# Patient Record
Sex: MALE | Race: WHITE | NOT HISPANIC OR LATINO | Employment: OTHER | URBAN - METROPOLITAN AREA
[De-identification: names, ages, dates, MRNs, and addresses within clinical notes are randomized per-mention and may not be internally consistent; named-entity substitution may affect disease eponyms.]

---

## 2021-12-23 ENCOUNTER — NURSING HOME VISIT (OUTPATIENT)
Dept: GERIATRICS | Facility: CLINIC | Age: 81
End: 2021-12-23
Payer: COMMERCIAL

## 2021-12-23 DIAGNOSIS — R29.6 FALLS FREQUENTLY: Primary | ICD-10-CM

## 2021-12-23 RX ORDER — MULTIPLE VITAMINS W/ MINERALS TAB 9MG-400MCG
1 TAB ORAL DAILY
COMMUNITY
End: 2022-03-31

## 2021-12-23 RX ORDER — ASPIRIN 81 MG/1
81 TABLET ORAL DAILY
COMMUNITY
End: 2022-04-07

## 2021-12-23 RX ORDER — FOLIC ACID 1 MG/1
1 TABLET ORAL DAILY
COMMUNITY
End: 2022-03-09

## 2021-12-23 RX ORDER — POLYETHYLENE GLYCOL 3350 17 G/17G
1 POWDER, FOR SOLUTION ORAL DAILY
COMMUNITY
End: 2022-04-01

## 2021-12-23 RX ORDER — ACETAMINOPHEN 500 MG
1000 TABLET ORAL 2 TIMES DAILY
COMMUNITY
End: 2022-11-04

## 2021-12-23 RX ORDER — CALCIUM CARBONATE/VITAMIN D3 500-10/5ML
1 LIQUID (ML) ORAL DAILY
COMMUNITY
End: 2022-03-09

## 2021-12-23 RX ORDER — CLONAZEPAM 1 MG/1
1 TABLET ORAL 2 TIMES DAILY PRN
COMMUNITY
End: 2022-03-31

## 2021-12-23 RX ORDER — LANOLIN ALCOHOL/MO/W.PET/CERES
100 CREAM (GRAM) TOPICAL DAILY
COMMUNITY
End: 2022-03-09

## 2021-12-23 RX ORDER — OLANZAPINE 2.5 MG/1
5 TABLET, FILM COATED ORAL DAILY
COMMUNITY
End: 2022-01-12

## 2021-12-23 RX ORDER — NYSTATIN 100000 [USP'U]/G
POWDER TOPICAL 2 TIMES DAILY
COMMUNITY
End: 2024-08-20

## 2021-12-23 RX ORDER — MIRTAZAPINE 15 MG/1
15 TABLET, FILM COATED ORAL AT BEDTIME
COMMUNITY
End: 2022-04-07

## 2021-12-24 ENCOUNTER — TRANSITIONAL CARE UNIT VISIT (OUTPATIENT)
Dept: GERIATRICS | Facility: CLINIC | Age: 81
End: 2021-12-24
Payer: MEDICARE

## 2021-12-24 VITALS
HEART RATE: 78 BPM | SYSTOLIC BLOOD PRESSURE: 121 MMHG | HEIGHT: 68 IN | WEIGHT: 212.8 LBS | DIASTOLIC BLOOD PRESSURE: 65 MMHG | TEMPERATURE: 98.1 F | OXYGEN SATURATION: 96 % | RESPIRATION RATE: 18 BRPM | BODY MASS INDEX: 32.25 KG/M2

## 2021-12-24 DIAGNOSIS — F10.10 ETOH ABUSE: ICD-10-CM

## 2021-12-24 DIAGNOSIS — I10 HYPERTENSION, UNSPECIFIED TYPE: ICD-10-CM

## 2021-12-24 DIAGNOSIS — M62.81 GENERALIZED MUSCLE WEAKNESS: ICD-10-CM

## 2021-12-24 DIAGNOSIS — R29.6 FALLS FREQUENTLY: Primary | ICD-10-CM

## 2021-12-24 PROCEDURE — 99310 SBSQ NF CARE HIGH MDM 45: CPT | Performed by: NURSE PRACTITIONER

## 2021-12-24 ASSESSMENT — MIFFLIN-ST. JEOR: SCORE: 1644.75

## 2021-12-24 NOTE — PROGRESS NOTES
[place] [facility]  PRIMARY CARE PROVIDER AND CLINIC:  No primary care provider on file., No primary physician on file.  No chief complaint on file.    [place] Medical Record Number:  [id number]  Place of Service where encounter took place:  No question data found.    Jamie Valdivia  is a 81 year old  (1940), admitted to the above facility from [facility]. Hospital stay 12/17/21 through 12/23/21..  Admitted to this facility for  rehab, medical management and nursing care.    HPI:    HPI information obtained from: facility chart records, facility staff and patient report.   Brief Summary of Hospital Course:   Updates on Status Since Skilled nursing Admission:     Patient Jamie Garg is a 81 yr old male admitted to [facility] for rehabilitation and placement s/p hospitalization [facility] for falls, weakness and alcoholism, alcoholic hepatitis   Needs placement in residential with wife. 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        CODE STATUS/ADVANCE DIRECTIVES DISCUSSION:   CPR/Full code   Patient's living condition: lives with spouse  ALLERGIES: Ativan [lorazepam]  PAST MEDICAL HISTORY:  has a past medical history of Alcohol dependence with withdrawal (H), Alcoholism (H), Back pain, CAD (coronary artery disease), Chronic a-fib (H), Constipation, ED (erectile dysfunction), Edema, Falling, JOSÉ MANUEL (generalized anxiety disorder), GERD (gastroesophageal reflux disease), GI bleeding, H/O carpal tunnel syndrome, HTN (hypertension), Osteoarthritis, Osteoporosis, Peripheral  "neuropathy, Rhabdomyolysis, Thrombocytopenia (H), and Urination disorder.  PAST SURGICAL HISTORY:   has no past surgical history on file.  FAMILY HISTORY: family history is not on file.  SOCIAL HISTORY:       Post Discharge Medication Reconciliation Status: discharge medications reconciled and changed, per note/orders    Current Outpatient Medications   Medication Sig Dispense Refill     acetaminophen (TYLENOL) 500 MG tablet Take 1,000 mg by mouth every 6 hours as needed for mild pain       aspirin 81 MG EC tablet Take 81 mg by mouth daily       Cholecalciferol (VITAMIN D3) 25 MCG (1000 UT) CAPS Take by mouth daily       clonazePAM (KLONOPIN) 1 MG tablet Take 1 mg by mouth 2 times daily as needed for seizures       folic acid (FOLVITE) 1 MG tablet Take 1 mg by mouth daily       magnesium hydroxide (MILK OF MAGNESIA) 400 MG/5ML suspension Take 30 mLs by mouth daily as needed for constipation or heartburn       magnesium oxide 400 MG CAPS Take 1 capsule by mouth daily       mirtazapine (REMERON) 15 MG tablet Take 15 mg by mouth At Bedtime       multivitamin w/minerals (THERA-VIT-M) tablet Take 1 tablet by mouth daily       nystatin (MYCOSTATIN) 329099 UNIT/GM external powder Apply topically 3 times daily as needed       OLANZapine (ZYPREXA) 2.5 MG tablet Take 2.5 mg by mouth daily       polyethylene glycol (MIRALAX) 17 g packet Take 1 packet by mouth daily as needed for constipation       polyvinyl alcohol-povidone PF (REFRESH) 1.4-0.6 % ophthalmic solution 1 drop every 4 hours as needed for irritation       thiamine (B-1) 100 MG tablet Take 100 mg by mouth daily         ROS:  10 point ROS of systems including Constitutional, Eyes, Respiratory, Cardiovascular, Gastroenterology, Genitourinary, Integumentary, Musculoskeletal, Psychiatric were all negative except for pertinent positives noted in my HPI.    Vitals:  /65   Pulse 78   Temp 98.1  F (36.7  C)   Resp 18   Ht 1.727 m (5' 8\")   Wt 96.5 kg (212 lb 12.8 " oz)   SpO2 96%   BMI 32.36 kg/m      Exam:  GENERAL APPEARANCE:  Alert, in no distress  ENT:  Mouth and posterior oropharynx normal, moist mucous membranes  EYES:  EOM, conjunctivae, lids, pupils and irises normal  NECK:  No adenopathy,masses or thyromegaly  RESP:  respiratory effort and palpation of chest normal, lungs clear to auscultation , no respiratory distress  CV:  Palpation and auscultation of heart done , regular rate and rhythm, no murmur, rub, or gallop  ABDOMEN:  normal bowel sounds, soft, nontender, no hepatosplenomegaly or other masses  M/S:   Lying in bed  SKIN:  Inspection of skin and subcutaneous tissue baseline  NEURO:   Cranial nerves 2-12 are normal tested and grossly at patient's baseline  PSYCH:  oriented X 3    Lab/Diagnostic data:  Labs done in SNF are in [place] EPIC. Please refer to them using [employer] Everywhere.    ASSESSMENT/PLAN:  Falls   Weakness  alcoholism   Walks with walker at baseline, coordinated with therapies regarding getting walker  Looking for placement with wife.   Continue therapies    involved in safe plan home  Recommend referral to chemical dependency program. Patient states he had not been drinking for 3 months  Continue vitamins supplements  Seizure,   History Etoh withdrawal  Unclear last seizure  No signs and symptoms seizures  Continue as needed Clonazepam     Malnourishment  Weight  Dietary referral, consider supplements    Afib  Rate controlled  Not on rate control medications or anticoagulated, hx Etoh and falls  Reviewed with daughter and no plans to start anticoagulation at this time and may review in future     hypertension  blood pressure managed   Not on blood pressure medications    Peripheral neuropathy  History Etoh abuse  Denies neuropathy  Not on medications to treat    Mood  JOSÉ MANUEL  Mood stable   Continue Remeron and Zyprexa  Wife in hospital  Goal to transition to prison  Supportive family   Consider house psychologist     Back  pain  Pain managed   Continue Tylenol as needed, monitor     Edema  Consider TG shapes    Constipation  Wishes to have schedule Miralax  Will schedule Miralax and continue as needed Miralax    Advanced care planning  Wishes to be FULL code  Reviewed with daughter Alexia     Thrombocytopenia  Monitor     Follow up CMP,CBC 12/27/21        Total time spent with patient visit at the [facility] nursing facility was 36 min including patient visit and review of past records. Greater than 50% of total time spent with counseling and coordinating care due to discussion on goals of care, functional goals, and discharge planning and discussion with daughter on goals of care and patient care plan    Electronically signed by:  MARICHUY Mendoza CNP

## 2021-12-24 NOTE — LETTER
12/24/2021        RE: Jamie Valdivia  [address]  [place] [address]        [place] [facility]  PRIMARY CARE PROVIDER AND CLINIC:  No primary care provider on file., No primary physician on file.  No chief complaint on file.    [place] Medical Record Number:  [id number]  Place of Service where encounter took place:  No question data found.    Jamie Valdivia  is a 81 year old  (1940), admitted to the above facility from [facility]. Hospital stay 12/17/21 through 12/23/21..  Admitted to this facility for  rehab, medical management and nursing care.    HPI:    HPI information obtained from: facility chart records, facility staff and patient report.   Brief Summary of Hospital Course:   Updates on Status Since Skilled nursing Admission:     Patient Jamie Garg is a 81 yr old male admitted to [facility] for rehabilitation and placement s/p hospitalization [facility] for falls, weakness and alcoholism, alcoholic hepatitis   Needs placement in [place] with wife. 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        CODE STATUS/ADVANCE DIRECTIVES DISCUSSION:   CPR/Full code   Patient's living condition: lives with spouse  ALLERGIES: Ativan [lorazepam]  PAST MEDICAL HISTORY:  has a past medical history of Alcohol dependence with withdrawal (H), Alcoholism (H), Back pain, CAD (coronary artery disease), Chronic a-fib (H), Constipation, ED (erectile dysfunction), Edema, Falling, JOSÉ MANUEL (generalized anxiety disorder), GERD (gastroesophageal reflux disease), GI bleeding, H/O  carpal tunnel syndrome, HTN (hypertension), Osteoarthritis, Osteoporosis, Peripheral neuropathy, Rhabdomyolysis, Thrombocytopenia (H), and Urination disorder.  PAST SURGICAL HISTORY:   has no past surgical history on file.  FAMILY HISTORY: family history is not on file.  SOCIAL HISTORY:       Post Discharge Medication Reconciliation Status: discharge medications reconciled and changed, per note/orders    Current Outpatient Medications   Medication Sig Dispense Refill     acetaminophen (TYLENOL) 500 MG tablet Take 1,000 mg by mouth every 6 hours as needed for mild pain       aspirin 81 MG EC tablet Take 81 mg by mouth daily       Cholecalciferol (VITAMIN D3) 25 MCG (1000 UT) CAPS Take by mouth daily       clonazePAM (KLONOPIN) 1 MG tablet Take 1 mg by mouth 2 times daily as needed for seizures       folic acid (FOLVITE) 1 MG tablet Take 1 mg by mouth daily       magnesium hydroxide (MILK OF MAGNESIA) 400 MG/5ML suspension Take 30 mLs by mouth daily as needed for constipation or heartburn       magnesium oxide 400 MG CAPS Take 1 capsule by mouth daily       mirtazapine (REMERON) 15 MG tablet Take 15 mg by mouth At Bedtime       multivitamin w/minerals (THERA-VIT-M) tablet Take 1 tablet by mouth daily       nystatin (MYCOSTATIN) 315870 UNIT/GM external powder Apply topically 3 times daily as needed       OLANZapine (ZYPREXA) 2.5 MG tablet Take 2.5 mg by mouth daily       polyethylene glycol (MIRALAX) 17 g packet Take 1 packet by mouth daily as needed for constipation       polyvinyl alcohol-povidone PF (REFRESH) 1.4-0.6 % ophthalmic solution 1 drop every 4 hours as needed for irritation       thiamine (B-1) 100 MG tablet Take 100 mg by mouth daily         ROS:  10 point ROS of systems including Constitutional, Eyes, Respiratory, Cardiovascular, Gastroenterology, Genitourinary, Integumentary, Musculoskeletal, Psychiatric were all negative except for pertinent positives noted in my HPI.    Vitals:  /65   Pulse 78   " Temp 98.1  F (36.7  C)   Resp 18   Ht 1.727 m (5' 8\")   Wt 96.5 kg (212 lb 12.8 oz)   SpO2 96%   BMI 32.36 kg/m      Exam:  GENERAL APPEARANCE:  Alert, in no distress  ENT:  Mouth and posterior oropharynx normal, moist mucous membranes  EYES:  EOM, conjunctivae, lids, pupils and irises normal  NECK:  No adenopathy,masses or thyromegaly  RESP:  respiratory effort and palpation of chest normal, lungs clear to auscultation , no respiratory distress  CV:  Palpation and auscultation of heart done , regular rate and rhythm, no murmur, rub, or gallop  ABDOMEN:  normal bowel sounds, soft, nontender, no hepatosplenomegaly or other masses  M/S:   Lying in bed  SKIN:  Inspection of skin and subcutaneous tissue baseline  NEURO:   Cranial nerves 2-12 are normal tested and grossly at patient's baseline  PSYCH:  oriented X 3    Lab/Diagnostic data:  Labs done in SNF are in [facility]. Please refer to them using [employer] Everywhere.    ASSESSMENT/PLAN:  Falls   Weakness  alcoholism   Walks with walker at baseline, coordinated with therapies regarding getting walker  Looking for placement with wife.   Continue therapies    involved in safe plan home  Recommend referral to chemical dependency program. Patient states he had not been drinking for 3 months  Continue vitamins supplements  Seizure,   History Etoh withdrawal  Unclear last seizure  No signs and symptoms seizures  Continue as needed Clonazepam     Malnourishment  Weight  Dietary referral, consider supplements    Afib  Rate controlled  Not on rate control medications or anticoagulated, hx Etoh and falls  Reviewed with daughter and no plans to start anticoagulation at this time and may review in future     hypertension  blood pressure managed   Not on blood pressure medications    Peripheral neuropathy  History Etoh abuse  Denies neuropathy  Not on medications to treat    Mood  JOSÉ MANUEL  Mood stable   Continue Remeron and Zyprexa  Wife in hospital  Goal to " transition to jail  Supportive family   Consider house psychologist     Back pain  Pain managed   Continue Tylenol as needed, monitor     Edema  Consider TG shapes    Constipation  Wishes to have schedule Miralax  Will schedule Miralax and continue as needed Miralax    Advanced care planning  Wishes to be FULL code  Reviewed with daughter Alexia     Thrombocytopenia  Monitor     Follow up CMP,CBC 12/27/21        Total time spent with patient visit at the skilled nursing facility was 36 min including patient visit and review of past records. Greater than 50% of total time spent with counseling and coordinating care due to discussion on goals of care, functional goals, and discharge planning and discussion with daughter on goals of care and patient care plan    Electronically signed by:  MARICHUY Mendoza CNP                             Sincerely,        MARICHUY Mendoza CNP

## 2021-12-27 ENCOUNTER — DOCUMENTATION ONLY (OUTPATIENT)
Dept: OTHER | Facility: CLINIC | Age: 81
End: 2021-12-27

## 2021-12-27 ENCOUNTER — TRANSITIONAL CARE UNIT VISIT (OUTPATIENT)
Dept: GERIATRICS | Facility: CLINIC | Age: 81
End: 2021-12-27
Payer: MEDICARE

## 2021-12-27 VITALS
RESPIRATION RATE: 18 BRPM | HEART RATE: 66 BPM | BODY MASS INDEX: 32.12 KG/M2 | WEIGHT: 211.9 LBS | SYSTOLIC BLOOD PRESSURE: 151 MMHG | DIASTOLIC BLOOD PRESSURE: 78 MMHG | TEMPERATURE: 98.2 F | HEIGHT: 68 IN | OXYGEN SATURATION: 95 %

## 2021-12-27 DIAGNOSIS — I10 HYPERTENSION, UNSPECIFIED TYPE: ICD-10-CM

## 2021-12-27 DIAGNOSIS — M62.81 GENERALIZED MUSCLE WEAKNESS: ICD-10-CM

## 2021-12-27 DIAGNOSIS — F10.10 ETOH ABUSE: ICD-10-CM

## 2021-12-27 DIAGNOSIS — R29.6 FALLS FREQUENTLY: Primary | ICD-10-CM

## 2021-12-27 PROCEDURE — 99309 SBSQ NF CARE MODERATE MDM 30: CPT | Performed by: NURSE PRACTITIONER

## 2021-12-27 ASSESSMENT — MIFFLIN-ST. JEOR: SCORE: 1640.67

## 2021-12-27 NOTE — PROGRESS NOTES
[place] [facility]  [place] Medical Record Number:  [id number]  Place of Service where encounter took place:  [facility] (TCU) [[id number]]  Chief Complaint   Patient presents with     Nursing Home Acute       HPI:    Jamie Valdivia  is a 81 year old (1940), who is being seen today for an episodic care visit.  HPI information obtained from: facility chart records, facility staff and patient report. Today's concern is:    Patient Jamie Garg is a 81 yr old male admitted to [facility] for rehabilitation and placement s/p hospitalization [facility] for falls, weakness and alcoholism, alcoholic hepatitis   Needs placement in [facility] with wife. Wife current in hospital     PMHx Afib (chronic)(not on anticoagulation), on aspirin, CAD, hypertension, GERD, osteoarthritis, back pain, peripheral neuropathy and thrombocytopenia       Falls frequently  ETOH abuse  Generalized muscle weakness  Hypertension, unspecified type     Vitals stable, SBP slightly elevated at times  Participating in therapies and states he is walking with walker. No acute concerns. Reports he is eating and regular elimination. Denies pain  Patient asks if wife at facility and informed patient not yet and will update when she arrives     Past Medical and Surgical History reviewed in Epic today.    MEDICATIONS:    Current Outpatient Medications   Medication Sig Dispense Refill     acetaminophen (TYLENOL) 500 MG tablet Take 1,000 mg by mouth every 6 hours as needed for mild pain       aspirin 81 MG EC tablet Take 81 mg by mouth daily       Cholecalciferol (VITAMIN D3) 25 MCG (1000 UT) CAPS Take by mouth daily       clonazePAM (KLONOPIN) 1 MG tablet Take 1 mg by mouth 2 times daily as needed for seizures       folic acid (FOLVITE) 1 MG tablet Take 1 mg by mouth daily       magnesium hydroxide (MILK OF MAGNESIA) 400 MG/5ML suspension Take 30 mLs by mouth daily as needed for  "constipation or heartburn       magnesium oxide 400 MG CAPS Take 1 capsule by mouth daily       mirtazapine (REMERON) 15 MG tablet Take 15 mg by mouth At Bedtime       multivitamin w/minerals (THERA-VIT-M) tablet Take 1 tablet by mouth daily       nystatin (MYCOSTATIN) 704441 UNIT/GM external powder Apply topically 3 times daily as needed       OLANZapine (ZYPREXA) 2.5 MG tablet Take 2.5 mg by mouth daily       polyethylene glycol (MIRALAX) 17 g packet Take 1 packet by mouth daily & daily as needed       polyvinyl alcohol-povidone PF (REFRESH) 1.4-0.6 % ophthalmic solution 1 drop every 4 hours as needed for irritation       thiamine (B-1) 100 MG tablet Take 100 mg by mouth daily           REVIEW OF SYSTEMS:  4 point ROS including Respiratory, CV, GI and , other than that noted in the HPI,  is negative    Objective:  BP (!) 151/78   Pulse 66   Temp 98.2  F (36.8  C)   Resp 18   Ht 1.727 m (5' 8\")   Wt 96.1 kg (211 lb 14.4 oz)   SpO2 95%   BMI 32.22 kg/m    Exam:  GENERAL APPEARANCE:  Alert, in no distress  RESP:  respiratory effort and palpation of chest normal, lungs clear to auscultation , no respiratory distress  CV:  Palpation and auscultation of heart done , regular rate and rhythm, no murmur, rub, or gallop  ABDOMEN:  normal bowel sounds, soft, nontender, no hepatosplenomegaly or other masses  M/S:   Lying in bed  SKIN:  Inspection of skin and subcutaneous tissue baseline  PSYCH:  oriented X 3    Labs:   Labs done in SNF are in [facility]. Please refer to them using [facility]Care Everywhere.    ASSESSMENT/PLAN:  1. Falls frequently    2. ETOH abuse    3. Generalized muscle weakness    4. Hypertension, unspecified type        Vitals stable, SBP slightly elevated at times. Will monitor trend at this time. Not on blood pressure medications at this time  Participating in therapies and states he is walking with walker. No acute concerns. Reports he is eating and regular elimination. Denies pain  Patient asks " if wife at facility and informed patient not yet and will update when she arrives       Electronically signed by:  MARICHUY Mendoza CNP

## 2021-12-28 ENCOUNTER — LAB REQUISITION (OUTPATIENT)
Dept: LAB | Facility: CLINIC | Age: 81
End: 2021-12-28

## 2021-12-28 DIAGNOSIS — Z11.1 ENCOUNTER FOR SCREENING FOR RESPIRATORY TUBERCULOSIS: ICD-10-CM

## 2021-12-29 LAB
ALBUMIN SERPL-MCNC: 3.3 G/DL (ref 3.4–5)
ALP SERPL-CCNC: 82 U/L (ref 40–150)
ALT SERPL W P-5'-P-CCNC: 17 U/L (ref 0–70)
ANION GAP SERPL CALCULATED.3IONS-SCNC: 6 MMOL/L (ref 3–14)
AST SERPL W P-5'-P-CCNC: 20 U/L (ref 0–45)
BILIRUB SERPL-MCNC: 0.3 MG/DL (ref 0.2–1.3)
BUN SERPL-MCNC: 17 MG/DL (ref 7–30)
CALCIUM SERPL-MCNC: 8.9 MG/DL (ref 8.5–10.1)
CHLORIDE BLD-SCNC: 105 MMOL/L (ref 94–109)
CO2 SERPL-SCNC: 29 MMOL/L (ref 20–32)
CREAT SERPL-MCNC: 0.62 MG/DL (ref 0.66–1.25)
ERYTHROCYTE [DISTWIDTH] IN BLOOD BY AUTOMATED COUNT: 14.5 % (ref 10–15)
GFR SERPL CREATININE-BSD FRML MDRD: >90 ML/MIN/1.73M2
GLUCOSE BLD-MCNC: 80 MG/DL (ref 70–99)
HCT VFR BLD AUTO: 40.7 % (ref 40–53)
HGB BLD-MCNC: 12.4 G/DL (ref 13.3–17.7)
MCH RBC QN AUTO: 29.7 PG (ref 26.5–33)
MCHC RBC AUTO-ENTMCNC: 30.5 G/DL (ref 31.5–36.5)
MCV RBC AUTO: 97 FL (ref 78–100)
PLATELET # BLD AUTO: 263 10E3/UL (ref 150–450)
POTASSIUM BLD-SCNC: 3.9 MMOL/L (ref 3.4–5.3)
PROT SERPL-MCNC: 7.5 G/DL (ref 6.8–8.8)
RBC # BLD AUTO: 4.18 10E6/UL (ref 4.4–5.9)
SODIUM SERPL-SCNC: 140 MMOL/L (ref 133–144)
WBC # BLD AUTO: 5.8 10E3/UL (ref 4–11)

## 2021-12-29 PROCEDURE — 36415 COLL VENOUS BLD VENIPUNCTURE: CPT | Performed by: NURSE PRACTITIONER

## 2021-12-29 PROCEDURE — 85027 COMPLETE CBC AUTOMATED: CPT | Performed by: NURSE PRACTITIONER

## 2021-12-29 PROCEDURE — 86481 TB AG RESPONSE T-CELL SUSP: CPT | Performed by: NURSE PRACTITIONER

## 2021-12-29 PROCEDURE — 82040 ASSAY OF SERUM ALBUMIN: CPT | Performed by: NURSE PRACTITIONER

## 2021-12-29 PROCEDURE — P9604 ONE-WAY ALLOW PRORATED TRIP: HCPCS | Performed by: NURSE PRACTITIONER

## 2021-12-30 LAB
GAMMA INTERFERON BACKGROUND BLD IA-ACNC: 0.06 IU/ML
M TB IFN-G BLD-IMP: NEGATIVE
M TB IFN-G CD4+ BCKGRND COR BLD-ACNC: 6.23 IU/ML
MITOGEN IGNF BCKGRD COR BLD-ACNC: 0.03 IU/ML
MITOGEN IGNF BCKGRD COR BLD-ACNC: 0.05 IU/ML
QUANTIFERON MITOGEN: 6.29 IU/ML
QUANTIFERON NIL TUBE: 0.06 IU/ML
QUANTIFERON TB1 TUBE: 0.11 IU/ML
QUANTIFERON TB2 TUBE: 0.09

## 2022-01-01 ENCOUNTER — NURSING HOME VISIT (OUTPATIENT)
Dept: GERIATRICS | Facility: CLINIC | Age: 82
End: 2022-01-01
Payer: MEDICARE

## 2022-01-01 DIAGNOSIS — F10.10 ETOH ABUSE: ICD-10-CM

## 2022-01-01 DIAGNOSIS — R29.6 FALLS FREQUENTLY: Primary | ICD-10-CM

## 2022-01-01 DIAGNOSIS — I10 HYPERTENSION, UNSPECIFIED TYPE: ICD-10-CM

## 2022-01-01 PROCEDURE — 99304 1ST NF CARE SF/LOW MDM 25: CPT | Performed by: INTERNAL MEDICINE

## 2022-01-02 NOTE — PROGRESS NOTES
[place] [facility]  Pt was seen by Dr Weiss at the [facility] on 1/1/22 for an initial TCU visit    Pt is a 81 year old  (1940), admitted to the above facility from [facility]. Hospital stay 12/17/21 through 12/23/21..  Admitted to this facility for  rehab, medical management and nursing care.    Hospital course was reviewed by me, is as per the hospital discharge summary and NP note.        Pt is a 81 yr old male admitted to [facility] for rehabilitation and placement s/p hospitalization [facility] for falls, weakness and alcoholism, alcoholic hepatitis   [place]eds placement [place] with [place]. Wife was recently hospitalized, is now also in the TCU    PMHx Afib (chronic)(not on anticoagulation), on aspirin, CAD, hypertension, GERD, osteoarthritis, back pain, peripheral neuropathy and thrombocytopenia      Pt states he feels fine, denies chest pain, cough, SOB, dizziness, abd pain  He is walking with a walker with therapy  Appetite has been good  Vitals have been stable since admission to the TCU          CODE STATUS/ADVANCE DIRECTIVES DISCUSSION:   CPR/Full code   Patient's living condition: lives with spouse  ALLERGIES: Ativan [lorazepam]  PAST MEDICAL HISTORY:  has a past medical history of Alcohol dependence with withdrawal (H), Alcoholism (H), Back pain, CAD (coronary artery disease), Chronic a-fib (H), Constipation, ED (erectile dysfunction), Edema, Falling, JOSÉ MANUEL (generalized anxiety disorder), GERD (gastroesophageal reflux disease), GI bleeding, H/O carpal tunnel syndrome, HTN (hypertension), Osteoarthritis, Osteoporosis, Peripheral neuropathy, Rhabdomyolysis, Thrombocytopenia (H), and Urination disorder.  PAST SURGICAL HISTORY:   has no past surgical history on file.  FAMILY HISTORY: not obtained by me today  SOCIAL HISTORY:   Lives with wife, plan is to move into [place]        Current Outpatient Medications    Medication Sig Dispense Refill     acetaminophen (TYLENOL) 500 MG tablet Take 1,000 mg by mouth every 6 hours as needed for mild pain       aspirin 81 MG EC tablet Take 81 mg by mouth daily       Cholecalciferol (VITAMIN D3) 25 MCG (1000 UT) CAPS Take by mouth daily       clonazePAM (KLONOPIN) 1 MG tablet Take 1 mg by mouth 2 times daily as needed for seizures       folic acid (FOLVITE) 1 MG tablet Take 1 mg by mouth daily       magnesium hydroxide (MILK OF MAGNESIA) 400 MG/5ML suspension Take 30 mLs by mouth daily as needed for constipation or heartburn       magnesium oxide 400 MG CAPS Take 1 capsule by mouth daily       mirtazapine (REMERON) 15 MG tablet Take 15 mg by mouth At Bedtime       multivitamin w/minerals (THERA-VIT-M) tablet Take 1 tablet by mouth daily       nystatin (MYCOSTATIN) 748985 UNIT/GM external powder Apply topically 3 times daily as needed       OLANZapine (ZYPREXA) 2.5 MG tablet Take 2.5 mg by mouth daily       polyethylene glycol (MIRALAX) 17 g packet Take 1 packet by mouth daily as needed for constipation       polyvinyl alcohol-povidone PF (REFRESH) 1.4-0.6 % ophthalmic solution 1 drop every 4 hours as needed for irritation       thiamine (B-1) 100 MG tablet Take 100 mg by mouth daily         ROS:  10 point ROS of systems including Constitutional, Eyes, Respiratory, Cardiovascular, Gastroenterology, Genitourinary, Integumentary, Musculoskeletal, Psychiatric were all negative except for pertinent positives noted in my HPI.          Exam:  GENERAL APPEARANCE:  Alert, in no distress, lying in bed  ENT:  Oral mucosa moist  EYES: no eye redness or drainage  NECK:  supple  RESP: RR 12, unlabored, lungs clear  CV:  Irreg, HR 60s  ABDOMEN:  Soft, non-tender  M/S:  Trace ankle edema  SKIN:  No rash  NEURO: alert, fully oriented, speech clear  No tremors, no gross focal weakness  Gait was not assessed          ASSESSMENT/PLAN:      Falls, generalized weakness, in setting of history of chronic  ETOH use, peripheral neuropathy.  Last ETOH use said to be 3 months ago  Stable mental and functional status since admission to the TCU  Plan continue therapies, monitor vitals  Assure safe discharge (to [place])      Afib  Rate controlled  Not on rate control medications or anticoagulated, hx Etoh and falls    Hypertension  Fair control, on no medications  Plan monitor BP, no indication for anti-HTN medications at this time    Mood  JOSÉ MANUEL  Appears stable  Continue Remeron and Zyprexa       Tyler Weiss MD

## 2022-01-03 ENCOUNTER — TRANSITIONAL CARE UNIT VISIT (OUTPATIENT)
Dept: GERIATRICS | Facility: CLINIC | Age: 82
End: 2022-01-03
Payer: MEDICARE

## 2022-01-03 VITALS
HEART RATE: 81 BPM | WEIGHT: 211.8 LBS | BODY MASS INDEX: 32.1 KG/M2 | TEMPERATURE: 98.7 F | OXYGEN SATURATION: 96 % | SYSTOLIC BLOOD PRESSURE: 129 MMHG | RESPIRATION RATE: 18 BRPM | HEIGHT: 68 IN | DIASTOLIC BLOOD PRESSURE: 78 MMHG

## 2022-01-03 DIAGNOSIS — R41.89 COGNITIVE IMPAIRMENT: ICD-10-CM

## 2022-01-03 DIAGNOSIS — M62.81 GENERALIZED MUSCLE WEAKNESS: ICD-10-CM

## 2022-01-03 DIAGNOSIS — R29.6 FALLS FREQUENTLY: Primary | ICD-10-CM

## 2022-01-03 PROCEDURE — 99309 SBSQ NF CARE MODERATE MDM 30: CPT | Performed by: NURSE PRACTITIONER

## 2022-01-03 ASSESSMENT — MIFFLIN-ST. JEOR: SCORE: 1640.22

## 2022-01-03 NOTE — PROGRESS NOTES
[facility]  [place] Medical Record Number:  [id number]  Place of Service where encounter took place:  [facility] ([facility]U) [[id number]]  Chief Complaint   Patient presents with     Nursing Home Acute       HPI:    Jamie Valdivia  is a 81 year old (1940), who is being seen today for an episodic care visit.  HPI information obtained from: facility chart records, facility staff and patient report. Today's concern is:    Patient Jamie Garg is a 81 yr old male admitted to [facility] for rehabilitation and placement s/p hospitalization [facility] for falls, weakness and alcoholism, alcoholic hepatitis   Needs placement in [facility] with wife. Wife current in hospital     PMHx Afib (chronic)(not on anticoagulation), on aspirin, CAD, hypertension, GERD, osteoarthritis, back pain, peripheral neuropathy and thrombocytopenia       Falls frequently  Generalized muscle weakness  Cognitive impairment     Patient confused and wandering in halls. Thought he was at a library and was going to catch a bus  Patient wife is at facility and daughter looking into placement  Patient has no acute concerns and states he feels fine  Vitals stable  Walks with walker    Past Medical and Surgical History reviewed in Epic today.    MEDICATIONS:    Current Outpatient Medications   Medication Sig Dispense Refill     acetaminophen (TYLENOL) 500 MG tablet Take 1,000 mg by mouth every 6 hours as needed for mild pain       aspirin 81 MG EC tablet Take 81 mg by mouth daily       Cholecalciferol (VITAMIN D3) 25 MCG (1000 UT) CAPS Take by mouth daily       clonazePAM (KLONOPIN) 1 MG tablet Take 1 mg by mouth 2 times daily as needed for seizures       folic acid (FOLVITE) 1 MG tablet Take 1 mg by mouth daily       magnesium hydroxide (MILK OF MAGNESIA) 400 MG/5ML suspension Take 30 mLs by mouth daily as needed for constipation or heartburn       magnesium oxide 400 MG  "CAPS Take 1 capsule by mouth daily       mirtazapine (REMERON) 15 MG tablet Take 15 mg by mouth At Bedtime       multivitamin w/minerals (THERA-VIT-M) tablet Take 1 tablet by mouth daily       nystatin (MYCOSTATIN) 153623 UNIT/GM external powder Apply topically 3 times daily as needed       OLANZapine (ZYPREXA) 2.5 MG tablet Take 2.5 mg by mouth daily       polyethylene glycol (MIRALAX) 17 g packet Take 1 packet by mouth daily & daily as needed       polyvinyl alcohol-povidone PF (REFRESH) 1.4-0.6 % ophthalmic solution 1 drop every 4 hours as needed for irritation       thiamine (B-1) 100 MG tablet Take 100 mg by mouth daily           REVIEW OF SYSTEMS:  4 point ROS including Respiratory, CV, GI and , other than that noted in the HPI,  is negative    Objective:  /78   Pulse 81   Temp 98.7  F (37.1  C)   Resp 18   Ht 1.727 m (5' 8\")   Wt 96.1 kg (211 lb 12.8 oz)   SpO2 96%   BMI 32.20 kg/m    Exam:  GENERAL APPEARANCE:  Alert, in no distress  RESP:  respiratory effort and palpation of chest normal, lungs clear to auscultation , no respiratory distress  CV:  Palpation and auscultation of heart done , regular rate and rhythm, no murmur, rub, or gallop  ABDOMEN:  normal bowel sounds, soft, nontender, no hepatosplenomegaly or other masses  M/S:   sitting in WC  SKIN:  Inspection of skin and subcutaneous tissue baseline  PSYCH:  memory impaired , affect and mood normal    Labs:   Labs done in SNF are in [place] EPIC. Please refer to them using EPIC/Care Everywhere.    ASSESSMENT/PLAN:     Falls frequently  Generalized muscle weakness  Cognitive impairment     Patient confused and wandering in halls. Thought he was at a library and was going to catch a bus  Wanderguard placed   Patient wife is at facility and daughter looking into placement   involved in safe discharge planning  Patient has no acute concerns and states he feels fine  Vitals stable  Walks with walker  Continue therapies and " orient & redirect as appropriate   Mood stable. No aggression noted    Electronically signed by:  MARICHUY Mendoza CNP

## 2022-01-12 ENCOUNTER — TRANSITIONAL CARE UNIT VISIT (OUTPATIENT)
Dept: GERIATRICS | Facility: CLINIC | Age: 82
End: 2022-01-12
Payer: MEDICARE

## 2022-01-12 VITALS
RESPIRATION RATE: 18 BRPM | OXYGEN SATURATION: 97 % | HEIGHT: 68 IN | WEIGHT: 213.5 LBS | BODY MASS INDEX: 32.36 KG/M2 | SYSTOLIC BLOOD PRESSURE: 158 MMHG | HEART RATE: 84 BPM | TEMPERATURE: 98.2 F | DIASTOLIC BLOOD PRESSURE: 70 MMHG

## 2022-01-12 DIAGNOSIS — M62.81 GENERALIZED MUSCLE WEAKNESS: ICD-10-CM

## 2022-01-12 DIAGNOSIS — F10.10 ETOH ABUSE: ICD-10-CM

## 2022-01-12 DIAGNOSIS — R29.6 FALLS FREQUENTLY: Primary | ICD-10-CM

## 2022-01-12 DIAGNOSIS — R41.89 COGNITIVE IMPAIRMENT: ICD-10-CM

## 2022-01-12 PROCEDURE — 99309 SBSQ NF CARE MODERATE MDM 30: CPT | Performed by: NURSE PRACTITIONER

## 2022-01-12 RX ORDER — OLANZAPINE 5 MG/1
5 TABLET ORAL DAILY
COMMUNITY
End: 2022-04-07

## 2022-01-12 RX ORDER — LANOLIN ALCOHOL/MO/W.PET/CERES
3 CREAM (GRAM) TOPICAL AT BEDTIME
COMMUNITY
End: 2022-03-31

## 2022-01-12 ASSESSMENT — MIFFLIN-ST. JEOR: SCORE: 1647.93

## 2022-01-12 NOTE — PROGRESS NOTES
[facility]  [place] Medical Record Number:  [id number]  Place of Service where encounter took place:  [facility] ([facility]) [[id number]]  Chief Complaint   Patient presents with     Nursing Home Acute       HPI:    Jamie Valdivia  is a 81 year old (1940), who is being seen today for an episodic care visit.  HPI information obtained from: facility chart records, facility staff and patient report. Today's concern is:    Patient Jamie Garg is a 81 yr old male admitted to [facility] for rehabilitation and placement s/p hospitalization [facility][note 2] for falls, weakness and alcoholism, alcoholic hepatitis   Needs placement in [facility] with wife. Wife current in hospital     PMHx Afib (chronic)(not on anticoagulation), on aspirin, CAD, hypertension, GERD, osteoarthritis, back pain, peripheral neuropathy and thrombocytopenia       Falls frequently  Cognitive impairment  Generalized muscle weakness  ETOH abuse     Patient often wearing gown in the morning. States he does not always want to get dressed. Reports he is tired this morning  Patient has been agitated in the evening and night looking for his wife despite having already said good night to her. She is in the TCU in different room. Recent increase in Zyprexa, however, has been given in the morning    Past Medical and Surgical History reviewed in Epic today.    MEDICATIONS:    Current Outpatient Medications   Medication Sig Dispense Refill     acetaminophen (TYLENOL) 500 MG tablet Take 1,000 mg by mouth every 6 hours as needed for mild pain       aspirin 81 MG EC tablet Take 81 mg by mouth daily       Cholecalciferol (VITAMIN D3) 25 MCG (1000 UT) CAPS Take by mouth daily       clonazePAM (KLONOPIN) 1 MG tablet Take 1 mg by mouth 2 times daily as needed for seizures       folic acid (FOLVITE) 1 MG tablet Take 1 mg by mouth daily       magnesium hydroxide (MILK OF MAGNESIA) 400 MG/5ML  "suspension Take 30 mLs by mouth daily as needed for constipation or heartburn       magnesium oxide 400 MG CAPS Take 1 capsule by mouth daily       melatonin 3 MG tablet Take 3 mg by mouth At Bedtime       mirtazapine (REMERON) 15 MG tablet Take 15 mg by mouth At Bedtime       multivitamin w/minerals (THERA-VIT-M) tablet Take 1 tablet by mouth daily       nystatin (MYCOSTATIN) 485859 UNIT/GM external powder Apply topically 3 times daily as needed       OLANZapine (ZYPREXA) 5 MG tablet Take 5 mg by mouth daily       polyethylene glycol (MIRALAX) 17 g packet Take 1 packet by mouth daily & daily as needed       polyvinyl alcohol-povidone PF (REFRESH) 1.4-0.6 % ophthalmic solution 1 drop every 4 hours as needed for irritation       thiamine (B-1) 100 MG tablet Take 100 mg by mouth daily           REVIEW OF SYSTEMS:  4 point ROS including Respiratory, CV, GI and , other than that noted in the HPI,  is negative    Objective:  BP (!) 158/70   Pulse 84   Temp 98.2  F (36.8  C)   Resp 18   Ht 1.727 m (5' 8\")   Wt 96.8 kg (213 lb 8 oz)   SpO2 97%   BMI 32.46 kg/m    Exam:  GENERAL APPEARANCE:  Alert, in no distress  RESP:  respiratory effort and palpation of chest normal, lungs clear to auscultation , no respiratory distress  CV:  Palpation and auscultation of heart done , regular rate and rhythm, no murmur, rub, or gallop  ABDOMEN:  normal bowel sounds, soft, nontender, no hepatosplenomegaly or other masses  M/S:   lying in bed  SKIN:  Inspection of skin and subcutaneous tissue + 2 edema bilateral LE   PSYCH:  affect and mood normal    Labs:   Labs done in SNF are in [place] EPIC. Please refer to them using EPIC/Care Everywhere.    ASSESSMENT/PLAN:     Falls frequently  Cognitive impairment  Generalized muscle weakness  ETOH abuse     Patient often wearing gown in the morning. States he does not always want to get dressed. Reports he is tired this morning  Patient has been agitated in the evening and night looking " for his wife despite having already said good night to her. She is in the TCU in different room. Recent increase in Zyprexa, however, has been given in the morning  Order change Zyprexa to 5pm and continue with Remeron and Melatonin HS  No signs and symptoms seizures    Per therapies report  Transfers: SBA 2WW   Bed Mobility: Supervision   Ambulation: up to 564 ft 4WW SBA   SLUMS:24/30   Grooming/Hygiene: Supervision   UB Dressing: SBA   LB Dressing: Min   Toileting: CGA   Speech: Currently on DD3 diet, history of choking especially on meats; mild to moderate cognitive impairment noted; becoming increasingly resistive to therapy sessions - ST likely to discharge soon  Continue therapies at [facility]      Electronically signed by:  MARICHUY Mendoza CNP

## 2022-01-19 ENCOUNTER — TRANSITIONAL CARE UNIT VISIT (OUTPATIENT)
Dept: GERIATRICS | Facility: CLINIC | Age: 82
End: 2022-01-19
Payer: MEDICARE

## 2022-01-19 VITALS
WEIGHT: 214.7 LBS | TEMPERATURE: 98.3 F | RESPIRATION RATE: 18 BRPM | BODY MASS INDEX: 32.54 KG/M2 | HEIGHT: 68 IN | OXYGEN SATURATION: 99 % | HEART RATE: 57 BPM | DIASTOLIC BLOOD PRESSURE: 80 MMHG | SYSTOLIC BLOOD PRESSURE: 125 MMHG

## 2022-01-19 DIAGNOSIS — R29.6 FALLS FREQUENTLY: ICD-10-CM

## 2022-01-19 DIAGNOSIS — M62.81 GENERALIZED MUSCLE WEAKNESS: ICD-10-CM

## 2022-01-19 DIAGNOSIS — R41.89 COGNITIVE IMPAIRMENT: Primary | ICD-10-CM

## 2022-01-19 DIAGNOSIS — F10.10 ETOH ABUSE: ICD-10-CM

## 2022-01-19 PROCEDURE — 99309 SBSQ NF CARE MODERATE MDM 30: CPT | Performed by: NURSE PRACTITIONER

## 2022-01-19 ASSESSMENT — MIFFLIN-ST. JEOR: SCORE: 1653.37

## 2022-01-19 NOTE — PROGRESS NOTES
[facility]  [place] Medical Record Number:  [id number]  Place of Service where encounter took place:  [facility] ([facility]) [[id number]]  Chief Complaint   Patient presents with     Nursing Home Acute       HPI:    Jamie Valdivia  is a 81 year old (1940), who is being seen today for an episodic care visit.  HPI information obtained from: facility chart records, facility staff and patient report. Today's concern is:    Patient Jamie Garg is a 81 yr old male admitted to [facility] for rehabilitation and placement s/p hospitalization [facility] for falls, weakness and alcoholism, alcoholic hepatitis   Needs placement in [facility] with wife. 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    MEDICATIONS:    Current Outpatient Medications   Medication Sig Dispense Refill     acetaminophen (TYLENOL) 500 MG tablet Take 1,000 mg by mouth every 6 hours as needed for mild pain       aspirin 81 MG EC tablet Take 81 mg by mouth daily       Cholecalciferol (VITAMIN D3) 25 MCG (1000 UT) CAPS Take by mouth daily       clonazePAM (KLONOPIN) 1 MG tablet Take 1 mg by mouth 2 times daily as needed for seizures       folic acid (FOLVITE) 1 MG tablet Take 1 mg by mouth daily       magnesium hydroxide (MILK OF MAGNESIA) 400 MG/5ML suspension Take 30 mLs by mouth daily as needed for constipation or heartburn       magnesium oxide 400 MG CAPS Take 1 capsule by mouth  "daily       melatonin 3 MG tablet Take 3 mg by mouth At Bedtime       mirtazapine (REMERON) 15 MG tablet Take 15 mg by mouth At Bedtime       multivitamin w/minerals (THERA-VIT-M) tablet Take 1 tablet by mouth daily       nystatin (MYCOSTATIN) 189974 UNIT/GM external powder Apply topically 3 times daily as needed       OLANZapine (ZYPREXA) 5 MG tablet Take 5 mg by mouth daily       polyethylene glycol (MIRALAX) 17 g packet Take 1 packet by mouth daily & daily as needed       polyvinyl alcohol-povidone PF (REFRESH) 1.4-0.6 % ophthalmic solution 1 drop every 4 hours as needed for irritation       thiamine (B-1) 100 MG tablet Take 100 mg by mouth daily           REVIEW OF SYSTEMS:  4 point ROS including Respiratory, CV, GI and , other than that noted in the HPI,  is negative    Objective:  /80   Pulse 57   Temp 98.3  F (36.8  C)   Resp 18   Ht 1.727 m (5' 8\")   Wt 97.4 kg (214 lb 11.2 oz)   SpO2 99%   BMI 32.65 kg/m    Exam:  GENERAL APPEARANCE:  Alert, in no distress  RESP:  respiratory effort and palpation of chest normal, lungs clear to auscultation , no respiratory distress  CV:  Palpation and auscultation of heart done , regular rate and rhythm, no murmur, rub, or gallop  ABDOMEN:  normal bowel sounds, soft, nontender, no hepatosplenomegaly or other masses  M/S:   lying in bed in gown   SKIN:  Inspection of skin and subcutaneous tissue + 2 edema bilateral LE  PSYCH:  memory impaired , affect and mood normal    Labs:   Labs done in SNF are in [place] EPIC. Please refer to them using EPIC/Care Everywhere.    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 No discharge date discussed.     looking into placement      Electronically signed by:  MARICHUY Mendoza CNP

## 2022-02-09 ENCOUNTER — TRANSITIONAL CARE UNIT VISIT (OUTPATIENT)
Dept: GERIATRICS | Facility: CLINIC | Age: 82
End: 2022-02-09
Payer: MEDICARE

## 2022-02-09 VITALS
DIASTOLIC BLOOD PRESSURE: 63 MMHG | WEIGHT: 215 LBS | RESPIRATION RATE: 16 BRPM | BODY MASS INDEX: 32.58 KG/M2 | HEART RATE: 57 BPM | TEMPERATURE: 97.8 F | SYSTOLIC BLOOD PRESSURE: 126 MMHG | OXYGEN SATURATION: 97 % | HEIGHT: 68 IN

## 2022-02-09 DIAGNOSIS — R41.89 COGNITIVE IMPAIRMENT: Primary | ICD-10-CM

## 2022-02-09 DIAGNOSIS — I10 HYPERTENSION, UNSPECIFIED TYPE: ICD-10-CM

## 2022-02-09 DIAGNOSIS — F10.10 ETOH ABUSE: ICD-10-CM

## 2022-02-09 DIAGNOSIS — R29.6 FALLS FREQUENTLY: ICD-10-CM

## 2022-02-09 DIAGNOSIS — M62.81 GENERALIZED MUSCLE WEAKNESS: ICD-10-CM

## 2022-02-09 PROCEDURE — 99309 SBSQ NF CARE MODERATE MDM 30: CPT | Performed by: NURSE PRACTITIONER

## 2022-02-09 RX ORDER — HYOSCYAMINE SULFATE 0.125 MG
0.12 TABLET ORAL 4 TIMES DAILY PRN
COMMUNITY
End: 2022-04-27

## 2022-02-09 ASSESSMENT — MIFFLIN-ST. JEOR: SCORE: 1654.73

## 2022-02-09 NOTE — PROGRESS NOTES
[facility]  [place] Medical Record Number:  [id number]  Place of Service where encounter took place:  [facility] ([facility]) [[id number]]  Chief Complaint   Patient presents with     Nursing Home Acute       HPI:    Jamie Valdivia  is a 81 year old (1940), who is being seen today for an episodic care visit.  HPI information obtained from: facility chart records, facility staff and patient report. Today's concern is:    Patient Jamie Garg is a 81 yr old male admitted to [facility] for rehabilitation and placement s/p hospitalization [facility] for falls, weakness and alcoholism, alcoholic hepatitis   Needs placement in [place] with wife. Wife current in hospital     PMHx Afib (chronic)(not on anticoagulation), on aspirin, CAD, hypertension, GERD, osteoarthritis, back pain, peripheral neuropathy and thrombocytopenia       Cognitive impairment  ETOH abuse  Generalized muscle weakness  Falls frequently  Hypertension, unspecified type     Mood stable. Mood improve with increase in Zyprexa give in the morning    Participating in therapies  Transfers: Supervision 4WW   Bed Mobility: Mod Ind   Ambulation: up to 565 feet 4WW Supervision   SLUMS: 24/30   CPT: 4.5/5.6   Grooming/Hygiene: Supervision   UB Dressing: Supervision   LB Dressing: SBA   Toileting: Supervision   Discharge plan and any ID barriers to discharge: Therapy plans to discharge on 2/8/22. Jamie will remain in the facility for the time being while financial situation is figured out and placement in shelter setting is secured.     Continue therapies at this time     looking into [facility]     blood pressure managed     No signs and symptoms seizure,         Past Medical and Surgical History reviewed in Epic today.    MEDICATIONS:    Current Outpatient Medications   Medication Sig Dispense Refill     acetaminophen (TYLENOL) 500 MG tablet Take  "1,000 mg by mouth every 6 hours as needed for mild pain       aspirin 81 MG EC tablet Take 81 mg by mouth daily       Cholecalciferol (VITAMIN D3) 25 MCG (1000 UT) CAPS Take by mouth daily       clonazePAM (KLONOPIN) 1 MG tablet Take 1 mg by mouth 2 times daily as needed for seizures       folic acid (FOLVITE) 1 MG tablet Take 1 mg by mouth daily       hyoscyamine (LEVSIN) 0.125 MG tablet Take 0.125 mg by mouth 4 times daily as needed for cramping       magnesium hydroxide (MILK OF MAGNESIA) 400 MG/5ML suspension Take 30 mLs by mouth daily as needed for constipation or heartburn       magnesium oxide 400 MG CAPS Take 1 capsule by mouth daily       melatonin 3 MG tablet Take 3 mg by mouth At Bedtime       mirtazapine (REMERON) 15 MG tablet Take 15 mg by mouth At Bedtime       multivitamin w/minerals (THERA-VIT-M) tablet Take 1 tablet by mouth daily       nystatin (MYCOSTATIN) 426040 UNIT/GM external powder Apply topically 3 times daily as needed       OLANZapine (ZYPREXA) 5 MG tablet Take 5 mg by mouth daily       polyethylene glycol (MIRALAX) 17 g packet Take 1 packet by mouth daily & daily as needed       polyvinyl alcohol-povidone PF (REFRESH) 1.4-0.6 % ophthalmic solution 1 drop every 4 hours as needed for irritation       thiamine (B-1) 100 MG tablet Take 100 mg by mouth daily           REVIEW OF SYSTEMS:  4 point ROS including Respiratory, CV, GI and , other than that noted in the HPI,  is negative    Objective:  /63   Pulse 57   Temp 97.8  F (36.6  C)   Resp 16   Ht 1.727 m (5' 8\")   Wt 97.5 kg (215 lb)   SpO2 97%   BMI 32.69 kg/m    Exam:  GENERAL APPEARANCE:  Alert, in no distress  RESP:  respiratory effort and palpation of chest normal, lungs clear to auscultation , no respiratory distress  CV:  Palpation and auscultation of heart done , regular rate and rhythm, no murmur, rub, or gallop  ABDOMEN:  normal bowel sounds, soft, nontender, no hepatosplenomegaly or other masses  M/S:   Gait and " station normal  SKIN:  Inspection of skin and subcutaneous tissue +2 edema bilateral LE  PSYCH:  oriented X 3    Labs:   Labs done in SNF are in [place] EPIC. Please refer to them using EPIC/Care Everywhere.    ASSESSMENT/PLAN:     Cognitive impairment  ETOH abuse  Generalized muscle weakness  Falls frequently  Hypertension, unspecified type     Mood stable. Mood improve with increase in Zyprexa and give in the morning  Continue on current dose of Zyprexa  Provide emotional support    Participating in therapies  Transfers: Supervision 4WW   Bed Mobility: Mod Ind   Ambulation: up to 565 feet 4WW Supervision   SLUMS: 24/30   CPT: 4.5/5.6   Grooming/Hygiene: Supervision   UB Dressing: Supervision   LB Dressing: SBA   Toileting: Supervision   Discharge plan and any ID barriers to discharge: Therapy plans to discharge on 2/8/22. Jamie will remain in the facility for the time being while financial situation is figured out and placement in [facility] setting is secured.   Continue therapies at this time    looking into [facility]     blood pressure managed, not on medications to treat  Monitor      No signs and symptoms seizure, Not using Klonopin  Monitor     Patient request Levsin as needed for cramping. This has been added  Staff marking bowel movement ~every couple of days  Continue on current bowel movement medications    monitor         Electronically signed by:  MARICHUY Mendoza CNP

## 2022-02-20 ENCOUNTER — LAB REQUISITION (OUTPATIENT)
Dept: LAB | Facility: CLINIC | Age: 82
End: 2022-02-20
Payer: MEDICARE

## 2022-02-20 DIAGNOSIS — U07.1 COVID-19: ICD-10-CM

## 2022-02-20 PROCEDURE — U0003 INFECTIOUS AGENT DETECTION BY NUCLEIC ACID (DNA OR RNA); SEVERE ACUTE RESPIRATORY SYNDROME CORONAVIRUS 2 (SARS-COV-2) (CORONAVIRUS DISEASE [COVID-19]), AMPLIFIED PROBE TECHNIQUE, MAKING USE OF HIGH THROUGHPUT TECHNOLOGIES AS DESCRIBED BY CMS-2020-01-R: HCPCS | Mod: ORL | Performed by: NURSE PRACTITIONER

## 2022-02-21 LAB — SARS-COV-2 RNA RESP QL NAA+PROBE: NEGATIVE

## 2022-02-25 ENCOUNTER — LAB REQUISITION (OUTPATIENT)
Dept: LAB | Facility: CLINIC | Age: 82
End: 2022-02-25
Payer: MEDICARE

## 2022-02-25 DIAGNOSIS — D51.9 VITAMIN B12 DEFICIENCY ANEMIA, UNSPECIFIED: ICD-10-CM

## 2022-02-25 DIAGNOSIS — E61.2 MAGNESIUM DEFICIENCY: ICD-10-CM

## 2022-02-28 LAB
MAGNESIUM SERPL-MCNC: 2.2 MG/DL (ref 1.6–2.3)
VIT B12 SERPL-MCNC: 265 PG/ML (ref 193–986)

## 2022-02-28 PROCEDURE — 36415 COLL VENOUS BLD VENIPUNCTURE: CPT | Mod: ORL | Performed by: NURSE PRACTITIONER

## 2022-02-28 PROCEDURE — 82607 VITAMIN B-12: CPT | Mod: ORL | Performed by: NURSE PRACTITIONER

## 2022-02-28 PROCEDURE — 83735 ASSAY OF MAGNESIUM: CPT | Mod: ORL | Performed by: NURSE PRACTITIONER

## 2022-03-09 ENCOUNTER — DISCHARGE SUMMARY NURSING HOME (OUTPATIENT)
Dept: GERIATRICS | Facility: CLINIC | Age: 82
End: 2022-03-09
Payer: MEDICARE

## 2022-03-09 VITALS
OXYGEN SATURATION: 96 % | RESPIRATION RATE: 16 BRPM | HEART RATE: 60 BPM | TEMPERATURE: 97.3 F | BODY MASS INDEX: 33.25 KG/M2 | HEIGHT: 68 IN | WEIGHT: 219.4 LBS | DIASTOLIC BLOOD PRESSURE: 76 MMHG | SYSTOLIC BLOOD PRESSURE: 152 MMHG

## 2022-03-09 DIAGNOSIS — I10 HYPERTENSION, UNSPECIFIED TYPE: ICD-10-CM

## 2022-03-09 DIAGNOSIS — R29.6 FALLS FREQUENTLY: ICD-10-CM

## 2022-03-09 DIAGNOSIS — M62.81 GENERALIZED MUSCLE WEAKNESS: ICD-10-CM

## 2022-03-09 DIAGNOSIS — R41.89 COGNITIVE IMPAIRMENT: Primary | ICD-10-CM

## 2022-03-09 DIAGNOSIS — F10.10 ETOH ABUSE: ICD-10-CM

## 2022-03-09 PROCEDURE — 99316 NF DSCHRG MGMT 30 MIN+: CPT | Performed by: NURSE PRACTITIONER

## 2022-03-09 NOTE — LETTER
3/9/2022        RE: Jamie Valdivia  [address]  [place] [address]        [facility] DISCHARGE SUMMARY  PATIENT'S NAME: Jamie Valdivia  YOB: 1940  MEDICAL RECORD NUMBER:  [id number]  Place of Service where encounter took place:  [facility] ([facility]) [[id number]]    PRIMARY CARE PROVIDER AND CLINIC RESPONSIBLE AFTER TRANSFER:   MARICHUY Dewitt-CNP   [facility] Provider     Transferring providers: MARICHUY Mendoza CNP, Dr. Tyler Weiss MD  Recent Hospitalization/ED:  [facility] stay 12/17/21 to 12/23/21.  Date of SNF Admission: December 23, 2021  Date of SNF (anticipated) Discharge: March 15, 2022  Discharged to: previous independent home  Cognitive Scores/ Physical Function/DME:  Transfers: Supervision 4WW   Bed Mobility: Mod Ind   Ambulation: up to 565 feet 4WW Supervision   SLUMS: 24/30   CPT: 4.5/5.6   Grooming/Hygiene: Supervision   UB Dressing: Supervision   LB Dressing: SBA   Toileting: Supervision  CODE STATUS/ADVANCE DIRECTIVES DISCUSSION:  Full Code   ALLERGIES: Ativan [lorazepam]    DISCHARGE DIAGNOSIS/NURSING FACILITY COURSE:   Patient Jamie Garg is a 81 yr old male admitted to [facility] for rehabilitation and placement s/p [facility] for falls, weakness and alcoholism, alcoholic hepatitis   Needs placement in [place] with wife. Wife current in hospital     PMHx Afib (chronic)(not on anticoagulation), on aspirin, CAD, hypertension, GERD, osteoarthritis, back pain, peripheral neuropathy and thrombocytopenia    Falls   Weakness  alcoholism   Walks with walker and progressed in therapies, improved balance  Plans to discharge to assistive living with homecare   No Etoh issues at TCU. Supplemental vitamins recently stopped due to not covered by insurance    Seizure,   History Etoh withdrawal  Unclear last seizure  No signs and symptoms  seizures  Continue as needed Clonazepam     Malnourishment  Wt Readings from Last 2 Encounters:   03/08/22 99.5 kg (219 lb 6.4 oz)   02/09/22 97.5 kg (215 lb)   appetite improve  Dietary follow; consider supplements    Afib  Rate controlled  Not on rate control medications or anticoagulated, hx Etoh and falls  Reviewed with daughter and no plans to start anticoagulation at this time and may review in future   Continue aspirin     hypertension  blood pressure fairly managed   Not on blood pressure medications  Avoid hypotension    Peripheral neuropathy  History Etoh abuse  Denies neuropathy  Not on medications to treat    Mood  JOSÉ MANUEL  Mood stable   Continue Remeron and Zyprexa  Wife plans to go to [facility]  Supportive family     Back pain  Pain managed   Continue Tylenol as needed, monitor     Edema  Elevate legs  TG shapes on AM and off HS    Constipation  Wishes to have schedule Miralax  Will schedule Miralax and continue as needed Miralax    gastrointestinal upset  Continue Levsin     Advanced care planning  Wishes to be FULL code  Reviewed with daughter Alexia       Past Medical History:  has a past medical history of Alcohol dependence with withdrawal (H), Alcoholism (H), Back pain, CAD (coronary artery disease), Chronic a-fib (H), Constipation, ED (erectile dysfunction), Edema, Falling, JOSÉ MANUEL (generalized anxiety disorder), GERD (gastroesophageal reflux disease), GI bleeding, H/O carpal tunnel syndrome, HTN (hypertension), Osteoarthritis, Osteoporosis, Peripheral neuropathy, Rhabdomyolysis, Thrombocytopenia (H), and Urination disorder.    Discharge Medications:    Current Outpatient Medications   Medication Sig Dispense Refill     acetaminophen (TYLENOL) 500 MG tablet Take 1,000 mg by mouth every 6 hours as needed for mild pain       aspirin 81 MG EC tablet Take 81 mg by mouth daily       clonazePAM (KLONOPIN) 1 MG tablet Take 1 mg by mouth 2 times daily as needed for seizures       hyoscyamine (LEVSIN) 0.125 MG tablet  "Take 0.125 mg by mouth 4 times daily as needed for cramping       magnesium hydroxide (MILK OF MAGNESIA) 400 MG/5ML suspension Take 30 mLs by mouth daily as needed for constipation or heartburn       melatonin 3 MG tablet Take 3 mg by mouth At Bedtime       mirtazapine (REMERON) 15 MG tablet Take 15 mg by mouth At Bedtime       multivitamin w/minerals (THERA-VIT-M) tablet Take 1 tablet by mouth daily       nystatin (MYCOSTATIN) 719351 UNIT/GM external powder Apply topically 3 times daily as needed       OLANZapine (ZYPREXA) 5 MG tablet Take 5 mg by mouth daily       polyethylene glycol (MIRALAX) 17 g packet Take 1 packet by mouth daily & daily as needed       polyvinyl alcohol-povidone PF (REFRESH) 1.4-0.6 % ophthalmic solution 1 drop every 4 hours as needed for irritation         Medication Changes/Rationale:         Controlled medications sent with patient:   not applicable/none     ROS:   10 point ROS of systems including Constitutional, Eyes, Respiratory, Cardiovascular, Gastroenterology, Genitourinary, Integumentary, Musculoskeletal, Psychiatric were all negative except for pertinent positives noted in my HPI.    Physical Exam:   Vitals: BP (!) 152/76   Pulse 60   Temp 97.3  F (36.3  C)   Resp 16   Ht 1.727 m (5' 8\")   Wt 99.5 kg (219 lb 6.4 oz)   SpO2 96%   BMI 33.36 kg/m    BMI= Body mass index is 33.36 kg/m .     03/09/2022 15:36 113/64 mmHg (Other)  03/09/2022 07:54 152/76 mmHg (Lying r/arm) Systolic High of 139 exceeded  03/09/2022 00:56 112/62 mmHg (Lying r/arm)  03/08/2022 15:43 139/75 mmHg (Sitting r/arm)  03/08/2022 08:37 126/72 mmHg (Sitting r/arm)  03/08/2022 02:43 129/74 mmHg (Lying r/arm)  03/07/2022 15:03 134/70 mmHg (Sitting r/arm)  03/07/2022 09:18 150/74 mmHg (Lying r/arm) Systolic High of 139 exceeded  03/07/2022 00:47 127/64 mmHg (Lying r/arm)  03/06/2022 22:57 135/73 mmHg (Lying l/arm)  03/06/2022 13:11 120/70 mmHg (Sitting r/arm)  03/05/2022 15:39 149/80 mmHg (Lying r/arm) Systolic " High of 139 exceeded  03/05/2022 15:16 132/75 mmHg (Lying r/arm)  03/05/2022 00:49 146/63 mmHg (Lying r/arm) Systolic High of 139 exceeded  03/04/2022 21:46 100/63 mmHg (Sitting r/arm)  03/04/2022 13:55 152/73 mmHg (Sitting r/arm) S    GENERAL APPEARANCE:  Alert, in no distress  ENT:  Mouth and posterior oropharynx normal, moist mucous membranes  EYES:  EOM, conjunctivae, lids, pupils and irises normal  RESP:  respiratory effort and palpation of chest normal, lungs clear to auscultation , no respiratory distress  CV:  Palpation and auscultation of heart done , regular rate and rhythm, no murmur, rub, or gallop  ABDOMEN:  normal bowel sounds, soft, nontender, no hepatosplenomegaly or other masses  M/S:   Gait and station normal  SKIN:  Inspection of skin and subcutaneous tissue baseline  NEURO:   Cranial nerves 2-12 are normal tested and grossly at patient's baseline  PSYCH:  affect and mood normal     SNF labs: Labs done in SNF are in [facility]. Please refer to them using [employer]/Care Everywhere.    DISCHARGE PLAN:    Follow up labs: No labs orders/due    Medical Follow Up:      Follow up with primary care provider in 1-2 weeks    MTM referral needed and placed by this provider: No    Current [place] scheduled appointments:     Future Appointments   Date Time Provider Department Center   No future appts.      Discharge Services: Home Care:  Occupational Therapy, Physical Therapy, Registered Nurse and Home Health Aide        TOTAL DISCHARGE TIME:   Greater than 30 minutes  Electronically signed by:  MARICHUY Mendoza CNP     Home care Face to Face documentation done in EPIC attached to Home care orders for [facility].                     Sincerely,        MARICHUY Mendoza CNP

## 2022-03-09 NOTE — PROGRESS NOTES
[facility] DISCHARGE SUMMARY  PATIENT'S NAME: Jamie Valdivia  YOB: 1940  MEDICAL RECORD NUMBER:  [id number]  Place of Service where encounter took place:  [facility] ([facility]) [[id number]]    PRIMARY CARE PROVIDER AND CLINIC RESPONSIBLE AFTER TRANSFER:   MARICHUY Dewitt-CNP   [facility] Provider     Transferring providers: MARICHUY Mendoza CNP, Dr. Tyler Weiss MD  Recent Hospitalization/ED:  [facility] stay 12/17/21 to 12/23/21.  Date of SNF Admission: December 23, 2021  Date of SNF (anticipated) Discharge: March 15, 2022  Discharged to: previous independent home  Cognitive Scores/ Physical Function/DME:  Transfers: Supervision 4WW   Bed Mobility: Mod Ind   Ambulation: up to 565 feet 4WW Supervision   SLUMS: 24/30   CPT: 4.5/5.6   Grooming/Hygiene: Supervision   UB Dressing: Supervision   LB Dressing: SBA   Toileting: Supervision  CODE STATUS/ADVANCE DIRECTIVES DISCUSSION:  Full Code   ALLERGIES: Ativan [lorazepam]    DISCHARGE DIAGNOSIS/NURSING FACILITY COURSE:   Patient Jamie Garg is a 81 yr old male admitted to [facility] for rehabilitation and placement s/p hospitalization [facility] for falls, weakness and alcoholism, alcoholic hepatitis   Needs placement in [place] with wife. Wife current in hospital     PMHx Afib (chronic)(not on anticoagulation), on aspirin, CAD, hypertension, GERD, osteoarthritis, back pain, peripheral neuropathy and thrombocytopenia    Falls   Weakness  alcoholism   Walks with walker and progressed in therapies, improved balance  Plans to discharge to assistive living with homecare   No Etoh issues at TCU. Supplemental vitamins recently stopped due to not covered by insurance    Seizure,   History Etoh withdrawal  Unclear last seizure  No signs and symptoms seizures  Continue as needed Clonazepam     Malnourishment  Wt Readings from Last 2 Encounters:    03/08/22 99.5 kg (219 lb 6.4 oz)   02/09/22 97.5 kg (215 lb)   appetite improve  Dietary follow; consider supplements    Afib  Rate controlled  Not on rate control medications or anticoagulated, hx Etoh and falls  Reviewed with daughter and no plans to start anticoagulation at this time and may review in future   Continue aspirin     hypertension  blood pressure fairly managed   Not on blood pressure medications  Avoid hypotension    Peripheral neuropathy  History Etoh abuse  Denies neuropathy  Not on medications to treat    Mood  JOSÉ MANUEL  Mood stable   Continue Remeron and Zyprexa  Wife plans to go to [facility]  Supportive family     Back pain  Pain managed   Continue Tylenol as needed, monitor     Edema  Elevate legs  TG shapes on AM and off HS    Constipation  Wishes to have schedule Miralax  Will schedule Miralax and continue as needed Miralax    gastrointestinal upset  Continue Levsin     Advanced care planning  Wishes to be FULL code  Reviewed with daughter Alexia       Past Medical History:  has a past medical history of Alcohol dependence with withdrawal (H), Alcoholism (H), Back pain, CAD (coronary artery disease), Chronic a-fib (H), Constipation, ED (erectile dysfunction), Edema, Falling, JOSÉ MANUEL (generalized anxiety disorder), GERD (gastroesophageal reflux disease), GI bleeding, H/O carpal tunnel syndrome, HTN (hypertension), Osteoarthritis, Osteoporosis, Peripheral neuropathy, Rhabdomyolysis, Thrombocytopenia (H), and Urination disorder.    Discharge Medications:    Current Outpatient Medications   Medication Sig Dispense Refill     acetaminophen (TYLENOL) 500 MG tablet Take 1,000 mg by mouth every 6 hours as needed for mild pain       aspirin 81 MG EC tablet Take 81 mg by mouth daily       clonazePAM (KLONOPIN) 1 MG tablet Take 1 mg by mouth 2 times daily as needed for seizures       hyoscyamine (LEVSIN) 0.125 MG tablet Take 0.125 mg by mouth 4 times daily as needed for cramping       magnesium hydroxide (MILK OF  "MAGNESIA) 400 MG/5ML suspension Take 30 mLs by mouth daily as needed for constipation or heartburn       melatonin 3 MG tablet Take 3 mg by mouth At Bedtime       mirtazapine (REMERON) 15 MG tablet Take 15 mg by mouth At Bedtime       multivitamin w/minerals (THERA-VIT-M) tablet Take 1 tablet by mouth daily       nystatin (MYCOSTATIN) 193688 UNIT/GM external powder Apply topically 3 times daily as needed       OLANZapine (ZYPREXA) 5 MG tablet Take 5 mg by mouth daily       polyethylene glycol (MIRALAX) 17 g packet Take 1 packet by mouth daily & daily as needed       polyvinyl alcohol-povidone PF (REFRESH) 1.4-0.6 % ophthalmic solution 1 drop every 4 hours as needed for irritation         Medication Changes/Rationale:         Controlled medications sent with patient:   not applicable/none     ROS:   10 point ROS of systems including Constitutional, Eyes, Respiratory, Cardiovascular, Gastroenterology, Genitourinary, Integumentary, Musculoskeletal, Psychiatric were all negative except for pertinent positives noted in my HPI.    Physical Exam:   Vitals: BP (!) 152/76   Pulse 60   Temp 97.3  F (36.3  C)   Resp 16   Ht 1.727 m (5' 8\")   Wt 99.5 kg (219 lb 6.4 oz)   SpO2 96%   BMI 33.36 kg/m    BMI= Body mass index is 33.36 kg/m .     03/09/2022 15:36 113/64 mmHg (Other)  03/09/2022 07:54 152/76 mmHg (Lying r/arm) Systolic High of 139 exceeded  03/09/2022 00:56 112/62 mmHg (Lying r/arm)  03/08/2022 15:43 139/75 mmHg (Sitting r/arm)  03/08/2022 08:37 126/72 mmHg (Sitting r/arm)  03/08/2022 02:43 129/74 mmHg (Lying r/arm)  03/07/2022 15:03 134/70 mmHg (Sitting r/arm)  03/07/2022 09:18 150/74 mmHg (Lying r/arm) Systolic High of 139 exceeded  03/07/2022 00:47 127/64 mmHg (Lying r/arm)  03/06/2022 22:57 135/73 mmHg (Lying l/arm)  03/06/2022 13:11 120/70 mmHg (Sitting r/arm)  03/05/2022 15:39 149/80 mmHg (Lying r/arm) Systolic High of 139 exceeded  03/05/2022 15:16 132/75 mmHg (Lying r/arm)  03/05/2022 00:49 146/63 mmHg " (Lying r/arm) Systolic High of 139 exceeded  03/04/2022 21:46 100/63 mmHg (Sitting r/arm)  03/04/2022 13:55 152/73 mmHg (Sitting r/arm) S    GENERAL APPEARANCE:  Alert, in no distress  ENT:  Mouth and posterior oropharynx normal, moist mucous membranes  EYES:  EOM, conjunctivae, lids, pupils and irises normal  RESP:  respiratory effort and palpation of chest normal, lungs clear to auscultation , no respiratory distress  CV:  Palpation and auscultation of heart done , regular rate and rhythm, no murmur, rub, or gallop  ABDOMEN:  normal bowel sounds, soft, nontender, no hepatosplenomegaly or other masses  M/S:   Gait and station normal  SKIN:  Inspection of skin and subcutaneous tissue baseline  NEURO:   Cranial nerves 2-12 are normal tested and grossly at patient's baseline  PSYCH:  affect and mood normal     SNF labs: Labs done in SNF are in [facility]. Please refer to them using [employer]/Care Everywhere.    DISCHARGE PLAN:    Follow up labs: No labs orders/due    Medical Follow Up:      Follow up with primary care provider in 1-2 weeks    MTM referral needed and placed by this provider: No    Current [place] scheduled appointments:     Future Appointments   Date Time Provider Department Center   No future appts.      Discharge Services: Home Care:  Occupational Therapy, Physical Therapy, Registered Nurse and Home Health Aide        TOTAL DISCHARGE TIME:   Greater than 30 minutes  Electronically signed by:  MARICHUY Mendoza CNP     Home care Face to Face documentation done in EPIC [facility]

## 2022-03-15 ENCOUNTER — MEDICAL CORRESPONDENCE (OUTPATIENT)
Dept: HEALTH INFORMATION MANAGEMENT | Facility: CLINIC | Age: 82
End: 2022-03-15

## 2022-03-22 ENCOUNTER — DOCUMENTATION ONLY (OUTPATIENT)
Dept: OTHER | Facility: CLINIC | Age: 82
End: 2022-03-22
Payer: MEDICARE

## 2022-03-30 VITALS
RESPIRATION RATE: 18 BRPM | BODY MASS INDEX: 34.4 KG/M2 | OXYGEN SATURATION: 95 % | DIASTOLIC BLOOD PRESSURE: 61 MMHG | WEIGHT: 227 LBS | HEART RATE: 80 BPM | TEMPERATURE: 98 F | SYSTOLIC BLOOD PRESSURE: 111 MMHG | HEIGHT: 68 IN

## 2022-03-30 PROBLEM — G40.89 OTHER SEIZURES (H): Status: ACTIVE | Noted: 2022-03-30

## 2022-03-30 PROBLEM — F10.20 ALCOHOL DEPENDENCE (H): Status: ACTIVE | Noted: 2022-03-30

## 2022-03-30 PROBLEM — G47.09 OTHER INSOMNIA: Status: ACTIVE | Noted: 2019-03-14

## 2022-03-30 PROBLEM — I25.10 CORONARY ATHEROSCLEROSIS OF NATIVE CORONARY ARTERY: Status: ACTIVE | Noted: 2022-03-30

## 2022-03-30 PROBLEM — S06.9XAA MILD TBI (TRAUMATIC BRAIN INJURY) (H): Status: ACTIVE | Noted: 2019-03-14

## 2022-03-30 PROBLEM — M54.50 LOW BACK PAIN: Status: ACTIVE | Noted: 2018-12-19

## 2022-03-30 PROBLEM — M81.0 AGE-RELATED OSTEOPOROSIS WITHOUT CURRENT PATHOLOGICAL FRACTURE: Status: ACTIVE | Noted: 2022-03-30

## 2022-03-30 PROBLEM — K21.9 GASTRO-ESOPHAGEAL REFLUX DISEASE WITHOUT ESOPHAGITIS: Status: ACTIVE | Noted: 2020-04-27

## 2022-03-30 PROBLEM — R53.81 PHYSICAL DECONDITIONING: Status: ACTIVE | Noted: 2019-03-14

## 2022-03-30 PROBLEM — F10.10 ALCOHOL ABUSE: Status: ACTIVE | Noted: 2017-11-19

## 2022-03-30 PROBLEM — R53.1 WEAKNESS: Status: ACTIVE | Noted: 2020-04-27

## 2022-03-30 PROBLEM — G90.09 OTHER IDIOPATHIC PERIPHERAL AUTONOMIC NEUROPATHY: Status: ACTIVE | Noted: 2022-03-30

## 2022-03-30 PROBLEM — K70.10 ALCOHOLIC HEPATITIS WITHOUT ASCITES (H): Status: ACTIVE | Noted: 2022-03-30

## 2022-03-30 PROBLEM — F41.1 GENERALIZED ANXIETY DISORDER: Status: ACTIVE | Noted: 2020-04-27

## 2022-03-30 PROBLEM — D64.9 ANEMIA: Status: ACTIVE | Noted: 2022-03-30

## 2022-03-30 PROBLEM — R60.9 EDEMA: Status: ACTIVE | Noted: 2022-03-30

## 2022-03-30 PROBLEM — F10.27 DEMENTIA ASSOCIATED WITH ALCOHOLISM WITH BEHAVIORAL DISTURBANCE (H): Status: ACTIVE | Noted: 2021-11-19

## 2022-03-30 PROBLEM — M54.9 BACKACHE: Status: ACTIVE | Noted: 2018-12-19

## 2022-03-30 PROBLEM — K59.00 CONSTIPATION: Status: ACTIVE | Noted: 2022-03-30

## 2022-03-30 PROBLEM — R26.89 IMPAIRED GAIT AND MOBILITY: Status: ACTIVE | Noted: 2019-03-14

## 2022-03-30 PROBLEM — G31.84 MILD COGNITIVE IMPAIRMENT: Status: ACTIVE | Noted: 2019-08-12

## 2022-03-30 PROBLEM — R29.6 REPEATED FALLS: Status: ACTIVE | Noted: 2022-03-30

## 2022-03-30 PROBLEM — F33.9 RECURRENT MAJOR DEPRESSIVE DISORDER (H): Status: ACTIVE | Noted: 2022-03-30

## 2022-03-30 PROBLEM — Z86.16 HISTORY OF 2019 NOVEL CORONAVIRUS DISEASE (COVID-19): Status: ACTIVE | Noted: 2021-02-08

## 2022-03-30 RX ORDER — MULTIVIT-MIN/FA/LYCOPEN/LUTEIN .4-300-25
1 TABLET ORAL DAILY
COMMUNITY
End: 2022-05-02

## 2022-03-30 NOTE — PROGRESS NOTES
[facility]  PRIMARY CARE PROVIDER AND CLINIC:  Sylvia Stein, APRN CNP, [address] / [place] [address]  Chief Complaint   Patient presents with     Establish Care     [facility] Medical Record Number:  [id number]  Place of Service where encounter took place:  [facility]) [61]   [address]    Jamie Valdivia  is a 81 year old  (1940) PMH significant for GERD with esophagitis, OA BL knees, pharyngeal dysphagia, edema, abdominal wall hernia, chronic atrial fibrillation, HTN, osteoporosis with hx of vertebral fracture, dementia, anemia, hx of COVID-19 in Feb 2021, peripheral neuropathy admitted to the above facility on 3/11/22 following a TCU stay at [facility] (formerly [facility]) from 12/23/21 through 3/15/22.      Admitted to this facility for  medical management and nursing care.    HPI:    HPI information obtained from: facility chart records, facility staff, patient report, [facility] chart review, Care Everywhere Epic chart review and family/first contact daughter Coral and wife Gisela report.     Brief Summary of Hospital/TCU Course:   Moved from [facility] to [facility] for permanent placement on 3/11/22.     Hospitalized from 12/17 to 12/23/21 at [facility] for falls, weakness, alcoholism, and alcoholic hepatitis; discharge summary not available. Wife (who was primary care giver) was having medical issues and needed neurology care in the [facility]. Family felt Jamie unable to safely be left alone so they called EMS to transport to hospital for ongoing care/ETOH detox. Ultimately transferred to [facility] where wife was convalescing.      Prior to this, hospitalized at [facility] form 11/1 to 11/5/21 with generalized weakness, dehydration, and concern for UTI but culture came back negative. Symptoms likely related to chronic alcoholism with alcohol withdrawal. Chronic cognitive impairment in setting of long history of alcoholism, hx of  "hallucinations. Multiple hospitalizations over preceding months per family due to unsafe living situation, well known to ED staff. Spent time in SNF ([facility]) around September 2021, details unclear, but seem to have suffered spinal fracture.     Remained at [facility] from 12/23 and 3/15/22 with largely uneventful stay. Zyprexa dose increased with good effect on mood, prior to starting medication having hallucinations, delusions, and wandering on unit.    Reviewed therapy progress per TCU discharge:  \"Transfers: Supervision 4WW   Bed Mobility: Mod Ind   Ambulation: up to 565 feet 4WW Supervision   SLUMS: 24/30   CPT: 4.5/5.6   Grooming/Hygiene: Supervision   UB Dressing: Supervision   LB Dressing: SBA   Toileting: Supervision\"    Updates on Status Since Skilled nursing Admission:   Follow-up today to establish care.    Fall today unwitnessed. Called EMS to get him off floor.  Fall occurred during \"routine bathroom trip.\"  Didn't hit head. Pushed call button  \"Sense of dizziness sometimes.\" Occurs with change in position.  Does not recall tripping.  Let go of walker and lost balance. Brakes on walker are broken.   Right UE skin tear.    Episode of choking 3/25/22, able to cough up food, some blood in mouth after episode.  Long history of dysphagia. Per PCP notes from June 2021 was to get an interval EGD. Using hyoscyamine 01.25 mg PO about twice daily as needed for abd pain. Previous PCP prescribed this medication. Not covered by insurance.  Uses PRN MOM and PRN Miralax PRN once each.  Asking nursing to give two caps full of Miralax daily.  Feels when he is able to evacuate bowel routinely dose not need hyoscyamine.     Also noted with elevated PSA 13.45 and was referred to urology, but never followed up per family.  No dysuria at this time.    Started on Mirtazapine for anxiety in September 2022.  Per notes uses ETOH to cope with anxiety.  Current doing okay without alcohol.  \"It is not like I awake up in the " "morning looking for a drink.\"    TG shapes per TCU discharge, but not ordered in [facility] setting.  Does not like to wear compression socks and leg remain swollen.    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) (11/19/2021), ED (erectile dysfunction), Edema, Falling, JOSÉ MANUEL (generalized anxiety disorder), Generalized anxiety disorder (4/27/2020), GERD (gastroesophageal reflux disease), GI bleeding, H/O carpal tunnel syndrome, History of 2019 novel coronavirus disease (COVID-19) (2/8/2021), HTN (hypertension), Impaired gait and mobility (3/14/2019), Mild cognitive impairment (8/12/2019), Mild TBI (traumatic brain injury) (H) (3/14/2019), Obesity (BMI 30-39.9) (9/3/2015), Osteoarthritis, Osteoarthritis of both knees (5/13/2015), Osteoporosis, Other idiopathic peripheral autonomic neuropathy (3/30/2022), Other insomnia (3/14/2019), Other seizures (H) (3/30/2022), Peripheral neuropathy, Pharyngeal dysphagia (5/13/2015), Physical deconditioning (3/14/2019), Recurrent major depressive disorder (H) (3/30/2022), Rhabdomyolysis, Thrombocytopenia (H), Urination disorder, and Weakness (4/27/2020).     PAST SURGICAL HISTORY:   has a past surgical history that includes left hip replacement (2010); left shoulder replacement (2016); and tonsillectomy.     FUSION LUMBAR THORACIC SPINE N/A 8/4/2020   Procedure: T8-T12 PEDICLE SCREW FIXATION WITH " CEMENT, WITH NEUROMONITORING;; Surgeon: Sabit, Behzad, MD     JOINT REPLACEMENT 2010   Left Hip     SHOULDER SURGERY Left 07/20/2016   traumatic fracture. Dr. Joyce at [facility]     TONSILLECTOMY   as a child     UPPER ENDOSCOPY N/A 3/18/2019   Procedure: UPPER ENDOSCOPY;; Surgeon: Kwaku Bowden MD    colonoscopy on 10/1/15, repeat colonoscopy in 5 years recommended Colon polyp 10/15/2015   Serrated adenoma   FAMILY HISTORY: family history includes Osteoporosis in his mother; Prostate Cancer in his paternal grandfather.  SOCIAL HISTORY:   reports that he has never smoked. He has never used smokeless tobacco. He reports current alcohol use. He reports that he does not use drugs.   Prior to admission lived in [place], [place]  , wife Gisela resides in [facility]) - 60 years   Children: 7 adult children, Viola is nurse for [employer] involved in care.  Owned own business for 50 years, [employer] and consulting.  BA Music Education, started portion of Master's  Worked as teacher   Never smoked cigarettes  CD treatment for alcohol in remote past, no ETOH now due to limited access.    Most Recent Immunizations   Administered Date(s) Administered     COVID-19,PF,Pfizer (12+ Yrs) 04/08/2021     Influenza, Quad, High Dose, Pf, 65yr+ (Fluzone HD) 02/29/2020     Mantoux Tuberculin Skin Test 04/27/2020     Pneumo Conj 13-V (2010&after) 06/16/2016     Pneumococcal 23 valent 06/11/2015     Tdap (Adacel,Boostrix) 06/11/2015     Tdap (Adult) Unspecified Formulation 06/11/2015     Zoster vaccine recombinant adjuvanted (SHINGRIX) 06/11/2015         Post Discharge Medication Reconciliation Status: discharge medications reconciled and changed, per note/orders.    Current Outpatient Medications   Medication Sig Dispense Refill     acetaminophen (TYLENOL) 500 MG tablet Take 1,000 mg by mouth every 6 hours as needed for mild pain       aspirin 81 MG EC tablet Take 81 mg by mouth  "daily       hyoscyamine (LEVSIN) 0.125 MG tablet Take 0.125 mg by mouth 4 times daily as needed for cramping       magnesium hydroxide (MILK OF MAGNESIA) 400 MG/5ML suspension Take 30 mLs by mouth daily as needed for constipation or heartburn       mirtazapine (REMERON) 15 MG tablet Take 15 mg by mouth At Bedtime       Multiple Vitamins-Minerals (CEROVITE SENIOR) TABS Take 1 tablet by mouth daily       nystatin (MYCOSTATIN) 989351 UNIT/GM external powder Apply topically 3 times daily as needed       OLANZapine (ZYPREXA) 5 MG tablet Take 5 mg by mouth daily       polyethylene glycol (MIRALAX) 17 g packet Take 1 packet by mouth daily & daily as needed       polyvinyl alcohol-povidone PF (REFRESH) 1.4-0.6 % ophthalmic solution 1 drop every 4 hours as needed for irritation       ROS:   Constitutional - Good appetite per pt report, but appetite poor in TCU. Working on weight loss prior to acute care stay. Sleep variable, normally early riser.  HEENT - + dysphagia, coughing while eating t/o visit, likes yogurt, wears glasses for reading, does not like hearing aids, ringing in ears, upper dentures, need to be checked by dental.  Pulmonary - No SOB, cough w/ eating  CV- Hx of chest pain. Hx of a fib on chart, not aware.  PV - + leg swelling, chronic.  GI - Chronic abd pain, uses Levsin. No abd pain now. Pressure, whole abd, previous PCP ordered Miralax see HPI. Normal BM this AM. Wife would mix Miralx in OJ. Does not trust staff giving medication as ordered.   - No dysuria, incontinent, + nocturia, urology follow-up needed, missed appointment  Neuro -  No known seizure. No tremor. + neuropathy, not troubling now.  MS - Ambulatory with assistive device - 4WW, pain okay.  SKIN - No concerns   Psychiatric - Stress with businesses, still involved but not \"day to day.\" Does not feel down depressed or hopeless. + anxiety. Family reports memory concerns. SLUMS 24/30.    Vitals:  /61   Pulse 80   Temp 98  F (36.7  C)   " "Resp 18   Ht 1.727 m (5' 8\")   Wt 103 kg (227 lb)   SpO2 95%   BMI 34.52 kg/m    Exam:              GENERAL APPEARANCE:  Alert, in no distress, pleasant, cooperative, seated in recliner chair.  HEAD: NC/AT  EYES: Sclera clear and conjunctiva normal, no discharge   ENT:  Mouth normal, moist mucous membranes, hearing acuity grossly decreased BL  NECK:  Nontender, supple, symmetrical; no cervical adenopathy, masses or thyromegaly, trachea midline  RESP:  Non-labored breathing, palpation of chest normal, no chest wall tenderness, no respiratory distress, Lung sounds clear, patient is on room air. SpO2 97%.   CV:  Palpation - no murmur/non-displaced PMI, Auscultation - rate and rhythm regular, no murmur, no rub or gallop. HR 68.  VASCULAR: 2+ edema bilateral lower extremities.   ABDOMEN:  Normal bowel sounds, soft, nontender, no grimacing or guarding with palpation. No hepatosplenomegaly. No appreciable masses.  M/S:   Gait and station ambulates independently with walker around apartment.   SKIN:  Inspection - linear ecchymosis to right abdomen. Palpation- no increased warmth, skin is dry and non-tender.  NEURO: cranial nerves II-XII grossly intact except hearing, no facial asymmetry, follows simple commands, moves all extremities symmetrically, normal tone, no tremor  PSYCH: awake and alert, speech fluent,  insight and judgement impaired - does not issue with children driving him to routine appointments 3 hours away, memory impaired, irritable, but cooperative    Lab/Diagnostic data:  Recent labs in [facility] reviewed by me today.    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 No seizure hx per pt/family, clonazepam PRN in past to prevent alcohol withdrawal sequela. In memory care due to safety concerns.  Plan:   - Will benefit from supportive care in [facility] setting  - Continue Mirtazapine 15 mg q HS  - Continue Zyrpexa 5 mg at HS > discuss possible orthostatic hypotension and role in falls/dizziness. Family would like to continue as mood much improved with medication.   - Nursing to check ortho static blood pressures  - Continue MVI, no longer drinking actively so B vitamins stopped  -  OT following for cognition and functional mobility  - Poor safety awareness, continue falls prevention     (R13.13) Pharyngeal dysphagia  (R04.2) Hemoptysis  (K21.00) GERD  Comment: Hx of hypopharyngeal ulceration on EGD in 2019, choking episodes of over last year, reports recently coughing up blood.  Plan:   - GI referral - [place] [facility]  - Will call PCP whom he trust implicitly to confirm okay to see group in [facility] rather than drive to [place]    (D64.9) Normocytic Anemia  Comment: Chronic, mild, Hgb 12.4 and MCV 97 in December  Plan:   - Check CBC    (K59.01) Slow transit constipation  (K30) Chronic upset stomach  Comment: Chronic, details unclear, using Levsin multiple times per day for abd pain, no longer covered by insurance, reports abd improved with higher Miralax dose. Irritated by [facility] rules regarding medications administration, much of visit spent discussion way to improve bowel  regimen.  Plan:   - polyethylene glycol (MIRALAX) 17 GM/Dose powder; 1-2 capfuls in 8 oz of ORANGE JUICE PO one time each morning and 1-2 capfuls 8 oz of ORANGE JUICE PO one time daily PRN for constipation. Please mix medication in juice in front of the patient. Hold for loose stools.   - Review side effect profile of Levsin, not preferred in elderly, on [facility] list   - GI referral     (I48.91) Atrial fibrillation, unspecified type (H)  Comment: Chronic, not anticoagulated, rate controlled off medication  [id number]: 3 (age, HTN), 3.2% risk of stroke per year  Plan:   - Monitor VS and clinical status  - Would not add AC due to falls     (I10) Essential hypertension, benign  Comment: BP at goal of < 150/90 given age and comorbid conditions  BP Readings from Last 3 Encounters:   03/30/22 111/61   03/09/22 (!) 152/76   02/09/22 126/63   Plan:   - Monitor routine VS and labs off anti-hypertensive medications  - ASA 81 mg daily  - Check CMP    (R60.0) Lower extremity edema  Comment: Chronic, multifactorial, albumin 3.3 L, does not like to elevate legs or use compression socks, legs in dependent position t/o the day  Plan:   - Would benefit from compression socks, but does not seem keen on this today  - Staff to encourage elevation/compression as able     (M17.0) Primary osteoarthritis of both knees  (R26.9) Abnormal gait  Comment: Fall today, walker broken, bruise to right abd   Plan:   -  PT/OT following through [facility]  - Order placed for new 4WW and emailed to daughter   The patient has a mobility limitation of edema, dementia, OA  that significantly impairs his ability to participate in one or more mobility-related activities of daily living in the home. The patient is able to safely use the walker and the functional mobility deficit can be sufficiently resolved with the use of a walker  - Facility to provide walker to use in interim     (M15.9) OA Multiple Sites  (M81.0) Osteoporosis  (Z87.81) History of  vertebral fracture  Comment: DJD multiple sites and hx of multiple spinal compression fractures per chart review in setting of recurrent falls. Looks to have been on Fosamax in past.   Plan:   - Tylenol 650 mg q 6 hours PRN  - Hold off on bisphosphonate until EGD completed  - Consider endocrine referral for injectable osteoporosis therapy     (97.20) Elevated PSA  Comment: PSA 13.45 in March 2021  Plan:  - Urology referral placed  - Will call Dr. Smith's office to confirm approval of care in [facility]  - Check PSA    (Z71.89) ACP (advance care planning)  Comment: HCD on file, Markos 1st agent, Coral 2nd  Plan:   - POLST for Full Code   - Message sent to P  in effort to get family more support    See patient instructions.    Total time spent with patient visit at the skilled nursing facility was 60 minutes including patient visit, review of past records and discussion of medications, recommended treatment, history, care goals with patient, then daughter Coral, and wife Gisela in person onsite. Coordinated case managed support and ordered DME. Greater than 50% of total time spent with counseling and coordinating care due to new patient admission with multiple concerns. ~ 35/60 minutes on counseling and care coordination.    Electronically signed by:  MARICHUY Basilio CNP

## 2022-03-31 ENCOUNTER — ASSISTED LIVING VISIT (OUTPATIENT)
Dept: GERIATRICS | Facility: CLINIC | Age: 82
End: 2022-03-31
Payer: COMMERCIAL

## 2022-03-31 DIAGNOSIS — Z71.89 ACP (ADVANCE CARE PLANNING): ICD-10-CM

## 2022-03-31 DIAGNOSIS — G62.9 PERIPHERAL POLYNEUROPATHY: ICD-10-CM

## 2022-03-31 DIAGNOSIS — K30 CHRONIC UPSET STOMACH: ICD-10-CM

## 2022-03-31 DIAGNOSIS — K21.00 GASTROESOPHAGEAL REFLUX DISEASE WITH ESOPHAGITIS, UNSPECIFIED WHETHER HEMORRHAGE: ICD-10-CM

## 2022-03-31 DIAGNOSIS — F10.20 ALCOHOLISM (H): ICD-10-CM

## 2022-03-31 DIAGNOSIS — M17.0 PRIMARY OSTEOARTHRITIS OF BOTH KNEES: ICD-10-CM

## 2022-03-31 DIAGNOSIS — R04.2 HEMOPTYSIS: ICD-10-CM

## 2022-03-31 DIAGNOSIS — F43.23 ADJUSTMENT REACTION WITH ANXIETY AND DEPRESSION: ICD-10-CM

## 2022-03-31 DIAGNOSIS — I10 ESSENTIAL HYPERTENSION, BENIGN: ICD-10-CM

## 2022-03-31 DIAGNOSIS — F10.27 DEMENTIA ASSOCIATED WITH ALCOHOLISM WITH BEHAVIORAL DISTURBANCE (H): Primary | ICD-10-CM

## 2022-03-31 DIAGNOSIS — R97.20 ELEVATED PROSTATE SPECIFIC ANTIGEN (PSA): ICD-10-CM

## 2022-03-31 DIAGNOSIS — M15.9 GENERALIZED OSTEOARTHRITIS OF MULTIPLE SITES: ICD-10-CM

## 2022-03-31 DIAGNOSIS — R60.0 LOWER EXTREMITY EDEMA: ICD-10-CM

## 2022-03-31 DIAGNOSIS — I48.91 ATRIAL FIBRILLATION, UNSPECIFIED TYPE (H): ICD-10-CM

## 2022-03-31 DIAGNOSIS — R26.9 ABNORMAL GAIT: ICD-10-CM

## 2022-03-31 DIAGNOSIS — D64.9 NORMOCYTIC ANEMIA: ICD-10-CM

## 2022-03-31 DIAGNOSIS — M81.0 OSTEOPOROSIS, UNSPECIFIED OSTEOPOROSIS TYPE, UNSPECIFIED PATHOLOGICAL FRACTURE PRESENCE: ICD-10-CM

## 2022-03-31 DIAGNOSIS — R13.13 PHARYNGEAL DYSPHAGIA: ICD-10-CM

## 2022-03-31 DIAGNOSIS — R44.3 HALLUCINATIONS: ICD-10-CM

## 2022-03-31 DIAGNOSIS — Z87.81 HISTORY OF VERTEBRAL FRACTURE: ICD-10-CM

## 2022-03-31 DIAGNOSIS — K59.01 SLOW TRANSIT CONSTIPATION: ICD-10-CM

## 2022-03-31 DIAGNOSIS — Z53.9 DIAGNOSIS NOT YET DEFINED: Primary | ICD-10-CM

## 2022-03-31 PROBLEM — G40.89 OTHER SEIZURES (H): Status: RESOLVED | Noted: 2022-03-30 | Resolved: 2022-03-31

## 2022-03-31 PROBLEM — Z86.69 HX OF SEIZURE DISORDER: Status: ACTIVE | Noted: 2022-03-31

## 2022-03-31 PROBLEM — F33.9 RECURRENT MAJOR DEPRESSIVE DISORDER (H): Status: RESOLVED | Noted: 2022-03-30 | Resolved: 2022-03-31

## 2022-03-31 PROCEDURE — G0180 MD CERTIFICATION HHA PATIENT: HCPCS | Performed by: NURSE PRACTITIONER

## 2022-03-31 NOTE — LETTER
3/31/2022        RE: [facility]  [address]  [facility] [address]        [facility]  PRIMARY CARE PROVIDER AND CLINIC:  MARICHUY Basilio CNP, [address] [place] [address] 290 / [place] [address]  Chief Complaint   Patient presents with     Establish Care     [facility] Record Number:  [id number]  Place of Service where encounter took place:  [facility] ([facility]) [61]   [address]    Jamie Valdivia  is a 81 year old  (1940) PMH significant for GERD with esophagitis, OA BL knees, pharyngeal dysphagia, edema, abdominal wall hernia, chronic atrial fibrillation, HTN, osteoporosis with hx of vertebral fracture, dementia, anemia, hx of COVID-19 in Feb 2021, peripheral neuropathy admitted to the above facility on 3/11/22 following a TCU stay at [facility] from 12/23/21 through 3/15/22.      Admitted to this facility for  medical management and nursing care.    HPI:    HPI information obtained from: facility chart records, facility staff, patient report, [facility] chart review, Care Everywhere Epic chart review and family/first contact daughter Coral and wife Gisela report.     Brief Summary of Hospital/TCU Course:   Moved from [facility] to [facility] for permanent placement on 3/11/22.     Hospitalized from 12/17 to 12/23/21 at [facility] for falls, weakness, alcoholism, and alcoholic hepatitis; discharge summary not available. Wife (who was primary care giver) was having medical issues and needed neurology care in the [facility]. Family felt Jamie unable to safely be left alone so they called EMS to transport to hospital for ongoing care/ETOH detox. Ultimately transferred to [facility] where wife was convalescing.      Prior to this, hospitalized at [facility] form 11/1 to 11/5/21 with generalized weakness, dehydration, and concern for UTI but culture came back negative. Symptoms likely related to  "chronic alcoholism with alcohol withdrawal. Chronic cognitive impairment in setting of long history of alcoholism, hx of hallucinations. Multiple hospitalizations over preceding months per family due to unsafe living situation, well known to ED staff. Spent time in SNF ([facility]) around September 2021, details unclear, but seem to have suffered spinal fracture.     Remained at [facility] from 12/23 and 3/15/22 with largely uneventful stay. Zyprexa dose increased with good effect on mood, prior to starting medication having hallucinations, delusions, and wandering on unit.    Reviewed therapy progress per TCU discharge:  \"Transfers: Supervision 4WW   Bed Mobility: Mod Ind   Ambulation: up to 565 feet 4WW Supervision   SLUMS: 24/30   CPT: 4.5/5.6   Grooming/Hygiene: Supervision   UB Dressing: Supervision   LB Dressing: SBA   Toileting: Supervision\"    Updates on Status Since Skilled nursing Admission:   Follow-up today to establish care.    Fall today unwitnessed. Called EMS to get him off floor.  Fall occurred during \"routine bathroom trip.\"  Didn't hit head. Pushed call button  \"Sense of dizziness sometimes.\" Occurs with change in position.  Does not recall tripping.  Let go of walker and lost balance. Brakes on walker are broken.   Right UE skin tear.    Episode of choking 3/25/22, able to cough up food, some blood in mouth after episode.  Long history of dysphagia. Per PCP notes from June 2021 was to get an interval EGD. Using hyoscyamine 01.25 mg PO about twice daily as needed for abd pain. Previous PCP prescribed this medication. Not covered by insurance.  Uses PRN MOM and PRN Miralax PRN once each.  Asking nursing to give two caps full of Miralax daily.  Feels when he is able to evacuate bowel routinely dose not need hyoscyamine.     Also noted with elevated PSA 13.45 and was referred to urology, but never followed up per family.  No dysuria at this time.    Started on Mirtazapine for anxiety in September " "2022.  Per notes uses ETOH to cope with anxiety.  Current doing okay without alcohol.  \"It is not like I awake up in the morning looking for a drink.\"    TG shapes per TCU discharge, but not ordered in IVETTE setting.  Does not like to wear compression socks and leg remain swollen.    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) (11/19/2021), ED (erectile dysfunction), Edema, Falling, JOSÉ MANUEL (generalized anxiety disorder), Generalized anxiety disorder (4/27/2020), GERD (gastroesophageal reflux disease), GI bleeding, H/O carpal tunnel syndrome, History of 2019 novel coronavirus disease (COVID-19) (2/8/2021), HTN (hypertension), Impaired gait and mobility (3/14/2019), Mild cognitive impairment (8/12/2019), Mild TBI (traumatic brain injury) (H) (3/14/2019), Obesity (BMI 30-39.9) (9/3/2015), Osteoarthritis, Osteoarthritis of both knees (5/13/2015), Osteoporosis, Other idiopathic peripheral autonomic neuropathy (3/30/2022), Other insomnia (3/14/2019), Other seizures (H) (3/30/2022), Peripheral neuropathy, Pharyngeal dysphagia (5/13/2015), Physical deconditioning (3/14/2019), Recurrent major depressive disorder (H) (3/30/2022), Rhabdomyolysis, Thrombocytopenia (H), Urination disorder, and Weakness (4/27/2020).     PAST SURGICAL HISTORY:   has a past surgical history that includes left hip replacement (2010); left shoulder replacement " (2016); and tonsillectomy.     FUSION LUMBAR THORACIC SPINE N/A 8/4/2020   Procedure: T8-T12 PEDICLE SCREW FIXATION WITH CEMENT, WITH NEUROMONITORING;; Surgeon: Sabit, Behzad, MD     JOINT REPLACEMENT 2010   Left Hip     SHOULDER SURGERY Left 07/20/2016   traumatic fracture. Dr. Joyce at [facility]     TONSILLECTOMY   as a child     UPPER ENDOSCOPY N/A 3/18/2019   Procedure: UPPER ENDOSCOPY;; Surgeon: Kwaku Bowden MD    colonoscopy on 10/1/15, repeat colonoscopy in 5 years recommended Colon polyp 10/15/2015   Serrated adenoma   FAMILY HISTORY: family history includes Osteoporosis in his mother; Prostate Cancer in his paternal grandfather.  SOCIAL HISTORY:   reports that he has never smoked. He has never used smokeless tobacco. He reports current alcohol use. He reports that he does not use drugs.   Prior to admission lived in [place], [place]  , wife Gisela resides in [facility]) - 60 years   Children: 7 adult children, Viola is nurse for [employer] involved in care.  Owned own business for 50 years, [employer] and consulting.  BA Music Education, started portion of Master's  Worked as teacher   Never smoked cigarettes  CD treatment for alcohol in remote past, no ETOH now due to limited access.    Most Recent Immunizations   Administered Date(s) Administered     COVID-19,PF,Pfizer (12+ Yrs) 04/08/2021     Influenza, Quad, High Dose, Pf, 65yr+ (Fluzone HD) 02/29/2020     Mantoux Tuberculin Skin Test 04/27/2020     Pneumo Conj 13-V (2010&after) 06/16/2016     Pneumococcal 23 valent 06/11/2015     Tdap (Adacel,Boostrix) 06/11/2015     Tdap (Adult) Unspecified Formulation 06/11/2015     Zoster vaccine recombinant adjuvanted (SHINGRIX) 06/11/2015         Post Discharge Medication Reconciliation Status: discharge medications reconciled and changed, per note/orders.    Current Outpatient Medications   Medication Sig Dispense Refill     acetaminophen (TYLENOL) 500 MG  "tablet Take 1,000 mg by mouth every 6 hours as needed for mild pain       aspirin 81 MG EC tablet Take 81 mg by mouth daily       hyoscyamine (LEVSIN) 0.125 MG tablet Take 0.125 mg by mouth 4 times daily as needed for cramping       magnesium hydroxide (MILK OF MAGNESIA) 400 MG/5ML suspension Take 30 mLs by mouth daily as needed for constipation or heartburn       mirtazapine (REMERON) 15 MG tablet Take 15 mg by mouth At Bedtime       Multiple Vitamins-Minerals (CEROVITE SENIOR) TABS Take 1 tablet by mouth daily       nystatin (MYCOSTATIN) 677315 UNIT/GM external powder Apply topically 3 times daily as needed       OLANZapine (ZYPREXA) 5 MG tablet Take 5 mg by mouth daily       polyethylene glycol (MIRALAX) 17 g packet Take 1 packet by mouth daily & daily as needed       polyvinyl alcohol-povidone PF (REFRESH) 1.4-0.6 % ophthalmic solution 1 drop every 4 hours as needed for irritation       ROS:   Constitutional - Good appetite per pt report, but appetite poor in TCU. Working on weight loss prior to acute care stay. Sleep variable, normally early riser.  HEENT - + dysphagia, coughing while eating t/o visit, likes yogurt, wears glasses for reading, does not like hearing aids, ringing in ears, upper dentures, need to be checked by dental.  Pulmonary - No SOB, cough w/ eating  CV- Hx of chest pain. Hx of a fib on chart, not aware.  PV - + leg swelling, chronic.  GI - Chronic abd pain, uses Levsin. No abd pain now. Pressure, whole abd, previous PCP ordered Miralax see HPI. Normal BM this AM. Wife would mix Miralx in OJ. Does not trust staff giving medication as ordered.   - No dysuria, incontinent, + nocturia, urology follow-up needed, missed appointment  Neuro -  No known seizure. No tremor. + neuropathy, not troubling now.  MS - Ambulatory with assistive device - 4WW, pain okay.  SKIN - No concerns   Psychiatric - Stress with businesses, still involved but not \"day to day.\" Does not feel down depressed or hopeless. " "+ anxiety. Family reports memory concerns. SLUMS 24/30.    Vitals:  /61   Pulse 80   Temp 98  F (36.7  C)   Resp 18   Ht 1.727 m (5' 8\")   Wt 103 kg (227 lb)   SpO2 95%   BMI 34.52 kg/m    Exam:              GENERAL APPEARANCE:  Alert, in no distress, pleasant, cooperative, seated in recliner chair.  HEAD: NC/AT  EYES: Sclera clear and conjunctiva normal, no discharge   ENT:  Mouth normal, moist mucous membranes, hearing acuity grossly decreased BL  NECK:  Nontender, supple, symmetrical; no cervical adenopathy, masses or thyromegaly, trachea midline  RESP:  Non-labored breathing, palpation of chest normal, no chest wall tenderness, no respiratory distress, Lung sounds clear, patient is on room air. SpO2 97%.   CV:  Palpation - no murmur/non-displaced PMI, Auscultation - rate and rhythm regular, no murmur, no rub or gallop. HR 68.  VASCULAR: 2+ edema bilateral lower extremities.   ABDOMEN:  Normal bowel sounds, soft, nontender, no grimacing or guarding with palpation. No hepatosplenomegaly. No appreciable masses.  M/S:   Gait and station ambulates independently with walker around apartment.   SKIN:  Inspection - linear ecchymosis to right abdomen. Palpation- no increased warmth, skin is dry and non-tender.  NEURO: cranial nerves II-XII grossly intact except hearing, no facial asymmetry, follows simple commands, moves all extremities symmetrically, normal tone, no tremor  PSYCH: awake and alert, speech fluent,  insight and judgement impaired - does not issue with children driving him to routine appointments 3 hours away, memory impaired, irritable, but cooperative    Lab/Diagnostic data:  Recent labs in [facility] reviewed by me today.    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 No seizure hx per pt/family, clonazepam PRN in past to prevent alcohol withdrawal sequela. In memory care due to safety concerns.  Plan:   - Will benefit from supportive care in Memory Care nursing home setting  - Continue Mirtazapine 15 mg q HS  - Continue Zyrpexa 5 mg at HS > discuss possible orthostatic hypotension and role in falls/dizziness. Family would like to continue as mood much improved with medication.   - Nursing to check ortho static blood pressures  - Continue MVI, no longer drinking actively so B vitamins stopped  -  OT following for cognition and functional mobility  - Poor safety awareness, continue falls prevention     (R13.13) Pharyngeal dysphagia  (R04.2) Hemoptysis  (K21.00) GERD  Comment: Hx of hypopharyngeal ulceration on EGD in 2019, choking episodes of over last year, reports recently coughing up blood.  Plan:   - GI referral - [place] [facility]  - Will call PCP whom he trust implicitly to confirm okay to see group in [facility] rather than drive to [place]    (D64.9) Normocytic Anemia  Comment: Chronic, mild, Hgb 12.4 and MCV 97 in December  Plan:   - Check CBC    (K59.01) Slow transit constipation  (K30) Chronic upset stomach  Comment: Chronic, details unclear, using Levsin multiple times per day for abd pain, no longer covered by insurance, reports abd improved with higher Miralax dose.  Irritated by IVETTE rules regarding medications administration, much of visit spent discussion way to improve bowel regimen.  Plan:   - polyethylene glycol (MIRALAX) 17 GM/Dose powder; 1-2 capfuls in 8 oz of ORANGE JUICE PO one time each morning and 1-2 capfuls 8 oz of ORANGE JUICE PO one time daily PRN for constipation. Please mix medication in juice in front of the patient. Hold for loose stools.   - Review side effect profile of Levsin, not preferred in elderly, on [facility] list   - GI referral     (I48.91) Atrial fibrillation, unspecified type (H)  Comment: Chronic, not anticoagulated, rate controlled off medication  [id number]: 3 (age, HTN), 3.2% risk of stroke per year  Plan:   - Monitor VS and clinical status  - Would not add AC due to falls     (I10) Essential hypertension, benign  Comment: BP at goal of < 150/90 given age and comorbid conditions  BP Readings from Last 3 Encounters:   03/30/22 111/61   03/09/22 (!) 152/76   02/09/22 126/63   Plan:   - Monitor routine VS and labs off anti-hypertensive medications  - ASA 81 mg daily  - Check CMP    (R60.0) Lower extremity edema  Comment: Chronic, multifactorial, albumin 3.3 L, does not like to elevate legs or use compression socks, legs in dependent position t/o the day  Plan:   - Would benefit from compression socks, but does not seem keen on this today  - Staff to encourage elevation/compression as able     (M17.0) Primary osteoarthritis of both knees  (R26.9) Abnormal gait  Comment: Fall today, walker broken, bruise to right abd   Plan:   -  PT/OT following through [facility]  - Order placed for new 4WW and emailed to daughter   The patient has a mobility limitation of edema, dementia, OA  that significantly impairs his ability to participate in one or more mobility-related activities of daily living in the home. The patient is able to safely use the walker and the functional mobility deficit can be sufficiently resolved with the use of a walker  - Facility to  provide walker to use in interim     (M15.9) OA Multiple Sites  (M81.0) Osteoporosis  (Z87.81) History of vertebral fracture  Comment: DJD multiple sites and hx of multiple spinal compression fractures per chart review in setting of recurrent falls. Looks to have been on Fosamax in past.   Plan:   - Tylenol 650 mg q 6 hours PRN  - Hold off on bisphosphonate until EGD completed  - Consider endocrine referral for injectable osteoporosis therapy     (97.20) Elevated PSA  Comment: PSA 13.45 in March 2021  Plan:  - Urology referral placed  - Will call Dr. Smith's office to confirm approval of care in [facility]  - Check PSA    (Z71.89) ACP (advance care planning)  Comment: HCD on file, Markos 1st agent, Coral 2nd  Plan:   - POLST for Full Code   - Message sent to P  in effort to get family more support    See patient instructions.    Total time spent with patient visit at the skilled nursing facility was 60 minutes including patient visit, review of past records and discussion of medications, recommended treatment, history, care goals with patient, then daughter Coral, and wife Gisela in person onsite. Coordinated case managed support and ordered DME. Greater than 50% of total time spent with counseling and coordinating care due to new patient admission with multiple concerns. ~ 35/60 minutes on counseling and care coordination.    Electronically signed by:  MARICHUY Basilio CNP                 Sincerely,        MARICHUY Basilio CNP

## 2022-04-01 ENCOUNTER — TELEPHONE (OUTPATIENT)
Dept: GERIATRICS | Facility: CLINIC | Age: 82
End: 2022-04-01
Payer: MEDICARE

## 2022-04-01 DIAGNOSIS — R97.20 ELEVATED PROSTATE SPECIFIC ANTIGEN (PSA): ICD-10-CM

## 2022-04-01 DIAGNOSIS — F10.27 DEMENTIA ASSOCIATED WITH ALCOHOLISM WITH BEHAVIORAL DISTURBANCE (H): Primary | ICD-10-CM

## 2022-04-01 DIAGNOSIS — R13.10 DYSPHAGIA, UNSPECIFIED TYPE: ICD-10-CM

## 2022-04-01 DIAGNOSIS — R26.9 ABNORMAL GAIT: ICD-10-CM

## 2022-04-01 PROCEDURE — 99358 PROLONG SERVICE W/O CONTACT: CPT | Performed by: NURSE PRACTITIONER

## 2022-04-01 RX ORDER — POLYETHYLENE GLYCOL 3350 17 G/17G
POWDER, FOR SOLUTION ORAL
Qty: 510 G | Refills: 98 | Status: SHIPPED | OUTPATIENT
Start: 2022-04-01 | End: 2022-05-23

## 2022-04-01 NOTE — TELEPHONE ENCOUNTER
[facility]  NON-FACE TO FACE PROLONGED SERVICE    Jamie Valdivia 1940    Date of Related Face to Face Service: 3/31/22    INTENT OF SERVICE  This is an extended evaluation of patient's specific problem.  The service relates to a recent or future E/M visit.  Documentation supports medical necessity, relates to ongoing patient management and documents start times and stop times.    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  (Start time 8:34 am, Stop time 9:07 am) regarding recent admission to [facility] and transfer of care from Dr. Smith in [place], [place] to [facility] Service. Daughter assisted in clarifying recent history and more circumstance of move to [facility]. Patient's wife Gisela asked not to live with , choosing not see him in TCU at times. They have visit in the afternoon and eat dinner together daily. Difficult change being apart, but hope arrangement will allow wife more independence. PTA pt/wife struggling with getting food, going to the food shelf. Discussed medications, specifically for mgmt of constipation, Afib and statin, reluctant to take medications follow through on medication care previously especially when drinking. Okay with plan for GI and urology referral locally. Will call Dr. Smith's office for care handoff and ensure she is on board with plan.     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 Appreciate insights into care needs.  Plan:  - Follow-up with Dr. Quintana for initial physician visit 4/8 ~ 10:00 AM  - Will provide GI and Urology referral #'s at  that visit   -  PT/OT/SLP following to assist with transition to new environment  - Reviewed medication changes made at visit this week, daughter expressed understanding of plan of care     TIME  Total time spent with Non-Face to Face prolonged service 33 minutes.     Electronically signed by:  MARICHUY Basilio CNP

## 2022-04-01 NOTE — PATIENT INSTRUCTIONS
Jamie Valdivia  1940  ORDERS:  - Discontinue current Miralax order  - polyethylene glycol (MIRALAX) 17 GM/Dose powder; 1-2 capfuls in 8 oz of ORANGE JUICE PO one time each morning and 1-2 capfuls 8 oz of ORANGE JUICE PO one time daily PRN for constipation. Please mix medication in juice in front of the patient. Hold for loose stools.  Dispense: 510 g; Refill: 98  - FYI - Walker Order for DME - emailed to Coral  - Please check Orthostatic BP/HR laying, sitting, standing, and report to NP prior to 4/8 visit with Dr. Quintana  - 4/7 draw: CBC dx D64.9, CMP dx I10, TSH dx E03.7, Lipid Panel dx E78.5, PSA dx 97.20, Hgb A1c dx Z13.1   - Please schedule for onsite services: opthalmology, podiatry, dental   Electronically signed by:   MARICHUY Basilio CNP  04/01/22 8:29 AM

## 2022-04-06 ENCOUNTER — LAB REQUISITION (OUTPATIENT)
Dept: LAB | Facility: CLINIC | Age: 82
End: 2022-04-06
Payer: MEDICARE

## 2022-04-06 ENCOUNTER — TELEPHONE (OUTPATIENT)
Dept: GERIATRICS | Facility: CLINIC | Age: 82
End: 2022-04-06
Payer: MEDICARE

## 2022-04-06 DIAGNOSIS — R73.09 OTHER ABNORMAL GLUCOSE: ICD-10-CM

## 2022-04-06 DIAGNOSIS — Z13.1 ENCOUNTER FOR SCREENING FOR DIABETES MELLITUS: ICD-10-CM

## 2022-04-06 DIAGNOSIS — E78.5 HYPERLIPIDEMIA, UNSPECIFIED: ICD-10-CM

## 2022-04-06 DIAGNOSIS — E03.9 HYPOTHYROIDISM, UNSPECIFIED: ICD-10-CM

## 2022-04-06 DIAGNOSIS — R97.20 ELEVATED PROSTATE SPECIFIC ANTIGEN (PSA): ICD-10-CM

## 2022-04-06 DIAGNOSIS — D64.9 ANEMIA, UNSPECIFIED: ICD-10-CM

## 2022-04-06 DIAGNOSIS — Z12.5 ENCOUNTER FOR SCREENING FOR MALIGNANT NEOPLASM OF PROSTATE: ICD-10-CM

## 2022-04-06 DIAGNOSIS — I10 ESSENTIAL (PRIMARY) HYPERTENSION: ICD-10-CM

## 2022-04-06 NOTE — TELEPHONE ENCOUNTER
[facility] TELEPHONE ENCOUNTER    Called previous PCP Dr. Gisela Smith's clinic ([phone]) to discuss urology and GI follow-up. Resident wanting to follow-up in [facility]. Calling to get MD approval to schedule follow-up with local teams. Spoke to nurse Vieyra, reports Dr. Smith approves local follow-up care per Dr. Quintana recommendation.     Electronically signed by:   MARICHUY Basilio CNP

## 2022-04-07 DIAGNOSIS — I25.10 CORONARY ATHEROSCLEROSIS OF NATIVE CORONARY ARTERY: ICD-10-CM

## 2022-04-07 DIAGNOSIS — F41.9 ANXIETY: ICD-10-CM

## 2022-04-07 DIAGNOSIS — F41.1 GENERALIZED ANXIETY DISORDER: Primary | ICD-10-CM

## 2022-04-07 LAB
ALBUMIN SERPL-MCNC: 3.2 G/DL (ref 3.4–5)
ALP SERPL-CCNC: 69 U/L (ref 40–150)
ALT SERPL W P-5'-P-CCNC: 23 U/L (ref 0–70)
ANION GAP SERPL CALCULATED.3IONS-SCNC: 5 MMOL/L (ref 3–14)
AST SERPL W P-5'-P-CCNC: 20 U/L (ref 0–45)
BILIRUB SERPL-MCNC: 0.4 MG/DL (ref 0.2–1.3)
BUN SERPL-MCNC: 13 MG/DL (ref 7–30)
CALCIUM SERPL-MCNC: 9 MG/DL (ref 8.5–10.1)
CHLORIDE BLD-SCNC: 102 MMOL/L (ref 94–109)
CHOLEST SERPL-MCNC: 107 MG/DL
CO2 SERPL-SCNC: 31 MMOL/L (ref 20–32)
CREAT SERPL-MCNC: 0.76 MG/DL (ref 0.66–1.25)
ERYTHROCYTE [DISTWIDTH] IN BLOOD BY AUTOMATED COUNT: 13.8 % (ref 10–15)
FASTING STATUS PATIENT QL REPORTED: NORMAL
GFR SERPL CREATININE-BSD FRML MDRD: 90 ML/MIN/1.73M2
GLUCOSE BLD-MCNC: 77 MG/DL (ref 70–99)
HBA1C MFR BLD: 5.6 % (ref 0–5.6)
HCT VFR BLD AUTO: 36.8 % (ref 40–53)
HDLC SERPL-MCNC: 48 MG/DL
HGB BLD-MCNC: 11.7 G/DL (ref 13.3–17.7)
LDLC SERPL CALC-MCNC: 49 MG/DL
MCH RBC QN AUTO: 28.6 PG (ref 26.5–33)
MCHC RBC AUTO-ENTMCNC: 31.8 G/DL (ref 31.5–36.5)
MCV RBC AUTO: 90 FL (ref 78–100)
NONHDLC SERPL-MCNC: 59 MG/DL
PLATELET # BLD AUTO: 203 10E3/UL (ref 150–450)
POTASSIUM BLD-SCNC: 4.3 MMOL/L (ref 3.4–5.3)
PROT SERPL-MCNC: 7.2 G/DL (ref 6.8–8.8)
PSA SERPL-MCNC: 11.3 UG/L (ref 0–4)
RBC # BLD AUTO: 4.09 10E6/UL (ref 4.4–5.9)
SODIUM SERPL-SCNC: 138 MMOL/L (ref 133–144)
TRIGL SERPL-MCNC: 50 MG/DL
TSH SERPL DL<=0.005 MIU/L-ACNC: 3.94 MU/L (ref 0.4–4)
WBC # BLD AUTO: 5.4 10E3/UL (ref 4–11)

## 2022-04-07 PROCEDURE — G0103 PSA SCREENING: HCPCS | Mod: ORL | Performed by: NURSE PRACTITIONER

## 2022-04-07 PROCEDURE — 83036 HEMOGLOBIN GLYCOSYLATED A1C: CPT | Mod: ORL | Performed by: NURSE PRACTITIONER

## 2022-04-07 PROCEDURE — 84443 ASSAY THYROID STIM HORMONE: CPT | Mod: ORL | Performed by: NURSE PRACTITIONER

## 2022-04-07 PROCEDURE — P9604 ONE-WAY ALLOW PRORATED TRIP: HCPCS | Mod: ORL | Performed by: NURSE PRACTITIONER

## 2022-04-07 PROCEDURE — 80061 LIPID PANEL: CPT | Mod: ORL | Performed by: NURSE PRACTITIONER

## 2022-04-07 PROCEDURE — 80053 COMPREHEN METABOLIC PANEL: CPT | Mod: ORL | Performed by: NURSE PRACTITIONER

## 2022-04-07 PROCEDURE — 85027 COMPLETE CBC AUTOMATED: CPT | Mod: ORL | Performed by: NURSE PRACTITIONER

## 2022-04-07 PROCEDURE — 36415 COLL VENOUS BLD VENIPUNCTURE: CPT | Mod: ORL | Performed by: NURSE PRACTITIONER

## 2022-04-07 RX ORDER — MIRTAZAPINE 15 MG/1
TABLET, FILM COATED ORAL
Qty: 90 TABLET | Refills: 3 | Status: SHIPPED | OUTPATIENT
Start: 2022-04-07 | End: 2022-08-23

## 2022-04-07 RX ORDER — ASPIRIN 81 MG/1
TABLET, COATED ORAL
Qty: 90 TABLET | Refills: 3 | Status: SHIPPED | OUTPATIENT
Start: 2022-04-07 | End: 2022-04-26

## 2022-04-07 RX ORDER — OLANZAPINE 5 MG/1
TABLET ORAL
Qty: 90 TABLET | Refills: 3 | Status: SHIPPED | OUTPATIENT
Start: 2022-04-07 | End: 2022-06-16

## 2022-04-08 ENCOUNTER — ASSISTED LIVING VISIT (OUTPATIENT)
Dept: GERIATRICS | Facility: CLINIC | Age: 82
End: 2022-04-08
Payer: COMMERCIAL

## 2022-04-08 VITALS
OXYGEN SATURATION: 95 % | RESPIRATION RATE: 20 BRPM | DIASTOLIC BLOOD PRESSURE: 72 MMHG | TEMPERATURE: 97.4 F | HEIGHT: 68 IN | HEART RATE: 74 BPM | BODY MASS INDEX: 34.4 KG/M2 | SYSTOLIC BLOOD PRESSURE: 128 MMHG | WEIGHT: 227 LBS

## 2022-04-08 DIAGNOSIS — M17.0 PRIMARY OSTEOARTHRITIS OF BOTH KNEES: ICD-10-CM

## 2022-04-08 DIAGNOSIS — M81.0 OSTEOPOROSIS, UNSPECIFIED OSTEOPOROSIS TYPE, UNSPECIFIED PATHOLOGICAL FRACTURE PRESENCE: ICD-10-CM

## 2022-04-08 DIAGNOSIS — Z87.81 HISTORY OF VERTEBRAL FRACTURE: ICD-10-CM

## 2022-04-08 DIAGNOSIS — I48.91 ATRIAL FIBRILLATION, UNSPECIFIED TYPE (H): ICD-10-CM

## 2022-04-08 DIAGNOSIS — K21.00 GASTROESOPHAGEAL REFLUX DISEASE WITH ESOPHAGITIS, UNSPECIFIED WHETHER HEMORRHAGE: ICD-10-CM

## 2022-04-08 DIAGNOSIS — F43.23 ADJUSTMENT REACTION WITH ANXIETY AND DEPRESSION: ICD-10-CM

## 2022-04-08 DIAGNOSIS — F10.20 ALCOHOLISM (H): ICD-10-CM

## 2022-04-08 DIAGNOSIS — I10 ESSENTIAL HYPERTENSION, BENIGN: ICD-10-CM

## 2022-04-08 DIAGNOSIS — R44.3 HALLUCINATIONS: ICD-10-CM

## 2022-04-08 DIAGNOSIS — R60.0 LOWER EXTREMITY EDEMA: ICD-10-CM

## 2022-04-08 DIAGNOSIS — F10.27 DEMENTIA ASSOCIATED WITH ALCOHOLISM WITH BEHAVIORAL DISTURBANCE (H): ICD-10-CM

## 2022-04-08 DIAGNOSIS — R13.13 PHARYNGEAL DYSPHAGIA: Primary | ICD-10-CM

## 2022-04-08 DIAGNOSIS — K59.01 SLOW TRANSIT CONSTIPATION: ICD-10-CM

## 2022-04-08 DIAGNOSIS — R97.20 ELEVATED PROSTATE SPECIFIC ANTIGEN (PSA): ICD-10-CM

## 2022-04-08 DIAGNOSIS — G62.9 PERIPHERAL POLYNEUROPATHY: ICD-10-CM

## 2022-04-08 NOTE — LETTER
4/8/2022        RE: Jamie Valdivia  [facility]  [address]  [facility] [address]        Jamie Valdivia is a 81 year old male seen April 8, 2022 at [facility] unit where he has resided for one month (admit 3/2022) seen for initial visit.  Pt is seen in his apartment with his daughter Alexia and son Markos present, and they help with history.    Pt notes he does not feel well, mostly GI issues.   Has some dysphagia, occ spitting up some blood as well as trouble swallowing food.   H/o variceal bleed in 2002    He also manages symptoms of IBS with Levsin (calls it the H pill) and Miralax   Determines how may capfuls of Miralax he takes by how he feels.   Had his wife bring him some in.   Pt calls his wife if no one answers his call light, laid on floor for a while after a fall recently, called EMS.  Probably not remembering to push his pendant as he was not able to locate it when asked today.     By chart review, pt has a h/o GERD with esophgitis, significant alcohol abuse, peripheral neuropathy, atrial fib, HTN, osteoporosis with vertebral fracture and OA.  His problem with dysphagia is not new, had EGD in 2019 that showed circumferential ulceration and necrosis at the hypopharynx to UES.   Has been recommended to follow up with ENT and have follow up EGD - not done yet.    Pt had a [facility] stay in November 2021 for weakness, dehydration and alcohol withdrawal.  Has cognitive decline secondary to longstanding alcoholism (lifetime per family) and a h/o hallucinations.   He was hospitalized at [facility] in December 2021 when his wife was hospitalized and he could not manage at home.  Admitted for detox, with weakness and confusion in unsafe living conditions.  He transferred to [facility] for 3 month stay.  Started on olanzapine secondary to hallucinations, delusions and wandering  Worked with therapies and has regained ambulation with 4WW.    At TCU discharge [place] moved [place], with [place] in a different [place] apartment.       Past Medical History:   Diagnosis Date     Age-related osteoporosis without current pathological fracture 3/30/2022     Alcohol abuse 11/19/2017     Alcohol dependence with withdrawal (H)      Alcoholism (H)      Back pain      Backache 12/19/2018     CAD (coronary artery disease)      Chronic a-fib (H)      Chronic alcohol use 6/11/2015    Formatting of this note might be different from the original. 2/26/2019: serious fall at home with multiple vertebral fractures.  BAL 0.414% on admission.  Denies that he drinks much. 12/18/2018: hospitalized for fall due to alcohol intoxication.  BAL 0.34% on admission. 9/16/2018: hospitalized for injuries from fall due to alcohol intoxication.  BAL 0.34% on admission     Chronic atrial fibrillation (H) 7/15/2016    Formatting of this note might be different from the original. 2/2019: Multiple falls so started on aspirin only.  Then aspirin stopped due to GI bleed.     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 2/2/21     HTN (hypertension)      Impaired gait and mobility 3/14/2019     Mild cognitive impairment 8/12/2019    Formatting of this note might be different from the original. NON-AMNESTIC TYPE     Mild TBI (traumatic brain injury) (H) 3/14/2019     Obesity (BMI 30-39.9) 9/3/2015     Osteoarthritis      Osteoarthritis of both knees 5/13/2015     Osteoporosis      Other idiopathic peripheral autonomic neuropathy 3/30/2022     Other  "insomnia 3/14/2019     Other seizures (H) 3/30/2022     Peripheral neuropathy      Pharyngeal dysphagia 5/13/2015    Formatting of this note might be different from the original. Likely secondary to GERD with esophagitis, on PPI and H2 blocker with notable improvement, EGD 10/5/15.     Physical deconditioning 3/14/2019     Recurrent major depressive disorder (H) 3/30/2022     Rhabdomyolysis      Thrombocytopenia (H)      Urination disorder      Weakness 4/27/2020       Past Surgical History:   Procedure Laterality Date     left hip replacement  2010     left shoulder replacement  2016     TONSILLECTOMY        SH:  Lived with his wife Gisela, house in [place] prior to move to [place].    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 ear, [place] in right ear     EXAM: NAD  /72   Pulse 74   Temp 97.4  F (36.3  C)   Resp 20   Ht 1.727 m (5' 8\")   Wt 103 kg (227 lb)   SpO2 95%   BMI 34.52 kg/m     Neck supple without adenopathy  Lungs with decreased BS, no rales or wheeze  Heart RRR s1s2  Abd soft, NT, no distention or guarding, +BS  Ext with trace edema   Neuro: limited history, off topic, generalized weakness and slow to move about  Psych: affect and content depressed     Last Comprehensive Metabolic Panel:  Sodium   Date Value Ref Range Status   12/29/2021 140 133 - 144 mmol/L Final     Potassium   Date Value Ref Range Status   12/29/2021 3.9 3.4 - 5.3 mmol/L Final     Chloride   Date Value Ref Range Status   12/29/2021 105 94 - 109 mmol/L Final     Carbon Dioxide (CO2)   Date Value Ref Range Status   12/29/2021 29 20 - 32 mmol/L Final     Anion Gap   Date Value Ref Range Status   12/29/2021 6 3 - 14 mmol/L Final     Glucose   Date Value Ref Range Status   12/29/2021 80 70 - 99 mg/dL Final     Urea Nitrogen   Date Value Ref Range Status   12/29/2021 17 7 - 30 mg/dL " Final     Creatinine   Date Value Ref Range Status   12/29/2021 0.62 (L) 0.66 - 1.25 mg/dL Final     GFR Estimate   Date Value Ref Range Status   12/29/2021 >90 >60 mL/min/1.73m2 Final     Comment:     Effective December 21, 2021 eGFRcr in adults is calculated using the 2021 CKD-EPI creatinine equation which includes age and gender (Curt et al., [facility] DOI: 10.1056/ATLFcr3853937)     Calcium   Date Value Ref Range Status   12/29/2021 8.9 8.5 - 10.1 mg/dL Final     Lab Results   Component Value Date    AST 20 12/29/2021      ALBUMIN 3.3 12/29/2021      ALKPHOS 82 12/29/2021     Lab Results   Component Value Date    WBC 5.8 12/29/2021      HGB 12.4 12/29/2021      MCV 97 12/29/2021       12/29/2021       IMP/PLAN:   (R13.13) Pharyngeal dysphagia    (K21.00) GERD  Comment: spits up food, blood; choking   Plan: Referral to GI for repeat EGD to clarify state of abnormalities seen in 2019     (G62.9) Peripheral polyneuropathy  Comment: secondary to alcoholism     Plan: not currently treated.  Will follow     (I48.91) Atrial fibrillation, unspecified type (H)  Comment:   Pulse Readings from Last 4 Encounters:   04/08/22 74   03/30/22 80   03/09/22 60   02/09/22 57      Plan: CHADS-VASc score 3  Not anticoagulated secondary to alcohol, falls   Does remain on daily aspirin 81 mg    (I10) Essential hypertension, benign  (R60.0) Lower extremity edema  Comment:   BP Readings from Last 3 Encounters:   04/08/22 128/72   03/30/22 111/61   03/09/22 (!) 152/76      Plan: not currently on anti-hypertensives  Declines LE compression.   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    Olanzapine 5 mg/HS    (F43.23) Adjustment reaction with anxiety and depression  Comment: many changes, loss of autonomy     Plan:  mirtazapine 15 mg/HS    (M17.0) Primary osteoarthritis of both knees  Comment: ambulatory with 4WW  Plan: prn acetaminophen, local measures       (M81.0) Osteoporosis  (Z87.81) History of vertebral fracture  Comment: falls   Plan: would check vit D level, consider replacement  Dietary calcium     (R97.20) Elevated prostate specific antigen (PSA)  Comment: most recently >13   Plan: needs Urology follow up     (K59.01) Slow transit constipation  Comment: chronic GI discomfort   Plan: uses Miralax, prn Levsin        Tabatha Quintana MD         Sincerely,        Tabatha Quintana MD

## 2022-04-08 NOTE — PROGRESS NOTES
Jamie Valdivia is a 81 year old male seen April 8, 2022 at [facility] unit where he has resided for one month (admit 3/2022) seen for initial visit.  Pt is seen in his apartment with his daughter Alexia and son Markos present, and they help with history.    Pt notes he does not feel well, mostly GI issues.   Has some dysphagia, occ spitting up some blood as well as trouble swallowing food.   H/o variceal bleed in 2002    He also manages symptoms of IBS with Levsin (calls it the H pill) and Miralax   Determines how may capfuls of Miralax he takes by how he feels.   Had his wife bring him some in.   Pt calls his wife if no one answers his call light, laid on floor for a while after a fall recently, called EMS.  Probably not remembering to push his pendant as he was not able to locate it when asked today.     By chart review, pt has a h/o GERD with esophgitis, significant alcohol abuse, peripheral neuropathy, atrial fib, HTN, osteoporosis with vertebral fracture and OA.  His problem with dysphagia is not new, had EGD in 2019 that showed circumferential ulceration and necrosis at the hypopharynx to UES.   Has been recommended to follow up with ENT and have follow up EGD - not done yet.    Pt had a [facility] stay in November 2021 for weakness, dehydration and alcohol withdrawal.  Has cognitive decline secondary to longstanding alcoholism (lifetime per family) and a h/o hallucinations.   He was hospitalized at [facility] in December 2021 when his wife was hospitalized and he could not manage at home.  Admitted for detox, with weakness and confusion in unsafe living conditions.  He transferred to [facility] for 3 month stay.  Started on olanzapine secondary to hallucinations, delusions and wandering  Worked with therapies and has regained ambulation with 4WW.   At TCU discharge he moved to [place] Memory Care unit, with [place] in a different [place] apartment.       Past Medical  History:   Diagnosis Date     Age-related osteoporosis without current pathological fracture 3/30/2022     Alcohol abuse 11/19/2017     Alcohol dependence with withdrawal (H)      Alcoholism (H)      Back pain      Backache 12/19/2018     CAD (coronary artery disease)      Chronic a-fib (H)      Chronic alcohol use 6/11/2015    Formatting of this note might be different from the original. 2/26/2019: serious fall at home with multiple vertebral fractures.  BAL 0.414% on admission.  Denies that he drinks much. 12/18/2018: hospitalized for fall due to alcohol intoxication.  BAL 0.34% on admission. 9/16/2018: hospitalized for injuries from fall due to alcohol intoxication.  BAL 0.34% on admission     Chronic atrial fibrillation (H) 7/15/2016    Formatting of this note might be different from the original. 2/2019: Multiple falls so started on aspirin only.  Then aspirin stopped due to GI bleed.     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 2/2/21     HTN (hypertension)      Impaired gait and mobility 3/14/2019     Mild cognitive impairment 8/12/2019    Formatting of this note might be different from the original. NON-AMNESTIC TYPE     Mild TBI (traumatic brain injury) (H) 3/14/2019     Obesity (BMI 30-39.9) 9/3/2015     Osteoarthritis      Osteoarthritis of both knees 5/13/2015     Osteoporosis      Other idiopathic peripheral autonomic neuropathy 3/30/2022     Other insomnia 3/14/2019     Other seizures (H) 3/30/2022     Peripheral neuropathy      Pharyngeal dysphagia 5/13/2015     "Formatting of this note might be different from the original. Likely secondary to GERD with esophagitis, on PPI and H2 blocker with notable improvement, EGD 10/5/15.     Physical deconditioning 3/14/2019     Recurrent major depressive disorder (H) 3/30/2022     Rhabdomyolysis      Thrombocytopenia (H)      Urination disorder      Weakness 4/27/2020       Past Surgical History:   Procedure Laterality Date     left hip replacement  2010     left shoulder replacement  2016     TONSILLECTOMY        SH:  Lived with his wife Gisela, house in [place] prior to move to [place].    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 ear, [place] in right ear     EXAM: NAD  /72   Pulse 74   Temp 97.4  F (36.3  C)   Resp 20   Ht 1.727 m (5' 8\")   Wt 103 kg (227 lb)   SpO2 95%   BMI 34.52 kg/m     Neck supple without adenopathy  Lungs with decreased BS, no rales or wheeze  Heart RRR s1s2  Abd soft, NT, no distention or guarding, +BS  Ext with trace edema   Neuro: limited history, off topic, generalized weakness and slow to move about  Psych: affect and content depressed     Last Comprehensive Metabolic Panel:  Sodium   Date Value Ref Range Status   12/29/2021 140 133 - 144 mmol/L Final     Potassium   Date Value Ref Range Status   12/29/2021 3.9 3.4 - 5.3 mmol/L Final     Chloride   Date Value Ref Range Status   12/29/2021 105 94 - 109 mmol/L Final     Carbon Dioxide (CO2)   Date Value Ref Range Status   12/29/2021 29 20 - 32 mmol/L Final     Anion Gap   Date Value Ref Range Status   12/29/2021 6 3 - 14 mmol/L Final     Glucose   Date Value Ref Range Status   12/29/2021 80 70 - 99 mg/dL Final     Urea Nitrogen   Date Value Ref Range Status   12/29/2021 17 7 - 30 mg/dL Final     Creatinine   Date Value Ref Range Status   12/29/2021 0.62 (L) 0.66 - 1.25 mg/dL Final     GFR Estimate "   Date Value Ref Range Status   12/29/2021 >90 >60 mL/min/1.73m2 Final     Comment:     Effective December 21, 2021 eGFRcr in adults is calculated using the 2021 CKD-EPI creatinine equation which includes age and gender (Curt et al., [facility], DOI: 10.1056/RLARyx3585703)     Calcium   Date Value Ref Range Status   12/29/2021 8.9 8.5 - 10.1 mg/dL Final     Lab Results   Component Value Date    AST 20 12/29/2021      ALBUMIN 3.3 12/29/2021      ALKPHOS 82 12/29/2021     Lab Results   Component Value Date    WBC 5.8 12/29/2021      HGB 12.4 12/29/2021      MCV 97 12/29/2021       12/29/2021       IMP/PLAN:   (R13.13) Pharyngeal dysphagia    (K21.00) GERD  Comment: spits up food, blood; choking   Plan: Referral to GI for repeat EGD to clarify state of abnormalities seen in 2019     (G62.9) Peripheral polyneuropathy  Comment: secondary to alcoholism     Plan: not currently treated.  Will follow     (I48.91) Atrial fibrillation, unspecified type (H)  Comment:   Pulse Readings from Last 4 Encounters:   04/08/22 74   03/30/22 80   03/09/22 60   02/09/22 57      Plan: CHADS-VASc score 3  Not anticoagulated secondary to alcohol, falls   Does remain on daily aspirin 81 mg    (I10) Essential hypertension, benign  (R60.0) Lower extremity edema  Comment:   BP Readings from Last 3 Encounters:   04/08/22 128/72   03/30/22 111/61   03/09/22 (!) 152/76      Plan: not currently on anti-hypertensives  Declines LE compression.   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    Olanzapine 5 mg/HS    (F43.23) Adjustment reaction with anxiety and depression  Comment: many changes, loss of autonomy     Plan: mirtazapine 15 mg/HS    (M17.0) Primary osteoarthritis of both knees  Comment: ambulatory with 4WW  Plan: prn  acetaminophen, local measures       (M81.0) Osteoporosis  (Z87.81) History of vertebral fracture  Comment: falls   Plan: would check vit D level, consider replacement  Dietary calcium     (R97.20) Elevated prostate specific antigen (PSA)  Comment: most recently >13   Plan: needs Urology follow up     (K59.01) Slow transit constipation  Comment: chronic GI discomfort   Plan: uses Miralax, prn Timsin        Tabatha Quintana MD

## 2022-04-15 PROBLEM — S06.9XAA MILD TBI (TRAUMATIC BRAIN INJURY) (H): Status: RESOLVED | Noted: 2019-03-14 | Resolved: 2022-04-15

## 2022-04-22 ENCOUNTER — TELEPHONE (OUTPATIENT)
Dept: GERIATRICS | Facility: CLINIC | Age: 82
End: 2022-04-22
Payer: MEDICARE

## 2022-04-22 RX ORDER — ECHINACEA PURPUREA EXTRACT 125 MG
2 TABLET ORAL DAILY
COMMUNITY
End: 2022-06-17

## 2022-04-22 NOTE — TELEPHONE ENCOUNTER
[facility] Triage Nurse Telephone Encounter    Provider: MARICHUY Basilio CNP  Facility: [facility]  Facility Type:  [place]    Caller: Lakia  Call Back Number: [phone]    Allergies:    Allergies   Allergen Reactions     Ativan [Lorazepam]         Reason for call: Nurse called to report that daughter came by to visit patient is wanting something for nasal congestion/dripping.      Verbal Order/Direction given by Provider: Okay to start ocean spray 0.65% nasal spray, 2 sprays into each nostril daily.      Provider giving Order:  MARICHUY Bravo CNP     Verbal Order given to: Lakia Rankin RN

## 2022-04-26 ENCOUNTER — TELEPHONE (OUTPATIENT)
Dept: GERIATRICS | Facility: CLINIC | Age: 82
End: 2022-04-26
Payer: MEDICARE

## 2022-04-26 DIAGNOSIS — Z87.19 HISTORY OF GI BLEED: Primary | ICD-10-CM

## 2022-04-26 NOTE — TELEPHONE ENCOUNTER
"[facility] TELEPHONE ENCOUNTER    CC:    Jamie Valdivia is a 81 year old  (1940), nurse called today to report: concern resident \"coughing up blood\".  Found dried blood around nose/mouth and blood tissues in trash, reports \"blood in his esophagus.    Referral given to [facility] on admission to [facility] and had been given previous PCP but resident did not follow-up.  Hx of GIB in past. Hx of heavy ETOH use.   VSS per nursing.    ASSESSMENT/PLAN  Chronic hemoptysis/dysphagia present since admission, hx of GIB and heavy ETOH use. Family given referral to [facility], but nursing reports no appointment made to date.  Discussed with Dr. Quintana. Agree needs GI follow-up ASAP. Reviewed medications, see orders below.    PLAN:   - Hold ASA  - Start omeprazole 20 mg daily for GI protection  - 4/28/22: CBC, CMP, TSH, B12, Hgb A1C, Lipid panel   - If VS unstable or any concern for air way compromise would need immediate transfer to ER    Called daughter Viola and Coral, no answer, left message for Coral with update and requested return call, given NP direct cell#.    Electronically signed by:   MARICHUY Basilio CNP          "

## 2022-04-27 ENCOUNTER — LAB REQUISITION (OUTPATIENT)
Dept: LAB | Facility: CLINIC | Age: 82
End: 2022-04-27
Payer: MEDICARE

## 2022-04-27 DIAGNOSIS — E11.9 TYPE 2 DIABETES MELLITUS WITHOUT COMPLICATIONS (H): ICD-10-CM

## 2022-04-27 DIAGNOSIS — E78.5 HYPERLIPIDEMIA, UNSPECIFIED: ICD-10-CM

## 2022-04-27 DIAGNOSIS — E03.9 HYPOTHYROIDISM, UNSPECIFIED: ICD-10-CM

## 2022-04-27 DIAGNOSIS — R25.2 CRAMP OF LIMB: Primary | ICD-10-CM

## 2022-04-27 DIAGNOSIS — I10 ESSENTIAL (PRIMARY) HYPERTENSION: ICD-10-CM

## 2022-04-27 DIAGNOSIS — D64.9 ANEMIA, UNSPECIFIED: ICD-10-CM

## 2022-04-27 RX ORDER — HYOSCYAMINE SULFATE 0.125 MG
0.12 TABLET ORAL 4 TIMES DAILY PRN
Qty: 30 TABLET | Refills: 11 | Status: SHIPPED | OUTPATIENT
Start: 2022-04-27 | End: 2022-08-23

## 2022-04-27 NOTE — TELEPHONE ENCOUNTER
May refill non-narcotic medications as needed for patients in Assisted Living or equivalent care setting

## 2022-04-28 LAB
ALBUMIN SERPL-MCNC: 3.2 G/DL (ref 3.4–5)
ALP SERPL-CCNC: 68 U/L (ref 40–150)
ALT SERPL W P-5'-P-CCNC: 18 U/L (ref 0–70)
ANION GAP SERPL CALCULATED.3IONS-SCNC: 3 MMOL/L (ref 3–14)
AST SERPL W P-5'-P-CCNC: 16 U/L (ref 0–45)
BILIRUB SERPL-MCNC: 0.4 MG/DL (ref 0.2–1.3)
BUN SERPL-MCNC: 9 MG/DL (ref 7–30)
CALCIUM SERPL-MCNC: 8.3 MG/DL (ref 8.5–10.1)
CHLORIDE BLD-SCNC: 104 MMOL/L (ref 94–109)
CHOLEST SERPL-MCNC: 101 MG/DL
CO2 SERPL-SCNC: 32 MMOL/L (ref 20–32)
CREAT SERPL-MCNC: 0.68 MG/DL (ref 0.66–1.25)
ERYTHROCYTE [DISTWIDTH] IN BLOOD BY AUTOMATED COUNT: 13.8 % (ref 10–15)
FASTING STATUS PATIENT QL REPORTED: NORMAL
GFR SERPL CREATININE-BSD FRML MDRD: >90 ML/MIN/1.73M2
GLUCOSE BLD-MCNC: 80 MG/DL (ref 70–99)
HBA1C MFR BLD: 5.5 % (ref 0–5.6)
HCT VFR BLD AUTO: 37.9 % (ref 40–53)
HDLC SERPL-MCNC: 40 MG/DL
HGB BLD-MCNC: 12 G/DL (ref 13.3–17.7)
LDLC SERPL CALC-MCNC: 50 MG/DL
MCH RBC QN AUTO: 28.8 PG (ref 26.5–33)
MCHC RBC AUTO-ENTMCNC: 31.7 G/DL (ref 31.5–36.5)
MCV RBC AUTO: 91 FL (ref 78–100)
NONHDLC SERPL-MCNC: 61 MG/DL
PLATELET # BLD AUTO: 199 10E3/UL (ref 150–450)
POTASSIUM BLD-SCNC: 4.4 MMOL/L (ref 3.4–5.3)
PROT SERPL-MCNC: 7.2 G/DL (ref 6.8–8.8)
RBC # BLD AUTO: 4.17 10E6/UL (ref 4.4–5.9)
SODIUM SERPL-SCNC: 139 MMOL/L (ref 133–144)
TRIGL SERPL-MCNC: 54 MG/DL
TSH SERPL DL<=0.005 MIU/L-ACNC: 2.77 MU/L (ref 0.4–4)
VIT B12 SERPL-MCNC: 420 PG/ML (ref 193–986)
WBC # BLD AUTO: 5.5 10E3/UL (ref 4–11)

## 2022-04-28 PROCEDURE — 80061 LIPID PANEL: CPT | Mod: ORL | Performed by: NURSE PRACTITIONER

## 2022-04-28 PROCEDURE — 84443 ASSAY THYROID STIM HORMONE: CPT | Mod: ORL | Performed by: NURSE PRACTITIONER

## 2022-04-28 PROCEDURE — 80053 COMPREHEN METABOLIC PANEL: CPT | Mod: ORL | Performed by: NURSE PRACTITIONER

## 2022-04-28 PROCEDURE — 82607 VITAMIN B-12: CPT | Mod: ORL | Performed by: NURSE PRACTITIONER

## 2022-04-28 PROCEDURE — 85027 COMPLETE CBC AUTOMATED: CPT | Mod: ORL | Performed by: NURSE PRACTITIONER

## 2022-04-28 PROCEDURE — 36415 COLL VENOUS BLD VENIPUNCTURE: CPT | Mod: ORL | Performed by: NURSE PRACTITIONER

## 2022-04-28 PROCEDURE — 83036 HEMOGLOBIN GLYCOSYLATED A1C: CPT | Mod: ORL | Performed by: NURSE PRACTITIONER

## 2022-04-28 PROCEDURE — P9604 ONE-WAY ALLOW PRORATED TRIP: HCPCS | Mod: ORL | Performed by: NURSE PRACTITIONER

## 2022-05-02 DIAGNOSIS — Z78.9 TAKES DIETARY SUPPLEMENTS: Primary | ICD-10-CM

## 2022-05-02 RX ORDER — MULTIVIT-MIN/FA/LYCOPEN/LUTEIN .4-300-25
1 TABLET ORAL DAILY
Qty: 90 TABLET | Refills: 3 | Status: SHIPPED | OUTPATIENT
Start: 2022-05-02 | End: 2022-08-23

## 2022-05-09 ENCOUNTER — PATIENT OUTREACH (OUTPATIENT)
Dept: GERIATRIC MEDICINE | Facility: CLINIC | Age: 82
End: 2022-05-09
Payer: COMMERCIAL

## 2022-05-09 NOTE — LETTER
May 9, 2022    JAMIE CRUMP  [address]   [place] [address]      Dear Jamie,    Welcome to [facility] ([facility]) ([facility]) health program. My name is Yue Goss RN. I am your [facility] care coordinator. You are eligible for Care Coordination through [facility] Product.    Here is how Care Coordination works:    I will meet with you in person to determine your care coordination needs    We will develop a plan of care to meet your needs    We will create a service plan showing the services you will receive    We will talk about and coordinate any preventive care needs you have    I will call you soon to see how you are doing and determine what needs you may have. Our goal is to keep you as healthy and independent as possible.     [facility] combines the benefits you may already receive from Medical Assistance, Medicare and the Prescription Drug Coverage Program.    Soon you will receive a new [facility] member identification (ID) card from [facility]. When you receive it, please use this card where you get your health services.    Being in the [place] [facility] ([facility]) ([facility]) Care Coordination program is voluntary and offered to you at no cost. If you ever wish to stop being in the Care Coordination program or have questions, call me at [phone]. If you reach my voice mail, leave a message and your phone number. If you are hearing impaired, call the [place] [employer] at 945 or [phone] (zhzbee-mh-[email or web address] relay service).  Sincerely,    Yue Goss RN    Email: [email or web address]  Phone: [phone]      [facility] ([facility]) is a health plan that contracts with both Medicare and the [place] Medical Assistance (Medicaid) program to provide benefits of both programs to enrollees. Enrollment in [facility] depends on contract renewal.    [id number] accepted       (12/2019)

## 2022-05-09 NOTE — TELEPHONE ENCOUNTER
[facility] Care Coordination Contact    Member became effective with  [facility] on 05/01/2022 [facility]  Previous Health Plan: [place]/Fee For Service  Previous Care System: [place] [facility]  Previous care coordinators name and number: Farida Arnodl [phone]  Waiver Type: EW  Last MMIS Entry: Date 03/17/2022 and Type EW Open  MMIS visit date (and type) if different from above: 02/25/2022 relocation assistance  Services Listed in MMIS:   A3 F [facility] 08 C MED APPTS 35 F CASE MGMT  86 C 24 CL50 3 06 C HOMEMAKER 62 C LAUNDRY  57 C PRSNL ASST  UTF received: No: Requested on 05/09/2022 via voicemail from Farida Arnold ().     Alyssa Brito  Care Management Specialist  [facility]  [phone]

## 2022-05-10 ENCOUNTER — ASSISTED LIVING VISIT (OUTPATIENT)
Dept: GERIATRICS | Facility: CLINIC | Age: 82
End: 2022-05-10
Payer: COMMERCIAL

## 2022-05-10 VITALS
RESPIRATION RATE: 15 BRPM | HEART RATE: 63 BPM | WEIGHT: 228.8 LBS | BODY MASS INDEX: 34.68 KG/M2 | DIASTOLIC BLOOD PRESSURE: 65 MMHG | OXYGEN SATURATION: 96 % | SYSTOLIC BLOOD PRESSURE: 121 MMHG | TEMPERATURE: 98.7 F | HEIGHT: 68 IN

## 2022-05-10 DIAGNOSIS — R13.13 PHARYNGEAL DYSPHAGIA: ICD-10-CM

## 2022-05-10 DIAGNOSIS — F10.27 DEMENTIA ASSOCIATED WITH ALCOHOLISM WITH BEHAVIORAL DISTURBANCE (H): ICD-10-CM

## 2022-05-10 DIAGNOSIS — D64.9 NORMOCYTIC ANEMIA: ICD-10-CM

## 2022-05-10 DIAGNOSIS — F43.23 ADJUSTMENT REACTION WITH ANXIETY AND DEPRESSION: ICD-10-CM

## 2022-05-10 DIAGNOSIS — R04.2 COUGHING BLOOD: Primary | ICD-10-CM

## 2022-05-10 DIAGNOSIS — R44.3 HALLUCINATIONS: ICD-10-CM

## 2022-05-10 NOTE — LETTER
"    5/10/2022        RE: Jamie Valdivia  [facility]  [address]  [facility] [address]        [facility] GERIATRICS    Chief Complaint   Patient presents with     RECHECK     hemopytsis     HPI:  Jamie Valdivia is a 81 year old  (1940), who is being seen today for an episodic care visit at: [facility]) [61].     M[place] [facility] to [facility] for permanent placement on 3/11/22.     Hospitalized from 12/17 to 12/23/21 at [facility] for falls, weakness, alcoholism, and alcoholic hepatitis; discharge summary not available. Wife (who was primary care giver) was having medical issues and needed neurology care in the [facility]. Family felt Jamie unable to safely be left alone so they called EMS to transport to hospital for ongoing care/ETOH detox. Ultimately transferred to [facility] where wife was convalescing.      Prior to this, hospitalized at [facility] form 11/1 to 11/5/21 with generalized weakness, dehydration, and concern for UTI but culture came back negative. Symptoms likely related to chronic alcoholism with alcohol withdrawal. Chronic cognitive impairment in setting of long history of alcoholism, hx of hallucinations. Multiple hospitalizations over preceding months per family due to unsafe living situation, well known to ED staff. Spent time in SNF ([facility]) around September 2021, details unclear, but seem to have suffered spinal fracture.     Rem[place] [facility] [place]23 and 3/15/22 with largely uneventful stay. Zyprexa dose increased with good effect on mood, prior to starting medication having hallucinations, delusions, and wandering on unit.    Today's concern is:  Follow-up today due to hemoptysis.  Occurring over last several months, dysphagia over last several years.    Daughter Viola called to give update and left VM.  Esophagram scheduled for 5/13/22 and EGD for 6/1/22    Resident reports he is \"not " "good!\"  Each morning when wakes up coughs up blood.  Blood also coming up nose.   Wants to drink \"as much cough syrup as I need.\"  Periodic coughing.  \"When it gets dry I just start bleeding for no reason.\"  Not related to eating.   No SOB.  No drooling..  Choking on food.  \"Very common when I eat.\"     Of bowels states, okay \"if I can get the cooperation from the pill people.\"  Finds one aid particularly helpful.  Having regular BMs.  \"The H pill\" (Levsin) - is \"keeping the blockage from being a problem.\"  Having difficulty with staff giving medications.    Reports fatigue and long days, but spirits are okay.  \"There is one medication that really knocks me out.\"  Reviewed Zyprexa and Mirtazapine.     Allergies, and PMH/PSH reviewed in EPIC today.    REVIEW OF SYSTEMS:  Limited secondary to cognitive impairment but today pt reports 4 point ROS including Respiratory, CV, GI and , other than that noted in the HPI,  is negative    Objective:   /65   Pulse 63   Temp 98.7  F (37.1  C)   Resp 15   Ht 1.727 m (5' 8\")   Wt 103.8 kg (228 lb 12.8 oz)   SpO2 96%   BMI 34.79 kg/m    GENERAL APPEARANCE:  Alert, in no distress, pleasant, cooperative, seated in recliner chair.  EYES: Sclera clear and conjunctiva normal, no discharge   ENT:  Mouth normal, moist mucous membranes, hearing acuity grossly decreased BL. No visible blood, lesions, sores in nares BL, mouth, oral pharynx.    NECK:  Nontender, supple, symmetrical; no cervical adenopathy, masses or thyromegaly, trachea midline  RESP:  Non-labored breathing, palpation of chest normal, no chest wall tenderness, no respiratory distress, Lung sounds clear, patient is on room air.  CV:  Palpation - no murmur/non-displaced PMI, Auscultation - rate and rhythm regular, no murmur, no rub or gallop.  VASCULAR: 2+ edema bilateral lower extremities.   ABDOMEN:  Normal bowel sounds, soft, nontender, no grimacing or guarding with palpation  M/S:   Gait and station ambulates " independently to answer door without walker  PSYCH: awake and alert, speech fluent, memory impaired, cooperative    Recent labs in EPIC reviewed by me today.    CBC RESULTS: Recent Labs   Lab Test 04/28/22  0655 04/07/22  0735   WBC 5.5 5.4   RBC 4.17* 4.09*   HGB 12.0* 11.7*   HCT 37.9* 36.8*   MCV 91 90   MCH 28.8 28.6   MCHC 31.7 31.8   RDW 13.8 13.8    203       Last Basic Metabolic Panel:  Recent Labs   Lab Test 04/28/22  0655 04/07/22  0735    138   POTASSIUM 4.4 4.3   CHLORIDE 104 102   JOSUE 8.3* 9.0   CO2 32 31   BUN 9 13   CR 0.68 0.76   GLC 80 77       Liver Function Studies -   Recent Labs   Lab Test 04/28/22  0655 04/07/22  0735   PROTTOTAL 7.2 7.2   ALBUMIN 3.2* 3.2*   BILITOTAL 0.4 0.4   ALKPHOS 68 69   AST 16 20   ALT 18 23       TSH   Date Value Ref Range Status   04/28/2022 2.77 0.40 - 4.00 mU/L Final   04/07/2022 3.94 0.40 - 4.00 mU/L Final     Lab Results   Component Value Date    A1C 5.5 04/28/2022    A1C 5.6 04/07/2022     Assessment/Plan:  (R04.2) Hemoptysis  (R13.13) Pharyngeal dysphagia  Comment: 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 Now followed by [facility] after approved by previous PCP.  Plan:   - Esophagram scheduled for 5/13/22  - EGD for 6/1/22, will call to move up if more frequency episodes or clinically unstable  - Check Hgb   - Appreciate care from [facility]    (D64.9) Normocytic Anemia  Comment: Chronic, mild, Hgb 12.4 and MCV 97 in December  Plan:   - Follow serial Hgb, daughter finds this reassuring     (K59.01) Slow transit constipation  (K30) Chronic upset stomach  Comment: Chronic, long hx of Levsin multiple times per day for abd pain, settling into routine of staff giving medications.  Plan:   - polyethylene glycol (MIRALAX) 17 GM/Dose powder; 1-2 capfuls in 8 oz of ORANGE JUICE PO one time each morning and 1-2 capfuls 8 oz of ORANGE JUICE PO one time daily PRN for  constipation. Please mix medication in juice in front of the patient. Hold for loose stools.   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 In memory care due to safety concerns. Settling into memory care unit. Reports some fatigue, but daughter reports resident still quite irritable with family.  Plan:   - Continues to benefit from supportive care in Memory Care FDC setting  - Continue Mirtazapine 15 mg q HS  - Continue Zyrpexa 5 mg at HS  - Look to GDR of psychotropics as able  - Continue MVI    Reviewed clinical status and plan of care with daughter Viola via telephone.    Electronically signed by: MARICHUY Basilio CNP             Sincerely,        MARICHUY Basilio CNP

## 2022-05-10 NOTE — PROGRESS NOTES
"[facility] GERIATRICS    Chief Complaint   Patient presents with     RECHECK     hemopytsis     HPI:  Jamie Valdivia is a 81 year old  (1940), who is being seen today for an episodic care visit at: [facility]) [61].     Moved from [facility] to [facility] for permanent placement on 3/11/22.     Hospitalized from 12/17 to 12/23/21 at [facility] [facility] for falls, weakness, alcoholism, and alcoholic hepatitis; discharge summary not available. Wife (who was primary care giver) was having medical issues and needed neurology care in the [facility]. Family felt Jamie unable to safely be left alone so they called EMS to transport to hospital for ongoing care/ETOH detox. Ultimately transferred to [facility] where wife was convalescing.      Prior to this, hospitalized at [facility] form 11/1 to 11/5/21 with generalized weakness, dehydration, and concern for UTI but culture came back negative. Symptoms likely related to chronic alcoholism with alcohol withdrawal. Chronic cognitive impairment in setting of long history of alcoholism, hx of hallucinations. Multiple hospitalizations over preceding months per family due to unsafe living situation, well known to ED staff. Spent time in SNF ([facility]) around September 2021, details unclear, but seem to have suffered spinal fracture.     Remained at [facility] from 12/23 and 3/15/22 with largely uneventful stay. Zyprexa dose increased with good effect on mood, prior to starting medication having hallucinations, delusions, and wandering on unit.    Today's concern is:  Follow-up today due to hemoptysis.  Occurring over last several months, dysphagia over last several years.    Daughter Viola called to give update and left .  Esophagram scheduled for 5/13/22 and EGD for 6/1/22    Resident reports he is \"not good!\"  Each morning when wakes up coughs up blood.  Blood also coming up nose.   Wants to drink \"as much cough syrup " "as I need.\"  Periodic coughing.  \"When it gets dry I just start bleeding for no reason.\"  Not related to eating.   No SOB.  No drooling..  Choking on food.  \"Very common when I eat.\"     Of bowels states, okay \"if I can get the cooperation from the pill people.\"  Finds one aid particularly helpful.  Having regular BMs.  \"The H pill\" (Levsin) - is \"keeping the blockage from being a problem.\"  Having difficulty with staff giving medications.    Reports fatigue and long days, but spirits are okay.  \"There is one medication that really knocks me out.\"  Reviewed Zyprexa and Mirtazapine.     Allergies, and PMH/PSH reviewed in EPIC today.    REVIEW OF SYSTEMS:  Limited secondary to cognitive impairment but today pt reports 4 point ROS including Respiratory, CV, GI and , other than that noted in the HPI,  is negative    Objective:   /65   Pulse 63   Temp 98.7  F (37.1  C)   Resp 15   Ht 1.727 m (5' 8\")   Wt 103.8 kg (228 lb 12.8 oz)   SpO2 96%   BMI 34.79 kg/m    GENERAL APPEARANCE:  Alert, in no distress, pleasant, cooperative, seated in recliner chair.  EYES: Sclera clear and conjunctiva normal, no discharge   ENT:  Mouth normal, moist mucous membranes, hearing acuity grossly decreased BL. No visible blood, lesions, sores in nares BL, mouth, oral pharynx.    NECK:  Nontender, supple, symmetrical; no cervical adenopathy, masses or thyromegaly, trachea midline  RESP:  Non-labored breathing, palpation of chest normal, no chest wall tenderness, no respiratory distress, Lung sounds clear, patient is on room air.  CV:  Palpation - no murmur/non-displaced PMI, Auscultation - rate and rhythm regular, no murmur, no rub or gallop.  VASCULAR: 2+ edema bilateral lower extremities.   ABDOMEN:  Normal bowel sounds, soft, nontender, no grimacing or guarding with palpation  M/S:   Gait and station ambulates independently to answer door without walker  PSYCH: awake and alert, speech fluent, memory impaired, " [facility]    Recent labs in [facility] reviewed by me today.    CBC RESULTS: Recent Labs   Lab Test 04/28/22  0655 04/07/22  0735   WBC 5.5 5.4   RBC 4.17* 4.09*   HGB 12.0* 11.7*   HCT 37.9* 36.8*   MCV 91 90   MCH 28.8 28.6   MCHC 31.7 31.8   RDW 13.8 13.8    203       Last Basic Metabolic Panel:  Recent Labs   Lab Test 04/28/22  0655 04/07/22  0735    138   POTASSIUM 4.4 4.3   CHLORIDE 104 102   JOSUE 8.3* 9.0   CO2 32 31   BUN 9 13   CR 0.68 0.76   GLC 80 77       Liver Function Studies -   Recent Labs   Lab Test 04/28/22  0655 04/07/22  0735   PROTTOTAL 7.2 7.2   ALBUMIN 3.2* 3.2*   BILITOTAL 0.4 0.4   ALKPHOS 68 69   AST 16 20   ALT 18 23       TSH   Date Value Ref Range Status   04/28/2022 2.77 0.40 - 4.00 mU/L Final   04/07/2022 3.94 0.40 - 4.00 mU/L Final     Lab Results   Component Value Date    A1C 5.5 04/28/2022    A1C 5.6 04/07/2022     Assessment/Plan:  (R04.2) Hemoptysis  (R13.13) Pharyngeal dysphagia  Comment: 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 Now followed by [facility] after approved by previous PCP.  Plan:   - Esophagram scheduled for 5/13/22  - EGD for 6/1/22, will call to move up if more frequency episodes or clinically unstable  - Check Hgb   - Appreciate care from [facility]    (D64.9) Normocytic Anemia  Comment: Chronic, mild, Hgb 12.4 and MCV 97 in December  Plan:   - Follow serial Hgb, daughter finds this reassuring     (K59.01) Slow transit constipation  (K30) Chronic upset stomach  Comment: Chronic, long hx of Levsin multiple times per day for abd pain, settling into routine of staff giving medications.  Plan:   - polyethylene glycol (MIRALAX) 17 GM/Dose powder; 1-2 capfuls in 8 oz of ORANGE JUICE PO one time each morning and 1-2 capfuls 8 oz of ORANGE JUICE PO one time daily PRN for constipation. Please mix medication in juice in front of the patient. Hold for loose stools.   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 In memory care due to safety concerns. Settling into memory care unit. Reports some fatigue, but daughter reports resident still quite irritable with family.  Plan:   - Continues to benefit from supportive care in Memory Care custodial setting  - Continue Mirtazapine 15 mg q HS  - Continue Zyrpexa 5 mg at HS  - Look to GDR of psychotropics as able  - Continue MVI    Reviewed clinical status and plan of care with daughter Viola via telephone.    Electronically signed by: MARICHUY Basilio CNP

## 2022-05-10 NOTE — PATIENT INSTRUCTIONS
Jamie Valdivia  1940  ORDERS:  - Please check Hgb on 5/12/22 dx R04.2  Electronically signed by:   MARICHUY Basilio CNP  05/10/22 4:49 PM

## 2022-05-11 ENCOUNTER — LAB REQUISITION (OUTPATIENT)
Dept: LAB | Facility: CLINIC | Age: 82
End: 2022-05-11
Payer: COMMERCIAL

## 2022-05-11 DIAGNOSIS — R04.2 HEMOPTYSIS: ICD-10-CM

## 2022-05-12 LAB — HGB BLD-MCNC: 12.8 G/DL (ref 13.3–17.7)

## 2022-05-12 PROCEDURE — 36415 COLL VENOUS BLD VENIPUNCTURE: CPT | Mod: ORL | Performed by: NURSE PRACTITIONER

## 2022-05-12 PROCEDURE — 85018 HEMOGLOBIN: CPT | Mod: ORL | Performed by: NURSE PRACTITIONER

## 2022-05-12 PROCEDURE — P9604 ONE-WAY ALLOW PRORATED TRIP: HCPCS | Mod: ORL | Performed by: NURSE PRACTITIONER

## 2022-05-13 ENCOUNTER — HOSPITAL ENCOUNTER (OUTPATIENT)
Dept: GENERAL RADIOLOGY | Facility: CLINIC | Age: 82
Discharge: HOME | End: 2022-05-13
Attending: PHYSICIAN ASSISTANT | Admitting: PHYSICIAN ASSISTANT
Payer: COMMERCIAL

## 2022-05-13 DIAGNOSIS — R05.9 COUGH: Primary | ICD-10-CM

## 2022-05-13 DIAGNOSIS — R13.10 DYSPHAGIA, UNSPECIFIED TYPE: ICD-10-CM

## 2022-05-13 PROCEDURE — 74220 X-RAY XM ESOPHAGUS 1CNTRST: CPT

## 2022-05-13 RX ORDER — GUAIFENESIN 200 MG/10ML
LIQUID ORAL
Qty: 473 ML | Refills: 97 | Status: SHIPPED | OUTPATIENT
Start: 2022-05-13 | End: 2023-03-30

## 2022-05-17 ENCOUNTER — PATIENT OUTREACH (OUTPATIENT)
Dept: GERIATRIC MEDICINE | Facility: CLINIC | Age: 82
End: 2022-05-17
Payer: COMMERCIAL

## 2022-05-17 NOTE — PROGRESS NOTES
[facility] Care Coordination Contact    CC spoke with daughter Alexia- who reported that Coral had been primary contact working with [employer] for the waiver program and recommended CC reach out to Coral to review the changes.     CC left a message with lizzeth Moncada and requested call back to review changes/Transfer information.    CC left a message with GLENN Fraga at [facility] and requested call back to review care needs and how member is adjusting to new living environment.   Yue Goss RN  [facility]  P: [phone]  Fax: [phone]

## 2022-05-23 DIAGNOSIS — K59.01 SLOW TRANSIT CONSTIPATION: ICD-10-CM

## 2022-05-23 DIAGNOSIS — Z11.59 ENCOUNTER FOR SCREENING FOR OTHER VIRAL DISEASES: Primary | ICD-10-CM

## 2022-05-23 RX ORDER — POLYETHYLENE GLYCOL 3350 17 G/17G
POWDER, FOR SOLUTION ORAL
Qty: 507 G | Refills: 3 | Status: SHIPPED | OUTPATIENT
Start: 2022-05-23 | End: 2022-09-20

## 2022-05-23 NOTE — PROGRESS NOTES
[facility] Care Coordination Contact      [facility] Health Plan or Product Change    Late entry for 5/18CC received notification that member's health plan or health plan product changed from MA/Fee For Service to [employer] effective 5/1/2.  CC contacted member's daughter Coral and discussed change and face-to-face visit was offered. Member declined need for home visit- as initial assessment completed on 2/2022 and still current up to date. CC reviewed previous Health Risk Assessment/LTCC and POC with member and no changes noted.    Called member and introduced self as member s new CC. Confirmed with member that the welcome letter with writer's name and contact information has been received.  Reviewed LTCC/Health Risk Assessment (HRA) and POC with member. No changes noted.  Transitional HRA completed. Care Plan Summary updated and reflects current services.  Required referral authorization information communicated to CMS: Yes  Writer reviewed the following with member:  ER visits: No  Hospitalizations: No  TCU stays: Yes -  [facility]  Significant health status changes: No major changes- member needs new walker as well as dentures- had uppers but they got lost during moving. References given for follow up clinics as needed. Requested memory kit A for member- referral placed on 5/19  Falls/Injuries: No  ADL/IADL changes: No  Changes in services: No    Follow-Up Plan: Member informed of future contact, plan to f/u with member with at next regularly scheduled contact.  Contact information shared with member and family, encouraged member to call with any questions or concerns.  Yeu Goss RN  [facility]  P: [phone]  Fax: [phone]

## 2022-05-25 ENCOUNTER — TELEPHONE (OUTPATIENT)
Dept: GERIATRICS | Facility: CLINIC | Age: 82
End: 2022-05-25
Payer: COMMERCIAL

## 2022-05-25 DIAGNOSIS — K21.00 GASTROESOPHAGEAL REFLUX DISEASE WITH ESOPHAGITIS, UNSPECIFIED WHETHER HEMORRHAGE: Primary | ICD-10-CM

## 2022-05-25 RX ORDER — OMEPRAZOLE 40 MG/1
CAPSULE, DELAYED RELEASE ORAL
Qty: 180 CAPSULE | Refills: 3 | Status: SHIPPED | OUTPATIENT
Start: 2022-05-25 | End: 2023-09-19

## 2022-05-25 NOTE — TELEPHONE ENCOUNTER
[facility] GERIATRICS TELEPHONE NOTE    Called [facility] ([phone]) to review need for EGD pre-procedure exam. No pre-op needed per staff.    MARICHUY Basilio CNP  05/25/22 12:08 PM

## 2022-05-25 NOTE — TELEPHONE ENCOUNTER
[facility] Care Coordination Contact    Order placed with [facility] (p: [phone]; f: [phone]) for walker bag.  Order placed on 05/25/2022. Database updated.  As required, authorization submitted to health plan.    Alyssa Brito  Care Management Specialist  [facility]  [phone]

## 2022-05-27 ENCOUNTER — LAB REQUISITION (OUTPATIENT)
Dept: LAB | Facility: CLINIC | Age: 82
End: 2022-05-27
Payer: COMMERCIAL

## 2022-05-27 ENCOUNTER — TELEPHONE (OUTPATIENT)
Dept: GERIATRICS | Facility: CLINIC | Age: 82
End: 2022-05-27
Payer: COMMERCIAL

## 2022-05-27 DIAGNOSIS — Z11.59 ENCOUNTER FOR SCREENING FOR OTHER VIRAL DISEASES: ICD-10-CM

## 2022-05-27 DIAGNOSIS — L30.9 DERMATITIS: Primary | ICD-10-CM

## 2022-05-27 LAB — SARS-COV-2 RNA RESP QL NAA+PROBE: NEGATIVE

## 2022-05-27 PROCEDURE — U0003 INFECTIOUS AGENT DETECTION BY NUCLEIC ACID (DNA OR RNA); SEVERE ACUTE RESPIRATORY SYNDROME CORONAVIRUS 2 (SARS-COV-2) (CORONAVIRUS DISEASE [COVID-19]), AMPLIFIED PROBE TECHNIQUE, MAKING USE OF HIGH THROUGHPUT TECHNOLOGIES AS DESCRIBED BY CMS-2020-01-R: HCPCS | Mod: ORL | Performed by: FAMILY MEDICINE

## 2022-05-27 RX ORDER — ANORECTAL OINTMENT 15.7; .44; 24; 20.6 G/100G; G/100G; G/100G; G/100G
OINTMENT TOPICAL
Qty: 113 G | Refills: 98 | Status: SHIPPED | OUTPATIENT
Start: 2022-05-27 | End: 2024-03-19

## 2022-05-27 NOTE — TELEPHONE ENCOUNTER
Jamie Valdivia  1940  ORDERS:  - menthol-zinc oxide (CALMOSEPTINE) 0.44-20.6 % OINT ointment; Apply 1 g topically 2 times daily. May also apply 1 g 2 times daily as needed for skin protection.  Dispense: 113 g; Refill: 98 dx dermatitis  Electronically signed by:   MARICHUY Basilio CNP  05/27/22 12:15 PM

## 2022-05-31 ENCOUNTER — PATIENT OUTREACH (OUTPATIENT)
Dept: GERIATRIC MEDICINE | Facility: CLINIC | Age: 82
End: 2022-05-31
Payer: COMMERCIAL

## 2022-05-31 DIAGNOSIS — R60.0 LOWER EXTREMITY EDEMA: Primary | ICD-10-CM

## 2022-05-31 NOTE — PROGRESS NOTES
[facility] Care Coordination Contact    Received message from member's daughter- Coral. Requesting assistance in getting compression stockings. CC to assist- due to needing Rx. Also provided measurements from OT.   Also requested additional information regarding IVETTE options in the area, looking for different care options for family.   CC set up phone call for noon today to discuss care options and what they are looking for/change.  Yue Goss RN  [facility]  P: [phone]  Fax: [phone]

## 2022-06-01 ENCOUNTER — HOSPITAL ENCOUNTER (OUTPATIENT)
Facility: CLINIC | Age: 82
Discharge: HOME | End: 2022-06-01
Attending: INTERNAL MEDICINE | Admitting: INTERNAL MEDICINE
Payer: COMMERCIAL

## 2022-06-01 VITALS
DIASTOLIC BLOOD PRESSURE: 63 MMHG | BODY MASS INDEX: 34.67 KG/M2 | WEIGHT: 228 LBS | OXYGEN SATURATION: 98 % | RESPIRATION RATE: 20 BRPM | HEART RATE: 56 BPM | SYSTOLIC BLOOD PRESSURE: 113 MMHG

## 2022-06-01 LAB — UPPER GI ENDOSCOPY: NORMAL

## 2022-06-01 PROCEDURE — 250N000009 HC RX 250: Performed by: INTERNAL MEDICINE

## 2022-06-01 PROCEDURE — 43239 EGD BIOPSY SINGLE/MULTIPLE: CPT | Performed by: INTERNAL MEDICINE

## 2022-06-01 PROCEDURE — 999N000099 HC STATISTIC MODERATE SEDATION < 10 MIN: Performed by: INTERNAL MEDICINE

## 2022-06-01 PROCEDURE — 88305 TISSUE EXAM BY PATHOLOGIST: CPT | Mod: TC | Performed by: INTERNAL MEDICINE

## 2022-06-01 PROCEDURE — 88305 TISSUE EXAM BY PATHOLOGIST: CPT | Mod: 26 | Performed by: PATHOLOGY

## 2022-06-01 PROCEDURE — 250N000011 HC RX IP 250 OP 636: Performed by: INTERNAL MEDICINE

## 2022-06-01 RX ORDER — NALOXONE HYDROCHLORIDE 0.4 MG/ML
0.4 INJECTION, SOLUTION INTRAMUSCULAR; INTRAVENOUS; SUBCUTANEOUS
Status: DISCONTINUED | OUTPATIENT
Start: 2022-06-01 | End: 2022-06-01 | Stop reason: HOSPADM

## 2022-06-01 RX ORDER — NALOXONE HYDROCHLORIDE 0.4 MG/ML
0.2 INJECTION, SOLUTION INTRAMUSCULAR; INTRAVENOUS; SUBCUTANEOUS
Status: DISCONTINUED | OUTPATIENT
Start: 2022-06-01 | End: 2022-06-01 | Stop reason: HOSPADM

## 2022-06-01 RX ORDER — ONDANSETRON 4 MG/1
4 TABLET, ORALLY DISINTEGRATING ORAL EVERY 6 HOURS PRN
Status: DISCONTINUED | OUTPATIENT
Start: 2022-06-01 | End: 2022-06-01 | Stop reason: HOSPADM

## 2022-06-01 RX ORDER — ONDANSETRON 2 MG/ML
4 INJECTION INTRAMUSCULAR; INTRAVENOUS EVERY 6 HOURS PRN
Status: DISCONTINUED | OUTPATIENT
Start: 2022-06-01 | End: 2022-06-01 | Stop reason: HOSPADM

## 2022-06-01 RX ORDER — FENTANYL CITRATE 0.05 MG/ML
50-100 INJECTION, SOLUTION INTRAMUSCULAR; INTRAVENOUS EVERY 5 MIN PRN
Status: DISCONTINUED | OUTPATIENT
Start: 2022-06-01 | End: 2022-06-01 | Stop reason: HOSPADM

## 2022-06-01 RX ORDER — EPINEPHRINE 1 MG/ML
0.1 INJECTION, SOLUTION INTRAMUSCULAR; SUBCUTANEOUS
Status: DISCONTINUED | OUTPATIENT
Start: 2022-06-01 | End: 2022-06-01 | Stop reason: HOSPADM

## 2022-06-01 RX ORDER — FLUMAZENIL 0.1 MG/ML
0.2 INJECTION, SOLUTION INTRAVENOUS
Status: DISCONTINUED | OUTPATIENT
Start: 2022-06-01 | End: 2022-06-01 | Stop reason: HOSPADM

## 2022-06-01 RX ORDER — ONDANSETRON 2 MG/ML
4 INJECTION INTRAMUSCULAR; INTRAVENOUS
Status: DISCONTINUED | OUTPATIENT
Start: 2022-06-01 | End: 2022-06-01 | Stop reason: HOSPADM

## 2022-06-01 RX ORDER — ATROPINE SULFATE 0.1 MG/ML
1 INJECTION INTRAVENOUS
Status: DISCONTINUED | OUTPATIENT
Start: 2022-06-01 | End: 2022-06-01 | Stop reason: HOSPADM

## 2022-06-01 RX ORDER — SIMETHICONE 40MG/0.6ML
133 SUSPENSION, DROPS(FINAL DOSAGE FORM)(ML) ORAL
Status: DISCONTINUED | OUTPATIENT
Start: 2022-06-01 | End: 2022-06-01 | Stop reason: HOSPADM

## 2022-06-01 RX ORDER — DIPHENHYDRAMINE HYDROCHLORIDE 50 MG/ML
25-50 INJECTION INTRAMUSCULAR; INTRAVENOUS
Status: DISCONTINUED | OUTPATIENT
Start: 2022-06-01 | End: 2022-06-01 | Stop reason: HOSPADM

## 2022-06-01 RX ORDER — PROCHLORPERAZINE MALEATE 5 MG
5 TABLET ORAL EVERY 6 HOURS PRN
Status: DISCONTINUED | OUTPATIENT
Start: 2022-06-01 | End: 2022-06-01 | Stop reason: HOSPADM

## 2022-06-01 RX ORDER — FENTANYL CITRATE 0.05 MG/ML
INJECTION, SOLUTION INTRAMUSCULAR; INTRAVENOUS PRN
Status: COMPLETED | OUTPATIENT
Start: 2022-06-01 | End: 2022-06-01

## 2022-06-01 RX ORDER — LIDOCAINE 40 MG/G
CREAM TOPICAL
Status: DISCONTINUED | OUTPATIENT
Start: 2022-06-01 | End: 2022-06-01 | Stop reason: HOSPADM

## 2022-06-01 RX ADMIN — FENTANYL CITRATE 50 MCG: 0.05 INJECTION, SOLUTION INTRAMUSCULAR; INTRAVENOUS at 09:23

## 2022-06-01 RX ADMIN — MIDAZOLAM 1 MG: 1 INJECTION INTRAMUSCULAR; INTRAVENOUS at 09:23

## 2022-06-01 RX ADMIN — TOPICAL ANESTHETIC 1 SPRAY: 200 SPRAY DENTAL; PERIODONTAL at 09:21

## 2022-06-01 RX ADMIN — MIDAZOLAM 1 MG: 1 INJECTION INTRAMUSCULAR; INTRAVENOUS at 09:25

## 2022-06-01 NOTE — PROGRESS NOTES
[facility] Care Coordination Contact    Spoke with daughter Coral at 4pm on 5/31/22- Reviewed cares at current [facility] and she reported having concerns regarding staff response to call light times- take an hour or more at times which is concerning as member has choking hazzards. Also reports that some staff have become confrontational towards member's wife as well as family at times Ex. Different resident wandered into member room, wife was present in room and asked/requested different resident to leave multiple times as that was not her room. Different resident finally left after wife had to escort out and 2 staff member came into room and started to 'yell' at member's wife stating she can not do that. Also when daughter Alexia was present in members room she said hi to staff that came into assist member and staff did not acknoweldge her or respond to Alexia at all. Family also feels that their concerns/reports to management have not been addressed or listened to or that nothing seems to be done in response to these complaints. CC offered assistance to follow up with staff at facility to also discuss concerns. Family report they would like to see how the next week or so goes especially with member's procedure tomorrow. Family will keep CC updated if any additional concerns arise.   Coral also requested different IVETTE options to look at to see if they would be a better/good fit for member and his wife. CC will send list of options. Also reviewed staffing shortage is at all location so changing IVETTE may not improve staff response times. Coral stated understanding and would still like to look at possible options for down the road looking in [place] area- family lives in [place], [place], [facility].     Update 1030 am on 6/1/22- CC compiled list of [facility] IVETTE and group home setting options in the areas requested. Also sent additional websites family can use as well to find options in  different areas if they would like. CC also encouraged them to tour location and if there are wait lists to get member on the wait list as can be a while before member is able to move. Coral will look into options and update CC if additional needs/assistance arise.   Yue Goss RN  [facility]  P: [phone]  Fax: [phone]

## 2022-06-01 NOTE — PROGRESS NOTES
[facility]  Pre-Endoscopy History and Physical     Jamie Valdivia MRN# [id number]   YOB: 1940 Age: 82 year old   Today's Date: 06/01/2022    Date of Procedure: 6/1/2022  Primary care provider: Sylvia Stein  Type of Endoscopy: esophagogastroduodenoscopy (upper GI endoscopy)  Reason for Procedure: Dysphagia, spitting up blood  Type of Anesthesia Anticipated: Moderate (conscious) sedation    HPI:    Jamie is a 82 year old male who will be undergoing the above procedure.      A history and physical has been performed. The patient's medications and allergies have been reviewed. The risks and benefits of the procedure and the sedation options and risks were discussed with the patient.  All questions were answered and informed consent was obtained.      Allergies   Allergen Reactions     Ativan [Lorazepam]         No current facility-administered medications for this encounter.       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)     Anxiety     Chronic alcohol use     Chronic atrial fibrillation (H)     Claustrophobia     Mild cognitive impairment     Gastroesophageal reflux disease with esophagitis     Generalized anxiety disorder     Gastro-esophageal reflux disease without esophagitis     History of 2019 novel coronavirus disease (COVID-19)     Impaired gait and mobility     Low back pain     Obesity (BMI 30-39.9)     Osteoarthritis of both knees     Other insomnia     Pharyngeal dysphagia     Physical deconditioning     Weakness     Alcohol dependence (H)     Alcoholic hepatitis without ascites     Repeated falls     Coronary atherosclerosis of native coronary artery     Anemia     Other idiopathic peripheral autonomic neuropathy     Age-related osteoporosis without current pathological fracture     Constipation     Edema     Hx of seizure disorder        Past Medical History:   Diagnosis Date      Age-related osteoporosis without current pathological fracture 3/30/2022     Alcohol abuse 11/19/2017     Alcohol dependence with withdrawal (H)      Alcoholism (H)      Back pain      Backache 12/19/2018     CAD (coronary artery disease)      Chronic a-fib (H)      Chronic alcohol use 6/11/2015    Formatting of this note might be different from the original. 2/26/2019: serious fall at home with multiple vertebral fractures.  BAL 0.414% on admission.  Denies that he drinks much. 12/18/2018: hospitalized for fall due to alcohol intoxication.  BAL 0.34% on admission. 9/16/2018: hospitalized for injuries from fall due to alcohol intoxication.  BAL 0.34% on admission     Chronic atrial fibrillation (H) 7/15/2016    Formatting of this note might be different from the original. 2/2019: Multiple falls so started on aspirin only.  Then aspirin stopped due to GI bleed.     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 2/2/21     HTN (hypertension)      Impaired gait and mobility 3/14/2019     Mild cognitive impairment 8/12/2019    Formatting of this note might be different from the original. NON-AMNESTIC TYPE     Mild TBI (traumatic brain injury) (H) 3/14/2019     Obesity (BMI 30-39.9) 9/3/2015     Osteoarthritis      Osteoarthritis of both knees 5/13/2015     Osteoporosis      Other idiopathic peripheral autonomic neuropathy 3/30/2022     Other insomnia 3/14/2019     Other seizures (H) 3/30/2022     Peripheral neuropathy      Pharyngeal dysphagia 5/13/2015    Formatting of this note might  "be different from the original. Likely secondary to GERD with esophagitis, on PPI and H2 blocker with notable improvement, EGD 10/5/15.     Physical deconditioning 3/14/2019     Recurrent major depressive disorder (H) 3/30/2022     Rhabdomyolysis      Thrombocytopenia (H)      Urination disorder      Weakness 4/27/2020        Past Surgical History:   Procedure Laterality Date     left hip replacement  2010     left shoulder replacement  2016     ORTHOPEDIC SURGERY       SPINE SURGERY      done in [place]     TONSILLECTOMY         Social History     Tobacco Use     Smoking status: Never Smoker     Smokeless tobacco: Never Used   Substance Use Topics     Alcohol use: Not Currently     Comment: not since December 2021       Family History   Problem Relation Age of Onset     Osteoporosis Mother      Prostate Cancer Paternal Grandfather      Colon Cancer No family hx of          PHYSICAL EXAM:   /64   Resp 18   Wt 103.4 kg (228 lb)   SpO2 98%   BMI 34.67 kg/m   Estimated body mass index is 34.67 kg/m  as calculated from the following:    Height as of 5/10/22: 1.727 m (5' 8\").    Weight as of this encounter: 103.4 kg (228 lb).   RESP: lungs clear to auscultation - no rales, rhonchi or wheezes  CV: regular rates and rhythm    IMPRESSION   ASA Class 3 - Severe systemic disease, but not incapacitating  Mallampati Score: 2      David Springer MD                "

## 2022-06-01 NOTE — DISCHARGE INSTRUCTIONS
What Is Vickers Esophagus?  You have Vickers esophagus. This means that there have been changes to the lining of the esophagus near the stomach. The changes may have been caused by the acid reflux that happens with GERD (gastroesophageal reflux disease). The changed lining is not cancerous. But it may increase your chances of developing cancer later on.      When you have GERD   The esophagus is the tube that carries food and liquid from the mouth to the stomach. Your lower esophageal sphincter (LES) is a one-way valve at the top of the stomach. It keeps food and stomach acid from flowing backward. If the LES is weakened, food and stomach acid flow back (reflux) into your esophagus. If this happens often, the condition is called GERD.   Changes in the lining   The stomach is kept safe from its own acid by a special lining. The esophagus isn t meant to contact stomach acid. But a small amount of acid reflux is normal and occurs in many people. With GERD, acid flows back into the esophagus often. This damages the esophagus. In response to the damage, new tissue forms that is not normal. This is Vickers esophagus. The new tissue may keep changing. This is why it is more likely to become cancer in the future.   Preventing further damage   Your healthcare provider may suggest regular tests to keep track of changes in the esophagus. This often includes an endoscopy, when a flexible lighted scope is placed through the mouth into the esophagus. Tissue samples (biopsies) can be taken of the abnormal areas. You are often sedated with an IV (intravenous) medicine for comfort. Your provider may also suggest ways for you to control GERD. This includes lifestyle changes such as quitting smoking and staying at a healthy weight. Medicine or even surgery may also be suggested. This should help keep your Vickers esophagus from getting worse. If it does get worse, more treatment is often done during an endoscopy.   Symptoms of  GERD  Symptoms include:  Heartburn  Sour-tasting fluid backing up into your mouth  Frequent burping or belching  Symptoms that get worse after you eat, bend over, or lie down  Coughing repeatedly to clear your throat  Hoarseness  Trouble swallowing  [place] last reviewed this educational content on 8/1/2018 2000-2021 [employer]. All rights reserved. This information is not intended as a substitute for professional medical care. Always follow your healthcare professional's instructions.

## 2022-06-02 ENCOUNTER — ASSISTED LIVING VISIT (OUTPATIENT)
Dept: GERIATRICS | Facility: CLINIC | Age: 82
End: 2022-06-02
Payer: COMMERCIAL

## 2022-06-02 VITALS
TEMPERATURE: 98.4 F | BODY MASS INDEX: 34.68 KG/M2 | OXYGEN SATURATION: 97 % | DIASTOLIC BLOOD PRESSURE: 68 MMHG | SYSTOLIC BLOOD PRESSURE: 140 MMHG | HEIGHT: 68 IN | HEART RATE: 63 BPM | WEIGHT: 228.8 LBS | RESPIRATION RATE: 16 BRPM

## 2022-06-02 DIAGNOSIS — F43.23 ADJUSTMENT DISORDER WITH MIXED ANXIETY AND DEPRESSED MOOD: ICD-10-CM

## 2022-06-02 DIAGNOSIS — F10.27 DEMENTIA ASSOCIATED WITH ALCOHOLISM WITH BEHAVIORAL DISTURBANCE (H): ICD-10-CM

## 2022-06-02 DIAGNOSIS — R04.2 HEMOPTYSIS: ICD-10-CM

## 2022-06-02 DIAGNOSIS — R44.3 HALLUCINATIONS: ICD-10-CM

## 2022-06-02 DIAGNOSIS — K22.70 BARRETT'S ESOPHAGUS WITHOUT DYSPLASIA: ICD-10-CM

## 2022-06-02 DIAGNOSIS — Z87.898 HISTORY OF ALCOHOL USE DISORDER: Primary | ICD-10-CM

## 2022-06-02 DIAGNOSIS — R04.0 EPISTAXIS: ICD-10-CM

## 2022-06-02 LAB
PATH REPORT.COMMENTS IMP SPEC: NORMAL
PATH REPORT.COMMENTS IMP SPEC: NORMAL
PATH REPORT.FINAL DX SPEC: NORMAL
PATH REPORT.GROSS SPEC: NORMAL
PATH REPORT.MICROSCOPIC SPEC OTHER STN: NORMAL
PATH REPORT.RELEVANT HX SPEC: NORMAL
PHOTO IMAGE: NORMAL

## 2022-06-02 RX ORDER — OMEPRAZOLE 40 MG/1
40 CAPSULE, DELAYED RELEASE ORAL 2 TIMES DAILY
COMMUNITY
Start: 2022-06-02 | End: 2022-06-02

## 2022-06-02 NOTE — PROGRESS NOTES
"[facility]    Chief Complaint   Patient presents with     RECHECK     HPI:  Jamie Valdivia is a 82 year old  (1940) PMH significant for GERD with esophagitis, OA BL knees, pharyngeal dysphagia, edema, abdominal wall hernia, chronic atrial fibrillation, HTN, osteoporosis with hx of vertebral fracture, dementia, anemia, hx of COVID-19 in Feb 2021, peripheral neuropathy, who is being seen today for an episodic care visit at: [facility] ([facility]) [[id number]].     Today's concern is:   Follow-up today after EGD ordered due to hemoptysis.    Reviewed Care Everywhere previous history of ulceration of hypoharynx felt to be response for GIB in March 2019 where patient required blood transfusion due to significant anemia. Previously followed by ENT.    Reviewed pathology significant for BE (see below) and recommendation for PPI.    Last bleeding episode 5/31, precipitated by eating, choking, coughing.     30+ year of anxiety   \"Drinking solved the extreme anxiety.\"  \"I just get hyper.\"  \"If you're having a heart attack and you can't get to the right hospital you're much better off with a bottle of whiskey.\"  Feels in the middle of night he thinking strange things, walking around at night, staff have not reported this.   \"Feels the alcohol is the silver the bullet.\"    Daughter reports resident is \"anti-medication\" and it will be difficult to convince him to take any more medications.    Allergies, and PMH/PSH reviewed in [facility] today.    REVIEW OF SYSTEMS:  4 point ROS including Respiratory, CV, GI and , other than that noted in the HPI,  is negative    Objective:   BP (!) 140/68   Pulse 63   Temp 98.4  F (36.9  C)   Resp 16   Ht 1.727 m (5' 8\")   Wt 103.8 kg (228 lb 12.8 oz)   SpO2 97%   BMI 34.79 kg/m    GENERAL APPEARANCE:  Alert, in no distress, pleasant, cooperative, seated in recliner chair.  EYES: Sclera clear and conjunctiva normal, no discharge   ENT:  Mouth normal, moist mucous " membranes, hearing acuity grossly decreased BL. No visible blood, lesions, sores in nares BL, mouth, oral pharynx.    NECK:  Nontender, supple, symmetrical; no cervical adenopathy, masses or thyromegaly, trachea midline  RESP:  Non-labored breathing, palpation of chest normal, no chest wall tenderness, no respiratory distress, Lung sounds clear, patient is on room air.  CV:  Rate and rhythm regular, no murmur, no rub or gallop.  VASCULAR: 2+ edema bilateral lower extremities.   ABDOMEN:  Normal bowel sounds, soft, nontender, no grimacing or guarding with palpation  M/S:   Gait and station ambulates independently to answer door without walker  PSYCH: Awake and alert, speech fluent, memory impaired, cooperative, but circular conversation.    Recent labs in [facility] reviewed by me today.    CBC RESULTS: Recent Labs   Lab Test 05/12/22  1220 04/28/22  0655 04/07/22  0735   WBC  --  5.5 5.4   RBC  --  4.17* 4.09*   HGB 12.8* 12.0* 11.7*   HCT  --  37.9* 36.8*   MCV  --  91 90   MCH  --  28.8 28.6   MCHC  --  31.7 31.8   RDW  --  13.8 13.8   PLT  --  199 203     Last Basic Metabolic Panel:  Recent Labs   Lab Test 04/28/22  0655 04/07/22  0735    138   POTASSIUM 4.4 4.3   CHLORIDE 104 102   JOSUE 8.3* 9.0   CO2 32 31   BUN 9 13   CR 0.68 0.76   GLC 80 77     Liver Function Studies -   Recent Labs   Lab Test 04/28/22  0655 04/07/22  0735   PROTTOTAL 7.2 7.2   ALBUMIN 3.2* 3.2*   BILITOTAL 0.4 0.4   ALKPHOS 68 69   AST 16 20   ALT 18 23     TSH   Date Value Ref Range Status   04/28/2022 2.77 0.40 - 4.00 mU/L Final   04/07/2022 3.94 0.40 - 4.00 mU/L Final     Lab Results   Component Value Date    A1C 5.5 04/28/2022    A1C 5.6 04/07/2022 6/1/22 EGD      6/1/22 Pathology       Assessment/Plan:  (Z87.898) History of alcohol use disorder  (primary encounter diagnosis)  (R04.2) Hemoptysis  (R04.0) Epistaxis  Comment: Chronic, recurrent, EGD not helpful in determining cause  Plan:   - Adult ENT  Referral  - CXR  in effort to r/o lung mass    (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   Chronic low mood, suspect JOSÉ MANUEL with panic attacks.   On memory care unit due to safety concerns, currently no access to ETOH.   Reports some fatigue daytime sleepiness and strange thoughts at night.   Plan:   - Consider GDR or Zyprexa and then Mirtazapine, continue both for now, patient to consider change and meet with pharmacist next week  - Possible Zolfort and/or gabapentin may be more helpful at this point  - Consult MTM PharmD  - ACP consult  - Offered cardiology referral, with anxiety episode reports chest pain, but pt/daughter declined   - Continues to benefit from supportive care in [facility] setting  - Continue MVI    Orders:  - ENT referral placed, gave # ([phone]) daughter to schedule   - Continue PPI   - CXR due to hemoptysis   - ACP consult, notified provider of referal  - MTM consult     Total time with an established patient visit in the assisted livin minutes including discussions about the POC and care coordination with the patient and family, lizzeth Rod. Greater than 50% of total time spent with counseling and coordinating care due to new test results and multiple unstable health issues.    10:20 - 10;25 (5 minutes) Spoke with daughter regarding EGD and pathology results    12:00 - 12:05 (5 minutes) Spoke with daughter regarding anixety and drinking history and risk/benefit of treatment options including supplying resident with a small amount of ETOH    2:45 - 3:15 (30 minutes) Resident visit, 20 minutes in  discussion hx of alcohol misuse and risks, anxiety and mental health concerns and treatment options with risk/benefit of medications.    3:26 - 3:44 PM (18 minutes) Spoke to daughter regarding anxiety and treatment options. Offered psychiatry offsite vs primary team mgmt with support from PharmD due to resident concern about medication side effects. Would prefer primary team onsite care. Updated PharmD who will see resident next week.    3:45 PM - 3:47 PM (2 minute) Called ACP therapy to update her on referral and clinical concerns ahead of initial visit.    Electronically signed by: MARICHUY Basilio CNP

## 2022-06-02 NOTE — LETTER
"    6/2/2022        RE: Jamie Valdivia  [facility]  [address]  [facility] [address]        [facility]    Chief Complaint   Patient presents with     RECHECK     HPI:  Jamie Valdivia is a 82 year old  (1940) PMH significant for GERD with esophagitis, OA BL knees, pharyngeal dysphagia, edema, abdominal wall hernia, chronic atrial fibrillation, HTN, osteoporosis with hx of vertebral fracture, dementia, anemia, hx of COVID-19 in Feb 2021, peripheral neuropathy, who is being seen today for an episodic care visit at: [facility] ([facility]) [[id number]].     Today's concern is:   Follow-up today after EGD ordered due to hemoptysis.    Reviewed Care Everywhere previous history of ulceration of hypoharynx felt to be response for GIB in March 2019 where patient required blood transfusion due to significant anemia. Previously followed by ENT.    Reviewed pathology significant for BE (see below) and recommendation for PPI.    Last bleeding episode 5/31, precipitated by eating, choking, coughing.     30+ year of anxiety   \"Drinking solved the extreme anxiety.\"  \"I just get hyper.\"  \"If you're having a heart attack and you can't get to the right hospital you're much better off with a bottle of whiskey.\"  Feels in the middle of night he thinking strange things, walking around at night, staff have not reported this.   \"Feels the alcohol is the silver the bullet.\"    Daughter reports resident is \"anti-medication\" and it will be difficult to convince him to take any more medications.    Allergies, and PMH/PSH reviewed in [facility] today.    REVIEW OF SYSTEMS:  4 point ROS including Respiratory, CV, GI and , other than that noted in the HPI,  is negative    Objective:   BP (!) 140/68   Pulse 63   Temp 98.4  F (36.9  C)   Resp 16   Ht 1.727 m (5' 8\")   Wt 103.8 kg (228 lb 12.8 oz)   SpO2 97%   BMI 34.79 kg/m    GENERAL APPEARANCE:  Alert, in no distress, pleasant, " cooperative, seated in recliner chair.  EYES: Sclera clear and conjunctiva normal, no discharge   ENT:  Mouth normal, moist mucous membranes, hearing acuity grossly decreased BL. No visible blood, lesions, sores in nares BL, mouth, oral pharynx.    NECK:  Nontender, supple, symmetrical; no cervical adenopathy, masses or thyromegaly, trachea midline  RESP:  Non-labored breathing, palpation of chest normal, no chest wall tenderness, no respiratory distress, Lung sounds clear, patient is on room air.  CV:  Rate and rhythm regular, no murmur, no rub or gallop.  VASCULAR: 2+ edema bilateral lower extremities.   ABDOMEN:  Normal bowel sounds, soft, nontender, no grimacing or guarding with palpation  M/S:   Gait and station ambulates independently to answer door without walker  PSYCH: Awake and alert, speech fluent, memory impaired, cooperative, but circular conversation.    Recent labs in [facility] reviewed by me today.    CBC RESULTS: Recent Labs   Lab Test 05/12/22  1220 04/28/22  0655 04/07/22  0735   WBC  --  5.5 5.4   RBC  --  4.17* 4.09*   HGB 12.8* 12.0* 11.7*   HCT  --  37.9* 36.8*   MCV  --  91 90   MCH  --  28.8 28.6   MCHC  --  31.7 31.8   RDW  --  13.8 13.8   PLT  --  199 203     Last Basic Metabolic Panel:  Recent Labs   Lab Test 04/28/22  0655 04/07/22  0735    138   POTASSIUM 4.4 4.3   CHLORIDE 104 102   JOSUE 8.3* 9.0   CO2 32 31   BUN 9 13   CR 0.68 0.76   GLC 80 77     Liver Function Studies -   Recent Labs   Lab Test 04/28/22  0655 04/07/22  0735   PROTTOTAL 7.2 7.2   ALBUMIN 3.2* 3.2*   BILITOTAL 0.4 0.4   ALKPHOS 68 69   AST 16 20   ALT 18 23     TSH   Date Value Ref Range Status   04/28/2022 2.77 0.40 - 4.00 mU/L Final   04/07/2022 3.94 0.40 - 4.00 mU/L Final     Lab Results   Component Value Date    A1C 5.5 04/28/2022    A1C 5.6 04/07/2022 6/1/22 EGD      6/1/22 Pathology       Assessment/Plan:  (Z87.898) History of alcohol use disorder  (primary encounter diagnosis)  (R04.2)  Hemoptysis  (R04.0) Epistaxis  Comment: Chronic, recurrent, EGD not helpful in determining cause  Plan:   - Adult ENT  Referral  - CXR in effort to r/o lung mass    (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   Chronic low mood, suspect JOSÉ MANUEL with panic attacks.   On memory care unit due to safety concerns, currently no access to ETOH.   Reports some fatigue daytime sleepiness and strange thoughts at night.   Plan:   - Consider GDR or Zyprexa and then Mirtazapine, continue both for now, patient to consider change and meet with pharmacist next week  - Possible Zolfort and/or gabapentin may be more helpful at this point  - Consult MTM PharmD  - ACP consult  - Offered cardiology referral, with anxiety episode reports chest pain, but pt/daughter declined   - Continues to benefit from supportive care in Memory Care senior care setting  - Continue MVI    Orders:  - ENT referral placed, gave # ([phone]) daughter to schedule   - Continue PPI   - CXR due to hemoptysis   - ACP consult, notified provider of referal  - MTM consult     Total time with an established patient visit in the assisted livin minutes including discussions about the POC and care coordination with the patient and family, lizzeth Rod. Greater than 50% of total time spent with counseling and coordinating care due to new test results and multiple unstable health issues.    10:20 - 10;25 (5 minutes) Spoke with daughter regarding EGD and pathology results    12:00 - 12:05 (5 minutes) Spoke with daughter regarding anixety and drinking history and risk/benefit  of treatment options including supplying resident with a small amount of ETOH    2:45 - 3:15 (30 minutes) Resident visit, 20 minutes in discussion hx of alcohol misuse and risks, anxiety and mental health concerns and treatment options with risk/benefit of medications.    3:26 - 3:44 PM (18 minutes) Spoke to daughter regarding anxiety and treatment options. Offered psychiatry offsite vs primary team mgmt with support from PharmD due to resident concern about medication side effects. Would prefer primary team onsite care. Updated PharmD who will see resident next week.    3:45 PM - 3:47 PM (2 minute) Called ACP therapy to update her on referral and clinical concerns ahead of initial visit.    Electronically signed by: MARICHUY Basilio CNP               Sincerely,        MARICHUY Basilio CNP

## 2022-06-03 ENCOUNTER — TRANSFERRED RECORDS (OUTPATIENT)
Dept: HEALTH INFORMATION MANAGEMENT | Facility: CLINIC | Age: 82
End: 2022-06-03
Payer: COMMERCIAL

## 2022-06-03 ENCOUNTER — TELEPHONE (OUTPATIENT)
Dept: GERIATRICS | Facility: CLINIC | Age: 82
End: 2022-06-03
Payer: COMMERCIAL

## 2022-06-03 NOTE — TELEPHONE ENCOUNTER
[facility] Lab Note     Provider: MARICHUY Basilio CNP  Facility: [facility] Type:  AL    Allergies   Allergen Reactions     Ativan [Lorazepam]        Labs Reviewed by provider: CXR       Verbal Order/Direction given by Provider: No new orders at this time.    Provider giving Order:  MARICHUY Basilio CNP    Verbal Order given to: Dominga Garcia RN

## 2022-06-07 ENCOUNTER — TRANSFERRED RECORDS (OUTPATIENT)
Dept: HEALTH INFORMATION MANAGEMENT | Facility: CLINIC | Age: 82
End: 2022-06-07

## 2022-06-08 NOTE — PROGRESS NOTES
Medication Therapy Management (MTM) Encounter    ASSESSMENT:                            Medication Adherence/Access: No issues identified    Afib, Hypertension:  Stable.  Not on anticoagulation due to risk likely > benefit as noted below.  BPs at goal <150/90mmHg.  This goal reasonable in this elderly patient at risk of dizziness, hypotension, orthostasis, falls, tissue/cerebral hypoperfusion.    GERD, Vickers's esophagus: Due to history of Vickers's esophagus, long-term use of PPI appropriate.  However, I am unclear as to why he is on 40mg twice daily of omeprazole vs 20mg twice daily.  This could be because he had continued symptoms at 40mg total daily, but unclear to me.  In future, may consider reduction in dose to 20mg twice daily, however I did not recommend this today; will plan to address at next visit if appropriate.  PPIs increase risk of Cdiff, low Mg, low B12, low BMD/inc fracture risk, and risk increases with dose.      Cough, Allergic rhinitis:  Unclear if Robitussin has offered benefit, and per his report, seems it may be more helpful for his throat feeling dry vs helping with cough or clearing phlegm.  Possible cough is more related to GERD, esophagitis, and may benefit from stopping scheduled use, and change prn order to 100mg every 4hr as needed.  As noted below, dry throat may be related to hyoscyamine as well.    Dementia with behavioral disturbance, Depression with anxiety, h/o alcohol abuse: Patient noting significant daytime fatigue, and does not report recent hallucinations.  He does seem to have the delusion that he still holds a job, but this is not an upsetting delusion, and patient may benefit from reduction in olanzapine dose and monitor target symptoms.  If tolerates reduction, would benefit from d'c altogether in the future.  Olanzapine may be contributing to his fatigue, causing napping, and then more difficulty sleeping at night.  May also increase rate of cognitive decline,  "confusion, falls, mortality in dementia patients, weight gain.  Discussed that mirtazapine may also contribute to daytime fatigue, however, olanzapine higher risk medication and due to no upsetting hallucinations/delusions at this time, would begin with taper and d'c of this vs mirtazapine.  In future, could consider change from mirtazapine to selective serotonin reuptake inhibitor such as citalopram.    Constipation, Abdominal pain: Discussed that hyoscyamine is meant for abodminal pain in IBS, and may actually increase issues with constipation, and may also be contributing to his \"dry throat\".  May also increase risk of confusion, cognitive decline, hallucinations, sedation, falls, dysphagia, etc.  When hearing these risks and understanding that the med may actually worsen constipation, he is agreeable to limiting use, and asks that we begin by not having staff bring a dose to him daily (states they automatically bring him a dose at least daily at his previous request).  Goal to d'c altogether in future, and encouraged continued use of Miralax.      Neuropathy, Pain: Stable.    Supplements:  Stable.    PLAN:                            1.  Provider may consider reduction in olanzapine to 2.5mg daily and monitor target symptoms.  If tolerates reduction without an increase in upsetting hallucinations or delusions, consider d'c altogether after 2 weeks and monitor.  2.  Limit use of prn hyoscyamine.  Provider may consider asking staff to bring only if patient requests prn (vs staff offering/bringing it to him once daily).  Goal to d'c altogether in future.  Continue Miralax for constipation.  3.  Provider may consider discontinuation of scheduled Robitussin, and change prn order to 100mg every 4hr as needed.    Follow-up: 4-6 weeks, sooner if necessary    SUBJECTIVE/OBJECTIVE:                          Jamie Valdivia is an 82 year old male seen for an initial visit. He was referred to me from Sylvia Stein NP.      Reason " "for visit: initial MTM visit.    Allergies/ADRs: Reviewed in chart  Past Medical History: Reviewed in chart  Tobacco: He reports that he has never smoked. He has never used smokeless tobacco.  Alcohol: history of alcohol dependence, alcoholic hepatitis.  Assistive Devices: walker for ambulation.  h/o falls, weakness    Medication Adherence/Access: Patient has assistance with medication administration: assisted living.    Afib, Hypertension: Patient is currently taking no current anticoagulation.  NP notes this is due to risk > benefit given history of hemoptysis, gi bleed, as well as history of alcohol abuse and frequent falls.  Not on rate control of antihypertensives either.      BP Readings from Last 3 Encounters:   06/02/22 (!) 140/68   06/01/22 113/63   05/10/22 121/65     GERD, Vickers's esophagus: with history of pharyngeal dysphagia.  Current medications include: Prilosec (omeprazole) 40mg twice daily. The patient does have a history of GI bleed.  Has had hemoptysis, and reports continued issues with this.  Notes occasional blood out of nose as well.  Denies reflux symptoms.      Cough, Allergic rhinitis:  Current medications: Robitussin liquid 200mg twice daily and twice daily as needed for cough, [place] NS 2 sprays into both nostrils daily.  States coughing quite a bit, some phlegm, again notes blood when coughs.  Notes dry throat, and feels only needs \"a little of the cough syrup to help with the dryness\".  He would like to manage this himself, and states he would like to take less but more frequently for dryness.  Isn't sure that it is necessary for cough, however.  Notes always has had \"sinus trouble\", but hasn't felt congested, and isn't sure [place] NS is helpful.    Dementia with behavioral disturbance, Depression with anxiety, h/o alcohol abuse: Current medications:  Olanzapine 5mg at 5pm, mirtazapine 15mg at bedtime.  Per NP, patient has noted he feels drinking helps his anxiety.  H/o multiple " "hospitalizations per chart review due to unsafe living situation.  Olanzapine previously increased due to hallucinations, delusions.  States \"everything that could go wrong in our life has gone wrong.\"  Wife also living in memory care.  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  States doesn't sleep ok at night, feels very tired during day & naps.  Fatigue starting to get to him.  States \"not any more anxiety than I ever did.\"  Notes over last few years, occasional hallucination, but wonders if eyesight could contribute.  Denies recent hallucinations.    Constipation, Abdominal pain: Current medications: Miralax 34gm daily and once daily as needed, Milk of magnesia 30ml once daily as needed, hyoscyamine 0.125mg four times daily as needed for abdominal pain/cramping.  Notes problems with constipation, dry mouth/throat.  Has cognitive impairment.  Notes fall in the bathroom while here about 6 months ago per his report.  Has had dizziness.  He states he has made a \"stink\" about staff bringing him the hyoscyamine, and he requests this when he is constipated because he believes this is what it is for.      Neuropathy, Pain: Current medications include acetaminophen 1000mg every 6hr as needed.  Pain in feet, but notes not often.  States Tylenol effective when used for general aches and pains.    Supplements:  Current medications: MVI once daily.  Eats meals offered at facility, and notes gaining weight.    Today's Vitals: There were no vitals taken for this visit.  ----------------  Post Discharge Medication Reconciliation Status: medication reconcilation previously completed during another office visit.    I spent 30 minutes with this patient today (an extra 15 minutes was spent creating the Medication Action Plan). A copy of the visit note was provided to the patient's provider(s).    The patient declined a summary of these recommendations.     Sindy Hanley, " Pharm.D.,[facility] [place]  Board Certified Geriatric Pharmacist  Medication Therapy Management Pharmacist  [phone]         Medication Therapy Recommendations  Abdominal pain, epigastric    Current Medication: hyoscyamine (LEVSIN) 0.125 MG tablet   Rationale: Undesirable effect - Adverse medication event - Safety   Recommendation: Decrease Frequency   Status: Contact Provider - Awaiting Response         Cough    Current Medication: guaiFENesin (ROBITUSSIN) 100 MG/5ML liquid   Rationale: Frequency inappropriate - Dosage too high - Safety   Recommendation: Decrease Frequency   Status: Contact Provider - Awaiting Response         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

## 2022-06-08 NOTE — PROGRESS NOTES
[facility] Care Coordination Contact    Left a message with nursing staff at [facility] to determine if Rx was left with them at facility for compressions stockings.   Yue Goss RN  [facility]  P: [phone]  Fax: [phone]

## 2022-06-08 NOTE — PROGRESS NOTES
[facility] Care Coordination Contact    Received order for compression stockins and sent to [facility] for delivery. NP also reported placing order for home care to be started for lymphedema therapy but they have not been involved yet. CC to follow up.  Yue Goss RN  [facility]  P: [phone]  Fax: [phone]

## 2022-06-09 ENCOUNTER — OFFICE VISIT (OUTPATIENT)
Dept: PHARMACY | Facility: CLINIC | Age: 82
End: 2022-06-09
Payer: COMMERCIAL

## 2022-06-09 DIAGNOSIS — K21.00 GASTROESOPHAGEAL REFLUX DISEASE WITH ESOPHAGITIS, UNSPECIFIED WHETHER HEMORRHAGE: ICD-10-CM

## 2022-06-09 DIAGNOSIS — R52 PAIN: ICD-10-CM

## 2022-06-09 DIAGNOSIS — F10.27 DEMENTIA ASSOCIATED WITH ALCOHOLISM WITH BEHAVIORAL DISTURBANCE (H): ICD-10-CM

## 2022-06-09 DIAGNOSIS — K59.01 SLOW TRANSIT CONSTIPATION: ICD-10-CM

## 2022-06-09 DIAGNOSIS — K22.70 BARRETT'S ESOPHAGUS WITHOUT DYSPLASIA: Primary | ICD-10-CM

## 2022-06-09 DIAGNOSIS — I48.20 CHRONIC ATRIAL FIBRILLATION (H): ICD-10-CM

## 2022-06-09 DIAGNOSIS — E63.9 NUTRITIONAL DEFICIENCY: ICD-10-CM

## 2022-06-09 DIAGNOSIS — I10 BENIGN ESSENTIAL HYPERTENSION: ICD-10-CM

## 2022-06-09 DIAGNOSIS — F10.10 ALCOHOL ABUSE: ICD-10-CM

## 2022-06-09 DIAGNOSIS — J30.9 ALLERGIC RHINITIS, UNSPECIFIED SEASONALITY, UNSPECIFIED TRIGGER: ICD-10-CM

## 2022-06-09 DIAGNOSIS — R05.9 COUGH: ICD-10-CM

## 2022-06-09 DIAGNOSIS — R10.13 ABDOMINAL PAIN, EPIGASTRIC: ICD-10-CM

## 2022-06-09 DIAGNOSIS — G62.9 NEUROPATHY: ICD-10-CM

## 2022-06-09 DIAGNOSIS — F41.8 DEPRESSION WITH ANXIETY: ICD-10-CM

## 2022-06-09 PROCEDURE — 99607 MTMS BY PHARM ADDL 15 MIN: CPT | Performed by: PHARMACIST

## 2022-06-09 PROCEDURE — 99605 MTMS BY PHARM NP 15 MIN: CPT | Performed by: PHARMACIST

## 2022-06-09 NOTE — PATIENT INSTRUCTIONS
"Recommendations from today's MTM visit:                                                    MTM (medication therapy management) is a service provided by a clinical pharmacist designed to help you get the most of out of your medicines.   Today we reviewed what your medicines are for, how to know if they are working, that your medicines are safe and how to make your medicine regimen as easy as possible.      1.  Provider may consider reduction in olanzapine to 2.5mg daily and monitor for hallucinations/delusions.  If tolerates reduction without an increase in upsetting hallucinations or delusions, consider stopping altogether after 2 weeks and monitor.  2.  Limit use of as needed hyoscyamine.  Provider may consider asking staff to bring only if patient requests as needed dose (vs staff offering/bringing it to him once daily).  Goal to stop altogether in future.  Continue Miralax for constipation.  3.  Provider may consider discontinuation of scheduled Robitussin, and change as needed order to 100mg every 4hr as needed.    Follow-up: 4-6 weeks, sooner if necessary      It was great speaking with you today.  I value your experience and would be very thankful for your time in providing feedback in our clinic survey. In the next few days, you may receive an email or text message from [employer] with a link to a survey related to your  clinical pharmacist.\"     To schedule another MTM appointment, please call me directly or you may call the MTM scheduling line at [phone] or toll-free at [phone].     My Clinical Pharmacist's contact information:                                                      Please feel free to contact me with any questions or concerns you have.      Sindy Hanley, Pharm.D.,[facility]  Board Certified Geriatric Pharmacist  Medication Therapy Management Pharmacist  [phone]      "

## 2022-06-09 NOTE — Clinical Note
Thanks, Sylvia!  He actually brought up the hyoscyamine - see note, and let me know if any questions!

## 2022-06-09 NOTE — LETTER
June 9, 2022  Jamie Valdivia  [address] [place] [address]    Dear Mr. Valdivia, [facility]        Thank you for talking with me on Jun 9, 2022 about your health and medications. As a follow-up to our conversation, I have included two documents:      1. Your Recommended To-Do List has steps you should take to get the best results from your medications.  2. Your Medication List will help you keep track of your medications and how to take them.    If you want to talk about these documents, please call Sindy Hanley RPH at phone: [phone], Monday-Friday 8-4:30pm.    I look forward to working with you and your doctors to make sure your medications work well for you.    Sincerely,    Sindy Hanley RPH  [facility] Pharmacist, [facility]

## 2022-06-09 NOTE — LETTER
_  Medication List        Prepared on: 6/9/2022     Bring your Medication List when you go to the doctor, hospital, or   emergency room. And, share it with your family or caregivers.     Note any changes to how you take your medications.  Cross out medications when you no longer use them.    Medication How I take it Why I use it Prescriber   acetaminophen (TYLENOL) 500 MG tablet Take 1,000 mg by mouth every 6 hours as needed for mild pain pain Sylvia Stein NP   guaiFENesin (ROBITUSSIN) 100 MG/5ML liquid GIVE 10ML (200 MG) BY MOUTH TWICE DAILY;AND TWICE DAILY AS NEEDED FOR COUGH Cough MARICHUY Pepper CNP   hyoscyamine (LEVSIN) 0.125 MG tablet Take 1 tablet (125 mcg) by mouth 4 times daily as needed for cramping Abdominal cramping MARICHUY Pepper CNP   magnesium hydroxide (MILK OF MAGNESIA) 400 MG/5ML suspension Take 30 mLs by mouth daily as needed for constipation or heartburn heartburn Sylvia Stein NP   menthol-zinc oxide (CALMOSEPTINE) 0.44-20.6 % OINT ointment Apply 1 g topically 2 times daily. May also apply 1 g 2 times daily as needed for skin protection. Dermatitis MARICHUY Pepper CNP   mirtazapine (REMERON) 15 MG tablet TAKE 1 TABLET BY MOUTH AT BEDTIME Generalized Anxiety Disorder, Depression MARICHUY Pepper CNP   Multiple Vitamins-Minerals (CEROVITE SENIOR) TABS Take 1 tablet by mouth daily Takes dietary supplements MARICHUY Pepper CNP   nystatin (MYCOSTATIN) 864568 UNIT/GM external powder Apply topically 3 times daily as needed Skin disorder Patient Reported   OLANZapine (ZYPREXA) 5 MG tablet TAKE 1 TABLET BY MOUTH ONCE DAILY Anxiety, dementia with hallucinations MARICHUY Pepper CNP   omeprazole (PRILOSEC) 40 MG DR capsule TAKE 1 CAPSULE BY MOUTH TWICE DAILY Gastroesophageal reflux disease with esophagitis, unspecified whether hemorrhage MARICHUY Pepper CNP   polyethylene glycol (GAVILAX) 17 GM/Dose powder MIX 2 CAPFULS IN 8OZ OF ORANGE JUICE  IN THE  MORNING;AND MAY MIX 2 CAPFULS ONCE DAILY AS NEEDED FOR CONSTIPATION. MIX IN FRONT OF PT. HOLD FOR LOOSE STOOL. OK TO GIVE 1 CAPFUL IF RESIDENT REFUSES 2 CAPFULS. Slow Transit Constipation MARICHUY Pepper CNP   polyvinyl alcohol-povidone PF (REFRESH) 1.4-0.6 % ophthalmic solution 1 drop every 4 hours as needed for irritation Dry eyes Patient Reported   sodium chloride (OCEAN) 0.65 % nasal spray Spray 2 sprays into both nostrils daily Allergic rhinitis Patient Reported         Add new medications, over-the-counter drugs, herbals, vitamins, or  minerals in the blank rows below.    Medication How I take it Why I use it Prescriber                          Allergies:      ativan [lorazepam]        Side effects I have had:               Other Information:              My notes and questions:

## 2022-06-09 NOTE — LETTER
"Recommended To-Do List      Prepared on: 6/9/2022     You can get the best results from your medications by completing the items on this \"To-Do List.\"      Bring your To-Do List when you go to your doctor. And, share it with your family or caregivers.    My To-Do List:  What we talked about: What I should do:   An issue with your medication - may contribute to constipation, dry mouth/throat, sedation, falls, difficulty swallowing, memory issues    Decrease how often you take hyoscyamine (LEVSIN)          What we talked about: What I should do:   Your medication dosage being too high - may benefit from stopping scheduled use due to unclear benefit    Decrease how often you take guaiFENesin (ROBITUSSIN) - IF provider agrees          What we talked about: What I should do:   An issue with your medication - may contribute to sedation, falls, confusion, etc.    Decrease your dosage of OLANZapine (zyPREXA) - IF provider agrees          What we talked about: What I should do:                     "

## 2022-06-16 ENCOUNTER — ASSISTED LIVING VISIT (OUTPATIENT)
Dept: GERIATRICS | Facility: CLINIC | Age: 82
End: 2022-06-16
Payer: COMMERCIAL

## 2022-06-16 VITALS — OXYGEN SATURATION: 96 % | TEMPERATURE: 97.3 F | WEIGHT: 228.8 LBS | HEIGHT: 68 IN | BODY MASS INDEX: 34.68 KG/M2

## 2022-06-16 DIAGNOSIS — S51.811A SKIN TEAR OF RIGHT FOREARM WITHOUT COMPLICATION, INITIAL ENCOUNTER: ICD-10-CM

## 2022-06-16 DIAGNOSIS — F41.1 GAD (GENERALIZED ANXIETY DISORDER): ICD-10-CM

## 2022-06-16 DIAGNOSIS — R44.3 HALLUCINATIONS: Primary | ICD-10-CM

## 2022-06-16 DIAGNOSIS — Z87.898 HISTORY OF ALCOHOL USE DISORDER: ICD-10-CM

## 2022-06-16 RX ORDER — GABAPENTIN 100 MG/1
100 CAPSULE ORAL AT BEDTIME
COMMUNITY
Start: 2022-06-16 | End: 2022-07-08

## 2022-06-16 RX ORDER — OLANZAPINE 2.5 MG/1
TABLET, FILM COATED ORAL
Qty: 30 TABLET | Refills: 98 | Status: SHIPPED | OUTPATIENT
Start: 2022-06-16 | End: 2022-07-26

## 2022-06-16 NOTE — PROGRESS NOTES
"[facility]    Chief Complaint   Patient presents with     RECHECK     Arm wound     HPI:  Jamie Valdivia is a 82 year old  (1940) PMH significant for GERD with esophagitis, OA BL knees, pharyngeal dysphagia, edema, abdominal wall hernia, chronic atrial fibrillation, HTN, osteoporosis with hx of vertebral fracture, dementia, anemia, hx of COVID-19, who is being seen today for an episodic care visit at: [facility] ([facility]) [[id number]].     Today's concern is:   Follow-up today requested by nursing due to skin issue to right forearm.     First report by nursing on 6/8/22:    No pain to arm.  No swelling.  No bleeding.    Resident's main concern today is, \"I'm a ball of nerves!\"  Wife is currently hospitalized with hip fracture, he did get to speak with her this morning, which was helpful.   Not sleeping well at night and feeling foggy in the morning. Met with MTM PharmD last week who recommended dose reduction of Zyprexa.    Does not know when his ENT appointment is scheduled.    Allergies, and PMH/PSH reviewed in [facility] today.    REVIEW OF SYSTEMS:  4 point ROS including Respiratory, CV, GI and , other than that noted in the HPI,  is negative    Objective:   Temp 97.3  F (36.3  C)   Ht 1.727 m (5' 8\")   Wt 103.8 kg (228 lb 12.8 oz)   SpO2 96%   BMI 34.79 kg/m    GENERAL APPEARANCE:  Alert, in no distress, pleasant, cooperative, seated in recliner chair.  EYES: Sclera clear and conjunctiva normal, no discharge   RESP:  Non-labored breathing, no chest wall tenderness, no respiratory distress, Lung sounds clear, patient is on room air.  CV:  Rate and rhythm regular, no murmur, no rub or gallop.  VASCULAR: 2+ edema bilateral lower extremities.   ABDOMEN:  Normal bowel sounds, soft, nontender, no grimacing or guarding with palpation  M/S:   Gait and station ambulates independently to answer door without walker  SKIN: R posterior forearm skin tear, see photo below  PSYCH: awake and alert, " speech fluent, memory impaired, cooperative     Right Forearm      Recent labs in EPIC reviewed by me today.    CBC RESULTS: Recent Labs   Lab Test 05/12/22  1220 04/28/22  0655 04/07/22  0735   WBC  --  5.5 5.4   RBC  --  4.17* 4.09*   HGB 12.8* 12.0* 11.7*   HCT  --  37.9* 36.8*   MCV  --  91 90   MCH  --  28.8 28.6   MCHC  --  31.7 31.8   RDW  --  13.8 13.8   PLT  --  199 203     Last Basic Metabolic Panel:  Recent Labs   Lab Test 04/28/22  0655 04/07/22  0735    138   POTASSIUM 4.4 4.3   CHLORIDE 104 102   JOSUE 8.3* 9.0   CO2 32 31   BUN 9 13   CR 0.68 0.76   GLC 80 77     Assessment/Plan:  (R44.3) Hallucinations  (primary encounter diagnosis)  (F41.1) JOSÉ MANUEL (generalized anxiety disorder)  (Z87.898) History of alcohol use disorder  Comment: Chronic, no longer has access to ETOH, wife with acute health concerns adding to stress  Plan:  - GDR of Zyprexa to 2.5 mg PO q HS and 2.5 mg daily PRN  - Add gabapentin 100 mg PO q HS  - MTM PharmD following  - ACP consult for onsite counseling   - Offered cardiology referral, with anxiety episode reports chest pain, but pt/daughter declined   - Continues to benefit from supportive care in [facility] setting  - Continue MVI     (S51.403O) Skin tear of right forearm without complication, initial encounter  Comment: Traumatic in setting mobility impairment and age related skin changes  Plan:   - Cleanse right forearm with soap/water, pat dry, cover with foam dressing change q 72 hours until healed     Called daughter Viola to review medication changes, in agreement with plan. Does not want to use [facility] due to location, okay to use ENT in [facility], will call if needs referral.     Electronically signed by: MARICHUY Basilio CNP

## 2022-06-16 NOTE — LETTER
"    6/16/2022        RE: Jamie Valdivia  [facility]  [address]  [place] [place] [address]        [facility]    Chief Complaint   Patient presents with     RECHECK     Arm wound     HPI:  Jamie Valdivia is a 82 year old  (1940) PMH significant for GERD with esophagitis, OA BL knees, pharyngeal dysphagia, edema, abdominal wall hernia, chronic atrial fibrillation, HTN, osteoporosis with hx of vertebral fracture, dementia, anemia, hx of COVID-19, who is being seen today for an episodic care visit at: [facility] ([facility]) [61].     Today's concern is:   Follow-up today requested by nursing due to skin issue to right forearm.     First report by nursing on 6/8/22:    No pain to arm.  No swelling.  No bleeding.    Resident's main concern today is, \"I'm a ball of nerves!\"  Wife is currently hospitalized with hip fracture, he did get to speak with her this morning, which was helpful.   Not sleeping well at night and feeling foggy in the morning. Met with MTM PharmD last week who recommended dose reduction of Zyprexa.    Does not know when his ENT appointment is scheduled.    Allergies, and PMH/PSH reviewed in EPIC today.    REVIEW OF SYSTEMS:  4 point ROS including Respiratory, CV, GI and , other than that noted in the HPI,  is negative    Objective:   Temp 97.3  F (36.3  C)   Ht 1.727 m (5' 8\")   Wt 103.8 kg (228 lb 12.8 oz)   SpO2 96%   BMI 34.79 kg/m    GENERAL APPEARANCE:  Alert, in no distress, pleasant, cooperative, seated in recliner chair.  EYES: Sclera clear and conjunctiva normal, no discharge   RESP:  Non-labored breathing, no chest wall tenderness, no respiratory distress, Lung sounds clear, patient is on room air.  CV:  Rate and rhythm regular, no murmur, no rub or gallop.  VASCULAR: 2+ edema bilateral lower extremities.   ABDOMEN:  Normal bowel sounds, soft, nontender, no grimacing or guarding with palpation  M/S:   Gait and station " ambulates independently to answer door without walker  SKIN: R posterior forearm skin tear, see photo below  PSYCH: awake and alert, speech fluent, memory impaired, cooperative     Right Forearm      Recent labs in EPIC reviewed by me today.    CBC RESULTS: Recent Labs   Lab Test 05/12/22  1220 04/28/22  0655 04/07/22  0735   WBC  --  5.5 5.4   RBC  --  4.17* 4.09*   HGB 12.8* 12.0* 11.7*   HCT  --  37.9* 36.8*   MCV  --  91 90   MCH  --  28.8 28.6   MCHC  --  31.7 31.8   RDW  --  13.8 13.8   PLT  --  199 203     Last Basic Metabolic Panel:  Recent Labs   Lab Test 04/28/22  0655 04/07/22  0735    138   POTASSIUM 4.4 4.3   CHLORIDE 104 102   JOSUE 8.3* 9.0   CO2 32 31   BUN 9 13   CR 0.68 0.76   GLC 80 77     Assessment/Plan:  (R44.3) Hallucinations  (primary encounter diagnosis)  (F41.1) JOSÉ MANUEL (generalized anxiety disorder)  (Z87.898) History of alcohol use disorder  Comment: Chronic, no longer has access to ETOH, wife with acute health concerns adding to stress  Plan:  - GDR of Zyprexa to 2.5 mg PO q HS and 2.5 mg daily PRN  - Add gabapentin 100 mg PO q HS  - MTM PharmD following  - ACP consult for onsite counseling   - Offered cardiology referral, with anxiety episode reports chest pain, but pt/daughter declined   - Continues to benefit from supportive care in [facility] setting  - Continue MVI     (S57.398X) Skin tear of right forearm without complication, initial encounter  Comment: Traumatic in setting mobility impairment and age related skin changes  Plan:   - Cleanse right forearm with soap/water, pat dry, cover with foam dressing change q 72 hours until healed     Called daughter Viola to review medication changes, in agreement with plan. Does not want to use [facility] due to location, okay to use ENT in [facility], will call if needs referral.     Electronically signed by: MARICHUY Basilio CNP             Sincerely,        MARICHUY Basilio CNP

## 2022-06-17 DIAGNOSIS — R09.81 NASAL CONGESTION: Primary | ICD-10-CM

## 2022-06-17 RX ORDER — SODIUM CHLORIDE 0.65 %
AEROSOL, SPRAY (ML) NASAL
Qty: 44 ML | Refills: 97 | Status: SHIPPED | OUTPATIENT
Start: 2022-06-17 | End: 2023-03-30

## 2022-06-21 NOTE — PROGRESS NOTES
[facility] Care Coordination Contact    Received update from [facility] that walker will be delivered ASAP and that compression stockings were mailed out on 6/10. CC confirmed with [facility] where stockings were sent because family has not received them yet.      Spoke with Kalpana [phone]- home care nurse with [facility]. who reported Lymphedema therapist out on maternity leave and no one else is covering- she will follow up to see if they can still put an order in to have someone see him. OT is working with him. Nursing is discharging Thursday and OT/PT will be working with him until July.  Yue Goss RN  [facility]  P: [phone]  Fax: [phone]

## 2022-06-28 ENCOUNTER — ASSISTED LIVING VISIT (OUTPATIENT)
Dept: GERIATRICS | Facility: CLINIC | Age: 82
End: 2022-06-28
Payer: COMMERCIAL

## 2022-06-28 VITALS
DIASTOLIC BLOOD PRESSURE: 76 MMHG | OXYGEN SATURATION: 90 % | WEIGHT: 228.8 LBS | BODY MASS INDEX: 34.68 KG/M2 | RESPIRATION RATE: 12 BRPM | SYSTOLIC BLOOD PRESSURE: 132 MMHG | HEIGHT: 68 IN | TEMPERATURE: 96.4 F | HEART RATE: 50 BPM

## 2022-06-28 DIAGNOSIS — Z87.898 HISTORY OF ALCOHOL USE DISORDER: ICD-10-CM

## 2022-06-28 DIAGNOSIS — R04.2 HEMOPTYSIS: ICD-10-CM

## 2022-06-28 DIAGNOSIS — F41.1 GAD (GENERALIZED ANXIETY DISORDER): ICD-10-CM

## 2022-06-28 DIAGNOSIS — R44.3 HALLUCINATIONS: Primary | ICD-10-CM

## 2022-06-28 NOTE — LETTER
"    6/28/2022        RE: Jamie Valdivia  [facility]  [address]  [facility] [place] [facility] [address]        [facility] GERIATRICS    Chief Complaint   Patient presents with     RECHECK     anxiety     HPI:  Jamie Valdivia is a 82 year old  (1940) PMH significant for GERD with esophagitis, OA BL knees, pharyngeal dysphagia, edema, abdominal wall hernia, chronic atrial fibrillation, HTN, osteoporosis with hx of vertebral fracture, dementia, anemia, hx of COVID-19, who is being seen today for an episodic care visit at: [facility] ([facility]) [61].     Today's concern is:   Follow-up today to assess mood after dose adjustment of psychotropic medications.  Gradual dose reduction of Zyprexa and addition of gabapentin to address anxiety, which he historically manages with ETOH. Hx of chest pain with these episodes, but declined further cardiac work-up    Reports overall he is \"not real good.\"  Does not recall medication changes.  Not sleeping well.  Saw Genna (his therapist from [facility]) this morning.   Typically a morning person and more alert during the day.  \"I'm finding myself drowsy.\"  \"I need jae more motivated.\"  Expresses sadness regarding loss of independence.   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    Under some stress as wife recently fell and broke her hip, she is recovering at [facility]  Able to see wife routinely which has been helpful.    Reviewed hemoptysis.  No more bleeding.  Going to ENT 7/1 with daughter Viola Yost, and PMH/PSH reviewed in EPIC today.    REVIEW OF SYSTEMS:  4 point ROS including Respiratory, CV, GI and , other than that noted in the HPI,  is negative    Objective:   /76   Pulse 50   Temp (!) 96.4  F (35.8  C)   Resp 12   Ht 1.727 m (5' 8\")   Wt 103.8 kg (228 lb 12.8 oz)   SpO2 90%   BMI 34.79 kg/m   "   GENERAL APPEARANCE:  Alert, in no distress, seated in empty dinning room prior to lunch  RESP:  Non-labored breathing  VASCULAR: 2+ edema bilateral lower extremities.   PSYCH: Awake and alert, speech fluent, memory impaired, limited insight.    Labs done in SNF are in [place] EPIC. Please refer to them using EPIC/Care Everywhere.    Assessment/Plan:  (R44.3) Hallucinations  (primary encounter diagnosis)  (F41.1) JOSÉ MANUEL (generalized anxiety disorder)  (Z87.898) History of alcohol use disorder  Comment: Chronic, no longer has access to ETOH, no longer hallucinating (possible acute delirium), wife with acute health concerns adding to stress, difficult transition to memory care unit, + sleep trouble.  Plan:  - Discontinue schedule Zyprexa and change to 2.5 mg daily PRN  - Increase gabapentin to 200 mg PO q HS  - MTM PharmD following  - ACP following for onsite counseling   - Offered cardiology referral, with anxiety episode reports chest pain, but pt/daughter declined   - Continues to benefit from supportive care in [facility] setting  - Continue MVI     (R04.2) Hemoptysis  Comment: Chronic, recurrent, EGD not helpful in determining cause positive for BE.   CXR without mass, possible tiny right middle lobe infiltrate, no clinical s/s of infection  Plan:   - ENT follow-up 7/1    Orders:  - Zyprexa 2.5 mg PO daily PRN  - Increase gabapentin 200 mg q HS     3:58 PM - Called daughter Viola to update, agreeable to plan. Reviewed CXR from earlier this month. Viola will reach out after ENT visit later this week with update. Discussed options for meeting care needs, but keep resident socially engaged: coming out to regular [facility] for meals, adult day program, [facility]       Electronically signed by: MARICHUY Basilio CNP             Sincerely,        MARICHUY Basilio CNP

## 2022-06-28 NOTE — PROGRESS NOTES
"[facility] GERIATRICS    Chief Complaint   Patient presents with     RECHECK     anxiety     HPI:  Jamie Valdivia is a 82 year old  (1940) PMH significant for GERD with esophagitis, OA BL knees, pharyngeal dysphagia, edema, abdominal wall hernia, chronic atrial fibrillation, HTN, osteoporosis with hx of vertebral fracture, dementia, anemia, hx of COVID-19, who is being seen today for an episodic care visit at: [facility] ([facility]) [61].     Today's concern is:   Follow-up today to assess mood after dose adjustment of psychotropic medications.  Gradual dose reduction of Zyprexa and addition of gabapentin to address anxiety, which he historically manages with ETOH. Hx of chest pain with these episodes, but declined further cardiac work-up    Reports overall he is \"not real good.\"  Does not recall medication changes.  Not sleeping well.  Saw Genna (his therapist from [facility]) this morning.   Typically a morning person and more alert during the day.  \"I'm finding myself drowsy.\"  \"I need jae more motivated.\"  Expresses sadness regarding loss of independence.   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    Under some stress as wife recently fell and broke her hip, she is recovering at [facility]  Able to see wife routinely which has been helpful.    Reviewed hemoptysis.  No more bleeding.  Going to ENT 7/1 with daughter Viola Yost, and PMH/PSH reviewed in EPIC today.    REVIEW OF SYSTEMS:  4 point ROS including Respiratory, CV, GI and , other than that noted in the HPI,  is negative    Objective:   /76   Pulse 50   Temp (!) 96.4  F (35.8  C)   Resp 12   Ht 1.727 m (5' 8\")   Wt 103.8 kg (228 lb 12.8 oz)   SpO2 90%   BMI 34.79 kg/m     GENERAL APPEARANCE:  Alert, in no distress, seated in empty dinning room prior to lunch  RESP:  Non-labored breathing  VASCULAR: 2+ " edema bilateral lower extremities.   PSYCH: Awake and alert, speech fluent, memory impaired, limited insight.    Labs done in SNF are in [place] EPIC. Please refer to them using EPIC/Care Everywhere.    Assessment/Plan:  (R44.3) Hallucinations  (primary encounter diagnosis)  (F41.1) JOSÉ MANUEL (generalized anxiety disorder)  (Z87.898) History of alcohol use disorder  Comment: Chronic, no longer has access to ETOH, no longer hallucinating (possible acute delirium), wife with acute health concerns adding to stress, difficult transition to memory care unit, + sleep trouble.  Plan:  - Discontinue schedule Zyprexa and change to 2.5 mg daily PRN  - Increase gabapentin to 200 mg PO q HS  - MTM PharmD following  - ACP following for onsite counseling   - Offered cardiology referral, with anxiety episode reports chest pain, but pt/daughter declined   - Continues to benefit from supportive care in [facility] setting  - Continue MVI     (R04.2) Hemoptysis  Comment: Chronic, recurrent, EGD not helpful in determining cause positive for BE.   CXR without mass, possible tiny right middle lobe infiltrate, no clinical s/s of infection  Plan:   - ENT follow-up 7/1    Orders:  - Zyprexa 2.5 mg PO daily PRN  - Increase gabapentin 200 mg q HS     3:58 PM - Called daughter Viola to update, agreeable to plan. Reviewed CXR from earlier this month. Viola will reach out after ENT visit later this week with update. Discussed options for meeting care needs, but keep resident socially engaged: coming out to regular [facility] for meals, adult day program, [facility]       Electronically signed by: MARICHUY Basilio CNP

## 2022-06-30 ENCOUNTER — HOSPITAL ENCOUNTER (EMERGENCY)
Facility: CLINIC | Age: 82
Discharge: HOME | End: 2022-07-01
Attending: EMERGENCY MEDICINE | Admitting: EMERGENCY MEDICINE
Payer: COMMERCIAL

## 2022-06-30 ENCOUNTER — APPOINTMENT (OUTPATIENT)
Dept: GENERAL RADIOLOGY | Facility: CLINIC | Age: 82
End: 2022-06-30
Attending: EMERGENCY MEDICINE
Payer: COMMERCIAL

## 2022-06-30 ENCOUNTER — APPOINTMENT (OUTPATIENT)
Dept: CT IMAGING | Facility: CLINIC | Age: 82
End: 2022-06-30
Attending: EMERGENCY MEDICINE
Payer: COMMERCIAL

## 2022-06-30 DIAGNOSIS — I71.20 THORACIC AORTIC ANEURYSM WITHOUT RUPTURE (H): ICD-10-CM

## 2022-06-30 DIAGNOSIS — R07.9 CHEST PAIN, UNSPECIFIED TYPE: ICD-10-CM

## 2022-06-30 LAB
ANION GAP SERPL CALCULATED.3IONS-SCNC: 4 MMOL/L (ref 3–14)
BASOPHILS # BLD AUTO: 0.1 10E3/UL (ref 0–0.2)
BASOPHILS NFR BLD AUTO: 1 %
BUN SERPL-MCNC: 14 MG/DL (ref 7–30)
CALCIUM SERPL-MCNC: 8.8 MG/DL (ref 8.5–10.1)
CHLORIDE BLD-SCNC: 103 MMOL/L (ref 94–109)
CO2 SERPL-SCNC: 31 MMOL/L (ref 20–32)
CREAT SERPL-MCNC: 0.7 MG/DL (ref 0.66–1.25)
EOSINOPHIL # BLD AUTO: 0.3 10E3/UL (ref 0–0.7)
EOSINOPHIL NFR BLD AUTO: 5 %
ERYTHROCYTE [DISTWIDTH] IN BLOOD BY AUTOMATED COUNT: 14 % (ref 10–15)
GFR SERPL CREATININE-BSD FRML MDRD: >90 ML/MIN/1.73M2
GLUCOSE BLD-MCNC: 97 MG/DL (ref 70–99)
HCT VFR BLD AUTO: 40.4 % (ref 40–53)
HGB BLD-MCNC: 12.9 G/DL (ref 13.3–17.7)
IMM GRANULOCYTES # BLD: 0 10E3/UL
IMM GRANULOCYTES NFR BLD: 0 %
LYMPHOCYTES # BLD AUTO: 2.6 10E3/UL (ref 0.8–5.3)
LYMPHOCYTES NFR BLD AUTO: 42 %
MCH RBC QN AUTO: 29.1 PG (ref 26.5–33)
MCHC RBC AUTO-ENTMCNC: 31.9 G/DL (ref 31.5–36.5)
MCV RBC AUTO: 91 FL (ref 78–100)
MONOCYTES # BLD AUTO: 0.5 10E3/UL (ref 0–1.3)
MONOCYTES NFR BLD AUTO: 8 %
NEUTROPHILS # BLD AUTO: 2.7 10E3/UL (ref 1.6–8.3)
NEUTROPHILS NFR BLD AUTO: 44 %
NRBC # BLD AUTO: 0 10E3/UL
NRBC BLD AUTO-RTO: 0 /100
PLATELET # BLD AUTO: 204 10E3/UL (ref 150–450)
POTASSIUM BLD-SCNC: 4.5 MMOL/L (ref 3.4–5.3)
RBC # BLD AUTO: 4.44 10E6/UL (ref 4.4–5.9)
SODIUM SERPL-SCNC: 138 MMOL/L (ref 133–144)
TROPONIN I SERPL HS-MCNC: 8 NG/L
WBC # BLD AUTO: 6.1 10E3/UL (ref 4–11)

## 2022-06-30 PROCEDURE — 36415 COLL VENOUS BLD VENIPUNCTURE: CPT | Performed by: EMERGENCY MEDICINE

## 2022-06-30 PROCEDURE — 93005 ELECTROCARDIOGRAM TRACING: CPT

## 2022-06-30 PROCEDURE — 99285 EMERGENCY DEPT VISIT HI MDM: CPT | Mod: 25

## 2022-06-30 PROCEDURE — 82310 ASSAY OF CALCIUM: CPT | Performed by: EMERGENCY MEDICINE

## 2022-06-30 PROCEDURE — 85025 COMPLETE CBC W/AUTO DIFF WBC: CPT | Performed by: EMERGENCY MEDICINE

## 2022-06-30 PROCEDURE — 71275 CT ANGIOGRAPHY CHEST: CPT

## 2022-06-30 PROCEDURE — 84484 ASSAY OF TROPONIN QUANT: CPT | Performed by: EMERGENCY MEDICINE

## 2022-06-30 PROCEDURE — 71046 X-RAY EXAM CHEST 2 VIEWS: CPT

## 2022-06-30 PROCEDURE — 250N000011 HC RX IP 250 OP 636: Performed by: EMERGENCY MEDICINE

## 2022-06-30 PROCEDURE — 250N000013 HC RX MED GY IP 250 OP 250 PS 637: Performed by: EMERGENCY MEDICINE

## 2022-06-30 RX ORDER — IOPAMIDOL 755 MG/ML
500 INJECTION, SOLUTION INTRAVASCULAR ONCE
Status: COMPLETED | OUTPATIENT
Start: 2022-06-30 | End: 2022-06-30

## 2022-06-30 RX ORDER — DOXYCYCLINE 100 MG/1
100 CAPSULE ORAL ONCE
Status: DISCONTINUED | OUTPATIENT
Start: 2022-07-01 | End: 2022-06-30

## 2022-06-30 RX ORDER — ACETAMINOPHEN 325 MG/1
650 TABLET ORAL ONCE
Status: COMPLETED | OUTPATIENT
Start: 2022-06-30 | End: 2022-06-30

## 2022-06-30 RX ADMIN — ACETAMINOPHEN 650 MG: 325 TABLET, FILM COATED ORAL at 20:08

## 2022-06-30 RX ADMIN — IOPAMIDOL 70 ML: 755 INJECTION, SOLUTION INTRAVENOUS at 21:16

## 2022-06-30 ASSESSMENT — ENCOUNTER SYMPTOMS
VOMITING: 0
NAUSEA: 0
FEVER: 0
SHORTNESS OF BREATH: 0
ABDOMINAL PAIN: 0

## 2022-06-30 NOTE — ED PROVIDER NOTES
History   Chief Complaint:  Chest Pain       The history is provided by the patient and a relative. History limited by: dementia.      Jamie Valdivia is a 82 year old male with history of coronary atherosclerosis, atrial fibrillation, GERD, and dementia who presents with chronic intermittent chest pain that today is similar in nature, but more frequent. The patient arrived by EMS in which he was given aspirin and nitroglycerin, he notes slight pain relief.  Before lunch time today, he was watching a ball game when he started to experience left-sided chest pain. He reports his pain feels like pressure and is worse with taking a deep breath. The patient states that the pain is often related to stress and lasts 30 minutes. He notes pain after eating some days, but denies this being the case recently. He endorses congestion, noting that he takes a daily cough suppressant. The patient reports that his legs are chronically swollen, denying any new changes. He denies abdominal pain, nausea, vomiting, fever, shortness of breath, history of blood clots, recent falls, and blood thinner use. His daughter later notes that the patient has been coughing up blood for one month. He had a recent EGD on June 1st and they are seeing ENT tomorrow.  Patient with also ongoing cough.  Patient does report difficulty swallowing and coughing with swallowing as well.    Review of Systems   Constitutional: Negative for fever.   HENT: Positive for congestion.    Respiratory: Negative for shortness of breath.         (+) hemoptysis   Cardiovascular: Positive for chest pain and leg swelling.   Gastrointestinal: Negative for abdominal pain, nausea and vomiting.   All other systems reviewed and are negative.      Allergies:  Ativan [Lorazepam]    Medications:  Gabapentin   Levsin  Remeron   Zyprexa  Prilosec  Lasix  Senokot  Folic acid  Fosamax  Anaspaz     Past Medical History:     Alcohol abuse  Dementia   Anxiety  Chronic artrial  "fibrillation   Claustrophobia  Mild cognitive impairment   GERD with esophagitis   COVID-19  Impaired gait and mobility   Low back pain  Obesity   Bilateral osteoarthritis   Insomnia   Pharyngeal dysphagia  Physical deconditioning   Weakness  Alcoholic hepatitis without ascites  Repeated falls  Coronary atherosclerosis of native coronary artery  Anemia  Idiopathic peripheral autonomic neuropathy  Osteoporosis  Constipation  Edema   Seizure disorder  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   Arrived via EMS.  Resides in memory care.     Physical Exam     Patient Vitals for the past 24 hrs:   BP Temp Temp src Pulse Resp SpO2 Height Weight   06/30/22 2330 (!) 154/78 -- -- 54 12 98 % -- --   06/30/22 2315 (!) 146/75 -- -- 52 18 97 % -- --   06/30/22 2300 (!) 157/84 -- -- 50 12 98 % -- --   06/30/22 2245 (!) 147/93 -- -- 68 20 100 % -- --   06/30/22 2230 (!) 151/91 -- -- 57 12 100 % -- --   06/30/22 2215 (!) 148/81 -- -- 61 20 100 % -- --   06/30/22 2200 (!) 146/76 -- -- 70 -- 100 % -- --   06/30/22 2145 (!) 147/77 -- -- 63 -- 100 % -- --   06/30/22 2130 (!) 151/71 -- -- 57 -- 100 % -- --   06/30/22 2115 (!) 172/81 -- -- -- -- -- -- --   06/30/22 1915 -- -- -- -- -- 99 % -- --   06/30/22 1900 -- -- -- -- -- 97 % -- --   06/30/22 1838 -- 97.4  F (36.3  C) Oral -- -- -- -- --   06/30/22 1837 (!) 143/74 -- -- 65 16 99 % 1.753 m (5' 9\") 103.4 kg (228 lb)       Physical Exam    Gen: alert  HEENT: PERRL, oropharynx clear  Neck: normal ROM  CV: irregular rhythm, bradycardic, 2+ distal pulses in all 4 extremities  Chest: Exquisite tenderness over the chest wall  Pulm: breath sounds " equal, lungs clear  Abd: Soft, nontender  Back: no evidence of injury  MSK: symmetric bilateral lower extremity edema- unchanged per pt and family, no calf tenderness  Skin: no rash  Neuro: alert, appropriate conversation and interaction        Emergency Department Course   ECG  ECG taken at 1843, ECG read at 1850  Atrial fibrillation with slow ventricular response  Abnormal ECG    Rate 49 bpm. IN interval * ms. QRS duration 84 ms. QT/QTc 428/386 ms. P-R-T axes * -27 26.     Imaging:  CT Chest Pulmonary Embolism w Contrast   Final Result   IMPRESSION:   1.  There is no pulmonary embolus.   2.  4.6 cm ascending thoracic aortic aneurysm.   3.  Fluid-filled bronchi in bilateral lower lobes.      Chest XR,  PA & LAT   Final Result   IMPRESSION: Left basilar pneumonia.      Report per radiology    Laboratory:  Labs Ordered and Resulted from Time of ED Arrival to Time of ED Departure   CBC WITH PLATELETS AND DIFFERENTIAL - Abnormal       Result Value    WBC Count 6.1      RBC Count 4.44      Hemoglobin 12.9 (*)     Hematocrit 40.4      MCV 91      MCH 29.1      MCHC 31.9      RDW 14.0      Platelet Count 204      % Neutrophils 44      % Lymphocytes 42      % Monocytes 8      % Eosinophils 5      % Basophils 1      % Immature Granulocytes 0      NRBCs per 100 WBC 0      Absolute Neutrophils 2.7      Absolute Lymphocytes 2.6      Absolute Monocytes 0.5      Absolute Eosinophils 0.3      Absolute Basophils 0.1      Absolute Immature Granulocytes 0.0      Absolute NRBCs 0.0     BASIC METABOLIC PANEL - Normal    Sodium 138      Potassium 4.5      Chloride 103      Carbon Dioxide (CO2) 31      Anion Gap 4      Urea Nitrogen 14      Creatinine 0.70      Calcium 8.8      Glucose 97      GFR Estimate >90     TROPONIN I - Normal    Troponin I High Sensitivity 8       Emergency Department Course:     Reviewed:  I reviewed nursing notes, vitals, past medical history and Care Everywhere    Assessments:  1912 I obtained history and  examined the patient as noted above.   2100 I rechecked the patient and explained findings.   2345 I rechecked and updated the patient/family. At this point I feel that the patient is safe for discharge, and the patient agrees.      Consults:  2214 I consulted with Dr. Washington, Thoracic Surgery, regarding the patient's history and presentation in the emergency department today.   2225 I consulted with Dr. Harrison, Thoracic Surgery, regarding the patient's history and presentation.     Interventions:  2008 Tylenol 650 mg Oral   Augmentin 1 tablet Oral    Disposition:  The patient was discharged to home.     Impression & Plan     Medical Decision Making:  Patient presents for atypical left-sided chest pain.  EKG x2 showed atrial fibrillation, which is chronic for the patient without any other overt ischemic changes.  Troponin negative in the setting of approximately 8 hours of ongoing symptoms.  I feel a single troponin is sufficient to exclude ACS.  Given 1 month of hemoptysis and cough, we opted to obtain CT PE protocol of the chest.  This was negative for PE.  This does show a thoracic aortic aneurysm.  Finding discussed with on-call cardiothoracic surgery.  Given size of aneurysm and lack of other connective tissue disorder, patient was not felt to require admission.  Rather, recommended outpatient follow-up with repeat CT chest and echocardiogram in 3 months to evaluate for any progression.  Patient is exquisitely tender over the chest wall.  I do suspect there is a component of musculoskeletal pain contributing to his symptoms.  Patient also with chronic cough over the past 1 month.  The cough is productive with small blood.  CT chest negative for mass or pulmonary embolism.  There are some fluid-filled bronchioles in the lower lungs.  Given ongoing cough as well as aspiration risk, will treat for Augmentin for possible pneumonia.  Recommended close primary care follow-up for recheck.  Signs and symptoms for  which return to the emergency department were discussed.  Discharge home.      Diagnosis:    ICD-10-CM    1. Thoracic aortic aneurysm without rupture (H)  I71.2    2. Chest pain, unspecified type  R07.9        Discharge Medications:  New Prescriptions    AMOXICILLIN-CLAVULANATE (AUGMENTIN) 875-125 MG TABLET    Take 1 tablet by mouth 2 times daily for 7 days       Scribe Disclosure:  ISonja, am serving as a scribe at 6:40 PM on 6/30/2022 to document services personally performed by Quita Mcclain MD based on my observations and the provider's statements to me.   ICarole, am serving as a scribe  at 7:07 PM on 6/30/2022.        Quita Mcclain MD  07/01/22 0100

## 2022-06-30 NOTE — ED TRIAGE NOTES
Pt presents for complaint of intermittent left sided chest pains. Pt states 20 episodes today and states a history of these for some years. Pt states hx of one MI in the past. Denies pain at this time. ABC intact, A&Ox4.     Triage Assessment     Row Name 06/30/22 9262       Triage Assessment (Adult)    Airway WDL WDL       Respiratory WDL    Respiratory WDL WDL       Skin Circulation/Temperature WDL    Skin Circulation/Temperature WDL WDL       Cardiac WDL    Cardiac WDL WDL       Peripheral/Neurovascular WDL    Peripheral Neurovascular WDL WDL       Cognitive/Neuro/Behavioral WDL    Cognitive/Neuro/Behavioral WDL WDL

## 2022-07-01 ENCOUNTER — TELEPHONE (OUTPATIENT)
Dept: GERIATRICS | Facility: CLINIC | Age: 82
End: 2022-07-01

## 2022-07-01 VITALS
HEART RATE: 53 BPM | DIASTOLIC BLOOD PRESSURE: 78 MMHG | BODY MASS INDEX: 33.77 KG/M2 | HEIGHT: 69 IN | SYSTOLIC BLOOD PRESSURE: 167 MMHG | WEIGHT: 228 LBS | OXYGEN SATURATION: 100 % | RESPIRATION RATE: 15 BRPM | TEMPERATURE: 97.4 F

## 2022-07-01 DIAGNOSIS — K21.9 GASTROESOPHAGEAL REFLUX DISEASE, UNSPECIFIED WHETHER ESOPHAGITIS PRESENT: Primary | ICD-10-CM

## 2022-07-01 LAB
ATRIAL RATE - MUSE: 61 BPM
ATRIAL RATE - MUSE: 82 BPM
DIASTOLIC BLOOD PRESSURE - MUSE: NORMAL MMHG
DIASTOLIC BLOOD PRESSURE - MUSE: NORMAL MMHG
INTERPRETATION ECG - MUSE: NORMAL
INTERPRETATION ECG - MUSE: NORMAL
P AXIS - MUSE: NORMAL DEGREES
P AXIS - MUSE: NORMAL DEGREES
PR INTERVAL - MUSE: NORMAL MS
PR INTERVAL - MUSE: NORMAL MS
QRS DURATION - MUSE: 84 MS
QRS DURATION - MUSE: 86 MS
QT - MUSE: 428 MS
QT - MUSE: 430 MS
QTC - MUSE: 386 MS
QTC - MUSE: 411 MS
R AXIS - MUSE: -27 DEGREES
R AXIS - MUSE: -34 DEGREES
SYSTOLIC BLOOD PRESSURE - MUSE: NORMAL MMHG
SYSTOLIC BLOOD PRESSURE - MUSE: NORMAL MMHG
T AXIS - MUSE: 26 DEGREES
T AXIS - MUSE: 33 DEGREES
VENTRICULAR RATE- MUSE: 49 BPM
VENTRICULAR RATE- MUSE: 55 BPM

## 2022-07-01 PROCEDURE — 250N000013 HC RX MED GY IP 250 OP 250 PS 637: Performed by: EMERGENCY MEDICINE

## 2022-07-01 RX ORDER — ALUMINA, MAGNESIA, AND SIMETHICONE 2400; 2400; 240 MG/30ML; MG/30ML; MG/30ML
30 SUSPENSION ORAL EVERY 6 HOURS PRN
Qty: 355 ML | Refills: 3 | Status: SHIPPED | OUTPATIENT
Start: 2022-07-01 | End: 2024-06-04

## 2022-07-01 RX ADMIN — AMOXICILLIN AND CLAVULANATE POTASSIUM 1 TABLET: 875; 125 TABLET, FILM COATED ORAL at 00:15

## 2022-07-01 NOTE — TELEPHONE ENCOUNTER
Jamie Valdivia  1940  ORDERS:  - Okay to crush Augmentin per patient preference   - alum & mag hydroxide-simethicone (MAALOX MAX) 400-400-40 MG/5ML SUSP suspension; Take 30 mLs by mouth every 6 hours as needed for indigestion dx reflux  - Discontinue milk of magnesia   Electronically signed by:   MARICHUY Basilio CNP  07/01/22 1:46 PM

## 2022-07-01 NOTE — DISCHARGE INSTRUCTIONS
Your chest pain is likely multifactorial today.    We do see a thoracic aortic aneurysm.  Please follow-up on this result with your primary care doctor.  You will need a repeat echocardiogram and CT of the chest in 3 months to assess stability.    Given your 1 month of cough and chest CT findings, recommend treatment with antibiotics to see if resolution of cough.    Keep ENT appointment tomorrow to evaluate cause of hemoptysis.    Consider pulmonology consult for ongoing cough or hemoptysis.  Please discuss this with primary care.

## 2022-07-05 ENCOUNTER — PATIENT OUTREACH (OUTPATIENT)
Dept: GERIATRIC MEDICINE | Facility: CLINIC | Age: 82
End: 2022-07-05

## 2022-07-05 NOTE — PROGRESS NOTES
[facility] Care Coordination Contact  CC received notification of Emergency Room visit.  ER visit occurred on 6/30/22 at [facility] with Dx of thoracic aortic aneurysm.    CC contacted adult daughter Coral and left a message requesting a return call.  Member has a follow-up appointment with PCP: Yes: scheduled on seen onsite for PCP follow up; urology apt also scheduled for f/u  Member has had a change in condition: No  New referrals placed: No  Home Visit Needed: No  Care plan reviewed and updated.  PCP notified of ED visit via EMR.  Yue Goss RN  [facility]  P: [phone]  Fax: [phone]

## 2022-07-07 VITALS
WEIGHT: 228.8 LBS | DIASTOLIC BLOOD PRESSURE: 67 MMHG | HEIGHT: 69 IN | TEMPERATURE: 97.9 F | OXYGEN SATURATION: 97 % | BODY MASS INDEX: 33.89 KG/M2 | RESPIRATION RATE: 14 BRPM | SYSTOLIC BLOOD PRESSURE: 138 MMHG | HEART RATE: 57 BPM

## 2022-07-07 NOTE — PROGRESS NOTES
"[facility]ERIATRICS    Chief Complaint   Patient presents with     RECHECK     ER follow up     HPI:  Jamie Valdivia is a 82 year old  (1940) PMH significant for GERD with esophagitis, OA BL knees, pharyngeal dysphagia, edema, abdominal wall hernia, chronic atrial fibrillation, HTN, osteoporosis with hx of vertebral fracture, dementia, anemia, hx of COVID-19, who is being seen today for an episodic care visit at: [facility] ([facility]) [61].     Follow-up today for ER visit on June 30, 2022.  Resident was watching a baseball game when he experienced left-sided chest pain.  Treated by EMS with aspirin and nitroglycerin with slight relief.  Reported pain worse with taking deep breaths and related to anxiety.  Resident experiencing hemoptysis over the last month with recent EGD but did not explain his symptoms.  Also reporting cough and chronic leg swelling.  EKG showed atrial fibrillation with controlled rate; chronic.  Chest CT showed 4.6 cm ascending thoracic aortic aneurysm, but was negative for plumonary embolism.  Cardiothoracic surgery consulted given size of aneurysm and lack of other connective tissue disorder recommended outpatient follow-up with repeat chest CT and echocardiogram in 3 months to evaluate for progression.  Chest x-ray showed left basilar pneumonia and chest CT showed fluid-filled bronchi\" bilateral lower lobes.  Labs significant for negative troponin, ACS exclude.  ER physician noted tenderness over left chest wall and cough over last month. Hx of dysphagia and increased aspiration risk.  Symptoms most likely consistent with pneumonia discharged back to assisted living facility with Augmentin.  Resident missed ENT follow-up which was scheduled for July 1st.    Today's concerns:  Presented in good spirits today, went over to SNF to visit wife who recently broke her hip, but will be returning to [facility] next week.   Denies shortness of breath or significant cough.  No fever " "or chills.  No chest pain reported.  No recent hemoptysis.  Good appetite.  Mobility at baseline with four-wheel walker no weakness.  Mental status at baseline.  Zyprexa changed to PRN and gabapentin increased on 6/29/22.  Completed Augmentin on 7/7/22.    Allergies, and PMH/PSH reviewed in [facility] today.    REVIEW OF SYSTEMS:  Limited secondary to cognitive impairment but today pt reports and 4 point ROS including Respiratory, CV, GI and , other than that noted in the HPI,  is negative    Objective:   /67   Pulse 57   Temp 97.9  F (36.6  C)   Resp 14   Ht 1.753 m (5' 9\")   Wt 103.8 kg (228 lb 12.8 oz)   SpO2 97%   BMI 33.79 kg/m    GENERAL APPEARANCE:  Alert, in no distress, pleasant, cooperative, seated in recliner chair.  EYES: Sclera clear and conjunctiva normal, no discharge    RESP:  Non-labored breathing, palpation of chest normal, no chest wall tenderness, no respiratory distress, Lung sounds clear, patient is on room air.  CV: Rate and rhythm irregular, no murmur, no rub or gallop.  VASCULAR: 2+ edema bilateral lower extremities.   ABDOMEN:  Normal bowel sounds, soft, nontender, no grimacing or guarding with palpation  M/S:   Gait and station ambulates independently to answer door without walker  PSYCH: awake and alert, speech fluent, memory impaired, cooperative    Recent labs in [facility] reviewed by me today.    CBC RESULTS: Recent Labs   Lab Test 06/30/22 1928 05/12/22  1220 04/28/22  0655   WBC 6.1  --  5.5   RBC 4.44  --  4.17*   HGB 12.9* 12.8* 12.0*   HCT 40.4  --  37.9*   MCV 91  --  91   MCH 29.1  --  28.8   MCHC 31.9  --  31.7   RDW 14.0  --  13.8     --  199       Last Basic Metabolic Panel:  Recent Labs   Lab Test 06/30/22 1928 04/28/22  0655    139   POTASSIUM 4.5 4.4   CHLORIDE 103 104   JOSUE 8.8 8.3*   CO2 31 32   BUN 14 9   CR 0.70 0.68   GLC 97 80       Liver Function Studies -   Recent Labs   Lab Test 04/28/22  0655 04/07/22  0735   PROTTOTAL 7.2 7.2   ALBUMIN 3.2* " 3.2*   BILITOTAL 0.4 0.4   ALKPHOS 68 69   AST 16 20   ALT 18 23       TSH   Date Value Ref Range Status   04/28/2022 2.77 0.40 - 4.00 mU/L Final   04/07/2022 3.94 0.40 - 4.00 mU/L Final     Lab Results   Component Value Date    A1C 5.5 04/28/2022    A1C 5.6 04/07/2022     EXAM: CT CHEST PULMONARY EMBOLISM W CONTRAST  LOCATION: [facility]  DATE/TIME: 6/30/2022 9:05 PM     INDICATION: Pleuritic chest pain.  COMPARISON: None.  TECHNIQUE: CT chest pulmonary angiogram during arterial phase injection of IV contrast. Multiplanar reformats and MIP reconstructions were performed. Dose reduction techniques were used.   CONTRAST: 70 mL Isovue 370     FINDINGS:  ANGIOGRAM CHEST: Pulmonary arteries are normal caliber and negative for pulmonary emboli. The ascending thoracic aorta is aneurysmal at 4.6 cm. The aorta is not well-opacified which limits evaluation for dissection. No dissection in the ascending   thoracic aorta. No CT evidence of right heart strain.     LUNGS AND PLEURA: Dependent atelectasis bilaterally. Scarring at the lung apices and bases. Fluid filled bronchi in bilateral lower lobes. No pneumothorax or pleural effusion.     MEDIASTINUM/AXILLAE: No lymph node enlargement. The heart is at the upper limits normal in size.     CORONARY ARTERY CALCIFICATION: Severe.     UPPER ABDOMEN: No acute upper abdominal abnormality.     MUSCULOSKELETAL: Spinal rods. Degenerative disease and prominent kyphosis in the thoracic spine. Left shoulder arthroplasty.                                                               IMPRESSION:  1.  There is no pulmonary embolus.  2.  4.6 cm ascending thoracic aortic aneurysm.  3.  Fluid-filled bronchi in bilateral lower lobes.    Assessment/Plan:  (J18.9) Pneumonia of both lower lobes due to infectious organism  (primary encounter diagnosis)  (R04.2) Hemoptysis  Comment: No further chest pain or recent coughing up of blood.  CT chest negative for PE.  EGD negative  for cause of hemoptysis    Plan:   - Complete course of Augmentin  - Continue guaifenesin for supportive care  -  PT/OT/lymph OT  - Work on deep breathing   - Consider repeat CXR in mid-August  - Daughter Viola to reschedule ENT    (I71.2) Thoracic aortic aneurysm without rupture (H) - 6/30/22 4.6 cm  Comment: New finding in ER  Plan:   - Reviewed plan for CT and echo in 3 months (~ 10/2022) with pt/daughter, they don't think they need referral for vascular follow-up, will let NP know if run into difficulty scheduling follow-up care     (R60.0) Lower extremity edema  Comment: Chronic  Plan:   - Lymph OT supposed to come this week, but didn't. Daughter Coral will call home care to follow-up    Orders:  No new orders    Plan to follow-up 7/26/22.    Electronically signed by: MARICHUY Basilio CNP

## 2022-07-08 ENCOUNTER — ASSISTED LIVING VISIT (OUTPATIENT)
Dept: GERIATRICS | Facility: CLINIC | Age: 82
End: 2022-07-08
Payer: COMMERCIAL

## 2022-07-08 DIAGNOSIS — R60.0 LOWER EXTREMITY EDEMA: ICD-10-CM

## 2022-07-08 DIAGNOSIS — J18.9 PNEUMONIA OF BOTH LOWER LOBES DUE TO INFECTIOUS ORGANISM: Primary | ICD-10-CM

## 2022-07-08 DIAGNOSIS — I71.20 THORACIC AORTIC ANEURYSM WITHOUT RUPTURE (H): ICD-10-CM

## 2022-07-08 DIAGNOSIS — R04.2 HEMOPTYSIS: ICD-10-CM

## 2022-07-08 RX ORDER — GABAPENTIN 100 MG/1
200 CAPSULE ORAL AT BEDTIME
COMMUNITY
Start: 2022-07-08 | End: 2022-07-18

## 2022-07-08 NOTE — LETTER
"    7/8/2022        RE: Jamie Valdivia  [facility]  [address]  [facility] [address]        [facility] GERIATRICS    Chief Complaint   Patient presents with     RECHECK     ER follow up     HPI:  Jamie Valdivia is a 82 year old  (1940) PMH significant for GERD with esophagitis, OA BL knees, pharyngeal dysphagia, edema, abdominal wall hernia, chronic atrial fibrillation, HTN, osteoporosis with hx of vertebral fracture, dementia, anemia, hx of COVID-19, who is being seen today for an episodic care visit at: [facility] ([facility]) [61].     Follow-up today for ER visit on June 30, 2022.  Resident was watching a baseball game when he experienced left-sided chest pain.  Treated by EMS with aspirin and nitroglycerin with slight relief.  Reported pain worse with taking deep breaths and related to anxiety.  Resident experiencing hemoptysis over the last month with recent EGD but did not explain his symptoms.  Also reporting cough and chronic leg swelling.  EKG showed atrial fibrillation with controlled rate; chronic.  Chest CT showed 4.6 cm ascending thoracic aortic aneurysm, but was negative for plumonary embolism.  Cardiothoracic surgery consulted given size of aneurysm and lack of other connective tissue disorder recommended outpatient follow-up with repeat chest CT and echocardiogram in 3 months to evaluate for progression.  Chest x-ray showed left basilar pneumonia and chest CT showed fluid-filled bronchi\" bilateral lower lobes.  Labs significant for negative troponin, ACS exclude.  ER physician noted tenderness over left chest wall and cough over last month. Hx of dysphagia and increased aspiration risk.  Symptoms most likely consistent with pneumonia discharged back to assisted living facility with Augmentin.  Resident missed ENT follow-up which was scheduled for July 1st.    Today's concerns:  Presented in good spirits today, went over to SNF to visit wife who " "recently broke her hip, but will be returning to [facility] next week.   Denies shortness of breath or significant cough.  No fever or chills.  No chest pain reported.  No recent hemoptysis.  Good appetite.  Mobility at baseline with four-wheel walker no weakness.  Mental status at baseline.  Zyprexa changed to PRN and gabapentin increased on 6/29/22.  Completed Augmentin on 7/7/22.    Allergies, and PMH/PSH reviewed in [facility] today.    REVIEW OF SYSTEMS:  Limited secondary to cognitive impairment but today pt reports and 4 point ROS including Respiratory, CV, GI and , other than that noted in the HPI,  is negative    Objective:   /67   Pulse 57   Temp 97.9  F (36.6  C)   Resp 14   Ht 1.753 m (5' 9\")   Wt 103.8 kg (228 lb 12.8 oz)   SpO2 97%   BMI 33.79 kg/m    GENERAL APPEARANCE:  Alert, in no distress, pleasant, cooperative, seated in recliner chair.  EYES: Sclera clear and conjunctiva normal, no discharge    RESP:  Non-labored breathing, palpation of chest normal, no chest wall tenderness, no respiratory distress, Lung sounds clear, patient is on room air.  CV: Rate and rhythm irregular, no murmur, no rub or gallop.  VASCULAR: 2+ edema bilateral lower extremities.   ABDOMEN:  Normal bowel sounds, soft, nontender, no grimacing or guarding with palpation  M/S:   Gait and station ambulates independently to answer door without walker  PSYCH: awake and alert, speech fluent, memory impaired, cooperative    Recent labs in [facility] reviewed by me today.    CBC RESULTS: Recent Labs   Lab Test 06/30/22 1928 05/12/22  1220 04/28/22  0655   WBC 6.1  --  5.5   RBC 4.44  --  4.17*   HGB 12.9* 12.8* 12.0*   HCT 40.4  --  37.9*   MCV 91  --  91   MCH 29.1  --  28.8   MCHC 31.9  --  31.7   RDW 14.0  --  13.8     --  199       Last Basic Metabolic Panel:  Recent Labs   Lab Test 06/30/22 1928 04/28/22  0655    139   POTASSIUM 4.5 4.4   CHLORIDE 103 104   JOSUE 8.8 8.3*   CO2 31 32   BUN 14 9   CR 0.70 0.68   GLC 97 " 80       Liver Function Studies -   Recent Labs   Lab Test 04/28/22  0655 04/07/22  0735   PROTTOTAL 7.2 7.2   ALBUMIN 3.2* 3.2*   BILITOTAL 0.4 0.4   ALKPHOS 68 69   AST 16 20   ALT 18 23       TSH   Date Value Ref Range Status   04/28/2022 2.77 0.40 - 4.00 mU/L Final   04/07/2022 3.94 0.40 - 4.00 mU/L Final     Lab Results   Component Value Date    A1C 5.5 04/28/2022    A1C 5.6 04/07/2022     EXAM: CT CHEST PULMONARY EMBOLISM W CONTRAST  LOCATION: [facility]  DATE/TIME: 6/30/2022 9:05 PM     INDICATION: Pleuritic chest pain.  COMPARISON: None.  TECHNIQUE: CT chest pulmonary angiogram during arterial phase injection of IV contrast. Multiplanar reformats and MIP reconstructions were performed. Dose reduction techniques were used.   CONTRAST: 70 mL Isovue 370     FINDINGS:  ANGIOGRAM CHEST: Pulmonary arteries are normal caliber and negative for pulmonary emboli. The ascending thoracic aorta is aneurysmal at 4.6 cm. The aorta is not well-opacified which limits evaluation for dissection. No dissection in the ascending   thoracic aorta. No CT evidence of right heart strain.     LUNGS AND PLEURA: Dependent atelectasis bilaterally. Scarring at the lung apices and bases. Fluid filled bronchi in bilateral lower lobes. No pneumothorax or pleural effusion.     MEDIASTINUM/AXILLAE: No lymph node enlargement. The heart is at the upper limits normal in size.     CORONARY ARTERY CALCIFICATION: Severe.     UPPER ABDOMEN: No acute upper abdominal abnormality.     MUSCULOSKELETAL: Spinal rods. Degenerative disease and prominent kyphosis in the thoracic spine. Left shoulder arthroplasty.                                                               IMPRESSION:  1.  There is no pulmonary embolus.  2.  4.6 cm ascending thoracic aortic aneurysm.  3.  Fluid-filled bronchi in bilateral lower lobes.    Assessment/Plan:  (J18.9) Pneumonia of both lower lobes due to infectious organism  (primary encounter  diagnosis)  (R04.2) Hemoptysis  Comment: No further chest pain or recent coughing up of blood.  CT chest negative for PE.  EGD negative for cause of hemoptysis    Plan:   - Complete course of Augmentin  - Continue guaifenesin for supportive care  -  PT/OT/lymph OT  - Work on deep breathing   - Consider repeat CXR in mid-August  - Daughter Viola to reschedule ENT    (I71.2) Thoracic aortic aneurysm without rupture (H) - 6/30/22 4.6 cm  Comment: New finding in ER  Plan:   - Reviewed plan for CT and echo in 3 months (~ 10/2022) with pt/daughter, they don't think they need referral for vascular follow-up, will let NP know if run into difficulty scheduling follow-up care     (R60.0) Lower extremity edema  Comment: Chronic  Plan:   - Lymph OT supposed to come this week, but didn't. Daughter Coral will call home care to follow-up    Orders:  No new orders    Plan to follow-up 7/26/22.    Electronically signed by: MARICHUY Basilio CNP             Sincerely,        MARICHUY Basilio CNP

## 2022-07-15 ENCOUNTER — TELEPHONE (OUTPATIENT)
Dept: GERIATRICS | Facility: CLINIC | Age: 82
End: 2022-07-15

## 2022-07-15 NOTE — TELEPHONE ENCOUNTER
[place] [facility] NON-EMERGENT  ENCOUNTER    Jamie Valdivia is a 82 year old  (1940), phone call received today regarding: [facility]    PT EOW x 4 weeks.      Electronically signed by:   Rosana Garcia RN

## 2022-07-17 DIAGNOSIS — E11.9 TYPE 2 DIABETES MELLITUS WITHOUT COMPLICATION, UNSPECIFIED WHETHER LONG TERM INSULIN USE (H): Primary | ICD-10-CM

## 2022-07-18 RX ORDER — GABAPENTIN 100 MG/1
CAPSULE ORAL
Qty: 180 CAPSULE | Refills: 97 | Status: SHIPPED | OUTPATIENT
Start: 2022-07-18 | End: 2023-01-27

## 2022-07-19 ENCOUNTER — PATIENT OUTREACH (OUTPATIENT)
Dept: GERIATRIC MEDICINE | Facility: CLINIC | Age: 82
End: 2022-07-19

## 2022-07-19 NOTE — PROGRESS NOTES
[facility] Care Coordination Contact    Received request from member's daughter to have more socialization options for member to get out of room more as staff are short at facility and not able to get outside out of room much. CC updated on duplication of services but updated CHW for volunteer options if available. CHW will reach out to facility to follow up and update CC if options found     Left a message with nursing staff at faciltiy to determine if there in on-site psych that member can follow up with. (family reported that NP mentioned talking to someone at a visit)  Yue Goss RN  [facility]  P: [phone]  Fax: [phone]

## 2022-07-20 ENCOUNTER — TELEPHONE (OUTPATIENT)
Dept: GERIATRICS | Facility: CLINIC | Age: 82
End: 2022-07-20

## 2022-07-20 NOTE — TELEPHONE ENCOUNTER
[facility] Triage Nurse Telephone Encounter    Provider: MARICHUY Basilio CNP  Facility: [facility]  Facility Type:  AL    Caller: Lakia  Call Back Number: [phone]    Allergies:    Allergies   Allergen Reactions     Ativan [Lorazepam]         Reason for call: Facility nurse asking for Nystatin powder to be scheduled due to rash under breast skin fold    Verbal Order/Direction given by Provider:   To rash under right breast skin fold twice daily:   Clean area and dry well   Apply Nystatin Powder 100,000 units   Once rash has healed, return Nystatin to PRN.     Provider giving Order:  MARICHYU Brooks CNP     Verbal Order given to: Lakia Garcia RN

## 2022-07-25 VITALS
SYSTOLIC BLOOD PRESSURE: 125 MMHG | HEART RATE: 56 BPM | HEIGHT: 69 IN | TEMPERATURE: 98.6 F | RESPIRATION RATE: 20 BRPM | DIASTOLIC BLOOD PRESSURE: 69 MMHG | WEIGHT: 234 LBS | BODY MASS INDEX: 34.66 KG/M2

## 2022-07-25 NOTE — PROGRESS NOTES
[facility] Care Coordination Contact    Received update from daughter- Coral that member is being followed by on site Psych- Dr. Genna Wick. Family has been in touch with provider as well. No additional follow ups needed at this time.   Yue Goss RN  [facility]  P: [phone]  Fax: [phone]

## 2022-07-25 NOTE — PROGRESS NOTES
"[place] [facility]IATRIC SERVICES  [place] Medical Record Number:  [id number]  Place of Service where encounter took place:  [facility] ([facility]) [61]  Chief Complaint   Patient presents with     Nursing Home Acute       HPI:    Jamie Valdivia  is a 82 year old (1940) PMH significant for GERD with esophagitis, OA BL knees, pharyngeal dysphagia, edema, abdominal wall hernia, chronic atrial fibrillation, HTN, osteoporosis with hx of vertebral fracture, dementia, anemia, hx of COVID-19, who is being seen today for an episodic care visit.      HPI information obtained from: facility chart records, facility staff, patient report and [facility] chart review.     Today's concern is:  Follow-up today at family request to review mood after dose adjustment of medications in late June.  Zyprexa changed to PRN and gabapentin increased on 6/29/22.  Completed Augmentin on 7/7/22 for possible pneumonia.    Joint visit today with LUNA PharmD.    Of his mood, states: \"Okay is about all you can say.\"  Tapered off Zyprexa, no hallucinations, no use of PRN.   Still feeling really sedated the next day.  Getting more sleep at night.   Historically poor sleeper.   Current on Mirtazapine.    Anxiety about not being able to do everything he needs to do.  Chest pressure chronically worse with anxiety.  \"When I feel the stress it makes the chest pain worse.\"  Especially when not able to solve problems like he used.    \"I take so many pills!\"  Would be open to stopping MVI.  Prone to gagging and choking, causes anxiety.  Reviewed EGD and indication for PPI given BE.    No recurrent chest pain episodes.  No SOB.  No cough.  No fever/chills.    Past Medical and Surgical History reviewed in Epic today.    MEDICATIONS:  Current Outpatient Medications   Medication Sig Dispense Refill     acetaminophen (TYLENOL) 500 MG tablet Take 1,000 mg by mouth every 6 hours as needed for mild pain       alum & mag hydroxide-simethicone (MAALOX " "MAX) 400-400-40 MG/5ML SUSP suspension Take 30 mLs by mouth every 6 hours as needed for indigestion 355 mL 3     citalopram (CELEXA) 10 MG tablet Take 0.5 tablets (5 mg) by mouth daily 15 tablet 98     DEEP SEA NASAL SPRAY 0.65 % nasal spray INHALE 2 SPRAYS IN EACH NOSTRIL ONCE DAILY 44 mL 97     gabapentin (NEURONTIN) 100 MG capsule TAKE TWO CAPSULES (200MG) BY MOUTH EVERY NIGHT AT BEDTIME 180 capsule 97     guaiFENesin (ROBITUSSIN) 100 MG/5ML liquid GIVE 10ML (200 MG) BY MOUTH TWICE DAILY;AND TWICE DAILY AS NEEDED FOR COUGH 473 mL 97     hyoscyamine (LEVSIN) 0.125 MG tablet Take 1 tablet (125 mcg) by mouth 4 times daily as needed for cramping 30 tablet 11     menthol-zinc oxide (CALMOSEPTINE) 0.44-20.6 % OINT ointment Apply 1 g topically 2 times daily. May also apply 1 g 2 times daily as needed for skin protection. 113 g 98     mirtazapine (REMERON) 15 MG tablet TAKE 1 TABLET BY MOUTH AT BEDTIME 90 tablet 3     Multiple Vitamins-Minerals (CEROVITE SENIOR) TABS Take 1 tablet by mouth daily 90 tablet 3     nystatin (MYCOSTATIN) 429558 UNIT/GM external powder Apply topically 2 times daily And three times daily as needed       omeprazole (PRILOSEC) 40 MG DR capsule TAKE 1 CAPSULE BY MOUTH TWICE DAILY 180 capsule 3     polyethylene glycol (GAVILAX) 17 GM/Dose powder MIX 2 CAPFULS IN 8OZ OF ORANGE JUICE  IN THE MORNING;AND MAY MIX 2 CAPFULS ONCE DAILY AS NEEDED FOR CONSTIPATION. MIX IN FRONT OF PT. HOLD FOR LOOSE STOOL. OK TO GIVE 1 CAPFUL IF RESIDENT REFUSES 2 CAPFULS. 507 g 3     polyvinyl alcohol-povidone PF (REFRESH) 1.4-0.6 % ophthalmic solution 1 drop every 4 hours as needed for irritation       amoxicillin-clavulanate (AUGMENTIN) 875-125 MG tablet Take 1 tablet by mouth 2 times daily       REVIEW OF SYSTEMS:  Limited secondary to cognitive impairment but today pt reports history as per HPI.     Objective:  /69   Pulse 56   Temp 98.6  F (37  C)   Resp 20   Ht 1.753 m (5' 9\")   Wt 106.1 kg (234 lb)   " BMI 34.56 kg/m    Exam:  GENERAL APPEARANCE:  Alert, in no distress, pleasant, cooperative, seated in recliner chair.  EYES: Sclera clear and conjunctiva normal, no discharge    EARS: Grossly decreased hearing, better with ear, must speak low, loudly, slowly to communicate  RESP:  Non-labored breathing, no respiratory distress, Lung sounds clear, patient is on room air.  CV: Rate and rhythm irregular, no murmur, no rub or gallop.  VASCULAR: 2+ edema bilateral lower extremities.   ABDOMEN:  Normal bowel sounds, soft, nontender, no grimacing or guarding with palpation  PSYCH: awake and alert, speech fluent, memory impaired, cooperative    Labs:   Recent labs in [facility] reviewed by me today.    CBC RESULTS: Recent Labs   Lab Test 06/30/22 1928 05/12/22  1220 04/28/22  0655   WBC 6.1  --  5.5   RBC 4.44  --  4.17*   HGB 12.9* 12.8* 12.0*   HCT 40.4  --  37.9*   MCV 91  --  91   MCH 29.1  --  28.8   MCHC 31.9  --  31.7   RDW 14.0  --  13.8     --  199       Last Basic Metabolic Panel:  Recent Labs   Lab Test 06/30/22 1928 04/28/22  0655    139   POTASSIUM 4.5 4.4   CHLORIDE 103 104   JOSUE 8.8 8.3*   CO2 31 32   BUN 14 9   CR 0.70 0.68   GLC 97 80       Liver Function Studies -   Recent Labs   Lab Test 04/28/22  0655 04/07/22  0735   PROTTOTAL 7.2 7.2   ALBUMIN 3.2* 3.2*   BILITOTAL 0.4 0.4   ALKPHOS 68 69   AST 16 20   ALT 18 23       TSH   Date Value Ref Range Status   04/28/2022 2.77 0.40 - 4.00 mU/L Final   04/07/2022 3.94 0.40 - 4.00 mU/L Final     Lab Results   Component Value Date    A1C 5.5 04/28/2022    A1C 5.6 04/07/2022         ASSESSMENT/PLAN:  (F41.9,  F32.A) Anxiety and depression  (primary encounter diagnosis)  (R44.3) Hallucinations    (Z87.898) History of alcohol use disorder  Comment: Chronic, no longer has access to ETOH, no longer hallucinating (possible acute delirium), difficult transition to memory care unit. Biggest concern is daytime sedation and anxiety episodes. Also still wanting to  drink alcohol, but does not have access.  Plan:  - Discontinue PRN Zyprexa  - Add Celexa 5 mg daily  - Look to wean mirtazapine as next step   - Continue gabapentin 200 mg PO q HS  - Discontinue MVI, as pill burden adds to anxiety, check vitamin D level with next labs  - MTM PharmD following  - ACP following for onsite counseling, working on non-pharm anxiety strategies   - Offered cardiology referral due to episodic chest pain, but pt/daughter declined.   - Continues to benefit from supportive care in [facility] detention setting    (J18.9) Pneumonia   (R04.2) Hemoptysis  Comment: No further chest pain or recent coughing up of blood, completed course of Augmentin  CT chest negative for PE. EGD negative for cause of hemoptysis, but + BE.    Plan:   - ENT follow-up due t to hemoptysis   - Continue guaifenesin for intermittent cough  - May need esophogram   -  PT/OT/lymph OT following, re-consult speech?  - Consider repeat CXR in mid-August    (I71.2) Thoracic aortic aneurysm without rupture (H) - 6/30/22 4.6 cm  Comment: New finding in ER, reviewed this again today  Plan:   - Plan for CT and echo in 3 months (~ 10/2022)     (R60.0) Lower extremity edema  Comment: Chronic  Plan:   - Lymph OT ordered Velcro wraps, await arrival    - Continue elevation in mechanical recliner     Orders:   See patient instructions     Electronically signed by:  MARICHUY Basilio CNP

## 2022-07-26 ENCOUNTER — ASSISTED LIVING VISIT (OUTPATIENT)
Dept: GERIATRICS | Facility: CLINIC | Age: 82
End: 2022-07-26
Payer: COMMERCIAL

## 2022-07-26 ENCOUNTER — OFFICE VISIT (OUTPATIENT)
Dept: PHARMACY | Facility: CLINIC | Age: 82
End: 2022-07-26
Payer: COMMERCIAL

## 2022-07-26 DIAGNOSIS — K21.00 GASTROESOPHAGEAL REFLUX DISEASE WITH ESOPHAGITIS, UNSPECIFIED WHETHER HEMORRHAGE: ICD-10-CM

## 2022-07-26 DIAGNOSIS — R04.2 HEMOPTYSIS: ICD-10-CM

## 2022-07-26 DIAGNOSIS — E63.9 NUTRITIONAL DEFICIENCY: ICD-10-CM

## 2022-07-26 DIAGNOSIS — F41.9 ANXIETY AND DEPRESSION: Primary | ICD-10-CM

## 2022-07-26 DIAGNOSIS — R44.3 HALLUCINATIONS: ICD-10-CM

## 2022-07-26 DIAGNOSIS — F41.8 DEPRESSION WITH ANXIETY: Primary | ICD-10-CM

## 2022-07-26 DIAGNOSIS — J18.9 PNEUMONIA DUE TO INFECTIOUS ORGANISM, UNSPECIFIED LATERALITY, UNSPECIFIED PART OF LUNG: ICD-10-CM

## 2022-07-26 DIAGNOSIS — R52 PAIN: ICD-10-CM

## 2022-07-26 DIAGNOSIS — G62.9 NEUROPATHY: ICD-10-CM

## 2022-07-26 DIAGNOSIS — F10.27 DEMENTIA ASSOCIATED WITH ALCOHOLISM WITH BEHAVIORAL DISTURBANCE (H): ICD-10-CM

## 2022-07-26 DIAGNOSIS — F10.10 ALCOHOL ABUSE: ICD-10-CM

## 2022-07-26 DIAGNOSIS — F32.A ANXIETY AND DEPRESSION: Primary | ICD-10-CM

## 2022-07-26 DIAGNOSIS — R60.0 LOWER EXTREMITY EDEMA: ICD-10-CM

## 2022-07-26 DIAGNOSIS — Z87.898 HISTORY OF ALCOHOL USE DISORDER: ICD-10-CM

## 2022-07-26 DIAGNOSIS — I25.10 ATHEROSCLEROSIS OF NATIVE CORONARY ARTERY OF NATIVE HEART, UNSPECIFIED WHETHER ANGINA PRESENT: ICD-10-CM

## 2022-07-26 DIAGNOSIS — I48.20 CHRONIC ATRIAL FIBRILLATION (H): ICD-10-CM

## 2022-07-26 DIAGNOSIS — K22.70 BARRETT'S ESOPHAGUS WITHOUT DYSPLASIA: ICD-10-CM

## 2022-07-26 DIAGNOSIS — I10 BENIGN ESSENTIAL HYPERTENSION: ICD-10-CM

## 2022-07-26 PROCEDURE — 99607 MTMS BY PHARM ADDL 15 MIN: CPT | Performed by: PHARMACIST

## 2022-07-26 PROCEDURE — 99606 MTMS BY PHARM EST 15 MIN: CPT | Performed by: PHARMACIST

## 2022-07-26 RX ORDER — OLANZAPINE 2.5 MG/1
2.5 TABLET, FILM COATED ORAL DAILY PRN
COMMUNITY
Start: 2022-07-26 | End: 2022-08-05

## 2022-07-26 NOTE — PROGRESS NOTES
Medication Therapy Management (MTM) Encounter    ASSESSMENT:                            Medication Adherence/Access: Patient noting he would like to cut back on meds where able.    Afib, Hypertension, CAD:  Stable.  Not on anticoagulation due to risk likely > benefit as noted below.  BPs at goal <150/90mmHg.  This goal reasonable in this elderly patient at risk of dizziness, hypotension, orthostasis, falls, tissue/cerebral hypoperfusion.  Due to high cardiac risk, statin would be appropriate, however, given patient's age, cognition, life expectancy, his preference to limit meds, reasonable to not initiate a statin.     GERD, Vickers's esophagus: Due to history of Vickers's esophagus, long-term use of PPI appropriate.        Dementia with behavioral disturbance, Depression with anxiety, h/o alcohol abuse: Patient has tapered off of scheduled olanzapine successfully.  May benefit from initiation of citalopram, with plan to d'c mirtazapine in near future.  Citalopram may be more effective for anxiety, and mirtazapine may certainly be contributing to daytime fatigue.  He is somewhat hesitant to start citalopram, but with discussion that he would come off of mirtazapine shortly after starting this, he is accepting.  Also may benefit from changing gabapentin to as needed use for anxiety, and stop prn olanzapine.     Neuropathy, Pain: Stable.    Supplements:  May consider d'c MVI since patient preference to reduce pill load and has well rounded diet.  May be of benefit, however, to consider addition of monthly vitamin D due to likely low in vitamin D without supplementation, and for weakness/fall/fracture prevention, cognition, mood.      PLAN:                            1.  Provider may consider addition of citalopram 5mg daily.  In 2-4 weeks, consider d'c mirtazapine.  2.  Provider may consider d'c prn olanzapine.  3.  Provider may consider d'c scheduled gabapentin 200mg at bedtime & begin gabapentin 100mg twice daily as  "needed for anxiety.  4.  Provider may consider d'c MVI, and initiate vitamin D 50,000u once monthly.    Follow-up: 4-6 weeks, sooner if necessary          SUBJECTIVE/OBJECTIVE:                          Jamie Valdivia is an 82 year old male seen for a follow-up visit   He was referred to me from Sylvia Stein NP.  This was a co-visit with Sylvia.    Reason for visit: follow-up from 6/9/22 MTM visit  Had ED visit for thoracic aortic aneurysm without rupture on 6/30/22    Allergies/ADRs: Reviewed in chart  Past Medical History: Reviewed in chart  Tobacco: He reports that he has never smoked. He has never used smokeless tobacco.  Alcohol: history of alcohol dependence, alcoholic hepatitis.  Assistive Devices: walker for ambulation.  h/o falls, weakness    Medication Adherence/Access: Patient has assistance with medication administration: assisted living.     Afib, Hypertension, CAD: Patient is currently not on anticoagulation.  NP notes this is due to risk > benefit given history of hemoptysis, gi bleed, as well as history of alcohol abuse and frequent falls.  Not on medication for rate control or blood pressure either.  Patient reports a history of MI.  As noted above, recent ED visit for thoracic aortic aneurysm.  He notes chest pain \"is a regular thing for me.\"  Describes this as pressure on left side of chest.  When asked if anything seems to worsen this pain, he states \"not being able to solve problems like I used to.\"  Denies palpitations, shortness of breath.  Hasn't been as regular with walking, and notes it has impacted stamina.    BP Readings from Last 3 Encounters:   07/25/22 125/69   07/07/22 138/67   06/30/22 (!) 167/78     GERD, Vickers's esophagus: with history of pharyngeal dysphagia.  Current medications include: Prilosec (omeprazole) 40mg twice daily, Maalox as needed. The patient does have a history of GI bleed.  Has had hemoptysis, but notes no longer having this issue.  Denies reflux symptoms.  GI has " "recommended continued omeprazole long term.    Dementia with behavioral disturbance, Depression with anxiety, h/o alcohol abuse: Current medications:  Olanzapine 2.5mg daily as needed (this has been reduced from 5mg daily at 5pm since last visit), mirtazapine 15mg at bedtime, gabapentin 200mg every night at bedtime (started following our last visit).  Patient notes today he feels alcohol helps with his anxiety; not currently using alcohol.  H/o multiple hospitalizations per chart review due to unsafe living situation.  On scheduled olanzapine previously due to hallucinations, delusions.  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  Thinks he is getting more sleep ; doesn't feel needs anything for sleep.  Still very tired.  Notes balance is off, dizziness more frequently now, but cannot pinpoint when this occurs.    Neuropathy, Pain: Current medications include acetaminophen 1000mg every 6hr as needed, gabapentin 200mg at bedtime.   Notes some right knee pain, history of shoulder and hip replacement.  Exercising helps.  Tylenol helpful when used.    Supplements:  Current medications: MVI once daily.  Eats meals offered at facility, and notes gaining weight.  Feels appetite is good; \"getting plenty of food.\"  \"I could exercise more.\"   Was previously on vitamin D3 1000u daily, but in review of chart, appears this was stopped due to not covered by insurance.      ----------------      I spent 50 minutes with this patient today.   A copy of the visit note was provided to the patient's provider(s).    The patient declined a summary of these recommendations.     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)    Current Medication: " OLANZapine (ZYPREXA) 2.5 MG tablet   Rationale: Undesirable effect   Recommendation: Discontinue Medication         Depression with anxiety    Current Medication: gabapentin (NEURONTIN) 100 MG capsule   Rationale: Undesirable effect   Recommendation: Decrease Frequency          Current Medication: mirtazapine (REMERON) 15 MG tablet   Rationale: More effective medication available   Recommendation: Change Medication   Note: consider addition of citalopram 5mg daily, and in 2-4wk, consider d'c mirtazapine         Nutritional deficiency    Current Medication: Multiple Vitamins-Minerals (CEROVITE SENIOR) TABS   Rationale: Nonmedication therapy more appropriate   Recommendation: Discontinue Medication          Rationale: Preventive therapy   Recommendation: Start Medication - Vitamin D (Ergocalciferol) 04239 units Caps - Take one capsule by mouth monthly

## 2022-07-28 ENCOUNTER — PATIENT OUTREACH (OUTPATIENT)
Dept: GERIATRIC MEDICINE | Facility: CLINIC | Age: 82
End: 2022-07-28

## 2022-07-28 ENCOUNTER — DOCUMENTATION ONLY (OUTPATIENT)
Dept: GERIATRICS | Facility: CLINIC | Age: 82
End: 2022-07-28

## 2022-07-28 DIAGNOSIS — R60.0 LOWER EXTREMITY EDEMA: Primary | ICD-10-CM

## 2022-07-28 NOTE — PATIENT INSTRUCTIONS
"Recommendations from today's MTM visit:                                                         1.  Provider may consider addition of citalopram 5mg daily.  In 2-4 weeks, consider stopping mirtazapine.  2.  Provider may consider stopping as needed olanzapine.  3.  Provider may consider stopping scheduled gabapentin 200mg at bedtime & begin gabapentin 100mg twice daily as needed for anxiety.  4.  Provider may consider stopping multivitamin, and initiate vitamin D 50,000u once monthly.    Follow-up: 4-6 weeks, sooner if necessary    It was great speaking with you today.  I value your experience and would be very thankful for your time in providing feedback in our clinic survey. In the next few days, you may receive an email or text message from [employer] with a link to a survey related to your  clinical pharmacist.\"     To schedule another MTM appointment, please call me directly or you may call the MTM scheduling line at [phone] or toll-free at [phone].     My Clinical Pharmacist's contact information:                                                      Please feel free to contact me with any questions or concerns you have.      Sindy Hanley, Pharm.D.,[facility]  Board Certified Geriatric Pharmacist  Medication Therapy Management Pharmacist  [phone]      "

## 2022-07-28 NOTE — PROGRESS NOTES
Jamie Valdivia  1940  ORDERS:  BL Velcro Closure Compression Garments for BL LE edema (dx R60.0), quantity two pairs  Electronically signed by:   MARICHUY Basilio CNP  07/28/22 2:04 PM

## 2022-07-29 RX ORDER — CITALOPRAM HYDROBROMIDE 10 MG/1
5 TABLET ORAL DAILY
Qty: 15 TABLET | Refills: 98 | Status: SHIPPED | OUTPATIENT
Start: 2022-07-29 | End: 2022-08-23

## 2022-07-29 NOTE — PROGRESS NOTES
[facility] Care Coordination Contact    Received update from member's OT/lymphedema therapist working with member- ordering compression wraps for member use:  Compreflex Transition velcro closure compression garments size Large/Tall qty 2  From [employer] store:  [email or web address]      2 pairs ordered at this time.   Yue Goss RN  [facility]  P: [phone]  Fax: [phone]

## 2022-08-02 ENCOUNTER — TELEPHONE (OUTPATIENT)
Dept: GERIATRICS | Facility: CLINIC | Age: 82
End: 2022-08-02

## 2022-08-02 NOTE — TELEPHONE ENCOUNTER
Jamie Valdivia  1940  ORDERS:  - Chest x-ray AP and lateral dx cough, wheeze r/o infection  Electronically signed by:   MARICHUY Basilio CNP  08/02/22 4:43 PM

## 2022-08-03 ENCOUNTER — TRANSFERRED RECORDS (OUTPATIENT)
Dept: HEALTH INFORMATION MANAGEMENT | Facility: CLINIC | Age: 82
End: 2022-08-03

## 2022-08-04 ENCOUNTER — TELEPHONE (OUTPATIENT)
Dept: GERIATRICS | Facility: CLINIC | Age: 82
End: 2022-08-04

## 2022-08-04 NOTE — TELEPHONE ENCOUNTER
[facility] Triage Nurse Telephone Encounter    Provider: MARICHUY Basilio CNP  Facility: [facility]  Facility Type:  AL    Caller: Megan   Call Back Number: [phone]    Allergies:    Allergies   Allergen Reactions     Ativan [Lorazepam]         Reason for call:       Verbal Order/Direction given by Provider: Augmentin 875mg po bid x7days.     Provider giving Order:  Tabatha Quintana MD    Verbal Order given to: Lakia De RN

## 2022-08-05 ENCOUNTER — OFFICE VISIT (OUTPATIENT)
Dept: UROLOGY | Facility: CLINIC | Age: 82
End: 2022-08-05
Attending: NURSE PRACTITIONER
Payer: COMMERCIAL

## 2022-08-05 VITALS
WEIGHT: 228 LBS | HEIGHT: 69 IN | BODY MASS INDEX: 33.77 KG/M2 | SYSTOLIC BLOOD PRESSURE: 116 MMHG | DIASTOLIC BLOOD PRESSURE: 62 MMHG

## 2022-08-05 DIAGNOSIS — R97.20 ELEVATED PROSTATE SPECIFIC ANTIGEN (PSA): ICD-10-CM

## 2022-08-05 DIAGNOSIS — N39.41 URGE INCONTINENCE: Primary | ICD-10-CM

## 2022-08-05 DIAGNOSIS — N40.2 PROSTATE NODULE: ICD-10-CM

## 2022-08-05 LAB
ALBUMIN UR-MCNC: NEGATIVE MG/DL
APPEARANCE UR: CLEAR
BILIRUB UR QL STRIP: NEGATIVE
COLOR UR AUTO: YELLOW
GLUCOSE UR STRIP-MCNC: NEGATIVE MG/DL
HGB UR QL STRIP: NEGATIVE
KETONES UR STRIP-MCNC: NEGATIVE MG/DL
LEUKOCYTE ESTERASE UR QL STRIP: ABNORMAL
NITRATE UR QL: NEGATIVE
PH UR STRIP: 5.5 [PH] (ref 5–7)
RESIDUAL VOLUME (RV) (EXTERNAL): 13
SP GR UR STRIP: 1.02 (ref 1–1.03)
UROBILINOGEN UR STRIP-ACNC: 0.2 E.U./DL

## 2022-08-05 PROCEDURE — 99205 OFFICE O/P NEW HI 60 MIN: CPT | Mod: 25 | Performed by: STUDENT IN AN ORGANIZED HEALTH CARE EDUCATION/TRAINING PROGRAM

## 2022-08-05 PROCEDURE — 51798 US URINE CAPACITY MEASURE: CPT | Performed by: STUDENT IN AN ORGANIZED HEALTH CARE EDUCATION/TRAINING PROGRAM

## 2022-08-05 PROCEDURE — 81003 URINALYSIS AUTO W/O SCOPE: CPT | Mod: QW | Performed by: STUDENT IN AN ORGANIZED HEALTH CARE EDUCATION/TRAINING PROGRAM

## 2022-08-05 ASSESSMENT — PAIN SCALES - GENERAL: PAINLEVEL: NO PAIN (0)

## 2022-08-05 NOTE — PROGRESS NOTES
[facility]  Main Office: [address]  [address]  [place] [address]       CHIEF COMPLAINT:  Elevated PSA, LUTS    Here with his daughter    HISTORY:   83 yo M with elevated PSA, BPH LUTS    Urinary urgency and urge incontinence, goes through several depends a day, most bothersome urinary symptom. Also has weak stream/slow stream.    Apparently he was supposed to see a urologist in [place], [place] last year for LUTS and elevated PSA but did not see; a PSA from 3/12/2021 was elevated to 13.45 ng/ml    AUA symptom score 3-3-2-5-3-2-2 = 22 severe QOL unhappy     No known first degree relatives with prostate cancer    Has not had a prostate biopsy before    Has not seen a urologist recently.    He is on hyosycamine prn apparently for abdominal pain/constipation. He does not see it impact his urinary symptoms    PAST MEDICAL HISTORY:   Past Medical History:   Diagnosis Date     Age-related osteoporosis without current pathological fracture 03/30/2022     Alcohol abuse 11/19/2017     Alcohol dependence with withdrawal (H)      Alcoholism (H)      Back pain      Backache 12/19/2018     CAD (coronary artery disease)      Chronic a-fib (H)      Chronic alcohol use 06/11/2015    Formatting of this note might be different from the original. 2/26/2019: serious fall at home with multiple vertebral fractures.  BAL 0.414% on admission.  Denies that he drinks much. 12/18/2018: hospitalized for fall due to alcohol intoxication.  BAL 0.34% on admission. 9/16/2018: hospitalized for injuries from fall due to alcohol intoxication.  BAL 0.34% on admission     Chronic atrial fibrillation (H) 07/15/2016    Formatting of this note might be different from the original. 2/2019: Multiple falls so started on aspirin only.  Then aspirin stopped due to GI bleed.     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 2/2/21     HTN (hypertension)      Impaired gait and mobility 03/14/2019     Mild cognitive impairment 08/12/2019    Formatting of this note might be different from the original. NON-AMNESTIC TYPE     Mild TBI (traumatic brain injury) (H) 03/14/2019     Mumps      Obesity (BMI 30-39.9) 09/03/2015     Osteoarthritis      Osteoarthritis of both knees 05/13/2015     Osteoporosis      Other idiopathic peripheral autonomic neuropathy 03/30/2022     Other insomnia 03/14/2019     Other seizures (H) 03/30/2022     Palpitations      Peripheral neuropathy      Pharyngeal dysphagia 05/13/2015    Formatting of this note might be different from the original. Likely secondary to GERD with esophagitis, on PPI and H2 blocker with notable improvement, EGD 10/5/15.     Physical deconditioning 03/14/2019     Recurrent major depressive disorder (H) 03/30/2022     Rhabdomyolysis      Thrombocytopenia (H)      Urination disorder      Weakness 04/27/2020       PAST SURGICAL HISTORY:   Past Surgical History:   Procedure Laterality Date     ESOPHAGOSCOPY, GASTROSCOPY, DUODENOSCOPY (EGD), COMBINED N/A 06/01/2022    Procedure: ESOPHAGOGASTRODUODENOSCOPY (EGD) mngi with biopsies using jumbo cold biopsy forceps;  Surgeon: David Springer MD;  Location:  GI     left hip replacement  2010     left shoulder replacement  2016     ORTHOPEDIC SURGERY       SPINE SURGERY      done in [place]     TONSILLECTOMY         FAMILY HISTORY:   Family History   Problem Relation Age of Onset     Osteoporosis Mother      Prostate Cancer Paternal Grandfather      Colon Cancer No family hx of        SOCIAL HISTORY:   Social History      Tobacco Use     Smoking status: Never Smoker     Smokeless tobacco: Never Used   Substance Use Topics     Alcohol use: Not Currently     Comment: not since December 2021          Allergies   Allergen Reactions     Ativan [Lorazepam]          Current Outpatient Medications:      acetaminophen (TYLENOL) 500 MG tablet, Take 1,000 mg by mouth every 6 hours as needed for mild pain, Disp: , Rfl:      alum & mag hydroxide-simethicone (MAALOX MAX) 400-400-40 MG/5ML SUSP suspension, Take 30 mLs by mouth every 6 hours as needed for indigestion, Disp: 355 mL, Rfl: 3     amoxicillin-clavulanate (AUGMENTIN) 875-125 MG tablet, Take 1 tablet by mouth 2 times daily, Disp: , Rfl:      citalopram (CELEXA) 10 MG tablet, Take 0.5 tablets (5 mg) by mouth daily, Disp: 15 tablet, Rfl: 98     DEEP SEA NASAL SPRAY 0.65 % nasal spray, INHALE 2 SPRAYS IN EACH NOSTRIL ONCE DAILY, Disp: 44 mL, Rfl: 97     gabapentin (NEURONTIN) 100 MG capsule, TAKE TWO CAPSULES (200MG) BY MOUTH EVERY NIGHT AT BEDTIME, Disp: 180 capsule, Rfl: 97     guaiFENesin (ROBITUSSIN) 100 MG/5ML liquid, GIVE 10ML (200 MG) BY MOUTH TWICE DAILY;AND TWICE DAILY AS NEEDED FOR COUGH, Disp: 473 mL, Rfl: 97     hyoscyamine (LEVSIN) 0.125 MG tablet, Take 1 tablet (125 mcg) by mouth 4 times daily as needed for cramping, Disp: 30 tablet, Rfl: 11     menthol-zinc oxide (CALMOSEPTINE) 0.44-20.6 % OINT ointment, Apply 1 g topically 2 times daily. May also apply 1 g 2 times daily as needed for skin protection., Disp: 113 g, Rfl: 98     mirtazapine (REMERON) 15 MG tablet, TAKE 1 TABLET BY MOUTH AT BEDTIME, Disp: 90 tablet, Rfl: 3     Multiple Vitamins-Minerals (CEROVITE SENIOR) TABS, Take 1 tablet by mouth daily, Disp: 90 tablet, Rfl: 3     nystatin (MYCOSTATIN) 953191 UNIT/GM external powder, Apply topically 2 times daily And three times daily as needed, Disp: , Rfl:      omeprazole (PRILOSEC) 40 MG DR capsule, TAKE 1 CAPSULE BY MOUTH TWICE DAILY, Disp: 180 capsule, Rfl: 3      "polyethylene glycol (GAVILAX) 17 GM/Dose powder, MIX 2 CAPFULS IN 8OZ OF ORANGE JUICE  IN THE MORNING;AND MAY MIX 2 CAPFULS ONCE DAILY AS NEEDED FOR CONSTIPATION. MIX IN FRONT OF PT. HOLD FOR LOOSE STOOL. OK TO GIVE 1 CAPFUL IF RESIDENT REFUSES 2 CAPFULS., Disp: 507 g, Rfl: 3     polyvinyl alcohol-povidone PF (REFRESH) 1.4-0.6 % ophthalmic solution, 1 drop every 4 hours as needed for irritation, Disp: , Rfl:      OLANZapine (ZYPREXA) 2.5 MG tablet, Take 1 tablet (2.5 mg) by mouth daily as needed (hallucinations) (Patient not taking: Reported on 8/5/2022), Disp: , Rfl:     Review Of Systems:  Skin: No rash, pruritis, or skin pigmentation  Eyes: No changes in vision  Ears/Nose/Throat: No changes in hearing, no nosebleeds  Respiratory: No shortness of breath, dyspnea on exertion, cough, or hemoptysis  Cardiovascular: No chest pain or palpitations  Gastrointestinal: + constipation  Genitourinary: see HPI  Musculoskeletal: No pain or swelling of joints, normal range of motion  Neurologic: No weakness or tremors  Psychiatric: No recent changes in memory or mood  Hematologic/Lymphatic/Immunologic: No easy bruising or enlarged lymph nodes  Endocrine: No weight gain or loss      PHYSICAL EXAM:    /62   Ht 1.753 m (5' 9\")   Wt 103.4 kg (228 lb)   BMI 33.67 kg/m    General appearance: In NAD, conversant  HEENT: Normocephalic and atraumatic, anicteric sclera  Cardiovascular: Not examined  Respiratory: normal, non-labored breathing  Gastrointestinal: negative, Abdomen soft, non-tender, and non-distended.   Musculoskeletal: Not Examined  Peripheral Vascular/extremity: No peripheral edema  Skin: Normal temperature, turgor, and texture. No rash  Psychiatric: Appropriate affect, alert and oriented to person, place, and time    Digital Rectal Exam: 60 gram prostate, very firm nodule on right prostate mid    Cystoscopy: Not done      PSA:   Component      Latest Ref Rng & Units 4/7/2022   PSA      0.00 - 4.00 ug/L 11.30 (H) " "      UA RESULTS:  Recent Labs   Lab Test 08/05/22  0912   COLOR Yellow   APPEARANCE Clear   URINEGLC Negative   URINEBILI Negative   URINEKETONE Negative   SG 1.020   UBLD Negative   URINEPH 5.5   PROTEIN Negative   UROBILINOGEN 0.2   NITRITE Negative   LEUKEST Trace*       Bladder Scan:  PVR 13 ml    Other Labs:      Imaging Studies: None      CLINICAL IMPRESSION:   81 yo M with elevated PSA, BPH LUTS, urge urinary incontinence    He is emptying his bladder well based on PVR 13 ml    Patient is unclear about what his \"H\" pill (hyosycamine) is for. He seems to think that with miralax it helps with his constipation. I explained to him that this is an anticholinergic medication and should actually cause constipation to be worse. He thinks that since it helps with his stomach cramping that he should continue it. Anticholinergics can help with urinary urgency but usually I was use an alternative such as trospium HCL 20 mg twice a day. Again, this can make his constipation worse.    Abnormal LEONEL with firm prostate nodule. His PSA is also abnormal. He does not have a pacemaker or metal in his body. He has strong claustrophobia    Based on the patient's demographics, the Prostate Biopsy Collaborative Group nomogram indicates 80 percent risk of high-grade prostate cancer and 8 percent risk of low-grade prostate cancer (reference: Elizabeth Rivera, Marva Sutton, Ronel Smith, Johanna Andrea, Megan Stokes, Aaron Meléndez, Blas Heath, et al.  A Contemporary Prostate Biopsy Risk Calculator Based on Multiple Heterogeneous Cohorts.   Urology 74, no. 2 (2018): 197-203. Doi:[email or web address].)    I recommend prostate biopsy for diagnosis. Risks including infection, false negative, bleeding, pain and discomfort discussed. He is very anxious.     PLAN:   Hold hyosycamine  Trial trospium HCL 20 mg twice a day, discontinue if having adverse side effects especially worsening " "constipation  Return for TRUS random prostate biopsy. He has significant anxiety and wants a valium prior to this. He has a listed reaction to ativan (he had a \"sick\" feeling?, daughter says this was during treatment for EtOH withdrawal)    *addendum* patient's daughter calling back, requesting prostate biopsy under anesthesia. Will place orders    Niraj Larry MD   [facility]  [phone] clinic phone    Over 60 minutes spent on this encounter including review of prior records, discussion with patient, documentation    "

## 2022-08-05 NOTE — LETTER
8/5/2022       RE: [facility]  [address]  [facility] [address]     Dear Colleague,    Thank you for referring your patient, Jamie Valdivia, to the [facility] [place] at [facility]. Please see a copy of my visit note below.    [facility]  Main Office: [address]  [address]  [place] [address]       CHIEF COMPLAINT:  Elevated PSA, LUTS    Here with his daughter    HISTORY:   81 yo M with elevated PSA, BPH LUTS    Urinary urgency and urge incontinence, goes through several depends a day, most bothersome urinary symptom. Also has weak stream/slow stream.    Apparently he was supposed to see a urologist in [place], [place] last year for LUTS and elevated PSA but did not see; a PSA from 3/12/2021 was elevated to 13.45 ng/ml    AUA symptom score 3-3-2-5-3-2-2 = 22 severe QOL unhappy     No known first degree relatives with prostate cancer    Has not had a prostate biopsy before    Has not seen a urologist recently.    He is on hyosycamine prn apparently for abdominal pain/constipation. He does not see it impact his urinary symptoms    PAST MEDICAL HISTORY:   Past Medical History:   Diagnosis Date     Age-related osteoporosis without current pathological fracture 03/30/2022     Alcohol abuse 11/19/2017     Alcohol dependence with withdrawal (H)      Alcoholism (H)      Back pain      Backache 12/19/2018     CAD (coronary artery disease)      Chronic a-fib (H)      Chronic alcohol use 06/11/2015    Formatting of this note might be different from the original. 2/26/2019: serious fall at home with multiple vertebral fractures.  BAL 0.414% on admission.  Denies that he drinks much. 12/18/2018: hospitalized for fall due to alcohol intoxication.  BAL 0.34% on admission. 9/16/2018: hospitalized for injuries from fall due to alcohol intoxication.  BAL 0.34% on admission     Chronic atrial  fibrillation (H) 07/15/2016    Formatting of this note might be different from the original. 2/2019: Multiple falls so started on aspirin only.  Then aspirin stopped due to GI bleed.     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 2/2/21     HTN (hypertension)      Impaired gait and mobility 03/14/2019     Mild cognitive impairment 08/12/2019    Formatting of this note might be different from the original. NON-AMNESTIC TYPE     Mild TBI (traumatic brain injury) (H) 03/14/2019     Mumps      Obesity (BMI 30-39.9) 09/03/2015     Osteoarthritis      Osteoarthritis of both knees 05/13/2015     Osteoporosis      Other idiopathic peripheral autonomic neuropathy 03/30/2022     Other insomnia 03/14/2019     Other seizures (H) 03/30/2022     Palpitations      Peripheral neuropathy      Pharyngeal dysphagia 05/13/2015    Formatting of this note might be different from the original. Likely secondary to GERD with esophagitis, on PPI and H2 blocker with notable improvement, EGD 10/5/15.     Physical deconditioning 03/14/2019     Recurrent major depressive disorder (H) 03/30/2022     Rhabdomyolysis      Thrombocytopenia (H)      Urination disorder      Weakness 04/27/2020       PAST SURGICAL HISTORY:   Past Surgical History:   Procedure Laterality Date     ESOPHAGOSCOPY, GASTROSCOPY, DUODENOSCOPY (EGD), COMBINED N/A 06/01/2022    Procedure: ESOPHAGOGASTRODUODENOSCOPY (EGD) mngi with biopsies using jumbo cold biopsy forceps;  Surgeon: David Springer MD;  Location:  GI     left  hip replacement  2010     left shoulder replacement  2016     ORTHOPEDIC SURGERY       SPINE SURGERY      done in [place]     TONSILLECTOMY         FAMILY HISTORY:   Family History   Problem Relation Age of Onset     Osteoporosis Mother      Prostate Cancer Paternal Grandfather      Colon Cancer No family hx of        SOCIAL HISTORY:   Social History     Tobacco Use     Smoking status: Never Smoker     Smokeless tobacco: Never Used   Substance Use Topics     Alcohol use: Not Currently     Comment: not since December 2021          Allergies   Allergen Reactions     Ativan [Lorazepam]          Current Outpatient Medications:      acetaminophen (TYLENOL) 500 MG tablet, Take 1,000 mg by mouth every 6 hours as needed for mild pain, Disp: , Rfl:      alum & mag hydroxide-simethicone (MAALOX MAX) 400-400-40 MG/5ML SUSP suspension, Take 30 mLs by mouth every 6 hours as needed for indigestion, Disp: 355 mL, Rfl: 3     amoxicillin-clavulanate (AUGMENTIN) 875-125 MG tablet, Take 1 tablet by mouth 2 times daily, Disp: , Rfl:      citalopram (CELEXA) 10 MG tablet, Take 0.5 tablets (5 mg) by mouth daily, Disp: 15 tablet, Rfl: 98     DEEP SEA NASAL SPRAY 0.65 % nasal spray, INHALE 2 SPRAYS IN EACH NOSTRIL ONCE DAILY, Disp: 44 mL, Rfl: 97     gabapentin (NEURONTIN) 100 MG capsule, TAKE TWO CAPSULES (200MG) BY MOUTH EVERY NIGHT AT BEDTIME, Disp: 180 capsule, Rfl: 97     guaiFENesin (ROBITUSSIN) 100 MG/5ML liquid, GIVE 10ML (200 MG) BY MOUTH TWICE DAILY;AND TWICE DAILY AS NEEDED FOR COUGH, Disp: 473 mL, Rfl: 97     hyoscyamine (LEVSIN) 0.125 MG tablet, Take 1 tablet (125 mcg) by mouth 4 times daily as needed for cramping, Disp: 30 tablet, Rfl: 11     menthol-zinc oxide (CALMOSEPTINE) 0.44-20.6 % OINT ointment, Apply 1 g topically 2 times daily. May also apply 1 g 2 times daily as needed for skin protection., Disp: 113 g, Rfl: 98     mirtazapine (REMERON) 15 MG tablet, TAKE 1 TABLET BY MOUTH AT BEDTIME, Disp: 90 tablet, Rfl: 3      "Multiple Vitamins-Minerals (CEROVITE SENIOR) TABS, Take 1 tablet by mouth daily, Disp: 90 tablet, Rfl: 3     nystatin (MYCOSTATIN) 248164 UNIT/GM external powder, Apply topically 2 times daily And three times daily as needed, Disp: , Rfl:      omeprazole (PRILOSEC) 40 MG DR capsule, TAKE 1 CAPSULE BY MOUTH TWICE DAILY, Disp: 180 capsule, Rfl: 3     polyethylene glycol (GAVILAX) 17 GM/Dose powder, MIX 2 CAPFULS IN 8OZ OF ORANGE JUICE  IN THE MORNING;AND MAY MIX 2 CAPFULS ONCE DAILY AS NEEDED FOR CONSTIPATION. MIX IN FRONT OF PT. HOLD FOR LOOSE STOOL. OK TO GIVE 1 CAPFUL IF RESIDENT REFUSES 2 CAPFULS., Disp: 507 g, Rfl: 3     polyvinyl alcohol-povidone PF (REFRESH) 1.4-0.6 % ophthalmic solution, 1 drop every 4 hours as needed for irritation, Disp: , Rfl:      OLANZapine (ZYPREXA) 2.5 MG tablet, Take 1 tablet (2.5 mg) by mouth daily as needed (hallucinations) (Patient not taking: Reported on 8/5/2022), Disp: , Rfl:     Review Of Systems:  Skin: No rash, pruritis, or skin pigmentation  Eyes: No changes in vision  Ears/Nose/Throat: No changes in hearing, no nosebleeds  Respiratory: No shortness of breath, dyspnea on exertion, cough, or hemoptysis  Cardiovascular: No chest pain or palpitations  Gastrointestinal: + constipation  Genitourinary: see HPI  Musculoskeletal: No pain or swelling of joints, normal range of motion  Neurologic: No weakness or tremors  Psychiatric: No recent changes in memory or mood  Hematologic/Lymphatic/Immunologic: No easy bruising or enlarged lymph nodes  Endocrine: No weight gain or loss      PHYSICAL EXAM:    /62   Ht 1.753 m (5' 9\")   Wt 103.4 kg (228 lb)   BMI 33.67 kg/m    General appearance: In NAD, conversant  HEENT: Normocephalic and atraumatic, anicteric sclera  Cardiovascular: Not examined  Respiratory: normal, non-labored breathing  Gastrointestinal: negative, Abdomen soft, non-tender, and non-distended.   Musculoskeletal: Not Examined  Peripheral Vascular/extremity: No " "peripheral edema  Skin: Normal temperature, turgor, and texture. No rash  Psychiatric: Appropriate affect, alert and oriented to person, place, and time    Digital Rectal Exam: 60 gram prostate, very firm nodule on right prostate mid    Cystoscopy: Not done      PSA:   Component      Latest Ref Rng & Units 4/7/2022   PSA      0.00 - 4.00 ug/L 11.30 (H)       UA RESULTS:  Recent Labs   Lab Test 08/05/22  0912   COLOR Yellow   APPEARANCE Clear   URINEGLC Negative   URINEBILI Negative   URINEKETONE Negative   SG 1.020   UBLD Negative   URINEPH 5.5   PROTEIN Negative   UROBILINOGEN 0.2   NITRITE Negative   LEUKEST Trace*       Bladder Scan:  PVR 13 ml    Other Labs:      Imaging Studies: None      CLINICAL IMPRESSION:   81 yo M with elevated PSA, BPH LUTS, urge urinary incontinence    He is emptying his bladder well based on PVR 13 ml    Patient is unclear about what his \"H\" pill (hyosycamine) is for. He seems to think that with miralax it helps with his constipation. I explained to him that this is an anticholinergic medication and should actually cause constipation to be worse. He thinks that since it helps with his stomach cramping that he should continue it. Anticholinergics can help with urinary urgency but usually I was use an alternative such as trospium HCL 20 mg twice a day. Again, this can make his constipation worse.    Abnormal LEONEL with firm prostate nodule. His PSA is also abnormal. He does not have a pacemaker or metal in his body. He has strong claustrophobia    Based on the patient's demographics, the Prostate Biopsy Collaborative Group nomogram indicates 80 percent risk of high-grade prostate cancer and 8 percent risk of low-grade prostate cancer (reference: Elizabeth Rivera, Marva Sutton, Ronel Smith, Johanna Andrea, Megan Stokes, Aaron Meléndez, Blas Heath, et al.  A Contemporary Prostate Biopsy Risk Calculator Based on Multiple Heterogeneous Cohorts.   Urology 74, no. " "2 (2018): 197-203. Doi:[id number].)    I recommend prostate biopsy for diagnosis. Risks including infection, false negative, bleeding, pain and discomfort discussed. He is very anxious.     PLAN:   Hold hyosycamine  Trial trospium HCL 20 mg twice a day, discontinue if having adverse side effects especially worsening constipation  Return for TRUS random prostate biopsy. He has significant anxiety and wants a valium prior to this. He has a listed reaction to ativan (he had a \"sick\" feeling?, daughter says this was during treatment for EtOH withdrawal)      Niraj Larry MD   [facility]  [phone] clinic phone    Over 60 minutes spent on this encounter including review of prior records, discussion with patient, documentation      "

## 2022-08-05 NOTE — PATIENT INSTRUCTIONS
Ultrasound Guided Prostate Biopsy    What is a prostate biopsy? A prostate biopsy is a relatively painless procedure performed in the physician's office, outpatient facility or hospital.  A long, thin needle is inserted into the prostate to collect a sample of tissue from the prostate.  These samples are then examined by a pathologist for abnormal cells.    Why do I need a prostate biopsy? During a man's lifetime the prostate gradually increases in size.  The patient may or may not experience symptoms.  Symptoms of an enlarged prostate include:    Increased frequency of urination    Decreased force of urinary stream    Trouble with urination    Awakening at night to urinate    When the prostate is examined, the physician may feel a nodule (hard or firm growth) which would require a biopsy.    Another reason for having a prostate biopsy is an increase in the prostate specific androgen (PSA) in your blood.  A prostate biopsy may be necessary to determine the cause of the increased PSA.    Your physician will also perform an ultrasound (an image created by sound waves).  The ultrasound is performed by placing a small probe in the rectum to image the prostate gland.    Preparation for the Prostate Biopsy    Blood thinning medications must be stopped prior to your biopsy.  Please stop the following medication the appropriate number of days before:  10 days-acetylsalicylic acid, vitamin E, fish oil, aspirin, baby aspirin  7 days- Plavix, Brilinta, ibuprofen (Advil, Motrin, Aleve)  5 days-Pradaxa  4 days-Coumadin/warfarin  3 days-Eliquis, Xarelto    2.  If you have any bleeding problems (thin blood), please tell your doctor.    3.  If you have been told by another doctor to take antibiotics before dental work or surgery, please tell the doctor.  This is common for patients that have had a joint replacement.    YOU HAVE BEEN PRESCRIBED AN ANTIBIOTIC.  You will start this medication the day before your biopsy. It is one  pill, twice a day.  You will continue taking the medication until it is gone.    The day of the biopsy you will be asked to use an enema one hour before you leave for your appointment.    PLEASE EAT YOUR REGULAR MEALS ON THE DAY OF YOUR BIOPSY.    Unless you have been prescribed a sedative (Valium or a similar drug) you will be able to drive to and from your appointment.  If you have taken a sedative you must have a ride.    AFTER THE BIOPSY    DANGER SIGNS    Small amount of blood in the urine for 10-14 days Excessive blood in the urine, stool or ejaculate  Small amount of blood in the stool for 48 hours Fever over 100 or chills  Small amount of blood in the ejaculate for up to Frequent urination or burning when you urinate  4 weeks          CALL THE DOCTOR'S OFFICE -[phone] IF YOU HAVE ANY OF THESE SYMPTOMS.  IF YOU CANNOT CONTACT THE OFFICE, GO TO THE EMERGENCY ROOM.          Your biopsy is scheduled on Monday, 9/19/22, at our [place] office located at [address] [place] [address], [address].  Please be at the office at 1:00PM.    A follow up visit has been scheduled for you on Thursday, 9/29/22, at 8:15AM.  This is a virtual visit.  Your doctor will discuss your results with you at this visit.

## 2022-08-09 ENCOUNTER — ASSISTED LIVING VISIT (OUTPATIENT)
Dept: GERIATRICS | Facility: CLINIC | Age: 82
End: 2022-08-09
Payer: COMMERCIAL

## 2022-08-09 VITALS
HEIGHT: 69 IN | DIASTOLIC BLOOD PRESSURE: 85 MMHG | OXYGEN SATURATION: 98 % | BODY MASS INDEX: 33.93 KG/M2 | SYSTOLIC BLOOD PRESSURE: 137 MMHG | TEMPERATURE: 97.8 F | RESPIRATION RATE: 16 BRPM | HEART RATE: 63 BPM | WEIGHT: 229.1 LBS

## 2022-08-09 DIAGNOSIS — M81.0 OSTEOPOROSIS, UNSPECIFIED OSTEOPOROSIS TYPE, UNSPECIFIED PATHOLOGICAL FRACTURE PRESENCE: ICD-10-CM

## 2022-08-09 DIAGNOSIS — I71.20 THORACIC AORTIC ANEURYSM WITHOUT RUPTURE (H): ICD-10-CM

## 2022-08-09 DIAGNOSIS — R13.13 PHARYNGEAL DYSPHAGIA: ICD-10-CM

## 2022-08-09 DIAGNOSIS — R97.20 ELEVATED PROSTATE SPECIFIC ANTIGEN (PSA): ICD-10-CM

## 2022-08-09 DIAGNOSIS — D64.9 NORMOCYTIC ANEMIA: ICD-10-CM

## 2022-08-09 DIAGNOSIS — K59.04 CHRONIC IDIOPATHIC CONSTIPATION: ICD-10-CM

## 2022-08-09 DIAGNOSIS — Z87.81 HISTORY OF VERTEBRAL FRACTURE: ICD-10-CM

## 2022-08-09 DIAGNOSIS — F41.1 GAD (GENERALIZED ANXIETY DISORDER): ICD-10-CM

## 2022-08-09 DIAGNOSIS — I48.91 ATRIAL FIBRILLATION, UNSPECIFIED TYPE (H): ICD-10-CM

## 2022-08-09 DIAGNOSIS — F10.27 DEMENTIA ASSOCIATED WITH ALCOHOLISM WITH BEHAVIORAL DISTURBANCE (H): Primary | ICD-10-CM

## 2022-08-09 DIAGNOSIS — Z71.89 ACP (ADVANCE CARE PLANNING): ICD-10-CM

## 2022-08-09 DIAGNOSIS — F43.21 ADJUSTMENT DISORDER WITH DEPRESSED MOOD: ICD-10-CM

## 2022-08-09 DIAGNOSIS — K22.70 BARRETT'S ESOPHAGUS WITHOUT DYSPLASIA: ICD-10-CM

## 2022-08-09 DIAGNOSIS — R60.0 LOWER EXTREMITY EDEMA: ICD-10-CM

## 2022-08-09 DIAGNOSIS — J18.9 RECURRENT PNEUMONIA: ICD-10-CM

## 2022-08-09 DIAGNOSIS — R04.2 HEMOPTYSIS: ICD-10-CM

## 2022-08-09 DIAGNOSIS — R26.9 ABNORMAL GAIT: ICD-10-CM

## 2022-08-09 DIAGNOSIS — M15.0 PRIMARY OSTEOARTHRITIS INVOLVING MULTIPLE JOINTS: ICD-10-CM

## 2022-08-09 DIAGNOSIS — I10 ESSENTIAL HYPERTENSION, BENIGN: ICD-10-CM

## 2022-08-09 RX ORDER — TROSPIUM CHLORIDE 20 MG/1
20 TABLET, FILM COATED ORAL 2 TIMES DAILY
COMMUNITY
End: 2022-08-18

## 2022-08-09 NOTE — PROGRESS NOTES
[facility]  Chief Complaint   Patient presents with     Annual Comprehensive Nursing Home     pneumonia     [facility] Record Number:  [id number]  Place of Service where encounter took place:  [facility] ([facility]) [[id number]]    HPI:    Jamie Valdivia  is a 82 year old (1940) PMH significant for GERD with esophagitis, OA BL knees, pharyngeal dysphagia, edema, abdominal wall hernia, chronic atrial fibrillation, HTN, osteoporosis with hx of vertebral fracture, dementia, anemia, hx of COVID-19, who is being seen today for an annual comprehensive visit.     HPI information obtained from: facility chart records, facility staff, patient report, [facility] chart review and family/first contact daughter Coral lindsay and wife Gisela.     Resident of [facility] since March 2022.     Hospitalized at [facility] form 11/1 to 11/5/21 with generalized weakness, dehydration, and concern for UTI but culture came back negative. Symptoms likely related to chronic alcoholism with alcohol withdrawal. Chronic cognitive impairment in setting of long history of alcoholism, hx of hallucinations. Multiple hospitalizations over preceding months per family due to unsafe living situation, well known to ED staff. Spent time in SNF ([facility]) around September 2021, details unclear, but seem to have suffered spinal fracture.     Hospitalized from 12/17 to 12/23/21 at [facility] for falls, weakness, alcoholism, and alcoholic hepatitis; discharge summary not available. Wife (who was primary care giver) was having medical issues and needed neurology care in the [facility]. Family felt Jamie unable to safely be left alone so they called EMS to transport to hospital for ongoing care/ETOH detox. Ultimately [place] [facility] where wife was convalescing.      [place] [facility] [place] and 3/15/22 with largely uneventful stay. Zyprexa dose increased with good  "effect on mood, prior to starting medication having hallucinations, delusions, and wandering on unit. Transferred to [facility] for permanent placement.    ER visit on June 30, 2022 due to chest pain.  Resident was watching a baseball game when he experienced left-sided chest pain.  Treated by EMS with aspirin and nitroglycerin with slight relief.  Reported pain worse with taking deep breaths and related to anxiety.  EKG showed atrial fibrillation with controlled rate; chronic.  Chest CT showed 4.6 cm ascending thoracic aortic aneurysm, but was negative for plumonary embolism.  Cardiothoracic surgery consulted given size of aneurysm and lack of other connective tissue disorder recommended outpatient follow-up with repeat chest CT and echocardiogram in 3 months to evaluate for progression.  Chest x-ray showed left basilar pneumonia and chest CT showed fluid-filled bronchi\" bilateral lower lobes.  Labs significant for negative troponin, ACS exclude.  ER physician noted tenderness over left chest wall and cough over last month. Hx of dysphagia and increased aspiration risk. Symptoms most likely consistent with pneumonia discharged back to assisted living facility with Augmentin.    Repeat CXR on 8/4 due to subjective shortness of breath showed possible left lower lobe patchy opacification, as well as low lung volumes with vascular crowding and basilar atelectasis. Dr. Quintana started Augmentin.     Today's concerns are:  Follow-up today for annual visit and assessment of multiple chronic health issues as outlined above.     Breathing \"fine.\"  Periodic cough.   Robitussin DM using PRN.   Choking with eating and pills getting stuck.   No hemoptysis.   Got new denture that is impacting tongue and swallowing.  No fever/chills.     Wife reports resident is upset about recent urology visit, referred to due to elevated PSA.  Reviewed preliminary urology note.  Advised to hold hyosycamine due to anticholingeric side " "effects.  [place]ola trospium BID  Plan for prostate biopsy.     Velcro wraps have not arrived.   Happy with results of wrapping and feels leg swelling is much better.     Spirits are \"bad!\"  No SI.   Recalls trip to restaurant.   \"I got some bad news.\"  Recalls discussion with urologists regarding elevated PSA and possibility of cancer and regarding urologic SE of Levsin.  Grogginess somewhat better.    \"I want to get my drives license back.\"  Enjoys working with PT.  Walked with PT outside today 20 minutes.     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) (11/19/2021), ED (erectile dysfunction), Edema, Falling, JOSÉ MANUEL (generalized anxiety disorder), Generalized anxiety disorder (04/27/2020), GERD (gastroesophageal reflux disease), GI bleeding, H/O carpal tunnel syndrome, History of 2019 novel coronavirus disease (COVID-19) (02/08/2021), HTN (hypertension), Impaired gait and mobility (03/14/2019), Mild cognitive impairment (08/12/2019), Mild TBI (traumatic brain injury) (H) (03/14/2019), Mumps, Obesity (BMI 30-39.9) (09/03/2015), Osteoarthritis, Osteoarthritis of both knees (05/13/2015), Osteoporosis, Other idiopathic peripheral autonomic neuropathy (03/30/2022), Other insomnia (03/14/2019), Other seizures (H) (03/30/2022), Palpitations, Peripheral neuropathy, Pharyngeal dysphagia (05/13/2015), Physical deconditioning (03/14/2019), Recurrent major depressive disorder (H) (03/30/2022), Rhabdomyolysis, Thrombocytopenia (H), Urination disorder, and Weakness " (04/27/2020).    He has no past medical history of Asymptomatic human immunodeficiency virus (HIV) infection status (H), Blood in semen, Cerebral palsy (H), Complication of anesthesia, Congenital renal agenesis and dysgenesis, Epididymitis, bilateral, Epididymitis, left, Epididymitis, right, Goiter, Gout, Hernia, abdominal, History of spinal cord injury, History of thrombophlebitis, Horseshoe kidney, Hydrocephalus (H), Malignant hyperthermia, Orchitis, epididymitis, and epididymo-orchitis, with abscess, Parkinsons disease (H), Penile discharge, Prostate infection, Spider veins, Spina bifida (H), STD (sexually transmitted disease), Swelling of testicle, Tethered cord (H), or Tuberculosis.  PAST SURGICAL HISTORY:  has a past surgical history that includes left hip replacement (2010); left shoulder replacement (2016); tonsillectomy; Spine surgery; orthopedic surgery; and Esophagoscopy, gastroscopy, duodenoscopy (EGD), combined (N/A, 06/01/2022).  IMMUNIZATIONS:  Immunization History   Administered Date(s) Administered     COVID-19,PF,Pfizer (12+ Yrs) 03/18/2021, 04/08/2021, 06/14/2022     Influenza, Quad, High Dose, Pf, 65yr+ (Fluzone HD) 02/29/2020     Mantoux Tuberculin Skin Test 04/27/2020     Pneumo Conj 13-V (2010&after) 09/27/2011, 06/16/2016     Pneumococcal 23 valent 06/11/2015     Tdap (Adacel,Boostrix) 06/11/2015     Tdap (Adult) Unspecified Formulation 06/11/2015     Zoster vaccine recombinant adjuvanted (SHINGRIX) 06/11/2015     Above immunizations pulled from [facility]. [facility] and facility records also reconciled. Outstanding information sent to  to update [facility] .  Future immunizations needed:  yearly influenza per facility protocol.    Current Outpatient Medications   Medication Sig Dispense Refill     acetaminophen (TYLENOL) 500 MG tablet Take 1,000 mg by mouth every 6 hours as needed for mild pain       alum & mag hydroxide-simethicone (MAALOX MAX) 400-400-40 MG/5ML SUSP  suspension Take 30 mLs by mouth every 6 hours as needed for indigestion 355 mL 3     amoxicillin-clavulanate (AUGMENTIN) 875-125 MG tablet Take 1 tablet by mouth 2 times daily       citalopram (CELEXA) 10 MG tablet Take 1 tablet (10 mg) by mouth daily 30 tablet 98     DEEP SEA NASAL SPRAY 0.65 % nasal spray INHALE 2 SPRAYS IN EACH NOSTRIL ONCE DAILY 44 mL 97     gabapentin (NEURONTIN) 100 MG capsule TAKE TWO CAPSULES (200MG) BY MOUTH EVERY NIGHT AT BEDTIME 180 capsule 97     guaiFENesin (ROBITUSSIN) 100 MG/5ML liquid GIVE 10ML (200 MG) BY MOUTH TWICE DAILY;AND TWICE DAILY AS NEEDED FOR COUGH 473 mL 97     menthol-zinc oxide (CALMOSEPTINE) 0.44-20.6 % OINT ointment Apply 1 g topically 2 times daily. May also apply 1 g 2 times daily as needed for skin protection. 113 g 98     nystatin (MYCOSTATIN) 029612 UNIT/GM external powder Apply topically 2 times daily And three times daily as needed       omeprazole (PRILOSEC) 40 MG DR capsule TAKE 1 CAPSULE BY MOUTH TWICE DAILY 180 capsule 3     polyethylene glycol (GAVILAX) 17 GM/Dose powder MIX 2 CAPFULS IN 8OZ OF ORANGE JUICE  IN THE MORNING;AND MAY MIX 2 CAPFULS ONCE DAILY AS NEEDED FOR CONSTIPATION. MIX IN FRONT OF PT. HOLD FOR LOOSE STOOL. OK TO GIVE 1 CAPFUL IF RESIDENT REFUSES 2 CAPFULS. 507 g 3     polyvinyl alcohol-povidone PF (REFRESH) 1.4-0.6 % ophthalmic solution 1 drop every 4 hours as needed for irritation       trospium (SANCTURA) 20 MG tablet TAKE 1 TABLET BY MOUTH TWICE DAILY 180 tablet 97     Case Management:  I have reviewed the Assisted Living care plan, current immunizations and preventive care/cancer screening. .Future cancer screening is not clinically indicated secondary to age/goals of care Patient's desire to return to the community is present, but is not able due to care needs . Current Level of Care is appropriate.    Advance Directive Discussion:    I reviewed the current advanced directives as reflected in EPIC, the POLST and the facility chart,  "and verified the congruency of orders. I contacted the first party daughter Denies and discussed the plan of Care.  I did review the advance directives with the resident.     Team Discussion:  I communicated with the appropriate disciplines involved with the Plan of Care:   Nursing    Patient's goal is maintain independence and QoL.  Information reviewed:  Medications, vital signs, orders, and nursing notes.    ROS:  Limited secondary to cognitive impairment but today pt reports 4 point ROS including Respiratory, CV, GI and , other than that noted in the HPI,  is negative.    Vitals:  /85   Pulse 63   Temp 97.8  F (36.6  C)   Resp 16   Ht 1.753 m (5' 9\")   Wt 103.9 kg (229 lb 1.6 oz)   SpO2 98%   BMI 33.83 kg/m   Body mass index is 33.83 kg/m .  Exam:  GENERAL APPEARANCE:  Alert, in no distress, pleasant, cooperative, seated in recliner chair.  EYES: Sclera clear and conjunctiva normal, no discharge    EARS: Grossly decreased hearing, better with right ear, must speak low, loudly, slowly to communicate  RESP:  Non-labored breathing, no respiratory distress, Lung sounds clear, patient is on room air. SpO2 97% RA.  CV: Rate and rhythm irregular, no murmur, no rub or gallop. HR 69.  VASCULAR: 1-2+ edema bilateral lower extremities.   ABDOMEN:  Normal bowel sounds, soft, nontender, no grimacing or guarding with palpation  PSYCH: awake and alert, speech fluent, memory impaired, cooperative    Lab/Diagnostic data:   Recent labs in [facility] reviewed by me today.    CBC RESULTS: Recent Labs   Lab Test 06/30/22 1928 05/12/22  1220 04/28/22  0655   WBC 6.1  --  5.5   RBC 4.44  --  4.17*   HGB 12.9* 12.8* 12.0*   HCT 40.4  --  37.9*   MCV 91  --  91   MCH 29.1  --  28.8   MCHC 31.9  --  31.7   RDW 14.0  --  13.8     --  199       Last Basic Metabolic Panel:  Recent Labs   Lab Test 06/30/22 1928 04/28/22  0655    139   POTASSIUM 4.5 4.4   CHLORIDE 103 104   JOSUE 8.8 8.3*   CO2 31 32   BUN 14 9   CR 0.70 " 0.68   GLC 97 80       Liver Function Studies -   Recent Labs   Lab Test 04/28/22  0655 04/07/22  0735   PROTTOTAL 7.2 7.2   ALBUMIN 3.2* 3.2*   BILITOTAL 0.4 0.4   ALKPHOS 68 69   AST 16 20   ALT 18 23       TSH   Date Value Ref Range Status   04/28/2022 2.77 0.40 - 4.00 mU/L Final   04/07/2022 3.94 0.40 - 4.00 mU/L Final     Lab Results   Component Value Date    A1C 5.5 04/28/2022    A1C 5.6 04/07/2022     Recent Labs   Lab Test 04/28/22  0655 04/07/22  0735   CHOL 101 107   HDL 40 48   LDL 50 49   TRIG 54 50     ESOPHAGRAM   5/13/2022 3:21 PM   IMPRESSION:  1. Limited esophagram demonstrates mild luminal narrowing at the most  proximal aspect of the esophagus with some mucosal versus submucosal  mild nodularity that is nonspecific. Recommend further assessment with  endoscopic visualization.  2. Presbyesophagus.  3. The distal esophagus appears normal with normal passage of contrast  into the stomach without impairment. The GE junction appears  unremarkable.     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   Chronic low mood, suspect JOSÉ MANUEL with panic attacks for many years.   On memory care unit due to safety concerns, currently no access to ETOH.   Reports some fatigue daytime sleepiness and strange thoughts at night.   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  Some cough and pill dysphagia.  Saw ENT, no explanation.  Last esophagram May 2022 as above, presbyesophagus, proximal luminal narrowing.  Plan:   - Re-consult SLP due to recurrent pneumonia  - Complete Augmentin  - Repeat CXR in ~ 6 weeks    (I71.2) Thoracic aortic aneurysm without rupture (H) - 6/30/22 4.6 cm  Comment: New finding in ER June 2022  Plan:   - Reviewed plan for CT and echo in 3 months (~ 10/2022)   - HR and BP control    (D64.9) Normocytic Anemia  Comment: Chronic, mild, Hgb 12.9 and MCV 91 on 6/30/22  Plan:   - Follow serial Hgb, daughter finds this reassuring     (K59.04) Chronic constipation  Comment: Chronic, long hx of Levsin multiple times per day for abd pain, but stopped by urology when started trospium.  Plan:   - Monitor bowel habits off Levsin  - polyethylene glycol (MIRALAX) 17 GM/Dose powder; 1-2 capfuls in 8 oz of ORANGE JUICE PO one time each morning and 1-2 capfuls 8 oz of ORANGE JUICE PO one time daily PRN for constipation. Please mix medication in juice in front of the patient. Hold for loose stools.   - GI following     (I48.91) Atrial fibrillation, unspecified type (H)  Comment: Chronic, not anticoagulated, rate controlled off medication  [id number]: 3 (age, HTN), 3.2% risk of stroke per year  Plan:   - Monitor VS and clinical status  - Would not add AC due to falls   - Consider cardiology follow-up    (I10) Essential hypertension, benign  Comment: BP at goal of < 150/90 given age and comorbid conditions  Stopped ASA due to hemoptysis  BP Readings from Last 3 Encounters:   08/09/22 137/85   08/05/22  116/62   07/25/22 125/69   Plan:   - Monitor routine VS and labs off anti-hypertensive medications    (M15.9) OA Multiple Sites  (M81.0) Osteoporosis  (Z87.81) History of vertebral fracture  (R26.9) Abnormal gait  Comment: DJD multiple sites and hx of multiple spinal compression fractures per chart review in setting of recurrent falls. Looks to have been on Fosamax in past.   Plan:   - Tylenol 650 mg q 6 hours PRN  - Discuss bisphosphonate vs injectable medication for osteoporosis with PharmD at next visit, consider endocrine referral     (97.20) Elevated PSA  Comment: Chronic  PSA 13.45 in March 2021, Repeat PSA 11.3 on 4/7/22  Saw urology, recommendation for prostate biopsy  Plan:  - Trail trospium BID  - Family to schedule prostate biopsy, fearful about this procedure, recommendation to discuss again with urology regarding pain mgmt, consider anti-anxiety medication prior as appropriate    (Z71.89) ACP (advance care planning)  Comment: HCD on file, Markos 1st agent, Coral 2nd; POLST scanned into Epic  Plan:   - POLST for Full Code, no changes at this time    Orders:  - Discontinue mirtazapine  - Increase celexa  - SLP consult   - Complete Augumentin   - Schedule prostate biopsy     Spoke to daughter Coral regarding medication changes and plan of care.    Called lymph OT and requested SLP consult.      Electronically signed by:  MARICHUY Basilio CNP

## 2022-08-09 NOTE — LETTER
8/9/2022        RE: Jamie Valdivia  [facility]  [address]  [facility] [address]        [facility]  Chief Complaint   Patient presents with     Annual Comprehensive Nursing Home     pneumonia     [facility] Medical Record Number:  [id number]  Place of Service where encounter took place:  [facility] ([facility]) [[id number]]    HPI:    Jamie Valdivia  is a 82 year old (1940) PMH significant for GERD with esophagitis, OA BL knees, pharyngeal dysphagia, edema, abdominal wall hernia, chronic atrial fibrillation, HTN, osteoporosis with hx of vertebral fracture, dementia, anemia, hx of COVID-19, who is being seen today for an annual comprehensive visit.     HPI information obtained from: facility chart records, facility staff, patient report, [facility] chart review and family/first contact daughter Coral lindsay and wife Gisela.     Resident of [facility] since March 2022.     Hospitalized at [facility] form 11/1 to 11/5/21 with generalized weakness, dehydration, and concern for UTI but culture came back negative. Symptoms likely related to chronic alcoholism with alcohol withdrawal. Chronic cognitive impairment in setting of long history of alcoholism, hx of hallucinations. Multiple hospitalizations over preceding months per family due to unsafe living situation, well known to ED staff. Spent time in SNF ([facility]) around September 2021, details unclear, but seem to have suffered spinal fracture.     Hospitalized from 12/17 to 12/23/21 at [facility] for falls, weakness, alcoholism, and alcoholic hepatitis; discharge summary not available. Wife (who was primary care giver) was having medical issues and needed neurology care in the [facility]. Family felt Jamie unable to safely be left alone so they called EMS to transport to hospital for ongoing care/ETOH detox. Ultimately transferred to [facility] where wife  Problem: Patient Care Overview  Goal: Plan of Care Review  Outcome: Ongoing (interventions implemented as appropriate)  AAOX4.VSS. Pt free of trauma, falls, injury and skin breakdown.  Pt pain moderately controlled with IV and PO analgesics.Pt tolerating crackers and burger at time of bedside shift report after multiple episodes of nausea and vomiting. N/V relieved with Reglan 10mg IV.Pt bilateral lower extremities pulses intact.  Dressing to R hip CDI.Fluids/IS encouraged throughout shift. Pt encouraged to reposition self frequently while in bed. PT this shift (see note). Purposeful hourly rounding.Pt has call light in reach, side rails up X2, bed in low position, TEDs/SCDs and nonskid socks on. Abduction pillow in place.Pt up in bed in no distress with spouse at the bedside.               "was convalescing.      [place] [facility] [place] and 3/15/22 with largely uneventful stay. Zyprexa dose increased with good effect on mood, prior to starting medication having hallucinations, delusions, and wandering on unit. Transferred to [facility] for permanent placement.    ER visit on June 30, 2022 due to chest pain.  Resident was watching a baseball game when he experienced left-sided chest pain.  Treated by EMS with aspirin and nitroglycerin with slight relief.  Reported pain worse with taking deep breaths and related to anxiety.  EKG showed atrial fibrillation with controlled rate; chronic.  Chest CT showed 4.6 cm ascending thoracic aortic aneurysm, but was negative for plumonary embolism.  Cardiothoracic surgery consulted given size of aneurysm and lack of other connective tissue disorder recommended outpatient follow-up with repeat chest CT and echocardiogram in 3 months to evaluate for progression.  Chest x-ray showed left basilar pneumonia and chest CT showed fluid-filled bronchi\" bilateral lower lobes.  Labs significant for negative troponin, ACS exclude.  ER physician noted tenderness over left chest wall and cough over last month. Hx of dysphagia and increased aspiration risk. Symptoms most likely consistent with pneumonia discharged back to assisted living facility with Augmentin.    Repeat CXR on 8/4 due to subjective shortness of breath showed possible left lower lobe patchy opacification, as well as low lung volumes with vascular crowding and basilar atelectasis. Dr. Quintana started Augmentin.     Today's concerns are:  Follow-up today for annual visit and assessment of multiple chronic health issues as outlined above.     Breathing \"fine.\"  Periodic cough.   Robitussin DM using PRN.   Choking with eating and pills getting stuck.   No hemoptysis.   Got new denture that is impacting tongue and swallowing.  No fever/chills.     Wife reports resident is upset about recent urology visit, " "referred to due to elevated PSA.  Reviewed preliminary urology note.  Advised to hold hyosycamine due to anticholingeric side effects.  Trail trospium BID  Plan for prostate biopsy.     Velcro wraps have not arrived.   Happy with results of wrapping and feels leg swelling is much better.     Spirits are \"bad!\"  No SI.   Recalls trip to restaurant.   \"I got some bad news.\"  Recalls discussion with urologists regarding elevated PSA and possibility of cancer and regarding urologic SE of Levsin.  Grogginess somewhat better.    \"I want to get my drives license back.\"  Enjoys working with PT.  Walked with PT outside today 20 minutes.     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) (11/19/2021), ED (erectile dysfunction), Edema, Falling, JOSÉ MANUEL (generalized anxiety disorder), Generalized anxiety disorder (04/27/2020), GERD (gastroesophageal reflux disease), GI bleeding, H/O carpal tunnel syndrome, History of 2019 novel coronavirus disease (COVID-19) (02/08/2021), HTN (hypertension), Impaired gait and mobility (03/14/2019), Mild cognitive impairment (08/12/2019), Mild TBI (traumatic brain injury) (H) (03/14/2019), Mumps, Obesity (BMI 30-39.9) (09/03/2015), Osteoarthritis, Osteoarthritis of both knees (05/13/2015), Osteoporosis, Other idiopathic peripheral autonomic neuropathy (03/30/2022), Other insomnia (03/14/2019), Other seizures (H) (03/30/2022), Palpitations, Peripheral neuropathy, Pharyngeal dysphagia (05/13/2015), Physical deconditioning (03/14/2019), " Recurrent major depressive disorder (H) (03/30/2022), Rhabdomyolysis, Thrombocytopenia (H), Urination disorder, and Weakness (04/27/2020).    He has no past medical history of Asymptomatic human immunodeficiency virus (HIV) infection status (H), Blood in semen, Cerebral palsy (H), Complication of anesthesia, Congenital renal agenesis and dysgenesis, Epididymitis, bilateral, Epididymitis, left, Epididymitis, right, Goiter, Gout, Hernia, abdominal, History of spinal cord injury, History of thrombophlebitis, Horseshoe kidney, Hydrocephalus (H), Malignant hyperthermia, Orchitis, epididymitis, and epididymo-orchitis, with abscess, Parkinsons disease (H), Penile discharge, Prostate infection, Spider veins, Spina bifida (H), STD (sexually transmitted disease), Swelling of testicle, Tethered cord (H), or Tuberculosis.  PAST SURGICAL HISTORY:  has a past surgical history that includes left hip replacement (2010); left shoulder replacement (2016); tonsillectomy; Spine surgery; orthopedic surgery; and Esophagoscopy, gastroscopy, duodenoscopy (EGD), combined (N/A, 06/01/2022).  IMMUNIZATIONS:  Immunization History   Administered Date(s) Administered     COVID-19,PF,Pfizer (12+ Yrs) 03/18/2021, 04/08/2021, 06/14/2022     Influenza, Quad, High Dose, Pf, 65yr+ (Fluzone HD) 02/29/2020     Mantoux Tuberculin Skin Test 04/27/2020     Pneumo Conj 13-V (2010&after) 09/27/2011, 06/16/2016     Pneumococcal 23 valent 06/11/2015     Tdap (Adacel,Boostrix) 06/11/2015     Tdap (Adult) Unspecified Formulation 06/11/2015     Zoster vaccine recombinant adjuvanted (SHINGRIX) 06/11/2015     Above immunizations pulled from [facility]. [facility] and facility records also reconciled. Outstanding information sent to  to update [facility] .  Future immunizations needed:  yearly influenza per facility protocol.    Current Outpatient Medications   Medication Sig Dispense Refill     acetaminophen (TYLENOL) 500 MG tablet Take 1,000 mg by  mouth every 6 hours as needed for mild pain       alum & mag hydroxide-simethicone (MAALOX MAX) 400-400-40 MG/5ML SUSP suspension Take 30 mLs by mouth every 6 hours as needed for indigestion 355 mL 3     amoxicillin-clavulanate (AUGMENTIN) 875-125 MG tablet Take 1 tablet by mouth 2 times daily       citalopram (CELEXA) 10 MG tablet Take 1 tablet (10 mg) by mouth daily 30 tablet 98     DEEP SEA NASAL SPRAY 0.65 % nasal spray INHALE 2 SPRAYS IN EACH NOSTRIL ONCE DAILY 44 mL 97     gabapentin (NEURONTIN) 100 MG capsule TAKE TWO CAPSULES (200MG) BY MOUTH EVERY NIGHT AT BEDTIME 180 capsule 97     guaiFENesin (ROBITUSSIN) 100 MG/5ML liquid GIVE 10ML (200 MG) BY MOUTH TWICE DAILY;AND TWICE DAILY AS NEEDED FOR COUGH 473 mL 97     menthol-zinc oxide (CALMOSEPTINE) 0.44-20.6 % OINT ointment Apply 1 g topically 2 times daily. May also apply 1 g 2 times daily as needed for skin protection. 113 g 98     nystatin (MYCOSTATIN) 679075 UNIT/GM external powder Apply topically 2 times daily And three times daily as needed       omeprazole (PRILOSEC) 40 MG DR capsule TAKE 1 CAPSULE BY MOUTH TWICE DAILY 180 capsule 3     polyethylene glycol (GAVILAX) 17 GM/Dose powder MIX 2 CAPFULS IN 8OZ OF ORANGE JUICE  IN THE MORNING;AND MAY MIX 2 CAPFULS ONCE DAILY AS NEEDED FOR CONSTIPATION. MIX IN FRONT OF PT. HOLD FOR LOOSE STOOL. OK TO GIVE 1 CAPFUL IF RESIDENT REFUSES 2 CAPFULS. 507 g 3     polyvinyl alcohol-povidone PF (REFRESH) 1.4-0.6 % ophthalmic solution 1 drop every 4 hours as needed for irritation       trospium (SANCTURA) 20 MG tablet TAKE 1 TABLET BY MOUTH TWICE DAILY 180 tablet 97     Case Management:  I have reviewed the Assisted Living care plan, current immunizations and preventive care/cancer screening. .Future cancer screening is not clinically indicated secondary to age/goals of care Patient's desire to return to the community is present, but is not able due to care needs . Current Level of Care is appropriate.    Advance  "Directive Discussion:    I reviewed the current advanced directives as reflected in EPIC, the POLST and the facility chart, and verified the congruency of orders. I contacted the first party daughter Denies and discussed the plan of Care.  I did review the advance directives with the resident.     Team Discussion:  I communicated with the appropriate disciplines involved with the Plan of Care:   Nursing    Patient's goal is maintain independence and QoL.  Information reviewed:  Medications, vital signs, orders, and nursing notes.    ROS:  Limited secondary to cognitive impairment but today pt reports 4 point ROS including Respiratory, CV, GI and , other than that noted in the HPI,  is negative.    Vitals:  /85   Pulse 63   Temp 97.8  F (36.6  C)   Resp 16   Ht 1.753 m (5' 9\")   Wt 103.9 kg (229 lb 1.6 oz)   SpO2 98%   BMI 33.83 kg/m   Body mass index is 33.83 kg/m .  Exam:  GENERAL APPEARANCE:  Alert, in no distress, pleasant, cooperative, seated in recliner chair.  EYES: Sclera clear and conjunctiva normal, no discharge    EARS: Grossly decreased hearing, better with right ear, must speak low, loudly, slowly to communicate  RESP:  Non-labored breathing, no respiratory distress, Lung sounds clear, patient is on room air. SpO2 97% RA.  CV: Rate and rhythm irregular, no murmur, no rub or gallop. HR 69.  VASCULAR: 1-2+ edema bilateral lower extremities.   ABDOMEN:  Normal bowel sounds, soft, nontender, no grimacing or guarding with palpation  PSYCH: awake and alert, speech fluent, memory impaired, cooperative    Lab/Diagnostic data:   Recent labs in [facility] reviewed by me today.    CBC RESULTS: Recent Labs   Lab Test 06/30/22  1928 05/12/22  1220 04/28/22  0655   WBC 6.1  --  5.5   RBC 4.44  --  4.17*   HGB 12.9* 12.8* 12.0*   HCT 40.4  --  37.9*   MCV 91  --  91   MCH 29.1  --  28.8   MCHC 31.9  --  31.7   RDW 14.0  --  13.8     --  199       Last Basic Metabolic Panel:  Recent Labs   Lab Test " 06/30/22  1928 04/28/22  0655    139   POTASSIUM 4.5 4.4   CHLORIDE 103 104   JOSUE 8.8 8.3*   CO2 31 32   BUN 14 9   CR 0.70 0.68   GLC 97 80       Liver Function Studies -   Recent Labs   Lab Test 04/28/22  0655 04/07/22  0735   PROTTOTAL 7.2 7.2   ALBUMIN 3.2* 3.2*   BILITOTAL 0.4 0.4   ALKPHOS 68 69   AST 16 20   ALT 18 23       TSH   Date Value Ref Range Status   04/28/2022 2.77 0.40 - 4.00 mU/L Final   04/07/2022 3.94 0.40 - 4.00 mU/L Final     Lab Results   Component Value Date    A1C 5.5 04/28/2022    A1C 5.6 04/07/2022     Recent Labs   Lab Test 04/28/22  0655 04/07/22  0735   CHOL 101 107   HDL 40 48   LDL 50 49   TRIG 54 50     ESOPHAGRAM   5/13/2022 3:21 PM   IMPRESSION:  1. Limited esophagram demonstrates mild luminal narrowing at the most  proximal aspect of the esophagus with some mucosal versus submucosal  mild nodularity that is nonspecific. Recommend further assessment with  endoscopic visualization.  2. Presbyesophagus.  3. The distal esophagus appears normal with normal passage of contrast  into the stomach without impairment. The GE junction appears  unremarkable.     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   Chronic low mood, suspect JOSÉ MANUEL with panic attacks for many years.   On memory care unit due to safety concerns, currently no access to ETOH.   Reports some fatigue daytime sleepiness and strange thoughts at night.   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  Some cough and pill dysphagia.  Saw ENT, no explanation.  Last esophagram May 2022 as above, presbyesophagus, proximal luminal narrowing.  Plan:   - Re-consult SLP due to recurrent pneumonia  - Complete Augmentin  - Repeat CXR in ~ 6 weeks    (I71.2) Thoracic aortic aneurysm without rupture (H) - 6/30/22 4.6 cm  Comment: New finding in ER June 2022  Plan:   - Reviewed plan for CT and echo in 3 months (~ 10/2022)   - HR and BP control    (D64.9) Normocytic Anemia  Comment: Chronic, mild, Hgb 12.9 and MCV 91 on 6/30/22  Plan:   - Follow serial Hgb, daughter finds this reassuring     (K59.04) Chronic constipation  Comment: Chronic, long hx of Levsin multiple times per day for abd pain, but stopped by urology when started trospium.  Plan:   - Monitor bowel habits off Levsin  - polyethylene glycol (MIRALAX) 17 GM/Dose powder; 1-2 capfuls in 8 oz of ORANGE JUICE PO one time each morning and 1-2 capfuls 8 oz of ORANGE JUICE PO one time daily PRN for constipation. Please mix medication in juice in front of the patient. Hold for loose stools.   - GI following     (I48.91) Atrial fibrillation, unspecified type (H)  Comment: Chronic, not anticoagulated, rate controlled off medication  MCN1DS5-PPTa: 3 (age, HTN), 3.2% risk of stroke per year  Plan:   - Monitor VS and clinical status  - Would not add AC due to falls   - Consider cardiology follow-up    (I10) Essential hypertension, benign  Comment: BP at goal of <  150/90 given age and comorbid conditions  Stopped ASA due to hemoptysis  BP Readings from Last 3 Encounters:   08/09/22 137/85   08/05/22 116/62   07/25/22 125/69   Plan:   - Monitor routine VS and labs off anti-hypertensive medications    (M15.9) OA Multiple Sites  (M81.0) Osteoporosis  (Z87.81) History of vertebral fracture  (R26.9) Abnormal gait  Comment: DJD multiple sites and hx of multiple spinal compression fractures per chart review in setting of recurrent falls. Looks to have been on Fosamax in past.   Plan:   - Tylenol 650 mg q 6 hours PRN  - Discuss bisphosphonate vs injectable medication for osteoporosis with PharmD at next visit, consider endocrine referral     (97.20) Elevated PSA  Comment: Chronic  PSA 13.45 in March 2021, Repeat PSA 11.3 on 4/7/22  Saw urology, recommendation for prostate biopsy  Plan:  - Trail trospium BID  - Family to schedule prostate biopsy, fearful about this procedure, recommendation to discuss again with urology regarding pain mgmt, consider anti-anxiety medication prior as appropriate    (Z71.89) ACP (advance care planning)  Comment: HCD on file, Markos 1st agent, Coral 2nd; POLST scanned into Epic  Plan:   - POLST for Full Code, no changes at this time    Orders:  - Discontinue mirtazapine  - Increase celexa  - SLP consult   - Complete Augumentin   - Schedule prostate biopsy     Spoke to daughter Coral regarding medication changes and plan of care.    Called lymph OT and requested SLP consult.      Electronically signed by:  MARICHUY Basilio CNP         Sincerely,        MARICHUY Basilio CNP

## 2022-08-17 DIAGNOSIS — R97.20 ELEVATED PROSTATE SPECIFIC ANTIGEN (PSA): Primary | ICD-10-CM

## 2022-08-17 DIAGNOSIS — Z79.2 PROPHYLACTIC ANTIBIOTIC: ICD-10-CM

## 2022-08-17 DIAGNOSIS — N32.81 OVERACTIVE BLADDER: Primary | ICD-10-CM

## 2022-08-17 RX ORDER — CIPROFLOXACIN 500 MG/1
500 TABLET, FILM COATED ORAL 2 TIMES DAILY
Qty: 6 TABLET | Refills: 0 | Status: SHIPPED | OUTPATIENT
Start: 2022-08-17 | End: 2022-08-20

## 2022-08-18 RX ORDER — TROSPIUM CHLORIDE 20 MG/1
TABLET, FILM COATED ORAL
Qty: 180 TABLET | Refills: 97 | Status: SHIPPED | OUTPATIENT
Start: 2022-08-18 | End: 2022-11-18

## 2022-08-23 ENCOUNTER — PATIENT OUTREACH (OUTPATIENT)
Dept: GERIATRIC MEDICINE | Facility: CLINIC | Age: 82
End: 2022-08-23

## 2022-08-23 RX ORDER — CITALOPRAM HYDROBROMIDE 10 MG/1
10 TABLET ORAL DAILY
Qty: 30 TABLET | Refills: 98 | Status: ON HOLD | OUTPATIENT
Start: 2022-08-23 | End: 2022-09-28

## 2022-08-23 NOTE — PATIENT INSTRUCTIONS
Jamie Valdivia  1940  ORDERS:  - Discontinue Mirtazapine  - Discontinue Celexa  - Celexa 10 mg PO once daily dx depression  Electronically signed by:   MARICHUY Basilio CNP  08/23/22 9:10 AM

## 2022-08-23 NOTE — PROGRESS NOTES
No call to member. [facility] Regulatory update completed.  Nita Valladares RN  [facility], [place]  [phone]

## 2022-08-24 DIAGNOSIS — N40.2 PROSTATE NODULE: Primary | ICD-10-CM

## 2022-08-25 RX ORDER — CLINDAMYCIN PHOSPHATE 900 MG/50ML
900 INJECTION, SOLUTION INTRAVENOUS SEE ADMIN INSTRUCTIONS
Status: CANCELLED | OUTPATIENT
Start: 2022-08-25

## 2022-08-25 RX ORDER — CLINDAMYCIN PHOSPHATE 900 MG/50ML
900 INJECTION, SOLUTION INTRAVENOUS
Status: CANCELLED | OUTPATIENT
Start: 2022-08-25

## 2022-08-30 ENCOUNTER — ASSISTED LIVING VISIT (OUTPATIENT)
Dept: GERIATRICS | Facility: CLINIC | Age: 82
End: 2022-08-30
Payer: COMMERCIAL

## 2022-08-30 VITALS — OXYGEN SATURATION: 95 % | WEIGHT: 229.1 LBS | BODY MASS INDEX: 33.93 KG/M2 | TEMPERATURE: 98.9 F | HEIGHT: 69 IN

## 2022-08-30 DIAGNOSIS — K22.70 BARRETT'S ESOPHAGUS WITHOUT DYSPLASIA: ICD-10-CM

## 2022-08-30 DIAGNOSIS — I48.91 ATRIAL FIBRILLATION, UNSPECIFIED TYPE (H): ICD-10-CM

## 2022-08-30 DIAGNOSIS — M15.0 PRIMARY OSTEOARTHRITIS INVOLVING MULTIPLE JOINTS: ICD-10-CM

## 2022-08-30 DIAGNOSIS — M81.0 OSTEOPOROSIS, UNSPECIFIED OSTEOPOROSIS TYPE, UNSPECIFIED PATHOLOGICAL FRACTURE PRESENCE: ICD-10-CM

## 2022-08-30 DIAGNOSIS — R13.13 PHARYNGEAL DYSPHAGIA: ICD-10-CM

## 2022-08-30 DIAGNOSIS — F41.1 GAD (GENERALIZED ANXIETY DISORDER): Primary | ICD-10-CM

## 2022-08-30 DIAGNOSIS — R60.0 LOWER EXTREMITY EDEMA: ICD-10-CM

## 2022-08-30 DIAGNOSIS — I71.20 THORACIC AORTIC ANEURYSM WITHOUT RUPTURE (H): ICD-10-CM

## 2022-08-30 NOTE — LETTER
"    8/30/2022        RE: Jamie Valdivia  [facility]  [address]  [facility] [address]        Jamie Valdivia is a 82 year old male seen August 30, 2022 at [facility] unit where he has resided for 5 months (admit 3/2022) seen to follow up multiple somatic complaints and anxiety.     Continuous sore throat and \"hard to swallow.  I got a real bad stomach.\"     EGD showed Vickers's esophagus.    Seen by ENT without any findings.   No further coughing up blood.    \"I don't sleep at night.   I got a lot of things on my mind.\"  Worried about prostate biopsy and maybe having cancer.    Friday : trouble breathing  [place] nursing staff has reported delusional thoughts, e.g.,  Wife jetting off to [place] with other men.  Doctor's sleeping in her apartment   (Zyprexa was tried for this, but stopped secondary to daytime sleepiness)   CP episodes and wanting to drink again - better with citalopram, but still feels alcohol would help with his symptoms, specifically his anxiety.         Cough and trouble breathing, feeling like \"fluid in his lungs\" reported by family last week, pt does not report that today and thinks his breathing is okay.       By chart review, pt has a h/o GERD with esophgitis, significant alcohol abuse, peripheral neuropathy, atrial fib, HTN, osteoporosis with vertebral fracture and OA.  His problem with dysphagia is not new, had EGD in 2019 that showed circumferential ulceration and necrosis at the hypopharynx to UES.   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    Pt had a [facility] stay in November 2021 for weakness, dehydration and alcohol withdrawal.  Has cognitive decline secondary to longstanding alcoholism (lifetime per family) and a h/o hallucinations.   He was hospitalized at [facility] in December 2021 when his wife was hospitalized " and he could not manage at home.  Admitted for detox, with weakness and confusion in unsafe living conditions.  He transferred to [facility] for [facility] month stay.  Started on olanzapine secondary to hallucinations, delusions and wandering  Worked with therapies and has regained ambulation with 4WW.   At TCU discharge he moved to [place] [facility] unit, with his wife in a different [place] apartment.      H/o variceal bleed in 2002    He also manages symptoms of IBS with Levsin (calls it the H pill) and Miralax   Pt was seen in the ED in June 2022 with CP.  Workup negative for ischemia or PE.   Chest CT did show 4.6cm ascending thoracic aortic aneurysm and fluid-filled bronchioles in the lower lungs.   Also had left basilar pneumonia on CXR   No fever or leukocytosis.  He was treated with Augmentin and returned to [place].     Past Medical History:   Diagnosis Date     Age-related osteoporosis without current pathological fracture 03/30/2022     Alcohol abuse 11/19/2017     Alcohol dependence with withdrawal (H)      Alcoholism (H)      Back pain      Backache 12/19/2018     CAD (coronary artery disease)      Chronic a-fib (H)      Chronic alcohol use 06/11/2015    Formatting of this note might be different from the original. 2/26/2019: serious fall at home with multiple vertebral fractures.  BAL 0.414% on admission.  Denies that he drinks much. 12/18/2018: hospitalized for fall due to alcohol intoxication.  BAL 0.34% on admission. 9/16/2018: hospitalized for injuries from fall due to alcohol intoxication.  BAL 0.34% on admission     Chronic atrial fibrillation (H) 07/15/2016    Formatting of this note might be different from the original. 2/2019: Multiple falls so started on aspirin only.  Then aspirin stopped due to GI bleed.     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 2/2/21     HTN (hypertension)      Impaired gait and mobility 03/14/2019     Mild cognitive impairment 08/12/2019    Formatting of this note might be different from the original. NON-AMNESTIC TYPE     Mild TBI (traumatic brain injury) (H) 03/14/2019     Mumps      Obesity (BMI 30-39.9) 09/03/2015     Osteoarthritis      Osteoarthritis of both knees 05/13/2015     Osteoporosis      Other idiopathic peripheral autonomic neuropathy 03/30/2022     Other insomnia 03/14/2019     Other seizures (H) 03/30/2022     Palpitations      Peripheral neuropathy      Pharyngeal dysphagia 05/13/2015    Formatting of this note might be different from the original. Likely secondary to GERD with esophagitis, on PPI and H2 blocker with notable improvement, EGD 10/5/15.     Physical deconditioning 03/14/2019     Recurrent major depressive disorder (H) 03/30/2022     Rhabdomyolysis      Thrombocytopenia (H)      Urination disorder      Weakness 04/27/2020       Past Surgical History:   Procedure Laterality Date     ESOPHAGOSCOPY, GASTROSCOPY, DUODENOSCOPY (EGD), COMBINED N/A 06/01/2022    Procedure: ESOPHAGOGASTRODUODENOSCOPY (EGD) mngi with biopsies using jumbo cold biopsy forceps;  Surgeon: David Springer MD;  Location:  GI     left hip replacement  2010     left shoulder replacement  2016     ORTHOPEDIC SURGERY       SPINE SURGERY      done in [place]     TONSILLECTOMY        SH:  Lived with his wife Gisela, house in [place] prior to move to [place].    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    Non smoker      ROS:   SLUMS 24/30   CPT 4.5    Ambulatory with 4WW  "  Deaf in left ear, [place] in right ear   Weight in April was 227 lbs   Wt Readings from Last 5 Encounters:   08/30/22 103.9 kg (229 lb 1.6 oz)   08/09/22 103.9 kg (229 lb 1.6 oz)   08/05/22 103.4 kg (228 lb)   07/25/22 106.1 kg (234 lb)   07/07/22 103.8 kg (228 lb 12.8 oz)      EXAM: NAD, overall looks better than previous visit   Temp 98.9  F (37.2  C)   Ht 1.753 m (5' 9\")   Wt 103.9 kg (229 lb 1.6 oz)   SpO2 95%   BMI 33.83 kg/m     Neck supple without adenopathy  Lungs with decreased BS left base, no rales or wheeze  Heart RRR s1s2, occ ectopy  Abd soft, NT, no distention or guarding, +BS  Ext with 1+ edema   Neuro: limited history, poor insight  Ambulatory without device within his apartment, moving much better   Psych: affect and content depressed     Last Comprehensive Metabolic Panel:  Sodium   Date Value Ref Range Status   06/30/2022 138 133 - 144 mmol/L Final     Potassium   Date Value Ref Range Status   06/30/2022 4.5 3.4 - 5.3 mmol/L Final     Chloride   Date Value Ref Range Status   06/30/2022 103 94 - 109 mmol/L Final     Carbon Dioxide (CO2)   Date Value Ref Range Status   06/30/2022 31 20 - 32 mmol/L Final     Anion Gap   Date Value Ref Range Status   06/30/2022 4 3 - 14 mmol/L Final     Glucose   Date Value Ref Range Status   06/30/2022 97 70 - 99 mg/dL Final     Urea Nitrogen   Date Value Ref Range Status   06/30/2022 14 7 - 30 mg/dL Final     Creatinine   Date Value Ref Range Status   06/30/2022 0.70 0.66 - 1.25 mg/dL Final     GFR Estimate   Date Value Ref Range Status   06/30/2022 >90 >60 mL/min/1.73m2 Final     Comment:     Effective December 21, 2021 eGFRcr in adults is calculated using the 2021 CKD-EPI creatinine equation which includes age and gender (Curt inman al., NEJ, DOI: 10.1056/QEYFqt2160719)     Calcium   Date Value Ref Range Status   06/30/2022 8.8 8.5 - 10.1 mg/dL Final      ALBUMIN 3.2 04/28/2022     Lab Results   Component Value Date    WBC 6.1 06/30/2022      HGB 12.9 06/30/2022 "      MCV 91 06/30/2022       06/30/2022     CT CHEST PULMONARY EMBOLISM W CONTRAST   6/30/2022   ANGIOGRAM CHEST: Pulmonary arteries are normal caliber and negative for pulmonary emboli. The ascending thoracic aorta is aneurysmal at 4.6 cm. The aorta is not well-opacified which limits evaluation for dissection. No dissection in the ascending thoracic aorta. No CT evidence of right heart strain.  LUNGS AND PLEURA: Dependent atelectasis bilaterally. Scarring at the lung apices and bases.   MUSCULOSKELETAL: Spinal rods. Degenerative disease and prominent kyphosis in the thoracic spine. Left shoulder arthroplasty.                                                            IMPRESSION:  1.  There is no pulmonary embolus.  2.  4.6 cm ascending thoracic aortic aneurysm.    ESOPHAGRAM   5/13/2022   1. Limited esophagram demonstrates mild luminal narrowing at the most  proximal aspect of the esophagus with some mucosal versus submucosal  mild nodularity that is nonspecific. Recommend further assessment with endoscopic visualization.  2. Presbyesophagus.  3. The distal esophagus appears normal with normal passage of contrast  into the stomach without impairment. The GE junction appears unremarkable.      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 Speech  therapy     (I71.2) Thoracic aortic aneurysm without rupture (H)  Comment: 4.6 cm ascending aorta     Plan: repeat imaging 3 months     (G62.9) Peripheral polyneuropathy  Comment: secondary to alcoholism     Plan: gabapentin 200 mg/ HS     (I48.91) Atrial fibrillation, unspecified type (H)  Comment:   Pulse Readings from Last 4 Encounters:   08/09/22 63   07/25/22 56   07/07/22 57   07/01/22 53      Plan: CHADS-VASc score 3  Not anticoagulated secondary to alcohol, falls   Does remain on daily aspirin 81 mg    (I10) Essential hypertension, benign  (R60.0) Lower extremity edema  Comment:   BP Readings from Last 3 Encounters:   08/09/22 137/85   08/05/22 116/62   07/25/22 125/69      Plan: not currently on anti-hypertensives  LE compression and elevation as tolerated.   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      (M17.0) Primary osteoarthritis of both knees  Comment: ambulatory with 4WW  Plan: prn acetaminophen, local measures       (M81.0) Osteoporosis  (Z87.81) History of vertebral fracture  Comment: falls   Plan:  vit D level replacement  Dietary calcium     (R97.20) Elevated prostate specific antigen (PSA)  Comment: most recently >13   Plan: Urology follow up   Remains on trospium 20 mg bid for  symptoms     (K59.01) Slow transit constipation  Comment: chronic GI discomfort   Plan: uses Miralax, prn Levsin       Talked with daughter Coral by phone today; she is agreement with current changes.        Tabatha Quintana MD         Sincerely,        Tabatha Quintana MD

## 2022-08-30 NOTE — PROGRESS NOTES
"Jamie Valdivia is a 82 year old male seen August 30, 2022 at [facility] unit where he has resided for 5 months (admit 3/2022) seen to follow up multiple somatic complaints and anxiety.     Continuous sore throat and \"hard to swallow.  I got a real bad stomach.\"     EGD showed Vickers's esophagus.    Seen by ENT without any findings.   No further coughing up blood.    \"I don't sleep at night.   I got a lot of things on my mind.\"  Worried about prostate biopsy and maybe having cancer.    Friday : trouble breathing  [place] nursing staff has reported delusional thoughts, e.g.,  Wife jetting off to [place] with other men.  Doctor's sleeping in her apartment   (Zyprexa was tried for this, but stopped secondary to daytime sleepiness)   CP episodes and wanting to drink again - better with citalopram, but still feels alcohol would help with his symptoms, specifically his anxiety.         Cough and trouble breathing, feeling like \"fluid in his lungs\" reported by family last week, pt does not report that today and thinks his breathing is okay.       By chart review, pt has a h/o GERD with esophgitis, significant alcohol abuse, peripheral neuropathy, atrial fib, HTN, osteoporosis with vertebral fracture and OA.  His problem with dysphagia is not new, had EGD in 2019 that showed circumferential ulceration and necrosis at the hypopharynx to UES.   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    Pt had a [facility] stay in November 2021 for weakness, dehydration and alcohol withdrawal.  Has cognitive decline secondary to longstanding alcoholism (lifetime per family) and a h/o hallucinations.   He was hospitalized at [facility] in December 2021 when his wife was hospitalized and he could not manage at home.  Admitted for detox, with weakness and confusion in unsafe living conditions.  He transferred to " [facility] for 3 month stay.  Started on olanzapine secondary to hallucinations, delusions and wandering  Worked with therapies and has regained ambulation with 4WW.   At TCU discharge he moved to [place] Memory Care unit, with his wife in a different [place] apartment.      H/o variceal bleed in 2002    He also manages symptoms of IBS with Levsin (calls it the H pill) and Miralax   Pt was seen in the ED in June 2022 with CP.  Workup negative for ischemia or PE.   Chest CT did show 4.6cm ascending thoracic aortic aneurysm and fluid-filled bronchioles in the lower lungs.   Also had left basilar pneumonia on CXR   No fever or leukocytosis.  He was treated with Augmentin and returned to [place].     Past Medical History:   Diagnosis Date     Age-related osteoporosis without current pathological fracture 03/30/2022     Alcohol abuse 11/19/2017     Alcohol dependence with withdrawal (H)      Alcoholism (H)      Back pain      Backache 12/19/2018     CAD (coronary artery disease)      Chronic a-fib (H)      Chronic alcohol use 06/11/2015    Formatting of this note might be different from the original. 2/26/2019: serious fall at home with multiple vertebral fractures.  BAL 0.414% on admission.  Denies that he drinks much. 12/18/2018: hospitalized for fall due to alcohol intoxication.  BAL 0.34% on admission. 9/16/2018: hospitalized for injuries from fall due to alcohol intoxication.  BAL 0.34% on admission     Chronic atrial fibrillation (H) 07/15/2016    Formatting of this note might be different from the original. 2/2019: Multiple falls so started on aspirin only.  Then aspirin stopped due to GI bleed.     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 2/2/21     HTN (hypertension)      Impaired gait and mobility 03/14/2019     Mild cognitive impairment 08/12/2019    Formatting of this note might be different from the original. NON-AMNESTIC TYPE     Mild TBI (traumatic brain injury) (H) 03/14/2019     Mumps      Obesity (BMI 30-39.9) 09/03/2015     Osteoarthritis      Osteoarthritis of both knees 05/13/2015     Osteoporosis      Other idiopathic peripheral autonomic neuropathy 03/30/2022     Other insomnia 03/14/2019     Other seizures (H) 03/30/2022     Palpitations      Peripheral neuropathy      Pharyngeal dysphagia 05/13/2015    Formatting of this note might be different from the original. Likely secondary to GERD with esophagitis, on PPI and H2 blocker with notable improvement, EGD 10/5/15.     Physical deconditioning 03/14/2019     Recurrent major depressive disorder (H) 03/30/2022     Rhabdomyolysis      Thrombocytopenia (H)      Urination disorder      Weakness 04/27/2020       Past Surgical History:   Procedure Laterality Date     ESOPHAGOSCOPY, GASTROSCOPY, DUODENOSCOPY (EGD), COMBINED N/A 06/01/2022    Procedure: ESOPHAGOGASTRODUODENOSCOPY (EGD) mngi with biopsies using jumbo cold biopsy forceps;  Surgeon: David Springer MD;  Location:  GI     left hip replacement  2010     left shoulder replacement  2016     ORTHOPEDIC SURGERY       SPINE SURGERY      done in [place]     TONSILLECTOMY        SH:  Lived with his wife Gisela, house in [place] prior to move to [place].    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    Non smoker      ROS:   SLUMS 24/30   CPT 4.5    Ambulatory with 4WW   Deaf in left ear, [place] in right ear   Weight in April was 227 lbs   Wt Readings from Last 5 Encounters:   08/30/22 103.9 kg (229 lb 1.6  "oz)   08/09/22 103.9 kg (229 lb 1.6 oz)   08/05/22 103.4 kg (228 lb)   07/25/22 106.1 kg (234 lb)   07/07/22 103.8 kg (228 lb 12.8 oz)      EXAM: NAD, overall looks better than previous visit   Temp 98.9  F (37.2  C)   Ht 1.753 m (5' 9\")   Wt 103.9 kg (229 lb 1.6 oz)   SpO2 95%   BMI 33.83 kg/m     Neck supple without adenopathy  Lungs with decreased BS left base, no rales or wheeze  Heart RRR s1s2, occ ectopy  Abd soft, NT, no distention or guarding, +BS  Ext with 1+ edema   Neuro: limited history, poor insight  Ambulatory without device within his apartment, moving much better   Psych: affect and content depressed     Last Comprehensive Metabolic Panel:  Sodium   Date Value Ref Range Status   06/30/2022 138 133 - 144 mmol/L Final     Potassium   Date Value Ref Range Status   06/30/2022 4.5 3.4 - 5.3 mmol/L Final     Chloride   Date Value Ref Range Status   06/30/2022 103 94 - 109 mmol/L Final     Carbon Dioxide (CO2)   Date Value Ref Range Status   06/30/2022 31 20 - 32 mmol/L Final     Anion Gap   Date Value Ref Range Status   06/30/2022 4 3 - 14 mmol/L Final     Glucose   Date Value Ref Range Status   06/30/2022 97 70 - 99 mg/dL Final     Urea Nitrogen   Date Value Ref Range Status   06/30/2022 14 7 - 30 mg/dL Final     Creatinine   Date Value Ref Range Status   06/30/2022 0.70 0.66 - 1.25 mg/dL Final     GFR Estimate   Date Value Ref Range Status   06/30/2022 >90 >60 mL/min/1.73m2 Final     Comment:     Effective December 21, 2021 eGFRcr in adults is calculated using the 2021 CKD-EPI creatinine equation which includes age and gender (Curt inman al., NEJM, DOI: 10.1056/CDHEyt6572444)     Calcium   Date Value Ref Range Status   06/30/2022 8.8 8.5 - 10.1 mg/dL Final      ALBUMIN 3.2 04/28/2022     Lab Results   Component Value Date    WBC 6.1 06/30/2022      HGB 12.9 06/30/2022      MCV 91 06/30/2022       06/30/2022     CT CHEST PULMONARY EMBOLISM W CONTRAST   6/30/2022   ANGIOGRAM CHEST: Pulmonary " arteries are normal caliber and negative for pulmonary emboli. The ascending thoracic aorta is aneurysmal at 4.6 cm. The aorta is not well-opacified which limits evaluation for dissection. No dissection in the ascending thoracic aorta. No CT evidence of right heart strain.  LUNGS AND PLEURA: Dependent atelectasis bilaterally. Scarring at the lung apices and bases.   MUSCULOSKELETAL: Spinal rods. Degenerative disease and prominent kyphosis in the thoracic spine. Left shoulder arthroplasty.                                                            IMPRESSION:  1.  There is no pulmonary embolus.  2.  4.6 cm ascending thoracic aortic aneurysm.    ESOPHAGRAM   5/13/2022   1. Limited esophagram demonstrates mild luminal narrowing at the most  proximal aspect of the esophagus with some mucosal versus submucosal  mild nodularity that is nonspecific. Recommend further assessment with endoscopic visualization.  2. Presbyesophagus.  3. The distal esophagus appears normal with normal passage of contrast  into the stomach without impairment. The GE junction appears unremarkable.      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  Comment: EGD in June 2022     Plan: omeprazole 40 mg bid indefinitely  [facility] up with [facility]      (R13.13) Pharyngeal dysphagia  Comment: unexplained difficulty swallowing   No significant findings by ENT or GI  Plan: [facility] Speech therapy     (I71.2) Thoracic aortic aneurysm without rupture (H)  Comment: 4.6 cm ascending aorta     Plan: repeat imaging 3 months      (G62.9) Peripheral polyneuropathy  Comment: secondary to alcoholism     Plan: gabapentin 200 mg/ HS     (I48.91) Atrial fibrillation, unspecified type (H)  Comment:   Pulse Readings from Last 4 Encounters:   08/09/22 63   07/25/22 56   07/07/22 57   07/01/22 53      Plan: CHADS-VASc score 3  Not anticoagulated secondary to alcohol, falls   Does remain on daily aspirin 81 mg    (I10) Essential hypertension, benign  (R60.0) Lower extremity edema  Comment:   BP Readings from Last 3 Encounters:   08/09/22 137/85   08/05/22 116/62   07/25/22 125/69      Plan: not currently on anti-hypertensives  LE compression and elevation as tolerated.   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      (M17.0) Primary osteoarthritis of both knees  Comment: ambulatory with 4WW  Plan: prn acetaminophen, local measures       (M81.0) Osteoporosis  (Z87.81) History of vertebral fracture  Comment: falls   Plan:  vit D level replacement  Dietary calcium     (R97.20) Elevated prostate specific antigen (PSA)  Comment: most recently >13   Plan: Urology follow up   Remains on trospium 20 mg bid for  symptoms     (K59.01) Slow transit constipation  Comment: chronic GI discomfort   Plan: uses Miralax, prn Levsin       Talked with daughter Coral by phone today; she is agreement with current changes.        Tabatha Quintana MD

## 2022-08-31 ENCOUNTER — TELEPHONE (OUTPATIENT)
Dept: GERIATRICS | Facility: CLINIC | Age: 82
End: 2022-08-31

## 2022-08-31 ENCOUNTER — TELEPHONE (OUTPATIENT)
Dept: OTHER | Facility: CLINIC | Age: 82
End: 2022-08-31

## 2022-08-31 DIAGNOSIS — I71.20 THORACIC AORTIC ANEURYSM WITHOUT RUPTURE (H): Primary | ICD-10-CM

## 2022-08-31 NOTE — TELEPHONE ENCOUNTER
[facility] GERIATRICS TELEPHONE NOTE    Daughter Viola requested phone call regarding visit with Dr. Quintana yesterday.    Reviewed medications changes.  Vascular follow-up, Viola to schedule, referral placed.  Prostate biopsy on 9/28, will need pre-op, doing procedure in OR.  Viola to bring pre-op orders to nursing office.    MARICHUY Basilio CNP  08/31/22 2:46 PM

## 2022-08-31 NOTE — TELEPHONE ENCOUNTER
Referred to [facility] by Sylvia Stein, MARICHUY CNP for  ascending thoracic aorta is aneurysmal 4.6 cm; CT chest 6/30/22 in Epic.    Pt needs to be scheduled for new patient consult with Vascular Medicine (Dr. Lucio or Dr. Dominguez)  Will route to scheduling to coordinate an appointment at next available.    Elke Alberto, MICHAELN, RN  Formerly [facility]

## 2022-09-01 NOTE — TELEPHONE ENCOUNTER
Left voicemail with instructions for patient to call back to schedule their appointment(s)    September 1, 2022 , 10:35 AM

## 2022-09-02 ENCOUNTER — PATIENT OUTREACH (OUTPATIENT)
Dept: GERIATRIC MEDICINE | Facility: CLINIC | Age: 82
End: 2022-09-02

## 2022-09-02 NOTE — PROGRESS NOTES
[facility] Care Coordination Contact      [facility] Six-Month Telephone Assessment    6 month telephone assessment completed on 9/2/22 with members daughter, Alexia.    ER visits: Yes -  [facility] 6/30/22 for thoracic aortic aneurysm without rupture.  Hospitalizations: No  TCU stays: No  Significant health status changes: will be having prostate biopsy at end of Sept - member is anxious about this per daughter.  Falls/Injuries: No  ADL/IADL changes: No  Changes in services: No, remains at [facility] in memory care    Caregiver Assessment follow up:  n/a    Goals: See POC in chart for goal progress documentation.  Alexia reports member is stable.  He is worried about upcoming prostate biopsy.  Alexia says they are very pleased with work [facility] clinic did, compression stockings ordered.  MTM following member. Has f/u with Vascular in October. Alexia denied any unmet needs at this time.     Will see member in 6 months for an annual health risk assessment. Encouraged member to call CC with any questions or concerns in the meantime.     GUICHO Littlejohn  Formerly [facility], [facility]1983 Lead Care Coordinator  Cell: [phone]

## 2022-09-09 DIAGNOSIS — F41.1 GENERALIZED ANXIETY DISORDER: Primary | ICD-10-CM

## 2022-09-12 RX ORDER — QUETIAPINE FUMARATE 25 MG/1
TABLET, FILM COATED ORAL
Qty: 45 TABLET | Refills: 97 | Status: SHIPPED | OUTPATIENT
Start: 2022-09-12 | End: 2022-09-30

## 2022-09-12 NOTE — PROGRESS NOTES
Medication Therapy Management (MTM) Encounter    ASSESSMENT:                            Medication Adherence/Access: No issues identified    Dementia with behavioral disturbance, Insomnia, Depression with anxiety, h/o alcohol abuse: Due to continued reports of depression and anxiety, patient may benefit from increase in citalopram dose, and change administration time to bedtime to potentially help with sleep.    Neuropathy, Pain: Stable.    Urge incontinence: May benefit from d'c of Sanctura due to patient noting no benefit, and of note, this med is highly anticholinergic/on [place] due to anticholinergic effects, and should be avoided in elderly, especially with dementia due to potential to contribute to cognitive decline, confusion, as well as falls.  May be contributing to constipation/abdominal pain as well.  If alternative agent is necessary, could consider Myrbetriq in future, and monitor BP due to potential for Myrbetriq to contribute to elevated blood pressure.    Constipation, Abdominal pain:  Possible that patient is having abdominal pain due to constipation, and Sanctura may be contributing to this as noted above.  As noted, may benefit from d'c of Sanctura.  Discussed potential of Miralax to contribute to abdominal discomfort, especially with higher doses he tends to take.  If abdominal pain continues, may need to consider reduction of Miralax dose in future.    GERD, Vickers's esophagus: Stable.    Osteoporosis: May benefit from scheduling Tums for calcium supplementation, in addition to monthly vitamin D (patient preference for limited pills).  Oral bisphosphonate not great option for this patient due to history of Vickers's esophagus, GERD.  If patient/family prefers, could consider endocrinology referral for potential Prolia due to high fall/fracture risk.        PLAN:                            1. Provider may consider increasing citalopram to 20mg daily and change administration time to  "bedtime.  2. Provider may consider d'c Sanctura.  Monitor if abdominal pain improves.  3. Provider may consider scheduling Tums EX 750mg - 1 tablet twice daily.  Please also consider addition of vitamin D 50,000u monthly.  4.  May consider referral to endocrinology regarding Prolia.       Follow-up: 6-8 weeks, sooner if necessaary    SUBJECTIVE/OBJECTIVE:                          Jamie Valdivia is an 82 year old male seen for a follow-up visit.  Today's visit is a follow-up MTM visit from 7/26/22.     Reason for visit: follow-up MTM.    Allergies/ADRs: Reviewed in chart  Past Medical History: Reviewed in chart  Tobacco: He reports that he has never smoked. He has never used smokeless tobacco.  Alcohol: history of alcohol dependence, alcoholic hepatitis  Assistive Devices: walker for ambulation.  H/o falls, weakness.    Medication Adherence/Access: Patient has assistance with medication administration: assisted living.    Dementia with behavioral disturbance, Insomnia, Depression with anxiety, h/o alcohol abuse: Current medications: citalopram 10mg every morning, gabapentin 200mg at bedtime, quetiapine 12.5mg at bedtime. On scheduled olanzapine previously due to hallucinations, delusions.  Due to returning symptoms/delusional thoughts, low dose quetiapine initiated on 8/31. Previously on mirtazapine, dc'd on 8/9 due to daytime sleepiness, and at same time citalopram was increased from 5mg to 10mg daily. Patient states he has been feeling scared and sad at times. Reports he has a lot of stress. Patient also reports that he has a difficult time sleeping.     Neuropathy, Pain: Current medications include acetaminophen 1000mg every 6hr as needed, gabapentin 200mg at bedtime.   History of right knee pain, history of shoulder and hip replacement. Patient reports intermittent \"bad\" knee pain. States that the Tylenol is helpful with this breakthrough pain when used, although reports not needing it very often.    Urge " "incontinence: Current medication: Sanctura 20mg twice daily.  This was started by urology on 8/5/22 due to patient reporting urinary urgency and incontinence, going through several Depends a day.  Weak/low stream also reported.  At same time, hyoscyamine was discontinued. Still has frequent urination and incontinence. Patient reports that urinary frequency has not changed since starting Sanctura. He notes increased dizziness recently when he stands up. States that he has not experienced fogginess or confusion. Does report that he is having trouble focusing his eyes.  Has prostate biopsy coming up end of September.       Constipation, Abdominal pain:  Current medications: Miralax 1-2 capfuls daily & daily as needed. Patient states that he hasn't been feeling well for a couple of months when asked how he is feeling today. He reports that his stomach problems are his biggest concern at this time. When he coughs, he states that his lower abdomen hurts. Notes abdomen \"hurts all the time,\" but it hurts more when he coughs. He denies constipation or diarrhea.  Notes a greenish-black stool last week, but this has not continued.  Feels nauseous.     GERD, Vickers's esophagus: Current therapy includes: omeprazole 40mg twice daily, Tums 750 mg, takes 2 tablets as needed, Maalox as needed.  Tums are self-administered, and difficult to know how frequently he is taking these.  Did not complain of heartburn/reflux, but as noted above, complaining of nausea and abdominal pain.    Osteoporosis: Current therapy includes: none.  Receives some calcium through prn Tums - unclear how frequently he is using this.  Notes he has been drinking milk more recently, but unable to say how much.  Per recent NP note, patient was on alendronate in the past.  Risk factors: chronic PPI use, DJD multiple sites and history of multiple spinal compression fractures in setting of recurrent falls  Last vitamin D level:  No results found for: VITDT      "     Today's Vitals: There were no vitals taken for this visit.  ----------------    I spent 30 minutes with this patient today. A copy of the visit note was provided to the patient's provider(s).    The patient was not mailed a summary of these recommendations due to cognitive impairment.     Jasmyne Bergman, Student Pharmacist    Sindy Hanley, Pharm.D.,[facility]  Board Certified Geriatric Pharmacist  Medication Therapy Management Pharmacist  [phone]     Medication Therapy Recommendations Needing Review  Depression with anxiety    Current Medication: citalopram (CELEXA) 10 MG tablet   Rationale: Dose too low   Recommendation: Increase Dose         Nutritional deficiency    Rationale: Preventive therapy   Recommendation: Start Medication - Vitamin D (Ergocalciferol) 88701 units Caps - Take one capsule by mouth monthly         Osteoporosis, unspecified osteoporosis type, unspecified pathological fracture presence    Current Medication: calcium carbonate 750 MG CHEW   Rationale: Frequency inappropriate   Recommendation: Increase Frequency - Tums E-X 750 750 MG Chew - take 1 tablet by mouth twice daily         Urge incontinence of urine    Current Medication: trospium (SANCTURA) 20 MG tablet   Rationale: Undesirable effect   Recommendation: Discontinue Medication

## 2022-09-13 ENCOUNTER — OFFICE VISIT (OUTPATIENT)
Dept: PHARMACY | Facility: CLINIC | Age: 82
End: 2022-09-13
Payer: COMMERCIAL

## 2022-09-13 ENCOUNTER — TELEPHONE (OUTPATIENT)
Dept: UROLOGY | Facility: CLINIC | Age: 82
End: 2022-09-13

## 2022-09-13 ENCOUNTER — OFFICE VISIT (OUTPATIENT)
Dept: GERIATRICS | Facility: CLINIC | Age: 82
End: 2022-09-13
Payer: COMMERCIAL

## 2022-09-13 DIAGNOSIS — M15.0 PRIMARY OSTEOARTHRITIS INVOLVING MULTIPLE JOINTS: ICD-10-CM

## 2022-09-13 DIAGNOSIS — K21.00 GASTROESOPHAGEAL REFLUX DISEASE WITH ESOPHAGITIS, UNSPECIFIED WHETHER HEMORRHAGE: ICD-10-CM

## 2022-09-13 DIAGNOSIS — I71.20 THORACIC AORTIC ANEURYSM WITHOUT RUPTURE (H): ICD-10-CM

## 2022-09-13 DIAGNOSIS — R26.9 ABNORMAL GAIT: ICD-10-CM

## 2022-09-13 DIAGNOSIS — R10.13 ABDOMINAL PAIN, EPIGASTRIC: ICD-10-CM

## 2022-09-13 DIAGNOSIS — K59.01 SLOW TRANSIT CONSTIPATION: ICD-10-CM

## 2022-09-13 DIAGNOSIS — K22.70 BARRETT'S ESOPHAGUS WITHOUT DYSPLASIA: ICD-10-CM

## 2022-09-13 DIAGNOSIS — F41.8 DEPRESSION WITH ANXIETY: Primary | ICD-10-CM

## 2022-09-13 DIAGNOSIS — I48.91 ATRIAL FIBRILLATION, UNSPECIFIED TYPE (H): ICD-10-CM

## 2022-09-13 DIAGNOSIS — R97.20 ELEVATED PROSTATE SPECIFIC ANTIGEN (PSA): Primary | ICD-10-CM

## 2022-09-13 DIAGNOSIS — I10 ESSENTIAL HYPERTENSION, BENIGN: ICD-10-CM

## 2022-09-13 DIAGNOSIS — D64.9 ANEMIA, UNSPECIFIED TYPE: ICD-10-CM

## 2022-09-13 DIAGNOSIS — N39.41 URGE INCONTINENCE OF URINE: ICD-10-CM

## 2022-09-13 DIAGNOSIS — M81.0 OSTEOPOROSIS, UNSPECIFIED OSTEOPOROSIS TYPE, UNSPECIFIED PATHOLOGICAL FRACTURE PRESENCE: ICD-10-CM

## 2022-09-13 DIAGNOSIS — F10.10 ALCOHOL ABUSE: ICD-10-CM

## 2022-09-13 DIAGNOSIS — G47.01 INSOMNIA DUE TO MEDICAL CONDITION: ICD-10-CM

## 2022-09-13 DIAGNOSIS — R60.0 LOWER EXTREMITY EDEMA: ICD-10-CM

## 2022-09-13 DIAGNOSIS — K59.09 CHRONIC CONSTIPATION: ICD-10-CM

## 2022-09-13 DIAGNOSIS — Z79.2 PROPHYLACTIC ANTIBIOTIC: Primary | ICD-10-CM

## 2022-09-13 DIAGNOSIS — G62.9 NEUROPATHY: ICD-10-CM

## 2022-09-13 DIAGNOSIS — F10.27 DEMENTIA ASSOCIATED WITH ALCOHOLISM WITH BEHAVIORAL DISTURBANCE (H): ICD-10-CM

## 2022-09-13 DIAGNOSIS — F43.21 ADJUSTMENT DISORDER WITH DEPRESSED MOOD: ICD-10-CM

## 2022-09-13 DIAGNOSIS — Z87.81 HISTORY OF VERTEBRAL FRACTURE: ICD-10-CM

## 2022-09-13 DIAGNOSIS — F41.1 GAD (GENERALIZED ANXIETY DISORDER): ICD-10-CM

## 2022-09-13 DIAGNOSIS — R52 PAIN: ICD-10-CM

## 2022-09-13 DIAGNOSIS — R13.10 DYSPHAGIA, UNSPECIFIED TYPE: ICD-10-CM

## 2022-09-13 DIAGNOSIS — J18.9 RECURRENT PNEUMONIA: ICD-10-CM

## 2022-09-13 DIAGNOSIS — R04.2 HEMOPTYSIS: ICD-10-CM

## 2022-09-13 PROCEDURE — 99607 MTMS BY PHARM ADDL 15 MIN: CPT | Performed by: PHARMACIST

## 2022-09-13 PROCEDURE — 99606 MTMS BY PHARM EST 15 MIN: CPT | Performed by: PHARMACIST

## 2022-09-13 RX ORDER — CALCIUM CARBONATE 750 MG/1
750 TABLET, CHEWABLE ORAL DAILY PRN
COMMUNITY
End: 2022-09-28

## 2022-09-13 RX ORDER — SULFAMETHOXAZOLE AND TRIMETHOPRIM 400; 80 MG/1; MG/1
1 TABLET ORAL 2 TIMES DAILY
Qty: 6 TABLET | Refills: 0 | Status: SHIPPED | OUTPATIENT
Start: 2022-09-13 | End: 2022-09-16

## 2022-09-13 NOTE — TELEPHONE ENCOUNTER
[facility] Call Center    Phone Message    May a detailed message be left on voicemail: yes     Reason for Call: Other: Aga stovall PA called regarding pre op, please call her at [phone]. Thank you,     Action Taken: Message routed to:  [facility] Urology     Travel Screening: Not Applicable

## 2022-09-13 NOTE — PROGRESS NOTES
[facility] GERIATRICS  [address] [place] [address]  Phone: [phone]  Fax: [phone]  Primary Provider: Sylvia Stein  Pre-op Performing Provider: SYLVIA STIEN    PREOPERATIVE EVALUATION:  Today's date: 9/13/2022    Jamie Valdivia is a 82 year old male PMH significant for GERD with esophagitis, OA BL knees, pharyngeal dysphagia, edema, abdominal wall hernia, chronic atrial fibrillation, HTN, osteoporosis with hx of vertebral fracture, dementia, anemia, hx of COVID-19 who presents for a preoperative evaluation.    Surgical Information:  Surgery/Procedure: Transrectal Prostate Biopsy  Surgery Location: [facility] OR, same day surgery  Surgeon: Dr. Niraj Larry MD  Surgery Date: 9/28/22  Time of Surgery: 2:20 PM  Where patient plans to recover: At a [facility]  Fax number for surgical facility: Note does not need to be faxed, will be available electronically in Epic.    Type of Anesthesia Anticipated: General    Assessment & Plan     The proposed surgical procedure is considered LOW risk.    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       Hematologic    Anemia      Other Visit Diagnoses     Elevated prostate specific antigen (PSA)    -  Primary    Adjustment disorder with depressed mood        JOSÉ MANUEL (generalized anxiety disorder)        Lower extremity edema        Recurrent pneumonia        Hemoptysis        Dysphagia, unspecified type        Thoracic aortic aneurysm without rupture        Chronic constipation        Atrial fibrillation, unspecified type (H)        Essential hypertension, benign        Primary osteoarthritis involving multiple joints        Osteoporosis, unspecified osteoporosis type, unspecified pathological fracture presence        History of vertebral fracture        Abnormal gait             Possible Sleep Apnea: High risk for sleep apnea.     Risks and Recommendations:  The patient has the following additional risks and recommendations for perioperative complications:   - History of delirium or dementia  Cardiovascular:   - Low risk procedure, will not consult cardiology, may benefit from routine cardiology follow-up in future   Pulmonary:    - Recently treated pulmonary infection, early August 2022, repeat CXR prior to procedure  Anemia/Bleeding/Clotting:    - Mild anemia and does not require treatment prior to surgery. Monitor hemoglobin postoperatively  Social and Substance:    - History of ETOH abuse     Medication Instructions:   - SSRIs, SNRIs, TCAs, Antipsychotics: Continue without modification.     RECOMMENDATION:  APPROVAL GIVEN to proceed with proposed procedure pending review of diagnostic evaluation.    Labs 9/15/22    CXR 9/14/22    Subjective     HPI related to upcoming procedure:   History of elevated PSA (13.45 ng/mL in 3/2021 and 11.30 on 4/7/22) and bothersome lower urinary tract symptoms including urinary urgency and incontinence. Referred to urology in [place] in May 2021, but did not follow-up. No history of prostate biopsy in the past.  Plan for transrectal prostate biopsy with Dr. Weston on 9/28/22 under general anesthesia due to significant anxiety.      Preop Questions 9/13/2022   1. Have you ever had a heart attack or stroke? UNKNOWN - history of coronary atherosclerosis, long history of recurrent chest pain episodes in setting of anxiety, see ER visit 6/30/22, EKG showed no over ischemic changes, Troponin negative.  - CORONARY ARTERY CALCIFICATION: Severe.  - Ascending thoracic aorta is aneurysmal at 4.6 cm.    2. Have you ever had surgery on your heart or blood vessels, such as a stent placement, a coronary artery bypass, or surgery on an artery in your head, neck, heart, or legs? No   3. Do you have chest pain with activity? No   4. Do you have a history of  heart  failure? No   5. Do you currently have a cold, bronchitis or symptoms of other infection? No   6. Do you have a cough, shortness of breath, or wheezing? No   7. Do you or anyone in your family have previous history of blood clots? No   8. Do you or does anyone in your family have a serious bleeding problem such as prolonged bleeding following surgeries or cuts? UNKNOWN   9. Have you ever had problems with anemia or been told to take iron pills? YES - Chronic mild anemia, baseline Hgb 11-12.   10. Have you had any abnormal blood loss such as black, tarry or bloody stools? YES - Hx GI bleed, details unclear.  EGD on 6/1/22 showed normal stomach, duodenum, esophageal mucosa with short-segment BE, started on PPI. Hx of hemoptysis and blood from nose, saw ENT unable to identify cause. No episodes of late.   11. Have you ever had a blood transfusion? YES - no recollection of reaction    11a. Have you ever had a transfusion reaction? No   12. Are you willing to have a blood transfusion if it is medically needed before, during, or after your surgery? Yes   13. Have you or any of your relatives ever had problems with anesthesia? YES - Colonoscopy, when woke up, combative with nursing staff.   14. Do you have sleep apnea, excessive snoring or daytime drowsiness? YES - daytime drowsiness   14a. Do you have a CPAP machine? No   15. Do you have any artifical heart valves or other implanted medical devices like a pacemaker, defibrillator, or continuous glucose monitor? No   16. Do you have artificial joints? YES -   Left hip replacement   Left shoulder arthroplasty     17. Are you allergic to latex? No     Health Care Directive:  Patient has a Health Care Directive on file    Preoperative Review of :   reviewed - controlled substances reflected in medication list.     Status of chronic health conditions:    Resident of [facility] since March 2022.     Hospitalized at [facility] form 11/1 to  "11/5/21 with generalized weakness, dehydration, and concern for UTI but culture came back negative. Symptoms likely related to chronic alcoholism with alcohol withdrawal. Chronic cognitive impairment in setting of long history of alcoholism, hx of hallucinations. Multiple hospitalizations over preceding months per family due to unsafe living situation, well known to ED staff. Spent time in SNF ([facility]) around September 2021, details unclear, but seem to have suffered spinal fracture.     Hospitalized from 12/17 to 12/23/21 at [facility] for falls, weakness, alcoholism, and alcoholic hepatitis; discharge summary not available. Wife (who was primary care giver) was having medical issues and needed neurology care in the [facility]. Family felt Jamie unable to safely be left alone so they called EMS to transport to hospital for ongoing care/ETOH detox. Ultimately transferred to [facility] where wife was convalescing.      Remained at [facility] from 12/23 and 3/15/22 with largely uneventful stay. Zyprexa dose increased with good effect on mood, prior to starting medication having hallucinations, delusions, and wandering on unit. Transferred to [facility] for permanent placement.    ER visit on 6/30/22 due to chest pain. Resident was watching a baseball game when he experienced left-sided chest pain.Treated by EMS with aspirin and nitroglycerin with slight relief. Reported pain worse with taking deep breaths and related to anxiety.  EKG showed atrial fibrillation with controlled rate; chronic.  Chest CT showed 4.6 cm ascending thoracic aortic aneurysm, but was negative for plumonary embolism. Cardiothoracic surgery consulted given size of aneurysm and lack of other connective tissue disorder recommended outpatient follow-up with repeat chest CT and echocardiogram in 3 months to evaluate for progression. Chest x-ray showed left basilar pneumonia and chest CT showed fluid-filled bronchi\" " bilateral lower lobes.  Labs significant for negative troponin, ACS exclude. ER physician noted tenderness over left chest wall and cough over last month. Hx of dysphagia and increased aspiration risk. Symptoms most likely consistent with pneumonia discharged back to assisted living facility with Augmentin.    Repeat CXR on 8/4 due to subjective shortness of breath showed possible left lower lobe patchy opacification, as well as low lung volumes with vascular crowding and basilar atelectasis. Dr. Quintana started Augmentin. Symptoms improved.    (97.20) Elevated PSA  Comment: Chronic  PSA 13.45 in March 2021, Repeat PSA 11.3 on 4/7/22  Saw urology, recommendation for prostate biopsy, scheduled for 9/28/22 under general anesthesia due to severe anxiety.   Plan:  - See patient instructions for pre-op orders including abx and enema   - Trospium BID, unclear how helpful this has been and anticholingeric side effects, consider trial off after procedure.    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   Chronic low mood, suspect JOSÉ MANUEL with panic attacks for many years.   On memory care unit due to safety concerns, currently no access to ETOH.   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  Hx of hypopharyngeal ulceration on EGD in 2019, choking episodes of over last year, reports recurrently coughing up blood, but not recently.  Saw ENT, no explanation.  Last esophagram May 2022 as above, presbyesophagus, proximal luminal narrowing.  Recent saw SLP 8/2022, no overt dysphagia.  Recurrent pneumonia, last 8/4/22.  No increased cough, fever, or respiratory distress  Plan:   - Repeat CXR prior to general anesthesia  - Check CBC, panel, and procalcitonin prior to general anesthesia   - Consider repeat video swallow study and pumonology referral after procedure     (I71.2) Thoracic aortic aneurysm without rupture (H) - 6/30/22 4.6 cm  Comment: Chronic, new finding in ER June 2022  Plan:   - Plan for CT and echo, seeing Vascular Dr. Lucio 10/20/22  - HR and BP control of medications at present     (D64.9) Normocytic Anemia  Comment: Chronic, mild, Hgb 12.9 and MCV 91 on 6/30/22  Plan:   - Follow serial Hgb, check CBC prior to general anesthesia      (K59.04) Chronic constipation  Comment: Chronic, long hx of Levsin multiple times per day for abd pain, but stopped by urology when started trospium. Having regular BMs   Plan:   - HOLD 9/28: polyethylene glycol (MIRALAX) 17 GM/Dose powder; 1-2 capfuls in 8 oz of ORANGE JUICE PO one time each morning and 1-2 capfuls 8 oz of ORANGE JUICE PO one time daily PRN for constipation. Please mix medication in juice in front of the patient. Hold for loose stools.   - GI following     (I48.91) Atrial fibrillation (H)  Comment: Chronic, not anticoagulated, rate controlled off medication  [id number]: 3 (age, HTN), 3.2% risk of stroke per year  Plan:   - Monitor VS and clinical status  - Would not add AC due to falls   - Consider cardiology follow-up after procesure     (I10) Essential hypertension,  "benign  Comment: BP at goal of < 150/90 given age and comorbid conditions  Stopped ASA due to hemoptysis  BP Readings from Last 3 Encounters:   09/13/22 128/58   08/09/22 137/85   08/05/22 116/62   Plan:   - Monitor routine VS and labs off anti-hypertensive medications    (M15.9) OA Multiple Sites  (M81.0) Osteoporosis  (Z87.81) History of vertebral fracture  (R26.9) Abnormal gait  Comment: Chronic.  DJD multiple sites and hx of multiple spinal compression fractures per chart review in setting of recurrent falls. Looks to have been on Fosamax in past.   Plan:   - Tylenol 650 mg q 6 hours PRN  - Discuss bisphosphonate vs injectable medication for osteoporosis with PharmD at next visit, consider endocrine referral     Review of Systems  Constitutional - No fever/chills. Good appetite. Does not sleep well at night, tired during the day.  HEENT - No HA. Recently had eye test, discovered cataracts. + difficulty hearing, no ear pain. Swallowing \"better\" saw SLP recently, no dysphagia.   Pulmonary - No SOB. Rare cough.  CV- No CP, episode of chest pain last summer, see not in Epic.   PV - Chronic leg swelling. Wears Velcro compression wraps.   GI - Chronic abd pain, lower abdomen. No N/V. Stools in between constipation and diarrhea, + gas, feels like stools are loose, bloating. BMs daily. Focused on Miralax, wants to consider decreasing dose to once daily.    - No significant dysuria, incontinent of urine   Neuro - No focal deficit. No dizziness at times if stands up quick. No hx of seizure. No tremor.   MS - Ambulatory with  assistive device- 4WW, chronic knee pain R > L.   SKIN - No concerns.  Psychiatric - \"Fairly depressed\" - after move to facility from home. \"I have excessive worry.\" Feeling unmotivated.   + problems with memory, resides on locked memory care unit [facility] 24/30.     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 06/02/2022     Priority: Medium     Hx of " seizure disorder 03/31/2022     Priority: Medium     Alcohol dependence (H) 03/30/2022     Priority: Medium     Alcoholic hepatitis without ascites 03/30/2022     Priority: Medium     Repeated falls 03/30/2022     Priority: Medium     Coronary atherosclerosis of native coronary artery 03/30/2022     Priority: Medium     Anemia 03/30/2022     Priority: Medium     Other idiopathic peripheral autonomic neuropathy 03/30/2022     Priority: Medium     Age-related osteoporosis without current pathological fracture 03/30/2022     Priority: Medium     Constipation 03/30/2022     Priority: Medium     Edema 03/30/2022     Priority: Medium     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  2/2/21       Generalized anxiety disorder 04/27/2020     Priority: Medium     Gastro-esophageal reflux disease without esophagitis 04/27/2020     Priority: Medium     Weakness 04/27/2020     Priority: Medium     Mild cognitive impairment 08/12/2019     Priority: Medium     Formatting of this note might be different from the original.  NON-AMNESTIC TYPE       Impaired gait and mobility 03/14/2019     Priority: Medium     Other insomnia 03/14/2019     Priority: Medium     Physical deconditioning 03/14/2019     Priority: Medium     Low back pain 12/19/2018     Priority: Medium     Alcohol abuse 11/19/2017     Priority: Medium     Chronic atrial fibrillation (H) 07/15/2016     Priority: Medium     Formatting of this note might be different from the original.  2/2019: Multiple falls so started on aspirin only.  Then aspirin stopped due to GI bleed.       Anxiety 10/06/2015     Priority: Medium     Obesity (BMI 30-39.9) 09/03/2015     Priority: Medium     Chronic alcohol use 06/11/2015     Priority: Medium     Formatting of this note might be  different from the original.  2/26/2019: serious fall at home with multiple vertebral fractures.  BAL 0.414% on admission.  Denies that he drinks much.  12/18/2018: hospitalized for fall due to alcohol intoxication.  BAL 0.34% on admission.  9/16/2018: hospitalized for injuries from fall due to alcohol intoxication.  BAL 0.34% on admission       Claustrophobia 05/13/2015     Priority: Medium     Gastroesophageal reflux disease with esophagitis 05/13/2015     Priority: Medium     Formatting of this note might be different from the original.  EGD with biopsies 10/5/15 showing signs of acute on chronic esophagitis/gastritis, no dysplasia. Repeat in 5 years.       Osteoarthritis of both knees 05/13/2015     Priority: Medium     Pharyngeal dysphagia 05/13/2015     Priority: Medium     Formatting of this note might be different from the original.  Likely secondary to GERD with esophagitis, on PPI and H2 blocker with notable improvement, EGD 10/5/15.        Past Medical History:   Diagnosis Date     Age-related osteoporosis without current pathological fracture 03/30/2022     Alcohol abuse 11/19/2017     Alcohol dependence with withdrawal (H)      Alcoholism (H)      Back pain      Backache 12/19/2018     CAD (coronary artery disease)      Chronic a-fib (H)      Chronic alcohol use 06/11/2015    Formatting of this note might be different from the original. 2/26/2019: serious fall at home with multiple vertebral fractures.  BAL 0.414% on admission.  Denies that he drinks much. 12/18/2018: hospitalized for fall due to alcohol intoxication.  BAL 0.34% on admission. 9/16/2018: hospitalized for injuries from fall due to alcohol intoxication.  BAL 0.34% on admission     Chronic atrial fibrillation (H) 07/15/2016    Formatting of this note might be different from the original. 2/2019: Multiple falls so started on aspirin only.  Then aspirin stopped due to GI bleed.     Claustrophobia 05/13/2015     Constipation      Dementia  associated with alcoholism with behavioral disturbance (H) 11/19/2021     ED (erectile dysfunction)      Edema      Falling      JOSÉ MANUEL (generalized anxiety disorder)      Generalized anxiety disorder 04/27/2020     GERD (gastroesophageal reflux disease)      GI bleeding      H/O carpal tunnel syndrome      History of 2019 novel coronavirus disease (COVID-19) 02/08/2021    Formatting of this note might be different from the original. 2/2/21     HTN (hypertension)      Impaired gait and mobility 03/14/2019     Mild cognitive impairment 08/12/2019    Formatting of this note might be different from the original. NON-AMNESTIC TYPE     Mild TBI (traumatic brain injury) (H) 03/14/2019     Mumps      Obesity (BMI 30-39.9) 09/03/2015     Osteoarthritis      Osteoarthritis of both knees 05/13/2015     Osteoporosis      Other idiopathic peripheral autonomic neuropathy 03/30/2022     Other insomnia 03/14/2019     Other seizures (H) 03/30/2022     Palpitations      Peripheral neuropathy      Pharyngeal dysphagia 05/13/2015    Formatting of this note might be different from the original. Likely secondary to GERD with esophagitis, on PPI and H2 blocker with notable improvement, EGD 10/5/15.     Physical deconditioning 03/14/2019     Recurrent major depressive disorder (H) 03/30/2022     Rhabdomyolysis      Thrombocytopenia (H)      Urination disorder      Weakness 04/27/2020     Past Surgical History:   Procedure Laterality Date     ESOPHAGOSCOPY, GASTROSCOPY, DUODENOSCOPY (EGD), COMBINED N/A 06/01/2022    Procedure: ESOPHAGOGASTRODUODENOSCOPY (EGD) mngi with biopsies using jumbo cold biopsy forceps;  Surgeon: David Springer MD;  Location:  GI     left hip replacement  2010     left shoulder replacement  2016     ORTHOPEDIC SURGERY       SPINE SURGERY      done in [place]     TONSILLECTOMY       Current Outpatient Medications   Medication Sig Dispense Refill     acetaminophen (TYLENOL) 500 MG tablet Take 1,000 mg by  mouth every 6 hours as needed for mild pain       alum & mag hydroxide-simethicone (MAALOX MAX) 400-400-40 MG/5ML SUSP suspension Take 30 mLs by mouth every 6 hours as needed for indigestion 355 mL 3     calcium carbonate 750 MG CHEW Take 750 mg by mouth daily as needed       citalopram (CELEXA) 10 MG tablet Take 1 tablet (10 mg) by mouth daily 30 tablet 98     DEEP SEA NASAL SPRAY 0.65 % nasal spray INHALE 2 SPRAYS IN EACH NOSTRIL ONCE DAILY 44 mL 97     gabapentin (NEURONTIN) 100 MG capsule TAKE TWO CAPSULES (200MG) BY MOUTH EVERY NIGHT AT BEDTIME 180 capsule 97     guaiFENesin (ROBITUSSIN) 100 MG/5ML liquid GIVE 10ML (200 MG) BY MOUTH TWICE DAILY;AND TWICE DAILY AS NEEDED FOR COUGH 473 mL 97     menthol-zinc oxide (CALMOSEPTINE) 0.44-20.6 % OINT ointment Apply 1 g topically 2 times daily. May also apply 1 g 2 times daily as needed for skin protection. 113 g 98     nystatin (MYCOSTATIN) 277010 UNIT/GM external powder Apply topically 2 times daily And three times daily as needed       omeprazole (PRILOSEC) 40 MG DR capsule TAKE 1 CAPSULE BY MOUTH TWICE DAILY 180 capsule 3     polyvinyl alcohol-povidone PF (REFRESH) 1.4-0.6 % ophthalmic solution 1 drop every 4 hours as needed for irritation       QUEtiapine (SEROQUEL) 25 MG tablet TAKE ONE-HALF TABLET (12.5MG) BY MOUTH AT BEDTIME 45 tablet 97     trospium (SANCTURA) 20 MG tablet TAKE 1 TABLET BY MOUTH TWICE DAILY 180 tablet 97     polyethylene glycol (MIRALAX) 17 GM/Dose powder MIX 2 CAPFULS IN 8OZ OF ORANGE JUICE  IN THE MORNING;AND MAY MIX 2 CAPFULS ONCE DAILY AS NEEDED FOR CONSTIPATION. MIX IN FRONT OF PT. HOLD FOR LOOSE STOOL. OK TO GIVE 1 CAPFUL IF RESIDENT REFUSES 2 CAPFULS. 510 g 97       Allergies   Allergen Reactions     Ativan [Lorazepam]         Social History     Tobacco Use     Smoking status: Never Smoker     Smokeless tobacco: Never Used   Substance Use Topics     Alcohol use: Not Currently     Comment: not since December 2021         Objective   BP  "128/58   Pulse 62   Temp 97.7  F (36.5  C)   Resp 16   Ht 1.753 m (5' 9\")   Wt 103.9 kg (229 lb 1.6 oz)   SpO2 96%   BMI 33.83 kg/m     Physical Exam  GENERAL APPEARANCE:  Alert, in no distress, seated in recliner chair.  HEAD: NC/AT  EYES: Sclera clear and conjunctiva normal, no discharge   ENT:  Mouth normal, moist mucous membranes, upper partial denture, missing lower teeth soft pallet and uvula with symmetric rise, hearing acuity grossly decreased BL.  NECK:  Nontender, supple, symmetrical; no cervical adenopathy, masses or thyromegaly, trachea midline  RESP:  Non-labored breathing, palpation of chest normal, no chest wall tenderness, no respiratory distress, Lung sounds clear except for occasional faint expiratory wheeze , patient is on room air.  CV:  Palpation - no murmur/non-displaced PMI, Auscultation - rate and rhythm regular, no murmur, no rub or gallop.  VASCULAR: 2+ edema bilateral lower extremities, wearing Velcro compression wraps.   ABDOMEN:  Normal bowel sounds, soft, nontender, no grimacing or guarding with palpation,   umblical hernia. No hepatosplenomegaly. No appreciable masses.  M/S:   Gait and station ambulates independently with walker around apartment.   SKIN:  Inspection - No lesions, rashes, or ecchymosis on limited exam as fully dressed. Palpation- no increased warmth, skin is dry and non-tender.  NEURO: cranial nerves II-XII grossly intact except hearing, no facial asymmetry, follows simple commands, moves all extremities symmetrically, normal tone, no tremor  PSYCH: awake and alert, speech fluent,  insight and judgement impaired, memory impaired, cooperative, anxious affect.    Recent Labs   Lab Test 06/30/22  1928 05/12/22  1220 04/28/22  0655 04/07/22  0735   HGB 12.9* 12.8* 12.0* 11.7*     --  199 203     --  139 138   POTASSIUM 4.5  --  4.4 4.3   CR 0.70  --  0.68 0.76   A1C  --   --  5.5 5.6      Diagnostics:  Labs pending at this time.  Results will be reviewed " when available.   EKG required for possible CAD and afib and completed in the last 90 days as below.      EXAM: CT CHEST PULMONARY EMBOLISM W CONTRAST  LOCATION: [facility]  DATE/TIME: 6/30/2022 9:05 PM     INDICATION: Pleuritic chest pain.                                                                   IMPRESSION:  1.  There is no pulmonary embolus.  2.  4.6 cm ascending thoracic aortic aneurysm.  3.  Fluid-filled bronchi in bilateral lower lobes.          EKG 6/30/22 - see Epic      Revised Cardiac Risk Index (RCRI):  The patient has the following serious cardiovascular risks for perioperative complications:   - Coronary Artery Disease (MI, positive stress test, angina, Qs on EKG) = 1 point     RCRI Interpretation: 1 point: Class II (low risk - 0.9% complication rate)    Signed Electronically by: MARICHUY Basilio CNP  Copy of this evaluation report is provided to requesting physician.    Append 09/23/22 now:   RECOMMENDATION:  APPROVAL GIVEN to proceed with proposed procedure mild hyponatremia possibly in setting of new selective serotonin reuptake inhibitor, dose decreased, CXR improved from August 2022 (see report above), low procalcitonin and normal WBC, no hypoxia or fever.    Electronically signed by:   MARICHUY Basilio CNP  09/23/22 11:30 AM

## 2022-09-13 NOTE — LETTER
9/13/2022        RE: Jamie Valdivia  [facility]  [address]  [facility] [address]        [facility]  [facility] [place] [address]  Phone: [phone]  Fax: [phone]  Primary Provider: Sylvia Stein  Pre-op Performing Provider: SYLVIA STEIN    PREOPERATIVE EVALUATION:  Today's date: 9/13/2022    Jamie Valdivia is a 82 year old male PMH significant for GERD with esophagitis, OA BL knees, pharyngeal dysphagia, edema, abdominal wall hernia, chronic atrial fibrillation, HTN, osteoporosis with hx of vertebral fracture, dementia, anemia, hx of COVID-19 who presents for a preoperative evaluation.    Surgical Information:  Surgery/Procedure: Transrectal Prostate Biopsy  Surgery Location: [facility] OR, same day surgery  Surgeon: Dr. Niraj Larry MD  Surgery Date: 9/28/22  Time of Surgery: 2:20 PM  Where patient plans to recover: At a [facility]  Fax number for surgical facility: Note does not need to be faxed, will be available electronically in Epic.    Type of Anesthesia Anticipated: General    Assessment & Plan     The proposed surgical procedure is considered LOW risk.    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       Hematologic    Anemia      Other Visit Diagnoses     Elevated prostate specific antigen (PSA)    -  Primary    Adjustment disorder with depressed mood        JOSÉ MANUEL (generalized anxiety disorder)        Lower extremity edema        Recurrent pneumonia        Hemoptysis        Dysphagia, unspecified type        Thoracic aortic aneurysm without rupture        Chronic constipation        Atrial fibrillation, unspecified type (H)        Essential hypertension, benign        Primary osteoarthritis involving multiple joints        Osteoporosis, unspecified  osteoporosis type, unspecified pathological fracture presence        History of vertebral fracture        Abnormal gait            Possible Sleep Apnea: High risk for sleep apnea.     Risks and Recommendations:  The patient has the following additional risks and recommendations for perioperative complications:   - History of delirium or dementia  Cardiovascular:   - Low risk procedure, will not consult cardiology, may benefit from routine cardiology follow-up in future   Pulmonary:    - Recently treated pulmonary infection, early August 2022, repeat CXR prior to procedure  Anemia/Bleeding/Clotting:    - Mild anemia and does not require treatment prior to surgery. Monitor hemoglobin postoperatively  Social and Substance:    - History of ETOH abuse     Medication Instructions:   - SSRIs, SNRIs, TCAs, Antipsychotics: Continue without modification.     RECOMMENDATION:  APPROVAL GIVEN to proceed with proposed procedure pending review of diagnostic evaluation.    Labs 9/15/22    CXR 9/14/22    Subjective     HPI related to upcoming procedure:   History of elevated PSA (13.45 ng/mL in 3/2021 and 11.30 on 4/7/22) and bothersome lower urinary tract symptoms including urinary urgency and incontinence. Referred to urology in [place] in May 2021, but did not follow-up. No history of prostate biopsy in the past.  Plan for transrectal prostate biopsy with Dr. Weston on 9/28/22 under general anesthesia due to significant anxiety.      Preop Questions 9/13/2022   1. Have you ever had a heart attack or stroke? UNKNOWN - history of coronary atherosclerosis, long history of recurrent chest pain episodes in setting of anxiety, see ER visit 6/30/22, EKG showed no over ischemic changes, Troponin negative.  - CORONARY ARTERY CALCIFICATION: Severe.  - Ascending thoracic aorta is aneurysmal at 4.6 cm.    2. Have you ever had surgery on your heart or blood vessels, such as a stent placement, a coronary artery bypass, or surgery on an artery  in your head, neck, heart, or legs? No   3. Do you have chest pain with activity? No   4. Do you have a history of  heart failure? No   5. Do you currently have a cold, bronchitis or symptoms of other infection? No   6. Do you have a cough, shortness of breath, or wheezing? No   7. Do you or anyone in your family have previous history of blood clots? No   8. Do you or does anyone in your family have a serious bleeding problem such as prolonged bleeding following surgeries or cuts? UNKNOWN   9. Have you ever had problems with anemia or been told to take iron pills? YES - Chronic mild anemia, baseline Hgb 11-12.   10. Have you had any abnormal blood loss such as black, tarry or bloody stools? YES - Hx GI bleed, details unclear.  EGD on 6/1/22 showed normal stomach, duodenum, esophageal mucosa with short-segment BE, started on PPI. Hx of hemoptysis and blood from nose, saw ENT unable to identify cause. No episodes of late.   11. Have you ever had a blood transfusion? YES - no recollection of reaction    11a. Have you ever had a transfusion reaction? No   12. Are you willing to have a blood transfusion if it is medically needed before, during, or after your surgery? Yes   13. Have you or any of your relatives ever had problems with anesthesia? YES - Colonoscopy, when woke up, combative with nursing staff.   14. Do you have sleep apnea, excessive snoring or daytime drowsiness? YES - daytime drowsiness   14a. Do you have a CPAP machine? No   15. Do you have any artifical heart valves or other implanted medical devices like a pacemaker, defibrillator, or continuous glucose monitor? No   16. Do you have artificial joints? YES -   Left hip replacement   Left shoulder arthroplasty     17. Are you allergic to latex? No     Health Care Directive:  Patient has a Health Care Directive on file    Preoperative Review of :   reviewed - controlled substances reflected in medication list.     Status of chronic health  conditions:    Resident of [facility] since March 2022.     Hospitalized at [facility] form 11/1 to 11/5/21 with generalized weakness, dehydration, and concern for UTI but culture came back negative. Symptoms likely related to chronic alcoholism with alcohol withdrawal. Chronic cognitive impairment in setting of long history of alcoholism, hx of hallucinations. Multiple hospitalizations over preceding months per family due to unsafe living situation, well known to ED staff. Spent [facility] SNF [facility] around September 2021, details unclear, but seem to have suffered spinal fracture.     Hospitalized from 12/17 to 12/23/21 at [facility] for falls, weakness, alcoholism, and alcoholic hepatitis; discharge summary not available. Wife (who was primary care giver) was having medical issues and needed neurology care in the [facility]. Family felt Jamie unable to safely be left alone so they called EMS to transport to hospital for ongoing care/ETOH detox. Ultimately transferred to [facility] where wife was convalescing.      Remained at [facility] from 12/23 and 3/15/22 with largely uneventful stay. Zyprexa dose increased with good effect on mood, prior to starting medication having hallucinations, delusions, and wandering on unit. Transferred to [facility] for permanent placement.    ER visit on 6/30/22 due to chest pain. Resident was watching a baseball game when he experienced left-sided chest pain.Treated by EMS with aspirin and nitroglycerin with slight relief. Reported pain worse with taking deep breaths and related to anxiety.  EKG showed atrial fibrillation with controlled rate; chronic.  Chest CT showed 4.6 cm ascending thoracic aortic aneurysm, but was negative for plumonary embolism. Cardiothoracic surgery consulted given size of aneurysm and lack of other connective tissue disorder recommended outpatient follow-up with repeat chest CT and echocardiogram in 3  "months to evaluate for progression. Chest x-ray showed left basilar pneumonia and chest CT showed fluid-filled bronchi\" bilateral lower lobes.  Labs significant for negative troponin, ACS exclude. ER physician noted tenderness over left chest wall and cough over last month. Hx of dysphagia and increased aspiration risk. Symptoms most likely consistent with pneumonia discharged back to assisted living facility with Augmentin.    Repeat CXR on 8/4 due to subjective shortness of breath showed possible left lower lobe patchy opacification, as well as low lung volumes with vascular crowding and basilar atelectasis. Dr. Quintana started Augmentin. Symptoms improved.    (97.20) Elevated PSA  Comment: Chronic  PSA 13.45 in March 2021, Repeat PSA 11.3 on 4/7/22  Saw urology, recommendation for prostate biopsy, scheduled for 9/28/22 under general anesthesia due to severe anxiety.   Plan:  - See patient instructions for pre-op orders including abx and enema   - Trospium BID, unclear how helpful this has been and anticholingeric side effects, consider trial off after procedure.    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   Chronic low mood, suspect JOSÉ MANUEL with panic attacks for many years.   On memory care unit due to safety concerns, currently no access to ETOH.   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  Hx of hypopharyngeal ulceration on EGD in 2019, choking episodes of over last year, reports recurrently coughing up blood, but not recently.  Saw ENT, no explanation.  Last esophagram May 2022 as above, presbyesophagus, proximal luminal narrowing.  Recent saw SLP 8/2022, no overt dysphagia.  Recurrent pneumonia, last 8/4/22.  No increased cough, fever, or respiratory distress  Plan:   - Repeat CXR prior to general anesthesia  - Check CBC, panel, and procalcitonin prior to general anesthesia   - Consider repeat video swallow study and pumonology referral after procedure     (I71.2) Thoracic aortic aneurysm without rupture (H) - 6/30/22 4.6 cm  Comment: Chronic, new finding in ER June 2022  Plan:   - Plan for CT and echo, seeing Vascular Dr. Lucio 10/20/22  - HR and BP control of medications at present     (D64.9) Normocytic Anemia  Comment: Chronic, mild, Hgb 12.9 and MCV 91 on 6/30/22  Plan:   - Follow serial Hgb, check CBC prior to general anesthesia      (K59.04) Chronic constipation  Comment: Chronic, long hx of Levsin multiple times per day for abd pain, but stopped by urology when started trospium. Having regular BMs   Plan:   - HOLD 9/28: polyethylene glycol (MIRALAX) 17 GM/Dose powder; 1-2 capfuls in 8 oz of ORANGE JUICE PO one time each morning and 1-2 capfuls 8 oz of ORANGE JUICE PO one time daily PRN for constipation. Please mix medication in juice in front of the patient. Hold for loose stools.   - GI following     (I48.91) Atrial fibrillation (H)  Comment: Chronic, not anticoagulated, rate controlled off medication  [id number]: 3 (age, HTN), 3.2% risk of stroke per year  Plan:   - Monitor VS and clinical status  - Would not  "add AC due to falls   - Consider cardiology follow-up after procesure     (I10) Essential hypertension, benign  Comment: BP at goal of < 150/90 given age and comorbid conditions  Stopped ASA due to hemoptysis  BP Readings from Last 3 Encounters:   09/13/22 128/58   08/09/22 137/85   08/05/22 116/62   Plan:   - Monitor routine VS and labs off anti-hypertensive medications    (M15.9) OA Multiple Sites  (M81.0) Osteoporosis  (Z87.81) History of vertebral fracture  (R26.9) Abnormal gait  Comment: Chronic.  DJD multiple sites and hx of multiple spinal compression fractures per chart review in setting of recurrent falls. Looks to have been on Fosamax in past.   Plan:   - Tylenol 650 mg q 6 hours PRN  - Discuss bisphosphonate vs injectable medication for osteoporosis with PharmD at next visit, consider endocrine referral     Review of Systems  Constitutional - No fever/chills. Good appetite. Does not sleep well at night, tired during the day.  HEENT - No HA. Recently had eye test, discovered cataracts. + difficulty hearing, no ear pain. Swallowing \"better\" saw SLP recently, no dysphagia.   Pulmonary - No SOB. Rare cough.  CV- No CP, episode of chest pain last summer, see not in Epic.   PV - Chronic leg swelling. Wears Velcro compression wraps.   GI - Chronic abd pain, lower abdomen. No N/V. Stools in between constipation and diarrhea, + gas, feels like stools are loose, bloating. BMs daily. Focused on Miralax, wants to consider decreasing dose to once daily.    - No significant dysuria, incontinent of urine   Neuro - No focal deficit. No dizziness at times if stands up quick. No hx of seizure. No tremor.   MS - Ambulatory with  assistive device- 4WW, chronic knee pain R > L.   SKIN - No concerns.  Psychiatric - \"Fairly depressed\" - after move to facility from home. \"I have excessive worry.\" Feeling unmotivated.   + problems with memory, resides on locked memory care unit [facility] 24/30.     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 06/02/2022     Priority: Medium     Hx of seizure disorder 03/31/2022     Priority: Medium     Alcohol dependence (H) 03/30/2022     Priority: Medium     Alcoholic hepatitis without ascites 03/30/2022     Priority: Medium     Repeated falls 03/30/2022     Priority: Medium     Coronary atherosclerosis of native coronary artery 03/30/2022     Priority: Medium     Anemia 03/30/2022     Priority: Medium     Other idiopathic peripheral autonomic neuropathy 03/30/2022     Priority: Medium     Age-related osteoporosis without current pathological fracture 03/30/2022     Priority: Medium     Constipation 03/30/2022     Priority: Medium     Edema 03/30/2022     Priority: Medium     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  2/2/21       Generalized anxiety disorder 04/27/2020     Priority: Medium     Gastro-esophageal reflux disease without esophagitis 04/27/2020     Priority: Medium     Weakness 04/27/2020     Priority: Medium     Mild cognitive impairment 08/12/2019     Priority: Medium     Formatting of this note might be different from the original.  NON-AMNESTIC TYPE       Impaired gait and mobility 03/14/2019     Priority: Medium     Other insomnia 03/14/2019     Priority: Medium     Physical deconditioning 03/14/2019     Priority: Medium     Low back pain 12/19/2018     Priority: Medium     Alcohol abuse 11/19/2017     Priority: Medium     Chronic atrial fibrillation (H) 07/15/2016     Priority: Medium     Formatting of this note might be different from the original.  2/2019: Multiple falls so started on aspirin only.  Then aspirin stopped due to GI bleed.       Anxiety 10/06/2015     Priority: Medium     Obesity (BMI 30-39.9) 09/03/2015      Priority: Medium     Chronic alcohol use 06/11/2015     Priority: Medium     Formatting of this note might be different from the original.  2/26/2019: serious fall at home with multiple vertebral fractures.  BAL 0.414% on admission.  Denies that he drinks much.  12/18/2018: hospitalized for fall due to alcohol intoxication.  BAL 0.34% on admission.  9/16/2018: hospitalized for injuries from fall due to alcohol intoxication.  BAL 0.34% on admission       Claustrophobia 05/13/2015     Priority: Medium     Gastroesophageal reflux disease with esophagitis 05/13/2015     Priority: Medium     Formatting of this note might be different from the original.  EGD with biopsies 10/5/15 showing signs of acute on chronic esophagitis/gastritis, no dysplasia. Repeat in 5 years.       Osteoarthritis of both knees 05/13/2015     Priority: Medium     Pharyngeal dysphagia 05/13/2015     Priority: Medium     Formatting of this note might be different from the original.  Likely secondary to GERD with esophagitis, on PPI and H2 blocker with notable improvement, EGD 10/5/15.        Past Medical History:   Diagnosis Date     Age-related osteoporosis without current pathological fracture 03/30/2022     Alcohol abuse 11/19/2017     Alcohol dependence with withdrawal (H)      Alcoholism (H)      Back pain      Backache 12/19/2018     CAD (coronary artery disease)      Chronic a-fib (H)      Chronic alcohol use 06/11/2015    Formatting of this note might be different from the original. 2/26/2019: serious fall at home with multiple vertebral fractures.  BAL 0.414% on admission.  Denies that he drinks much. 12/18/2018: hospitalized for fall due to alcohol intoxication.  BAL 0.34% on admission. 9/16/2018: hospitalized for injuries from fall due to alcohol intoxication.  BAL 0.34% on admission     Chronic atrial fibrillation (H) 07/15/2016    Formatting of this note might be different from the original. 2/2019: Multiple falls so started on aspirin  only.  Then aspirin stopped due to GI bleed.     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 2/2/21     HTN (hypertension)      Impaired gait and mobility 03/14/2019     Mild cognitive impairment 08/12/2019    Formatting of this note might be different from the original. NON-AMNESTIC TYPE     Mild TBI (traumatic brain injury) (H) 03/14/2019     Mumps      Obesity (BMI 30-39.9) 09/03/2015     Osteoarthritis      Osteoarthritis of both knees 05/13/2015     Osteoporosis      Other idiopathic peripheral autonomic neuropathy 03/30/2022     Other insomnia 03/14/2019     Other seizures (H) 03/30/2022     Palpitations      Peripheral neuropathy      Pharyngeal dysphagia 05/13/2015    Formatting of this note might be different from the original. Likely secondary to GERD with esophagitis, on PPI and H2 blocker with notable improvement, EGD 10/5/15.     Physical deconditioning 03/14/2019     Recurrent major depressive disorder (H) 03/30/2022     Rhabdomyolysis      Thrombocytopenia (H)      Urination disorder      Weakness 04/27/2020     Past Surgical History:   Procedure Laterality Date     ESOPHAGOSCOPY, GASTROSCOPY, DUODENOSCOPY (EGD), COMBINED N/A 06/01/2022    Procedure: ESOPHAGOGASTRODUODENOSCOPY (EGD) mngi with biopsies using jumbo cold biopsy forceps;  Surgeon: David Springer MD;  Location:  GI     left hip replacement  2010     left shoulder replacement  2016     ORTHOPEDIC SURGERY       SPINE SURGERY      done in [place]     TONSILLECTOMY       Current Outpatient  Medications   Medication Sig Dispense Refill     acetaminophen (TYLENOL) 500 MG tablet Take 1,000 mg by mouth every 6 hours as needed for mild pain       alum & mag hydroxide-simethicone (MAALOX MAX) 400-400-40 MG/5ML SUSP suspension Take 30 mLs by mouth every 6 hours as needed for indigestion 355 mL 3     calcium carbonate 750 MG CHEW Take 750 mg by mouth daily as needed       citalopram (CELEXA) 10 MG tablet Take 1 tablet (10 mg) by mouth daily 30 tablet 98     DEEP SEA NASAL SPRAY 0.65 % nasal spray INHALE 2 SPRAYS IN EACH NOSTRIL ONCE DAILY 44 mL 97     gabapentin (NEURONTIN) 100 MG capsule TAKE TWO CAPSULES (200MG) BY MOUTH EVERY NIGHT AT BEDTIME 180 capsule 97     guaiFENesin (ROBITUSSIN) 100 MG/5ML liquid GIVE 10ML (200 MG) BY MOUTH TWICE DAILY;AND TWICE DAILY AS NEEDED FOR COUGH 473 mL 97     menthol-zinc oxide (CALMOSEPTINE) 0.44-20.6 % OINT ointment Apply 1 g topically 2 times daily. May also apply 1 g 2 times daily as needed for skin protection. 113 g 98     nystatin (MYCOSTATIN) 384645 UNIT/GM external powder Apply topically 2 times daily And three times daily as needed       omeprazole (PRILOSEC) 40 MG DR capsule TAKE 1 CAPSULE BY MOUTH TWICE DAILY 180 capsule 3     polyvinyl alcohol-povidone PF (REFRESH) 1.4-0.6 % ophthalmic solution 1 drop every 4 hours as needed for irritation       QUEtiapine (SEROQUEL) 25 MG tablet TAKE ONE-HALF TABLET (12.5MG) BY MOUTH AT BEDTIME 45 tablet 97     trospium (SANCTURA) 20 MG tablet TAKE 1 TABLET BY MOUTH TWICE DAILY 180 tablet 97     polyethylene glycol (MIRALAX) 17 GM/Dose powder MIX 2 CAPFULS IN 8OZ OF ORANGE JUICE  IN THE MORNING;AND MAY MIX 2 CAPFULS ONCE DAILY AS NEEDED FOR CONSTIPATION. MIX IN FRONT OF PT. HOLD FOR LOOSE STOOL. OK TO GIVE 1 CAPFUL IF RESIDENT REFUSES 2 CAPFULS. 510 g 97       Allergies   Allergen Reactions     Ativan [Lorazepam]         Social History     Tobacco Use     Smoking status: Never Smoker     Smokeless tobacco: Never Used   Substance  "Use Topics     Alcohol use: Not Currently     Comment: not since December 2021         Objective   /58   Pulse 62   Temp 97.7  F (36.5  C)   Resp 16   Ht 1.753 m (5' 9\")   Wt 103.9 kg (229 lb 1.6 oz)   SpO2 96%   BMI 33.83 kg/m     Physical Exam  GENERAL APPEARANCE:  Alert, in no distress, seated in recliner chair.  HEAD: NC/AT  EYES: Sclera clear and conjunctiva normal, no discharge   ENT:  Mouth normal, moist mucous membranes, upper partial denture, missing lower teeth soft pallet and uvula with symmetric rise, hearing acuity grossly decreased BL.  NECK:  Nontender, supple, symmetrical; no cervical adenopathy, masses or thyromegaly, trachea midline  RESP:  Non-labored breathing, palpation of chest normal, no chest wall tenderness, no respiratory distress, Lung sounds clear except for occasional faint expiratory wheeze , patient is on room air.  CV:  Palpation - no murmur/non-displaced PMI, Auscultation - rate and rhythm regular, no murmur, no rub or gallop.  VASCULAR: 2+ edema bilateral lower extremities, wearing Velcro compression wraps.   ABDOMEN:  Normal bowel sounds, soft, nontender, no grimacing or guarding with palpation,   umblical hernia. No hepatosplenomegaly. No appreciable masses.  M/S:   Gait and station ambulates independently with walker around apartment.   SKIN:  Inspection - No lesions, rashes, or ecchymosis on limited exam as fully dressed. Palpation- no increased warmth, skin is dry and non-tender.  NEURO: cranial nerves II-XII grossly intact except hearing, no facial asymmetry, follows simple commands, moves all extremities symmetrically, normal tone, no tremor  PSYCH: awake and alert, speech fluent,  insight and judgement impaired, memory impaired, cooperative, anxious affect.    Recent Labs   Lab Test 06/30/22  1928 05/12/22  1220 04/28/22  0655 04/07/22  0735   HGB 12.9* 12.8* 12.0* 11.7*     --  199 203     --  139 138   POTASSIUM 4.5  --  4.4 4.3   CR 0.70  --  " 0.68 0.76   A1C  --   --  5.5 5.6      Diagnostics:  Labs pending at this time.  Results will be reviewed when available.   EKG required for possible CAD and afib and completed in the last 90 days as below.      EXAM: CT CHEST PULMONARY EMBOLISM W CONTRAST  LOCATION: [facility]  DATE/TIME: 6/30/2022 9:05 PM     INDICATION: Pleuritic chest pain.                                                                   IMPRESSION:  1.  There is no pulmonary embolus.  2.  4.6 cm ascending thoracic aortic aneurysm.  3.  Fluid-filled bronchi in bilateral lower lobes.          EKG 6/30/22 - see Epic      Revised Cardiac Risk Index (RCRI):  The patient has the following serious cardiovascular risks for perioperative complications:   - Coronary Artery Disease (MI, positive stress test, angina, Qs on EKG) = 1 point     RCRI Interpretation: 1 point: Class II (low risk - 0.9% complication rate)    Signed Electronically by: MARICHUY Basilio CNP  Copy of this evaluation report is provided to requesting physician.    Append 09/23/22 now:   RECOMMENDATION:  APPROVAL GIVEN to proceed with proposed procedure mild hyponatremia possibly in setting of new selective serotonin reuptake inhibitor, dose decreased, CXR improved from August 2022 (see report above), low procalcitonin and normal WBC, no hypoxia or fever.    Electronically signed by:   MARICHUY Basilio CNP  09/23/22 11:30 AM                  Sincerely,        MARICHUY Basilio CNP

## 2022-09-14 ENCOUNTER — TELEPHONE (OUTPATIENT)
Dept: GERIATRICS | Facility: CLINIC | Age: 82
End: 2022-09-14

## 2022-09-14 ENCOUNTER — LAB REQUISITION (OUTPATIENT)
Dept: LAB | Facility: CLINIC | Age: 82
End: 2022-09-14
Payer: COMMERCIAL

## 2022-09-14 ENCOUNTER — TRANSFERRED RECORDS (OUTPATIENT)
Dept: HEALTH INFORMATION MANAGEMENT | Facility: CLINIC | Age: 82
End: 2022-09-14

## 2022-09-14 DIAGNOSIS — J18.9 PNEUMONIA, UNSPECIFIED ORGANISM: ICD-10-CM

## 2022-09-14 DIAGNOSIS — I10 ESSENTIAL (PRIMARY) HYPERTENSION: ICD-10-CM

## 2022-09-14 DIAGNOSIS — E55.9 VITAMIN D DEFICIENCY, UNSPECIFIED: ICD-10-CM

## 2022-09-14 DIAGNOSIS — D64.9 ANEMIA, UNSPECIFIED: ICD-10-CM

## 2022-09-14 NOTE — PATIENT INSTRUCTIONS
Jamie Valdivia  1940  ORDERS:  - Discontinue Cipro  - Bactrim 400-80 mg tablets, one tablet PO BID x 3 days starting 9/27/22, please give morning of procedure as ordered  - Hold Miralax morning of 9/28/22 prior to general anesthesia   - Fast 8 hours prior to procedure, no food or colored liquids, water okay until check in  - Please check COVID-19 RAT test on 9/26/22, provide documentation of negative test for patient to take with him to procedure  - Sodium Phosphates (FLEET ENEMA) ENEM; Place 1 Bottle (1 enema) rectally once for 1 dose Give at 10:30 AM prior to leaving for prostate biopsy dx constipation   Electronically signed by:   MARICHUY Basilio CNP  09/14/22 6:19 AM

## 2022-09-14 NOTE — TELEPHONE ENCOUNTER
[facility] Triage Nurse Telephone Encounter    Provider: MARICHUY Basilio CNP  Facility: [facility]  Facility Type:  AL    Caller: Lakia   Call Back Number: [phone]    Allergies:    Allergies   Allergen Reactions     Ativan [Lorazepam]         Reason for call:   O2 was 98% on ra today and T98.7. No cough and No sob today.       Verbal Order/Direction given by Provider: NNO    Provider giving Order:  MARICHUY Basilio CNP    Verbal Order given to: Lakia De RN

## 2022-09-14 NOTE — PATIENT INSTRUCTIONS
"Recommendations from today's MTM visit:                                                       1. Provider may consider increasing citalopram to 20mg daily and change administration time to bedtime.  2. Provider may consider stopping Sanctura.  Monitor if abdominal pain improves.  3. Provider may consider scheduling Tums EX 750mg - 1 tablet twice daily.  Please also consider addition of vitamin D 50,000u monthly.  4.  May consider referral to endocrinology regarding Prolia.      Follow-up: 6-8 weeks, sooner if necessary    It was great speaking with you today.  I value your experience and would be very thankful for your time in providing feedback in our clinic survey. In the next few days, you may receive an email or text message from [employer] with a link to a survey related to your  clinical pharmacist.\"     To schedule another MTM appointment, please call me directly or you may call the MTM scheduling line at [phone] or toll-free at [phone].     My Clinical Pharmacist's contact information:                                                      Please feel free to contact me with any questions or concerns you have.      Sindy Hanley, Pharm.D.,[facility]  Board Certified Geriatric Pharmacist  Medication Therapy Management Pharmacist  [phone]      "

## 2022-09-15 LAB
ALBUMIN SERPL-MCNC: 3.3 G/DL (ref 3.4–5)
ALP SERPL-CCNC: 60 U/L (ref 40–150)
ALT SERPL W P-5'-P-CCNC: 15 U/L (ref 0–70)
ANION GAP SERPL CALCULATED.3IONS-SCNC: 7 MMOL/L (ref 3–14)
AST SERPL W P-5'-P-CCNC: 11 U/L (ref 0–45)
BILIRUB SERPL-MCNC: 1 MG/DL (ref 0.2–1.3)
BUN SERPL-MCNC: 9 MG/DL (ref 7–30)
CALCIUM SERPL-MCNC: 8.9 MG/DL (ref 8.5–10.1)
CHLORIDE BLD-SCNC: 97 MMOL/L (ref 94–109)
CO2 SERPL-SCNC: 27 MMOL/L (ref 20–32)
CREAT SERPL-MCNC: 0.5 MG/DL (ref 0.66–1.25)
DEPRECATED CALCIDIOL+CALCIFEROL SERPL-MC: 29 UG/L (ref 20–75)
ERYTHROCYTE [DISTWIDTH] IN BLOOD BY AUTOMATED COUNT: 13.4 % (ref 10–15)
FERRITIN SERPL-MCNC: 40 NG/ML (ref 26–388)
GFR SERPL CREATININE-BSD FRML MDRD: >90 ML/MIN/1.73M2
GLUCOSE BLD-MCNC: 87 MG/DL (ref 70–99)
HCT VFR BLD AUTO: 36.6 % (ref 40–53)
HGB BLD-MCNC: 12.2 G/DL (ref 13.3–17.7)
IRON SATN MFR SERPL: 13 % (ref 15–46)
IRON SERPL-MCNC: 48 UG/DL (ref 35–180)
MCH RBC QN AUTO: 28.6 PG (ref 26.5–33)
MCHC RBC AUTO-ENTMCNC: 33.3 G/DL (ref 31.5–36.5)
MCV RBC AUTO: 86 FL (ref 78–100)
PLATELET # BLD AUTO: 211 10E3/UL (ref 150–450)
POTASSIUM BLD-SCNC: 4.1 MMOL/L (ref 3.4–5.3)
PROCALCITONIN SERPL-MCNC: <0.05 NG/ML
PROT SERPL-MCNC: 7.3 G/DL (ref 6.8–8.8)
RBC # BLD AUTO: 4.27 10E6/UL (ref 4.4–5.9)
SODIUM SERPL-SCNC: 131 MMOL/L (ref 133–144)
TIBC SERPL-MCNC: 374 UG/DL (ref 240–430)
WBC # BLD AUTO: 6.3 10E3/UL (ref 4–11)

## 2022-09-15 PROCEDURE — 36415 COLL VENOUS BLD VENIPUNCTURE: CPT | Mod: ORL | Performed by: NURSE PRACTITIONER

## 2022-09-15 PROCEDURE — 82306 VITAMIN D 25 HYDROXY: CPT | Mod: ORL | Performed by: NURSE PRACTITIONER

## 2022-09-15 PROCEDURE — 80053 COMPREHEN METABOLIC PANEL: CPT | Mod: ORL | Performed by: NURSE PRACTITIONER

## 2022-09-15 PROCEDURE — 82728 ASSAY OF FERRITIN: CPT | Mod: ORL | Performed by: NURSE PRACTITIONER

## 2022-09-15 PROCEDURE — P9604 ONE-WAY ALLOW PRORATED TRIP: HCPCS | Mod: ORL | Performed by: NURSE PRACTITIONER

## 2022-09-15 PROCEDURE — 84145 PROCALCITONIN (PCT): CPT | Mod: ORL | Performed by: NURSE PRACTITIONER

## 2022-09-15 PROCEDURE — 83550 IRON BINDING TEST: CPT | Mod: ORL | Performed by: NURSE PRACTITIONER

## 2022-09-15 PROCEDURE — 85027 COMPLETE CBC AUTOMATED: CPT | Mod: ORL | Performed by: NURSE PRACTITIONER

## 2022-09-16 ENCOUNTER — TELEPHONE (OUTPATIENT)
Dept: GERIATRICS | Facility: CLINIC | Age: 82
End: 2022-09-16

## 2022-09-16 NOTE — CONFIDENTIAL NOTE
[facility] Geriatrics Lab Note     Provider: MARICHUY Basilio CNP  Facility: [facility] Type:  AL    Allergies   Allergen Reactions     Ativan [Lorazepam]        Labs Reviewed by provider: Vitamin D3, procalcitonin, ferritin, iron, iron binding, CBC, and CMP.    Verbal Order/Direction given by Provider: Decrease Celexa to 10 mg po daily.      Provider giving Order:  MARICHUY Basilio CNP    Verbal Order given to: Flakito Rankin RN

## 2022-09-20 DIAGNOSIS — K59.01 SLOW TRANSIT CONSTIPATION: ICD-10-CM

## 2022-09-20 RX ORDER — POLYETHYLENE GLYCOL 3350 17 G/17G
POWDER, FOR SOLUTION ORAL
Qty: 510 G | Refills: 97 | Status: SHIPPED | OUTPATIENT
Start: 2022-09-20

## 2022-09-22 ENCOUNTER — TELEPHONE (OUTPATIENT)
Dept: UROLOGY | Facility: CLINIC | Age: 82
End: 2022-09-22

## 2022-09-22 ENCOUNTER — PATIENT OUTREACH (OUTPATIENT)
Dept: GERIATRIC MEDICINE | Facility: CLINIC | Age: 82
End: 2022-09-22

## 2022-09-22 VITALS
TEMPERATURE: 97.7 F | DIASTOLIC BLOOD PRESSURE: 58 MMHG | OXYGEN SATURATION: 96 % | BODY MASS INDEX: 33.93 KG/M2 | RESPIRATION RATE: 16 BRPM | HEIGHT: 69 IN | SYSTOLIC BLOOD PRESSURE: 128 MMHG | HEART RATE: 62 BPM | WEIGHT: 229.1 LBS

## 2022-09-22 NOTE — LETTER
September 22, 2022    JAMIE CRUMP  [facility]  [address]  [place] [place] [address]      Dear Jamie:    As a member of [facility] ([facility] ([facility] [place]), you are provided a care coordinator. I will be your new care coordinator as of 10/01/2022. I will be calling you soon to see how you are doing and determine your needs.    If you have any questions, please feel free to call me at [phone]. If you reach my voice mail, please leave a message and your phone number. If you are hearing impaired, please call the [place] Relay at 942 or [phone] ([email or web address] relay service).    I look forward to speaking with you soon.    Sincerely,    Gisela Hall RN, BSN, PHN  [phone]  [email or web address] is a health plan that contracts with both Medicare and the [place] Medical Assistance (Medicaid) program to provide benefits of both programs to enrollees. Enrollment in [facility] depends on contract renewal.      [facility]  [id number] DHS Approved ([id number])  [id number] (11/18)

## 2022-09-22 NOTE — TELEPHONE ENCOUNTER
Health Call Center    Phone Message    May a detailed message be left on voicemail: yes     Reason for Call: Sylvia Stein who is a part of pt's care team called today and stated they received 2 different antibiotics (Cipro and Bactrim) with no instructions for pt's upcoming procedure with Dr. Larry. Please review and follow-up with Sylvia (pt's NP) at [phone]. Voicemail okay to leave.    Action Taken: Other: [facility]    Travel Screening: Not Applicable

## 2022-09-22 NOTE — PROGRESS NOTES
[facility] Care Coordination Contact    Internal CC change effective 10/01/2022.  Mailed member CC Change letter.  Additional tasks to be completed by CMS include: update database & EPIC, enter CC Change in MMIS, and move member files on Q drive.    Pat Varma  Case Management Specialist  [facility]  [phone]

## 2022-09-22 NOTE — TELEPHONE ENCOUNTER
Returned phone call to facility and informed that the cipro was prescribed for procedure that was cancelled, so a new script was sent in for Bactrim that didn't have any warnings/interactions this time. Gave instruction that patient is to start morning prior to day of procedure and to take day of and day after twice daily each day. Sylvia plans to write orders for the facility with these instructions.      Odalis Flowers LPN

## 2022-09-23 RX ORDER — SODIUM PHOSPHATE, DIBASIC AND SODIUM PHOSPHATE, MONOBASIC 7; 19 G/230ML; G/230ML
1 ENEMA RECTAL ONCE
Qty: 133 ML | Refills: 0 | Status: SHIPPED | OUTPATIENT
Start: 2022-09-23 | End: 2022-09-23

## 2022-09-26 ENCOUNTER — LAB REQUISITION (OUTPATIENT)
Dept: LAB | Facility: CLINIC | Age: 82
End: 2022-09-26
Payer: COMMERCIAL

## 2022-09-26 DIAGNOSIS — U07.1 COVID-19: ICD-10-CM

## 2022-09-26 PROCEDURE — U0005 INFEC AGEN DETEC AMPLI PROBE: HCPCS | Mod: ORL | Performed by: NURSE PRACTITIONER

## 2022-09-27 LAB — SARS-COV-2 RNA RESP QL NAA+PROBE: NEGATIVE

## 2022-09-28 ENCOUNTER — HOSPITAL ENCOUNTER (OUTPATIENT)
Facility: CLINIC | Age: 82
Discharge: NURSING, REHAB OR LONG-TERM CARE | End: 2022-09-28
Attending: STUDENT IN AN ORGANIZED HEALTH CARE EDUCATION/TRAINING PROGRAM | Admitting: STUDENT IN AN ORGANIZED HEALTH CARE EDUCATION/TRAINING PROGRAM
Payer: COMMERCIAL

## 2022-09-28 ENCOUNTER — LAB REQUISITION (OUTPATIENT)
Dept: LAB | Facility: CLINIC | Age: 82
End: 2022-09-28
Payer: COMMERCIAL

## 2022-09-28 ENCOUNTER — TELEPHONE (OUTPATIENT)
Dept: GERIATRICS | Facility: CLINIC | Age: 82
End: 2022-09-28

## 2022-09-28 ENCOUNTER — ANESTHESIA (OUTPATIENT)
Dept: SURGERY | Facility: CLINIC | Age: 82
End: 2022-09-28
Payer: COMMERCIAL

## 2022-09-28 ENCOUNTER — ANESTHESIA EVENT (OUTPATIENT)
Dept: SURGERY | Facility: CLINIC | Age: 82
End: 2022-09-28
Payer: COMMERCIAL

## 2022-09-28 VITALS
BODY MASS INDEX: 33.86 KG/M2 | RESPIRATION RATE: 18 BRPM | SYSTOLIC BLOOD PRESSURE: 168 MMHG | WEIGHT: 223.4 LBS | HEART RATE: 77 BPM | TEMPERATURE: 97.4 F | DIASTOLIC BLOOD PRESSURE: 81 MMHG | HEIGHT: 68 IN | OXYGEN SATURATION: 96 %

## 2022-09-28 DIAGNOSIS — N40.2 PROSTATE NODULE: ICD-10-CM

## 2022-09-28 DIAGNOSIS — R97.20 ELEVATED PROSTATE SPECIFIC ANTIGEN (PSA): ICD-10-CM

## 2022-09-28 DIAGNOSIS — E87.1 HYPO-OSMOLALITY AND HYPONATREMIA: ICD-10-CM

## 2022-09-28 LAB
ANION GAP SERPL CALCULATED.3IONS-SCNC: 9 MMOL/L (ref 3–14)
BUN SERPL-MCNC: 7 MG/DL (ref 7–30)
CALCIUM SERPL-MCNC: 9.1 MG/DL (ref 8.5–10.1)
CHLORIDE BLD-SCNC: 92 MMOL/L (ref 94–109)
CO2 SERPL-SCNC: 26 MMOL/L (ref 20–32)
CREAT SERPL-MCNC: 0.6 MG/DL (ref 0.66–1.25)
GFR SERPL CREATININE-BSD FRML MDRD: >90 ML/MIN/1.73M2
GLUCOSE BLD-MCNC: 116 MG/DL (ref 70–99)
HOLD SPECIMEN: NORMAL
POTASSIUM BLD-SCNC: 4.1 MMOL/L (ref 3.4–5.3)
SODIUM SERPL-SCNC: 127 MMOL/L (ref 133–144)

## 2022-09-28 PROCEDURE — 36415 COLL VENOUS BLD VENIPUNCTURE: CPT | Performed by: ANESTHESIOLOGY

## 2022-09-28 PROCEDURE — 80048 BASIC METABOLIC PNL TOTAL CA: CPT | Performed by: ANESTHESIOLOGY

## 2022-09-28 PROCEDURE — 250N000011 HC RX IP 250 OP 636: Performed by: STUDENT IN AN ORGANIZED HEALTH CARE EDUCATION/TRAINING PROGRAM

## 2022-09-28 PROCEDURE — 258N000003 HC RX IP 258 OP 636: Performed by: STUDENT IN AN ORGANIZED HEALTH CARE EDUCATION/TRAINING PROGRAM

## 2022-09-28 PROCEDURE — 999N000141 HC STATISTIC PRE-PROCEDURE NURSING ASSESSMENT: Performed by: STUDENT IN AN ORGANIZED HEALTH CARE EDUCATION/TRAINING PROGRAM

## 2022-09-28 RX ORDER — SULFAMETHOXAZOLE AND TRIMETHOPRIM 400; 80 MG/1; MG/1
1 TABLET ORAL 2 TIMES DAILY
COMMUNITY
End: 2022-10-01

## 2022-09-28 RX ORDER — CLINDAMYCIN PHOSPHATE 900 MG/50ML
900 INJECTION, SOLUTION INTRAVENOUS SEE ADMIN INSTRUCTIONS
Status: DISCONTINUED | OUTPATIENT
Start: 2022-09-28 | End: 2022-09-28 | Stop reason: HOSPADM

## 2022-09-28 RX ORDER — CLINDAMYCIN PHOSPHATE 900 MG/50ML
900 INJECTION, SOLUTION INTRAVENOUS
Status: COMPLETED | OUTPATIENT
Start: 2022-09-28 | End: 2022-09-28

## 2022-09-28 RX ADMIN — GENTAMICIN SULFATE 520 MG: 40 INJECTION, SOLUTION INTRAMUSCULAR; INTRAVENOUS at 13:26

## 2022-09-28 RX ADMIN — CLINDAMYCIN PHOSPHATE 900 MG: 900 INJECTION, SOLUTION INTRAVENOUS at 14:34

## 2022-09-28 ASSESSMENT — ACTIVITIES OF DAILY LIVING (ADL)
ADLS_ACUITY_SCORE: 41
ADLS_ACUITY_SCORE: 35

## 2022-09-28 ASSESSMENT — ENCOUNTER SYMPTOMS
DYSRHYTHMIAS: 1
SEIZURES: 1

## 2022-09-28 NOTE — OR NURSING
, Dr Luong notified.  Surgery canceled, Dr Larry notified.  MD would like primary care provider notified.      Sylvia Stein NP notified of  and procedure canceled.  She will place orders for [facility] to follow.

## 2022-09-28 NOTE — PROGRESS NOTES
PTA medications updated by Medication Scribe prior to surgery via phone call with patient (last doses completed by Nurse)     Medication history sources: MAR ([facility])  In the past week, patient estimated taking medication this percent of the time: Greater than 90%  Adherence assessment: N/A Not Observed    Significant changes made to the medication list:  Patient reports no longer taking the following meds (med scribe removed from PTA med list): Calcium Carbonate      Additional medication history information:   None    Medication reconciliation completed by provider prior to medication history? No    Time spent in this activity: 20 minutes    The information provided in this note is only as accurate as the sources available at the time of update(s)    Prior to Admission medications    Medication Sig Last Dose Taking? Auth Provider Long Term End Date   acetaminophen (TYLENOL) 500 MG tablet Take 1,000 mg by mouth every 6 hours as needed for mild pain  at prn Yes Reported, Patient     alum & mag hydroxide-simethicone (MAALOX MAX) 400-400-40 MG/5ML SUSP suspension Take 30 mLs by mouth every 6 hours as needed for indigestion 9/23/2022 at am Yes Sylvia Stein APRN CNP     citalopram (CELEXA) 10 MG tablet Take 1 tablet (10 mg) by mouth daily 9/27/2022 at pm Yes Sylvia Stein APRN CNP Yes    DEEP SEA NASAL SPRAY 0.65 % nasal spray INHALE 2 SPRAYS IN EACH NOSTRIL ONCE DAILY 9/28/2022 at am Yes Jerrica Rome APRN CNP     gabapentin (NEURONTIN) 100 MG capsule TAKE TWO CAPSULES (200MG) BY MOUTH EVERY NIGHT AT BEDTIME 9/27/2022 at pm Yes Kaley Starr APRN CNP Yes    guaiFENesin (ROBITUSSIN) 100 MG/5ML liquid GIVE 10ML (200 MG) BY MOUTH TWICE DAILY;AND TWICE DAILY AS NEEDED FOR COUGH 9/27/2022 at pm Yes Sylvia Stein APRN CNP     menthol-zinc oxide (CALMOSEPTINE) 0.44-20.6 % OINT ointment Apply 1 g topically 2 times daily. May also apply 1 g 2 times daily as needed for  skin protection. 9/28/2022 at am Yes Sylvia Stein APRN CNP     nystatin (MYCOSTATIN) 798637 UNIT/GM external powder Apply topically 2 times daily 9/28/2022 at am Yes Reported, Patient     omeprazole (PRILOSEC) 40 MG DR capsule TAKE 1 CAPSULE BY MOUTH TWICE DAILY 9/28/2022 at am Yes Sylvia Stein APRN CNP     polyethylene glycol (MIRALAX) 17 GM/Dose powder MIX 2 CAPFULS IN 8OZ OF ORANGE JUICE  IN THE MORNING;AND MAY MIX 2 CAPFULS ONCE DAILY AS NEEDED FOR CONSTIPATION. MIX IN FRONT OF PT. HOLD FOR LOOSE STOOL. OK TO GIVE 1 CAPFUL IF RESIDENT REFUSES 2 CAPFULS. 9/27/2022 at am Yes Sylvia Stein APRN CNP     polyvinyl alcohol-povidone PF (REFRESH) 1.4-0.6 % ophthalmic solution 1 drop every 4 hours as needed for irritation  at prn Yes Reported, Patient     QUEtiapine (SEROQUEL) 25 MG tablet TAKE ONE-HALF TABLET (12.5MG) BY MOUTH AT BEDTIME 9/27/2022 at pm Yes Sylvia Stein APRN CNP Yes    sodium phosphate (FLEET ENEMA) 7-19 GM/118ML rectal enema Place 1 enema rectally once 9/28/2022 at am Yes Reported, Patient     sulfamethoxazole-trimethoprim (BACTRIM) 400-80 MG tablet Take 1 tablet by mouth 2 times daily 9/28/2022 at am Yes Reported, Patient     trospium (SANCTURA) 20 MG tablet TAKE 1 TABLET BY MOUTH TWICE DAILY 9/28/2022 at am Yes Sylvia Stein APRN CNP       Medication history completed by:    Nikhil Verdugo CPhT  [facility]

## 2022-09-28 NOTE — TELEPHONE ENCOUNTER
Jamie Valdivia  1940  ORDERS:  - Discontinue Celexa   - BMP on 9/29 dx hyponatremia   Electronically signed by:   MARICHUY Basilio CNP  09/28/22 2:59 PM

## 2022-09-28 NOTE — ANESTHESIA PREPROCEDURE EVALUATION
Anesthesia Pre-Procedure Evaluation    Patient: Jamie Valdivia   MRN: [id number] : 1940        Procedure : Procedure(s):  transrectal prostate biopsy  transrectal ultrasound          Past Medical History:   Diagnosis Date     Age-related osteoporosis without current pathological fracture 2022     Alcohol abuse 2017     Alcohol dependence with withdrawal (H)      Alcoholism (H)      Back pain      Backache 2018     CAD (coronary artery disease)      Chronic a-fib (H)      Chronic alcohol use 2015    Formatting of this note might be different from the original. 2019: serious fall at home with multiple vertebral fractures.  BAL 0.414% on admission.  Denies that he drinks much. 2018: hospitalized for fall due to alcohol intoxication.  BAL 0.34% on admission. 2018: hospitalized for injuries from fall due to alcohol intoxication.  BAL 0.34% on admission     Chronic atrial fibrillation (H) 07/15/2016    Formatting of this note might be different from the original. 2019: Multiple falls so started on aspirin only.  Then aspirin stopped due to GI bleed.     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 21     HTN (hypertension)      Impaired gait and mobility 2019     Mild cognitive impairment 2019    Formatting of this note might be different from the original. NON-AMNESTIC TYPE     Mild TBI (traumatic brain injury) (H) 2019     Mumps      Obesity (BMI 30-39.9) 2015     Osteoarthritis      Osteoarthritis of  both knees 05/13/2015     Osteoporosis      Other idiopathic peripheral autonomic neuropathy 03/30/2022     Other insomnia 03/14/2019     Other seizures (H) 03/30/2022     Palpitations      Peripheral neuropathy      Pharyngeal dysphagia 05/13/2015    Formatting of this note might be different from the original. Likely secondary to GERD with esophagitis, on PPI and H2 blocker with notable improvement, EGD 10/5/15.     Physical deconditioning 03/14/2019     Recurrent major depressive disorder (H) 03/30/2022     Rhabdomyolysis      Thrombocytopenia (H)      Urination disorder      Weakness 04/27/2020      Past Surgical History:   Procedure Laterality Date     ESOPHAGOSCOPY, GASTROSCOPY, DUODENOSCOPY (EGD), COMBINED N/A 06/01/2022    Procedure: ESOPHAGOGASTRODUODENOSCOPY (EGD) mngi with biopsies using ScanSafe cold biopsy forceps;  Surgeon: David Springer MD;  Location:  GI     left hip replacement  2010     left shoulder replacement  2016     ORTHOPEDIC SURGERY       SPINE SURGERY      done in [place]     TONSILLECTOMY        Allergies   Allergen Reactions     Ativan [Lorazepam]       Social History     Tobacco Use     Smoking status: Never Smoker     Smokeless tobacco: Never Used   Substance Use Topics     Alcohol use: Not Currently     Comment: not since December 2021      Wt Readings from Last 1 Encounters:   08/15/22 103.9 kg (229 lb 1.6 oz)        Anesthesia Evaluation   Pt has had prior anesthetic.     No history of anesthetic complications       ROS/MED HX  ENT/Pulmonary: Comment: Recently treated pulmonary infection, early August 2022, repeat CXR prior to procedure      Neurologic: Comment: H/o TBI    (+) dementia, peripheral neuropathy, seizures,     Cardiovascular: Comment: Echo 2020  Conclusions     Left Ventricle:   Normal left ventricular systolic function. The ejection fraction is visually estimated to be 65 %.     Left Atrium:   Dilated left atrium by visual assessment.     Aorta:   There is  dilatation of the ascending aorta measuring 41.0 mm.     Pulmonary Artery:   Moderate pulmonary artery hypertension.     Right Atrium:   Dilated right atrium by visual assessment.     Tricuspid Valve:   Mild-to-moderate tricuspid regurgitation.       Comparison Study   Comparison Date: 03/08/2019   Comparison Study: Transthoracic Echocardiogram   There was no significant change from prior echo     Findings     Left Ventricle:   Normal left ventricular size. Increased left ventricular wall thickness. Normal left ventricular systolic function. The ejection   fraction is visually estimated to be 65 %. There are no left ventricular regional wall motion abnormalities. Unable to assess left   ventricular diastolic function due to abnormal rhythm.     Left Atrium:   Dilated left atrium by visual assessment.     Aortic Valve:   The aortic valve is tricuspid. Moderate aortic cuspal thickening. Mild aortic cuspal calcification. Reduced aortic cuspal mobility.   Trivial aortic regurgitation. Aortic valve area likely underestimated due to poor doppler angle.     Aorta:   There is dilatation in the sinus of valsalva measuring 45.0 mm. There is dilatation of the ascending aorta measuring 41.0 mm.   Mitral Valve:   Mild mitral leaflet thickening. There is mild mitral annular calcification. Mild mitral regurgitation. No mitral stenosis.     IAS:   No evidence of an atrial shunt by color Doppler.     Right Ventricle:   Normal right ventricular size. Normal right ventricular systolic function. Normal right ventricular wall thickness. Pulmonary   artery systolic pressure is measured at 53 mmHg. TAPSE measures 18 mm. Tricuspid valve lateral annulus peak systolic   velocity is 10.0 cm/sec.     Pulmonary Artery:   Moderate pulmonary artery hypertension.     Pulmonary Vein:   The pulmonary venous flow demonstrates a blunted S-wave.     Right Atrium:   Dilated right atrium by visual assessment.     Tricuspid Valve:   Normal tricuspid valve  structure. Mild-to-moderate tricuspid regurgitation. No significant tricuspid stenosis.     Pulmonic Valve:   Normal pulmonary valve structure. Trivial pulmonary regurgitation. No pulmonary stenosis.     IVC:   Dilated IVC with normal respirophasic changes.     Pericardium:   No significant pericardial effusion. There is pericardial fat. No pleural effusion.       H/o  Lower extremity edema    (+) hypertension--CAD ---dysrhythmias, a-fib, valvular problems/murmurs type: MR TR. 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    Abdominal wall hernia    (+) GERD, Symptomatic, hepatitis type Alcoholic, liver disease,     Renal/Genitourinary:       Endo:     (+) Obesity,     Psychiatric/Substance Use:     (+) psychiatric history anxiety alcohol abuse     Infectious Disease:       Malignancy: Comment: Elevated PSA       Other:            Physical Exam    Airway  airway exam normal      Mallampati: II   TM distance: > 3 FB   Neck ROM: full   Mouth opening: > 3 cm    Respiratory Devices and Support         Dental  no notable dental history         Cardiovascular          Rhythm and rate: regular and bradycardia     Pulmonary   pulmonary exam normal                OUTSIDE LABS:  CBC:   Lab Results   Component Value Date    WBC 6.3 09/15/2022    WBC 6.1 06/30/2022    HGB 12.2 (L) 09/15/2022    HGB 12.9 (L) 06/30/2022    HCT 36.6 (L) 09/15/2022    HCT 40.4 06/30/2022     09/15/2022     06/30/2022     BMP:   Lab Results   Component Value Date     (L) 09/15/2022     06/30/2022    POTASSIUM 4.1 09/15/2022    POTASSIUM 4.5 06/30/2022    CHLORIDE 97 09/15/2022    CHLORIDE 103 06/30/2022    CO2 27 09/15/2022    CO2 31 06/30/2022    BUN 9 09/15/2022    BUN 14 06/30/2022    CR  0.50 (L) 09/15/2022    CR 0.70 06/30/2022    GLC 87 09/15/2022    GLC 97 06/30/2022     COAGS: No results found for: PTT, INR, FIBR  POC: No results found for: BGM, HCG, HCGS  HEPATIC:   Lab Results   Component Value Date    ALBUMIN 3.3 (L) 09/15/2022    PROTTOTAL 7.3 09/15/2022    ALT 15 09/15/2022    AST 11 09/15/2022    ALKPHOS 60 09/15/2022    BILITOTAL 1.0 09/15/2022     OTHER:   Lab Results   Component Value Date    A1C 5.5 04/28/2022    JOSUE 8.9 09/15/2022    MAG 2.2 02/28/2022    TSH 2.77 04/28/2022       Anesthesia Plan    ASA Status:  4   NPO Status:  NPO Appropriate                  Consents            Postoperative Care            Comments:    Other Comments: Sodium level came back from lab at 127. Case will be cancelled for today.            Blas Luong MD

## 2022-09-29 ENCOUNTER — PATIENT OUTREACH (OUTPATIENT)
Dept: GERIATRIC MEDICINE | Facility: CLINIC | Age: 82
End: 2022-09-29

## 2022-09-29 ENCOUNTER — TELEPHONE (OUTPATIENT)
Dept: GERIATRICS | Facility: CLINIC | Age: 82
End: 2022-09-29

## 2022-09-29 LAB
ANION GAP SERPL CALCULATED.3IONS-SCNC: 11 MMOL/L (ref 3–14)
BUN SERPL-MCNC: 7 MG/DL (ref 7–30)
CALCIUM SERPL-MCNC: 9.1 MG/DL (ref 8.5–10.1)
CHLORIDE BLD-SCNC: 92 MMOL/L (ref 94–109)
CO2 SERPL-SCNC: 23 MMOL/L (ref 20–32)
CREAT SERPL-MCNC: 0.6 MG/DL (ref 0.66–1.25)
GFR SERPL CREATININE-BSD FRML MDRD: >90 ML/MIN/1.73M2
GLUCOSE BLD-MCNC: 109 MG/DL (ref 70–99)
POTASSIUM BLD-SCNC: 4.1 MMOL/L (ref 3.4–5.3)
SODIUM SERPL-SCNC: 126 MMOL/L (ref 133–144)

## 2022-09-29 PROCEDURE — 80048 BASIC METABOLIC PNL TOTAL CA: CPT | Performed by: NURSE PRACTITIONER

## 2022-09-29 PROCEDURE — P9604 ONE-WAY ALLOW PRORATED TRIP: HCPCS | Performed by: NURSE PRACTITIONER

## 2022-09-29 PROCEDURE — 36415 COLL VENOUS BLD VENIPUNCTURE: CPT | Performed by: NURSE PRACTITIONER

## 2022-09-29 NOTE — PROGRESS NOTES
[facility] Care Coordination Contact    Writer contacted members daughter, Viola, to introduce self as new care coordinator. Writer gave contact information and encouraged her to call with any questions/concerns. Viola stated she would give the contact information to her sister, Coral as well.   Gisela Hall RN  [facility]  [phone]

## 2022-09-29 NOTE — TELEPHONE ENCOUNTER
"[facility] GERIATRICS TELEPHONE NOTE    Spoke to daughter Viola.   Reviewed hyponatremia, stopped Celexa.   Wondering if current medications have been the best the choice for him.   Used to go to drinking to manage mood and doesn't deal with health issues.  Cycled between depression and \"up mood\" through his whole life. Still having psychotic delusions regarding wife having relationship with a male doctor. Family feels these were better on Zyprexa.   Discussed psychiatry referral, but would like to attempt mgmt with primary team to start.  Stomach complaints recently, wondering about PRN for anxiety.     PLAN:  - Schedule joint visit next week with NP and PharmD, call daughter after visit.    Sylvia Stein, MARICHUY CNP  09/29/22 2:40 PM      "

## 2022-09-30 ENCOUNTER — HOSPITAL ENCOUNTER (INPATIENT)
Facility: CLINIC | Age: 82
LOS: 6 days | Discharge: NURSING, REHAB OR LONG-TERM CARE | DRG: 640 | End: 2022-10-06
Attending: EMERGENCY MEDICINE | Admitting: HOSPITALIST
Payer: COMMERCIAL

## 2022-09-30 ENCOUNTER — APPOINTMENT (OUTPATIENT)
Dept: CT IMAGING | Facility: CLINIC | Age: 82
DRG: 640 | End: 2022-09-30
Attending: EMERGENCY MEDICINE
Payer: COMMERCIAL

## 2022-09-30 ENCOUNTER — TELEPHONE (OUTPATIENT)
Dept: GERIATRICS | Facility: CLINIC | Age: 82
End: 2022-09-30

## 2022-09-30 DIAGNOSIS — E87.1 HYPONATREMIA: ICD-10-CM

## 2022-09-30 DIAGNOSIS — S32.020D CLOSED WEDGE COMPRESSION FRACTURE OF L2 VERTEBRA WITH ROUTINE HEALING: Primary | ICD-10-CM

## 2022-09-30 DIAGNOSIS — F41.1 GAD (GENERALIZED ANXIETY DISORDER): Primary | ICD-10-CM

## 2022-09-30 DIAGNOSIS — F41.1 GENERALIZED ANXIETY DISORDER: ICD-10-CM

## 2022-09-30 DIAGNOSIS — F10.27 DEMENTIA ASSOCIATED WITH ALCOHOLISM WITH BEHAVIORAL DISTURBANCE (H): ICD-10-CM

## 2022-09-30 LAB
ALBUMIN SERPL BCG-MCNC: 3.9 G/DL (ref 3.5–5.2)
ALP SERPL-CCNC: 61 U/L (ref 40–129)
ALT SERPL W P-5'-P-CCNC: 15 U/L (ref 10–50)
ANION GAP SERPL CALCULATED.3IONS-SCNC: 12 MMOL/L (ref 7–15)
AST SERPL W P-5'-P-CCNC: 37 U/L (ref 10–50)
BASOPHILS # BLD AUTO: 0 10E3/UL (ref 0–0.2)
BASOPHILS NFR BLD AUTO: 0 %
BILIRUB DIRECT SERPL-MCNC: 0.21 MG/DL (ref 0–0.3)
BILIRUB SERPL-MCNC: 0.7 MG/DL
BUN SERPL-MCNC: 6.9 MG/DL (ref 8–23)
CALCIUM SERPL-MCNC: 9 MG/DL (ref 8.8–10.2)
CHLORIDE SERPL-SCNC: 78 MMOL/L (ref 98–107)
CREAT SERPL-MCNC: 0.63 MG/DL (ref 0.67–1.17)
DEPRECATED HCO3 PLAS-SCNC: 24 MMOL/L (ref 22–29)
EOSINOPHIL # BLD AUTO: 0 10E3/UL (ref 0–0.7)
EOSINOPHIL NFR BLD AUTO: 0 %
ERYTHROCYTE [DISTWIDTH] IN BLOOD BY AUTOMATED COUNT: 12.5 % (ref 10–15)
GFR SERPL CREATININE-BSD FRML MDRD: >90 ML/MIN/1.73M2
GLUCOSE SERPL-MCNC: 116 MG/DL (ref 70–99)
HCT VFR BLD AUTO: 35.6 % (ref 40–53)
HGB BLD-MCNC: 12.3 G/DL (ref 13.3–17.7)
IMM GRANULOCYTES # BLD: 0.1 10E3/UL
IMM GRANULOCYTES NFR BLD: 1 %
LYMPHOCYTES # BLD AUTO: 1.1 10E3/UL (ref 0.8–5.3)
LYMPHOCYTES NFR BLD AUTO: 8 %
MCH RBC QN AUTO: 28.9 PG (ref 26.5–33)
MCHC RBC AUTO-ENTMCNC: 34.6 G/DL (ref 31.5–36.5)
MCV RBC AUTO: 84 FL (ref 78–100)
MONOCYTES # BLD AUTO: 0.4 10E3/UL (ref 0–1.3)
MONOCYTES NFR BLD AUTO: 3 %
NEUTROPHILS # BLD AUTO: 11.7 10E3/UL (ref 1.6–8.3)
NEUTROPHILS NFR BLD AUTO: 88 %
NRBC # BLD AUTO: 0 10E3/UL
NRBC BLD AUTO-RTO: 0 /100
PLATELET # BLD AUTO: 204 10E3/UL (ref 150–450)
POTASSIUM SERPL-SCNC: 3.9 MMOL/L (ref 3.4–5.3)
PROT SERPL-MCNC: 7 G/DL (ref 6.4–8.3)
RBC # BLD AUTO: 4.26 10E6/UL (ref 4.4–5.9)
SODIUM SERPL-SCNC: 114 MMOL/L (ref 136–145)
WBC # BLD AUTO: 13.4 10E3/UL (ref 4–11)

## 2022-09-30 PROCEDURE — 96374 THER/PROPH/DIAG INJ IV PUSH: CPT | Mod: 59

## 2022-09-30 PROCEDURE — C9803 HOPD COVID-19 SPEC COLLECT: HCPCS

## 2022-09-30 PROCEDURE — 80053 COMPREHEN METABOLIC PANEL: CPT | Performed by: EMERGENCY MEDICINE

## 2022-09-30 PROCEDURE — 82248 BILIRUBIN DIRECT: CPT | Performed by: EMERGENCY MEDICINE

## 2022-09-30 PROCEDURE — U0003 INFECTIOUS AGENT DETECTION BY NUCLEIC ACID (DNA OR RNA); SEVERE ACUTE RESPIRATORY SYNDROME CORONAVIRUS 2 (SARS-COV-2) (CORONAVIRUS DISEASE [COVID-19]), AMPLIFIED PROBE TECHNIQUE, MAKING USE OF HIGH THROUGHPUT TECHNOLOGIES AS DESCRIBED BY CMS-2020-01-R: HCPCS | Performed by: EMERGENCY MEDICINE

## 2022-09-30 PROCEDURE — 71250 CT THORAX DX C-: CPT

## 2022-09-30 PROCEDURE — 85025 COMPLETE CBC W/AUTO DIFF WBC: CPT | Performed by: EMERGENCY MEDICINE

## 2022-09-30 PROCEDURE — 72192 CT PELVIS W/O DYE: CPT

## 2022-09-30 PROCEDURE — 250N000011 HC RX IP 250 OP 636: Performed by: EMERGENCY MEDICINE

## 2022-09-30 PROCEDURE — 99285 EMERGENCY DEPT VISIT HI MDM: CPT | Mod: 25

## 2022-09-30 PROCEDURE — 96376 TX/PRO/DX INJ SAME DRUG ADON: CPT

## 2022-09-30 PROCEDURE — 120N000001 HC R&B MED SURG/OB

## 2022-09-30 PROCEDURE — 36415 COLL VENOUS BLD VENIPUNCTURE: CPT | Performed by: EMERGENCY MEDICINE

## 2022-09-30 PROCEDURE — 72128 CT CHEST SPINE W/O DYE: CPT

## 2022-09-30 PROCEDURE — 96375 TX/PRO/DX INJ NEW DRUG ADDON: CPT

## 2022-09-30 PROCEDURE — 70450 CT HEAD/BRAIN W/O DYE: CPT

## 2022-09-30 PROCEDURE — 93005 ELECTROCARDIOGRAM TRACING: CPT

## 2022-09-30 PROCEDURE — 250N000013 HC RX MED GY IP 250 OP 250 PS 637: Performed by: EMERGENCY MEDICINE

## 2022-09-30 PROCEDURE — 72125 CT NECK SPINE W/O DYE: CPT

## 2022-09-30 PROCEDURE — 72131 CT LUMBAR SPINE W/O DYE: CPT

## 2022-09-30 PROCEDURE — 51798 US URINE CAPACITY MEASURE: CPT

## 2022-09-30 RX ORDER — QUETIAPINE FUMARATE 25 MG/1
12.5 TABLET, FILM COATED ORAL 2 TIMES DAILY
COMMUNITY
End: 2022-09-30

## 2022-09-30 RX ORDER — QUETIAPINE FUMARATE 25 MG/1
TABLET, FILM COATED ORAL
Qty: 60 TABLET | Refills: 11 | Status: SHIPPED | OUTPATIENT
Start: 2022-09-30 | End: 2023-11-20

## 2022-09-30 RX ORDER — QUETIAPINE FUMARATE 25 MG/1
12.5 TABLET, FILM COATED ORAL 2 TIMES DAILY
Qty: 20 TABLET | Refills: 0 | Status: SHIPPED | OUTPATIENT
Start: 2022-09-30 | End: 2022-09-30

## 2022-09-30 RX ORDER — ONDANSETRON 2 MG/ML
4 INJECTION INTRAMUSCULAR; INTRAVENOUS ONCE
Status: COMPLETED | OUTPATIENT
Start: 2022-09-30 | End: 2022-09-30

## 2022-09-30 RX ORDER — QUETIAPINE FUMARATE 25 MG/1
12.5 TABLET, FILM COATED ORAL EVERY 12 HOURS PRN
COMMUNITY
End: 2022-09-30

## 2022-09-30 RX ORDER — HALOPERIDOL 5 MG/ML
2.5 INJECTION INTRAMUSCULAR ONCE
Status: COMPLETED | OUTPATIENT
Start: 2022-09-30 | End: 2022-09-30

## 2022-09-30 RX ORDER — ACETAMINOPHEN 325 MG/1
650 TABLET ORAL ONCE
Status: COMPLETED | OUTPATIENT
Start: 2022-09-30 | End: 2022-09-30

## 2022-09-30 RX ORDER — QUETIAPINE FUMARATE 25 MG/1
12.5 TABLET, FILM COATED ORAL EVERY 12 HOURS PRN
Qty: 10 TABLET | Refills: 0 | Status: SHIPPED | OUTPATIENT
Start: 2022-09-30 | End: 2022-09-30

## 2022-09-30 RX ORDER — HALOPERIDOL 5 MG/ML
2.5 INJECTION INTRAMUSCULAR ONCE
Status: DISCONTINUED | OUTPATIENT
Start: 2022-09-30 | End: 2022-09-30

## 2022-09-30 RX ADMIN — HALOPERIDOL LACTATE 2.5 MG: 5 INJECTION, SOLUTION INTRAMUSCULAR at 21:13

## 2022-09-30 RX ADMIN — MIDAZOLAM HYDROCHLORIDE 1 MG: 1 INJECTION, SOLUTION INTRAMUSCULAR; INTRAVENOUS at 21:57

## 2022-09-30 RX ADMIN — HALOPERIDOL LACTATE 2.5 MG: 5 INJECTION, SOLUTION INTRAMUSCULAR at 20:34

## 2022-09-30 RX ADMIN — HALOPERIDOL LACTATE 2.5 MG: 5 INJECTION, SOLUTION INTRAMUSCULAR at 20:27

## 2022-09-30 RX ADMIN — ACETAMINOPHEN 650 MG: 325 TABLET, FILM COATED ORAL at 17:25

## 2022-09-30 RX ADMIN — ONDANSETRON 4 MG: 2 INJECTION INTRAMUSCULAR; INTRAVENOUS at 20:00

## 2022-09-30 RX ADMIN — MIDAZOLAM HYDROCHLORIDE 1 MG: 1 INJECTION, SOLUTION INTRAMUSCULAR; INTRAVENOUS at 22:05

## 2022-09-30 ASSESSMENT — ACTIVITIES OF DAILY LIVING (ADL)
ADLS_ACUITY_SCORE: 41
ADLS_ACUITY_SCORE: 41
ADLS_ACUITY_SCORE: 35
ADLS_ACUITY_SCORE: 41

## 2022-09-30 ASSESSMENT — ENCOUNTER SYMPTOMS
DIARRHEA: 1
NAUSEA: 1
VOMITING: 1
APPETITE CHANGE: 1
CONFUSION: 1
ABDOMINAL PAIN: 1

## 2022-09-30 NOTE — ED NOTES
Bed: HW01  Expected date:   Expected time:   Means of arrival:   Comments:  A531 82m fall confusion dementia

## 2022-09-30 NOTE — TELEPHONE ENCOUNTER
[facility] Triage Nurse Telephone Encounter    Provider: MARICHUY Basilio CNP  Facility: [facility]  Facility Type:  [place]    Caller: Kandace  Call Back Number: [phone]    Allergies:    Allergies   Allergen Reactions     Ativan [Lorazepam]         Reason for call: Pt's family is reporting anxiety.     Verbal Order/Direction given by Provider: Quetiapine 12.5 mg PO at noon and HS. Quetiapine 12.5 mg PO Q12H PRN.    Provider giving Order:  Tabatha Quintana MD    Verbal Order given to: Kandace Garcia RN

## 2022-09-30 NOTE — ED TRIAGE NOTES
Pt presents via EMS for evaluation of AMS and a fall. Pt resides in an [facility] facility. Staff has noted an increase in confusion over last 2 days. Pt also has generalized abdominal pain with associate nausea, vomiting and diarrhea and decreased appetite. Currently on abx for pre-op prostate surgery, but surgery was postponed due to low sodium. Was in the shower today and had a fall per report. Unknown if it was witnessed or if any head injury. Pt states he didn't have a fall today. C/o back pain which is normal for him. . Dry heaving for EMS. Hx of BLE edema.

## 2022-09-30 NOTE — ED PROVIDER NOTES
History   Chief Complaint:  Altered Mental Status and Fall    The history is provided by the patient and the EMS personnel.      Jamie Valdivia is a 82 year old male with history of atrial fibrillation, coronary atherosclerosis, seizure disorder, alcohol abuse, and obesity who presents following a fall with altered mental status. The patient comes to the ED via EMS from [facility] where staff has noted increased confusion throughout the past two days. Patient is also said to have been complaining of abdominal pain, nausea, vomiting, decreased appetite, and diarrhea. Then today, the patient fell while in the shower, prompting his visit to the ED. It is unknown whether this fall was witness, nor is it known if the patient hit his head during the fall. However, the patient denies any fall today. Patient complains of some back pain, which the patient says is new, but EMS says has been ongoing. There was note of some dry heaving while with EMS en route.     Review of Systems   Unable to perform ROS: Dementia   Constitutional: Positive for appetite change (decreased).   Gastrointestinal: Positive for abdominal pain, diarrhea, nausea and vomiting.   Psychiatric/Behavioral: Positive for confusion.     Allergies:  Lorazepam    Medications:  Maalox  Neurontin  Robitussin  Mycostatin  Prilosec  Miralax  Seroquel  Sanctura  Bactrim  Sodium phosphate     Past Medical History:     Alcohol abuse  Dementia  Anxiety  Chronic atrial fibrillation  Claustrophobia  GERD  Covid-19  Vickers's esophagus   Seizure disorder  Osteoporosis  Anemia  Coronary atherosclerosis of native coronary artery  Alcoholic hepatitis   Obesity     Past Surgical History:    EGD, combined  Left hip replacement  Left shoulder replacement  Orthopedic surgery  Tonsillectomy   Fusion lumbar thoracic      Family History:    Mother: osteoporosis    Social History:  PCP: Sylvia Stein   Presents to the ED alone via EMS, coming from  "memory care assisted living facility.     Physical Exam     Patient Vitals for the past 24 hrs:   BP Temp Temp src Pulse Resp SpO2 Height Weight   09/30/22 2230 108/61 -- -- 70 22 99 % -- --   09/30/22 2130 (!) 144/68 -- -- 87 24 93 % -- --   09/30/22 2107 -- -- -- 86 -- 94 % -- --   09/30/22 2000 (!) 178/90 -- -- 82 -- -- -- --   09/30/22 1921 -- -- -- -- -- 90 % -- --   09/30/22 1920 (!) 148/76 -- -- 83 -- -- -- --   09/30/22 1805 -- -- -- 81 22 95 % -- --   09/30/22 1800 (!) 171/68 -- -- 69 21 -- -- --   09/30/22 1730 (!) 145/107 -- -- 74 23 90 % -- --   09/30/22 1625 (!) 171/75 97.6  F (36.4  C) Oral 78 20 92 % 1.778 m (5' 10\") 101.3 kg (223 lb 5.2 oz)       Physical Exam  Gen: Mucous membranes appear dry  Oral : Mucous membranes moist,   Nose: No rhinorhea  Ears: External near normal, without drainage   Eyes: periorbital tissues and sclera normal   Neck: supple, no abnormal swelling  Lungs: Clear bilaterally, no tachypnea or distress, speaks full sentences  CV: Regular rate  Abd: soft, nontender, nondistended, no rebound/guarding  Ext: symmetric bilateral lower extremity edema. Complains of mid-back pain with palpation. Normal range of motion of shoulder, hips, and knees.   Skin: warm, dry, well perfused, no rashes/bruising/lesions on exposed skin  Neuro: Opens eyes, interactive but does not answer questions appropriately, difficult to redirect  Psych: Not applicable    Emergency Department Course   ECG  ECG taken at 1626, ECG read at 1630  Atrial flutter with variable AV block.    Rate 67 bpm. WV interval * ms. QRS duration 94 ms. QT/QTc 406/429 ms. P-R-T axes * -24 6.     Imaging:  CT Pelvis Bone wo Contrast         Head CT w/o contrast    (Results Pending)   CT Thoracic Spine w/o Contrast    (Results Pending)   Lumbar spine CT w/o contrast    (Results Pending)   Cervical spine CT w/o contrast    (Results Pending)   CT Chest Abdomen Pelvis w/o Contrast    (Results Pending)     Report per " radiology    Laboratory:  Labs Ordered and Resulted from Time of ED Arrival to Time of ED Departure   BASIC METABOLIC PANEL - Abnormal       Result Value    Sodium 114 (*)     Potassium 3.9      Chloride 78 (*)     Carbon Dioxide (CO2) 24      Anion Gap 12      Urea Nitrogen 6.9 (*)     Creatinine 0.63 (*)     Calcium 9.0      Glucose 116 (*)     GFR Estimate >90     CBC WITH PLATELETS AND DIFFERENTIAL - Abnormal    WBC Count 13.4 (*)     RBC Count 4.26 (*)     Hemoglobin 12.3 (*)     Hematocrit 35.6 (*)     MCV 84      MCH 28.9      MCHC 34.6      RDW 12.5      Platelet Count 204      % Neutrophils 88      % Lymphocytes 8      % Monocytes 3      % Eosinophils 0      % Basophils 0      % Immature Granulocytes 1      NRBCs per 100 WBC 0      Absolute Neutrophils 11.7 (*)     Absolute Lymphocytes 1.1      Absolute Monocytes 0.4      Absolute Eosinophils 0.0      Absolute Basophils 0.0      Absolute Immature Granulocytes 0.1      Absolute NRBCs 0.0     HEPATIC FUNCTION PANEL - Normal    Protein Total 7.0      Albumin 3.9      Bilirubin Total 0.7      Alkaline Phosphatase 61      AST 37      ALT 15      Bilirubin Direct 0.21       Emergency Department Course:       Reviewed:  I reviewed nursing notes, vitals, past medical history and Care Everywhere    Assessments:  1631 I obtained history and examined the patient as noted above.       Consults:      Interventions:  Medications   acetaminophen (TYLENOL) tablet 650 mg (650 mg Oral Given 9/30/22 1725)   ondansetron (ZOFRAN) injection 4 mg (4 mg Intravenous Given 9/30/22 2000)   haloperidol lactate (HALDOL) injection 2.5 mg (2.5 mg Intravenous Given 9/30/22 2027)   haloperidol lactate (HALDOL) injection 2.5 mg (2.5 mg Intravenous Given 9/30/22 2034)   haloperidol lactate (HALDOL) injection 2.5 mg (2.5 mg Intravenous Given 9/30/22 2113)   midazolam (VERSED) injection 1 mg (1 mg Intravenous Given 9/30/22 2157)   midazolam (VERSED) injection 1 mg (1 mg Intravenous Given  9/30/22 7550)     Disposition:      Impression & Plan     Medical Decision Making:  Patient presents emergency department underlying dementia with increased confusion over the last few days.  He is really not able to give a reliable history on anything at this point.  There was some concern that he may have fallen and injured himself but no obvious sign of trauma.  Patient was on some antibiotics recently for a prostate biopsy which did not happen due to subtle hyponatremia.  His sodium is more significantly low tonight likely contributing to his confusion.  I believe if I am understanding things properly the patient has an upset stomach and nausea and vomiting, he has retched a bit tonight.  This may be from the antibiotics he was supposed to be taking, this may have worsened his hyponatremia.  Unable to obtain any information from the patient as far as history or review of systems, I felt the need to obtain some CT imaging to rule out trauma.  In order to hold still the patient required 7.5 mg IV Haldol as well as 2 mg of IV Versed.  We were able to get CT imaging at that point.  Those results are currently pending.  Will sign out to my partner to follow-up those results and complete admission for this patient.    Diagnosis:  No diagnosis found.    Discharge Medications:  New Prescriptions    No medications on file     Scribe Disclosure:  I, Bertrand Ignacio, am serving as a scribe at 4:26 PM on 9/30/2022 to document services personally performed by Hao Ingram MD based on my observations and the provider's statements to me.              Hao Ingram MD  09/30/22 6574

## 2022-10-01 ENCOUNTER — APPOINTMENT (OUTPATIENT)
Dept: GENERAL RADIOLOGY | Facility: CLINIC | Age: 82
DRG: 640 | End: 2022-10-01
Attending: INTERNAL MEDICINE
Payer: COMMERCIAL

## 2022-10-01 ENCOUNTER — APPOINTMENT (OUTPATIENT)
Dept: SPEECH THERAPY | Facility: CLINIC | Age: 82
DRG: 640 | End: 2022-10-01
Attending: HOSPITALIST
Payer: COMMERCIAL

## 2022-10-01 LAB
ALBUMIN UR-MCNC: 30 MG/DL
ANION GAP SERPL CALCULATED.3IONS-SCNC: 13 MMOL/L (ref 7–15)
APPEARANCE UR: CLEAR
BASE EXCESS BLDV CALC-SCNC: 2.5 MMOL/L (ref -7.7–1.9)
BILIRUB UR QL STRIP: NEGATIVE
BUN SERPL-MCNC: 5 MG/DL (ref 8–23)
CALCIUM SERPL-MCNC: 8.7 MG/DL (ref 8.8–10.2)
CHLORIDE SERPL-SCNC: 83 MMOL/L (ref 98–107)
COLOR UR AUTO: YELLOW
CREAT SERPL-MCNC: 0.57 MG/DL (ref 0.67–1.17)
DEPRECATED HCO3 PLAS-SCNC: 23 MMOL/L (ref 22–29)
ERYTHROCYTE [DISTWIDTH] IN BLOOD BY AUTOMATED COUNT: 12.8 % (ref 10–15)
GFR SERPL CREATININE-BSD FRML MDRD: >90 ML/MIN/1.73M2
GLUCOSE SERPL-MCNC: 124 MG/DL (ref 70–99)
GLUCOSE UR STRIP-MCNC: NEGATIVE MG/DL
HCO3 BLDV-SCNC: 26 MMOL/L (ref 21–28)
HCT VFR BLD AUTO: 37 % (ref 40–53)
HGB BLD-MCNC: 12.7 G/DL (ref 13.3–17.7)
HGB UR QL STRIP: ABNORMAL
KETONES UR STRIP-MCNC: 40 MG/DL
LACTATE SERPL-SCNC: 0.8 MMOL/L (ref 0.7–2)
LEUKOCYTE ESTERASE UR QL STRIP: NEGATIVE
MCH RBC QN AUTO: 28.9 PG (ref 26.5–33)
MCHC RBC AUTO-ENTMCNC: 34.3 G/DL (ref 31.5–36.5)
MCV RBC AUTO: 84 FL (ref 78–100)
NITRATE UR QL: NEGATIVE
NT-PROBNP SERPL-MCNC: ABNORMAL PG/ML (ref 0–1800)
O2/TOTAL GAS SETTING VFR VENT: 1 %
OSMOLALITY UR: 326 MMOL/KG (ref 100–1200)
PCO2 BLDV: 37 MM HG (ref 40–50)
PH BLDV: 7.46 [PH] (ref 7.32–7.43)
PH UR STRIP: 6 [PH] (ref 5–7)
PLATELET # BLD AUTO: 188 10E3/UL (ref 150–450)
PO2 BLDV: 59 MM HG (ref 25–47)
POTASSIUM SERPL-SCNC: 4 MMOL/L (ref 3.4–5.3)
PROCALCITONIN SERPL IA-MCNC: 0.11 NG/ML
RBC # BLD AUTO: 4.39 10E6/UL (ref 4.4–5.9)
RBC URINE: <1 /HPF
SARS-COV-2 RNA RESP QL NAA+PROBE: NEGATIVE
SODIUM SERPL-SCNC: 116 MMOL/L (ref 136–145)
SODIUM SERPL-SCNC: 118 MMOL/L (ref 136–145)
SODIUM SERPL-SCNC: 118 MMOL/L (ref 136–145)
SODIUM SERPL-SCNC: 119 MMOL/L (ref 136–145)
SODIUM SERPL-SCNC: 119 MMOL/L (ref 136–145)
SODIUM SERPL-SCNC: 122 MMOL/L (ref 136–145)
SODIUM UR-SCNC: 42 MMOL/L
SP GR UR STRIP: 1.01 (ref 1–1.03)
TROPONIN T SERPL HS-MCNC: 49 NG/L
UROBILINOGEN UR STRIP-MCNC: NORMAL MG/DL
WBC # BLD AUTO: 17 10E3/UL (ref 4–11)
WBC URINE: 1 /HPF

## 2022-10-01 PROCEDURE — 250N000009 HC RX 250: Performed by: INTERNAL MEDICINE

## 2022-10-01 PROCEDURE — 87040 BLOOD CULTURE FOR BACTERIA: CPT | Performed by: INTERNAL MEDICINE

## 2022-10-01 PROCEDURE — 84295 ASSAY OF SERUM SODIUM: CPT | Performed by: HOSPITALIST

## 2022-10-01 PROCEDURE — 83605 ASSAY OF LACTIC ACID: CPT | Performed by: INTERNAL MEDICINE

## 2022-10-01 PROCEDURE — 71045 X-RAY EXAM CHEST 1 VIEW: CPT

## 2022-10-01 PROCEDURE — 250N000013 HC RX MED GY IP 250 OP 250 PS 637: Performed by: HOSPITALIST

## 2022-10-01 PROCEDURE — 120N000001 HC R&B MED SURG/OB

## 2022-10-01 PROCEDURE — 92610 EVALUATE SWALLOWING FUNCTION: CPT | Mod: GN

## 2022-10-01 PROCEDURE — 36415 COLL VENOUS BLD VENIPUNCTURE: CPT | Performed by: HOSPITALIST

## 2022-10-01 PROCEDURE — 250N000013 HC RX MED GY IP 250 OP 250 PS 637: Performed by: INTERNAL MEDICINE

## 2022-10-01 PROCEDURE — 82803 BLOOD GASES ANY COMBINATION: CPT | Performed by: INTERNAL MEDICINE

## 2022-10-01 PROCEDURE — 84484 ASSAY OF TROPONIN QUANT: CPT | Performed by: INTERNAL MEDICINE

## 2022-10-01 PROCEDURE — 85027 COMPLETE CBC AUTOMATED: CPT | Performed by: HOSPITALIST

## 2022-10-01 PROCEDURE — 83880 ASSAY OF NATRIURETIC PEPTIDE: CPT | Performed by: INTERNAL MEDICINE

## 2022-10-01 PROCEDURE — 96376 TX/PRO/DX INJ SAME DRUG ADON: CPT

## 2022-10-01 PROCEDURE — 258N000003 HC RX IP 258 OP 636: Performed by: INTERNAL MEDICINE

## 2022-10-01 PROCEDURE — 99223 1ST HOSP IP/OBS HIGH 75: CPT | Mod: AI | Performed by: HOSPITALIST

## 2022-10-01 PROCEDURE — 84145 PROCALCITONIN (PCT): CPT | Performed by: INTERNAL MEDICINE

## 2022-10-01 PROCEDURE — 81001 URINALYSIS AUTO W/SCOPE: CPT | Performed by: HOSPITALIST

## 2022-10-01 PROCEDURE — 258N000003 HC RX IP 258 OP 636: Performed by: HOSPITALIST

## 2022-10-01 PROCEDURE — 96361 HYDRATE IV INFUSION ADD-ON: CPT

## 2022-10-01 PROCEDURE — 250N000011 HC RX IP 250 OP 636: Performed by: HOSPITALIST

## 2022-10-01 PROCEDURE — 36415 COLL VENOUS BLD VENIPUNCTURE: CPT | Performed by: INTERNAL MEDICINE

## 2022-10-01 PROCEDURE — 83935 ASSAY OF URINE OSMOLALITY: CPT | Performed by: HOSPITALIST

## 2022-10-01 PROCEDURE — 84300 ASSAY OF URINE SODIUM: CPT | Performed by: HOSPITALIST

## 2022-10-01 RX ORDER — SODIUM CHLORIDE 9 MG/ML
INJECTION, SOLUTION INTRAVENOUS CONTINUOUS
Status: ACTIVE | OUTPATIENT
Start: 2022-10-01 | End: 2022-10-01

## 2022-10-01 RX ORDER — LIDOCAINE 4 G/G
1 PATCH TOPICAL
Status: DISCONTINUED | OUTPATIENT
Start: 2022-10-01 | End: 2022-10-06 | Stop reason: HOSPADM

## 2022-10-01 RX ORDER — ACETAMINOPHEN 325 MG/1
650 TABLET ORAL EVERY 6 HOURS PRN
Status: DISCONTINUED | OUTPATIENT
Start: 2022-10-01 | End: 2022-10-06 | Stop reason: HOSPADM

## 2022-10-01 RX ORDER — NALOXONE HYDROCHLORIDE 0.4 MG/ML
0.4 INJECTION, SOLUTION INTRAMUSCULAR; INTRAVENOUS; SUBCUTANEOUS
Status: DISCONTINUED | OUTPATIENT
Start: 2022-10-01 | End: 2022-10-06 | Stop reason: HOSPADM

## 2022-10-01 RX ORDER — LANOLIN ALCOHOL/MO/W.PET/CERES
3 CREAM (GRAM) TOPICAL
Status: DISCONTINUED | OUTPATIENT
Start: 2022-10-01 | End: 2022-10-06 | Stop reason: HOSPADM

## 2022-10-01 RX ORDER — AMOXICILLIN 250 MG
2 CAPSULE ORAL 2 TIMES DAILY PRN
Status: DISCONTINUED | OUTPATIENT
Start: 2022-10-01 | End: 2022-10-06 | Stop reason: HOSPADM

## 2022-10-01 RX ORDER — POLYETHYLENE GLYCOL 3350 17 G/17G
17 POWDER, FOR SOLUTION ORAL DAILY PRN
Status: DISCONTINUED | OUTPATIENT
Start: 2022-10-01 | End: 2022-10-06 | Stop reason: HOSPADM

## 2022-10-01 RX ORDER — BISACODYL 10 MG
10 SUPPOSITORY, RECTAL RECTAL DAILY PRN
Status: DISCONTINUED | OUTPATIENT
Start: 2022-10-01 | End: 2022-10-06 | Stop reason: HOSPADM

## 2022-10-01 RX ORDER — SODIUM CHLORIDE 9 MG/ML
INJECTION, SOLUTION INTRAVENOUS CONTINUOUS
Status: DISCONTINUED | OUTPATIENT
Start: 2022-10-01 | End: 2022-10-02

## 2022-10-01 RX ORDER — ONDANSETRON 4 MG/1
4 TABLET, ORALLY DISINTEGRATING ORAL EVERY 6 HOURS PRN
Status: DISCONTINUED | OUTPATIENT
Start: 2022-10-01 | End: 2022-10-06 | Stop reason: HOSPADM

## 2022-10-01 RX ORDER — HALOPERIDOL 5 MG/ML
2 INJECTION INTRAMUSCULAR EVERY 6 HOURS PRN
Status: DISCONTINUED | OUTPATIENT
Start: 2022-10-01 | End: 2022-10-06 | Stop reason: HOSPADM

## 2022-10-01 RX ORDER — ACETAMINOPHEN 650 MG/1
650 SUPPOSITORY RECTAL EVERY 6 HOURS PRN
Status: DISCONTINUED | OUTPATIENT
Start: 2022-10-01 | End: 2022-10-06 | Stop reason: HOSPADM

## 2022-10-01 RX ORDER — LIDOCAINE 40 MG/G
CREAM TOPICAL
Status: DISCONTINUED | OUTPATIENT
Start: 2022-10-01 | End: 2022-10-06 | Stop reason: HOSPADM

## 2022-10-01 RX ORDER — CITALOPRAM HYDROBROMIDE 10 MG/1
10 TABLET ORAL DAILY
COMMUNITY
End: 2022-10-01

## 2022-10-01 RX ORDER — ONDANSETRON 2 MG/ML
4 INJECTION INTRAMUSCULAR; INTRAVENOUS EVERY 6 HOURS PRN
Status: DISCONTINUED | OUTPATIENT
Start: 2022-10-01 | End: 2022-10-06 | Stop reason: HOSPADM

## 2022-10-01 RX ORDER — NALOXONE HYDROCHLORIDE 0.4 MG/ML
0.2 INJECTION, SOLUTION INTRAMUSCULAR; INTRAVENOUS; SUBCUTANEOUS
Status: DISCONTINUED | OUTPATIENT
Start: 2022-10-01 | End: 2022-10-06 | Stop reason: HOSPADM

## 2022-10-01 RX ORDER — AMOXICILLIN 250 MG
1 CAPSULE ORAL 2 TIMES DAILY PRN
Status: DISCONTINUED | OUTPATIENT
Start: 2022-10-01 | End: 2022-10-06 | Stop reason: HOSPADM

## 2022-10-01 RX ORDER — IPRATROPIUM BROMIDE AND ALBUTEROL SULFATE 2.5; .5 MG/3ML; MG/3ML
3 SOLUTION RESPIRATORY (INHALATION) ONCE
Status: COMPLETED | OUTPATIENT
Start: 2022-10-01 | End: 2022-10-01

## 2022-10-01 RX ORDER — CITALOPRAM HYDROBROMIDE 20 MG/1
20 TABLET ORAL DAILY
COMMUNITY
Start: 2022-09-14 | End: 2022-10-01

## 2022-10-01 RX ADMIN — HYDROMORPHONE HYDROCHLORIDE 1 MG: 2 TABLET ORAL at 11:30

## 2022-10-01 RX ADMIN — SODIUM CHLORIDE: 9 INJECTION, SOLUTION INTRAVENOUS at 01:47

## 2022-10-01 RX ADMIN — HYDROMORPHONE HYDROCHLORIDE 1 MG: 2 TABLET ORAL at 16:21

## 2022-10-01 RX ADMIN — IPRATROPIUM BROMIDE AND ALBUTEROL SULFATE 3 ML: 2.5; .5 SOLUTION RESPIRATORY (INHALATION) at 12:59

## 2022-10-01 RX ADMIN — ACETAMINOPHEN 650 MG: 325 TABLET, FILM COATED ORAL at 18:32

## 2022-10-01 RX ADMIN — LIDOCAINE 1 PATCH: 246 PATCH TOPICAL at 11:32

## 2022-10-01 RX ADMIN — SODIUM CHLORIDE: 9 INJECTION, SOLUTION INTRAVENOUS at 15:53

## 2022-10-01 RX ADMIN — HALOPERIDOL LACTATE 2 MG: 5 INJECTION, SOLUTION INTRAMUSCULAR at 12:16

## 2022-10-01 ASSESSMENT — ACTIVITIES OF DAILY LIVING (ADL)
ADLS_ACUITY_SCORE: 41
ADLS_ACUITY_SCORE: 46
ADLS_ACUITY_SCORE: 46
ADLS_ACUITY_SCORE: 49
ADLS_ACUITY_SCORE: 41
ADLS_ACUITY_SCORE: 46
ADLS_ACUITY_SCORE: 49
ADLS_ACUITY_SCORE: 46
ADLS_ACUITY_SCORE: 46
ADLS_ACUITY_SCORE: 41
ADLS_ACUITY_SCORE: 49
ADLS_ACUITY_SCORE: 41

## 2022-10-01 NOTE — PHARMACY-ADMISSION MEDICATION HISTORY
Admission medication history interview status for this patient is complete. See [facility] admission navigator for allergy information, prior to admission medications and immunization status.     Medication history interview done, indicate source(s): None  Medication history resources (including written lists, pill bottles, clinic record): MAR; [facility]   Pharmacy: [facility] [address] [place] [address]    Changes made to PTA medication list:  Added: none  Changed: None  Reported as Not Taking: N/A  Removed: Bactrim, Fleet enema, celexa    Actions taken by pharmacist (provider contacted, etc):None     Additional medication history information: Added Citalopram to PTA list recent fill history of 9/23 (10 mg) and 9/14 (20mg); no nursing staff on weekend to verify. Maalox Max unable to verify.     Medication reconciliation/reorder completed by provider prior to medication history?  N   (Y/N)     Prior to Admission medications    Medication Sig Last Dose Taking? Auth Provider Long Term End Date   acetaminophen (TYLENOL) 500 MG tablet Take 1,000 mg by mouth every 6 hours as needed for mild pain More than a month at Unknown time Yes Reported, Patient        Yes Unknown, Entered By History No    DEEP SEA NASAL SPRAY 0.65 % nasal spray INHALE 2 SPRAYS IN EACH NOSTRIL ONCE DAILY 9/30/2022 at Unknown time Yes Jerrica Rome APRN CNP     gabapentin (NEURONTIN) 100 MG capsule TAKE TWO CAPSULES (200MG) BY MOUTH EVERY NIGHT AT BEDTIME 9/29/2022 at Unknown time Yes Kaley Starr APRN CNP Yes    guaiFENesin (ROBITUSSIN) 100 MG/5ML liquid GIVE 10ML (200 MG) BY MOUTH TWICE DAILY;AND TWICE DAILY AS NEEDED FOR COUGH 9/30/2022 at Unknown time Yes Sylvia Stein APRN CNP     menthol-zinc oxide (CALMOSEPTINE) 0.44-20.6 % OINT ointment Apply 1 g topically 2 times daily. May also apply 1 g 2 times daily as needed for skin protection. 9/30/2022 at Unknown time Yes Emil  MARICHUY Laguna CNP     nystatin (MYCOSTATIN) 952797 UNIT/GM external powder Apply topically 2 times daily 9/30/2022 at Unknown time Yes Reported, Patient     omeprazole (PRILOSEC) 40 MG DR capsule TAKE 1 CAPSULE BY MOUTH TWICE DAILY 9/30/2022 at Unknown time Yes Sylvia Stein APRN CNP     QUEtiapine (SEROQUEL) 25 MG tablet Take 0.5 tablets (12.5 mg) by mouth 2 times daily. May also take 0.5 tablets (12.5 mg) every 12 hours as needed (anxiety). 9/30/2022 at Unknown time Yes Tabatha Quintana MD Yes    trospium (SANCTURA) 20 MG tablet TAKE 1 TABLET BY MOUTH TWICE DAILY 9/30/2022 at Unknown time Yes Sylvia Stein APRN CNP     alum & mag hydroxide-simethicone (MAALOX MAX) 400-400-40 MG/5ML SUSP suspension Take 30 mLs by mouth every 6 hours as needed for indigestion   Sylvia Stein APRN CNP     citalopram (CELEXA) 20 MG tablet Take 20 mg by mouth daily   Unknown, Entered By History No    polyethylene glycol (MIRALAX) 17 GM/Dose powder MIX 2 CAPFULS IN 8OZ OF ORANGE JUICE  IN THE MORNING;AND MAY MIX 2 CAPFULS ONCE DAILY AS NEEDED FOR CONSTIPATION. MIX IN FRONT OF PT. HOLD FOR LOOSE STOOL. OK TO GIVE 1 CAPFUL IF RESIDENT REFUSES 2 CAPFULS. 9/29/2022  Sylvia Stein APRN CNP     polyvinyl alcohol-povidone PF (REFRESH) 1.4-0.6 % ophthalmic solution 1 drop every 4 hours as needed for irritation  at PRN  Reported, Patient

## 2022-10-01 NOTE — PROGRESS NOTES
"SLP Clinical Swallow Evaluation         10/01/22 3240   General Information   Onset of Illness/Injury or Date of Surgery 09/30/22   Referring Physician Quinn Manrique,    Patient/Family Therapy Goal Statement (SLP) Pt: sleep   Pertinent History of Current Problem Per H&P, \"Jamie Valdivia is a 82-year-old male with a history of advanced dementia, alcohol use disorder, seizure disorder, chronic atrial fibrillation, GERD and dysphagia, osteoporosis and vertebral fractures, who presents to [facility] for evaluation of a fall, found to have hyponatremia.\"   General Observations Pt asleep and easily roused, [place], with reduced attention. Daughter present.   Past History of Dysphagia Per Care Everywhere, pt seen for VFSS at OSH on 3/5/18: \"OVERALL CLINICAL IMPRESSIONS: Jamie currently presents with a mildly impaired swallow that is characterized by reduced tongue base retraction and a diverticulum. This is resulting in residue after the swallow and penetration of thin liquids. It is recommended that Jamie follow-up with his physician regarding the finding of a diverticulum and what options for treatment are. Therapist also recommended that Jamie thicken his liquids to nectar-thick consistency to prevent penetration and aspiration. Follow-up speech therapy was recommended after Jamie follows up with his physician to see if any treatment options are available for the diverticulum.\" (one instance of penetration on consecutive sips of thin liquid and no aspiration visualized).     Pt seen for esophagram 5/13/22: \"Limited esophagram demonstrates mild luminal narrowing at the most  proximal aspect of the esophagus with some mucosal versus submucosal  mild nodularity that is nonspecific. Recommend further assessment with  endoscopic visualization...Presbyesophagus.\"     EGD 6/1/22 also with abnormal esophageal motility, suspicious for presbyesophagus. Daughter reported pt was previously on thickened " liquid, but has been on regular diet with thin liquid for some time.   Type of Evaluation   Type of Evaluation Swallow Evaluation   Oral Motor   Oral Musculature unable to assess due to poor participation/comprehension  (appeared GWFL per observation)   Dentition (Oral Motor)   Dentition (Oral Motor) natural dentition;some missing teeth   Vocal Quality/Secretion Management (Oral Motor)   Vocal Quality (Oral Motor) hoarse   Secretion Management (Oral Motor) WNL   General Swallowing Observations   Current Diet/Method of Nutritional Intake (General Swallowing Observations, NIS) NPO   Swallowing Evaluation Clinical swallow evaluation   Clinical Swallow Evaluation   Feeding Assistance frequent cues/help required   Clinical Swallow Evaluation Textures Trialed thin liquids;mildly thick liquids;pureed;soft & bite-sized   Clinical Swallow Eval: Thin Liquid Texture Trial   Mode of Presentation, Thin Liquids cup;spoon;self-fed;fed by clinician   Oral Phase of Swallow   (?reduced control)   Pharyngeal Phase of Swallow impaired;coughing/choking;reduction in laryngeal movement  (delayed swallow)   Clinical Swallow Eval: Mildly Thick Liquids   Mode of Presentation cup;self-fed;fed by clinician   Oral Phase WFL   Pharyngeal Phase impaired;reduction in laryngeal movement  (delayed swallow)   Clinical Swallow Evaluation: Puree Solid Texture Trial   Mode of Presentation, Puree spoon;fed by clinician;self-fed   Oral Phase, Puree WFL   Pharyngeal Phase, Puree impaired;reduction in laryngeal movement   Clinical Swallow Eval: Soft & Bite Sized   Mode of Presentation fed by clinician;self-fed   Oral Phase impaired mastication;delayed AP movement   Pharyngeal Phase impaired;reduction in laryngeal movement   Swallowing Recommendations   Diet Consistency Recommendations minced & moist (level 5);mildly thick liquids (level 2)   Supervision Level for Intake 1:1 supervision needed   Mode of Delivery Recommendations bolus size, small;slow rate of  intake;no straws   Swallowing Maneuver Recommendations alternate food and liquid intake   Monitoring/Assistance Required (Eating/Swallowing) stop eating activities when fatigue is present;monitor for cough or change in vocal quality with intake   Recommended Feeding/Eating Techniques (Swallow Eval) maintain upright posture during/after eating for 30 minutes;provide assist with feeding;minimize distractions during oral intake   Medication Administration Recommendations, Swallowing (SLP) crushed in jennifer   Instrumental Assessment Recommendations VFSS (videofluoroscopic swallowing study)  (consider as indicated)   General Therapy Interventions   Planned Therapy Interventions Dysphagia Treatment   Dysphagia treatment Modified diet education;Instruction of safe swallow strategies   Clinical Impression   Criteria for Skilled Therapeutic Interventions Met (SLP Eval) Yes, treatment indicated   SLP Diagnosis mild/mild-moderate oral and suspected pharyngeal dysphagia   Clinical Impression Comments Pt currently presents with mild/mild-moderate oral and suspected pharyngeal dysphagia. Noted fair acceptance and containment, as well as impulsivity with pt repeatedly attempting to take large, consecutive cup/straw sips despite verbal and [place] cueing. Bolus formation/propulsion and lingual coordination appeared ~mildly reduced and prolonged. Suspect delayed swallow with reduced hyolaryngeal elevation. + immediate and somewhat persistent cough for 2/2 trials of thin liquid via straw, as well as frequent throat clear for thin liquid via cup (occasionally difficult to differentiate between throat clear and vocalization (moaning). No other overt cough, throat clear, wet vocal quality, eye watering, or increased WOB.   SLP Discharge Planning   SLP Discharge Recommendation Transitional Care Facility;Long term care facility   SLP Rationale for DC Rec Possible ongoing SLP needs; likely need for assist and multiple other services   SLP Brief  overview of current status  Recommend minced and moist diet with mildly thick liquid -- no straws, small bites/sips, slow rate, upright posture, assist with meals. Would consider GI consult (determination of IP vs OP per MD) as indicated given PMHx/past testing.    Total Evaluation Time   Total Evaluation Time (Minutes) 28   SLP Goals   Therapy Frequency (SLP Eval) 5 times/wk   SLP Predicted Duration/Target Date for Goal Attainment 10/15/22

## 2022-10-01 NOTE — PROGRESS NOTES
DATE:  10/1/2022   TIME OF RECEIPT FROM LAB:  0935  LAB TEST:  Sodium  LAB VALUE:  119  RESULTS GIVEN WITH READ-BACK TO (PROVIDER):  Howie GASCA  TIME LAB VALUE REPORTED TO PROVIDER:   6538

## 2022-10-01 NOTE — ED NOTES
Brief saturated with urine. Changed chux and cleaned patient. Attempted to place external male catheter, pt unable to tolerate at this time.

## 2022-10-01 NOTE — ED NOTES
[facility]  ED Nurse Handoff Report    Jamie Valdivia is a 82 year old male   ED Chief complaint: Altered Mental Status and Fall  . ED Diagnosis:   Final diagnoses:   Hyponatremia     Allergies:   Allergies   Allergen Reactions     Ativan [Lorazepam]        Code Status: Full Code  Activity level - Baseline/Home:  Stand by Assist. Activity Level - Current:   Assist X 1. Lift room needed: No. Bariatric: No   Needed: No   Isolation: No. Infection: Not Applicable.     Vital Signs:   Vitals:    09/30/22 2107 09/30/22 2130 09/30/22 2230 09/30/22 2300   BP:  (!) 144/68 108/61 138/74   Pulse: 86 87 70 76   Resp:  24 22 23   Temp:       TempSrc:       SpO2: 94% 93% 99% 98%   Weight:       Height:           Cardiac Rhythm:  ,      Pain level:    Patient confused: Yes. Patient Falls Risk: Yes.   Elimination Status: Has voided, is incontinent at times  Patient Report - Initial Complaint: AMS and fall. Focused Assessment: Increased confusion over last 2 days, N/V/D, abdominal pain, decreased appetite. Scheduled prostate surgery rescheduled due to low sodium. Per daughter, pt is not normally so disoriented however is in memory care at this time. Fall today, unknown if witnessed or if any head injury.  Pt is calm and cooperative at this time but did require medications to complete imaging. Pt fidgets in bed with call light, SpO2 sensor, and nasal cannula.  Tests Performed: Labs, EKG, CT. Abnormal Results:   Labs Ordered and Resulted from Time of ED Arrival to Time of ED Departure   BASIC METABOLIC PANEL - Abnormal       Result Value    Sodium 114 (*)     Potassium 3.9      Chloride 78 (*)     Carbon Dioxide (CO2) 24      Anion Gap 12      Urea Nitrogen 6.9 (*)     Creatinine 0.63 (*)     Calcium 9.0      Glucose 116 (*)     GFR Estimate >90     CBC WITH PLATELETS AND DIFFERENTIAL - Abnormal    WBC Count 13.4 (*)     RBC Count 4.26 (*)     Hemoglobin 12.3 (*)     Hematocrit 35.6 (*)     MCV 84      MCH  28.9      MCHC 34.6      RDW 12.5      Platelet Count 204      % Neutrophils 88      % Lymphocytes 8      % Monocytes 3      % Eosinophils 0      % Basophils 0      % Immature Granulocytes 1      NRBCs per 100 WBC 0      Absolute Neutrophils 11.7 (*)     Absolute Lymphocytes 1.1      Absolute Monocytes 0.4      Absolute Eosinophils 0.0      Absolute Basophils 0.0      Absolute Immature Granulocytes 0.1      Absolute NRBCs 0.0     HEPATIC FUNCTION PANEL - Normal    Protein Total 7.0      Albumin 3.9      Bilirubin Total 0.7      Alkaline Phosphatase 61      AST 37      ALT 15      Bilirubin Direct 0.21       Head CT w/o contrast   Final Result   IMPRESSION:   HEAD CT:   1.  No acute intracranial process.      CERVICAL SPINE CT:   1.  No fracture or posttraumatic subluxation.   2.  Moderate bilateral neural foraminal narrowing at C3-4.      THORACIC SPINE CT:   1.  No acute fracture or posttraumatic subluxation.   2.  There are multiple chronic compression deformities as detailed above.   3.  Posterior instrumented fusion from T8 through T12.   4.  Moderate foraminal narrowing bilaterally at T10-11.   5.  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      LUMBAR SPINE CT:   1.  Age-indeterminate compression deformities of L2-L5 with a chronic mild compression deformity at the superior endplate of L1.   2.  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   3.  Moderate multilevel central canal stenosis and neural foraminal narrowing.      Cervical spine CT w/o contrast   Final Result   IMPRESSION:   HEAD CT:   1.  No acute intracranial process.      CERVICAL SPINE CT:   1.  No fracture or posttraumatic subluxation.   2.  Moderate bilateral neural foraminal narrowing at C3-4.      THORACIC SPINE CT:   1.  No acute fracture or posttraumatic subluxation.   2.  There are multiple chronic compression deformities as detailed above.   3.   Posterior instrumented fusion from T8 through T12.   4.  Moderate foraminal narrowing bilaterally at T10-11.   5.  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      LUMBAR SPINE CT:   1.  Age-indeterminate compression deformities of L2-L5 with a chronic mild compression deformity at the superior endplate of L1.   2.  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   3.  Moderate multilevel central canal stenosis and neural foraminal narrowing.      CT Thoracic Spine w/o Contrast   Final Result   IMPRESSION:   HEAD CT:   1.  No acute intracranial process.      CERVICAL SPINE CT:   1.  No fracture or posttraumatic subluxation.   2.  Moderate bilateral neural foraminal narrowing at C3-4.      THORACIC SPINE CT:   1.  No acute fracture or posttraumatic subluxation.   2.  There are multiple chronic compression deformities as detailed above.   3.  Posterior instrumented fusion from T8 through T12.   4.  Moderate foraminal narrowing bilaterally at T10-11.   5.  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      LUMBAR SPINE CT:   1.  Age-indeterminate compression deformities of L2-L5 with a chronic mild compression deformity at the superior endplate of L1.   2.  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   3.  Moderate multilevel central canal stenosis and neural foraminal narrowing.      CT Chest Abdomen Pelvis w/o Contrast   Final Result   IMPRESSION:   1.  No acute abnormality. No bowel obstruction or inflammation. No acute traumatic abnormality.      Lumbar spine CT w/o contrast   Final Result   IMPRESSION:   HEAD CT:   1.  No acute intracranial process.      CERVICAL SPINE CT:   1.  No fracture or posttraumatic subluxation.   2.  Moderate bilateral neural foraminal narrowing at C3-4.      THORACIC SPINE CT:   1.  No acute fracture or posttraumatic  subluxation.   2.  There are multiple chronic compression deformities as detailed above.   3.  Posterior instrumented fusion from T8 through T12.   4.  Moderate foraminal narrowing bilaterally at T10-11.   5.  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      LUMBAR SPINE CT:   1.  Age-indeterminate compression deformities of L2-L5 with a chronic mild compression deformity at the superior endplate of L1.   2.  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   3.  Moderate multilevel central canal stenosis and neural foraminal narrowing.      CT Pelvis Bone wo Contrast   Final Result        .   Treatments provided: See MAR  Family Comments: Daughter was at bedside  OBS brochure/video discussed/provided to patient:  N/A  ED Medications:   Medications   acetaminophen (TYLENOL) tablet 650 mg (650 mg Oral Given 9/30/22 1725)   ondansetron (ZOFRAN) injection 4 mg (4 mg Intravenous Given 9/30/22 2000)   haloperidol lactate (HALDOL) injection 2.5 mg (2.5 mg Intravenous Given 9/30/22 2027)   haloperidol lactate (HALDOL) injection 2.5 mg (2.5 mg Intravenous Given 9/30/22 2034)   haloperidol lactate (HALDOL) injection 2.5 mg (2.5 mg Intravenous Given 9/30/22 2113)   midazolam (VERSED) injection 1 mg (1 mg Intravenous Given 9/30/22 2157)   midazolam (VERSED) injection 1 mg (1 mg Intravenous Given 9/30/22 2205)     Drips infusing:  No  For the majority of the shift, the patient's behavior Green. Interventions performed were n/a.    Sepsis treatment initiated: No     Patient tested for COVID 19 prior to admission: YES    ED Nurse Name/Phone Number: Ara Montelongo RN,   11:24 PM    RECEIVING UNIT ED HANDOFF REVIEW    Above ED Nurse Handoff Report was reviewed: Yes. Also, received a verbal report from patient's nurseJeanne.   Reviewed by: Taniya Francisco RN on October 1, 2022 at 8:07 PM

## 2022-10-01 NOTE — PROGRESS NOTES
PT did well with speech. PT has been slowly bringing sodium up. Getting 50 ml bolus. PT is complaining of chest pain, ekg and labs to be obtained. MD aware.

## 2022-10-01 NOTE — ED PROVIDER NOTES
Patient's daughter Viola Robledo [phone] would like to be called if there are any questions regarding patient's care.      Hao Ingram MD  09/30/22 3564

## 2022-10-01 NOTE — PLAN OF CARE
"Plan of Care: [id number]    Pt is disoriented and confused. hasnt been OOB. No pain per PAINAD scale.   Pt intermittently restless and pulling off blankets. Incontinent of bowel and bladder. Attempted to place external male cath twice, pulled off both times. UA sample to be collected. 3 LPM O2 via NC, weaned down, O2 dropped and placed back on 3 L. NPO- speech consult. K replacement protocol- 3.9, recheck in AM. Serial NA labs q4- 114, 116. NS infusing at 50 mL/hr.     BP (!) 178/85   Pulse 96   Temp 97.8  F (36.6  C) (Axillary)   Resp 24   Ht 1.778 m (5' 10\")   Wt 101.3 kg (223 lb 5.2 oz)   SpO2 94%   BMI 32.04 kg/m       "

## 2022-10-01 NOTE — PROVIDER NOTIFICATION
DATE:  10/1/2022   TIME OF RECEIPT FROM LAB:  1845  LAB TEST:  sodium  LAB VALUE:  118  RESULTS GIVEN WITH READ-BACK TO (PROVIDER):  Paged to Dr. Denise  TIME LAB VALUE REPORTED TO PROVIDER:   8801

## 2022-10-01 NOTE — PROGRESS NOTES
Patient seen and examined, boarded in the emergency room, his daughter and RN at bedside. I assumed care today, H&P, labs, medications reviewed.  I agree with the plan as outlined by Dr. Quinn Manrique in H&P.  -Sodium level was 126 two days ago and within a day decreased to 114.  -Patient was gently hydrated and sodium improved slowly to 119, increased IV fluid from 50 mill per hour to 75 mill per hour.  -Subsequent sodium level decreased 118.  Increased IV fluids to 125 mill per hour, with 250 ml bolus.  -Patient also developed wheezing, chest x-ray showed mild congestion.  Patient is clinically dry.  We will continue to gently hydrate the patient and monitor sodium level.  -DuoNeb given for wheezing and improved.  We will continue DuoNebs as needed  -Patient also complained of chest pain Per RN.  Echocardiogram is pending.  Ordered EKG and troponin x1 and result is pending.  BNP added to the lab.  -Patient remained hemodynamically stable, currently on 1 L/min oxygen saturating 96%  -We will continue to monitor the patient and will continue current management.  -Risk of deterioration and needs close monitoring.  -Appreciate great care by the bedside RN today.  Discussed with patient's daughter and also with bedside RN.

## 2022-10-01 NOTE — SIGNIFICANT EVENT
DATE:  10/1/2022   TIME OF RECEIPT FROM LAB:  1508  LAB TEST:  Sodium  LAB VALUE:  118  RESULTS GIVEN WITH READ-BACK TO (PROVIDER):  Howie GASCA  TIME LAB VALUE REPORTED TO PROVIDER:   1503

## 2022-10-01 NOTE — H&P
[facility]  Hospitalist H&P    Name: Jamie Valdivia      MRN: [id number]  YOB: 1940    Age: 82 year old  Date of admission: 9/30/2022  Primary care provider: Sylvia Stein            Assessment and Plan:     Jamie Valdivia is a 82-year-old male with a history of advanced dementia, alcohol use disorder, seizure disorder, chronic atrial fibrillation, GERD and dysphagia, osteoporosis and vertebral fractures, who presents to [facility] for evaluation of a fall, found to have hyponatremia.    1.  Hypovolemic hyponatremia:  He does have volume depleted.  I suspect his hyponatremia is a result of his recent GI symptoms and inadequate oral intake.  Given that he has sodium performed as recently as 36 hours ago, which was 126, I suspect his hyponatremia is fairly acute and thus he is at fairly low risk for demyelination.  He may be having symptoms related to his hyponatremia in the context of agitation and encephalopathy; however, this is challenging to assess given his history of advanced dementia.  For now, we will be careful with too rapid correction of his sodium, as to take all precautions.  I will attempt to raise his sodium by no more than 1 millimole per liter every hour.  We will start normal saline at 50 mL per hour.  Check sodium levels every 4 hours.  I will send a urine osmolality and urine sodium level to assess for any possibility of SIADH.  2.  Advanced dementia:  He resides at a memory care unit.  He required multiple doses of Haldol and midazolam in the Emergency Department for sedation.  Currently, he is somewhat restless and unable to provide any meaningful interaction or discussion with the pain.  I will have IV Haldol ordered as needed.  If his behavioral issues remain a concern, scheduling some doses of Seroquel might be reasonable.  I would like to avoid benzodiazepines.  3.  Chronic atrial fibrillation:  Currently, appears rate controlled.   He is not on anticoagulants, nor rate control medications.  4.  Leukocytosis:  Unclear etiology.  I will plan to check a urinalysis.  No fever, no obvious source of infection identified.  Hold off on antibiotics for now.  5.  History of dysphagia:  He will be on n.p.o. for now and I will request speech therapy consultation.  6.  Age indeterminate vertebral fracture:  Does have a history of vertebral fractures and osteoporosis.  We will plan on analgesics as needed to address these.  7.  Elevated PSA:  The patient has plans for a prostate biopsy.  8.  Nausea and vomiting:  Supportive care is indicated at this point.  We will hold antibiotics for now, given that they could be explaining this.  9.  The patient will be admitted to inpatient status.    CODE STATUS:  FULL CODE.            Chief Complaint:     Fall.         History of Present Illness:   Jamie Valdivia is a 82-year-old male with a history of GERD, esophagitis, bilateral knee arthritis, pharyngeal dysphagia, chronic atrial fibrillation, hypertension, osteoporosis, vertebral fractures, alcohol use disorder and dementia, presenting to [facility] for evaluation of confusion.  The patient is unable to provide any meaningful history, so the primary history is obtained from the emergency room physician.  The electronic medical record was also reviewed.    The patient resides in a memory care unit.  He was recently started on oral antibiotics in preparation for prostate surgery.  This led to some GI symptoms including nausea and loose stools.  He has been complaining of some mild abdominal pain and not eating quite as normal as usual.  Staff noticed that he has been increasingly unstable and confused with issues with agitation.  He was in the shower and fell and EMS was called.  The patient complained of a sore back.  He was brought to the Emergency Department for evaluation.    Here in the hospital, temperature is 97.6, heart rate 78, blood  pressure 171/75, respiratory rate 20, and oxygen saturation 92% on room air.  Laboratory work shows sodium 114, chloride 78, otherwise unremarkable basic metabolic panel and liver function tests.  CBC notable for white blood cell 13.4, hemoglobin 12.3 and platelets of 204.  Extensive imaging outlined below was essentially unremarkable for acute injury.  The patient required multiple doses of IV Haldol and Versed for sedation to get CT is adequately performed.  He will be admitted for further management.            Past Medical History:     Past Medical History:   Diagnosis Date     Age-related osteoporosis without current pathological fracture 03/30/2022     Alcohol abuse 11/19/2017     Alcohol dependence with withdrawal (H)      Alcoholism (H)      Back pain      Backache 12/19/2018     CAD (coronary artery disease)      Chronic a-fib (H)      Chronic alcohol use 06/11/2015    Formatting of this note might be different from the original. 2/26/2019: serious fall at home with multiple vertebral fractures.  BAL 0.414% on admission.  Denies that he drinks much. 12/18/2018: hospitalized for fall due to alcohol intoxication.  BAL 0.34% on admission. 9/16/2018: hospitalized for injuries from fall due to alcohol intoxication.  BAL 0.34% on admission     Chronic atrial fibrillation (H) 07/15/2016    Formatting of this note might be different from the original. 2/2019: Multiple falls so started on aspirin only.  Then aspirin stopped due to GI bleed.     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 2/2/21     HTN (hypertension)      Impaired gait and mobility 03/14/2019     Mild cognitive impairment 08/12/2019    Formatting of this note might be different from the original. NON-AMNESTIC TYPE     Mild TBI (traumatic brain injury) (H) 03/14/2019     Mumps      Obesity (BMI 30-39.9) 09/03/2015     Osteoarthritis      Osteoarthritis of both knees 05/13/2015     Osteoporosis      Other idiopathic peripheral autonomic neuropathy 03/30/2022     Other insomnia 03/14/2019     Other seizures (H) 03/30/2022     Palpitations      Peripheral neuropathy      Pharyngeal dysphagia 05/13/2015    Formatting of this note might be different from the original. Likely secondary to GERD with esophagitis, on PPI and H2 blocker with notable improvement, EGD 10/5/15.     Physical deconditioning 03/14/2019     Recurrent major depressive disorder (H) 03/30/2022     Rhabdomyolysis      Thrombocytopenia (H)      Urination disorder      Weakness 04/27/2020             Past Surgical History:     Past Surgical History:   Procedure Laterality Date     ESOPHAGOSCOPY, GASTROSCOPY, DUODENOSCOPY (EGD), COMBINED N/A 06/01/2022    Procedure: ESOPHAGOGASTRODUODENOSCOPY (EGD) mngi with biopsies using jumbo cold biopsy forceps;  Surgeon: David Springer MD;  Location:  GI     left hip replacement  2010     left shoulder replacement  2016     ORTHOPEDIC SURGERY       SPINE SURGERY      done in [place]     TONSILLECTOMY               Social History:     Social History     Tobacco Use     Smoking status: Never Smoker     Smokeless tobacco: Never Used   Substance Use Topics     Alcohol use: Not Currently     Comment: not since December 2021             Family History:   The family history was fully reviewed and non-contributory in this case.         Allergies:     Allergies   Allergen Reactions     Ativan [Lorazepam]              Medications:     Prior to Admission medications    Medication Sig Last Dose Taking? Auth Provider Long  Term End Date   acetaminophen (TYLENOL) 500 MG tablet Take 1,000 mg by mouth every 6 hours as needed for mild pain   Reported, Patient     alum & mag hydroxide-simethicone (MAALOX MAX) 400-400-40 MG/5ML SUSP suspension Take 30 mLs by mouth every 6 hours as needed for indigestion   Sylvia Stein APRN CNP     DEEP SEA NASAL SPRAY 0.65 % nasal spray INHALE 2 SPRAYS IN EACH NOSTRIL ONCE DAILY   Jerrica Rome APRN CNP     gabapentin (NEURONTIN) 100 MG capsule TAKE TWO CAPSULES (200MG) BY MOUTH EVERY NIGHT AT BEDTIME   Kaley Starr APRN CNP Yes    guaiFENesin (ROBITUSSIN) 100 MG/5ML liquid GIVE 10ML (200 MG) BY MOUTH TWICE DAILY;AND TWICE DAILY AS NEEDED FOR COUGH   Sylvia Stein APRN CNP     menthol-zinc oxide (CALMOSEPTINE) 0.44-20.6 % OINT ointment Apply 1 g topically 2 times daily. May also apply 1 g 2 times daily as needed for skin protection.   Sylvia Stein APRN CNP     nystatin (MYCOSTATIN) 565621 UNIT/GM external powder Apply topically 2 times daily   Reported, Patient     omeprazole (PRILOSEC) 40 MG DR capsule TAKE 1 CAPSULE BY MOUTH TWICE DAILY   Sylvia Stein APRN CNP     polyethylene glycol (MIRALAX) 17 GM/Dose powder MIX 2 CAPFULS IN 8OZ OF ORANGE JUICE  IN THE MORNING;AND MAY MIX 2 CAPFULS ONCE DAILY AS NEEDED FOR CONSTIPATION. MIX IN FRONT OF PT. HOLD FOR LOOSE STOOL. OK TO GIVE 1 CAPFUL IF RESIDENT REFUSES 2 CAPFULS.   Sylvia Stein APRN CNP     polyvinyl alcohol-povidone PF (REFRESH) 1.4-0.6 % ophthalmic solution 1 drop every 4 hours as needed for irritation   Reported, Patient     QUEtiapine (SEROQUEL) 25 MG tablet Take 0.5 tablets (12.5 mg) by mouth 2 times daily. May also take 0.5 tablets (12.5 mg) every 12 hours as needed (anxiety).   Tabatha Quintana MD Yes    sodium phosphate (FLEET ENEMA) 7-19 GM/118ML rectal enema Place 1 enema rectally once   Reported, Patient     sulfamethoxazole-trimethoprim (BACTRIM) 400-80 MG tablet Take 1 tablet by mouth 2  "times daily   Reported, Patient     trospium (SANCTURA) 20 MG tablet TAKE 1 TABLET BY MOUTH TWICE DAILY   Sylvia Stein, MARICHUY CNP               Review of Systems:     A Comprehensive greater than 10 system review of systems was carried out.  Pertinent positives and negatives are noted above.  Otherwise negative for contributory information.           Physical Exam:   Blood pressure (!) 178/85, pulse 96, temperature 97.8  F (36.6  C), temperature source Axillary, resp. rate 24, height 1.778 m (5' 10\"), weight 101.3 kg (223 lb 5.2 oz), SpO2 94 %.  Wt Readings from Last 1 Encounters:   09/30/22 101.3 kg (223 lb 5.2 oz)     Exam:  GENERAL: No apparent distress. Awake, alert, and not oriented.  HEENT: Normocephalic, atraumatic. Extraocular movements intact.  CARDIOVASCULAR: Regular rate and rhythm without murmurs or rubs. No S3.  PULMONARY: Clear to auscultation bilaterally.  ABDOMINAL: Soft, non-tender, non-distended. Bowel sounds normoactive.   EXTREMITIES: No cyanosis or clubbing. No appreciable edema.  NEUROLOGICAL: CN 2-12 grossly intact, no focal neurological deficits. Agitated.  DERMATOLOGICAL: No rash, ulcer, bruising, nor jaundice.          Data:   EKG:  Personally reviewed.     Laboratory:  Recent Labs   Lab 09/30/22  1755   WBC 13.4*   HGB 12.3*   HCT 35.6*   MCV 84        Recent Labs   Lab 10/01/22  0235 09/30/22  1755 09/29/22  0755 09/28/22  1408   * 114* 126* 127*   POTASSIUM  --  3.9 4.1 4.1   CHLORIDE  --  78* 92* 92*   CO2  --  24 23 26   ANIONGAP  --  12 11 9   GLC  --  116* 109* 116*   BUN  --  6.9* 7 7   CR  --  0.63* 0.60* 0.60*   GFRESTIMATED  --  >90 >90 >90   JOSUE  --  9.0 9.1 9.1     No results for input(s): CULT in the last 168 hours.    Imaging:  Recent Results (from the past 24 hour(s))   CT Pelvis Bone wo Contrast    Narrative    EXAM: CT PELVIS BONE WO CONTRAST  LOCATION: [facility]  DATE/TIME: 9/30/2022 10:27 PM    INDICATION: Injury with " pain.  COMPARISON: None.  TECHNIQUE: CT scan of the pelvis was performed without IV contrast. Multiplanar reformats were obtained. Dose reduction techniques were used.  CONTRAST: None.    FINDINGS:    1.  Bones are demineralized. Multiple lumbar compression fractures. No pelvic fracture. Left hip arthroplasty. There is a large right inguinal hernia containing fat and ascitic fluid.   Lumbar spine CT w/o contrast    Narrative    EXAM: CT HEAD W/O CONTRAST, CT LUMBAR SPINE W/O CONTRAST, CT CERVICAL SPINE W/O CONTRAST, CT THORACIC SPINE W/O CONTRAST  LOCATION: [facility]  DATE/TIME: 9/30/2022 10:38 PM    INDICATION: Traumatic injury. Fall. Head, neck and back pain. Confusion.  COMPARISON: Chest CT 6/30/2022  TECHNIQUE:   1) Routine CT Head without IV contrast. Multiplanar reformats. Dose reduction techniques were used.   2) Routine CT Cervical Spine without IV contrast. Multiplanar reformats. Dose reduction techniques were used.   3) Routine CT Thoracic Spine without IV contrast. Multiplanar reformats. Dose reduction techniques were used.   4) Routine CT Lumbar Spine without IV contrast. Multiplanar reformats. Dose reduction techniques were used.     FINDINGS:   HEAD CT:   INTRACRANIAL CONTENTS: No intracranial hemorrhage, extraaxial collection, or mass effect.  No CT evidence of acute infarct. Mild presumed chronic small vessel ischemic changes. Chronic infarct right occipital lobe. Mild to moderate generalized volume   loss. No hydrocephalus.     VISUALIZED ORBITS/SINUSES/MASTOIDS: No intraorbital abnormality. No paranasal sinus mucosal disease. Scattered fluid/membrane thickening in the left mastoid air cells. No apparent mass in the posterior nasopharynx or skull base.    BONES/SOFT TISSUES: No acute abnormality.    CERVICAL SPINE CT:  VERTEBRA: Normal vertebral body heights and alignment. Ankylosis of C5-6. No acute fracture or subluxation.     CANAL/FORAMINA: No high-grade central canal  stenosis. Moderate bilateral neural foraminal narrowing at C3-4.    PARASPINAL: No extraspinal abnormality. Visualized lung fields are clear.    THORACIC SPINE CT:  VERTEBRA: Moderate to severe thoracic kyphosis. Stable mild compression deformities of T1 and T2, T5, T7, T10 and T12. Advanced bony demineralization. Posterior instrumented fusion from T8 through T12. No hardware failure identified. No acute fracture or   subluxation. Bridging osteophytes throughout the thoracic spine.     CANAL/FORAMINA: No high-grade central canal stenosis. Moderate bilateral neural foraminal narrowing at T10-11.    PARASPINAL: No extraspinal abnormality.    LUMBAR SPINE CT:  VERTEBRA: 5 lumbar type vertebra. Age-indeterminate moderate compression deformities of L2, L3 and L5. Age-indeterminate moderate to advanced compression deformity at L4. Chronic mild compression deformity at the superior endplate of L1. Advanced   osteopenia. Fusion of the SI joints. No definite acute displaced fracture or subluxation.     CANAL/FORAMINA: The spinal canal is narrow on a congenital basis due to short pedicles. There is moderate multilevel central canal and neural foraminal narrowing.    PARASPINAL: No extraspinal abnormality.      Impression    IMPRESSION:  HEAD CT:  1.  No acute intracranial process.    CERVICAL SPINE CT:  1.  No fracture or posttraumatic subluxation.  2.  Moderate bilateral neural foraminal narrowing at C3-4.    THORACIC SPINE CT:  1.  No acute fracture or posttraumatic subluxation.  2.  There are multiple chronic compression deformities as detailed above.  3.  Posterior instrumented fusion from T8 through T12.  4.  Moderate foraminal narrowing bilaterally at T10-11.  5.  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    LUMBAR SPINE CT:  1.  Age-indeterminate compression deformities of L2-L5 with a chronic mild compression deformity at the superior endplate of L1.  2.  Ossification along the anterior longitudinal ligament from the  visualized thoracic spine to the L3 level with fusion of the SI joints.  3.  Moderate multilevel central canal stenosis and neural foraminal narrowing.   CT Chest Abdomen Pelvis w/o Contrast    Narrative    EXAM: CT CHEST ABDOMEN PELVIS W/O CONTRAST  LOCATION: [facility]  DATE/TIME: 9/30/2022 10:30 PM    INDICATION: Nausea and vomiting. Fall.  COMPARISON: 6/30/2022.  TECHNIQUE: CT scan of the chest, abdomen, and pelvis was performed without IV contrast. Multiplanar reformats were obtained. Dose reduction techniques were used.   CONTRAST: None.    FINDINGS:   LUNGS AND PLEURA: Peripheral and basilar fibrotic disease similar to the previous exam. Scarring at the lung apices. Calcified granuloma in the right upper lobe anteriorly. Dependent atelectasis bilaterally. No pneumothorax or pleural effusion.    MEDIASTINUM/AXILLAE: There is no lymph node enlargement. No abnormal mediastinal fluid. The heart is at the upper limits of normal in size.    CORONARY ARTERY CALCIFICATION: Severe.    HEPATOBILIARY: Normal.    PANCREAS: Normal.    SPLEEN: Normal.    ADRENAL GLANDS: Normal.    KIDNEYS/BLADDER: No hydronephrosis. Left renal cyst.    BOWEL: There is no bowel obstruction. No free intraperitoneal gas or fluid.    LYMPH NODES: Normal.    VASCULATURE: Atherosclerotic calcification of the aorta and its branches. No aneurysm.    PELVIC ORGANS: Normal.    MUSCULOSKELETAL: Left hip arthroplasty. Left shoulder arthroplasty. Right inguinal hernia containing fat. Umbilical hernia containing fat. Thoracic fusion hardware. Degenerative disease throughout the spine. Probable old lumbar vertebral body compression   fractures. No acute bone fractures are evident.      Impression    IMPRESSION:  1.  No acute abnormality. No bowel obstruction or inflammation. No acute traumatic abnormality.   CT Thoracic Spine w/o Contrast    Narrative    EXAM: CT HEAD W/O CONTRAST, CT LUMBAR SPINE W/O CONTRAST, CT CERVICAL SPINE  W/O CONTRAST, CT THORACIC SPINE W/O CONTRAST  LOCATION: [facility]  DATE/TIME: 9/30/2022 10:38 PM    INDICATION: Traumatic injury. Fall. Head, neck and back pain. Confusion.  COMPARISON: Chest CT 6/30/2022  TECHNIQUE:   1) Routine CT Head without IV contrast. Multiplanar reformats. Dose reduction techniques were used.   2) Routine CT Cervical Spine without IV contrast. Multiplanar reformats. Dose reduction techniques were used.   3) Routine CT Thoracic Spine without IV contrast. Multiplanar reformats. Dose reduction techniques were used.   4) Routine CT Lumbar Spine without IV contrast. Multiplanar reformats. Dose reduction techniques were used.     FINDINGS:   HEAD CT:   INTRACRANIAL CONTENTS: No intracranial hemorrhage, extraaxial collection, or mass effect.  No CT evidence of acute infarct. Mild presumed chronic small vessel ischemic changes. Chronic infarct right occipital lobe. Mild to moderate generalized volume   loss. No hydrocephalus.     VISUALIZED ORBITS/SINUSES/MASTOIDS: No intraorbital abnormality. No paranasal sinus mucosal disease. Scattered fluid/membrane thickening in the left mastoid air cells. No apparent mass in the posterior nasopharynx or skull base.    BONES/SOFT TISSUES: No acute abnormality.    CERVICAL SPINE CT:  VERTEBRA: Normal vertebral body heights and alignment. Ankylosis of C5-6. No acute fracture or subluxation.     CANAL/FORAMINA: No high-grade central canal stenosis. Moderate bilateral neural foraminal narrowing at C3-4.    PARASPINAL: No extraspinal abnormality. Visualized lung fields are clear.    THORACIC SPINE CT:  VERTEBRA: Moderate to severe thoracic kyphosis. Stable mild compression deformities of T1 and T2, T5, T7, T10 and T12. Advanced bony demineralization. Posterior instrumented fusion from T8 through T12. No hardware failure identified. No acute fracture or   subluxation. Bridging osteophytes throughout the thoracic spine.     CANAL/FORAMINA: No  high-grade central canal stenosis. Moderate bilateral neural foraminal narrowing at T10-11.    PARASPINAL: No extraspinal abnormality.    LUMBAR SPINE CT:  VERTEBRA: 5 lumbar type vertebra. Age-indeterminate moderate compression deformities of L2, L3 and L5. Age-indeterminate moderate to advanced compression deformity at L4. Chronic mild compression deformity at the superior endplate of L1. Advanced   osteopenia. Fusion of the SI joints. No definite acute displaced fracture or subluxation.     CANAL/FORAMINA: The spinal canal is narrow on a congenital basis due to short pedicles. There is moderate multilevel central canal and neural foraminal narrowing.    PARASPINAL: No extraspinal abnormality.      Impression    IMPRESSION:  HEAD CT:  1.  No acute intracranial process.    CERVICAL SPINE CT:  1.  No fracture or posttraumatic subluxation.  2.  Moderate bilateral neural foraminal narrowing at C3-4.    THORACIC SPINE CT:  1.  No acute fracture or posttraumatic subluxation.  2.  There are multiple chronic compression deformities as detailed above.  3.  Posterior instrumented fusion from T8 through T12.  4.  Moderate foraminal narrowing bilaterally at T10-11.  5.  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    LUMBAR SPINE CT:  1.  Age-indeterminate compression deformities of L2-L5 with a chronic mild compression deformity at the superior endplate of L1.  2.  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  3.  Moderate multilevel central canal stenosis and neural foraminal narrowing.   Cervical spine CT w/o contrast    Narrative    EXAM: CT HEAD W/O CONTRAST, CT LUMBAR SPINE W/O CONTRAST, CT CERVICAL SPINE W/O CONTRAST, CT THORACIC SPINE W/O CONTRAST  LOCATION: [facility]  DATE/TIME: 9/30/2022 10:38 PM    INDICATION: Traumatic injury. Fall. Head, neck and back pain. Confusion.  COMPARISON: Chest CT  6/30/2022  TECHNIQUE:   1) Routine CT Head without IV contrast. Multiplanar reformats. Dose reduction techniques were used.   2) Routine CT Cervical Spine without IV contrast. Multiplanar reformats. Dose reduction techniques were used.   3) Routine CT Thoracic Spine without IV contrast. Multiplanar reformats. Dose reduction techniques were used.   4) Routine CT Lumbar Spine without IV contrast. Multiplanar reformats. Dose reduction techniques were used.     FINDINGS:   HEAD CT:   INTRACRANIAL CONTENTS: No intracranial hemorrhage, extraaxial collection, or mass effect.  No CT evidence of acute infarct. Mild presumed chronic small vessel ischemic changes. Chronic infarct right occipital lobe. Mild to moderate generalized volume   loss. No hydrocephalus.     VISUALIZED ORBITS/SINUSES/MASTOIDS: No intraorbital abnormality. No paranasal sinus mucosal disease. Scattered fluid/membrane thickening in the left mastoid air cells. No apparent mass in the posterior nasopharynx or skull base.    BONES/SOFT TISSUES: No acute abnormality.    CERVICAL SPINE CT:  VERTEBRA: Normal vertebral body heights and alignment. Ankylosis of C5-6. No acute fracture or subluxation.     CANAL/FORAMINA: No high-grade central canal stenosis. Moderate bilateral neural foraminal narrowing at C3-4.    PARASPINAL: No extraspinal abnormality. Visualized lung fields are clear.    THORACIC SPINE CT:  VERTEBRA: Moderate to severe thoracic kyphosis. Stable mild compression deformities of T1 and T2, T5, T7, T10 and T12. Advanced bony demineralization. Posterior instrumented fusion from T8 through T12. No hardware failure identified. No acute fracture or   subluxation. Bridging osteophytes throughout the thoracic spine.     CANAL/FORAMINA: No high-grade central canal stenosis. Moderate bilateral neural foraminal narrowing at T10-11.    PARASPINAL: No extraspinal abnormality.    LUMBAR SPINE CT:  VERTEBRA: 5 lumbar type vertebra. Age-indeterminate moderate  compression deformities of L2, L3 and L5. Age-indeterminate moderate to advanced compression deformity at L4. Chronic mild compression deformity at the superior endplate of L1. Advanced   osteopenia. Fusion of the SI joints. No definite acute displaced fracture or subluxation.     CANAL/FORAMINA: The spinal canal is narrow on a congenital basis due to short pedicles. There is moderate multilevel central canal and neural foraminal narrowing.    PARASPINAL: No extraspinal abnormality.      Impression    IMPRESSION:  HEAD CT:  1.  No acute intracranial process.    CERVICAL SPINE CT:  1.  No fracture or posttraumatic subluxation.  2.  Moderate bilateral neural foraminal narrowing at C3-4.    THORACIC SPINE CT:  1.  No acute fracture or posttraumatic subluxation.  2.  There are multiple chronic compression deformities as detailed above.  3.  Posterior instrumented fusion from T8 through T12.  4.  Moderate foraminal narrowing bilaterally at T10-11.  5.  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    LUMBAR SPINE CT:  1.  Age-indeterminate compression deformities of L2-L5 with a chronic mild compression deformity at the superior endplate of L1.  2.  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  3.  Moderate multilevel central canal stenosis and neural foraminal narrowing.   Head CT w/o contrast    Narrative    EXAM: CT HEAD W/O CONTRAST, CT LUMBAR SPINE W/O CONTRAST, CT CERVICAL SPINE W/O CONTRAST, CT THORACIC SPINE W/O CONTRAST  LOCATION: [facility]  DATE/TIME: 9/30/2022 10:38 PM    INDICATION: Traumatic injury. Fall. Head, neck and back pain. Confusion.  COMPARISON: Chest CT 6/30/2022  TECHNIQUE:   1) Routine CT Head without IV contrast. Multiplanar reformats. Dose reduction techniques were used.   2) Routine CT Cervical Spine without IV contrast. Multiplanar reformats. Dose reduction techniques were used.   3)  Routine CT Thoracic Spine without IV contrast. Multiplanar reformats. Dose reduction techniques were used.   4) Routine CT Lumbar Spine without IV contrast. Multiplanar reformats. Dose reduction techniques were used.     FINDINGS:   HEAD CT:   INTRACRANIAL CONTENTS: No intracranial hemorrhage, extraaxial collection, or mass effect.  No CT evidence of acute infarct. Mild presumed chronic small vessel ischemic changes. Chronic infarct right occipital lobe. Mild to moderate generalized volume   loss. No hydrocephalus.     VISUALIZED ORBITS/SINUSES/MASTOIDS: No intraorbital abnormality. No paranasal sinus mucosal disease. Scattered fluid/membrane thickening in the left mastoid air cells. No apparent mass in the posterior nasopharynx or skull base.    BONES/SOFT TISSUES: No acute abnormality.    CERVICAL SPINE CT:  VERTEBRA: Normal vertebral body heights and alignment. Ankylosis of C5-6. No acute fracture or subluxation.     CANAL/FORAMINA: No high-grade central canal stenosis. Moderate bilateral neural foraminal narrowing at C3-4.    PARASPINAL: No extraspinal abnormality. Visualized lung fields are clear.    THORACIC SPINE CT:  VERTEBRA: Moderate to severe thoracic kyphosis. Stable mild compression deformities of T1 and T2, T5, T7, T10 and T12. Advanced bony demineralization. Posterior instrumented fusion from T8 through T12. No hardware failure identified. No acute fracture or   subluxation. Bridging osteophytes throughout the thoracic spine.     CANAL/FORAMINA: No high-grade central canal stenosis. Moderate bilateral neural foraminal narrowing at T10-11.    PARASPINAL: No extraspinal abnormality.    LUMBAR SPINE CT:  VERTEBRA: 5 lumbar type vertebra. Age-indeterminate moderate compression deformities of L2, L3 and L5. Age-indeterminate moderate to advanced compression deformity at L4. Chronic mild compression deformity at the superior endplate of L1. Advanced   osteopenia. Fusion of the SI joints. No definite acute  displaced fracture or subluxation.     CANAL/FORAMINA: The spinal canal is narrow on a congenital basis due to short pedicles. There is moderate multilevel central canal and neural foraminal narrowing.    PARASPINAL: No extraspinal abnormality.      Impression    IMPRESSION:  HEAD CT:  1.  No acute intracranial process.    CERVICAL SPINE CT:  1.  No fracture or posttraumatic subluxation.  2.  Moderate bilateral neural foraminal narrowing at C3-4.    THORACIC SPINE CT:  1.  No acute fracture or posttraumatic subluxation.  2.  There are multiple chronic compression deformities as detailed above.  3.  Posterior instrumented fusion from T8 through T12.  4.  Moderate foraminal narrowing bilaterally at T10-11.  5.  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    LUMBAR SPINE CT:  1.  Age-indeterminate compression deformities of L2-L5 with a chronic mild compression deformity at the superior endplate of L1.  2.  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  3.  Moderate multilevel central canal stenosis and neural foraminal narrowing.       Quinn Manrique DO MPH  Replaced by [facility]  [address] [facility] [place] [address]  10/01/2022    D: 10/01/2022   T: 10/01/2022   MT: [email or web address]    Name:     CAMILO CRUMP  MRN:      [id number]        Account:     [id number]   :      1940           Admitted:    2022       Document: [id number]

## 2022-10-02 ENCOUNTER — APPOINTMENT (OUTPATIENT)
Dept: GENERAL RADIOLOGY | Facility: CLINIC | Age: 82
DRG: 640 | End: 2022-10-02
Attending: ORTHOPAEDIC SURGERY
Payer: COMMERCIAL

## 2022-10-02 ENCOUNTER — APPOINTMENT (OUTPATIENT)
Dept: SPEECH THERAPY | Facility: CLINIC | Age: 82
DRG: 640 | End: 2022-10-02
Payer: COMMERCIAL

## 2022-10-02 LAB
ANION GAP SERPL CALCULATED.3IONS-SCNC: 12 MMOL/L (ref 7–15)
BASOPHILS # BLD AUTO: 0 10E3/UL (ref 0–0.2)
BASOPHILS NFR BLD AUTO: 0 %
BUN SERPL-MCNC: 8.7 MG/DL (ref 8–23)
BURR CELLS BLD QL SMEAR: SLIGHT
CALCIUM SERPL-MCNC: 8.4 MG/DL (ref 8.8–10.2)
CHLORIDE SERPL-SCNC: 88 MMOL/L (ref 98–107)
CREAT SERPL-MCNC: 0.65 MG/DL (ref 0.67–1.17)
DEPRECATED HCO3 PLAS-SCNC: 22 MMOL/L (ref 22–29)
EOSINOPHIL # BLD AUTO: 0.4 10E3/UL (ref 0–0.7)
EOSINOPHIL NFR BLD AUTO: 4 %
ERYTHROCYTE [DISTWIDTH] IN BLOOD BY AUTOMATED COUNT: 13.2 % (ref 10–15)
GFR SERPL CREATININE-BSD FRML MDRD: >90 ML/MIN/1.73M2
GLUCOSE SERPL-MCNC: 102 MG/DL (ref 70–99)
HCT VFR BLD AUTO: 36.2 % (ref 40–53)
HGB BLD-MCNC: 12.1 G/DL (ref 13.3–17.7)
IMM GRANULOCYTES # BLD: 0.1 10E3/UL
IMM GRANULOCYTES NFR BLD: 1 %
LYMPHOCYTES # BLD AUTO: 0.9 10E3/UL (ref 0.8–5.3)
LYMPHOCYTES NFR BLD AUTO: 9 %
MCH RBC QN AUTO: 28.7 PG (ref 26.5–33)
MCHC RBC AUTO-ENTMCNC: 33.4 G/DL (ref 31.5–36.5)
MCV RBC AUTO: 86 FL (ref 78–100)
MONOCYTES # BLD AUTO: 0.5 10E3/UL (ref 0–1.3)
MONOCYTES NFR BLD AUTO: 5 %
NEUTROPHILS # BLD AUTO: 7.5 10E3/UL (ref 1.6–8.3)
NEUTROPHILS NFR BLD AUTO: 81 %
NRBC # BLD AUTO: 0 10E3/UL
NRBC BLD AUTO-RTO: 0 /100
OSMOLALITY SERPL: 254 MMOL/KG (ref 280–301)
OSMOLALITY UR: 591 MMOL/KG (ref 100–1200)
PLAT MORPH BLD: ABNORMAL
PLATELET # BLD AUTO: ABNORMAL 10*3/UL
POTASSIUM SERPL-SCNC: 4 MMOL/L (ref 3.4–5.3)
RBC # BLD AUTO: 4.22 10E6/UL (ref 4.4–5.9)
RBC MORPH BLD: ABNORMAL
SODIUM SERPL-SCNC: 121 MMOL/L (ref 136–145)
SODIUM SERPL-SCNC: 121 MMOL/L (ref 136–145)
SODIUM SERPL-SCNC: 122 MMOL/L (ref 136–145)
SODIUM SERPL-SCNC: 123 MMOL/L (ref 136–145)
SODIUM SERPL-SCNC: 124 MMOL/L (ref 136–145)
SODIUM UR-SCNC: 23 MMOL/L
TROPONIN T SERPL HS-MCNC: 34 NG/L
WBC # BLD AUTO: 9.4 10E3/UL (ref 4–11)

## 2022-10-02 PROCEDURE — 258N000003 HC RX IP 258 OP 636: Performed by: INTERNAL MEDICINE

## 2022-10-02 PROCEDURE — 84295 ASSAY OF SERUM SODIUM: CPT | Performed by: INTERNAL MEDICINE

## 2022-10-02 PROCEDURE — 250N000013 HC RX MED GY IP 250 OP 250 PS 637: Performed by: HOSPITALIST

## 2022-10-02 PROCEDURE — 73560 X-RAY EXAM OF KNEE 1 OR 2: CPT | Mod: RT

## 2022-10-02 PROCEDURE — 84295 ASSAY OF SERUM SODIUM: CPT | Performed by: HOSPITALIST

## 2022-10-02 PROCEDURE — 250N000013 HC RX MED GY IP 250 OP 250 PS 637: Performed by: STUDENT IN AN ORGANIZED HEALTH CARE EDUCATION/TRAINING PROGRAM

## 2022-10-02 PROCEDURE — 36415 COLL VENOUS BLD VENIPUNCTURE: CPT | Performed by: HOSPITALIST

## 2022-10-02 PROCEDURE — 250N000013 HC RX MED GY IP 250 OP 250 PS 637: Performed by: INTERNAL MEDICINE

## 2022-10-02 PROCEDURE — 250N000009 HC RX 250: Performed by: INTERNAL MEDICINE

## 2022-10-02 PROCEDURE — 84484 ASSAY OF TROPONIN QUANT: CPT | Performed by: INTERNAL MEDICINE

## 2022-10-02 PROCEDURE — 83930 ASSAY OF BLOOD OSMOLALITY: CPT | Performed by: STUDENT IN AN ORGANIZED HEALTH CARE EDUCATION/TRAINING PROGRAM

## 2022-10-02 PROCEDURE — 36415 COLL VENOUS BLD VENIPUNCTURE: CPT | Performed by: INTERNAL MEDICINE

## 2022-10-02 PROCEDURE — 120N000001 HC R&B MED SURG/OB

## 2022-10-02 PROCEDURE — 99233 SBSQ HOSP IP/OBS HIGH 50: CPT | Performed by: INTERNAL MEDICINE

## 2022-10-02 PROCEDURE — 83935 ASSAY OF URINE OSMOLALITY: CPT | Performed by: STUDENT IN AN ORGANIZED HEALTH CARE EDUCATION/TRAINING PROGRAM

## 2022-10-02 PROCEDURE — 85018 HEMOGLOBIN: CPT | Performed by: INTERNAL MEDICINE

## 2022-10-02 PROCEDURE — 85041 AUTOMATED RBC COUNT: CPT | Performed by: INTERNAL MEDICINE

## 2022-10-02 PROCEDURE — 84300 ASSAY OF URINE SODIUM: CPT | Performed by: STUDENT IN AN ORGANIZED HEALTH CARE EDUCATION/TRAINING PROGRAM

## 2022-10-02 PROCEDURE — 92526 ORAL FUNCTION THERAPY: CPT | Mod: GN

## 2022-10-02 PROCEDURE — 99221 1ST HOSP IP/OBS SF/LOW 40: CPT | Performed by: STUDENT IN AN ORGANIZED HEALTH CARE EDUCATION/TRAINING PROGRAM

## 2022-10-02 RX ORDER — IPRATROPIUM BROMIDE AND ALBUTEROL SULFATE 2.5; .5 MG/3ML; MG/3ML
3 SOLUTION RESPIRATORY (INHALATION) EVERY 4 HOURS PRN
Status: DISCONTINUED | OUTPATIENT
Start: 2022-10-02 | End: 2022-10-06 | Stop reason: HOSPADM

## 2022-10-02 RX ADMIN — ACETAMINOPHEN 650 MG: 325 TABLET, FILM COATED ORAL at 12:33

## 2022-10-02 RX ADMIN — HYDROMORPHONE HYDROCHLORIDE 1 MG: 2 TABLET ORAL at 05:45

## 2022-10-02 RX ADMIN — SODIUM CHLORIDE: 9 INJECTION, SOLUTION INTRAVENOUS at 00:09

## 2022-10-02 RX ADMIN — ACETAMINOPHEN 650 MG: 325 TABLET, FILM COATED ORAL at 05:45

## 2022-10-02 RX ADMIN — HYDROMORPHONE HYDROCHLORIDE 1 MG: 2 TABLET ORAL at 15:19

## 2022-10-02 RX ADMIN — Medication 30 G: at 13:31

## 2022-10-02 RX ADMIN — HYDROMORPHONE HYDROCHLORIDE 1 MG: 2 TABLET ORAL at 12:32

## 2022-10-02 RX ADMIN — HYDROMORPHONE HYDROCHLORIDE 1 MG: 2 TABLET ORAL at 08:43

## 2022-10-02 RX ADMIN — ACETAMINOPHEN 650 MG: 325 TABLET, FILM COATED ORAL at 00:05

## 2022-10-02 RX ADMIN — HYDROMORPHONE HYDROCHLORIDE 1 MG: 2 TABLET ORAL at 00:06

## 2022-10-02 RX ADMIN — IPRATROPIUM BROMIDE AND ALBUTEROL SULFATE 3 ML: 2.5; .5 SOLUTION RESPIRATORY (INHALATION) at 13:41

## 2022-10-02 RX ADMIN — HYDROMORPHONE HYDROCHLORIDE 1 MG: 2 TABLET ORAL at 20:31

## 2022-10-02 RX ADMIN — ACETAMINOPHEN 650 MG: 325 TABLET, FILM COATED ORAL at 20:31

## 2022-10-02 RX ADMIN — Medication 3 MG: at 20:31

## 2022-10-02 RX ADMIN — Medication 30 G: at 20:31

## 2022-10-02 RX ADMIN — SODIUM CHLORIDE: 9 INJECTION, SOLUTION INTRAVENOUS at 08:18

## 2022-10-02 ASSESSMENT — ACTIVITIES OF DAILY LIVING (ADL)
ADLS_ACUITY_SCORE: 45
ADLS_ACUITY_SCORE: 49
DEPENDENT_IADLS:: CLEANING;COOKING;LAUNDRY;SHOPPING;MEAL PREPARATION;MEDICATION MANAGEMENT;MONEY MANAGEMENT;TRANSPORTATION
ADLS_ACUITY_SCORE: 49
ADLS_ACUITY_SCORE: 45
ADLS_ACUITY_SCORE: 49

## 2022-10-02 NOTE — PROVIDER NOTIFICATION
Dr. Denise pageleah- Patients RUE warm, red, and edematous. Patients daughter also requesting orthopedic consult. Thanks.    Sadia Tenorio RN

## 2022-10-02 NOTE — CONSULTS
[facility]    Neurosurgery Consultation     Date of Admission:  9/30/2022  Date of Consult (When I saw the patient): 10/02/22    Assessment & Plan   Jamie Valdivia is a 82 year old male who was admitted on 9/30/2022. I was asked to see the patient for multiple age indeterminate compression fractures in his lumbar spine. Hx of advanced dementia, lives in memory care facility. Was noted to have fallen in the shower on Friday, and c/o increased back pain since then. He was admitted for management of other medical concerns including hyponatremia, but does note the back pain and also has history of spinal fusion, T8-12 pedicle screw fixation with cementing, in August 2020 at [facility].   No leg pain other than some acute right knee pain, possible exacerbated from falling.   No radicular leg pain, no bowel or bladder changes. No MRI done yet, as he does have severe claustrophobia.   Lumbar CT here shows 1.  Age-indeterminate compression deformities of L2-L5 with a chronic mild compression deformity at the superior endplate of L1.  2.  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  3.  Moderate multilevel central canal stenosis and neural foraminal narrowing    We discussed options for him, such as a TLSO brace vs kyphoplasty. At this point, he is wishing to pursue more conservative measures, so we will order the TLSO through Orthotics department.        I have discussed the following assessment and plan with Dr. Bill, who is in agreement with the initial plan and will follow up with further consultation recommendations.    Tadeo George PA-C  [facility]  [address] [address]    Tel [phone]  Pager [phone]        Code Status    Full Code    Reason for Consult   Reason for consult: back pain    Primary Care Physician    Sylvia Stein    Chief Complaint   Back pain     History is obtained from the electronic health record and patient's daughter    History of Present Illness   Jamie Valdivia is a 82 year old male who presents with  multiple age indeterminate compression fractures in his lumbar spine. Hx of advanced dementia, lives in memory care facility. Was noted to have fallen in the shower on Friday, and c/o increased back pain since then. He was admitted for management of other medical concerns including hyponatremia, but does note the back pain and also has history of spinal fusion, T8-12 pedicle screw fixation with cementing, in August 2020 at [facility].   No leg pain other than some acute right knee pain, possible exacerbated from falling.   No radicular leg pain, no bowel or bladder changes. No MRI done yet, as he does have severe claustrophobia.     Past Medical History   I have reviewed this patient's medical history and updated it with pertinent information if needed.   Past Medical History:   Diagnosis Date     Age-related osteoporosis without current pathological fracture 03/30/2022     Alcohol abuse 11/19/2017     Alcohol dependence with withdrawal (H)      Alcoholism (H)      Back pain      Backache 12/19/2018     CAD (coronary artery disease)      Chronic a-fib (H)      Chronic alcohol use 06/11/2015    Formatting of this note might be different from the original. 2/26/2019: serious fall at home with multiple vertebral fractures.  BAL 0.414% on admission.  Denies that he drinks much. 12/18/2018: hospitalized for fall due to alcohol intoxication.  BAL 0.34% on admission. 9/16/2018: hospitalized for injuries from fall due to alcohol intoxication.  BAL 0.34% on admission     Chronic atrial fibrillation (H) 07/15/2016    Formatting of this note might be different from the original. 2/2019: Multiple falls so started on aspirin only.  Then aspirin stopped due to GI bleed.     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 2/2/21     HTN (hypertension)      Impaired gait and mobility 03/14/2019     Mild cognitive impairment 08/12/2019    Formatting of this note might be different from the original. NON-AMNESTIC TYPE     Mild TBI (traumatic brain injury) (H) 03/14/2019     Mumps      Obesity (BMI 30-39.9) 09/03/2015     Osteoarthritis      Osteoarthritis of both knees 05/13/2015     Osteoporosis      Other idiopathic peripheral autonomic neuropathy 03/30/2022     Other insomnia 03/14/2019     Other seizures (H) 03/30/2022     Palpitations      Peripheral neuropathy      Pharyngeal dysphagia 05/13/2015    Formatting of this note might be different from the original. Likely secondary to GERD with esophagitis, on PPI and H2 blocker with notable improvement, EGD 10/5/15.     Physical deconditioning 03/14/2019     Recurrent major depressive disorder (H) 03/30/2022     Rhabdomyolysis      Thrombocytopenia (H)      Urination disorder      Weakness 04/27/2020       Past Surgical History   I have reviewed this patient's surgical history and updated it with pertinent information if needed.  Past Surgical History:   Procedure Laterality Date     ESOPHAGOSCOPY, GASTROSCOPY, DUODENOSCOPY (EGD), COMBINED N/A 06/01/2022    Procedure: ESOPHAGOGASTRODUODENOSCOPY (EGD) mngi with biopsies using jumbo cold biopsy forceps;  Surgeon: David Springer MD;  Location: [facility]     left hip replacement  2010     left shoulder replacement  2016     ORTHOPEDIC SURGERY       SPINE SURGERY      done in  [place]     TONSILLECTOMY         Prior to Admission Medications   Prior to Admission Medications   Prescriptions Last Dose Informant Patient Reported? Taking?   DEEP SEA NASAL SPRAY 0.65 % nasal spray 9/30/2022 at Unknown time USP No Yes   Sig: INHALE 2 SPRAYS IN EACH NOSTRIL ONCE DAILY   QUEtiapine (SEROQUEL) 25 MG tablet 9/30/2022 at Unknown time  No Yes   Sig: Take 0.5 tablets (12.5 mg) by mouth 2 times daily. May also take 0.5 tablets (12.5 mg) every 12 hours as needed (anxiety).   acetaminophen (TYLENOL) 500 MG tablet More than a month at Unknown time Nursing Home Yes Yes   Sig: Take 1,000 mg by mouth every 6 hours as needed for mild pain   alum & mag hydroxide-simethicone (MAALOX MAX) 400-400-40 MG/5ML SUSP suspension  Nursing Home No No   Sig: Take 30 mLs by mouth every 6 hours as needed for indigestion   gabapentin (NEURONTIN) 100 MG capsule 9/29/2022 at Unknown time [facility]P No Yes   Sig: TAKE TWO CAPSULES (200MG) BY MOUTH EVERY NIGHT AT BEDTIME   guaiFENesin (ROBITUSSIN) 100 MG/5ML liquid 9/30/2022 at Unknown time USP No Yes   Sig: GIVE 10ML (200 MG) BY MOUTH TWICE DAILY;AND TWICE DAILY AS NEEDED FOR COUGH   menthol-zinc oxide (CALMOSEPTINE) 0.44-20.6 % OINT ointment 9/30/2022 at Unknown time USP No Yes   Sig: Apply 1 g topically 2 times daily. May also apply 1 g 2 times daily as needed for skin protection.   nystatin (MYCOSTATIN) 272470 UNIT/GM external powder 9/30/2022 at Unknown time Nursing Home Yes Yes   Sig: Apply topically 2 times daily   omeprazole (PRILOSEC) 40 MG DR capsule 9/30/2022 at Unknown time USP No Yes   Sig: TAKE 1 CAPSULE BY MOUTH TWICE DAILY   polyethylene glycol (MIRALAX) 17 GM/Dose powder 9/29/2022 Nursing Home No No   Sig: MIX 2 CAPFULS IN 8OZ OF ORANGE JUICE  IN THE MORNING;AND MAY MIX 2 CAPFULS ONCE DAILY AS NEEDED FOR CONSTIPATION. MIX IN FRONT OF PT. HOLD FOR LOOSE STOOL. OK TO GIVE 1 CAPFUL IF RESIDENT REFUSES 2 CAPFULS.   polyvinyl  "alcohol-povidone PF (REFRESH) 1.4-0.6 % ophthalmic solution  at [facility] Nursing Home Yes No   Si drop every 4 hours as needed for irritation   trospium (SANCTURA) 20 MG tablet 2022 at Unknown time skilled nursing No Yes   Sig: TAKE 1 TABLET BY MOUTH TWICE DAILY      Facility-Administered Medications: None     Allergies   Allergies   Allergen Reactions     Ativan [Lorazepam]        Social History   I have reviewed this patient's social history and updated it with pertinent information if needed. Jamie Valdivia  reports that he has never smoked. He has never used smokeless tobacco. He reports previous alcohol use. He reports that he does not use drugs.    Family History   I have reviewed this patient's family history and updated it with pertinent information if needed.   Family History   Problem Relation Age of Onset     Osteoporosis Mother      Prostate Cancer Paternal Grandfather      Colon Cancer No family hx of        Review of Systems   10 point review of systems is negative with the exception of HPI and PMH.       Physical Exam   Temp: 97.5  F (36.4  C) Temp src: Oral BP: 112/57 Pulse: 75   Resp: 20 SpO2: 98 % O2 Device: Nasal cannula Oxygen Delivery: 2 LPM  Vital Signs with Ranges  Temp:  [97.5  F (36.4  C)-98.7  F (37.1  C)] 97.5  F (36.4  C)  Pulse:  [72-93] 75  Resp:  [20-24] 20  BP: (105-152)/(55-83) 112/57  SpO2:  [92 %-99 %] 98 %  224 lbs 0 oz     , Blood pressure 112/57, pulse 75, temperature 97.5  F (36.4  C), temperature source Oral, resp. rate 20, height 1.778 m (5' 10\"), weight 101.6 kg (224 lb), SpO2 98 %.  224 lbs 0 oz  HEENT:  Normocephalic, atraumatic.  PERRLA.  EOM s intact.   Neck:  Supple, non-tender, without lymphadenopathy.  Heart:  No peripheral edema  Lungs:  No SOB  Abdomen:  Soft, non-tender, non-distended.  Skin:  Warm and dry, good capillary refill.  Extremities:  Good radial and dorsalis pedis pulses bilaterally, no edema, cyanosis or clubbing.    NEUROLOGICAL EXAMINATION: "     Mental status:  Alert and pleasantly confused, follows commands appropriately, speech is fluent.  Cranial nerves:  II-XII intact.   Motor:  Strength is 5/5 throughout the upper and lower extremities   Hip Flexor:                Right: 5/5  Left:  5/5  Hip Adductor:             Right:  5/5  Left:  5/5  Hip Abductor:             Right:  5/5  Left:  5/5  Gastroc Soleus:        Right:  5/5  Left:  5/5  Tib/Ant:                      Right:  5/5  Left:  5/5  EHL:                     Right:  5/5  Left:  5/5  Sensation:  intact  Reflexes:   Negative Babinski.  Negative Clonus.        Data   All new lab and imaging data was personally reviewed by me.    CT:EXAM: CT HEAD W/O CONTRAST, CT LUMBAR SPINE W/O CONTRAST, CT CERVICAL SPINE W/O CONTRAST, CT THORACIC SPINE W/O CONTRAST  LOCATION: [facility]  DATE/TIME: 9/30/2022 10:38 PM     INDICATION: Traumatic injury. Fall. Head, neck and back pain. Confusion.  COMPARISON: Chest CT 6/30/2022  TECHNIQUE:   1) Routine CT Head without IV contrast. Multiplanar reformats. Dose reduction techniques were used.   2) Routine CT Cervical Spine without IV contrast. Multiplanar reformats. Dose reduction techniques were used.   3) Routine CT Thoracic Spine without IV contrast. Multiplanar reformats. Dose reduction techniques were used.   4) Routine CT Lumbar Spine without IV contrast. Multiplanar reformats. Dose reduction techniques were used.      FINDINGS:   HEAD CT:   INTRACRANIAL CONTENTS: No intracranial hemorrhage, extraaxial collection, or mass effect.  No CT evidence of acute infarct. Mild presumed chronic small vessel ischemic changes. Chronic infarct right occipital lobe. Mild to moderate generalized volume   loss. No hydrocephalus.      VISUALIZED ORBITS/SINUSES/MASTOIDS: No intraorbital abnormality. No paranasal sinus mucosal disease. Scattered fluid/membrane thickening in the left mastoid air cells. No apparent mass in the posterior nasopharynx or skull  base.     BONES/SOFT TISSUES: No acute abnormality.     CERVICAL SPINE CT:  VERTEBRA: Normal vertebral body heights and alignment. Ankylosis of C5-6. No acute fracture or subluxation.      CANAL/FORAMINA: No high-grade central canal stenosis. Moderate bilateral neural foraminal narrowing at C3-4.     PARASPINAL: No extraspinal abnormality. Visualized lung fields are clear.     THORACIC SPINE CT:  VERTEBRA: Moderate to severe thoracic kyphosis. Stable mild compression deformities of T1 and T2, T5, T7, T10 and T12. Advanced bony demineralization. Posterior instrumented fusion from T8 through T12. No hardware failure identified. No acute fracture or   subluxation. Bridging osteophytes throughout the thoracic spine.      CANAL/FORAMINA: No high-grade central canal stenosis. Moderate bilateral neural foraminal narrowing at T10-11.     PARASPINAL: No extraspinal abnormality.     LUMBAR SPINE CT:  VERTEBRA: 5 lumbar type vertebra. Age-indeterminate moderate compression deformities of L2, L3 and L5. Age-indeterminate moderate to advanced compression deformity at L4. Chronic mild compression deformity at the superior endplate of L1. Advanced   osteopenia. Fusion of the SI joints. No definite acute displaced fracture or subluxation.      CANAL/FORAMINA: The spinal canal is narrow on a congenital basis due to short pedicles. There is moderate multilevel central canal and neural foraminal narrowing.     PARASPINAL: No extraspinal abnormality.                                                                      IMPRESSION:  HEAD CT:  1.  No acute intracranial process.     CERVICAL SPINE CT:  1.  No fracture or posttraumatic subluxation.  2.  Moderate bilateral neural foraminal narrowing at C3-4.     THORACIC SPINE CT:  1.  No acute fracture or posttraumatic subluxation.  2.  There are multiple chronic compression deformities as detailed above.  3.  Posterior instrumented fusion from T8 through T12.  4.  Moderate foraminal  narrowing bilaterally at T10-11.  5.  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     LUMBAR SPINE CT:  1.  Age-indeterminate compression deformities of L2-L5 with a chronic mild compression deformity at the superior endplate of L1.  2.  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  3.  Moderate multilevel central canal stenosis and neural foraminal narrowing.    MRI:    CBC RESULTS:   Recent Labs   Lab Test 10/02/22  0859 10/01/22  0837   WBC 9.4 17.0*   RBC 4.22* 4.39*   HGB 12.1* 12.7*   HCT 36.2* 37.0*   MCV 86 84   MCH 28.7 28.9   MCHC 33.4 34.3   RDW 13.2 12.8   PLT  --  188     Basic Metabolic Panel:  Lab Results   Component Value Date     10/02/2022      Lab Results   Component Value Date    POTASSIUM 4.0 10/02/2022    POTASSIUM 4.1 09/29/2022     Lab Results   Component Value Date    CHLORIDE 88 10/02/2022    CHLORIDE 92 09/29/2022     Lab Results   Component Value Date    JOSUE 8.4 10/02/2022     Lab Results   Component Value Date    CO2 22 10/02/2022    CO2 23 09/29/2022     Lab Results   Component Value Date    BUN 8.7 10/02/2022    BUN 7 09/29/2022     Lab Results   Component Value Date    CR 0.65 10/02/2022     Lab Results   Component Value Date     10/02/2022     09/29/2022     INR:  No results found for: INR

## 2022-10-02 NOTE — PROGRESS NOTES
[facility]  Hospitalist Progress Note  Maxi Denise MD 10/02/2022    Reason for Stay (Diagnosis): Hyponatremia, metabolic encephalopathy         Assessment and Plan:      Summary of Stay: Jamie Valdivia is a 82 year old male with history of advanced dementia, seizure disorder, chronic atrial fibrillation, GERD, dysphagia, osteoporosis, vertebral fracture who presented to the emergency department for evaluation of fall and found to have hyponatremia and admitted on 9/30/2022.    Problem List:   1. Severe acute hyponatremia.  -Initially it was suspected this is due to hypovolemia.  -Sodium was 126 a day prior to admission, then decreased to 114.  -Patient was gently hydrated with normal saline initially at 50 mill per hour then increased to 75 then increased to 125 mm/h.  -Patient is started to have wheezing.  -Sodium improved from 1 16 to 122, then trended down again to 121.  -Urine sodium was 42 yesterday and decreased to 23 today.  Urine osmolality 326.  -Nephrologist is consulted, input appreciated.  -Discontinue IV fluids, this could also be partly due to SIADH as sodium improved somewhat from 1 114 to 122 and then remained around that level.  -Fluid restriction to 1.2 L/day.  -Start urea 30 mg twice daily.  -Continue to monitor sodium level every 4 hours for tonight.    2. Metabolic encephalopathy secondary to hyponatremia in the setting of underlying dementia.  -Patient has underlying advanced dementia and resides in the memory care unit.  -The emergency room required multiple doses of Haldol and midazolam.  -Today she is much better, cooperative and responding to questions.  -Mental status seems to be improving.    3. Chronic atrial fibrillation.  -Rate is controlled, patient is not on anticoagulation.    4. History of dysphagia.  -Evaluated by speech-language pathology, oral diet recommended input appreciated.    5. Age-indeterminate vertebral fracture.  6. Lower back pain  -Patient  "has history of vertebral fracture in the past and that there is seem to be relatively new onset in the lumbar area.  -CT scan showed age-indeterminate lumbar spine fractures.  -Spine surgery was consulted pending evaluation.  -Continue pain medications and Lidoderm patch.  -Continue stool softeners with pain medication.  -PT will see the patient after spinal surgery evaluate the patient    7. Leukocytosis.  -This seems to be reactive, no sign of infection.  -Blood culture done so far negative UA is negative.  -Pro-Santana is 0.11.  -WBC was 17 yesterday, decreased to 9.4 today.  -No indication for antibiotics at this point.    8. Wheezing.  -Patient has no history of asthma.  -Started to have expiratory wheezing after he was given IV fluids.  -Echocardiogram done result is pending.  -Patient had echocardiogram in 2020 as an outpatient, at that time EF was 65% there is moderate pulmonary hypertension and  moderate tricuspid regurg.  -Responded well to nebulizer.    9. Dysphagia  -Patient was evaluated by speech-language pathology, diet Per SLP recommendation.  10.     DVT Prophylaxis: Pneumatic Compression Devices  Code Status: Full Code  Discharge Dispo: [facility] care unit.  Estimated Disch Date / # of Days until Disch: Likely in 2 days if continues to improve.  I discussed with patient, his wife and daughter at bedside and also with another daughter over the phone.        Interval History (Subjective):      Patient seen and examined, feels better, more awake today complaining of back pain, his daughter and wife at bedside.  No nausea or vomiting, tolerating oral intake                  Physical Exam:      Last Vital Signs:  /57 (BP Location: Right arm)   Pulse 75   Temp 97.5  F (36.4  C) (Oral)   Resp 20   Ht 1.778 m (5' 10\")   Wt 101.6 kg (224 lb)   SpO2 98%   BMI 32.14 kg/m      No intake/output data recorded.  Vitals:    09/30/22 1625 10/01/22 2035 10/02/22 0553   Weight: 101.3 kg (223 lb 5.2 oz) 102.8 kg " (226 lb 11.2 oz) 101.6 kg (224 lb)     Current Facility-Administered Medications   Medication     acetaminophen (TYLENOL) tablet 650 mg    Or     acetaminophen (TYLENOL) Suppository 650 mg     bisacodyl (DULCOLAX) suppository 10 mg     haloperidol lactate (HALDOL) injection 2 mg     HYDROmorphone (DILAUDID) half-tab 1 mg     ipratropium - albuterol 0.5 mg/2.5 mg/3 mL (DUONEB) neb solution 3 mL     Lidocaine (LIDOCARE) 4 % Patch 1 patch     lidocaine (LMX4) cream     lidocaine 1 % 0.1-1 mL     lidocaine patch in PLACE     melatonin tablet 3 mg     naloxone (NARCAN) injection 0.2 mg    Or     naloxone (NARCAN) injection 0.4 mg    Or     naloxone (NARCAN) injection 0.2 mg    Or     naloxone (NARCAN) injection 0.4 mg     ondansetron (ZOFRAN ODT) ODT tab 4 mg    Or     ondansetron (ZOFRAN) injection 4 mg     polyethylene glycol (MIRALAX) Packet 17 g     senna-docusate (SENOKOT-S/PERICOLACE) 8.6-50 MG per tablet 1 tablet    Or     senna-docusate (SENOKOT-S/PERICOLACE) 8.6-50 MG per tablet 2 tablet     sodium chloride (PF) 0.9% PF flush 3 mL     sodium chloride (PF) 0.9% PF flush 3 mL     Urea (URE-NA) packet 30 g       Constitutional: Awake, alert, no apparent distress, no agitation   Respiratory: Clear to auscultation bilaterally, no crackles or scattered wheezing   Cardiovascular: irregular rate and rhythm, normal S1 and S2, and no murmur noted   Abdomen: Normal bowel sounds, soft, non-distended, non-tender   Skin:  Right arm redness, blanching, no swelling and no sign of infection, no cyanosis, dry to touch   Neuro: Alert and oriented x3, no weakness, numbness, memory loss   Extremities: No edema, normal range of motion   Other(s):HEENT  Pink, nonicteric, moist oral mucosa       All other systems: Negative          Medications:      All current medications were reviewed with changes reflected in problem list.         Data:      All new lab and imaging data was reviewed.   Labs:  Recent Labs   Lab 10/02/22  8586  10/01/22  0837 09/30/22  1755   WBC 9.4 17.0* 13.4*   HGB 12.1* 12.7* 12.3*   HCT 36.2* 37.0* 35.6*   MCV 86 84 84   PLT  --  188 204     Recent Labs   Lab 10/02/22  1356 10/02/22  0859 10/02/22  0626 10/01/22  1008 10/01/22  0837 10/01/22  0235 09/30/22  1755   * 121* 122*  122*   < > 119*   < > 114*   POTASSIUM  --   --  4.0  --  4.0  --  3.9   CHLORIDE  --   --  88*  --  83*  --  78*   CO2  --   --  22  --  23  --  24   ANIONGAP  --   --  12  --  13  --  12   GLC  --   --  102*  --  124*  --  116*   BUN  --   --  8.7  --  5.0*  --  6.9*   CR  --   --  0.65*  --  0.57*  --  0.63*   GFRESTIMATED  --   --  >90  --  >90  --  >90   JOSUE  --   --  8.4*  --  8.7*  --  9.0   PROTTOTAL  --   --   --   --   --   --  7.0   ALBUMIN  --   --   --   --   --   --  3.9   BILITOTAL  --   --   --   --   --   --  0.7   ALKPHOS  --   --   --   --   --   --  61   AST  --   --   --   --   --   --  37   ALT  --   --   --   --   --   --  15    < > = values in this interval not displayed.     Recent Labs   Lab 10/02/22  0626 10/01/22  0837 09/30/22  1755 09/29/22  0755 09/28/22  1408   * 124* 116* 109* 116*      Imaging:   Results for orders placed or performed during the hospital encounter of 09/30/22   Head CT w/o contrast    Narrative    EXAM: CT HEAD W/O CONTRAST, CT LUMBAR SPINE W/O CONTRAST, CT CERVICAL SPINE W/O CONTRAST, CT THORACIC SPINE W/O CONTRAST  LOCATION: [facility]  DATE/TIME: 9/30/2022 10:38 PM    INDICATION: Traumatic injury. Fall. Head, neck and back pain. Confusion.  COMPARISON: Chest CT 6/30/2022  TECHNIQUE:   1) Routine CT Head without IV contrast. Multiplanar reformats. Dose reduction techniques were used.   2) Routine CT Cervical Spine without IV contrast. Multiplanar reformats. Dose reduction techniques were used.   3) Routine CT Thoracic Spine without IV contrast. Multiplanar reformats. Dose reduction techniques were used.   4) Routine CT Lumbar Spine without IV contrast.  Multiplanar reformats. Dose reduction techniques were used.     FINDINGS:   HEAD CT:   INTRACRANIAL CONTENTS: No intracranial hemorrhage, extraaxial collection, or mass effect.  No CT evidence of acute infarct. Mild presumed chronic small vessel ischemic changes. Chronic infarct right occipital lobe. Mild to moderate generalized volume   loss. No hydrocephalus.     VISUALIZED ORBITS/SINUSES/MASTOIDS: No intraorbital abnormality. No paranasal sinus mucosal disease. Scattered fluid/membrane thickening in the left mastoid air cells. No apparent mass in the posterior nasopharynx or skull base.    BONES/SOFT TISSUES: No acute abnormality.    CERVICAL SPINE CT:  VERTEBRA: Normal vertebral body heights and alignment. Ankylosis of C5-6. No acute fracture or subluxation.     CANAL/FORAMINA: No high-grade central canal stenosis. Moderate bilateral neural foraminal narrowing at C3-4.    PARASPINAL: No extraspinal abnormality. Visualized lung fields are clear.    THORACIC SPINE CT:  VERTEBRA: Moderate to severe thoracic kyphosis. Stable mild compression deformities of T1 and T2, T5, T7, T10 and T12. Advanced bony demineralization. Posterior instrumented fusion from T8 through T12. No hardware failure identified. No acute fracture or   subluxation. Bridging osteophytes throughout the thoracic spine.     CANAL/FORAMINA: No high-grade central canal stenosis. Moderate bilateral neural foraminal narrowing at T10-11.    PARASPINAL: No extraspinal abnormality.    LUMBAR SPINE CT:  VERTEBRA: 5 lumbar type vertebra. Age-indeterminate moderate compression deformities of L2, L3 and L5. Age-indeterminate moderate to advanced compression deformity at L4. Chronic mild compression deformity at the superior endplate of L1. Advanced   osteopenia. Fusion of the SI joints. No definite acute displaced fracture or subluxation.     CANAL/FORAMINA: The spinal canal is narrow on a congenital basis due to short pedicles. There is moderate multilevel  central canal and neural foraminal narrowing.    PARASPINAL: No extraspinal abnormality.      Impression    IMPRESSION:  HEAD CT:  1.  No acute intracranial process.    CERVICAL SPINE CT:  1.  No fracture or posttraumatic subluxation.  2.  Moderate bilateral neural foraminal narrowing at C3-4.    THORACIC SPINE CT:  1.  No acute fracture or posttraumatic subluxation.  2.  There are multiple chronic compression deformities as detailed above.  3.  Posterior instrumented fusion from T8 through T12.  4.  Moderate foraminal narrowing bilaterally at T10-11.  5.  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    LUMBAR SPINE CT:  1.  Age-indeterminate compression deformities of L2-L5 with a chronic mild compression deformity at the superior endplate of L1.  2.  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  3.  Moderate multilevel central canal stenosis and neural foraminal narrowing.   CT Thoracic Spine w/o Contrast    Narrative    EXAM: CT HEAD W/O CONTRAST, CT LUMBAR SPINE W/O CONTRAST, CT CERVICAL SPINE W/O CONTRAST, CT THORACIC SPINE W/O CONTRAST  LOCATION: [facility]  DATE/TIME: 9/30/2022 10:38 PM    INDICATION: Traumatic injury. Fall. Head, neck and back pain. Confusion.  COMPARISON: Chest CT 6/30/2022  TECHNIQUE:   1) Routine CT Head without IV contrast. Multiplanar reformats. Dose reduction techniques were used.   2) Routine CT Cervical Spine without IV contrast. Multiplanar reformats. Dose reduction techniques were used.   3) Routine CT Thoracic Spine without IV contrast. Multiplanar reformats. Dose reduction techniques were used.   4) Routine CT Lumbar Spine without IV contrast. Multiplanar reformats. Dose reduction techniques were used.     FINDINGS:   HEAD CT:   INTRACRANIAL CONTENTS: No intracranial hemorrhage, extraaxial collection, or mass effect.  No CT evidence of acute infarct. Mild presumed chronic  small vessel ischemic changes. Chronic infarct right occipital lobe. Mild to moderate generalized volume   loss. No hydrocephalus.     VISUALIZED ORBITS/SINUSES/MASTOIDS: No intraorbital abnormality. No paranasal sinus mucosal disease. Scattered fluid/membrane thickening in the left mastoid air cells. No apparent mass in the posterior nasopharynx or skull base.    BONES/SOFT TISSUES: No acute abnormality.    CERVICAL SPINE CT:  VERTEBRA: Normal vertebral body heights and alignment. Ankylosis of C5-6. No acute fracture or subluxation.     CANAL/FORAMINA: No high-grade central canal stenosis. Moderate bilateral neural foraminal narrowing at C3-4.    PARASPINAL: No extraspinal abnormality. Visualized lung fields are clear.    THORACIC SPINE CT:  VERTEBRA: Moderate to severe thoracic kyphosis. Stable mild compression deformities of T1 and T2, T5, T7, T10 and T12. Advanced bony demineralization. Posterior instrumented fusion from T8 through T12. No hardware failure identified. No acute fracture or   subluxation. Bridging osteophytes throughout the thoracic spine.     CANAL/FORAMINA: No high-grade central canal stenosis. Moderate bilateral neural foraminal narrowing at T10-11.    PARASPINAL: No extraspinal abnormality.    LUMBAR SPINE CT:  VERTEBRA: 5 lumbar type vertebra. Age-indeterminate moderate compression deformities of L2, L3 and L5. Age-indeterminate moderate to advanced compression deformity at L4. Chronic mild compression deformity at the superior endplate of L1. Advanced   osteopenia. Fusion of the SI joints. No definite acute displaced fracture or subluxation.     CANAL/FORAMINA: The spinal canal is narrow on a congenital basis due to short pedicles. There is moderate multilevel central canal and neural foraminal narrowing.    PARASPINAL: No extraspinal abnormality.      Impression    IMPRESSION:  HEAD CT:  1.  No acute intracranial process.    CERVICAL SPINE CT:  1.  No fracture or posttraumatic  subluxation.  2.  Moderate bilateral neural foraminal narrowing at C3-4.    THORACIC SPINE CT:  1.  No acute fracture or posttraumatic subluxation.  2.  There are multiple chronic compression deformities as detailed above.  3.  Posterior instrumented fusion from T8 through T12.  4.  Moderate foraminal narrowing bilaterally at T10-11.  5.  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    LUMBAR SPINE CT:  1.  Age-indeterminate compression deformities of L2-L5 with a chronic mild compression deformity at the superior endplate of L1.  2.  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  3.  Moderate multilevel central canal stenosis and neural foraminal narrowing.   Lumbar spine CT w/o contrast    Narrative    EXAM: CT HEAD W/O CONTRAST, CT LUMBAR SPINE W/O CONTRAST, CT CERVICAL SPINE W/O CONTRAST, CT THORACIC SPINE W/O CONTRAST  LOCATION: [facility]  DATE/TIME: 9/30/2022 10:38 PM    INDICATION: Traumatic injury. Fall. Head, neck and back pain. Confusion.  COMPARISON: Chest CT 6/30/2022  TECHNIQUE:   1) Routine CT Head without IV contrast. Multiplanar reformats. Dose reduction techniques were used.   2) Routine CT Cervical Spine without IV contrast. Multiplanar reformats. Dose reduction techniques were used.   3) Routine CT Thoracic Spine without IV contrast. Multiplanar reformats. Dose reduction techniques were used.   4) Routine CT Lumbar Spine without IV contrast. Multiplanar reformats. Dose reduction techniques were used.     FINDINGS:   HEAD CT:   INTRACRANIAL CONTENTS: No intracranial hemorrhage, extraaxial collection, or mass effect.  No CT evidence of acute infarct. Mild presumed chronic small vessel ischemic changes. Chronic infarct right occipital lobe. Mild to moderate generalized volume   loss. No hydrocephalus.     VISUALIZED ORBITS/SINUSES/MASTOIDS: No intraorbital abnormality. No paranasal sinus  mucosal disease. Scattered fluid/membrane thickening in the left mastoid air cells. No apparent mass in the posterior nasopharynx or skull base.    BONES/SOFT TISSUES: No acute abnormality.    CERVICAL SPINE CT:  VERTEBRA: Normal vertebral body heights and alignment. Ankylosis of C5-6. No acute fracture or subluxation.     CANAL/FORAMINA: No high-grade central canal stenosis. Moderate bilateral neural foraminal narrowing at C3-4.    PARASPINAL: No extraspinal abnormality. Visualized lung fields are clear.    THORACIC SPINE CT:  VERTEBRA: Moderate to severe thoracic kyphosis. Stable mild compression deformities of T1 and T2, T5, T7, T10 and T12. Advanced bony demineralization. Posterior instrumented fusion from T8 through T12. No hardware failure identified. No acute fracture or   subluxation. Bridging osteophytes throughout the thoracic spine.     CANAL/FORAMINA: No high-grade central canal stenosis. Moderate bilateral neural foraminal narrowing at T10-11.    PARASPINAL: No extraspinal abnormality.    LUMBAR SPINE CT:  VERTEBRA: 5 lumbar type vertebra. Age-indeterminate moderate compression deformities of L2, L3 and L5. Age-indeterminate moderate to advanced compression deformity at L4. Chronic mild compression deformity at the superior endplate of L1. Advanced   osteopenia. Fusion of the SI joints. No definite acute displaced fracture or subluxation.     CANAL/FORAMINA: The spinal canal is narrow on a congenital basis due to short pedicles. There is moderate multilevel central canal and neural foraminal narrowing.    PARASPINAL: No extraspinal abnormality.      Impression    IMPRESSION:  HEAD CT:  1.  No acute intracranial process.    CERVICAL SPINE CT:  1.  No fracture or posttraumatic subluxation.  2.  Moderate bilateral neural foraminal narrowing at C3-4.    THORACIC SPINE CT:  1.  No acute fracture or posttraumatic subluxation.  2.  There are multiple chronic compression deformities as detailed above.  3.   Posterior instrumented fusion from T8 through T12.  4.  Moderate foraminal narrowing bilaterally at T10-11.  5.  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    LUMBAR SPINE CT:  1.  Age-indeterminate compression deformities of L2-L5 with a chronic mild compression deformity at the superior endplate of L1.  2.  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  3.  Moderate multilevel central canal stenosis and neural foraminal narrowing.   CT Pelvis Bone wo Contrast    Narrative    EXAM: CT PELVIS BONE WO CONTRAST  LOCATION: [facility]  DATE/TIME: 9/30/2022 10:27 PM    INDICATION: Injury with pain.  COMPARISON: None.  TECHNIQUE: CT scan of the pelvis was performed without IV contrast. Multiplanar reformats were obtained. Dose reduction techniques were used.  CONTRAST: None.    FINDINGS:    1.  Bones are demineralized. Multiple lumbar compression fractures. No pelvic fracture. Left hip arthroplasty. There is a large right inguinal hernia containing fat and ascitic fluid.   Cervical spine CT w/o contrast    Narrative    EXAM: CT HEAD W/O CONTRAST, CT LUMBAR SPINE W/O CONTRAST, CT CERVICAL SPINE W/O CONTRAST, CT THORACIC SPINE W/O CONTRAST  LOCATION: [facility]  DATE/TIME: 9/30/2022 10:38 PM    INDICATION: Traumatic injury. Fall. Head, neck and back pain. Confusion.  COMPARISON: Chest CT 6/30/2022  TECHNIQUE:   1) Routine CT Head without IV contrast. Multiplanar reformats. Dose reduction techniques were used.   2) Routine CT Cervical Spine without IV contrast. Multiplanar reformats. Dose reduction techniques were used.   3) Routine CT Thoracic Spine without IV contrast. Multiplanar reformats. Dose reduction techniques were used.   4) Routine CT Lumbar Spine without IV contrast. Multiplanar reformats. Dose reduction techniques were used.     FINDINGS:   HEAD CT:   INTRACRANIAL CONTENTS: No  intracranial hemorrhage, extraaxial collection, or mass effect.  No CT evidence of acute infarct. Mild presumed chronic small vessel ischemic changes. Chronic infarct right occipital lobe. Mild to moderate generalized volume   loss. No hydrocephalus.     VISUALIZED ORBITS/SINUSES/MASTOIDS: No intraorbital abnormality. No paranasal sinus mucosal disease. Scattered fluid/membrane thickening in the left mastoid air cells. No apparent mass in the posterior nasopharynx or skull base.    BONES/SOFT TISSUES: No acute abnormality.    CERVICAL SPINE CT:  VERTEBRA: Normal vertebral body heights and alignment. Ankylosis of C5-6. No acute fracture or subluxation.     CANAL/FORAMINA: No high-grade central canal stenosis. Moderate bilateral neural foraminal narrowing at C3-4.    PARASPINAL: No extraspinal abnormality. Visualized lung fields are clear.    THORACIC SPINE CT:  VERTEBRA: Moderate to severe thoracic kyphosis. Stable mild compression deformities of T1 and T2, T5, T7, T10 and T12. Advanced bony demineralization. Posterior instrumented fusion from T8 through T12. No hardware failure identified. No acute fracture or   subluxation. Bridging osteophytes throughout the thoracic spine.     CANAL/FORAMINA: No high-grade central canal stenosis. Moderate bilateral neural foraminal narrowing at T10-11.    PARASPINAL: No extraspinal abnormality.    LUMBAR SPINE CT:  VERTEBRA: 5 lumbar type vertebra. Age-indeterminate moderate compression deformities of L2, L3 and L5. Age-indeterminate moderate to advanced compression deformity at L4. Chronic mild compression deformity at the superior endplate of L1. Advanced   osteopenia. Fusion of the SI joints. No definite acute displaced fracture or subluxation.     CANAL/FORAMINA: The spinal canal is narrow on a congenital basis due to short pedicles. There is moderate multilevel central canal and neural foraminal narrowing.    PARASPINAL: No extraspinal abnormality.      Impression     IMPRESSION:  HEAD CT:  1.  No acute intracranial process.    CERVICAL SPINE CT:  1.  No fracture or posttraumatic subluxation.  2.  Moderate bilateral neural foraminal narrowing at C3-4.    THORACIC SPINE CT:  1.  No acute fracture or posttraumatic subluxation.  2.  There are multiple chronic compression deformities as detailed above.  3.  Posterior instrumented fusion from T8 through T12.  4.  Moderate foraminal narrowing bilaterally at T10-11.  5.  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    LUMBAR SPINE CT:  1.  Age-indeterminate compression deformities of L2-L5 with a chronic mild compression deformity at the superior endplate of L1.  2.  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  3.  Moderate multilevel central canal stenosis and neural foraminal narrowing.   CT Chest Abdomen Pelvis w/o Contrast    Narrative    EXAM: CT CHEST ABDOMEN PELVIS W/O CONTRAST  LOCATION: [facility]  DATE/TIME: 9/30/2022 10:30 PM    INDICATION: Nausea and vomiting. Fall.  COMPARISON: 6/30/2022.  TECHNIQUE: CT scan of the chest, abdomen, and pelvis was performed without IV contrast. Multiplanar reformats were obtained. Dose reduction techniques were used.   CONTRAST: None.    FINDINGS:   LUNGS AND PLEURA: Peripheral and basilar fibrotic disease similar to the previous exam. Scarring at the lung apices. Calcified granuloma in the right upper lobe anteriorly. Dependent atelectasis bilaterally. No pneumothorax or pleural effusion.    MEDIASTINUM/AXILLAE: There is no lymph node enlargement. No abnormal mediastinal fluid. The heart is at the upper limits of normal in size.    CORONARY ARTERY CALCIFICATION: Severe.    HEPATOBILIARY: Normal.    PANCREAS: Normal.    SPLEEN: Normal.    ADRENAL GLANDS: Normal.    KIDNEYS/BLADDER: No hydronephrosis. Left renal cyst.    BOWEL: There is no bowel obstruction. No free intraperitoneal gas  or fluid.    LYMPH NODES: Normal.    VASCULATURE: Atherosclerotic calcification of the aorta and its branches. No aneurysm.    PELVIC ORGANS: Normal.    MUSCULOSKELETAL: Left hip arthroplasty. Left shoulder arthroplasty. Right inguinal hernia containing fat. Umbilical hernia containing fat. Thoracic fusion hardware. Degenerative disease throughout the spine. Probable old lumbar vertebral body compression   fractures. No acute bone fractures are evident.      Impression    IMPRESSION:  1.  No acute abnormality. No bowel obstruction or inflammation. No acute traumatic abnormality.   XR Chest Port 1 View    Narrative    EXAM: XR CHEST PORTABLE 1 VIEW  LOCATION: [facility]  DATE/TIME: 10/01/2022, 1:29 PM    INDICATION: Hypoxia, wheezing.  COMPARISON: 09/14/2022.      Impression    IMPRESSION: Question some vascular congestion, which could indicate congestive heart failure. Heart size, similar to previous. No consolidation.

## 2022-10-02 NOTE — CONSULTS
Care Management Initial Consult    General Information  Assessment completed with: Family, Daughter Alexia  Type of CM/SW Visit: Initial Assessment    Primary Care Provider verified and updated as needed: Yes   Readmission within the last 30 days:           Advance Care Planning: Advance Care Planning Reviewed: present on chart          Communication Assessment  Patient's communication style: spoken language (English or Bilingual)             Cognitive  Cognitive/Neuro/Behavioral: .WDL except, arousability, level of consciousness, orientation, speech  Level of Consciousness: confused  Arousal Level: arouses to voice  Orientation: disoriented to, place, time, situation  Mood/Behavior: uncooperative, cooperative  Best Language: 1 - Mild to moderate  Speech: garbled    Living Environment:   People in home: facility resident     Current living Arrangements: residential facility  Name of Facility: [facility]   Able to return to prior arrangements: yes       Family/Social Support:  Care provided by: other (see comments)  Provides care for:    Marital Status:   Children, Wife          Description of Support System: Supportive, Involved         Current Resources:   Patient receiving home care services: No (recently had [facility])     Community Resources:    Equipment currently used at home: grab bar, toilet, walker, rolling, other (see comments) (lift chair)  Supplies currently used at home:      Employment/Financial:  Employment Status: retired        Financial Concerns:             Lifestyle & Psychosocial Needs:  Social Determinants of Health     Tobacco Use: Low Risk      Smoking Tobacco Use: Never Smoker     Smokeless Tobacco Use: Never Used   Alcohol Use: Not on file   Financial Resource Strain: Not on file   Food Insecurity: Not on file   Transportation Needs: Not on file   Physical Activity: Not on file   Stress: Not on file   Social Connections: Not on file   Intimate Partner Violence: Not on  file   Depression: Not on file   Housing Stability: Not on file       Functional Status:  Prior to admission patient needed assistance:   Dependent ADLs:: Ambulation-walker, Bathing, Dressing, Grooming, Incontinence, Toileting  Dependent IADLs:: Cleaning, Cooking, Laundry, Shopping, Meal Preparation, Medication Management, Money Management, Transportation         Additional Information:  Consult was placed for discharge planning. Patient admitted from his memory care unit after a fall and hyponatremia.  Attempted to reach [facility] [phone] to verify residency and services. Message left for their nursing staff.  Call placed to daughter Alexia who verified pt is a resident at [facility].  She states he is normally able to mobilize fairly independently with his walker. He does have a lift recliner to assist to a standing position. He occasionally has incontinence that staff assist with. He does need reminders to walk to meals.  He recently had [facility] care and the daughter thinks they may have closed his care. E-mail sent to [facility] to verify services.  They use  LTC pharmacy which has been updated in the chart    Family typically will provided transport but depending on his discharge mobility may need WC transport    Daughter is requesting an ortho consult to make sure the spine fractures seen on xray appear stable to previous images taken at [facility] ( in Care Everywhere). She is also requesting a cardiology consult related to the elevated Troponin and BMP. Dr. Jones aware of the requests.    Will continue to follow along and attempt to call facility tomorrow when they are available.    Catina Felix RN BSN OCN  Care Coordinator  [facility]  [phone]

## 2022-10-02 NOTE — PLAN OF CARE
"Goal Outcome Evaluation:    Patient from ER at around 2130. Lethargic, orientedx2-3, to self, \"hospital\" and some situation. Forgetful. Speech a little garbled. [place] of hearing. Having moderate amount pain in his back, and worse with lowering the head of the bed and turning. Prn Dilaudid and Tylenol given with some improvement, but still in moderate pain with turns. Denies shortness of breath. LS dim with exp wheezes. Sats 93% on 2 L, desats on room air to 87%. Afebrile, vitals stable. Skin bruised, unblanchable redness to bottom, coccyx. Mepilex applied. Incontinent with external catheter. Patient did not get up. Turned/repositioned q 2 hrs with assist of 2. Pulsate mattress ordered.                       "

## 2022-10-02 NOTE — PROVIDER NOTIFICATION
Dr. Denise paged- Patients RUE remains warm, red, and edematous. Trying to decide if it looks more like a cellulitis or possible DVT? Also- patient has a-fib and not on anticoag. Would you like tele? Thanks.    Sadia Tenorio RN

## 2022-10-02 NOTE — CONSULTS
[facility]    Nephrology Consultation     Date of Admission:  9/30/2022    Assessment & Plan     Jamie Valdivia is a 82 year old male with PMH advanced dementia, alcohol use disorder, seizure disorder, chronic afib, GERD, dysphagia, osteoporosis who was admitted on 9/30/2022 for a fall and confusion, found to have severe hyponatremia.     Assessment:  1) Severe hyponatremia, improved   Na on admission 114 on 9/30. On 9/28-9/29 Na stablely low at 127 and 126, though prior was 131. Has had poor PO intake for last 1-2 weeks compared to baseline, diarrhea from 9/29-9/30 due to abx use. Due to concern for hypovolemic hyponatremia, IVF started early 10/1 until today. Did have some improvement up to 122, but stalled there. Urine Osm 326, Urine sodium 42 taken after starting IVF, but more consistent with SIADH. Suspect there was a component of hypovolemia contributing due to improvement with fluids. Would start fluid restriction and urena to improve Na closer to normal.   - discontinue NS   - start fluid restriction 1.2L   - start urena 30mg BID  - agree with q4h Na checks for now, can decrease frequency once Na in mid 120s consistently       Elvira Mejia MD   [facility] - Nephrology  [phone]  --------------------------------------------------------------------------------------------  Reason for Consult     I was asked to see the patient for hyponatremia.    Primary Care Physician     Sylvia Stein    Chief Complaint     Confusion, fall    History is obtained from chart review, 2 daughters, and wife.    History of Present Illness     Jamie Valdivia is a 82 year old male who presents after a fall.     Patient resides in memory care unit. He was reportedly having nausea and loose stools related to recent abx use for upcoming prostate biopsy. He started the abx Tuesday, took them through Thursday. Due to low sodium, biopsy was canceled. He developed diarrhea and abd pain  Thursday per wife. He did seem to have worse PO intake for last week or two. Per memory care staff, on day of ED visit patient appeared more confused than baseline, more agitated. He had a fall in the shower. He's been at his current memory care unit since March, had one prior fall but that was mechanical in nature.     In ED, Na 114, Cl 78. Patient was agitated, required multiple doses of haldol and versed for sedation to allow for CT scan.     Due to concern for hypovolemic hyponatremia, patient given IVF with gradual improvement, but stalling of sodium.     At bedside is two daughters and patient's wife who provide majority of history. Patient states he feels improved, denies feeling thirsty. Has been able to eat a little since coming here. Denies shortness of breath though is on minimal supplemental O2. Continues to have mild abd pain. Isn't sure if he's still having diarrhea.     Past Medical History   I have reviewed this patient's medical history and updated it with pertinent information if needed.   Past Medical History:   Diagnosis Date     Age-related osteoporosis without current pathological fracture 03/30/2022     Alcohol abuse 11/19/2017     Alcohol dependence with withdrawal (H)      Alcoholism (H)      Back pain      Backache 12/19/2018     CAD (coronary artery disease)      Chronic a-fib (H)      Chronic alcohol use 06/11/2015    Formatting of this note might be different from the original. 2/26/2019: serious fall at home with multiple vertebral fractures.  BAL 0.414% on admission.  Denies that he drinks much. 12/18/2018: hospitalized for fall due to alcohol intoxication.  BAL 0.34% on admission. 9/16/2018: hospitalized for injuries from fall due to alcohol intoxication.  BAL 0.34% on admission     Chronic atrial fibrillation (H) 07/15/2016    Formatting of this note might be different from the original. 2/2019: Multiple falls so started on aspirin only.  Then aspirin stopped due to GI bleed.      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 2/2/21     HTN (hypertension)      Impaired gait and mobility 03/14/2019     Mild cognitive impairment 08/12/2019    Formatting of this note might be different from the original. NON-AMNESTIC TYPE     Mild TBI (traumatic brain injury) (H) 03/14/2019     Mumps      Obesity (BMI 30-39.9) 09/03/2015     Osteoarthritis      Osteoarthritis of both knees 05/13/2015     Osteoporosis      Other idiopathic peripheral autonomic neuropathy 03/30/2022     Other insomnia 03/14/2019     Other seizures (H) 03/30/2022     Palpitations      Peripheral neuropathy      Pharyngeal dysphagia 05/13/2015    Formatting of this note might be different from the original. Likely secondary to GERD with esophagitis, on PPI and H2 blocker with notable improvement, EGD 10/5/15.     Physical deconditioning 03/14/2019     Recurrent major depressive disorder (H) 03/30/2022     Rhabdomyolysis      Thrombocytopenia (H)      Urination disorder      Weakness 04/27/2020       Past Surgical History   I have reviewed this patient's surgical history and updated it with pertinent information if needed.  Past Surgical History:   Procedure Laterality Date     ESOPHAGOSCOPY, GASTROSCOPY, DUODENOSCOPY (EGD), COMBINED N/A 06/01/2022    Procedure: ESOPHAGOGASTRODUODENOSCOPY (EGD) mngi with biopsies using jumbo cold biopsy forceps;  Surgeon: David Springer MD;  Location: [facility]     left hip replacement  2010     left shoulder replacement  2016     ORTHOPEDIC SURGERY        SPINE SURGERY      done in [place]     TONSILLECTOMY         Prior to Admission Medications   Prior to Admission Medications   Prescriptions Last Dose Informant Patient Reported? Taking?   DEEP SEA NASAL SPRAY 0.65 % nasal spray 9/30/2022 at Unknown time halfway No Yes   Sig: INHALE 2 SPRAYS IN EACH NOSTRIL ONCE DAILY   QUEtiapine (SEROQUEL) 25 MG tablet 9/30/2022 at Unknown time  No Yes   Sig: Take 0.5 tablets (12.5 mg) by mouth 2 times daily. May also take 0.5 tablets (12.5 mg) every 12 hours as needed (anxiety).   acetaminophen (TYLENOL) 500 MG tablet More than a month at Unknown time Nursing Home Yes Yes   Sig: Take 1,000 mg by mouth every 6 hours as needed for mild pain   alum & mag hydroxide-simethicone (MAALOX MAX) 400-400-40 MG/5ML SUSP suspension  Nursing Home No No   Sig: Take 30 mLs by mouth every 6 hours as needed for indigestion   gabapentin (NEURONTIN) 100 MG capsule 9/29/2022 at Unknown time halfway No Yes   Sig: TAKE TWO CAPSULES (200MG) BY MOUTH EVERY NIGHT AT BEDTIME   guaiFENesin (ROBITUSSIN) 100 MG/5ML liquid 9/30/2022 at Unknown time halfway No Yes   Sig: GIVE 10ML (200 MG) BY MOUTH TWICE DAILY;AND TWICE DAILY AS NEEDED FOR COUGH   menthol-zinc oxide (CALMOSEPTINE) 0.44-20.6 % OINT ointment 9/30/2022 at Unknown time halfway No Yes   Sig: Apply 1 g topically 2 times daily. May also apply 1 g 2 times daily as needed for skin protection.   nystatin (MYCOSTATIN) 408361 UNIT/GM external powder 9/30/2022 at Unknown time Nursing Home Yes Yes   Sig: Apply topically 2 times daily   omeprazole (PRILOSEC) 40 MG DR capsule 9/30/2022 at Unknown time halfway No Yes   Sig: TAKE 1 CAPSULE BY MOUTH TWICE DAILY   polyethylene glycol (MIRALAX) 17 GM/Dose powder 9/29/2022 Nursing Home No No   Sig: MIX 2 CAPFULS IN 8OZ OF ORANGE JUICE  IN THE MORNING;AND MAY MIX 2 CAPFULS ONCE DAILY AS NEEDED FOR CONSTIPATION. MIX IN FRONT OF PT. HOLD FOR LOOSE STOOL. OK TO GIVE 1 CAPFUL IF RESIDENT  REFUSES 2 CAPFULS.   polyvinyl alcohol-povidone PF (REFRESH) 1.4-0.6 % ophthalmic solution  at PRN Nursing Home Yes No   Si drop every 4 hours as needed for irritation   trospium (SANCTURA) 20 MG tablet 2022 at Unknown time detention No Yes   Sig: TAKE 1 TABLET BY MOUTH TWICE DAILY      Facility-Administered Medications: None     Allergies   Allergies   Allergen Reactions     Ativan [Lorazepam]        Social History   I have reviewed this patient's social history and updated it with pertinent information if needed. Jamie Valdivia  reports that he has never smoked. He has never used smokeless tobacco. He reports previous alcohol use. He reports that he does not use drugs.    Family History   I have reviewed this patient's family history and updated it with pertinent information if needed.   Family History   Problem Relation Age of Onset     Osteoporosis Mother      Prostate Cancer Paternal Grandfather      Colon Cancer No family hx of        Review of Systems   The 10 point Review of Systems is negative other than noted in the HPI.     Physical Exam   Temp: 97.5  F (36.4  C) Temp src: Oral BP: 112/57 Pulse: 75   Resp: 20 SpO2: 98 % O2 Device: Nasal cannula Oxygen Delivery: 2 LPM  Vital Signs with Ranges  Temp:  [97.5  F (36.4  C)-98.7  F (37.1  C)] 97.5  F (36.4  C)  Pulse:  [72-93] 75  Resp:  [20-24] 20  BP: (105-152)/(55-83) 112/57  SpO2:  [92 %-99 %] 98 %  224 lbs 0 oz    GENERAL: sleeping, frail appearing but no acute distress   HEENT:  MMM though dry lips  CV: distant heart sounds  RESP: intermittent faint crackles  GI: slightly distended but soft and nontender  MUSCULOSKELETAL: extremities nl - no gross deformities noted. Trace ankle edema, none in remainder of legs  SKIN: no suspicious lesions or rashes, dry to touch  NEURO:  Hard of hearing. Normal speech. Intermittently confused.   PSYCH: mood good, affect appropriate  : condom catheter, dark appearing urine     Data   BMP  Recent Labs   Lab  10/02/22  0859 10/02/22  0626 10/02/22  0159 10/01/22  2157 10/01/22  1008 10/01/22  0837 10/01/22  0235 09/30/22  1755 09/29/22  0755   * 122*  122* 121* 122*   < > 119*   < > 114* 126*   POTASSIUM  --  4.0  --   --   --  4.0  --  3.9 4.1   CHLORIDE  --  88*  --   --   --  83*  --  78* 92*   JOSUE  --  8.4*  --   --   --  8.7*  --  9.0 9.1   CO2  --  22  --   --   --  23  --  24 23   BUN  --  8.7  --   --   --  5.0*  --  6.9* 7   CR  --  0.65*  --   --   --  0.57*  --  0.63* 0.60*   GLC  --  102*  --   --   --  124*  --  116* 109*    < > = values in this interval not displayed.     [email or web address](phos:4)  CBC)  Recent Labs   Lab 10/02/22  0859 10/01/22  0837 09/30/22  1755   WBC 9.4 17.0* 13.4*   HGB 12.1* 12.7* 12.3*   HCT 36.2* 37.0* 35.6*   MCV 86 84 84   PLT  --  188 204     Recent Labs   Lab 09/30/22  1755   AST 37   ALT 15   ALKPHOS 61   BILITOTAL 0.7     No lab results found in last 7 days.  No results found for: D2VIT, D3VIT, DTOT  Recent Labs   Lab 10/02/22  0859   HGB 12.1*   HCT 36.2*   MCV 86       Urine Osm 326  Urine sodium 42    No results for input(s): PTHI in the last 168 hours.

## 2022-10-03 ENCOUNTER — APPOINTMENT (OUTPATIENT)
Dept: CARDIOLOGY | Facility: CLINIC | Age: 82
DRG: 640 | End: 2022-10-03
Attending: INTERNAL MEDICINE
Payer: COMMERCIAL

## 2022-10-03 ENCOUNTER — APPOINTMENT (OUTPATIENT)
Dept: SPEECH THERAPY | Facility: CLINIC | Age: 82
DRG: 640 | End: 2022-10-03
Payer: COMMERCIAL

## 2022-10-03 LAB
ANION GAP SERPL CALCULATED.3IONS-SCNC: 8 MMOL/L (ref 7–15)
ATRIAL RATE - MUSE: 366 BPM
ATRIAL RATE - MUSE: 86 BPM
BUN SERPL-MCNC: 27.7 MG/DL (ref 8–23)
CALCIUM SERPL-MCNC: 8.6 MG/DL (ref 8.8–10.2)
CHLORIDE SERPL-SCNC: 91 MMOL/L (ref 98–107)
CREAT SERPL-MCNC: 0.5 MG/DL (ref 0.67–1.17)
DEPRECATED HCO3 PLAS-SCNC: 27 MMOL/L (ref 22–29)
DIASTOLIC BLOOD PRESSURE - MUSE: NORMAL MMHG
DIASTOLIC BLOOD PRESSURE - MUSE: NORMAL MMHG
ERYTHROCYTE [DISTWIDTH] IN BLOOD BY AUTOMATED COUNT: 13.4 % (ref 10–15)
GFR SERPL CREATININE-BSD FRML MDRD: >90 ML/MIN/1.73M2
GLUCOSE SERPL-MCNC: 109 MG/DL (ref 70–99)
HCT VFR BLD AUTO: 33 % (ref 40–53)
HGB BLD-MCNC: 11.1 G/DL (ref 13.3–17.7)
INTERPRETATION ECG - MUSE: NORMAL
INTERPRETATION ECG - MUSE: NORMAL
LVEF ECHO: NORMAL
MCH RBC QN AUTO: 29 PG (ref 26.5–33)
MCHC RBC AUTO-ENTMCNC: 33.6 G/DL (ref 31.5–36.5)
MCV RBC AUTO: 86 FL (ref 78–100)
P AXIS - MUSE: NORMAL DEGREES
P AXIS - MUSE: NORMAL DEGREES
PLATELET # BLD AUTO: 144 10E3/UL (ref 150–450)
POTASSIUM SERPL-SCNC: 3.6 MMOL/L (ref 3.4–5.3)
PR INTERVAL - MUSE: NORMAL MS
PR INTERVAL - MUSE: NORMAL MS
QRS DURATION - MUSE: 74 MS
QRS DURATION - MUSE: 94 MS
QT - MUSE: 406 MS
QT - MUSE: 464 MS
QTC - MUSE: 429 MS
QTC - MUSE: 552 MS
R AXIS - MUSE: -24 DEGREES
R AXIS - MUSE: -26 DEGREES
RBC # BLD AUTO: 3.83 10E6/UL (ref 4.4–5.9)
SODIUM SERPL-SCNC: 125 MMOL/L (ref 136–145)
SODIUM SERPL-SCNC: 126 MMOL/L (ref 136–145)
SODIUM SERPL-SCNC: 126 MMOL/L (ref 136–145)
SODIUM SERPL-SCNC: 129 MMOL/L (ref 136–145)
SODIUM SERPL-SCNC: 130 MMOL/L (ref 136–145)
SYSTOLIC BLOOD PRESSURE - MUSE: NORMAL MMHG
SYSTOLIC BLOOD PRESSURE - MUSE: NORMAL MMHG
T AXIS - MUSE: -6 DEGREES
T AXIS - MUSE: 6 DEGREES
VENTRICULAR RATE- MUSE: 67 BPM
VENTRICULAR RATE- MUSE: 85 BPM
WBC # BLD AUTO: 7.4 10E3/UL (ref 4–11)

## 2022-10-03 PROCEDURE — 120N000001 HC R&B MED SURG/OB

## 2022-10-03 PROCEDURE — 84295 ASSAY OF SERUM SODIUM: CPT | Performed by: INTERNAL MEDICINE

## 2022-10-03 PROCEDURE — G0463 HOSPITAL OUTPT CLINIC VISIT: HCPCS | Performed by: PHYSICIAN ASSISTANT

## 2022-10-03 PROCEDURE — 93306 TTE W/DOPPLER COMPLETE: CPT | Mod: 26 | Performed by: INTERNAL MEDICINE

## 2022-10-03 PROCEDURE — L0625 LO FLEX L1-BELOW L5 PRE OTS: HCPCS

## 2022-10-03 PROCEDURE — 255N000002 HC RX 255 OP 636: Performed by: INTERNAL MEDICINE

## 2022-10-03 PROCEDURE — 250N000013 HC RX MED GY IP 250 OP 250 PS 637: Performed by: HOSPITALIST

## 2022-10-03 PROCEDURE — 84295 ASSAY OF SERUM SODIUM: CPT | Performed by: HOSPITALIST

## 2022-10-03 PROCEDURE — 250N000011 HC RX IP 250 OP 636: Performed by: HOSPITALIST

## 2022-10-03 PROCEDURE — 250N000013 HC RX MED GY IP 250 OP 250 PS 637: Performed by: INTERNAL MEDICINE

## 2022-10-03 PROCEDURE — 36415 COLL VENOUS BLD VENIPUNCTURE: CPT | Performed by: INTERNAL MEDICINE

## 2022-10-03 PROCEDURE — 999N000208 ECHOCARDIOGRAM COMPLETE

## 2022-10-03 PROCEDURE — 99232 SBSQ HOSP IP/OBS MODERATE 35: CPT | Performed by: INTERNAL MEDICINE

## 2022-10-03 PROCEDURE — 250N000013 HC RX MED GY IP 250 OP 250 PS 637: Performed by: STUDENT IN AN ORGANIZED HEALTH CARE EDUCATION/TRAINING PROGRAM

## 2022-10-03 PROCEDURE — 85027 COMPLETE CBC AUTOMATED: CPT | Performed by: INTERNAL MEDICINE

## 2022-10-03 PROCEDURE — 36416 COLLJ CAPILLARY BLOOD SPEC: CPT | Performed by: HOSPITALIST

## 2022-10-03 PROCEDURE — 92526 ORAL FUNCTION THERAPY: CPT | Mod: GN

## 2022-10-03 RX ORDER — GABAPENTIN 100 MG/1
100 CAPSULE ORAL 2 TIMES DAILY
Status: DISCONTINUED | OUTPATIENT
Start: 2022-10-03 | End: 2022-10-06 | Stop reason: HOSPADM

## 2022-10-03 RX ORDER — MAGNESIUM HYDROXIDE/ALUMINUM HYDROXICE/SIMETHICONE 120; 1200; 1200 MG/30ML; MG/30ML; MG/30ML
30 SUSPENSION ORAL EVERY 6 HOURS PRN
Status: DISCONTINUED | OUTPATIENT
Start: 2022-10-03 | End: 2022-10-06 | Stop reason: HOSPADM

## 2022-10-03 RX ORDER — GLIPIZIDE 10 MG/1
1 TABLET ORAL EVERY 4 HOURS PRN
Status: DISCONTINUED | OUTPATIENT
Start: 2022-10-03 | End: 2022-10-06 | Stop reason: HOSPADM

## 2022-10-03 RX ORDER — PANTOPRAZOLE SODIUM 40 MG/1
40 TABLET, DELAYED RELEASE ORAL 2 TIMES DAILY
Status: DISCONTINUED | OUTPATIENT
Start: 2022-10-03 | End: 2022-10-06 | Stop reason: HOSPADM

## 2022-10-03 RX ORDER — TOLTERODINE TARTRATE 1 MG/1
2 TABLET, EXTENDED RELEASE ORAL 2 TIMES DAILY
Refills: 97 | Status: DISCONTINUED | OUTPATIENT
Start: 2022-10-03 | End: 2022-10-06 | Stop reason: HOSPADM

## 2022-10-03 RX ADMIN — TOLTERODINE TARTRATE 2 MG: 1 TABLET, FILM COATED ORAL at 21:28

## 2022-10-03 RX ADMIN — HALOPERIDOL LACTATE 2 MG: 5 INJECTION, SOLUTION INTRAMUSCULAR at 07:59

## 2022-10-03 RX ADMIN — QUETIAPINE FUMARATE 12.5 MG: 25 TABLET ORAL at 10:55

## 2022-10-03 RX ADMIN — HYDROMORPHONE HYDROCHLORIDE 1 MG: 2 TABLET ORAL at 03:33

## 2022-10-03 RX ADMIN — ACETAMINOPHEN 650 MG: 325 TABLET, FILM COATED ORAL at 03:33

## 2022-10-03 RX ADMIN — PANTOPRAZOLE SODIUM 40 MG: 40 TABLET, DELAYED RELEASE ORAL at 10:54

## 2022-10-03 RX ADMIN — GABAPENTIN 100 MG: 100 CAPSULE ORAL at 21:28

## 2022-10-03 RX ADMIN — HUMAN ALBUMIN MICROSPHERES AND PERFLUTREN 3 ML: 10; .22 INJECTION, SOLUTION INTRAVENOUS at 10:11

## 2022-10-03 RX ADMIN — LIDOCAINE 1 PATCH: 246 PATCH TOPICAL at 08:00

## 2022-10-03 RX ADMIN — TOLTERODINE TARTRATE 2 MG: 1 TABLET, FILM COATED ORAL at 11:00

## 2022-10-03 RX ADMIN — GABAPENTIN 100 MG: 100 CAPSULE ORAL at 10:55

## 2022-10-03 RX ADMIN — PANTOPRAZOLE SODIUM 40 MG: 40 TABLET, DELAYED RELEASE ORAL at 16:50

## 2022-10-03 RX ADMIN — Medication 30 G: at 08:00

## 2022-10-03 RX ADMIN — HYDROMORPHONE HYDROCHLORIDE 1 MG: 2 TABLET ORAL at 09:28

## 2022-10-03 RX ADMIN — ACETAMINOPHEN 650 MG: 325 TABLET, FILM COATED ORAL at 21:28

## 2022-10-03 RX ADMIN — ACETAMINOPHEN 650 MG: 325 TABLET, FILM COATED ORAL at 14:35

## 2022-10-03 ASSESSMENT — ACTIVITIES OF DAILY LIVING (ADL)
ADLS_ACUITY_SCORE: 45

## 2022-10-03 NOTE — PLAN OF CARE
End of Shift Summary  For vital signs and complete assessments, please see documentation flowsheets.     Pertinent assessments: Disoriented to time and situation. VSS, on RA. C/O back and right knee pain. Lidocaine patch to lower back. External cath in place. Tolerating diet. Mepilex intact on coccyx. C/o agitation, improved after seroquel.    Major Shift Events: measured for TLSO brace    Treatment Plan: Pain management. Monitor respiratory status and I's & O's. Serial NA checks. Nephrology, neurosurgery, and ortho.

## 2022-10-03 NOTE — CONSULTS
"CLINICAL NUTRITION SERVICES  -  ASSESSMENT NOTE      Recommendations:   - Diet per SLP.  Fluid restriction per MD teams.  - Appreciate any encouragement surrounding PO intakes.     MALNUTRITION:  % Weight Loss: Weight loss does not meet criteria for malnutrition   % Intake: Unable to determine  Subcutaneous Fat Loss: None observed   Muscle Loss:  Temporal region mild depletion, Clavicle bone region mild depletion and Acromion bone region mild depletion --> not able to observe LEs today, would not use as indicator given suspect these changes are largely age-related  Fluid Retention: None documented    Malnutrition Diagnosis: Patient does not meet two of the above criteria necessary for diagnosing malnutrition          REASON FOR ASSESSMENT  Jamie Valdivia is a 82 year old male seen by Registered Dietitian for Admission Nutrition Risk Screen for positive.    PMH of: Dementia, alcohol use disorder, seizure disorder, GERD, dysphagia, vertebral fractures.    Admit 2/2: Fall, hyponatremia,     NUTRITION HISTORY  - Information obtained from chart as patient oriented to self and currently sleeping.  - Resides in memory care per review of care coordinator note.  - Allergies: NKFA.      CURRENT NUTRITION ORDERS  Diet Order:     IDDSI 5/mildly thick + 1200 mL fluid restriction    Current Intake/Tolerance:  SLP consulted upon admit and completed bedside swallow evaluation - diet as above recommended + possibility of GI consult to further evaluate.  Limited timeframe since admit and no flowsheet recordings yet.      Obtained from Chart/Interdisciplinary Team:  - Nephrology following for hyponatremia  - No documentation of PI  - Stooling patterns reviewed    ANTHROPOMETRICS  Height: 5' 10\"  Weight: 222 lbs 6.4 oz  Body mass index is 31.91 kg/m .  Weight Status:  Obesity Grade I BMI 30-34.9  Weight History:  Wt Readings from Last 10 Encounters:   10/03/22 100.9 kg (222 lb 6.4 oz)   09/28/22 101.3 kg (223 lb 6.4 oz)   08/15/22 " 103.9 kg (229 lb 1.6 oz)   09/13/22 103.9 kg (229 lb 1.6 oz)   08/30/22 103.9 kg (229 lb 1.6 oz)   08/09/22 103.9 kg (229 lb 1.6 oz)   08/05/22 103.4 kg (228 lb)   07/25/22 106.1 kg (234 lb)   07/07/22 103.8 kg (228 lb 12.8 oz)   06/30/22 103.4 kg (228 lb)     - No current documentation of edema.  Possibility of slight wt loss , 2%, over the past 1-2 months?      LABS  Labs reviewed.      MEDICATIONS  Medications reviewed.      ASSESSED NUTRITION NEEDS PER APPROVED PRACTICE GUIDELINES:    Dosing Weight 101 kg   Estimated Energy Needs: 20-25 Kcal/Kg  Justification: maintenance  Estimated Protein Needs: 1-1.2 g pro/Kg  Justification: preservation of lean body mass  Estimated Fluid Needs: per MD      NUTRITION DIAGNOSIS:  Predicted inadequate nutrient intake (energy/protein) related to potential for decline in PO intakes during admit pending LOS and appetite acutely.    NUTRITION INTERVENTIONS  Recommendations / Nutrition Prescription  See above.      Implementation  Nutrition education: Not appropriate at this time due to patient condition.    Collaboration and Referral of Nutrition care: Discussed POC with team during rounds.      Nutrition Goals  Patient to consume at least 50-75% of meals TID while acutely admitted.       MONITORING AND EVALUATION:  Progress towards goals will be monitored and evaluated per protocol and Practice Guidelines          Dominga Driscoll RDN, LD  Clinical Dietitian  3rd floor/ICU: [phone]  All other floors: [phone]  Weekend/holiday: [phone]  Office: [phone]

## 2022-10-03 NOTE — PROGRESS NOTES
Neurosurgery Progress     CT HEAD W/O CONTRAST, CT LUMBAR SPINE W/O CONTRAST, CT CERVICAL SPINE W/O CONTRAST, CT THORACIC SPINE W/O CONTRAST - 9/30/2022 10:38 PM     HEAD CT:  1.  No acute intracranial process.     CERVICAL SPINE CT:  1.  No fracture or posttraumatic subluxation.  2.  Moderate bilateral neural foraminal narrowing at C3-4.     THORACIC SPINE CT:  1.  No acute fracture or posttraumatic subluxation.  2.  There are multiple chronic compression deformities as detailed above.  3.  Posterior instrumented fusion from T8 through T12.  4.  Moderate foraminal narrowing bilaterally at T10-11.  5.  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     LUMBAR SPINE CT:  1.  Age-indeterminate compression deformities of L2-L5 with a chronic mild compression deformity at the superior endplate of L1.  2.  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  3.  Moderate multilevel central canal stenosis and neural foraminal narrowing.    Neuro stable.     TODAY'S PLAN:     Mr. Valdivia was evaluated yesterday. It was determined that he will be treated with a LSO corset as he will not fit in a TLSO due to his severe kyphosis. He has tried a TLSO in the past which resulted in open wounds. We have asked out colleagues on the Orthotics team to fit and supply him with this brace. We instructed the patient to wear the brace when out of bed, but may shower without the brace on. The brace will most likely be required for 3 months.    In the event that patient's symptoms worsen or change we would appreciate being contacted. We did discuss signs of a worsening problem that he should seek being evaluated.     We did review the above information with the patient whom agrees with the plan and did verbalize understanding.   ________________________________________________________________     Mr. Valdivia overall feels well and denies any significant discomfort.  "Tolerating regular diet without n/v.    /64 (BP Location: Right arm)   Pulse 56   Temp 97.6  F (36.4  C) (Oral)   Resp 18   Ht 5' 10\" (1.778 m)   Wt 222 lb 6.4 oz (100.9 kg)   SpO2 99%   BMI 31.91 kg/m       Pt in bed. Appears comfortable and in no apparent distress, moving all extremities.   CN II-XII intact, alert and appropriate with conversation and following commands.   Bilateral upper and lower extremities with appropriate strength. DTR's WNL. Spine is non tender to palpation throughout. Summers's and Babinski sign neg. Sensation intact throughout. Acceptable ROM.   Calves soft and non-tender bilaterally.     All pertinent labs and updated imaging reviewed in EPIC.     Phil Arredondo PA-C   Neurosurgery   [phone] (P)       This has been reviewed with Dr. Sales    "

## 2022-10-03 NOTE — PROGRESS NOTES
Care Management Follow Up    Length of Stay (days): 3    Expected Discharge Date: 10/05/2022     Concerns to be Addressed:       Patient plan of care discussed at interdisciplinary rounds: Yes    Anticipated Discharge Disposition:  TBD     Anticipated Discharge Services:    Anticipated Discharge DME:      Additional Information:  Received call from Liliya KIM at [facility]. They verified that they provide meds, assist with bathing and dressing. He does mobilize with his walker and they would like to have him at or close to baseline for return to them. Explained he will be getting a new TLSO brace and the therapies will be working with him.  Nory Waller RN # [phone] F: [phone]    Catina Felix RN BSN OCN  Care Coordinator  [facility]  [phone]

## 2022-10-03 NOTE — PLAN OF CARE
"Goal Outcome Evaluation:     [facility] 1900-0730. Alert, orientedx2-3, to self, \"hospital\" and some situation. Forgetful. Speech a little garbled. Hard of hearing. Having moderate amount pain in his back, and worse with lowering the head of the bed and turning. Prn Dilaudid and Tylenol given twice with some improvement, but still in moderate-to-severe pain with turns. Denies shortness of breath. LS dim with exp wheezes. Sats 93% on 2 L. Afebrile, vitals stable. Skin bruised, unblanchable redness to bottom, coccyx. Mepilex applied. Incontinent with external catheter. Patient did not get up. Needs TLSO brace, but [facility]. Turned/repositioned with assist of 2. On pulsate mattress.                         "

## 2022-10-03 NOTE — PROGRESS NOTES
Fit patient with corset while in bed with RN assisting.  Patient requests leave the orthosis on for now because it makes him feel better.  I advised patient and RN that orthosis doesn't have to be worn in bed.  Written instructions given to patient.  Contact information was given to daughter Viola as she signed delivery ticket.  Please call orthotics if questions.  Norman DEL CID.

## 2022-10-03 NOTE — PLAN OF CARE
Goal Outcome Evaluation:    To Do:  End of Shift Summary  For vital signs and complete assessments, please see documentation flowsheets.     Pertinent assessments: Disoriented to time. VSS, on 2 LPM O2 via NC. C/O back and right knee pain. Lidocaine patch to right knee. External cath in place. Tolerating diet. Had BM this shift. Mepilex intact on coccyx.    Major Shift Events: PRN dilaudid dose frequency increased. RUE edematous, warm, and red. MD aware- redness outlined on skin.     Treatment Plan: Pain management. Monitor respiratory status and I's & O's. Followed by nephrology, neurosurgery, and ortho.     Bedside Nurse: Sadia Tenorio RN

## 2022-10-03 NOTE — PROGRESS NOTES
[facility]     Renal Progress Note       SHORTHAND KEY FOR MY NOTES:  c = with, s = without, p = after, a = before, x = except, asx = asymptomatic, tx = transplant or treatment, sx = symptoms or symptomatic, cx = canceled or culture, rxn = reaction, yday = yesterday, nl = normal, abx = antibiotics, fxn = function, dx = diagnosis, dz = disease, m/h = melena/hematochezia, c/d/l/ha = cramping/dizziness/lightheadedness/headache, d/c = discharge or diarrhea/constipation, f/c/n/v = fevers/chills/nausea/vomiting, cp/sob = chest pain/shortness of breath, tbv = total body volume, rxn = reaction, tdc = tunneled dialysis catheter, pta = prior to admission, hd = hemodialysis, pd = peritoneal dialysis, hhd = home hemodialysis, edw = estimated dry wt         Assessment/Plan:     1.  Severe hyponatremia.  Pt's Na is up to 130.  He is asx and his rate of correction has been ok.  Etiology appears multifactorial.  A.  Continue fluid restriction of 1200 ml every day.  B.  Stop urea.  C.  Change Na checks to q 12h.  D.  Monitor how he does s the urea.    2.  Anemia. Hb is stable.  A.  Follow clinically.    3.  FEN.  Pt's K is on the lower side.  He isn't eating much.  A.  Monitor K and replace prn.        Interval History:     Pt is doing ok today.  Good uo noted via ext catheter.  No f/c/v, but occ nausea.  He is eating some food, but not a nl amt yet.  At home, he didn't eat for a few days prior to admission.  No cp/sob/abd pain at present.          Medications and Allergies:       gabapentin  100 mg Oral BID     lidocaine  1 patch Transdermal Q24H     lidocaine   Transdermal Q8H JOSÉ LUIS     pantoprazole  40 mg Oral BID     sodium chloride (PF)  3 mL Intracatheter Q8H     tolterodine  2 mg Oral BID     Allergies   Allergen Reactions     Ativan [Lorazepam]           Physical Exam:     Vitals were reviewed     , Blood pressure 132/58, pulse 67, temperature 97.4  F (36.3  C), temperature source Axillary, resp. rate  "22, height 1.778 m (5' 10\"), weight 100.9 kg (222 lb 6.4 oz), SpO2 92 %.  Wt Readings from Last 3 Encounters:   10/03/22 100.9 kg (222 lb 6.4 oz)   09/28/22 101.3 kg (223 lb 6.4 oz)   08/15/22 103.9 kg (229 lb 1.6 oz)     Intake/Output Summary (Last 24 hours) at 10/3/2022 1740  Last data filed at 10/3/2022 1011  Gross per 24 hour   Intake 0 ml   Output 850 ml   Net -850 ml     GENERAL APPEARANCE: lying in bed, looks sick, interactive, family at bedside  HEENT:  eyes/ears/nose/neck grossly nl  RESP: diminished BS - L > R  CV: RRR, nl S1/S2, distant  ABDOMEN: o/s/nt/nd  EXTREMITIES/SKIN: tr ble edema         Data:     CBC RESULTS:     Recent Labs   Lab 10/03/22  0743 10/02/22  0859 10/01/22  0837 09/30/22  1755   WBC 7.4 9.4 17.0* 13.4*   RBC 3.83* 4.22* 4.39* 4.26*   HGB 11.1* 12.1* 12.7* 12.3*   HCT 33.0* 36.2* 37.0* 35.6*   *  --  188 204     Basic Metabolic Panel:  Recent Labs   Lab 10/03/22  1447 10/03/22  0743 10/03/22  0225 10/02/22  2204 10/02/22  1830 10/02/22  1356 10/02/22  0859 10/02/22  0626 10/01/22  1008 10/01/22  0837 10/01/22  0235 09/30/22  1755 09/29/22  0755 09/28/22  1408   * 126*  126* 125* 123* 122* 124*   < > 122*  122*   < > 119*   < > 114* 126* 127*   POTASSIUM  --  3.6  --   --   --   --   --  4.0  --  4.0  --  3.9 4.1 4.1   CHLORIDE  --  91*  --   --   --   --   --  88*  --  83*  --  78* 92* 92*   CO2  --  27  --   --   --   --   --  22  --  23  --  24 23 26   BUN  --  27.7*  --   --   --   --   --  8.7  --  5.0*  --  6.9* 7 7   CR  --  0.50*  --   --   --   --   --  0.65*  --  0.57*  --  0.63* 0.60* 0.60*   GLC  --  109*  --   --   --   --   --  102*  --  124*  --  116* 109* 116*   JOSUE  --  8.6*  --   --   --   --   --  8.4*  --  8.7*  --  9.0 9.1 9.1    < > = values in this interval not displayed.     INRNo lab results found in last 7 days.   Attestation:   I have reviewed today's relevant vital signs, notes, medications, labs and imaging.    Marco Feldman MD  [facility]phrology  [phone]

## 2022-10-03 NOTE — CONSULTS
[facility]    Orthopedic Consultation    Jamie Valdivia MRN# [id number]   Age: 82 year old YOB: 1940     Date of Admission: 9/30/2022    Reason for consult: Right knee pain        Requesting provider: Tadeo George       Level of consult: One-time consult to assist in determining a diagnosis, recommend an appropriate treatment plan and place orders           Assessment and Plan:   Assessment:   Right knee pain with advanced ostoarthritis      Plan:   Flare of osteoarthritic pain for the fall.  Able to WBAT with walker.  If unable to weight bear, then will order an intra-articular steroid injection.   Progress in PT as able            Chief Complaint:   Right knee pain          History of Present Illness:   Jamie Valdivia is a 82-year-old male with a history of advanced dementia who presented to [facility] on 9/30/2022 for evaluation of a fall, found to have hyponatremia and vertebral fractures.  Per medical records, patient fall at his memory care facility while in the shower.  He has been complaining of increased knee pain following the fall.            Past Medical History:     Past Medical History:   Diagnosis Date     Age-related osteoporosis without current pathological fracture 03/30/2022     Alcohol abuse 11/19/2017     Alcohol dependence with withdrawal (H)      Alcoholism (H)      Back pain      Backache 12/19/2018     CAD (coronary artery disease)      Chronic a-fib (H)      Chronic alcohol use 06/11/2015    Formatting of this note might be different from the original. 2/26/2019: serious fall at home with multiple vertebral fractures.  BAL 0.414% on admission.  Denies that he drinks much. 12/18/2018: hospitalized for fall due to alcohol intoxication.  BAL 0.34% on admission. 9/16/2018: hospitalized for injuries from fall due to alcohol intoxication.  BAL 0.34% on admission     Chronic atrial fibrillation (H) 07/15/2016    Formatting of this note might  be different from the original. 2/2019: Multiple falls so started on aspirin only.  Then aspirin stopped due to GI bleed.     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 2/2/21     HTN (hypertension)      Impaired gait and mobility 03/14/2019     Mild cognitive impairment 08/12/2019    Formatting of this note might be different from the original. NON-AMNESTIC TYPE     Mild TBI (traumatic brain injury) (H) 03/14/2019     Mumps      Obesity (BMI 30-39.9) 09/03/2015     Osteoarthritis      Osteoarthritis of both knees 05/13/2015     Osteoporosis      Other idiopathic peripheral autonomic neuropathy 03/30/2022     Other insomnia 03/14/2019     Other seizures (H) 03/30/2022     Palpitations      Peripheral neuropathy      Pharyngeal dysphagia 05/13/2015    Formatting of this note might be different from the original. Likely secondary to GERD with esophagitis, on PPI and H2 blocker with notable improvement, EGD 10/5/15.     Physical deconditioning 03/14/2019     Recurrent major depressive disorder (H) 03/30/2022     Rhabdomyolysis      Thrombocytopenia (H)      Urination disorder      Weakness 04/27/2020             Past Surgical History:     Past Surgical History:   Procedure Laterality Date     ESOPHAGOSCOPY, GASTROSCOPY, DUODENOSCOPY (EGD), COMBINED N/A 06/01/2022    Procedure: ESOPHAGOGASTRODUODENOSCOPY (EGD) mngi with biopsies using jumbo cold biopsy forceps;  Surgeon: David Springer MD;  Location: [facility]     left hip replacement  2010     left shoulder replacement   2016     ORTHOPEDIC SURGERY       SPINE SURGERY      done in [place]     TONSILLECTOMY               Social History:     Social History     Tobacco Use     Smoking status: Never Smoker     Smokeless tobacco: Never Used   Substance Use Topics     Alcohol use: Not Currently     Comment: not since December 2021             Family History:     Family History   Problem Relation Age of Onset     Osteoporosis Mother      Prostate Cancer Paternal Grandfather      Colon Cancer No family hx of              Immunizations:     VACCINE/DOSE   Diptheria   DPT   DTAP   HBIG   Hepatitis A   Hepatitis B   HIB   Influenza   Measles   Meningococcal   MMR   Mumps   Pneumococcal   Polio   Rubella   Small Pox   TDAP   Varicella   Zoster             Allergies:     Allergies   Allergen Reactions     Ativan [Lorazepam]              Medications:     Current Facility-Administered Medications   Medication     acetaminophen (TYLENOL) tablet 650 mg    Or     acetaminophen (TYLENOL) Suppository 650 mg     alum & mag hydroxide-simethicone (MAALOX) suspension 30 mL     artificial tears (GENTEAL) 0.1-0.2-0.3 % ophthalmic solution 1 drop     bisacodyl (DULCOLAX) suppository 10 mg     gabapentin (NEURONTIN) capsule 100 mg     haloperidol lactate (HALDOL) injection 2 mg     HYDROmorphone (DILAUDID) half-tab 1 mg     ipratropium - albuterol 0.5 mg/2.5 mg/3 mL (DUONEB) neb solution 3 mL     Lidocaine (LIDOCARE) 4 % Patch 1 patch     lidocaine (LMX4) cream     lidocaine 1 % 0.1-1 mL     lidocaine patch in PLACE     melatonin tablet 3 mg     naloxone (NARCAN) injection 0.2 mg    Or     naloxone (NARCAN) injection 0.4 mg    Or     naloxone (NARCAN) injection 0.2 mg    Or     naloxone (NARCAN) injection 0.4 mg     ondansetron (ZOFRAN ODT) ODT tab 4 mg    Or     ondansetron (ZOFRAN) injection 4 mg     pantoprazole (PROTONIX) EC tablet 40 mg     polyethylene glycol (MIRALAX) Packet 17 g     QUEtiapine (SEROquel) half-tab 12.5 mg     senna-docusate  (SENOKOT-S/PERICOLACE) 8.6-50 MG per tablet 1 tablet    Or     senna-docusate (SENOKOT-S/PERICOLACE) 8.6-50 MG per tablet 2 tablet     sodium chloride (PF) 0.9% PF flush 3 mL     sodium chloride (PF) 0.9% PF flush 3 mL     tolterodine (DETROL) tablet 2 mg     Urea (URE-NA) packet 30 g             Review of Systems:   ROS:  10 point ROS neg other than the symptoms noted above in the HPI.            Physical Exam:   All vitals have been reviewed  Patient Vitals for the past 24 hrs:   BP Temp Temp src Pulse Resp SpO2 Weight   10/03/22 0738 135/64 97.6  F (36.4  C) Oral 56 18 99 % --   10/03/22 0358 -- -- -- -- -- -- 100.9 kg (222 lb 6.4 oz)   10/02/22 2320 115/56 98.4  F (36.9  C) Axillary 64 20 98 % --   10/02/22 1533 122/65 98  F (36.7  C) Oral 80 20 96 % --       Intake/Output Summary (Last 24 hours) at 10/3/2022 1252  Last data filed at 10/3/2022 1011  Gross per 24 hour   Intake 0 ml   Output 850 ml   Net -850 ml         Physical Exam   Temp: 97.6  F (36.4  C) Temp src: Oral BP: 135/64 Pulse: 56   Resp: 18 SpO2: 99 % O2 Device: Nasal cannula Oxygen Delivery: 2 LPM  Vital Signs with Ranges  Temp:  [97.6  F (36.4  C)-98.4  F (36.9  C)] 97.6  F (36.4  C)  Pulse:  [56-80] 56  Resp:  [18-20] 18  BP: (115-135)/(56-65) 135/64  SpO2:  [96 %-99 %] 99 %  222 lbs 6.4 oz    Constitutional:  Pleasant, alert, appropriate, following commands.  HEENT: Head atraumatic normocephalic. Pupils equal round and reactive to light.  Respiratory: Unlabored breathing no audible wheeze  Cardiovascular: Regular rate and rhythm per pulses  GI: Abdomen non-distended.  Lymph/Hematologic: No lymphadenopathy in areas examined  Genitourinary:  No de leon  Skin: No rashes, no cyanosis, no edema.  Musculoskeletal: On physical exam of the right knee   Skin: intact, no ecchymosis or erythema  Swelling: mild joint effusion   Alignment: neutral, mild varus deformity  Tenderness to palpation along medial joint line   ROM: able to range the right knee 0-50  with minimal pain bilaterally.  Stable valgus/varus stress.  Denies pain with hip rotation.    Calves:  Soft and non-tender  Distal pulses are intact and equal bilaterally  Sensation to light touch intact and equal bilaterally.   Neurologic: normal without focal findings, mental status: dementia             Data:   All laboratory data reviewed  Results for orders placed or performed during the hospital encounter of 09/30/22   Head CT w/o contrast     Status: None    Narrative    EXAM: CT HEAD W/O CONTRAST, CT LUMBAR SPINE W/O CONTRAST, CT CERVICAL SPINE W/O CONTRAST, CT THORACIC SPINE W/O CONTRAST  LOCATION: [facility]  DATE/TIME: 9/30/2022 10:38 PM    INDICATION: Traumatic injury. Fall. Head, neck and back pain. Confusion.  COMPARISON: Chest CT 6/30/2022  TECHNIQUE:   1) Routine CT Head without IV contrast. Multiplanar reformats. Dose reduction techniques were used.   2) Routine CT Cervical Spine without IV contrast. Multiplanar reformats. Dose reduction techniques were used.   3) Routine CT Thoracic Spine without IV contrast. Multiplanar reformats. Dose reduction techniques were used.   4) Routine CT Lumbar Spine without IV contrast. Multiplanar reformats. Dose reduction techniques were used.     FINDINGS:   HEAD CT:   INTRACRANIAL CONTENTS: No intracranial hemorrhage, extraaxial collection, or mass effect.  No CT evidence of acute infarct. Mild presumed chronic small vessel ischemic changes. Chronic infarct right occipital lobe. Mild to moderate generalized volume   loss. No hydrocephalus.     VISUALIZED ORBITS/SINUSES/MASTOIDS: No intraorbital abnormality. No paranasal sinus mucosal disease. Scattered fluid/membrane thickening in the left mastoid air cells. No apparent mass in the posterior nasopharynx or skull base.    BONES/SOFT TISSUES: No acute abnormality.    CERVICAL SPINE CT:  VERTEBRA: Normal vertebral body heights and alignment. Ankylosis of C5-6. No acute fracture or subluxation.      CANAL/FORAMINA: No high-grade central canal stenosis. Moderate bilateral neural foraminal narrowing at C3-4.    PARASPINAL: No extraspinal abnormality. Visualized lung fields are clear.    THORACIC SPINE CT:  VERTEBRA: Moderate to severe thoracic kyphosis. Stable mild compression deformities of T1 and T2, T5, T7, T10 and T12. Advanced bony demineralization. Posterior instrumented fusion from T8 through T12. No hardware failure identified. No acute fracture or   subluxation. Bridging osteophytes throughout the thoracic spine.     CANAL/FORAMINA: No high-grade central canal stenosis. Moderate bilateral neural foraminal narrowing at T10-11.    PARASPINAL: No extraspinal abnormality.    LUMBAR SPINE CT:  VERTEBRA: 5 lumbar type vertebra. Age-indeterminate moderate compression deformities of L2, L3 and L5. Age-indeterminate moderate to advanced compression deformity at L4. Chronic mild compression deformity at the superior endplate of L1. Advanced   osteopenia. Fusion of the SI joints. No definite acute displaced fracture or subluxation.     CANAL/FORAMINA: The spinal canal is narrow on a congenital basis due to short pedicles. There is moderate multilevel central canal and neural foraminal narrowing.    PARASPINAL: No extraspinal abnormality.      Impression    IMPRESSION:  HEAD CT:  1.  No acute intracranial process.    CERVICAL SPINE CT:  1.  No fracture or posttraumatic subluxation.  2.  Moderate bilateral neural foraminal narrowing at C3-4.    THORACIC SPINE CT:  1.  No acute fracture or posttraumatic subluxation.  2.  There are multiple chronic compression deformities as detailed above.  3.  Posterior instrumented fusion from T8 through T12.  4.  Moderate foraminal narrowing bilaterally at T10-11.  5.  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    LUMBAR SPINE CT:  1.  Age-indeterminate compression deformities of L2-L5 with a chronic mild compression deformity at the superior endplate of L1.  2.  Ossification  along the anterior longitudinal ligament from the visualized thoracic spine to the L3 level with fusion of the SI joints.  3.  Moderate multilevel central canal stenosis and neural foraminal narrowing.   CT Thoracic Spine w/o Contrast     Status: None    Narrative    EXAM: CT HEAD W/O CONTRAST, CT LUMBAR SPINE W/O CONTRAST, CT CERVICAL SPINE W/O CONTRAST, CT THORACIC SPINE W/O CONTRAST  LOCATION: [facility]  DATE/TIME: 9/30/2022 10:38 PM    INDICATION: Traumatic injury. Fall. Head, neck and back pain. Confusion.  COMPARISON: Chest CT 6/30/2022  TECHNIQUE:   1) Routine CT Head without IV contrast. Multiplanar reformats. Dose reduction techniques were used.   2) Routine CT Cervical Spine without IV contrast. Multiplanar reformats. Dose reduction techniques were used.   3) Routine CT Thoracic Spine without IV contrast. Multiplanar reformats. Dose reduction techniques were used.   4) Routine CT Lumbar Spine without IV contrast. Multiplanar reformats. Dose reduction techniques were used.     FINDINGS:   HEAD CT:   INTRACRANIAL CONTENTS: No intracranial hemorrhage, extraaxial collection, or mass effect.  No CT evidence of acute infarct. Mild presumed chronic small vessel ischemic changes. Chronic infarct right occipital lobe. Mild to moderate generalized volume   loss. No hydrocephalus.     VISUALIZED ORBITS/SINUSES/MASTOIDS: No intraorbital abnormality. No paranasal sinus mucosal disease. Scattered fluid/membrane thickening in the left mastoid air cells. No apparent mass in the posterior nasopharynx or skull base.    BONES/SOFT TISSUES: No acute abnormality.    CERVICAL SPINE CT:  VERTEBRA: Normal vertebral body heights and alignment. Ankylosis of C5-6. No acute fracture or subluxation.     CANAL/FORAMINA: No high-grade central canal stenosis. Moderate bilateral neural foraminal narrowing at C3-4.    PARASPINAL: No extraspinal abnormality. Visualized lung fields are clear.    THORACIC SPINE  CT:  VERTEBRA: Moderate to severe thoracic kyphosis. Stable mild compression deformities of T1 and T2, T5, T7, T10 and T12. Advanced bony demineralization. Posterior instrumented fusion from T8 through T12. No hardware failure identified. No acute fracture or   subluxation. Bridging osteophytes throughout the thoracic spine.     CANAL/FORAMINA: No high-grade central canal stenosis. Moderate bilateral neural foraminal narrowing at T10-11.    PARASPINAL: No extraspinal abnormality.    LUMBAR SPINE CT:  VERTEBRA: 5 lumbar type vertebra. Age-indeterminate moderate compression deformities of L2, L3 and L5. Age-indeterminate moderate to advanced compression deformity at L4. Chronic mild compression deformity at the superior endplate of L1. Advanced   osteopenia. Fusion of the SI joints. No definite acute displaced fracture or subluxation.     CANAL/FORAMINA: The spinal canal is narrow on a congenital basis due to short pedicles. There is moderate multilevel central canal and neural foraminal narrowing.    PARASPINAL: No extraspinal abnormality.      Impression    IMPRESSION:  HEAD CT:  1.  No acute intracranial process.    CERVICAL SPINE CT:  1.  No fracture or posttraumatic subluxation.  2.  Moderate bilateral neural foraminal narrowing at C3-4.    THORACIC SPINE CT:  1.  No acute fracture or posttraumatic subluxation.  2.  There are multiple chronic compression deformities as detailed above.  3.  Posterior instrumented fusion from T8 through T12.  4.  Moderate foraminal narrowing bilaterally at T10-11.  5.  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    LUMBAR SPINE CT:  1.  Age-indeterminate compression deformities of L2-L5 with a chronic mild compression deformity at the superior endplate of L1.  2.  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  3.  Moderate multilevel central canal stenosis and neural foraminal narrowing.   Lumbar  spine CT w/o contrast     Status: None    Narrative    EXAM: CT HEAD W/O CONTRAST, CT LUMBAR SPINE W/O CONTRAST, CT CERVICAL SPINE W/O CONTRAST, CT THORACIC SPINE W/O CONTRAST  LOCATION: [facility]  DATE/TIME: 9/30/2022 10:38 PM    INDICATION: Traumatic injury. Fall. Head, neck and back pain. Confusion.  COMPARISON: Chest CT 6/30/2022  TECHNIQUE:   1) Routine CT Head without IV contrast. Multiplanar reformats. Dose reduction techniques were used.   2) Routine CT Cervical Spine without IV contrast. Multiplanar reformats. Dose reduction techniques were used.   3) Routine CT Thoracic Spine without IV contrast. Multiplanar reformats. Dose reduction techniques were used.   4) Routine CT Lumbar Spine without IV contrast. Multiplanar reformats. Dose reduction techniques were used.     FINDINGS:   HEAD CT:   INTRACRANIAL CONTENTS: No intracranial hemorrhage, extraaxial collection, or mass effect.  No CT evidence of acute infarct. Mild presumed chronic small vessel ischemic changes. Chronic infarct right occipital lobe. Mild to moderate generalized volume   loss. No hydrocephalus.     VISUALIZED ORBITS/SINUSES/MASTOIDS: No intraorbital abnormality. No paranasal sinus mucosal disease. Scattered fluid/membrane thickening in the left mastoid air cells. No apparent mass in the posterior nasopharynx or skull base.    BONES/SOFT TISSUES: No acute abnormality.    CERVICAL SPINE CT:  VERTEBRA: Normal vertebral body heights and alignment. Ankylosis of C5-6. No acute fracture or subluxation.     CANAL/FORAMINA: No high-grade central canal stenosis. Moderate bilateral neural foraminal narrowing at C3-4.    PARASPINAL: No extraspinal abnormality. Visualized lung fields are clear.    THORACIC SPINE CT:  VERTEBRA: Moderate to severe thoracic kyphosis. Stable mild compression deformities of T1 and T2, T5, T7, T10 and T12. Advanced bony demineralization. Posterior instrumented fusion from T8 through T12. No hardware  failure identified. No acute fracture or   subluxation. Bridging osteophytes throughout the thoracic spine.     CANAL/FORAMINA: No high-grade central canal stenosis. Moderate bilateral neural foraminal narrowing at T10-11.    PARASPINAL: No extraspinal abnormality.    LUMBAR SPINE CT:  VERTEBRA: 5 lumbar type vertebra. Age-indeterminate moderate compression deformities of L2, L3 and L5. Age-indeterminate moderate to advanced compression deformity at L4. Chronic mild compression deformity at the superior endplate of L1. Advanced   osteopenia. Fusion of the SI joints. No definite acute displaced fracture or subluxation.     CANAL/FORAMINA: The spinal canal is narrow on a congenital basis due to short pedicles. There is moderate multilevel central canal and neural foraminal narrowing.    PARASPINAL: No extraspinal abnormality.      Impression    IMPRESSION:  HEAD CT:  1.  No acute intracranial process.    CERVICAL SPINE CT:  1.  No fracture or posttraumatic subluxation.  2.  Moderate bilateral neural foraminal narrowing at C3-4.    THORACIC SPINE CT:  1.  No acute fracture or posttraumatic subluxation.  2.  There are multiple chronic compression deformities as detailed above.  3.  Posterior instrumented fusion from T8 through T12.  4.  Moderate foraminal narrowing bilaterally at T10-11.  5.  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    LUMBAR SPINE CT:  1.  Age-indeterminate compression deformities of L2-L5 with a chronic mild compression deformity at the superior endplate of L1.  2.  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  3.  Moderate multilevel central canal stenosis and neural foraminal narrowing.   CT Pelvis Bone wo Contrast     Status: None    Narrative    EXAM: CT PELVIS BONE WO CONTRAST  LOCATION: [facility]  DATE/TIME: 9/30/2022 10:27 PM    INDICATION: Injury with pain.  COMPARISON:  None.  TECHNIQUE: CT scan of the pelvis was performed without IV contrast. Multiplanar reformats were obtained. Dose reduction techniques were used.  CONTRAST: None.    FINDINGS:    1.  Bones are demineralized. Multiple lumbar compression fractures. No pelvic fracture. Left hip arthroplasty. There is a large right inguinal hernia containing fat and ascitic fluid.   Cervical spine CT w/o contrast     Status: None    Narrative    EXAM: CT HEAD W/O CONTRAST, CT LUMBAR SPINE W/O CONTRAST, CT CERVICAL SPINE W/O CONTRAST, CT THORACIC SPINE W/O CONTRAST  LOCATION: [facility]  DATE/TIME: 9/30/2022 10:38 PM    INDICATION: Traumatic injury. Fall. Head, neck and back pain. Confusion.  COMPARISON: Chest CT 6/30/2022  TECHNIQUE:   1) Routine CT Head without IV contrast. Multiplanar reformats. Dose reduction techniques were used.   2) Routine CT Cervical Spine without IV contrast. Multiplanar reformats. Dose reduction techniques were used.   3) Routine CT Thoracic Spine without IV contrast. Multiplanar reformats. Dose reduction techniques were used.   4) Routine CT Lumbar Spine without IV contrast. Multiplanar reformats. Dose reduction techniques were used.     FINDINGS:   HEAD CT:   INTRACRANIAL CONTENTS: No intracranial hemorrhage, extraaxial collection, or mass effect.  No CT evidence of acute infarct. Mild presumed chronic small vessel ischemic changes. Chronic infarct right occipital lobe. Mild to moderate generalized volume   loss. No hydrocephalus.     VISUALIZED ORBITS/SINUSES/MASTOIDS: No intraorbital abnormality. No paranasal sinus mucosal disease. Scattered fluid/membrane thickening in the left mastoid air cells. No apparent mass in the posterior nasopharynx or skull base.    BONES/SOFT TISSUES: No acute abnormality.    CERVICAL SPINE CT:  VERTEBRA: Normal vertebral body heights and alignment. Ankylosis of C5-6. No acute fracture or subluxation.     CANAL/FORAMINA: No high-grade central canal  stenosis. Moderate bilateral neural foraminal narrowing at C3-4.    PARASPINAL: No extraspinal abnormality. Visualized lung fields are clear.    THORACIC SPINE CT:  VERTEBRA: Moderate to severe thoracic kyphosis. Stable mild compression deformities of T1 and T2, T5, T7, T10 and T12. Advanced bony demineralization. Posterior instrumented fusion from T8 through T12. No hardware failure identified. No acute fracture or   subluxation. Bridging osteophytes throughout the thoracic spine.     CANAL/FORAMINA: No high-grade central canal stenosis. Moderate bilateral neural foraminal narrowing at T10-11.    PARASPINAL: No extraspinal abnormality.    LUMBAR SPINE CT:  VERTEBRA: 5 lumbar type vertebra. Age-indeterminate moderate compression deformities of L2, L3 and L5. Age-indeterminate moderate to advanced compression deformity at L4. Chronic mild compression deformity at the superior endplate of L1. Advanced   osteopenia. Fusion of the SI joints. No definite acute displaced fracture or subluxation.     CANAL/FORAMINA: The spinal canal is narrow on a congenital basis due to short pedicles. There is moderate multilevel central canal and neural foraminal narrowing.    PARASPINAL: No extraspinal abnormality.      Impression    IMPRESSION:  HEAD CT:  1.  No acute intracranial process.    CERVICAL SPINE CT:  1.  No fracture or posttraumatic subluxation.  2.  Moderate bilateral neural foraminal narrowing at C3-4.    THORACIC SPINE CT:  1.  No acute fracture or posttraumatic subluxation.  2.  There are multiple chronic compression deformities as detailed above.  3.  Posterior instrumented fusion from T8 through T12.  4.  Moderate foraminal narrowing bilaterally at T10-11.  5.  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    LUMBAR SPINE CT:  1.  Age-indeterminate compression deformities of L2-L5 with a chronic mild compression deformity at the superior endplate of L1.  2.  Ossification along the anterior longitudinal ligament from the  visualized thoracic spine to the L3 level with fusion of the SI joints.  3.  Moderate multilevel central canal stenosis and neural foraminal narrowing.   CT Chest Abdomen Pelvis w/o Contrast     Status: None    Narrative    EXAM: CT CHEST ABDOMEN PELVIS W/O CONTRAST  LOCATION: [facility]  DATE/TIME: 9/30/2022 10:30 PM    INDICATION: Nausea and vomiting. Fall.  COMPARISON: 6/30/2022.  TECHNIQUE: CT scan of the chest, abdomen, and pelvis was performed without IV contrast. Multiplanar reformats were obtained. Dose reduction techniques were used.   CONTRAST: None.    FINDINGS:   LUNGS AND PLEURA: Peripheral and basilar fibrotic disease similar to the previous exam. Scarring at the lung apices. Calcified granuloma in the right upper lobe anteriorly. Dependent atelectasis bilaterally. No pneumothorax or pleural effusion.    MEDIASTINUM/AXILLAE: There is no lymph node enlargement. No abnormal mediastinal fluid. The heart is at the upper limits of normal in size.    CORONARY ARTERY CALCIFICATION: Severe.    HEPATOBILIARY: Normal.    PANCREAS: Normal.    SPLEEN: Normal.    ADRENAL GLANDS: Normal.    KIDNEYS/BLADDER: No hydronephrosis. Left renal cyst.    BOWEL: There is no bowel obstruction. No free intraperitoneal gas or fluid.    LYMPH NODES: Normal.    VASCULATURE: Atherosclerotic calcification of the aorta and its branches. No aneurysm.    PELVIC ORGANS: Normal.    MUSCULOSKELETAL: Left hip arthroplasty. Left shoulder arthroplasty. Right inguinal hernia containing fat. Umbilical hernia containing fat. Thoracic fusion hardware. Degenerative disease throughout the spine. Probable old lumbar vertebral body compression   fractures. No acute bone fractures are evident.      Impression    IMPRESSION:  1.  No acute abnormality. No bowel obstruction or inflammation. No acute traumatic abnormality.   XR Chest Port 1 View     Status: None    Narrative    EXAM: XR CHEST PORTABLE 1 VIEW  LOCATION: [facility]  [facility]  DATE/TIME: 10/01/2022, 1:29 PM    INDICATION: Hypoxia, wheezing.  COMPARISON: 09/14/2022.      Impression    IMPRESSION: Question some vascular congestion, which could indicate congestive heart failure. Heart size, similar to previous. No consolidation.     XR Knee Port Right 1/2 Views     Status: None    Narrative    EXAM: XR KNEE PORT RIGHT 1/2 VIEWS  LOCATION: [facility]  DATE/TIME: 10/2/2022 7:25 PM    INDICATION: right knee pain trauma  COMPARISON: None.      Impression    IMPRESSION: No acute fracture. 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 Diffuse bony demineralization. Small knee joint effusion.   Basic metabolic panel (BMP)     Status: Abnormal   Result Value Ref Range    Sodium 114 (LL) 136 - 145 mmol/L    Potassium 3.9 3.4 - 5.3 mmol/L    Chloride 78 (L) 98 - 107 mmol/L    Carbon Dioxide (CO2) 24 22 - 29 mmol/L    Anion Gap 12 7 - 15 mmol/L    Urea Nitrogen 6.9 (L) 8.0 - 23.0 mg/dL    Creatinine 0.63 (L) 0.67 - 1.17 mg/dL    Calcium 9.0 8.8 - 10.2 mg/dL    Glucose 116 (H) 70 - 99 mg/dL    GFR Estimate >90 >60 mL/min/1.73m2   CBC with platelets and differential     Status: Abnormal   Result Value Ref Range    WBC Count 13.4 (H) 4.0 - 11.0 10e3/uL    RBC Count 4.26 (L) 4.40 - 5.90 10e6/uL    Hemoglobin 12.3 (L) 13.3 - 17.7 g/dL    Hematocrit 35.6 (L) 40.0 - 53.0 %    MCV 84 78 - 100 fL    MCH 28.9 26.5 - 33.0 pg    MCHC 34.6 31.5 - 36.5 g/dL    RDW 12.5 10.0 - 15.0 %    Platelet Count 204 150 - 450 10e3/uL    % Neutrophils 88 %    % Lymphocytes 8 %    % Monocytes 3 %    % Eosinophils 0 %    % Basophils 0 %    % Immature Granulocytes 1 %    NRBCs per 100 WBC 0 <1 /100    Absolute Neutrophils 11.7 (H) 1.6 - 8.3 10e3/uL    Absolute Lymphocytes 1.1 0.8 - 5.3 10e3/uL    Absolute Monocytes 0.4 0.0 - 1.3 10e3/uL    Absolute Eosinophils 0.0 0.0 - 0.7 10e3/uL    Absolute Basophils 0.0 0.0 - 0.2 10e3/uL    Absolute Immature  Granulocytes 0.1 <=0.4 10e3/uL    Absolute NRBCs 0.0 10e3/uL   Hepatic function panel     Status: Normal   Result Value Ref Range    Protein Total 7.0 6.4 - 8.3 g/dL    Albumin 3.9 3.5 - 5.2 g/dL    Bilirubin Total 0.7 <=1.2 mg/dL    Alkaline Phosphatase 61 40 - 129 U/L    AST 37 10 - 50 U/L    ALT 15 10 - 50 U/L    Bilirubin Direct 0.21 0.00 - 0.30 mg/dL   Asymptomatic COVID-19 Virus (Coronavirus) by PCR Nasopharyngeal     Status: Normal    Specimen: Nasopharyngeal; Swab   Result Value Ref Range    SARS CoV2 PCR Negative Negative    Narrative    Testing was performed using the [employer] Xpress SARS-CoV-2 Assay on the   Cepheid Gene-Xpert Instrument Systems. Additional information about   this Emergency Use Authorization (EUA) assay can be found via the Lab   Guide. This test should be ordered for the detection of SARS-CoV-2 in   individuals who meet SARS-CoV-2 clinical and/or epidemiological   criteria. Test performance is unknown in asymptomatic patients. This   test is for in vitro diagnostic use under the FDA EUA for   laboratories certified under CLIA to perform high complexity testing.   This test has not been FDA cleared or approved. A negative result   does not rule out the presence of PCR inhibitors in the specimen or   target RNA in concentration below the limit of detection for the   assay. The possibility of a false negative should be considered if   the patient's recent exposure or clinical presentation suggests   COVID-19. This test was validated by the [facility]. This laboratory is certified under the Clinical Laboratory Improvement Amendments of 1988 (CLIA-88) as qualified to perform high complexity laboratory testing.     Osmolality urine     Status: Normal   Result Value Ref Range    Osmolality Urine 326 100 - 1,200 mmol/kg    Narrative    Reference Ranges depend on patient's hydration status and renal function.   Neonates:  mmol/kg   2 years and older, random  specimens: 100-1200 mmol/kg; Greater than 850 mmol/kg after 12 hour fluid restriction  Urine/serum osmolality ratio: 2 years and older: 1.0-3.0; 3.0-4.7 after 12 hour fluid restriction   Sodium random urine     Status: None   Result Value Ref Range    Sodium Urine mmol/L 42 mmol/L   UA with Microscopic     Status: Abnormal   Result Value Ref Range    Color Urine Yellow Colorless, Straw, Light Yellow, Yellow    Appearance Urine Clear Clear    Glucose Urine Negative Negative mg/dL    Bilirubin Urine Negative Negative    Ketones Urine 40  (A) Negative mg/dL    Specific Gravity Urine 1.012 1.003 - 1.035    Blood Urine Trace (A) Negative    pH Urine 6.0 5.0 - 7.0    Protein Albumin Urine 30  (A) Negative mg/dL    Urobilinogen Urine Normal Normal, 2.0 mg/dL    Nitrite Urine Negative Negative    Leukocyte Esterase Urine Negative Negative    RBC Urine <1 <=2 /HPF    WBC Urine 1 <=5 /HPF   Sodium     Status: Abnormal   Result Value Ref Range    Sodium 116 (LL) 136 - 145 mmol/L   Basic metabolic panel     Status: Abnormal   Result Value Ref Range    Sodium 119 (LL) 136 - 145 mmol/L    Potassium 4.0 3.4 - 5.3 mmol/L    Chloride 83 (L) 98 - 107 mmol/L    Carbon Dioxide (CO2) 23 22 - 29 mmol/L    Anion Gap 13 7 - 15 mmol/L    Urea Nitrogen 5.0 (L) 8.0 - 23.0 mg/dL    Creatinine 0.57 (L) 0.67 - 1.17 mg/dL    Calcium 8.7 (L) 8.8 - 10.2 mg/dL    Glucose 124 (H) 70 - 99 mg/dL    GFR Estimate >90 >60 mL/min/1.73m2   CBC with platelets     Status: Abnormal   Result Value Ref Range    WBC Count 17.0 (H) 4.0 - 11.0 10e3/uL    RBC Count 4.39 (L) 4.40 - 5.90 10e6/uL    Hemoglobin 12.7 (L) 13.3 - 17.7 g/dL    Hematocrit 37.0 (L) 40.0 - 53.0 %    MCV 84 78 - 100 fL    MCH 28.9 26.5 - 33.0 pg    MCHC 34.3 31.5 - 36.5 g/dL    RDW 12.8 10.0 - 15.0 %    Platelet Count 188 150 - 450 10e3/uL   Sodium     Status: Abnormal   Result Value Ref Range    Sodium 119 (LL) 136 - 145 mmol/L   Blood gas venous     Status: Abnormal   Result Value Ref Range     pH Venous 7.46 (H) 7.32 - 7.43    pCO2 Venous 37 (L) 40 - 50 mm Hg    pO2 Venous 59 (H) 25 - 47 mm Hg    Bicarbonate Venous 26 21 - 28 mmol/L    Base Excess/Deficit (+/-) 2.5 (H) -7.7 - 1.9 mmol/L    FIO2 1    Procalcitonin     Status: Abnormal   Result Value Ref Range    Procalcitonin 0.11 (H) <0.05 ng/mL   Sodium     Status: Abnormal   Result Value Ref Range    Sodium 118 (LL) 136 - 145 mmol/L   Troponin T, High Sensitivity     Status: Abnormal   Result Value Ref Range    Troponin T, High Sensitivity 49 (H) <=22 ng/L   Nt probnp inpatient     Status: Abnormal   Result Value Ref Range    N terminal Pro BNP Inpatient 10,767 (H) 0 - 1,800 pg/mL   Sodium     Status: Abnormal   Result Value Ref Range    Sodium 118 (LL) 136 - 145 mmol/L   Sodium     Status: Abnormal   Result Value Ref Range    Sodium 122 (L) 136 - 145 mmol/L   Sodium     Status: Abnormal   Result Value Ref Range    Sodium 121 (L) 136 - 145 mmol/L   Lactic Acid STAT     Status: Normal   Result Value Ref Range    Lactic Acid 0.8 0.7 - 2.0 mmol/L   Sodium     Status: Abnormal   Result Value Ref Range    Sodium 122 (L) 136 - 145 mmol/L   Sodium     Status: Abnormal   Result Value Ref Range    Sodium 121 (L) 136 - 145 mmol/L   Basic metabolic panel     Status: Abnormal   Result Value Ref Range    Sodium 122 (L) 136 - 145 mmol/L    Potassium 4.0 3.4 - 5.3 mmol/L    Chloride 88 (L) 98 - 107 mmol/L    Carbon Dioxide (CO2) 22 22 - 29 mmol/L    Anion Gap 12 7 - 15 mmol/L    Urea Nitrogen 8.7 8.0 - 23.0 mg/dL    Creatinine 0.65 (L) 0.67 - 1.17 mg/dL    Calcium 8.4 (L) 8.8 - 10.2 mg/dL    Glucose 102 (H) 70 - 99 mg/dL    GFR Estimate >90 >60 mL/min/1.73m2   Sodium     Status: Abnormal   Result Value Ref Range    Sodium 124 (L) 136 - 145 mmol/L   Troponin T, High Sensitivity     Status: Abnormal   Result Value Ref Range    Troponin T, High Sensitivity 34 (H) <=22 ng/L   CBC with platelets and differential     Status: Abnormal   Result Value Ref Range    WBC Count  9.4 4.0 - 11.0 10e3/uL    RBC Count 4.22 (L) 4.40 - 5.90 10e6/uL    Hemoglobin 12.1 (L) 13.3 - 17.7 g/dL    Hematocrit 36.2 (L) 40.0 - 53.0 %    MCV 86 78 - 100 fL    MCH 28.7 26.5 - 33.0 pg    MCHC 33.4 31.5 - 36.5 g/dL    RDW 13.2 10.0 - 15.0 %    Platelet Count      % Neutrophils 81 %    % Lymphocytes 9 %    % Monocytes 5 %    % Eosinophils 4 %    % Basophils 0 %    % Immature Granulocytes 1 %    NRBCs per 100 WBC 0 <1 /100    Absolute Neutrophils 7.5 1.6 - 8.3 10e3/uL    Absolute Lymphocytes 0.9 0.8 - 5.3 10e3/uL    Absolute Monocytes 0.5 0.0 - 1.3 10e3/uL    Absolute Eosinophils 0.4 0.0 - 0.7 10e3/uL    Absolute Basophils 0.0 0.0 - 0.2 10e3/uL    Absolute Immature Granulocytes 0.1 <=0.4 10e3/uL    Absolute NRBCs 0.0 10e3/uL   RBC and Platelet Morphology     Status: Abnormal   Result Value Ref Range    Platelet Assessment Platelets Clumped (A) Automated Count Confirmed. Platelet morphology is normal.    Crescent Cells Slight (A) None Seen    RBC Morphology Confirmed RBC Indices    Sodium random urine     Status: None   Result Value Ref Range    Sodium Urine mmol/L 23 mmol/L   Osmolality urine     Status: Normal   Result Value Ref Range    Osmolality Urine 591 100 - 1,200 mmol/kg    Narrative    Reference Ranges depend on patient's hydration status and renal function.   Neonates:  mmol/kg   2 years and older, random specimens: 100-1200 mmol/kg; Greater than 850 mmol/kg after 12 hour fluid restriction  Urine/serum osmolality ratio: 2 years and older: 1.0-3.0; 3.0-4.7 after 12 hour fluid restriction   Osmolality     Status: Abnormal   Result Value Ref Range    Osmolality Blood 254 (L) 280 - 301 mmol/kg    Narrative    Greater than 385 mmol/kg relates to stupor in hyperglycemia   Greater than 400 mmol/kg can relate to seizures   Greater than 420 mmol/kg can be lethal    Serum Osmalar Gap:   Normal <10   Larger suggest unmeasured substances present in serum (ethanol, methanol, isopropanol, mannitol, ethylene  glycol).   Sodium     Status: Abnormal   Result Value Ref Range    Sodium 122 (L) 136 - 145 mmol/L   Sodium     Status: Abnormal   Result Value Ref Range    Sodium 123 (L) 136 - 145 mmol/L   Sodium     Status: Abnormal   Result Value Ref Range    Sodium 125 (L) 136 - 145 mmol/L   Sodium     Status: Abnormal   Result Value Ref Range    Sodium 126 (L) 136 - 145 mmol/L   Basic metabolic panel     Status: Abnormal   Result Value Ref Range    Sodium 126 (L) 136 - 145 mmol/L    Potassium 3.6 3.4 - 5.3 mmol/L    Chloride 91 (L) 98 - 107 mmol/L    Carbon Dioxide (CO2) 27 22 - 29 mmol/L    Anion Gap 8 7 - 15 mmol/L    Urea Nitrogen 27.7 (H) 8.0 - 23.0 mg/dL    Creatinine 0.50 (L) 0.67 - 1.17 mg/dL    Calcium 8.6 (L) 8.8 - 10.2 mg/dL    Glucose 109 (H) 70 - 99 mg/dL    GFR Estimate >90 >60 mL/min/1.73m2   CBC with platelets     Status: Abnormal   Result Value Ref Range    WBC Count 7.4 4.0 - 11.0 10e3/uL    RBC Count 3.83 (L) 4.40 - 5.90 10e6/uL    Hemoglobin 11.1 (L) 13.3 - 17.7 g/dL    Hematocrit 33.0 (L) 40.0 - 53.0 %    MCV 86 78 - 100 fL    MCH 29.0 26.5 - 33.0 pg    MCHC 33.6 31.5 - 36.5 g/dL    RDW 13.4 10.0 - 15.0 %    Platelet Count 144 (L) 150 - 450 10e3/uL   EKG 12 lead     Status: None   Result Value Ref Range    Systolic Blood Pressure  mmHg    Diastolic Blood Pressure  mmHg    Ventricular Rate 67 BPM    Atrial Rate 366 BPM    AL Interval  ms    QRS Duration 94 ms     ms    QTc 429 ms    P Axis  degrees    R AXIS -24 degrees    T Axis 6 degrees    Interpretation ECG       Atrial flutter with variable A-V block  Abnormal ECG  When compared with ECG of 30-JUN-2022 22:48,  Atrial flutter has replaced Atrial fibrillation  Confirmed by - EMERGENCY ROOM, PHYSICIAN ([id number]),  VERA ELIZONDO (Nahid) on 10/3/2022 6:44:17 AM     EKG 12-lead, tracing only     Status: None   Result Value Ref Range    Systolic Blood Pressure  mmHg    Diastolic Blood Pressure  mmHg    Ventricular Rate 85 BPM    Atrial Rate 86 BPM     NC Interval  ms    QRS Duration 74 ms     ms    QTc 552 ms    P Axis  degrees    R AXIS -26 degrees    T Axis -6 degrees    Interpretation ECG       Undetermined rhythm  Inferior infarct , age undetermined  Prolonged QT  Abnormal ECG  When compared with ECG of 30-SEP-2022 16:26, (unconfirmed)  Current undetermined rhythm precludes rhythm comparison, needs review  QT has lengthened  Confirmed by - EMERGENCY ROOM, PHYSICIAN (Mino),  VERA ELIZONDO (Nahid) on 10/3/2022 6:42:24 AM     Echocardiogram Complete     Status: None   Result Value Ref Range    LVEF  55%     Narrative    [id number]  [facility]  [id number]  [id number]^JC     [facility]  Echocardiography Laboratory  [address] [place] [address]     Name: CAMILO CRUMP  MRN: [id number]  : 1940  Study Date: 10/03/2022 09:40 AM  Age: 82 yrs  Gender: Male  Patient Location: [facility]  Reason For Study: CHF  Ordering Physician: SAYRA ARRIETA  Referring Physician: Sylvia Stein APRN CNP  Performed By: Jessica Yao RDCS     BSA: 2.2 m2  Height: 70 in  Weight: 222 lb  HR: 68  BP: 112/57 mmHg  ______________________________________________________________________________  Procedure  Complete Portable Echo Adult. Optison (NDC #6458-1086) given intravenously.  ______________________________________________________________________________  Interpretation Summary     1. The left ventricle is normal in structure, function and size. The visual  ejection fraction is estimated at 55%.  2. The right ventricle is mildly dilated. Mildly decreased right ventricular  systolic function  3. Aortic valve visually appears to have mild (to moderate) stenosis, however  gradients are in the high-normal range.  4. 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 Sinus 4.8cm, ascending 4.4cm.     Echo 2020 from [facility]: EF 65%, normal RV, calcified AV with likely  underestimated gradient, sinus 4.5cm, ascending aorta  4.1cm.  ______________________________________________________________________________  Left Ventricle  The left ventricle is normal in structure, function and size. There is normal  left ventricular wall thickness. The visual ejection fraction is estimated at  55%. Left ventricular diastolic function is indeterminate. Normal left  ventricular wall motion.     Right Ventricle  The right ventricle is mildly dilated. Mildly decreased right ventricular  systolic function.     Atria  The left atrium is mildly dilated. The right atrium is mild to moderately  dilated. There is no atrial shunt seen.     Mitral Valve  There is mild (1+) mitral regurgitation.     Tricuspid Valve  There is mild (1+) tricuspid regurgitation. The right ventricular systolic  pressure is elevated at 42.3 mmHg.     Aortic Valve  Aortic valve visually appears to have mild stenosis, however gradients are in  the high-normal range.     Pulmonic Valve  The pulmonic valve is normal in structure and function.     Vessels  The ascending aorta is Moderately dilated. Inferior vena cava not well  visualized for estimation of right atrial pressure.     Pericardium  There is no pericardial effusion.     Rhythm  Sinus rhythm was noted.  ______________________________________________________________________________  MMode/2D Measurements & Calculations  IVSd: 1.2 cm     LVIDd: 3.7 cm  LVIDs: 2.0 cm  LVPWd: 1.1 cm  FS: 45.2 %  LV mass(C)d: 135.7 grams  LV mass(C)dI: 62.2 grams/m2  Ao root diam: 4.8 cm  LA dimension: 5.0 cm  asc Aorta Diam: 4.4 cm  LA/Ao: 1.0  LVOT diam: 2.4 cm  LVOT area: 4.5 cm2  LA Volume (BP): 85.7 ml  LA Volume Index (BP): 39.3 ml/m2  RWT: 0.59     Doppler Measurements & Calculations  MV E max odilia: 101.7 cm/sec  MV max P.1 mmHg  MV mean P.0 mmHg  MV V2 VTI: 36.4 cm  MVA(VTI): 2.8 cm2  MV P1/2t max odilia: 152.0 cm/sec  MV P1/2t: 73.7 msec  MVA(P1/2t): 3.0 cm2  MV dec slope: 604.0 cm/sec2  MV dec time: 0.17 sec  Ao V2 max: 197.9  cm/sec  Ao max P.0 mmHg  Ao V2 mean: 136.7 cm/sec  Ao mean P.8 mmHg  Ao V2 VTI: 40.4 cm  LEONILA(I,D): 2.5 cm2  LEONILA(V,D): 2.5 cm2  LV V1 max P.6 mmHg  LV V1 max: 107.4 cm/sec  LV V1 VTI: 22.8 cm  SV(LVOT): 103.0 ml  SI(LVOT): 47.2 ml/m2  PA acc time: 0.09 sec  TR max shelton: 325.0 cm/sec  TR max P.3 mmHg  AV Shelton Ratio (DI): 0.54  LEONILA Index (cm2/m2): 1.2  E/E' av.3  Lateral E/e': 8.1  Medial E/e': 10.6     ______________________________________________________________________________  Report approved by: Dari Zapata 10/03/2022 11:21 AM         Blood Culture Hand, Right     Status: Normal (Preliminary result)    Specimen: Hand, Right; Blood   Result Value Ref Range    Culture No growth after 1 day     Narrative    Only an Aerobic Blood Culture Bottle was collected, interpret results with caution.     Blood Culture Arm, Right     Status: Normal (Preliminary result)    Specimen: Arm, Right; Blood   Result Value Ref Range    Culture No growth after 1 day    CBC + differential     Status: Abnormal    Narrative    The following orders were created for panel order CBC + differential.  Procedure                               Abnormality         Status                     ---------                               -----------         ------                     CBC with platelets and d...[[id number]]  Abnormal            Final result                 Please view results for these tests on the individual orders.   CBC with Platelets & Differential     Status: Abnormal    Narrative    The following orders were created for panel order CBC with Platelets & Differential.  Procedure                               Abnormality         Status                     ---------                               -----------         ------                     CBC with platelets and d...[[id number]]  Abnormal            Final result               RBC and Platelet Morphology[[id number]]  Abnormal            Final result                  Please view results for these tests on the individual orders.          Attestation:  I have reviewed today's vital signs, notes, medications, labs and imaging with Dr. YIFAN Flowers.  Amount of time performed on this consult: 30 minutes.    Liliya Gonzalez PA-C

## 2022-10-03 NOTE — PROGRESS NOTES
[facility]  Hospitalist Progress Note  Maxi Denise MD 10/03/2022    Reason for Stay (Diagnosis): Hyponatremia, metabolic encephalopathy, fall.         Assessment and Plan:      Summary of Stay: Jamie Valdivia is a 82 year old male with history of advanced dementia, seizure disorder, chronic atrial fibrillation, GERD, dysphagia, osteoporosis, vertebral fracture who presented to the emergency department for evaluation of fall and found to have hyponatremia and admitted on 9/30/2022.    Problem List:   1. Severe acute hyponatremia.  -Initially it was suspected to be due to hypovolemia.  -Sodium was 126 a day prior to admission, then decreased to 114.  -He was gently hydrated with normal saline initially at 50 mill per hour then increased to 75 then increased to 125 mm/h. The Na level improved up to 122 and remained around the same level. Then SIADH was suspected.  -He also started to have wheezing.  -Urine sodium was 42 on admission, then decreased to 23 today.  Urine osmolality 326.  -Nephrologist is consulted, input appreciated.  -Discontinue IV fluids, and started on urea. Na level continued to improve slowly.   -Fluid restriction to 1.2 L/day, may increase this when Na level improves.  -Continue urea 30 mg twice daily.  -Change Na level check to every 8 hours for today.    2. Metabolic encephalopathy secondary to hyponatremia in the setting of underlying dementia.  -Patient has underlying advanced dementia and resides in the memory care unit.  -He required doses of Haldol and midazolam in ED.  -Mental status improved, with improvement of Na level..    3. Chronic atrial fibrillation.  -Rate is controlled, patient is not on anticoagulation.    4. History of dysphagia.  -Evaluated by speech-language pathology, oral diet recommended input appreciated.    5. Age-indeterminate lumbar vertebral fracture.  6. Lower back pain  -Patient has history of vertebral fracture in the past and that there  "is seem to be relatively new onset in the lumbar area.  -CT scan showed age-indeterminate lumbar spine fractures.  -Spine surgery was consulted, recommended TLSO.  -Continue pain medications and Lidoderm patch.  -Continue stool softeners with pain medication.  -PT will see the patient after he is fit with TLSO.    7. Leukocytosis.  -This seems to be reactive, no sign of infection.  -Blood culture done so far negative UA is negative.  -Pro-Santana is 0.11.  -WBC was 17 yesterday, improved to 7.4.  -No indication for antibiotics at this point, unless culture is positive or there is sign of localized infection..    8. Wheezing.  -Patient has no history of asthma or CHF.  -Started to have expiratory wheezing after he was given IV fluids.  -Echocardiogram is still pending.  -Patient had echocardiogram in 2020 as an outpatient, at that time EF was 65% there is moderate pulmonary hypertension and  moderate tricuspid regurg.  -Responded well to nebulizer.    9. Dysphagia  -Patient was evaluated by speech-language pathology, diet Per SLP recommendation.  10.     DVT Prophylaxis: Pneumatic Compression Devices  Code Status: Full Code.  Discharge Dispo: Memory care unit.  Estimated Disch Date / # of Days until Disch: Likely in 2 days if continues to improve.  I discussed with patient, his wife and daughter at bedside and also with another daughter over the phone.        Interval History (Subjective):      Patient seen and examined, feels better, more awake and alert, complaining of back pain, no family member today at bedside. No nausea or vomiting, tolerating oral intake                  Physical Exam:      Last Vital Signs:  /64 (BP Location: Right arm)   Pulse 56   Temp 97.6  F (36.4  C) (Oral)   Resp 18   Ht 1.778 m (5' 10\")   Wt 100.9 kg (222 lb 6.4 oz)   SpO2 99%   BMI 31.91 kg/m      I/O last 3 completed shifts:  In: -   Out: 400 [Urine:400]  Vitals:    09/30/22 1625 10/01/22 2035 10/02/22 0553 10/03/22 0358 "   Weight: 101.3 kg (223 lb 5.2 oz) 102.8 kg (226 lb 11.2 oz) 101.6 kg (224 lb) 100.9 kg (222 lb 6.4 oz)     Current Facility-Administered Medications   Medication     acetaminophen (TYLENOL) tablet 650 mg    Or     acetaminophen (TYLENOL) Suppository 650 mg     alum & mag hydroxide-simethicone (MAALOX) suspension 30 mL     artificial tears (GENTEAL) 0.1-0.2-0.3 % ophthalmic solution 1 drop     bisacodyl (DULCOLAX) suppository 10 mg     gabapentin (NEURONTIN) capsule 100 mg     haloperidol lactate (HALDOL) injection 2 mg     HYDROmorphone (DILAUDID) half-tab 1 mg     ipratropium - albuterol 0.5 mg/2.5 mg/3 mL (DUONEB) neb solution 3 mL     Lidocaine (LIDOCARE) 4 % Patch 1 patch     lidocaine (LMX4) cream     lidocaine 1 % 0.1-1 mL     lidocaine patch in PLACE     melatonin tablet 3 mg     naloxone (NARCAN) injection 0.2 mg    Or     naloxone (NARCAN) injection 0.4 mg    Or     naloxone (NARCAN) injection 0.2 mg    Or     naloxone (NARCAN) injection 0.4 mg     ondansetron (ZOFRAN ODT) ODT tab 4 mg    Or     ondansetron (ZOFRAN) injection 4 mg     pantoprazole (PROTONIX) EC tablet 40 mg     polyethylene glycol (MIRALAX) Packet 17 g     QUEtiapine (SEROquel) half-tab 12.5 mg     senna-docusate (SENOKOT-S/PERICOLACE) 8.6-50 MG per tablet 1 tablet    Or     senna-docusate (SENOKOT-S/PERICOLACE) 8.6-50 MG per tablet 2 tablet     sodium chloride (PF) 0.9% PF flush 3 mL     sodium chloride (PF) 0.9% PF flush 3 mL     tolterodine (DETROL) tablet 2 mg     Urea (URE-NA) packet 30 g       Constitutional: Awake, alert, no apparent distress, slightly anxious, he stated he is agitated inside, but appears calm.   Respiratory: Clear to auscultation bilaterally, no crackles or scattered wheezing   Cardiovascular: irregular rate and rhythm, normal S1 and S2, and no murmur noted   Abdomen: Normal bowel sounds, soft, non-distended, non-tender   Skin:  Right arm redness, blanching, no swelling and no sign of infection, no cyanosis, dry to  touch   Neuro: Alert and oriented x3, no weakness, numbness, memory loss   Extremities: No edema, normal range of motion   Other(s):HEENT Pink, nonicteric, moist oral mucosa.       All other systems: Negative          Medications:      All current medications were reviewed with changes reflected in problem list.         Data:      All new lab and imaging data was reviewed.   Labs:  Recent Labs   Lab 10/03/22  0743 10/02/22  0859 10/01/22  0837 09/30/22  1755   WBC 7.4 9.4 17.0* 13.4*   HGB 11.1* 12.1* 12.7* 12.3*   HCT 33.0* 36.2* 37.0* 35.6*   MCV 86 86 84 84   *  --  188 204     Recent Labs   Lab 10/03/22  0743 10/03/22  0225 10/02/22  2204 10/02/22  0859 10/02/22  0626 10/01/22  1008 10/01/22  0837 10/01/22  0235 09/30/22  1755   *  126* 125* 123*   < > 122*  122*   < > 119*   < > 114*   POTASSIUM 3.6  --   --   --  4.0  --  4.0  --  3.9   CHLORIDE 91*  --   --   --  88*  --  83*  --  78*   CO2 27  --   --   --  22  --  23  --  24   ANIONGAP 8  --   --   --  12  --  13  --  12   *  --   --   --  102*  --  124*  --  116*   BUN 27.7*  --   --   --  8.7  --  5.0*  --  6.9*   CR 0.50*  --   --   --  0.65*  --  0.57*  --  0.63*   GFRESTIMATED >90  --   --   --  >90  --  >90  --  >90   JOSUE 8.6*  --   --   --  8.4*  --  8.7*  --  9.0   PROTTOTAL  --   --   --   --   --   --   --   --  7.0   ALBUMIN  --   --   --   --   --   --   --   --  3.9   BILITOTAL  --   --   --   --   --   --   --   --  0.7   ALKPHOS  --   --   --   --   --   --   --   --  61   AST  --   --   --   --   --   --   --   --  37   ALT  --   --   --   --   --   --   --   --  15    < > = values in this interval not displayed.     Recent Labs   Lab 10/03/22  0743 10/02/22  0626 10/01/22  0837 09/30/22  1755 09/29/22  0755   * 102* 124* 116* 109*      Imaging:   Results for orders placed or performed during the hospital encounter of 09/30/22   Head CT w/o contrast    Narrative    EXAM: CT HEAD W/O CONTRAST, CT LUMBAR SPINE W/O  CONTRAST, CT CERVICAL SPINE W/O CONTRAST, CT THORACIC SPINE W/O CONTRAST  LOCATION: [facility]  DATE/TIME: 9/30/2022 10:38 PM    INDICATION: Traumatic injury. Fall. Head, neck and back pain. Confusion.  COMPARISON: Chest CT 6/30/2022  TECHNIQUE:   1) Routine CT Head without IV contrast. Multiplanar reformats. Dose reduction techniques were used.   2) Routine CT Cervical Spine without IV contrast. Multiplanar reformats. Dose reduction techniques were used.   3) Routine CT Thoracic Spine without IV contrast. Multiplanar reformats. Dose reduction techniques were used.   4) Routine CT Lumbar Spine without IV contrast. Multiplanar reformats. Dose reduction techniques were used.     FINDINGS:   HEAD CT:   INTRACRANIAL CONTENTS: No intracranial hemorrhage, extraaxial collection, or mass effect.  No CT evidence of acute infarct. Mild presumed chronic small vessel ischemic changes. Chronic infarct right occipital lobe. Mild to moderate generalized volume   loss. No hydrocephalus.     VISUALIZED ORBITS/SINUSES/MASTOIDS: No intraorbital abnormality. No paranasal sinus mucosal disease. Scattered fluid/membrane thickening in the left mastoid air cells. No apparent mass in the posterior nasopharynx or skull base.    BONES/SOFT TISSUES: No acute abnormality.    CERVICAL SPINE CT:  VERTEBRA: Normal vertebral body heights and alignment. Ankylosis of C5-6. No acute fracture or subluxation.     CANAL/FORAMINA: No high-grade central canal stenosis. Moderate bilateral neural foraminal narrowing at C3-4.    PARASPINAL: No extraspinal abnormality. Visualized lung fields are clear.    THORACIC SPINE CT:  VERTEBRA: Moderate to severe thoracic kyphosis. Stable mild compression deformities of T1 and T2, T5, T7, T10 and T12. Advanced bony demineralization. Posterior instrumented fusion from T8 through T12. No hardware failure identified. No acute fracture or   subluxation. Bridging osteophytes throughout the thoracic  spine.     CANAL/FORAMINA: No high-grade central canal stenosis. Moderate bilateral neural foraminal narrowing at T10-11.    PARASPINAL: No extraspinal abnormality.    LUMBAR SPINE CT:  VERTEBRA: 5 lumbar type vertebra. Age-indeterminate moderate compression deformities of L2, L3 and L5. Age-indeterminate moderate to advanced compression deformity at L4. Chronic mild compression deformity at the superior endplate of L1. Advanced   osteopenia. Fusion of the SI joints. No definite acute displaced fracture or subluxation.     CANAL/FORAMINA: The spinal canal is narrow on a congenital basis due to short pedicles. There is moderate multilevel central canal and neural foraminal narrowing.    PARASPINAL: No extraspinal abnormality.      Impression    IMPRESSION:  HEAD CT:  1.  No acute intracranial process.    CERVICAL SPINE CT:  1.  No fracture or posttraumatic subluxation.  2.  Moderate bilateral neural foraminal narrowing at C3-4.    THORACIC SPINE CT:  1.  No acute fracture or posttraumatic subluxation.  2.  There are multiple chronic compression deformities as detailed above.  3.  Posterior instrumented fusion from T8 through T12.  4.  Moderate foraminal narrowing bilaterally at T10-11.  5.  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    LUMBAR SPINE CT:  1.  Age-indeterminate compression deformities of L2-L5 with a chronic mild compression deformity at the superior endplate of L1.  2.  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  3.  Moderate multilevel central canal stenosis and neural foraminal narrowing.   CT Thoracic Spine w/o Contrast    Narrative    EXAM: CT HEAD W/O CONTRAST, CT LUMBAR SPINE W/O CONTRAST, CT CERVICAL SPINE W/O CONTRAST, CT THORACIC SPINE W/O CONTRAST  LOCATION: [facility]  DATE/TIME: 9/30/2022 10:38 PM    INDICATION: Traumatic injury. Fall. Head, neck and back pain.  Confusion.  COMPARISON: Chest CT 6/30/2022  TECHNIQUE:   1) Routine CT Head without IV contrast. Multiplanar reformats. Dose reduction techniques were used.   2) Routine CT Cervical Spine without IV contrast. Multiplanar reformats. Dose reduction techniques were used.   3) Routine CT Thoracic Spine without IV contrast. Multiplanar reformats. Dose reduction techniques were used.   4) Routine CT Lumbar Spine without IV contrast. Multiplanar reformats. Dose reduction techniques were used.     FINDINGS:   HEAD CT:   INTRACRANIAL CONTENTS: No intracranial hemorrhage, extraaxial collection, or mass effect.  No CT evidence of acute infarct. Mild presumed chronic small vessel ischemic changes. Chronic infarct right occipital lobe. Mild to moderate generalized volume   loss. No hydrocephalus.     VISUALIZED ORBITS/SINUSES/MASTOIDS: No intraorbital abnormality. No paranasal sinus mucosal disease. Scattered fluid/membrane thickening in the left mastoid air cells. No apparent mass in the posterior nasopharynx or skull base.    BONES/SOFT TISSUES: No acute abnormality.    CERVICAL SPINE CT:  VERTEBRA: Normal vertebral body heights and alignment. Ankylosis of C5-6. No acute fracture or subluxation.     CANAL/FORAMINA: No high-grade central canal stenosis. Moderate bilateral neural foraminal narrowing at C3-4.    PARASPINAL: No extraspinal abnormality. Visualized lung fields are clear.    THORACIC SPINE CT:  VERTEBRA: Moderate to severe thoracic kyphosis. Stable mild compression deformities of T1 and T2, T5, T7, T10 and T12. Advanced bony demineralization. Posterior instrumented fusion from T8 through T12. No hardware failure identified. No acute fracture or   subluxation. Bridging osteophytes throughout the thoracic spine.     CANAL/FORAMINA: No high-grade central canal stenosis. Moderate bilateral neural foraminal narrowing at T10-11.    PARASPINAL: No extraspinal abnormality.    LUMBAR SPINE CT:  VERTEBRA: 5 lumbar type  vertebra. Age-indeterminate moderate compression deformities of L2, L3 and L5. Age-indeterminate moderate to advanced compression deformity at L4. Chronic mild compression deformity at the superior endplate of L1. Advanced   osteopenia. Fusion of the SI joints. No definite acute displaced fracture or subluxation.     CANAL/FORAMINA: The spinal canal is narrow on a congenital basis due to short pedicles. There is moderate multilevel central canal and neural foraminal narrowing.    PARASPINAL: No extraspinal abnormality.      Impression    IMPRESSION:  HEAD CT:  1.  No acute intracranial process.    CERVICAL SPINE CT:  1.  No fracture or posttraumatic subluxation.  2.  Moderate bilateral neural foraminal narrowing at C3-4.    THORACIC SPINE CT:  1.  No acute fracture or posttraumatic subluxation.  2.  There are multiple chronic compression deformities as detailed above.  3.  Posterior instrumented fusion from T8 through T12.  4.  Moderate foraminal narrowing bilaterally at T10-11.  5.  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    LUMBAR SPINE CT:  1.  Age-indeterminate compression deformities of L2-L5 with a chronic mild compression deformity at the superior endplate of L1.  2.  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  3.  Moderate multilevel central canal stenosis and neural foraminal narrowing.   Lumbar spine CT w/o contrast    Narrative    EXAM: CT HEAD W/O CONTRAST, CT LUMBAR SPINE W/O CONTRAST, CT CERVICAL SPINE W/O CONTRAST, CT THORACIC SPINE W/O CONTRAST  LOCATION: [facility]  DATE/TIME: 9/30/2022 10:38 PM    INDICATION: Traumatic injury. Fall. Head, neck and back pain. Confusion.  COMPARISON: Chest CT 6/30/2022  TECHNIQUE:   1) Routine CT Head without IV contrast. Multiplanar reformats. Dose reduction techniques were used.   2) Routine CT Cervical Spine without IV contrast. Multiplanar reformats.  Dose reduction techniques were used.   3) Routine CT Thoracic Spine without IV contrast. Multiplanar reformats. Dose reduction techniques were used.   4) Routine CT Lumbar Spine without IV contrast. Multiplanar reformats. Dose reduction techniques were used.     FINDINGS:   HEAD CT:   INTRACRANIAL CONTENTS: No intracranial hemorrhage, extraaxial collection, or mass effect.  No CT evidence of acute infarct. Mild presumed chronic small vessel ischemic changes. Chronic infarct right occipital lobe. Mild to moderate generalized volume   loss. No hydrocephalus.     VISUALIZED ORBITS/SINUSES/MASTOIDS: No intraorbital abnormality. No paranasal sinus mucosal disease. Scattered fluid/membrane thickening in the left mastoid air cells. No apparent mass in the posterior nasopharynx or skull base.    BONES/SOFT TISSUES: No acute abnormality.    CERVICAL SPINE CT:  VERTEBRA: Normal vertebral body heights and alignment. Ankylosis of C5-6. No acute fracture or subluxation.     CANAL/FORAMINA: No high-grade central canal stenosis. Moderate bilateral neural foraminal narrowing at C3-4.    PARASPINAL: No extraspinal abnormality. Visualized lung fields are clear.    THORACIC SPINE CT:  VERTEBRA: Moderate to severe thoracic kyphosis. Stable mild compression deformities of T1 and T2, T5, T7, T10 and T12. Advanced bony demineralization. Posterior instrumented fusion from T8 through T12. No hardware failure identified. No acute fracture or   subluxation. Bridging osteophytes throughout the thoracic spine.     CANAL/FORAMINA: No high-grade central canal stenosis. Moderate bilateral neural foraminal narrowing at T10-11.    PARASPINAL: No extraspinal abnormality.    LUMBAR SPINE CT:  VERTEBRA: 5 lumbar type vertebra. Age-indeterminate moderate compression deformities of L2, L3 and L5. Age-indeterminate moderate to advanced compression deformity at L4. Chronic mild compression deformity at the superior endplate of L1. Advanced   osteopenia.  Fusion of the SI joints. No definite acute displaced fracture or subluxation.     CANAL/FORAMINA: The spinal canal is narrow on a congenital basis due to short pedicles. There is moderate multilevel central canal and neural foraminal narrowing.    PARASPINAL: No extraspinal abnormality.      Impression    IMPRESSION:  HEAD CT:  1.  No acute intracranial process.    CERVICAL SPINE CT:  1.  No fracture or posttraumatic subluxation.  2.  Moderate bilateral neural foraminal narrowing at C3-4.    THORACIC SPINE CT:  1.  No acute fracture or posttraumatic subluxation.  2.  There are multiple chronic compression deformities as detailed above.  3.  Posterior instrumented fusion from T8 through T12.  4.  Moderate foraminal narrowing bilaterally at T10-11.  5.  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    LUMBAR SPINE CT:  1.  Age-indeterminate compression deformities of L2-L5 with a chronic mild compression deformity at the superior endplate of L1.  2.  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  3.  Moderate multilevel central canal stenosis and neural foraminal narrowing.   CT Pelvis Bone wo Contrast    Narrative    EXAM: CT PELVIS BONE WO CONTRAST  LOCATION: [facility]  DATE/TIME: 9/30/2022 10:27 PM    INDICATION: Injury with pain.  COMPARISON: None.  TECHNIQUE: CT scan of the pelvis was performed without IV contrast. Multiplanar reformats were obtained. Dose reduction techniques were used.  CONTRAST: None.    FINDINGS:    1.  Bones are demineralized. Multiple lumbar compression fractures. No pelvic fracture. Left hip arthroplasty. There is a large right inguinal hernia containing fat and ascitic fluid.   Cervical spine CT w/o contrast    Narrative    EXAM: CT HEAD W/O CONTRAST, CT LUMBAR SPINE W/O CONTRAST, CT CERVICAL SPINE W/O CONTRAST, CT THORACIC SPINE W/O CONTRAST  LOCATION: [facility]  DATE/TIME: 9/30/2022 10:38 PM    INDICATION: Traumatic injury. Fall. Head, neck and back pain. Confusion.  COMPARISON: Chest CT 6/30/2022  TECHNIQUE:   1) Routine CT Head without IV contrast. Multiplanar reformats. Dose reduction techniques were used.   2) Routine CT Cervical Spine without IV contrast. Multiplanar reformats. Dose reduction techniques were used.   3) Routine CT Thoracic Spine without IV contrast. Multiplanar reformats. Dose reduction techniques were used.   4) Routine CT Lumbar Spine without IV contrast. Multiplanar reformats. Dose reduction techniques were used.     FINDINGS:   HEAD CT:   INTRACRANIAL CONTENTS: No intracranial hemorrhage, extraaxial collection, or mass effect.  No CT evidence of acute infarct. Mild presumed chronic small vessel ischemic changes. Chronic infarct right occipital lobe. Mild to moderate generalized volume   loss. No hydrocephalus.     VISUALIZED ORBITS/SINUSES/MASTOIDS: No intraorbital abnormality. No paranasal sinus mucosal disease. Scattered fluid/membrane thickening in the left mastoid air cells. No apparent mass in the posterior nasopharynx or skull base.    BONES/SOFT TISSUES: No acute abnormality.    CERVICAL SPINE CT:  VERTEBRA: Normal vertebral body heights and alignment. Ankylosis of C5-6. No acute fracture or subluxation.     CANAL/FORAMINA: No high-grade central canal stenosis. Moderate bilateral neural foraminal narrowing at C3-4.    PARASPINAL: No extraspinal abnormality. Visualized lung fields are clear.    THORACIC SPINE CT:  VERTEBRA: Moderate to severe thoracic kyphosis. Stable mild compression deformities of T1 and T2, T5, T7, T10 and T12. Advanced bony demineralization. Posterior instrumented fusion from T8 through T12. No hardware failure identified. No acute fracture or   subluxation. Bridging osteophytes throughout the thoracic spine.     CANAL/FORAMINA: No high-grade central canal stenosis. Moderate bilateral neural foraminal narrowing  at T10-11.    PARASPINAL: No extraspinal abnormality.    LUMBAR SPINE CT:  VERTEBRA: 5 lumbar type vertebra. Age-indeterminate moderate compression deformities of L2, L3 and L5. Age-indeterminate moderate to advanced compression deformity at L4. Chronic mild compression deformity at the superior endplate of L1. Advanced   osteopenia. Fusion of the SI joints. No definite acute displaced fracture or subluxation.     CANAL/FORAMINA: The spinal canal is narrow on a congenital basis due to short pedicles. There is moderate multilevel central canal and neural foraminal narrowing.    PARASPINAL: No extraspinal abnormality.      Impression    IMPRESSION:  HEAD CT:  1.  No acute intracranial process.    CERVICAL SPINE CT:  1.  No fracture or posttraumatic subluxation.  2.  Moderate bilateral neural foraminal narrowing at C3-4.    THORACIC SPINE CT:  1.  No acute fracture or posttraumatic subluxation.  2.  There are multiple chronic compression deformities as detailed above.  3.  Posterior instrumented fusion from T8 through T12.  4.  Moderate foraminal narrowing bilaterally at T10-11.  5.  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    LUMBAR SPINE CT:  1.  Age-indeterminate compression deformities of L2-L5 with a chronic mild compression deformity at the superior endplate of L1.  2.  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  3.  Moderate multilevel central canal stenosis and neural foraminal narrowing.   CT Chest Abdomen Pelvis w/o Contrast    Narrative    EXAM: CT CHEST ABDOMEN PELVIS W/O CONTRAST  LOCATION: [facility]  DATE/TIME: 9/30/2022 10:30 PM    INDICATION: Nausea and vomiting. Fall.  COMPARISON: 6/30/2022.  TECHNIQUE: CT scan of the chest, abdomen, and pelvis was performed without IV contrast. Multiplanar reformats were obtained. Dose reduction techniques were used.   CONTRAST: None.    FINDINGS:   LUNGS  AND PLEURA: Peripheral and basilar fibrotic disease similar to the previous exam. Scarring at the lung apices. Calcified granuloma in the right upper lobe anteriorly. Dependent atelectasis bilaterally. No pneumothorax or pleural effusion.    MEDIASTINUM/AXILLAE: There is no lymph node enlargement. No abnormal mediastinal fluid. The heart is at the upper limits of normal in size.    CORONARY ARTERY CALCIFICATION: Severe.    HEPATOBILIARY: Normal.    PANCREAS: Normal.    SPLEEN: Normal.    ADRENAL GLANDS: Normal.    KIDNEYS/BLADDER: No hydronephrosis. Left renal cyst.    BOWEL: There is no bowel obstruction. No free intraperitoneal gas or fluid.    LYMPH NODES: Normal.    VASCULATURE: Atherosclerotic calcification of the aorta and its branches. No aneurysm.    PELVIC ORGANS: Normal.    MUSCULOSKELETAL: Left hip arthroplasty. Left shoulder arthroplasty. Right inguinal hernia containing fat. Umbilical hernia containing fat. Thoracic fusion hardware. Degenerative disease throughout the spine. Probable old lumbar vertebral body compression   fractures. No acute bone fractures are evident.      Impression    IMPRESSION:  1.  No acute abnormality. No bowel obstruction or inflammation. No acute traumatic abnormality.   XR Chest Port 1 View    Narrative    EXAM: XR CHEST PORTABLE 1 VIEW  LOCATION: [facility]  DATE/TIME: 10/01/2022, 1:29 PM    INDICATION: Hypoxia, wheezing.  COMPARISON: 09/14/2022.      Impression    IMPRESSION: Question some vascular congestion, which could indicate congestive heart failure. Heart size, similar to previous. No consolidation.

## 2022-10-03 NOTE — PROGRESS NOTES
Came by at 8:20AM  and 12:20PM in attempt to measure patient for orthosis.  The daughter Magi was present for part of the 2nd visit.  She said patient sleeps in recliner at home and had a turtle shell a couple of years back but wasn't fond of it and it cause posterior wound.  .  I attempted to flatten bed but patient said is too painful and he cannot go flat.  I talked to RN about pain meds and she is looking at providing some at her end of shift at 3.  I told her that I will come by after my 3:00 patient and attempt to see patient again.  If I cannot get patient supine, this will affect the fit of the orthosis and likely a poor fit.  Norman DEL CID.

## 2022-10-04 ENCOUNTER — PATIENT OUTREACH (OUTPATIENT)
Dept: GERIATRIC MEDICINE | Facility: CLINIC | Age: 82
End: 2022-10-04

## 2022-10-04 ENCOUNTER — APPOINTMENT (OUTPATIENT)
Dept: PHYSICAL THERAPY | Facility: CLINIC | Age: 82
DRG: 640 | End: 2022-10-04
Attending: INTERNAL MEDICINE
Payer: COMMERCIAL

## 2022-10-04 ENCOUNTER — APPOINTMENT (OUTPATIENT)
Dept: SPEECH THERAPY | Facility: CLINIC | Age: 82
DRG: 640 | End: 2022-10-04
Payer: COMMERCIAL

## 2022-10-04 PROBLEM — S32.020D CLOSED WEDGE COMPRESSION FRACTURE OF L2 VERTEBRA WITH ROUTINE HEALING: Status: ACTIVE | Noted: 2022-10-04

## 2022-10-04 LAB
ALBUMIN SERPL BCG-MCNC: 3 G/DL (ref 3.5–5.2)
ANION GAP SERPL CALCULATED.3IONS-SCNC: 7 MMOL/L (ref 7–15)
BUN SERPL-MCNC: 16.6 MG/DL (ref 8–23)
CALCIUM SERPL-MCNC: 8.7 MG/DL (ref 8.8–10.2)
CHLORIDE SERPL-SCNC: 95 MMOL/L (ref 98–107)
CREAT SERPL-MCNC: 0.51 MG/DL (ref 0.67–1.17)
DEPRECATED HCO3 PLAS-SCNC: 31 MMOL/L (ref 22–29)
GFR SERPL CREATININE-BSD FRML MDRD: >90 ML/MIN/1.73M2
GLUCOSE SERPL-MCNC: 117 MG/DL (ref 70–99)
PHOSPHATE SERPL-MCNC: 3.5 MG/DL (ref 2.5–4.5)
POTASSIUM SERPL-SCNC: 3.6 MMOL/L (ref 3.4–5.3)
SODIUM SERPL-SCNC: 133 MMOL/L (ref 136–145)

## 2022-10-04 PROCEDURE — 80069 RENAL FUNCTION PANEL: CPT | Performed by: INTERNAL MEDICINE

## 2022-10-04 PROCEDURE — 99232 SBSQ HOSP IP/OBS MODERATE 35: CPT | Performed by: INTERNAL MEDICINE

## 2022-10-04 PROCEDURE — 250N000013 HC RX MED GY IP 250 OP 250 PS 637: Performed by: HOSPITALIST

## 2022-10-04 PROCEDURE — 97161 PT EVAL LOW COMPLEX 20 MIN: CPT | Mod: GP | Performed by: PHYSICAL THERAPIST

## 2022-10-04 PROCEDURE — 120N000001 HC R&B MED SURG/OB

## 2022-10-04 PROCEDURE — 36415 COLL VENOUS BLD VENIPUNCTURE: CPT | Performed by: INTERNAL MEDICINE

## 2022-10-04 PROCEDURE — 99231 SBSQ HOSP IP/OBS SF/LOW 25: CPT | Performed by: INTERNAL MEDICINE

## 2022-10-04 PROCEDURE — 250N000013 HC RX MED GY IP 250 OP 250 PS 637: Performed by: INTERNAL MEDICINE

## 2022-10-04 PROCEDURE — 92526 ORAL FUNCTION THERAPY: CPT | Mod: GN

## 2022-10-04 PROCEDURE — 97530 THERAPEUTIC ACTIVITIES: CPT | Mod: GP | Performed by: PHYSICAL THERAPIST

## 2022-10-04 RX ADMIN — HYDROMORPHONE HYDROCHLORIDE 1 MG: 2 TABLET ORAL at 21:13

## 2022-10-04 RX ADMIN — HYDROMORPHONE HYDROCHLORIDE 1 MG: 2 TABLET ORAL at 09:03

## 2022-10-04 RX ADMIN — GABAPENTIN 100 MG: 100 CAPSULE ORAL at 09:01

## 2022-10-04 RX ADMIN — POLYETHYLENE GLYCOL 3350 17 G: 17 POWDER, FOR SOLUTION ORAL at 09:28

## 2022-10-04 RX ADMIN — ACETAMINOPHEN 650 MG: 325 TABLET, FILM COATED ORAL at 17:00

## 2022-10-04 RX ADMIN — LIDOCAINE 1 PATCH: 246 PATCH TOPICAL at 09:01

## 2022-10-04 RX ADMIN — QUETIAPINE FUMARATE 12.5 MG: 25 TABLET ORAL at 06:05

## 2022-10-04 RX ADMIN — TOLTERODINE TARTRATE 2 MG: 1 TABLET, FILM COATED ORAL at 09:01

## 2022-10-04 RX ADMIN — QUETIAPINE FUMARATE 12.5 MG: 25 TABLET ORAL at 13:45

## 2022-10-04 RX ADMIN — ACETAMINOPHEN 650 MG: 325 TABLET, FILM COATED ORAL at 05:07

## 2022-10-04 RX ADMIN — HYDROMORPHONE HYDROCHLORIDE 1 MG: 2 TABLET ORAL at 13:45

## 2022-10-04 RX ADMIN — HYDROMORPHONE HYDROCHLORIDE 1 MG: 2 TABLET ORAL at 05:07

## 2022-10-04 RX ADMIN — TOLTERODINE TARTRATE 2 MG: 1 TABLET, FILM COATED ORAL at 21:13

## 2022-10-04 RX ADMIN — GABAPENTIN 100 MG: 100 CAPSULE ORAL at 21:13

## 2022-10-04 RX ADMIN — PANTOPRAZOLE SODIUM 40 MG: 40 TABLET, DELAYED RELEASE ORAL at 09:01

## 2022-10-04 RX ADMIN — PANTOPRAZOLE SODIUM 40 MG: 40 TABLET, DELAYED RELEASE ORAL at 17:00

## 2022-10-04 RX ADMIN — HYDROMORPHONE HYDROCHLORIDE 1 MG: 2 TABLET ORAL at 00:48

## 2022-10-04 ASSESSMENT — ACTIVITIES OF DAILY LIVING (ADL)
ADLS_ACUITY_SCORE: 53
ADLS_ACUITY_SCORE: 45
ADLS_ACUITY_SCORE: 45
ADLS_ACUITY_SCORE: 49
ADLS_ACUITY_SCORE: 49
ADLS_ACUITY_SCORE: 45
ADLS_ACUITY_SCORE: 49
ADLS_ACUITY_SCORE: 45
ADLS_ACUITY_SCORE: 45
ADLS_ACUITY_SCORE: 49

## 2022-10-04 NOTE — PROGRESS NOTES
[facility] Care Coordination Contact    [facility]  Ambulatory Care Coordination to Inpatient Care Management   Hand-In Communication    Date:  October 4, 2022  Name: Jamie Valdivia is enrolled in [facility] Care Coordination program and I am the Lead Care Coordinator.  CC Contact Information:.   Payor Source: Payor: [facility] / Plan: [facility] DUAL / Product Type: HMO /   Current services in place:     Please see the CC Snaphot and Care Management Flowsheets for specific  details of this Jamie Valdivia care plan.   Additional details/specific concerns r/t this admission:    No additional concerns at this time      I will follow this admission in Epic. Please feel free to contact me with questions or for further collaboration in discharge planning.    Shanae Sheridan RN BSN PHN      [facility]  Care Coordinator  Phone:   [phone]  Fax:       [phone]

## 2022-10-04 NOTE — PROGRESS NOTES
[facility]    Neurosurgery  Daily Note    Assessment & Plan   Jamie Valdivia is a 82 year old male who was admitted on 9/30/2022. 82 year old history of advanced dementia and lives in memory care facility, history of spinal fusion, T8-12 pedicle screw fixation with cementing in August 2020 at [facility] who fell in shower 9/30/22 with ongoing low back pain since that time with imaging evidence of age-indeterminate compression deformities of L2-L5 with a chronic mild compression deformity at the superior endplate of L1.    Options had been reviewed with patient regarding bracing and conservative measures versus kyphoplasty and decision was made to pursue conservative measures with bracing. LSO arrived yesterday. Patient still having low back pain. Denies radicular symptoms, paresthesias, bowel/bladder changes. Pain has been better controlled with medications.     Plan:  -LSO brace - continue when ambulating. OK to have off when resting and in chair.   -Light activity only. Please limit your lifting to no more that ten pounds and avoid excessive bending, twisting and turning at the lumbar spine. You should also avoid excessive jostling and jarring activities.   -Follow up in 6 and 12 weeks with lumbar XR prior. Our office will arrange follow up.  -Our team will sign off at this time   -Please page or call with questions or concerns    Shara Hamm PA-C  [facility] [place] [address]    Tel [phone]  Pager [phone]    Active Problems:    Hyponatremia      Shara Hamm PA-C    Interval History   Stable     Physical Exam   Temp: 97.9  F (36.6  C) Temp src: Axillary BP: 129/59 Pulse: 57   Resp: 16 SpO2: 98 % O2 Device: Nasal cannula Oxygen Delivery: 2 LPM  Vitals:    10/02/22 0553 10/03/22 0358 10/04/22 0503   Weight: 224 lb (101.6 kg) 222 lb 6.4 oz (100.9 kg) 226 lb 3.2 oz (102.6 kg)     Vital Signs  with Ranges  Temp:  [96.8  F (36  C)-97.9  F (36.6  C)] 97.9  F (36.6  C)  Pulse:  [57-67] 57  Resp:  [16-22] 16  BP: (128-137)/(58-64) 129/59  SpO2:  [90 %-98 %] 98 %  I/O last 3 completed shifts:  In: 240 [P.O.:240]  Out: 1575 [Urine:1575]    Awake, alert, disoriented and confused at baseline   Moving BLE symmetrically and on command   Sensation intact to light touch BLE   Negative clonus     Medications        gabapentin  100 mg Oral BID     lidocaine  1 patch Transdermal Q24H     lidocaine   Transdermal Q8H JOSÉ LUIS     pantoprazole  40 mg Oral BID     sodium chloride (PF)  3 mL Intracatheter Q8H     tolterodine  2 mg Oral BID       Shara Hamm PA-C  [facility] Neurosurgery  [facility]  [address] [place] [address]  [address]  [place] [address]    Tel [phone]  Pager [phone]

## 2022-10-04 NOTE — PROGRESS NOTES
"[facility]     Renal Progress Note       SHORTHAND KEY FOR MY NOTES:  c = with, s = without, p = after, a = before, x = except, asx = asymptomatic, tx = transplant or treatment, sx = symptoms or symptomatic, cx = canceled or culture, rxn = reaction, yday = yesterday, nl = normal, abx = antibiotics, fxn = function, dx = diagnosis, dz = disease, m/h = melena/hematochezia, c/d/l/ha = cramping/dizziness/lightheadedness/headache, d/c = discharge or diarrhea/constipation, f/c/n/v = fevers/chills/nausea/vomiting, cp/sob = chest pain/shortness of breath, tbv = total body volume, rxn = reaction, tdc = tunneled dialysis catheter, pta = prior to admission, hd = hemodialysis, pd = peritoneal dialysis, hhd = home hemodialysis, edw = estimated dry wt         Assessment/Plan:     1.  Severe hyponatremia.  Pt's Na is up to 133 today.  Etiology appears multifactorial.  A.  Continue fluid restriction of 1200 ml every day.  B.  Change Na checks to daily.    2.  Anemia. Hb is down a bit today, but no obv signs of bleeding.  A.  Follow clinically.    3.  FEN.  Pt's K is on the lower side.  He isn't eating much.  A.  Monitor K and replace prn.    Thank you for this consultation.  I will sign off.  Please call if any questions.        Interval History:     Pt is more alert and feels a bit better today.  He is working c PT now.          Medications and Allergies:       gabapentin  100 mg Oral BID     lidocaine  1 patch Transdermal Q24H     lidocaine   Transdermal Q8H JOSÉ LUIS     pantoprazole  40 mg Oral BID     sodium chloride (PF)  3 mL Intracatheter Q8H     tolterodine  2 mg Oral BID     Allergies   Allergen Reactions     Ativan [Lorazepam]           Physical Exam:     Vitals were reviewed     , Blood pressure 138/63, pulse 69, temperature 97.7  F (36.5  C), temperature source Oral, resp. rate 16, height 1.778 m (5' 10\"), weight 102.6 kg (226 lb 3.2 oz), SpO2 92 %.  Wt Readings from Last 3 Encounters:   10/04/22 102.6 " kg (226 lb 3.2 oz)   09/28/22 101.3 kg (223 lb 6.4 oz)   08/15/22 103.9 kg (229 lb 1.6 oz)     Intake/Output Summary (Last 24 hours) at 10/4/2022 1630  Last data filed at 10/4/2022 1437  Gross per 24 hour   Intake 360 ml   Output 1125 ml   Net -765 ml     GENERAL APPEARANCE: sitting on side of bed, working c PT, much more alert today  HEENT:  eyes/ears/nose/neck grossly nl  ABDOMEN: o/s/nt/nd  EXTREMITIES/SKIN: tr ble edema         Data:     CBC RESULTS:     Recent Labs   Lab 10/03/22  0743 10/02/22  0859 10/01/22  0837 09/30/22  1755   WBC 7.4 9.4 17.0* 13.4*   RBC 3.83* 4.22* 4.39* 4.26*   HGB 11.1* 12.1* 12.7* 12.3*   HCT 33.0* 36.2* 37.0* 35.6*   *  --  188 204     Basic Metabolic Panel:  Recent Labs   Lab 10/04/22  0720 10/03/22  1820 10/03/22  1447 10/03/22  0743 10/03/22  0225 10/02/22  2204 10/02/22  0859 10/02/22  0626 10/01/22  1008 10/01/22  0837 10/01/22  0235 09/30/22  1755 09/29/22  0755   * 129* 130* 126*  126* 125* 123*   < > 122*  122*   < > 119*   < > 114* 126*   POTASSIUM 3.6  --   --  3.6  --   --   --  4.0  --  4.0  --  3.9 4.1   CHLORIDE 95*  --   --  91*  --   --   --  88*  --  83*  --  78* 92*   CO2 31*  --   --  27  --   --   --  22  --  23  --  24 23   BUN 16.6  --   --  27.7*  --   --   --  8.7  --  5.0*  --  6.9* 7   CR 0.51*  --   --  0.50*  --   --   --  0.65*  --  0.57*  --  0.63* 0.60*   *  --   --  109*  --   --   --  102*  --  124*  --  116* 109*   JOSUE 8.7*  --   --  8.6*  --   --   --  8.4*  --  8.7*  --  9.0 9.1    < > = values in this interval not displayed.     INRNo lab results found in last 7 days.   Attestation:   I have reviewed today's relevant vital signs, notes, medications, labs and imaging.    Marco Feldman MD  [facility] - Nephrology  [phone]

## 2022-10-04 NOTE — PROGRESS NOTES
TRANSITIONS OF CARE (ISAAC) LOG   ISAAC tasks should be completed by the CC within one (1) business day of notification of each transition. Follow up contact with member is required after return to their usual care setting.  Note:  If CC finds out about the transitions fifteen (15) days or more after the member has returned to their usual care setting, no ISAAC log is needed. However, the CC should check in with the member to discuss the transition process, any changes needed to the care plan and document it in a case note.    Member Name:  Jamie Valdivia MCO [facility] Member ID[facility] [id number]   Product: [facility] Care Coordinator Contact:  Gisela Hall RN, BSN, PHN Agency/County/Care System: [facility]   Transition Communication Actions from Care Management Contact   Transition #1   Notification Date: 10/4/22 Transition Date[facility]   [facility]     Is this the member s usual care setting?               yes Transition To: [facility]   Transition Type:  Unplanned  Reason for Admission/Comments:  Member fell in shower and reported back pain  Contact member/responsible party to offer assistance with transition Date completed: 10/4/22    Notes from conversation with the member/responsible party, provider, discharging and receiving facility (as applicable): CC contacted Hospital /discharge planner via the [employer] Transitional Care Hand-In Process, with community care plan included.  CC reached out to adult daughter Coral regarding transition and left a message requesting a return call.  Reviewed and update care plan as needed.  Notified community service providers and placed services Assisted Living on hold as needed.  Transition log initiated.   PCP notified of hospitalization via EMR.       Shared CC contact info, care plan/services with receiving setting--Date completed: 10/4/22   Name & Title of receiving setting contact:  [facility]   Notified PCP of transition--Date completed:  9/30/22     via  EMR  Name of PCP: Sylvia Stein     Transition #2   Notification Date: 10/7/22 Transition Date:   10/6/22 Transition From: [facility], [facility]     Is this the member s usual care setting?               no Transition To: Rehabiliation Facility, [facility]   Transition Type:  Planned  Reason for Admission/Comments:  Lumber compression fractures and PT/OT at hospital recommending TCU stay.   Contact member/responsible party to offer assistance with transition Date completed: Writer called and left message for members daughterCoral. Coral returned call and had some concerns about his return to [place]. Coral asked about getting a hospital bed. Writer contacted TCU LSW about her request for hospital bed. LSW stated she would notify therapy and get their recommendations.     Notes from conversation with the member/responsible party, provider, discharging and receiving facility (as applicable): OBRA 1 right faxed to half-way admissions office.   CC contacted Nursing Home LSW (Mala for Name and Phone Number) and reviewed community POC. CC requested to be notified of concerns, care conference dates and discharge planning.   CC reached out to adult daughter Coral regarding transition and left a message requesting a return call.    Transition log updated.   PCP notified of transition to TCU via EMR.       Shared CC contact info, care plan/services with receiving setting--Date completed: 10/7/22   Name & Title of receiving setting contact: [facility]   Notified PCP of transition--Date completed:  10/7/22     via  EMR  Name of PCP: Sylvia Stein      Transition #3   Notification Date: 10/31/22 Transition Date:   11/4/22 Transition From: Nursing Home, [facility]     Is this the member s usual care setting?               no Transition To: Assisted Living,  [place]   Transition Type:  Planned  Reason for Admission/Comments:  Member was receiving therapy at [facility]  Contact member/responsible party to offer assistance with transition Date completed: 10/31 and 11/1 to coordinate hospital bed and halo rings to be delivered on discharge date    Notes from conversation with the member/responsible party, provider, discharging and receiving facility (as applicable): CC contacted adult daughter Coral on 11/4 and left a message requesting a return call.   Member has a follow-up appointment with PCP in 7 days: Yes: scheduled on 11/8/22   Member has had a change in condition: No  Home visit needed: No  Care plan reviewed and updated.  The following home based services Assisted Living were resumed.  New referrals placed: No  Transition log completed.   PCP notified of transition back to home via EMR.       Shared CC contact info, care plan/services with receiving setting--Date completed: 11/4   Name & Title of receiving setting contact: NORBERTO Najera at [place]   Notified PCP of transition--Date completed:  11/4/22     via  EMR  Name of PCP: Sylvia Stein            *RETURN TO USUAL CARE SETTING: *Complete tasks below when the member is discharging TO their usual care setting within one (1) business day of notification..      For situations where the Care Coordinator is notified of the discharge prior to the date of discharge, the Care Coordinator must follow up with the member or designated representative to confirm that discharge actually occurred and discuss required ISAAC tasks as outlined in the ISAAC Instructions.  (This includes situations where it may be a  new  usual care setting for the member. (i.e., a community member who decides upon permanent nursing home placement following hospitalization and rehab).    Discuss with Member/Responsible Party:    Check  Yes  - if the member, family member and/or SNF/facility staff manages the following:    If  No   provide explanation in the comments section.          Date completed: 11/4 Communicated with member or their designated representative about the following:  care transition process; about changes to the member s health status; plan of care updates; education about transitions and how to prevent unplanned transitions/readmissions    Four Pillars for Optimal Transition:    Check  Yes  - if the member, family member and/or SNF/facility staff manages the following:    If  No  provide explanation in the comments section.          [x]  Yes     []  No Does the member have a follow-up appointment scheduled with primary care or specialist? (Mental health hospitalizations--the appt. should be w/in 7 days)              For mental health hospitalizations:  []  Yes     []  No     Does the member have a follow-up appointment scheduled with a mental health practitioner within 7 days of discharge?  [x]  Yes     []  No     Has a medication review been completed with member? If no, refer to PCP, home care nurse, MTM, pharmacist  [x]  Yes     []  No     Can the member manage their medications or is there a system in place to manage medications (e.g. home care set-up)?         [x]  Yes     []  No     Can the member verbalize warning signs and symptoms to watch for and how to respond?  []  Yes     [x]  No     Does the member have a copy of and understand their discharge instructions?  If no, assist to obtain copy of discharge instructions, review discharge instructions, and assist to contact PCP to discuss questions about their recent hospitalization.  [x]  Yes     []  No     Does the member have adequate food, housing and transportation?  If no, add goal and discuss additional supports available to the member                                                                                                                                                                                 [x]  Yes     []  No     Is the member safe in their  home?  If no, document needs and support provided                                                                                                                                                                          []  Yes     [x]  No     Are there any concerns of vulnerability, abuse, or neglect?  If yes, document concerns and actions taken by Care Coordinator as a mandated                                                                                                                                                                              []  Yes     [x]  No     Does the member use a Personal Health Care Record?  Check  Yes  if visit summary, discharge summary, and/or healthcare summary are being used as a PHR.                                                                                                                                                                                  [x]  Yes     []  No     Have you reviewed the discharge summary with the member? If  No  provide explanation in comments.  [x]  Yes     []  No     Have you updated the member s care plan/support plan? Add new diagnosis, medications, treatments, goals & interventions, as applicable. If No, provide explanation in comments.    Comments:           Notes from conversation with the member/responsible party, provider, discharging and receiving facility (as applicable): CC contacted adult daughter Coral and left a message requesting a return call. Attempt #2 made on 11/11 and left message for Coral requesting return call. Coral called back and stated that she has not been able to go over to the [place] to see her father yet but her sister Alexia has been there. Writer called Alexia on 11/11 and left message requesting return call. Return call received from Alexia and she stated that everything seems to be going well and they don't need anything at this point in time.  Writer emailed NORBERTO Najera on 11/4 and 11/11.     Member has a  follow-up appointment with PCP in 7 days: Yes: scheduled on 11/8/22   Member has had a change in condition: No  Home visit needed: No  Care plan reviewed and updated.  The following home based services Assisted Living were resumed.  New referrals placed: No  Transition log completed.   PCP notified of transition back to home via EMR.              Gisela Hall RN  [facility]  [phone]

## 2022-10-04 NOTE — PROGRESS NOTES
"[facility]  Hospitalist Progress Note     Assessment & Plan     ASSESSMENT:    82M with hx of dementia, seizures, and pAF presents after a GLF and found to have lumbar compression fractures and severe hyponatremia.     PLAN:    -Acute Severe Hyponatremia 2/2 SIADH: Fluid restriction. Trend.    -Acute Metabolic Encephalopathy and Dementia: 2/2 the above now back to baseline.    -Compression Fractures of Lumbar Spine: Brace in place. Appreciate neurosurgery eval.    -pAF: Rate controlled. Not on AC due to hx of falls.    -DVT Prophy: SCDs.    -Disposition: Back to memory care unit tomorrow.      Jun Pantoja MD    Subjective     Patient seen at bedside. No events overnight. Eating okay. Denies nausea or vomiting.        Objective   Blood pressure 129/59, pulse 57, temperature 97.9  F (36.6  C), temperature source Axillary, resp. rate 16, height 1.778 m (5' 10\"), weight 102.6 kg (226 lb 3.2 oz), SpO2 98 %.    PHYSICAL EXAM  General: In no acute distress  CV: RRR.  Lungs: CTAB. Nl WOB.  Abd: Non-tender.  Ext: No edema.    LABS AND IMAGING: Reviewed and pertinent results discussed in assessment and plan.      "

## 2022-10-04 NOTE — PLAN OF CARE
A&Ox4. Turned and repositioning in bed, pulsate mattress in use. Bruise to coccyx from fall previous to admission. C/o of back pain, PO Dilaudid given x2, lidocaine patch in place, corset when out of bed, declined to get out of bed this shift. Na 133, Q12hr Na checks. Tolerating diet, 1200ml fluids restriction. PT following. Daughter visited today. Discharge pending.

## 2022-10-04 NOTE — PROGRESS NOTES
10/04/22 1433   Quick Adds   Type of Visit Initial PT Evaluation   Living Environment   People in Home facility resident   Current Living Arrangements assisted living   Self-Care   Usual Activity Tolerance good   Current Activity Tolerance fair   Regular Exercise No   Equipment Currently Used at Home grab bar, toilet;walker, rolling;other (see comments)   Fall history within last six months yes   Number of times patient has fallen within last six months 1   Activity/Exercise/Self-Care Comment Amb  w 4WW prior to fall, A w LE  dressing,bathing, mod indep  w/toileting   General Information   Onset of Illness/Injury or Date of Surgery 10/03/22   Referring Physician Maxi Denise MD   Pertinent History of Current Problem (include personal factors and/or comorbidities that impact the POC) 82 year old male with history of advanced dementia, seizure disorder, chronic atrial fibrillation, GERD, dysphagia, osteoporosis, vertebral fracture who presented to the emergency department for evaluation of fall and found to have hyponatremia   Existing Precautions/Restrictions fall;oxygen therapy device and L/min   Cognition   Affect/Mental Status (Cognition) WFL;anxious   Orientation Status (Cognition) oriented x 3   Follows Commands (Cognition) follows one-step commands;repetition of directions required;verbal cues/prompting required   Safety Deficit (Cognition) ability to follow commands;at risk behavior observed   Posture    Posture Forward head position;Protracted shoulders   Range of Motion (ROM)   ROM Comment B LE ROm WFL   Strength (Manual Muscle Testing)   Strength Comments decreased  LE strength, B HF 3/5, remaining4-/5   Bed Mobility   Comment, (Bed Mobility) unable to roll onto side  w/o bed rail and assist   Transfers   Comment, (Transfers) unable to  stand w/ max A, preses thru feet and leans backwards   Gait/Stairs (Locomotion)   Comment, (Gait/Stairs) not assessed,unable to stand w/ WW   Balance   Balance  Comments good in sitting;  unable to assess in standing   Clinical Impression   Criteria for Skilled Therapeutic Intervention Yes, treatment indicated   PT Diagnosis (PT) impaired mobility   Influenced by the following impairments decreased strength impaired  gait, pain   Functional limitations due to impairments impaired functional mobility   Clinical Presentation (PT Evaluation Complexity) Evolving/Changing   Clinical Presentation Rationale clinical assessment   Clinical Decision Making (Complexity) low complexity   Planned Therapy Interventions (PT) bed mobility training;gait training;strengthening;thermotherapy   Risk & Benefits of therapy have been explained evaluation/treatment results reviewed;care plan/treatment goals reviewed;risks/benefits reviewed   PT Discharge Planning   PT Discharge Recommendation (DC Rec) Transitional Care Facility;home with assist;home with home care physical therapy   PT Rationale for DC Rec Pt below baseline for all mobility and ADLS req  A 1-2, will benefit from IPPT during stay anticipate pt will need cont PT  in rehab  setting, should pt progress greatly he would need to be A x 1 for all mobility, supervised gait and SBA for toilet transfers  to return to [facility] w home PT   Total Evaluation Time   Total Evaluation Time (Minutes) 10

## 2022-10-04 NOTE — PLAN OF CARE
Assessment:   A/O, forgetful. Pt did use call light appropriately overnight. C/O pain in back. PO tylenol x1, PO dilaudid x2. Turned with assist. Air mattress in place. Male purewick overnight for incontinence. Moderate ISELA LE edema noted, pt states he wears compression stocking at home but did not bring them. Declined hospital TEDS. Legs elevated on pillows.     Shift events: Pain meds x2.      Plan: pain management, O2 support, Fluid restriction, Na checks, thickened drinks, minced food. Neph & PT following.

## 2022-10-04 NOTE — PLAN OF CARE
Vitals: WNL  Neuro: Alert to self, slept most of the shift  Cardiac: no chest pain  GI: good appetite, meds crushed with applesauce, did very well with moderately thick apple juice  : good urine output, new external catheter placed  Resp: on 2L/nc  Activity: Ax2 with lift - baseline walker,   Pain: some pain with repositioning, Tylenol given with relief  Skin: mepilex over bottom     Plan: pain management, O2 support, Fluid restriction, Na checks

## 2022-10-05 ENCOUNTER — APPOINTMENT (OUTPATIENT)
Dept: SPEECH THERAPY | Facility: CLINIC | Age: 82
DRG: 640 | End: 2022-10-05
Payer: COMMERCIAL

## 2022-10-05 ENCOUNTER — APPOINTMENT (OUTPATIENT)
Dept: PHYSICAL THERAPY | Facility: CLINIC | Age: 82
DRG: 640 | End: 2022-10-05
Payer: COMMERCIAL

## 2022-10-05 LAB
ANION GAP SERPL CALCULATED.3IONS-SCNC: 6 MMOL/L (ref 7–15)
BUN SERPL-MCNC: 8.7 MG/DL (ref 8–23)
CALCIUM SERPL-MCNC: 8.6 MG/DL (ref 8.8–10.2)
CHLORIDE SERPL-SCNC: 96 MMOL/L (ref 98–107)
CREAT SERPL-MCNC: 0.5 MG/DL (ref 0.67–1.17)
DEPRECATED HCO3 PLAS-SCNC: 33 MMOL/L (ref 22–29)
GFR SERPL CREATININE-BSD FRML MDRD: >90 ML/MIN/1.73M2
GLUCOSE SERPL-MCNC: 109 MG/DL (ref 70–99)
POTASSIUM SERPL-SCNC: 3.7 MMOL/L (ref 3.4–5.3)
SODIUM SERPL-SCNC: 135 MMOL/L (ref 136–145)

## 2022-10-05 PROCEDURE — 92526 ORAL FUNCTION THERAPY: CPT | Mod: GN

## 2022-10-05 PROCEDURE — 97530 THERAPEUTIC ACTIVITIES: CPT | Mod: GP | Performed by: PHYSICAL THERAPIST

## 2022-10-05 PROCEDURE — 36415 COLL VENOUS BLD VENIPUNCTURE: CPT | Performed by: INTERNAL MEDICINE

## 2022-10-05 PROCEDURE — 99232 SBSQ HOSP IP/OBS MODERATE 35: CPT | Performed by: INTERNAL MEDICINE

## 2022-10-05 PROCEDURE — 250N000013 HC RX MED GY IP 250 OP 250 PS 637: Performed by: INTERNAL MEDICINE

## 2022-10-05 PROCEDURE — 120N000001 HC R&B MED SURG/OB

## 2022-10-05 PROCEDURE — 250N000013 HC RX MED GY IP 250 OP 250 PS 637: Performed by: HOSPITALIST

## 2022-10-05 PROCEDURE — 80048 BASIC METABOLIC PNL TOTAL CA: CPT | Performed by: INTERNAL MEDICINE

## 2022-10-05 RX ADMIN — GABAPENTIN 100 MG: 100 CAPSULE ORAL at 08:31

## 2022-10-05 RX ADMIN — LIDOCAINE 1 PATCH: 246 PATCH TOPICAL at 08:30

## 2022-10-05 RX ADMIN — HYDROMORPHONE HYDROCHLORIDE 1 MG: 2 TABLET ORAL at 17:25

## 2022-10-05 RX ADMIN — PANTOPRAZOLE SODIUM 40 MG: 40 TABLET, DELAYED RELEASE ORAL at 17:25

## 2022-10-05 RX ADMIN — PANTOPRAZOLE SODIUM 40 MG: 40 TABLET, DELAYED RELEASE ORAL at 08:31

## 2022-10-05 RX ADMIN — GABAPENTIN 100 MG: 100 CAPSULE ORAL at 20:16

## 2022-10-05 RX ADMIN — POLYETHYLENE GLYCOL 3350 17 G: 17 POWDER, FOR SOLUTION ORAL at 08:30

## 2022-10-05 RX ADMIN — Medication 1 LOZENGE: at 06:27

## 2022-10-05 RX ADMIN — TOLTERODINE TARTRATE 2 MG: 1 TABLET, FILM COATED ORAL at 20:16

## 2022-10-05 RX ADMIN — HYDROMORPHONE HYDROCHLORIDE 1 MG: 2 TABLET ORAL at 12:07

## 2022-10-05 RX ADMIN — HYDROMORPHONE HYDROCHLORIDE 1 MG: 2 TABLET ORAL at 06:26

## 2022-10-05 RX ADMIN — QUETIAPINE FUMARATE 12.5 MG: 25 TABLET ORAL at 17:25

## 2022-10-05 RX ADMIN — QUETIAPINE FUMARATE 12.5 MG: 25 TABLET ORAL at 12:07

## 2022-10-05 RX ADMIN — ACETAMINOPHEN 650 MG: 325 TABLET, FILM COATED ORAL at 06:26

## 2022-10-05 RX ADMIN — TOLTERODINE TARTRATE 2 MG: 1 TABLET, FILM COATED ORAL at 08:31

## 2022-10-05 ASSESSMENT — ACTIVITIES OF DAILY LIVING (ADL)
ADLS_ACUITY_SCORE: 49
ADLS_ACUITY_SCORE: 53
ADLS_ACUITY_SCORE: 49
ADLS_ACUITY_SCORE: 53
ADLS_ACUITY_SCORE: 53
ADLS_ACUITY_SCORE: 49
ADLS_ACUITY_SCORE: 53

## 2022-10-05 NOTE — CONSULTS
Care Management Follow Up    Length of Stay (days): 5    Expected Discharge Date: 10/06/2022     Concerns to be Addressed:  discharge planning, Transitional Care Unit vs return to   Patient plan of care discussed at interdisciplinary rounds: Yes    Anticipated Discharge Disposition:  vs TCU     Education Provided on the Discharge Plan: yes          Additional Information:  CM following for discharge planning. Per chart review, patient worked with Physical Therapy yesterday with his LSO brace on. He was needing assist of 2. Therapy is recommending Transitional Care Unit vs home with HC. Per bedside RN, patient refused to get up today. He is still requiring assist of 2 with yoan gannon.    Call placed to [facility] [phone] and spoke to [place], Agency Nurse, regarding patient returning to facility. She verified that he is in the Memory care facility. She states he is in the exercise program and requires assist of one with walker. She clarified that they are not able to take him back as assist of 2. He will need to be at his baseline in order to return. If patient improves to baseline and he is able to return with therapies, they do not have a preference on HC agency. It would be patient preference on HC agency.    Call placed to patient's son Markos as he is first on the contact list to discuss discharge planning and recommendations. He states his sister Alexia should be the main contact and asked ARTHUR to call her to discuss. Call placed to Alexia to discuss discharge planning. We discussed Physical Therapy session yesterday and recommendations for Transitional Care Unit. We discussed his prior level of assistance at  and that they are unable to accommodate assist of two. She is understandable of that. She states ARTHUR is the first person to contact her and that she has not spoken to anyone yet regarding updates on her fathers plan of care. She states she is going to be visiting today after work and will  assess patient and see how he is doing with his mentation and mobility. She states she is not opposed to Transitional Care Unit for patient but feels it would be more beneficial for him to return to  with  services once his pain is under control and he has had therapies. She does not want to further discuss discharge planning until after she visits. She states [facility] has been to [facility] (which [facility] attached to [facility]) in the past. She would like to speak to medical staff before any decision is made.     CM phone number given to Alexia and CM will follow up for discharge planning after her visit and discharge date is determined.     ADDENDUM 1615:  Met with patient, daughter Alexia, wife and MD at bedside at length to discuss the discharge plan of care. Explained again the recommendations from Physical Therapy and that [facility] unable to accept him back as assist of two which he is still requiring. MD discussed that patient is medically ready. Listened to concerns from family regarding plan of care. Explained process of Transitional Care Unit and daughter is aware of this. They are agreeable to send Transitional Care Unit referral and they want [facility] at this time. Explained that CM would follow up with family for additional choices if needed. They are requesting transport on discharge. Reviewed out of pocket cost for [facility] transport, $81.80 for base rate and $5.26 per mile to the destination. They expressed understanding and are agreeable to this. Referral sent to [facility].                Sindy Claros RN BSN CM  Inpatient Care Coordination  [facility]  [phone]

## 2022-10-05 NOTE — PLAN OF CARE
Vitals: WNL  Neuro: more alert today, A/O x3  Cardiac: no chest pain  GI: had some ice cream and apple juice, well tolerated  : good urine output, external catheter in place  Resp: on 2L/nc, infrequent productive cough  Activity: Ax1-2 with yoan steady - baseline walker, worked with PT today  Pain: some pain with repositioning, Tylenol given with relief   Skin: mepilex over bottom     Plan: corset brace, pain management, O2 support, Fluid restriction, Na checks

## 2022-10-05 NOTE — PROGRESS NOTES
"[facility]  Hospitalist Progress Note     Assessment & Plan     ASSESSMENT:    82M with hx of dementia, seizures, and pAF presents after a GLF and found to have lumbar compression fractures and severe hyponatremia now improved and working on discharge planning.    PLAN:    -Acute Severe Hyponatremia 2/2 SIADH: Resolved. Continue fluid restriction.    -Acute Metabolic Encephalopathy and Dementia: 2/2 the above now back to baseline.    -Compression Fractures of Lumbar Spine: Brace in place. Appreciate neurosurgery eval.    -pAF: Rate controlled. Not on AC due to hx of falls.    -DVT Prophy: SCDs.    -Disposition: Undetermined. Likely back to memory care unit although would likely need therapies. Discussing w/ SW.      Jun Pantoja MD    Subjective     Seen at bedside. Feels back to his baseline. Na normalized. Chart reviewed and discussed case with SW. Memory care unit wants patient back to his baseline functional status although isn't participating much in therapies.        Objective   Blood pressure (!) 141/79, pulse 58, temperature 97.7  F (36.5  C), temperature source Oral, resp. rate 18, height 1.778 m (5' 10\"), weight 101.2 kg (223 lb 3.2 oz), SpO2 91 %.    PHYSICAL EXAM  General: In no acute distress  CV: RRR.  Lungs: CTAB. Nl WOB.  Abd: Non-tender.  Ext: No edema.    LABS AND IMAGING: Reviewed and pertinent results discussed in assessment and plan.      "

## 2022-10-05 NOTE — PLAN OF CARE
Assessment:   A/O, forgetful. Pt did use call light appropriately overnight. C/O pain & sore throat. Lozenge given while seated 90*, PO dilaudid for pain.  Turned with assist. Air mattress in place. Male purewick overnight for incontinence. Moderate ISELA LE edema noted, pt states he wears compression stocking at home but did not bring them. Declined hospital TEDS. Legs elevated on pillows.      Shift events: Pain meds PRn. Slept well.      Plan: pain management, O2 support, Fluid restriction, Na checks, soft food. Neph & PT following. return to  with PT soon.

## 2022-10-06 ENCOUNTER — LAB REQUISITION (OUTPATIENT)
Dept: LAB | Facility: CLINIC | Age: 82
End: 2022-10-06

## 2022-10-06 ENCOUNTER — APPOINTMENT (OUTPATIENT)
Dept: PHYSICAL THERAPY | Facility: CLINIC | Age: 82
DRG: 640 | End: 2022-10-06
Payer: COMMERCIAL

## 2022-10-06 VITALS
WEIGHT: 223.2 LBS | TEMPERATURE: 97.4 F | DIASTOLIC BLOOD PRESSURE: 67 MMHG | OXYGEN SATURATION: 97 % | SYSTOLIC BLOOD PRESSURE: 137 MMHG | HEIGHT: 70 IN | BODY MASS INDEX: 31.95 KG/M2 | HEART RATE: 54 BPM | RESPIRATION RATE: 18 BRPM

## 2022-10-06 DIAGNOSIS — Z11.1 ENCOUNTER FOR SCREENING FOR RESPIRATORY TUBERCULOSIS: ICD-10-CM

## 2022-10-06 LAB
AMMONIA PLAS-SCNC: 36 UMOL/L (ref 16–60)
BACTERIA BLD CULT: NO GROWTH
BACTERIA BLD CULT: NO GROWTH
BASE EXCESS BLDV CALC-SCNC: 8.6 MMOL/L (ref -7.7–1.9)
HCO3 BLDV-SCNC: 34 MMOL/L (ref 21–28)
HOLD SPECIMEN: NORMAL
O2/TOTAL GAS SETTING VFR VENT: 0 %
PCO2 BLDV: 46 MM HG (ref 40–50)
PH BLDV: 7.47 [PH] (ref 7.32–7.43)
PO2 BLDV: 65 MM HG (ref 25–47)
POTASSIUM SERPL-SCNC: 4.7 MMOL/L (ref 3.4–5.3)
TSH SERPL DL<=0.005 MIU/L-ACNC: 1.67 UIU/ML (ref 0.3–4.2)

## 2022-10-06 PROCEDURE — 82803 BLOOD GASES ANY COMBINATION: CPT | Performed by: INTERNAL MEDICINE

## 2022-10-06 PROCEDURE — 97530 THERAPEUTIC ACTIVITIES: CPT | Mod: GP | Performed by: PHYSICAL THERAPIST

## 2022-10-06 PROCEDURE — 84443 ASSAY THYROID STIM HORMONE: CPT | Performed by: INTERNAL MEDICINE

## 2022-10-06 PROCEDURE — 36415 COLL VENOUS BLD VENIPUNCTURE: CPT | Performed by: INTERNAL MEDICINE

## 2022-10-06 PROCEDURE — 250N000013 HC RX MED GY IP 250 OP 250 PS 637: Performed by: INTERNAL MEDICINE

## 2022-10-06 PROCEDURE — 82140 ASSAY OF AMMONIA: CPT | Performed by: INTERNAL MEDICINE

## 2022-10-06 PROCEDURE — 250N000011 HC RX IP 250 OP 636: Performed by: INTERNAL MEDICINE

## 2022-10-06 PROCEDURE — 84132 ASSAY OF SERUM POTASSIUM: CPT | Performed by: INTERNAL MEDICINE

## 2022-10-06 PROCEDURE — 250N000013 HC RX MED GY IP 250 OP 250 PS 637: Performed by: HOSPITALIST

## 2022-10-06 PROCEDURE — 97110 THERAPEUTIC EXERCISES: CPT | Mod: GP | Performed by: PHYSICAL THERAPIST

## 2022-10-06 PROCEDURE — 99239 HOSP IP/OBS DSCHRG MGMT >30: CPT | Performed by: INTERNAL MEDICINE

## 2022-10-06 RX ORDER — FUROSEMIDE 10 MG/ML
20 INJECTION INTRAMUSCULAR; INTRAVENOUS ONCE
Status: COMPLETED | OUTPATIENT
Start: 2022-10-06 | End: 2022-10-06

## 2022-10-06 RX ADMIN — GABAPENTIN 100 MG: 100 CAPSULE ORAL at 09:14

## 2022-10-06 RX ADMIN — FUROSEMIDE 20 MG: 10 INJECTION, SOLUTION INTRAMUSCULAR; INTRAVENOUS at 10:48

## 2022-10-06 RX ADMIN — PANTOPRAZOLE SODIUM 40 MG: 40 TABLET, DELAYED RELEASE ORAL at 09:14

## 2022-10-06 RX ADMIN — ACETAMINOPHEN 650 MG: 325 TABLET, FILM COATED ORAL at 09:40

## 2022-10-06 RX ADMIN — TOLTERODINE TARTRATE 2 MG: 1 TABLET, FILM COATED ORAL at 09:14

## 2022-10-06 RX ADMIN — QUETIAPINE FUMARATE 12.5 MG: 25 TABLET ORAL at 02:31

## 2022-10-06 RX ADMIN — LIDOCAINE 1 PATCH: 246 PATCH TOPICAL at 09:15

## 2022-10-06 RX ADMIN — HYDROMORPHONE HYDROCHLORIDE 1 MG: 2 TABLET ORAL at 02:28

## 2022-10-06 ASSESSMENT — ACTIVITIES OF DAILY LIVING (ADL)
ADLS_ACUITY_SCORE: 53
ADLS_ACUITY_SCORE: 49
ADLS_ACUITY_SCORE: 53
ADLS_ACUITY_SCORE: 53

## 2022-10-06 NOTE — PROGRESS NOTES
"[facility]  Hospitalist Progress Note     Assessment & Plan     ASSESSMENT:    82M with hx of dementia, seizures, and pAF presents after a GLF and found to have lumbar compression fractures and severe hyponatremia now improved and working on discharge planning.    PLAN:    -Acute Severe Hyponatremia, Multifactorial: Resolved. Continue fluid restriction.    -Acute Metabolic Encephalopathy and Dementia: 2/2 the above. Daughter says thinking still delayed - will add TSH, ammonia, and VBG.    -Compression Fractures of Lumbar Spine: Brace in place. Appreciate neurosurgery eval.    -pAF: Rate controlled. Not on AC due to hx of falls.    -DVT Prophy: SCDs.    -Disposition: TCU when bed is available.      Jun Pantoja MD    Subjective     Discussed case with SW and daughter at beside yesterday need for TCU and daughter is agreeable. Concerned that patient is thinking and moving slower than he is at his baseline. Seen with therapies at bedside this morning and having a lot of trouble with ambulation.        Objective   Blood pressure (!) 167/71, pulse 55, temperature 97.3  F (36.3  C), temperature source Oral, resp. rate 20, height 1.778 m (5' 10\"), weight 101.2 kg (223 lb 3.2 oz), SpO2 93 %.    PHYSICAL EXAM  General: In no acute distress  CV: RRR.  Lungs: CTAB. Nl WOB.  Abd: Non-tender.  Ext: 1+ edema.    LABS AND IMAGING: Reviewed and pertinent results discussed in assessment and plan.      "

## 2022-10-06 NOTE — PROGRESS NOTES
Care Management Follow Up    Length of Stay (days): 6    Expected Discharge Date: 10/06/2022     Concerns to be Addressed:       Patient plan of care discussed at interdisciplinary rounds: Yes    Anticipated Discharge Disposition: Transitional Care     Anticipated Discharge Services: None  Anticipated Discharge DME: Other (see comment) (TLSO brace)    Patient/family educated on Medicare website which has current facility and service quality ratings: yes  Education Provided on the Discharge Plan:    Patient/Family in Agreement with the Plan: yes    Referrals Placed by CM/SW: Post Acute Facilities    Additional Information: [id number]        Kiara Poole, RN, MSN, [facility]-BC  Manager of Inpatient Care Manager

## 2022-10-06 NOTE — PLAN OF CARE
Pt discharged to [facility].  Pt Is alert confused at times.  Up with a yoan-steady.  Pt calls appropriately for assistance.  Given Ty for pain.  Seroquil for anxiety when needed.  Corset brace LUPILLO in place.  Unable to do hard LUPILLO due to Kyphosis.  Meplex on coccyx for preventative, Legs 1-2* edema.  External cath for incontinence.      Mary Lou Mckeon RN

## 2022-10-06 NOTE — PLAN OF CARE
Assessment:   Disoriented to time & forgetful. Up ax2 w/ yoan steady. VSS. RA. PO dilaudid given for back pain. Seroquel for agitation. Brace on when out of bed. Edema to BLE. Using external cath.    Major Shift Events: Uneventful      Treatment Plan: pain management, Fluid restriction, Na checks, soft food. Neph & PT following. return to  with PT soon.   Bedside Nurse: Airam Rios RN

## 2022-10-06 NOTE — DISCHARGE SUMMARY
"[facility]  Discharge Summary    Admit date:  9/30/2022    Discharge date and time: 10/06/22 1330    Discharge Physician: Jun Pantoja MD    Primary care provider: Sylvia Stein    Primary Discharge Diagnosis      Acute Severe Hyponatremia    Secondary Diagnoses     Acute Metabolic Encephalopathy  Dementia  Compression Fractures of Lumbar Spine  pAF  Seizure Disorder  Mild to moderate aortic valve stenosis  Acute Pulmonary Edema 2/2 IV fluids - No CHF (not lasix dependent)    Summary of Hospital Stay     82M with hx of dementia, seizures, and pAF presents after a GLF and found to have lumbar compression fractures and severe multifactorial hyponatremia. Hyponatremia thought to be 2/2 volume depletion plus some degree of SIADH. Patient will be discharged to TCU for rehab needs.     -Follow-up appointments needed:   PCP   Nephrology - needs to be on 1.5L fluid restriction until follow-up   Cardiology    -Follow-up labs needed:   BMP within 1 week from date of discharge    Patient Discharge Condition & Exam     Discharge condition: Improved    BP (!) 167/71 (BP Location: Right arm)   Pulse 55   Temp 97.3  F (36.3  C) (Oral)   Resp 20   Ht 1.778 m (5' 10\")   Wt 101.2 kg (223 lb 3.2 oz)   SpO2 93%   BMI 32.03 kg/m       General: In NAD.  Cardiac: RRR.  Lungs: CTAB.  Abd: Non-tender.  Ext: No edema.    Discharge Instructions     Patient/family instructions: Written discharge instruction given to patient/family    Discharge Medications:     Review of your medicines      CONTINUE these medicines which have NOT CHANGED      Dose / Directions   acetaminophen 500 MG tablet  Commonly known as: TYLENOL      Dose: 1,000 mg  Take 1,000 mg by mouth every 6 hours as needed for mild pain  Refills: 0     alum & mag hydroxide-simethicone 400-400-40 MG/5ML Susp suspension  Commonly known as: Maalox Max  Used for: Gastroesophageal reflux disease, unspecified whether esophagitis present      Dose: 30 mL  Take " 30 mLs by mouth every 6 hours as needed for indigestion  Quantity: 355 mL  Refills: 3     Calmoseptine 0.44-20.6 % Oint ointment  Used for: Dermatitis  Generic drug: menthol-zinc oxide      Apply 1 g topically 2 times daily. May also apply 1 g 2 times daily as needed for skin protection.  Quantity: 113 g  Refills: 98     Deep Sea Nasal Spray 0.65 % nasal spray  Used for: Nasal congestion  Generic drug: sodium chloride      INHALE 2 SPRAYS IN EACH NOSTRIL ONCE DAILY  Quantity: 44 mL  Refills: 97     gabapentin 100 MG capsule  Commonly known as: NEURONTIN  Used for: Type 2 diabetes mellitus without complication, unspecified whether long term insulin use (H)      TAKE TWO CAPSULES (200MG) BY MOUTH EVERY NIGHT AT BEDTIME  Quantity: 180 capsule  Refills: 97     guaiFENesin 100 MG/5ML liquid  Commonly known as: ROBITUSSIN  Used for: Cough      GIVE 10ML (200 MG) BY MOUTH TWICE DAILY;AND TWICE DAILY AS NEEDED FOR COUGH  Quantity: 473 mL  Refills: 97     nystatin 118368 UNIT/GM external powder  Commonly known as: MYCOSTATIN      Apply topically 2 times daily  Refills: 0     omeprazole 40 MG DR capsule  Commonly known as: priLOSEC  Used for: Gastroesophageal reflux disease with esophagitis, unspecified whether hemorrhage      TAKE 1 CAPSULE BY MOUTH TWICE DAILY  Quantity: 180 capsule  Refills: 3     polyethylene glycol 17 GM/Dose powder  Commonly known as: MIRALAX  Used for: Slow transit constipation      MIX 2 CAPFULS IN 8OZ OF ORANGE JUICE  IN THE MORNING;AND MAY MIX 2 CAPFULS ONCE DAILY AS NEEDED FOR CONSTIPATION. MIX IN FRONT OF PT. HOLD FOR LOOSE STOOL. OK TO GIVE 1 CAPFUL IF RESIDENT REFUSES 2 CAPFULS.  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 May also take 0.5 tablets (12.5 mg) every 12 hours as needed (anxiety).  Quantity: 60 tablet  Refills: 11     trospium 20 MG tablet  Commonly known as: SANCTURA  Used for: Overactive bladder      TAKE 1 TABLET BY MOUTH TWICE DAILY  Quantity: 180 tablet  Refills: 97           Discharge diet: Regular with 1.5L fluid restriction    Discharge activity: Activity as tolerated    Discharge follow-up:    Follow up with primary care provider within one week or earlier if symptoms return or gets worse.    Follow up with consultant as instructed.    Pending Results     Unresulted Labs Ordered in the Past 30 Days of this Admission     Date and Time Order Name Status Description    10/1/2022  1:41 PM Blood Culture Arm, Right Preliminary     10/1/2022  1:41 PM Blood Culture Hand, Right Preliminary            Patient Allergies     Allergies   Allergen Reactions     Ativan [Lorazepam]      Disposition   Disposition: TCU     I saw and evaluated the patient on day of discharge and  discharge instructions reviewed  and  all the patient's questions and concerns addressed. Over 30 minutes spent on discharge and coordination of discharge process for this patient.

## 2022-10-06 NOTE — PLAN OF CARE
Assessment:   A/O, forgetful. Pt did use call light appropriately.  Up to chair with gaitbelt 2 assist sera steady. Dilaudid po given X2  Seroquil given X2 for anxiety.  Complete bedbath after external cath leakage. Pt is confused at times marin after just waking up.  Reoriented well.        Plan: pain management, O2 support, Fluid restriction, Na checks, soft food. Neph & PT following. return to  with PT soon.   Mary Lou Mckeon RN

## 2022-10-06 NOTE — PROGRESS NOTES
Care Management Discharge Note    Discharge Date: 10/06/2022       Discharge Disposition: Transitional Care    Discharge Services: None    Discharge DME: Other (see comment) (TLSO brace)    Discharge Transportation: agency    Private pay costs discussed: transportation costs    PAS Confirmation Code:    Patient/family educated on Medicare website which has current facility and service quality ratings: yes    Education Provided on the Discharge Plan:  yes  Persons Notified of Discharge Plans: lizzeth Hale  Patient/Family in Agreement with the Plan: yes    Handoff Referral Completed: Yes    Additional Information:  Received call from [facility] that they have a shared TCU bed available today after 1300. Spoke with lizzeth Hale and she is in agreement with this plan if patient is medically ready for discharge. We discussed transportation and due to patients back fractures, TLSO brace and impaired mobility they feel the safest transportation option should be wheelchair transport. Reviewed out of pocket cost for  [facility] transport, $81.80 for base rate and $5.26 per mile to the destination. They are in agreement with this transportation.    MD made aware of bed availability and he would like to check VBG and ammonia level and if WNL he can discharge. [facility]  transport set up for 1600.    Addendum 1340:  Ok for discharge. Orders faxed to [facility]. Lizzeth Hale notified of discharge time.    Catina Felix RN BSN OCN  Care Coordinator  [facility]  [phone]

## 2022-10-06 NOTE — PLAN OF CARE
Physical Therapy Discharge Summary    Reason for therapy discharge:    Discharged to long term care facility.    Progress towards therapy goal(s). See goals on Care Plan in [facility] electronic health record for goal details.  Goals not met.  Barriers to achieving goals:   limited tolerance for therapy and discharge from facility.    Therapy recommendation(s):    Continued therapy is recommended.  Rationale/Recommendations:  to progress to higher level of indep w/ mobility.

## 2022-10-07 ENCOUNTER — LAB REQUISITION (OUTPATIENT)
Dept: LAB | Facility: CLINIC | Age: 82
End: 2022-10-07

## 2022-10-07 ENCOUNTER — PREP FOR PROCEDURE (OUTPATIENT)
Dept: UROLOGY | Facility: CLINIC | Age: 82
End: 2022-10-07

## 2022-10-07 ENCOUNTER — TRANSITIONAL CARE UNIT VISIT (OUTPATIENT)
Dept: GERIATRICS | Facility: CLINIC | Age: 82
End: 2022-10-07
Payer: COMMERCIAL

## 2022-10-07 VITALS
HEART RATE: 71 BPM | OXYGEN SATURATION: 97 % | SYSTOLIC BLOOD PRESSURE: 141 MMHG | DIASTOLIC BLOOD PRESSURE: 98 MMHG | RESPIRATION RATE: 18 BRPM | WEIGHT: 216.5 LBS | BODY MASS INDEX: 30.99 KG/M2 | TEMPERATURE: 99.1 F | HEIGHT: 70 IN

## 2022-10-07 DIAGNOSIS — F10.27 DEMENTIA ASSOCIATED WITH ALCOHOLISM WITH BEHAVIORAL DISTURBANCE (H): ICD-10-CM

## 2022-10-07 DIAGNOSIS — Z86.69 HX OF SEIZURE DISORDER: ICD-10-CM

## 2022-10-07 DIAGNOSIS — R97.20 ELEVATED PROSTATE SPECIFIC ANTIGEN (PSA): Primary | ICD-10-CM

## 2022-10-07 DIAGNOSIS — R53.81 PHYSICAL DECONDITIONING: ICD-10-CM

## 2022-10-07 DIAGNOSIS — F10.10 ALCOHOL ABUSE: ICD-10-CM

## 2022-10-07 DIAGNOSIS — N40.2 PROSTATE NODULE: ICD-10-CM

## 2022-10-07 DIAGNOSIS — S32.000D COMPRESSION FRACTURE OF LUMBAR VERTEBRA WITH ROUTINE HEALING, UNSPECIFIED LUMBAR VERTEBRAL LEVEL, SUBSEQUENT ENCOUNTER: ICD-10-CM

## 2022-10-07 DIAGNOSIS — E87.1 HYPO-OSMOLALITY AND HYPONATREMIA: ICD-10-CM

## 2022-10-07 DIAGNOSIS — I48.20 CHRONIC ATRIAL FIBRILLATION (H): ICD-10-CM

## 2022-10-07 DIAGNOSIS — F41.1 GAD (GENERALIZED ANXIETY DISORDER): Primary | ICD-10-CM

## 2022-10-07 DIAGNOSIS — R97.20 ELEVATED PROSTATE SPECIFIC ANTIGEN (PSA): ICD-10-CM

## 2022-10-07 PROCEDURE — 86481 TB AG RESPONSE T-CELL SUSP: CPT | Performed by: NURSE PRACTITIONER

## 2022-10-07 PROCEDURE — 99310 SBSQ NF CARE HIGH MDM 45: CPT | Performed by: NURSE PRACTITIONER

## 2022-10-07 PROCEDURE — 36415 COLL VENOUS BLD VENIPUNCTURE: CPT | Performed by: NURSE PRACTITIONER

## 2022-10-07 PROCEDURE — P9603 ONE-WAY ALLOW PRORATED MILES: HCPCS | Performed by: NURSE PRACTITIONER

## 2022-10-07 NOTE — LETTER
10/7/2022        RE: Jamie Valdivia  [facility]  [address]  [facility] [address]        [facility]  PRIMARY CARE PROVIDER AND CLINIC:  MARICHUY Basilio [facility], [address] [facility] [address]  Chief Complaint   Patient presents with     [facility] Medical Record Number:  [id number]  Place of Service where encounter took place:  [facility] [[id number]]    Jamie Valdivia  is a 82 year old  (1940), admitted to the above facility from  [facility]. Hospital stay 9/30/22 through 10/6/22..  Admitted to this facility for  rehab, medical management and nursing care.    HPI:    HPI information obtained from: facility chart records, facility staff and patient report.   Brief Summary of Hospital Course:   Updates on Status Since Skilled nursing Admission:     Patient Jamie Garg is a 82 yr old male admitted to [facility] for rehabilitation s/p hospitalization [facility] 9/30-10/6/22 for lumbar compression fractures s/p fall and multifactorial hyponatremia (volume depletion and SIADH)  PMHx dementia, seizures, pAF, CAD, hypertension, history Etoh abuse, JOSÉ MANUEL, overactive bladder and neuropathy idiopathic        TODAY  Patient states he feels fine, pain managed with tylenol, takes for back and knees  Used a 4WW at home  Denies chest pain or shortness of breath  States he is eating meals.   Reports regular elimination   Wishes to be FULL code    CODE STATUS/ADVANCE DIRECTIVES DISCUSSION:   CPR/Full code   Patient's living condition: lives in an assisted living facility  ALLERGIES: Ativan [lorazepam]  PAST MEDICAL HISTORY:  has a past medical history of Age-related osteoporosis without current pathological fracture (03/30/2022), Alcohol abuse (11/19/2017), Alcohol dependence with withdrawal (H), Alcoholism (H), Back pain, Backache (12/19/2018), CAD (coronary artery  disease), Chronic a-fib (H), Chronic alcohol use (06/11/2015), Chronic atrial fibrillation (H) (07/15/2016), Claustrophobia (05/13/2015), Constipation, 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) (11/19/2021), ED (erectile dysfunction), Edema, Falling, JOSÉ MANUEL (generalized anxiety disorder), Generalized anxiety disorder (04/27/2020), GERD (gastroesophageal reflux disease), GI bleeding, H/O carpal tunnel syndrome, History of 2019 novel coronavirus disease (COVID-19) (02/08/2021), HTN (hypertension), Impaired gait and mobility (03/14/2019), Mild cognitive impairment (08/12/2019), Mild TBI (traumatic brain injury) (03/14/2019), Mumps, Obesity (BMI 30-39.9) (09/03/2015), Osteoarthritis, Osteoarthritis of both knees (05/13/2015), Osteoporosis, Other idiopathic peripheral autonomic neuropathy (03/30/2022), Other insomnia (03/14/2019), Other seizures (H) (03/30/2022), Palpitations, Peripheral neuropathy, Pharyngeal dysphagia (05/13/2015), Physical deconditioning (03/14/2019), Recurrent major depressive disorder (H) (03/30/2022), Rhabdomyolysis, Thrombocytopenia (H), Urination disorder, and Weakness (04/27/2020).    He has no past medical history of Asymptomatic human immunodeficiency virus (HIV) infection status (H), Blood in semen, Cerebral palsy (H), Complication of anesthesia, Congenital renal agenesis and dysgenesis, Epididymitis, bilateral, Epididymitis, left, Epididymitis, right, Goiter, Gout, Hernia, abdominal, History of spinal cord injury, History of thrombophlebitis, Horseshoe kidney, Hydrocephalus (H), Malignant hyperthermia, Orchitis, epididymitis, and epididymo-orchitis, with abscess, Parkinsons disease (H), Penile discharge, Prostate infection, Spider veins, Spina bifida (H), STD (sexually transmitted disease), Swelling of testicle, Tethered cord (H), or Tuberculosis.  PAST SURGICAL HISTORY:   has a past surgical history that includes left hip replacement (2010); left shoulder replacement  (2016); tonsillectomy; Spine surgery; orthopedic surgery; and Esophagoscopy, gastroscopy, duodenoscopy (EGD), combined (N/A, 06/01/2022).  FAMILY HISTORY: family history includes Osteoporosis in his mother; Prostate Cancer in his paternal grandfather.  SOCIAL HISTORY:   reports that he has never smoked. He has never used smokeless tobacco. He reports previous alcohol use. He reports that he does not use drugs.    Post Discharge Medication Reconciliation Status: discharge medications reconciled and changed, per note/orders    Current Outpatient Medications   Medication Sig Dispense Refill     acetaminophen (TYLENOL) 500 MG tablet Take 1,000 mg by mouth every 6 hours as needed for mild pain       alum & mag hydroxide-simethicone (MAALOX MAX) 400-400-40 MG/5ML SUSP suspension Take 30 mLs by mouth every 6 hours as needed for indigestion 355 mL 3     DEEP SEA NASAL SPRAY 0.65 % nasal spray INHALE 2 SPRAYS IN EACH NOSTRIL ONCE DAILY 44 mL 97     gabapentin (NEURONTIN) 100 MG capsule TAKE TWO CAPSULES (200MG) BY MOUTH EVERY NIGHT AT BEDTIME 180 capsule 97     guaiFENesin (ROBITUSSIN) 100 MG/5ML liquid GIVE 10ML (200 MG) BY MOUTH TWICE DAILY;AND TWICE DAILY AS NEEDED FOR COUGH 473 mL 97     menthol-zinc oxide (CALMOSEPTINE) 0.44-20.6 % OINT ointment Apply 1 g topically 2 times daily. May also apply 1 g 2 times daily as needed for skin protection. 113 g 98     nystatin (MYCOSTATIN) 843738 UNIT/GM external powder Apply topically 2 times daily       omeprazole (PRILOSEC) 40 MG DR capsule TAKE 1 CAPSULE BY MOUTH TWICE DAILY 180 capsule 3     polyethylene glycol (MIRALAX) 17 GM/Dose powder MIX 2 CAPFULS IN 8OZ OF ORANGE JUICE  IN THE MORNING;AND MAY MIX 2 CAPFULS ONCE DAILY AS NEEDED FOR CONSTIPATION. MIX IN FRONT OF PT. HOLD FOR LOOSE STOOL. OK TO GIVE 1 CAPFUL IF RESIDENT REFUSES 2 CAPFULS. 510 g 97     polyvinyl alcohol-povidone PF (REFRESH) 1.4-0.6 % ophthalmic solution 1 drop every 4 hours as needed for irritation        "QUEtiapine (SEROQUEL) 25 MG tablet Take 0.5 tablets (12.5 mg) by mouth 2 times daily. May also take 0.5 tablets (12.5 mg) every 12 hours as needed (anxiety). 60 tablet 11     trospium (SANCTURA) 20 MG tablet TAKE 1 TABLET BY MOUTH TWICE DAILY 180 tablet 97       ROS:  10 point ROS of systems including Constitutional, Eyes, Respiratory, Cardiovascular, Gastroenterology, Genitourinary, Integumentary, Musculoskeletal, Psychiatric were all negative except for pertinent positives noted in my HPI.    Vitals:  BP (!) 141/98   Pulse 71   Temp 99.1  F (37.3  C)   Resp 18   Ht 1.778 m (5' 10\")   Wt 98.2 kg (216 lb 8 oz)   SpO2 97%   BMI 31.06 kg/m    Exam:  GENERAL APPEARANCE:  Alert, in no distress  ENT:  Mouth and posterior oropharynx normal, moist mucous membranes  EYES:  EOM, conjunctivae, lids, pupils and irises normal  NECK:  No adenopathy,masses or thyromegaly  RESP:  respiratory effort and palpation of chest normal, lungs clear to auscultation, no respiratory distress  CV:  Palpation and auscultation of heart done , regular rate and rhythm, no murmur, rub, or gallop  ABDOMEN:  normal bowel sounds, soft, nontender, no hepatosplenomegaly or other masses  M/S:   lying in bed  SKIN:  Inspection of skin and subcutaneous tissue baseline, trace edema bilateral lower extremity   NEURO:   Cranial nerves 2-12 are normal tested and grossly at patient's baseline  PSYCH:  oriented X 3    Lab/Diagnostic data:  Labs done in SNF are in [facility]. Please refer to them using [facility]/Care Everywhere.     Last Comprehensive Metabolic Panel:  Sodium   Date Value Ref Range Status   10/05/2022 135 (L) 136 - 145 mmol/L Final     Potassium   Date Value Ref Range Status   10/06/2022 4.7 3.4 - 5.3 mmol/L Final     Comment:     Specimen slightly hemolyzed, potassium may be falsely elevated.   09/29/2022 4.1 3.4 - 5.3 mmol/L Final     Chloride   Date Value Ref Range Status   10/05/2022 96 (L) 98 - 107 mmol/L Final   09/29/2022 92 (L) 94 - " 109 mmol/L Final     Carbon Dioxide (CO2)   Date Value Ref Range Status   10/05/2022 33 (H) 22 - 29 mmol/L Final   09/29/2022 23 20 - 32 mmol/L Final     Anion Gap   Date Value Ref Range Status   10/05/2022 6 (L) 7 - 15 mmol/L Final   09/29/2022 11 3 - 14 mmol/L Final     Glucose   Date Value Ref Range Status   10/05/2022 109 (H) 70 - 99 mg/dL Final   09/29/2022 109 (H) 70 - 99 mg/dL Final     Urea Nitrogen   Date Value Ref Range Status   10/05/2022 8.7 8.0 - 23.0 mg/dL Final   09/29/2022 7 7 - 30 mg/dL Final     Creatinine   Date Value Ref Range Status   10/05/2022 0.50 (L) 0.67 - 1.17 mg/dL Final     GFR Estimate   Date Value Ref Range Status   10/05/2022 >90 >60 mL/min/1.73m2 Final     Comment:     Effective December 21, 2021 eGFRcr in adults is calculated using the 2021 CKD-EPI creatinine equation which includes age and gender (Curt et al., NEJ, DOI: 10.1056/TPGXxh0836182)     Calcium   Date Value Ref Range Status   10/05/2022 8.6 (L) 8.8 - 10.2 mg/dL Final       Lab Results   Component Value Date    WBC 7.4 10/03/2022     Lab Results   Component Value Date    RBC 3.83 10/03/2022     Lab Results   Component Value Date    HGB 11.1 10/03/2022     Lab Results   Component Value Date    HCT 33.0 10/03/2022     No components found for: MCT  Lab Results   Component Value Date    MCV 86 10/03/2022     Lab Results   Component Value Date    MCH 29.0 10/03/2022     Lab Results   Component Value Date    MCHC 33.6 10/03/2022     Lab Results   Component Value Date    RDW 13.4 10/03/2022     Lab Results   Component Value Date     10/03/2022         ASSESSMENT/PLAN:    Fall   Lumbar compression fractures  Chronic neuropathy  Denies pain   Continue tylenol as needed and scheduled Neurontin for pain management   Reports participating in therapies  Follow up neurosurgeon  Continue back brace as ordered, TLSO    Hyponatremia  Thought to be due to volume depletion and SIADH   Patient discharged from hospital on fluid  restriction 1500 ml  Dietary involved   follow up BMP,CBC 10/10/22  Consider pharmacy med review    Constipation  Continue miralax  Monitor     Overactive bladder  Denies urinary concerns  Continue Sanctura at this time, consider trial off given side effect profile    Atrial fibrillation  Rate controlled, not on medications to treat  Not on anticoagulation due to history falls    CAD  Vitals stable  Denies chest pain  Continue aspirin     hypertension  chronic managed   Not on medications to treat    Dementia  Lives in [facility] care  Wife lives at [facility]   Has supportive children  Pending repeat cognitive testing    JOSÉ MANUEL  agitation  Mood stable  Continue Seroquel  Monitor     Deconditioned  Comment: d/t recent hospitalization & comorbidity, expect delay rehabilitation d/t age & comorbidity  Plan: PT/OT eval & treat, monitor   involved in safe plan home  [facility]. Maybe good fit for long term care as well     Advanced care planning  Wishes to be FULL code         Appointments in Next Year    Oct 20, 2022 11:10 AM  New Visit with Victor Manuel Lucio MD  [facility] ([facility] ) [phone]   Nov 14, 2022  2:00 PM  (Arrive by 1:45 PM)  XR LUMBAR SPINE 2/3 VIEWS with CSXR1  [facility] ([facility] ) [phone]   Nov 14, 2022  2:30 PM  Return Visit with Faiza Penny PA-C  [facility] ([facility] ) [phone]   Dec 20, 2022  1:30 PM  (Arrive by 1:15 PM)  XR LUMBAR SPINE 2/3 VIEWS with CSXR1  [facility] ([facility] ) [phone]   Dec 20, 2022  2:00 PM  Return Visit with Jeronimo Garcia MD  [facility] ([facility]  ) [phone]          Total time spent with patient visit at the skilled nursing facility was 38 min including patient visit and review of past records. Greater than 50% of total time spent with counseling and coordinating care due to discussion on pain management, functional goals, discharge planning, goals of care and constipation management .     Electronically signed by:  MARICHUY Mendoza CNP                           Sincerely,        MARICHUY Mendoza CNP

## 2022-10-07 NOTE — PROGRESS NOTES
[place] [facility]  PRIMARY CARE PROVIDER AND CLINIC:  Sylvia Stein, MARICHUY CNP, [address] [facility] / [facility] [address]  Chief Complaint   Patient presents with     [facility] Medical Record Number:  [id number]  Place of Service where encounter took place:  [facility] [[id number]]    Jamie Valdivia  is a 82 year old  (1940), admitted to the above facility from  [facility]. Hospital stay 9/30/22 through 10/6/22..  Admitted to this facility for  rehab, medical management and nursing care.    HPI:    HPI information obtained from: facility chart records, facility staff and patient report.   Brief Summary of Hospital Course:   Updates on Status Since Skilled nursing Admission:     Patient Jamie Garg is a 82 yr old male admitted to [facility] for rehabilitation s/p hospitalization [facility] 9/30-10/6/22 for lumbar compression fractures s/p fall and multifactorial hyponatremia (volume depletion and SIADH)  PMHx dementia, seizures, pAF, CAD, hypertension, history Etoh abuse, JOSÉ MANUEL, overactive bladder and neuropathy idiopathic        TODAY  Patient states he feels fine, pain managed with tylenol, takes for back and knees  Used a 4WW at home  Denies chest pain or shortness of breath  States he is eating meals.   Reports regular elimination   Wishes to be FULL code    CODE STATUS/ADVANCE DIRECTIVES DISCUSSION:   CPR/Full code   Patient's living condition: lives in an assisted living facility  ALLERGIES: Ativan [lorazepam]  PAST MEDICAL HISTORY:  has a past medical history of Age-related osteoporosis without current pathological fracture (03/30/2022), Alcohol abuse (11/19/2017), Alcohol dependence with withdrawal (H), Alcoholism (H), Back pain, Backache (12/19/2018), CAD (coronary artery disease), Chronic a-fib (H), Chronic alcohol use (06/11/2015), Chronic atrial fibrillation (H) (07/15/2016), Claustrophobia  (05/13/2015), Constipation, 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) (11/19/2021), ED (erectile dysfunction), Edema, Falling, JOSÉ MANUEL (generalized anxiety disorder), Generalized anxiety disorder (04/27/2020), GERD (gastroesophageal reflux disease), GI bleeding, H/O carpal tunnel syndrome, History of 2019 novel coronavirus disease (COVID-19) (02/08/2021), HTN (hypertension), Impaired gait and mobility (03/14/2019), Mild cognitive impairment (08/12/2019), Mild TBI (traumatic brain injury) (03/14/2019), Mumps, Obesity (BMI 30-39.9) (09/03/2015), Osteoarthritis, Osteoarthritis of both knees (05/13/2015), Osteoporosis, Other idiopathic peripheral autonomic neuropathy (03/30/2022), Other insomnia (03/14/2019), Other seizures (H) (03/30/2022), Palpitations, Peripheral neuropathy, Pharyngeal dysphagia (05/13/2015), Physical deconditioning (03/14/2019), Recurrent major depressive disorder (H) (03/30/2022), Rhabdomyolysis, Thrombocytopenia (H), Urination disorder, and Weakness (04/27/2020).    He has no past medical history of Asymptomatic human immunodeficiency virus (HIV) infection status (H), Blood in semen, Cerebral palsy (H), Complication of anesthesia, Congenital renal agenesis and dysgenesis, Epididymitis, bilateral, Epididymitis, left, Epididymitis, right, Goiter, Gout, Hernia, abdominal, History of spinal cord injury, History of thrombophlebitis, Horseshoe kidney, Hydrocephalus (H), Malignant hyperthermia, Orchitis, epididymitis, and epididymo-orchitis, with abscess, Parkinsons disease (H), Penile discharge, Prostate infection, Spider veins, Spina bifida (H), STD (sexually transmitted disease), Swelling of testicle, Tethered cord (H), or Tuberculosis.  PAST SURGICAL HISTORY:   has a past surgical history that includes left hip replacement (2010); left shoulder replacement (2016); tonsillectomy; Spine surgery; orthopedic surgery; and Esophagoscopy, gastroscopy, duodenoscopy (EGD), combined (N/A,  06/01/2022).  FAMILY HISTORY: family history includes Osteoporosis in his mother; Prostate Cancer in his paternal grandfather.  SOCIAL HISTORY:   reports that he has never smoked. He has never used smokeless tobacco. He reports previous alcohol use. He reports that he does not use drugs.    Post Discharge Medication Reconciliation Status: discharge medications reconciled and changed, per note/orders    Current Outpatient Medications   Medication Sig Dispense Refill     acetaminophen (TYLENOL) 500 MG tablet Take 1,000 mg by mouth every 6 hours as needed for mild pain       alum & mag hydroxide-simethicone (MAALOX MAX) 400-400-40 MG/5ML SUSP suspension Take 30 mLs by mouth every 6 hours as needed for indigestion 355 mL 3     DEEP SEA NASAL SPRAY 0.65 % nasal spray INHALE 2 SPRAYS IN EACH NOSTRIL ONCE DAILY 44 mL 97     gabapentin (NEURONTIN) 100 MG capsule TAKE TWO CAPSULES (200MG) BY MOUTH EVERY NIGHT AT BEDTIME 180 capsule 97     guaiFENesin (ROBITUSSIN) 100 MG/5ML liquid GIVE 10ML (200 MG) BY MOUTH TWICE DAILY;AND TWICE DAILY AS NEEDED FOR COUGH 473 mL 97     menthol-zinc oxide (CALMOSEPTINE) 0.44-20.6 % OINT ointment Apply 1 g topically 2 times daily. May also apply 1 g 2 times daily as needed for skin protection. 113 g 98     nystatin (MYCOSTATIN) 967474 UNIT/GM external powder Apply topically 2 times daily       omeprazole (PRILOSEC) 40 MG DR capsule TAKE 1 CAPSULE BY MOUTH TWICE DAILY 180 capsule 3     polyethylene glycol (MIRALAX) 17 GM/Dose powder MIX 2 CAPFULS IN 8OZ OF ORANGE JUICE  IN THE MORNING;AND MAY MIX 2 CAPFULS ONCE DAILY AS NEEDED FOR CONSTIPATION. MIX IN FRONT OF PT. HOLD FOR LOOSE STOOL. OK TO GIVE 1 CAPFUL IF RESIDENT REFUSES 2 CAPFULS. 510 g 97     polyvinyl alcohol-povidone PF (REFRESH) 1.4-0.6 % ophthalmic solution 1 drop every 4 hours as needed for irritation       QUEtiapine (SEROQUEL) 25 MG tablet Take 0.5 tablets (12.5 mg) by mouth 2 times daily. May also take 0.5 tablets (12.5 mg) every  "12 hours as needed (anxiety). 60 tablet 11     trospium (SANCTURA) 20 MG tablet TAKE 1 TABLET BY MOUTH TWICE DAILY 180 tablet 97       ROS:  10 point ROS of systems including Constitutional, Eyes, Respiratory, Cardiovascular, Gastroenterology, Genitourinary, Integumentary, Musculoskeletal, Psychiatric were all negative except for pertinent positives noted in my HPI.    Vitals:  BP (!) 141/98   Pulse 71   Temp 99.1  F (37.3  C)   Resp 18   Ht 1.778 m (5' 10\")   Wt 98.2 kg (216 lb 8 oz)   SpO2 97%   BMI 31.06 kg/m    Exam:  GENERAL APPEARANCE:  Alert, in no distress  ENT:  Mouth and posterior oropharynx normal, moist mucous membranes  EYES:  EOM, conjunctivae, lids, pupils and irises normal  NECK:  No adenopathy,masses or thyromegaly  RESP:  respiratory effort and palpation of chest normal, lungs clear to auscultation, no respiratory distress  CV:  Palpation and auscultation of heart done , regular rate and rhythm, no murmur, rub, or gallop  ABDOMEN:  normal bowel sounds, soft, nontender, no hepatosplenomegaly or other masses  M/S:   lying in bed  SKIN:  Inspection of skin and subcutaneous tissue baseline, trace edema bilateral lower extremity   NEURO:   Cranial nerves 2-12 are normal tested and grossly at patient's baseline  PSYCH:  oriented X 3    Lab/Diagnostic data:  [facility] SNF [facility]. Please refer to them using [employer]/Care Everywhere.     Last Comprehensive Metabolic Panel:  Sodium   Date Value Ref Range Status   10/05/2022 135 (L) 136 - 145 mmol/L Final     Potassium   Date Value Ref Range Status   10/06/2022 4.7 3.4 - 5.3 mmol/L Final     Comment:     Specimen slightly hemolyzed, potassium may be falsely elevated.   09/29/2022 4.1 3.4 - 5.3 mmol/L Final     Chloride   Date Value Ref Range Status   10/05/2022 96 (L) 98 - 107 mmol/L Final   09/29/2022 92 (L) 94 - 109 mmol/L Final     Carbon Dioxide (CO2)   Date Value Ref Range Status   10/05/2022 33 (H) 22 - 29 mmol/L Final   09/29/2022 " 23 20 - 32 mmol/L Final     Anion Gap   Date Value Ref Range Status   10/05/2022 6 (L) 7 - 15 mmol/L Final   09/29/2022 11 3 - 14 mmol/L Final     Glucose   Date Value Ref Range Status   10/05/2022 109 (H) 70 - 99 mg/dL Final   09/29/2022 109 (H) 70 - 99 mg/dL Final     Urea Nitrogen   Date Value Ref Range Status   10/05/2022 8.7 8.0 - 23.0 mg/dL Final   09/29/2022 7 7 - 30 mg/dL Final     Creatinine   Date Value Ref Range Status   10/05/2022 0.50 (L) 0.67 - 1.17 mg/dL Final     GFR Estimate   Date Value Ref Range Status   10/05/2022 >90 >60 mL/min/1.73m2 Final     Comment:     Effective December 21, 2021 eGFRcr in adults is calculated using the 2021 CKD-EPI creatinine equation which includes age and gender (Curt et al., NE, DOI: 10.1056/WROMah9455222)     Calcium   Date Value Ref Range Status   10/05/2022 8.6 (L) 8.8 - 10.2 mg/dL Final       Lab Results   Component Value Date    WBC 7.4 10/03/2022     Lab Results   Component Value Date    RBC 3.83 10/03/2022     Lab Results   Component Value Date    HGB 11.1 10/03/2022     Lab Results   Component Value Date    HCT 33.0 10/03/2022     No components found for: MCT  Lab Results   Component Value Date    MCV 86 10/03/2022     Lab Results   Component Value Date    MCH 29.0 10/03/2022     Lab Results   Component Value Date    MCHC 33.6 10/03/2022     Lab Results   Component Value Date    RDW 13.4 10/03/2022     Lab Results   Component Value Date     10/03/2022     IMPRESSION:  HEAD CT:  1.  No acute intracranial process.     CERVICAL SPINE CT:  1.  No fracture or posttraumatic subluxation.  2.  Moderate bilateral neural foraminal narrowing at C3-4.     THORACIC SPINE CT:  1.  No acute fracture or posttraumatic subluxation.  2.  There are multiple chronic compression deformities as detailed above.  3.  Posterior instrumented fusion from T8 through T12.  4.  Moderate foraminal narrowing bilaterally at T10-11.  5.  Moderate to advanced thoracic  kyphosis.     LUMBAR SPINE CT:  1.  Age-indeterminate compression deformities of L2-L5 with a chronic mild compression deformity at the superior endplate of L1.  2.  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  3.  Moderate multilevel central canal stenosis and neural foraminal narrowing.      EXAM: XR KNEE PORT RIGHT 1/2 VIEWS  LOCATION: [facility]  DATE/TIME: 10/2/2022 7:25 PM     INDICATION: right knee pain trauma  COMPARISON: None.                                                                      IMPRESSION: No acute fracture. 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 Diffuse bony demineralization. Small knee joint effusion.      ASSESSMENT/PLAN:    Fall   Lumbar compression fractures  Chronic neuropathy  Denies pain   Continue tylenol as needed and scheduled Neurontin for pain management   Reports participating in therapies  Follow up neurosurgeon  Continue back brace as ordered, TLSO when out of bed    Hyponatremia  Thought to be due to volume depletion and SIADH   Patient discharged from hospital on fluid restriction 1500 ml  Dietary involved   follow up BMP,CBC 10/10/22  Consider pharmacy med review    Constipation  Continue miralax  Monitor     Overactive bladder  Denies urinary concerns  Continue Sanctura at this time, consider trial off given side effect profile    Atrial fibrillation  Rate controlled, not on medications to treat  Not on anticoagulation due to history falls    CAD  Vitals stable  Denies chest pain  Continue aspirin     hypertension  chronic managed   Not on medications to treat    Dementia  Lives in [facility] memory care  Wife lives at [facility]   Has supportive children  Pending repeat cognitive testing    JOSÉ MANUEL  agitation  Mood stable  Continue Seroquel  Monitor     Deconditioned  Comment: d/t recent  hospitalization & comorbidity, expect delay rehabilitation d/t age & comorbidity  Plan: PT/OT eval & treat, monitor   involved in safe plan home  [facility] lives at [facility]. Maybe good fit for long term care as well     Advanced care planning  Wishes to be FULL code         Appointments in Next Year    Oct 20, 2022 11:10 AM  New Visit with Victor Manuel Lucio MD  [facility] [place] ([facility] ) [phone]   Nov 14, 2022  2:00 PM  (Arrive by 1:45 PM)  XR LUMBAR SPINE 2/3 VIEWS with CSXR1  [facility] ([facility] ) [phone]   Nov 14, 2022  2:30 PM  Return Visit with Faiza Penny PA-C  [facility] ([facility] ) [phone]   Dec 20, 2022  1:30 PM  (Arrive by 1:15 PM)  XR LUMBAR SPINE 2/3 VIEWS with CSXR1  [facility] ([facility] ) [phone]   Dec 20, 2022  2:00 PM  Return Visit with Jeronimo Garcia MD  [facility] ([facility] ) [phone]          Total time spent with patient visit at the skilled nursing facility was 38 min including patient visit and review of past records. Greater than 50% of total time spent with counseling and coordinating care due to discussion on pain management, functional goals, discharge planning, goals of care and constipation management .     Electronically signed by:  MARICHUY Mendoza CNP

## 2022-10-07 NOTE — PROGRESS NOTES
Speech Language Therapy Discharge Summary    Reason for therapy discharge:    Discharged to transitional care facility.    Progress towards therapy goal(s). [facility] for goal details.  Goals partially met.  Barriers to achieving goals:   discharge from facility.    Therapy recommendation(s):    Continued therapy is recommended.  Rationale/Recommendations:  dysphagia f/u as indicated.     At the time of hospital discharge the pt was on a soft and bite sized diet with thin liquids -- small bites/sips, slow rate, upright posture, assist with meals.     Would consider GI consult as indicated given PMHx/past testing.

## 2022-10-08 LAB
GAMMA INTERFERON BACKGROUND BLD IA-ACNC: 0.04 IU/ML
M TB IFN-G BLD-IMP: NEGATIVE
M TB IFN-G CD4+ BCKGRND COR BLD-ACNC: 9.96 IU/ML
MITOGEN IGNF BCKGRD COR BLD-ACNC: 0.03 IU/ML
MITOGEN IGNF BCKGRD COR BLD-ACNC: 0.05 IU/ML
QUANTIFERON MITOGEN: 10 IU/ML
QUANTIFERON NIL TUBE: 0.04 IU/ML
QUANTIFERON TB1 TUBE: 0.07 IU/ML
QUANTIFERON TB2 TUBE: 0.09

## 2022-10-10 ENCOUNTER — TRANSITIONAL CARE UNIT VISIT (OUTPATIENT)
Dept: GERIATRICS | Facility: CLINIC | Age: 82
End: 2022-10-10
Payer: COMMERCIAL

## 2022-10-10 VITALS
DIASTOLIC BLOOD PRESSURE: 72 MMHG | RESPIRATION RATE: 18 BRPM | BODY MASS INDEX: 33.74 KG/M2 | TEMPERATURE: 97.2 F | WEIGHT: 222.6 LBS | SYSTOLIC BLOOD PRESSURE: 128 MMHG | HEIGHT: 68 IN | HEART RATE: 66 BPM | OXYGEN SATURATION: 95 %

## 2022-10-10 DIAGNOSIS — R53.81 PHYSICAL DECONDITIONING: ICD-10-CM

## 2022-10-10 DIAGNOSIS — E87.1 HYPONATREMIA: ICD-10-CM

## 2022-10-10 DIAGNOSIS — S32.000D COMPRESSION FRACTURE OF LUMBAR VERTEBRA WITH ROUTINE HEALING, UNSPECIFIED LUMBAR VERTEBRAL LEVEL, SUBSEQUENT ENCOUNTER: Primary | ICD-10-CM

## 2022-10-10 LAB
ANION GAP SERPL CALCULATED.3IONS-SCNC: 9 MMOL/L (ref 3–14)
BUN SERPL-MCNC: 14 MG/DL (ref 7–30)
CALCIUM SERPL-MCNC: 9.3 MG/DL (ref 8.5–10.1)
CHLORIDE BLD-SCNC: 99 MMOL/L (ref 94–109)
CO2 SERPL-SCNC: 26 MMOL/L (ref 20–32)
CREAT SERPL-MCNC: 0.5 MG/DL (ref 0.66–1.25)
ERYTHROCYTE [DISTWIDTH] IN BLOOD BY AUTOMATED COUNT: 14 % (ref 10–15)
GFR SERPL CREATININE-BSD FRML MDRD: >90 ML/MIN/1.73M2
GLUCOSE BLD-MCNC: 89 MG/DL (ref 70–99)
HCT VFR BLD AUTO: 37.7 % (ref 40–53)
HGB BLD-MCNC: 12.2 G/DL (ref 13.3–17.7)
MCH RBC QN AUTO: 28.8 PG (ref 26.5–33)
MCHC RBC AUTO-ENTMCNC: 32.4 G/DL (ref 31.5–36.5)
MCV RBC AUTO: 89 FL (ref 78–100)
PLATELET # BLD AUTO: 306 10E3/UL (ref 150–450)
POTASSIUM BLD-SCNC: 4.5 MMOL/L (ref 3.4–5.3)
RBC # BLD AUTO: 4.24 10E6/UL (ref 4.4–5.9)
SODIUM SERPL-SCNC: 134 MMOL/L (ref 133–144)
WBC # BLD AUTO: 6.1 10E3/UL (ref 4–11)

## 2022-10-10 PROCEDURE — 85027 COMPLETE CBC AUTOMATED: CPT | Performed by: NURSE PRACTITIONER

## 2022-10-10 PROCEDURE — 99309 SBSQ NF CARE MODERATE MDM 30: CPT | Performed by: NURSE PRACTITIONER

## 2022-10-10 PROCEDURE — P9604 ONE-WAY ALLOW PRORATED TRIP: HCPCS | Performed by: NURSE PRACTITIONER

## 2022-10-10 PROCEDURE — 36415 COLL VENOUS BLD VENIPUNCTURE: CPT | Performed by: NURSE PRACTITIONER

## 2022-10-10 PROCEDURE — 80048 BASIC METABOLIC PNL TOTAL CA: CPT | Performed by: NURSE PRACTITIONER

## 2022-10-10 NOTE — PROGRESS NOTES
[place] [facility]  [place] Medical Record Number:  [id number]  Place of Service where encounter took place:  [facility] ([facility]) [[id number]]  Chief Complaint   Patient presents with     Nursing Home Acute       HPI:    Jamie Valdivia  is a 82 year old (1940), who is being seen today for an episodic care visit.  HPI information obtained from: facility chart records, facility staff and patient report. Today's concern is:    Patient Jamie Garg is a 82 yr old male admitted to [facility] for rehabilitation s/p hospitalization [facility] 9/30-10/6/22 for lumbar compression fractures s/p fall and multifactorial hyponatremia (volume depletion and SIADH)  PMHx dementia, seizures, pAF, CAD, hypertension, history Etoh abuse, JOSÉ MANUEL, overactive bladder and neuropathy idiopathic        Compression fracture of lumbar vertebra with routine healing, unspecified lumbar vertebral level, subsequent encounter  Hyponatremia  Physical deconditioning     Patient report pain managed in back and right knee  Using Tylenol 1000mg ~1-2xday  Progressing in therapies, doing seating exercises and working on transfers  NA within normal limits, patient does not want to be on fluid restriction and has been non-complaint  Daughter requesting hospital bed for discharge planning given lumbar fracture    Past Medical and Surgical History reviewed in Epic today.    MEDICATIONS:    Current Outpatient Medications   Medication Sig Dispense Refill     acetaminophen (TYLENOL) 500 MG tablet Take 1,000 mg by mouth every 6 hours as needed for mild pain       alum & mag hydroxide-simethicone (MAALOX MAX) 400-400-40 MG/5ML SUSP suspension Take 30 mLs by mouth every 6 hours as needed for indigestion 355 mL 3     DEEP SEA NASAL SPRAY 0.65 % nasal spray INHALE 2 SPRAYS IN EACH NOSTRIL ONCE DAILY 44 mL 97     gabapentin (NEURONTIN) 100 MG capsule TAKE TWO CAPSULES (200MG) BY MOUTH EVERY NIGHT AT BEDTIME  "180 capsule 97     guaiFENesin (ROBITUSSIN) 100 MG/5ML liquid GIVE 10ML (200 MG) BY MOUTH TWICE DAILY;AND TWICE DAILY AS NEEDED FOR COUGH 473 mL 97     menthol-zinc oxide (CALMOSEPTINE) 0.44-20.6 % OINT ointment Apply 1 g topically 2 times daily. May also apply 1 g 2 times daily as needed for skin protection. 113 g 98     nystatin (MYCOSTATIN) 792772 UNIT/GM external powder Apply topically 2 times daily       omeprazole (PRILOSEC) 40 MG DR capsule TAKE 1 CAPSULE BY MOUTH TWICE DAILY 180 capsule 3     polyethylene glycol (MIRALAX) 17 GM/Dose powder MIX 2 CAPFULS IN 8OZ OF ORANGE JUICE  IN THE MORNING;AND MAY MIX 2 CAPFULS ONCE DAILY AS NEEDED FOR CONSTIPATION. MIX IN FRONT OF PT. HOLD FOR LOOSE STOOL. OK TO GIVE 1 CAPFUL IF RESIDENT REFUSES 2 CAPFULS. 510 g 97     polyvinyl alcohol-povidone PF (REFRESH) 1.4-0.6 % ophthalmic solution 1 drop every 4 hours as needed for irritation       QUEtiapine (SEROQUEL) 25 MG tablet Take 0.5 tablets (12.5 mg) by mouth 2 times daily. May also take 0.5 tablets (12.5 mg) every 12 hours as needed (anxiety). 60 tablet 11     trospium (SANCTURA) 20 MG tablet TAKE 1 TABLET BY MOUTH TWICE DAILY 180 tablet 97         REVIEW OF SYSTEMS:  4 point ROS including Respiratory, CV, GI and , other than that noted in the HPI,  is negative    Objective:  /72   Pulse 66   Temp 97.2  F (36.2  C)   Resp 18   Ht 1.727 m (5' 8\")   Wt 101 kg (222 lb 9.6 oz)   SpO2 95%   BMI 33.85 kg/m    Exam:  GENERAL APPEARANCE:  Alert, in no distress  RESP:  respiratory effort and palpation of chest normal, lungs clear to auscultation , no respiratory distress  CV:  Palpation and auscultation of heart done , regular rate and rhythm, no murmur, rub, or gallop  ABDOMEN:  normal bowel sounds, soft, nontender, no hepatosplenomegaly or other masses  M/S:   lying in bed  SKIN:  Inspection of skin and subcutaneous tissue baseline  PSYCH:  oriented X 3    Labs:   Labs done in SNF are in [place] EPIC. Please " refer to them using EPIC/Care Everywhere.   Last Comprehensive Metabolic Panel:  Sodium   Date Value Ref Range Status   10/10/2022 134 133 - 144 mmol/L Final     Potassium   Date Value Ref Range Status   10/10/2022 4.5 3.4 - 5.3 mmol/L Final     Chloride   Date Value Ref Range Status   10/10/2022 99 94 - 109 mmol/L Final     Carbon Dioxide (CO2)   Date Value Ref Range Status   10/10/2022 26 20 - 32 mmol/L Final     Anion Gap   Date Value Ref Range Status   10/10/2022 9 3 - 14 mmol/L Final     Glucose   Date Value Ref Range Status   10/10/2022 89 70 - 99 mg/dL Final     Urea Nitrogen   Date Value Ref Range Status   10/10/2022 14 7 - 30 mg/dL Final     Creatinine   Date Value Ref Range Status   10/10/2022 0.50 (L) 0.66 - 1.25 mg/dL Final     GFR Estimate   Date Value Ref Range Status   10/10/2022 >90 >60 mL/min/1.73m2 Final     Comment:     Effective December 21, 2021 eGFRcr in adults is calculated using the 2021 CKD-EPI creatinine equation which includes age and gender (Curt et al., NEJM, DOI: 10.1056/NYOIhr9107626)     Calcium   Date Value Ref Range Status   10/10/2022 9.3 8.5 - 10.1 mg/dL Final     Lab Results   Component Value Date    WBC 6.1 10/10/2022     Lab Results   Component Value Date    RBC 4.24 10/10/2022     Lab Results   Component Value Date    HGB 12.2 10/10/2022     Lab Results   Component Value Date    HCT 37.7 10/10/2022     No components found for: MCT  Lab Results   Component Value Date    MCV 89 10/10/2022     Lab Results   Component Value Date    MCH 28.8 10/10/2022     Lab Results   Component Value Date    MCHC 32.4 10/10/2022     Lab Results   Component Value Date    RDW 14.0 10/10/2022     Lab Results   Component Value Date     10/10/2022         ASSESSMENT/PLAN:     Compression fracture of lumbar vertebra with routine healing, unspecified lumbar vertebral level, subsequent encounter  Hyponatremia  Physical deconditioning     Patient report pain managed in back and right knee  Using  Tylenol 1000mg ~1-2xday  Continue tylenol for pain managed  Continue back brace when up  Follow up spine specialist as advised    Progressing in therapies, doing seating exercises and working on transfers  Continue therapies     NA within normal limits, patient does not want to be on fluid restriction and has been non-complaint  Will discontinue fluid restriction  [facility] 10/14/22    Daughter requesting hospital bed for discharge planning given lumbar fracture    Appointments in Next Year    Oct 10, 2022  9:30 AM  Transitional Care Unit Visit with MARICHUY Mendoza CNP  [facility] ([facility] ) [phone]   Oct 20, 2022 11:10 AM  New Visit with Victor Manuel Lucio MD  [facility] ([facility] ) [phone]   Nov 14, 2022  2:00 PM  (Arrive by 1:45 PM)  XR LUMBAR SPINE 2/3 VIEWS with CSXR1  [facility] ([facility] ) [phone]   Nov 14, 2022  2:30 PM  Return Visit with Faiza Penny PA-C  [facility] ([facility] ) [phone]   Dec 20, 2022  1:30 PM  (Arrive by 1:15 PM)  XR LUMBAR SPINE 2/3 VIEWS with CSXR1  [facility] ([facility] ) [phone]   Dec 20, 2022  2:00 PM  Return Visit with Jeronimo Garcia MD  [facility] ([facility] ) [phone]                  Electronically signed by:  MARICHUY Mendoza CNP

## 2022-10-12 ENCOUNTER — LAB REQUISITION (OUTPATIENT)
Dept: LAB | Facility: CLINIC | Age: 82
End: 2022-10-12
Payer: COMMERCIAL

## 2022-10-12 ENCOUNTER — DOCUMENTATION ONLY (OUTPATIENT)
Dept: OTHER | Facility: CLINIC | Age: 82
End: 2022-10-12

## 2022-10-12 DIAGNOSIS — E87.1 HYPO-OSMOLALITY AND HYPONATREMIA: ICD-10-CM

## 2022-10-13 ENCOUNTER — LAB REQUISITION (OUTPATIENT)
Dept: LAB | Facility: CLINIC | Age: 82
End: 2022-10-13

## 2022-10-13 DIAGNOSIS — E87.6 HYPOKALEMIA: ICD-10-CM

## 2022-10-14 VITALS
TEMPERATURE: 97.7 F | SYSTOLIC BLOOD PRESSURE: 137 MMHG | BODY MASS INDEX: 33.01 KG/M2 | OXYGEN SATURATION: 97 % | WEIGHT: 217.8 LBS | HEIGHT: 68 IN | RESPIRATION RATE: 18 BRPM | HEART RATE: 53 BPM | DIASTOLIC BLOOD PRESSURE: 76 MMHG

## 2022-10-14 LAB
ANION GAP SERPL CALCULATED.3IONS-SCNC: 7 MMOL/L (ref 3–14)
BUN SERPL-MCNC: 12 MG/DL (ref 7–30)
CALCIUM SERPL-MCNC: 8.8 MG/DL (ref 8.5–10.1)
CHLORIDE BLD-SCNC: 100 MMOL/L (ref 94–109)
CO2 SERPL-SCNC: 28 MMOL/L (ref 20–32)
CREAT SERPL-MCNC: 0.52 MG/DL (ref 0.66–1.25)
GFR SERPL CREATININE-BSD FRML MDRD: >90 ML/MIN/1.73M2
GLUCOSE BLD-MCNC: 94 MG/DL (ref 70–99)
POTASSIUM BLD-SCNC: 4.1 MMOL/L (ref 3.4–5.3)
SODIUM SERPL-SCNC: 135 MMOL/L (ref 133–144)

## 2022-10-14 PROCEDURE — 82310 ASSAY OF CALCIUM: CPT | Performed by: NURSE PRACTITIONER

## 2022-10-14 PROCEDURE — P9603 ONE-WAY ALLOW PRORATED MILES: HCPCS | Performed by: NURSE PRACTITIONER

## 2022-10-14 PROCEDURE — 36415 COLL VENOUS BLD VENIPUNCTURE: CPT | Performed by: NURSE PRACTITIONER

## 2022-10-14 NOTE — PROGRESS NOTES
"[facility] GERIATRICS    PRIMARY CARE PROVIDER AND CLINIC:  MARICHUY Basilio CNP, [address] [facility] / [facility] [address]  Chief Complaint   Patient presents with     Hospital F/U      [place] Medical Record Number:  [id number]  Place of Service where encounter took place:  [facility] (U)     Hospital course reviewed by me, is as per the hospital discharge summary and NP note.    Jamie Valdivia  is a 82 year old  (1940), admitted to the above facility from  [facility]. Hospital stay 9/30/22 through 10/6/22..       Hospital was reviewed by me, is as per the limited hospital discharge summary and the U  NP note.    Pt  is a 82 yr old male admitted to [facility] for rehabilitation s/p hospitalization [facility] 9/30-10/6/22 for lumbar compression fractures s/p fall and multifactorial hyponatremia (volume depletion and SIADH)    PMHx dementia, seizures, pAF, CAD, hypertension, history Etoh abuse, JOSÉ MANUEL, overactive bladder and neuropathy     Pt presented from his memory care unit with c/o back pain after a fall. Staff had noted increased confusion and agitation  Lumbar CT revealed Age-indeterminate compression deformities of L2-L5 with a chronic mild compression deformity at the superior endplate of L1.  Neuro surgery recommended conservative management with brace  Na 114, WBC 13,4  Na improved somewhat  with IV fluids, then leveled off in mid 120s  Echo nl   Pain managed with tylenol  Encephalopathy improved, mental status felt to be close to baseline at time of discharge  Limited ambulation at time of discharge.    Pt has a history of BPH, has an elevated PSA, has prostate biopsy scheduled.      Pt notes continued back pain, states he \"hurts all over\" today  He is receiving only tylenol  He states he is walking short distances with therapy  He denies chest pain, cough, SOB, dizziness, abd pain, dizziness        CODE " STATUS/ADVANCE DIRECTIVES DISCUSSION:  Prior  CPR/Full code   ALLERGIES:   Allergies   Allergen Reactions     Ativan [Lorazepam]       PAST MEDICAL HISTORY:   Past Medical History:   Diagnosis Date     Age-related osteoporosis without current pathological fracture 03/30/2022     Alcohol abuse 11/19/2017     Alcohol dependence with withdrawal (H)      Alcoholism (H)      Back pain      Backache 12/19/2018     CAD (coronary artery disease)      Chronic a-fib (H)      Chronic alcohol use 06/11/2015    Formatting of this note might be different from the original. 2/26/2019: serious fall at home with multiple vertebral fractures.  BAL 0.414% on admission.  Denies that he drinks much. 12/18/2018: hospitalized for fall due to alcohol intoxication.  BAL 0.34% on admission. 9/16/2018: hospitalized for injuries from fall due to alcohol intoxication.  BAL 0.34% on admission     Chronic atrial fibrillation (H) 07/15/2016    Formatting of this note might be different from the original. 2/2019: Multiple falls so started on aspirin only.  Then aspirin stopped due to GI bleed.     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 2/2/21     HTN (hypertension)      Impaired gait and mobility 03/14/2019     Mild cognitive impairment 08/12/2019    Formatting of this note might be different from the original. NON-AMNESTIC TYPE     Mild TBI (traumatic brain injury) 03/14/2019     Mumps      Obesity (BMI 30-39.9) 09/03/2015     Osteoarthritis      Osteoarthritis of both knees 05/13/2015     Osteoporosis       Other idiopathic peripheral autonomic neuropathy 03/30/2022     Other insomnia 03/14/2019     Other seizures (H) 03/30/2022     Palpitations      Peripheral neuropathy      Pharyngeal dysphagia 05/13/2015    Formatting of this note might be different from the original. Likely secondary to GERD with esophagitis, on PPI and H2 blocker with notable improvement, EGD 10/5/15.     Physical deconditioning 03/14/2019     Recurrent major depressive disorder (H) 03/30/2022     Rhabdomyolysis      Thrombocytopenia (H)      Urination disorder      Weakness 04/27/2020      PAST SURGICAL HISTORY:   has a past surgical history that includes left hip replacement (2010); left shoulder replacement (2016); tonsillectomy; Spine surgery; orthopedic surgery; and Esophagoscopy, gastroscopy, duodenoscopy (EGD), combined (N/A, 06/01/2022).  FAMILY HISTORY: family history includes Osteoporosis in his mother; Prostate Cancer in his paternal grandfather.  SOCIAL HISTORY:   reports that he has never smoked. He has never used smokeless tobacco. He reports that he does not currently use alcohol. He reports that he does not use drugs.  Patient's living condition: lives in an assisted living facility    Medications were reviewed by me today:    Current Outpatient Medications   Medication Sig     acetaminophen (TYLENOL) 500 MG tablet Take 1,000 mg by mouth every 6 hours as needed for mild pain     alum & mag hydroxide-simethicone (MAALOX MAX) 400-400-40 MG/5ML SUSP suspension Take 30 mLs by mouth every 6 hours as needed for indigestion     DEEP SEA NASAL SPRAY 0.65 % nasal spray INHALE 2 SPRAYS IN EACH NOSTRIL ONCE DAILY     gabapentin (NEURONTIN) 100 MG capsule TAKE TWO CAPSULES (200MG) BY MOUTH EVERY NIGHT AT BEDTIME     guaiFENesin (ROBITUSSIN) 100 MG/5ML liquid GIVE 10ML (200 MG) BY MOUTH TWICE DAILY;AND TWICE DAILY AS NEEDED FOR COUGH     menthol-zinc oxide (CALMOSEPTINE) 0.44-20.6 % OINT ointment Apply 1 g topically 2 times daily. May also  "apply 1 g 2 times daily as needed for skin protection.     nystatin (MYCOSTATIN) 844171 UNIT/GM external powder Apply topically 2 times daily     omeprazole (PRILOSEC) 40 MG DR capsule TAKE 1 CAPSULE BY MOUTH TWICE DAILY     polyethylene glycol (MIRALAX) 17 GM/Dose powder MIX 2 CAPFULS IN 8OZ OF ORANGE JUICE  IN THE MORNING;AND MAY MIX 2 CAPFULS ONCE DAILY AS NEEDED FOR CONSTIPATION. MIX IN FRONT OF PT. HOLD FOR LOOSE STOOL. OK TO GIVE 1 CAPFUL IF RESIDENT REFUSES 2 CAPFULS.     polyvinyl alcohol-povidone PF (REFRESH) 1.4-0.6 % ophthalmic solution 1 drop every 4 hours as needed for irritation     QUEtiapine (SEROQUEL) 25 MG tablet Take 0.5 tablets (12.5 mg) by mouth 2 times daily. May also take 0.5 tablets (12.5 mg) every 12 hours as needed (anxiety).     trospium (SANCTURA) 20 MG tablet TAKE 1 TABLET BY MOUTH TWICE DAILY     No current facility-administered medications for this visit.       ROS:  10 point ROS of systems including Constitutional, Eyes, Respiratory, Cardiovascular, Gastroenterology, Genitourinary, Integumentary, Musculoskeletal, Psychiatric were all negative except for pertinent positives noted in my HPI.    Vitals:  /76   Pulse 53   Temp 97.7  F (36.5  C)   Resp 18   Ht 1.727 m (5' 8\")   Wt 98.8 kg (217 lb 12.8 oz)   SpO2 97%   BMI 33.12 kg/m    Exam:  Well appearing male, lying in bed, c/o back pain  HEENT oral mucosa moist  Neck no adenopathy  Lungs clear  CV rrr  No LE edema  Neuro: alert, oriented to person, place, general circumstances  Diminished insight  No focal LE weakness, as assessed with Pt lying in bed        Lab/Diagnostic data:    Most Recent 3 CBC's:Recent Labs   Lab Test 10/10/22  0845 10/03/22  0743 10/02/22  0859 10/01/22  0837   WBC 6.1 7.4 9.4 17.0*   HGB 12.2* 11.1* 12.1* 12.7*   MCV 89 86 86 84    144*  --  188     Most Recent 3 BMP's:Recent Labs   Lab Test 10/14/22  0515 10/10/22  0845 10/06/22  0623 10/05/22  0750    134  --  135*   POTASSIUM 4.1 " 4.5 4.7 3.7   CHLORIDE 100 99  --  96*   CO2 28 26  --  33*   BUN 12 14  --  8.7   CR 0.52* 0.50*  --  0.50*   ANIONGAP 7 9  --  6*   JOSUE 8.8 9.3  --  8.6*   GLC 94 89  --  109*           ASSESSMENT/PLAN:      L compression fracture after fall  Moderate back pain today, slow progress in therapy  No neurologic signs  Plan continue therapies, tylenol for pain, back brace when up    Hyponatremia  Possible mixed picture of SIADH and volume depletion  Stable  Plan monitor lytes without FR    CAD  HTN  Stable on ASA  Not on statin      History afib  HR controlled, not on AC secondary to falls, history of GI bleed  Plan monitor vitals    Dementia   Encephalopathy in setting of hyponatremia, pain  Improved  Plan continue seroquel, monitor mental and functional status  Assure safe discharge    BPH  Elevated PSA  Plan monitor voiding, continue Sanctura  Urology f/u for biopsy      Tyler Weiss MD

## 2022-10-15 ENCOUNTER — TRANSITIONAL CARE UNIT VISIT (OUTPATIENT)
Dept: GERIATRICS | Facility: CLINIC | Age: 82
End: 2022-10-15
Payer: COMMERCIAL

## 2022-10-15 DIAGNOSIS — E87.1 HYPONATREMIA: ICD-10-CM

## 2022-10-15 DIAGNOSIS — R97.20 ELEVATED PROSTATE SPECIFIC ANTIGEN (PSA): ICD-10-CM

## 2022-10-15 DIAGNOSIS — F10.27 DEMENTIA ASSOCIATED WITH ALCOHOLISM WITH BEHAVIORAL DISTURBANCE (H): ICD-10-CM

## 2022-10-15 DIAGNOSIS — I48.20 CHRONIC ATRIAL FIBRILLATION (H): ICD-10-CM

## 2022-10-15 DIAGNOSIS — S32.000D COMPRESSION FRACTURE OF LUMBAR VERTEBRA WITH ROUTINE HEALING, UNSPECIFIED LUMBAR VERTEBRAL LEVEL, SUBSEQUENT ENCOUNTER: Primary | ICD-10-CM

## 2022-10-15 PROCEDURE — 99305 1ST NF CARE MODERATE MDM 35: CPT | Performed by: INTERNAL MEDICINE

## 2022-10-15 NOTE — LETTER
"    10/15/2022        RE: Jamie Valdivia  [facility]  [address]  [facility] [address]        [facility] GERIATRICS    PRIMARY CARE PROVIDER AND CLINIC:  MARICHUY Basilio [facility], [address] [facility] [address]  Chief Complaint   Patient presents with     Hospital F/U      [place] Medical Record Number:  [id number]  Place of Service where encounter took place:  [facility] (U)     Hospital course reviewed by me, is as per the hospital discharge summary and NP note.    Jamie Valdivia  is a 82 year old  (1940), admitted to the above facility from  [facility]. Hospital stay 9/30/22 through 10/6/22..       Hospital was reviewed by me, is as per the limited hospital discharge summary and the U  NP note.    Pt  is a 82 yr old male admitted to [facility] for rehabilitation s/p hospitalization [facility] 9/30-10/6/22 for lumbar compression fractures s/p fall and multifactorial hyponatremia (volume depletion and SIADH)    PMHx dementia, seizures, pAF, CAD, hypertension, history Etoh abuse, JOSÉ MANUEL, overactive bladder and neuropathy     Pt presented from his memory care unit with c/o back pain after a fall. Staff had noted increased confusion and agitation  Lumbar CT revealed Age-indeterminate compression deformities of L2-L5 with a chronic mild compression deformity at the superior endplate of L1.  Neuro surgery recommended conservative management with brace  Na 114, WBC 13,4  Na improved somewhat  with IV fluids, then leveled off in mid 120s  Echo nl   Pain managed with tylenol  Encephalopathy improved, mental status felt to be close to baseline at time of discharge  Limited ambulation at time of discharge.    Pt has a history of BPH, has an elevated PSA, has prostate biopsy scheduled.      Pt notes continued back pain, states he \"hurts all over\" today  He is receiving only tylenol  He " states he is walking short distances with therapy  He denies chest pain, cough, SOB, dizziness, abd pain, dizziness        CODE STATUS/ADVANCE DIRECTIVES DISCUSSION:  Prior  CPR/Full code   ALLERGIES:   Allergies   Allergen Reactions     Ativan [Lorazepam]       PAST MEDICAL HISTORY:   Past Medical History:   Diagnosis Date     Age-related osteoporosis without current pathological fracture 03/30/2022     Alcohol abuse 11/19/2017     Alcohol dependence with withdrawal (H)      Alcoholism (H)      Back pain      Backache 12/19/2018     CAD (coronary artery disease)      Chronic a-fib (H)      Chronic alcohol use 06/11/2015    Formatting of this note might be different from the original. 2/26/2019: serious fall at home with multiple vertebral fractures.  BAL 0.414% on admission.  Denies that he drinks much. 12/18/2018: hospitalized for fall due to alcohol intoxication.  BAL 0.34% on admission. 9/16/2018: hospitalized for injuries from fall due to alcohol intoxication.  BAL 0.34% on admission     Chronic atrial fibrillation (H) 07/15/2016    Formatting of this note might be different from the original. 2/2019: Multiple falls so started on aspirin only.  Then aspirin stopped due to GI bleed.     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 2/2/21     HTN (hypertension)      Impaired gait and mobility 03/14/2019     Mild cognitive impairment 08/12/2019    Formatting of this note might be different from the original. NON-AMNESTIC TYPE     Mild TBI (traumatic brain injury) 03/14/2019      Mumps      Obesity (BMI 30-39.9) 09/03/2015     Osteoarthritis      Osteoarthritis of both knees 05/13/2015     Osteoporosis      Other idiopathic peripheral autonomic neuropathy 03/30/2022     Other insomnia 03/14/2019     Other seizures (H) 03/30/2022     Palpitations      Peripheral neuropathy      Pharyngeal dysphagia 05/13/2015    Formatting of this note might be different from the original. Likely secondary to GERD with esophagitis, on PPI and H2 blocker with notable improvement, EGD 10/5/15.     Physical deconditioning 03/14/2019     Recurrent major depressive disorder (H) 03/30/2022     Rhabdomyolysis      Thrombocytopenia (H)      Urination disorder      Weakness 04/27/2020      PAST SURGICAL HISTORY:   has a past surgical history that includes left hip replacement (2010); left shoulder replacement (2016); tonsillectomy; Spine surgery; orthopedic surgery; and Esophagoscopy, gastroscopy, duodenoscopy (EGD), combined (N/A, 06/01/2022).  FAMILY HISTORY: family history includes Osteoporosis in his mother; Prostate Cancer in his paternal grandfather.  SOCIAL HISTORY:   reports that he has never smoked. He has never used smokeless tobacco. He reports that he does not currently use alcohol. He reports that he does not use drugs.  Patient's living condition: lives in an assisted living facility    Medications were reviewed by me today:    Current Outpatient Medications   Medication Sig     acetaminophen (TYLENOL) 500 MG tablet Take 1,000 mg by mouth every 6 hours as needed for mild pain     alum & mag hydroxide-simethicone (MAALOX MAX) 400-400-40 MG/5ML SUSP suspension Take 30 mLs by mouth every 6 hours as needed for indigestion     DEEP SEA NASAL SPRAY 0.65 % nasal spray INHALE 2 SPRAYS IN EACH NOSTRIL ONCE DAILY     gabapentin (NEURONTIN) 100 MG capsule TAKE TWO CAPSULES (200MG) BY MOUTH EVERY NIGHT AT BEDTIME     guaiFENesin (ROBITUSSIN) 100 MG/5ML liquid GIVE 10ML (200 MG) BY MOUTH TWICE DAILY;AND TWICE  "DAILY AS NEEDED FOR COUGH     menthol-zinc oxide (CALMOSEPTINE) 0.44-20.6 % OINT ointment Apply 1 g topically 2 times daily. May also apply 1 g 2 times daily as needed for skin protection.     nystatin (MYCOSTATIN) 621104 UNIT/GM external powder Apply topically 2 times daily     omeprazole (PRILOSEC) 40 MG DR capsule TAKE 1 CAPSULE BY MOUTH TWICE DAILY     polyethylene glycol (MIRALAX) 17 GM/Dose powder MIX 2 CAPFULS IN 8OZ OF ORANGE JUICE  IN THE MORNING;AND MAY MIX 2 CAPFULS ONCE DAILY AS NEEDED FOR CONSTIPATION. MIX IN FRONT OF PT. HOLD FOR LOOSE STOOL. OK TO GIVE 1 CAPFUL IF RESIDENT REFUSES 2 CAPFULS.     polyvinyl alcohol-povidone PF (REFRESH) 1.4-0.6 % ophthalmic solution 1 drop every 4 hours as needed for irritation     QUEtiapine (SEROQUEL) 25 MG tablet Take 0.5 tablets (12.5 mg) by mouth 2 times daily. May also take 0.5 tablets (12.5 mg) every 12 hours as needed (anxiety).     trospium (SANCTURA) 20 MG tablet TAKE 1 TABLET BY MOUTH TWICE DAILY     No current facility-administered medications for this visit.       ROS:  10 point ROS of systems including Constitutional, Eyes, Respiratory, Cardiovascular, Gastroenterology, Genitourinary, Integumentary, Musculoskeletal, Psychiatric were all negative except for pertinent positives noted in my HPI.    Vitals:  /76   Pulse 53   Temp 97.7  F (36.5  C)   Resp 18   Ht 1.727 m (5' 8\")   Wt 98.8 kg (217 lb 12.8 oz)   SpO2 97%   BMI 33.12 kg/m    Exam:  Well appearing male, lying in bed, c/o back pain  HEENT oral mucosa moist  Neck no adenopathy  Lungs clear  CV rrr  No LE edema  Neuro: alert, oriented to person, place, general circumstances  Diminished insight  No focal LE weakness, as assessed with Pt lying in bed        Lab/Diagnostic data:    Most Recent 3 CBC's:Recent Labs   Lab Test 10/10/22  0845 10/03/22  0743 10/02/22  0859 10/01/22  0837   WBC 6.1 7.4 9.4 17.0*   HGB 12.2* 11.1* 12.1* 12.7*   MCV 89 86 86 84    144*  --  188     Most " Recent 3 BMP's:Recent Labs   Lab Test 10/14/22  0515 10/10/22  0845 10/06/22  0623 10/05/22  0750    134  --  135*   POTASSIUM 4.1 4.5 4.7 3.7   CHLORIDE 100 99  --  96*   CO2 28 26  --  33*   BUN 12 14  --  8.7   CR 0.52* 0.50*  --  0.50*   ANIONGAP 7 9  --  6*   JOSUE 8.8 9.3  --  8.6*   GLC 94 89  --  109*           ASSESSMENT/PLAN:      L compression fracture after fall  Moderate back pain today, slow progress in therapy  No neurologic signs  Plan continue therapies, tylenol for pain, back brace when up    Hyponatremia  Possible mixed picture of SIADH and volume depletion  Stable  Plan monitor lytes without FR    CAD  HTN  Stable on ASA  Not on statin      History afib  HR controlled, not on AC secondary to falls, history of GI bleed  Plan monitor vitals    Dementia   Encephalopathy in setting of hyponatremia, pain  Improved  Plan continue seroquel, monitor mental and functional status  Assure safe discharge    BPH  Elevated PSA  Plan monitor voiding, continue Sanctura  Urology f/u for biopsy      Tyler Weiss MD        Sincerely,        Tyler Weiss MD

## 2022-10-17 ENCOUNTER — TRANSITIONAL CARE UNIT VISIT (OUTPATIENT)
Dept: GERIATRICS | Facility: CLINIC | Age: 82
End: 2022-10-17
Payer: COMMERCIAL

## 2022-10-17 ENCOUNTER — PATIENT OUTREACH (OUTPATIENT)
Dept: GERIATRIC MEDICINE | Facility: CLINIC | Age: 82
End: 2022-10-17

## 2022-10-17 VITALS
RESPIRATION RATE: 18 BRPM | BODY MASS INDEX: 33.22 KG/M2 | HEIGHT: 68 IN | SYSTOLIC BLOOD PRESSURE: 118 MMHG | TEMPERATURE: 97.9 F | OXYGEN SATURATION: 98 % | WEIGHT: 219.2 LBS | HEART RATE: 72 BPM | DIASTOLIC BLOOD PRESSURE: 76 MMHG

## 2022-10-17 DIAGNOSIS — E87.1 HYPONATREMIA: ICD-10-CM

## 2022-10-17 DIAGNOSIS — F10.27 DEMENTIA ASSOCIATED WITH ALCOHOLISM WITH BEHAVIORAL DISTURBANCE (H): ICD-10-CM

## 2022-10-17 DIAGNOSIS — S32.000D COMPRESSION FRACTURE OF LUMBAR VERTEBRA WITH ROUTINE HEALING, UNSPECIFIED LUMBAR VERTEBRAL LEVEL, SUBSEQUENT ENCOUNTER: Primary | ICD-10-CM

## 2022-10-17 DIAGNOSIS — R53.81 PHYSICAL DECONDITIONING: ICD-10-CM

## 2022-10-17 PROCEDURE — 99309 SBSQ NF CARE MODERATE MDM 30: CPT | Performed by: NURSE PRACTITIONER

## 2022-10-17 NOTE — PROGRESS NOTES
[facility] Care Coordination Contact  CC attended care conference for member on 10/17/22 at [facility] TCU via TEAMS.   Present at care conference member, this care coordinator, spouse (Gisela), adult daughter (Eliceo), NH RN (Khushbu) and NH Therapy (PT/OT/SLP).  OT Report: Reports patient needing help with ADL's  Cognitive testing results: Not completed yet. Plan is to complete cognitive testing.   PT Report: PT reports transfers are contact guard assist and staff hold onto transfer belt while patient uses walker. Patient is able to move in bed and sit up with the use of the bed rail. He does need moderate assist with getting his lower extremities into bed. Reports that back brace is missing so PT unable to work with patient on standing exercises.   Nursing Report: Monitoring sodium levels. Barriers to therapy progression include missing back brace and pain. NP changed Tylenol orders from PRN to scheduled. Khushbu reports that she will follow up on missing brace.   Dietician Report: Reports that patient is eating well. He is working with SLP for his dysphagia and currently a level DD3 diet.   Social Work Report: Working on getting hospital bed at discharge.   TCU Recommendations: Continue with therapy    CC Report:  Notified TCU team of 30 day waiver closure requirement, waiver will close on 11/4/22. Reviewed that if member discharges after the 30th day, will need to re-screened for elderly waiver prior to discharge.    Gisela Hall RN  [facility]  [phone]

## 2022-10-17 NOTE — PROGRESS NOTES
[facility] [facility]  [facility] Record Number:  [id number]  Place of Service where encounter took place:  [facility] ([facility]) [[id number]]  Chief Complaint   Patient presents with     RECHECK       HPI:    Jamie Valdivia  is a 82 year old (1940), who is being seen today for an episodic care visit.  HPI information obtained from: facility chart records, facility staff and patient report. 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 Likely was in bed most of the day due to no therapies on Sunday  Reports not feeling as well today and more tired  Also, reports teeth no being brushed and slow call light response      Past Medical and Surgical History reviewed in Epic today.    MEDICATIONS:    Current Outpatient Medications   Medication Sig Dispense Refill     acetaminophen (TYLENOL) 500 MG tablet Take 1,000 mg by mouth 2 times daily & 2 x daily as needed       alum & mag hydroxide-simethicone (MAALOX MAX) 400-400-40 MG/5ML SUSP suspension Take 30 mLs by mouth every 6 hours as needed for indigestion 355 mL 3     DEEP SEA NASAL SPRAY 0.65 % nasal spray INHALE 2 SPRAYS IN EACH NOSTRIL ONCE DAILY 44 mL 97     gabapentin (NEURONTIN) 100 MG capsule TAKE TWO CAPSULES (200MG) BY  "MOUTH EVERY NIGHT AT BEDTIME 180 capsule 97     guaiFENesin (ROBITUSSIN) 100 MG/5ML liquid GIVE 10ML (200 MG) BY MOUTH TWICE DAILY;AND TWICE DAILY AS NEEDED FOR COUGH 473 mL 97     menthol-zinc oxide (CALMOSEPTINE) 0.44-20.6 % OINT ointment Apply 1 g topically 2 times daily. May also apply 1 g 2 times daily as needed for skin protection. 113 g 98     nystatin (MYCOSTATIN) 399272 UNIT/GM external powder Apply topically 2 times daily       omeprazole (PRILOSEC) 40 MG DR capsule TAKE 1 CAPSULE BY MOUTH TWICE DAILY 180 capsule 3     polyethylene glycol (MIRALAX) 17 GM/Dose powder MIX 2 CAPFULS IN 8OZ OF ORANGE JUICE  IN THE MORNING;AND MAY MIX 2 CAPFULS ONCE DAILY AS NEEDED FOR CONSTIPATION. MIX IN FRONT OF PT. HOLD FOR LOOSE STOOL. OK TO GIVE 1 CAPFUL IF RESIDENT REFUSES 2 CAPFULS. 510 g 97     polyvinyl alcohol-povidone PF (REFRESH) 1.4-0.6 % ophthalmic solution 1 drop every 4 hours as needed for irritation       QUEtiapine (SEROQUEL) 25 MG tablet Take 0.5 tablets (12.5 mg) by mouth 2 times daily. May also take 0.5 tablets (12.5 mg) every 12 hours as needed (anxiety). 60 tablet 11     trospium (SANCTURA) 20 MG tablet TAKE 1 TABLET BY MOUTH TWICE DAILY 180 tablet 97         REVIEW OF SYSTEMS:  4 point ROS including Respiratory, CV, GI and , other than that noted in the HPI,  is negative    Objective:  /76   Pulse 72   Temp 97.9  F (36.6  C)   Resp 18   Ht 1.727 m (5' 8\")   Wt 99.4 kg (219 lb 3.2 oz)   SpO2 98%   BMI 33.33 kg/m    Exam:  GENERAL APPEARANCE:  Alert, in no distress  RESP:  respiratory effort and palpation of chest normal, lungs clear to auscultation , no respiratory distress  CV:  Palpation and auscultation of heart done , regular rate and rhythm, no murmur, rub, or gallop  ABDOMEN:  normal bowel sounds, soft, nontender, no hepatosplenomegaly or other masses  M/S:   lying in bed  SKIN:  Inspection of skin and subcutaneous tissue baseline  PSYCH:  oriented X 3    Labs:   Labs done in SNF are " in [facility]. Please refer to them using [facility] Everywhere.     Last Comprehensive Metabolic Panel:  Sodium   Date Value Ref Range Status   10/14/2022 135 133 - 144 mmol/L Final     Potassium   Date Value Ref Range Status   10/14/2022 4.1 3.4 - 5.3 mmol/L Final     Chloride   Date Value Ref Range Status   10/14/2022 100 94 - 109 mmol/L Final     Carbon Dioxide (CO2)   Date Value Ref Range Status   10/14/2022 28 20 - 32 mmol/L Final     Anion Gap   Date Value Ref Range Status   10/14/2022 7 3 - 14 mmol/L Final     Glucose   Date Value Ref Range Status   10/14/2022 94 70 - 99 mg/dL Final     Urea Nitrogen   Date Value Ref Range Status   10/14/2022 12 7 - 30 mg/dL Final     Creatinine   Date Value Ref Range Status   10/14/2022 0.52 (L) 0.66 - 1.25 mg/dL Final     GFR Estimate   Date Value Ref Range Status   10/14/2022 >90 >60 mL/min/1.73m2 Final     Comment:     Effective December 21, 2021 eGFRcr in adults is calculated using the 2021 CKD-EPI creatinine equation which includes age and gender (Curt et al., NEJM, DOI: 10.1056/ZLDSki9112189)     Calcium   Date Value Ref Range Status   10/14/2022 8.8 8.5 - 10.1 mg/dL Final       Lab Results   Component Value Date    WBC 6.1 10/10/2022     Lab Results   Component Value Date    RBC 4.24 10/10/2022     Lab Results   Component Value Date    HGB 12.2 10/10/2022     Lab Results   Component Value Date    HCT 37.7 10/10/2022     No components found for: MCT  Lab Results   Component Value Date    MCV 89 10/10/2022     Lab Results   Component Value Date    MCH 28.8 10/10/2022     Lab Results   Component Value Date    MCHC 32.4 10/10/2022     Lab Results   Component Value Date    RDW 14.0 10/10/2022     Lab Results   Component Value Date     10/10/2022         ASSESSMENT/PLAN:     Compression fracture of lumbar vertebra with routine healing, unspecified lumbar vertebral level, subsequent encounter  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 Likely was in bed most of the day due to no therapies on Sunday  Will schedule Tylenol 1000mg 2 x daily and 2 x daily as needed  Continue therapies and back brace when up  Follow up spine specialist as advised    Reports not feeling as well today and more tired  BMP, CBC 10/19/22 due to history hyponatremia  States he is eating and drinking and reports regular elimination    Also, reports teeth no being brushed and slow call light response  Updated charge nurse regarding    Appointments in Next Year    Oct 20, 2022 11:10 AM  New Visit with Victor Manuel Lucio MD  [facility] ([facility] ) [phone]   Nov 14, 2022  2:00 PM  (Arrive by 1:45 PM)  XR LUMBAR SPINE 2/3 VIEWS with CSXR1  [facility] ([facility] ) [phone]   Nov 14, 2022  2:30 PM  Return Visit with Faiza Penny PA-C  [facility] ([facility] ) [phone]   Dec 20, 2022  1:30 PM  (Arrive by 1:15 PM)  XR LUMBAR SPINE 2/3 VIEWS with CSXR1  [facility] ([facility] ) [phone]   Dec 20, 2022  2:00 PM  Return Visit with Jeronimo Garcia MD  [facility] ([facility] ) [phone]              Electronically signed by:  MARICHUY Mendoza CNP

## 2022-10-18 ENCOUNTER — LAB REQUISITION (OUTPATIENT)
Dept: LAB | Facility: CLINIC | Age: 82
End: 2022-10-18

## 2022-10-18 DIAGNOSIS — E87.1 HYPO-OSMOLALITY AND HYPONATREMIA: ICD-10-CM

## 2022-10-19 LAB
ANION GAP SERPL CALCULATED.3IONS-SCNC: 6 MMOL/L (ref 3–14)
BUN SERPL-MCNC: 13 MG/DL (ref 7–30)
CALCIUM SERPL-MCNC: 8.8 MG/DL (ref 8.5–10.1)
CHLORIDE BLD-SCNC: 103 MMOL/L (ref 94–109)
CO2 SERPL-SCNC: 28 MMOL/L (ref 20–32)
CREAT SERPL-MCNC: 0.6 MG/DL (ref 0.66–1.25)
ERYTHROCYTE [DISTWIDTH] IN BLOOD BY AUTOMATED COUNT: 13.8 % (ref 10–15)
GFR SERPL CREATININE-BSD FRML MDRD: >90 ML/MIN/1.73M2
GLUCOSE BLD-MCNC: 75 MG/DL (ref 70–99)
HCT VFR BLD AUTO: 38.5 % (ref 40–53)
HGB BLD-MCNC: 12.3 G/DL (ref 13.3–17.7)
MCH RBC QN AUTO: 28.5 PG (ref 26.5–33)
MCHC RBC AUTO-ENTMCNC: 31.9 G/DL (ref 31.5–36.5)
MCV RBC AUTO: 89 FL (ref 78–100)
PLATELET # BLD AUTO: 306 10E3/UL (ref 150–450)
POTASSIUM BLD-SCNC: 4.4 MMOL/L (ref 3.4–5.3)
RBC # BLD AUTO: 4.31 10E6/UL (ref 4.4–5.9)
SODIUM SERPL-SCNC: 137 MMOL/L (ref 133–144)
WBC # BLD AUTO: 6 10E3/UL (ref 4–11)

## 2022-10-19 PROCEDURE — P9604 ONE-WAY ALLOW PRORATED TRIP: HCPCS | Performed by: NURSE PRACTITIONER

## 2022-10-19 PROCEDURE — 80048 BASIC METABOLIC PNL TOTAL CA: CPT | Performed by: NURSE PRACTITIONER

## 2022-10-19 PROCEDURE — 85027 COMPLETE CBC AUTOMATED: CPT | Performed by: NURSE PRACTITIONER

## 2022-10-19 PROCEDURE — 36415 COLL VENOUS BLD VENIPUNCTURE: CPT | Performed by: NURSE PRACTITIONER

## 2022-10-19 NOTE — PROGRESS NOTES
Face to Face and Medical Necessity Statement for DME Provider visit    Demographic Information on Jamie Valdivia:  Gender: male  : 1940  [facility]  [address]  [place] [address]  [phone] (home)     Medical Record: [id number]  Social Security Number: [id number]  Primary Care Provider: Sylvia Stein  Insurance: Payor: [facility] / Plan: [facility] DUAL / Product Type: HMO /     HPI:   Jamie Valdivia is a 82 year old  (1940), who is being seen today for a face to face provider visit at [facility]; medical necessity statement for DME included. This patient requires the following:  DME Ordered and Medical Necessity Statement     The patient does  require positioning of the body in ways not feasible with an ordinary bed due to a medical condition that is expected to last at least 1 month due to weakness and spinal fracture.   The patient does  require, for the alleviation of pain, postioning of the body in ways not feasible with an ordinary bed.   The patient does not require the head of bed elevated more than 30* most of the time due to CHF, chronic pulmonary disease or aspiration.  The patient does not require traction that can only be attached to a hospital bed.  The patient does  require a bed height different than a fixed height hospital bed to permit tranfers to wheelchair or standing position.   The patient does  require frequent or immediate changes in body position due to risk skin breakdown.     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)  Hyponatremia  Physical deconditioning      PMH   has a past medical history of Age-related osteoporosis without current  pathological fracture (03/30/2022), Alcohol abuse (11/19/2017), Alcohol dependence with withdrawal (H), Alcoholism (H), Back pain, Backache (12/19/2018), CAD (coronary artery disease), Chronic a-fib (H), Chronic alcohol use (06/11/2015), Chronic atrial fibrillation (H) (07/15/2016), Claustrophobia (05/13/2015), Constipation, 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) (11/19/2021), ED (erectile dysfunction), Edema, Falling, JOSÉ MANUEL (generalized anxiety disorder), Generalized anxiety disorder (04/27/2020), GERD (gastroesophageal reflux disease), GI bleeding, H/O carpal tunnel syndrome, History of 2019 novel coronavirus disease (COVID-19) (02/08/2021), HTN (hypertension), Impaired gait and mobility (03/14/2019), Mild cognitive impairment (08/12/2019), Mild TBI (traumatic brain injury) (03/14/2019), Mumps, Obesity (BMI 30-39.9) (09/03/2015), Osteoarthritis, Osteoarthritis of both knees (05/13/2015), Osteoporosis, Other idiopathic peripheral autonomic neuropathy (03/30/2022), Other insomnia (03/14/2019), Other seizures (H) (03/30/2022), Palpitations, Peripheral neuropathy, Pharyngeal dysphagia (05/13/2015), Physical deconditioning (03/14/2019), Recurrent major depressive disorder (H) (03/30/2022), Rhabdomyolysis, Thrombocytopenia (H), Urination disorder, and Weakness (04/27/2020).    He has no past medical history of Asymptomatic human immunodeficiency virus (HIV) infection status (H), Blood in semen, Cerebral palsy (H), Complication of anesthesia, Congenital renal agenesis and dysgenesis, Epididymitis, bilateral, Epididymitis, left, Epididymitis, right, Goiter, Gout, Hernia, abdominal, History of spinal cord injury, History of thrombophlebitis, Horseshoe kidney, Hydrocephalus (H), Malignant hyperthermia, Orchitis, epididymitis, and epididymo-orchitis, with abscess, Parkinsons disease (H), Penile discharge, Prostate infection, Spider veins, Spina bifida (H), STD (sexually transmitted disease),  "Swelling of testicle, Tethered cord (H), or Tuberculosis.    ROS:4 point ROS including Respiratory, CV, GI and , other than that noted in the HPI,  is negative    EXAM  Vitals: /76   Pulse 72   Temp 97.9  F (36.6  C)   Resp 18   Ht 1.727 m (5' 8\")   Wt 99.4 kg (219 lb 3.2 oz)   SpO2 98%   BMI 33.33 kg/m  ;BMI= Body mass index is 33.33 kg/m .      ASSESSMENT/PLAN:  1. Compression fracture of lumbar vertebra with routine healing, unspecified lumbar vertebral level, subsequent encounter    2. 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 Hyponatremia    4. Physical deconditioning        Orders:  1. Facility staff/TC to contact DME company to get their order form for provider to fill out    ELECTRONICALLY SIGNED BY TAM CERTIFIED PROVIDER:  MARICHUY Mendoza CNP   NPI: [id number]  [facility] [place] [facility]  [address] [place] [address], [address]  [place] [address]        "

## 2022-10-24 ENCOUNTER — TRANSITIONAL CARE UNIT VISIT (OUTPATIENT)
Dept: GERIATRICS | Facility: CLINIC | Age: 82
End: 2022-10-24
Payer: COMMERCIAL

## 2022-10-24 VITALS
WEIGHT: 217.8 LBS | BODY MASS INDEX: 33.01 KG/M2 | SYSTOLIC BLOOD PRESSURE: 137 MMHG | TEMPERATURE: 97.6 F | RESPIRATION RATE: 18 BRPM | HEART RATE: 57 BPM | OXYGEN SATURATION: 98 % | HEIGHT: 68 IN | DIASTOLIC BLOOD PRESSURE: 73 MMHG

## 2022-10-24 DIAGNOSIS — S32.000D COMPRESSION FRACTURE OF LUMBAR VERTEBRA WITH ROUTINE HEALING, UNSPECIFIED LUMBAR VERTEBRAL LEVEL, SUBSEQUENT ENCOUNTER: Primary | ICD-10-CM

## 2022-10-24 DIAGNOSIS — R53.81 PHYSICAL DECONDITIONING: ICD-10-CM

## 2022-10-24 DIAGNOSIS — E87.1 HYPONATREMIA: ICD-10-CM

## 2022-10-24 DIAGNOSIS — F10.27 DEMENTIA ASSOCIATED WITH ALCOHOLISM WITH BEHAVIORAL DISTURBANCE (H): ICD-10-CM

## 2022-10-24 PROCEDURE — 99309 SBSQ NF CARE MODERATE MDM 30: CPT | Performed by: NURSE PRACTITIONER

## 2022-10-24 NOTE — PROGRESS NOTES
[place] [facility]  [facility] Record Number:  [id number]  Place of Service where encounter took place:  [facility] ([facility]) [[id number]]  Chief Complaint   Patient presents with     Nursing Home Acute       HPI:    Jamie Valdivia  is a 82 year old (1940), who is being seen today for an episodic care visit.  HPI information obtained from: facility chart records, facility staff and patient report. 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    MEDICATIONS:    Current Outpatient Medications   Medication Sig Dispense Refill     acetaminophen (TYLENOL) 500 MG tablet Take 1,000 mg by mouth 2 times daily & 2 x daily as needed       alum & mag hydroxide-simethicone (MAALOX MAX) 400-400-40 MG/5ML SUSP suspension Take 30 mLs by mouth every 6 hours as needed for indigestion 355 mL 3     DEEP SEA NASAL SPRAY 0.65 % nasal spray INHALE 2 SPRAYS IN  "EACH NOSTRIL ONCE DAILY 44 mL 97     gabapentin (NEURONTIN) 100 MG capsule TAKE TWO CAPSULES (200MG) BY MOUTH EVERY NIGHT AT BEDTIME 180 capsule 97     guaiFENesin (ROBITUSSIN) 100 MG/5ML liquid GIVE 10ML (200 MG) BY MOUTH TWICE DAILY;AND TWICE DAILY AS NEEDED FOR COUGH 473 mL 97     menthol-zinc oxide (CALMOSEPTINE) 0.44-20.6 % OINT ointment Apply 1 g topically 2 times daily. May also apply 1 g 2 times daily as needed for skin protection. 113 g 98     nystatin (MYCOSTATIN) 789426 UNIT/GM external powder Apply topically 2 times daily       omeprazole (PRILOSEC) 40 MG DR capsule TAKE 1 CAPSULE BY MOUTH TWICE DAILY 180 capsule 3     polyethylene glycol (MIRALAX) 17 GM/Dose powder MIX 2 CAPFULS IN 8OZ OF ORANGE JUICE  IN THE MORNING;AND MAY MIX 2 CAPFULS ONCE DAILY AS NEEDED FOR CONSTIPATION. MIX IN FRONT OF PT. HOLD FOR LOOSE STOOL. OK TO GIVE 1 CAPFUL IF RESIDENT REFUSES 2 CAPFULS. 510 g 97     polyvinyl alcohol-povidone PF (REFRESH) 1.4-0.6 % ophthalmic solution 1 drop every 4 hours as needed for irritation       QUEtiapine (SEROQUEL) 25 MG tablet Take 0.5 tablets (12.5 mg) by mouth 2 times daily. May also take 0.5 tablets (12.5 mg) every 12 hours as needed (anxiety). 60 tablet 11     trospium (SANCTURA) 20 MG tablet TAKE 1 TABLET BY MOUTH TWICE DAILY 180 tablet 97         REVIEW OF SYSTEMS:  4 point ROS including Respiratory, CV, GI and , other than that noted in the HPI,  is negative    Objective:  /73   Pulse 57   Temp 97.6  F (36.4  C)   Resp 18   Ht 1.727 m (5' 8\")   Wt 98.8 kg (217 lb 12.8 oz)   SpO2 98%   BMI 33.12 kg/m    Exam:  GENERAL APPEARANCE:  Alert, in no distress  RESP:  respiratory effort and palpation of chest normal, lungs clear to auscultation , no respiratory distress  CV:  Palpation and auscultation of heart done , regular rate and rhythm, no murmur, rub, or gallop  ABDOMEN:  normal bowel sounds, soft, nontender, no hepatosplenomegaly or other masses  M/S:  Sitting in wheelchair "   SKIN:  Inspection of skin and subcutaneous tissueback brace on   NEURO:   Cranial nerves 2-12 are normal tested and grossly at patient's baseline  PSYCH:  oriented X 3    Labs:   Labs done in SNF are in [facility]. Please refer to them using [facility] Everywhere.   Last Comprehensive Metabolic Panel:  Sodium   Date Value Ref Range Status   10/19/2022 137 133 - 144 mmol/L Final     Potassium   Date Value Ref Range Status   10/19/2022 4.4 3.4 - 5.3 mmol/L Final     Chloride   Date Value Ref Range Status   10/19/2022 103 94 - 109 mmol/L Final     Carbon Dioxide (CO2)   Date Value Ref Range Status   10/19/2022 28 20 - 32 mmol/L Final     Anion Gap   Date Value Ref Range Status   10/19/2022 6 3 - 14 mmol/L Final     Glucose   Date Value Ref Range Status   10/19/2022 75 70 - 99 mg/dL Final     Urea Nitrogen   Date Value Ref Range Status   10/19/2022 13 7 - 30 mg/dL Final     Creatinine   Date Value Ref Range Status   10/19/2022 0.60 (L) 0.66 - 1.25 mg/dL Final     GFR Estimate   Date Value Ref Range Status   10/19/2022 >90 >60 mL/min/1.73m2 Final     Comment:     Effective December 21, 2021 eGFRcr in adults is calculated using the 2021 CKD-EPI creatinine equation which includes age and gender (Curt et al., NEJM, DOI: 10.1056/MDFXxn0689800)     Calcium   Date Value Ref Range Status   10/19/2022 8.8 8.5 - 10.1 mg/dL Final       Lab Results   Component Value Date    WBC 6.0 10/19/2022     Lab Results   Component Value Date    RBC 4.31 10/19/2022     Lab Results   Component Value Date    HGB 12.3 10/19/2022     Lab Results   Component Value Date    HCT 38.5 10/19/2022     No components found for: MCT  Lab Results   Component Value Date    MCV 89 10/19/2022     Lab Results   Component Value Date    MCH 28.5 10/19/2022     Lab Results   Component Value Date    MCHC 31.9 10/19/2022     Lab Results   Component Value Date    RDW 13.8 10/19/2022     Lab Results   Component Value Date     10/19/2022          ASSESSMENT/PLAN:     Compression fracture of lumbar vertebra with routine healing, unspecified lumbar vertebral level, subsequent encounter  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)  Physical deconditioning  Hyponatremia     Progressing in therapies  Goal to return to [facility]   Patient report pain managed with current pain medications   Continue current pain medications with Tylenol and Neurontin  Wearing back brace  Follow up spine specialist as ordered  Continue therapies    involved in safe plan home    BMP stable last week, reports feeling better this week  Monitor   States eating and regular elimination, continue Miralax  Denies recent chest pain or shortness of breath     Mood stable, using as needed Seroquel for agitation and helpful  Continue on current medications to treat for mood      Electronically signed by:  MARICHUY Mendoza CNP

## 2022-10-28 ENCOUNTER — DISCHARGE SUMMARY NURSING HOME (OUTPATIENT)
Dept: GERIATRICS | Facility: CLINIC | Age: 82
End: 2022-10-28
Payer: COMMERCIAL

## 2022-10-28 VITALS
SYSTOLIC BLOOD PRESSURE: 145 MMHG | RESPIRATION RATE: 18 BRPM | HEIGHT: 70 IN | HEART RATE: 68 BPM | TEMPERATURE: 96.9 F | OXYGEN SATURATION: 100 % | DIASTOLIC BLOOD PRESSURE: 83 MMHG | BODY MASS INDEX: 31.35 KG/M2 | WEIGHT: 219 LBS

## 2022-10-28 DIAGNOSIS — R53.81 PHYSICAL DECONDITIONING: ICD-10-CM

## 2022-10-28 DIAGNOSIS — F10.27 DEMENTIA ASSOCIATED WITH ALCOHOLISM WITH BEHAVIORAL DISTURBANCE (H): ICD-10-CM

## 2022-10-28 DIAGNOSIS — S32.000D COMPRESSION FRACTURE OF LUMBAR VERTEBRA WITH ROUTINE HEALING, UNSPECIFIED LUMBAR VERTEBRAL LEVEL, SUBSEQUENT ENCOUNTER: Primary | ICD-10-CM

## 2022-10-28 DIAGNOSIS — F41.1 GAD (GENERALIZED ANXIETY DISORDER): ICD-10-CM

## 2022-10-28 DIAGNOSIS — I48.20 CHRONIC ATRIAL FIBRILLATION (H): ICD-10-CM

## 2022-10-28 PROCEDURE — 99316 NF DSCHRG MGMT 30 MIN+: CPT | Performed by: NURSE PRACTITIONER

## 2022-10-28 NOTE — PROGRESS NOTES
[facility] DISCHARGE SUMMARY  PATIENT'S NAME: Jamie Valdivia  YOB: 1940  MEDICAL RECORD NUMBER:  [id number]  Place of Service where encounter took place:  [facility] (TCU) [[id number]]    PRIMARY CARE PROVIDER AND CLINIC RESPONSIBLE AFTER TRANSFER:   MARICHUY Basilio CNP, [address] [facility] [address]    Assisted Living: [facility] ()     Transferring providers: MARICHUY Mendoza CNP; Tyler Weiss MD  Recent Hospitalization/ED:  [facility] Hospital stay 9/30/22 to 10/6/22.  Date of SNF Admission: October 06, 2022  Date of SNF (anticipated) Discharge: November 03, 2022  Discharged to: previous assisted living  Cognitive Scores/ Physical Function/ DME:    Transfers SBA-CGA   Bed mobility CGA   Ambulating 191' with CGA   UB dressing max LSO   LB dressing max assist   ST for swallowing   Discharge plan and any ID barriers to discharge: Goal to discharge to previous memory care [facility] setting.    CODE STATUS/ADVANCE DIRECTIVES DISCUSSION:  Full Code   ALLERGIES: Ativan [lorazepam]    DISCHARGE DIAGNOSIS/NURSING FACILITY COURSE:     Patient Jamie Garg is a 82 yr old male admitted to [facility] for rehabilitation s/p hospitalization [facility] 9/30-10/6/22 for lumbar compression fractures s/p fall and multifactorial hyponatremia (volume depletion and SIADH)  PMHx dementia, seizures, pAF, CAD, hypertension, history Etoh abuse, JOSÉ MANUEL, overactive bladder, elevated PSA (planned for biopsy), and neuropathy idiopathic       Fall   Lumbar compression fractures  Chronic neuropathy  Pain managed with tylenol 1000 mg 2 x daily and 2 x daily as needed and scheduled Neurontin   Follow up neurosurgeon as advised  Continue back brace as ordered, TLSO when out of bed   Progressing in therapies and plans to discharge back to [facility] with  homecare    Hyponatremia  Thought to be due to volume depletion and SIADH   Patient discharged from hospital on fluid restriction 1500 ml  Dietary involved   Resolved, no longer on fluid restriction    Constipation  chronic managed   Continue miralax  Monitor     Overactive bladder  Denies urinary concerns  Continue Sanctura at this time, consider trial off given side effect profile    Atrial fibrillation  Rate controlled, not on medications to treat  Not on anticoagulation due to history falls    CAD  Vitals stable  Denies chest pain   Continue aspirin     hypertension  chronic managed   Not on medications to treat    Dementia  Lives in [facility] care  Wife lives at [facility]   Has supportive children    JOSÉ MANUEL  agitation  Mood stable  Continue Seroquel  Monitor     Deconditioned  Comment: d/t recent hospitalization & comorbidity, expect delay rehabilitation d/t age & comorbidity  Plan: PT/OT eval & treat, monitor   involved in safe plan home  Currently lives at [facility]. Maybe good fit for long term care as well, however, patient states he would prefer [facility]. He would prefer to not be in memory care unit at [facility] and be in regular unit     Advanced care planning  Wishes to be FULL code     Appointment: 11/14 145pm XRAYS / Faiza Ferrell NP: [facility] Neurology [address] [facility] [phone] Family to transport p/u 1245pm    Past Medical History:  has a past medical history of Age-related osteoporosis without current pathological fracture (03/30/2022), Alcohol abuse (11/19/2017), Alcohol dependence with withdrawal (H), Alcoholism (H), Back pain, Backache (12/19/2018), CAD (coronary artery disease), Chronic a-fib (H), Chronic alcohol use (06/11/2015), Chronic atrial fibrillation (H) (07/15/2016), Claustrophobia (05/13/2015), Constipation, 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/2021), ED (erectile dysfunction), Edema, Falling, JOSÉ MANUEL (generalized anxiety disorder), Generalized anxiety disorder (04/27/2020), GERD (gastroesophageal reflux disease), GI bleeding, H/O carpal tunnel syndrome, History of 2019 novel coronavirus disease (COVID-19) (02/08/2021), HTN (hypertension), Impaired gait and mobility (03/14/2019), Mild cognitive impairment (08/12/2019), Mild TBI (traumatic brain injury) (03/14/2019), Mumps, Obesity (BMI 30-39.9) (09/03/2015), Osteoarthritis, Osteoarthritis of both knees (05/13/2015), Osteoporosis, Other idiopathic peripheral autonomic neuropathy (03/30/2022), Other insomnia (03/14/2019), Other seizures (H) (03/30/2022), Palpitations, Peripheral neuropathy, Pharyngeal dysphagia (05/13/2015), Physical deconditioning (03/14/2019), Recurrent major depressive disorder (H) (03/30/2022), Rhabdomyolysis, Thrombocytopenia (H), Urination disorder, and Weakness (04/27/2020).    He has no past medical history of Asymptomatic human immunodeficiency virus (HIV) infection status (H), Blood in semen, Cerebral palsy (H), Complication of anesthesia, Congenital renal agenesis and dysgenesis, Epididymitis, bilateral, Epididymitis, left, Epididymitis, right, Goiter, Gout, Hernia, abdominal, History of spinal cord injury, History of thrombophlebitis, Horseshoe kidney, Hydrocephalus (H), Malignant hyperthermia, Orchitis, epididymitis, and epididymo-orchitis, with abscess, Parkinsons disease (H), Penile discharge, Prostate infection, Spider veins, Spina bifida (H), STD (sexually transmitted disease), Swelling of testicle, Tethered cord (H), or Tuberculosis.    Discharge Medications:    Current Outpatient Medications   Medication Sig Dispense Refill     acetaminophen (TYLENOL) 500 MG tablet Take 1,000 mg by mouth 2 times daily & 2 x daily as needed       alum & mag hydroxide-simethicone (MAALOX MAX) 400-400-40 MG/5ML SUSP suspension Take 30 mLs by mouth every 6 hours as needed  "for indigestion 355 mL 3     DEEP SEA NASAL SPRAY 0.65 % nasal spray INHALE 2 SPRAYS IN EACH NOSTRIL ONCE DAILY 44 mL 97     gabapentin (NEURONTIN) 100 MG capsule TAKE TWO CAPSULES (200MG) BY MOUTH EVERY NIGHT AT BEDTIME 180 capsule 97     guaiFENesin (ROBITUSSIN) 100 MG/5ML liquid GIVE 10ML (200 MG) BY MOUTH TWICE DAILY;AND TWICE DAILY AS NEEDED FOR COUGH 473 mL 97     menthol-zinc oxide (CALMOSEPTINE) 0.44-20.6 % OINT ointment Apply 1 g topically 2 times daily. May also apply 1 g 2 times daily as needed for skin protection. 113 g 98     nystatin (MYCOSTATIN) 801617 UNIT/GM external powder Apply topically 2 times daily       omeprazole (PRILOSEC) 40 MG DR capsule TAKE 1 CAPSULE BY MOUTH TWICE DAILY 180 capsule 3     polyethylene glycol (MIRALAX) 17 GM/Dose powder MIX 2 CAPFULS IN 8OZ OF ORANGE JUICE  IN THE MORNING;AND MAY MIX 2 CAPFULS ONCE DAILY AS NEEDED FOR CONSTIPATION. MIX IN FRONT OF PT. HOLD FOR LOOSE STOOL. OK TO GIVE 1 CAPFUL IF RESIDENT REFUSES 2 CAPFULS. 510 g 97     polyvinyl alcohol-povidone PF (REFRESH) 1.4-0.6 % ophthalmic solution 1 drop every 4 hours as needed for irritation       QUEtiapine (SEROQUEL) 25 MG tablet Take 0.5 tablets (12.5 mg) by mouth 2 times daily. May also take 0.5 tablets (12.5 mg) every 12 hours as needed (anxiety). 60 tablet 11     trospium (SANCTURA) 20 MG tablet TAKE 1 TABLET BY MOUTH TWICE DAILY 180 tablet 97       Medication Changes/Rationale:         Controlled medications sent with patient:   not applicable/none     ROS:   4 point ROS including Respiratory, CV, GI and , other than that noted in the HPI,  is negative    Physical Exam:   Vitals: BP (!) 145/83   Pulse 68   Temp 96.9  F (36.1  C)   Resp 18   Ht 1.778 m (5' 10\")   Wt 99.3 kg (219 lb)   SpO2 100%   BMI 31.42 kg/m    BMI= Body mass index is 31.42 kg/m .  GENERAL APPEARANCE:  Alert, in no distress  RESP:  respiratory effort and palpation of chest normal, lungs clear to auscultation , no respiratory " distress  CV:  Palpation and auscultation of heart done , regular rate and rhythm, no murmur, rub, or gallop  ABDOMEN:  normal bowel sounds, soft, nontender, no hepatosplenomegaly or other masses  M/S:   Transfer stand by assist   SKIN:  Inspection of skin and subcutaneous tissue baseline  PSYCH:  oriented X 3     SNF labs: Labs done in SNF are in [place] EPIC. Please refer to them using EPIC/Care Everywhere.   Last Comprehensive Metabolic Panel:  Sodium   Date Value Ref Range Status   10/19/2022 137 133 - 144 mmol/L Final     Potassium   Date Value Ref Range Status   10/19/2022 4.4 3.4 - 5.3 mmol/L Final     Chloride   Date Value Ref Range Status   10/19/2022 103 94 - 109 mmol/L Final     Carbon Dioxide (CO2)   Date Value Ref Range Status   10/19/2022 28 20 - 32 mmol/L Final     Anion Gap   Date Value Ref Range Status   10/19/2022 6 3 - 14 mmol/L Final     Glucose   Date Value Ref Range Status   10/19/2022 75 70 - 99 mg/dL Final     Urea Nitrogen   Date Value Ref Range Status   10/19/2022 13 7 - 30 mg/dL Final     Creatinine   Date Value Ref Range Status   10/19/2022 0.60 (L) 0.66 - 1.25 mg/dL Final     GFR Estimate   Date Value Ref Range Status   10/19/2022 >90 >60 mL/min/1.73m2 Final     Comment:     Effective December 21, 2021 eGFRcr in adults is calculated using the 2021 CKD-EPI creatinine equation which includes age and gender (Curt et al., NEJ, DOI: 10.1056/DGYIss1548568)     Calcium   Date Value Ref Range Status   10/19/2022 8.8 8.5 - 10.1 mg/dL Final       Lab Results   Component Value Date    WBC 6.0 10/19/2022     Lab Results   Component Value Date    RBC 4.31 10/19/2022     Lab Results   Component Value Date    HGB 12.3 10/19/2022     Lab Results   Component Value Date    HCT 38.5 10/19/2022     No components found for: MCT  Lab Results   Component Value Date    MCV 89 10/19/2022     Lab Results   Component Value Date    MCH 28.5 10/19/2022     Lab Results   Component Value Date    MCHC 31.9  10/19/2022     Lab Results   Component Value Date    RDW 13.8 10/19/2022     Lab Results   Component Value Date     10/19/2022           DISCHARGE PLAN:    Follow up labs: No labs orders/due    Medical Follow Up:      Follow up with primary care provider in 1-2 weeks    MTM referral needed and placed by this provider: No    Current [place] scheduled appointments:  Next 5 appointments (look out 90 days)    Nov 14, 2022  2:30 PM  Return Visit with Faiza Penny PA-C  [facility] Neurology [facility] [address] [place] [address], [address]  [facility] [address]  [phone]   Dec 20, 2022  2:00 PM  Return Visit with Jeronimo Garcia MD  [facility] Neurology [facility] ([facility] ) [address] [place] [address], [address]  [facility] [address]  [phone]           Discharge Services: Home Care:  Occupational Therapy, Physical Therapy, Registered Nurse and Home Health Aide        TOTAL DISCHARGE TIME:   Greater than 30 minutes  Electronically signed by:  MARICHUY Mendoza CNP     Home care Face to Face documentation done in [facility] attached to Home care orders for homecare.

## 2022-10-28 NOTE — LETTER
10/28/2022        RE: Jamie Valdivia  [facility]  [address]  [facility] [address]        [facility] DISCHARGE SUMMARY  PATIENT'S NAME: Jamie Valdivia  YOB: 1940  MEDICAL RECORD NUMBER:  [id number]  Place of Service where encounter took place:  [facility] (TCU) [[id number]]    PRIMARY CARE PROVIDER AND CLINIC RESPONSIBLE AFTER TRANSFER:   MARICHUY Basilio CNP, [address] [facility] [address]    Assisted Living: [facility] ([facility]     Transferring providers: MARICHUY Mendoza CNP; Tyler Weiss MD  Recent Hospitalization/ED:  [facility] Hospital stay 9/30/22 to 10/6/22.  Date of SNF Admission: October 06, 2022  Date of SNF (anticipated) Discharge: November 03, 2022  Discharged to: previous assisted living  Cognitive Scores/ Physical Function/ DME:    Transfers SBA-CGA   Bed mobility CGA   Ambulating 191' with CGA   UB dressing max LSO   LB dressing max assist   ST for swallowing   Discharge plan and any ID barriers to discharge: Goal to discharge to previous memory care group home setting.    CODE STATUS/ADVANCE DIRECTIVES DISCUSSION:  Full Code   ALLERGIES: Ativan [lorazepam]    DISCHARGE DIAGNOSIS/NURSING FACILITY COURSE:     Patient Jamie Garg is a 82 yr old male admitted to [facility] for rehabilitation s/p hospitalization [facility] 9/30-10/6/22 for lumbar compression fractures s/p fall and multifactorial hyponatremia (volume depletion and SIADH)  PMHx dementia, seizures, pAF, CAD, hypertension, history Etoh abuse, JOSÉ MANUEL, overactive bladder, elevated PSA (planned for biopsy), and neuropathy idiopathic       Fall   Lumbar compression fractures  Chronic neuropathy  Pain managed with tylenol 1000 mg 2 x daily and 2 x daily as needed and scheduled Neurontin   Follow up neurosurgeon as advised  Continue back brace as ordered, TLSO when out of  bed   Progressing in therapies and plans to discharge back to [facility] with homecare    Hyponatremia  Thought to be due to volume depletion and SIADH   Patient discharged from hospital on fluid restriction 1500 ml  Dietary involved   Resolved, no longer on fluid restriction    Constipation  chronic managed   Continue miralax  Monitor     Overactive bladder  Denies urinary concerns  Continue Sanctura at this time, consider trial off given side effect profile    Atrial fibrillation  Rate controlled, not on medications to treat  Not on anticoagulation due to history falls    CAD  Vitals stable  Denies chest pain   Continue aspirin     hypertension  chronic managed   Not on medications to treat    Dementia  Lives in [facility] care  Wife lives at [facility]   Has supportive children    JOSÉ MANUEL  agitation  Mood stable  Continue Seroquel  Monitor     Deconditioned  Comment: d/t recent hospitalization & comorbidity, expect delay rehabilitation d/t age & comorbidity  Plan: PT/OT eval & treat, monitor   involved in safe plan home  Currently lives at [facility]. Maybe good fit for long term care as well, however, patient states he would prefer [facility]. He would prefer to not be in memory care unit at [facility] and be in regular unit     Advanced care planning  Wishes to be FULL code     Appointment: 11/14 145pm AILYN / Faiza Ferrell NP: 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) (11/19/2021), ED (erectile dysfunction), Edema, Falling, JOSÉ MANUEL (generalized anxiety disorder), Generalized anxiety disorder (04/27/2020), GERD (gastroesophageal reflux disease), GI bleeding, H/O carpal tunnel syndrome, History of 2019 novel coronavirus disease (COVID-19) (02/08/2021), HTN (hypertension), Impaired gait and mobility (03/14/2019), Mild cognitive impairment (08/12/2019), Mild TBI (traumatic brain injury) (03/14/2019), Mumps, Obesity (BMI 30-39.9) (09/03/2015), Osteoarthritis, Osteoarthritis of both knees (05/13/2015), Osteoporosis, Other idiopathic peripheral autonomic neuropathy (03/30/2022), Other insomnia (03/14/2019), Other seizures (H) (03/30/2022), Palpitations, Peripheral neuropathy, Pharyngeal dysphagia (05/13/2015), Physical deconditioning (03/14/2019), Recurrent major depressive disorder (H) (03/30/2022), Rhabdomyolysis, Thrombocytopenia (H), Urination disorder, and Weakness (04/27/2020).    He has no past medical history of Asymptomatic human immunodeficiency virus (HIV) infection status (H), Blood in semen, Cerebral palsy (H), Complication of anesthesia, Congenital renal agenesis and dysgenesis, Epididymitis, bilateral, Epididymitis, left, Epididymitis, right, Goiter, Gout, Hernia, abdominal, History of spinal cord injury, History of thrombophlebitis, Horseshoe kidney, Hydrocephalus (H), Malignant hyperthermia, Orchitis, epididymitis, and epididymo-orchitis, with abscess, Parkinsons disease (H), Penile discharge, Prostate infection, Spider veins, Spina bifida (H), STD (sexually transmitted disease), Swelling of testicle, Tethered cord (H), or Tuberculosis.    Discharge Medications:    Current Outpatient Medications   Medication Sig Dispense Refill     acetaminophen (TYLENOL) 500 MG tablet Take 1,000 mg by mouth 2 times daily & 2 x daily as needed       alum & mag hydroxide-simethicone  "(MAALOX MAX) 400-400-40 MG/5ML SUSP suspension Take 30 mLs by mouth every 6 hours as needed for indigestion 355 mL 3     DEEP SEA NASAL SPRAY 0.65 % nasal spray INHALE 2 SPRAYS IN EACH NOSTRIL ONCE DAILY 44 mL 97     gabapentin (NEURONTIN) 100 MG capsule TAKE TWO CAPSULES (200MG) BY MOUTH EVERY NIGHT AT BEDTIME 180 capsule 97     guaiFENesin (ROBITUSSIN) 100 MG/5ML liquid GIVE 10ML (200 MG) BY MOUTH TWICE DAILY;AND TWICE DAILY AS NEEDED FOR COUGH 473 mL 97     menthol-zinc oxide (CALMOSEPTINE) 0.44-20.6 % OINT ointment Apply 1 g topically 2 times daily. May also apply 1 g 2 times daily as needed for skin protection. 113 g 98     nystatin (MYCOSTATIN) 668363 UNIT/GM external powder Apply topically 2 times daily       omeprazole (PRILOSEC) 40 MG DR capsule TAKE 1 CAPSULE BY MOUTH TWICE DAILY 180 capsule 3     polyethylene glycol (MIRALAX) 17 GM/Dose powder MIX 2 CAPFULS IN 8OZ OF ORANGE JUICE  IN THE MORNING;AND MAY MIX 2 CAPFULS ONCE DAILY AS NEEDED FOR CONSTIPATION. MIX IN FRONT OF PT. HOLD FOR LOOSE STOOL. OK TO GIVE 1 CAPFUL IF RESIDENT REFUSES 2 CAPFULS. 510 g 97     polyvinyl alcohol-povidone PF (REFRESH) 1.4-0.6 % ophthalmic solution 1 drop every 4 hours as needed for irritation       QUEtiapine (SEROQUEL) 25 MG tablet Take 0.5 tablets (12.5 mg) by mouth 2 times daily. May also take 0.5 tablets (12.5 mg) every 12 hours as needed (anxiety). 60 tablet 11     trospium (SANCTURA) 20 MG tablet TAKE 1 TABLET BY MOUTH TWICE DAILY 180 tablet 97       Medication Changes/Rationale:         Controlled medications sent with patient:   not applicable/none     ROS:   4 point ROS including Respiratory, CV, GI and , other than that noted in the HPI,  is negative    Physical Exam:   Vitals: BP (!) 145/83   Pulse 68   Temp 96.9  F (36.1  C)   Resp 18   Ht 1.778 m (5' 10\")   Wt 99.3 kg (219 lb)   SpO2 100%   BMI 31.42 kg/m    BMI= Body mass index is 31.42 kg/m .  GENERAL APPEARANCE:  Alert, in no distress  RESP:  " respiratory effort and palpation of chest normal, lungs clear to auscultation , no respiratory distress  CV:  Palpation and auscultation of heart done , regular rate and rhythm, no murmur, rub, or gallop  ABDOMEN:  normal bowel sounds, soft, nontender, no hepatosplenomegaly or other masses  M/S:   Transfer stand by assist   SKIN:  Inspection of skin and subcutaneous tissue baseline  PSYCH:  oriented X 3     SNF labs: Labs done in SNF are in [place] EPIC. Please refer to them using [employer] [facility]/Care Everywhere.   Last Comprehensive Metabolic Panel:  Sodium   Date Value Ref Range Status   10/19/2022 137 133 - 144 mmol/L Final     Potassium   Date Value Ref Range Status   10/19/2022 4.4 3.4 - 5.3 mmol/L Final     Chloride   Date Value Ref Range Status   10/19/2022 103 94 - 109 mmol/L Final     Carbon Dioxide (CO2)   Date Value Ref Range Status   10/19/2022 28 20 - 32 mmol/L Final     Anion Gap   Date Value Ref Range Status   10/19/2022 6 3 - 14 mmol/L Final     Glucose   Date Value Ref Range Status   10/19/2022 75 70 - 99 mg/dL Final     Urea Nitrogen   Date Value Ref Range Status   10/19/2022 13 7 - 30 mg/dL Final     Creatinine   Date Value Ref Range Status   10/19/2022 0.60 (L) 0.66 - 1.25 mg/dL Final     GFR Estimate   Date Value Ref Range Status   10/19/2022 >90 >60 mL/min/1.73m2 Final     Comment:     Effective December 21, 2021 eGFRcr in adults is calculated using the 2021 CKD-EPI creatinine equation which includes age and gender (Curt et al., NEJ, DOI: 10.1056/GJYZzs3399495)     Calcium   Date Value Ref Range Status   10/19/2022 8.8 8.5 - 10.1 mg/dL Final       Lab Results   Component Value Date    WBC 6.0 10/19/2022     Lab Results   Component Value Date    RBC 4.31 10/19/2022     Lab Results   Component Value Date    HGB 12.3 10/19/2022     Lab Results   Component Value Date    HCT 38.5 10/19/2022     No components found for: MCT  Lab Results   Component Value Date    MCV 89 10/19/2022     Lab Results   Component  Value Date    MCH 28.5 10/19/2022     Lab Results   Component Value Date    MCHC 31.9 10/19/2022     Lab Results   Component Value Date    RDW 13.8 10/19/2022     Lab Results   Component Value Date     10/19/2022           DISCHARGE PLAN:    Follow up labs: No labs orders/due    Medical Follow Up:      Follow up with primary care provider in 1-2 weeks    [facility] referral needed and placed by this provider: No    Current [place] scheduled appointments:  Next 5 appointments (look out 90 days)    Nov 14, 2022  2:30 PM  Return Visit with Faiza Penny PA-C  [facility] Neurology [facility] ([facility] ) [address] [place] [address], [address]  [facility] [address]  [phone]   Dec 20, 2022  2:00 PM  Return Visit with Jeronimo Garcia MD  [facility] ([facility] ) [address] [place] [address], [address]  [facility] [address]  [phone]           Discharge Services: Home Care:  Occupational Therapy, Physical Therapy, Registered Nurse and Home Health Aide        TOTAL DISCHARGE TIME:   Greater than 30 minutes  Electronically signed by:  MARICHUY Mendoza CNP     Home care Face to Face documentation done in [facility] attached to Home care orders for homecare.                       Sincerely,        MARICHUY Mendoza CNP

## 2022-11-08 ENCOUNTER — ASSISTED LIVING VISIT (OUTPATIENT)
Dept: GERIATRICS | Facility: CLINIC | Age: 82
End: 2022-11-08
Payer: COMMERCIAL

## 2022-11-08 VITALS
HEIGHT: 70 IN | RESPIRATION RATE: 19 BRPM | BODY MASS INDEX: 33.36 KG/M2 | DIASTOLIC BLOOD PRESSURE: 70 MMHG | OXYGEN SATURATION: 97 % | TEMPERATURE: 98.3 F | WEIGHT: 233 LBS | HEART RATE: 67 BPM | SYSTOLIC BLOOD PRESSURE: 123 MMHG

## 2022-11-08 DIAGNOSIS — E22.2 SIADH (SYNDROME OF INAPPROPRIATE ADH PRODUCTION) (H): ICD-10-CM

## 2022-11-08 DIAGNOSIS — M15.0 PRIMARY OSTEOARTHRITIS INVOLVING MULTIPLE JOINTS: ICD-10-CM

## 2022-11-08 DIAGNOSIS — E87.1 HYPONATREMIA: Primary | ICD-10-CM

## 2022-11-08 DIAGNOSIS — Z87.81 HISTORY OF VERTEBRAL FRACTURE: ICD-10-CM

## 2022-11-08 DIAGNOSIS — F41.1 GAD (GENERALIZED ANXIETY DISORDER): ICD-10-CM

## 2022-11-08 DIAGNOSIS — R13.13 PHARYNGEAL DYSPHAGIA: ICD-10-CM

## 2022-11-08 DIAGNOSIS — Z87.01 H/O RECURRENT PNEUMONIA: ICD-10-CM

## 2022-11-08 DIAGNOSIS — I35.0 AORTIC VALVE STENOSIS, ETIOLOGY OF CARDIAC VALVE DISEASE UNSPECIFIED: ICD-10-CM

## 2022-11-08 DIAGNOSIS — K59.09 CHRONIC CONSTIPATION: ICD-10-CM

## 2022-11-08 DIAGNOSIS — F43.23 ADJUSTMENT REACTION WITH ANXIETY AND DEPRESSION: ICD-10-CM

## 2022-11-08 DIAGNOSIS — K22.70 BARRETT'S ESOPHAGUS WITHOUT DYSPLASIA: ICD-10-CM

## 2022-11-08 DIAGNOSIS — I71.20 THORACIC AORTIC ANEURYSM WITHOUT RUPTURE, UNSPECIFIED PART (H): ICD-10-CM

## 2022-11-08 DIAGNOSIS — R97.20 ELEVATED PROSTATE SPECIFIC ANTIGEN (PSA): ICD-10-CM

## 2022-11-08 DIAGNOSIS — R26.9 ABNORMAL GAIT: ICD-10-CM

## 2022-11-08 DIAGNOSIS — D64.9 ANEMIA, UNSPECIFIED TYPE: ICD-10-CM

## 2022-11-08 DIAGNOSIS — R04.2 HEMOPTYSIS: ICD-10-CM

## 2022-11-08 DIAGNOSIS — I48.91 ATRIAL FIBRILLATION, UNSPECIFIED TYPE (H): ICD-10-CM

## 2022-11-08 DIAGNOSIS — I10 ESSENTIAL HYPERTENSION, BENIGN: ICD-10-CM

## 2022-11-08 DIAGNOSIS — S32.000D COMPRESSION FRACTURE OF LUMBAR VERTEBRA WITH ROUTINE HEALING, UNSPECIFIED LUMBAR VERTEBRAL LEVEL, SUBSEQUENT ENCOUNTER: ICD-10-CM

## 2022-11-08 DIAGNOSIS — R60.0 LOWER EXTREMITY EDEMA: ICD-10-CM

## 2022-11-08 DIAGNOSIS — M81.0 OSTEOPOROSIS, UNSPECIFIED OSTEOPOROSIS TYPE, UNSPECIFIED PATHOLOGICAL FRACTURE PRESENCE: ICD-10-CM

## 2022-11-08 DIAGNOSIS — F10.27 DEMENTIA ASSOCIATED WITH ALCOHOLISM WITH BEHAVIORAL DISTURBANCE (H): ICD-10-CM

## 2022-11-08 NOTE — LETTER
11/8/2022        RE: Jamie Valdivia  [facility]  [address]  [facility] [address]        [facility]    PRIMARY CARE PROVIDER AND CLINIC:  MARICHUY Basilio CNP, [address] [facility] / [place] [address]  Chief Complaint   Patient presents with     RECHECK     TCU Follow-up      [facility] Medical Record Number:  [id number]  Place of Service where encounter took place:  [facility] ([facility]) [[id number]]    Jamie Valdivia  is a 82 year old  (1940) PMH significant for GERD with esophagitis, OA BL knees, pharyngeal dysphagia, edema, abdominal wall hernia, chronic atrial fibrillation, HTN, osteoporosis with hx of vertebral fracture, dementia, anemia, hx of COVID-19, returned to the above facility from  [facility]. Prior to TCU admission hospitalized from 9/30/22 - 10/6/22 at [facility].     Brief Summary of Hospitalization:  Hospitalized at [facility] on 9/30/22 with fall and confusion, found to have severe hyponatremia (Na+ 114). Prior to hospitalization had loose stools and poor intake in setting of prophylactic antibiotic (Bactrim) prescribed prior to planned prostate biospy which was canceled due to low sodium (at 129). Started on IVF due to concern for hypovolemic hyponatremia with some improvement. 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 Treated with 1.2 L/day fluid restriction and sodium improved.  Confusion improved with treatment of hyponatremia.     Also noted to have back pain after fall. Lumbar CT showed age indeterminate compression deformity of L2-L5 with chronic mild compression deformity at superior endplate of L1. Neurosurgery consulted and recommended conservative mgmt with brace. Recommended PT in TCU at hospital discharge due to difficult with ambulation.     Medications for other chronic conditions continued without change.  Interval Echo completed, with EF 55%, right ventricle mildly dilated  "with mildy decreased systolic function, aortic valve mild-moderate stenosis, ascending aorta moderately dilated.     Brief Summary of TCU Stay:  Resident recovered at [facility] from 10/6 to 11/03/22. Worked with physical therapy. Ambulating 191 feet with contact guard assist. Needs assist with transfers and dressing.     Pain controlled with Tylenol and gabapentin.   Wearing TLSO when out of bed.     Hyponatremia felt to be due to volume depletion and SIADH. Improved and able to discontinue fluid restriction.     Today's concerns:   Follow-up today for intermediate readmission.     staff present prior to visit.  Notes increased leg swelling.  Working with resident to don his Velcro wraps.     \"Its going well, glad to be back.\"    Pain is \"do-able.\"  Laments inability to do his own pills.   Staff bringing Tylenol at different times.  Concerned about max dose of Tylenol.    Hurt right great toe at TCU, hitting against foot board.  Better now that moved back to apartment.     Breathing \"pretty good.\"  Coughing is \"some what of a problem.\"  Likes to take guaifenesin   \"Like a shot of whiskey.\"    Discussed dysphagia, \"I've had this for 40 years.\"  Really liked SLP program at [facility].  \"I haven't choked\" - since implemented strategies.     Episodic chest pain over last several decades.  \"Recently not too bad.\"    Discussed bowel habits.  Getting two capfuls daily of Miralax.  Did better with range of dosing.   \"Let me vary the amount and it is okay.\"    Chronic incontinence - functional.   Working on voiding program - prompted voiding on schedule.  Having prostate biopsy in January.      Spirits \"up and down for a lot of reasons.\"  Living arrangement is less than desirable.  Mood is up and down.   Would like to try to go up on gabapentin.   Discusses losses, for example golf course he was building on property  Realizing this probably the best thing.  Celexa stopped 9/29/22 due to low sodium.  Mirtazapine stopped 8/9/22 due to " daytime sleepiness.   Zyprexa switched to Seroquel several months ago due to daytime sedation.  Family reports continued delusion and paranoia, mostly centered around wife, paranoid about infidelity.     CODE STATUS/ADVANCE DIRECTIVES DISCUSSION:  CPR/Full code   ALLERGIES:   Allergies   Allergen Reactions     Ativan [Lorazepam]       PAST MEDICAL HISTORY:   Past Medical History:   Diagnosis Date     Age-related osteoporosis without current pathological fracture 03/30/2022     Alcohol abuse 11/19/2017     Alcohol dependence with withdrawal (H)      Alcoholism (H)      Back pain      Backache 12/19/2018     CAD (coronary artery disease)      Chronic a-fib (H)      Chronic alcohol use 06/11/2015    Formatting of this note might be different from the original. 2/26/2019: serious fall at home with multiple vertebral fractures.  BAL 0.414% on admission.  Denies that he drinks much. 12/18/2018: hospitalized for fall due to alcohol intoxication.  BAL 0.34% on admission. 9/16/2018: hospitalized for injuries from fall due to alcohol intoxication.  BAL 0.34% on admission     Chronic atrial fibrillation (H) 07/15/2016    Formatting of this note might be different from the original. 2/2019: Multiple falls so started on aspirin only.  Then aspirin stopped due to GI bleed.     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 2/2/21     HTN (hypertension)      Impaired gait and mobility 03/14/2019     Mild cognitive impairment 08/12/2019    Formatting of this note might be different from the  original. NON-AMNESTIC TYPE     Mild TBI (traumatic brain injury) 03/14/2019     Mumps      Obesity (BMI 30-39.9) 09/03/2015     Osteoarthritis      Osteoarthritis of both knees 05/13/2015     Osteoporosis      Other idiopathic peripheral autonomic neuropathy 03/30/2022     Other insomnia 03/14/2019     Other seizures (H) 03/30/2022     Palpitations      Peripheral neuropathy      Pharyngeal dysphagia 05/13/2015    Formatting of this note might be different from the original. Likely secondary to GERD with esophagitis, on PPI and H2 blocker with notable improvement, EGD 10/5/15.     Physical deconditioning 03/14/2019     Recurrent major depressive disorder (H) 03/30/2022     Rhabdomyolysis      Thrombocytopenia (H)      Urination disorder      Weakness 04/27/2020      PAST SURGICAL HISTORY:   has a past surgical history that includes left hip replacement (2010); left shoulder replacement (2016); tonsillectomy; Spine surgery; orthopedic surgery; and Esophagoscopy, gastroscopy, duodenoscopy (EGD), combined (N/A, 06/01/2022).  FAMILY HISTORY: family history includes Osteoporosis in his mother; Prostate Cancer in his paternal grandfather.  SOCIAL HISTORY:   reports that he has never smoked. He has never used smokeless tobacco. He reports that he does not currently use alcohol. He reports that he does not use drugs.  Patient's living condition: lives in an assisted living facility on memory care unit.    Post Discharge Medication Reconciliation Status:   MED REC REQUIRED  Post Medication Reconciliation Status:  Discharge medications reconciled and changed, see notes/orders    Current Outpatient Medications   Medication Sig     ACETAMINOPHEN EXTRA STRENGTH 500 MG tablet TAKE TWO TABLETS (1000MG) BY MOUTH TWICE DAILY AND;TAKE TWO TABLETS (1000MG) TWICE DAILY AS NEEDED     alum & mag hydroxide-simethicone (MAALOX MAX) 400-400-40 MG/5ML SUSP suspension Take 30 mLs by mouth every 6 hours as needed for indigestion     DEEP SEA  "NASAL SPRAY 0.65 % nasal spray INHALE 2 SPRAYS IN EACH NOSTRIL ONCE DAILY     gabapentin (NEURONTIN) 100 MG capsule TAKE TWO CAPSULES (200MG) BY MOUTH EVERY NIGHT AT BEDTIME     guaiFENesin (ROBITUSSIN) 100 MG/5ML liquid GIVE 10ML (200 MG) BY MOUTH TWICE DAILY;AND TWICE DAILY AS NEEDED FOR COUGH     menthol-zinc oxide (CALMOSEPTINE) 0.44-20.6 % OINT ointment Apply 1 g topically 2 times daily. May also apply 1 g 2 times daily as needed for skin protection.     nystatin (MYCOSTATIN) 023820 UNIT/GM external powder Apply topically 2 times daily     omeprazole (PRILOSEC) 40 MG DR capsule TAKE 1 CAPSULE BY MOUTH TWICE DAILY     polyethylene glycol (MIRALAX) 17 GM/Dose powder MIX 2 CAPFULS IN 8OZ OF ORANGE JUICE  IN THE MORNING;AND MAY MIX 2 CAPFULS ONCE DAILY AS NEEDED FOR CONSTIPATION. MIX IN FRONT OF PT. HOLD FOR LOOSE STOOL. OK TO GIVE 1 CAPFUL IF RESIDENT REFUSES 2 CAPFULS.     polyvinyl alcohol-povidone PF (REFRESH) 1.4-0.6 % ophthalmic solution 1 drop every 4 hours as needed for irritation     QUEtiapine (SEROQUEL) 25 MG tablet Take 0.5 tablets (12.5 mg) by mouth 2 times daily. May also take 0.5 tablets (12.5 mg) every 12 hours as needed (anxiety).     trospium (SANCTURA) 20 MG tablet TAKE 1 TABLET BY MOUTH TWICE DAILY     No current facility-administered medications for this visit.     ROS:  4 point ROS including Respiratory, CV, GI and , other than that noted in the HPI,  is negative    Vitals:  /70   Pulse 67   Temp 98.3  F (36.8  C)   Resp 19   Ht 1.778 m (5' 10\")   Wt 105.7 kg (233 lb)   SpO2 97%   BMI 33.43 kg/m    Exam:  GENERAL APPEARANCE:  Alert, in no distress, seated in recliner chair.  EYES: Sclera clear and conjunctiva normal, no discharge   ENT:  Mouth normal, moist mucous membranes, hearing acuity grossly decreased BL.  NECK:  Nontender, supple, symmetrical; no cervical adenopathy, masses or thyromegaly, trachea midline  RESP:  Non-labored breathing, palpation of chest normal, no chest " wall tenderness, no respiratory distress, Lung sounds clear, patient is on room air.  CV:  Palpation - no murmur/non-displaced PMI, Auscultation - rate and rhythm regular, no murmur, no rub or gallop.  VASCULAR: 2-3+ edema bilateral lower extremities, not wearing Velcro compression wraps.   ABDOMEN:  Normal bowel sounds, soft, nontender, no grimacing or guarding with palpation, +umblical hernia.   M/S:   Gait and station ambulates independently with 4WW around apartment.   SKIN:  Inspection - No lesions, rashes, or ecchymosis on limited exam as fully dressed. Palpation- no increased warmth, skin is dry and non-tender.  NEURO: Cranial nerves II-XII grossly intact except hearing, no facial asymmetry, follows simple commands, moves all extremities symmetrically, normal tone, no tremor    Lab/Diagnostic data:  Recent labs in [facility] reviewed by me today.    CBC RESULTS: Recent Labs   Lab Test 10/19/22  1225 10/10/22  0845   WBC 6.0 6.1   RBC 4.31* 4.24*   HGB 12.3* 12.2*   HCT 38.5* 37.7*   MCV 89 89   MCH 28.5 28.8   MCHC 31.9 32.4   RDW 13.8 14.0    306     Ferritin   Date Value Ref Range Status   09/15/2022 40 26 - 388 ng/mL Final     Iron   Date Value Ref Range Status   09/15/2022 48 35 - 180 ug/dL Final     Iron Binding Capacity   Date Value Ref Range Status   09/15/2022 374 240 - 430 ug/dL Final     Lab Results   Component Value Date    B12 420 04/28/2022       Last Basic Metabolic Panel:  Recent Labs   Lab Test 10/19/22  1225 10/14/22  0515    135   POTASSIUM 4.4 4.1   CHLORIDE 103 100   JOSUE 8.8 8.8   CO2 28 28   BUN 13 12   CR 0.60* 0.52*   GLC 75 94     GFR Estimate   Date Value Ref Range Status   10/19/2022 >90 >60 mL/min/1.73m2 Final     Liver Function Studies -   Recent Labs   Lab Test 10/04/22  0720 09/30/22  1755 09/15/22  0645   PROTTOTAL  --  7.0 7.3   ALBUMIN 3.0* 3.9 3.3*   BILITOTAL  --  0.7 1.0   ALKPHOS  --  61 60   AST  --  37 11   ALT  --  15 15     TSH   Date Value Ref Range Status    10/06/2022 1.67 0.30 - 4.20 uIU/mL Final   04/28/2022 2.77 0.40 - 4.00 mU/L Final   04/07/2022 3.94 0.40 - 4.00 mU/L Final     Lab Results   Component Value Date    A1C 5.5 04/28/2022    A1C 5.6 04/07/2022     Echo 9/30/22    Interpretation Summary     1. The left ventricle is normal in structure, function and size. The visual  ejection fraction is estimated at 55%.  2. The right ventricle is mildly dilated. Mildly decreased right ventricular  systolic function  3. Aortic valve visually appears to have mild (to moderate) stenosis, however  gradients are in the high-normal range.  4. 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 Sinus 4.8cm, ascending 4.4cm.     Echo 8/2020 from [place]: EF 65%, normal RV, calcified AV with likely  underestimated gradient, sinus 4.5cm, ascending aorta 4.1cm.      ASSESSMENT/PLAN:  (E87.1) Hyponatremia  (primary encounter diagnosis)  (E22.2) SIADH (syndrome of inappropriate ADH production) (H)  Comment: Stable, multifactorial, occurred in setting of SIADH, recent dose increased of selective serotonin reuptake inhibitor (Celexa), Bactrim, hypovolemia.  Stopped Celexa 9/29/22  Plan:   - Monitor interval BMP  - Avoid medications which could exacerbate hyponatremia   - Avoid excessive free water intake    (S32.000D) Compression fracture of lumbar vertebra -compression deformity of L2-L5 with chronic mild compression deformity at superior endplate of L1.  (R26.9) Abnormal gait  (M15.9) OA Multiple Sites  Comment: Stable, mild pain due to compression fracture, completed course of calcitonin   DJD multiple sites.  Hx of multiple spinal compression fractures per chart review in setting of recurrent falls.   Looks to have been on Fosamax in past.   Plan:  - Continue Tylenol 650 mg PO BID and BID PRN, reviewed max dosing with patient, reassurance provided  - Continue gabapentin 200 mg at HS  - Continue HH PT/OT  - Support brace for comfort when OOB  - Neurosurgery follow-up PRN  - Consider  treatment for osteoporosis, would not be good candidate for bisphosphonate due to swallowing issues, consider endocrine referral    (97.20) Elevated PSA  Comment: Chronic  PSA 13.45 in March 2021, Repeat PSA 11.3 on 4/7/22  Saw urology, recommendation for prostate biopsy, scheduled for 9/28/22 under general anesthesia due to severe anxiety, scheduled due to abnormal labs  Plan:  - Prostate biopsy scheduled for Jan 2023  - Continue Trospium BID, unclear how helpful this has been and anticholingeric side effects, consider trial off.    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   Chronic low mood, suspect JOSÉ MANUEL with panic attacks for many years.   On memory care unit due to safety concerns, currently no access to ETOH.   Plan:   - Will requested joint visit with MTM PharmD next week to discuss treatment options for anxiety/depression given hyponatremia  - [facility] counselor following  - Continues to benefit from supportive care in [facility] setting  - Offer psychiatry referral but would like to continue therapeutic trial of medication mgmt in primary care setting    (R60.0) Lower extremity edema  Comment: Chronic, improved with compression wraps   Plan:   - Continue Velcro compression wraps  - Encourage resident to elevate legs  -  OT following    (K22.70) 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  Hx of hypopharyngeal ulceration on EGD in 2019, choking episodes of over last year, reports recurrently coughing  up blood, but not recently.  Saw ENT, no explanation.  Last esophagram May 2022 as above, presbyesophagus, proximal luminal narrowing.  Recent saw SLP 8/2022, no overt dysphagia.  Recurrent pneumonia, last 8/4/22.  No increased cough, fever, or respiratory distress  Plan:   -  SLP following, continue compensatory strategies   - Consider repeat video swallow study and pumonology referral after procedure     (I35.0) Aortic Valve Stenosis  Comment: Noted on Echo  Plan:   - Will discuss cardiology referral with daughter    (I71.2) Thoracic aortic aneurysm without rupture (H) - 6/30/22 4.6 cm  Comment: Chronic, new finding in ER June 2022  Plan:   - Plan for CT and echo, seeing Vascular 11/18  - HR and BP control of medications at present     (D64.9) Normocytic Anemia  Comment: Chronic, mild, Hgb 12.3 on 10/19/22, normal iron, B12  Plan:   - Follow serial Hgb    (I48.91) Atrial fibrillation (H)  Comment: Chronic, not anticoagulated due to recurrent falls with head trauma, rate controlled off medication  [id number]: 3 (age, HTN), 3.2% risk of stroke per year  Plan:   - Monitor VS and clinical status  - Would not add AC due to falls   - Consider cardiology follow-up     (I10) Essential hypertension, benign  Comment: BP at goal of < 150/90 given age and comorbid conditions  Stopped ASA due to hemoptysis  Plan:   - Monitor routine VS and labs off anti-hypertensive medications    (K59.09) Chronic constipation  Comment: Chronic, long hx of Levsin multiple times per day for abd pain, but stopped by urology when started trospium. Having regular BMs.   Plan:   - Continue polyethylene glycol (MIRALAX) 17 GM/Dose powder; 1-2 capfuls in 8 oz of ORANGE JUICE PO one time each morning and 1-2 capfuls 8 oz of ORANGE JUICE PO one time daily PRN for constipation. Please mix medication in juice in front of the patient. Hold for loose stools.   - GI following     At end of visit asks about sildenafil, but does not want this discussed  with family or staff. Will request PharmD run drug interaction check and consider safety. Plan to discuss with facility about how this medication could be administered discretely.     Electronically signed by:  MARICHUY Basilio CNP                     Sincerely,        MARICHUY Basilio CNP

## 2022-11-08 NOTE — PROGRESS NOTES
[facility] GERIATRICS    PRIMARY CARE PROVIDER AND CLINIC:  Sylvia Stein, MARICHUY CNP, [address] [facility] [address]  Chief Complaint   Patient presents with     RECHECK     TCU Follow-up      [place] [facility] Record Number:  [id number]  Place of Service where encounter took place:  [facility] ([facility]) [[id number]]    Jamie Valdivia  is a 82 year old  (1940) PMH significant for GERD with esophagitis, OA BL knees, pharyngeal dysphagia, edema, abdominal wall hernia, chronic atrial fibrillation, HTN, osteoporosis with hx of vertebral fracture, dementia, anemia, hx of COVID-19, returned to the above facility from  [facility]. Prior to TCU admission hospitalized from 9/30/22 - 10/6/22 at [facility].     Brief Summary of Hospitalization:  Hospitalized at [facility] on 9/30/22 with fall and confusion, found to have severe hyponatremia (Na+ 114). Prior to hospitalization had loose stools and poor intake in setting of prophylactic antibiotic (Bactrim) prescribed prior to planned prostate biospy which was canceled due to low sodium (at 129). Started on IVF due to concern for hypovolemic hyponatremia with some improvement. 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 Treated with 1.2 L/day fluid restriction and sodium improved.  Confusion improved with treatment of hyponatremia.     Also noted to have back pain after fall. Lumbar CT showed age indeterminate compression deformity of L2-L5 with chronic mild compression deformity at superior endplate of L1. Neurosurgery consulted and recommended conservative mgmt with brace. Recommended PT in TCU at hospital discharge due to difficult with ambulation.     Medications for other chronic conditions continued without change.  Interval Echo completed, with EF 55%, right ventricle mildly dilated with mildy decreased systolic function, aortic valve mild-moderate stenosis, ascending aorta moderately dilated.     Brief Summary  "of TCU Stay:  Resident recovered at [facility] from 10/6 to 11/03/22. Worked with physical therapy. Ambulating 191 feet with contact guard assist. Needs assist with transfers and dressing.     Pain controlled with Tylenol and gabapentin.   Wearing TLSO when out of bed.     Hyponatremia felt to be due to volume depletion and SIADH. Improved and able to discontinue fluid restriction.     Today's concerns:   Follow-up today for longterm readmission.    HH staff present prior to visit.  Notes increased leg swelling.  Working with resident to don his Velcro wraps.     \"Its going well, glad to be back.\"    Pain is \"do-able.\"  Laments inability to do his own pills.   Staff bringing Tylenol at different times.  Concerned about max dose of Tylenol.    Hurt right great toe at TCU, hitting against foot board.  Better now that moved back to apartment.     Breathing \"pretty good.\"  Coughing is \"some what of a problem.\"  Likes to take guaifenesin   \"Like a shot of whiskey.\"    Discussed dysphagia, \"I've had this for 40 years.\"  Really liked SLP program at [facility].  \"I haven't choked\" - since implemented strategies.     Episodic chest pain over last several decades.  \"Recently not too bad.\"    Discussed bowel habits.  Getting two capfuls daily of Miralax.  Did better with range of dosing.   \"Let me vary the amount and it is okay.\"    Chronic incontinence - functional.   Working on voiding program - prompted voiding on schedule.  Having prostate biopsy in January.      Spirits \"up and down for a lot of reasons.\"  Living arrangement is less than desirable.  Mood is up and down.   Would like to try to go up on gabapentin.   Discusses losses, for example golf course he was building on property  Realizing this probably the best thing.  Celexa stopped 9/29/22 due to low sodium.  Mirtazapine stopped 8/9/22 due to daytime sleepiness.   Zyprexa switched to Seroquel several months ago due to daytime sedation.  Family reports continued delusion and " paranoia, mostly centered around wife, paranoid about infidelity.     CODE STATUS/ADVANCE DIRECTIVES DISCUSSION:  CPR/Full code   ALLERGIES:   Allergies   Allergen Reactions     Ativan [Lorazepam]       PAST MEDICAL HISTORY:   Past Medical History:   Diagnosis Date     Age-related osteoporosis without current pathological fracture 03/30/2022     Alcohol abuse 11/19/2017     Alcohol dependence with withdrawal (H)      Alcoholism (H)      Back pain      Backache 12/19/2018     CAD (coronary artery disease)      Chronic a-fib (H)      Chronic alcohol use 06/11/2015    Formatting of this note might be different from the original. 2/26/2019: serious fall at home with multiple vertebral fractures.  BAL 0.414% on admission.  Denies that he drinks much. 12/18/2018: hospitalized for fall due to alcohol intoxication.  BAL 0.34% on admission. 9/16/2018: hospitalized for injuries from fall due to alcohol intoxication.  BAL 0.34% on admission     Chronic atrial fibrillation (H) 07/15/2016    Formatting of this note might be different from the original. 2/2019: Multiple falls so started on aspirin only.  Then aspirin stopped due to GI bleed.     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 2/2/21     HTN (hypertension)      Impaired gait and mobility 03/14/2019     Mild cognitive impairment 08/12/2019    Formatting of this note might be different from the original. NON-AMNESTIC TYPE     Mild TBI (traumatic brain injury) 03/14/2019     Mumps      Obesity (BMI 30-39.9) 09/03/2015      Osteoarthritis      Osteoarthritis of both knees 05/13/2015     Osteoporosis      Other idiopathic peripheral autonomic neuropathy 03/30/2022     Other insomnia 03/14/2019     Other seizures (H) 03/30/2022     Palpitations      Peripheral neuropathy      Pharyngeal dysphagia 05/13/2015    Formatting of this note might be different from the original. Likely secondary to GERD with esophagitis, on PPI and H2 blocker with notable improvement, EGD 10/5/15.     Physical deconditioning 03/14/2019     Recurrent major depressive disorder (H) 03/30/2022     Rhabdomyolysis      Thrombocytopenia (H)      Urination disorder      Weakness 04/27/2020      PAST SURGICAL HISTORY:   has a past surgical history that includes left hip replacement (2010); left shoulder replacement (2016); tonsillectomy; Spine surgery; orthopedic surgery; and Esophagoscopy, gastroscopy, duodenoscopy (EGD), combined (N/A, 06/01/2022).  FAMILY HISTORY: family history includes Osteoporosis in his mother; Prostate Cancer in his paternal grandfather.  SOCIAL HISTORY:   reports that he has never smoked. He has never used smokeless tobacco. He reports that he does not currently use alcohol. He reports that he does not use drugs.  Patient's living condition: lives in an assisted living facility on memory care unit.    Post Discharge Medication Reconciliation Status:   MED REC REQUIRED  Post Medication Reconciliation Status:  Discharge medications reconciled and changed, see notes/orders    Current Outpatient Medications   Medication Sig     ACETAMINOPHEN EXTRA STRENGTH 500 MG tablet TAKE TWO TABLETS (1000MG) BY MOUTH TWICE DAILY AND;TAKE TWO TABLETS (1000MG) TWICE DAILY AS NEEDED     alum & mag hydroxide-simethicone (MAALOX MAX) 400-400-40 MG/5ML SUSP suspension Take 30 mLs by mouth every 6 hours as needed for indigestion     DEEP SEA NASAL SPRAY 0.65 % nasal spray INHALE 2 SPRAYS IN EACH NOSTRIL ONCE DAILY     gabapentin (NEURONTIN) 100 MG capsule TAKE TWO  "CAPSULES (200MG) BY MOUTH EVERY NIGHT AT BEDTIME     guaiFENesin (ROBITUSSIN) 100 MG/5ML liquid GIVE 10ML (200 MG) BY MOUTH TWICE DAILY;AND TWICE DAILY AS NEEDED FOR COUGH     menthol-zinc oxide (CALMOSEPTINE) 0.44-20.6 % OINT ointment Apply 1 g topically 2 times daily. May also apply 1 g 2 times daily as needed for skin protection.     nystatin (MYCOSTATIN) 439961 UNIT/GM external powder Apply topically 2 times daily     omeprazole (PRILOSEC) 40 MG DR capsule TAKE 1 CAPSULE BY MOUTH TWICE DAILY     polyethylene glycol (MIRALAX) 17 GM/Dose powder MIX 2 CAPFULS IN 8OZ OF ORANGE JUICE  IN THE MORNING;AND MAY MIX 2 CAPFULS ONCE DAILY AS NEEDED FOR CONSTIPATION. MIX IN FRONT OF PT. HOLD FOR LOOSE STOOL. OK TO GIVE 1 CAPFUL IF RESIDENT REFUSES 2 CAPFULS.     polyvinyl alcohol-povidone PF (REFRESH) 1.4-0.6 % ophthalmic solution 1 drop every 4 hours as needed for irritation     QUEtiapine (SEROQUEL) 25 MG tablet Take 0.5 tablets (12.5 mg) by mouth 2 times daily. May also take 0.5 tablets (12.5 mg) every 12 hours as needed (anxiety).     trospium (SANCTURA) 20 MG tablet TAKE 1 TABLET BY MOUTH TWICE DAILY     No current facility-administered medications for this visit.     ROS:  4 point ROS including Respiratory, CV, GI and , other than that noted in the HPI,  is negative    Vitals:  /70   Pulse 67   Temp 98.3  F (36.8  C)   Resp 19   Ht 1.778 m (5' 10\")   Wt 105.7 kg (233 lb)   SpO2 97%   BMI 33.43 kg/m    Exam:  GENERAL APPEARANCE:  Alert, in no distress, seated in recliner chair.  EYES: Sclera clear and conjunctiva normal, no discharge   ENT:  Mouth normal, moist mucous membranes, hearing acuity grossly decreased BL.  NECK:  Nontender, supple, symmetrical; no cervical adenopathy, masses or thyromegaly, trachea midline  RESP:  Non-labored breathing, palpation of chest normal, no chest wall tenderness, no respiratory distress, Lung sounds clear, patient is on room air.  CV:  Palpation - no " murmur/non-displaced PMI, Auscultation - rate and rhythm regular, no murmur, no rub or gallop.  VASCULAR: 2-3+ edema bilateral lower extremities, not wearing Velcro compression wraps.   ABDOMEN:  Normal bowel sounds, soft, nontender, no grimacing or guarding with palpation, +umblical hernia.   M/S:   Gait and station ambulates independently with 4WW around apartment.   SKIN:  Inspection - No lesions, rashes, or ecchymosis on limited exam as fully dressed. Palpation- no increased warmth, skin is dry and non-tender.  NEURO: Cranial nerves II-XII grossly intact except hearing, no facial asymmetry, follows simple commands, moves all extremities symmetrically, normal tone, no tremor    Lab/Diagnostic data:  Recent labs in [facility] reviewed by me today.    CBC RESULTS: Recent Labs   Lab Test 10/19/22  1225 10/10/22  0845   WBC 6.0 6.1   RBC 4.31* 4.24*   HGB 12.3* 12.2*   HCT 38.5* 37.7*   MCV 89 89   MCH 28.5 28.8   MCHC 31.9 32.4   RDW 13.8 14.0    306     Ferritin   Date Value Ref Range Status   09/15/2022 40 26 - 388 ng/mL Final     Iron   Date Value Ref Range Status   09/15/2022 48 35 - 180 ug/dL Final     Iron Binding Capacity   Date Value Ref Range Status   09/15/2022 374 240 - 430 ug/dL Final     Lab Results   Component Value Date    B12 420 04/28/2022       Last Basic Metabolic Panel:  Recent Labs   Lab Test 10/19/22  1225 10/14/22  0515    135   POTASSIUM 4.4 4.1   CHLORIDE 103 100   JOSUE 8.8 8.8   CO2 28 28   BUN 13 12   CR 0.60* 0.52*   GLC 75 94     GFR Estimate   Date Value Ref Range Status   10/19/2022 >90 >60 mL/min/1.73m2 Final     Liver Function Studies -   Recent Labs   Lab Test 10/04/22  0720 09/30/22  1755 09/15/22  0645   PROTTOTAL  --  7.0 7.3   ALBUMIN 3.0* 3.9 3.3*   BILITOTAL  --  0.7 1.0   ALKPHOS  --  61 60   AST  --  37 11   ALT  --  15 15     TSH   Date Value Ref Range Status   10/06/2022 1.67 0.30 - 4.20 uIU/mL Final   04/28/2022 2.77 0.40 - 4.00 mU/L Final   04/07/2022 3.94 0.40 -  4.00 mU/L Final     Lab Results   Component Value Date    A1C 5.5 04/28/2022    A1C 5.6 04/07/2022     Echo 9/30/22    Interpretation Summary     1. The left ventricle is normal in structure, function and size. The visual  ejection fraction is estimated at 55%.  2. The right ventricle is mildly dilated. Mildly decreased right ventricular  systolic function  3. Aortic valve visually appears to have mild (to moderate) stenosis, however  gradients are in the high-normal range.  4. 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 Sinus 4.8cm, ascending 4.4cm.     Echo 8/2020 from [place]: EF 65%, normal RV, calcified AV with likely  underestimated gradient, sinus 4.5cm, ascending aorta 4.1cm.      ASSESSMENT/PLAN:  (E87.1) Hyponatremia  (primary encounter diagnosis)  (E22.2) SIADH (syndrome of inappropriate ADH production) (H)  Comment: Stable, multifactorial, occurred in setting of SIADH, recent dose increased of selective serotonin reuptake inhibitor (Celexa), Bactrim, hypovolemia.  Stopped Celexa 9/29/22  Plan:   - Monitor interval BMP  - Avoid medications which could exacerbate hyponatremia   - Avoid excessive free water intake    (S32.000D) Compression fracture of lumbar vertebra -compression deformity of L2-L5 with chronic mild compression deformity at superior endplate of L1.  (R26.9) Abnormal gait  (M15.9) OA Multiple Sites  Comment: Stable, mild pain due to compression fracture, completed course of calcitonin   DJD multiple sites.  Hx of multiple spinal compression fractures per chart review in setting of recurrent falls.   Looks to have been on Fosamax in past.   Plan:  - Continue Tylenol 650 mg PO BID and BID PRN, reviewed max dosing with patient, reassurance provided  - Continue gabapentin 200 mg at HS  - Continue HH PT/OT  - Support brace for comfort when OOB  - Neurosurgery follow-up PRN  - Consider treatment for osteoporosis, would not be good candidate for bisphosphonate due to swallowing issues, consider  endocrine referral    (97.20) Elevated PSA  Comment: Chronic  PSA 13.45 in March 2021, Repeat PSA 11.3 on 4/7/22  Saw urology, recommendation for prostate biopsy, scheduled for 9/28/22 under general anesthesia due to severe anxiety, scheduled due to abnormal labs  Plan:  - Prostate biopsy scheduled for Jan 2023  - Continue Trospium BID, unclear how helpful this has been and anticholingeric side effects, consider trial off.    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   Chronic low mood, suspect JOSÉ MANUEL with panic attacks for many years.   On memory care unit due to safety concerns, currently no access to ETOH.   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  Hx of hypopharyngeal ulceration on EGD in 2019, choking episodes of over last year, reports recurrently coughing up blood, but not recently.  Saw ENT, no explanation.  Last esophagram May 2022 as above, presbyesophagus,  proximal luminal narrowing.  Recent saw SLP 8/2022, no overt dysphagia.  Recurrent pneumonia, last 8/4/22.  No increased cough, fever, or respiratory distress  Plan:   -  SLP following, continue compensatory strategies   - Consider repeat video swallow study and pumonology referral after procedure     (I35.0) Aortic Valve Stenosis  Comment: Noted on Echo  Plan:   - Will discuss cardiology referral with daughter    (I71.2) Thoracic aortic aneurysm without rupture (H) - 6/30/22 4.6 cm  Comment: Chronic, new finding in ER June 2022  Plan:   - Plan for CT and echo, seeing Vascular 11/18  - HR and BP control of medications at present     (D64.9) Normocytic Anemia  Comment: Chronic, mild, Hgb 12.3 on 10/19/22, normal iron, B12  Plan:   - Follow serial Hgb    (I48.91) Atrial fibrillation (H)  Comment: Chronic, not anticoagulated due to recurrent falls with head trauma, rate controlled off medication  [id number]: 3 (age, HTN), 3.2% risk of stroke per year  Plan:   - Monitor VS and clinical status  - Would not add AC due to falls   - Consider cardiology follow-up     (I10) Essential hypertension, benign  Comment: BP at goal of < 150/90 given age and comorbid conditions  Stopped ASA due to hemoptysis  Plan:   - Monitor routine VS and labs off anti-hypertensive medications    (K59.09) Chronic constipation  Comment: Chronic, long hx of Levsin multiple times per day for abd pain, but stopped by urology when started trospium. Having regular BMs.   Plan:   - Continue polyethylene glycol (MIRALAX) 17 GM/Dose powder; 1-2 capfuls in 8 oz of ORANGE JUICE PO one time each morning and 1-2 capfuls 8 oz of ORANGE JUICE PO one time daily PRN for constipation. Please mix medication in juice in front of the patient. Hold for loose stools.   - GI following     At end of visit asks about sildenafil, but does not want this discussed with family or staff. Will request PharmD run drug interaction check and consider safety. Plan to discuss  with facility about how this medication could be administered discretely.     Electronically signed by:  MARICHUY Basilio CNP

## 2022-11-17 ENCOUNTER — ASSISTED LIVING VISIT (OUTPATIENT)
Dept: GERIATRICS | Facility: CLINIC | Age: 82
End: 2022-11-17
Payer: COMMERCIAL

## 2022-11-17 ENCOUNTER — OFFICE VISIT (OUTPATIENT)
Dept: PHARMACY | Facility: CLINIC | Age: 82
End: 2022-11-17
Payer: COMMERCIAL

## 2022-11-17 VITALS
HEIGHT: 70 IN | SYSTOLIC BLOOD PRESSURE: 132 MMHG | DIASTOLIC BLOOD PRESSURE: 72 MMHG | TEMPERATURE: 98 F | OXYGEN SATURATION: 95 % | BODY MASS INDEX: 33.36 KG/M2 | WEIGHT: 233 LBS

## 2022-11-17 DIAGNOSIS — N40.1 BENIGN PROSTATIC HYPERPLASIA WITH LOWER URINARY TRACT SYMPTOMS, SYMPTOM DETAILS UNSPECIFIED: ICD-10-CM

## 2022-11-17 DIAGNOSIS — F10.10 ALCOHOL ABUSE: ICD-10-CM

## 2022-11-17 DIAGNOSIS — R52 PAIN: ICD-10-CM

## 2022-11-17 DIAGNOSIS — F10.27: ICD-10-CM

## 2022-11-17 DIAGNOSIS — E22.2 SIADH (SYNDROME OF INAPPROPRIATE ADH PRODUCTION) (H): ICD-10-CM

## 2022-11-17 DIAGNOSIS — I71.20 THORACIC AORTIC ANEURYSM WITHOUT RUPTURE, UNSPECIFIED PART (H): ICD-10-CM

## 2022-11-17 DIAGNOSIS — N39.41 URGE INCONTINENCE OF URINE: ICD-10-CM

## 2022-11-17 DIAGNOSIS — F41.1 GAD (GENERALIZED ANXIETY DISORDER): ICD-10-CM

## 2022-11-17 DIAGNOSIS — E87.1 HYPONATREMIA: ICD-10-CM

## 2022-11-17 DIAGNOSIS — G47.01 INSOMNIA DUE TO MEDICAL CONDITION: ICD-10-CM

## 2022-11-17 DIAGNOSIS — F10.27 DEMENTIA ASSOCIATED WITH ALCOHOLISM WITH BEHAVIORAL DISTURBANCE (H): ICD-10-CM

## 2022-11-17 DIAGNOSIS — K21.00 GASTROESOPHAGEAL REFLUX DISEASE WITH ESOPHAGITIS, UNSPECIFIED WHETHER HEMORRHAGE: ICD-10-CM

## 2022-11-17 DIAGNOSIS — F43.21 ADJUSTMENT DISORDER WITH DEPRESSED MOOD: Primary | ICD-10-CM

## 2022-11-17 DIAGNOSIS — G62.9 NEUROPATHY: ICD-10-CM

## 2022-11-17 DIAGNOSIS — K22.70 BARRETT'S ESOPHAGUS WITHOUT DYSPLASIA: ICD-10-CM

## 2022-11-17 DIAGNOSIS — K59.09 CHRONIC CONSTIPATION: ICD-10-CM

## 2022-11-17 DIAGNOSIS — R39.81 FUNCTIONAL URINARY INCONTINENCE: ICD-10-CM

## 2022-11-17 DIAGNOSIS — K59.01 SLOW TRANSIT CONSTIPATION: ICD-10-CM

## 2022-11-17 DIAGNOSIS — R10.13 ABDOMINAL PAIN, EPIGASTRIC: ICD-10-CM

## 2022-11-17 DIAGNOSIS — H26.9 CATARACT OF BOTH EYES, UNSPECIFIED CATARACT TYPE: ICD-10-CM

## 2022-11-17 DIAGNOSIS — F41.8 DEPRESSION WITH ANXIETY: Primary | ICD-10-CM

## 2022-11-17 DIAGNOSIS — R97.20 ELEVATED PROSTATE SPECIFIC ANTIGEN (PSA): ICD-10-CM

## 2022-11-17 PROBLEM — S32.000D COMPRESSION FRACTURE OF LUMBAR VERTEBRA WITH ROUTINE HEALING, UNSPECIFIED LUMBAR VERTEBRAL LEVEL, SUBSEQUENT ENCOUNTER: Status: ACTIVE | Noted: 2022-11-17

## 2022-11-17 PROCEDURE — 99606 MTMS BY PHARM EST 15 MIN: CPT | Performed by: PHARMACIST

## 2022-11-17 PROCEDURE — 99607 MTMS BY PHARM ADDL 15 MIN: CPT | Performed by: PHARMACIST

## 2022-11-17 NOTE — LETTER
"    11/17/2022        RE: Jamie Valdivia  [facility]  [address]  [facility] [address]        M [facility] GERIATRICS    Chief Complaint   Patient presents with     RECHECK     depression     HPI:  Jamie Valdivia is a 82 year old  (1940) PMH significant for GERD with esophagitis, OA BL knees, pharyngeal dysphagia, edema, abdominal wall hernia, chronic atrial fibrillation, HTN, osteoporosis with hx of vertebral fracture, dementia, anemia, hx of COVID-19, who is being seen today for an episodic care visit at: [facility] ([facility]) [61].     Today's concern is:   Follow-up to day to review medication, mood, urinary symptoms, constipation in joint visit with PharmD Sindy Hanley.    Reports mood is \"pretty good.\"  Personal life improving.  \"I'm not much of a sleeper.\"  Describes dozing on and off at night and not getting good sleeping  Daytime sleepiness when doesn't sleep well at night.   Eating \"too much.\"  + anxiety \"I've had that all my life.\"  Not as groggy during the day as when on Zyprexa.  Daughter reports he is still bothering wife with paranoid delusions, calling her repeatedly.  Followed by ACP counselor, Kimberly.    Urinary symptoms, \"I think are okay.\"  Wears briefs.  Functional incontinence.   Open to stopping Sanctura, not sure it has helped.  Also interested in  Sildenafil.    Chronic constipation, abd pain and stomach upset.  Worse with anxiety.  Having regular BMs with Miralax.     Back pain improved, does not feel he needs noon time Tylenol.  \"Many, many sore spots.\"  Likes to sleep in recliner.  HH PT/OT still following.    Allergies, and PMH/PSH reviewed in [facility] today.    REVIEW OF SYSTEMS:  4 point ROS including Respiratory, CV, GI and , other than that noted in the HPI,  is negative    Objective:   /72   Pulse (P) 65   Temp 98  F (36.7  C)   Resp (P) 16   Ht 1.778 m (5' 10\")   Wt 105.7 kg (233 lb)   SpO2 (P) 97%   BMI 33.43 kg/m  "   GENERAL APPEARANCE:  Alert, in no distress, seated in recliner chair.  EYES: Sclera clear and conjunctiva normal, no discharge   ENT:  Hearing acuity grossly decreased BL.  RESP:  Non-labored breathing, no respiratory distress, Lung sounds clear, patient is on room air.  CV:  Rate and rhythm regular, no murmur, no rub or gallop.  VASCULAR: 2 + edema bilateral lower extremities, wearing Velcro compression wraps.   ABDOMEN:  Normal bowel sounds, soft, nontender, no grimacing or guarding with palpation. Unable to palpate bladder, no suprapubic tenderness.  M/S:   Gait and station ambulates independently with 4WW around apartment.   PSYCH: awake and alert, speech fluent,  insight and judgement impaired, no acute confusion, slightly flat affect but brightens when engaged, calm and cooperative.    Recent labs in [facility] reviewed by me today.    CBC RESULTS: Recent Labs   Lab Test 10/19/22  1225 10/10/22  0845   WBC 6.0 6.1   RBC 4.31* 4.24*   HGB 12.3* 12.2*   HCT 38.5* 37.7*   MCV 89 89   MCH 28.5 28.8   MCHC 31.9 32.4   RDW 13.8 14.0    306     Last Basic Metabolic Panel:  Recent Labs   Lab Test 10/19/22  1225 10/14/22  0515    135   POTASSIUM 4.4 4.1   CHLORIDE 103 100   JOSUE 8.8 8.8   CO2 28 28   BUN 13 12   CR 0.60* 0.52*   GLC 75 94   Estimated Creatinine Clearance: 115.6 mL/min (A) (based on SCr of 0.6 mg/dL (L)).    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   Recent hospitalization for multifactorial hyponatremia, limits psychotropic use.  Not sleeping well, continued anxiety and depression.  Cognitive impairment in setting of ETOH abuse with recurrent falls and hospitalizations.   On memory care unit due to safety concerns, currently no access to ETOH.   Plan:   - Therapeutic trial of mirtazpaine 7.5 mg PO q HS,  confers less risk of hyponatremia compared to Celexa  - Continue Seroquel 12.5 mg BID and BID PRN for psychotic features  - Gabapentin 200 mg PO q HS for anxiety and pain  - BMP on next lab day  - [facility]  - Continues to benefit from supportive care in [facility] setting  - Offered psychiatry referral in past, but pt/family prefer onsite care    (R97.20) Elevated PSA  (R39.81) Functional urinary incontinence   (N40.1) Benign prostatic hyperplasia with lower urinary tract symptoms, symptom details unspecified  (N39.41) Urge incontinence of urine  Comment: Chronic, still having some urinary incontinence, not clear if Trospium helpful for symptoms.  PSA 13.45 in March 2021, Repeat PSA 11.3 on 4/7/22  Saw urology, recommendation for prostate biopsy, family requested under general anesthesia due to severe anxiety, scheduled for Jan 2023.  Plan:  - Discussed anticholinergic side effects of Trospium, will trial off medication and monitor symptoms  - Prostate biopsy scheduled for Jan 2023  - Staff at [facility] to assist with toileting and incontinence care    (K59.09) Chronic constipation  Comment: Chronic, long hx of Levsin multiple times per day for abd pain, but stopped by urology when started trospium. Having regular BMs. Symptoms improved off Levsin and stopping trospium may also be helpful.  Plan:   - Continue polyethylene glycol (MIRALAX) 17 GM/Dose powder; 1-2 capfuls in 8 oz of ORANGE JUICE PO one time each morning and 1-2 capfuls 8 oz of ORANGE JUICE PO one time daily PRN for constipation. Please mix medication in juice in front of the patient. Hold for loose stools.   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  Stopped Celexa 9/29/22.  Last Na+  137 on 10/19/22  Plan:   - BMP next lab day after starting Mirtazapine  - Avoid medications which could exacerbate hyponatremia   - Avoid excessive free water intake    (I71.2) Thoracic aortic aneurysm without rupture (H) - 6/30/22 4.6 cm  Comment: Chronic, new finding in ER June 2022  Plan:   - Plan for CT and echo, seeing Vascular 11/18  - HR and BP control of medications at present     (H26.9) Cataracts  Comment: Reported by daughter, bothersome, needs procedure   Plan:  - Requested facility send daughter scheduling information from onsite eye group who made referral    Orders:  - Discontinue trospium  - Therapeutic trial of mirtazpaine 7.5 mg PO q HS  - BMP on next lab day  - Facility to provide referral information for cataract      Electronically signed by: MARICHUY Basilio CNP             Sincerely,        MARICHUY Basilio CNP

## 2022-11-17 NOTE — PROGRESS NOTES
Medication Therapy Management (MTM) Encounter    ASSESSMENT:                            Medication Adherence/Access: No issues identified    Dementia with behavioral disturbance, Insomnia, Depression with anxiety, h/o alcohol abuse: Due to continued anxiety, lower mood, difficulty sleeping, and less propensity for mirtazapine to contribute to low sodium, may benefit from retrial of mirtazapine 7.5mg at bedtime & follow-up sodium level.  In future, may allow a reduction in quetiapine.    Urge incontinence:  May benefit from d'c Sanctura.  Doesn't appear to have offered much benefit, and may be contributing to confusion, cognitive decline, fall risk, CNS/psychiatric adverse effects, constipation, blurred vision, etc. Due to highly anticholinergic.    Neuropathy, Pain: Stable.    Constipation, Abdominal pain: Improved with increased Miralax and d'c of previous Lomotil, however still with some complaints, and as noted above, Sanctura may contribute.  D'c of Sanctura may improve these symptoms.    GERD, Vickers's esophagus:  Stable.    PLAN:                            1.  Discontinue Sanctura.  2.  Initiate mirtazapine 7.5mg daily at bedtime.  Follow-up mood, anxiety, sleep, sodium level.    Follow-up: 4-8 weeks, sooner if necessary    SUBJECTIVE/OBJECTIVE:                          Jamie Valdivia is an 82 year old male seen for a co-visit with Sylvia Stein NP.     Reason for visit: follow-up after hospitalization and TCU stay - see NP encounter for details.      Allergies/ADRs: Reviewed in chart  Past Medical History: Reviewed in chart  Tobacco: He reports that he has never smoked. He has never used smokeless tobacco.  Alcohol: history of alcohol dependence, alcoholic hepatitis  Assistive Devices: walker for ambulation.  H/o falls, weakness.    Medication Adherence/Access: Patient has assistance with medication administration: assisted living.    Dementia with behavioral disturbance, Insomnia, Depression with anxiety, h/o  "alcohol abuse: quetiapine 12.5mg twice daily & 12.5mg every 12hr as needed, gabapentin 200mg every night at bedtime.  Previously on citalopram, and this was dc'd on 9/28 due to potential for it to contribute to hyponatremia.  Prior to citalopram use, was on mirtazapine as well as olanzapine.  Had been complaining of fatigue, and both were weaned off.    Noting some depression, doesn't like living situation.  Today he states his spirits \"are pretty good.\"    Family noting some delusions, paranoia; thinking wife is having an affair.  \"Not much of a sleeper, pretty much par for the course\" per his report.  States \"probably\" when asked if having excessive worry/anxiety.  \"I've always been a do-er.\"  Does drink caffeine - 4 cups coffee/day; has cut back from previous per his report.    Urge incontinence:  Sanctura 20mg twice daily.  This was started by urology on 8/5/22 due to patient reporting urinary urgency and incontinence, going through several Depends a day.  Weak/low stream also reported.  At same time, hyoscyamine was discontinued.   Patient wears Depends.  States still with frequent urination.  Tries to get to restroom on time, but doesn't always make it.  Potential side effects he is experiencing: confusion, constipation, blurred vision.  In past has reported dizziness with position changes.  Prostate biopsy rescheduled for January from September due to patient's hospitalization in September.    Neuropathy, Pain:  Gabapentin 200mg at bedtime, Tylenol 1000mg twice daily & twice daily as needed.  Notes some aches and pains; nothing currently too troublesome.    Constipation, Abdominal pain: Miralax 34gm daily & daily as needed.  History of Lomotil use; no longer on.  States fair amount of abdominal pain/stomach upset.  Feels like \"a couple of rocks\" in lower abdominal area.  Feels Miralax has been helpful.  States \"eating too well.\"    GERD, Vickers's esophagus:   omeprazole 40mg twice daily, Tums  mg, " takes 2 tablets as needed, Maalox as needed.    Tums are self-administered, and difficult to know how frequently he is taking these.  Did not complain of heartburn/reflux.    ----------------      I spent 35 minutes with this patient today. A copy of the visit note was provided to the patient's provider(s).    The patient was not mailed a summary of these recommendations due to cognitive impairment. See Provider note/AVS from today.     Sindy Hanley, Pharm.D.,[facility]  Board Certified Geriatric Pharmacist  Medication Therapy Management Pharmacist  [phone]       Medication Therapy Recommendations Needing Review  Depression with anxiety    Current Medication: mirtazapine (REMERON) 7.5 MG tablet   Rationale: Synergistic therapy   Recommendation: Start Medication         Nutritional deficiency    Rationale: Preventive therapy   Recommendation: Start Medication - Vitamin D (Ergocalciferol) 44636 units Caps - Take one capsule by mouth monthly         Osteoporosis, unspecified osteoporosis type, unspecified pathological fracture presence    Current Medication: calcium carbonate 750 MG CHEW (Discontinued)   Rationale: Frequency inappropriate   Recommendation: Increase Frequency - Tums E-X 750 750 MG Chew - take 1 tablet by mouth twice daily         Urge incontinence of urine    Current Medication: trospium (SANCTURA) 20 MG tablet (Discontinued)   Rationale: Undesirable effect   Recommendation: Discontinue Medication

## 2022-11-17 NOTE — Clinical Note
Sorry I was slow to close this, Sylvia! I saw that vascular is recommending lipid panel and if LDL >70, to start a statin - just fyi in case you want to add a lipid panel to your next lab day orders! Also - we didn't talk about the osteoporosis much, but noticed as I looked at my past note, was wondering if it would make sense to schedule Tums EX for calcium supplementation and add a monthly vitamin D for fall/fracture risk reduction?  Thanks!!

## 2022-11-17 NOTE — PROGRESS NOTES
"[facility] GERIATRICS    Chief Complaint   Patient presents with     RECHECK     depression     HPI:  Jamie Valdivia is a 82 year old  (1940) PMH significant for GERD with esophagitis, OA BL knees, pharyngeal dysphagia, edema, abdominal wall hernia, chronic atrial fibrillation, HTN, osteoporosis with hx of vertebral fracture, dementia, anemia, hx of COVID-19, who is being seen today for an episodic care visit at: [facility] ([facility]) [61].     Today's concern is:   Follow-up to day to review medication, mood, urinary symptoms, constipation in joint visit with PharmD Sindy Hanley.    Reports mood is \"pretty good.\"  Personal life improving.  \"I'm not much of a sleeper.\"  Describes dozing on and off at night and not getting good sleeping  Daytime sleepiness when doesn't sleep well at night.   Eating \"too much.\"  + anxiety \"I've had that all my life.\"  Not as groggy during the day as when on Zyprexa.  Daughter reports he is still bothering wife with paranoid delusions, calling her repeatedly.  Followed by ACP counselor, Kimberly.    Urinary symptoms, \"I think are okay.\"  Wears briefs.  Functional incontinence.   Open to stopping Sanctura, not sure it has helped.  Also interested in  Sildenafil.    Chronic constipation, abd pain and stomach upset.  Worse with anxiety.  Having regular BMs with Miralax.     Back pain improved, does not feel he needs noon time Tylenol.  \"Many, many sore spots.\"  Likes to sleep in recliner.  HH PT/OT still following.    Allergies, and PMH/PSH reviewed in [facility] today.    REVIEW OF SYSTEMS:  4 point ROS including Respiratory, CV, GI and , other than that noted in the HPI,  is negative    Objective:   /72   Pulse (P) 65   Temp 98  F (36.7  C)   Resp (P) 16   Ht 1.778 m (5' 10\")   Wt 105.7 kg (233 lb)   SpO2 (P) 97%   BMI 33.43 kg/m    GENERAL APPEARANCE:  Alert, in no distress, seated in recliner chair.  EYES: Sclera clear and conjunctiva normal, no discharge "   ENT:  Hearing acuity grossly decreased BL.  RESP:  Non-labored breathing, no respiratory distress, Lung sounds clear, patient is on room air.  CV:  Rate and rhythm regular, no murmur, no rub or gallop.  VASCULAR: 2 + edema bilateral lower extremities, wearing Velcro compression wraps.   ABDOMEN:  Normal bowel sounds, soft, nontender, no grimacing or guarding with palpation. Unable to palpate bladder, no suprapubic tenderness.  M/S:   Gait and station ambulates independently with 4WW around apartment.   PSYCH: awake and alert, speech fluent,  insight and judgement impaired, no acute confusion, slightly flat affect but brightens when engaged, calm and cooperative.    Recent labs in [facility] reviewed by me today.    CBC RESULTS: Recent Labs   Lab Test 10/19/22  1225 10/10/22  0845   WBC 6.0 6.1   RBC 4.31* 4.24*   HGB 12.3* 12.2*   HCT 38.5* 37.7*   MCV 89 89   MCH 28.5 28.8   MCHC 31.9 32.4   RDW 13.8 14.0    306     Last Basic Metabolic Panel:  Recent Labs   Lab Test 10/19/22  1225 10/14/22  0515    135   POTASSIUM 4.4 4.1   CHLORIDE 103 100   JOSUE 8.8 8.8   CO2 28 28   BUN 13 12   CR 0.60* 0.52*   GLC 75 94   Estimated Creatinine Clearance: 115.6 mL/min (A) (based on SCr of 0.6 mg/dL (L)).    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   Recent hospitalization for multifactorial hyponatremia, limits psychotropic use.  Not sleeping well, continued anxiety and depression.  Cognitive impairment in setting of ETOH abuse with recurrent falls and hospitalizations.   On memory care unit due to safety concerns, currently no access to ETOH.   Plan:   - Therapeutic trial of mirtazpaine 7.5 mg PO q HS, confers less risk of hyponatremia compared to Celexa  - Continue Seroquel 12.5 mg BID and BID PRN for psychotic features  -  Gabapentin 200 mg PO q HS for anxiety and pain  - BMP on next lab day  - [facility] counselor following  - Continues to benefit from supportive care in [facility] setting  - Offered psychiatry referral in past, but pt/family prefer onsite care    (R97.20) Elevated PSA  (R39.81) Functional urinary incontinence   (N40.1) Benign prostatic hyperplasia with lower urinary tract symptoms, symptom details unspecified  (N39.41) Urge incontinence of urine  Comment: Chronic, still having some urinary incontinence, not clear if Trospium helpful for symptoms.  PSA 13.45 in March 2021, Repeat PSA 11.3 on 4/7/22  Saw urology, recommendation for prostate biopsy, family requested under general anesthesia due to severe anxiety, scheduled for Jan 2023.  Plan:  - Discussed anticholinergic side effects of Trospium, will trial off medication and monitor symptoms  - Prostate biopsy scheduled for Jan 2023  - Staff at [facility] to assist with toileting and incontinence care    (K59.09) Chronic constipation  Comment: Chronic, long hx of Levsin multiple times per day for abd pain, but stopped by urology when started trospium. Having regular BMs. Symptoms improved off Levsin and stopping trospium may also be helpful.  Plan:   - Continue polyethylene glycol (MIRALAX) 17 GM/Dose powder; 1-2 capfuls in 8 oz of ORANGE JUICE PO one time each morning and 1-2 capfuls 8 oz of ORANGE JUICE PO one time daily PRN for constipation. Please mix medication in juice in front of the patient. Hold for loose stools.   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  Stopped Celexa 9/29/22.  Last Na+ 137 on 10/19/22  Plan:   - BMP next lab day after starting Mirtazapine  - Avoid medications which could exacerbate  hyponatremia   - Avoid excessive free water intake    (I71.2) Thoracic aortic aneurysm without rupture (H) - 6/30/22 4.6 cm  Comment: Chronic, new finding in ER June 2022  Plan:   - Plan for CT and echo, seeing Vascular 11/18  - HR and BP control of medications at present     (H26.9) Cataracts  Comment: Reported by daughter, bothersome, needs procedure   Plan:  - Requested facility send daughter scheduling information from onsite eye group who made referral    Orders:  - Discontinue trospium  - Therapeutic trial of mirtazpaine 7.5 mg PO q HS  - BMP on next lab day  - Facility to provide referral information for cataract      Electronically signed by: MARICHUY Basilio CNP

## 2022-11-18 ENCOUNTER — DOCUMENTATION ONLY (OUTPATIENT)
Dept: OTHER | Facility: CLINIC | Age: 82
End: 2022-11-18

## 2022-11-18 ENCOUNTER — OFFICE VISIT (OUTPATIENT)
Dept: OTHER | Facility: CLINIC | Age: 82
End: 2022-11-18
Attending: NURSE PRACTITIONER
Payer: COMMERCIAL

## 2022-11-18 VITALS
HEIGHT: 70 IN | BODY MASS INDEX: 33.79 KG/M2 | HEART RATE: 69 BPM | SYSTOLIC BLOOD PRESSURE: 125 MMHG | WEIGHT: 236 LBS | DIASTOLIC BLOOD PRESSURE: 74 MMHG | OXYGEN SATURATION: 98 %

## 2022-11-18 DIAGNOSIS — I35.0 AORTIC VALVE STENOSIS, ETIOLOGY OF CARDIAC VALVE DISEASE UNSPECIFIED: ICD-10-CM

## 2022-11-18 DIAGNOSIS — I48.20 CHRONIC ATRIAL FIBRILLATION (H): ICD-10-CM

## 2022-11-18 DIAGNOSIS — I70.0 ATHEROSCLEROSIS OF AORTA (H): ICD-10-CM

## 2022-11-18 DIAGNOSIS — I27.20 PULMONARY HYPERTENSION (H): ICD-10-CM

## 2022-11-18 DIAGNOSIS — I25.10 CORONARY ARTERY DISEASE INVOLVING NATIVE CORONARY ARTERY OF NATIVE HEART WITHOUT ANGINA PECTORIS: ICD-10-CM

## 2022-11-18 DIAGNOSIS — I77.810 ASCENDING AORTA DILATATION (H): Primary | ICD-10-CM

## 2022-11-18 PROCEDURE — G0463 HOSPITAL OUTPT CLINIC VISIT: HCPCS

## 2022-11-18 PROCEDURE — 99205 OFFICE O/P NEW HI 60 MIN: CPT | Performed by: INTERNAL MEDICINE

## 2022-11-18 RX ORDER — MIRTAZAPINE 7.5 MG/1
7.5 TABLET, FILM COATED ORAL AT BEDTIME
Qty: 30 TABLET | Refills: 98 | Status: SHIPPED | OUTPATIENT
Start: 2022-11-18 | End: 2023-01-26

## 2022-11-18 RX ORDER — CALCIUM CARBONATE 750 MG/1
750 TABLET, CHEWABLE ORAL PRN
COMMUNITY
End: 2023-03-30

## 2022-11-18 NOTE — PROGRESS NOTES
[facility] INITIAL VASCULAR MEDICINE CONSULT    ( New patient visit)       PRIMARY HEALTH CARE PROVIDER:  Sylvia Stein APRN CNP      REFERRING HEALTH CARE PROVIDER;  MARICHUY Pepper CNP      REASON FOR CONSULT: Evaluation and management of thoracic aortic aneurysm ranging anywhere from 4.4 to 4.6 cm over the last few years and a elderly male with complex and complicated past medical and past surgical history.      HPI: Jamie Valdivia is a 82 year old very pleasant male with history of multiple medical problems including alcohol abuse with withdrawal currently not drinking and living in a facility accompanied by his son he has a chronic low back pain underwent surgeries and multiple spine fractures and he has a hardware in place with history of for frequent falls the last time he fell was a month ago and also history of chronic atrial fibrillation due to high risk is not anticoagulated, history of coronary artery disease, generalized anxiety disorder and dementia underwent CT scan of the chest in June 2022 which revealed 4.6 cm ascending thoracic aorta aneurysm.  He received care in [place] system underwent multiple spine CTs chest CTs abdominal CTs  In 2020 it was also reported anywhere from 4.4 to 4.5 cm on CT.  He also underwent echocardiogram a month ago which revealed mild to moderate aortic stenosis and also dilatation of the ascending aorta reported as 4.8 cm  He is asymptomatic.  He denies any chest pain shortness of breath or palpitations  No history of abdominal aortic aneurysm  He never smoked  No family history of aneurysms    He is new to this clinic reviewed extensive records and imaging studies outside records in epic and updated chart    PAST MEDICAL HISTORY  Past Medical History:   Diagnosis Date     Age-related osteoporosis without current pathological fracture 03/30/2022     Alcohol abuse 11/19/2017     Alcohol dependence with withdrawal (H)       Alcoholism (H)      Back pain      Backache 12/19/2018     CAD (coronary artery disease)      Chronic a-fib (H)      Chronic alcohol use 06/11/2015    Formatting of this note might be different from the original. 2/26/2019: serious fall at home with multiple vertebral fractures.  BAL 0.414% on admission.  Denies that he drinks much. 12/18/2018: hospitalized for fall due to alcohol intoxication.  BAL 0.34% on admission. 9/16/2018: hospitalized for injuries from fall due to alcohol intoxication.  BAL 0.34% on admission     Chronic atrial fibrillation (H) 07/15/2016    Formatting of this note might be different from the original. 2/2019: Multiple falls so started on aspirin only.  Then aspirin stopped due to GI bleed.     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 2/2/21     HTN (hypertension)      Impaired gait and mobility 03/14/2019     Mild cognitive impairment 08/12/2019    Formatting of this note might be different from the original. NON-AMNESTIC TYPE     Mild TBI (traumatic brain injury) 03/14/2019     Mumps      Obesity (BMI 30-39.9) 09/03/2015     Osteoarthritis      Osteoarthritis of both knees 05/13/2015     Osteoporosis      Other idiopathic peripheral autonomic neuropathy 03/30/2022     Other insomnia 03/14/2019     Other seizures (H) 03/30/2022     Palpitations      Peripheral neuropathy      Pharyngeal dysphagia 05/13/2015    Formatting of this note might be different from the original. Likely secondary to GERD with esophagitis, on PPI and H2 blocker with notable  improvement, EGD 10/5/15.     Physical deconditioning 03/14/2019     Recurrent major depressive disorder (H) 03/30/2022     Rhabdomyolysis      Thrombocytopenia (H)      Urination disorder      Weakness 04/27/2020       CURRENT MEDICATIONS  ACETAMINOPHEN EXTRA STRENGTH 500 MG tablet, TAKE TWO TABLETS (1000MG) BY MOUTH TWICE DAILY AND;TAKE TWO TABLETS (1000MG) TWICE DAILY AS NEEDED  alum & mag hydroxide-simethicone (MAALOX MAX) 400-400-40 MG/5ML SUSP suspension, Take 30 mLs by mouth every 6 hours as needed for indigestion  DEEP SEA NASAL SPRAY 0.65 % nasal spray, INHALE 2 SPRAYS IN EACH NOSTRIL ONCE DAILY  gabapentin (NEURONTIN) 100 MG capsule, TAKE TWO CAPSULES (200MG) BY MOUTH EVERY NIGHT AT BEDTIME  guaiFENesin (ROBITUSSIN) 100 MG/5ML liquid, GIVE 10ML (200 MG) BY MOUTH TWICE DAILY;AND TWICE DAILY AS NEEDED FOR COUGH  menthol-zinc oxide (CALMOSEPTINE) 0.44-20.6 % OINT ointment, Apply 1 g topically 2 times daily. May also apply 1 g 2 times daily as needed for skin protection.  mirtazapine (REMERON) 7.5 MG tablet, Take 1 tablet (7.5 mg) by mouth At Bedtime  nystatin (MYCOSTATIN) 703382 UNIT/GM external powder, Apply topically 2 times daily  omeprazole (PRILOSEC) 40 MG DR capsule, TAKE 1 CAPSULE BY MOUTH TWICE DAILY  polyethylene glycol (MIRALAX) 17 GM/Dose powder, MIX 2 CAPFULS IN 8OZ OF ORANGE JUICE  IN THE MORNING;AND MAY MIX 2 CAPFULS ONCE DAILY AS NEEDED FOR CONSTIPATION. MIX IN FRONT OF PT. HOLD FOR LOOSE STOOL. OK TO GIVE 1 CAPFUL IF RESIDENT REFUSES 2 CAPFULS.  polyvinyl alcohol-povidone PF (REFRESH) 1.4-0.6 % ophthalmic solution, 1 drop every 4 hours as needed for irritation  QUEtiapine (SEROQUEL) 25 MG tablet, Take 0.5 tablets (12.5 mg) by mouth 2 times daily. May also take 0.5 tablets (12.5 mg) every 12 hours as needed (anxiety).    No current facility-administered medications on file prior to visit.      PAST SURGICAL HISTORY:  Past Surgical History:   Procedure Laterality Date     ESOPHAGOSCOPY,  GASTROSCOPY, DUODENOSCOPY (EGD), COMBINED N/A 06/01/2022    Procedure: ESOPHAGOGASTRODUODENOSCOPY (EGD) mngi with biopsies using jumbo cold biopsy forceps;  Surgeon: David Springer MD;  Location: [facility]     left hip replacement  2010     left shoulder replacement  2016     ORTHOPEDIC SURGERY       SPINE SURGERY      done in [place]     TONSILLECTOMY         ALLERGIES     Allergies   Allergen Reactions     Ativan [Lorazepam]        FAMILY HISTORY  Family History   Problem Relation Age of Onset     Osteoporosis Mother      Prostate Cancer Paternal Grandfather      Colon Cancer No family hx of        VASCULAR FAMILY HISTORY  1st order relative with atherosclerotic PAD: No  1st order relative with AAA: No  Family history of Familial Hyperlipidemia No  Family History of Hypercoagulable state:No    VASCULAR RISK FACTORS  1. Diabetes:No   2. Smoking: has never smoked.  3. HTN: controlled  4.Hyperlipidemia: Yes -       SOCIAL HISTORY  Social History     Socioeconomic History     Marital status:      Spouse name: Not on file     Number of children: Not on file     Years of education: Not on file     Highest education level: Not on file   Occupational History     Not on file   Tobacco Use     Smoking status: Never     Smokeless tobacco: Never   Substance and Sexual Activity     Alcohol use: Not Currently     Comment: not since December 2021     Drug use: Never     Sexual activity: Not on file   Other Topics Concern     Not on file   Social History Narrative     Not on file     Social Determinants of Health     Financial Resource Strain: Not on file   Food Insecurity: Not on file   Transportation Needs: Not on file   Physical Activity: Not on file   Stress: Not on file   Social Connections: Not on file   Intimate Partner Violence: Not on file   Housing Stability: Not on file       ROS:   General: No change in weight, sleep or appetite.  Normal energy.  No fever or chills  Eyes: Negative for vision changes or  "eye problems  ENT: No problems with ears, nose or throat.  No difficulty swallowing.  Resp: No coughing, wheezing or shortness of breath  CV: No chest pains or palpitations  GI: No nausea, vomiting,  heartburn, abdominal pain, diarrhea, constipation or change in bowel habits  : No urinary frequency or dysuria, bladder or kidney problems  Musculoskeletal: No significant muscle or joint pains  Neurologic: No headaches, numbness, tingling, weakness, problems with balance or coordination  Psychiatric: No problems with anxiety, depression or mental health  Heme/immune/allergy: No history of bleeding or clotting problems or anemia.  No allergies or immune system problems  Endocrine: No history of thyroid disease, diabetes or other endocrine disorders  Skin: No rashes,worrisome lesions or skin problems  Vascular:  No claudication, lifestyle limiting or otherwise; no ischemic rest pain; no non-healing ulcers. No weakness, No loss of sensation history of leg edema and wearing the wraps    EXAM:  /74 (BP Location: Left arm)   Pulse 69   Ht 5' 10\" (1.778 m)   Wt 236 lb (107 kg)   SpO2 98%   BMI 33.86 kg/m    In general, the patient is a pleasant male in no apparent distress.    HEENT: NC/AT.  PERRLA.  EOMI.  Sclerae white, not injected.  Nares clear.  Pharynx without erythema or exudate.  Dentition intact.    Neck: No adenopathy.  No thyromegaly. Carotids +2/2 bilaterally without bruits.  No jugular venous distension.   Heart:  Irregularly irregular  Lungs: CTA.  No ronchi, wheezes, rales.  No dullness to percussion.   Abdomen: Soft, nontender, nondistended. No organomegaly. No AAA.  No bruits.   Extremities: No clubbing, cyanosis, or edema. No wounds.   He is wearing leg wraps and does not want to be removed  Unable to evaluate pulses    Labs:  LIPID RESULTS:  Lab Results   Component Value Date    CHOL 101 04/28/2022    HDL 40 04/28/2022    LDL 50 04/28/2022    TRIG 54 04/28/2022       LIVER ENZYME RESULTS:  Lab " Results   Component Value Date    AST 37 09/30/2022    ALT 15 09/30/2022       CBC RESULTS:  Lab Results   Component Value Date    WBC 6.0 10/19/2022    RBC 4.31 (L) 10/19/2022    HGB 12.3 (L) 10/19/2022    HCT 38.5 (L) 10/19/2022    MCV 89 10/19/2022    MCH 28.5 10/19/2022    MCHC 31.9 10/19/2022    RDW 13.8 10/19/2022     10/19/2022       BMP RESULTS:  Lab Results   Component Value Date     10/19/2022    POTASSIUM 4.4 10/19/2022    CHLORIDE 103 10/19/2022    CO2 28 10/19/2022    ANIONGAP 6 10/19/2022    GLC 75 10/19/2022    BUN 13 10/19/2022    CR 0.60 (L) 10/19/2022    GFRESTIMATED >90 10/19/2022    JOSUE 8.8 10/19/2022        A1C RESULTS:  Lab Results   Component Value Date    A1C 5.5 04/28/2022       THYROID RESULTS:  Lab Results   Component Value Date    TSH 1.67 10/06/2022    TSH 2.77 04/28/2022       Procedures:     EXAM: CT CHEST PULMONARY EMBOLISM W CONTRAST  LOCATION: [facility]  DATE/TIME: 6/30/2022 9:05 PM     INDICATION: Pleuritic chest pain.  COMPARISON: None.  TECHNIQUE: CT chest pulmonary angiogram during arterial phase injection of IV contrast. Multiplanar reformats and MIP reconstructions were performed. Dose reduction techniques were used.   CONTRAST: 70 mL Isovue 370     FINDINGS:  ANGIOGRAM CHEST: Pulmonary arteries are normal caliber and negative for pulmonary emboli. The ascending thoracic aorta is aneurysmal at 4.6 cm. The aorta is not well-opacified which limits evaluation for dissection. No dissection in the ascending   thoracic aorta. No CT evidence of right heart strain.     LUNGS AND PLEURA: Dependent atelectasis bilaterally. Scarring at the lung apices and bases. Fluid filled bronchi in bilateral lower lobes. No pneumothorax or pleural effusion.     MEDIASTINUM/AXILLAE: No lymph node enlargement. The heart is at the upper limits normal in size.     CORONARY ARTERY CALCIFICATION: Severe.     UPPER ABDOMEN: No acute upper abdominal  abnormality.     MUSCULOSKELETAL: Spinal rods. Degenerative disease and prominent kyphosis in the thoracic spine. Left shoulder arthroplasty.                                                                      IMPRESSION:  1.  There is no pulmonary embolus.  2.  4.6 cm ascending thoracic aortic aneurysm.  3.  Fluid-filled bronchi in bilateral lower lobes.    [facility]  Echocardiography Laboratory  [address] [place] [address]     Name: CAMILO CRUMP  MRN: [id number]  : 1940  Study Date: 10/03/2022 09:40 AM  Age: 82 yrs  Gender: Male  Patient [facility]  Reason For Study: CHF  Ordering Physician: SAYRA ARRIETA  Referring Physician: Sylvia Stein APRN CNP  Performed By: Jessica Yao RDCS     BSA: 2.2 m2  Height: 70 in  Weight: 222 lb  HR: 68  BP: 112/57 mmHg  ______________________________________________________________________________  Procedure  Complete Portable Echo Adult. Optison (NDC #1415-1894) given intravenously.  ______________________________________________________________________________  Interpretation Summary     1. The left ventricle is normal in structure, function and size. The visual  ejection fraction is estimated at 55%.  2. The right ventricle is mildly dilated. Mildly decreased right ventricular  systolic function  3. Aortic valve visually appears to have mild (to moderate) stenosis, however  gradients are in the high-normal range.  4. 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 Sinus 4.8cm, ascending 4.4cm.     Echo 2020 from [place]: EF 65%, normal RV, calcified AV with likely  underestimated gradient, sinus 4.5cm, ascending aorta 4.1cm.  ______________________________________________________________________________  Left Ventricle  The left ventricle is normal in structure, function and size. There is normal  left ventricular wall thickness. The visual ejection fraction is estimated at  55%. Left ventricular diastolic  function is indeterminate. Normal left  ventricular wall motion.     Right Ventricle  The right ventricle is mildly dilated. Mildly decreased right ventricular  systolic function.     Atria  The left atrium is mildly dilated. The right atrium is mild to moderately  dilated. There is no atrial shunt seen.     Mitral Valve  There is mild (1+) mitral regurgitation.     Tricuspid Valve  There is mild (1+) tricuspid regurgitation. The right ventricular systolic  pressure is elevated at 42.3 mmHg.     Aortic Valve  Aortic valve visually appears to have mild stenosis, however gradients are in  the high-normal range.     Pulmonic Valve  The pulmonic valve is normal in structure and function.     Vessels  The ascending aorta is Moderately dilated. Inferior vena cava not well  visualized for estimation of right atrial pressure.     Pericardium  There is no pericardial effusion.     Rhythm  Sinus rhythm was noted.  ______________________________________________________________________________  MMode/2D Measurements & Calculations  IVSd: 1.2 cm     LVIDd: 3.7 cm  LVIDs: 2.0 cm  LVPWd: 1.1 cm  FS: 45.2 %  LV mass(C)d: 135.7 grams  LV mass(C)dI: 62.2 grams/m2  Ao root diam: 4.8 cm  LA dimension: 5.0 cm  asc Aorta Diam: 4.4 cm  LA/Ao: 1.0  LVOT diam: 2.4 cm  LVOT area: 4.5 cm2  LA Volume (BP): 85.7 ml  LA Volume Index (BP): 39.3 ml/m2  RWT: 0.59     Doppler Measurements & Calculations  MV E max odilia: 101.7 cm/sec  MV max P.1 mmHg  MV mean P.0 mmHg  MV V2 VTI: 36.4 cm  MVA(VTI): 2.8 cm2  MV P1/2t max odilia: 152.0 cm/sec  MV P1/2t: 73.7 msec  MVA(P1/2t): 3.0 cm2  MV dec slope: 604.0 cm/sec2  MV dec time: 0.17 sec  Ao V2 max: 197.9 cm/sec  Ao max P.0 mmHg  Ao V2 mean: 136.7 cm/sec  Ao mean P.8 mmHg  Ao V2 VTI: 40.4 cm  LEONILA(I,D): 2.5 cm2  LEONILA(V,D): 2.5 cm2  LV V1 max P.6 mmHg  LV V1 max: 107.4 cm/sec  LV V1 VTI: 22.8 cm  SV(LVOT): 103.0 ml  SI(LVOT): 47.2 ml/m2  PA acc time: 0.09 sec  TR max odilia: 325.0 cm/sec  TR max  P.3 mmHg  AV Shelton Ratio (DI): 0.54  LEONILA Index (cm2/m2): 1.2  E/E' av.3  Lateral E/e': 8.1  Medial E/e': 10.6     ______________________________________________________________________________  Report approved by: Dari Zapata 10/03/2022 11:21 AM         EXAM:  CT CHEST WITH CONTRAST [place]  2020    CLINICAL INFORMATION:  80-year-old male presents with trauma to the trunk.     TECHNICAL INFORMATION:  Spiral acquisition of data was obtained from the lung apices through   the hemidiaphragms after the IV administration of contrast.  Sagittal and coronal reformats   were obtained.     CONTRAST: 100 mL of Omnipaque 300.       SEDATION: None.     COMPARISON:  None.     INTERPRETATION:       Chest wall/thyroid gland:  The thyroid gland is not well evaluated.  The chest wall is grossly   normal.     Heart/cardiovascular:  The heart is enlarged without pericardial thickening or fluid.  Coronary    artery and aortic vascular calcifications.  The ascending aorta measures 4.4 x 4.5 cm.  No   evidence of dissection.  The pulmonary arteries are grossly normal.     Lymph bob regions:  No mediastinal, hilar or axillary adenopathy.       Central airways:  The central airways are normal.  No bronchial wall thickening, bronchiectasis    or filling defects.     Pleura:  Pleural space is normal without nodularity, thickening, gas or fluid.       Lungs:  Slightly motion degraded pulmonary evaluation.  Right apical subpleural reticulation   and architectural distortion.  Scattered vascular crowding and subsegmental atelectasis.  No   airspace consolidation, interstitial or septal thickening.         Osseous structures:  Motion degraded rib evaluation.  There appears to be posttraumatic old   left rib fracture deformities.  Please see separate thoracic spine CT report for findings of   the thoracic spine.       Please refer to separate abdomen and pelvis CT report for findings of the abdomen.       IMPRESSION:        1.  No acute cardiopulmonary disease.     2.  Cardiomegaly without pulmonary edema.     3.  Asymmetry aortic ectasia measuring 4.4 x 4.5 cm.     We are committed to maintaining high-quality CT images while improving patient safety.  We   minimize radiation dose by adjusting the mA and/or kV according to each patient's size.     The results were discussed with  Thierry Nunez D.O. on 7/20/2020 1:43 PM CDT and have been    documented in [employer]. Message ID [id number].     Read By: Placido Holden M.D.     Reviewed and Electronically Signed By: Placido Holden M.D.   Procedure Note    Placido Holden MD - 07/20/2020   Formatting of this note might be different from the original.   -------------------------------------- Original Report --------------------------------------     EXAM:  CT CHEST WITH CONTRAST     CLINICAL INFORMATION:  80-year-old male presents with trauma to the trunk.     TECHNICAL INFORMATION:  Spiral acquisition of data was obtained from the lung apices through   the hemidiaphragms after the IV administration of contrast.  Sagittal and coronal reformats   were obtained.     CONTRAST: 100 mL of Omnipaque 300.       SEDATION: None.     COMPARISON:  None.     INTERPRETATION:       Chest wall/thyroid gland:  The thyroid gland is not well evaluated.  The chest wall is grossly   normal.     Heart/cardiovascular:  The heart is enlarged without pericardial thickening or fluid.  Coronary    artery and aortic vascular calcifications.  The ascending aorta measures 4.4 x 4.5 cm.  No   evidence of dissection.  The pulmonary arteries are grossly normal.     Lymph bob regions:  No mediastinal, hilar or axillary adenopathy.       Central airways:  The central airways are normal.  No bronchial wall thickening, bronchiectasis    or filling defects.     Pleura:  Pleural space is normal without nodularity, thickening, gas or fluid.       Lungs:  Slightly motion degraded pulmonary evaluation.  Right apical  subpleural reticulation   and architectural distortion.  Scattered vascular crowding and subsegmental atelectasis.  No   airspace consolidation, interstitial or septal thickening.          Osseous structures:  Motion degraded rib evaluation.  There appears to be posttraumatic old   left rib fracture deformities.  Please see separate thoracic spine CT report for findings of   the thoracic spine.       Please refer to separate abdomen and pelvis CT report for findings of the abdomen.       IMPRESSION:       1.  No acute cardiopulmonary disease.     2.  Cardiomegaly without pulmonary edema.     3.  Asymmetry aortic ectasia measuring 4.4 x 4.5 cm.     We are committed to maintaining high-quality CT images while improving patient safety.  We   minimize radiation dose by adjusting the mA and/or kV according to each patient's size.     The results were discussed with  Thierry Nunez D.O. on 7/20/2020 1:43 PM CDT and have been    documented in [employer]. Message ID [id number].     Read By: Placido Holden M.D.     Reviewed and Electronically Signed By: Placido Holden M.D.   Exam End: 07/20/20  1:22 PM           Assessment and Plan:     1. Ascending aorta dilatation (H) 4.4 to 4.6 cm on echo, CT since 2020     2. Aortic valve stenosis, etiology of cardiac valve disease unspecified ( mild to moderate on echo 10/2022)    3. Chronic atrial fibrillation (H)    4. Coronary artery disease involving native coronary artery of native heart without angina pectoris    5. Atherosclerosis of aorta (H)  6. Pulmonary hypertension (H), moderate echo 2020      This is a very pleasant elderly 82-year-old male with complex and complicated past medical and past surgical history with known history of ascending aortic dilatation dated back last few years and ranging anywhere from 4.4 to 4.6 cm there was a discrepancy between the echo readings and the CT readings and most recent CT scan within the [facility] system on June 30, 2022 reported 4.6 cm  on echo 4.8  Reviewed [facility] records and it was reported 4.4 to 4.5 cm of aneurysm in 2020.  He has history of chronic A. fib because of previous alcohol use and recurrent falls not anticoagulated  He is non-smoker  Blood pressure is well controlled  No recent lipids  He has a known history of coronary artery disease and currently not on any statin.    I reviewed all available imaging studies and discussed with his son Markos who accompanied him and also his daughter Alexia who is a nurse on speaker phone and explained options of further imaging with CTA chest abdomen pelvis which requires contrast dye load or short interval echocardiogram pros and cons with them.    Patient and family does not want any aggressive testing or intervention  They decided to go with a short interval echocardiogram which is reasonable    Suggested patient to get fasting lipids through primary care physician's office and if LDL is greater than 70 initiate either atorvastatin or Crestor    Avoid falls    Plan for echocardiogram in June 2023 then followed by office visit order placed    Rest of the medical management per primary care physician      - Echocardiogram Complete; Future      60  minutes spent on the date of the encounter doing chart review, history and exam, documentation, and further activities as noted above.    Thank you for the consultation  This note was dictated by utilizing Dragon software    Copy of this note to primary care provider    Loyd Dominguez MD, FAHA, FSVM, FNLA, FACP  Vascular Medicine  Clinical Hypertension specialist  Clinical lipidologist

## 2022-11-18 NOTE — PATIENT INSTRUCTIONS
"Recommendations from today's MTM visit:                                                       1.  Discontinue Sanctura.  2.  Initiate mirtazapine 7.5mg daily at bedtime.  Follow-up mood, anxiety, sleep, sodium level.    Follow-up: 4-8 weeks, sooner if necessary    It was great speaking with you today.  I value your experience and would be very thankful for your time in providing feedback in our clinic survey. In the next few days, you may receive an email or text message from [employer] with a link to a survey related to your  clinical pharmacist.\"     To schedule another MTM appointment, please call me directly or you may call the MTM scheduling line at [phone] or toll-free at [phone].     My Clinical Pharmacist's contact information:                                                      Please feel free to contact me with any questions or concerns you have.      Sindy Hanley, Pharm.D.,[facility]  Board Certified Geriatric Pharmacist  Medication Therapy Management Pharmacist  [phone]      "

## 2022-11-18 NOTE — PATIENT INSTRUCTIONS
Will plan for ECHO in June 2023 then  office visit     Follow up with primary and avoid falls     Please get fasting lipid labs through primary office and consider statin in LDL more than 70

## 2022-11-18 NOTE — PROGRESS NOTES
"Patient is here to discuss consult.    /74 (BP Location: Left arm)   Pulse 69   Ht 5' 10\" (1.778 m)   Wt 236 lb (107 kg)   SpO2 98%   BMI 33.86 kg/m      Questions patient would like addressed today are: N/A.    Refills are needed: N/A    Has homecare services and agency name:  Yes: [facility].     Grayson Pathak  "

## 2022-11-30 ENCOUNTER — LAB REQUISITION (OUTPATIENT)
Dept: LAB | Facility: CLINIC | Age: 82
End: 2022-11-30
Payer: COMMERCIAL

## 2022-11-30 DIAGNOSIS — E87.1 HYPO-OSMOLALITY AND HYPONATREMIA: ICD-10-CM

## 2022-11-30 NOTE — PROGRESS NOTES
"[facility] GERIATRICS    Chief Complaint   Patient presents with     RECHECK     Depression/anxiety     HPI:  Jamie Valdivia is a 82 year old  (1940) PMH significant for GERD with esophagitis, OA BL knees, pharyngeal dysphagia, edema, abdominal wall hernia, chronic atrial fibrillation, HTN, osteoporosis with hx of vertebral fracture, dementia, anemia, hx of COVID-19, who is being seen today for an episodic care visit at: [facility] ([facility]) [[id number]].     Today's concern is:   Follow-up today to assess mood, two weeks after starting mirtazapine.  Out in group activity prior to visit.   \"More positive, more up than down.\"  \"I think I'm getting some good sleep.\"  Some daytime sleepiness, but mild.    Trospium was also stopped due to anticholinergic side effects including constipation and lack of efficacy ion targeting urinary symptoms.    \"I got off track with the Miralax.\"  Better later, but constipation a few days ago.  Still having urinary frequency, but no different, drinking less water due to hyponatremia.    Saw [facility] on 11/18/22. Recommendation to check lipid panel and start statin if appropriate given known CAD.  Plan is for echocardiogram in June 2023.    Very concerned about memory testing today.  Focused on how he ended up in memory care unit.  Does not recall events leading up to TCU.   Wants to \"clear his good name.\"  Circular conversation when discussion of CPT and SLUMS.  Talking about make appointment with .   Open to more memory testing in  Memory Clinic.     Allergies, and PMH/PSH reviewed in EPIC today.    REVIEW OF SYSTEMS:  Limited secondary to cognitive impairment but today pt reports history as per HPI.    Objective:   BP (!) 154/87   Pulse 65   Temp 98.2  F (36.8  C)   Resp 16   Ht 1.778 m (5' 10\")   Wt 105.7 kg (233 lb)   SpO2 98%   BMI 33.43 kg/m    GENERAL APPEARANCE:  Alert, in no distress, out at [place] service prior to visit.  EYES: Sclera clear and " conjunctiva normal, no discharge   ENT:  Hearing acuity grossly decreased BL.  RESP:  Non-labored breathing, no respiratory distress, Lung sounds clear, patient is on room air.  CV:  Rate and rhythm regular, no murmur, no rub or gallop.  VASCULAR: 2 + edema bilateral lower extremities, wearing Velcro compression wraps.   ABDOMEN:  Normal bowel sounds, soft, nontender, no grimacing or guarding with palpation.  M/S:   Gait and station ambulates independently with 4WW around apartment.   PSYCH: awake and alert, speech fluent,  insight and judgement impaired, no acute confusion, slightly flat affect but brightens when engaged, calm and cooperative, circular conversation.    Recent labs in [facility] reviewed by me today.    Last Basic Metabolic Panel:  Recent Labs   Lab Test 12/01/22  1020 10/19/22  1225    137   POTASSIUM 4.4 4.4   CHLORIDE 103 103   JOSUE 8.8 8.8   CO2 29 28   BUN 12 13   CR 0.59* 0.60*   GLC 81 75   Estimated Creatinine Clearance: 117.6 mL/min (A) (based on SCr of 0.59 mg/dL (L)).    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  Chronic low mood, suspect JOSÉ MANUEL with panic attacks for many years.   Hx of hospitalization for multifactorial hyponatremia, limits psychotropic use.  Cognitive impairment in setting of ETOH abuse with recurrent falls and hospitalizations.   On memory care unit due to safety concerns, currently no access to ETOH.   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  Stopped Celexa 9/29/22.  Started Mirtazapine 11/17/22.  Na+ today 139 WNL  Plan:   - Avoid medications which could exacerbate hyponatremia   - Avoid excessive free water intake  - Follow serial labs    (R97.20) Elevated PSA  (R39.81) Functional urinary incontinence   (N40.1) Benign prostatic hyperplasia with lower urinary tract symptoms, symptom details unspecified  (N39.41) Urge incontinence of urine  Comment: No change in symptoms after stopping Trospium will continue off medication.  PSA 13.45 in March 2021, Repeat PSA 11.3 on 4/7/22  Saw urology, recommendation for prostate biopsy, family requested under general anesthesia due to severe anxiety, scheduled for Jan 2023.  Plan:  - Prostate biopsy scheduled for Jan 2023  - Staff at [facility] to assist with toileting and incontinence care    Plan to check lipid panel with next routine labs.    Electronically signed by: MARICHUY Basilio CNP

## 2022-12-01 ENCOUNTER — ASSISTED LIVING VISIT (OUTPATIENT)
Dept: GERIATRICS | Facility: CLINIC | Age: 82
End: 2022-12-01
Payer: COMMERCIAL

## 2022-12-01 DIAGNOSIS — N40.1 BENIGN PROSTATIC HYPERPLASIA WITH LOWER URINARY TRACT SYMPTOMS, SYMPTOM DETAILS UNSPECIFIED: ICD-10-CM

## 2022-12-01 DIAGNOSIS — N39.41 URGE INCONTINENCE: ICD-10-CM

## 2022-12-01 DIAGNOSIS — E87.1 SODIUM (NA) DEFICIENCY: ICD-10-CM

## 2022-12-01 DIAGNOSIS — F41.1 GAD (GENERALIZED ANXIETY DISORDER): ICD-10-CM

## 2022-12-01 DIAGNOSIS — R97.20 ELEVATED PROSTATE SPECIFIC ANTIGEN (PSA): ICD-10-CM

## 2022-12-01 DIAGNOSIS — R39.81 FUNCTIONAL URINARY INCONTINENCE: ICD-10-CM

## 2022-12-01 DIAGNOSIS — F10.988 COGNITIVE DYSFUNCTION, ALCOHOL-RELATED (H): Primary | ICD-10-CM

## 2022-12-01 DIAGNOSIS — F43.21 ADJUSTMENT DISORDER WITH DEPRESSED MOOD: ICD-10-CM

## 2022-12-01 DIAGNOSIS — E22.2 SIADH (SYNDROME OF INAPPROPRIATE ADH PRODUCTION) (H): ICD-10-CM

## 2022-12-01 LAB
ANION GAP SERPL CALCULATED.3IONS-SCNC: 7 MMOL/L (ref 3–14)
BUN SERPL-MCNC: 12 MG/DL (ref 7–30)
CALCIUM SERPL-MCNC: 8.8 MG/DL (ref 8.5–10.1)
CHLORIDE BLD-SCNC: 103 MMOL/L (ref 94–109)
CO2 SERPL-SCNC: 29 MMOL/L (ref 20–32)
CREAT SERPL-MCNC: 0.59 MG/DL (ref 0.66–1.25)
GFR SERPL CREATININE-BSD FRML MDRD: >90 ML/MIN/1.73M2
GLUCOSE BLD-MCNC: 81 MG/DL (ref 70–99)
POTASSIUM BLD-SCNC: 4.4 MMOL/L (ref 3.4–5.3)
SODIUM SERPL-SCNC: 139 MMOL/L (ref 133–144)

## 2022-12-01 PROCEDURE — 80048 BASIC METABOLIC PNL TOTAL CA: CPT | Mod: ORL | Performed by: NURSE PRACTITIONER

## 2022-12-01 PROCEDURE — 36415 COLL VENOUS BLD VENIPUNCTURE: CPT | Mod: ORL | Performed by: NURSE PRACTITIONER

## 2022-12-01 NOTE — LETTER
"    12/1/2022        RE: Jamie Valdivia  [facility]  [address]  [facility] [address]        [facility] GERIATRICS    Chief Complaint   Patient presents with     RECHECK     Depression/anxiety     HPI:  Jamie Valdivia is a 82 year old  (1940) PMH significant for GERD with esophagitis, OA BL knees, pharyngeal dysphagia, edema, abdominal wall hernia, chronic atrial fibrillation, HTN, osteoporosis with hx of vertebral fracture, dementia, anemia, hx of COVID-19, who is being seen today for an episodic care visit at: [facility] ([facility]) [61].     Today's concern is:   Follow-up today to assess mood, two weeks after starting mirtazapine.  Out in group activity prior to visit.   \"More positive, more up than down.\"  \"I think I'm getting some good sleep.\"  Some daytime sleepiness, but mild.    Trospium was also stopped due to anticholinergic side effects including constipation and lack of efficacy ion targeting urinary symptoms.    \"I got off track with the Miralax.\"  Better later, but constipation a few days ago.  Still having urinary frequency, but no different, drinking less water due to hyponatremia.    Saw [facility] on 11/18/22. Recommendation to check lipid panel and start statin if appropriate given known CAD.  Plan is for echocardiogram in June 2023.    Very concerned about memory testing today.  Focused on how he ended up in memory care unit.  Does not recall events leading up to TCU.   Wants to \"clear his good name.\"  Circular conversation when discussion of CPT and SLUMS.  Talking about make appointment with .   Open to more memory testing in [facility].     Allergies, and PMH/PSH reviewed in EPIC today.    REVIEW OF SYSTEMS:  Limited secondary to cognitive impairment but today pt reports history as per HPI.    Objective:   BP (!) 154/87   Pulse 65   Temp 98.2  F (36.8  C)   Resp 16   Ht 1.778 m (5' 10\")   Wt 105.7 kg (233 lb)   SpO2 98% "   BMI 33.43 kg/m    GENERAL APPEARANCE:  Alert, in no distress, out at [employer] service prior to visit.  EYES: Sclera clear and conjunctiva normal, no discharge   ENT:  Hearing acuity grossly decreased BL.  RESP:  Non-labored breathing, no respiratory distress, Lung sounds clear, patient is on room air.  CV:  Rate and rhythm regular, no murmur, no rub or gallop.  VASCULAR: 2 + edema bilateral lower extremities, wearing Velcro compression wraps.   ABDOMEN:  Normal bowel sounds, soft, nontender, no grimacing or guarding with palpation.  M/S:   Gait and station ambulates independently with 4WW around apartment.   PSYCH: awake and alert, speech fluent,  insight and judgement impaired, no acute confusion, slightly flat affect but brightens when engaged, calm and cooperative, circular conversation.    Recent labs in [facility] reviewed by me today.    Last Basic Metabolic Panel:  Recent Labs   Lab Test 12/01/22  1020 10/19/22  1225    137   POTASSIUM 4.4 4.4   CHLORIDE 103 103   JOSUE 8.8 8.8   CO2 29 28   BUN 12 13   CR 0.59* 0.60*   GLC 81 75   Estimated Creatinine Clearance: 117.6 mL/min (A) (based on SCr of 0.59 mg/dL (L)).    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  Chronic low mood, suspect JOSÉ MANUEL with panic attacks for many years.   Hx of hospitalization for multifactorial hyponatremia, limits psychotropic use.  Cognitive impairment in setting of ETOH abuse with recurrent falls and hospitalizations.   On memory care unit due to safety concerns, currently no access to ETOH.   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  Stopped Celexa 9/29/22.  Started Mirtazapine 11/17/22.  Na+ today 139 WNL  Plan:   - Avoid medications which could exacerbate hyponatremia   - Avoid excessive free water intake  - Follow serial labs    (R97.20) Elevated PSA  (R39.81) Functional urinary incontinence   (N40.1) Benign prostatic hyperplasia with lower urinary tract symptoms, symptom details unspecified  (N39.41) Urge incontinence of urine  Comment: No change in symptoms after stopping Trospium will continue off medication.  PSA 13.45 in March 2021, Repeat PSA 11.3 on 4/7/22  Saw urology, recommendation for prostate biopsy, family requested under general anesthesia due to severe anxiety, scheduled for Jan 2023.  Plan:  - Prostate biopsy scheduled for Jan 2023  - Staff at [facility] to assist with toileting and incontinence care    Plan to check lipid panel with next routine labs.    Electronically signed by: MARICHUY Basilio CNP             Sincerely,        MARICHUY Basilio CNP

## 2022-12-02 VITALS
HEART RATE: 65 BPM | WEIGHT: 233 LBS | RESPIRATION RATE: 16 BRPM | OXYGEN SATURATION: 98 % | HEIGHT: 70 IN | BODY MASS INDEX: 33.36 KG/M2 | SYSTOLIC BLOOD PRESSURE: 154 MMHG | TEMPERATURE: 98.2 F | DIASTOLIC BLOOD PRESSURE: 87 MMHG

## 2022-12-15 ENCOUNTER — PATIENT OUTREACH (OUTPATIENT)
Dept: GERIATRIC MEDICINE | Facility: CLINIC | Age: 82
End: 2022-12-15

## 2022-12-15 NOTE — PROGRESS NOTES
Encounter opened due to Regulatory [employer] Update to close [facility] Program.    Pat Varma  Case Management Specialist  [facility]  [phone]

## 2022-12-15 NOTE — PROGRESS NOTES
Encounter opened due to Regulatory [employer] Update to open [facility] Program.    Pat Varma  Case Management Specialist  [facility]  [phone]

## 2023-01-02 NOTE — PROGRESS NOTES
[facility]ERIATR[facility]  [address] [place] [address]  Phone: [phone]  Fax: [phone]  Primary Provider: Sylvia Stein  Pre-op Performing Provider: SYLVIA STEIN    PREOPERATIVE EVALUATION:  Today's date: 1/3/2023      Jamie Valdivia is a 82 year old male who presents for a preoperative evaluation.    Surgical Information:  Surgery/Procedure: Prostate biopsy  Surgery Location: [facility]  Surgeon: Dr. Niraj Larry  Surgery Date: 01/11/23  Time of Surgery: 12:55 PM  Where patient plans to recover: Other: [facility]  Fax number for surgical facility: Note does not need to be faxed, will be available electronically in Epic.    Type of Anesthesia Anticipated: General    Assessment & Plan     The proposed surgical procedure is considered INTERMEDIATE risk.    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)        History of compression fracture of spine        Abnormal gait        Osteoarthritis of multiple joints, unspecified osteoarthritis type        Mild dementia associated with alcoholism, with mood disturbance (H)        Adjustment reaction with anxiety and depression        JOSÉ MANUEL (generalized anxiety disorder)        Lower extremity edema        Recurrent pneumonia        Hemoptysis        Class 1 obesity due to excess calories with serious comorbidity and body mass index (BMI) of 31.0 to 31.9 in adult               Risks and Recommendations:  The patient has the following additional risks  and recommendations for perioperative complications:   - History of delirium or dementia   - Obesity:  Body mass index is 31.48 kg/m .  Cardiovascular:  - Would benefit from cardiology follow-up, hx as below,felt conditions chronic/stable, will address after procedure.  - Recommend obtain interval EKG as per anesthesia last 10/01/2022. Last Echo 10/3/22. Both studies in [facility].  Pulmonary:    - Hx of recurrent pneumonia, no symptoms today  Obstructive Sleep Apnea:   - Hx of daytime sleepiness, no work-up of SADIQ today,Anemia/Bleeding/Clotting:    - Anemia and does not require treatment prior to surgery. Monitor hemoglobin postoperatively.  Social and Substance:    - Hx of alcohol abuse, current abstinent for alcohol     Medication Instructions:   - SSRIs, SNRIs, TCAs, Antipsychotics: Continue without modification.    - Hold Miralax 1/11/23 while NPO    RECOMMENDATION:  APPROVAL GIVEN to proceed with proposed procedure, without further diagnostic evaluation.    Subjective     HPI related to upcoming procedure:   History of elevated PSA (13.45 ng/mL in 3/2021 and 11.30 on 4/7/22) and bothersome lower urinary tract symptoms including urinary urgency and incontinence. Referred to urology in [place] in May 2021, but did not follow-up. No history of prostate biopsy in the past. Plan was for transrectal prostate biopsy with Dr. Weston on 9/28/22 under general anesthesia due to significant anxiety, but procedure was delayed dye to hyponatremia in setting of decreased oral intake, Bactrim, and starting anti-depressant medication. Procedure now scheduled for 1/11/2023 as resident medically stable.      Preop Questions 1/8/2023   1. Have you ever had a heart attack or stroke? UNKNOWN - history of coronary atherosclerosis, long history of recurrent chest pain episodes in setting of anxiety, see ER visit 6/30/22, EKG showed no overvet ischemic changes, Troponin negative.  - CORONARY ARTERY CALCIFICATION: Severe on chest CT  6/30/22  - Ascending thoracic aorta is aneurysmal at 4.6 cm.    2. Have you ever had surgery on your heart or blood vessels, such as a stent placement, a coronary artery bypass, or surgery on an artery in your head, neck, heart, or legs? No   3. Do you have chest pain with activity? No   4. Do you have a history of  heart failure? No   5. Do you currently have a cold, bronchitis or symptoms of other infection? No   6. Do you have a cough, shortness of breath, or wheezing? No   7. Do you or anyone in your family have previous history of blood clots? No   8. Do you or does anyone in your family have a serious bleeding problem such as prolonged bleeding following surgeries or cuts? UNKNOWN    9. Have you ever had problems with anemia or been told to take iron pills? YES - Chronic, mild anemia, Hgb 11-12     10. Have you had any abnormal blood loss such as black, tarry or bloody stools?   YES - Hx GI bleed  3/2019: acute drop in hgb. EGD showed ulceration of hypopharynx, felt to be responsible for GI bleed. No stomach bleeding or esophageal varices. Received pRBCs and hgb stabilized.  EGD on 6/1/22 showed normal stomach, duodenum, esophageal mucosa with short-segment BE, started on PPI. Hx of hemoptysis and blood from nose, saw ENT unable to identify cause. No episodes of late.      11. Have you ever had a blood transfusion? YES   11a. Have you ever had a transfusion reaction? No   12. Are you willing to have a blood transfusion if it is medically needed before, during, or after your surgery? Yes   13. Have you or any of your relatives ever had problems with anesthesia? YES - Colonoscopy, when woke up, combative with nursing staff.   14. Do you have sleep apnea, excessive snoring or daytime drowsiness? No   14a. Do you have a CPAP machine? -   15. Do you have any artifical heart valves or other implanted medical devices like a pacemaker, defibrillator, or continuous glucose monitor? No   16. Do you have artificial  joints? YES - L Hip (2010), L shoulder replacement (2016),  Underwent spinal surgery with hardware placement T8-12 pedicle screw fixation with cement by Dr. Behzad Sabit on 8/4/20   17. Are you allergic to latex? No     Health Care Directive:  Patient has a Health Care Directive on file    Preoperative Review of :   reviewed - controlled substances reflected in medication list.   Reviewed 01/08/23 2:10 PM    Chronic Medical Concerns:  Resident of [facility] since March 2022.     Hospitalized at [facility] form 11/1 to 11/5/21 with generalized weakness, dehydration, and concern for UTI but culture came back negative. Symptoms likely related to chronic alcoholism with alcohol withdrawal. Chronic cognitive impairment in setting of long history of alcoholism, hx of hallucinations. Multiple hospitalizations over preceding months per family due to unsafe living situation, well known to ED staff. Spent time in SNF ([facility]) around September 2021, details unclear, but seem to have suffered spinal fracture.     Hospitalized from 12/17 to 12/23/21 at [facility] for falls, weakness, alcoholism, and alcoholic hepatitis; discharge summary not available. Wife (who was primary care giver) was having medical issues and needed neurology care in the [facility] and the [facility]. Family felt resident unable to safely be left alone so they called EMS to transport to hospital for ongoing care/ETOH detox. Ultimately transferred to [facility] where wife was convalescing.      Remained at [facility] from 12/23 and 3/15/22 with largely uneventful stay. Zyprexa dose increased with good effect on mood, prior to starting medication having hallucinations, delusions, and wandering on unit. Transferred to [facility] for permanent placement.    ER visit on 6/30/22 due to chest pain. Resident was watching a baseball game when he experienced left-sided chest pain.Treated by EMS with aspirin and  "nitroglycerin with slight relief. Reported pain worse with taking deep breaths and related to anxiety. EKG showed atrial fibrillation with controlled rate; chronic.  Chest CT showed 4.6 cm ascending thoracic aortic aneurysm, but was negative for plumonary embolism. Cardiothoracic surgery consulted given size of aneurysm and lack of other connective tissue disorder recommended outpatient follow-up with repeat chest CT and echocardiogram in 3 months to evaluate for progression. Chest x-ray showed left basilar pneumonia and chest CT showed fluid-filled bronchi\" bilateral lower lobes. Labs significant for negative troponin, ACS excluded. ER physician noted tenderness over left chest wall and cough over last month. Hx of dysphagia and increased aspiration risk. Symptoms most likely consistent with pneumonia discharged back to assisted living facility with Augmentin.    Repeat CXR on 8/4 due to subjective shortness of breath showed possible left lower lobe patchy opacification, as well as low lung volumes with vascular crowding and basilar atelectasis. Dr. Quintana started Augmentin. Symptoms improved.    On 9/13/22 prostate biopsy canceled due to hyponatremia. Subsequently hospitalized at [facility] on 9/30/22 with fall and confusion, found to have severe hyponatremia (Na+ 114). Prior to hospitalization had loose stools and poor intake in setting of prophylactic antibiotic (Bactrim) prescribed prior to planned prostate biospy which was canceled due to low sodium (at 129). Started on IVF due to concern for hypovolemic hyponatremia with some improvement. 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 Treated with 1.2 L/day fluid restriction and sodium improved. Confusion improved with treatment of hyponatremia.   Also noted to have back pain after fall. Lumbar CT showed age indeterminate compression deformity of L2-L5 with chronic mild compression deformity at superior endplate of L1. Neurosurgery " consulted and recommended conservative mgmt with brace. Recommended PT in TCU at hospital discharge due to difficult with ambulation.   Medications for other chronic conditions continued without change.  Interval Echo completed, with EF 55%, right ventricle mildly dilated with mildy decreased systolic function, aortic valve mild-moderate stenosis, ascending aorta moderately dilated.     Resident recovered at [facility] from 10/6 to 11/03/22. Pain controlled with Tylenol and gabapentin.   Wearing TLSO when out of bed.  Hyponatremia felt to be due to volume depletion and SIADH. Improved and able to discontinue fluid restriction. Discharged back to [facility].    (97.20) Elevated PSA  Comment: Chronic  PSA 13.45 in March 2021, Repeat PSA 11.3 on 4/7/22  Saw urology, recommendation for prostate biopsy, scheduled for 9/28/22 under general anesthesia due to severe anxiety, canceled due to hyponatremia which perciptated hospitalization as above. Biopsy now rescheduled for 1/11/23. 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  Previous hyponatremia multifactorial, occurred in setting of SIADH, dose increased of selective serotonin reuptake inhibitor (Celexa), Bactrim, hypovolemia.  Stopped Celexa 9/29/22, now on Mirtazapine.  Sodium   Date Value Ref Range Status   01/05/2023 137 133 - 144 mmol/L Final   Plan:   - Avoid medications which could exacerbate hyponatremia   - Avoid excessive free water intake    (Z87.81) Hx of compression fracture of spine  (R26.9) Abnormal gait  (M15.9) OA Multiple Sites  Comment: Stable, no pain now due to compression fracture, completed course of calcitonin   DJD multiple sites.   Hx of T8-12 pedicle screw fixation with cement by Dr. Behzad Sabit on 8/4/20.  Hx of multiple  spinal compression fractures per chart review in setting of recurrent falls.   Looks to have been on Fosamax in past.   Plan:  - Continue Tylenol 500 mg PO BID and BID PRN  - Continue gabapentin 200 mg at HS  - Neurosurgery follow-up and TLSO PRN  - Consider treatment for osteoporosis, would not be good candidate for bisphosphonate due to swallowing issues, consider endocrine referral after prostate biopsy    (F10.97) Dementia associated with alcoholism with mood disturbance (H)   (F10.21) Alcohol dependence in remission  (F43.21) Adjustment reaction with depression  (F41.1) JOSÉ MANUEL  Comment: Cognitive impairment in setting of history of years of ETOH abuse with recurrent falls and hospitalizations. Currently no access to ETOH thus abstinent.  Chronic low mood, suspect JOSÉ MANUEL with panic attacks for many years.   On memory care unit due to safety concerns.  Plan:   - Continue mirtazpaine 7.5 mg PO q HS (started 11/17/22)  - Continue Seroquel 12.5 mg BID and BID PRN for psychotic features - paranoid delusions. Previously on Zyprexa.  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  Reported  recurrent coughing up blood, but not recently.  Saw ENT, no explanation of symptoms.  Last esophagram May 2022 as above, presbyesophagus, proximal luminal narrowing.  Recent saw SLP 8/2022, no overt dysphagia.  Recurrent pneumonia, last 8/4/22.  CXR 10/1/22 without concern for PNA.  No increased cough, fever, or respiratory distress at present.  Plan:   - No symptoms at present, monitor clinically  - Continue swallowing strategies outlined by SLP    (I71.2) Thoracic aortic aneurysm without rupture (H) - 6/30/22 4.6 cm  (I70.0) Atherosclerosis of the aorta   Comment: Chronic, noted in ER June 2022 and on outside records  Followed by vascular Dr. Rodriguez.  4.6 cm ascending thoracic aortic aneurysm since 2020.  Mild - mod aortic valve stenosis.  1/5/23: LDL 60  Plan:   - Plan for interval Echo June 2023 given wish for no aggressive intervention or testing  - HR and BP control of medications at present     (D64.9) Normocytic Anemia  Comment: Chronic, mild, Hgb 11.2 on 1/5/23. No symptoms.  Plan:   - Follow serial Hgb    (K59.04) Chronic constipation  Comment: Chronic. Having regular BMs.  Long hx of Levsin multiple times per day for abd pain, but stopped by urology when started trospium, no recurrent pain. Having regular BMs   Plan:   - HOLD 1/11: polyethylene glycol (MIRALAX) 17 GM/Dose powder; 1-2 capfuls in 8 oz of ORANGE JUICE PO one time each morning and 1-2 capfuls 8 oz of ORANGE JUICE PO one time daily PRN for constipation. Please mix medication in juice in front of the patient. Hold for loose stools.   - GI following     (I48.91) Atrial fibrillation (H)  Comment: Chronic, not anticoagulated due to ETOH use and recurrent falls, rate controlled off medication  [id number]: 3 (age, HTN), 3.2% risk of stroke per year  Plan:   - Monitor VS and clinical status  - Would not add AC due to falls   - Consider cardiology follow-up after procedure, family has wanted to hold off.    (I10) Essential hypertension,  benign  Comment: BP at goal of < 150/90 given age and comorbid conditions  Stopped ASA due to hemoptysis  BP Readings from Last 3 Encounters:   01/04/23 (!) 148/72   11/30/22 (!) 154/87   11/18/22 125/74   Plan:   - Monitor routine VS and labs off anti-hypertensive medications    (M15.9) OA Multiple Sites  (M81.0) Osteoporosis  (Z87.81) History of vertebral fracture  (R26.9) Abnormal gait  Comment: Chronic.  DJD multiple sites and hx of multiple spinal compression fractures per chart review in setting of recurrent falls. Looks to have been on Fosamax in past.   Plan:   - Tylenol 500 mg BID and BID PRN for pain  - Falls precautions 4WW for ambulation   - Discuss bisphosphonate vs injectable medication for osteoporosis with PharmD at next visit, consider endocrine referral     (E66.09,  Z68.31) Class 1 obesity due to excess calories with serious comorbidity and body mass index (BMI) of 31.0 to 31.9 in adult  Comment:  Body mass index is 31.48 kg/m  with cardiovascular diease and OA.  Plan:   - Increased risk with general anesthesia    Review of Systems  10 point ROS of systems including Constitutional, Eyes, Respiratory, Cardiovascular, Gastroenterology, Genitourinary, Integumentary, Musculoskeletal, Psychiatric were all negative except for pertinent positives noted in my HPI.    Patient Active Problem List    Diagnosis Date Noted     Compression fracture of lumbar vertebra -compression deformity of L2-L5 with chronic mild compression deformity at superior endplate of L1. 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 06/02/2022     Priority: Medium     Hx of seizure disorder 03/31/2022     Priority: Medium     Alcohol dependence (H) 03/30/2022     Priority: Medium     Alcoholic hepatitis without ascites 03/30/2022     Priority: Medium      Repeated falls 03/30/2022     Priority: Medium     Coronary atherosclerosis of native coronary artery 03/30/2022     Priority: Medium     Anemia 03/30/2022     Priority: Medium     Other idiopathic peripheral autonomic neuropathy 03/30/2022     Priority: Medium     Age-related osteoporosis without current pathological fracture 03/30/2022     Priority: Medium     Constipation 03/30/2022     Priority: Medium     Edema 03/30/2022     Priority: Medium     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  2/2/21       Generalized anxiety disorder 04/27/2020     Priority: Medium     Gastro-esophageal reflux disease without esophagitis 04/27/2020     Priority: Medium     Weakness 04/27/2020     Priority: Medium     Mild cognitive impairment 08/12/2019     Priority: Medium     Formatting of this note might be different from the original.  NON-AMNESTIC TYPE       Impaired gait and mobility 03/14/2019     Priority: Medium     Other insomnia 03/14/2019     Priority: Medium     Physical deconditioning 03/14/2019     Priority: Medium     Low back pain 12/19/2018     Priority: Medium     Alcohol abuse 11/19/2017     Priority: Medium     Chronic atrial fibrillation (H) 07/15/2016     Priority: Medium     Formatting of this note might be different from the original.  2/2019: Multiple falls so started on aspirin only.  Then aspirin stopped due to GI bleed.       Anxiety 10/06/2015     Priority: Medium     Obesity (BMI 30-39.9) 09/03/2015     Priority: Medium     Chronic alcohol use 06/11/2015     Priority: Medium     Formatting of this note might be different from the original.  2/26/2019: serious fall at home with multiple vertebral fractures.  BAL 0.414% on admission.  Denies that he drinks much.  12/18/2018: hospitalized for  fall due to alcohol intoxication.  BAL 0.34% on admission.  9/16/2018: hospitalized for injuries from fall due to alcohol intoxication.  BAL 0.34% on admission       Claustrophobia 05/13/2015     Priority: Medium     Gastroesophageal reflux disease with esophagitis 05/13/2015     Priority: Medium     Formatting of this note might be different from the original.  EGD with biopsies 10/5/15 showing signs of acute on chronic esophagitis/gastritis, no dysplasia. Repeat in 5 years.       Osteoarthritis of both knees 05/13/2015     Priority: Medium     Pharyngeal dysphagia 05/13/2015     Priority: Medium     Formatting of this note might be different from the original.  Likely secondary to GERD with esophagitis, on PPI and H2 blocker with notable improvement, EGD 10/5/15.        Past Medical History:   Diagnosis Date     Age-related osteoporosis without current pathological fracture 03/30/2022     Alcohol abuse 11/19/2017     Alcohol dependence with withdrawal (H)      Alcoholism (H)      Back pain      Backache 12/19/2018     CAD (coronary artery disease)      Chronic a-fib (H)      Chronic alcohol use 06/11/2015    Formatting of this note might be different from the original. 2/26/2019: serious fall at home with multiple vertebral fractures.  BAL 0.414% on admission.  Denies that he drinks much. 12/18/2018: hospitalized for fall due to alcohol intoxication.  BAL 0.34% on admission. 9/16/2018: hospitalized for injuries from fall due to alcohol intoxication.  BAL 0.34% on admission     Chronic atrial fibrillation (H) 07/15/2016    Formatting of this note might be different from the original. 2/2019: Multiple falls so started on aspirin only.  Then aspirin stopped due to GI bleed.     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 2/2/21     HTN (hypertension)      Impaired gait and mobility 03/14/2019     Mild cognitive impairment 08/12/2019    Formatting of this note might be different from the original. NON-AMNESTIC TYPE     Mild TBI (traumatic brain injury) 03/14/2019     Mumps      Obesity (BMI 30-39.9) 09/03/2015     Osteoarthritis      Osteoarthritis of both knees 05/13/2015     Osteoporosis      Other idiopathic peripheral autonomic neuropathy 03/30/2022     Other insomnia 03/14/2019     Other seizures (H) 03/30/2022     Palpitations      Peripheral neuropathy      Pharyngeal dysphagia 05/13/2015    Formatting of this note might be different from the original. Likely secondary to GERD with esophagitis, on PPI and H2 blocker with notable improvement, EGD 10/5/15.     Physical deconditioning 03/14/2019     Recurrent major depressive disorder (H) 03/30/2022     Rhabdomyolysis      Thrombocytopenia (H)      Urination disorder      Weakness 04/27/2020     Past Surgical History:   Procedure Laterality Date     ESOPHAGOSCOPY, GASTROSCOPY, DUODENOSCOPY (EGD), COMBINED N/A 06/01/2022    Procedure: ESOPHAGOGASTRODUODENOSCOPY (EGD) mngi with biopsies using jumbo cold biopsy forceps;  Surgeon: David Springer MD;  Location:  GI     left hip replacement  2010     left shoulder replacement  2016     ORTHOPEDIC SURGERY       SPINE SURGERY      done in [place]     TONSILLECTOMY       Current Outpatient Medications   Medication Sig Dispense Refill     ACETAMINOPHEN EXTRA STRENGTH 500 MG tablet TAKE TWO TABLETS (1000MG) BY MOUTH TWICE DAILY AND;TAKE TWO TABLETS (1000MG) TWICE DAILY AS NEEDED 720 tablet 97     alum & mag hydroxide-simethicone (MAALOX MAX) 400-400-40 MG/5ML SUSP suspension  Take 30 mLs by mouth every 6 hours as needed for indigestion 355 mL 3     calcium carbonate 750 MG CHEW Take 750 mg by mouth as needed (Self-administers)       DEEP SEA NASAL SPRAY 0.65 % nasal spray INHALE 2 SPRAYS IN EACH NOSTRIL ONCE DAILY 44 mL 97     gabapentin (NEURONTIN) 100 MG capsule TAKE TWO CAPSULES (200MG) BY MOUTH EVERY NIGHT AT BEDTIME 180 capsule 97     guaiFENesin (ROBITUSSIN) 100 MG/5ML liquid GIVE 10ML (200 MG) BY MOUTH TWICE DAILY;AND TWICE DAILY AS NEEDED FOR COUGH 473 mL 97     menthol-zinc oxide (CALMOSEPTINE) 0.44-20.6 % OINT ointment Apply 1 g topically 2 times daily. May also apply 1 g 2 times daily as needed for skin protection. 113 g 98     mirtazapine (REMERON) 7.5 MG tablet Take 1 tablet (7.5 mg) by mouth At Bedtime 30 tablet 98     nystatin (MYCOSTATIN) 364202 UNIT/GM external powder Apply topically 2 times daily       omeprazole (PRILOSEC) 40 MG DR capsule TAKE 1 CAPSULE BY MOUTH TWICE DAILY 180 capsule 3     polyethylene glycol (MIRALAX) 17 GM/Dose powder MIX 2 CAPFULS IN 8OZ OF ORANGE JUICE  IN THE MORNING;AND MAY MIX 2 CAPFULS ONCE DAILY AS NEEDED FOR CONSTIPATION. MIX IN FRONT OF PT. HOLD FOR LOOSE STOOL. OK TO GIVE 1 CAPFUL IF RESIDENT REFUSES 2 CAPFULS. 510 g 97     polyvinyl alcohol-povidone PF (REFRESH) 1.4-0.6 % ophthalmic solution 1 drop every 4 hours as needed for irritation       QUEtiapine (SEROQUEL) 25 MG tablet Take 0.5 tablets (12.5 mg) by mouth 2 times daily. May also take 0.5 tablets (12.5 mg) every 12 hours as needed (anxiety). 60 tablet 11     [START ON 1/11/2023] saline laxitive (RA 7-19) 7-19 GM/118ML enema Use enema per packet instructions, per rectum one time, 1 hour prior to leaving apartment for surgery on 1/11/23 1 mL 0       Allergies   Allergen Reactions     Ativan [Lorazepam]         Social History     Tobacco Use     Smoking status: Never     Smokeless tobacco: Never   Substance Use Topics     Alcohol use: Not Currently     Comment: not since December  "2021     Family History   Problem Relation Age of Onset     Osteoporosis Mother      Prostate Cancer Paternal Grandfather      Colon Cancer No family hx of      History   Drug Use Unknown         Objective     BP (!) 148/72   Pulse 67   Temp 98.7  F (37.1  C)   Resp 18   Ht 1.778 m (5' 10\")   Wt 99.5 kg (219 lb 6.4 oz)   SpO2 96%   BMI 31.48 kg/m      Physical Exam  GENERAL APPEARANCE:  Alert, in no distress, seated in recliner chair.  HEAD: NC/AT  EYES: Sclera clear and conjunctiva normal, no discharge   ENT:  Mouth normal, moist mucous membranes, upper partial denture, missing lower teeth soft pallet and uvula with symmetric rise, hearing acuity grossly decreased BL, small scabbed excoriation to left nare.  NECK:  Nontender, supple, symmetrical; no cervical adenopathy, masses or thyromegaly, trachea midline  RESP:  Non-labored breathing, palpation of chest normal, no chest wall tenderness, no respiratory distress, Lung sounds clear, patient is on room air.  CV:  Palpation - no murmur/non-displaced PMI, Auscultation - rate and rhythm regular, no murmur, no rub or gallop.  VASCULAR: 2+ edema bilateral lower extremities, not wearing Velcro compression wraps.   ABDOMEN:  Normal bowel sounds, soft, nontender, no grimacing or guarding with palpation,   umblical hernia.   M/S:   Gait and station ambulates independently with walker around apartment.   SKIN:  Inspection - No lesions, rashes, or ecchymosis on limited exam as fully dressed. Palpation- no increased warmth, skin is dry and non-tender.  NEURO: cranial nerves II-XII grossly intact except hearing, no facial asymmetry, follows simple commands, moves all extremities symmetrically, normal tone, no tremor  PSYCH: Awake and alert, speech fluent,  insight and judgement impaired, memory impaired, cooperative, anxious affect.    Diagnostics:  CBC RESULTS: Recent Labs   Lab Test 01/05/23  1000 10/19/22  1225   WBC 6.5 6.0   RBC 3.89* 4.31*   HGB 11.2* 12.3*   HCT " 36.1* 38.5*   MCV 93 89   MCH 28.8 28.5   MCHC 31.0* 31.9   RDW 14.8 13.8    306     Last Basic Metabolic Panel:  Recent Labs   Lab Test 01/05/23  1000 12/01/22  1020    139   POTASSIUM 4.2 4.4   CHLORIDE 102 103   JOSUE 8.8 8.8   CO2 27 29   BUN 15 12   CR 0.68 0.59*   * 81     Liver Function Studies -   Recent Labs   Lab Test 01/05/23  1000 10/04/22  0720 09/30/22  1755   PROTTOTAL 7.6  --  7.0   ALBUMIN 3.6 3.0* 3.9   BILITOTAL 0.5  --  0.7   ALKPHOS 64  --  61   AST 18  --  37   ALT 13  --  15     TSH   Date Value Ref Range Status   10/06/2022 1.67 0.30 - 4.20 uIU/mL Final   04/28/2022 2.77 0.40 - 4.00 mU/L Final   04/07/2022 3.94 0.40 - 4.00 mU/L Final     Lab Results   Component Value Date    A1C 5.5 04/28/2022    A1C 5.6 04/07/2022     Recent Labs   Lab Test 01/05/23  1000 04/28/22  0655   CHOL 123 101   HDL 46 40   LDL 60 50   TRIG 84 54       Most Recent EKGs in [facility]:      EXAM: XR CHEST PORTABLE 1 VIEW  LOCATION: [facility]  DATE/TIME: 10/01/2022, 1:29 PM     INDICATION: Hypoxia, wheezing.  COMPARISON: 09/14/2022.                                                                   IMPRESSION: Question some vascular congestion, which could indicate congestive heart failure. Heart size, similar to previous. No consolidation.    Echo 10/03/2022  Interpretation Summary     1. The left ventricle is normal in structure, function and size. The visual  ejection fraction is estimated at 55%.  2. The right ventricle is mildly dilated. Mildly decreased right ventricular  systolic function   3. Aortic valve visually appears to have mild (to moderate) stenosis, however  gradients are in the high-normal range.  4. 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 Sinus 4.8cm, ascending 4.4cm.    Rhythm: Sinus rhythm was noted.     Echo 8/2020 from [place]: EF 65%, normal RV, calcified AV with likely  underestimated gradient, sinus 4.5cm, ascending aorta 4.1cm.     Revised Cardiac Risk  Index (RCRI):  The patient has the following serious cardiovascular risks for perioperative complications:   - Possible Coronary Artery Disease (MI, positive stress test, angina, Qs on EKG) = 1 point vs No serious cardiac risks = 0 points     RCRI Interpretation: 1 point: Class II (low risk - 0.9% complication rate)    Signed Electronically by: MARICHUY Basilio CNP  Copy of this evaluation report is provided to requesting physician.

## 2023-01-03 ENCOUNTER — OFFICE VISIT (OUTPATIENT)
Dept: GERIATRICS | Facility: CLINIC | Age: 83
End: 2023-01-03
Payer: COMMERCIAL

## 2023-01-03 DIAGNOSIS — E66.09 CLASS 1 OBESITY DUE TO EXCESS CALORIES WITH SERIOUS COMORBIDITY AND BODY MASS INDEX (BMI) OF 31.0 TO 31.9 IN ADULT: ICD-10-CM

## 2023-01-03 DIAGNOSIS — R13.13 PHARYNGEAL DYSPHAGIA: ICD-10-CM

## 2023-01-03 DIAGNOSIS — F41.1 GAD (GENERALIZED ANXIETY DISORDER): ICD-10-CM

## 2023-01-03 DIAGNOSIS — E87.1 HYPONATREMIA: ICD-10-CM

## 2023-01-03 DIAGNOSIS — F43.23 ADJUSTMENT REACTION WITH ANXIETY AND DEPRESSION: ICD-10-CM

## 2023-01-03 DIAGNOSIS — F10.21 ALCOHOL DEPENDENCE IN REMISSION (H): ICD-10-CM

## 2023-01-03 DIAGNOSIS — M15.9 OSTEOARTHRITIS OF MULTIPLE JOINTS, UNSPECIFIED OSTEOARTHRITIS TYPE: ICD-10-CM

## 2023-01-03 DIAGNOSIS — J18.9 RECURRENT PNEUMONIA: ICD-10-CM

## 2023-01-03 DIAGNOSIS — Z87.81 HISTORY OF COMPRESSION FRACTURE OF SPINE: ICD-10-CM

## 2023-01-03 DIAGNOSIS — R26.9 ABNORMAL GAIT: ICD-10-CM

## 2023-01-03 DIAGNOSIS — K22.70 BARRETT'S ESOPHAGUS WITHOUT DYSPLASIA: ICD-10-CM

## 2023-01-03 DIAGNOSIS — E22.2 SIADH (SYNDROME OF INAPPROPRIATE ADH PRODUCTION) (H): ICD-10-CM

## 2023-01-03 DIAGNOSIS — R60.0 LOWER EXTREMITY EDEMA: ICD-10-CM

## 2023-01-03 DIAGNOSIS — F10.27: ICD-10-CM

## 2023-01-03 DIAGNOSIS — R97.20 ELEVATED PROSTATE SPECIFIC ANTIGEN (PSA): Primary | ICD-10-CM

## 2023-01-03 DIAGNOSIS — I70.0 ATHEROSCLEROSIS OF AORTA (H): ICD-10-CM

## 2023-01-03 DIAGNOSIS — R04.2 HEMOPTYSIS: ICD-10-CM

## 2023-01-03 DIAGNOSIS — E66.811 CLASS 1 OBESITY DUE TO EXCESS CALORIES WITH SERIOUS COMORBIDITY AND BODY MASS INDEX (BMI) OF 31.0 TO 31.9 IN ADULT: ICD-10-CM

## 2023-01-03 PROCEDURE — 99349 HOME/RES VST EST MOD MDM 40: CPT | Performed by: NURSE PRACTITIONER

## 2023-01-03 NOTE — LETTER
1/3/2023        RE: [facility]  [address]  [facility] [address]        [place] [address] [place] [address]  Phone: [phone]  Fax: [phone]  Primary Provider: Sylvia Stein  Pre-op Performing Provider: SYLVIA STEIN    PREOPERATIVE EVALUATION:  Today's date: 1/3/2023      Jamie Valdivia is a 82 year old male who presents for a preoperative evaluation.    Surgical Information:  Surgery/Procedure: Prostate biopsy  Surgery Location: [facility]  Surgeon: Dr. Niraj Larry  Surgery Date: 01/11/23  Time of Surgery: 12:55 PM  Where patient plans to recover: Other: Memory Care residential  Fax number for surgical facility: Note does not need to be faxed, will be available electronically in Epic.    Type of Anesthesia Anticipated: General    Assessment & Plan     The proposed surgical procedure is considered INTERMEDIATE risk.    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)        History of compression fracture of spine        Abnormal gait        Osteoarthritis of multiple joints, unspecified osteoarthritis type        Mild dementia associated with alcoholism, with mood disturbance (H)        Adjustment reaction with anxiety and depression        JOSÉ MANUEL (generalized anxiety disorder)        Lower extremity edema        Recurrent pneumonia        Hemoptysis        Class 1 obesity due to excess calories with serious comorbidity and body mass  index (BMI) of 31.0 to 31.9 in adult               Risks and Recommendations:  The patient has the following additional risks and recommendations for perioperative complications:   - History of delirium or dementia   - Obesity:  Body mass index is 31.48 kg/m .  Cardiovascular:  - Would benefit from cardiology follow-up, hx as below,felt conditions chronic/stable, will address after procedure.  - Recommend obtain interval EKG as per anesthesia last 10/01/2022. Last Echo 10/3/22. Both studies in [facility].  Pulmonary:    - Hx of recurrent pneumonia, no symptoms today  Obstructive Sleep Apnea:   - Hx of daytime sleepiness, no work-up of SADIQ today,Anemia/Bleeding/Clotting:    - Anemia and does not require treatment prior to surgery. Monitor hemoglobin postoperatively.  Social and Substance:    - Hx of alcohol abuse, current abstinent for alcohol     Medication Instructions:   - SSRIs, SNRIs, TCAs, Antipsychotics: Continue without modification.    - Hold Miralax 1/11/23 while NPO    RECOMMENDATION:  APPROVAL GIVEN to proceed with proposed procedure, without further diagnostic evaluation.    Subjective     HPI related to upcoming procedure:   History of elevated PSA (13.45 ng/mL in 3/2021 and 11.30 on 4/7/22) and bothersome lower urinary tract symptoms including urinary urgency and incontinence. Referred to urology in [place] in May 2021, but did not follow-up. No history of prostate biopsy in the past. Plan was for transrectal prostate biopsy with Dr. Weston on 9/28/22 under general anesthesia due to significant anxiety, but procedure was delayed dye to hyponatremia in setting of decreased oral intake, Bactrim, and starting anti-depressant medication. Procedure now scheduled for 1/11/2023 as resident medically stable.      Preop Questions 1/8/2023   1. Have you ever had a heart attack or stroke? UNKNOWN - history of coronary atherosclerosis, long history of recurrent chest pain episodes in setting of anxiety, see ER visit  6/30/22, EKG showed no overvet ischemic changes, Troponin negative.  - CORONARY ARTERY CALCIFICATION: Severe on chest CT 6/30/22  - Ascending thoracic aorta is aneurysmal at 4.6 cm.    2. Have you ever had surgery on your heart or blood vessels, such as a stent placement, a coronary artery bypass, or surgery on an artery in your head, neck, heart, or legs? No   3. Do you have chest pain with activity? No   4. Do you have a history of  heart failure? No   5. Do you currently have a cold, bronchitis or symptoms of other infection? No   6. Do you have a cough, shortness of breath, or wheezing? No   7. Do you or anyone in your family have previous history of blood clots? No   8. Do you or does anyone in your family have a serious bleeding problem such as prolonged bleeding following surgeries or cuts? UNKNOWN    9. Have you ever had problems with anemia or been told to take iron pills? YES - Chronic, mild anemia, Hgb 11-12     10. Have you had any abnormal blood loss such as black, tarry or bloody stools?   YES - Hx GI bleed  3/2019: acute drop in hgb. EGD showed ulceration of hypopharynx, felt to be responsible for GI bleed. No stomach bleeding or esophageal varices. Received pRBCs and hgb stabilized.  EGD on 6/1/22 showed normal stomach, duodenum, esophageal mucosa with short-segment BE, started on PPI. Hx of hemoptysis and blood from nose, saw ENT unable to identify cause. No episodes of late.      11. Have you ever had a blood transfusion? YES   11a. Have you ever had a transfusion reaction? No   12. Are you willing to have a blood transfusion if it is medically needed before, during, or after your surgery? Yes   13. Have you or any of your relatives ever had problems with anesthesia? YES - Colonoscopy, when woke up, combative with nursing staff.   14. Do you have sleep apnea, excessive snoring or daytime drowsiness? No   14a. Do you have a CPAP machine? -   15. Do you have any artifical heart valves or other  implanted medical devices like a pacemaker, defibrillator, or continuous glucose monitor? No   16. Do you have artificial joints? YES - L Hip (2010), L shoulder replacement (2016),  Underwent spinal surgery with hardware placement T8-12 pedicle screw fixation with cement by Dr. Behzad Sabit on 8/4/20   17. Are you allergic to latex? No     Health Care Directive:  Patient has a Health Care Directive on file    Preoperative Review of :   reviewed - controlled substances reflected in medication list.   Reviewed 01/08/23 2:10 PM    Chronic Medical Concerns:  Resident of [facility] since March 2022.     Hospitalized at [facility] form 11/1 to 11/5/21 with generalized weakness, dehydration, and concern for UTI but culture came back negative. Symptoms likely related to chronic alcoholism with alcohol withdrawal. Chronic cognitive impairment in setting of long history of alcoholism, hx of hallucinations. Multiple hospitalizations over preceding months per family due to unsafe living situation, well known to ED staff. Spent time in SNF ([facility]) around September 2021, details unclear, but seem to have suffered spinal fracture.     Hospitalized from 12/17 to 12/23/21 at [facility] for falls, weakness, alcoholism, and alcoholic hepatitis; discharge summary not available. Wife (who was primary care giver) was having medical issues and needed neurology care in the [facility]. Family felt resident unable to safely be left alone so they called EMS to transport to hospital for ongoing care/ETOH detox. Ultimately transferred to [facility] where wife was convalescing.      Remained at [facility] from 12/23 and 3/15/22 with largely uneventful stay. Zyprexa dose increased with good effect on mood, prior to starting medication having hallucinations, delusions, and wandering on unit. Transferred to [facility] for permanent placement.    ER visit on 6/30/22 due to chest pain.  "Resident was watching a baseball game when he experienced left-sided chest pain.Treated by EMS with aspirin and nitroglycerin with slight relief. Reported pain worse with taking deep breaths and related to anxiety. EKG showed atrial fibrillation with controlled rate; chronic.  Chest CT showed 4.6 cm ascending thoracic aortic aneurysm, but was negative for plumonary embolism. Cardiothoracic surgery consulted given size of aneurysm and lack of other connective tissue disorder recommended outpatient follow-up with repeat chest CT and echocardiogram in 3 months to evaluate for progression. Chest x-ray showed left basilar pneumonia and chest CT showed fluid-filled bronchi\" bilateral lower lobes. Labs significant for negative troponin, ACS excluded. ER physician noted tenderness over left chest wall and cough over last month. Hx of dysphagia and increased aspiration risk. Symptoms most likely consistent with pneumonia discharged back to assisted living facility with Augmentin.    Repeat CXR on 8/4 due to subjective shortness of breath showed possible left lower lobe patchy opacification, as well as low lung volumes with vascular crowding and basilar atelectasis. Dr. Quintana started Augmentin. Symptoms improved.    On 9/13/22 prostate biopsy canceled due to hyponatremia. Subsequently hospitalized at [facility] on 9/30/22 with fall and confusion, found to have severe hyponatremia (Na+ 114). Prior to hospitalization had loose stools and poor intake in setting of prophylactic antibiotic (Bactrim) prescribed prior to planned prostate biospy which was canceled due to low sodium (at 129). Started on IVF due to concern for hypovolemic hyponatremia with some improvement. 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 Treated with 1.2 L/day fluid restriction and sodium improved. Confusion improved with treatment of hyponatremia.   Also noted to have back pain after fall. Lumbar CT showed age indeterminate " compression deformity of L2-L5 with chronic mild compression deformity at superior endplate of L1. Neurosurgery consulted and recommended conservative mgmt with brace. Recommended PT in TCU at hospital discharge due to difficult with ambulation.   Medications for other chronic conditions continued without change.  Interval Echo completed, with EF 55%, right ventricle mildly dilated with mildy decreased systolic function, aortic valve mild-moderate stenosis, ascending aorta moderately dilated.     Resident recovered at [facility] from 10/6 to 11/03/22. Pain controlled with Tylenol and gabapentin.   Wearing TLSO when out of bed.  Hyponatremia felt to be due to volume depletion and SIADH. Improved and able to discontinue fluid restriction. Discharged back to [facility].    (97.20) Elevated PSA  Comment: Chronic  PSA 13.45 in March 2021, Repeat PSA 11.3 on 4/7/22  Saw urology, recommendation for prostate biopsy, scheduled for 9/28/22 under general anesthesia due to severe anxiety, canceled due to hyponatremia which perciptated hospitalization as above. Biopsy now rescheduled for 1/11/23. 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  Previous hyponatremia multifactorial, occurred in setting of SIADH, dose increased of selective serotonin reuptake inhibitor (Celexa), Bactrim, hypovolemia.  Stopped Celexa 9/29/22, now on Mirtazapine.  Sodium   Date Value Ref Range Status   01/05/2023 137 133 - 144 mmol/L Final   Plan:   - Avoid medications which could exacerbate hyponatremia   - Avoid excessive free water intake    (Z87.81) Hx of compression fracture of spine  (R26.9) Abnormal gait  (M15.9) OA Multiple Sites  Comment: Stable, no pain now due to compression fracture, completed course of calcitonin   DJD  multiple sites.   Hx of T8-12 pedicle screw fixation with cement by Dr. Behzad Sabit on 8/4/20.  Hx of multiple spinal compression fractures per chart review in setting of recurrent falls.   Looks to have been on Fosamax in past.   Plan:  - Continue Tylenol 500 mg PO BID and BID PRN  - Continue gabapentin 200 mg at HS  - Neurosurgery follow-up and TLSO PRN  - Consider treatment for osteoporosis, would not be good candidate for bisphosphonate due to swallowing issues, consider endocrine referral after prostate biopsy    (F10.97) Dementia associated with alcoholism with mood disturbance (H)   (F10.21) Alcohol dependence in remission  (F43.21) Adjustment reaction with depression  (F41.1) JOSÉ MANUEL  Comment: Cognitive impairment in setting of history of years of ETOH abuse with recurrent falls and hospitalizations. Currently no access to ETOH thus abstinent.  Chronic low mood, suspect JOSÉ MANUEL with panic attacks for many years.   On memory care unit due to safety concerns.  Plan:   - Continue mirtazpaine 7.5 mg PO q HS (started 11/17/22)  - Continue Seroquel 12.5 mg BID and BID PRN for psychotic features - paranoid delusions. Previously on Zyprexa.  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  Reported recurrent coughing up blood, but not recently.  Saw ENT, no explanation of symptoms.  Last esophagram May 2022 as above, presbyesophagus, proximal luminal narrowing.  Recent saw SLP 8/2022, no overt dysphagia.  Recurrent pneumonia, last 8/4/22.  CXR 10/1/22 without concern for PNA.  No increased cough, fever, or respiratory distress at present.  Plan:   - No symptoms at present, monitor clinically  - Continue swallowing strategies outlined by SLP    (I71.2) Thoracic aortic aneurysm without rupture (H) - 6/30/22 4.6 cm  (I70.0) Atherosclerosis of the aorta   Comment: Chronic, noted in ER June 2022 and on outside records  Followed by vascular Dr. Rodriguez.  4.6 cm ascending thoracic aortic aneurysm since 2020.  Mild - mod aortic valve stenosis.  1/5/23: LDL 60  Plan:   - Plan for interval Echo June 2023 given wish for no aggressive intervention or testing  - HR and BP control of medications at present     (D64.9) Normocytic Anemia  Comment: Chronic, mild, Hgb 11.2 on 1/5/23. No symptoms.  Plan:   - Follow serial Hgb    (K59.04) Chronic constipation  Comment: Chronic. Having regular BMs.  Long hx of Levsin multiple times per day for abd pain, but stopped by urology when started trospium, no recurrent pain. Having regular BMs   Plan:   - HOLD 1/11: polyethylene glycol (MIRALAX) 17 GM/Dose powder; 1-2 capfuls in 8 oz of ORANGE JUICE PO one time each morning and 1-2 capfuls 8 oz of ORANGE JUICE PO one time daily PRN for constipation. Please mix medication in juice in front of the patient. Hold for loose stools.   - GI following     (I48.91) Atrial fibrillation (H)  Comment: Chronic, not anticoagulated due to ETOH use and recurrent falls, rate controlled off medication  [id number]: 3 (age, HTN), 3.2% risk of stroke per year  Plan:   - Monitor VS and clinical status  - Would not add AC due to falls   - Consider  cardiology follow-up after procedure, family has wanted to hold off.    (I10) Essential hypertension, benign  Comment: BP at goal of < 150/90 given age and comorbid conditions  Stopped ASA due to hemoptysis  BP Readings from Last 3 Encounters:   01/04/23 (!) 148/72   11/30/22 (!) 154/87   11/18/22 125/74   Plan:   - Monitor routine VS and labs off anti-hypertensive medications    (M15.9) OA Multiple Sites  (M81.0) Osteoporosis  (Z87.81) History of vertebral fracture  (R26.9) Abnormal gait  Comment: Chronic.  DJD multiple sites and hx of multiple spinal compression fractures per chart review in setting of recurrent falls. Looks to have been on Fosamax in past.   Plan:   - Tylenol 500 mg BID and BID PRN for pain  - Falls precautions 4WW for ambulation   - Discuss bisphosphonate vs injectable medication for osteoporosis with PharmD at next visit, consider endocrine referral     (E66.09,  Z68.31) Class 1 obesity due to excess calories with serious comorbidity and body mass index (BMI) of 31.0 to 31.9 in adult  Comment:  Body mass index is 31.48 kg/m  with cardiovascular diease and OA.  Plan:   - Increased risk with general anesthesia    Review of Systems  10 point ROS of systems including Constitutional, Eyes, Respiratory, Cardiovascular, Gastroenterology, Genitourinary, Integumentary, Musculoskeletal, Psychiatric were all negative except for pertinent positives noted in my HPI.    Patient Active Problem List    Diagnosis Date Noted     Compression fracture of lumbar vertebra -compression deformity of L2-L5 with chronic mild compression deformity at superior endplate of L1. 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 06/02/2022     Priority: Medium     Hx of seizure disorder 03/31/2022     Priority: Medium     Alcohol dependence (H)  03/30/2022     Priority: Medium     Alcoholic hepatitis without ascites 03/30/2022     Priority: Medium     Repeated falls 03/30/2022     Priority: Medium     Coronary atherosclerosis of native coronary artery 03/30/2022     Priority: Medium     Anemia 03/30/2022     Priority: Medium     Other idiopathic peripheral autonomic neuropathy 03/30/2022     Priority: Medium     Age-related osteoporosis without current pathological fracture 03/30/2022     Priority: Medium     Constipation 03/30/2022     Priority: Medium     Edema 03/30/2022     Priority: Medium     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  2/2/21       Generalized anxiety disorder 04/27/2020     Priority: Medium     Gastro-esophageal reflux disease without esophagitis 04/27/2020     Priority: Medium     Weakness 04/27/2020     Priority: Medium     Mild cognitive impairment 08/12/2019     Priority: Medium     Formatting of this note might be different from the original.  NON-AMNESTIC TYPE       Impaired gait and mobility 03/14/2019     Priority: Medium     Other insomnia 03/14/2019     Priority: Medium     Physical deconditioning 03/14/2019     Priority: Medium     Low back pain 12/19/2018     Priority: Medium     Alcohol abuse 11/19/2017     Priority: Medium     Chronic atrial fibrillation (H) 07/15/2016     Priority: Medium     Formatting of this note might be different from the original.  2/2019: Multiple falls so started on aspirin only.  Then aspirin stopped due to GI bleed.       Anxiety 10/06/2015     Priority: Medium     Obesity (BMI 30-39.9) 09/03/2015     Priority: Medium     Chronic alcohol use 06/11/2015     Priority: Medium     Formatting of this note might be different from the original.  2/26/2019: serious fall at home with multiple  vertebral fractures.  BAL 0.414% on admission.  Denies that he drinks much.  12/18/2018: hospitalized for fall due to alcohol intoxication.  BAL 0.34% on admission.  9/16/2018: hospitalized for injuries from fall due to alcohol intoxication.  BAL 0.34% on admission       Claustrophobia 05/13/2015     Priority: Medium     Gastroesophageal reflux disease with esophagitis 05/13/2015     Priority: Medium     Formatting of this note might be different from the original.  EGD with biopsies 10/5/15 showing signs of acute on chronic esophagitis/gastritis, no dysplasia. Repeat in 5 years.       Osteoarthritis of both knees 05/13/2015     Priority: Medium     Pharyngeal dysphagia 05/13/2015     Priority: Medium     Formatting of this note might be different from the original.  Likely secondary to GERD with esophagitis, on PPI and H2 blocker with notable improvement, EGD 10/5/15.        Past Medical History:   Diagnosis Date     Age-related osteoporosis without current pathological fracture 03/30/2022     Alcohol abuse 11/19/2017     Alcohol dependence with withdrawal (H)      Alcoholism (H)      Back pain      Backache 12/19/2018     CAD (coronary artery disease)      Chronic a-fib (H)      Chronic alcohol use 06/11/2015    Formatting of this note might be different from the original. 2/26/2019: serious fall at home with multiple vertebral fractures.  BAL 0.414% on admission.  Denies that he drinks much. 12/18/2018: hospitalized for fall due to alcohol intoxication.  BAL 0.34% on admission. 9/16/2018: hospitalized for injuries from fall due to alcohol intoxication.  BAL 0.34% on admission     Chronic atrial fibrillation (H) 07/15/2016    Formatting of this note might be different from the original. 2/2019: Multiple falls so started on aspirin only.  Then aspirin stopped due to GI bleed.     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 2/2/21     HTN (hypertension)      Impaired gait and mobility 03/14/2019     Mild cognitive impairment 08/12/2019    Formatting of this note might be different from the original. NON-AMNESTIC TYPE     Mild TBI (traumatic brain injury) 03/14/2019     Mumps      Obesity (BMI 30-39.9) 09/03/2015     Osteoarthritis      Osteoarthritis of both knees 05/13/2015     Osteoporosis      Other idiopathic peripheral autonomic neuropathy 03/30/2022     Other insomnia 03/14/2019     Other seizures (H) 03/30/2022     Palpitations      Peripheral neuropathy      Pharyngeal dysphagia 05/13/2015    Formatting of this note might be different from the original. Likely secondary to GERD with esophagitis, on PPI and H2 blocker with notable improvement, EGD 10/5/15.     Physical deconditioning 03/14/2019     Recurrent major depressive disorder (H) 03/30/2022     Rhabdomyolysis      Thrombocytopenia (H)      Urination disorder      Weakness 04/27/2020     Past Surgical History:   Procedure Laterality Date     ESOPHAGOSCOPY, GASTROSCOPY, DUODENOSCOPY (EGD), COMBINED N/A 06/01/2022    Procedure: ESOPHAGOGASTRODUODENOSCOPY (EGD) mngi with biopsies using jumbo cold biopsy forceps;  Surgeon: David Springer MD;  Location:  GI     left hip replacement  2010     left shoulder replacement  2016     ORTHOPEDIC SURGERY       SPINE SURGERY      done in [place]     TONSILLECTOMY       Current Outpatient Medications   Medication Sig Dispense Refill     ACETAMINOPHEN EXTRA STRENGTH 500 MG tablet TAKE TWO TABLETS (1000MG) BY MOUTH TWICE DAILY AND;TAKE TWO TABLETS (1000MG) TWICE DAILY AS  NEEDED 720 tablet 97     alum & mag hydroxide-simethicone (MAALOX MAX) 400-400-40 MG/5ML SUSP suspension Take 30 mLs by mouth every 6 hours as needed for indigestion 355 mL 3     calcium carbonate 750 MG CHEW Take 750 mg by mouth as needed (Self-administers)       DEEP SEA NASAL SPRAY 0.65 % nasal spray INHALE 2 SPRAYS IN EACH NOSTRIL ONCE DAILY 44 mL 97     gabapentin (NEURONTIN) 100 MG capsule TAKE TWO CAPSULES (200MG) BY MOUTH EVERY NIGHT AT BEDTIME 180 capsule 97     guaiFENesin (ROBITUSSIN) 100 MG/5ML liquid GIVE 10ML (200 MG) BY MOUTH TWICE DAILY;AND TWICE DAILY AS NEEDED FOR COUGH 473 mL 97     menthol-zinc oxide (CALMOSEPTINE) 0.44-20.6 % OINT ointment Apply 1 g topically 2 times daily. May also apply 1 g 2 times daily as needed for skin protection. 113 g 98     mirtazapine (REMERON) 7.5 MG tablet Take 1 tablet (7.5 mg) by mouth At Bedtime 30 tablet 98     nystatin (MYCOSTATIN) 367264 UNIT/GM external powder Apply topically 2 times daily       omeprazole (PRILOSEC) 40 MG DR capsule TAKE 1 CAPSULE BY MOUTH TWICE DAILY 180 capsule 3     polyethylene glycol (MIRALAX) 17 GM/Dose powder MIX 2 CAPFULS IN 8OZ OF ORANGE JUICE  IN THE MORNING;AND MAY MIX 2 CAPFULS ONCE DAILY AS NEEDED FOR CONSTIPATION. MIX IN FRONT OF PT. HOLD FOR LOOSE STOOL. OK TO GIVE 1 CAPFUL IF RESIDENT REFUSES 2 CAPFULS. 510 g 97     polyvinyl alcohol-povidone PF (REFRESH) 1.4-0.6 % ophthalmic solution 1 drop every 4 hours as needed for irritation       QUEtiapine (SEROQUEL) 25 MG tablet Take 0.5 tablets (12.5 mg) by mouth 2 times daily. May also take 0.5 tablets (12.5 mg) every 12 hours as needed (anxiety). 60 tablet 11     [START ON 1/11/2023] saline laxitive (RA 7-19) 7-19 GM/118ML enema Use enema per packet instructions, per rectum one time, 1 hour prior to leaving apartment for surgery on 1/11/23 1 mL 0       Allergies   Allergen Reactions     Ativan [Lorazepam]         Social History     Tobacco Use     Smoking status: Never     Smokeless  "tobacco: Never   Substance Use Topics     Alcohol use: Not Currently     Comment: not since December 2021     Family History   Problem Relation Age of Onset     Osteoporosis Mother      Prostate Cancer Paternal Grandfather      Colon Cancer No family hx of      History   Drug Use Unknown        Objective     BP (!) 148/72   Pulse 67   Temp 98.7  F (37.1  C)   Resp 18   Ht 1.778 m (5' 10\")   Wt 99.5 kg (219 lb 6.4 oz)   SpO2 96%   BMI 31.48 kg/m      Physical Exam  GENERAL APPEARANCE:  Alert, in no distress, seated in recliner chair.  HEAD: NC/AT  EYES: Sclera clear and conjunctiva normal, no discharge   ENT:  Mouth normal, moist mucous membranes, upper partial denture, missing lower teeth soft pallet and uvula with symmetric rise, hearing acuity grossly decreased BL, small scabbed excoriation to left nare.  NECK:  Nontender, supple, symmetrical; no cervical adenopathy, masses or thyromegaly, trachea midline  RESP:  Non-labored breathing, palpation of chest normal, no chest wall tenderness, no respiratory distress, Lung sounds clear, patient is on room air.  CV:  Palpation - no murmur/non-displaced PMI, Auscultation - rate and rhythm regular, no murmur, no rub or gallop.  VASCULAR: 2+ edema bilateral lower extremities, not wearing Velcro compression wraps.   ABDOMEN:  Normal bowel sounds, soft, nontender, no grimacing or guarding with palpation,   umblical hernia.   M/S:   Gait and station ambulates independently with walker around apartment.   SKIN:  Inspection - No lesions, rashes, or ecchymosis on limited exam as fully dressed. Palpation- no increased warmth, skin is dry and non-tender.  NEURO: cranial nerves II-XII grossly intact except hearing, no facial asymmetry, follows simple commands, moves all extremities symmetrically, normal tone, no tremor  PSYCH: Awake and alert, speech fluent,  insight and judgement impaired, memory impaired, cooperative, anxious affect.    Diagnostics:  CBC RESULTS: Recent " Labs   Lab Test 01/05/23  1000 10/19/22  1225   WBC 6.5 6.0   RBC 3.89* 4.31*   HGB 11.2* 12.3*   HCT 36.1* 38.5*   MCV 93 89   MCH 28.8 28.5   MCHC 31.0* 31.9   RDW 14.8 13.8    306     Last Basic Metabolic Panel:  Recent Labs   Lab Test 01/05/23  1000 12/01/22  1020    139   POTASSIUM 4.2 4.4   CHLORIDE 102 103   JOSUE 8.8 8.8   CO2 27 29   BUN 15 12   CR 0.68 0.59*   * 81     Liver Function Studies -   Recent Labs   Lab Test 01/05/23  1000 10/04/22  0720 09/30/22  1755   PROTTOTAL 7.6  --  7.0   ALBUMIN 3.6 3.0* 3.9   BILITOTAL 0.5  --  0.7   ALKPHOS 64  --  61   AST 18  --  37   ALT 13  --  15     TSH   Date Value Ref Range Status   10/06/2022 1.67 0.30 - 4.20 uIU/mL Final   04/28/2022 2.77 0.40 - 4.00 mU/L Final   04/07/2022 3.94 0.40 - 4.00 mU/L Final     Lab Results   Component Value Date    A1C 5.5 04/28/2022    A1C 5.6 04/07/2022     Recent Labs   Lab Test 01/05/23  1000 04/28/22  0655   CHOL 123 101   HDL 46 40   LDL 60 50   TRIG 84 54       Most Recent EKGs in Epic:      EXAM: XR CHEST PORTABLE 1 VIEW  LOCATION: [facility]  DATE/TIME: 10/01/2022, 1:29 PM     INDICATION: Hypoxia, wheezing.  COMPARISON: 09/14/2022.                                                                   IMPRESSION: Question some vascular congestion, which could indicate congestive heart failure. Heart size, similar to previous. No consolidation.    Echo 10/03/2022  Interpretation Summary     1. The left ventricle is normal in structure, function and size. The visual  ejection fraction is estimated at 55%.  2. The right ventricle is mildly dilated. Mildly decreased right ventricular  systolic function   3. Aortic valve visually appears to have mild (to moderate) stenosis, however  gradients are in the high-normal range.  4. 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 Sinus 4.8cm, ascending 4.4cm.    Rhythm: Sinus rhythm was noted.     Echo 8/2020 from [place]: EF 65%, normal RV, calcified AV  with likely  underestimated gradient, sinus 4.5cm, ascending aorta 4.1cm.     Revised Cardiac Risk Index (RCRI):  The patient has the following serious cardiovascular risks for perioperative complications:   - Possible Coronary Artery Disease (MI, positive stress test, angina, Qs on EKG) = 1 point vs No serious cardiac risks = 0 points     RCRI Interpretation: 1 point: Class II (low risk - 0.9% complication rate)    Signed Electronically by: MARICHUY Basilio CNP  Copy of this evaluation report is provided to requesting physician.        Sincerely,        MARICHUY Basilio CNP

## 2023-01-04 ENCOUNTER — LAB REQUISITION (OUTPATIENT)
Dept: LAB | Facility: CLINIC | Age: 83
End: 2023-01-04
Payer: COMMERCIAL

## 2023-01-04 DIAGNOSIS — E78.9 DISORDER OF LIPOPROTEIN METABOLISM, UNSPECIFIED: ICD-10-CM

## 2023-01-04 DIAGNOSIS — E87.1 HYPO-OSMOLALITY AND HYPONATREMIA: ICD-10-CM

## 2023-01-04 DIAGNOSIS — D64.9 ANEMIA, UNSPECIFIED: ICD-10-CM

## 2023-01-04 DIAGNOSIS — R60.9 EDEMA, UNSPECIFIED: ICD-10-CM

## 2023-01-04 NOTE — PATIENT INSTRUCTIONS
Jamie Valdivia  1940  ORDERS:  - 1/5/23 draw following labs: Lipid panel dx E78.5, CBC dx D64.9, CMP dx hyponatremia, edema   Electronically signed by:   MARICHUY Basilio CNP  01/04/23 11:44 AM    ADDITIONAL ORDERS:  Jamie Valdivia  1940  ORDERS:  - saline laxitive (RA 7-19) 7-19 GM/118ML enema; Use enema per packet instructions, per rectum one time, 1 hour prior to leaving apartment for surgery on 1/11/23 dx elevated PSA  - Please follow pre-op instructions: NPO 8 hours prior to 1:00 pm surgery, okay to have water up to 2 hours before check in-time (11:00 AM)  - Check rapid COVID-19 test on 1/10/23, please take photo of test and send with resident to hospital, notify provider if positive   Electronically signed by:   MARICHUY Basilio CNP  01/06/23 2:11 PM    ADDITIONAL ORDERS:  Jamie Valdivia  1940  ORDERS:  - 1/11/23: Hold Miralax   - Decrease Tylenol to one tablet (500 mg) PO BID and BID PRN dx pain, pre pt request   Electronically signed by:   MARICHUY Basilio CNP  01/08/23 2:35 PM

## 2023-01-05 ENCOUNTER — TRANSFERRED RECORDS (OUTPATIENT)
Dept: HEALTH INFORMATION MANAGEMENT | Facility: CLINIC | Age: 83
End: 2023-01-05

## 2023-01-05 LAB
ALBUMIN SERPL-MCNC: 3.6 G/DL (ref 3.4–5)
ALP SERPL-CCNC: 64 U/L (ref 40–150)
ALT SERPL W P-5'-P-CCNC: 13 U/L (ref 0–70)
ANION GAP SERPL CALCULATED.3IONS-SCNC: 8 MMOL/L (ref 3–14)
AST SERPL W P-5'-P-CCNC: 18 U/L (ref 0–45)
BILIRUB SERPL-MCNC: 0.5 MG/DL (ref 0.2–1.3)
BUN SERPL-MCNC: 15 MG/DL (ref 7–30)
CALCIUM SERPL-MCNC: 8.8 MG/DL (ref 8.5–10.1)
CHLORIDE BLD-SCNC: 102 MMOL/L (ref 94–109)
CHOLEST SERPL-MCNC: 123 MG/DL
CO2 SERPL-SCNC: 27 MMOL/L (ref 20–32)
CREAT SERPL-MCNC: 0.68 MG/DL (ref 0.66–1.25)
ERYTHROCYTE [DISTWIDTH] IN BLOOD BY AUTOMATED COUNT: 14.8 % (ref 10–15)
FASTING STATUS PATIENT QL REPORTED: NORMAL
GFR SERPL CREATININE-BSD FRML MDRD: >90 ML/MIN/1.73M2
GLUCOSE BLD-MCNC: 133 MG/DL (ref 70–99)
HCT VFR BLD AUTO: 36.1 % (ref 40–53)
HDLC SERPL-MCNC: 46 MG/DL
HGB BLD-MCNC: 11.2 G/DL (ref 13.3–17.7)
LDLC SERPL CALC-MCNC: 60 MG/DL
MCH RBC QN AUTO: 28.8 PG (ref 26.5–33)
MCHC RBC AUTO-ENTMCNC: 31 G/DL (ref 31.5–36.5)
MCV RBC AUTO: 93 FL (ref 78–100)
NONHDLC SERPL-MCNC: 77 MG/DL
PLATELET # BLD AUTO: 257 10E3/UL (ref 150–450)
POTASSIUM BLD-SCNC: 4.2 MMOL/L (ref 3.4–5.3)
PROT SERPL-MCNC: 7.6 G/DL (ref 6.8–8.8)
RBC # BLD AUTO: 3.89 10E6/UL (ref 4.4–5.9)
SODIUM SERPL-SCNC: 137 MMOL/L (ref 133–144)
TRIGL SERPL-MCNC: 84 MG/DL
WBC # BLD AUTO: 6.5 10E3/UL (ref 4–11)

## 2023-01-05 PROCEDURE — 36415 COLL VENOUS BLD VENIPUNCTURE: CPT | Mod: ORL | Performed by: NURSE PRACTITIONER

## 2023-01-05 PROCEDURE — 80061 LIPID PANEL: CPT | Mod: ORL | Performed by: NURSE PRACTITIONER

## 2023-01-05 PROCEDURE — 85027 COMPLETE CBC AUTOMATED: CPT | Mod: ORL | Performed by: NURSE PRACTITIONER

## 2023-01-05 PROCEDURE — 80053 COMPREHEN METABOLIC PANEL: CPT | Mod: ORL | Performed by: NURSE PRACTITIONER

## 2023-01-06 ENCOUNTER — PATIENT OUTREACH (OUTPATIENT)
Dept: GERIATRIC MEDICINE | Facility: CLINIC | Age: 83
End: 2023-01-06

## 2023-01-06 RX ORDER — MAGNESIUM HYDROXIDE 1200 MG/15ML
LIQUID ORAL
Qty: 1 ML | Refills: 0 | Status: SHIPPED | OUTPATIENT
Start: 2023-01-11 | End: 2023-01-26

## 2023-01-06 NOTE — PROGRESS NOTES
[facility] Care Coordination Contact    Called adult daughter Alexia to schedule annual HRA home visit. HRA has been scheduled for 1/17/23.     Gisela Hall RN  [facility]  [phone]

## 2023-01-08 VITALS
SYSTOLIC BLOOD PRESSURE: 148 MMHG | DIASTOLIC BLOOD PRESSURE: 72 MMHG | HEART RATE: 67 BPM | BODY MASS INDEX: 31.41 KG/M2 | WEIGHT: 219.4 LBS | RESPIRATION RATE: 18 BRPM | TEMPERATURE: 98.7 F | OXYGEN SATURATION: 96 % | HEIGHT: 70 IN

## 2023-01-08 PROBLEM — F10.20 ALCOHOL DEPENDENCE (H): Status: RESOLVED | Noted: 2022-03-30 | Resolved: 2023-01-08

## 2023-01-08 PROBLEM — F10.21 ALCOHOL DEPENDENCE IN REMISSION (H): Status: ACTIVE | Noted: 2023-01-08

## 2023-01-08 PROBLEM — I70.0 ATHEROSCLEROSIS OF AORTA (H): Status: ACTIVE | Noted: 2023-01-08

## 2023-01-08 PROBLEM — Z86.69 HX OF SEIZURE DISORDER: Status: RESOLVED | Noted: 2022-03-31 | Resolved: 2023-01-08

## 2023-01-08 PROBLEM — I27.20 PULMONARY HYPERTENSION (H): Status: ACTIVE | Noted: 2023-01-08

## 2023-01-08 PROBLEM — R53.1 WEAKNESS: Status: RESOLVED | Noted: 2020-04-27 | Resolved: 2023-01-08

## 2023-01-08 PROBLEM — M54.50 LOW BACK PAIN: Status: RESOLVED | Noted: 2018-12-19 | Resolved: 2023-01-08

## 2023-01-08 PROBLEM — I27.20 PULMONARY HYPERTENSION (H): Status: RESOLVED | Noted: 2023-01-08 | Resolved: 2023-01-08

## 2023-01-08 PROBLEM — F10.10 ALCOHOL ABUSE: Status: RESOLVED | Noted: 2017-11-19 | Resolved: 2023-01-08

## 2023-01-08 RX ORDER — ACETAMINOPHEN 500 MG
TABLET ORAL
Qty: 60 TABLET | Refills: 98 | Status: SHIPPED | OUTPATIENT
Start: 2023-01-08 | End: 2023-06-19

## 2023-01-10 NOTE — OR NURSING
Dr Larry office notified of noone postop, pt will be outpatient in bed. Family + [facility] notified of staying overnight

## 2023-01-10 NOTE — OR NURSING
preop call with nurse at [facility], hospitals pt will have only CNA care of 14 to 1 after 1630 day of surgery

## 2023-01-11 ENCOUNTER — MEDICAL CORRESPONDENCE (OUTPATIENT)
Dept: HEALTH INFORMATION MANAGEMENT | Facility: CLINIC | Age: 83
End: 2023-01-11

## 2023-01-11 ENCOUNTER — HOSPITAL ENCOUNTER (OUTPATIENT)
Facility: CLINIC | Age: 83
Discharge: HOME | End: 2023-01-12
Attending: STUDENT IN AN ORGANIZED HEALTH CARE EDUCATION/TRAINING PROGRAM | Admitting: STUDENT IN AN ORGANIZED HEALTH CARE EDUCATION/TRAINING PROGRAM
Payer: COMMERCIAL

## 2023-01-11 ENCOUNTER — ANESTHESIA (OUTPATIENT)
Dept: SURGERY | Facility: CLINIC | Age: 83
End: 2023-01-11
Payer: COMMERCIAL

## 2023-01-11 ENCOUNTER — ANESTHESIA EVENT (OUTPATIENT)
Dept: SURGERY | Facility: CLINIC | Age: 83
End: 2023-01-11
Payer: COMMERCIAL

## 2023-01-11 DIAGNOSIS — R97.20 ELEVATED PROSTATE SPECIFIC ANTIGEN (PSA): Primary | ICD-10-CM

## 2023-01-11 PROCEDURE — 88305 TISSUE EXAM BY PATHOLOGIST: CPT | Mod: TC | Performed by: STUDENT IN AN ORGANIZED HEALTH CARE EDUCATION/TRAINING PROGRAM

## 2023-01-11 PROCEDURE — 250N000009 HC RX 250: Performed by: NURSE ANESTHETIST, CERTIFIED REGISTERED

## 2023-01-11 PROCEDURE — 370N000017 HC ANESTHESIA TECHNICAL FEE, PER MIN: Performed by: STUDENT IN AN ORGANIZED HEALTH CARE EDUCATION/TRAINING PROGRAM

## 2023-01-11 PROCEDURE — 272N000001 HC OR GENERAL SUPPLY STERILE: Performed by: STUDENT IN AN ORGANIZED HEALTH CARE EDUCATION/TRAINING PROGRAM

## 2023-01-11 PROCEDURE — 258N000003 HC RX IP 258 OP 636: Performed by: ANESTHESIOLOGY

## 2023-01-11 PROCEDURE — 250N000011 HC RX IP 250 OP 636: Performed by: NURSE ANESTHETIST, CERTIFIED REGISTERED

## 2023-01-11 PROCEDURE — 250N000025 HC SEVOFLURANE, PER MIN: Performed by: STUDENT IN AN ORGANIZED HEALTH CARE EDUCATION/TRAINING PROGRAM

## 2023-01-11 PROCEDURE — 360N000075 HC SURGERY LEVEL 2, PER MIN: Performed by: STUDENT IN AN ORGANIZED HEALTH CARE EDUCATION/TRAINING PROGRAM

## 2023-01-11 PROCEDURE — 55700 PR BIOPSY OF PROSTATE,NEEDLE/PUNCH: CPT | Performed by: STUDENT IN AN ORGANIZED HEALTH CARE EDUCATION/TRAINING PROGRAM

## 2023-01-11 PROCEDURE — 999N000141 HC STATISTIC PRE-PROCEDURE NURSING ASSESSMENT: Performed by: STUDENT IN AN ORGANIZED HEALTH CARE EDUCATION/TRAINING PROGRAM

## 2023-01-11 PROCEDURE — 250N000011 HC RX IP 250 OP 636: Performed by: STUDENT IN AN ORGANIZED HEALTH CARE EDUCATION/TRAINING PROGRAM

## 2023-01-11 PROCEDURE — 258N000003 HC RX IP 258 OP 636: Performed by: NURSE ANESTHETIST, CERTIFIED REGISTERED

## 2023-01-11 PROCEDURE — 76942 ECHO GUIDE FOR BIOPSY: CPT | Performed by: STUDENT IN AN ORGANIZED HEALTH CARE EDUCATION/TRAINING PROGRAM

## 2023-01-11 PROCEDURE — 710N000009 HC RECOVERY PHASE 1, LEVEL 1, PER MIN: Performed by: STUDENT IN AN ORGANIZED HEALTH CARE EDUCATION/TRAINING PROGRAM

## 2023-01-11 PROCEDURE — 258N000003 HC RX IP 258 OP 636: Performed by: STUDENT IN AN ORGANIZED HEALTH CARE EDUCATION/TRAINING PROGRAM

## 2023-01-11 RX ORDER — FENTANYL CITRATE 50 UG/ML
INJECTION, SOLUTION INTRAMUSCULAR; INTRAVENOUS PRN
Status: DISCONTINUED | OUTPATIENT
Start: 2023-01-11 | End: 2023-01-11

## 2023-01-11 RX ORDER — LIDOCAINE HYDROCHLORIDE 20 MG/ML
INJECTION, SOLUTION INFILTRATION; PERINEURAL PRN
Status: DISCONTINUED | OUTPATIENT
Start: 2023-01-11 | End: 2023-01-11

## 2023-01-11 RX ORDER — ACETAMINOPHEN 325 MG/1
650 TABLET ORAL EVERY 6 HOURS PRN
Status: DISCONTINUED | OUTPATIENT
Start: 2023-01-11 | End: 2023-01-12 | Stop reason: HOSPADM

## 2023-01-11 RX ORDER — PROPOFOL 10 MG/ML
INJECTION, EMULSION INTRAVENOUS PRN
Status: DISCONTINUED | OUTPATIENT
Start: 2023-01-11 | End: 2023-01-11

## 2023-01-11 RX ORDER — ONDANSETRON 4 MG/1
4 TABLET, ORALLY DISINTEGRATING ORAL EVERY 6 HOURS PRN
Status: DISCONTINUED | OUTPATIENT
Start: 2023-01-11 | End: 2023-01-12 | Stop reason: HOSPADM

## 2023-01-11 RX ORDER — ACETAMINOPHEN 325 MG/1
650 TABLET ORAL EVERY 4 HOURS PRN
Qty: 100 TABLET | Refills: 0 | Status: SHIPPED | OUTPATIENT
Start: 2023-01-11 | End: 2023-01-12

## 2023-01-11 RX ORDER — DEXAMETHASONE SODIUM PHOSPHATE 4 MG/ML
INJECTION, SOLUTION INTRA-ARTICULAR; INTRALESIONAL; INTRAMUSCULAR; INTRAVENOUS; SOFT TISSUE PRN
Status: DISCONTINUED | OUTPATIENT
Start: 2023-01-11 | End: 2023-01-11

## 2023-01-11 RX ORDER — LIDOCAINE 40 MG/G
CREAM TOPICAL
Status: DISCONTINUED | OUTPATIENT
Start: 2023-01-11 | End: 2023-01-12 | Stop reason: HOSPADM

## 2023-01-11 RX ORDER — CEFAZOLIN SODIUM/WATER 2 G/20 ML
2 SYRINGE (ML) INTRAVENOUS
Status: DISCONTINUED | OUTPATIENT
Start: 2023-01-11 | End: 2023-01-11 | Stop reason: HOSPADM

## 2023-01-11 RX ORDER — CEFAZOLIN SODIUM/WATER 2 G/20 ML
2 SYRINGE (ML) INTRAVENOUS SEE ADMIN INSTRUCTIONS
Status: DISCONTINUED | OUTPATIENT
Start: 2023-01-11 | End: 2023-01-11 | Stop reason: HOSPADM

## 2023-01-11 RX ORDER — SODIUM CHLORIDE, SODIUM LACTATE, POTASSIUM CHLORIDE, CALCIUM CHLORIDE 600; 310; 30; 20 MG/100ML; MG/100ML; MG/100ML; MG/100ML
INJECTION, SOLUTION INTRAVENOUS CONTINUOUS
Status: DISCONTINUED | OUTPATIENT
Start: 2023-01-11 | End: 2023-01-11 | Stop reason: HOSPADM

## 2023-01-11 RX ORDER — ONDANSETRON 2 MG/ML
4 INJECTION INTRAMUSCULAR; INTRAVENOUS EVERY 6 HOURS PRN
Status: DISCONTINUED | OUTPATIENT
Start: 2023-01-11 | End: 2023-01-12 | Stop reason: HOSPADM

## 2023-01-11 RX ORDER — ONDANSETRON 2 MG/ML
INJECTION INTRAMUSCULAR; INTRAVENOUS PRN
Status: DISCONTINUED | OUTPATIENT
Start: 2023-01-11 | End: 2023-01-11

## 2023-01-11 RX ADMIN — LIDOCAINE HYDROCHLORIDE 100 MG: 20 INJECTION, SOLUTION INFILTRATION; PERINEURAL at 13:03

## 2023-01-11 RX ADMIN — Medication 2 G: at 12:56

## 2023-01-11 RX ADMIN — FENTANYL CITRATE 50 MCG: 50 INJECTION, SOLUTION INTRAMUSCULAR; INTRAVENOUS at 13:03

## 2023-01-11 RX ADMIN — ONDANSETRON 4 MG: 2 INJECTION INTRAMUSCULAR; INTRAVENOUS at 13:13

## 2023-01-11 RX ADMIN — SODIUM CHLORIDE, POTASSIUM CHLORIDE, SODIUM LACTATE AND CALCIUM CHLORIDE: 600; 310; 30; 20 INJECTION, SOLUTION INTRAVENOUS at 12:56

## 2023-01-11 RX ADMIN — DEXAMETHASONE SODIUM PHOSPHATE 4 MG: 4 INJECTION, SOLUTION INTRA-ARTICULAR; INTRALESIONAL; INTRAMUSCULAR; INTRAVENOUS; SOFT TISSUE at 13:13

## 2023-01-11 RX ADMIN — GENTAMICIN SULFATE 340 MG: 40 INJECTION, SOLUTION INTRAMUSCULAR; INTRAVENOUS at 13:47

## 2023-01-11 RX ADMIN — SUCCINYLCHOLINE CHLORIDE 20 MG: 20 INJECTION, SOLUTION INTRAMUSCULAR; INTRAVENOUS; PARENTERAL at 13:03

## 2023-01-11 RX ADMIN — PROPOFOL 200 MG: 10 INJECTION, EMULSION INTRAVENOUS at 13:03

## 2023-01-11 RX ADMIN — PHENYLEPHRINE HYDROCHLORIDE 100 MCG: 10 INJECTION INTRAVENOUS at 13:13

## 2023-01-11 ASSESSMENT — ACTIVITIES OF DAILY LIVING (ADL)
ADLS_ACUITY_SCORE: 46
ADLS_ACUITY_SCORE: 46
ADLS_ACUITY_SCORE: 35
ADLS_ACUITY_SCORE: 50
ADLS_ACUITY_SCORE: 35
ADLS_ACUITY_SCORE: 50
ADLS_ACUITY_SCORE: 33

## 2023-01-11 ASSESSMENT — ENCOUNTER SYMPTOMS
DYSRHYTHMIAS: 1
SEIZURES: 1

## 2023-01-11 NOTE — ANESTHESIA POSTPROCEDURE EVALUATION
Patient: Jamie Valdivia    Procedure: Procedure(s):  PROSTATE BIOPSY, RECTAL APPROACH  TRANSRECTAL ULTRASOUND       Anesthesia Type:  General    Note:  Disposition: Admission   Postop Pain Control: Uneventful            Sign Out: Well controlled pain   PONV: No   Neuro/Psych: Uneventful            Sign Out: Acceptable/Baseline neuro status   Airway/Respiratory: Uneventful            Sign Out: Acceptable/Baseline resp. status   CV/Hemodynamics: Uneventful            Sign Out: Acceptable CV status; No obvious hypovolemia; No obvious fluid overload   Other NRE: NONE   DID A NON-ROUTINE EVENT OCCUR? No           Last vitals:  Vitals Value Taken Time   /66 01/11/23 1515   Temp 36.5  C (97.7  F) 01/11/23 1500   Pulse 64 01/11/23 1530   Resp 20 01/11/23 1530   SpO2 98 % 01/11/23 1528   Vitals shown include unvalidated device data.    Electronically Signed By: Adrienne Cooper MD  January 11, 2023  5:20 PM

## 2023-01-11 NOTE — OR NURSING
Updated IRENE Izquierdo regarding pt in Afib CVR.  Reports baseline for pt. No new orders. Will continue to monitor.

## 2023-01-11 NOTE — OP NOTE
[facility]  Operative Note    Pre-operative diagnosis: Elevated prostate specific antigen (PSA) [R97.20]  Prostate nodule [N40.2]   Post-operative diagnosis Elevated prostate specific antigen (PSA) [R97.20]  Prostate nodule [N40.2]   Procedure: Procedure(s):  Transrectal ultrasound sizing of prostate and   Transrectal ultrasound guided random prostate biopsy   Surgeon: Niraj Larry MD   Assistants(s): none   Anesthesia: General    Estimated blood loss: Minimal    Total IV fluids: (See anesthesia record)   Blood transfusion: No transfusion was given during surgery   Total urine output: Not measured   Drains: None   Specimens: ID Type Source Tests Collected by Time Destination   1 : Left Lateral Base Biopsy Prostate SURGICAL PATHOLOGY EXAM Niraj Larry MD 1/11/2023 12:52 PM    2 : Left Base Biopsy Prostate SURGICAL PATHOLOGY EXAM Niraj Larry MD 1/11/2023 12:52 PM    3 : Left Lateral Mid Biopsy Prostate SURGICAL PATHOLOGY EXAM Niraj Larry MD 1/11/2023 12:52 PM    4 : Left Mid Biopsy Prostate SURGICAL PATHOLOGY EXAM Niraj Larry MD 1/11/2023 12:52 PM    5 : Left Lateral [place] Biopsy Prostate SURGICAL PATHOLOGY EXAM Niraj Larry MD 1/11/2023 12:52 PM    6 : Left  [place] Biopsy Prostate SURGICAL PATHOLOGY EXAM Niraj Larry MD 1/11/2023 12:52 PM    7 : Right Lateral Base Biopsy Prostate SURGICAL PATHOLOGY EXAM Niraj Larry MD 1/11/2023 12:52 PM    8 : Right Base Biopsy Prostate SURGICAL PATHOLOGY EXAM Niraj Larry MD 1/11/2023 12:52 PM    9 : Lateral Mid Biopsy Prostate SURGICAL PATHOLOGY EXAM Niraj Larry MD 1/11/2023 12:52 PM    10 : Right Mid Biopsy Prostate SURGICAL PATHOLOGY EXAM Niraj Larry MD 1/11/2023 12:52 PM    11 : Right Lateral [place] Biopsy Prostate SURGICAL PATHOLOGY EXAM Niraj Larry MD 1/11/2023 12:52 PM    12 :  Biopsy Prostate SURGICAL PATHOLOGY EXAM Niraj Larry MD 1/11/2023 12:52 PM         Implants: None      Findings:   30 ml prostate on ultrasound, firm right mid prostate nodule   Complications: None.   Condition: Stable               Description of procedure:  81 yo M with elevated PSA, prostate nodule, here for prostate biopsy under anesthesia. After discussion with patient and daughter, the patient declines attempt at office prostate biopsy under local anesthetic and instead requested general anesthesia due to anxiety. Discussed risks of prostate biopsy including bleeding (hematuria, hematochezia, hematospermia), infection (urinary tract infection, 5% risk of sepsis), pain, false negative. Also discussed additional anesthetic risks including possible aspiration. Informed consent obtained    The procedure, the outcome, the anesthesia, and the risks were discussed with the patient. Informed consent was obtained and signed. The patient was brought to OR#19 and general endotracheal anesthesia was induced. He was placed in left side down position with knees up towards the chest and all pressure points were padded. Antibiotics were given and a timeout was completed prior to the procedure. Digital rectal examination was performed confirming right mid prostate nodule. The transrectal ultrasound probe was inserted and sizing was performed. The prostate was somewhat heterogeneous in appearance but no obvious large hypoechoic lesions were seen. A systematic 12 core biopsy was then performed with a [employer] Maxcore biopsy gun, with biopsies taken at the medial and lateral apex, mid, and base of the prostate bilaterally. The patient was then placed back into supine position, awoken from anesthesia and brought to PACU in stable condition. He will remain admitted overnight on observation. He will return in 1 week for discussion of pathology results    Niraj Larry MD   [facility]  [phone] clinic phone

## 2023-01-11 NOTE — PROGRESS NOTES
PTA medications completed by Medication Scribe day of surgery     Medication history sources: Surescripts, H&P and MAR ([facility])  In the past week, patient estimated taking medication this percent of the time: Greater than 90%  Adherence assessment: N/A Not Observed    Significant changes made to the medication list:  None      Additional medication history information:   None    Medication reconciliation completed by provider prior to medication history? No    Time spent in this activity: 60 minutes    The information provided in this note is only as accurate as the sources available at the time of update(s)      Prior to Admission medications    Medication Sig Last Dose Taking? Auth Provider Long Term End Date   acetaminophen (TYLENOL) 500 MG tablet Take 1 tablet (500 mg) by mouth 2 times daily. May also take 1 tablet (500 mg) 2 times daily as needed for mild pain. 1/11/2023 at AM Yes Sylvia Stein APRN CNP     alum & mag hydroxide-simethicone (MAALOX MAX) 400-400-40 MG/5ML SUSP suspension Take 30 mLs by mouth every 6 hours as needed for indigestion 11/4/2022 at AM Yes Sylvia Stein APRN CNP     DEEP SEA NASAL SPRAY 0.65 % nasal spray INHALE 2 SPRAYS IN EACH NOSTRIL ONCE DAILY 1/10/2023 at AM Yes Jerrica Rome APRN CNP     gabapentin (NEURONTIN) 100 MG capsule TAKE TWO CAPSULES (200MG) BY MOUTH EVERY NIGHT AT BEDTIME 1/10/2023 at HS Yes Kaley Starr APRN CNP Yes    guaiFENesin (ROBITUSSIN) 100 MG/5ML liquid GIVE 10ML (200 MG) BY MOUTH TWICE DAILY;AND TWICE DAILY AS NEEDED FOR COUGH 1/10/2023 at PM Yes Sylvia Stein APRN CNP     menthol-zinc oxide (CALMOSEPTINE) 0.44-20.6 % OINT ointment Apply 1 g topically 2 times daily. May also apply 1 g 2 times daily as needed for skin protection. 1/11/2023 at AM Yes Sylvia Stein APRN CNP     mirtazapine (REMERON) 7.5 MG tablet Take 1 tablet (7.5 mg) by mouth At Bedtime 1/10/2023 at HS Yes Sylvia Stein  MARICHUY Bautista CNP Yes    nystatin (MYCOSTATIN) 473889 UNIT/GM external powder Apply topically 2 times daily Unknown at PRN Yes Reported, Patient     omeprazole (PRILOSEC) 40 MG DR capsule TAKE 1 CAPSULE BY MOUTH TWICE DAILY 1/11/2023 at AM Yes Sylvia Stein APRN CNP     polyethylene glycol (MIRALAX) 17 GM/Dose powder MIX 2 CAPFULS IN 8OZ OF ORANGE JUICE  IN THE MORNING;AND MAY MIX 2 CAPFULS ONCE DAILY AS NEEDED FOR CONSTIPATION. MIX IN FRONT OF PT. HOLD FOR LOOSE STOOL. OK TO GIVE 1 CAPFUL IF RESIDENT REFUSES 2 CAPFULS. 1/10/2023 at AM Yes Sylvia Stein APRN CNP     polyvinyl alcohol-povidone PF (REFRESH) 1.4-0.6 % ophthalmic solution 1 drop every 4 hours as needed for irritation Unknown at PRN Yes Reported, Patient     QUEtiapine (SEROQUEL) 25 MG tablet Take 0.5 tablets (12.5 mg) by mouth 2 times daily. May also take 0.5 tablets (12.5 mg) every 12 hours as needed (anxiety). 1/11/2023 at AM Yes Tabatha Quintana MD Yes    saline laxitive (RA 7-19) 7-19 GM/118ML enema Use enema per packet instructions, per rectum one time, 1 hour prior to leaving apartment for surgery on 1/11/23 1/11/2023 at AM Yes Sylvia Stein APRN CNP     calcium carbonate 750 MG CHEW Take 750 mg by mouth as needed (Self-administers) PRN  Reported, Patient

## 2023-01-11 NOTE — ANESTHESIA PREPROCEDURE EVALUATION
Anesthesia Pre-Procedure Evaluation    Patient: Jamie Valdivia   MRN: [id number] : 1940        Procedure : Procedure(s):  PROSTATE BIOPSY, RECTAL APPROACH  TRANSRECTAL ULTRASOUND          Past Medical History:   Diagnosis Date     Age-related osteoporosis without current pathological fracture 2022     Alcohol abuse 2017     Alcohol dependence with withdrawal (H)      Alcoholism (H)      Back pain      Backache 2018     CAD (coronary artery disease)      Chronic a-fib (H)      Chronic alcohol use 2015    Formatting of this note might be different from the original. 2019: serious fall at home with multiple vertebral fractures.  BAL 0.414% on admission.  Denies that he drinks much. 2018: hospitalized for fall due to alcohol intoxication.  BAL 0.34% on admission. 2018: hospitalized for injuries from fall due to alcohol intoxication.  BAL 0.34% on admission     Chronic atrial fibrillation (H) 07/15/2016    Formatting of this note might be different from the original. 2019: Multiple falls so started on aspirin only.  Then aspirin stopped due to GI bleed.     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 21     HTN (hypertension)      Impaired gait and mobility 2019     Mild cognitive impairment 2019    Formatting of this note might be different from the original. NON-AMNESTIC TYPE     Mild TBI (traumatic brain injury) 2019     Mumps      Obesity (BMI 30-39.9) 2015     Osteoarthritis      Osteoarthritis of  both knees 05/13/2015     Osteoporosis      Other idiopathic peripheral autonomic neuropathy 03/30/2022     Other insomnia 03/14/2019     Other seizures (H) 03/30/2022     Palpitations      Peripheral neuropathy      Pharyngeal dysphagia 05/13/2015    Formatting of this note might be different from the original. Likely secondary to GERD with esophagitis, on PPI and H2 blocker with notable improvement, EGD 10/5/15.     Physical deconditioning 03/14/2019     Recurrent major depressive disorder (H) 03/30/2022     Rhabdomyolysis      Thrombocytopenia (H)      Urination disorder      Weakness 04/27/2020      Past Surgical History:   Procedure Laterality Date     ESOPHAGOSCOPY, GASTROSCOPY, DUODENOSCOPY (EGD), COMBINED N/A 06/01/2022    Procedure: ESOPHAGOGASTRODUODENOSCOPY (EGD) mngi with biopsies using [employer] cold biopsy forceps;  Surgeon: David Springer MD;  Location:  GI     left hip replacement  2010     left shoulder replacement  2016     ORTHOPEDIC SURGERY       SPINE SURGERY      done in [place]     TONSILLECTOMY        Allergies   Allergen Reactions     Ativan [Lorazepam]       Social History     Tobacco Use     Smoking status: Never     Smokeless tobacco: Never   Substance Use Topics     Alcohol use: Not Currently     Comment: not since December 2021      Wt Readings from Last 1 Encounters:   01/02/23 99.5 kg (219 lb 6.4 oz)        Anesthesia Evaluation   Pt has had prior anesthetic.     No history of anesthetic complications       ROS/MED HX  ENT/Pulmonary:     (+) SADIQ risk factors,  (-) recent URI   Neurologic: Comment: H/o TBI    (+) dementia, peripheral neuropathy, seizures,  (-) no CVA and no TIA   Cardiovascular: Comment: Echo 2020 Conclusions:    Left Ventricle:   Normal left ventricular systolic function. The ejection fraction is visually estimated to be 65 %.     Left Atrium: Dilated left atrium by visual assessment.     Aorta: There is dilatation of the ascending aorta measuring 41.0 mm.      Pulmonary Artery: Moderate pulmonary artery hypertension.     Right Atrium: Dilated right atrium by visual assessment.     Tricuspid Valve: Mild-to-moderate tricuspid regurgitation.       Comparison Study   Comparison Date: 03/08/2019   Comparison Study: Transthoracic Echocardiogram   There was no significant change from prior echo     H/o  Lower extremity edema    (+) hypertension--CAD ---dysrhythmias, a-fib, valvular problems/murmurs type: MR TR. 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    Abdominal wall hernia    (+) GERD, Symptomatic, hepatitis type Alcoholic, liver disease,     Renal/Genitourinary:    (-) renal disease   Endo:     (+) Obesity,     Psychiatric/Substance Use:     (+) psychiatric history anxiety alcohol abuse     Infectious Disease:    (-) Recent Fever   Malignancy: Comment: Elevated PSA       Other:            Physical Exam    Airway        Mallampati: III   TM distance: > 3 FB   Neck ROM: full   Mouth opening: > 3 cm    Respiratory Devices and Support         Dental       (+) upper dentures      Cardiovascular          Rhythm and rate: regular and normal     Pulmonary           breath sounds clear to auscultation           OUTSIDE LABS:  CBC:   Lab Results   Component Value Date    WBC 6.5 01/05/2023    WBC 6.0 10/19/2022    HGB 11.2 (L) 01/05/2023    HGB 12.3 (L) 10/19/2022    HCT 36.1 (L) 01/05/2023    HCT 38.5 (L) 10/19/2022     01/05/2023     10/19/2022     BMP:   Lab Results   Component Value Date     01/05/2023     12/01/2022    POTASSIUM 4.2 01/05/2023    POTASSIUM 4.4 12/01/2022    CHLORIDE 102 01/05/2023    CHLORIDE 103 12/01/2022    CO2 27 01/05/2023    CO2 29 12/01/2022    BUN 15 01/05/2023    BUN 12 12/01/2022    CR  0.68 01/05/2023    CR 0.59 (L) 12/01/2022     (H) 01/05/2023    GLC 81 12/01/2022     COAGS: No results found for: PTT, INR, FIBR  POC: No results found for: BGM, HCG, HCGS  HEPATIC:   Lab Results   Component Value Date    ALBUMIN 3.6 01/05/2023    PROTTOTAL 7.6 01/05/2023    ALT 13 01/05/2023    AST 18 01/05/2023    ALKPHOS 64 01/05/2023    BILITOTAL 0.5 01/05/2023    PHILLIP 36 10/06/2022     OTHER:   Lab Results   Component Value Date    LACT 0.8 10/01/2022    A1C 5.5 04/28/2022    JOSUE 8.8 01/05/2023    PHOS 3.5 10/04/2022    MAG 2.2 02/28/2022    TSH 1.67 10/06/2022       Anesthesia Plan    ASA Status:  3   NPO Status:  NPO Appropriate    Anesthesia Type: General.     - Airway: ETT   Induction: Intravenous, Propofol.   Maintenance: Balanced.   Techniques and Equipment:     - Airway: Video-Laryngoscope         Consents    Anesthesia Plan(s) and associated risks, benefits, and realistic alternatives discussed. Questions answered and patient/representative(s) expressed understanding.     - Discussed: Risks, Benefits and Alternatives for the PROCEDURE were discussed     - Discussed with:  Patient         Postoperative Care       PONV prophylaxis: Background Propofol Infusion, Ondansetron (or other 5HT-3), Dexamethasone or Solumedrol     Comments:                Adrienne Cooper MD

## 2023-01-11 NOTE — ANESTHESIA CARE TRANSFER NOTE
Patient: Jamie Valdivia    Procedure: Procedure(s):  PROSTATE BIOPSY, RECTAL APPROACH  TRANSRECTAL ULTRASOUND       Diagnosis: Elevated prostate specific antigen (PSA) [R97.20]  Prostate nodule [N40.2]  Diagnosis Additional Information: No value filed.    Anesthesia Type:   General     Note:    Oropharynx: oropharynx clear of all foreign objects  Level of Consciousness: awake  Oxygen Supplementation: face mask    Independent Airway: airway patency satisfactory and stable  Dentition: dentition unchanged  Vital Signs Stable: post-procedure vital signs reviewed and stable  Report to RN Given: handoff report given  Patient transferred to: PACU    Handoff Report: Identifed the Patient, Identified the Reponsible Provider, Reviewed the pertinent medical history, Discussed the surgical course, Reviewed Intra-OP anesthesia mangement and issues during anesthesia, Set expectations for post-procedure period and Allowed opportunity for questions and acknowledgement of understanding      Vitals:  Vitals Value Taken Time   /75 01/11/23 1332   Temp     Pulse 65 01/11/23 1337   Resp 19 01/11/23 1337   SpO2 100 % 01/11/23 1337   Vitals shown include unvalidated device data.    Electronically Signed By: MARICHUY Glass CRNA  January 11, 2023  1:37 PM

## 2023-01-11 NOTE — ANESTHESIA PROCEDURE NOTES
Airway       Patient location during procedure: OR       Procedure Start/Stop Times: 1/11/2023 1:05 PM  Staff -        CRNA: Aye Matos APRN CRNA       Performed By: CRNA  Consent for Airway        Urgency: elective  Indications and Patient Condition       Indications for airway management: eveline-procedural       Induction type:intravenous       Mask difficulty assessment: 1 - vent by mask    Final Airway Details       Final airway type: endotracheal airway       Successful airway: ETT - single  Endotracheal Airway Details        ETT size (mm): 8.0       Cuffed: yes       Cuff volume (mL): 10       Successful intubation technique: video laryngoscopy       VL Blade Size: Glidescope 4       Grade View of Cords: 1       Adjucts: stylet       Position: Right       Measured from: gums/teeth       Secured at (cm): 23       Bite block used: None    Post intubation assessment        Placement verified by: capnometry, equal breath sounds and chest rise        Number of attempts at approach: 1       Secured with: pink tape       Ease of procedure: easy       Dentition: Intact and Unchanged    Medication(s) Administered   Medication Administration Time: 1/11/2023 1:05 PM

## 2023-01-12 ENCOUNTER — PATIENT OUTREACH (OUTPATIENT)
Dept: GERIATRIC MEDICINE | Facility: CLINIC | Age: 83
End: 2023-01-12

## 2023-01-12 VITALS
DIASTOLIC BLOOD PRESSURE: 66 MMHG | BODY MASS INDEX: 37.39 KG/M2 | HEART RATE: 64 BPM | HEIGHT: 67 IN | WEIGHT: 238.2 LBS | TEMPERATURE: 97.3 F | RESPIRATION RATE: 16 BRPM | OXYGEN SATURATION: 95 % | SYSTOLIC BLOOD PRESSURE: 132 MMHG

## 2023-01-12 LAB — GLUCOSE BLDC GLUCOMTR-MCNC: 182 MG/DL (ref 70–99)

## 2023-01-12 PROCEDURE — 250N000013 HC RX MED GY IP 250 OP 250 PS 637: Performed by: STUDENT IN AN ORGANIZED HEALTH CARE EDUCATION/TRAINING PROGRAM

## 2023-01-12 PROCEDURE — 82962 GLUCOSE BLOOD TEST: CPT

## 2023-01-12 RX ADMIN — ACETAMINOPHEN 650 MG: 325 TABLET, FILM COATED ORAL at 00:13

## 2023-01-12 ASSESSMENT — ACTIVITIES OF DAILY LIVING (ADL)
ADLS_ACUITY_SCORE: 46

## 2023-01-12 NOTE — CONSULTS
Care Management Initial Consult    General Information  Assessment completed with: Patient, Care Team Member, -chart review, Dania Quarles RN, Markos Hale  Type of CM/SW Visit: Initial Assessment    Primary Care Provider verified and updated as needed: Yes   Readmission within the last 30 days:        Reason for Consult: discharge planning  Advance Care Planning:            Communication Assessment  Patient's communication style: spoken language (English or Bilingual)          Cognitive  Cognitive/Neuro/Behavioral: WDL        Orientation: oriented x 4             Living Environment:   People in home: facility resident     Current living Arrangements: assisted living  [facility]   Able to return to prior arrangements: yes (pending mobility)       Family/Social Support:  Care provided by: self, other (see comments) ([facility] staff)  Provides care for: no one  Marital Status:   Wife, Children, Facility resident(s)/Staff  Gisela       Description of Support System: Supportive         Current Resources:   Patient receiving home care services: No     Community Resources:    Equipment currently used at home:  4 wheeled walker, incontinence supplies  Supplies currently used at home:      Employment/Financial:  Employment Status: retired        Financial Concerns:   None, on medical assistance now        Lifestyle & Psychosocial Needs:  Social Determinants of Health     Tobacco Use: Low Risk      Smoking Tobacco Use: Never     Smokeless Tobacco Use: Never     Passive Exposure: Not on file   Alcohol Use: Not on file   Financial Resource Strain: Not on file   Food Insecurity: Not on file   Transportation Needs: Not on file   Physical Activity: Not on file   Stress: Not on file   Social Connections: Not on file   Intimate Partner Violence: Not on file   Depression: Not on file   Housing Stability: Not on file       Functional Status:  Prior to admission patient needed assistance:    Dependent ADLs:: Ambulation-walker, Incontinence, Toileting        Mental Health Status:  Mental Health Status: No Current Concerns       Chemical Dependency Status:  Chemical Dependency Status: No Current Concerns             Values/Beliefs:  Spiritual, Cultural Beliefs, Jehovah's witness Practices, Values that affect care: no (Restorationist)               Care Management Discharge Note    Discharge Date: 01/12/2023       Discharge Disposition: Assisted Living    Discharge Services:      Discharge DME: None    Discharge Transportation: family or friend will provide    Private pay costs discussed: Not applicable    PAS Confirmation Code:    Patient/family educated on Medicare website which has current facility and service quality ratings:      Education Provided on the Discharge Plan:    Persons Notified of Discharge Plans: patient, Dania, RN, daughter Alexia -agrees with return to [facility] and [facility].   Patient/Family in Agreement with the Plan: yes    Handoff Referral Completed: Yes    Additional Information:  Initial consult completed with patient, children and RN at facility. Patient demonstrated SBA walking in hallway this morning with his 4 wheeled walker. Per Nursingassistant able to get out of bed with head of bead raised. Patient states he mostly uses recliner at home and it is a lift recliner. Patient states he uses his walker at home, has a pendant to call for help. Discharge orders faxed to RN staff at [facility] at [phone]. Bedside RN updated to call [facility] directly to coordinate ride time but anticipated approximately noonish.      Aye Montgomery, RN   [facility]   Phone [phone]

## 2023-01-12 NOTE — PLAN OF CARE
Admitted from same day surgery at 1550.   Alert and oriented x4. VSS on 2L NC. Denies chest pain and SOB. Denies pain. CMS intact except +3 edema to BLE. BLE elevated in bed. Tolerated full liquid diet for supper. Advanced to low fiber diet. Incontinent of urine, using male external catheter. Rashes to groin area. PIV SL. Plan is discharge back to [facility] tomorrow.

## 2023-01-12 NOTE — PROGRESS NOTES
TRANSITIONS OF CARE (ISAAC) LOG   ISAAC tasks should be completed by the CC within one (1) business day of notification of each transition. Follow up contact with member is required after return to their usual care setting.  Note:  If CC finds out about the transitions fifteen (15) days or more after the member has returned to their usual care setting, no ISAAC log is needed. However, the CC should check in with the member to discuss the transition process, any changes needed to the care plan and document it in a case note.    Member Name:  Jamie Valdivia MCO [facility] Member [facility] [id number]   Product: [facility] Care Coordinator Contact:  Gisela Hall RN, BSN, PHN Agency/County/Care System: [facility]   Transition Communication Actions from Care Management Contact   Transition #1   Notification Date: 1/12/23 Transition Date: 1/11/23 Transition From: Assisted Living, [facility]/ Memory Care     Is this the member s usual care setting?               yes Transition To: [facility]   Transition Type:  Unplanned  Reason for Admission/Comments: Member had a prostate biopsy done which was an outpatient procedure. He was admitted due to no one being able to stay with him at [facility] to monitor him.    Contact member/responsible party to offer assistance with transition Date completed: 1/12/23    Notes from conversation with the member/responsible party, provider, discharging and receiving facility (as applicable): CC contacted Hospital /discharge planner via the [employer] Transitional Care Hand-In Process, with community care plan included.  CC reached out to adult daughter Alexia regarding transition and offered support as needed.  Reviewed and update care plan as needed.  Notified community service providers and placed services Assisted Living on hold as needed.  Transition log initiated.   PCP notified of hospitalization via EMR.       Shared  CC contact info, care plan/services with receiving setting--Date completed: 1/12/23   Name & Title of receiving setting contact: [facility]   Notified PCP of transition--Date completed:  1/11/23     via  EMR  Name of PCP: Sylvia Stein     Transition #2   Notification Date: 1/12/23 Transition Date:   1/12/23 Transition From: Hospital, [facility]     Is this the member s usual care setting?               no Transition To: Assisted Living, [facility]   Transition Type:  Planned  Reason for Admission/Comments: Admitted for monitoring after outpatient procedure.    Contact member/responsible party to offer assistance with transition Date completed: 1/12/23    Notes from conversation with the member/responsible party, provider, discharging and receiving facility (as applicable): Spoke with daughter Alexia regarding admission/discharge. Her brother will be picking him up and bringing him back to [place] today.    CC contacted adult daughter Alexia and reviewed discharge summary.  Member has a follow-up appointment with PCP in 7 days: Yes: scheduled on  Geriatrics will see at [facility]    Member has had a change in condition: No  Home visit needed: No  Care plan reviewed and updated.  The following home based services Assisted Living were resumed.  New referrals placed: No   Transition log completed.   PCP notified of transition back to home via EMR.                *RETURN TO USUAL CARE SETTING: *Complete tasks below when the member is discharging TO their usual care setting within one (1) business day of notification..      For situations where the Care Coordinator is notified of the discharge prior to the date of discharge, the Care Coordinator must follow up with the member or designated representative to confirm that discharge actually occurred and discuss required ISAAC tasks as outlined in the ISAAC Instructions.  (This includes situations where it may  be a  new  usual care setting for the member. (i.e., a community member who decides upon permanent nursing home placement following hospitalization and rehab).    Discuss with Member/Responsible Party:    Check  Yes  - if the member, family member and/or SNF/facility staff manages the following:    If  No  provide explanation in the comments section.          Date completed: 1/12/23 Communicated with member or their designated representative about the following:  care transition process; about changes to the member s health status; plan of care updates; education about transitions and how to prevent unplanned transitions/readmissions    Four Pillars for Optimal Transition:    Check  Yes  - if the member, family member and/or SNF/facility staff manages the following:    If  No  provide explanation in the comments section.          [x]  Yes     []  No Does the member have a follow-up appointment scheduled with primary care or specialist? (Mental health hospitalizations--the appt. should be w/in 7 days)              For mental health hospitalizations:  []  Yes     []  No     Does the member have a follow-up appointment scheduled with a mental health practitioner within 7 days of discharge?  [x]  Yes     []  No     Has a medication review been completed with member? If no, refer to PCP, home care nurse, MTM, pharmacist  [x]  Yes     []  No     Can the member manage their medications or is there a system in place to manage medications (e.g. home care set-up)?         []  Yes     [x]  No     Can the member verbalize warning signs and symptoms to watch for and how to respond?  [x]  Yes     []  No     Does the member have a copy of and understand their discharge instructions?  If no, assist to obtain copy of discharge instructions, review discharge instructions, and assist to contact PCP to discuss questions about their recent hospitalization.  [x]  Yes     []  No     Does the member have adequate food, housing and  transportation?  If no, add goal and discuss additional supports available to the member                                                                                                                                                                                 [x]  Yes     []  No     Is the member safe in their home?  If no, document needs and support provided                                                                                                                                                                          []  Yes     [x]  No     Are there any concerns of vulnerability, abuse, or neglect?  If yes, document concerns and actions taken by Care Coordinator as a mandated                                                                                                                                                                              []  Yes     [x]  No     Does the member use a Personal Health Care Record?  Check  Yes  if visit summary, discharge summary, and/or healthcare summary are being used as a PHR.                                                                                                                                                                                  [x]  Yes     []  No     Have you reviewed the discharge summary with the member? If  No  provide explanation in comments.  [x]  Yes     []  No     Have you updated the member s care plan/support plan? Add new diagnosis, medications, treatments, goals & interventions, as applicable. If No, provide explanation in comments.            Gisela Hall RN  [facility]  [phone]

## 2023-01-12 NOTE — PROGRESS NOTES
"Urology  Progress Note  01/12/2023    - No acute events overnight  - No perineal pain  - Has not yet been OOB    Exam  BP (!) 153/75 (BP Location: Left arm, Patient Position: Semi-Puri's)   Pulse 63   Temp 97.5  F (36.4  C) (Oral)   Resp 16   Ht 1.702 m (5' 7\")   Wt 108 kg (238 lb 3.2 oz)   SpO2 100%   BMI 37.31 kg/m    No acute distress  Unlabored breathing  Abdomen soft, nontender, nondistended.   External catheter with yellow urine in tubing    I/O's (last 24/since midnight):  UOP 1100/NR    Labs  None.    Assessment/Plan  82 year old y/o male POD#1 s/p prostate biopsy, admitted overnight due to ride issues. Doing well.     Neuro: prn Tylenl for pain control  CV: No issues  Pulm: incentive spirometry while awake  FEN/GI: Regular diet, no MIVF  Endo: No issues  : External catheter in place  Heme/ID: Antibiotics complete  Activity: Ad loretta  PPx: SCDs  Dispo: Home today    Will discuss with Dr. Larry.    Palma Lynn MD  PGY-5 Urology  Pager [phone]      "

## 2023-01-12 NOTE — DISCHARGE SUMMARY
POD 1 from a prostate biopsey. A&Ox4 but forgetful. +3 edema in both LE. Bowel sounds normoactive, tolerating low fiber diet. VSS. Up with SBA with walker. Denies pain. MD ordered discharge. Patient dressed himself and son notified for . Belongings gathered. RN reviewed discharge instructions with patient and answered any questions. Wheeled to door 6 to by aide meet his son.

## 2023-01-12 NOTE — PROGRESS NOTES
[facility] Care Coordination Contact    [facility]  Ambulatory Care Coordination to Inpatient Care Management   Hand-In Communication    Date:  January 12, 2023  Name: Jamie Valdivia is enrolled in [facility] Care Coordination program and I am the Lead Care Coordinator.  CC Contact Information:. Gisela Hall RN/[phone]  Payor Source: Payor: [facility] / Plan: [facility] DUAL / Product Type: HMO /   Current services in place:     Please see the CC Snaphot and Care Management Flowsheets for specific  details of this Jamie Valdivia care plan.   Additional details/specific concerns r/t this admission: No concerns    I will follow this admission in Epic. Please feel free to contact me with questions or for further collaboration in discharge planning.    Gisela Hall RN  [facility]  [phone]

## 2023-01-12 NOTE — PLAN OF CARE
Pt A/Ox4. Vitally stable, weaned to 1L NC overnight. Complaint of headache, Tylenol given and pt able to sleep. Incontinent of urine, external male catheter in place overnight. +3 edema BLE with great tenderness to feet. Pt can be very anxious, wake slowly. Tolerating diet. Redness to groin area. Plan to [facility].

## 2023-01-12 NOTE — Clinical Note
I am Jamie Garg's [facility] care coordinator. I will follow this admission. Please reach out if you have any questions/concerns.   Gisela Hall RN [facility] [phone]

## 2023-01-13 LAB
PATH REPORT.COMMENTS IMP SPEC: ABNORMAL
PATH REPORT.COMMENTS IMP SPEC: YES
PATH REPORT.FINAL DX SPEC: ABNORMAL
PATH REPORT.GROSS SPEC: ABNORMAL
PATH REPORT.MICROSCOPIC SPEC OTHER STN: ABNORMAL
PATH REPORT.RELEVANT HX SPEC: ABNORMAL
PHOTO IMAGE: ABNORMAL

## 2023-01-13 PROCEDURE — G0416 PROSTATE BIOPSY, ANY MTHD: HCPCS | Mod: 26 | Performed by: PATHOLOGY

## 2023-01-13 PROCEDURE — 88344 IMHCHEM/IMCYTCHM EA MLT ANTB: CPT | Mod: 26 | Performed by: PATHOLOGY

## 2023-01-17 ENCOUNTER — PATIENT OUTREACH (OUTPATIENT)
Dept: GERIATRIC MEDICINE | Facility: CLINIC | Age: 83
End: 2023-01-17
Payer: COMMERCIAL

## 2023-01-17 ASSESSMENT — ACTIVITIES OF DAILY LIVING (ADL)
DEPENDENT_IADLS:: CLEANING;COOKING;LAUNDRY;SHOPPING;MEAL PREPARATION;MEDICATION MANAGEMENT;MONEY MANAGEMENT;TRANSPORTATION;INCONTINENCE

## 2023-01-17 ASSESSMENT — PATIENT HEALTH QUESTIONNAIRE - PHQ9: SUM OF ALL RESPONSES TO PHQ QUESTIONS 1-9: 2

## 2023-01-17 NOTE — Clinical Note
Sylvia, I am Jamie's care coordinator. I just completed his annual assessment. No concerns at this time. Please let me know if you have questions

## 2023-01-18 ENCOUNTER — OFFICE VISIT (OUTPATIENT)
Dept: UROLOGY | Facility: CLINIC | Age: 83
End: 2023-01-18
Payer: COMMERCIAL

## 2023-01-18 VITALS
WEIGHT: 220 LBS | HEIGHT: 68 IN | OXYGEN SATURATION: 97 % | DIASTOLIC BLOOD PRESSURE: 84 MMHG | BODY MASS INDEX: 33.34 KG/M2 | HEART RATE: 67 BPM | SYSTOLIC BLOOD PRESSURE: 149 MMHG

## 2023-01-18 DIAGNOSIS — C61 PROSTATE CANCER (H): Primary | ICD-10-CM

## 2023-01-18 DIAGNOSIS — F40.240 CLAUSTROPHOBIA: ICD-10-CM

## 2023-01-18 PROCEDURE — 99213 OFFICE O/P EST LOW 20 MIN: CPT | Performed by: STUDENT IN AN ORGANIZED HEALTH CARE EDUCATION/TRAINING PROGRAM

## 2023-01-18 RX ORDER — DIAZEPAM 5 MG
5 TABLET ORAL EVERY 6 HOURS PRN
Qty: 1 TABLET | Refills: 0 | Status: SHIPPED | OUTPATIENT
Start: 2023-01-18 | End: 2023-02-14

## 2023-01-18 ASSESSMENT — PAIN SCALES - GENERAL: PAINLEVEL: NO PAIN (0)

## 2023-01-18 NOTE — NURSING NOTE
Chief Complaint   Patient presents with     Follow Up     Prostate biopsy path results   family in room for the biopsy results .  No pain after biopsy .  Keith Dodd, CMA

## 2023-01-18 NOTE — LETTER
"1/18/2023       RE: [facility]  [address] [place] [id number]     Dear Colleague,    Thank you for referring your patient, Jamie Valdivia, to the [facility] at [facility]. Please see a copy of my visit note below.    CHIEF COMPLAINT   Jamie Valdivia who is a 82 year old male returns today for follow-up of new diagnosis of prostate cancer.      HPI   Jamie Valdivia is a 82 year old male who presents with a history of new diagnosis of Haily 4+3 =7 prostate cancer after TRUS biopsy under anesthesia 1/11/2023    PHYSICAL EXAM  Patient is a 82 year old  male   Vitals: Blood pressure (!) 149/84, pulse 67, height 1.727 m (5' 8\"), weight 99.8 kg (220 lb), SpO2 97 %.  Body mass index is 33.45 kg/m .  General Appearance Adult:   Alert, no acute distress, oriented  HENT: throat/mouth:normal, good dentition  Lungs: no respiratory distress, or pursed lip breathing  Heart: No obvious jugular venous distension present  Abdomen: nondistended  Musculoskeltal: extremities normal, no peripheral edema  Skin: no suspicious lesions or rashes  Neuro: Alert, oriented, speech and mentation normal  Psych: affect and mood normal  Gait: Normal    All pertinent imaging reviewed:    surg path 1/11/2023  A.  Prostate, left lateral base, biopsy   - Benign prostatic tissue. Negative for malignancy     B.  Prostate, left base, biopsy   -Benign prostatic tissue. Negative for malignancy     C.  Prostate, left lateral mid, biopsy   -Benign prostatic tissue. Negative for malignancy     D.  Prostate, left mid, biopsy   - Benign prostatic tissue. Negative for malignancy     E.  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  Prostate, left apex, biopsy   - Benign prostatic tissue. Negative for malignancy      G.  Prostate, right lateral base, biopsy   -Atypical small glands suspicious for malignancy (Please see comment)        H.  Prostate, right base, biopsy   - Benign prostatic tissue. Negative for malignancy      I.  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  Prostate, right mid, biopsy   - Benign prostatic tissue. Negative for malignancy .        K.  Prostate, right lateral apex, biopsy   - Prostatic adenocarcinoma, acinar type, [place]'s grade 4+3 = 7, grade group is 3, number of cores involved is 1 of 1, total surface area involved is 5%, percent of grade 4 is 60%, perineural invasion is identified     L.  Prostate, right apex, biopsy   - Benign prostatic tissue. Negative for malignancy .       ASSESSMENT and PLAN  82 year old male who presents with a history of new diagnosis of [place] 4+3 =7 prostate cancer after TRUS biopsy under anesthesia 1/11/2023 (iPSA 11.3, 3/12 cores)    We had an extensive discussion about the significance of intermediate unfavorable risk clinically localized prostate cancer. We discussed the options for treatment of a localized prostate cancer including active surveillance, brachytherapy, external beam radiation therapy, and surgical removal. We discussed that based on his Haily score he would not be an ideal candidate for active surveillance.       We discussed the relative merits of robotic assisted radical prostatectomy. Given his age      We discussed that there was no clear evidence of advantage between surgery and radiation with regard to risk of recurrence. Regarding radiation, we discussed risks including but not limited to cancer recurrence,  exacerbation of voiding symptoms or hematuria, urinary retention, incontinence, stricture of the urinary tract, induction of second malignancy, and risks of rectal symptoms or bleeding.  We discussed fact that rectal symptoms may be more common with radiation than with other treatment modalities. With radiation therapy, we also discussed the difficulties in diagnosing recurrent disease at an early stage due to variability in PSA levels and the fact that most patients are not candidates for local salvage therapy when biochemical recurrence is declared.  We also discussed the significant morbidity of post-radiation local salvage therapy in terms of perioperative complications, erectile dysfunction and urinary incontinence. With surgery, we also discussed the potential advantage over radiation therapy in that biochemical recurrence can be detected at a relatively earlier stage and that salvage radiotherapy is successful in controlling recurrent disease in a substantial proportion of patients.  We also discussed that salvage radiotherapy was associated with a considerably more favorable morbidity profile compared to local salvage therapies for recurrent disease post-radiation.      We discussed option for further discussion with radiation oncology, and he agrees      The risks, benefits, alternatives, and personnel involved with robotic prostatectomy were discussed in detail. Specifically, we discussed that risks include but are not limited to anesthetic complications including stroke, MI, DVT/PE, as well as risk of bleeding requiring transfusion, bowel injury, infection, lymphocele, ureteral injury, nerve injury, urine leak and other potentially unforseen complications. Also discussed the risk of bladder neck contracture and other urinary symptoms after procedure. In addition, we discussed risk long-term of urinary incontinence and erectile dysfunction following the procedure. Finally, we discussed risk for adverse  pathologic features, including positive surgical margins and potential for post-surgical radiation, hormone ablation and long-term need for PSA monitoring.  All questions were answered in detail.       Given the amount of Haily grade 4 disease on biopsy and the palpable nodule recommend staging imaging    Get CT abd/pelvis w/ contrast and NM bone scan    Needs valium prior to scan due to claustrophobia. He has a listed allergy to lorazepam    Refer to Dr. Fleming in radiation oncology for consideration of definitive therapy    See me after with a PSA        Niraj Larry MD   [facility] Urology  [facility] Phone: [phone]

## 2023-01-18 NOTE — PATIENT INSTRUCTIONS
[place]'s grade 4+3 = 7, grade group is 3    Get Ct and bone scan. Patient has significant claustrophobia and need valium prior  Refer to Dr. Fleming in rad onc  See me after with PSA prior

## 2023-01-18 NOTE — PROGRESS NOTES
"CHIEF COMPLAINT   Jamie Valdivia who is a 82 year old male returns today for follow-up of new diagnosis of prostate cancer.      HPI   Jamie Valdivia is a 82 year old male who presents with a history of new diagnosis of [place] 4+3 =7 prostate cancer after TRUS biopsy under anesthesia 1/11/2023    PHYSICAL EXAM  Patient is a 82 year old  male   Vitals: Blood pressure (!) 149/84, pulse 67, height 1.727 m (5' 8\"), weight 99.8 kg (220 lb), SpO2 97 %.  Body mass index is 33.45 kg/m .  General Appearance Adult:   Alert, no acute distress, oriented  HENT: throat/mouth:normal, good dentition  Lungs: no respiratory distress, or pursed lip breathing  Heart: No obvious jugular venous distension present  Abdomen: nondistended  Musculoskeltal: extremities normal, no peripheral edema  Skin: no suspicious lesions or rashes  Neuro: Alert, oriented, speech and mentation normal  Psych: affect and mood normal  Gait: Normal    All pertinent imaging reviewed:    surg path 1/11/2023  A.  Prostate, left lateral base, biopsy   - Benign prostatic tissue. Negative for malignancy     B.  Prostate, left base, biopsy   -Benign prostatic tissue. Negative for malignancy     C.  Prostate, left lateral mid, biopsy   -Benign prostatic tissue. Negative for malignancy     D.  Prostate, left mid, biopsy   - Benign prostatic tissue. Negative for malignancy     E.  Prostate, left lateral apex, biopsy   -Prostatic adenocarcinoma, acinar type, [place]'s grade 4+3 = 7, grade group is 3, number of cores involved is 1 of 1, total surface area involved is 40 %, percent of grade 4 is 55%, perineural invasion is not identified        F.  Prostate, left apex, biopsy   - Benign prostatic tissue. Negative for malignancy      G.  Prostate, right lateral base, biopsy   -Atypical small glands suspicious for malignancy (Please see comment)        H.  Prostate, right base, biopsy   - Benign prostatic tissue. Negative for malignancy      I.  Prostate, right lateral " mid, biopsy   - Prostatic adenocarcinoma, acinar type, Haily's grade 4+3 = 7, grade group is 3, number of cores involved is 1 of 1, total surface area involved is 10%, percent of grade 4 is 55%, perineural invasion is not identified     J.  Prostate, right mid, biopsy   - Benign prostatic tissue. Negative for malignancy .        K.  Prostate, right lateral apex, biopsy   - Prostatic adenocarcinoma, acinar type, Haily's grade 4+3 = 7, grade group is 3, number of cores involved is 1 of 1, total surface area involved is 5%, percent of grade 4 is 60%, perineural invasion is identified     L.  Prostate, right apex, biopsy   - Benign prostatic tissue. Negative for malignancy .       ASSESSMENT and PLAN  82 year old male who presents with a history of new diagnosis of Haily 4+3 =7 prostate cancer after TRUS biopsy under anesthesia 1/11/2023 (iPSA 11.3, 3/12 cores)    We had an extensive discussion about the significance of intermediate unfavorable risk clinically localized prostate cancer. We discussed the options for treatment of a localized prostate cancer including active surveillance, brachytherapy, external beam radiation therapy, and surgical removal. We discussed that based on his [place] score he would not be an ideal candidate for active surveillance.       We discussed the relative merits of robotic assisted radical prostatectomy. Given his age      We discussed that there was no clear evidence of advantage between surgery and radiation with regard to risk of recurrence. Regarding radiation, we discussed risks including but not limited to cancer recurrence, exacerbation of voiding symptoms or hematuria, urinary retention, incontinence, stricture of the urinary tract, induction of second malignancy, and risks of rectal symptoms or bleeding.  We discussed fact that rectal symptoms may be more common with radiation than with other treatment modalities. With radiation therapy, we also discussed the difficulties  in diagnosing recurrent disease at an early stage due to variability in PSA levels and the fact that most patients are not candidates for local salvage therapy when biochemical recurrence is declared.  We also discussed the significant morbidity of post-radiation local salvage therapy in terms of perioperative complications, erectile dysfunction and urinary incontinence. With surgery, we also discussed the potential advantage over radiation therapy in that biochemical recurrence can be detected at a relatively earlier stage and that salvage radiotherapy is successful in controlling recurrent disease in a substantial proportion of patients.  We also discussed that salvage radiotherapy was associated with a considerably more favorable morbidity profile compared to local salvage therapies for recurrent disease post-radiation.      We discussed option for further discussion with radiation oncology, and he agrees      The risks, benefits, alternatives, and personnel involved with robotic prostatectomy were discussed in detail. Specifically, we discussed that risks include but are not limited to anesthetic complications including stroke, MI, DVT/PE, as well as risk of bleeding requiring transfusion, bowel injury, infection, lymphocele, ureteral injury, nerve injury, urine leak and other potentially unforseen complications. Also discussed the risk of bladder neck contracture and other urinary symptoms after procedure. In addition, we discussed risk long-term of urinary incontinence and erectile dysfunction following the procedure. Finally, we discussed risk for adverse pathologic features, including positive surgical margins and potential for post-surgical radiation, hormone ablation and long-term need for PSA monitoring.  All questions were answered in detail.       Given the amount of [place] grade 4 disease on biopsy and the palpable nodule recommend staging imaging    Get CT abd/pelvis w/ contrast and NM bone  scan    Needs valium prior to scan due to claustrophobia. He has a listed allergy to lorazepam    Refer to Dr. Fleming in radiation oncology for consideration of definitive therapy    See me after with a PSA        Niraj Larry MD   [facility] Urology  [facility] Phone: [phone]

## 2023-01-21 NOTE — PROGRESS NOTES
[facility] Care Coordination Contact    [facility] Home Visit Assessment     Home visit for Health Risk Assessment with Jamie Valdivia completed on January 17, 2023    Type of residence:: Assisted living  Current living arrangement:: I live in assisted living     Assessment completed with:: Patient    Current Care Plan  Member currently receiving the following home care services: None  Member currently receiving the following community resources: None    Medication Review  Medication reconciliation completed in Epic: Yes  Medication set-up & administration: RN set up AL manages and administers.  Assisting Living staff administers medications.  Medication Risk Assessment Medication (1 or more, place referral to MTM): Recent falls within past year  MTM Referral Placed: No: Member is already followed by MTM. Will follow up with current MTM.    Mental/Behavioral Health   Depression Screening:      PHQ-9 Total Score: 2    Mental health DX:: Yes   Mental health DX how managed:: Medication    Falls Assessment:   Fallen 2 or more times in the past year?: Yes   Any fall with injury in the past year?: Yes    ADL/IADL Dependencies:   Dependent ADLs:: Ambulation-walker, Dressing, Grooming, Incontinence, Toileting  Dependent IADLs:: Cleaning, Cooking, Laundry, Shopping, Meal Preparation, Medication Management, Money Management, Transportation, Incontinence    [facility] sponsored benefits: Shared information re: Silver Sneakers/gym memberships, ASA, Calcium +D.    PCA Assessment completed at visit: Not Applicable     Elderly Waiver Eligibility: Yes-will continue on EW    Care Plan & Recommendations: Jamie will continue to receive formal AL services. Family will continue to help with managing outside medical appointments, finances, and shopping.      See [facility]CC for detailed assessment information.    Follow-Up Plan: Member informed of future contact, plan to f/u with member with a 6 month telephone  assessment.  Contact information shared with member and family, encouraged member to call with any questions or concerns at any time.    [place] care continuum providers: Please see Snapshot and Care Management Flowsheets for Specific details of care plan.    This CC note routed to PCP.    Gisela Hall RN  [facility]  [phone]

## 2023-01-24 ENCOUNTER — TRANSFERRED RECORDS (OUTPATIENT)
Dept: HEALTH INFORMATION MANAGEMENT | Facility: CLINIC | Age: 83
End: 2023-01-24
Payer: COMMERCIAL

## 2023-01-26 ENCOUNTER — ASSISTED LIVING VISIT (OUTPATIENT)
Dept: GERIATRICS | Facility: CLINIC | Age: 83
End: 2023-01-26
Payer: COMMERCIAL

## 2023-01-26 ENCOUNTER — OFFICE VISIT (OUTPATIENT)
Dept: PHARMACY | Facility: CLINIC | Age: 83
End: 2023-01-26
Payer: COMMERCIAL

## 2023-01-26 VITALS — WEIGHT: 242.8 LBS | OXYGEN SATURATION: 97 % | BODY MASS INDEX: 36.8 KG/M2 | HEIGHT: 68 IN | TEMPERATURE: 98 F

## 2023-01-26 DIAGNOSIS — K22.70 BARRETT'S ESOPHAGUS WITHOUT DYSPLASIA: ICD-10-CM

## 2023-01-26 DIAGNOSIS — F10.10 ALCOHOL ABUSE: ICD-10-CM

## 2023-01-26 DIAGNOSIS — F43.23 ADJUSTMENT REACTION WITH ANXIETY AND DEPRESSION: ICD-10-CM

## 2023-01-26 DIAGNOSIS — K21.00 GASTROESOPHAGEAL REFLUX DISEASE WITH ESOPHAGITIS, UNSPECIFIED WHETHER HEMORRHAGE: ICD-10-CM

## 2023-01-26 DIAGNOSIS — F10.27 DEMENTIA ASSOCIATED WITH ALCOHOLISM WITH BEHAVIORAL DISTURBANCE (H): ICD-10-CM

## 2023-01-26 DIAGNOSIS — F41.8 DEPRESSION WITH ANXIETY: Primary | ICD-10-CM

## 2023-01-26 DIAGNOSIS — K59.01 SLOW TRANSIT CONSTIPATION: ICD-10-CM

## 2023-01-26 DIAGNOSIS — Z79.899 POLYPHARMACY: ICD-10-CM

## 2023-01-26 DIAGNOSIS — C61 PROSTATE CANCER (H): Primary | ICD-10-CM

## 2023-01-26 DIAGNOSIS — N52.9 ERECTILE DYSFUNCTION, UNSPECIFIED ERECTILE DYSFUNCTION TYPE: ICD-10-CM

## 2023-01-26 DIAGNOSIS — R52 PAIN: ICD-10-CM

## 2023-01-26 DIAGNOSIS — R41.89 COGNITIVE IMPAIRMENT: ICD-10-CM

## 2023-01-26 DIAGNOSIS — F41.1 GAD (GENERALIZED ANXIETY DISORDER): ICD-10-CM

## 2023-01-26 DIAGNOSIS — R05.9 COUGH, UNSPECIFIED TYPE: ICD-10-CM

## 2023-01-26 DIAGNOSIS — R10.13 ABDOMINAL PAIN, EPIGASTRIC: ICD-10-CM

## 2023-01-26 DIAGNOSIS — G62.9 NEUROPATHY: ICD-10-CM

## 2023-01-26 DIAGNOSIS — F10.21 ALCOHOL DEPENDENCE IN REMISSION (H): ICD-10-CM

## 2023-01-26 DIAGNOSIS — G47.01 INSOMNIA DUE TO MEDICAL CONDITION: ICD-10-CM

## 2023-01-26 PROCEDURE — 99417 PROLNG OP E/M EACH 15 MIN: CPT | Performed by: NURSE PRACTITIONER

## 2023-01-26 PROCEDURE — 99605 MTMS BY PHARM NP 15 MIN: CPT | Performed by: PHARMACIST

## 2023-01-26 PROCEDURE — 99350 HOME/RES VST EST HIGH MDM 60: CPT | Performed by: NURSE PRACTITIONER

## 2023-01-26 PROCEDURE — 99607 MTMS BY PHARM ADDL 15 MIN: CPT | Performed by: PHARMACIST

## 2023-01-26 RX ORDER — GABAPENTIN 100 MG/1
100 CAPSULE ORAL 2 TIMES DAILY PRN
Qty: 30 CAPSULE | Refills: 98 | Status: SHIPPED | OUTPATIENT
Start: 2023-01-26 | End: 2023-03-21

## 2023-01-26 RX ORDER — MIRTAZAPINE 15 MG/1
15 TABLET, FILM COATED ORAL AT BEDTIME
Qty: 30 TABLET | Refills: 98 | Status: SHIPPED | OUTPATIENT
Start: 2023-01-26 | End: 2024-02-06

## 2023-01-26 NOTE — LETTER
January 27, 2023  [facility]  [address]  [facility] [address]    Dear  Ren[place], [facility]     Thank you for talking with me on Jan 26, 2023 about your health and medications. As a follow-up to our conversation, I have included two documents:      1. Your Recommended To-Do List has steps you should take to get the best results from your medications.  2. Your Medication List will help you keep track of your medications and how to take them.    If you want to talk about these documents, please call Sindy Hanley RPH at phone: [phone], Monday-Friday 8-4:30pm.    I look forward to working with you and your doctors to make sure your medications work well for you.    Sincerely,  Sindy Hanley RPH  [facility] Pharmacist, [facility]

## 2023-01-26 NOTE — Clinical Note
Jose NP made aware of blood pressure during levo pause. Jose NP states to restart levo at fixed 0.06mcg/kg/min. Jose NP states will discuss with P[facility] LEVY and reconsult ICU due to inability of medicine unit to accommodate levo drip Thanks, Sylvia!!

## 2023-01-26 NOTE — LETTER
"Recommended To-Do List      Prepared on: Jan 26, 2023       You can get the best results from your medications by completing the items on this \"To-Do List.\"      Bring your To-Do List when you go to your doctor. And, share it with your family or caregivers.    My To-Do List:  What we talked about: What I should do:   An issue with your medication    Decrease your dosage of polyethylene glycol (MIRALAX)          What we talked about: What I should do:   Your medication dosage being too low    Increase how often you take gabapentin (NEURONTIN) - to 200mg at bedtime and 100mg twice daily as needed          What we talked about: What I should do:   Your medication dosage being too low    Increase your dosage of mirtazapine (REMERON) - to 15mg at bedtime          What we talked about: What I should do:   An issue with your medication    Stop taking aspirin          What we talked about: What I should do:                       "

## 2023-01-26 NOTE — LETTER
"    1/26/2023        RE: Jamie Valdivia  [facility]  [address]  [facility] [address]        [facility] GERIATRICS    Chief Complaint   Patient presents with     RECHECK     Prostate cancer     HPI:  Jamie Valdivia is a 82 year old  (1940) PMH significant for GERD with esophagitis, OA BL knees, pharyngeal dysphagia, edema, abdominal wall hernia, chronic atrial fibrillation, HTN, osteoporosis with hx of vertebral fracture, dementia, anemia, hx of COVID-19, who is being seen today for an episodic care visit at: [facility] ([facility]) [61].     Today's concern is:   Follow-up today due to new diagnosis of prostate cancer, mood, and medication questions.    Joint visit with PharmD Sindy Hanley.    Discussed prostate cancer diagnosis.  \"I'm surprised by anything anymore.\"  Considering radiation.  CT scan and bone scan for staging next week.     Family reports wife bringing in cough medication and aspirin.  Taking guaifenesin and with episodes of chest pain.  \"I'm opposed to the way cough medicine is administered.\"  Having trouble getting cough syrup at night as needed.  Sleeping in the chair.  + dry cough.   Feels he needs\" two drops\" of cough medication \"on my tongue\" and the cough goes away.   Has not considered just drinking water.    Nasal voice quality.  Hoarse voice.  Unsure when this started.   No fever/chills.  No SOB.  No TAVERAS.    + chest pain with stress - chronic for many years.  No chest pain with activity.   Taking ASA or cough medication with chest pain.  \"I used to take a shot of whiskey and that would take care of it.\"    Some loose stools.  Staff bringing two caps of Miralax when he would prefer one.    Concerned about being on locked memory care unit, feels like he is in detention.  Feels memory is fine. Recalls details from last visit.  Not sleeping well. Calling wife in middle of the night.  Low mood and anxiety.  Family arranged volunteer to come visit " "weekly and take him outside with nicer weather.    Interested in sildenafil for ED, but does not want staff or family to know about medication.  Has not mentioned to urologist.    Allergies, and PMH/PSH reviewed in [facility] today.    MED REC REQUIRED  Post Medication Reconciliation Status:  Patient was not discharged from an inpatient facility or TCU but medications were reconciled per facility EMR.    REVIEW OF SYSTEMS:  4 point ROS including Respiratory, CV, GI and , other than that noted in the HPI,  is negative.    Objective:   Temp 98  F (36.7  C)   Ht 1.727 m (5' 8\")   Wt 110.1 kg (242 lb 12.8 oz)   SpO2 97%   BMI 36.92 kg/m    GENERAL APPEARANCE:  Alert, in no distress, seated in recliner chair.  EYES: Sclera clear and conjunctiva normal, no discharge   ENT:  Mouth normal, moist mucous membranes, hearing acuity grossly decreased BL better in right ear, no excoriations or nodules to BL nares.  RESP:  Non-labored breathing, palpation of chest normal, no chest wall tenderness, no respiratory distress, Lung sounds decrease BL without adventitious breath sounds, patient is on room air.  CV:  Rate and rhythm regular, no murmur, no rub or gallop.  VASCULAR: 2-3+ edema bilateral lower extremities, not wearing Velcro compression wraps.   ABDOMEN:  Normal bowel sounds, soft, nontender, no grimacing or guarding with palpation,   umblical hernia.   M/S:   Gait and station ambulates independently with walker around apartment.   PSYCH: Awake and alert, speech fluent,  insight and judgement impaired, memory impaired, cooperative, anxious affect when discussing memory.    Recent labs in [facility] reviewed by me today.    CBC RESULTS: Recent Labs   Lab Test 01/05/23  1000 10/19/22  1225   WBC 6.5 6.0   RBC 3.89* 4.31*   HGB 11.2* 12.3*   HCT 36.1* 38.5*   MCV 93 89   MCH 28.8 28.5   MCHC 31.0* 31.9   RDW 14.8 13.8    306       Last Basic Metabolic Panel:  Recent Labs   Lab Test 01/12/23  0614 01/05/23  1000 12/01/22  1020 "   NA  --  137 139   POTASSIUM  --  4.2 4.4   CHLORIDE  --  102 103   JOSUE  --  8.8 8.8   CO2  --  27 29   BUN  --  15 12   CR  --  0.68 0.59*   * 133* 81       Liver Function Studies -   Recent Labs   Lab Test 01/05/23  1000 10/04/22  0720 09/30/22  1755   PROTTOTAL 7.6  --  7.0   ALBUMIN 3.6 3.0* 3.9   BILITOTAL 0.5  --  0.7   ALKPHOS 64  --  61   AST 18  --  37   ALT 13  --  15       TSH   Date Value Ref Range Status   10/06/2022 1.67 0.30 - 4.20 uIU/mL Final   04/28/2022 2.77 0.40 - 4.00 mU/L Final   04/07/2022 3.94 0.40 - 4.00 mU/L Final     Lab Results   Component Value Date    A1C 5.5 04/28/2022    A1C 5.6 04/07/2022     Assessment/Plan:  (C61) Prostate Cancer  Comment: New diagnosis on biopsy 1/11/23 with Dr. Larry.  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 Currently no access to ETOH thus abstinent. Feels memory has improved and would like to move out of locked memory care unit. Discussed different facets of thinking, such as executive function issues, agreeable to neuropsych testing to clarify deficits. On memory care unit due to safety concerns from family at time of admission.  Chronic low mood, suspect JOSÉ MANUEL with panic attacks for many years.   Not sleeping well.  Plan:   - Referral for neuropsych evaluation written  - Given # for danielito's office   - Increase mirtazapine to 15 mg PO q HS (started 11/17/22)  - Continue Seroquel 12.5 mg BID and BID PRN for psychotic features - paranoid delusions. Previously on Zyprexa.  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  Previous hyponatremia multifactorial, occurred in setting of SIADH, dose increased of selective serotonin reuptake inhibitor (Celexa), Bactrim, hypovolemia.  Stopped Celexa 9/29/22, now on Mirtazapine.  Sodium   Date Value Ref Range Status   01/05/2023 137 133 - 144 mmol/L Final   Plan:   - BMP 2/9 after dose increase of mirtazapine to monitor sodium    - Avoid medications which could exacerbate hyponatremia   - Avoid excessive free water intake    (K59.04) Chronic constipation  Comment: Chronic. Having regular BMs, but some loose stools  Long hx of Levsin multiple times per day for abd pain, but stopped by urology when started trospium, no recurrent pain.   Plan:   - Change Miralax order so staff offer 1 cap and ability take addition capful if needed     (N52.9) Erectile Dysfunction  Comment: Discussed risk benefit of sildenafil today  Plan:  - Will hold off on ordering given unclear cardiac hx, potential upcoming prostate radiation treatment, and need to clarify independent decision making     (Z79.899) Polypharmacy  Comment: Discussed restrictions around unsecured medications in memory care [facility], also increased bleeding risk with ASA. Not using guaifenesin in any manner that the medication would be helping symptoms.  Plan:  - Agreed to try gabapentin PRN instead of taking guaifenesin or ASA with anxiety attacks  - Appreciate MTM supports - see not from Sindy Hanley, ChristianD        Orders:    Increase Mirtazapine to 15 mg     Add PRN gabapentin 100 mg BID PRN    Neuropsych eval -  [phone]    Total time with an established patient visit in the assisted livin minutes.    10:08 AM - 10:27 AM (19 minutes) Medication reconciliation and chart review with PharmD of recent events and medication risk/benefit.    12:00 PM - 12:56 PM (60 minutes) Patient visit, discussion of medications, multiple health concerns, memory concerns as outlined above.    Electronically signed by: MARICHUY Basilio CNP             Sincerely,        MARICHUY Basilio CNP

## 2023-01-26 NOTE — PROGRESS NOTES
Medication Therapy Management (MTM) Encounter    ASSESSMENT:                            Medication Adherence/Access: See below for considerations.  We did discuss risks/obstacles with Viagra use as well in memory care setting, and may benefit from waiting until after neuropsych testing completed as well as urology input given recent prostate cancer diagnosis with potential upcoming treatment.    Dementia with behavioral disturbance, Insomnia, Depression with anxiety, h/o alcohol abuse: May benefit from increase in mirtazapine for mood, anxiety, sleep, and follow-up sodium level.  Per his report, chest pain that occurs leading to aspirin use seems related to anxiety.  Discussed that aspirin may increase bleed risk, gi upset, and unclear how much he is using this; recommended avoiding aspirin use.  May benefit from prn gabapentin for anxiety.    Neuropathy, Pain: Stable.     Constipation, Abdominal pain: Due to patient noting some loose stools, may benefit from change in how Miralax order reads so staff don't automatically give patient 34gm of Miralax vs starting with 17gm.     GERD, Vickers's esophagus:  Stable.  Occasional use of aspirin may exacerbate gi symptoms - as noted above, would benefit from d'c aspirin.    Cough:  Discussed that Robitussin likely not adding benefit, and alternative for cough suppression such as Tessalon, but patient preference to continue with current regimen.    PLAN:                            1.  Increase mirtazapine to15mg at bedtime.  Recheck sodium level in 3-4 weeks.  2.  Change gabapentin to 200mg at bedtime and 100mg twice daily as needed.  3.  Stop aspirin  4.  Miralax order - change to 1 capful daily in the morning & as needed, ok to give 2 capfuls at patient request.      Follow-up: 3 months, sooner if necessary    SUBJECTIVE/OBJECTIVE:                          Jamie Valdivia is an 82 year old male seen for a co-visit with Sylvia Stein NP.     Reason for visit: follow-up MTM from  11/17/22.  Patient has been asking for Viagra rx.    Allergies/ADRs: Reviewed in chart  Past Medical History: Reviewed in chart  Tobacco: He reports that he has never smoked. He has never used smokeless tobacco.  Alcohol: history of alcohol dependence  Assistive Devices: walker for ambulation.  History of falls.    Medication Adherence/Access: Patient has assistance with medication administration: assisted living.    Dementia with behavioral disturbance, Insomnia, Depression with anxiety, h/o alcohol abuse: Current medications: mirtazapine 7.5mg at bedtime, quetiapine 12.5mg twice daily & every 12hr as needed, gabapentin 200mg at bedtime.  Mirtazapine was initiated on 11/17 due to continued anxiety, lower mood, difficulty sleeping, and less propensity for this to contribute to low sodium.  Per NP, has been doing better with start of this.  History of citalopram use, hyponatremia, so this was dc'd.  Notes occasional dizziness with position changes.  Patient feels if living with wife, wouldn't need antidepressant.  Low mood due to living situation.  Calling wife in the middle of the night, not sleeping well.  Notes occasional chest pain when feeling stressed, and has been taking aspirin for this when it occurs.  Unclear how often he uses, but notes history of blood noses.  Met an intern today that will be spending some time with him weekly, getting him active and discussing things he enjoys.    Prostate cancer:  Recent prostate cancer diagnosis; diazepam as needed ordered for prior to CT scan.  Bone scan as well on Monday.  Patient states considering radiation.    Neuropathy, Pain: Current medications: Tylenol 500mg twice daily & twice daily as needed, gabapentin 200mg at bedtime.  Sleeping in recliner more frequently due to this seems to offer more comfort on back.  Does not complain of bothersome pain.     Constipation, Abdominal pain: Current medications: Miralax scheduled & as needed, Maalox 30ml every 6hr as  "needed.  Has successfully come off of Lomotil.  Noting more loose stools, \"something changed in my system\" - prefers getting Miralax in the morning right away.       GERD, Vickers's esophagus:  Current medications: omeprazole 40mg twice daily, Tums 750mg as needed (self-administers).  Has occasional symptoms of heartburn.     Cough:  Robitussin liquid 200mg twice daily and twice daily as needed.  Daughter notes that wife has brought in bottles of cough syrup.  Noting dry cough.  Would like more frequent administration of Robitussin.  Likes to add just a little to his tongue.  Feels something is in the chest when he coughs.  Noting a runny nose, sounds nasally, will get hoarse easily when talking.  Denies shortness of breath with rest or activity.  States stamina is not as good.    Estimated Creatinine Clearance: 100.8 mL/min (based on SCr of 0.68 mg/dL).      ----------------      I spent 79 minutes with this patient today in total (56 minutes with patient & provider, remaining time with provider).  A copy of the visit note was provided to the patient's provider(s).    A summary of these recommendations was declined by patient.    Sindy Hanley, Pharm.D.,[facility] [place]  Board Certified Geriatric Pharmacist  Medication Therapy Management Pharmacist  [phone]     Medication Therapy Recommendations Needing Review  Constipation    Current Medication: polyethylene glycol (MIRALAX) 17 GM/Dose powder   Rationale: Incorrect administration   Recommendation: Decrease Dose         Depression with anxiety    Current Medication: gabapentin (NEURONTIN) 100 MG capsule   Rationale: Dose too low   Recommendation: Increase Frequency          Current Medication: mirtazapine (REMERON) 7.5 MG tablet (Discontinued)   Rationale: Dose too low   Recommendation: Increase Dose         Pain    Rationale: Undesirable effect   Recommendation: Discontinue Medication   Note: discontinue aspirin                Medication Therapy Recommendations  No " medication therapy recommendations to display

## 2023-01-26 NOTE — PROGRESS NOTES
"[facility] GERIATRICS    Chief Complaint   Patient presents with     RECHECK     Prostate cancer     HPI:  Jamie Valdivia is a 82 year old  (1940) PMH significant for GERD with esophagitis, OA BL knees, pharyngeal dysphagia, edema, abdominal wall hernia, chronic atrial fibrillation, HTN, osteoporosis with hx of vertebral fracture, dementia, anemia, hx of COVID-19, who is being seen today for an episodic care visit at: [facility] ([facility]) [61].     Today's concern is:   Follow-up today due to new diagnosis of prostate cancer, mood, and medication questions.    Joint visit with PharmLORRIE Hanley.    Discussed prostate cancer diagnosis.  \"I'm surprised by anything anymore.\"  Considering radiation.  CT scan and bone scan for staging next week.     Family reports wife bringing in cough medication and aspirin.  Taking guaifenesin and with episodes of chest pain.  \"I'm opposed to the way cough medicine is administered.\"  Having trouble getting cough syrup at night as needed.  Sleeping in the chair.  + dry cough.   Feels he needs\" two drops\" of cough medication \"on my tongue\" and the cough goes away.   Has not considered just drinking water.    Nasal voice quality.  Hoarse voice.  Unsure when this started.   No fever/chills.  No SOB.  No TAVERAS.    + chest pain with stress - chronic for many years.  No chest pain with activity.   Taking ASA or cough medication with chest pain.  \"I used to take a shot of whiskey and that would take care of it.\"    Some loose stools.  Staff bringing two caps of Miralax when he would prefer one.    Concerned about being on locked memory care unit, feels like he is in alf.  Feels memory is fine. Recalls details from last visit.  Not sleeping well. Calling wife in middle of the night.  Low mood and anxiety.  Family arranged volunteer to come visit weekly and take him outside with nicer weather.    Interested in sildenafil for ED, but does not want staff or family to know " "about medication.  Has not mentioned to urologist.    Allergies, and PMH/PSH reviewed in [facility] today.    MED REC REQUIRED  Post Medication Reconciliation Status:  Patient was not discharged from an inpatient facility or TCU but medications were reconciled per facility EMR.    REVIEW OF SYSTEMS:  4 point ROS including Respiratory, CV, GI and , other than that noted in the HPI,  is negative.    Objective:   Temp 98  F (36.7  C)   Ht 1.727 m (5' 8\")   Wt 110.1 kg (242 lb 12.8 oz)   SpO2 97%   BMI 36.92 kg/m    GENERAL APPEARANCE:  Alert, in no distress, seated in recliner chair.  EYES: Sclera clear and conjunctiva normal, no discharge   ENT:  Mouth normal, moist mucous membranes, hearing acuity grossly decreased BL better in right ear, no excoriations or nodules to BL nares.  RESP:  Non-labored breathing, palpation of chest normal, no chest wall tenderness, no respiratory distress, Lung sounds decrease BL without adventitious breath sounds, patient is on room air.  CV:  Rate and rhythm regular, no murmur, no rub or gallop.  VASCULAR: 2-3+ edema bilateral lower extremities, not wearing Velcro compression wraps.   ABDOMEN:  Normal bowel sounds, soft, nontender, no grimacing or guarding with palpation,   umblical hernia.   M/S:   Gait and station ambulates independently with walker around apartment.   PSYCH: Awake and alert, speech fluent,  insight and judgement impaired, memory impaired, cooperative, anxious affect when discussing memory.    Recent labs in [facility] reviewed by me today.    CBC RESULTS: Recent Labs   Lab Test 01/05/23  1000 10/19/22  1225   WBC 6.5 6.0   RBC 3.89* 4.31*   HGB 11.2* 12.3*   HCT 36.1* 38.5*   MCV 93 89   MCH 28.8 28.5   MCHC 31.0* 31.9   RDW 14.8 13.8    306       Last Basic Metabolic Panel:  Recent Labs   Lab Test 01/12/23  0614 01/05/23  1000 12/01/22  1020   NA  --  137 139   POTASSIUM  --  4.2 4.4   CHLORIDE  --  102 103   JOSUE  --  8.8 8.8   CO2  --  27 29   BUN  --  15 12   CR  " --  0.68 0.59*   * 133* 81       Liver Function Studies -   Recent Labs   Lab Test 01/05/23  1000 10/04/22  0720 09/30/22  1755   PROTTOTAL 7.6  --  7.0   ALBUMIN 3.6 3.0* 3.9   BILITOTAL 0.5  --  0.7   ALKPHOS 64  --  61   AST 18  --  37   ALT 13  --  15       TSH   Date Value Ref Range Status   10/06/2022 1.67 0.30 - 4.20 uIU/mL Final   04/28/2022 2.77 0.40 - 4.00 mU/L Final   04/07/2022 3.94 0.40 - 4.00 mU/L Final     Lab Results   Component Value Date    A1C 5.5 04/28/2022    A1C 5.6 04/07/2022     Assessment/Plan:  (C61) Prostate Cancer  Comment: New diagnosis on biopsy 1/11/23 with Dr. Larry.  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 Currently no access to ETOH thus abstinent. Feels memory has improved and would like to move out of locked memory care unit. Discussed different facets of thinking, such as executive function issues, agreeable to neuropsych testing to clarify deficits. On memory care unit due to safety concerns from family at time of admission.  Chronic low mood, suspect JOSÉ MANUEL with panic attacks for many years.   Not sleeping well.  Plan:   - Referral for neuropsych evaluation written  - Given # for omkacy's office   - Increase mirtazapine to 15 mg PO q HS (started 11/17/22)  - Continue Seroquel 12.5 mg BID and BID PRN for psychotic features - paranoid delusions. Previously on Zyprexa.  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  Previous hyponatremia multifactorial, occurred in setting of SIADH, dose increased of selective serotonin reuptake inhibitor (Celexa), Bactrim, hypovolemia.  Stopped Celexa 22, now on Mirtazapine.  Sodium   Date Value Ref Range Status   2023 137 133 - 144 mmol/L Final   Plan:   - BMP  after dose increase of mirtazapine to monitor sodium    - Avoid medications which could exacerbate hyponatremia   - Avoid excessive free water intake    (K59.04) Chronic constipation  Comment: Chronic. Having regular BMs, but some loose stools  Long hx of Levsin multiple times per day for abd pain, but stopped by urology when started trospium, no recurrent pain.   Plan:   - Change Miralax order so staff offer 1 cap and ability take addition capful if needed     (N52.9) Erectile Dysfunction  Comment: Discussed risk benefit of sildenafil today  Plan:  - Will hold off on ordering given unclear cardiac hx, potential upcoming prostate radiation treatment, and need to clarify independent decision making     (Z79.899) Polypharmacy  Comment: Discussed restrictions around unsecured medications in memory care [facility], also increased bleeding risk with ASA. Not using guaifenesin in any manner that the medication would be helping symptoms.  Plan:  - Agreed to try gabapentin PRN instead of taking guaifenesin or ASA with anxiety attacks  - Appreciate MTM supports - see not from Sindy Hanley, ChristianD        Orders:    Increase Mirtazapine to 15 mg     Add PRN gabapentin 100 mg BID PRN    Neuropsych eval - [phone]    Total time with an established patient visit in the assisted livin minutes.    10:08 AM - 10:27 AM (19 minutes) Medication  reconciliation and chart review with PharmD of recent events and medication risk/benefit.    12:00 PM - 12:56 PM (60 minutes) Patient visit, discussion of medications, multiple health concerns, memory concerns as outlined above.    Electronically signed by: MARICHUY Basilio CNP

## 2023-01-27 ENCOUNTER — TELEPHONE (OUTPATIENT)
Dept: GERIATRICS | Facility: CLINIC | Age: 83
End: 2023-01-27
Payer: COMMERCIAL

## 2023-01-27 DIAGNOSIS — E11.9 TYPE 2 DIABETES MELLITUS WITHOUT COMPLICATION, UNSPECIFIED WHETHER LONG TERM INSULIN USE (H): ICD-10-CM

## 2023-01-27 RX ORDER — GABAPENTIN 100 MG/1
200 CAPSULE ORAL AT BEDTIME
Qty: 180 CAPSULE | Refills: 97 | COMMUNITY
Start: 2023-01-27 | End: 2023-03-21

## 2023-01-27 NOTE — TELEPHONE ENCOUNTER
Jamie Valdivia  1940  ORDERS:  - change schedule gabapentin to 200 mg PO q HS, continue PRN gabapentin as ordered dx pain/anxiety  Electronically signed by:   MARICHUY Basilio CNP  01/27/23 2:52 PM

## 2023-01-27 NOTE — PATIENT INSTRUCTIONS
"Recommendations from today's MTM visit:                                                       1.  Increase mirtazapine to15mg at bedtime.  Recheck sodium level in 3-4 weeks.  2.  Change gabapentin to 200mg at bedtime and 100mg twice daily as needed.  3.  Stop aspirin.  4.  Miralax order - change to 1 capful daily in the morning & as needed, ok to give 2 capfuls at patient request.      Follow-up: 3 months, sooner if necessary      It was great speaking with you today.  I value your experience and would be very thankful for your time in providing feedback in our clinic survey. In the next few days, you may receive an email or text message from [employer] with a link to a survey related to your  clinical pharmacist.\"     To schedule another MTM appointment, please call me directly or you may call the MTM scheduling line at [phone] or toll-free at [phone].     My Clinical Pharmacist's contact information:                                                      Please feel free to contact me with any questions or concerns you have.      Sindy Hanley, Pharm.D.,[facility] [place]  Board Certified Geriatric Pharmacist  Medication Therapy Management Pharmacist  [phone]      "

## 2023-01-27 NOTE — PATIENT INSTRUCTIONS
Jamie Valdivia  1940  ORDERS:  - Please change Miralax order to state offer 1 capful and if resident would like may have 2 capfuls.   - Discontinue mirtazpine 7.5 mg daily  - mirtazapine (REMERON) 15 MG tablet; Take 1 tablet (15 mg) by mouth At Bedtime  dx depression  - gabapentin (NEURONTIN) 100 MG capsule; Take 1 capsule (100 mg) by mouth 2 times daily as needed (Anxiety) please keep scheduled dose as ordered   - Please give resident number to schedule neuropsych eval: [phone]  Electronically signed by:   MARICHUY Basilio CNP  01/26/23 9:28 PM

## 2023-01-30 ENCOUNTER — HOSPITAL ENCOUNTER (OUTPATIENT)
Dept: NUCLEAR MEDICINE | Facility: CLINIC | Age: 83
Setting detail: NUCLEAR MEDICINE
Discharge: HOME | End: 2023-01-30
Attending: STUDENT IN AN ORGANIZED HEALTH CARE EDUCATION/TRAINING PROGRAM
Payer: COMMERCIAL

## 2023-01-30 ENCOUNTER — HOSPITAL ENCOUNTER (OUTPATIENT)
Dept: CT IMAGING | Facility: CLINIC | Age: 83
Discharge: HOME | End: 2023-01-30
Attending: STUDENT IN AN ORGANIZED HEALTH CARE EDUCATION/TRAINING PROGRAM | Admitting: STUDENT IN AN ORGANIZED HEALTH CARE EDUCATION/TRAINING PROGRAM
Payer: COMMERCIAL

## 2023-01-30 DIAGNOSIS — C61 PROSTATE CANCER (H): ICD-10-CM

## 2023-01-30 PROCEDURE — 250N000009 HC RX 250: Performed by: STUDENT IN AN ORGANIZED HEALTH CARE EDUCATION/TRAINING PROGRAM

## 2023-01-30 PROCEDURE — 78306 BONE IMAGING WHOLE BODY: CPT

## 2023-01-30 PROCEDURE — 250N000011 HC RX IP 250 OP 636: Performed by: STUDENT IN AN ORGANIZED HEALTH CARE EDUCATION/TRAINING PROGRAM

## 2023-01-30 PROCEDURE — 74177 CT ABD & PELVIS W/CONTRAST: CPT

## 2023-01-30 PROCEDURE — 343N000001 HC RX 343: Performed by: STUDENT IN AN ORGANIZED HEALTH CARE EDUCATION/TRAINING PROGRAM

## 2023-01-30 PROCEDURE — 999N000040 HC STATISTIC CONSULT NO CHARGE VASC ACCESS

## 2023-01-30 PROCEDURE — 999N000128 HC STATISTIC PERIPHERAL IV START W/O US GUIDANCE

## 2023-01-30 PROCEDURE — A9503 TC99M MEDRONATE: HCPCS | Performed by: STUDENT IN AN ORGANIZED HEALTH CARE EDUCATION/TRAINING PROGRAM

## 2023-01-30 RX ORDER — IOPAMIDOL 755 MG/ML
119 INJECTION, SOLUTION INTRAVASCULAR ONCE
Status: COMPLETED | OUTPATIENT
Start: 2023-01-30 | End: 2023-01-30

## 2023-01-30 RX ORDER — TC 99M MEDRONATE 20 MG/10ML
25 INJECTION, POWDER, LYOPHILIZED, FOR SOLUTION INTRAVENOUS ONCE
Status: COMPLETED | OUTPATIENT
Start: 2023-01-30 | End: 2023-01-30

## 2023-01-30 RX ADMIN — SODIUM CHLORIDE 74 ML: 9 INJECTION, SOLUTION INTRAVENOUS at 15:17

## 2023-01-30 RX ADMIN — TC 99M MEDRONATE 26.5 MCI.: 20 INJECTION, POWDER, LYOPHILIZED, FOR SOLUTION INTRAVENOUS at 11:02

## 2023-01-30 RX ADMIN — IOPAMIDOL 119 ML: 755 INJECTION, SOLUTION INTRAVENOUS at 15:17

## 2023-01-31 ENCOUNTER — PATIENT OUTREACH (OUTPATIENT)
Dept: GERIATRIC MEDICINE | Facility: CLINIC | Age: 83
End: 2023-01-31
Payer: COMMERCIAL

## 2023-01-31 NOTE — LETTER
January 31, 2023      JAMIE CRUMP  [address]O [address]  [address]   [place] [address]      Dear Jamie:    At [facility], we re dedicated to improving your health and wellness. Enclosed is the Care Plan developed with you on 1/17/23. Please review the Care Plan carefully.    As a reminder, during your visit we talked about:    Ways to manage your physical and mental health    Using health care to maintain and improve your health     Your preventive care needs     Remember to contact your care coordinator if you:    Are hospitalized, or plan to be hospitalized     Have a fall      Have a change in your physical or mental health    Need help finding support or services    If you have questions, or don t agree with your Care Plan, call me at [phone]. You can also call me if your needs change. TTY users, call the [place] [employer] at (806) or 1-172.741.7774 (jkryaj-py-lwscqt relay service).    Sincerely,    Gisela Hall RN, BSN, PHN  [phone]  [email or web address] ([facility]) is a health plan that contracts with both Medicare and the [place] Medical Assistance (Medicaid) program to provide benefits of both programs to enrollees. Enrollment in [facility] depends on contract renewal.    [id number] accepted    [id number] (07/2022)

## 2023-01-31 NOTE — PROGRESS NOTES
[facility] Care Coordination Contact    Received after visit chart from care coordinator.  Completed following tasks: Mailed copy of care plan to client, Updated services in Database and Mailed [employer] Safe Medication Disposal   , Mailed copy of CL tool to member, faxed copy to AL facility, and submitted authorization to health plan.   and Provider Signature - Summary:  Member indicates that they would like a summary of their POC shared with the following [facility] providers:  [facility].  Letter faxed to providers for signature.     Pat Varma  Case Management Specialist  [facility]  [phone]

## 2023-02-01 ENCOUNTER — VIRTUAL VISIT (OUTPATIENT)
Dept: UROLOGY | Facility: CLINIC | Age: 83
End: 2023-02-01
Payer: COMMERCIAL

## 2023-02-01 VITALS — WEIGHT: 220 LBS | HEIGHT: 68 IN | BODY MASS INDEX: 33.34 KG/M2

## 2023-02-01 DIAGNOSIS — C61 PROSTATE CANCER (H): Primary | ICD-10-CM

## 2023-02-01 PROCEDURE — 99213 OFFICE O/P EST LOW 20 MIN: CPT | Mod: 95 | Performed by: STUDENT IN AN ORGANIZED HEALTH CARE EDUCATION/TRAINING PROGRAM

## 2023-02-01 NOTE — PROGRESS NOTES
"SEND LINK TO EMAIL  [email or web address] Emergency Registry    Jamie is a 82 year old who is being evaluated via a billable video visit.      How would you like to obtain your AVS? MyChart  If the video visit is dropped, the invitation should be resent by: Text to cell phone:   Will anyone else be joining your video visit? No      CHIEF COMPLAINT   Jamie Valdivia who is a 82 year old male returns today for follow-up of intermediate unfavorable risk prostate cancer (iPSA 11.3, [place] 4+3=7, 3/12 cores, dx 1/11/23)    Virtual visit with patient's son and the patient    HPI   Jamie Valdivia is a  82 year old male returns today for follow-up of intermediate unfavorable risk prostate cancer (iPSA 11.3, [place] 4+3=7, 3/12 cores, dx 1/11/23)    Here to discuss staging imaging, next steps    He saw Dr. Fleming, who recommended radiation w/o androgen deprivation therapy      PHYSICAL EXAM  Patient is a 82 year old  male   Vitals: Height 1.727 m (5' 8\"), weight 99.8 kg (220 lb).  Body mass index is 33.45 kg/m .  General Appearance Adult:   Alert, no acute distress, oriented  HENT: throat/mouth:normal, good dentition  Lungs: no respiratory distress, or pursed lip breathing  Heart: No obvious jugular venous distension present  Musculoskeltal: extremities normal, no peripheral edema  Skin: no suspicious lesions or rashes  Neuro: Alert, oriented, speech and mentation normal  Psych: affect and mood normal  Gait: Normal    All pertinent imaging reviewed:    NM bone 1/30/2023  IMPRESSION:     No osseous metastasis.    CT abd/pelvis w/o contrast 1/30/2023    IMPRESSION:   No metastatic disease demonstrated.    ASSESSMENT and PLAN  82 year old male returns today for follow-up of intermediate unfavorable risk prostate cancer (iPSA 11.3, [place] 4+3=7, 3/12 cores, dx 1/11/23)    Staging imaging negative for metastatic disease, prostate cancer appears clinically localized  Agree with EBRT w/o ADT for treatment given the patient's age and other " comorbidties    Follow up with me 3 months after completion of radiation with PSA prior    Niraj Larry MD   [facility]  [facility] Phone: [phone]      Video-Visit Details    Type of service:  Video Visit   Video Start Time: 10:37 AM  Video End Time:10:44 AM    Originating Location (pt. Location): Home  Distant Location (provider location):  On-site  Platform used for Video Visit: [employer]

## 2023-02-01 NOTE — LETTER
"2/1/2023       RE: Jamie Valdivia  [address]  [address]  [place] [address]     Dear Colleague,    Thank you for referring your patient, Jamie Valdivia, to the [facility] at [facility]. Please see a copy of my visit note below.    SEND LINK TO EMAIL  [email or web address]    Jamie is a 82 year old who is being evaluated via a billable video visit.      How would you like to obtain your AVS? MyChart  If the video visit is dropped, the invitation should be resent by: Text to cell phone:   Will anyone else be joining your video visit? No      CHIEF COMPLAINT   Jamie Valdivia who is a 82 year old male returns today for follow-up of intermediate unfavorable risk prostate cancer (iPSA 11.3, [place] 4+3=7, 3/12 cores, dx 1/11/23)    Virtual visit with patient's son and the patient    HPI   Jamie Valdivia is a  82 year old male returns today for follow-up of intermediate unfavorable risk prostate cancer (iPSA 11.3, [place] 4+3=7, 3/12 cores, dx 1/11/23)    Here to discuss staging imaging, next steps    He saw Dr. Fleming, who recommended radiation w/o androgen deprivation therapy      PHYSICAL EXAM  Patient is a 82 year old  male   Vitals: Height 1.727 m (5' 8\"), weight 99.8 kg (220 lb).  Body mass index is 33.45 kg/m .  General Appearance Adult:   Alert, no acute distress, oriented  HENT: throat/mouth:normal, good dentition  Lungs: no respiratory distress, or pursed lip breathing  Heart: No obvious jugular venous distension present  Musculoskeltal: extremities normal, no peripheral edema  Skin: no suspicious lesions or rashes  Neuro: Alert, oriented, speech and mentation normal  Psych: affect and mood normal  Gait: Normal    All pertinent imaging reviewed:    NM bone 1/30/2023  IMPRESSION:     No osseous metastasis.    CT abd/pelvis w/o contrast 1/30/2023    IMPRESSION:   No metastatic disease demonstrated.    ASSESSMENT and " PLAN  82 year old male returns today for follow-up of intermediate unfavorable risk prostate cancer (iPSA 11.3, [place] 4+3=7, 3/12 cores, dx 1/11/23)    Staging imaging negative for metastatic disease, prostate cancer appears clinically localized  Agree with EBRT w/o ADT for treatment given the patient's age and other comorbidties    Follow up with me 3 months after completion of radiation with PSA prior    Niraj Larry MD   [facility]rology  [facility] Phone: [phone]      Video-Visit Details    Type of service:  Video Visit   Video Start Time: 10:37 AM  Video End Time:10:44 AM    Originating Location (pt. Location): Home  Distant Location (provider location):  On-site  Platform used for Video Visit: [employer]

## 2023-02-02 ENCOUNTER — TELEPHONE (OUTPATIENT)
Dept: GERIATRICS | Facility: CLINIC | Age: 83
End: 2023-02-02
Payer: COMMERCIAL

## 2023-02-02 DIAGNOSIS — C61 PROSTATE CANCER (H): Primary | ICD-10-CM

## 2023-02-02 DIAGNOSIS — F10.27 DEMENTIA ASSOCIATED WITH ALCOHOLISM WITH BEHAVIORAL DISTURBANCE (H): ICD-10-CM

## 2023-02-02 DIAGNOSIS — H26.9 CATARACT OF BOTH EYES, UNSPECIFIED CATARACT TYPE: ICD-10-CM

## 2023-02-02 PROCEDURE — 99358 PROLONG SERVICE W/O CONTACT: CPT | Performed by: NURSE PRACTITIONER

## 2023-02-02 NOTE — TELEPHONE ENCOUNTER
[facility] GERIATRICS  NON-FACE TO FACE PROLONGED SERVICE    Jamie Valdivia 1940    Date of Related Face to Face Service: 01/26/23    INTENT OF SERVICE  This is an extended evaluation of patient's specific problem.  The service relates to a recent or future E/M visit.  Documentation supports medical necessity, relates to ongoing patient management and documents start times and stop times.    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 Reviewed visit one week ago and medication changes regarding mirtazapine, PRN gabapentin, and discussion of OTC medications. Discussed resident wish to move from memory care despite family safety concerns. Recommendation to pt/family for neuropsych testing, all parties agreeable and will schedule. In interim, family moving resident to 1st floor apartment where there is outdoor access and more male residents. Daughter reports upcoming cataract surgery will need pre-op completed prior to 2/14/23. Also will be starting radiation therapy [facility].    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 Fair memory, but limited insight and judgement.  Plan:  - Family given # to schedule neuropsych testing to re-evaluate need for locked unit  - Resident has danielito #  - Moved to 1st floor pt to address some QoL concerns in interim     Cataract of both eyes, unspecified cataract type  Comment: Upcoming staged surgery  Plan:  -  Put resident on schedule for pre-op next week    TIME  Total time spent with Non-Face to Face prolonged service 34 minutes.     Electronically signed by:  MARICHUY Basilio CNP

## 2023-02-09 ENCOUNTER — OFFICE VISIT (OUTPATIENT)
Dept: GERIATRICS | Facility: CLINIC | Age: 83
End: 2023-02-09
Payer: COMMERCIAL

## 2023-02-09 VITALS
DIASTOLIC BLOOD PRESSURE: 64 MMHG | OXYGEN SATURATION: 97 % | RESPIRATION RATE: 20 BRPM | TEMPERATURE: 97.8 F | HEIGHT: 68 IN | BODY MASS INDEX: 35.52 KG/M2 | WEIGHT: 234.4 LBS | SYSTOLIC BLOOD PRESSURE: 136 MMHG | HEART RATE: 74 BPM

## 2023-02-09 DIAGNOSIS — I71.20 THORACIC AORTIC ANEURYSM WITHOUT RUPTURE, UNSPECIFIED PART (H): ICD-10-CM

## 2023-02-09 DIAGNOSIS — E66.01 MORBID OBESITY (H): ICD-10-CM

## 2023-02-09 DIAGNOSIS — E66.01 CLASS 2 SEVERE OBESITY DUE TO EXCESS CALORIES WITH SERIOUS COMORBIDITY AND BODY MASS INDEX (BMI) OF 35.0 TO 35.9 IN ADULT (H): ICD-10-CM

## 2023-02-09 DIAGNOSIS — I70.0 ATHEROSCLEROSIS OF AORTA (H): ICD-10-CM

## 2023-02-09 DIAGNOSIS — I10 ESSENTIAL HYPERTENSION, BENIGN: ICD-10-CM

## 2023-02-09 DIAGNOSIS — E66.812 CLASS 2 SEVERE OBESITY DUE TO EXCESS CALORIES WITH SERIOUS COMORBIDITY AND BODY MASS INDEX (BMI) OF 35.0 TO 35.9 IN ADULT (H): ICD-10-CM

## 2023-02-09 DIAGNOSIS — M15.0 PRIMARY OSTEOARTHRITIS INVOLVING MULTIPLE JOINTS: ICD-10-CM

## 2023-02-09 DIAGNOSIS — D64.9 NORMOCYTIC ANEMIA: ICD-10-CM

## 2023-02-09 DIAGNOSIS — E22.2 SIADH (SYNDROME OF INAPPROPRIATE ADH PRODUCTION) (H): ICD-10-CM

## 2023-02-09 DIAGNOSIS — R26.9 ABNORMAL GAIT: ICD-10-CM

## 2023-02-09 DIAGNOSIS — Z87.81 HISTORY OF VERTEBRAL FRACTURE: ICD-10-CM

## 2023-02-09 DIAGNOSIS — E87.1 HYPONATREMIA: ICD-10-CM

## 2023-02-09 DIAGNOSIS — Z87.81 HX OF COMPRESSION FRACTURE OF SPINE: ICD-10-CM

## 2023-02-09 DIAGNOSIS — R13.13 PHARYNGEAL DYSPHAGIA: ICD-10-CM

## 2023-02-09 DIAGNOSIS — F10.27 DEMENTIA ASSOCIATED WITH ALCOHOLISM WITH BEHAVIORAL DISTURBANCE (H): ICD-10-CM

## 2023-02-09 DIAGNOSIS — K59.09 CHRONIC CONSTIPATION: ICD-10-CM

## 2023-02-09 DIAGNOSIS — H26.9 CATARACT OF BOTH EYES, UNSPECIFIED CATARACT TYPE: Primary | ICD-10-CM

## 2023-02-09 DIAGNOSIS — F41.1 GAD (GENERALIZED ANXIETY DISORDER): ICD-10-CM

## 2023-02-09 DIAGNOSIS — F10.21 ALCOHOL DEPENDENCE IN REMISSION (H): ICD-10-CM

## 2023-02-09 DIAGNOSIS — C61 PROSTATE CANCER (H): ICD-10-CM

## 2023-02-09 DIAGNOSIS — F43.23 ADJUSTMENT REACTION WITH ANXIETY AND DEPRESSION: ICD-10-CM

## 2023-02-09 DIAGNOSIS — K22.70 BARRETT'S ESOPHAGUS WITHOUT DYSPLASIA: ICD-10-CM

## 2023-02-09 DIAGNOSIS — M81.0 OSTEOPOROSIS, UNSPECIFIED OSTEOPOROSIS TYPE, UNSPECIFIED PATHOLOGICAL FRACTURE PRESENCE: ICD-10-CM

## 2023-02-09 DIAGNOSIS — R60.0 LOWER EXTREMITY EDEMA: ICD-10-CM

## 2023-02-09 DIAGNOSIS — I48.91 ATRIAL FIBRILLATION, UNSPECIFIED TYPE (H): ICD-10-CM

## 2023-02-09 PROCEDURE — 99349 HOME/RES VST EST MOD MDM 40: CPT | Performed by: NURSE PRACTITIONER

## 2023-02-09 NOTE — PROGRESS NOTES
[facility] GERIATRICS  [address] [place] [address]  Phone: [phone]  Fax: [phone]  Primary Provider: Sylvia Stein  Pre-op Performing Provider: SYLVIA STEIN    PREOPERATIVE EVALUATION:  Today's date: 2/9/2023    Jamie Valdivia is a 82 year old male PMH significant for GERD with esophagitis, OA BL knees, pharyngeal dysphagia, edema, abdominal wall hernia, chronic atrial fibrillation, HTN, osteoporosis with hx of vertebral fracture, dementia, anemia, hx of COVID-19 who presents for a preoperative evaluation.    Surgical Information:  Surgery/Procedure: Right Eye Cataract Surgery   Surgery Location: [facility]   Surgeon: Dr. Eleonora Dai  Surgery Date: 3/8/23  Time of Surgery: TBA   Where patient plans to recover: Other: [facility]  Fax number for surgical facility: Fax: [phone]  [facility] [phone]    Type of Anesthesia Anticipated: Local with MAC    Assessment & Plan     The proposed surgical procedure is considered LOW risk.    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)        Hx of compression fracture of spine        Abnormal gait        Primary osteoarthritis involving multiple joints        Adjustment reaction with anxiety and depression        JOSÉ MANUEL (generalized anxiety disorder)        Lower extremity edema        Thoracic aortic aneurysm without rupture, unspecified part        Normocytic anemia        Chronic constipation        Atrial fibrillation,  unspecified type (H)        Essential hypertension, benign        Osteoporosis, unspecified osteoporosis type, unspecified pathological fracture presence        History of vertebral fracture        Class 2 severe obesity due to excess calories with serious comorbidity and body mass index (BMI) of 35.0 to 35.9 in adult (H)            Preop Questions 1/8/2023   1. Have you ever had a heart attack or stroke? UNKNOWN - history of coronary atherosclerosis, long history of recurrent chest pain episodes in setting of anxiety, see ER visit 6/30/22, EKG showed no overvet ischemic changes, Troponin negative.  - CORONARY ARTERY CALCIFICATION: Severe on chest CT 6/30/22  - Ascending thoracic aorta is aneurysmal at 4.6 cm.    2. Have you ever had surgery on your heart or blood vessels, such as a stent placement, a coronary artery bypass, or surgery on an artery in your head, neck, heart, or legs? No   3. Do you have chest pain with activity? No   4. Do you have a history of  heart failure? No   5. Do you currently have a cold, bronchitis or symptoms of other infection? No   6. Do you have a cough, shortness of breath, or wheezing? No   7. Do you or anyone in your family have previous history of blood clots? No   8. Do you or does anyone in your family have a serious bleeding problem such as prolonged bleeding following surgeries or cuts? UNKNOWN    9. Have you ever had problems with anemia or been told to take iron pills? YES - Chronic, mild anemia, Hgb 11-12      10. Have you had any abnormal blood loss such as black, tarry or bloody stools?    YES - Hx GI bleed  3/2019: acute drop in hgb. EGD showed ulceration of hypopharynx, felt to be responsible for GI bleed. No stomach bleeding or esophageal varices. Received pRBCs and hgb stabilized.  EGD on 6/1/22 showed normal stomach, duodenum, esophageal mucosa with short-segment BE, started on PPI. Hx of hemoptysis and blood from nose, saw ENT unable to identify cause. No episodes of  late.       11. Have you ever had a blood transfusion? YES   11a. Have you ever had a transfusion reaction? No   12. Are you willing to have a blood transfusion if it is medically needed before, during, or after your surgery? Yes   13. Have you or any of your relatives ever had problems with anesthesia? YES - Colonoscopy, when woke up, combative with nursing staff.   14. Do you have sleep apnea, excessive snoring or daytime drowsiness? No   14a. Do you have a CPAP machine? -   15. Do you have any artifical heart valves or other implanted medical devices like a pacemaker, defibrillator, or continuous glucose monitor? No   16. Do you have artificial joints? YES - L Hip (2010), L shoulder replacement (2016),  Underwent spinal surgery with hardware placement T8-12 pedicle screw fixation with cement by Dr. Behzad Sabit on 8/4/20   17. Are you allergic to latex? No      Risks and Recommendations:  The patient has the following additional risks and recommendations for perioperative complications:   - History of delirium or dementia   - Obesity:  Body mass index is 35.64 kg/m .  Cardiovascular:  - Would benefit from cardiology follow-up, hx as below, felt conditions chronic/stable  Pulmonary:    - Hx of recurrent pneumonia, no symptoms today or in last 3 months  Anemia/Bleeding/Clotting:    - Anemia and does not require treatment prior to surgery. Monitor hemoglobin postoperatively.  Social and Substance:    - Hx of alcohol abuse, current abstinent for alcohol as no access on locked memory care unit where he resides      Medication Instructions:  Patient is to take all scheduled medications on the day of surgery EXCEPT for modifications listed below:   - Miralax  - Guaifenesin     RECOMMENDATION:  APPROVAL GIVEN to proceed with proposed procedure, without further diagnostic evaluation.    Subjective     HPI related to upcoming procedure:   Family requests pre-operative exam for upcoming staged cataract surgery. Vision is  blurry, wears glasses for reading.  R eye scheduled for 3/8, L eye scheduled for 3/22.     Health Care Directive:  Patient has a Health Care Directive on file    Preoperative Review of :   reviewed - controlled substances reflected in medication list.    Chronic Health Concerns  Resident of [facility] since March 2022.     Hospitalized at [facility] form 11/1 to 11/5/21 with generalized weakness, dehydration, and concern for UTI but culture came back negative. Symptoms likely related to chronic alcoholism with alcohol withdrawal. Chronic cognitive impairment in setting of long history of alcoholism, hx of hallucinations. Multiple hospitalizations over preceding months per family due to unsafe living situation, well known to ED staff. Spent time in SNF ([facility]) around September 2021, details unclear, but seem to have suffered spinal fracture.     Hospitalized from 12/17 to 12/23/21 at [facility] for falls, weakness, alcoholism, and alcoholic hepatitis; discharge summary not available. Wife (who was primary care giver) was having medical issues and needed neurology care in the [facility]. Family felt resident unable to safely be left alone so they called EMS to transport to hospital for ongoing care/ETOH detox. Ultimately transferred to [facility] where wife was convalescing.      Remained at [facility] from 12/23 and 3/15/22 with largely uneventful stay. Zyprexa dose increased with good effect on mood, prior to starting medication having hallucinations, delusions, and wandering on unit. Transferred to [facility] for permanent placement.    ER visit on 6/30/22 due to chest pain. Resident was watching a baseball game when he experienced left-sided chest pain.Treated by EMS with aspirin and nitroglycerin with slight relief. Reported pain worse with taking deep breaths and related to anxiety. EKG showed atrial fibrillation with controlled rate;  chronic.  Chest CT showed 4.6 cm ascending thoracic aortic aneurysm, but was negative for plumonary embolism. Cardiothoracic surgery consulted given size of aneurysm and lack of other connective tissue disorder recommended outpatient follow-up with repeat chest CT and echocardiogram in 3 months to evaluate for progression. Chest x-ray showed left basilar pneumonia and chest CT showed fluid-filled bronchi bilateral lower lobes. Labs significant for negative troponin, ACS excluded. ER physician noted tenderness over left chest wall and cough over last month. Hx of dysphagia and increased aspiration risk. Symptoms most likely consistent with pneumonia discharged back to assisted living facility with Augmentin.    Repeat CXR on 8/4/22 due to subjective shortness of breath showed possible left lower lobe patchy opacification, as well as low lung volumes with vascular crowding and basilar atelectasis. Dr. Quintana started Augmentin. Symptoms improved.    On 9/13/22 prostate biopsy canceled due to hyponatremia. Subsequently hospitalized at [facility] on 9/30/22 with fall and confusion, found to have severe hyponatremia (Na+ 114). Prior to hospitalization had loose stools and poor intake in setting of prophylactic antibiotic (Bactrim) prescribed prior to planned prostate biospy which was canceled due to low sodium (at 129). Started on IVF due to concern for hypovolemic hyponatremia with some improvement. 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 Treated with 1.2 L/day fluid restriction and sodium improved. Confusion improved with treatment of hyponatremia.   Also noted to have back pain after fall. Lumbar CT showed age indeterminate compression deformity of L2-L5 with chronic mild compression deformity at superior endplate of L1. Neurosurgery consulted and recommended conservative mgmt with brace. Recommended PT in TCU at hospital discharge due to difficult with ambulation.   Medications for  other chronic conditions continued without change.  Interval Echo completed, with EF 55%, right ventricle mildly dilated with mildy decreased systolic function, aortic valve mild-moderate stenosis, ascending aorta moderately dilated.     Resident recovered at [facility] from 10/6 to 11/03/22. Pain controlled with Tylenol and gabapentin.   Wearing TLSO when out of bed.  Hyponatremia felt to be due to volume depletion and SIADH. Improved and able to discontinue fluid restriction. Discharged back to [facility].    Underwent prostate biopsy 1/11/2023. Pathology positive for Haily 7 prostate cancer, plans to start radiation therapy in coming weeks.    (C61) Prostate Cancer  Comment: Ongoing issue.  Prostate biopsy 1/11/2023,pathology positive for Haily 7 prostate cancer.   Stopped Trospium due to anticholinergic side effects, no change in urinary symptoms.  Followed by Dr. Niraj Larry [facility].  Plan:  - Radiation therapy without androgen deprivation with Dr. Fleming    (E22.2) SIADH (syndrome of inappropriate ADH production) (H)  (E87.1) Hyponatremia    Comment: Stable.  Previous hyponatremia multifactorial, occurred in setting of SIADH, dose increase of selective serotonin reuptake inhibitor (Celexa), Bactrim, hypovolemia.  Stopped Celexa 9/29/22, now on Mirtazapine.  Sodium   Date Value Ref Range Status   01/05/2023 137 133 - 144 mmol/L Final   Plan:   - Avoid medications which could exacerbate hyponatremia   - Avoid excessive free water intake  - Monitor serial BMP    (Z87.81) Hx of compression fracture of spine  (R26.9) Abnormal gait  (M15.9) OA Multiple Sites  Comment: Stable.  No pain now due to compression fracture, completed course of calcitonin   DJD multiple sites.   Hx of T8-12 pedicle screw fixation with cement by Dr. Behzad Sabit on 8/4/20.  Hx of multiple spinal compression fractures per chart review in setting of recurrent falls.   Looks to have been on Fosamax in past.   Plan:  - Continue Tylenol  500 mg PO BID and BID PRN  - Continue gabapentin 200 mg at HS  - Neurosurgery follow-up and TLSO PRN  - Consider treatment for osteoporosis, would not be good candidate for bisphosphonate due to swallowing issues, consider endocrine referral after starts treatment for prostate cancer and cataract surgery    (F10.97) Dementia associated with alcoholism with mood disturbance (H)   (F10.21) Alcohol dependence in remission  (F43.21) Adjustment reaction with depression  (F41.1) JOSÉ MANUEL  Comment: Cognitive impairment in setting of history of years of ETOH abuse with recurrent falls and hospitalizations. Currently no access to ETOH and thus is abstinent.  Chronic low mood, suspect JOSÉ MANUEL with panic attacks/chest pain for many years.   On memory care unit due to safety concerns.  Plan:   - Continue mirtazpaine 15 mg PO q HS (started 11/17/22, dose increase 1/27/23)  - Continue Seroquel 12.5 mg BID and BID PRN for psychotic features - paranoid delusions. Previously on Zyprexa.  - Gabapentin 200 mg PO q HS and 100 mg BID PRN for anxiety and pain  - [facility] pysch counselor following  - Offered psychiatry referral in past, but pt/family prefer onsite care, [facility] follows  - Continues to benefit from supportive care in Memory Care skilled nursing setting  - Would like to move off memory care unit, referral given for repeat neuropsych testing help guide care, scheduled for 5/12/23     (R60.0) Lower extremity edema  Comment: Chronic, improved with compression wraps   Plan:   - Continue Velcro compression wraps  - Encourage resident to elevate legs and wear compression garments    (K22.70) 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  Reported recurrent coughing up blood,  but not recently.  Saw ENT in 2022, no explanation of symptoms.  Last esophagram May 2022 as above, presbyesophagus, proximal luminal narrowing.  Recent saw SLP 8/2022, no overt dysphagia.  Recurrent pneumonia, last 8/4/22.  CXR 10/1/22 without concern for PNA.  No increased cough, fever, or respiratory distress at present.  Plan:   - No symptoms at present, monitor clinically  - Continue swallowing strategies outlined by SLP    (I71.2) Thoracic aortic aneurysm without rupture (H) - 6/30/22 4.6 cm  (I70.0) Atherosclerosis of aorta - noted by vascular  Comment: Chronic  Noted in ER June 2022 and on outside records  Followed by vascular Dr. Rodriguez.  4.6 cm ascending thoracic aortic aneurysm since 2020.  Last Echo 9/30/22: ascending aorta is Moderately dilated. Sinus 4.8cm, ascending 4.4cm.  Mild - mod aortic valve stenosis.  1/5/23: LDL 60  Plan:   - Plan for interval Echo June 2023 given wish for no aggressive intervention or testing  - Follows with [facility] Dr. Rodriguez   - HR and BP control without medications at present     (D64.9) Normocytic Anemia  Comment: Chronic, mild.  Hgb 11.2 on 1/5/23.   No symptoms of acute bleed.  Plan:   - Follow serial Hgb    (K59.04) Chronic constipation  Comment: Chronic.   Having regular BMs.  Long hx of Levsin multiple times per day for abd pain, but stopped by urology when started trospium, no recurrent pain.   Having regular BMs.   Plan:   - Chronic polyethylene glycol (MIRALAX) 17 GM/Dose powder; 1-2 capfuls in 8 oz of ORANGE JUICE PO one time each morning and 1-2 capfuls 8 oz of ORANGE JUICE PO one time daily PRN for constipation.   - GI following     (I48.91) Atrial fibrillation (H)  Comment: Chronic  Not anticoagulated due to ETOH use and recurrent falls, rate controlled off medication.  [id number]: 3 (age, HTN), 3.2% risk of stroke per year  Plan:   - Monitor VS and clinical status  - Would not add AC due to falls   - Consider cardiology follow-up after  procedure, family has wanted to hold off due to multiple specialist appointemnts.    (I10) Essential hypertension, benign  Comment: Chronic  BP at goal of < 150/90 given age and comorbid conditions  Stopped ASA due to hemoptysis  BP Readings from Last 3 Encounters:   02/08/23 136/64   01/18/23 (!) 149/84   01/12/23 132/66   Plan:   - Monitor routine VS and labs off anti-hypertensive medications    (M15.9) OA Multiple Sites  (M81.0) Osteoporosis  (Z87.81) History of vertebral fracture  (R26.9) Abnormal gait  Comment: Chronic.  DJD multiple sites and hx of multiple spinal compression fractures per chart review in setting of recurrent falls. Ambulates with 4WW. No recent falls.  Plan:   - Tylenol 500 mg BID and BID PRN for pain  - Falls precautions 4WW for ambulation   - Discuss bisphosphonate vs injectable medication for osteoporosis, consider endocrine referral     (E66.09,  Z68.35) Class 2 obesity  Comment:  Body mass index is 35.64 kg/m  with cardiovascular diease and OA.  Plan:   - Increases risk of complications with anesthesia    Review of Systems  10 point ROS of systems including Constitutional, Eyes, Respiratory, Cardiovascular, Gastroenterology, Genitourinary, Integumentary, Musculoskeletal, Psychiatric were all negative except for pertinent positives noted in my HPI.    Patient Active Problem List    Diagnosis Date Noted     Prostate cancer (H) - Dx January 2023 01/26/2023     Priority: Medium     Atherosclerosis of aorta (H) 01/08/2023     Priority: Medium     Alcohol dependence in remission (H) 01/08/2023     Priority: Medium     Compression fracture of lumbar vertebra -compression deformity of L2-L5 with chronic mild compression deformity at superior endplate of L1. 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 06/02/2022      Priority: Medium     Alcoholic hepatitis without ascites 03/30/2022     Priority: Medium     Repeated falls 03/30/2022     Priority: Medium     Coronary atherosclerosis of native coronary artery 03/30/2022     Priority: Medium     Anemia 03/30/2022     Priority: Medium     Other idiopathic peripheral autonomic neuropathy 03/30/2022     Priority: Medium     Age-related osteoporosis without current pathological fracture 03/30/2022     Priority: Medium     Constipation 03/30/2022     Priority: Medium     Edema 03/30/2022     Priority: Medium     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  2/2/21       Generalized anxiety disorder 04/27/2020     Priority: Medium     Gastro-esophageal reflux disease without esophagitis 04/27/2020     Priority: Medium     Mild cognitive impairment 08/12/2019     Priority: Medium     Formatting of this note might be different from the original.  NON-AMNESTIC TYPE       Impaired gait and mobility 03/14/2019     Priority: Medium     Other insomnia 03/14/2019     Priority: Medium     Physical deconditioning 03/14/2019     Priority: Medium     Obesity (BMI 30-39.9) 09/03/2015     Priority: Medium     Chronic alcohol use 06/11/2015     Priority: Medium     Formatting of this note might be different from the original.  2/26/2019: serious fall at home with multiple vertebral fractures.  BAL 0.414% on admission.  Denies that he drinks much.  12/18/2018: hospitalized for fall due to alcohol intoxication.  BAL 0.34% on admission.  9/16/2018: hospitalized for injuries from fall due to alcohol intoxication.  BAL 0.34% on admission       Osteoarthritis of both knees 05/13/2015     Priority: Medium     Pharyngeal dysphagia 05/13/2015     Priority: Medium     Formatting of this note might be  different from the original.  Likely secondary to GERD with esophagitis, on PPI and H2 blocker with notable improvement, EGD 10/5/15.        Past Medical History:   Diagnosis Date     Age-related osteoporosis without current pathological fracture 03/30/2022     Alcohol abuse 11/19/2017     Alcohol dependence with withdrawal (H)      Alcoholism (H)      Back pain      Backache 12/19/2018     CAD (coronary artery disease)      Chronic a-fib (H)      Chronic alcohol use 06/11/2015    Formatting of this note might be different from the original. 2/26/2019: serious fall at home with multiple vertebral fractures.  BAL 0.414% on admission.  Denies that he drinks much. 12/18/2018: hospitalized for fall due to alcohol intoxication.  BAL 0.34% on admission. 9/16/2018: hospitalized for injuries from fall due to alcohol intoxication.  BAL 0.34% on admission     Chronic atrial fibrillation (H) 07/15/2016    Formatting of this note might be different from the original. 2/2019: Multiple falls so started on aspirin only.  Then aspirin stopped due to GI bleed.     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 2/2/21     HTN (hypertension)      Impaired gait and mobility 03/14/2019     Mild cognitive impairment 08/12/2019    Formatting of this note might be different from the original. NON-AMNESTIC TYPE     Mild TBI (traumatic brain injury) 03/14/2019     Mumps      Obesity (BMI 30-39.9) 09/03/2015     Osteoarthritis      Osteoarthritis of both knees 05/13/2015     Osteoporosis      Other  idiopathic peripheral autonomic neuropathy 03/30/2022     Other insomnia 03/14/2019     Other seizures (H) 03/30/2022     Palpitations      Peripheral neuropathy      Pharyngeal dysphagia 05/13/2015    Formatting of this note might be different from the original. Likely secondary to GERD with esophagitis, on PPI and H2 blocker with notable improvement, EGD 10/5/15.     Physical deconditioning 03/14/2019     Recurrent major depressive disorder (H) 03/30/2022     Rhabdomyolysis      Thrombocytopenia (H)      Urination disorder      Weakness 04/27/2020     Past Surgical History:   Procedure Laterality Date     BIOPSY PROSTATE TRANSRECTAL N/A 1/11/2023    Procedure: PROSTATE BIOPSY, RECTAL APPROACH;  Surgeon: Niraj Larry MD;  Location:  OR     COLONOSCOPY       ESOPHAGOSCOPY, GASTROSCOPY, DUODENOSCOPY (EGD), COMBINED N/A 06/01/2022    Procedure: ESOPHAGOGASTRODUODENOSCOPY (EGD) mngi with biopsies using jumbo cold biopsy forceps;  Surgeon: David Springer MD;  Location:  GI     left hip replacement  2010     left shoulder replacement  2016     ORTHOPEDIC SURGERY       SPINE SURGERY      done in [place]     TONSILLECTOMY       TRANSRECTAL ULTRASONIC SONOGRAM N/A 1/11/2023    Procedure: TRANSRECTAL ULTRASOUND;  Surgeon: Niraj Larry MD;  Location:  OR     Current Outpatient Medications   Medication Sig Dispense Refill     acetaminophen (TYLENOL) 500 MG tablet Take 1 tablet (500 mg) by mouth 2 times daily. May also take 1 tablet (500 mg) 2 times daily as needed for mild pain. 60 tablet 98     alum & mag hydroxide-simethicone (MAALOX MAX) 400-400-40 MG/5ML SUSP suspension Take 30 mLs by mouth every 6 hours as needed for indigestion 355 mL 3     calcium carbonate 750 MG CHEW Take 750 mg by mouth as needed (Self-administers)       DEEP SEA NASAL SPRAY 0.65 % nasal spray INHALE 2 SPRAYS IN EACH NOSTRIL ONCE DAILY 44 mL 97     gabapentin (NEURONTIN) 100 MG capsule Take 2 capsules (200 mg) by mouth At  "Bedtime 180 capsule 97     gabapentin (NEURONTIN) 100 MG capsule Take 1 capsule (100 mg) by mouth 2 times daily as needed (Anxiety) 30 capsule 98     guaiFENesin (ROBITUSSIN) 100 MG/5ML liquid GIVE 10ML (200 MG) BY MOUTH TWICE DAILY;AND TWICE DAILY AS NEEDED FOR COUGH 473 mL 97     menthol-zinc oxide (CALMOSEPTINE) 0.44-20.6 % OINT ointment Apply 1 g topically 2 times daily. May also apply 1 g 2 times daily as needed for skin protection. 113 g 98     mirtazapine (REMERON) 15 MG tablet Take 1 tablet (15 mg) by mouth At Bedtime 30 tablet 98     nystatin (MYCOSTATIN) 419353 UNIT/GM external powder Apply topically 2 times daily       omeprazole (PRILOSEC) 40 MG DR capsule TAKE 1 CAPSULE BY MOUTH TWICE DAILY 180 capsule 3     polyethylene glycol (MIRALAX) 17 GM/Dose powder MIX 2 CAPFULS IN 8OZ OF ORANGE JUICE  IN THE MORNING;AND MAY MIX 2 CAPFULS ONCE DAILY AS NEEDED FOR CONSTIPATION. MIX IN FRONT OF PT. HOLD FOR LOOSE STOOL. OK TO GIVE 1 CAPFUL IF RESIDENT REFUSES 2 CAPFULS. 510 g 97     polyvinyl alcohol-povidone PF (REFRESH) 1.4-0.6 % ophthalmic solution 1 drop every 4 hours as needed for irritation       QUEtiapine (SEROQUEL) 25 MG tablet Take 0.5 tablets (12.5 mg) by mouth 2 times daily. May also take 0.5 tablets (12.5 mg) every 12 hours as needed (anxiety). 60 tablet 11     diazepam (VALIUM) 5 MG tablet Take 1 tablet (5 mg) by mouth every 6 hours as needed (prior to CT scan) 1 tablet 0       Allergies   Allergen Reactions     Ativan [Lorazepam]         Social History     Tobacco Use     Smoking status: Never     Smokeless tobacco: Never   Substance Use Topics     Alcohol use: Not Currently     Comment: not since December 2021        Objective     /64   Pulse 74   Temp 97.8  F (36.6  C)   Resp 20   Ht 1.727 m (5' 8\")   Wt 106.3 kg (234 lb 6.4 oz)   SpO2 97%   BMI 35.64 kg/m      Physical Exam  GENERAL APPEARANCE:  Alert, in no distress, seated in recliner chair.  EYES: Sclera clear and conjunctiva " normal, no discharge, pupils 2+ equal, around and reactive to light BL. EOM intact BL.   ENT:  Mouth normal, moist mucous membranes, upper partial denture, missing lower teeth soft pallet and uvula with symmetric rise, hearing acuity grossly decreased BL, nares symmetrical and patent without drainage.  NECK:  Nontender, supple, symmetrical; no cervical adenopathy, masses or thyromegaly, trachea midline.  RESP:  Non-labored breathing, palpation of chest normal, no chest wall tenderness, no respiratory distress, Lung sounds clear, patient is on room air.  CV: Rate and rhythm irregular, no murmur, no rub or gallop.  VASCULAR: 2+ edema bilateral lower extremities, not wearing Velcro compression wraps.   ABDOMEN:  Normal bowel sounds, soft, nontender, no grimacing or guarding with palpation,   + umblical hernia, which is soft/non-tender.   M/S:   Gait and station ambulates independently with walker around apartment.   SKIN:  No lesions, rashes, or ecchymosis on limited exam as fully dressed. No increased warmth, skin is dry and non-tender.  NEURO: Cranial nerves II-XII grossly intact except hearing, no facial asymmetry, follows simple commands, moves all extremities symmetrically, normal tone, no tremor.  PSYCH: Awake and alert, speech fluent,  insight and judgement impaired, memory impaired - circular conservation at times, cooperative.    Recent Labs   Lab Test 01/05/23  1000 12/01/22  1020 10/19/22  1225 05/12/22  1220 04/28/22  0655 04/07/22  0735   HGB 11.2*  --  12.3*   < > 12.0* 11.7*     --  306   < > 199 203    139 137   < > 139 138   POTASSIUM 4.2 4.4 4.4   < > 4.4 4.3   CR 0.68 0.59* 0.60*   < > 0.68 0.76   A1C  --   --   --   --  5.5 5.6    < > = values in this interval not displayed.      Diagnostics:  Labs pending at this time.  Results will be reviewed when available.   No EKG required for low risk surgery (cataract, skin procedure, breast biopsy, etc).     Most Recent EKGs in Epic:      EXAM:  XR CHEST PORTABLE 1 VIEW  LOCATION: [facility]  DATE/TIME: 10/01/2022, 1:29 PM     INDICATION: Hypoxia, wheezing.  COMPARISON: 09/14/2022.                                                                   IMPRESSION: Question some vascular congestion, which could indicate congestive heart failure. Heart size, similar to previous. No consolidation.    Echo 10/03/2022  Interpretation Summary     1. The left ventricle is normal in structure, function and size. The visual  ejection fraction is estimated at 55%.  2. The right ventricle is mildly dilated. Mildly decreased right ventricular  systolic function   3. Aortic valve visually appears to have mild (to moderate) stenosis, however  gradients are in the high-normal range.  4. 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 Sinus 4.8cm, ascending 4.4cm.    Rhythm: Sinus rhythm was noted.     Echo 8/2020 from [place]: EF 65%, normal RV, calcified AV with likely  underestimated gradient, sinus 4.5cm, ascending aorta 4.1cm.    Revised Cardiac Risk Index (RCRI):  The patient has the following serious cardiovascular risks for perioperative complications:   - Possible Coronary Artery Disease (MI, positive stress test, angina, Qs on EKG) = 1 point vs No serious cardiac risks = 0 points     RCRI Interpretation: 1 point: Class II (low risk - 0.9% complication rate)    Signed Electronically by: MARICHUY Basilio CNP  Copy of this evaluation report is provided to requesting physician.

## 2023-02-09 NOTE — LETTER
2/9/2023        RE: Jamie Valdivia  C/o Coral De La Garza  [address]  [place] [address]        [facility]  [address] [place] [address]  Phone: [phone]  Fax: [phone]  Primary Provider: Sylvia Stein  Pre-op Performing Provider: SYLVIA STEIN    PREOPERATIVE EVALUATION:  Today's date: 2/9/2023    Jamie Valdivia is a 82 year old male PMH significant for GERD with esophagitis, OA BL knees, pharyngeal dysphagia, edema, abdominal wall hernia, chronic atrial fibrillation, HTN, osteoporosis with hx of vertebral fracture, dementia, anemia, hx of COVID-19 who presents for a preoperative evaluation.    Surgical Information:  Surgery/Procedure: Right Eye Cataract Surgery   Surgery Location: [facility]   Surgeon: Dr. Eleonora Dai  Surgery Date: 3/8/23  Time of Surgery: TBA   Where patient plans to recover: Other: Memory Care long-term  Fax number for surgical facility: Fax: [phone]  [facility] [phone]    Type of Anesthesia Anticipated: Local with MAC    Assessment & Plan     The proposed surgical procedure is considered LOW risk.    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)        Hx of compression fracture of spine        Abnormal gait        Primary osteoarthritis involving multiple joints        Adjustment reaction with anxiety and depression        JOSÉ MANUEL (generalized anxiety disorder)        Lower extremity edema        Thoracic aortic aneurysm without  rupture, unspecified part        Normocytic anemia        Chronic constipation        Atrial fibrillation, unspecified type (H)        Essential hypertension, benign        Osteoporosis, unspecified osteoporosis type, unspecified pathological fracture presence        History of vertebral fracture        Class 2 severe obesity due to excess calories with serious comorbidity and body mass index (BMI) of 35.0 to 35.9 in adult (H)            Preop Questions 1/8/2023   1. Have you ever had a heart attack or stroke? UNKNOWN - history of coronary atherosclerosis, long history of recurrent chest pain episodes in setting of anxiety, see ER visit 6/30/22, EKG showed no overvet ischemic changes, Troponin negative.  - CORONARY ARTERY CALCIFICATION: Severe on chest CT 6/30/22  - Ascending thoracic aorta is aneurysmal at 4.6 cm.    2. Have you ever had surgery on your heart or blood vessels, such as a stent placement, a coronary artery bypass, or surgery on an artery in your head, neck, heart, or legs? No   3. Do you have chest pain with activity? No   4. Do you have a history of  heart failure? No   5. Do you currently have a cold, bronchitis or symptoms of other infection? No   6. Do you have a cough, shortness of breath, or wheezing? No   7. Do you or anyone in your family have previous history of blood clots? No   8. Do you or does anyone in your family have a serious bleeding problem such as prolonged bleeding following surgeries or cuts? UNKNOWN    9. Have you ever had problems with anemia or been told to take iron pills? YES - Chronic, mild anemia, Hgb 11-12      10. Have you had any abnormal blood loss such as black, tarry or bloody stools?    YES - Hx GI bleed  3/2019: acute drop in hgb. EGD showed ulceration of hypopharynx, felt to be responsible for GI bleed. No stomach bleeding or esophageal varices. Received pRBCs and hgb stabilized.  EGD on 6/1/22 showed normal stomach, duodenum, esophageal mucosa with short-segment  BE, started on PPI. Hx of hemoptysis and blood from nose, saw ENT unable to identify cause. No episodes of late.       11. Have you ever had a blood transfusion? YES   11a. Have you ever had a transfusion reaction? No   12. Are you willing to have a blood transfusion if it is medically needed before, during, or after your surgery? Yes   13. Have you or any of your relatives ever had problems with anesthesia? YES - Colonoscopy, when woke up, combative with nursing staff.   14. Do you have sleep apnea, excessive snoring or daytime drowsiness? No   14a. Do you have a CPAP machine? -   15. Do you have any artifical heart valves or other implanted medical devices like a pacemaker, defibrillator, or continuous glucose monitor? No   16. Do you have artificial joints? YES - L Hip (2010), L shoulder replacement (2016),  Underwent spinal surgery with hardware placement T8-12 pedicle screw fixation with cement by Dr. Behzad Sabit on 8/4/20   17. Are you allergic to latex? No      Risks and Recommendations:  The patient has the following additional risks and recommendations for perioperative complications:   - History of delirium or dementia   - Obesity:  Body mass index is 35.64 kg/m .  Cardiovascular:  - Would benefit from cardiology follow-up, hx as below, felt conditions chronic/stable  Pulmonary:    - Hx of recurrent pneumonia, no symptoms today or in last 3 months  Anemia/Bleeding/Clotting:    - Anemia and does not require treatment prior to surgery. Monitor hemoglobin postoperatively.  Social and Substance:    - Hx of alcohol abuse, current abstinent for alcohol as no access on locked [facility] care unit where he resides      Medication Instructions:  Patient is to take all scheduled medications on the day of surgery EXCEPT for modifications listed below:   - Miralax  - Guaifenesin     RECOMMENDATION:  APPROVAL GIVEN to proceed with proposed procedure, without further diagnostic evaluation.    Subjective     HPI related to  upcoming procedure:   Family requests pre-operative exam for upcoming staged cataract surgery. Vision is blurry, wears glasses for reading.  R eye scheduled for 3/8, L eye scheduled for 3/22.     Health Care Directive:  Patient has a Health Care Directive on file    Preoperative Review of :   reviewed - controlled substances reflected in medication list.    Chronic Health Concerns  Resident of [facility] since March 2022.     Hospitalized at [facility] form 11/1 to 11/5/21 with generalized weakness, dehydration, and concern for UTI but culture came back negative. Symptoms likely related to chronic alcoholism with alcohol withdrawal. Chronic cognitive impairment in setting of long history of alcoholism, hx of hallucinations. Multiple hospitalizations over preceding months per family due to unsafe living situation, well known to ED staff. Spent time in SNF ([facility]) around September 2021, details unclear, but seem to have suffered spinal fracture.     Hospitalized from 12/17 to 12/23/21 at [facility] for falls, weakness, alcoholism, and alcoholic hepatitis; discharge summary not available. Wife (who was primary care giver) was having medical issues and needed neurology care in the [facility]. Family felt resident unable to safely be left alone so they called EMS to transport to hospital for ongoing care/ETOH detox. Ultimately transferred to [facility] where wife was convalescing.      Remained at [facility] from 12/23 and 3/15/22 with largely uneventful stay. Zyprexa dose increased with good effect on mood, prior to starting medication having hallucinations, delusions, and wandering on unit. Transferred to [facility] for permanent placement.    ER visit on 6/30/22 due to chest pain. Resident was watching a baseball game when he experienced left-sided chest pain.Treated by EMS with aspirin and nitroglycerin with slight relief. Reported pain worse with  taking deep breaths and related to anxiety. EKG showed atrial fibrillation with controlled rate; chronic.  Chest CT showed 4.6 cm ascending thoracic aortic aneurysm, but was negative for plumonary embolism. Cardiothoracic surgery consulted given size of aneurysm and lack of other connective tissue disorder recommended outpatient follow-up with repeat chest CT and echocardiogram in 3 months to evaluate for progression. Chest x-ray showed left basilar pneumonia and chest CT showed fluid-filled bronchi bilateral lower lobes. Labs significant for negative troponin, ACS excluded. ER physician noted tenderness over left chest wall and cough over last month. Hx of dysphagia and increased aspiration risk. Symptoms most likely consistent with pneumonia discharged back to assisted living facility with Augmentin.    Repeat CXR on 8/4/22 due to subjective shortness of breath showed possible left lower lobe patchy opacification, as well as low lung volumes with vascular crowding and basilar atelectasis. Dr. Quintana started Augmentin. Symptoms improved.    On 9/13/22 prostate biopsy canceled due to hyponatremia. Subsequently hospitalized at [facility] on 9/30/22 with fall and confusion, found to have severe hyponatremia (Na+ 114). Prior to hospitalization had loose stools and poor intake in setting of prophylactic antibiotic (Bactrim) prescribed prior to planned prostate biospy which was canceled due to low sodium (at 129). Started on IVF due to concern for hypovolemic hyponatremia with some improvement. 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 Treated with 1.2 L/day fluid restriction and sodium improved. Confusion improved with treatment of hyponatremia.   Also noted to have back pain after fall. Lumbar CT showed age indeterminate compression deformity of L2-L5 with chronic mild compression deformity at superior endplate of L1. Neurosurgery consulted and recommended conservative mgmt with brace.  Recommended PT in TCU at hospital discharge due to difficult with ambulation.   Medications for other chronic conditions continued without change.  Interval Echo completed, with EF 55%, right ventricle mildly dilated with mildy decreased systolic function, aortic valve mild-moderate stenosis, ascending aorta moderately dilated.     Resident recovered at [facility] from 10/6 to 11/03/22. Pain controlled with Tylenol and gabapentin.   Wearing TLSO when out of bed.  Hyponatremia felt to be due to volume depletion and SIADH. Improved and able to discontinue fluid restriction. Discharged back to [facility].    Underwent prostate biopsy 1/11/2023. Pathology positive for [place] 7 prostate cancer, plans to start radiation therapy in coming weeks.    (C61) Prostate Cancer  Comment: Ongoing issue.  Prostate biopsy 1/11/2023,pathology positive for [place] 7 prostate cancer.   Stopped Trospium due to anticholinergic side effects, no change in urinary symptoms.  Followed by Dr. Niraj Larry [facility].  Plan:  - Radiation therapy without androgen deprivation with Dr. Fleming    (E22.2) SIADH (syndrome of inappropriate ADH production) (H)  (E87.1) Hyponatremia    Comment: Stable.  Previous hyponatremia multifactorial, occurred in setting of SIADH, dose increase of selective serotonin reuptake inhibitor (Celexa), Bactrim, hypovolemia.  Stopped Celexa 9/29/22, now on Mirtazapine.  Sodium   Date Value Ref Range Status   01/05/2023 137 133 - 144 mmol/L Final   Plan:   - Avoid medications which could exacerbate hyponatremia   - Avoid excessive free water intake  - Monitor serial BMP    (Z87.81) Hx of compression fracture of spine  (R26.9) Abnormal gait  (M15.9) OA Multiple Sites  Comment: Stable.  No pain now due to compression fracture, completed course of calcitonin   DJD multiple sites.   Hx of T8-12 pedicle screw fixation with cement by Dr. Behzad Sabit on 8/4/20.  Hx of multiple spinal compression fractures per chart review in  setting of recurrent falls.   Looks to have been on Fosamax in past.   Plan:  - Continue Tylenol 500 mg PO BID and BID PRN  - Continue gabapentin 200 mg at HS  - Neurosurgery follow-up and TLSO PRN  - Consider treatment for osteoporosis, would not be good candidate for bisphosphonate due to swallowing issues, consider endocrine referral after starts treatment for prostate cancer and cataract surgery    (F10.97) Dementia associated with alcoholism with mood disturbance (H)   (F10.21) Alcohol dependence in remission  (F43.21) Adjustment reaction with depression  (F41.1) JOSÉ MANUEL  Comment: Cognitive impairment in setting of history of years of ETOH abuse with recurrent falls and hospitalizations. Currently no access to ETOH and thus is abstinent.  Chronic low mood, suspect JOSÉ MANUEL with panic attacks/chest pain for many years.   On memory care unit due to safety concerns.  Plan:   - Continue mirtazpaine 15 mg PO q HS (started 11/17/22, dose increase 1/27/23)  - Continue Seroquel 12.5 mg BID and BID PRN for psychotic features - paranoid delusions. Previously on Zyprexa.  - Gabapentin 200 mg PO q HS and 100 mg BID PRN for anxiety and pain  - ACP pysch counselor following  - Offered psychiatry referral in past, but pt/family prefer onsite care, [facility] PharmD follows  - Continues to benefit from supportive care in Memory Care [facility] setting  - Would like to move off memory care unit, referral given for repeat neuropsych testing help guide care, scheduled for 5/12/23     (R60.0) Lower extremity edema  Comment: Chronic, improved with compression wraps   Plan:   - Continue Velcro compression wraps  - Encourage resident to elevate legs and wear compression garments    (K22.70) 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  Reported recurrent coughing up blood, but not recently.  Saw ENT in 2022, no explanation of symptoms.  Last esophagram May 2022 as above, presbyesophagus, proximal luminal narrowing.  Recent saw SLP 8/2022, no overt dysphagia.  Recurrent pneumonia, last 8/4/22.  CXR 10/1/22 without concern for PNA.  No increased cough, fever, or respiratory distress at present.  Plan:   - No symptoms at present, monitor clinically  - Continue swallowing strategies outlined by SLP    (I71.2) Thoracic aortic aneurysm without rupture (H) - 6/30/22 4.6 cm  (I70.0) Atherosclerosis of aorta - noted by vascular  Comment: Chronic  Noted in ER June 2022 and on outside records  Followed by vascular Dr. Rodriguez.  4.6 cm ascending thoracic aortic aneurysm since 2020.  Last Echo 9/30/22: ascending aorta is Moderately dilated. Sinus 4.8cm, ascending 4.4cm.  Mild - mod aortic valve stenosis.  1/5/23: LDL 60  Plan:   - Plan for interval Echo June 2023 given wish for no aggressive intervention or testing  - Follows with [facility] Dr. Rodriguez   - HR and BP control without medications at present     (D64.9) Normocytic Anemia  Comment: Chronic, mild.  Hgb 11.2 on 1/5/23.   No symptoms of acute bleed.  Plan:   - Follow serial Hgb    (K59.04) Chronic constipation  Comment: Chronic.   Having regular BMs.  Long hx of Levsin multiple times per day for abd pain, but stopped by urology when started trospium, no recurrent pain.   Having regular BMs.   Plan:   - Chronic polyethylene glycol (MIRALAX) 17 GM/Dose powder; 1-2 capfuls in 8 oz of ORANGE JUICE PO one time each morning and 1-2 capfuls 8 oz of ORANGE JUICE PO one time daily PRN for constipation.   - GI following     (I48.91) Atrial fibrillation (H)  Comment: Chronic  Not anticoagulated due to ETOH use and recurrent falls, rate controlled off medication.  [id number]: 3 (age, HTN), 3.2% risk of stroke per year  Plan:   - Monitor VS  and clinical status  - Would not add AC due to falls   - Consider cardiology follow-up after procedure, family has wanted to hold off due to multiple specialist appointemnts.    (I10) Essential hypertension, benign  Comment: Chronic  BP at goal of < 150/90 given age and comorbid conditions  Stopped ASA due to hemoptysis  BP Readings from Last 3 Encounters:   02/08/23 136/64   01/18/23 (!) 149/84   01/12/23 132/66   Plan:   - Monitor routine VS and labs off anti-hypertensive medications    (M15.9) OA Multiple Sites  (M81.0) Osteoporosis  (Z87.81) History of vertebral fracture  (R26.9) Abnormal gait  Comment: Chronic.  DJD multiple sites and hx of multiple spinal compression fractures per chart review in setting of recurrent falls. Ambulates with 4WW. No recent falls.  Plan:   - Tylenol 500 mg BID and BID PRN for pain  - Falls precautions 4WW for ambulation   - Discuss bisphosphonate vs injectable medication for osteoporosis, consider endocrine referral     (E66.09,  Z68.35) Class 2 obesity  Comment:  Body mass index is 35.64 kg/m  with cardiovascular diease and OA.  Plan:   - Increases risk of complications with anesthesia    Review of Systems  10 point ROS of systems including Constitutional, Eyes, Respiratory, Cardiovascular, Gastroenterology, Genitourinary, Integumentary, Musculoskeletal, Psychiatric were all negative except for pertinent positives noted in my HPI.    Patient Active Problem List    Diagnosis Date Noted     Prostate cancer (H) - Dx January 2023 01/26/2023     Priority: Medium     Atherosclerosis of aorta (H) 01/08/2023     Priority: Medium     Alcohol dependence in remission (H) 01/08/2023     Priority: Medium     Compression fracture of lumbar vertebra -compression deformity of L2-L5 with chronic mild compression deformity at superior endplate of L1. 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 06/02/2022     Priority: Medium     Alcoholic hepatitis without ascites 03/30/2022     Priority: Medium     Repeated falls 03/30/2022     Priority: Medium     Coronary atherosclerosis of native coronary artery 03/30/2022     Priority: Medium     Anemia 03/30/2022     Priority: Medium     Other idiopathic peripheral autonomic neuropathy 03/30/2022     Priority: Medium     Age-related osteoporosis without current pathological fracture 03/30/2022     Priority: Medium     Constipation 03/30/2022     Priority: Medium     Edema 03/30/2022     Priority: Medium     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  2/2/21       Generalized anxiety disorder 04/27/2020     Priority: Medium     Gastro-esophageal reflux disease without esophagitis 04/27/2020     Priority: Medium     Mild cognitive impairment 08/12/2019     Priority: Medium     Formatting of this note might be different from the original.  NON-AMNESTIC TYPE       Impaired gait and mobility 03/14/2019     Priority: Medium     Other insomnia 03/14/2019     Priority: Medium     Physical deconditioning 03/14/2019     Priority: Medium     Obesity (BMI 30-39.9) 09/03/2015     Priority: Medium     Chronic alcohol use 06/11/2015     Priority: Medium     Formatting of this note might be different from the original.  2/26/2019: serious fall at home with multiple vertebral fractures.  BAL 0.414% on admission.  Denies that he drinks much.  12/18/2018: hospitalized for fall due to alcohol intoxication.  BAL 0.34% on admission.  9/16/2018: hospitalized for injuries from fall due to alcohol intoxication.  BAL 0.34% on admission       Osteoarthritis of both knees 05/13/2015     Priority:  Medium     Pharyngeal dysphagia 05/13/2015     Priority: Medium     Formatting of this note might be different from the original.  Likely secondary to GERD with esophagitis, on PPI and H2 blocker with notable improvement, EGD 10/5/15.        Past Medical History:   Diagnosis Date     Age-related osteoporosis without current pathological fracture 03/30/2022     Alcohol abuse 11/19/2017     Alcohol dependence with withdrawal (H)      Alcoholism (H)      Back pain      Backache 12/19/2018     CAD (coronary artery disease)      Chronic a-fib (H)      Chronic alcohol use 06/11/2015    Formatting of this note might be different from the original. 2/26/2019: serious fall at home with multiple vertebral fractures.  BAL 0.414% on admission.  Denies that he drinks much. 12/18/2018: hospitalized for fall due to alcohol intoxication.  BAL 0.34% on admission. 9/16/2018: hospitalized for injuries from fall due to alcohol intoxication.  BAL 0.34% on admission     Chronic atrial fibrillation (H) 07/15/2016    Formatting of this note might be different from the original. 2/2019: Multiple falls so started on aspirin only.  Then aspirin stopped due to GI bleed.     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 2/2/21     HTN (hypertension)      Impaired gait and mobility 03/14/2019     Mild cognitive impairment 08/12/2019    Formatting of this note might be different from the original. NON-AMNESTIC TYPE     Mild TBI (traumatic brain injury) 03/14/2019     Mumps      Obesity (BMI 30-39.9)  09/03/2015     Osteoarthritis      Osteoarthritis of both knees 05/13/2015     Osteoporosis      Other idiopathic peripheral autonomic neuropathy 03/30/2022     Other insomnia 03/14/2019     Other seizures (H) 03/30/2022     Palpitations      Peripheral neuropathy      Pharyngeal dysphagia 05/13/2015    Formatting of this note might be different from the original. Likely secondary to GERD with esophagitis, on PPI and H2 blocker with notable improvement, EGD 10/5/15.     Physical deconditioning 03/14/2019     Recurrent major depressive disorder (H) 03/30/2022     Rhabdomyolysis      Thrombocytopenia (H)      Urination disorder      Weakness 04/27/2020     Past Surgical History:   Procedure Laterality Date     BIOPSY PROSTATE TRANSRECTAL N/A 1/11/2023    Procedure: PROSTATE BIOPSY, RECTAL APPROACH;  Surgeon: Niraj Larry MD;  Location:  OR     COLONOSCOPY       ESOPHAGOSCOPY, GASTROSCOPY, DUODENOSCOPY (EGD), COMBINED N/A 06/01/2022    Procedure: ESOPHAGOGASTRODUODENOSCOPY (EGD) mngi with biopsies using jumbo cold biopsy forceps;  Surgeon: David Springer MD;  Location:  GI     left hip replacement  2010     left shoulder replacement  2016     ORTHOPEDIC SURGERY       SPINE SURGERY      done in [place]     TONSILLECTOMY       TRANSRECTAL ULTRASONIC SONOGRAM N/A 1/11/2023    Procedure: TRANSRECTAL ULTRASOUND;  Surgeon: Niraj Larry MD;  Location:  OR     Current Outpatient Medications   Medication Sig Dispense Refill     acetaminophen (TYLENOL) 500 MG tablet Take 1 tablet (500 mg) by mouth 2 times daily. May also take 1 tablet (500 mg) 2 times daily as needed for mild pain. 60 tablet 98     alum & mag hydroxide-simethicone (MAALOX MAX) 400-400-40 MG/5ML SUSP suspension Take 30 mLs by mouth every 6 hours as needed for indigestion 355 mL 3     calcium carbonate 750 MG CHEW Take 750 mg by mouth as needed (Self-administers)       DEEP SEA NASAL SPRAY 0.65 % nasal spray INHALE 2 SPRAYS IN EACH  "NOSTRIL ONCE DAILY 44 mL 97     gabapentin (NEURONTIN) 100 MG capsule Take 2 capsules (200 mg) by mouth At Bedtime 180 capsule 97     gabapentin (NEURONTIN) 100 MG capsule Take 1 capsule (100 mg) by mouth 2 times daily as needed (Anxiety) 30 capsule 98     guaiFENesin (ROBITUSSIN) 100 MG/5ML liquid GIVE 10ML (200 MG) BY MOUTH TWICE DAILY;AND TWICE DAILY AS NEEDED FOR COUGH 473 mL 97     menthol-zinc oxide (CALMOSEPTINE) 0.44-20.6 % OINT ointment Apply 1 g topically 2 times daily. May also apply 1 g 2 times daily as needed for skin protection. 113 g 98     mirtazapine (REMERON) 15 MG tablet Take 1 tablet (15 mg) by mouth At Bedtime 30 tablet 98     nystatin (MYCOSTATIN) 468607 UNIT/GM external powder Apply topically 2 times daily       omeprazole (PRILOSEC) 40 MG DR capsule TAKE 1 CAPSULE BY MOUTH TWICE DAILY 180 capsule 3     polyethylene glycol (MIRALAX) 17 GM/Dose powder MIX 2 CAPFULS IN 8OZ OF ORANGE JUICE  IN THE MORNING;AND MAY MIX 2 CAPFULS ONCE DAILY AS NEEDED FOR CONSTIPATION. MIX IN FRONT OF PT. HOLD FOR LOOSE STOOL. OK TO GIVE 1 CAPFUL IF RESIDENT REFUSES 2 CAPFULS. 510 g 97     polyvinyl alcohol-povidone PF (REFRESH) 1.4-0.6 % ophthalmic solution 1 drop every 4 hours as needed for irritation       QUEtiapine (SEROQUEL) 25 MG tablet Take 0.5 tablets (12.5 mg) by mouth 2 times daily. May also take 0.5 tablets (12.5 mg) every 12 hours as needed (anxiety). 60 tablet 11     diazepam (VALIUM) 5 MG tablet Take 1 tablet (5 mg) by mouth every 6 hours as needed (prior to CT scan) 1 tablet 0       Allergies   Allergen Reactions     Ativan [Lorazepam]         Social History     Tobacco Use     Smoking status: Never     Smokeless tobacco: Never   Substance Use Topics     Alcohol use: Not Currently     Comment: not since December 2021        Objective     /64   Pulse 74   Temp 97.8  F (36.6  C)   Resp 20   Ht 1.727 m (5' 8\")   Wt 106.3 kg (234 lb 6.4 oz)   SpO2 97%   BMI 35.64 kg/m      Physical " Exam  GENERAL APPEARANCE:  Alert, in no distress, seated in recliner chair.  EYES: Sclera clear and conjunctiva normal, no discharge, pupils 2+ equal, around and reactive to light BL. EOM intact BL.   ENT:  Mouth normal, moist mucous membranes, upper partial denture, missing lower teeth soft pallet and uvula with symmetric rise, hearing acuity grossly decreased BL, nares symmetrical and patent without drainage.  NECK:  Nontender, supple, symmetrical; no cervical adenopathy, masses or thyromegaly, trachea midline.  RESP:  Non-labored breathing, palpation of chest normal, no chest wall tenderness, no respiratory distress, Lung sounds clear, patient is on room air.  CV: Rate and rhythm irregular, no murmur, no rub or gallop.  VASCULAR: 2+ edema bilateral lower extremities, not wearing Velcro compression wraps.   ABDOMEN:  Normal bowel sounds, soft, nontender, no grimacing or guarding with palpation,   + umblical hernia, which is soft/non-tender.   M/S:   Gait and station ambulates independently with walker around apartment.   SKIN:  No lesions, rashes, or ecchymosis on limited exam as fully dressed. No increased warmth, skin is dry and non-tender.  NEURO: Cranial nerves II-XII grossly intact except hearing, no facial asymmetry, follows simple commands, moves all extremities symmetrically, normal tone, no tremor.  PSYCH: Awake and alert, speech fluent,  insight and judgement impaired, memory impaired - circular conservation at times, cooperative.    Recent Labs   Lab Test 01/05/23  1000 12/01/22  1020 10/19/22  1225 05/12/22  1220 04/28/22  0655 04/07/22  0735   HGB 11.2*  --  12.3*   < > 12.0* 11.7*     --  306   < > 199 203    139 137   < > 139 138   POTASSIUM 4.2 4.4 4.4   < > 4.4 4.3   CR 0.68 0.59* 0.60*   < > 0.68 0.76   A1C  --   --   --   --  5.5 5.6    < > = values in this interval not displayed.      Diagnostics:  Labs pending at this time.  Results will be reviewed when available.   No EKG  required for low risk surgery (cataract, skin procedure, breast biopsy, etc).     Most Recent EKGs in [facility]:      EXAM: XR CHEST PORTABLE 1 VIEW  LOCATION: [facility]  DATE/TIME: 10/01/2022, 1:29 PM     INDICATION: Hypoxia, wheezing.  COMPARISON: 09/14/2022.                                                                   IMPRESSION: Question some vascular congestion, which could indicate congestive heart failure. Heart size, similar to previous. No consolidation.    Echo 10/03/2022  Interpretation Summary     1. The left ventricle is normal in structure, function and size. The visual  ejection fraction is estimated at 55%.  2. The right ventricle is mildly dilated. Mildly decreased right ventricular  systolic function   3. Aortic valve visually appears to have mild (to moderate) stenosis, however  gradients are in the high-normal range.  4. 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 Sinus 4.8cm, ascending 4.4cm.    Rhythm: Sinus rhythm was noted.     Echo 8/2020 from [place]: EF 65%, normal RV, calcified AV with likely  underestimated gradient, sinus 4.5cm, ascending aorta 4.1cm.    Revised Cardiac Risk Index (RCRI):  The patient has the following serious cardiovascular risks for perioperative complications:   - Possible Coronary Artery Disease (MI, positive stress test, angina, Qs on EKG) = 1 point vs No serious cardiac risks = 0 points     RCRI Interpretation: 1 point: Class II (low risk - 0.9% complication rate)    Signed Electronically by: MARICHUY Basilio CNP  Copy of this evaluation report is provided to requesting physician.            Sincerely,        MARICHUY Basilio CNP

## 2023-02-13 PROBLEM — E66.01 MORBID OBESITY (H): Status: ACTIVE | Noted: 2023-02-13

## 2023-02-13 NOTE — PATIENT INSTRUCTIONS
Jamie Valdivia  1940  ORDERS:  - 2/16/23: Check BMP dx hyponatremia, Hgb dx anemia  - Patient is to take all scheduled medications on the day of surgery (3/8) EXCEPT: Miralax, Guaifenesin   - Please follow other pre-op orders as per [facility] Instructions  Electronically signed by:   MARICHUY Basilio CNP  02/13/23 6:28 AM

## 2023-02-14 RX ORDER — GUAIFENESIN 200 MG/10ML
LIQUID ORAL
COMMUNITY
Start: 2023-02-14 | End: 2023-02-14

## 2023-02-15 ENCOUNTER — LAB REQUISITION (OUTPATIENT)
Dept: LAB | Facility: CLINIC | Age: 83
End: 2023-02-15
Payer: COMMERCIAL

## 2023-02-15 DIAGNOSIS — D64.9 ANEMIA, UNSPECIFIED: ICD-10-CM

## 2023-02-15 DIAGNOSIS — E22.2 SYNDROME OF INAPPROPRIATE SECRETION OF ANTIDIURETIC HORMONE (H): ICD-10-CM

## 2023-02-16 ENCOUNTER — TELEPHONE (OUTPATIENT)
Dept: GERIATRICS | Facility: CLINIC | Age: 83
End: 2023-02-16

## 2023-02-16 LAB
ANION GAP SERPL CALCULATED.3IONS-SCNC: 13 MMOL/L (ref 7–15)
BUN SERPL-MCNC: 13.2 MG/DL (ref 8–23)
CALCIUM SERPL-MCNC: 9.3 MG/DL (ref 8.8–10.2)
CHLORIDE SERPL-SCNC: 100 MMOL/L (ref 98–107)
CREAT SERPL-MCNC: 0.6 MG/DL (ref 0.67–1.17)
DEPRECATED HCO3 PLAS-SCNC: 23 MMOL/L (ref 22–29)
GFR SERPL CREATININE-BSD FRML MDRD: >90 ML/MIN/1.73M2
GLUCOSE SERPL-MCNC: 85 MG/DL (ref 70–99)
HGB BLD-MCNC: 11.8 G/DL (ref 13.3–17.7)
POTASSIUM SERPL-SCNC: 5.5 MMOL/L (ref 3.4–5.3)
SODIUM SERPL-SCNC: 136 MMOL/L (ref 136–145)

## 2023-02-16 PROCEDURE — 80048 BASIC METABOLIC PNL TOTAL CA: CPT | Mod: ORL | Performed by: NURSE PRACTITIONER

## 2023-02-16 PROCEDURE — 85018 HEMOGLOBIN: CPT | Mod: ORL | Performed by: NURSE PRACTITIONER

## 2023-02-16 PROCEDURE — P9604 ONE-WAY ALLOW PRORATED TRIP: HCPCS | Mod: ORL | Performed by: NURSE PRACTITIONER

## 2023-02-16 PROCEDURE — 36415 COLL VENOUS BLD VENIPUNCTURE: CPT | Mod: ORL | Performed by: NURSE PRACTITIONER

## 2023-02-16 NOTE — TELEPHONE ENCOUNTER
[facility] Lab Note     Provider: MARICHUY Basilio CNP  Facility: [facility] Type:  [place]    Allergies   Allergen Reactions     Ativan [Lorazepam]        Labs Reviewed by provider: Hgb and BMP    Verbal Order/Direction given by Provider:   Check potassium level on 2/23/23 dx hyperkalemia       Provider giving Order:  MARICHUY Basilio CNP    Verbal Order given to:  left on facility nursing line ([place])    Rosana Garcia RN

## 2023-02-22 ENCOUNTER — LAB REQUISITION (OUTPATIENT)
Dept: LAB | Facility: CLINIC | Age: 83
End: 2023-02-22
Payer: COMMERCIAL

## 2023-02-22 DIAGNOSIS — E87.1 HYPO-OSMOLALITY AND HYPONATREMIA: ICD-10-CM

## 2023-03-01 ENCOUNTER — LAB REQUISITION (OUTPATIENT)
Dept: LAB | Facility: CLINIC | Age: 83
End: 2023-03-01
Payer: COMMERCIAL

## 2023-03-01 DIAGNOSIS — E87.5 HYPERKALEMIA: ICD-10-CM

## 2023-03-02 DIAGNOSIS — F10.21 ALCOHOLISM IN REMISSION (H): ICD-10-CM

## 2023-03-02 DIAGNOSIS — R52 PAIN: Primary | ICD-10-CM

## 2023-03-02 LAB
ANION GAP SERPL CALCULATED.3IONS-SCNC: 10 MMOL/L (ref 7–15)
BUN SERPL-MCNC: 12.8 MG/DL (ref 8–23)
CALCIUM SERPL-MCNC: 9.1 MG/DL (ref 8.8–10.2)
CHLORIDE SERPL-SCNC: 103 MMOL/L (ref 98–107)
CREAT SERPL-MCNC: 0.61 MG/DL (ref 0.67–1.17)
DEPRECATED HCO3 PLAS-SCNC: 28 MMOL/L (ref 22–29)
GFR SERPL CREATININE-BSD FRML MDRD: >90 ML/MIN/1.73M2
GLUCOSE SERPL-MCNC: 102 MG/DL (ref 70–99)
POTASSIUM SERPL-SCNC: 4.5 MMOL/L (ref 3.4–5.3)
SODIUM SERPL-SCNC: 141 MMOL/L (ref 136–145)

## 2023-03-02 PROCEDURE — 36415 COLL VENOUS BLD VENIPUNCTURE: CPT | Mod: ORL | Performed by: NURSE PRACTITIONER

## 2023-03-02 PROCEDURE — 80048 BASIC METABOLIC PNL TOTAL CA: CPT | Mod: ORL | Performed by: NURSE PRACTITIONER

## 2023-03-02 PROCEDURE — P9604 ONE-WAY ALLOW PRORATED TRIP: HCPCS | Mod: ORL | Performed by: NURSE PRACTITIONER

## 2023-03-03 RX ORDER — GABAPENTIN 100 MG/1
CAPSULE ORAL
Qty: 360 CAPSULE | Refills: 97 | Status: SHIPPED | OUTPATIENT
Start: 2023-03-03 | End: 2023-05-11

## 2023-03-07 ENCOUNTER — ASSISTED LIVING VISIT (OUTPATIENT)
Dept: GERIATRICS | Facility: CLINIC | Age: 83
End: 2023-03-07
Payer: COMMERCIAL

## 2023-03-07 VITALS
RESPIRATION RATE: 21 BRPM | DIASTOLIC BLOOD PRESSURE: 82 MMHG | HEIGHT: 68 IN | HEART RATE: 58 BPM | BODY MASS INDEX: 35.37 KG/M2 | SYSTOLIC BLOOD PRESSURE: 146 MMHG | OXYGEN SATURATION: 95 % | TEMPERATURE: 98.6 F | WEIGHT: 233.4 LBS

## 2023-03-07 DIAGNOSIS — M15.0 PRIMARY OSTEOARTHRITIS INVOLVING MULTIPLE JOINTS: ICD-10-CM

## 2023-03-07 DIAGNOSIS — I10 ESSENTIAL HYPERTENSION, BENIGN: ICD-10-CM

## 2023-03-07 DIAGNOSIS — I48.91 ATRIAL FIBRILLATION, UNSPECIFIED TYPE (H): ICD-10-CM

## 2023-03-07 DIAGNOSIS — C61 PROSTATE CANCER (H): Primary | ICD-10-CM

## 2023-03-07 DIAGNOSIS — F41.1 GAD (GENERALIZED ANXIETY DISORDER): ICD-10-CM

## 2023-03-07 DIAGNOSIS — I71.20 THORACIC AORTIC ANEURYSM WITHOUT RUPTURE, UNSPECIFIED PART (H): ICD-10-CM

## 2023-03-07 DIAGNOSIS — F10.27 DEMENTIA ASSOCIATED WITH ALCOHOLISM WITH BEHAVIORAL DISTURBANCE (H): ICD-10-CM

## 2023-03-07 DIAGNOSIS — R13.13 PHARYNGEAL DYSPHAGIA: ICD-10-CM

## 2023-03-07 DIAGNOSIS — K22.70 BARRETT'S ESOPHAGUS WITHOUT DYSPLASIA: ICD-10-CM

## 2023-03-07 PROCEDURE — 99349 HOME/RES VST EST MOD MDM 40: CPT | Performed by: INTERNAL MEDICINE

## 2023-03-07 NOTE — PROGRESS NOTES
"Jamie Valdivia is a 82 year old male seen March 7, 2023 at [facility] unit where he has resided for one year (admit 3/2022) seen to follow up multiple somatic complaints and anxiety.     Dx'd with prostate cancer by biopsy in January, follows with Dr Larry and started on XRT.   He had \"nightmares and anxiety\" about starting XRT, but today reports it's going better than he thought   Goes 3x/week.      Has cataract surgery tomorrow   Has had a lot of PHYSICAL THERAPY, \"I could teach a class.\"    Epigastric pain, not new  Wife has similar pain, \"don't feel good, saranya sick\"  Needs \"real\" Miralax, used to help immensely. Wants to mix his own and titrate the dose depending on how he feels.  Has had considerable workup for difficulty swallowing and abd pain   Had CT abd/pelvis on 1/30>>> unremarkable    Depression and anxiety, on mirtazapine and quetiapine but still feels alcohol would help with these symptoms, specifically his anxiety.  Would like to move to a less restrictive environment.         By chart review, pt has a h/o GERD with esophgitis, significant alcohol abuse, peripheral neuropathy, atrial fib, HTN, osteoporosis with vertebral fracture and OA.  His problem with dysphagia is not new, had EGD in 2019 that showed circumferential ulceration and necrosis at the hypopharynx to UES.   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    Pt had a [facility] stay in November 2021 for weakness, dehydration and alcohol withdrawal.  Has cognitive decline secondary to longstanding alcoholism (lifetime per family) and a h/o hallucinations.   He was hospitalized at [facility] in December 2021 when his wife was hospitalized and he could not manage at home.  Admitted for detox, with weakness and confusion in unsafe living conditions.  He transferred to [facility] for 3 month stay.  " Started on olanzapine secondary to hallucinations, delusions and wandering  Worked with therapies and has regained ambulation with 4WW.   At TCU discharge he moved to [place] [facility] unit, with [place] in a different [place] apartment.      H/o variceal bleed in 2002    He also manages symptoms of IBS with Levsin and Miralax   Pt was seen in the ED in June 2022 with CP.  Workup negative for ischemia or PE.   Chest CT did show 4.6cm ascending thoracic aortic aneurysm and fluid-filled bronchioles in the lower lungs.   Also had left basilar pneumonia on CXR   No fever or leukocytosis.  He was treated with Augmentin and returned to [place].   EGD showed Vickers's esophagus.    Seen by ENT without any findings.  Pt had a [facility] hospitalization in September 2022 for hyponatremia with encephalopathy.   Presented with a fall in which he suffered lumbar compression fractrues.   Initial Na 114, seen by Nephrology and thought to be secondary to diarrhea, and poor po intake.   He was given IVF with some improvement, but then developed pulmonary edema and was but on a fluid restriction.   He improved and discharged to [facility] before transitioning back to [place] Memory Care unit in November 2022.     Past Medical History:   Diagnosis Date     Age-related osteoporosis without current pathological fracture 03/30/2022     Alcohol abuse 11/19/2017     Alcohol dependence with withdrawal (H)      Alcoholism (H)      Back pain      Backache 12/19/2018     CAD (coronary artery disease)      Chronic a-fib (H)      Chronic alcohol use 06/11/2015    Formatting of this note might be different from the original. 2/26/2019: serious fall at home with multiple vertebral fractures.  BAL 0.414% on admission.  Denies that he drinks much. 12/18/2018: hospitalized for fall due to alcohol intoxication.  BAL 0.34% on admission. 9/16/2018: hospitalized for injuries from fall due to alcohol intoxication.  BAL 0.34% on admission     Chronic atrial  fibrillation (H) 07/15/2016    Formatting of this note might be different from the original. 2/2019: Multiple falls so started on aspirin only.  Then aspirin stopped due to GI bleed.     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 2/2/21     HTN (hypertension)      Impaired gait and mobility 03/14/2019     Mild cognitive impairment 08/12/2019    Formatting of this note might be different from the original. NON-AMNESTIC TYPE     Mild TBI (traumatic brain injury) 03/14/2019     Mumps      Obesity (BMI 30-39.9) 09/03/2015     Osteoarthritis      Osteoarthritis of both knees 05/13/2015     Osteoporosis      Other idiopathic peripheral autonomic neuropathy 03/30/2022     Other insomnia 03/14/2019     Other seizures (H) 03/30/2022     Palpitations      Peripheral neuropathy      Pharyngeal dysphagia 05/13/2015    Formatting of this note might be different from the original. Likely secondary to GERD with esophagitis, on PPI and H2 blocker with notable improvement, EGD 10/5/15.     Physical deconditioning 03/14/2019     Recurrent major depressive disorder (H) 03/30/2022     Rhabdomyolysis      Thrombocytopenia (H)      Urination disorder      Weakness 04/27/2020       Past Surgical History:   Procedure Laterality Date     BIOPSY PROSTATE TRANSRECTAL N/A 1/11/2023    Procedure: PROSTATE BIOPSY, RECTAL APPROACH;  Surgeon: Niraj Larry MD;  Location: [facility] OR     COLONOSCOPY       ESOPHAGOSCOPY, GASTROSCOPY, DUODENOSCOPY (EGD), COMBINED N/A 06/01/2022    Procedure:  "ESOPHAGOGASTRODUODENOSCOPY (EGD) mngi with biopsies using jumbo cold biopsy forceps;  Surgeon: David Springer MD;  Location:  GI     left hip replacement  2010     left shoulder replacement  2016     ORTHOPEDIC SURGERY       SPINE SURGERY      done in [place]     TONSILLECTOMY       TRANSRECTAL ULTRASONIC SONOGRAM N/A 1/11/2023    Procedure: TRANSRECTAL ULTRASOUND;  Surgeon: Niraj Larry MD;  Location:  OR      [facility]:  Lived with his wife Gisela, house in [place] prior to move to [place].    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    Non smoker      ROS:   SLUMS 24/30   CPT 4.5    Ambulatory with 4WW   Deaf in left ear, [place] in right ear   Weight in April 2022 was 227 lbs   Wt Readings from Last 5 Encounters:   03/07/23 105.9 kg (233 lb 6.4 oz)   02/08/23 106.3 kg (234 lb 6.4 oz)   02/01/23 99.8 kg (220 lb)   01/26/23 110.1 kg (242 lb 12.8 oz)   01/18/23 99.8 kg (220 lb)      EXAM: NAD  BP (!) 146/82   Pulse 58   Temp 98.6  F (37  C)   Resp 21   Ht 1.727 m (5' 8\")   Wt 105.9 kg (233 lb 6.4 oz)   SpO2 95%   BMI 35.49 kg/m     Neck supple without adenopathy  Lungs with decreased BS left base, no rales or wheeze  Heart RRR s1s2, frequent ectopy  Abd soft, NT, no distention or guarding, +BS  Ext with 1+ edema, no compression on today.    Some dry discolored skin changes but no open areas.     Neuro: limited history, poor insight, Mild dysarthria    Psych: affect okay      Sodium   Date Value Ref Range Status   03/02/2023 141 136 - 145 mmol/L Final     Potassium   Date Value Ref Range Status   03/02/2023 4.5 3.4 - 5.3 mmol/L Final     Carbon Dioxide (CO2)   Date Value Ref Range Status   03/02/2023 28 22 - 29 mmol/L Final     Glucose   Date Value Ref Range Status   03/02/2023 102 (H) 70 - 99 mg/dL Final     Urea Nitrogen   Date Value Ref Range Status   03/02/2023 12.8 8.0 - 23.0 mg/dL " Final     Creatinine   Date Value Ref Range Status   03/02/2023 0.61 (L) 0.67 - 1.17 mg/dL Final     GFR Estimate   Date Value Ref Range Status   03/02/2023 >90 >60 mL/min/1.73m2 Final     Calcium   Date Value Ref Range Status   03/02/2023 9.1 8.8 - 10.2 mg/dL Final     Lab Results   Component Value Date    WBC 6.5 01/05/2023      HGB 11.8 02/16/2023      MCV 93 01/05/2023       01/05/2023     ESOPHAGRAM   5/13/2022   1. Limited esophagram demonstrates mild luminal narrowing at the most proximal aspect of the esophagus with some mucosal versus submucosal  mild nodularity that is nonspecific. Recommend further assessment with endoscopic visualization.  2. Presbyesophagus.  3. The distal esophagus appears normal with normal passage of contrast into the stomach without impairment. The GE junction appears unremarkable.    Lumbar CT 9/2022:    1.  Age-indeterminate compression deformities of L2-L5 with a chronic mild compression deformity at the superior endplate of L1.  2.  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  3.  Moderate multilevel central canal stenosis and neural foraminal narrowing    ECHO 10/2/2022  1. The left ventricle is normal in structure, function and size. The visual ejection fraction is estimated at 55%.  2. The right ventricle is mildly dilated. Mildly decreased right ventricular systolic function  3. Aortic valve visually appears to have mild (to moderate) stenosis, however gradients are in the high-normal range.  4. 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 Sinus 4.8cm, ascending 4.4cm.  Echo 8/2020 from [place]: EF 65%, normal RV, calcified AV with likely underestimated gradient, sinus 4.5cm, ascending aorta 4.1cm.    CT ABDOMEN PELVIS WITH CONTRAST 1/30/2023   CLINICAL HISTORY: Abdominal pain. Prostate cancer (H)  LOWER CHEST: No infiltrates or effusions.  HEPATOBILIARY: No significant mass or  bile duct dilatation. No calcified gallstones.   PANCREAS: No significant mass, duct dilatation, or inflammatory change.  BOWEL: No obstruction or inflammatory change.  MUSCULOSKELETAL: No suspicious bony lesions. Multifocal spine compression deformities.                                                                IMPRESSION:   No metastatic disease demonstrated.      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  Pt /family declined intervention at that time     Plan: surveillance PRN.     (G62.9) Peripheral polyneuropathy  Comment: secondary to alcoholism     Plan: gabapentin 200 mg/ HS scheduled and 100 mg bid PRN     (I48.91) Atrial fibrillation, unspecified type (H)  Comment: has not needed rate slowing medications  Pulse Readings from Last 4 Encounters:   03/07/23 58   02/08/23 74   01/18/23 67   01/12/23 64      Plan: CHADS-VASc score 3  Not anticoagulated or on ASA secondary to alcohol, falls and gastric symptoms     (I10) Essential hypertension, benign  Comment: has a fall risk  BP Readings from Last 3 Encounters:   03/07/23 (!) 146/82    02/08/23 136/64   01/18/23 (!) 149/84      Plan: not currently on anti-hypertensives  Follow bps and exam      (F10.20) Alcoholism (H)  (F10.27) 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    Quetiapine 12.5 mg bid and PRN     (M17.0) Primary osteoarthritis of both knees  Comment: ambulatory with 4WW  Plan: prn acetaminophen, local measures       (K59.01) Slow transit constipation  Comment: chronic GI discomfort   Plan: uses Miralax, prn Levsin   Could potentially have Miralax in his apartment to take PRN        Tabatha Quintana MD

## 2023-03-07 NOTE — LETTER
"    3/7/2023        RE: Jamie Valdivia  C/o Coral De La Garza  [address]  [place] [address]        Jamie Valdivia is a 82 year old male seen March 7, 2023 at [facility] unit where he has resided for one year (admit 3/2022) seen to follow up multiple somatic complaints and anxiety.     Dx'd with prostate cancer by biopsy in January, follows with Dr Larry and started on XRT.   He had \"nightmares and anxiety\" about starting XRT, but today reports it's going better than he thought   Goes 3x/week.      Has cataract surgery tomorrow   Has had a lot of PHYSICAL THERAPY, \"I could teach a class.\"    Epigastric pain, not new  Wife has similar pain, \"don't feel good, saranya sick\"  Needs \"real\" Miralax, used to help immensely. Wants to mix his own and titrate the dose depending on how he feels.  Has had considerable workup for difficulty swallowing and abd pain   Had CT abd/pelvis on 1/30>>> unremarkable    Depression and anxiety, on mirtazapine and quetiapine but still feels alcohol would help with these symptoms, specifically his anxiety.  Would like to move to a less restrictive environment.         By chart review, pt has a h/o GERD with esophgitis, significant alcohol abuse, peripheral neuropathy, atrial fib, HTN, osteoporosis with vertebral fracture and OA.  His problem with dysphagia is not new, had EGD in 2019 that showed circumferential ulceration and necrosis at the hypopharynx to UES.   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    Pt had a [facility] stay in November 2021 for weakness, dehydration and alcohol withdrawal.  Has cognitive decline secondary to longstanding alcoholism (lifetime per family) and a h/o hallucinations.   He was hospitalized at [facility] in December 2021 when his wife was hospitalized and he could not manage at home.  Admitted for detox, with weakness " and confusion in unsafe living conditions.  He transferred to [facility] for 3 month stay.  Started on olanzapine secondary to hallucinations, delusions and wandering  Worked with therapies and has regained ambulation with 4WW.   At TCU discharge he moved to [place] Memory Care unit, with his wife in a different [place] apartment.      H/o variceal bleed in 2002    He also manages symptoms of IBS with Levsin and Miralax   Pt was seen in the ED in June 2022 with CP.  Workup negative for ischemia or PE.   Chest CT did show 4.6cm ascending thoracic aortic aneurysm and fluid-filled bronchioles in the lower lungs.   Also had left basilar pneumonia on CXR   No fever or leukocytosis.  He was treated with Augmentin and returned to [place].   EGD showed Vickers's esophagus.    Seen by ENT without any findings.  Pt had a [facility] hospitalization in September 2022 for hyponatremia with encephalopathy.   Presented with a fall in which he suffered lumbar compression fractrues.   Initial Na 114, seen by Nephrology and thought to be secondary to diarrhea, and poor po intake.   He was given IVF with some improvement, but then developed pulmonary edema and was but on a fluid restriction.   He improved and discharged to [facility] before transitioning back to [place] Memory Care unit in November 2022.     Past Medical History:   Diagnosis Date     Age-related osteoporosis without current pathological fracture 03/30/2022     Alcohol abuse 11/19/2017     Alcohol dependence with withdrawal (H)      Alcoholism (H)      Back pain      Backache 12/19/2018     CAD (coronary artery disease)      Chronic a-fib (H)      Chronic alcohol use 06/11/2015    Formatting of this note might be different from the original. 2/26/2019: serious fall at home with multiple vertebral fractures.  BAL 0.414% on admission.  Denies that he drinks much. 12/18/2018: hospitalized for fall due to alcohol intoxication.  BAL 0.34% on admission. 9/16/2018: hospitalized for  injuries from fall due to alcohol intoxication.  BAL 0.34% on admission     Chronic atrial fibrillation (H) 07/15/2016    Formatting of this note might be different from the original. 2/2019: Multiple falls so started on aspirin only.  Then aspirin stopped due to GI bleed.     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 2/2/21     HTN (hypertension)      Impaired gait and mobility 03/14/2019     Mild cognitive impairment 08/12/2019    Formatting of this note might be different from the original. NON-AMNESTIC TYPE     Mild TBI (traumatic brain injury) 03/14/2019     Mumps      Obesity (BMI 30-39.9) 09/03/2015     Osteoarthritis      Osteoarthritis of both knees 05/13/2015     Osteoporosis      Other idiopathic peripheral autonomic neuropathy 03/30/2022     Other insomnia 03/14/2019     Other seizures (H) 03/30/2022     Palpitations      Peripheral neuropathy      Pharyngeal dysphagia 05/13/2015    Formatting of this note might be different from the original. Likely secondary to GERD with esophagitis, on PPI and H2 blocker with notable improvement, EGD 10/5/15.     Physical deconditioning 03/14/2019     Recurrent major depressive disorder (H) 03/30/2022     Rhabdomyolysis      Thrombocytopenia (H)      Urination disorder      Weakness 04/27/2020       Past Surgical History:   Procedure Laterality Date     BIOPSY PROSTATE TRANSRECTAL N/A 1/11/2023    Procedure: PROSTATE BIOPSY, RECTAL APPROACH;  Surgeon: Niraj Larry MD;  Location: [facility] OR     COLONOSCOPY       ESOPHAGOSCOPY,  "GASTROSCOPY, DUODENOSCOPY (EGD), COMBINED N/A 06/01/2022    Procedure: ESOPHAGOGASTRODUODENOSCOPY (EGD) mngi with biopsies using jumbo cold biopsy forceps;  Surgeon: David Springer MD;  Location:  GI     left hip replacement  2010     left shoulder replacement  2016     ORTHOPEDIC SURGERY       SPINE SURGERY      done in [place]     TONSILLECTOMY       TRANSRECTAL ULTRASONIC SONOGRAM N/A 1/11/2023    Procedure: TRANSRECTAL ULTRASOUND;  Surgeon: Niraj Larry MD;  Location:  OR      [facility]:  Lived with his wife Gisela, house in [place] prior to move to [place].    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    Non smoker      ROS:   SLUMS 24/30   CPT 4.5    Ambulatory with 4WW   Deaf in left ear, [place] in right ear   Weight in April 2022 was 227 lbs   Wt Readings from Last 5 Encounters:   03/07/23 105.9 kg (233 lb 6.4 oz)   02/08/23 106.3 kg (234 lb 6.4 oz)   02/01/23 99.8 kg (220 lb)   01/26/23 110.1 kg (242 lb 12.8 oz)   01/18/23 99.8 kg (220 lb)      EXAM: NAD  BP (!) 146/82   Pulse 58   Temp 98.6  F (37  C)   Resp 21   Ht 1.727 m (5' 8\")   Wt 105.9 kg (233 lb 6.4 oz)   SpO2 95%   BMI 35.49 kg/m     Neck supple without adenopathy  Lungs with decreased BS left base, no rales or wheeze  Heart RRR s1s2, frequent ectopy  Abd soft, NT, no distention or guarding, +BS  Ext with 1+ edema, no compression on today.    Some dry discolored skin changes but no open areas.     Neuro: limited history, poor insight, Mild dysarthria    Psych: affect okay      Sodium   Date Value Ref Range Status   03/02/2023 141 136 - 145 mmol/L Final     Potassium   Date Value Ref Range Status   03/02/2023 4.5 3.4 - 5.3 mmol/L Final     Carbon Dioxide (CO2)   Date Value Ref Range Status   03/02/2023 28 22 - 29 mmol/L Final     Glucose   Date Value Ref Range Status   03/02/2023 102 (H) 70 - 99 mg/dL Final     Urea " Nitrogen   Date Value Ref Range Status   03/02/2023 12.8 8.0 - 23.0 mg/dL Final     Creatinine   Date Value Ref Range Status   03/02/2023 0.61 (L) 0.67 - 1.17 mg/dL Final     GFR Estimate   Date Value Ref Range Status   03/02/2023 >90 >60 mL/min/1.73m2 Final     Calcium   Date Value Ref Range Status   03/02/2023 9.1 8.8 - 10.2 mg/dL Final     Lab Results   Component Value Date    WBC 6.5 01/05/2023      HGB 11.8 02/16/2023      MCV 93 01/05/2023       01/05/2023     ESOPHAGRAM   5/13/2022   1. Limited esophagram demonstrates mild luminal narrowing at the most proximal aspect of the esophagus with some mucosal versus submucosal  mild nodularity that is nonspecific. Recommend further assessment with endoscopic visualization.  2. Presbyesophagus.  3. The distal esophagus appears normal with normal passage of contrast into the stomach without impairment. The GE junction appears unremarkable.    Lumbar CT 9/2022:    1.  Age-indeterminate compression deformities of L2-L5 with a chronic mild compression deformity at the superior endplate of L1.  2.  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  3.  Moderate multilevel central canal stenosis and neural foraminal narrowing    ECHO 10/2/2022  1. The left ventricle is normal in structure, function and size. The visual ejection fraction is estimated at 55%.  2. The right ventricle is mildly dilated. Mildly decreased right ventricular systolic function  3. Aortic valve visually appears to have mild (to moderate) stenosis, however gradients are in the high-normal range.  4. 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 Sinus 4.8cm, ascending 4.4cm.  Echo 8/2020 from [place]: EF 65%, normal RV, calcified AV with likely underestimated gradient, sinus 4.5cm, ascending aorta 4.1cm.    CT ABDOMEN PELVIS WITH CONTRAST 1/30/2023   CLINICAL HISTORY: Abdominal pain. Prostate cancer (H)  LOWER  CHEST: No infiltrates or effusions.  HEPATOBILIARY: No significant mass or bile duct dilatation. No calcified gallstones.   PANCREAS: No significant mass, duct dilatation, or inflammatory change.  BOWEL: No obstruction or inflammatory change.  MUSCULOSKELETAL: No suspicious bony lesions. Multifocal spine compression deformities.                                                                IMPRESSION:   No metastatic disease demonstrated.      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  Pt /family declined intervention at that time     Plan: surveillance PRN.     (G62.9) Peripheral polyneuropathy  Comment: secondary to alcoholism     Plan: gabapentin 200 mg/ HS scheduled and 100 mg bid PRN     (I48.91) Atrial fibrillation, unspecified type (H)  Comment: has not needed rate slowing medications  Pulse Readings from Last 4 Encounters:   03/07/23 58   02/08/23 74   01/18/23 67   01/12/23 64      Plan: CHADS-VASc score 3  Not anticoagulated or on ASA secondary to alcohol, falls and gastric symptoms     (I10) Essential hypertension, benign  Comment:  has a fall risk  BP Readings from Last 3 Encounters:   03/07/23 (!) 146/82   02/08/23 136/64   01/18/23 (!) 149/84      Plan: not currently on anti-hypertensives  Follow bps and exam      (F10.20) Alcoholism (H)  (F10.27) 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    Quetiapine 12.5 mg bid and PRN     (M17.0) Primary osteoarthritis of both knees  Comment: ambulatory with 4WW  Plan: prn acetaminophen, local measures       (K59.01) Slow transit constipation  Comment: chronic GI discomfort   Plan: uses Miralax, prn Levsin   Could potentially have Miralax in his apartment to take PRN        Tabatha Quintana MD         Sincerely,        Tabatha Quintana MD

## 2023-03-27 ENCOUNTER — TELEPHONE (OUTPATIENT)
Dept: GERIATRICS | Facility: CLINIC | Age: 83
End: 2023-03-27
Payer: COMMERCIAL

## 2023-03-27 ENCOUNTER — TRANSFERRED RECORDS (OUTPATIENT)
Dept: HEALTH INFORMATION MANAGEMENT | Facility: CLINIC | Age: 83
End: 2023-03-27
Payer: COMMERCIAL

## 2023-03-27 DIAGNOSIS — R04.0 EPISTAXIS: Primary | ICD-10-CM

## 2023-03-27 RX ORDER — OXYMETAZOLINE HYDROCHLORIDE 0.05 G/100ML
2 SPRAY NASAL 2 TIMES DAILY PRN
Start: 2023-03-27 | End: 2024-08-20

## 2023-03-27 NOTE — TELEPHONE ENCOUNTER
[facility] Nurse Triage Telephone Note    Provider: MARICHUY Basilio CNP  Facility: [facility]   Facility Type:  [place]    Caller: Lakia  Call Back Number: [phone]    Allergies   Allergen Reactions     Ativan [Lorazepam]        Reason for call: Pt had radiation this morning however treatment not received due to non-productive cough. Facility performed COVID swab this morning (negative) and later cough became productive with blood. Pt unable to describe if sensation of drainage from nose is going down his throat causing the blood to be coughed up. Nurse unable to see any blood or drainage in nasal passages, blood on Kleenex tissue is coming from coughing it up, not from wiping/blowing nose. Pt reports ongoing for approx. 2 hours now and this happens on a daily basis. Denies chest pain, appears anxious. Lung sounds clear bilat.   Vitals: BP:  138/78  P:: 75  R:: 16  SPO2: 97% R/A Temp.:  98.0      Verbal Order/Direction given by Provider:   - CXR AP and lateral STAT for ongoing cough  - Check CBC and BMP on 3/30/23  - Oxymetazoline 0.05% nasal spray Instill 2 sprays into each nostril BID PRN for nose bleed  - Give PRN Gabapentin if needed for anxiety    Provider giving Order:  MARICHUY Basilio CNP    Verbal Order given to: Lakia Cardoza RN

## 2023-03-29 ENCOUNTER — LAB REQUISITION (OUTPATIENT)
Dept: LAB | Facility: CLINIC | Age: 83
End: 2023-03-29
Payer: COMMERCIAL

## 2023-03-29 DIAGNOSIS — R05.9 COUGH, UNSPECIFIED: ICD-10-CM

## 2023-03-29 NOTE — PROGRESS NOTES
"[facility] GERIATRICS    Chief Complaint   Patient presents with     RECHECK     cough     HPI:  Jamie Valdivia is a 82 year old  (1940) PMH significant for GERD with esophagitis, OA BL knees, pharyngeal dysphagia, edema, abdominal wall hernia, chronic atrial fibrillation, HTN, osteoporosis with hx of vertebral fracture, dementia, anemia, hx of COVID-19, who is being seen today for an episodic care visit at: [facility] [[id number]].     Today's concern is:    Follow-up today due to cold symptoms and hemoptysis.    Resident with cough which started on 3/27.  Wife also with respiratory illness.  \"I thought I was getting the same cough that she was!\"   Onset several days ago, worse last night.  Skipped radiation therapy today.  Now feeling better.   + fatigue, not sure if this is due to radiation or respiratory illness.  Not taking ASA, in past wife brought resident medications not on med list.     Per nursing staff, rapid COVID-19 swab negative.  CXR without acute process.  On 3/27 had nose bleed, lasted 2 hours, and dripped down throat causing him to cough up blood.  No recurrent episodes.  Wants guaifenesin PRN and not scheduled.  Does not like saline nasal spray.    Bowels are \"better.\"  Able to self-dose Miralax.    Allergies, and PMH/PSH reviewed in [facility] today.    MED REC REQUIRED  Post Medication Reconciliation Status:  Patient was not discharged from an inpatient facility or TCU but medications were reconciled per facility EMR.    REVIEW OF SYSTEMS:  4 point ROS including Respiratory, CV, GI and , other than that noted in the HPI,  is negative    Objective:   BP (!) 149/91   Pulse 82   Temp 98.1  F (36.7  C)   Resp 16   Ht 1.727 m (5' 8\")   Wt 105.9 kg (233 lb 6.4 oz)   SpO2 92%   BMI 35.49 kg/m    GENERAL APPEARANCE:  Alert, in no distress, seated in recliner chair.  EYES: Sclera clear and conjunctiva normal, no discharge, pupils 2+ equal, round.  ENT:  Mouth normal, moist " mucous membranes, upper partial denture, missing lower teeth soft pallet and uvula with symmetric rise, hearing acuity grossly decreased BL, nares symmetrical and patent without drainage no abrasion, ulcers, lesions, or bleeding, TMs pearly grey and intact BL. No pharyngeal injection or exudate.  NECK:  Nontender, supple, symmetrical; no cervical adenopathy, masses or thyromegaly, trachea midline.  RESP:  Non-labored breathing, palpation of chest normal, no chest wall tenderness, no respiratory distress, Lung sounds clear, patient is on room air.  CV: Rate and rhythm regular, no murmur, no rub or gallop.  VASCULAR: 2+ edema bilateral lower extremities, not wearing Velcro compression wraps.   ABDOMEN:  Normal bowel sounds, soft, nontender, no grimacing or guarding with palpation,   + umblical hernia, soft/non-tender.   M/S:   Gait and station ambulates independently with walker around apartment.   SKIN:  No lesions, rashes, or ecchymosis on limited exam as fully dressed. No increased warmth, skin is dry and non-tender.  NEURO: Cranial nerves II-XII grossly intact except hearing, no facial asymmetry, follows simple commands, moves all extremities symmetrically, normal tone, no tremor.  PSYCH: Awake and alert, speech fluent,  insight and judgement impaired, memory impaired.    Recent labs in [facility] reviewed by me today.    CBC RESULTS: Recent Labs   Lab Test 03/30/23  0659 02/16/23  0905 01/05/23  1000   WBC 3.6*  --  6.5   RBC 3.78*  --  3.89*   HGB 10.7* 11.8* 11.2*   HCT 34.6*  --  36.1*   MCV 92  --  93   MCH 28.3  --  28.8   MCHC 30.9*  --  31.0*   RDW 14.1  --  14.8   *  --  257     Last Basic Metabolic Panel:  Recent Labs   Lab Test 03/30/23  0659 03/02/23  0750    141   POTASSIUM 4.4 4.5   CHLORIDE 103 103   JOSUE 9.0 9.1   CO2 26 28   BUN 7.9* 12.8   CR 0.68 0.61*   GLC 72 102*     Liver Function Studies -   Recent Labs   Lab Test 01/05/23  1000 10/04/22  0720 09/30/22  1755   PROTTOTAL 7.6  --  7.0    ALBUMIN 3.6 3.0* 3.9   BILITOTAL 0.5  --  0.7   ALKPHOS 64  --  61   AST 18  --  37   ALT 13  --  15     TSH   Date Value Ref Range Status   10/06/2022 1.67 0.30 - 4.20 uIU/mL Final   04/28/2022 2.77 0.40 - 4.00 mU/L Final   04/07/2022 3.94 0.40 - 4.00 mU/L Final     Lab Results   Component Value Date    A1C 5.5 04/28/2022    A1C 5.6 04/07/2022       Chest X-ray 3/27/23      Assessment/Plan:  (R05.1) Acute cough  (primary encounter diagnosis)  Comment: Stable and improving with time  CXR clear, non-toxic appearing  Plan:   - Request cough drop: benzocaine-menthol (CEPACOL EXTRA STRENGTH)         15-2.6 MG lozenge  - Change guaiFENesin (ROBITUSSIN) 20 mg/mL liquid to PRN dosing  _ Notify nursing if symptoms are not improved    (R04.0) Recurrent epistaxis  Comment: Chronic, recurrent  Likely insetting of frequent nose blowing  Admits he is not using saline nasal spray   Saw ENT in the past year w/o acute process  Plan:   - Discontinue saline nasal spray    - Start White Petrolatum ointment to nares  - Encourage to minimize picking and blowing  - PRN Afrin reviewed  - If recurrent would refer back to ENT for further exam    (C61) Prostate Cancer  Comment: Acute issue  Prostate biopsy 1/11/2023,pathology positive for [place] 7 prostate cancer.   Stopped Trospium due to anticholinergic side effects, no change in urinary symptoms.  Followed by Dr. Niraj Larry [facility].  Plan:  - Radiation therapy without androgen deprivation with Dr. Fleming - treatments ongoing. Tolerating well.  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  Reported recurrent coughing up blood - most recently in setting of epistaxis.  Saw ENT in 2022, no explanation of symptoms.  Last esophagram May 2022 as above, presbyesophagus, proximal luminal narrowing.  Recent saw SLP 8/2022, no overt dysphagia.  Recurrent pneumonia, last 8/4/22.  CXR 10/1/22, 3/27/23 without concern for PNA.  No increased cough, fever, or respiratory distress at present.  Plan:   - Continue swallowing strategies outlined by SLP    (D64.9) Normocytic Anemia  Comment: Chronic, mild.  Hgb 11.2 on 1/5/23.   No symptoms of acute bleed.  Pancytopenia on labs today: WBC 3.6, Hgb 10.6,   Plan:   - Follow serial CBC next with diff, possible related to radiation?    (K59.04) Chronic constipation  Comment: Chronic.   Having regular BMs.  Long hx of Levsin multiple times per day for abd pain, but stopped by urology when started trospium, no recurrent pain.   Having regular BMs on that he is able to take Miralax (name brand only) with self-admin.  No diarrhea in setting of radiation therapy for prostate cancer.   Plan:   - Chronic polyethylene glycol (MIRALAX) 17 GM/Dose powder; 1-2 capfuls in 8 oz of ORANGE JUICE PO one time each morning and 1-2 capfuls 8 oz of ORANGE JUICE PO one time daily PRN for constipation.  May self administer.    Orders:  See patient instructions.    Electronically signed by: MARICHUY Basilio CNP

## 2023-03-30 ENCOUNTER — ASSISTED LIVING VISIT (OUTPATIENT)
Dept: GERIATRICS | Facility: CLINIC | Age: 83
End: 2023-03-30
Payer: COMMERCIAL

## 2023-03-30 VITALS
BODY MASS INDEX: 35.37 KG/M2 | HEIGHT: 68 IN | HEART RATE: 82 BPM | RESPIRATION RATE: 16 BRPM | TEMPERATURE: 98.1 F | DIASTOLIC BLOOD PRESSURE: 91 MMHG | SYSTOLIC BLOOD PRESSURE: 149 MMHG | WEIGHT: 233.4 LBS | OXYGEN SATURATION: 92 %

## 2023-03-30 DIAGNOSIS — C61 PROSTATE CANCER (H): ICD-10-CM

## 2023-03-30 DIAGNOSIS — R13.13 PHARYNGEAL DYSPHAGIA: ICD-10-CM

## 2023-03-30 DIAGNOSIS — D64.9 NORMOCYTIC ANEMIA: ICD-10-CM

## 2023-03-30 DIAGNOSIS — R05.1 ACUTE COUGH: Primary | ICD-10-CM

## 2023-03-30 DIAGNOSIS — K22.70 BARRETT'S ESOPHAGUS WITHOUT DYSPLASIA: ICD-10-CM

## 2023-03-30 DIAGNOSIS — K59.09 CHRONIC CONSTIPATION: ICD-10-CM

## 2023-03-30 DIAGNOSIS — R04.0 RECURRENT EPISTAXIS: ICD-10-CM

## 2023-03-30 LAB
ANION GAP SERPL CALCULATED.3IONS-SCNC: 12 MMOL/L (ref 7–15)
BUN SERPL-MCNC: 7.9 MG/DL (ref 8–23)
CALCIUM SERPL-MCNC: 9 MG/DL (ref 8.8–10.2)
CHLORIDE SERPL-SCNC: 103 MMOL/L (ref 98–107)
CREAT SERPL-MCNC: 0.68 MG/DL (ref 0.67–1.17)
DEPRECATED HCO3 PLAS-SCNC: 26 MMOL/L (ref 22–29)
ERYTHROCYTE [DISTWIDTH] IN BLOOD BY AUTOMATED COUNT: 14.1 % (ref 10–15)
GFR SERPL CREATININE-BSD FRML MDRD: >90 ML/MIN/1.73M2
GLUCOSE SERPL-MCNC: 72 MG/DL (ref 70–99)
HCT VFR BLD AUTO: 34.6 % (ref 40–53)
HGB BLD-MCNC: 10.7 G/DL (ref 13.3–17.7)
MCH RBC QN AUTO: 28.3 PG (ref 26.5–33)
MCHC RBC AUTO-ENTMCNC: 30.9 G/DL (ref 31.5–36.5)
MCV RBC AUTO: 92 FL (ref 78–100)
PLATELET # BLD AUTO: 127 10E3/UL (ref 150–450)
POTASSIUM SERPL-SCNC: 4.4 MMOL/L (ref 3.4–5.3)
RBC # BLD AUTO: 3.78 10E6/UL (ref 4.4–5.9)
SODIUM SERPL-SCNC: 141 MMOL/L (ref 136–145)
WBC # BLD AUTO: 3.6 10E3/UL (ref 4–11)

## 2023-03-30 PROCEDURE — 85027 COMPLETE CBC AUTOMATED: CPT | Mod: ORL | Performed by: NURSE PRACTITIONER

## 2023-03-30 PROCEDURE — 99349 HOME/RES VST EST MOD MDM 40: CPT | Performed by: NURSE PRACTITIONER

## 2023-03-30 PROCEDURE — 80048 BASIC METABOLIC PNL TOTAL CA: CPT | Mod: ORL | Performed by: NURSE PRACTITIONER

## 2023-03-30 PROCEDURE — 36415 COLL VENOUS BLD VENIPUNCTURE: CPT | Mod: ORL | Performed by: NURSE PRACTITIONER

## 2023-03-30 RX ORDER — GUAIFENESIN 200 MG/10ML
10 LIQUID ORAL EVERY 4 HOURS PRN
Qty: 355 ML | Refills: 98 | Status: SHIPPED | OUTPATIENT
Start: 2023-03-30 | End: 2024-06-04

## 2023-03-30 RX ORDER — DIAZEPAM 5 MG
5 TABLET ORAL
COMMUNITY
Start: 2023-03-30 | End: 2023-05-09

## 2023-03-30 RX ORDER — BENZOCAINE AND MENTHOL 15; 2.6 MG/1; MG/1
LOZENGE ORAL
Qty: 16 LOZENGE | Refills: 0 | Status: SHIPPED | OUTPATIENT
Start: 2023-03-30 | End: 2023-04-06

## 2023-03-30 NOTE — PATIENT INSTRUCTIONS
Jamie Valdivia  1940  ORDERS:  - benzocaine-menthol (CEPACOL EXTRA STRENGTH) 15-2.6 MG lozenge; Place 1 lozenge inside cheek 2 times daily. May also place 1 lozenge 2 times daily as needed for sore throat. Do all this for 7 days. Dx cough  - Discontinue schedule guaifenesin  - guaiFENesin (ROBITUSSIN) 20 mg/mL liquid; Take 10 mLs by mouth every 4 hours as needed for cough    - Discontinue nasal saline  - White Petrolatum ointment; Apply to BL nares q HS  dx recurrent epistaxis   - Check CBC with diff in two weeks on 4/13/23 dx pancytopenia   Electronically signed by:   MARICHUY Basilio CNP  03/30/23 2:15 PM

## 2023-03-30 NOTE — LETTER
"    3/30/2023        RE: Jamie Valdivia  C/o Coral De La Garza  [address]  [facility] [address]        [facility]    Chief Complaint   Patient presents with     RECHECK     cough     HPI:  Jamie Valdivia is a 82 year old  (1940) PMH significant for GERD with esophagitis, OA BL knees, pharyngeal dysphagia, edema, abdominal wall hernia, chronic atrial fibrillation, HTN, osteoporosis with hx of vertebral fracture, dementia, anemia, hx of COVID-19, who is being seen today for an episodic care visit at: [facility] ([facility]) [[id number]].     Today's concern is:    Follow-up today due to cold symptoms and hemoptysis.    Resident with cough which started on 3/27.  Wife also with respiratory illness.  \"I thought I was getting the same cough that she was!\"   Onset several days ago, worse last night.  Skipped radiation therapy today.  Now feeling better.   + fatigue, not sure if this is due to radiation or respiratory illness.  Not taking ASA, in past wife brought resident medications not on med list.     Per nursing staff, rapid COVID-19 swab negative.  CXR without acute process.  On 3/27 had nose bleed, lasted 2 hours, and dripped down throat causing him to cough up blood.  No recurrent episodes.  Wants guaifenesin PRN and not scheduled.  Does not like saline nasal spray.    Bowels are \"better.\"  Able to self-dose Miralax.    Allergies, and PMH/PSH reviewed in EPIC today.    MED REC REQUIRED  Post Medication Reconciliation Status:  Patient was not discharged from an inpatient facility or TCU but medications were reconciled per facility EMR.    REVIEW OF SYSTEMS:  4 point ROS including Respiratory, CV, GI and , other than that noted in the HPI,  is negative    Objective:   BP (!) 149/91   Pulse 82   Temp 98.1  F (36.7  C)   Resp 16   Ht 1.727 m (5' 8\")   Wt 105.9 kg (233 lb 6.4 oz)   SpO2 92%   BMI 35.49 kg/m    GENERAL APPEARANCE:  Alert, in no distress, seated in recliner chair.  EYES: " Sclera clear and conjunctiva normal, no discharge, pupils 2+ equal, round.  ENT:  Mouth normal, moist mucous membranes, upper partial denture, missing lower teeth soft pallet and uvula with symmetric rise, hearing acuity grossly decreased BL, nares symmetrical and patent without drainage no abrasion, ulcers, lesions, or bleeding, TMs pearly grey and intact BL. No pharyngeal injection or exudate.  NECK:  Nontender, supple, symmetrical; no cervical adenopathy, masses or thyromegaly, trachea midline.  RESP:  Non-labored breathing, palpation of chest normal, no chest wall tenderness, no respiratory distress, Lung sounds clear, patient is on room air.  CV: Rate and rhythm regular, no murmur, no rub or gallop.  VASCULAR: 2+ edema bilateral lower extremities, not wearing Velcro compression wraps.   ABDOMEN:  Normal bowel sounds, soft, nontender, no grimacing or guarding with palpation,   + umblical hernia, soft/non-tender.   M/S:   Gait and station ambulates independently with walker around apartment.   SKIN:  No lesions, rashes, or ecchymosis on limited exam as fully dressed. No increased warmth, skin is dry and non-tender.  NEURO: Cranial nerves II-XII grossly intact except hearing, no facial asymmetry, follows simple commands, moves all extremities symmetrically, normal tone, no tremor.  PSYCH: Awake and alert, speech fluent,  insight and judgement impaired, memory impaired.    Recent labs in [facility] reviewed by me today.    CBC RESULTS: Recent Labs   Lab Test 03/30/23  0659 02/16/23  0905 01/05/23  1000   WBC 3.6*  --  6.5   RBC 3.78*  --  3.89*   HGB 10.7* 11.8* 11.2*   HCT 34.6*  --  36.1*   MCV 92  --  93   MCH 28.3  --  28.8   MCHC 30.9*  --  31.0*   RDW 14.1  --  14.8   *  --  257     Last Basic Metabolic Panel:  Recent Labs   Lab Test 03/30/23  0659 03/02/23  0750    141   POTASSIUM 4.4 4.5   CHLORIDE 103 103   JOSUE 9.0 9.1   CO2 26 28   BUN 7.9* 12.8   CR 0.68 0.61*   GLC 72 102*     Liver Function  Studies -   Recent Labs   Lab Test 01/05/23  1000 10/04/22  0720 09/30/22  1755   PROTTOTAL 7.6  --  7.0   ALBUMIN 3.6 3.0* 3.9   BILITOTAL 0.5  --  0.7   ALKPHOS 64  --  61   AST 18  --  37   ALT 13  --  15     TSH   Date Value Ref Range Status   10/06/2022 1.67 0.30 - 4.20 uIU/mL Final   04/28/2022 2.77 0.40 - 4.00 mU/L Final   04/07/2022 3.94 0.40 - 4.00 mU/L Final     Lab Results   Component Value Date    A1C 5.5 04/28/2022    A1C 5.6 04/07/2022       Chest X-ray 3/27/23      Assessment/Plan:  (R05.1) Acute cough  (primary encounter diagnosis)  Comment: Stable and improving with time  CXR clear, non-toxic appearing  Plan:   - Request cough drop: benzocaine-menthol (CEPACOL EXTRA STRENGTH)         15-2.6 MG lozenge  - Change guaiFENesin (ROBITUSSIN) 20 mg/mL liquid to PRN dosing  _ Notify nursing if symptoms are not improved    (R04.0) Recurrent epistaxis  Comment: Chronic, recurrent  Likely insetting of frequent nose blowing  Admits he is not using saline nasal spray   Saw ENT in the past year w/o acute process  Plan:   - Discontinue saline nasal spray    - Start White Petrolatum ointment to nares  - Encourage to minimize picking and blowing  - PRN Afrin reviewed  - If recurrent would refer back to ENT for further exam    (C61) Prostate Cancer  Comment: Acute issue  Prostate biopsy 1/11/2023,pathology positive for [place] 7 prostate cancer.   Stopped Trospium due to anticholinergic side effects, no change in urinary symptoms.  Followed by Dr. Niraj MENDEZ [facility].  Plan:  - Radiation therapy without androgen deprivation with Dr. Fleming - treatments ongoing. Tolerating well.  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  Reported recurrent coughing up blood - most recently in setting of epistaxis.  Saw ENT in 2022, no explanation of symptoms.  Last esophagram May 2022 as above, presbyesophagus, proximal luminal narrowing.  Recent saw SLP 8/2022, no overt dysphagia.  Recurrent pneumonia, last 8/4/22.  CXR 10/1/22, 3/27/23 without concern for PNA.  No increased cough, fever, or respiratory distress at present.  Plan:   - Continue swallowing strategies outlined by SLP    (D64.9) Normocytic Anemia  Comment: Chronic, mild.  Hgb 11.2 on 1/5/23.   No symptoms of acute bleed.  Pancytopenia on labs today: WBC 3.6, Hgb 10.6,   Plan:   - Follow serial CBC next with diff, possible related to radiation?    (K59.04) Chronic constipation  Comment: Chronic.   Having regular BMs.  Long hx of Levsin multiple times per day for abd pain, but stopped by urology when started trospium, no recurrent pain.   Having regular BMs on that he is able to take Miralax (name brand only) with self-admin.  No diarrhea in setting of radiation therapy for prostate cancer.   Plan:   - Chronic polyethylene glycol (MIRALAX) 17 GM/Dose powder; 1-2 capfuls in 8 oz of ORANGE JUICE PO one time each morning and 1-2 capfuls 8 oz of ORANGE JUICE PO one time daily PRN for constipation.  May self administer.    Orders:  See patient instructions.    Electronically signed by: MARICHUY Basilio CNP             Sincerely,        MARICHUY Basilio CNP

## 2023-04-12 ENCOUNTER — TRANSFERRED RECORDS (OUTPATIENT)
Dept: HEALTH INFORMATION MANAGEMENT | Facility: CLINIC | Age: 83
End: 2023-04-12
Payer: COMMERCIAL

## 2023-04-12 ENCOUNTER — LAB REQUISITION (OUTPATIENT)
Dept: LAB | Facility: CLINIC | Age: 83
End: 2023-04-12
Payer: COMMERCIAL

## 2023-04-12 DIAGNOSIS — D61.818 OTHER PANCYTOPENIA (H): ICD-10-CM

## 2023-04-13 LAB
BASOPHILS # BLD AUTO: 0 10E3/UL (ref 0–0.2)
BASOPHILS NFR BLD AUTO: 1 %
EOSINOPHIL # BLD AUTO: 0.3 10E3/UL (ref 0–0.7)
EOSINOPHIL NFR BLD AUTO: 7 %
ERYTHROCYTE [DISTWIDTH] IN BLOOD BY AUTOMATED COUNT: 14.3 % (ref 10–15)
HCT VFR BLD AUTO: 32.1 % (ref 40–53)
HGB BLD-MCNC: 10 G/DL (ref 13.3–17.7)
IMM GRANULOCYTES # BLD: 0 10E3/UL
IMM GRANULOCYTES NFR BLD: 0 %
LYMPHOCYTES # BLD AUTO: 1.8 10E3/UL (ref 0.8–5.3)
LYMPHOCYTES NFR BLD AUTO: 40 %
MCH RBC QN AUTO: 27.9 PG (ref 26.5–33)
MCHC RBC AUTO-ENTMCNC: 31.2 G/DL (ref 31.5–36.5)
MCV RBC AUTO: 89 FL (ref 78–100)
MONOCYTES # BLD AUTO: 0.5 10E3/UL (ref 0–1.3)
MONOCYTES NFR BLD AUTO: 10 %
NEUTROPHILS # BLD AUTO: 1.9 10E3/UL (ref 1.6–8.3)
NEUTROPHILS NFR BLD AUTO: 42 %
NRBC # BLD AUTO: 0 10E3/UL
NRBC BLD AUTO-RTO: 0 /100
PLATELET # BLD AUTO: 194 10E3/UL (ref 150–450)
RBC # BLD AUTO: 3.59 10E6/UL (ref 4.4–5.9)
WBC # BLD AUTO: 4.5 10E3/UL (ref 4–11)

## 2023-04-13 PROCEDURE — P9604 ONE-WAY ALLOW PRORATED TRIP: HCPCS | Mod: ORL | Performed by: NURSE PRACTITIONER

## 2023-04-13 PROCEDURE — 85025 COMPLETE CBC W/AUTO DIFF WBC: CPT | Mod: ORL | Performed by: NURSE PRACTITIONER

## 2023-04-13 PROCEDURE — 36415 COLL VENOUS BLD VENIPUNCTURE: CPT | Mod: ORL | Performed by: NURSE PRACTITIONER

## 2023-04-26 ENCOUNTER — LAB REQUISITION (OUTPATIENT)
Dept: LAB | Facility: CLINIC | Age: 83
End: 2023-04-26
Payer: COMMERCIAL

## 2023-04-26 DIAGNOSIS — C61 MALIGNANT NEOPLASM OF PROSTATE (H): ICD-10-CM

## 2023-04-27 PROCEDURE — 84153 ASSAY OF PSA TOTAL: CPT | Mod: ORL

## 2023-04-27 PROCEDURE — 36415 COLL VENOUS BLD VENIPUNCTURE: CPT | Mod: ORL

## 2023-04-27 PROCEDURE — P9604 ONE-WAY ALLOW PRORATED TRIP: HCPCS | Mod: ORL

## 2023-04-28 LAB — PSA SERPL DL<=0.01 NG/ML-MCNC: 4.06 NG/ML

## 2023-05-09 ENCOUNTER — ASSISTED LIVING VISIT (OUTPATIENT)
Dept: GERIATRICS | Facility: CLINIC | Age: 83
End: 2023-05-09
Payer: COMMERCIAL

## 2023-05-09 ENCOUNTER — OFFICE VISIT (OUTPATIENT)
Dept: PHARMACY | Facility: CLINIC | Age: 83
End: 2023-05-09
Payer: COMMERCIAL

## 2023-05-09 VITALS
OXYGEN SATURATION: 98 % | RESPIRATION RATE: 16 BRPM | DIASTOLIC BLOOD PRESSURE: 78 MMHG | WEIGHT: 230.8 LBS | HEART RATE: 57 BPM | TEMPERATURE: 97.6 F | HEIGHT: 68 IN | BODY MASS INDEX: 34.98 KG/M2 | SYSTOLIC BLOOD PRESSURE: 129 MMHG

## 2023-05-09 DIAGNOSIS — R26.9 ABNORMAL GAIT: ICD-10-CM

## 2023-05-09 DIAGNOSIS — K59.01 SLOW TRANSIT CONSTIPATION: ICD-10-CM

## 2023-05-09 DIAGNOSIS — R05.3 CHRONIC COUGH: ICD-10-CM

## 2023-05-09 DIAGNOSIS — R13.13 PHARYNGEAL DYSPHAGIA: ICD-10-CM

## 2023-05-09 DIAGNOSIS — R52 PAIN: ICD-10-CM

## 2023-05-09 DIAGNOSIS — R05.9 COUGH, UNSPECIFIED TYPE: Primary | ICD-10-CM

## 2023-05-09 DIAGNOSIS — E87.1 HYPONATREMIA: ICD-10-CM

## 2023-05-09 DIAGNOSIS — F10.21 ALCOHOLISM IN REMISSION (H): ICD-10-CM

## 2023-05-09 DIAGNOSIS — D64.9 ANEMIA, UNSPECIFIED TYPE: ICD-10-CM

## 2023-05-09 DIAGNOSIS — G47.01 INSOMNIA DUE TO MEDICAL CONDITION: ICD-10-CM

## 2023-05-09 DIAGNOSIS — M15.9 OSTEOARTHRITIS OF MULTIPLE JOINTS, UNSPECIFIED OSTEOARTHRITIS TYPE: ICD-10-CM

## 2023-05-09 DIAGNOSIS — I48.20 CHRONIC ATRIAL FIBRILLATION (H): ICD-10-CM

## 2023-05-09 DIAGNOSIS — G62.9 NEUROPATHY: ICD-10-CM

## 2023-05-09 DIAGNOSIS — C61 PROSTATE CANCER (H): Primary | ICD-10-CM

## 2023-05-09 DIAGNOSIS — R60.0 LOWER EXTREMITY EDEMA: ICD-10-CM

## 2023-05-09 DIAGNOSIS — F10.10 ALCOHOL ABUSE: ICD-10-CM

## 2023-05-09 DIAGNOSIS — F41.1 GAD (GENERALIZED ANXIETY DISORDER): ICD-10-CM

## 2023-05-09 DIAGNOSIS — F43.23 ADJUSTMENT REACTION WITH ANXIETY AND DEPRESSION: ICD-10-CM

## 2023-05-09 DIAGNOSIS — E66.812 CLASS 2 OBESITY: ICD-10-CM

## 2023-05-09 DIAGNOSIS — R10.13 ABDOMINAL PAIN, EPIGASTRIC: ICD-10-CM

## 2023-05-09 DIAGNOSIS — E22.2 SIADH (SYNDROME OF INAPPROPRIATE ADH PRODUCTION) (H): ICD-10-CM

## 2023-05-09 DIAGNOSIS — F10.27 DEMENTIA ASSOCIATED WITH ALCOHOLISM WITH BEHAVIORAL DISTURBANCE (H): ICD-10-CM

## 2023-05-09 DIAGNOSIS — K59.09 CHRONIC CONSTIPATION: ICD-10-CM

## 2023-05-09 DIAGNOSIS — F10.27: ICD-10-CM

## 2023-05-09 DIAGNOSIS — I10 ESSENTIAL HYPERTENSION, BENIGN: ICD-10-CM

## 2023-05-09 DIAGNOSIS — F41.8 DEPRESSION WITH ANXIETY: ICD-10-CM

## 2023-05-09 DIAGNOSIS — F10.21 ALCOHOL DEPENDENCE IN REMISSION (H): ICD-10-CM

## 2023-05-09 DIAGNOSIS — K22.70 BARRETT'S ESOPHAGUS WITHOUT DYSPLASIA: ICD-10-CM

## 2023-05-09 DIAGNOSIS — Z87.81 HX OF COMPRESSION FRACTURE OF SPINE: ICD-10-CM

## 2023-05-09 PROCEDURE — 99607 MTMS BY PHARM ADDL 15 MIN: CPT | Performed by: PHARMACIST

## 2023-05-09 PROCEDURE — 99606 MTMS BY PHARM EST 15 MIN: CPT | Performed by: PHARMACIST

## 2023-05-09 PROCEDURE — 99349 HOME/RES VST EST MOD MDM 40: CPT | Performed by: NURSE PRACTITIONER

## 2023-05-09 RX ORDER — BENZOCAINE/MENTHOL 6 MG-10 MG
1 LOZENGE MUCOUS MEMBRANE 2 TIMES DAILY PRN
COMMUNITY
End: 2023-06-20

## 2023-05-09 NOTE — LETTER
"    5/9/2023        RE: Jamie Valdivia  C/o Coral De La Garza  [address]  [facility] [address]        M [facility]    Chief Complaint   Patient presents with     RECHECK     HPI:  Jamie Valdivia is a 82 year old  (1940) PMH significant for GERD with esophagitis, OA BL knees, pharyngeal dysphagia, edema, abdominal wall hernia, chronic atrial fibrillation, HTN, osteoporosis with hx of vertebral fracture, dementia, anemia, hx of COVID-19, who is being seen today for an episodic care visit at: [facility] ([facility]) [[id number]].     Co-visit with PharmD Sindy Hanley.    Today's concern is:   Visit today with wife Gisela, who assists in providing history.    Finished radiation treatments for prostate cancer.  Wife reports \"ask him about the after effects!\"  Some issue with bowels, working to titrate laxative.  Urination \"almost out of control too\" - more incontinence.  Some pain in prostate.   All of these things were expected.  Urology follow-up 6/2023.    Memory test coming up on Friday 5/12.  Feeling okay about test.    Worried about dysfunction within family.  Does not want to live in locked unit and would like to move in with wife.  Concern he will start to use alcohol again from children.    Two episodes of anxiety over last week.  Experienced chest pain.  \"Pressure to extremes!\"   Chronic issue for many years, would usually treat with alcohol.  Doesn't notice pattern to episodes.   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   More anixety when wife is not present, their routine is Gisela comes to visit from 2:00 - 3:00 pm, eat dinner and watch TV, then part ways to go to bed.     Nursing staff and wife report cough.  \"Off and on.\"   Onset hard to determine - seems to be more chronic.  Chronic hoarse voice.  + sputum. Green colored. No cough during visit.  No sick contacts.  No SOB.   Walking around without issue.  No fever. Resident denies chills or feeling ill.  + rhinitis.  No " "epistaxis.   No hx of seasonal allergies.   Never used inhaler.  Never smoker.   No recent choking episodes. Follows recommendation from SLP and does not choke.   Then says of cough \"it really is so minimal.\" Not too bothersome.  Goes back to wanting to drink guaifenesin on his own. \"I need to just wet my tongue.\"  CXR March 2023 reviewed as below.    Allergies, and PMH/PSH reviewed in [facility] today.    REVIEW OF SYSTEMS:  4 point ROS including Respiratory, CV, GI and , other than that noted in the HPI,  is negative    Objective:   /78   Pulse 57   Temp 97.6  F (36.4  C)   Resp 16   Ht 1.727 m (5' 8\")   Wt 104.7 kg (230 lb 12.8 oz)   SpO2 98%   BMI 35.09 kg/m    GENERAL APPEARANCE:  Alert, in no distress, seated in recliner chair.  EYES: Sclera clear and conjunctiva normal, no discharge.  ENT:  Mouth normal, moist mucous membranes, upper partial denture, missing lower teeth soft pallet and uvula with symmetric rise, hearing acuity grossly decreased BL, TMs pearly grey and intact BL. No pharyngeal injection or exudate.  NECK:  Nontender, supple, symmetrical; no cervical adenopathy, masses or thyromegaly, trachea midline.  RESP:  Non-labored breathing, no respiratory distress, Lung sounds clear but decreased at BL bases, no adventitious breath sounds, patient is on room air. SpO2 97% RA.  CV: Rate and rhythm regular, no murmur, no rub or gallop. HR 64.  VASCULAR: 2-3+ edema bilateral lower extremities, not wearing Velcro compression wraps.   ABDOMEN:  Normal bowel sounds, soft, nontender, no grimacing or guarding with palpation,   + umblical hernia, soft/non-tender.   M/S:   Gait and station ambulates independently with walker around apartment.   SKIN:  No lesions, rashes, or ecchymosis on limited exam as fully dressed. No increased warmth, skin is dry and non-tender.  PSYCH: Awake and alert, speech fluent,  insight and judgement impaired, memory impaired.    CXR 3/27/23      Recent labs in [facility] reviewed " by me today.    CBC RESULTS: Recent Labs   Lab Test 04/13/23  0540 03/30/23  0659   WBC 4.5 3.6*   RBC 3.59* 3.78*   HGB 10.0* 10.7*   HCT 32.1* 34.6*   MCV 89 92   MCH 27.9 28.3   MCHC 31.2* 30.9*   RDW 14.3 14.1    127*     Last Basic Metabolic Panel:  Recent Labs   Lab Test 03/30/23  0659 03/02/23  0750    141   POTASSIUM 4.4 4.5   CHLORIDE 103 103   JOSUE 9.0 9.1   CO2 26 28   BUN 7.9* 12.8   CR 0.68 0.61*   GLC 72 102*     Liver Function Studies -   Recent Labs   Lab Test 01/05/23  1000 10/04/22  0720 09/30/22  1755   PROTTOTAL 7.6  --  7.0   ALBUMIN 3.6 3.0* 3.9   BILITOTAL 0.5  --  0.7   ALKPHOS 64  --  61   AST 18  --  37   ALT 13  --  15     TSH   Date Value Ref Range Status   10/06/2022 1.67 0.30 - 4.20 uIU/mL Final   04/28/2022 2.77 0.40 - 4.00 mU/L Final   04/07/2022 3.94 0.40 - 4.00 mU/L Final     Lab Results   Component Value Date    A1C 5.5 04/28/2022    A1C 5.6 04/07/2022     Assessment/Plan:  (C61) Prostate Cancer - adenocarcinoma [place] 4+3=7  Comment: Improved  Prostate biopsy 1/11/2023,pathology positive for [place] 7 prostate cancer.   Stopped Trospium due to anticholinergic side effects, no change in urinary symptoms.  Followed by Dr. Niraj Larry [facility].  Completed radiation therapy with [facility] under care of Dr Fleming.  Plan:  - Urology follow-up 6/2/23  - Monitor new or worsening post radiation symptoms    (R05.3) Chronic cough  Comment: Chronic  CXR clear 3/28/23, non-toxic appearing   Possible post-nasal gtt, discussed Flonase but not good option due to recurrent epistaxis.  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  Previous hyponatremia multifactorial, occurred in setting of SIADH, dose increase of selective serotonin reuptake inhibitor  (Celexa), Bactrim, hypovolemia.  Stopped Celexa 9/29/22, now on Mirtazapine  .  Plan:   - Avoid medications which could exacerbate hyponatremia   - Avoid excessive free water intake  - Monitor serial BMP q 6 months, ~ 9/2023    (Z87.81) Hx of compression fracture of spine  (R26.9) Abnormal gait  (M15.9) OA Multiple Sites  Comment: Chronic  DJD multiple sites.   Hx of T8-12 pedicle screw fixation with cement by Dr. Behzad Sabit on 8/4/20.  Hx of multiple spinal compression fractures per chart review in setting of recurrent falls.   Looks to have been on Fosamax in past.   Plan:  - Continue Tylenol 500 mg PO BID and BID PRN  - Continue gabapentin   - Neurosurgery follow-up and TLSO PRN  - Consider treatment for osteoporosis, would not be good candidate for bisphosphonate due to swallowing issues, consider endocrine referral after neuropsych testing complete.    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 Finds gabapentin helpful.  Cognitive impairment in setting of history of years of ETOH abuse with recurrent falls and hospitalizations. Currently no access to ETOH and thus is abstinent.  Chronic low mood, suspect JOSÉ MANUEL with panic attacks/chest pain for many years.   On memory care unit due to safety concerns.  Plan:   - Increase Gabapentin to 100 mg q AM and 200 mg PO q HS and 100 mg BID PRN for anxiety and pain  - Continue mirtazpaine 15 mg PO q HS (started 11/17/22, dose increase 1/27/23)  - Continue Seroquel 12.5 mg BID and BID PRN for psychotic features - paranoid delusions. Previously on Zyprexa. Look to wean as able, increased gabapentin may help ability to wean.  - [facility] brandee counselor following  - Offered psychiatry referral in past, but pt/family prefer onsite care, MTM  PharmD follows  - Would like to move off memory care unit, referral given for repeat neuropsych testing help guide care, scheduled for 5/12/23. Reviewed nature of testing.    (R60.0) Lower extremity edema  Comment: Chronic, improved with compression wraps, but does not always wear.   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  Reported recurrent coughing up blood, but not recently.  Saw ENT in 2022, no explanation of symptoms.  Last esophagram May 2022 as above, presbyesophagus, proximal luminal narrowing.  Recent saw SLP 8/2022, no overt dysphagia.  Recurrent pneumonia, last 8/4/22.  CXR 10/1/22, 3/28/23 without concern for PNA.  Plan:   - No symptoms at present, monitor clinically  - Continue swallowing strategies outlined by SLP    (I71.2) Thoracic aortic aneurysm without rupture (H) - 6/30/22 4.6 cm  (I70.0) Atherosclerosis of aorta - noted by vascular  Comment: Chronic  Noted in ER June 2022 and on outside records  Followed by vascular Dr. Rodriguez.  4.6 cm ascending thoracic aortic aneurysm since 2020.  Last Echo 9/30/22: ascending aorta is Moderately dilated. Sinus 4.8cm, ascending 4.4cm.  Mild - mod aortic valve stenosis.  1/5/23: LDL 60  Plan:   - Plan for interval Echo June 2023 given wish for no aggressive intervention or testing  - Follows with [facility] Dr. Rodriguez   - HR and BP control without medications at present     (D64.9) Normocytic Anemia  Comment: Chronic, mild.  Hgb 10 on 4/13/23  No symptoms of acute bleed.  Plan:   - Follow serial Hgb    (K59.04) Chronic constipation  Comment:  Chronic.   Having regular BMs.  Bowel habits change some s/p prostate radiation.  Long hx of Levsin multiple times per day for abd pain, but stopped by urology when started trospium, no recurrent pain.   Plan:   - Chronic polyethylene glycol (MIRALAX) 17 GM/Dose powder; 1-2 capfuls in 8 oz of ORANGE JUICE PO one time each morning and 1-2 capfuls 8 oz of ORANGE JUICE PO one time daily PRN for constipation.   - GI following     (I48.91) Atrial fibrillation (H)  Comment: Chronic  Not anticoagulated due to ETOH use and recurrent falls, rate controlled off medication.  [id number]-FMLr: 3 (age, HTN), 3.2% risk of stroke per year  Plan:   - Monitor VS and clinical status  - Would not add AC due to falls   - Consider cardiology follow-up after procedure, family has wanted to hold off due to multiple specialist appointments.    (I10) Essential hypertension, benign  Comment: Chronic  BP at goal of < 150/90 given age and comorbid conditions  Stopped ASA due to hemoptysis  BP Readings from Last 3 Encounters:   05/09/23 129/78   03/30/23 (!) 149/91   03/07/23 (!) 146/82   Plan:   - Monitor routine VS and labs off anti-hypertensive medications    (E66.09,  Z68.35) Class 2 obesity  Comment:  Body mass index is 35.09 kg/m .with cardiovascular diease and OA.  Plan:   - Encourage increased activity and portion control    Orders:  - Increase gabapentin 100 mg PO once daily 200 mg PO q HS  - Claritin 10 mg q HS    Follow-up visit planned for Tuesday 5/23 11:30 am to review neuropsych testing and symptoms after medication adjustment.    Electronically signed by: MARICHUY Basilio CNP             Sincerely,        MARICHUY Basilio CNP

## 2023-05-09 NOTE — PROGRESS NOTES
Medication Therapy Management (MTM) Encounter    ASSESSMENT:                            Medication Adherence/Access: No issues identified    Dementia with behavioral disturbance, Insomnia, Depression with anxiety, h/o alcohol abuse: Addition of low dose gabapentin in the morning may help with anxiety.  Would like to be able to taper off of quetiapine in the future due to associated risks.    Neuropathy, Pain:  Stable.    Constipation, Abdominal pain:  Stable.      Cough:  Possibly occurring due to post-nasal drip, and may benefit from trial of an antihistamine.  Avoiding nasal steroid due to history of nose bleeds.      PLAN:                            1.  Increase scheduled gabapentin to 100mg in the morning and 200mg at bedtime.  2.  Begin loratadine 10mg nightly prior to bedtime.  Monitor if cough improves.        Follow-up: 3-6 months, sooner if necessary  Addendum: 8/15/23 - spoke with NP re MTM follow-up.  Will follow-up in future upon provider request.    SUBJECTIVE/OBJECTIVE:                          Jamie Valdivia is an 82 year old male seen for a co-visit with Sylvia Stein NP.     Reason for visit: follow-up MTM from 1/26/23.    Allergies/ADRs: Reviewed in chart  Past Medical History: Reviewed in chart  Tobacco: He reports that he has never smoked. He has never used smokeless tobacco.  Alcohol: not currently using  Assistive Devices: walker for ambulation.  History of falls.    Medication Adherence/Access: Patient has assistance with medication administration: assisted living.    Dementia with behavioral disturbance, Insomnia, Depression with anxiety, h/o alcohol abuse: Current medications: gabapentin 200mg at bedtime, mirtazapine 15mg at bedtime, quetiapine 12.5mg twice daily & every 12hr as needed.  History of citalopram use, caused hyponatremia, so this was dc'd.  Tends to complain of chest pain with anxiety:  Per facility records, on 5/7 complained of chest pain and finger tingling, EMS called, but when  "daughter called to ask how he was doing, he didn't recall this.  Following report of chest pain to staff, was given Mintox and quetiapine.  On 4/27, also with episode of chest pain as well - also given prn Mintox and quetiapine.  Patient reported pain was due to anxiety and stress.  Today he notes \"extreme pressure\" in lives right now; \"nothing new for me.\"  With anxiety, gets pressure in chest.  Notes anxiety can happen anytime.  More so when wife not here.  She normally comes around 2:30-3pm, has dinner, will watch TV with him.  Neurocog test coming up on 5/12.   Sleeping ok at night.  Denies hallucinations, but notes difficulty with eyes, \"eyes play tricks on me.\"  Explains changes to peripheral vision.  He does not feel fatigued during the day.    Neuropathy, Pain:  Current medications: gabapentin 200mg at bedtime, Tylenol 500mg twice daily and twice daily as needed.  Denies pain.    Constipation, Abdominal pain:  Current medication: Miralax 34gm daily in the morning and daily as needed.  Self-administers Miralax.  Has successfully come off of Lomotil.  He notes \"after effects\" from prostate cancer treatment - change in BM, more incontinence with urine, some lower flank pain.  Adjusts his Miralax on his own, which is what he prefers.    Cough:  Current medication: Robitussin as needed.  Noting sputum production - green in color.  Increasing cough.  No shortness of breath.  No fever.  Did have chills yesterday per wife.  Lungs sounded clear today on NP assessment.  Hoarse voice, congested.  Vaseline to nares has improved occasional nose bleeds.  Some runny nose.  Denies seasonal allergies.  States allergy to dust.  Never smoked.  Swallowing has been ok.  Tickle in throat causes cough.      ----------------      I spent 35 minutes with this patient today. A copy of the visit note was provided to the patient's provider(s).    A summary of these recommendations was not given to the patient due to cognitive " impairment.    Sindy Hanley, Pharm.D.,[facility] [place]  Board Certified Geriatric Pharmacist  Medication Therapy Management Pharmacist  [phone]         Medication Therapy Recommendations Needing Review  Cough, unspecified type    Rationale: Synergistic therapy   Recommendation: Start Medication - loratadine 10 MG tablet         Depression with anxiety    Current Medication: gabapentin (NEURONTIN) 100 MG capsule   Rationale: Dose too low   Recommendation: Increase Dose

## 2023-05-09 NOTE — PROGRESS NOTES
"[facility] GERIATRICS    Chief Complaint   Patient presents with     RECHECK     HPI:  Jamie Valdivia is a 82 year old  (1940) PMH significant for GERD with esophagitis, OA BL knees, pharyngeal dysphagia, edema, abdominal wall hernia, chronic atrial fibrillation, HTN, osteoporosis with hx of vertebral fracture, dementia, anemia, hx of COVID-19, who is being seen today for an episodic care visit at: [facility] ([facility]) [61].     Co-visit with PharmD Sindy Hanley.    Today's concern is:   Visit today with wife Gisela, who assists in providing history.    Finished radiation treatments for prostate cancer.  Wife reports \"ask him about the after effects!\"  Some issue with bowels, working to titrate laxative.  Urination \"almost out of control too\" - more incontinence.  Some pain in prostate.   All of these things were expected.  Urology follow-up 6/2023.    Memory test coming up on Friday 5/12.  Feeling okay about test.    Worried about dysfunction within family.  Does not want to live in locked unit and would like to move in with wife.  Concern he will start to use alcohol again from children.    Two episodes of anxiety over last week.  Experienced chest pain.  \"Pressure to extremes!\"   Chronic issue for many years, would usually treat with alcohol.  Doesn't notice pattern to episodes.   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   More anixety when wife is not present, their routine is Gisela comes to visit from 2:00 - 3:00 pm, eat dinner and watch TV, then part ways to go to bed.     Nursing staff and wife report cough.  \"Off and on.\"   Onset hard to determine - seems to be more chronic.  Chronic hoarse voice.  + sputum. Green colored. No cough during visit.  No sick contacts.  No SOB.   Walking around without issue.  No fever. Resident denies chills or feeling ill.  + rhinitis.  No epistaxis.   No hx of seasonal allergies.   Never used inhaler.  Never smoker.   No recent choking " "episodes. Follows recommendation from SLP and does not choke.   Then says of cough \"it really is so minimal.\" Not too bothersome.  Goes back to wanting to drink guaifenesin on his own. \"I need to just wet my tongue.\"  CXR March 2023 reviewed as below.    Allergies, and PMH/PSH reviewed in [facility] today.    REVIEW OF SYSTEMS:  4 point ROS including Respiratory, CV, GI and , other than that noted in the HPI,  is negative    Objective:   /78   Pulse 57   Temp 97.6  F (36.4  C)   Resp 16   Ht 1.727 m (5' 8\")   Wt 104.7 kg (230 lb 12.8 oz)   SpO2 98%   BMI 35.09 kg/m    GENERAL APPEARANCE:  Alert, in no distress, seated in recliner chair.  EYES: Sclera clear and conjunctiva normal, no discharge.  ENT:  Mouth normal, moist mucous membranes, upper partial denture, missing lower teeth soft pallet and uvula with symmetric rise, hearing acuity grossly decreased BL, TMs pearly grey and intact BL. No pharyngeal injection or exudate.  NECK:  Nontender, supple, symmetrical; no cervical adenopathy, masses or thyromegaly, trachea midline.  RESP:  Non-labored breathing, no respiratory distress, Lung sounds clear but decreased at BL bases, no adventitious breath sounds, patient is on room air. SpO2 97% RA.  CV: Rate and rhythm regular, no murmur, no rub or gallop. HR 64.  VASCULAR: 2-3+ edema bilateral lower extremities, not wearing Velcro compression wraps.   ABDOMEN:  Normal bowel sounds, soft, nontender, no grimacing or guarding with palpation,   + umblical hernia, soft/non-tender.   M/S:   Gait and station ambulates independently with walker around apartment.   SKIN:  No lesions, rashes, or ecchymosis on limited exam as fully dressed. No increased warmth, skin is dry and non-tender.  PSYCH: Awake and alert, speech fluent,  insight and judgement impaired, memory impaired.    CXR 3/27/23      Recent labs in [facility] reviewed by me today.    CBC RESULTS: Recent Labs   Lab Test 04/13/23  0540 03/30/23  0659   WBC 4.5 3.6* "   RBC 3.59* 3.78*   HGB 10.0* 10.7*   HCT 32.1* 34.6*   MCV 89 92   MCH 27.9 28.3   MCHC 31.2* 30.9*   RDW 14.3 14.1    127*     Last Basic Metabolic Panel:  Recent Labs   Lab Test 03/30/23  0659 03/02/23  0750    141   POTASSIUM 4.4 4.5   CHLORIDE 103 103   JOSUE 9.0 9.1   CO2 26 28   BUN 7.9* 12.8   CR 0.68 0.61*   GLC 72 102*     Liver Function Studies -   Recent Labs   Lab Test 01/05/23  1000 10/04/22  0720 09/30/22  1755   PROTTOTAL 7.6  --  7.0   ALBUMIN 3.6 3.0* 3.9   BILITOTAL 0.5  --  0.7   ALKPHOS 64  --  61   AST 18  --  37   ALT 13  --  15     TSH   Date Value Ref Range Status   10/06/2022 1.67 0.30 - 4.20 uIU/mL Final   04/28/2022 2.77 0.40 - 4.00 mU/L Final   04/07/2022 3.94 0.40 - 4.00 mU/L Final     Lab Results   Component Value Date    A1C 5.5 04/28/2022    A1C 5.6 04/07/2022     Assessment/Plan:  (C61) Prostate Cancer - adenocarcinoma Haily 4+3=7  Comment: Improved  Prostate biopsy 1/11/2023,pathology positive for Haily 7 prostate cancer.   Stopped Trospium due to anticholinergic side effects, no change in urinary symptoms.  Followed by Dr. Niraj Larry [facility].  Completed radiation therapy with [facility] under care of Dr Fleming.  Plan:  - Urology follow-up 6/2/23  - Monitor new or worsening post radiation symptoms    (R05.3) Chronic cough  Comment: Chronic  CXR clear 3/28/23, non-toxic appearing   Possible post-nasal gtt, discussed Flonase but not good option due to recurrent epistaxis.  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  Previous hyponatremia multifactorial, occurred in setting of SIADH, dose increase of selective serotonin reuptake inhibitor (Celexa), Bactrim, hypovolemia.  Stopped Celexa 9/29/22, now on Mirtazapine  .  Plan:   - Avoid  medications which could exacerbate hyponatremia   - Avoid excessive free water intake  - Monitor serial BMP q 6 months, ~ 9/2023    (Z87.81) Hx of compression fracture of spine  (R26.9) Abnormal gait  (M15.9) OA Multiple Sites  Comment: Chronic  DJD multiple sites.   Hx of T8-12 pedicle screw fixation with cement by Dr. Behzad Sabit on 8/4/20.  Hx of multiple spinal compression fractures per chart review in setting of recurrent falls.   Looks to have been on Fosamax in past.   Plan:  - Continue Tylenol 500 mg PO BID and BID PRN  - Continue gabapentin   - Neurosurgery follow-up and TLSO PRN  - Consider treatment for osteoporosis, would not be good candidate for bisphosphonate due to swallowing issues, consider endocrine referral after neuropsych testing complete.    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 Finds gabapentin helpful.  Cognitive impairment in setting of history of years of ETOH abuse with recurrent falls and hospitalizations. Currently no access to ETOH and thus is abstinent.  Chronic low mood, suspect JOSÉ MANUEL with panic attacks/chest pain for many years.   On memory care unit due to safety concerns.  Plan:   - Increase Gabapentin to 100 mg q AM and 200 mg PO q HS and 100 mg BID PRN for anxiety and pain  - Continue mirtazpaine 15 mg PO q HS (started 11/17/22, dose increase 1/27/23)  - Continue Seroquel 12.5 mg BID and BID PRN for psychotic features - paranoid delusions. Previously on Zyprexa. Look to wean as able, increased gabapentin may help ability to wean.  - [facility] pysch counselor following  - Offered psychiatry referral in past, but pt/family prefer onsite care, MTM PharmD follows  - Would like to move off memory care unit, referral given for repeat neuropsych  testing help guide care, scheduled for 5/12/23. Reviewed nature of testing.    (R60.0) Lower extremity edema  Comment: Chronic, improved with compression wraps, but does not always wear.   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  Reported recurrent coughing up blood, but not recently.  Saw ENT in 2022, no explanation of symptoms.  Last esophagram May 2022 as above, presbyesophagus, proximal luminal narrowing.  Recent saw SLP 8/2022, no overt dysphagia.  Recurrent pneumonia, last 8/4/22.  CXR 10/1/22, 3/28/23 without concern for PNA.  Plan:   - No symptoms at present, monitor clinically  - Continue swallowing strategies outlined by SLP    (I71.2) Thoracic aortic aneurysm without rupture (H) - 6/30/22 4.6 cm  (I70.0) Atherosclerosis of aorta - noted by vascular  Comment: Chronic  Noted in ER June 2022 and on outside records  Followed by vascular Dr. Rodriguez.  4.6 cm ascending thoracic aortic aneurysm since 2020.  Last Echo 9/30/22: ascending aorta is Moderately dilated. Sinus 4.8cm, ascending 4.4cm.  Mild - mod aortic valve stenosis.  1/5/23: LDL 60  Plan:   - Plan for interval Echo June 2023 given wish for no aggressive intervention or testing  - Follows with [facility] Dr. Rodriguez   - HR and BP control without medications at present     (D64.9) Normocytic Anemia  Comment: Chronic, mild.  Hgb 10 on 4/13/23  No symptoms of acute bleed.  Plan:   - Follow serial Hgb    (K59.04) Chronic constipation  Comment: Chronic.   Having regular BMs.  Bowel habits change some s/p prostate radiation.  Long hx of Levsin  multiple times per day for abd pain, but stopped by urology when started trospium, no recurrent pain.   Plan:   - Chronic polyethylene glycol (MIRALAX) 17 GM/Dose powder; 1-2 capfuls in 8 oz of ORANGE JUICE PO one time each morning and 1-2 capfuls 8 oz of ORANGE JUICE PO one time daily PRN for constipation.   - GI following     (I48.91) Atrial fibrillation (H)  Comment: Chronic  Not anticoagulated due to ETOH use and recurrent falls, rate controlled off medication.  [id number]: 3 (age, HTN), 3.2% risk of stroke per year  Plan:   - Monitor VS and clinical status  - Would not add AC due to falls   - Consider cardiology follow-up after procedure, family has wanted to hold off due to multiple specialist appointments.    (I10) Essential hypertension, benign  Comment: Chronic  BP at goal of < 150/90 given age and comorbid conditions  Stopped ASA due to hemoptysis  BP Readings from Last 3 Encounters:   05/09/23 129/78   03/30/23 (!) 149/91   03/07/23 (!) 146/82   Plan:   - Monitor routine VS and labs off anti-hypertensive medications    (E66.09,  Z68.35) Class 2 obesity  Comment:  Body mass index is 35.09 kg/m .with cardiovascular diease and OA.  Plan:   - Encourage increased activity and portion control    Orders:  - Increase gabapentin 100 mg PO once daily 200 mg PO q HS  - Claritin 10 mg q HS    Follow-up visit planned for Tuesday 5/23 11:30 am to review neuropsych testing and symptoms after medication adjustment.    Electronically signed by: MARICHUY Basilio CNP

## 2023-05-10 NOTE — PATIENT INSTRUCTIONS
"Recommendations from today's MTM visit:                                                       1.  Increase scheduled gabapentin to 100mg in the morning and 200mg at bedtime.  2.  Begin loratadine 10mg nightly prior to bedtime.  Monitor if cough improves.        Follow-up: 3-6 months, sooner if necessary        It was great speaking with you today.  I value your experience and would be very thankful for your time in providing feedback in our clinic survey. In the next few days, you may receive an email or text message from [employer] with a link to a survey related to your  clinical pharmacist.\"     To schedule another MTM appointment, please call me directly or you may call the MTM scheduling line at [phone] or toll-free at [phone].     My Clinical Pharmacist's contact information:                                                      Please feel free to contact me with any questions or concerns you have.      Sindy Hanley, Pharm.D.,[facility]  Board Certified Geriatric Pharmacist  Medication Therapy Management Pharmacist  [phone]      "

## 2023-05-11 RX ORDER — GABAPENTIN 100 MG/1
CAPSULE ORAL
Qty: 100 CAPSULE | Refills: 98 | Status: SHIPPED | OUTPATIENT
Start: 2023-05-11 | End: 2024-03-19

## 2023-05-11 RX ORDER — LORATADINE 10 MG/1
10 TABLET ORAL AT BEDTIME
Qty: 30 TABLET | Refills: 98 | Status: SHIPPED | OUTPATIENT
Start: 2023-05-11 | End: 2024-05-09

## 2023-05-11 NOTE — PATIENT INSTRUCTIONS
Jamie Valdivia  1940  ORDERS:  - Discontinue current gabapentin  - gabapentin (NEURONTIN) 100 MG capsule; Take 1 capsule (100 mg) by mouth daily AND 2 capsules (200 mg) At Bedtime. May also take 1 capsule (100 mg) 2 times daily as needed (anxiety).  Dx anxiety  - loratadine (CLARITIN) 10 MG tablet; Take 1 tablet (10 mg) by mouth At Bedtime dx rhinitis, cough  Electronically signed by:   MARICHUY Basilio CNP  05/11/23 6:19 AM

## 2023-05-12 ENCOUNTER — LAB REQUISITION (OUTPATIENT)
Dept: LAB | Facility: CLINIC | Age: 83
End: 2023-05-12
Payer: COMMERCIAL

## 2023-05-12 ENCOUNTER — OFFICE VISIT (OUTPATIENT)
Dept: NEUROPSYCHOLOGY | Facility: CLINIC | Age: 83
End: 2023-05-12
Attending: NURSE PRACTITIONER
Payer: COMMERCIAL

## 2023-05-12 DIAGNOSIS — F10.27 DEMENTIA ASSOCIATED WITH ALCOHOLISM WITH BEHAVIORAL DISTURBANCE (H): Primary | ICD-10-CM

## 2023-05-12 DIAGNOSIS — R41.89 COGNITIVE IMPAIRMENT: ICD-10-CM

## 2023-05-12 DIAGNOSIS — Z11.59 ENCOUNTER FOR SCREENING FOR OTHER VIRAL DISEASES: ICD-10-CM

## 2023-05-12 DIAGNOSIS — F01.A18 MILD VASCULAR DEMENTIA WITH OTHER BEHAVIORAL DISTURBANCE (H): ICD-10-CM

## 2023-05-12 PROCEDURE — 96132 NRPSYC TST EVAL PHYS/QHP 1ST: CPT | Performed by: CLINICAL NEUROPSYCHOLOGIST

## 2023-05-12 PROCEDURE — 90791 PSYCH DIAGNOSTIC EVALUATION: CPT | Performed by: CLINICAL NEUROPSYCHOLOGIST

## 2023-05-12 PROCEDURE — 96139 PSYCL/NRPSYC TST TECH EA: CPT | Performed by: CLINICAL NEUROPSYCHOLOGIST

## 2023-05-12 PROCEDURE — 87804 INFLUENZA ASSAY W/OPTIC: CPT | Mod: ORL | Performed by: NURSE PRACTITIONER

## 2023-05-12 PROCEDURE — 96138 PSYCL/NRPSYC TECH 1ST: CPT | Performed by: CLINICAL NEUROPSYCHOLOGIST

## 2023-05-12 PROCEDURE — 96133 NRPSYC TST EVAL PHYS/QHP EA: CPT | Performed by: CLINICAL NEUROPSYCHOLOGIST
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
## 2023-05-13 ENCOUNTER — TELEPHONE (OUTPATIENT)
Dept: GERIATRICS | Facility: CLINIC | Age: 83
End: 2023-05-13
Payer: COMMERCIAL

## 2023-05-13 DIAGNOSIS — Z20.828 EXPOSURE TO INFLUENZA: Primary | ICD-10-CM

## 2023-05-13 LAB
FLUAV AG SPEC QL IA: NEGATIVE
FLUBV AG SPEC QL IA: NEGATIVE

## 2023-05-13 RX ORDER — OSELTAMIVIR PHOSPHATE 75 MG/1
75 CAPSULE ORAL DAILY
Qty: 14 CAPSULE | Refills: 0 | Status: SHIPPED | OUTPATIENT
Start: 2023-05-13 | End: 2023-05-27

## 2023-05-13 NOTE — TELEPHONE ENCOUNTER
Jamie Valdivia  1940  ORDERS:  - oseltamivir (TAMIFLU) 75 MG capsule; Take 1 capsule (75 mg) by mouth daily for 14 days dx Exposure to influenza  - Facility to obtain consent from family, if family declines then discontinue this order  Electronically signed by:   MARICHUY Basilio CNP  05/13/23 4:22 PM

## 2023-05-16 NOTE — NURSING NOTE
Pt was seen for neuropsychological evaluation at the request of Dr. Sylvia Stein for the purposes of diagnostic clarification and treatment planning. 219 minutes of test administration and scoring were provided by this writer. Please see Dr. Mihaela Brock's report for a full interpretation of the findings.    Vin De La Paz MA, CSP

## 2023-05-23 ENCOUNTER — ASSISTED LIVING VISIT (OUTPATIENT)
Dept: GERIATRICS | Facility: CLINIC | Age: 83
End: 2023-05-23
Payer: COMMERCIAL

## 2023-05-23 VITALS — HEIGHT: 68 IN | WEIGHT: 230.8 LBS | TEMPERATURE: 97.8 F | BODY MASS INDEX: 34.98 KG/M2 | OXYGEN SATURATION: 97 %

## 2023-05-23 DIAGNOSIS — F43.23 ADJUSTMENT REACTION WITH ANXIETY AND DEPRESSION: ICD-10-CM

## 2023-05-23 DIAGNOSIS — F41.1 GAD (GENERALIZED ANXIETY DISORDER): ICD-10-CM

## 2023-05-23 DIAGNOSIS — F10.21 PERSONAL HISTORY OF ALCOHOLISM (H): ICD-10-CM

## 2023-05-23 DIAGNOSIS — F10.988 COGNITIVE DYSFUNCTION, ALCOHOL-RELATED (H): Primary | ICD-10-CM

## 2023-05-23 PROCEDURE — 99349 HOME/RES VST EST MOD MDM 40: CPT | Performed by: NURSE PRACTITIONER

## 2023-05-23 NOTE — LETTER
"    5/23/2023        RE: Jamie Valdivia  C/o Coral De La Garza  [address]  [facility] [address]        [facility] GERIATRICS    Chief Complaint   Patient presents with     RECHECK     Cognitive impairment, cough      HPI:  Jamie Valdivia is a 83 year old  (1940) PMH significant for GERD with esophagitis, OA BL knees, pharyngeal dysphagia, edema, abdominal wall hernia, chronic atrial fibrillation, HTN, osteoporosis with hx of vertebral fracture, dementia, anemia, hx of COVID-19, who is being seen today for an episodic care visit at: [facility]) [61].     Today's concern is:   Follow-up today for anxiety and review neuropsych testing.     \"We have a problem.\"  \"I have way too much interference in my life.\"  Discussed issues with being on locked memory care unit.  Would like to \"extinguish all from family.\"  Would like to be more independent and live in regular [facility] with his wife.    Very focused on forgetting we had follow-up visit today.     Discussed anxiety.  Less panic attacks after starting gabapentin.  Reviewed dosing.  Sleeping better with gabapentin.     Some cough, but intermittent.  Not consistent.  Using cough drops that help.     Allergies, and PMH/PSH reviewed in EPIC today.    REVIEW OF SYSTEMS:  4 point ROS including Respiratory, CV, GI and , other than that noted in the HPI,  is negative    Objective:   Temp 97.8  F (36.6  C)   Ht 1.727 m (5' 8\")   Wt 104.7 kg (230 lb 12.8 oz)   SpO2 97%   BMI 35.09 kg/m    GENERAL APPEARANCE:  Alert, in no distress, seated in recliner chair.  RESP:  Non-labored breathing, no respiratory distress, Lung sounds clear but decreased at BL bases, no adventitious breath sounds, patient is on room air.   CV: Rate and rhythm regular, no murmur, no rub or gallop.   VASCULAR: 2-3+ edema bilateral lower extremities, not wearing Velcro compression wraps.   ABDOMEN:  Normal bowel sounds, soft, nontender, no grimacing or guarding with " palpation,   + umblical hernia, soft/non-tender.   M/S:   Gait and station ambulates independently with walker around apartment.   PSYCH: Awake and alert, speech fluent,  insight and judgement impaired, perseverative.     Labs done in SNF are in [place] EPIC. Please refer to them using EPIC/Care Everywhere.    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  Prior to [facility] admission managed with ETOH.   Cognitive impairment in setting of history of years of ETOH abuse with recurrent falls and hospitalizations. Currently no access to ETOH and thus is abstinent.  Chronic low mood, suspect JOSÉ MANUEL with panic attacks/chest pain for many years.   On memory care unit due to safety concerns.  Feels mood has improved with gabapentin, forgot about PRN dosing.  Plan:   - Continue Gabapentin 100 mg q AM and 200 mg PO q HS and 100 mg BID PRN for anxiety and pain. Reviewed dosing again today.  - Continue mirtazpaine 15 mg PO q HS (started 11/17/22, dose increase 1/27/23)  - Continue Seroquel 12.5 mg BID and BID PRN for psychotic features - paranoid delusions. Previously on Zyprexa. Look to wean as able, increased gabapentin may help ability to wean. Per family was routinely calling wife and accusing her of infidelity.   - [facility] brandee counselor following weekly  - Offered psychiatry referral in past, but pt/family prefer onsite care, [facility] follows  - Neuropsych testing completed 5/12/23. Pt goal to move off memory care unit if it is safe. Plan to follow-up once report is available.    Orders:  No new orders.    Follow-up Thursday 6/1 @11:30, will call patient to reschedule if neuropsych report is not yet completed.    Electronically signed by: MARICHUY Basilio CNP           Sincerely,        MARICHUY Basilio CNP

## 2023-05-23 NOTE — PROGRESS NOTES
"[facility] GERIATRICS    Chief Complaint   Patient presents with     RECHECK     Cognitive impairment, cough      HPI:  Jamie Valdivia is a 83 year old  (1940) PMH significant for GERD with esophagitis, OA BL knees, pharyngeal dysphagia, edema, abdominal wall hernia, chronic atrial fibrillation, HTN, osteoporosis with hx of vertebral fracture, dementia, anemia, hx of COVID-19, who is being seen today for an episodic care visit at: [facility]) [61].     Today's concern is:   Follow-up today for anxiety and review neuropsych testing.     \"We have a problem.\"  \"I have way too much interference in my life.\"  Discussed issues with being on locked memory care unit.  Would like to \"extinguish all from family.\"  Would like to be more independent and live in regular [facility] with his wife.    Very focused on forgetting we had follow-up visit today.     Discussed anxiety.  Less panic attacks after starting gabapentin.  Reviewed dosing.  Sleeping better with gabapentin.     Some cough, but intermittent.  Not consistent.  Using cough drops that help.     Allergies, and PMH/PSH reviewed in EPIC today.    REVIEW OF SYSTEMS:  4 point ROS including Respiratory, CV, GI and , other than that noted in the HPI,  is negative    Objective:   Temp 97.8  F (36.6  C)   Ht 1.727 m (5' 8\")   Wt 104.7 kg (230 lb 12.8 oz)   SpO2 97%   BMI 35.09 kg/m    GENERAL APPEARANCE:  Alert, in no distress, seated in recliner chair.  RESP:  Non-labored breathing, no respiratory distress, Lung sounds clear but decreased at BL bases, no adventitious breath sounds, patient is on room air.   CV: Rate and rhythm regular, no murmur, no rub or gallop.   VASCULAR: 2-3+ edema bilateral lower extremities, not wearing Velcro compression wraps.   ABDOMEN:  Normal bowel sounds, soft, nontender, no grimacing or guarding with palpation,   + umblical hernia, soft/non-tender.   M/S:   Gait and station ambulates independently with walker " around apartment.   PSYCH: Awake and alert, speech fluent,  insight and judgement impaired, perseverative.     Labs done in SNF are in [place] EPIC. Please refer to them using EPIC/Care Everywhere.    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  Prior to [facility] admission managed with ETOH.   Cognitive impairment in setting of history of years of ETOH abuse with recurrent falls and hospitalizations. Currently no access to ETOH and thus is abstinent.  Chronic low mood, suspect JOSÉ MANUEL with panic attacks/chest pain for many years.   On memory care unit due to safety concerns.  Feels mood has improved with gabapentin, forgot about PRN dosing.  Plan:   - Continue Gabapentin 100 mg q AM and 200 mg PO q HS and 100 mg BID PRN for anxiety and pain. Reviewed dosing again today.  - Continue mirtazpaine 15 mg PO q HS (started 11/17/22, dose increase 1/27/23)  - Continue Seroquel 12.5 mg BID and BID PRN for psychotic features - paranoid delusions. Previously on Zyprexa. Look to wean as able, increased gabapentin may help ability to wean. Per family was routinely calling wife and accusing her of infidelity.   - [facility] brandee counselor following weekly  - Offered psychiatry referral in past, but pt/family prefer onsite care, [facility] Pharm[facility] follows  - Neuropsych testing completed 5/12/23. Pt goal to move off memory care unit if it is safe. Plan to follow-up once report is available.    Orders:  No new orders.    Follow-up Thursday 6/1 @11:30, will call patient to reschedule if neuropsych report is not yet completed.    Electronically signed by: MARICHUY Basilio CNP

## 2023-05-30 NOTE — PROGRESS NOTES
Provider: AL      Tech: CIARAN      Patient Name: Jamie Valdivia      : 1940      MRN: [id number]      TAVERAS: 2023      Age: 83      Education: 17      Ethnicity: C      Handedness: Right      Station: OP             NEUROPSYCHOLOGICAL TESTS RAW SCORE STANDARDIZED SCORE* %bonita   COGNITIVE STATUS       NAB Orientation (/) 25 --     RBANS Total Score (Form A) -- SS= 93 32%        Immediate Memory Index -- SS= 114 83%        Visuospatial/Constructional Index -- SS= 84 14%        Language Index -- SS= 107 68%        Attention Index -- SS= 75 5%        Delayed Memory Index -- SS= 96 39%          INTELLECTUAL ABILITY RAW SCORE STANDARDIZED %bonita   WAIS-IV        VCI --          Similarities 21 ss= 10 50%   VALORIE --      Block Design 12 ss= 5 5%   PREMORBID ESTIMATE RAW SCORE STANDARDIZED SCORE* %bonita   Test of Premorbid Functioning (TOPF)  Actual SS= 99 47%    38 Pred SS= 109 --     E-FSIQ= 109 --   ATTENTION/CONC. & PROC SPEED RAW SCORE STANDARDIZED SCORE* %bonita   WAIS-IV       Digit Span Forward (LDSF=5) 8 ss= 8 25%   Digit Span Backward (LDSB=5) 9 ss= 14 91%   RBANS Attention Index -- SS= 75 5%   Digit Span 8 ss= 9 37%   Coding 16 ss= 3 1%   MEMORY RAW SCORE STANDARDIZED SCORE* %bonita   Verbal       RBANS Immediate Memory (Form A)       List Learning 24 ss= 11 63%   Story Memory 21 ss= 14 91%   RBANS Delayed Memory (Form A)       List Recall 5 --  51-75%   List Recognition 20 --  51-75%   Story Recall 9 ss= 11 63%   Story Recognition 11/12 %ile= 68-86    Visual        RBANS Delayed Memory (Form A)       Figure Recall 2 ss= 3 1%   Figure Recognition Yes      LANGUAGE RAW SCORE STANDARDIZED SCORE* %bonita   [place] Naming Test s,r,e 51 TS= 47 38%   Phonemic Cue (Correct; Administered) 3; 8      NAB Auditory Comprehension 55 TS= 52 58%   Colors 13 C. %ile= 100    Shapes 22 C. %ile= 100    CSN 20 C. %ile= 15    COWAT (FAS) 26 TS= 39 14%   Repetition Errors 1      Set Loss Errors 0      Animal Naming 18 TS= 50 50%    Repetition Errors 0      Set Loss Errors 0      RBANS Language (Form A)       Naming 10 --  >75   Semantic Fluency 19 ss= 11 91%   VISUOSPATIAL RAW SCORE STANDARDIZED SCORE* %bonita   Clock Drawing Test       Construction NORMAL --  --   Abstraction NORMAL --  --   RBANS Visuospat./Constr.       Line Orientation 11 --  3-9   Figure Copy 17 ss= 10 50%   EXECUTIVE FUNCTIONS RAW SCORE STANDARDIZED SCORE* %bonita   Trail Making Test (Errors; Time)        Part A s,r,e 0; 97 TS= 20 <1%   Part B s,r,e 1; 240 TS= 34 5%   [place] Card Sorting Test (WCST-64)       Categories Completede 1 --  6-10   Trials to Complete 1st Category 50 --  2-5   Total Errorse 25 TS= 40 16%   Perseverative Responsese 11 TS= 43 25%   Failure To Maintain Sete - --  -   % Conceptual Level Responses 0 TS= 0 0%   # Conceptual Level Responses 32 TS= 41 18%   NAB Executive Functioning Module       Judgement 15 TS= 50 50%   MOOD AND PERSONALITY RAW SCORE INTERPRETATION %bonita   Depression, Anxiety, Stress Scale (NORA-21)       Depression 2 NORMAL  --   Anxiety 0 NORMAL  --   Stress 8 NORMAL  --   ADAPTIVE FUNCTIONING RAW SCORE STANDARDIZED SCORE* %bonita   NAB Judgement 15 TS= 50 50%          * All standardized scores are adjusted for age. Superscripts denote additional adjustment for (s)ex, (r)ace/ethnicity, (l)anguage, and/or (e)ducation.   ** Descriptive ranges are based on American Academy of Clinical Neuropsychology (2020) consensus guidelines, or test manuals where appropriate.    WNL = within normal limits. DC = discontinued due to patient s inability to complete the test.     Standardized scores: TS = T-score; mean = 50, standard deviation =10; z = z-score; mean = 0, standard deviation = 1; ss = scaled score; mean = 10, standard deviation = 3; MAS = [place] Older Adult Normative Study age adjusted scaled score; mean = 10, standard deviation = 3; SS = standard score; mean = 100, standard deviation = 15.

## 2023-06-01 ENCOUNTER — ASSISTED LIVING VISIT (OUTPATIENT)
Dept: GERIATRICS | Facility: CLINIC | Age: 83
End: 2023-06-01
Payer: COMMERCIAL

## 2023-06-01 DIAGNOSIS — F03.90 MAJOR NEUROCOGNITIVE DISORDER (H): Primary | ICD-10-CM

## 2023-06-01 PROCEDURE — 99349 HOME/RES VST EST MOD MDM 40: CPT | Performed by: NURSE PRACTITIONER
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
## 2023-06-02 ENCOUNTER — OFFICE VISIT (OUTPATIENT)
Dept: UROLOGY | Facility: CLINIC | Age: 83
End: 2023-06-02
Payer: COMMERCIAL

## 2023-06-02 VITALS
BODY MASS INDEX: 34.4 KG/M2 | SYSTOLIC BLOOD PRESSURE: 148 MMHG | HEIGHT: 68 IN | WEIGHT: 227 LBS | DIASTOLIC BLOOD PRESSURE: 76 MMHG | HEART RATE: 73 BPM

## 2023-06-02 DIAGNOSIS — R30.0 DYSURIA: ICD-10-CM

## 2023-06-02 DIAGNOSIS — R53.83 FATIGUE, UNSPECIFIED TYPE: ICD-10-CM

## 2023-06-02 DIAGNOSIS — C61 PROSTATE CANCER (H): Primary | ICD-10-CM

## 2023-06-02 PROCEDURE — 99213 OFFICE O/P EST LOW 20 MIN: CPT | Performed by: STUDENT IN AN ORGANIZED HEALTH CARE EDUCATION/TRAINING PROGRAM

## 2023-06-02 ASSESSMENT — PAIN SCALES - GENERAL: PAINLEVEL: NO PAIN (0)

## 2023-06-02 NOTE — LETTER
"6/2/2023       RE: Jamie Valdivia  [address]  [address]  [facility] [place] [address]     Dear Colleague,    Thank you for referring your patient, Jamie Valdivia, to the [facility] [place] at [facility]. Please see a copy of my visit note below.    CHIEF COMPLAINT   Jamie Valdivia who is a 83 year old male returns today for follow-up of of intermediate unfavorable risk prostate cancer (iPSA 11.3, Haily 4+3=7, 3/12 cores, dx 1/11/23).      HPI   Jamie Valdivia is a 83 year old male returns today for follow-up of of intermediate unfavorable risk prostate cancer (iPSA 11.3, [place]ads 4+3=7, 3/12 cores, dx 1/11/23) now s/p EBRT w/o ADT, completed 4/12/2023 ([facility])    Here for followup    Patient is complaining of some dysuria, urinary frequency as well as fatigue after radiation    He is wearing incontinence pads at night for protection, occasionally cannot make it to the bathroom on time    PHYSICAL EXAM  Patient is a 83 year old  male   Vitals: Blood pressure (!) 148/76, pulse 73, height 1.727 m (5' 8\"), weight 103 kg (227 lb).  Body mass index is 34.52 kg/m .  General Appearance Adult:   Alert, no acute distress, oriented  HENT: throat/mouth:normal, good dentition  Lungs: no respiratory distress, or pursed lip breathing  Heart: No obvious jugular venous distension present  Abdomen: nondistended  Musculoskeltal: BLE edema  Skin: no suspicious lesions or rashes  Neuro: Alert, oriented, speech and mentation normal  Psych: affect and mood normal  Gait: Normal  : deferred    Component      Latest Ref Rng 4/27/2023  5:15 AM   PSA Tumor Marker      ng/mL 4.06          ASSESSMENT and PLAN  83 year old male returns today for follow-up of of intermediate unfavorable risk prostate cancer (iPSA 11.3, Haily 4+3=7, 3/12 cores, dx 1/11/23) now s/p EBRT w/o ADT, completed 4/12/2023 ([facility])    Expect fatigue to improve over the next few months " after radiation    Re: dysuria, certainly can have worsening urinary symptoms after radiation. Check urinalysis, send for culture if indicated, treat if indicated    - return 3 months with PSA prior    Niraj Larry MD   [facility] Urology  [facility] Phone: [phone]

## 2023-06-02 NOTE — NURSING NOTE
Chief Complaint   Patient presents with     Prostate Cancer     3 months with PSA     Pt has questions about side effects from radiation.    April Nolasco, CMA

## 2023-06-02 NOTE — PATIENT INSTRUCTIONS
Have you facility send a urinalysis with reflex to culture    We will see you in 3 months with PSA prior

## 2023-06-02 NOTE — PROGRESS NOTES
"CHIEF COMPLAINT   Jamie Valdivia who is a 83 year old male returns today for follow-up of of intermediate unfavorable risk prostate cancer (iPSA 11.3, [place] 4+3=7, 3/12 cores, dx 1/11/23).      HPI   Jamie Valdivia is a 83 year old male returns today for follow-up of of intermediate unfavorable risk prostate cancer (iPSA 11.3, Deal 4+3=7, 3/12 cores, dx 1/11/23) now s/p EBRT w/o ADT, completed 4/12/2023 ([facility])    Here for followup    Patient is complaining of some dysuria, urinary frequency as well as fatigue after radiation    He is wearing incontinence pads at night for protection, occasionally cannot make it to the bathroom on time    PHYSICAL EXAM  Patient is a 83 year old  male   Vitals: Blood pressure (!) 148/76, pulse 73, height 1.727 m (5' 8\"), weight 103 kg (227 lb).  Body mass index is 34.52 kg/m .  General Appearance Adult:   Alert, no acute distress, oriented  HENT: throat/mouth:normal, good dentition  Lungs: no respiratory distress, or pursed lip breathing  Heart: No obvious jugular venous distension present  Abdomen: nondistended  Musculoskeltal: BLE edema  Skin: no suspicious lesions or rashes  Neuro: Alert, oriented, speech and mentation normal  Psych: affect and mood normal  Gait: Normal  : deferred    Component      Latest Ref Rng 4/27/2023  5:15 AM   PSA Tumor Marker      ng/mL 4.06          ASSESSMENT and PLAN  83 year old male returns today for follow-up of of intermediate unfavorable risk prostate cancer (iPSA 11.3, Deal 4+3=7, 3/12 cores, dx 1/11/23) now s/p EBRT w/o ADT, completed 4/12/2023 ([facility])    Expect fatigue to improve over the next few months after radiation    Re: dysuria, certainly can have worsening urinary symptoms after radiation. Check urinalysis, send for culture if indicated, treat if indicated    - return 3 months with PSA prior    Niraj Larry MD   [facility] Urology  [facility] Phone: [phone]    "

## 2023-06-05 ENCOUNTER — TELEPHONE (OUTPATIENT)
Dept: PHYSICAL MEDICINE AND REHAB | Facility: CLINIC | Age: 83
End: 2023-06-05

## 2023-06-05 ENCOUNTER — LAB REQUISITION (OUTPATIENT)
Dept: LAB | Facility: CLINIC | Age: 83
End: 2023-06-05
Payer: COMMERCIAL

## 2023-06-05 DIAGNOSIS — R30.0 DYSURIA: ICD-10-CM

## 2023-06-05 LAB
ALBUMIN UR-MCNC: NEGATIVE MG/DL
APPEARANCE UR: CLEAR
BILIRUB UR QL STRIP: NEGATIVE
COLOR UR AUTO: NORMAL
GLUCOSE UR STRIP-MCNC: NEGATIVE MG/DL
HGB UR QL STRIP: NEGATIVE
KETONES UR STRIP-MCNC: NEGATIVE MG/DL
LEUKOCYTE ESTERASE UR QL STRIP: NEGATIVE
NITRATE UR QL: NEGATIVE
PH UR STRIP: 7 [PH] (ref 5–7)
SP GR UR STRIP: 1.01 (ref 1–1.03)
UROBILINOGEN UR STRIP-MCNC: NORMAL MG/DL

## 2023-06-05 PROCEDURE — 87086 URINE CULTURE/COLONY COUNT: CPT | Mod: ORL

## 2023-06-05 PROCEDURE — 81003 URINALYSIS AUTO W/O SCOPE: CPT | Mod: ORL

## 2023-06-05 NOTE — TELEPHONE ENCOUNTER
[facility] Call Center    Phone Message    May a detailed message be left on voicemail: yes     Reason for Call: Other: Alexia was returning a call regarding her fathers appointment. Please call back when available.      Action Taken: Other: Pain    Travel Screening: Not Applicable
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
## 2023-06-07 ENCOUNTER — APPOINTMENT (OUTPATIENT)
Dept: CT IMAGING | Facility: CLINIC | Age: 83
End: 2023-06-07
Attending: EMERGENCY MEDICINE
Payer: COMMERCIAL

## 2023-06-07 ENCOUNTER — TELEPHONE (OUTPATIENT)
Dept: GERIATRICS | Facility: CLINIC | Age: 83
End: 2023-06-07

## 2023-06-07 ENCOUNTER — HOSPITAL ENCOUNTER (OUTPATIENT)
Facility: CLINIC | Age: 83
Setting detail: OBSERVATION
Discharge: HOME HEALTH | End: 2023-06-08
Attending: EMERGENCY MEDICINE | Admitting: HOSPITALIST
Payer: COMMERCIAL

## 2023-06-07 VITALS
SYSTOLIC BLOOD PRESSURE: 141 MMHG | BODY MASS INDEX: 34.98 KG/M2 | WEIGHT: 230.8 LBS | TEMPERATURE: 98.2 F | HEART RATE: 55 BPM | OXYGEN SATURATION: 97 % | HEIGHT: 68 IN | RESPIRATION RATE: 17 BRPM | DIASTOLIC BLOOD PRESSURE: 94 MMHG
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
## 2023-06-07 DIAGNOSIS — S22.41XA CLOSED FRACTURE OF MULTIPLE RIBS OF RIGHT SIDE, INITIAL ENCOUNTER: ICD-10-CM

## 2023-06-07 DIAGNOSIS — R41.9 NEUROCOGNITIVE DISORDER: Primary | ICD-10-CM

## 2023-06-07 DIAGNOSIS — S32.030A CLOSED WEDGE COMPRESSION FRACTURE OF L3 VERTEBRA, INITIAL ENCOUNTER (H): ICD-10-CM

## 2023-06-07 LAB
ALBUMIN SERPL BCG-MCNC: 4 G/DL (ref 3.5–5.2)
ALBUMIN UR-MCNC: NEGATIVE MG/DL
ALP SERPL-CCNC: 58 U/L (ref 40–129)
ALT SERPL W P-5'-P-CCNC: 11 U/L (ref 10–50)
ANION GAP SERPL CALCULATED.3IONS-SCNC: 9 MMOL/L (ref 7–15)
APPEARANCE UR: CLEAR
AST SERPL W P-5'-P-CCNC: 23 U/L (ref 10–50)
BACTERIA UR CULT: NORMAL
BASOPHILS # BLD AUTO: 0.1 10E3/UL (ref 0–0.2)
BASOPHILS NFR BLD AUTO: 1 %
BILIRUB SERPL-MCNC: 0.4 MG/DL
BILIRUB UR QL STRIP: NEGATIVE
BUN SERPL-MCNC: 11.5 MG/DL (ref 8–23)
CALCIUM SERPL-MCNC: 9.1 MG/DL (ref 8.8–10.2)
CHLORIDE SERPL-SCNC: 101 MMOL/L (ref 98–107)
COLOR UR AUTO: NORMAL
CREAT SERPL-MCNC: 0.62 MG/DL (ref 0.67–1.17)
DEPRECATED HCO3 PLAS-SCNC: 28 MMOL/L (ref 22–29)
EOSINOPHIL # BLD AUTO: 0.3 10E3/UL (ref 0–0.7)
EOSINOPHIL NFR BLD AUTO: 3 %
ERYTHROCYTE [DISTWIDTH] IN BLOOD BY AUTOMATED COUNT: 15.5 % (ref 10–15)
GFR SERPL CREATININE-BSD FRML MDRD: >90 ML/MIN/1.73M2
GLUCOSE SERPL-MCNC: 92 MG/DL (ref 70–99)
GLUCOSE UR STRIP-MCNC: NEGATIVE MG/DL
HCT VFR BLD AUTO: 33.4 % (ref 40–53)
HGB BLD-MCNC: 10.6 G/DL (ref 13.3–17.7)
HGB UR QL STRIP: NEGATIVE
IMM GRANULOCYTES # BLD: 0 10E3/UL
IMM GRANULOCYTES NFR BLD: 0 %
KETONES UR STRIP-MCNC: NEGATIVE MG/DL
LEUKOCYTE ESTERASE UR QL STRIP: NEGATIVE
LIPASE SERPL-CCNC: 12 U/L (ref 13–60)
LYMPHOCYTES # BLD AUTO: 1.4 10E3/UL (ref 0.8–5.3)
LYMPHOCYTES NFR BLD AUTO: 13 %
MAGNESIUM SERPL-MCNC: 1.9 MG/DL (ref 1.7–2.3)
MCH RBC QN AUTO: 27 PG (ref 26.5–33)
MCHC RBC AUTO-ENTMCNC: 31.7 G/DL (ref 31.5–36.5)
MCV RBC AUTO: 85 FL (ref 78–100)
MONOCYTES # BLD AUTO: 0.5 10E3/UL (ref 0–1.3)
MONOCYTES NFR BLD AUTO: 5 %
NEUTROPHILS # BLD AUTO: 8.5 10E3/UL (ref 1.6–8.3)
NEUTROPHILS NFR BLD AUTO: 78 %
NITRATE UR QL: NEGATIVE
NRBC # BLD AUTO: 0 10E3/UL
NRBC BLD AUTO-RTO: 0 /100
PH UR STRIP: 7 [PH] (ref 5–7)
PLATELET # BLD AUTO: 191 10E3/UL (ref 150–450)
POTASSIUM SERPL-SCNC: 4.1 MMOL/L (ref 3.4–5.3)
PROT SERPL-MCNC: 7.2 G/DL (ref 6.4–8.3)
RBC # BLD AUTO: 3.93 10E6/UL (ref 4.4–5.9)
RBC URINE: <1 /HPF
SODIUM SERPL-SCNC: 138 MMOL/L (ref 136–145)
SP GR UR STRIP: 1.01 (ref 1–1.03)
SQUAMOUS EPITHELIAL: <1 /HPF
TROPONIN T SERPL HS-MCNC: 20 NG/L
TSH SERPL DL<=0.005 MIU/L-ACNC: 1.68 UIU/ML (ref 0.3–4.2)
UROBILINOGEN UR STRIP-MCNC: NORMAL MG/DL
WBC # BLD AUTO: 10.8 10E3/UL (ref 4–11)
WBC URINE: 0 /HPF

## 2023-06-07 PROCEDURE — 80053 COMPREHEN METABOLIC PANEL: CPT | Performed by: EMERGENCY MEDICINE

## 2023-06-07 PROCEDURE — 250N000013 HC RX MED GY IP 250 OP 250 PS 637: Performed by: EMERGENCY MEDICINE

## 2023-06-07 PROCEDURE — 71250 CT THORAX DX C-: CPT

## 2023-06-07 PROCEDURE — 85025 COMPLETE CBC W/AUTO DIFF WBC: CPT | Performed by: EMERGENCY MEDICINE

## 2023-06-07 PROCEDURE — 83735 ASSAY OF MAGNESIUM: CPT | Performed by: EMERGENCY MEDICINE

## 2023-06-07 PROCEDURE — 84443 ASSAY THYROID STIM HORMONE: CPT | Performed by: EMERGENCY MEDICINE

## 2023-06-07 PROCEDURE — G0378 HOSPITAL OBSERVATION PER HR: HCPCS

## 2023-06-07 PROCEDURE — 84484 ASSAY OF TROPONIN QUANT: CPT | Performed by: EMERGENCY MEDICINE

## 2023-06-07 PROCEDURE — 99358 PROLONG SERVICE W/O CONTACT: CPT | Performed by: NURSE PRACTITIONER

## 2023-06-07 PROCEDURE — L0627 LO SAG RI AN/POS PNL PRE CST: HCPCS

## 2023-06-07 PROCEDURE — 250N000013 HC RX MED GY IP 250 OP 250 PS 637: Performed by: HOSPITALIST

## 2023-06-07 PROCEDURE — 99223 1ST HOSP IP/OBS HIGH 75: CPT | Mod: AI | Performed by: HOSPITALIST

## 2023-06-07 PROCEDURE — 72131 CT LUMBAR SPINE W/O DYE: CPT

## 2023-06-07 PROCEDURE — 72128 CT CHEST SPINE W/O DYE: CPT

## 2023-06-07 PROCEDURE — 70450 CT HEAD/BRAIN W/O DYE: CPT

## 2023-06-07 PROCEDURE — 93005 ELECTROCARDIOGRAM TRACING: CPT

## 2023-06-07 PROCEDURE — 83690 ASSAY OF LIPASE: CPT | Performed by: EMERGENCY MEDICINE

## 2023-06-07 PROCEDURE — 36415 COLL VENOUS BLD VENIPUNCTURE: CPT | Performed by: EMERGENCY MEDICINE

## 2023-06-07 PROCEDURE — 81001 URINALYSIS AUTO W/SCOPE: CPT | Performed by: EMERGENCY MEDICINE

## 2023-06-07 PROCEDURE — 72125 CT NECK SPINE W/O DYE: CPT

## 2023-06-07 PROCEDURE — 99285 EMERGENCY DEPT VISIT HI MDM: CPT | Mod: 25

## 2023-06-07 RX ORDER — ONDANSETRON 2 MG/ML
4 INJECTION INTRAMUSCULAR; INTRAVENOUS EVERY 6 HOURS PRN
Status: DISCONTINUED | OUTPATIENT
Start: 2023-06-07 | End: 2023-06-08 | Stop reason: HOSPADM

## 2023-06-07 RX ORDER — POLYETHYLENE GLYCOL 3350 17 G/17G
17 POWDER, FOR SOLUTION ORAL DAILY PRN
Status: DISCONTINUED | OUTPATIENT
Start: 2023-06-07 | End: 2023-06-08 | Stop reason: HOSPADM

## 2023-06-07 RX ORDER — ACETAMINOPHEN 325 MG/1
975 TABLET ORAL EVERY 8 HOURS
Status: DISCONTINUED | OUTPATIENT
Start: 2023-06-07 | End: 2023-06-08 | Stop reason: HOSPADM

## 2023-06-07 RX ORDER — MIRTAZAPINE 15 MG/1
15 TABLET, FILM COATED ORAL AT BEDTIME
Status: DISCONTINUED | OUTPATIENT
Start: 2023-06-07 | End: 2023-06-08 | Stop reason: HOSPADM

## 2023-06-07 RX ORDER — METHOCARBAMOL 500 MG/1
500 TABLET, FILM COATED ORAL 3 TIMES DAILY PRN
Status: DISCONTINUED | OUTPATIENT
Start: 2023-06-07 | End: 2023-06-08 | Stop reason: HOSPADM

## 2023-06-07 RX ORDER — NALOXONE HYDROCHLORIDE 0.4 MG/ML
0.2 INJECTION, SOLUTION INTRAMUSCULAR; INTRAVENOUS; SUBCUTANEOUS
Status: DISCONTINUED | OUTPATIENT
Start: 2023-06-07 | End: 2023-06-08 | Stop reason: HOSPADM

## 2023-06-07 RX ORDER — NALOXONE HYDROCHLORIDE 0.4 MG/ML
0.4 INJECTION, SOLUTION INTRAMUSCULAR; INTRAVENOUS; SUBCUTANEOUS
Status: DISCONTINUED | OUTPATIENT
Start: 2023-06-07 | End: 2023-06-08 | Stop reason: HOSPADM

## 2023-06-07 RX ORDER — ONDANSETRON 4 MG/1
4 TABLET, ORALLY DISINTEGRATING ORAL EVERY 6 HOURS PRN
Status: DISCONTINUED | OUTPATIENT
Start: 2023-06-07 | End: 2023-06-08 | Stop reason: HOSPADM

## 2023-06-07 RX ORDER — OXYCODONE HYDROCHLORIDE 5 MG/1
5 TABLET ORAL EVERY 4 HOURS PRN
Status: DISCONTINUED | OUTPATIENT
Start: 2023-06-07 | End: 2023-06-08 | Stop reason: HOSPADM

## 2023-06-07 RX ORDER — GABAPENTIN 100 MG/1
100 CAPSULE ORAL 2 TIMES DAILY
Status: DISCONTINUED | OUTPATIENT
Start: 2023-06-07 | End: 2023-06-08 | Stop reason: HOSPADM

## 2023-06-07 RX ORDER — PANTOPRAZOLE SODIUM 40 MG/1
40 TABLET, DELAYED RELEASE ORAL
Status: DISCONTINUED | OUTPATIENT
Start: 2023-06-07 | End: 2023-06-08 | Stop reason: HOSPADM

## 2023-06-07 RX ORDER — ACETAMINOPHEN 325 MG/1
975 TABLET ORAL ONCE
Status: COMPLETED | OUTPATIENT
Start: 2023-06-07 | End: 2023-06-07

## 2023-06-07 RX ADMIN — OXYCODONE HYDROCHLORIDE 5 MG: 5 TABLET ORAL at 23:48

## 2023-06-07 RX ADMIN — PANTOPRAZOLE SODIUM 40 MG: 40 TABLET, DELAYED RELEASE ORAL at 16:29

## 2023-06-07 RX ADMIN — GABAPENTIN 100 MG: 100 CAPSULE ORAL at 21:37

## 2023-06-07 RX ADMIN — MIRTAZAPINE 15 MG: 15 TABLET, FILM COATED ORAL at 21:37

## 2023-06-07 RX ADMIN — QUETIAPINE FUMARATE 12.5 MG: 25 TABLET ORAL at 21:37

## 2023-06-07 RX ADMIN — ACETAMINOPHEN 975 MG: 325 TABLET ORAL at 09:47

## 2023-06-07 RX ADMIN — OXYCODONE HYDROCHLORIDE 5 MG: 5 TABLET ORAL at 16:11

## 2023-06-07 RX ADMIN — ACETAMINOPHEN 975 MG: 325 TABLET, FILM COATED ORAL at 16:11

## 2023-06-07 ASSESSMENT — ACTIVITIES OF DAILY LIVING (ADL)
ADLS_ACUITY_SCORE: 44
ADLS_ACUITY_SCORE: 38
ADLS_ACUITY_SCORE: 38
ADLS_ACUITY_SCORE: 35
ADLS_ACUITY_SCORE: 32

## 2023-06-07 NOTE — CONSULTS
[facility]    Neurosurgery Consultation     Date of Admission:  6/7/2023  Date of Consult (When I saw the patient): 06/07/23    Assessment & Plan   Jamie Valdivia is a 83 year old male who was admitted on 6/7/2023. Jamie Valdivia is a 83 year old male with a history of dementia, hypertension, chronic atrial fibrillation, CAD, s/p T8-T12 posterior instrumented fusion with insertion of bilateral fenestrated pedicle screws using O-arm Stealth navigation with  on 8/4/2020; No screws were placed in T10 due to the fracture; Injection of cement vertebroplasty (at T8, T9, T11 and T12) who presented today via EMS with a fall and imaging evidence of acute or subacute superimposed on chronic compression deformity involving L3.     Patient notes after fall he has had intense low back pain aggravated with movement and alleviated with laying still. He denies radicular sx, paresthesias, weakness, bowel/bladder changes.     CT LUMBAR SPINE WITHOUT CONTRAST  6/7/2023 11:21 AM                                               IMPRESSION:  1. Findings concerning for acute or subacute superimposed on chronic  compression fracture deformity involving the L3 vertebral body.  2. Multiple other unchanged compression deformities, as described.  Diffuse osseous demineralization.  3. Degenerative changes, as described. There appears to be at least  moderate degenerative spinal canal and bilateral neural foraminal  stenosis at the L4-L5 level.  4. Partially visualized lower thoracic posterior fusion hardware  appears intact, although it is incompletely evaluated.   This result has not been signed. Information might be incomplete     Clinical history, imaging and plans reviewed myself as well as with Dr. Kennedy. Plan for Orthotics to see for LSO fitting; hopefully can fit LSO without interfering with rib fractures. LSO to be worn when ambulating, OK to be OFF when sitting, resting, hygiene purposes as long as  safe. Reviewed diagnosis of compression fracture, typical healing time frame, conservative measures including pain control and light duties. Please limit your lifting to no more that ten pounds and avoid excessive bending, twisting and turning at the lumbar spine. You should also avoid excessive jostling and jarring activities. We will plan to obtain upright XR in brace tomorrow. If stable, will sign off and plan to see as an outpatient in 6 weeks with XR prior.     I have discussed the following assessment and plan with Dr. Kennedy and Dr. Li hospitalist MD who are both in agreement with the initial plan and will follow up with further consultation recommendations.    Shara Hamm PA-C  [facility] [place] [address]    Tel [phone]  Pager [phone]    Code Status    Prior    Reason for Consult   Reason for consult: I was asked by Dr. Schaffer to evaluate this patient for L3 fracture.    Primary Care Physician   Sylvia Stein    Chief Complaint   Fall     History is obtained from the patient, electronic health record and emergency department physician    History of Present Illness    Jamie Valdivia is a 83 year old male with a history of dementia, hypertension, chronic atrial fibrillation, CAD, s/p T8-T12 posterior instrumented fusion with insertion of bilateral fenestrated pedicle screws using O-arm Stealth navigation with  on 8/4/2020; No screws were placed in T10 due to the fracture; Injection of cement vertebroplasty (at T8, T9, T11 and T12) who presented today via EMS with a fall and imaging evidence of acute or subacute superimposed on chronic compression deformity involving L3.     Patient notes after fall he has had intense low back pain aggravated with movement and alleviated with laying still. He denies radicular sx, paresthesias, weakness, bowel/bladder changes.     CT LUMBAR SPINE WITHOUT CONTRAST  6/7/2023  11:21 AM                                               IMPRESSION:  1. Findings concerning for acute or subacute superimposed on chronic  compression fracture deformity involving the L3 vertebral body.  2. Multiple other unchanged compression deformities, as described.  Diffuse osseous demineralization.  3. Degenerative changes, as described. There appears to be at least  moderate degenerative spinal canal and bilateral neural foraminal  stenosis at the L4-L5 level.  4. Partially visualized lower thoracic posterior fusion hardware  appears intact, although it is incompletely evaluated.   This result has not been signed. Information might be incomplete         Past Medical History   I have reviewed this patient's medical history and updated it with pertinent information if needed.   Past Medical History:   Diagnosis Date     Age-related osteoporosis without current pathological fracture 03/30/2022     Alcohol abuse 11/19/2017     Alcohol dependence with withdrawal (H)      Alcoholism (H)      Back pain      Backache 12/19/2018     CAD (coronary artery disease)      Chronic a-fib (H)      Chronic alcohol use 06/11/2015    Formatting of this note might be different from the original. 2/26/2019: serious fall at home with multiple vertebral fractures.  BAL 0.414% on admission.  Denies that he drinks much. 12/18/2018: hospitalized for fall due to alcohol intoxication.  BAL 0.34% on admission. 9/16/2018: hospitalized for injuries from fall due to alcohol intoxication.  BAL 0.34% on admission     Chronic atrial fibrillation (H) 07/15/2016    Formatting of this note might be different from the original. 2/2019: Multiple falls so started on aspirin only.  Then aspirin stopped due to GI bleed.     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 2/2/21     HTN (hypertension)      Impaired gait and mobility 03/14/2019     Mild cognitive impairment 08/12/2019    Formatting of this note might be different from the original. NON-AMNESTIC TYPE     Mild TBI (traumatic brain injury) (H) 03/14/2019     Mumps      Obesity (BMI 30-39.9) 09/03/2015     Osteoarthritis      Osteoarthritis of both knees 05/13/2015     Osteoporosis      Other idiopathic peripheral autonomic neuropathy 03/30/2022     Other insomnia 03/14/2019     Other seizures (H) 03/30/2022     Palpitations      Peripheral neuropathy      Pharyngeal dysphagia 05/13/2015    Formatting of this note might be different from the original. Likely secondary to GERD with esophagitis, on PPI and H2 blocker with notable improvement, EGD 10/5/15.     Physical deconditioning 03/14/2019     Recurrent major depressive disorder (H) 03/30/2022     Rhabdomyolysis      Thrombocytopenia (H)      Urination disorder      Weakness 04/27/2020       Past Surgical History   I have reviewed this patient's surgical history and updated it with pertinent information if needed.  Past Surgical History:   Procedure Laterality Date     BIOPSY PROSTATE TRANSRECTAL N/A 1/11/2023    Procedure: PROSTATE BIOPSY, RECTAL APPROACH;  Surgeon: Niraj Larry MD;  Location: [facility] OR     COLONOSCOPY       ESOPHAGOSCOPY, GASTROSCOPY, DUODENOSCOPY (EGD), COMBINED N/A 06/01/2022    Procedure: ESOPHAGOGASTRODUODENOSCOPY (EGD) mngi with biopsies using jumbo cold biopsy forceps;  Surgeon: David Springer MD;  Location:  GI     left hip replacement  2010     left shoulder replacement  2016     ORTHOPEDIC SURGERY       SPINE SURG[facility]      [facility] [place]     [facility]        TRANSRECTAL ULTRASONIC SONOGRAM N/A 1/11/2023    Procedure: TRANSRECTAL ULTRASOUND;  Surgeon: Niraj Larry MD;  Location: [facility] OR       Prior to Admission Medications   Prior to Admission Medications   Prescriptions Last Dose Informant Patient Reported? Taking?   QUEtiapine (SEROQUEL) 25 MG tablet  Nursing Home No No   Sig: Take 0.5 tablets (12.5 mg) by mouth 2 times daily. May also take 0.5 tablets (12.5 mg) every 12 hours as needed (anxiety).   White Petrolatum ointment   No No   Sig: Apply to BL nares q HS   acetaminophen (TYLENOL) 500 MG tablet  Nursing Home No No   Sig: Take 1 tablet (500 mg) by mouth 2 times daily. May also take 1 tablet (500 mg) 2 times daily as needed for mild pain.   alum & mag hydroxide-simethicone (MAALOX MAX) 400-400-40 MG/5ML SUSP suspension  Nursing Home No No   Sig: Take 30 mLs by mouth every 6 hours as needed for indigestion   benzocaine-menthol (CHLORASEPTIC) 6-10 MG lozenge   Yes No   Sig: Place 1 lozenge inside cheek 2 times daily as needed   gabapentin (NEURONTIN) 100 MG capsule   No No   Sig: Take 1 capsule (100 mg) by mouth daily AND 2 capsules (200 mg) At Bedtime. May also take 1 capsule (100 mg) 2 times daily as needed (anxiety).   guaiFENesin (ROBITUSSIN) 20 mg/mL liquid   No No   Sig: Take 10 mLs by mouth every 4 hours as needed for cough   loratadine (CLARITIN) 10 MG tablet   No No   Sig: Take 1 tablet (10 mg) by mouth At Bedtime   menthol-zinc oxide (CALMOSEPTINE) 0.44-20.6 % OINT ointment  Nursing Home No No   Sig: Apply 1 g topically 2 times daily. May also apply 1 g 2 times daily as needed for skin protection.   mirtazapine (REMERON) 15 MG tablet   No No   Sig: Take 1 tablet (15 mg) by mouth At Bedtime   nystatin (MYCOSTATIN) 804516 UNIT/GM external powder  Nursing Home Yes No   Sig: Apply topically 2 times daily as needed   omeprazole (PRILOSEC) 40 MG DR capsule  Nursing Home No No   Sig: TAKE 1 CAPSULE BY MOUTH TWICE DAILY   oxymetazoline (AFRIN) 0.05 % nasal  spray   No No   Sig: Spray 2 sprays into both nostrils 2 times daily as needed (nose bleed)   polyethylene glycol (MIRALAX) 17 GM/Dose powder  Nursing Home No No   Sig: MIX 2 CAPFULS IN 8OZ OF ORANGE JUICE  IN THE MORNING;AND MAY MIX 2 CAPFULS ONCE DAILY AS NEEDED FOR CONSTIPATION. MIX IN FRONT OF PT. HOLD FOR LOOSE STOOL. OK TO GIVE 1 CAPFUL IF RESIDENT REFUSES 2 CAPFULS.   polyvinyl alcohol-povidone PF (REFRESH) 1.4-0.6 % ophthalmic solution  Nursing Home Yes No   Si drop every 4 hours as needed for irritation   prednisolon-gatiflox-bromfenac, pt own, no charge, 1-0.5-0.075 % opthalmic solution   Yes No   Sig: Place 1 drop into the right eye 2 times daily Until bottle is empty      Facility-Administered Medications: None     Allergies   Allergies   Allergen Reactions     Ativan [Lorazepam]        Social History   I have reviewed this patient's social history and updated it with pertinent information if needed. Jamie Valdivia  reports that he has never smoked. He has never used smokeless tobacco. He reports that he does not currently use alcohol. He reports that he does not use drugs.    Family History   I have reviewed this patient's family history and updated it with pertinent information if needed.   Family History   Problem Relation Age of Onset     Osteoporosis Mother      Prostate Cancer Paternal Grandfather      Colon Cancer No family hx of        Review of Systems    ROS: 10 point ROS neg other than the symptoms noted above in the HPI.      Physical Exam       BP: (!) 140/70 Pulse: 60   Resp: 16 SpO2: 96 % O2 Device: None (Room air)    Vital Signs with Ranges  Pulse:  [60-64] 60  Resp:  [16] 16  BP: (140-172)/(70-85) 140/70  SpO2:  [96 %-100 %] 96 %  0 lbs 0 oz     , Blood pressure (!) 140/70, pulse 60, resp. rate 16, SpO2 96 %.  0 lbs 0 oz  NEUROLOGICAL EXAMINATION:     Mental status:  Awake, alert, following commands  Cranial nerves:  II-XII intact.   Motor:   Hip Flexor:                Right: 5/5   "Left:  5/5  Hip Adductor:             Right:  5/5  Left:  5/5  Hip Abductor:             Right:  5/5  Left:  5/5  Gastroc Soleus:        Right:  5/5  Left:  5/5  Tib/Ant:                      Right:  5/5  Left:  5/5  EHL:                     Right:  5/5  Left:  5/5  Sensation:  Intact to light touch. Notes both feet are \"sore\" at baseline  Reflexes:    Negative Clonus.      Lumbar examination reveals tenderness of the spine and paraspinous muscles mid to lower lumbar region.  Straight leg raise is negative bilaterally.       Data   All new lab and imaging data was personally reviewed by me.  CT LUMBAR SPINE WITHOUT CONTRAST  6/7/2023 11:21 AM      HISTORY: Fall, low back pain.      TECHNIQUE: Axial images of the lumbar spine were obtained without  intravenous contrast. Multiplanar reformations were performed.   Radiation dose for this scan was reduced using automated exposure  control, adjustment of the mA and/or kV according to patient size, or  iterative reconstruction technique.      COMPARISON: CT lumbar spine 9/30/2020.      FINDINGS: Nomenclature is based on five lumbar vertebral bodies. There  is diffuse osseous demineralization that limits evaluation for  fracture. There appears to be progressed height loss involving the L3  vertebral body, now moderate to severe in degree, concerning for  progressed L3 compression fracture compared to the prior CT exam.  There is mild cortical irregularity involving the L3 superior  endplate, and acute/recent fracture is suspected given the patient's  clinical history. Chronic T12 and L1 compression deformities with mild  to moderate vertebral height loss, chronic compression deformity of  the L2 superior endplate with mild height loss, chronic compression  deformity of the L4 vertebral body with moderate to severe height  loss, and chronic L5 compression deformity with mild height loss,  otherwise appearing unchanged. Unchanged mild nonspecific patchy  sclerosis of the S1 " vertebra.     Sagittal alignment is normal. Minimal levoconvex curvature of the  lower lumbar spine. No posttraumatic subluxation identified. There  appears to be subtle radiolucency of the posterior aspect of the right  12th rib (series 6 image 33) without displacement. This could be a  nondisplaced recent fracture or artifact. Unchanged additional older  healed rib fractures. There is ankylosis across the bilateral  sacroiliac joints, which may be degenerative in nature, unchanged.     Flowing ossification along the anterior aspect of the vertebral column  extending from the lower thoracic region down to L3, which may  represent diffuse idiopathic skeletal hyperostosis. Calcification in  the T11-T12 and L2-L3 disc spaces, as before. Mild/mild to moderate  multilevel disc space narrowing. Multilevel disc osteophyte complexes.  Varying degrees of multilevel degenerative facet disease, most  pronounced in the lower lumbar spine, particularly at L4-L5 and L5-S1.  Scattered ligamentum flavum thickening. There appears to be at least  moderate degenerative neural foraminal stenosis at L4-L5 with  relatively lesser degrees of mild/mild to moderate spinal canal  narrowing elsewhere. There is at least moderate left greater than  right neural foraminal stenosis at L4-L5 with lesser degrees of neural  foraminal narrowing at other levels.     Partial visualization of posterior fusion hardware in the lower  thoracic spine at the T11 and T12 levels, incompletely evaluated.  Changes of vertebroplasty material placement in the T11 and T12  vertebral bodies surrounding the pedicle screws. Within the field of  view, no evidence for hardware fracture or loosening. Scattered  atherosclerotic calcifications of the aortoiliac arteries. The  visualized paraspinal soft tissues otherwise appear unremarkable.                                                                      IMPRESSION:  1. Findings concerning for acute or subacute  superimposed on chronic  compression fracture deformity involving the L3 vertebral body.  2. Multiple other unchanged compression deformities, as described.  Diffuse osseous demineralization.  3. Degenerative changes, as described. There appears to be at least  moderate degenerative spinal canal and bilateral neural foraminal  stenosis at the L4-L5 level.  4. Partially visualized lower thoracic posterior fusion hardware  appears intact, although it is incompletely evaluated.   This result has not been signed. Information might be incomplete       CBC RESULTS:   Recent Labs   Lab Test 06/07/23  0957   WBC 10.8   RBC 3.93*   HGB 10.6*   HCT 33.4*   MCV 85   MCH 27.0   MCHC 31.7   RDW 15.5*        Basic Metabolic Panel:  Lab Results   Component Value Date     06/07/2023      Lab Results   Component Value Date    POTASSIUM 4.1 06/07/2023    POTASSIUM 4.2 01/05/2023     Lab Results   Component Value Date    CHLORIDE 101 06/07/2023    CHLORIDE 102 01/05/2023     Lab Results   Component Value Date    JOSUE 9.1 06/07/2023     Lab Results   Component Value Date    CO2 28 06/07/2023    CO2 27 01/05/2023     Lab Results   Component Value Date    BUN 11.5 06/07/2023    BUN 15 01/05/2023     Lab Results   Component Value Date    CR 0.62 06/07/2023     Lab Results   Component Value Date    GLC 92 06/07/2023     01/12/2023     01/05/2023     INR:  No results found for: INR

## 2023-06-07 NOTE — PHARMACY-ADMISSION MEDICATION HISTORY
Pharmacist Admission Medication History    Admission medication history is complete. The information provided in this note is only as accurate as the sources available at the time of the update.    Medication reconciliation/reorder completed by provider prior to medication history? No    Information Source(s): Facility ([facility]/) medication list/MAR from [facility]    Pertinent Information: -    Changes made to PTA medication list:    Added: None    Deleted: None    Changed: None    Medication Affordability: Unable to assess     Allergies reviewed with patient and updates made in EHR: unable to assess    Medication History Completed By: Lilly Rolon RPH 6/7/2023 3:02 PM    Prior to Admission medications    Medication Sig Last Dose Taking? Auth Provider Long Term End Date   acetaminophen (TYLENOL) 500 MG tablet Take 1 tablet (500 mg) by mouth 2 times daily. May also take 1 tablet (500 mg) 2 times daily as needed for mild pain. 6/7/2023 at am Yes Sylvia Stein APRN CNP     alum & mag hydroxide-simethicone (MAALOX MAX) 400-400-40 MG/5ML SUSP suspension Take 30 mLs by mouth every 6 hours as needed for indigestion  Yes Sylvia Stein APRN CNP     benzocaine-menthol (CHLORASEPTIC) 6-10 MG lozenge Place 1 lozenge inside cheek 2 times daily as needed  Yes Reported, Patient     gabapentin (NEURONTIN) 100 MG capsule Take 1 capsule (100 mg) by mouth daily AND 2 capsules (200 mg) At Bedtime. May also take 1 capsule (100 mg) 2 times daily as needed (anxiety). 6/7/2023 at am Yes Sylvia Stein APRN CNP Yes    guaiFENesin (ROBITUSSIN) 20 mg/mL liquid Take 10 mLs by mouth every 4 hours as needed for cough  Yes Sylvia Stein APRN CNP     loratadine (CLARITIN) 10 MG tablet Take 1 tablet (10 mg) by mouth At Bedtime 6/6/2023 Yes Sylvia Stein APRN CNP     menthol-zinc oxide (CALMOSEPTINE) 0.44-20.6 % OINT ointment Apply 1 g topically 2 times daily. May also apply 1 g 2  times daily as needed for skin protection.  Yes Sylvia Stein APRN CNP     mirtazapine (REMERON) 15 MG tablet Take 1 tablet (15 mg) by mouth At Bedtime 6/6/2023 Yes Sylvia Stein APRN CNP Yes    nystatin (MYCOSTATIN) 639626 UNIT/GM external powder Apply topically 2 times daily as needed  Yes Reported, Patient     omeprazole (PRILOSEC) 40 MG DR capsule TAKE 1 CAPSULE BY MOUTH TWICE DAILY 6/7/2023 at am Yes Sylvia Stein APRN CNP     oxymetazoline (AFRIN) 0.05 % nasal spray Spray 2 sprays into both nostrils 2 times daily as needed (nose bleed)  Yes Sylvia Stein APRN CNP     polyethylene glycol (MIRALAX) 17 GM/Dose powder MIX 2 CAPFULS IN 8OZ OF ORANGE JUICE  IN THE MORNING;AND MAY MIX 2 CAPFULS ONCE DAILY AS NEEDED FOR CONSTIPATION. MIX IN FRONT OF PT. HOLD FOR LOOSE STOOL. OK TO GIVE 1 CAPFUL IF RESIDENT REFUSES 2 CAPFULS. 6/7/2023 Yes Sylvia Stein APRN CNP     polyvinyl alcohol-povidone PF (REFRESH) 1.4-0.6 % ophthalmic solution 1 drop every 4 hours as needed for irritation  Yes Reported, Patient     prednisolon-gatiflox-bromfenac, pt own, no charge, 1-0.5-0.075 % opthalmic solution Place 1 drop into the right eye 2 times daily Until bottle is empty 6/6/2023 Yes Reported, Patient     QUEtiapine (SEROQUEL) 25 MG tablet Take 0.5 tablets (12.5 mg) by mouth 2 times daily. May also take 0.5 tablets (12.5 mg) every 12 hours as needed (anxiety). 6/7/2023 Yes Tabatha Quintana MD Yes    White Petrolatum ointment Apply to BL nares q HS 6/6/2023 Yes Sylvia Stein APRN CNP

## 2023-06-07 NOTE — ED NOTES
[facility]  ED Nurse Handoff Report    ED Chief complaint: Fall  . ED Diagnosis:   Final diagnoses:   None       Allergies:   Allergies   Allergen Reactions     Ativan [Lorazepam]        Code Status: Full Code    Activity level - Baseline/Home:  assist of 1.  Activity Level - Current:   assist of 2.   Lift room needed: No.   Bariatric: No   Needed: No   Isolation: No.   Infection: Not Applicable.     Respiratory status: Room air    Vital Signs (within 30 minutes):   Vitals:    06/07/23 0936 06/07/23 1032 06/07/23 1042 06/07/23 1202   BP: (!) 172/85 (!) 149/76  (!) 141/79   Pulse: 64   62   Resp: 16      SpO2: 97%  100% 96%       Cardiac Rhythm:  ,      Pain level:    Patient confused: Yes.   Patient Falls Risk: bed/chair alarm on, nonskid shoes/slippers when out of bed, arm band in place, patient and family education, assistive device/personal items within reach, room door open and toileting schedule implemented.   Elimination Status: Has voided     Patient Report - Initial Complaint: fall.   Focused Assessment: pt comes to the ED after a fall in NH from a lift chair.  Pt was walked back to his bed and was able to bear weight when EMS arrived.  CT shows some possible new fractures.  Pt has c/o pain in right back and flank area.  Pt comes from memory care and has dementia     Abnormal Results:   Labs Ordered and Resulted from Time of ED Arrival to Time of ED Departure   COMPREHENSIVE METABOLIC PANEL - Abnormal       Result Value    Sodium 138      Potassium 4.1      Chloride 101      Carbon Dioxide (CO2) 28      Anion Gap 9      Urea Nitrogen 11.5      Creatinine 0.62 (*)     Calcium 9.1      Glucose 92      Alkaline Phosphatase 58      AST 23      ALT 11      Protein Total 7.2      Albumin 4.0      Bilirubin Total 0.4      GFR Estimate >90     LIPASE - Abnormal    Lipase 12 (*)    CBC WITH PLATELETS AND DIFFERENTIAL - Abnormal    WBC Count 10.8      RBC Count 3.93 (*)     Hemoglobin  10.6 (*)     Hematocrit 33.4 (*)     MCV 85      MCH 27.0      MCHC 31.7      RDW 15.5 (*)     Platelet Count 191      % Neutrophils 78      % Lymphocytes 13      % Monocytes 5      % Eosinophils 3      % Basophils 1      % Immature Granulocytes 0      NRBCs per 100 WBC 0      Absolute Neutrophils 8.5 (*)     Absolute Lymphocytes 1.4      Absolute Monocytes 0.5      Absolute Eosinophils 0.3      Absolute Basophils 0.1      Absolute Immature Granulocytes 0.0      Absolute NRBCs 0.0     MAGNESIUM - Normal    Magnesium 1.9     TROPONIN T, HIGH SENSITIVITY - Normal    Troponin T, High Sensitivity 20     TSH WITH FREE T4 REFLEX - Normal    TSH 1.68     ROUTINE UA WITH MICROSCOPIC REFLEX TO CULTURE        CT Chest Abdomen Pelvis w/o Contrast   Final Result   IMPRESSION:   1.  Acute nondisplaced fractures of the posterior ninth and tenth ribs   on the right.   2.  Remainder of the scan is negative for acute posttraumatic change.      LEANN HERR MD            SYSTEM ID:  [id number]      CT Cervical Spine w/o Contrast   Preliminary Result   IMPRESSION:   1. No definite acute fracture of the cervical spine. Diffuse osseous   demineralization.   2. Multilevel degenerative findings, as described. Please see the body   of the report for additional details.      CT Thoracic Spine w/o Contrast   Preliminary Result   IMPRESSION:   1. Diffuse osseous demineralization. No definite acute thoracic spine   fracture. Multiple unchanged mild/mild to moderate compression   deformities compared to prior exam.   2. Redemonstrated posterior fusion hardware spanning T8-T12 without   evidence for hardware loosening or failure.   3. Markedly exaggerated thoracic kyphosis, unchanged.   4. Degenerative changes.   5. Diffuse idiopathic skeletal hyperostosis.   6. No definite high-grade spinal canal stenosis. Moderate/moderate to   severe bilateral T10-T11 neural foraminal stenosis, unchanged.      CT Head w/o Contrast   Preliminary Result    IMPRESSION:    1. No acute intracranial hemorrhage, extra-axial fluid collection, or   mass effect.   2. 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   3. Brain atrophy and presumed chronic small vessel ischemic change, as   described.      Lumbar spine CT w/o contrast   Preliminary Result   IMPRESSION:   1. Findings concerning for acute or subacute superimposed on chronic   compression fracture deformity involving the L3 vertebral body.   2. Multiple other unchanged compression deformities, as described.   Diffuse osseous demineralization.   3. Degenerative changes, as described. There appears to be at least   moderate degenerative spinal canal and bilateral neural foraminal   stenosis at the L4-L5 level.   4. Partially visualized lower thoracic posterior fusion hardware   appears intact, although it is incompletely evaluated.          Treatments provided: labs, imaging  Family Comments: wife at the bedside  OBS brochure/video discussed/provided to patient:  No  ED Medications:   Medications   acetaminophen (TYLENOL) tablet 975 mg (975 mg Oral $Given 6/7/23 5289)       Drips infusing:  No  For the majority of the shift this patient was Yellow.   Interventions performed were pt can get a little panicky at times, he needs a lot of time and a lot of calming presence.    Sepsis treatment initiated: No    Cares/treatment/interventions/medications to be completed following ED care: see hospitalist orders, sometimes noncompliant, pt is from memory care and has difficulty with directions at times    ED Nurse Name: Dolores Kohli RN  1:01 PM  RECEIVING UNIT ED HANDOFF REVIEW    Above ED Nurse Handoff Report was reviewed: Yes  Reviewed by: Gamaliel Moreau RN on June 7, 2023 at 2:36 PM

## 2023-06-07 NOTE — ED TRIAGE NOTES
Fell from a lift chair to a carpeted floor.  Per EMS he was weight bearing at the scene and was able to walk a short distance.  C/o pain in right lower back     Triage Assessment     Row Name 06/07/23 0828       Triage Assessment (Adult)    Airway WDL WDL       Respiratory WDL    Respiratory WDL WDL       Cardiac WDL    Cardiac WDL WDL       Peripheral/Neurovascular WDL    Peripheral Neurovascular WDL WDL       Cognitive/Neuro/Behavioral WDL    Cognitive/Neuro/Behavioral WDL X  has some memory issues       Pupils (CN II)    Pupil PERRLA yes    Pupil Size Left 3 mm    Pupil Size Right 3 mm       Clyde Coma Scale    Best Eye Response 4-->(E4) spontaneous    Best Motor Response 6-->(M6) obeys commands    Best Verbal Response 5-->(V5) oriented    Tha Coma Scale Score 15

## 2023-06-07 NOTE — TELEPHONE ENCOUNTER
[facility] GERIATRICS  NON-FACE TO FACE PROLONGED SERVICE    Jamie Valdivia 1940    Date of Related Face to Face Service: 6/1/23    INTENT OF SERVICE  This is an extended evaluation of patient's specific problem.  The service relates to a recent or future E/M visit.  Documentation supports medical necessity, relates to ongoing patient management and documents start times and stop times.    Regarding the following diagnoses:  Neurocognitive disorder,   3:00-3:35 PM Reviewed chart. Spoke to lizzeth Rod regarding neuropsych testing. Viola had already discussed findings of exam with Dr. Brock and will be getting a copy of full report.   Reports resident has been calling his old  daily and leaving messages.  came out to facility. After this visit legal team contact children and resident lacks capacity and they cannot represent him.  Discussed several options regarding plan of care.  Would benefit from care conference with IDT after hospital to determine how QoL can be improved in context of safe living situation.    ASSESSMENT/PLAN  Neurocognitive disorder  Comment: Resident with progressive cognitive decline, needs assistance for decision making. Would like to leave locked memory care unit, but unable to due to safety concerns.  Plan:  - Will plan for care conference after resident is discharged from hospital    TIME  Total time spent with Non-Face to Face prolonged service 35 minutes.     Electronically signed by:  MARICHUY Basilio CNP                  3:00 PM - 3:35 PM Chart review and discussion regarding neruopsych eval and plan of care with lizzeth Rod.      MARICHUY Basilio CNP  06/07/23 3:06 PM

## 2023-06-07 NOTE — PLAN OF CARE
ROOM # 2012-2    Living Situation (if not independent, order SW consult):  Facility name:  : Wife, pt lives in     Activity level at baseline: Ind with walker  Activity level on admit: Ax1 with walker    Who will be transporting you at discharge: transport will need to be arranged     Patient registered to observation; given Patient Bill of Rights; given the opportunity to ask questions about observation status and their plan of care.  Patient has been oriented to the observation room, bathroom and call light is in place.    Discussed discharge goals and expectations with patient/family.

## 2023-06-07 NOTE — H&P
[facility]    History and Physical - Hospitalist Service       Date of Admission:  6/7/2023    Assessment & Plan      Jamie Valdivia is a 83 year old male with history of prostate CA (treated with radiation) dementia (lives in memory care), past EtOH use disorder (past), seizure disorder (patient denies), a fib, GERD, osteoporosis, T8-T12 fusion history of vertebral fracture, dysphagia admitted on 6/7/2023 after mechanical fall. Found to have acute R 9th/10th rib fractures and acute on chronic L3 vertebral compression fracture.    Mechanical fall  R posterior 9th/10th rib fractures  Acute on chronic L3 vertebral compression fracture    - already seen by Neurosurgery (I spoke with them)    - brace (ordered), outpt follow-up with xrays     - pain control: tylenol, Oxy, methocarbamol    - PT consult    Fall    - appears mechanical, but it is not clear    - will check orthostatics    - I do not think we need tele at this time    - PT eval    Dementia    - patient lives in memory care    - he is actually able to give me a detailed history    - cont home meds (seroquel)    Atrial fibrillation    - rate controlled without meds    - not on anticoagulation    Dysphagia    - on dysphagia diet    GERD    - cont PPI     EtOH abuse    - in the past    - sober x 2 years    Seizure disorder    - patient denies    - not on meds    - perhaps was related to past alcohol use     Prostate CA     - completed radiation about 4-6 weeks ago    - has some residual urinary incontinence and pelvic pain     Full code: has POLST from facility    Wife in room and updated     Diet: Regular Diet Adult Slightly Thick (level 1)    DVT Prophylaxis: start if he does not discharge tomorrow  Lucio Catheter: Not present  Lines: None     Cardiac Monitoring: None  Code Status:   Full    Clinically Significant Risk Factors Present on Admission                    # Dementia: noted on problem list    # Obesity: Estimated body mass  "index is 34.52 kg/m  as calculated from the following:    Height as of 6/2/23: 1.727 m (5' 8\").    Weight as of 6/2/23: 103 kg (227 lb).            Disposition Plan      Expected Discharge Date: 06/08/2023                  Marvel Li MD  Hospitalist Service  [facility]  Securely message with [employer] (more info)  Text page via [employer] Paging/Directory     ______________________________________________________________________    Chief Complaint   Fall with back pain/rib pain    History is obtained from the patient    History of Present Illness   Jamie Valdivia is a 83 year old male with history of prostate CA (treated with radiation), dementia (lives in memory care), past EtOH use disorder (past), seizure disorder (patient denies), a fib, GERD, osteoporosis, T8-T12 fusion history of vertebral fracture, dysphagia admitted on 6/7/2023 after mechanical fall. Found to have acute R 9th/10th rib fractures and acute on chronic L3 vertebral compression fracture.  Patient states he has been in his normal state of health.  He was getting up from his chair, which is a lift chair.  He had a ready been standing and then reach for his walker when he fell.  He is not sure how he fell.  He does not think he tripped.  He does not think he passed out.  He might have been a little bit dizzy beforehand, but he is not clear on the details of why he fell.  He denied any chest pain, shortness of breath, abdominal pain, nausea, vomiting, diarrhea.  No cough, runny nose, sore throat.  No fevers or chills.  No new urinary symptoms.  He has some chronic urinary symptoms with incontinence and some pain due to his recent prostate cancer and radiation treatments. Currently has 5/10 back pain. No symptoms down legs.        Past Medical History    Past Medical History:   Diagnosis Date     Age-related osteoporosis without current pathological fracture 03/30/2022     Alcohol abuse 11/19/2017     Alcohol dependence with " withdrawal (H)      Alcoholism (H)      Back pain      Backache 12/19/2018     CAD (coronary artery disease)      Chronic a-fib (H)      Chronic alcohol use 06/11/2015    Formatting of this note might be different from the original. 2/26/2019: serious fall at home with multiple vertebral fractures.  BAL 0.414% on admission.  Denies that he drinks much. 12/18/2018: hospitalized for fall due to alcohol intoxication.  BAL 0.34% on admission. 9/16/2018: hospitalized for injuries from fall due to alcohol intoxication.  BAL 0.34% on admission     Chronic atrial fibrillation (H) 07/15/2016    Formatting of this note might be different from the original. 2/2019: Multiple falls so started on aspirin only.  Then aspirin stopped due to GI bleed.     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 2/2/21     HTN (hypertension)      Impaired gait and mobility 03/14/2019     Mild cognitive impairment 08/12/2019    Formatting of this note might be different from the original. NON-AMNESTIC TYPE     Mild TBI (traumatic brain injury) (H) 03/14/2019     Mumps      Obesity (BMI 30-39.9) 09/03/2015     Osteoarthritis      Osteoarthritis of both knees 05/13/2015     Osteoporosis      Other idiopathic peripheral autonomic neuropathy 03/30/2022     Other insomnia 03/14/2019     Other seizures (H) 03/30/2022     Palpitations      Peripheral neuropathy      Pharyngeal dysphagia 05/13/2015    Formatting of this note might be different from the original. Likely secondary to GERD with esophagitis, on PPI and  H2 blocker with notable improvement, EGD 10/5/15.     Physical deconditioning 03/14/2019     Recurrent major depressive disorder (H) 03/30/2022     Rhabdomyolysis      Thrombocytopenia (H)      Urination disorder      Weakness 04/27/2020       Past Surgical History   Past Surgical History:   Procedure Laterality Date     BIOPSY PROSTATE TRANSRECTAL N/A 1/11/2023    Procedure: PROSTATE BIOPSY, RECTAL APPROACH;  Surgeon: Niraj Larry MD;  Location:  OR     COLONOSCOPY       ESOPHAGOSCOPY, GASTROSCOPY, DUODENOSCOPY (EGD), COMBINED N/A 06/01/2022    Procedure: ESOPHAGOGASTRODUODENOSCOPY (EGD) mngi with biopsies using jumbo cold biopsy forceps;  Surgeon: David Springer MD;  Location:  GI     left hip replacement  2010     left shoulder replacement  2016     ORTHOPEDIC SURGERY       SPINE SURGERY      done in [place]     TONSILLECTOMY       TRANSRECTAL ULTRASONIC SONOGRAM N/A 1/11/2023    Procedure: TRANSRECTAL ULTRASOUND;  Surgeon: Niraj Larry MD;  Location:  OR       Prior to Admission Medications   Prior to Admission Medications   Prescriptions Last Dose Informant Patient Reported? Taking?   QUEtiapine (SEROQUEL) 25 MG tablet 6/7/2023 Nursing Home No Yes   Sig: Take 0.5 tablets (12.5 mg) by mouth 2 times daily. May also take 0.5 tablets (12.5 mg) every 12 hours as needed (anxiety).   White Petrolatum ointment 6/6/2023  No Yes   Sig: Apply to BL nares q HS   acetaminophen (TYLENOL) 500 MG tablet 6/7/2023 at am Nursing Home No Yes   Sig: Take 1 tablet (500 mg) by mouth 2 times daily. May also take 1 tablet (500 mg) 2 times daily as needed for mild pain.   alum & mag hydroxide-simethicone (MAALOX MAX) 400-400-40 MG/5ML SUSP suspension  Nursing Home No Yes   Sig: Take 30 mLs by mouth every 6 hours as needed for indigestion   benzocaine-menthol (CHLORASEPTIC) 6-10 MG lozenge   Yes Yes   Sig: Place 1 lozenge inside cheek 2 times daily as needed   gabapentin (NEURONTIN) 100 MG capsule 6/7/2023 at  am  No Yes   Sig: Take 1 capsule (100 mg) by mouth daily AND 2 capsules (200 mg) At Bedtime. May also take 1 capsule (100 mg) 2 times daily as needed (anxiety).   guaiFENesin (ROBITUSSIN) 20 mg/mL liquid   No Yes   Sig: Take 10 mLs by mouth every 4 hours as needed for cough   loratadine (CLARITIN) 10 MG tablet 2023  No Yes   Sig: Take 1 tablet (10 mg) by mouth At Bedtime   menthol-zinc oxide (CALMOSEPTINE) 0.44-20.6 % OINT ointment  Nursing Home No Yes   Sig: Apply 1 g topically 2 times daily. May also apply 1 g 2 times daily as needed for skin protection.   mirtazapine (REMERON) 15 MG tablet 2023  No Yes   Sig: Take 1 tablet (15 mg) by mouth At Bedtime   nystatin (MYCOSTATIN) 724352 UNIT/GM external powder  Nursing Home Yes Yes   Sig: Apply topically 2 times daily as needed   omeprazole (PRILOSEC) 40 MG DR capsule 2023 at am Nursing Home No Yes   Sig: TAKE 1 CAPSULE BY MOUTH TWICE DAILY   oxymetazoline (AFRIN) 0.05 % nasal spray   No Yes   Sig: Spray 2 sprays into both nostrils 2 times daily as needed (nose bleed)   polyethylene glycol (MIRALAX) 17 GM/Dose powder 2023 Nursing Home No Yes   Sig: MIX 2 CAPFULS IN 8OZ OF ORANGE JUICE  IN THE MORNING;AND MAY MIX 2 CAPFULS ONCE DAILY AS NEEDED FOR CONSTIPATION. MIX IN FRONT OF PT. HOLD FOR LOOSE STOOL. OK TO GIVE 1 CAPFUL IF RESIDENT REFUSES 2 CAPFULS.   polyvinyl alcohol-povidone PF (REFRESH) 1.4-0.6 % ophthalmic solution  halfway Yes Yes   Si drop every 4 hours as needed for irritation   prednisolon-gatiflox-bromfenac, pt own, no charge, 1-0.5-0.075 % opthalmic solution 2023  Yes Yes   Sig: Place 1 drop into the right eye 2 times daily Until bottle is empty      Facility-Administered Medications: None        Review of Systems    The 10 point Review of Systems is negative other than noted in the HPI or here.     Social History   I have reviewed this patient's social history and updated it with pertinent information if needed.  Social  History     Tobacco Use     Smoking status: Never     Smokeless tobacco: Never   Substance Use Topics     Alcohol use: Not Currently     Comment: not since December 2021     Drug use: Never       Family History   I have reviewed this patient's family history and updated it with pertinent information if needed.  Family History   Problem Relation Age of Onset     Osteoporosis Mother      Prostate Cancer Paternal Grandfather      Colon Cancer No family hx of        Allergies   Allergies   Allergen Reactions     Ativan [Lorazepam]         Physical Exam   Vital Signs:     BP: (!) 140/70 Pulse: 60   Resp: 16 SpO2: 96 % O2 Device: None (Room air)    Weight: 0 lbs 0 oz    Constitutional: awake, alert, cooperative, no apparent distress, and appears stated age  Eyes: Lids and lashes normal, pupils equal, round and reactive to light, extra ocular muscles intact, sclera clear, conjunctiva normal  ENT: Normocephalic, without obvious abnormality, atraumatic, sinuses nontender on palpation, external ears without lesions, oral pharynx with moist mucous membranes, tonsils without erythema or exudates, gums normal and good dentition.  Respiratory: No increased work of breathing, good air exchange, clear to auscultation bilaterally, no crackles or wheezing  Cardiovascular: Normal apical impulse, regular rate and rhythm, normal S1 and S2, no S3 or S4, and no murmur noted  GI: No scars, normal bowel sounds, soft, non-distended, non-tender, no masses palpated, no hepatosplenomegally  Skin: no bruising or bleeding  Musculoskeletal: able to lift both legs off the bed with minimal pain    Medical Decision Making       60 MINUTES SPENT BY ME on the date of service doing chart review, history, exam, documentation & further activities per the note.      Data     I have personally reviewed the following data over the past 24 hrs:    10.8  \   10.6 (L)   / 191     138 101 11.5 /  92   4.1 28 0.62 (L) \       ALT: 11 AST: 23 AP: 58 TBILI: 0.4    ALB: 4.0 TOT PROTEIN: 7.2 LIPASE: 12 (L)       Trop: 20 BNP: N/A       TSH: 1.68 T4: N/A A1C: N/A       Imaging results reviewed over the past 24 hrs:   Recent Results (from the past 24 hour(s))   Lumbar spine CT w/o contrast    Narrative    CT LUMBAR SPINE WITHOUT CONTRAST  6/7/2023 11:21 AM     HISTORY: Fall, low back pain.      TECHNIQUE: Axial images of the lumbar spine were obtained without  intravenous contrast. Multiplanar reformations were performed.   Radiation dose for this scan was reduced using automated exposure  control, adjustment of the mA and/or kV according to patient size, or  iterative reconstruction technique.     COMPARISON: CT lumbar spine 9/30/2020.     FINDINGS: Nomenclature is based on five lumbar vertebral bodies. There  is diffuse osseous demineralization that limits evaluation for  fracture. There appears to be progressed height loss involving the L3  vertebral body, now moderate to severe in degree, concerning for  progressed L3 compression fracture compared to the prior CT exam.  There is mild cortical irregularity involving the L3 superior  endplate, and acute/recent fracture is suspected given the patient's  clinical history. Chronic T12 and L1 compression deformities with mild  to moderate vertebral height loss, chronic compression deformity of  the L2 superior endplate with mild height loss, chronic compression  deformity of the L4 vertebral body with moderate to severe height  loss, and chronic L5 compression deformity with mild height loss,  otherwise appearing unchanged. Unchanged mild nonspecific patchy  sclerosis of the S1 vertebra.    Sagittal alignment is normal. Minimal levoconvex curvature of the  lower lumbar spine. No posttraumatic subluxation identified. There  appears to be subtle radiolucency of the posterior aspect of the right  12th rib (series 6 image 33) without displacement. This could be a  nondisplaced recent fracture or artifact. Unchanged additional  older  healed rib fractures. There is ankylosis across the bilateral  sacroiliac joints, which may be degenerative in nature, unchanged.    Flowing ossification along the anterior aspect of the vertebral column  extending from the lower thoracic region down to L3, which may  represent diffuse idiopathic skeletal hyperostosis. Calcification in  the T11-T12 and L2-L3 disc spaces, as before. Mild/mild to moderate  multilevel disc space narrowing. Multilevel disc osteophyte complexes.  Varying degrees of multilevel degenerative facet disease, most  pronounced in the lower lumbar spine, particularly at L4-L5 and L5-S1.  Scattered ligamentum flavum thickening. There appears to be at least  moderate degenerative neural foraminal stenosis at L4-L5 with  relatively lesser degrees of mild/mild to moderate spinal canal  narrowing elsewhere. There is at least moderate left greater than  right neural foraminal stenosis at L4-L5 with lesser degrees of neural  foraminal narrowing at other levels.    Partial visualization of posterior fusion hardware in the lower  thoracic spine at the T11 and T12 levels, incompletely evaluated.  Changes of vertebroplasty material placement in the T11 and T12  vertebral bodies surrounding the pedicle screws. Within the field of  view, no evidence for hardware fracture or loosening. Scattered  atherosclerotic calcifications of the aortoiliac arteries. The  visualized paraspinal soft tissues otherwise appear unremarkable.      Impression    IMPRESSION:  1. Findings concerning for acute or subacute superimposed on chronic  compression fracture deformity involving the L3 vertebral body.  2. Multiple other unchanged compression deformities, as described.  Diffuse osseous demineralization.  3. Degenerative changes, as described. There appears to be at least  moderate degenerative spinal canal and bilateral neural foraminal  stenosis at the L4-L5 level.  4. Partially visualized lower thoracic posterior  fusion hardware  appears intact, although it is incompletely evaluated.   CT Head w/o Contrast    Narrative    CT SCAN OF THE HEAD WITHOUT CONTRAST   6/7/2023 11:22 AM     HISTORY: Fall, trauma.    TECHNIQUE:  Axial images of the head and coronal reformations without  IV contrast material. Radiation dose for this scan was reduced using  automated exposure control, adjustment of the mA and/or kV according  to patient size, or iterative reconstruction technique.    COMPARISON: CT of the head dated 9/30/2022.    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 Small focus of hypoattenuation along the  posterolateral margin of the right lentiform nucleus may represent a  dilated perivascular space or chronic lacunar infarct, unchanged. Mild  scattered patchy nonspecific hypoattenuation in the cerebral white  matter, which may represent sequela of chronic small vessel ischemic  disease. Mild to moderate generalized brain parenchymal volume loss.  The ventricles appear within normal limits in size and configuration.  Scattered intracranial atherosclerotic calcifications are present. No  definite CT findings of large transcortical acute or subacute infarct.  No acute intracranial hemorrhage, extra-axial fluid collection, or  mass effect.    Bilateral lens implants. Mild to moderate mucosal thickening in the  ethmoid sinuses. Small to moderate amount of fluid/membrane thickening  in the left mastoid tip. The right mastoid and bilateral middle ear  cavities appear clear. The bony calvarium and bones of the skull base  appear intact.       Impression    IMPRESSION:   1. No acute intracranial hemorrhage, extra-axial fluid collection, or  mass effect.  2. 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  3. Brain atrophy and presumed chronic small vessel ischemic change, as  described.   CT Thoracic Spine w/o Contrast    Narrative    CT THORACIC SPINE WITHOUT  CONTRAST   6/7/2023 11:25 AM     HISTORY: Fall, back pain, history of T-spine surgery.     TECHNIQUE: Axial images of the thoracic spine were obtained without  intravenous contrast. Multiplanar reformations were performed.   Radiation dose for this scan was reduced using automated exposure  control, adjustment of the mA and/or kV according to patient size, or  iterative reconstruction technique.    COMPARISON: Thoracic spine CT 9/30/2022.    FINDINGS:  There is diffuse osseous demineralization limits evaluation  for fracture. No definite new vertebral body height loss is identified  in the thoracic spine. Minimal/mild or mild to moderate height loss  involving multiple thoracic vertebral bodies (including T1, T2, T5,  T6, T7, T8, T9, T10, and T12) appears unchanged from the most recent  examination. Marked exaggeration of the normal thoracic kyphosis, as  before. Mild levoconvex curvature. No posttraumatic subluxation  identified.    Bilateral posterior fusion hardware extending from T8 down to T12.  There is some vertebroplasty material/augmentation surrounding the  posterior approach screws bilaterally at T8, T9, T11 and T12,  unchanged. No findings of hardware loosening or failure identified.    There is flowing ossification along the anterior aspect of the  vertebral column extending from T2 down to the upper lumbar region,  which may represent diffuse idiopathic skeletal hyperostosis. Mild and  moderate degrees of multilevel degenerative disc space narrowing, as  before, with marginal endplate osteophytes and multilevel degenerative  facet disease. No definite high-grade thoracic spinal canal stenosis  identified. There is moderate if not moderate to severe bilateral  neural foraminal stenosis at T10-T11, unchanged. Elsewhere, lesser  degrees of mild/mild to moderate neural foraminal narrowing noted.  Multiple old rib fractures noted.    Minimal scarring in the upper posterior right lung.  Atherosclerotic  calcifications of the aorta.      Impression    IMPRESSION:  1. Diffuse osseous demineralization. No definite acute thoracic spine  fracture. Multiple unchanged mild/mild to moderate compression  deformities compared to prior exam.  2. Redemonstrated posterior fusion hardware spanning T8-T12 without  evidence for hardware loosening or failure.  3. Markedly exaggerated thoracic kyphosis, unchanged.  4. Degenerative changes.  5. Diffuse idiopathic skeletal hyperostosis.  6. No definite high-grade spinal canal stenosis. Moderate/moderate to  severe bilateral T10-T11 neural foraminal stenosis, unchanged.   CT Cervical Spine w/o Contrast    Narrative    CT CERVICAL SPINE WITHOUT CONTRAST   6/7/2023 11:26 AM     HISTORY: Fall, trauma.     TECHNIQUE: Axial images of the cervical spine were obtained without  intravenous contrast. Multiplanar reformations were performed.   Radiation dose for this scan was reduced using automated exposure  control, adjustment of the mA and/or kV according to patient size, or  iterative reconstruction technique.    COMPARISON: Cervical spine CT dated 9/30/2022. Correlation is also  made with thoracic spine CT 9/30/2022.    FINDINGS: There is diffuse osseous demineralization that limits  evaluation for fracture. There is also some artifact that somewhat  limits evaluation. No definite acute cervical spine fracture is  identified. There appears to be unchanged mild anterior wedging and  height loss of the T1 and T2 vertebral bodies compared to prior exams.  There is straightening of the normal cervical lordosis. Minimal  anterolisthesis of C2 on C3, unchanged. There is mild levoconvex  curvature of the lower cervical/upper thoracic spine.    There is ankylosis along anterior/anterolateral aspects of the C4-C6  vertebral bodies. Mild to moderate multilevel degenerative disc space  narrowing, most pronounced at C3-4 and C6-C7. Scattered marginal  endplate and uncovertebral spurs.  Multilevel degenerative facet  disease. There is ankylosis across the C2-C3 facet joints.  Degenerative changes at the medial atlantoaxial joint and also  involving the atlantooccipital joints. Evaluation of the spinal canal  is limited by streak artifact. No definite high-grade central spinal  canal stenosis. There is at least moderate neural foraminal stenosis  on the left at C3-C4 and moderate to severe neural foraminal stenosis  on the right at C3-C4 and up to moderate neural foraminal stenosis on  the right at C5-C6. This appears unchanged.    Atherosclerotic calcifications of the aortic arch. Mild presumed  scarring in the posterior right upper lung zone. Bilateral carotid  bifurcation atherosclerotic calcifications. No prevertebral soft  tissue swelling identified.       Impression    IMPRESSION:  1. No definite acute fracture of the cervical spine. Diffuse osseous  demineralization.  2. Multilevel degenerative findings, as described. Please see the body  of the report for additional details.   CT Chest Abdomen Pelvis w/o Contrast    Narrative    CT CHEST, ABDOMEN AND PELVIS WITHOUT CONTRAST 6/7/2023 11:27 AM    CLINICAL HISTORY: Fall, chest and back pain.    TECHNIQUE: CT scan of the chest, abdomen, and pelvis was performed  without IV contrast. Multiplanar reformats were obtained. Dose  reduction techniques were used.   CONTRAST: None.    COMPARISON: Abdomen and pelvis CT from January 30, 2023.    FINDINGS:   LUNGS AND PLEURA: Trace scattered atelectasis and/or fibrosis.  Elevated left hemidiaphragm. No effusions or pneumothorax.    MEDIASTINUM/AXILLAE: No adenopathy demonstrated in the absence of  contrast. No aneurysm.    CORONARY ARTERY CALCIFICATIONS: Moderate.    HEPATOBILIARY: Question minimal cholelithiasis without cholecystitis.  Liver unremarkable.    PANCREAS: No significant mass, duct dilatation, or inflammatory  change.    SPLEEN: Normal size.    ADRENAL GLANDS: No significant  nodules.    KIDNEYS/BLADDER: No significant mass, stones, or hydronephrosis.    BOWEL: No obstruction or inflammatory change.    PELVIC ORGANS: No pelvic masses.    ADDITIONAL FINDINGS: Moderate fat-containing right inguinal hernia.  Small fat-containing umbilical hernia. No free fluid.    MUSCULOSKELETAL: Acute nondisplaced fractures of the posterior ninth  and tenth ribs on the right. Multifocal thoracolumbar compression  deformities and lower thoracic spine fusion changes appear similar to  previous.      Impression    IMPRESSION:  1.  Acute nondisplaced fractures of the posterior ninth and tenth ribs  on the right.  2.  Remainder of the scan is negative for acute posttraumatic change.    LEANN HERR MD         SYSTEM ID:  [id number]

## 2023-06-07 NOTE — ED PROVIDER NOTES
History     Chief Complaint:  Fall     The history is provided by the patient and the EMS personnel.      Jamie Valdivia is a 83 year old male with a history of dementia, hypertension, chronic atrial fibrillation, and CAD who presents via EMS with a fall. The patient states that earlier this morning, he was standing up from a lift chair to reach for his walker when he began to feel dizzy and had a fall. He denies hitting his head during this fall, but adds that he noticed a bloody nose after his fall, which he reports is not uncommon for him. EMS reports that the fall occurred around 0530, noting that the patient fell onto his left side. They state that the memory care staff was able to help the patient back up into his chair after the fall. They add that the patient is currently having back pain and right sided flank pain. The patient states that his back pain is located in his lower right back. He denies any numbness or tingling. He states that he took Aspirin this morning in addition to being given Tylenol on route to the ED. Of note, he has a history of vertebral compression fracture.     Independent Historian:   EMS - They report above    Review of External Notes: I reviewed the urology note from 6/2/23, which shows a history of prostate cancer. I also reviewed the geriatrics note from 6/1/23, which notes poor insight into his current circumstances. I also reviewed the geriatrics note from 5/23/23, which shows a history of dementia, alcohol dependence, generalized anxiety disorder, and depression.    Medications:    Mirtazapine  Aspirin 81 mg  Olanzapine    Past Medical History:    Age-related osteoporosis  Alcohol dependence with withdrawl  CAD  Chronic atrial fibrillation  Claustrophobia  Dementia   Erectile dysfunction  Edema  JOSÉ MANUEL  GERD  GI bleed  Hypertension  COVID-19  Impaired gait and mobility  Osteoarthritis  Idiopathic peripheral autonomic neuropathy  Insomnia  Seizures  Pharyngeal  dysphagia  Physical deconditioning  Recurrent major depressive disorder  Rhabdomyolysis  Thrombocytopenia  Urination disorder  Hypoglycemia  Hypopharyngeal lesion  Right inguinal hernia  Actinic keratosis  Seborrheic keratosis  Morbid obesity  Venous insufficiency  Pneumonia  Hearing loss  Prostate cancer    Past Surgical History:    Hip replacement, left  Shoulder replacement, left  Lumbar fusion, T8-T12  Tonsillectomy     Physical Exam     Patient Vitals for the past 24 hrs:   BP Pulse Resp SpO2   06/07/23 1300 (!) 140/70 60 -- --   06/07/23 1202 (!) 141/79 62 -- 96 %   06/07/23 1042 -- -- -- 100 %   06/07/23 1032 (!) 149/76 -- -- --   06/07/23 0936 (!) 172/85 64 16 97 %        Physical Exam    General: Laying on the ED bed, no distress  HEENT: Normocephalic, atraumatic  Cardiac: Radial pulses 2+, regular rate and rhythm  Pulm: Breathing comfortably, no accessory muscle usage, no conversational dyspnea, and lungs clear bilaterally  GI: Abdomen soft, nontender, no rigidity or guarding  MSK: C/T-spine nontender to palpation, no step-offs.  L-spine is tender to palpation and there is also left flank tenderness to palpation.  Anterior chest wall and posterior thorax nontender to palpation.  Extremities x4 without bony deformity, no instability, tenderness to palpation, or painful range of motion.  Skin: Warm and dry  Neuro: Sensorimotor intact extremities x4  Psych: Normal mood and affect    Emergency Department Course     ECG  ECG taken at 0947  Atrial fibrillation  Abnormal ECG   No significant changes as compared to prior, dated 9/30/22.  Rate 60 bpm. CO interval * ms. QRS duration 8 ms. QT/QTc 448/448 ms. P-R-T axes * -25 21.     Imaging:  CT Chest Abdomen Pelvis w/o Contrast   Final Result   IMPRESSION:   1.  Acute nondisplaced fractures of the posterior ninth and tenth ribs   on the right.   2.  Remainder of the scan is negative for acute posttraumatic change.      LEANN HERR MD            SYSTEM ID:   [id number]      CT Cervical Spine w/o Contrast   Preliminary Result   IMPRESSION:   1. No definite acute fracture of the cervical spine. Diffuse osseous   demineralization.   2. Multilevel degenerative findings, as described. Please see the body   of the report for additional details.      CT Thoracic Spine w/o Contrast   Preliminary Result   IMPRESSION:   1. Diffuse osseous demineralization. No definite acute thoracic spine   fracture. Multiple unchanged mild/mild to moderate compression   deformities compared to prior exam.   2. Redemonstrated posterior fusion hardware spanning T8-T12 without   evidence for hardware loosening or failure.   3. Markedly exaggerated thoracic kyphosis, unchanged.   4. Degenerative changes.   5. Diffuse idiopathic skeletal hyperostosis.   6. No definite high-grade spinal canal stenosis. Moderate/moderate to   severe bilateral T10-T11 neural foraminal stenosis, unchanged.      CT Head w/o Contrast   Preliminary Result   IMPRESSION:    1. No acute intracranial hemorrhage, extra-axial fluid collection, or   mass effect.   2. 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   3. Brain atrophy and presumed chronic small vessel ischemic change, as   described.      Lumbar spine CT w/o contrast   Preliminary Result   IMPRESSION:   1. Findings concerning for acute or subacute superimposed on chronic   compression fracture deformity involving the L3 vertebral body.   2. Multiple other unchanged compression deformities, as described.   Diffuse osseous demineralization.   3. Degenerative changes, as described. There appears to be at least   moderate degenerative spinal canal and bilateral neural foraminal   stenosis at the L4-L5 level.   4. Partially visualized lower thoracic posterior fusion hardware   appears intact, although it is incompletely evaluated.         Report per radiology    Laboratory:  Labs Ordered and Resulted from Time of ED Arrival to Time of ED  Departure   COMPREHENSIVE METABOLIC PANEL - Abnormal       Result Value    Sodium 138      Potassium 4.1      Chloride 101      Carbon Dioxide (CO2) 28      Anion Gap 9      Urea Nitrogen 11.5      Creatinine 0.62 (*)     Calcium 9.1      Glucose 92      Alkaline Phosphatase 58      AST 23      ALT 11      Protein Total 7.2      Albumin 4.0      Bilirubin Total 0.4      GFR Estimate >90     LIPASE - Abnormal    Lipase 12 (*)    CBC WITH PLATELETS AND DIFFERENTIAL - Abnormal    WBC Count 10.8      RBC Count 3.93 (*)     Hemoglobin 10.6 (*)     Hematocrit 33.4 (*)     MCV 85      MCH 27.0      MCHC 31.7      RDW 15.5 (*)     Platelet Count 191      % Neutrophils 78      % Lymphocytes 13      % Monocytes 5      % Eosinophils 3      % Basophils 1      % Immature Granulocytes 0      NRBCs per 100 WBC 0      Absolute Neutrophils 8.5 (*)     Absolute Lymphocytes 1.4      Absolute Monocytes 0.5      Absolute Eosinophils 0.3      Absolute Basophils 0.1      Absolute Immature Granulocytes 0.0      Absolute NRBCs 0.0     MAGNESIUM - Normal    Magnesium 1.9     TROPONIN T, HIGH SENSITIVITY - Normal    Troponin T, High Sensitivity 20     TSH WITH FREE T4 REFLEX - Normal    TSH 1.68     ROUTINE UA WITH MICROSCOPIC REFLEX TO CULTURE - Normal    Color Urine Light Yellow      Appearance Urine Clear      Glucose Urine Negative      Bilirubin Urine Negative      Ketones Urine Negative      Specific Gravity Urine 1.009      Blood Urine Negative      pH Urine 7.0      Protein Albumin Urine Negative      Urobilinogen Urine Normal      Nitrite Urine Negative      Leukocyte Esterase Urine Negative      RBC Urine <1      WBC Urine 0      Squamous Epithelials Urine <1        Emergency Department Course & Assessments:     Interventions:  Medications   gabapentin (NEURONTIN) capsule 100 mg (has no administration in time range)   mirtazapine (REMERON) tablet 15 mg (has no administration in time range)   pantoprazole (PROTONIX) EC tablet 40 mg  (has no administration in time range)   polyethylene glycol (MIRALAX) Packet 17 g (has no administration in time range)   QUEtiapine (SEROquel) half-tab 12.5 mg (has no administration in time range)   melatonin tablet 1 mg (has no administration in time range)   ondansetron (ZOFRAN ODT) ODT tab 4 mg (has no administration in time range)     Or   ondansetron (ZOFRAN) injection 4 mg (has no administration in time range)   acetaminophen (TYLENOL) tablet 975 mg (has no administration in time range)   oxyCODONE (ROXICODONE) tablet 5 mg (has no administration in time range)   methocarbamol (ROBAXIN) tablet 500 mg (has no administration in time range)   naloxone (NARCAN) injection 0.2 mg (has no administration in time range)     Or   naloxone (NARCAN) injection 0.4 mg (has no administration in time range)     Or   naloxone (NARCAN) injection 0.2 mg (has no administration in time range)     Or   naloxone (NARCAN) injection 0.4 mg (has no administration in time range)   acetaminophen (TYLENOL) tablet 975 mg (975 mg Oral $Given 23 0994)      Independent Interpretation (X-rays, CTs, rhythm strip):  None    Consultations/Discussion of Management or Tests:  1341 I spoke with Dr. Li, hospitalist, who accepts the patient for admission.  1249 I spoke with Shara Hamm, neurosurgery PA-C, regarding the patient.       Assessments:  ED Course as of 23 1549      08 I rechecked the patient and spoke with him about imaging. Wife is now sitting at bedside.      Social Determinants of Health affecting care:   None    Disposition:  The patient was admitted to the hospital under the care of Dr. Li.     Impression & Plan    CMS Diagnoses: None      Medical Decision Makin-year-old male presents with mechanical fall as scribed above.  He has back pain on exam and history of prior injury.  CT of the head and neck were obtained given the patient's fall.  Given his history of thoracic spine surgery and lower  back pain, CT of the chest, abdomen, pelvis were obtained as well.  CTs show to posterior fractures that correlate with the patient's left flank pain as well as worsening of his pre-existing L3 fracture.  Screening EKG was obtained and shows no acute findings.  Screening lab work was unremarkable.  I spoke with neurosurgery regarding the L3 fracture.  Plan is for admission to the hospitalist service for further care of his rib fractures and L3 fracture.    Critical Care time:  was 0 minutes for this patient excluding procedures.    Diagnosis:    ICD-10-CM    1. Closed fracture of multiple ribs of right side, initial encounter  S22.41XA       2. Closed wedge compression fracture of L3 vertebra, initial encounter (H)  S32.030A       3. Fall, initial encounter  W19.XXXA            Scribe Disclosure:  I, YOANA GRESHAM, am serving as a scribe at 8:28 AM on 6/7/2023 to document services personally performed by Garrett Schaffer MD based on my observations and the provider's statements to me.      6/7/2023   Garrett Schaffer MD King, Colin, MD  06/07/23 1555

## 2023-06-07 NOTE — PROGRESS NOTES
ROOM # 212-2    Living Situation (if not independent, order SW consult):  Facility name: [facility]  : Gisela(Spouse)    Activity level at baseline: Ax2  Activity level on admit: Ax2    Who will be transporting you at discharge: Gisela(Spouse)    Patient registered to observation; given Patient Bill of Rights; given the opportunity to ask questions about observation status and their plan of care.  Patient has been oriented to the observation room, bathroom and call light is in place.    Discussed discharge goals and expectations with patient/family.

## 2023-06-08 ENCOUNTER — APPOINTMENT (OUTPATIENT)
Dept: PHYSICAL THERAPY | Facility: CLINIC | Age: 83
End: 2023-06-08
Attending: HOSPITALIST
Payer: COMMERCIAL

## 2023-06-08 ENCOUNTER — PATIENT OUTREACH (OUTPATIENT)
Dept: GERIATRIC MEDICINE | Facility: CLINIC | Age: 83
End: 2023-06-08
Payer: COMMERCIAL

## 2023-06-08 ENCOUNTER — APPOINTMENT (OUTPATIENT)
Dept: GENERAL RADIOLOGY | Facility: CLINIC | Age: 83
End: 2023-06-08
Attending: PHYSICIAN ASSISTANT
Payer: COMMERCIAL

## 2023-06-08 VITALS
HEART RATE: 60 BPM | RESPIRATION RATE: 20 BRPM | OXYGEN SATURATION: 98 % | TEMPERATURE: 97.4 F | DIASTOLIC BLOOD PRESSURE: 77 MMHG | SYSTOLIC BLOOD PRESSURE: 157 MMHG

## 2023-06-08 DIAGNOSIS — S32.000D COMPRESSION FRACTURE OF LUMBAR VERTEBRA WITH ROUTINE HEALING, UNSPECIFIED LUMBAR VERTEBRAL LEVEL, SUBSEQUENT ENCOUNTER: Primary | ICD-10-CM

## 2023-06-08 LAB
ATRIAL RATE - MUSE: 416 BPM
BASOPHILS # BLD AUTO: 0.1 10E3/UL (ref 0–0.2)
BASOPHILS NFR BLD AUTO: 1 %
DIASTOLIC BLOOD PRESSURE - MUSE: NORMAL MMHG
EOSINOPHIL # BLD AUTO: 0.4 10E3/UL (ref 0–0.7)
EOSINOPHIL NFR BLD AUTO: 7 %
ERYTHROCYTE [DISTWIDTH] IN BLOOD BY AUTOMATED COUNT: 15.9 % (ref 10–15)
HCT VFR BLD AUTO: 32.7 % (ref 40–53)
HGB BLD-MCNC: 10.2 G/DL (ref 13.3–17.7)
IMM GRANULOCYTES # BLD: 0 10E3/UL
IMM GRANULOCYTES NFR BLD: 0 %
INTERPRETATION ECG - MUSE: NORMAL
LYMPHOCYTES # BLD AUTO: 1.1 10E3/UL (ref 0.8–5.3)
LYMPHOCYTES NFR BLD AUTO: 20 %
MCH RBC QN AUTO: 27.1 PG (ref 26.5–33)
MCHC RBC AUTO-ENTMCNC: 31.2 G/DL (ref 31.5–36.5)
MCV RBC AUTO: 87 FL (ref 78–100)
MONOCYTES # BLD AUTO: 0.4 10E3/UL (ref 0–1.3)
MONOCYTES NFR BLD AUTO: 8 %
NEUTROPHILS # BLD AUTO: 3.8 10E3/UL (ref 1.6–8.3)
NEUTROPHILS NFR BLD AUTO: 64 %
NRBC # BLD AUTO: 0 10E3/UL
NRBC BLD AUTO-RTO: 0 /100
P AXIS - MUSE: NORMAL DEGREES
PLATELET # BLD AUTO: 184 10E3/UL (ref 150–450)
PR INTERVAL - MUSE: NORMAL MS
QRS DURATION - MUSE: 98 MS
QT - MUSE: 448 MS
QTC - MUSE: 448 MS
R AXIS - MUSE: -25 DEGREES
RBC # BLD AUTO: 3.76 10E6/UL (ref 4.4–5.9)
SYSTOLIC BLOOD PRESSURE - MUSE: NORMAL MMHG
T AXIS - MUSE: 21 DEGREES
VENTRICULAR RATE- MUSE: 60 BPM
WBC # BLD AUTO: 5.8 10E3/UL (ref 4–11)

## 2023-06-08 PROCEDURE — 97530 THERAPEUTIC ACTIVITIES: CPT | Mod: GP | Performed by: PHYSICAL THERAPIST

## 2023-06-08 PROCEDURE — 99239 HOSP IP/OBS DSCHRG MGMT >30: CPT | Performed by: HOSPITALIST

## 2023-06-08 PROCEDURE — G0378 HOSPITAL OBSERVATION PER HR: HCPCS

## 2023-06-08 PROCEDURE — 36415 COLL VENOUS BLD VENIPUNCTURE: CPT | Performed by: HOSPITALIST

## 2023-06-08 PROCEDURE — 250N000013 HC RX MED GY IP 250 OP 250 PS 637: Performed by: HOSPITALIST

## 2023-06-08 PROCEDURE — 72100 X-RAY EXAM L-S SPINE 2/3 VWS: CPT

## 2023-06-08 PROCEDURE — 97161 PT EVAL LOW COMPLEX 20 MIN: CPT | Mod: GP | Performed by: PHYSICAL THERAPIST

## 2023-06-08 PROCEDURE — 97116 GAIT TRAINING THERAPY: CPT | Mod: GP | Performed by: PHYSICAL THERAPIST

## 2023-06-08 PROCEDURE — 85025 COMPLETE CBC W/AUTO DIFF WBC: CPT | Performed by: HOSPITALIST

## 2023-06-08 RX ORDER — OXYCODONE HYDROCHLORIDE 5 MG/1
5 TABLET ORAL EVERY 6 HOURS PRN
Qty: 3 TABLET | Refills: 0 | Status: SHIPPED | OUTPATIENT
Start: 2023-06-08 | End: 2023-06-16

## 2023-06-08 RX ORDER — TIZANIDINE 2 MG/1
2 TABLET ORAL 3 TIMES DAILY PRN
Qty: 3 TABLET | Refills: 0 | Status: SHIPPED | OUTPATIENT
Start: 2023-06-08 | End: 2023-07-11

## 2023-06-08 RX ORDER — TIZANIDINE 2 MG/1
2 TABLET ORAL 3 TIMES DAILY PRN
Qty: 20 TABLET | Refills: 0 | Status: SHIPPED | OUTPATIENT
Start: 2023-06-08 | End: 2023-06-08

## 2023-06-08 RX ORDER — OXYCODONE HYDROCHLORIDE 5 MG/1
5 TABLET ORAL EVERY 6 HOURS PRN
Qty: 15 TABLET | Refills: 0 | Status: SHIPPED | OUTPATIENT
Start: 2023-06-08 | End: 2023-06-08

## 2023-06-08 RX ADMIN — GABAPENTIN 100 MG: 100 CAPSULE ORAL at 08:57

## 2023-06-08 RX ADMIN — POLYETHYLENE GLYCOL 3350 17 G: 17 POWDER, FOR SOLUTION ORAL at 08:57

## 2023-06-08 RX ADMIN — METHOCARBAMOL 500 MG: 500 TABLET ORAL at 05:25

## 2023-06-08 RX ADMIN — OXYCODONE HYDROCHLORIDE 5 MG: 5 TABLET ORAL at 05:25

## 2023-06-08 RX ADMIN — QUETIAPINE FUMARATE 12.5 MG: 25 TABLET ORAL at 08:57

## 2023-06-08 RX ADMIN — PANTOPRAZOLE SODIUM 40 MG: 40 TABLET, DELAYED RELEASE ORAL at 08:57

## 2023-06-08 RX ADMIN — ACETAMINOPHEN 975 MG: 325 TABLET, FILM COATED ORAL at 00:05

## 2023-06-08 RX ADMIN — OXYCODONE HYDROCHLORIDE 5 MG: 5 TABLET ORAL at 14:58

## 2023-06-08 RX ADMIN — ACETAMINOPHEN 975 MG: 325 TABLET, FILM COATED ORAL at 08:57

## 2023-06-08 ASSESSMENT — ACTIVITIES OF DAILY LIVING (ADL)
ADLS_ACUITY_SCORE: 44
ADLS_ACUITY_SCORE: 44
DEPENDENT_IADLS:: CLEANING;COOKING;LAUNDRY;SHOPPING;MEAL PREPARATION;MEDICATION MANAGEMENT;MONEY MANAGEMENT;TRANSPORTATION;INCONTINENCE
ADLS_ACUITY_SCORE: 44

## 2023-06-08 NOTE — PROGRESS NOTES
"   06/08/23 1052   Appointment Info   Signing Clinician's Name / Credentials (PT) Moriah Stein, RERE   Quick Adds   Quick Adds Certification   Living Environment   People in Home facility resident;spouse   Current Living Arrangements assisted living  (memory care?)   Home Accessibility no concerns   Living Environment Comments reports living with spouse   Self-Care   Usual Activity Tolerance moderate   Current Activity Tolerance moderate   Equipment Currently Used at Home walker, rolling  (4WW)   Fall history within last six months yes   Number of times patient has fallen within last six months   (unable to state; reports multiple falls)   Activity/Exercise/Self-Care Comment reports normally mod I with use of 4WW; uses a lift chair; not sure what services he receives   General Information   Onset of Illness/Injury or Date of Surgery 06/07/23   Referring Physician Marvel Li MD   Patient/Family Therapy Goals Statement (PT) return home with spouse   Pertinent History of Current Problem (include personal factors and/or comorbidities that impact the POC) per chart: \"Jamie Valdivia is a 83 year old male with history of prostate CA (treated with radiation) dementia (lives in memory care), past EtOH use disorder (past), seizure disorder (patient denies), a fib, GERD, osteoporosis, T8-T12 fusion history of vertebral fracture, dysphagia admitted on 6/7/2023 after mechanical fall. Found to have acute R 9th/10th rib fractures and acute on chronic L3 vertebral compression fracture\"; see medical record for further information   Existing Precautions/Restrictions brace worn when out of bed;fall;spinal   General Observations patient in bed; agreeable to session   Cognition   Orientation Status (Cognition) oriented x 3   Cognitive Status Comments appears forgetful;   Pain Assessment   Patient Currently in Pain Yes, see Vital Sign flowsheet  (4-6/10; R sided ribs > back)   Integumentary/Edema   Integumentary/Edema Comments see " nursing notes; bruising noted on back   Posture    Posture Comments forward flexed at trunk   Range of Motion (ROM)   ROM Comment trunk limited by pain and spinal precautions   Strength (Manual Muscle Testing)   Strength Comments mild functional weakness   Bed Mobility   Comment, (Bed Mobility) min A with logroll; has a lift chair at home; sleeps in a recliner   Transfers   Comment, (Transfers) sit>stand with CGA; use of walker   Gait/Stairs (Locomotion)   Distance in Feet 25 feet   Comment, (Gait/Stairs) CGA with walker   Balance   Balance Comments impaired; current need for walker; no gross LOB   Clinical Impression   Criteria for Skilled Therapeutic Intervention Yes, treatment indicated   PT Diagnosis (PT) impaired functional mobility   Influenced by the following impairments pain; spinal precautions; impaired cognition; functional weakness   Functional limitations due to impairments impaired independence with functional mobility and cares secondary to above deficits   Clinical Presentation (PT Evaluation Complexity) Stable/Uncomplicated   Clinical Presentation Rationale clinical judgement; level of assist   Clinical Decision Making (Complexity) low complexity   Planned Therapy Interventions (PT) gait training;transfer training;progressive activity/exercise   Anticipated Equipment Needs at Discharge (PT) walker, rolling   Risk & Benefits of therapy have been explained evaluation/treatment results reviewed;care plan/treatment goals reviewed;risks/benefits reviewed;current/potential barriers reviewed;participants voiced agreement with care plan;participants included;patient   PT Total Evaluation Time   PT Eval, Low Complexity Minutes (58725) 10   Therapy Certification   Start of care date 06/08/23   Certification date from 06/08/23   Certification date to 06/10/23   Medical Diagnosis acute R 9th/10th rib fractures and acute on chronic L3 vertebral compression fracture   Physical Therapy Goals   PT Frequency Daily    PT Predicted Duration/Target Date for Goal Attainment 06/10/23   PT Goals Transfers;Gait   PT: Transfers Supervision/stand-by assist;Sit to/from stand;Bed to/from chair;Assistive device   PT: Gait Supervision/stand-by assist;Rolling walker;150 feet   PT Discharge Planning   PT Discharge Recommendation (DC Rec) home with assist;home with home care physical therapy   PT Rationale for DC Rec Anticipate patient could return home to his [facility] with increased assist with mobility/ cares initially and would also benefit from Home PT/OT to maximize return to PLOF; if facility unable to meet care needs, may need to briefly go to TCU prior to return to [facility]   PT Brief overview of current status CGA with walker for transfers/gait; min A for bed mobility   Total Session Time   Total Session Time (sum of timed and untimed services) 10     [facility]  OUTPATIENT PHYSICAL THERAPY EVALUATION  PLAN OF TREATMENT FOR OUTPATIENT REHABILITATION  (COMPLETE FOR INITIAL CLAIMS ONLY)  Patient's Last Name, First Name, M.I.  YOB: 1940  Jamie Valdivia                        Provider's Name  Psychiatric Medical Record No.  [id number]                             Onset Date:  06/07/23   Start of Care Date:  06/08/23   Type:     _X_PT   ___OT   ___SLP Medical Diagnosis:  (P) acute R 9th/10th rib fractures and acute on chronic L3 vertebral compression fracture              PT Diagnosis:  impaired functional mobility Visits from SOC:  1     See note for plan of treatment, functional goals and certification details    I CERTIFY THE NEED FOR THESE SERVICES FURNISHED UNDER        THIS PLAN OF TREATMENT AND WHILE UNDER MY CARE     (Physician co-signature of this document indicates review and certification of the therapy plan).

## 2023-06-08 NOTE — PROGRESS NOTES
TRANSITIONS OF CARE (ISAAC) LOG   ISAAC tasks should be completed by the CC within one (1) business day of notification of each transition. Follow up contact with member is required after return to their usual care setting.  Note:  If CC finds out about the transitions fifteen (15) days or more after the member has returned to their usual care setting, no ISAAC log is needed. However, the CC should check in with the member to discuss the transition process, any changes needed to the care plan and document it in a case note.    Member Name:  Jamie Valdivia MCO Name:  [employer] Member ID#: [id number]   Product: [facility] Care Coordinator Contact:  Gisela Hall RN, BSN, PHN Agency/County/Care System: [facility], [place]   Transition Communication Actions from Care Management Contact   Transition #1   Notification Date: 6/7/23 Transition Date:   6/7/23 Transition From: Assisted Living, [facility]     Is this the member s usual care setting?               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       Contact member/responsible party to offer assistance with transition Date completed: 6/8/23    Notes from conversation with the member/responsible party, provider, discharging and receiving facility (as applicable):   Date 6/8/23:CC contacted Hospital /discharge planner via the [facility] Transitional Care Hand-In Process, with community care plan included.  CC reached out to adult daughter Roselia regarding transition and offered support as needed.  Reviewed and update care plan as needed.  Notified community service providers and placed services Assisted Living on hold as needed.  Transition log initiated.   PCP, Sylvia Stein, notified of hospitalization via EMR.       Shared CC contact  info, care plan/services with receiving setting--Date completed: 6/8/23   Name & Title of receiving setting contact: [facility]   Notified PCP of transition--Date completed:  6/8/23     via  EMR  Name of PCP: Sylvia Stein     Transition #2   Notification Date: 6/9/23 Transition Date:   6/8/23 Transition From: Hospital, [facility]     Is this the member s usual care setting?               no Transition To: Assisted Living, [facility]   Transition Type:  Planned  Reason for Admission/Comments:  Fall at AL and found to have acute R 9th/10th rib fractures and acute on chronic L3 vertebral compression fracture.     Contact member/responsible party to offer assistance with transition Date completed: 6/9/23          Notified PCP of transition--Date completed:  6/9/23     via  EMR  Name of PCP: Sylvia Stein      *RETURN TO USUAL CARE SETTING: *Complete tasks below when the member is discharging TO their usual care setting within one (1) business day of notification..      For situations where the Care Coordinator is notified of the discharge prior to the date of discharge, the Care Coordinator must follow up with the member or designated representative to confirm that discharge actually occurred and discuss required ISAAC tasks as outlined in the ISAAC Instructions.  (This includes situations where it may be a  new  usual care setting for the member. (i.e., a community member who decides upon permanent nursing home placement following hospitalization and rehab).    Discuss with Member/Responsible Party:    Check  Yes  - if the member, family member and/or SNF/facility staff manages the following:    If  No  provide explanation in the comments section.          Date completed: 6/9/23 Communicated with member or their designated representative about the following:  care transition process; about changes to the member s health status; plan of care updates;  education about transitions and how to prevent unplanned transitions/readmissions    Four Pillars for Optimal Transition:    Check  Yes  - if the member, family member and/or SNF/facility staff manages the following:    If  No  provide explanation in the comments section.          [x]  Yes     []  No Does the member have a follow-up appointment scheduled with primary care or specialist? (Mental health hospitalizations--the appt. should be w/in 7 days)              For mental health hospitalizations:  []  Yes     []  No     Does the member have a follow-up appointment scheduled with a mental health practitioner within 7 days of discharge?  [x]  Yes     []  No     Has a medication review been completed with member? If no, refer to PCP, home care nurse, MTM, pharmacist  [x]  Yes     []  No     Can the member manage their medications or is there a system in place to manage medications (e.g. home care set-up)?         [x]  Yes     []  No     Can the member verbalize warning signs and symptoms to watch for and how to respond?  [x]  Yes     []  No     Does the member have a copy of and understand their discharge instructions?  If no, assist to obtain copy of discharge instructions, review discharge instructions, and assist to contact PCP to discuss questions about their recent hospitalization.  [x]  Yes     []  No     Does the member have adequate food, housing and transportation?  If no, add goal and discuss additional supports available to the member                                                                                                                                                                                 [x]  Yes     []  No     Is the member safe in their home?  If no, document needs and support provided                                                                                                                                                                          []  Yes     [x]  No     Are  there any concerns of vulnerability, abuse, or neglect?  If yes, document concerns and actions taken by Care Coordinator as a mandated                                                                                                                                                                              []  Yes     [x]  No     Does the member use a Personal Health Care Record?  Check  Yes  if visit summary, discharge summary, and/or healthcare summary are being used as a PHR.                                                                                                                                                                                  [x]  Yes     []  No     Have you reviewed the discharge summary with the member? If  No  provide explanation in comments.  [x]  Yes     []  No     Have you updated the member s care plan/support plan? Add new diagnosis, medications, treatments, goals & interventions, as applicable. If No, provide explanation in comments.    Comments:           Notes from conversation with the member/responsible party, provider, discharging and receiving facility (as applicable): CC contacted adult daughter Kulwinder and reviewed discharge summary.  Member has a follow-up appointment with PCP in 7 days: Yes: scheduled on Sylvia Stein NP will see member next week   Member has had a change in condition: No  Home visit needed: No  Care plan reviewed and updated.  The following home based services Assisted Living were resumed.  New referrals placed: No  Transition log completed.   PCP, Sylvia Stein, notified of transition back to home via EMR.    Gisela Hall RN  [facility]  [phone]

## 2023-06-08 NOTE — PROGRESS NOTES
[facility] Care Coordination Contact    [facility]  Ambulatory Care Coordination to Inpatient Care Management   Hand-In Communication    Date:  June 8, 2023  Name: Jamie Valdivia is enrolled in [facility] Care Coordination program and I am the Lead Care Coordinator.  CC Contact Information:. Gisela Hall/[phone]  Payor Source: Payor: [facility] / Plan: [facility] DUAL / Product Type: HMO /   Current services in place:  24 hour [facility]  Please see the CC Snaphot and Care Management Flowsheets for specific details of this Jamie Valdivia care plan.     Additional details/specific concerns r/t this admission:    No additional concerns at this time .    I will follow this admission in Epic. Please feel free to contact me with questions or for further collaboration in discharge planning.    Gisela Hall RN  [facility]  [phone]

## 2023-06-08 NOTE — PLAN OF CARE
Goal Outcome Evaluation:        PRIMARY DIAGNOSIS: FALL  OUTPATIENT/OBSERVATION GOALS TO BE MET BEFORE DISCHARGE:  1. ADLs back to baseline: No    2. Activity and level of assistance: Up with standby assistance.    3. Pain status: Improved-controlled with oral pain medications.    4. Return to near baseline physical activity: No     Discharge Planner Nurse   Safe discharge environment identified: No  Barriers to discharge: Yes       Entered by: Palma Shields RN 06/08/2023   /52 (BP Location: Right arm)   Pulse 51   Temp 97.6  F (36.4  C) (Oral)   Resp 18   SpO2 95%    Patient alert, oriented and awake in bed. VSS. Pain managed with Tylenol and Oxycodone. Robaxin offered for spasms to back.Patient is assist x 1 with walker, wears a brace when OOB. No IV access. Xray of lumber spine at 0900 hrs. PT, Neuro consult.Plan of care continues.  Please review provider order for any additional goals.   Nurse to notify provider when observation goals have been met and patient is ready for discharge.

## 2023-06-08 NOTE — PLAN OF CARE
Physical Therapy Discharge Summary    Reason for therapy discharge:    Discharged to home with home therapy.    Progress towards therapy goal(s). See goals on Care Plan in [facility] electronic health record for goal details.  Goals not met.  Barriers to achieving goals:   discharge on same date as initial evaluation.    Therapy recommendation(s):    Continued therapy is recommended.  Rationale/Recommendations:  Home PT and increased services to maximize return to PLOF.

## 2023-06-08 NOTE — PLAN OF CARE
PRIMARY DIAGNOSIS: Fall  OUTPATIENT/OBSERVATION GOALS TO BE MET BEFORE DISCHARGE:  1. Pain Status: Improved-controlled with oral pain medications.    2. Return to near baseline physical activity: No    3. Cleared for discharge by consultants (if involved): No    Discharge Planner Nurse   Safe discharge environment identified:  PT consult   Barriers to discharge: Yes       Entered by: Mita Pablo RN 06/07/2023 10:37 PM     Please review provider order for any additional goals.   Nurse to notify provider when observation goals have been met and patient is ready for discharge.    Temp: 97.9  F (36.6  C) Temp src: Oral BP: 137/62 Pulse: 50   Resp: 16 SpO2: 99 % O2 Device: None (Room air)       A&Ox4. Up Ax1 with walker and gait belt. Can be incontinent at times. Prn oxy given x1 for right rib cage/back pain. Pt fitted for brace, brace on. Plan- PT consult, brace, neurosurgery following- upright xray with brace on tomorrow- if stable neurosurgery will sign off and pt will need to follow-up outpatient in 6 weeks, pain control.

## 2023-06-08 NOTE — PLAN OF CARE
Patient's After Visit Summary was reviewed with patient    Patient verbalized understanding of After Visit Summary, recommended follow up and was given an opportunity to ask questions. Yes    Discharge medications sent home with patient/family: YES, No   Discharged with transport tech

## 2023-06-08 NOTE — PLAN OF CARE
Goal Outcome Evaluation:        PRIMARY DIAGNOSIS: FALL  OUTPATIENT/OBSERVATION GOALS TO BE MET BEFORE DISCHARGE:  1. ADLs back to baseline: No    2. Activity and level of assistance: Up with standby assistance.    3. Pain status: Improved-controlled with oral pain medications.    4. Return to near baseline physical activity: No     Discharge Planner Nurse   Safe discharge environment identified: No  Barriers to discharge: Yes       Entered by: Palma Shields RN 06/08/2023   /52 (BP Location: Right arm)   Pulse 51   Temp 97.6  F (36.4  C) (Oral)   Resp 18   SpO2 95%    Patient alert, oriented and awake in bed. VSS. Pain managed with Tylenol and Oxycodone. X-Ray  Dept. called to have patient go down to imaging for STAT Xray.Patient declined stating he was too tired and needed to rest.Deferred timing to initial order of 0900 hrs tomorrow.      Please review provider order for any additional goals.   Nurse to notify provider when observation goals have been met and patient is ready for discharge.

## 2023-06-08 NOTE — DISCHARGE SUMMARY
"[facility]  Hospitalist Discharge Summary      Date of Admission:  6/7/2023  Date of Discharge:  6/8/2023  Discharging Provider: Marvel Li MD  Discharge Service: Hospitalist Service    Discharge Diagnoses   Fall  R posterior 9th/10th rib fractures  Acute on chronic L3 vertebral compression fracture    Clinically Significant Risk Factors     # Obesity: Estimated body mass index is 34.52 kg/m  as calculated from the following:    Height as of 6/2/23: 1.727 m (5' 8\").    Weight as of 6/2/23: 103 kg (227 lb).       Follow-ups Needed After Discharge   Follow-up Appointments     Follow-up and recommended labs and tests       Follow up with primary care provider, Sylvia Stein, within 7 days for   hospital follow- up.  No follow up labs or test are needed.           Unresulted Labs Ordered in the Past 30 Days of this Admission     No orders found for last 31 day(s).      These results will be followed up by NA    Discharge Disposition   Discharged to home  Condition at discharge: Stable    Hospital Course   Jamie Valdivia is a 83 year old male with history of prostate CA (treated with radiation), dementia (lives in memory care), past EtOH use disorder (past), seizure disorder (patient denies), a fib, GERD, osteoporosis, T8-T12 fusion history of vertebral fracture, dysphagia admitted on 6/7/2023 after mechanical fall. Found to have acute R 9th/10th rib fractures and acute on chronic L3 vertebral compression fracture.  Patient states he has been in his normal state of health.  He was getting up from his chair, which is a lift chair.  He had a ready been standing and then reach for his walker when he fell.  He is not sure how he fell.  He does not think he tripped.  He does not think he passed out.  He might have been a little bit dizzy beforehand, but he is not clear on the details of why he fell.  He denied any chest pain, shortness of breath, abdominal pain, nausea, vomiting, diarrhea.  No " cough, runny nose, sore throat.  No fevers or chills.  No new urinary symptoms.  He has some chronic urinary symptoms with incontinence and some pain due to his recent prostate cancer and radiation treatments. Currently has 5/10 back pain. No symptoms down legs.  Mechanical fall  R posterior 9th/10th rib fractures  Acute on chronic L3 vertebral compression fracture    - already seen by Neurosurgery (I spoke with them)    - brace (ordered), outpt follow-up with xrays     - pain control: tylenol, Oxy, methocarbamol    - PT consult     Fall    - appears mechanical, but it is not clear    - will check orthostatics    - I do not think we need tele at this time    - PT eval     Dementia    - patient lives in memory care    - he is actually able to give me a detailed history    - cont home meds (seroquel)     Atrial fibrillation    - rate controlled without meds    - not on anticoagulation     Dysphagia    - on dysphagia diet     GERD    - cont PPI      EtOH abuse    - in the past    - sober x 2 years     Seizure disorder    - patient denies    - not on meds    - perhaps was related to past alcohol use      Prostate CA     - completed radiation about 4-6 weeks ago    - has some residual urinary incontinence and pelvic pain     Patient is doing well today.  His pain is controlled with the medications.  He has had his upright x-rays with neurosurgery.  They have signed off.  They will see him as an outpatient 4 to 6 weeks.  These orders have been placed.  He was seen by physical therapy.  They feel he is able to return to his facility.  We will order home physical therapy.  I did update his daughter.      Consultations This Hospital Stay   ORTHOSIS BRACE IP CONSULT  PHYSICAL THERAPY ADULT IP CONSULT    Code Status   Full Code    Time Spent on this Encounter   I, Marvel Li MD, personally saw the patient today and spent greater than 30 minutes discharging this patient.       Marvel Li MD  [facility]  OBSERVATION DEPT  [address]  [facility] [address]  Phone: [phone]  ______________________________________________________________________    Physical Exam   Vital Signs: Temp: 98.4  F (36.9  C) Temp src: Oral BP: 126/61 Pulse: 62   Resp: 16 SpO2: 96 % O2 Device: None (Room air)    Weight: 0 lbs 0 oz  Constitutional: awake, alert, cooperative, no apparent distress, and appears stated age  Eyes: Lids and lashes normal, pupils equal, round and reactive to light, extra ocular muscles intact, sclera clear, conjunctiva normal  ENT: Normocephalic, without obvious abnormality, atraumatic, sinuses nontender on palpation, external ears without lesions, oral pharynx with moist mucous membranes, tonsils without erythema or exudates, gums normal and good dentition.  Respiratory: No increased work of breathing, good air exchange, clear to auscultation bilaterally, no crackles or wheezing  Cardiovascular: Normal apical impulse, regular rate and rhythm, normal S1 and S2, no S3 or S4, and no murmur noted  GI: No scars, normal bowel sounds, soft, non-distended, non-tender, no masses palpated, no hepatosplenomegally  Skin: no bruising or bleeding       Primary Care Physician   Sylvia Stein    Discharge Orders      X-ray lumbar spine 2-3 views*    Standing x rays     Neurosurgery Referral      Home Care Referral      Reason for your hospital stay    Fall with L3 compression fracture and rib fractures     Follow-up and recommended labs and tests     Follow up with primary care provider, Sylvia Stein, within 7 days for hospital follow- up.  No follow up labs or test are needed.     Activity    Your activity upon discharge: activity as tolerated. Use brace when OOB     Diet    Follow this diet upon discharge: Orders Placed This Encounter      Regular Diet Adult Slightly Thick (level 1)       Significant Results and Procedures   Most Recent 3 CBC's:Recent Labs   Lab Test 06/08/23  0624 06/07/23  0957 04/13/23  0540   WBC  5.8 10.8 4.5   HGB 10.2* 10.6* 10.0*   MCV 87 85 89    191 194     Most Recent 3 BMP's:Recent Labs   Lab Test 06/07/23  0957 03/30/23  0659 03/02/23  0750    141 141   POTASSIUM 4.1 4.4 4.5   CHLORIDE 101 103 103   CO2 28 26 28   BUN 11.5 7.9* 12.8   CR 0.62* 0.68 0.61*   ANIONGAP 9 12 10   JOSUE 9.1 9.0 9.1   GLC 92 72 102*     Most Recent 2 LFT's:Recent Labs   Lab Test 06/07/23  0957 01/05/23  1000   AST 23 18   ALT 11 13   ALKPHOS 58 64   BILITOTAL 0.4 0.5   ,   Results for orders placed or performed during the hospital encounter of 06/07/23   CT Head w/o Contrast    Narrative    CT SCAN OF THE HEAD WITHOUT CONTRAST   6/7/2023 11:22 AM     HISTORY: Fall, trauma.    TECHNIQUE:  Axial images of the head and coronal reformations without  IV contrast material. Radiation dose for this scan was reduced using  automated exposure control, adjustment of the mA and/or kV according  to patient size, or iterative reconstruction technique.    COMPARISON: CT of the head dated 9/30/2022.    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 Small focus of hypoattenuation along the  posterolateral margin of the right lentiform nucleus may represent a  dilated perivascular space or chronic lacunar infarct, unchanged. Mild  scattered patchy nonspecific hypoattenuation in the cerebral white  matter, which may represent sequela of chronic small vessel ischemic  disease. Mild to moderate generalized brain parenchymal volume loss.  The ventricles appear within normal limits in size and configuration.  Scattered intracranial atherosclerotic calcifications are present. No  definite CT findings of large transcortical acute or subacute infarct.  No acute intracranial hemorrhage, extra-axial fluid collection, or  mass effect.    Bilateral lens implants. Mild to moderate mucosal thickening in the  ethmoid sinuses. Small to moderate amount of fluid/membrane thickening  in the left mastoid tip.  The right mastoid and bilateral middle ear  cavities appear clear. The bony calvarium and bones of the skull base  appear intact.       Impression    IMPRESSION:   1. No acute intracranial hemorrhage, extra-axial fluid collection, or  mass effect.  2. 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  3. Brain atrophy and presumed chronic small vessel ischemic change, as  described.    LEANN DAY MD         SYSTEM ID:  [id number]   CT Cervical Spine w/o Contrast    Narrative    CT CERVICAL SPINE WITHOUT CONTRAST   6/7/2023 11:26 AM     HISTORY: Fall, trauma.     TECHNIQUE: Axial images of the cervical spine were obtained without  intravenous contrast. Multiplanar reformations were performed.   Radiation dose for this scan was reduced using automated exposure  control, adjustment of the mA and/or kV according to patient size, or  iterative reconstruction technique.    COMPARISON: Cervical spine CT dated 9/30/2022. Correlation is also  made with thoracic spine CT 9/30/2022.    FINDINGS: There is diffuse osseous demineralization that limits  evaluation for fracture. There is also some artifact that somewhat  limits evaluation. No definite acute cervical spine fracture is  identified. There appears to be unchanged mild anterior wedging and  height loss of the T1 and T2 vertebral bodies compared to prior exams.  There is straightening of the normal cervical lordosis. Minimal  anterolisthesis of C2 on C3, unchanged. There is mild levoconvex  curvature of the lower cervical/upper thoracic spine.    There is ankylosis along anterior/anterolateral aspects of the C4-C6  vertebral bodies. Mild to moderate multilevel degenerative disc space  narrowing, most pronounced at C3-4 and C6-C7. Scattered marginal  endplate and uncovertebral spurs. Multilevel degenerative facet  disease. There is ankylosis across the C2-C3 facet joints.  Degenerative changes at the medial atlantoaxial joint and  also  involving the atlantooccipital joints. Evaluation of the spinal canal  is limited by streak artifact. No definite high-grade central spinal  canal stenosis. There is at least moderate neural foraminal stenosis  on the left at C3-C4 and moderate to severe neural foraminal stenosis  on the right at C3-C4 and up to moderate neural foraminal stenosis on  the right at C5-C6. This appears unchanged.    Atherosclerotic calcifications of the aortic arch. Mild presumed  scarring in the posterior right upper lung zone. Bilateral carotid  bifurcation atherosclerotic calcifications. No prevertebral soft  tissue swelling identified.       Impression    IMPRESSION:  1. No definite acute fracture of the cervical spine. Diffuse osseous  demineralization.  2. Multilevel degenerative findings, as described. Please see the body  of the report for additional details.    LEANN DAY MD         SYSTEM ID:  [id number]   Lumbar spine CT w/o contrast    Narrative    CT LUMBAR SPINE WITHOUT CONTRAST  6/7/2023 11:21 AM     HISTORY: Fall, low back pain.      TECHNIQUE: Axial images of the lumbar spine were obtained without  intravenous contrast. Multiplanar reformations were performed.   Radiation dose for this scan was reduced using automated exposure  control, adjustment of the mA and/or kV according to patient size, or  iterative reconstruction technique.     COMPARISON: CT lumbar spine 9/30/2020.     FINDINGS: Nomenclature is based on five lumbar vertebral bodies. There  is diffuse osseous demineralization that limits evaluation for  fracture. There appears to be progressed height loss involving the L3  vertebral body, now moderate to severe in degree, concerning for  progressed L3 compression fracture compared to the prior CT exam.  There is mild cortical irregularity involving the L3 superior  endplate, and acute/recent fracture is suspected given the patient's  clinical history. Chronic T12 and L1 compression deformities with  mild  to moderate vertebral height loss, chronic compression deformity of  the L2 superior endplate with mild height loss, chronic compression  deformity of the L4 vertebral body with moderate to severe height  loss, and chronic L5 compression deformity with mild height loss,  otherwise appearing unchanged. Unchanged mild nonspecific patchy  sclerosis of the S1 vertebra.    Sagittal alignment is normal. Minimal levoconvex curvature of the  lower lumbar spine. No posttraumatic subluxation identified. There  appears to be subtle radiolucency of the posterior aspect of the right  12th rib (series 6 image 33) without displacement. This could be a  nondisplaced recent fracture or artifact. Unchanged additional older  healed rib fractures. There is ankylosis across the bilateral  sacroiliac joints, which may be degenerative in nature, unchanged.    Flowing ossification along the anterior aspect of the vertebral column  extending from the lower thoracic region down to L3, which may  represent diffuse idiopathic skeletal hyperostosis. Calcification in  the T11-T12 and L2-L3 disc spaces, as before. Mild/mild to moderate  multilevel disc space narrowing. Multilevel disc osteophyte complexes.  Varying degrees of multilevel degenerative facet disease, most  pronounced in the lower lumbar spine, particularly at L4-L5 and L5-S1.  Scattered ligamentum flavum thickening. There appears to be at least  moderate degenerative neural foraminal stenosis at L4-L5 with  relatively lesser degrees of mild/mild to moderate spinal canal  narrowing elsewhere. There is at least moderate left greater than  right neural foraminal stenosis at L4-L5 with lesser degrees of neural  foraminal narrowing at other levels.    Partial visualization of posterior fusion hardware in the lower  thoracic spine at the T11 and T12 levels, incompletely evaluated.  Changes of vertebroplasty material placement in the T11 and T12  vertebral bodies surrounding the  pedicle screws. Within the field of  view, no evidence for hardware fracture or loosening. Scattered  atherosclerotic calcifications of the aortoiliac arteries. The  visualized paraspinal soft tissues otherwise appear unremarkable.      Impression    IMPRESSION:  1. Findings concerning for acute or subacute superimposed on chronic  compression fracture deformity involving the L3 vertebral body.  2. Multiple other unchanged compression deformities, as described.  Diffuse osseous demineralization.  3. Degenerative changes, as described. There appears to be at least  moderate degenerative spinal canal and bilateral neural foraminal  stenosis at the L4-L5 level.  4. Partially visualized lower thoracic posterior fusion hardware  appears intact, although it is incompletely evaluated.    LEANN DAY MD         SYSTEM ID:  [id number]   CT Chest Abdomen Pelvis w/o Contrast    Narrative    CT CHEST, ABDOMEN AND PELVIS WITHOUT CONTRAST 6/7/2023 11:27 AM    CLINICAL HISTORY: Fall, chest and back pain.    TECHNIQUE: CT scan of the chest, abdomen, and pelvis was performed  without IV contrast. Multiplanar reformats were obtained. Dose  reduction techniques were used.   CONTRAST: None.    COMPARISON: Abdomen and pelvis CT from January 30, 2023.    FINDINGS:   LUNGS AND PLEURA: Trace scattered atelectasis and/or fibrosis.  Elevated left hemidiaphragm. No effusions or pneumothorax.    MEDIASTINUM/AXILLAE: No adenopathy demonstrated in the absence of  contrast. No aneurysm.    CORONARY ARTERY CALCIFICATIONS: Moderate.    HEPATOBILIARY: Question minimal cholelithiasis without cholecystitis.  Liver unremarkable.    PANCREAS: No significant mass, duct dilatation, or inflammatory  change.    SPLEEN: Normal size.    ADRENAL GLANDS: No significant nodules.    KIDNEYS/BLADDER: No significant mass, stones, or hydronephrosis.    BOWEL: No obstruction or inflammatory change.    PELVIC ORGANS: No pelvic masses.    ADDITIONAL FINDINGS:  Moderate fat-containing right inguinal hernia.  Small fat-containing umbilical hernia. No free fluid.    MUSCULOSKELETAL: Acute nondisplaced fractures of the posterior ninth  and tenth ribs on the right. Multifocal thoracolumbar compression  deformities and lower thoracic spine fusion changes appear similar to  previous.      Impression    IMPRESSION:  1.  Acute nondisplaced fractures of the posterior ninth and tenth ribs  on the right.  2.  Remainder of the scan is negative for acute posttraumatic change.    LEANN HERR MD         SYSTEM ID:  [id number]   CT Thoracic Spine w/o Contrast    Narrative    CT THORACIC SPINE WITHOUT CONTRAST   6/7/2023 11:25 AM     HISTORY: Fall, back pain, history of T-spine surgery.     TECHNIQUE: Axial images of the thoracic spine were obtained without  intravenous contrast. Multiplanar reformations were performed.   Radiation dose for this scan was reduced using automated exposure  control, adjustment of the mA and/or kV according to patient size, or  iterative reconstruction technique.    COMPARISON: Thoracic spine CT 9/30/2022.    FINDINGS:  There is diffuse osseous demineralization limits evaluation  for fracture. No definite new vertebral body height loss is identified  in the thoracic spine. Minimal/mild or mild to moderate height loss  involving multiple thoracic vertebral bodies (including T1, T2, T5,  T6, T7, T8, T9, T10, and T12) appears unchanged from the most recent  examination. Marked exaggeration of the normal thoracic kyphosis, as  before. Mild levoconvex curvature. No posttraumatic subluxation  identified.    Bilateral posterior fusion hardware extending from T8 down to T12.  There is some vertebroplasty material/augmentation surrounding the  posterior approach screws bilaterally at T8, T9, T11 and T12,  unchanged. No findings of hardware loosening or failure identified.    There is flowing ossification along the anterior aspect of the  vertebral column extending from  T2 down to the upper lumbar region,  which may represent diffuse idiopathic skeletal hyperostosis. Mild and  moderate degrees of multilevel degenerative disc space narrowing, as  before, with marginal endplate osteophytes and multilevel degenerative  facet disease. No definite high-grade thoracic spinal canal stenosis  identified. There is moderate if not moderate to severe bilateral  neural foraminal stenosis at T10-T11, unchanged. Elsewhere, lesser  degrees of mild/mild to moderate neural foraminal narrowing noted.  Multiple old rib fractures noted.    Minimal scarring in the upper posterior right lung. Atherosclerotic  calcifications of the aorta.      Impression    IMPRESSION:  1. Diffuse osseous demineralization. No definite acute thoracic spine  fracture. Multiple unchanged mild/mild to moderate compression  deformities compared to prior exam.  2. Redemonstrated posterior fusion hardware spanning T8-T12 without  evidence for hardware loosening or failure.  3. Markedly exaggerated thoracic kyphosis, unchanged.  4. Degenerative changes.  5. Diffuse idiopathic skeletal hyperostosis.  6. No definite high-grade spinal canal stenosis. Moderate/moderate to  severe bilateral T10-T11 neural foraminal stenosis, unchanged.    LEANN DAY MD         SYSTEM ID:  [id number]   XR Lumbar Spine 2/3 Views    Narrative    LUMBAR SPINE TWO TO THREE VIEWS  6/8/2023 9:50 AM     HISTORY: Upright x-ray in brace for L3 fracture.    COMPARISON: CT of the lumbar spine 6/11/2023.      Impression    IMPRESSION: The patient was imaged upright/standing in a brace.  Nomenclature is based on five lumbar vertebral bodies. There is  diffuse osseous demineralization, which limits evaluation. No  significant interval change in the degree of height loss associated  with the moderate L3 compression fracture compared to most recent CT,  accounting for differences in technique. Moderate to severe L4  vertebral body height loss, moderate T12 height  loss, and mild height  loss of the L1, L2, and L5 vertebral bodies also appears unchanged.  Alignment appears within normal limits. Mild/moderate multilevel disc  space narrowing, as before. Multilevel degenerative facet arthropathy.  Partial visualization of lower thoracic posterior fusion hardware,  incompletely evaluated, as well as vertebroplasty changes in the T11  and T12 vertebral bodies, as before. Partially visualized left hip  prosthesis.    LEANN DAY MD         SYSTEM ID:  [id number]       Discharge Medications   Current Discharge Medication List      START taking these medications    Details   oxyCODONE (ROXICODONE) 5 MG tablet Take 1 tablet (5 mg) by mouth every 6 hours as needed for severe pain  Qty: 15 tablet, Refills: 0    Associated Diagnoses: Closed wedge compression fracture of L3 vertebra, initial encounter (H)      tiZANidine (ZANAFLEX) 2 MG tablet Take 1 tablet (2 mg) by mouth 3 times daily as needed for muscle spasms  Qty: 20 tablet, Refills: 0    Associated Diagnoses: Closed wedge compression fracture of L3 vertebra, initial encounter (H)         CONTINUE these medications which have NOT CHANGED    Details   acetaminophen (TYLENOL) 500 MG tablet Take 1 tablet (500 mg) by mouth 2 times daily. May also take 1 tablet (500 mg) 2 times daily as needed for mild pain.  Qty: 60 tablet, Refills: 98    Comments: Do not send until requested by facility, reflects dose change.  Associated Diagnoses: Osteoarthritis of multiple joints, unspecified osteoarthritis type      alum & mag hydroxide-simethicone (MAALOX MAX) 400-400-40 MG/5ML SUSP suspension Take 30 mLs by mouth every 6 hours as needed for indigestion  Qty: 355 mL, Refills: 3    Associated Diagnoses: Gastroesophageal reflux disease, unspecified whether esophagitis present      benzocaine-menthol (CHLORASEPTIC) 6-10 MG lozenge Place 1 lozenge inside cheek 2 times daily as needed      gabapentin (NEURONTIN) 100 MG capsule Take 1 capsule (100 mg)  by mouth daily AND 2 capsules (200 mg) At Bedtime. May also take 1 capsule (100 mg) 2 times daily as needed (anxiety).  Qty: 100 capsule, Refills: 98    Associated Diagnoses: JOSÉ MANUEL (generalized anxiety disorder)      guaiFENesin (ROBITUSSIN) 20 mg/mL liquid Take 10 mLs by mouth every 4 hours as needed for cough  Qty: 355 mL, Refills: 98    Associated Diagnoses: Acute cough      loratadine (CLARITIN) 10 MG tablet Take 1 tablet (10 mg) by mouth At Bedtime  Qty: 30 tablet, Refills: 98    Associated Diagnoses: Chronic cough      menthol-zinc oxide (CALMOSEPTINE) 0.44-20.6 % OINT ointment Apply 1 g topically 2 times daily. May also apply 1 g 2 times daily as needed for skin protection.  Qty: 113 g, Refills: 98    Associated Diagnoses: Dermatitis      mirtazapine (REMERON) 15 MG tablet Take 1 tablet (15 mg) by mouth At Bedtime  Qty: 30 tablet, Refills: 98    Associated Diagnoses: Adjustment reaction with anxiety and depression      nystatin (MYCOSTATIN) 138194 UNIT/GM external powder Apply topically 2 times daily as needed      omeprazole (PRILOSEC) 40 MG DR capsule TAKE 1 CAPSULE BY MOUTH TWICE DAILY  Qty: 180 capsule, Refills: 3    Associated Diagnoses: Gastroesophageal reflux disease with esophagitis, unspecified whether hemorrhage      oxymetazoline (AFRIN) 0.05 % nasal spray Spray 2 sprays into both nostrils 2 times daily as needed (nose bleed)    Associated Diagnoses: Epistaxis      polyethylene glycol (MIRALAX) 17 GM/Dose powder MIX 2 CAPFULS IN 8OZ OF ORANGE JUICE  IN THE MORNING;AND MAY MIX 2 CAPFULS ONCE DAILY AS NEEDED FOR CONSTIPATION. MIX IN FRONT OF PT. HOLD FOR LOOSE STOOL. OK TO GIVE 1 CAPFUL IF RESIDENT REFUSES 2 CAPFULS.  Qty: 510 g, Refills: 97    Associated Diagnoses: Slow transit constipation      polyvinyl alcohol-povidone PF (REFRESH) 1.4-0.6 % ophthalmic solution 1 drop every 4 hours as needed for irritation      prednisolon-gatiflox-bromfenac, pt own, no charge, 1-0.5-0.075 % opthalmic solution  Place 1 drop into the right eye 2 times daily Until bottle is empty      QUEtiapine (SEROQUEL) 25 MG tablet Take 0.5 tablets (12.5 mg) by mouth 2 times daily. May also take 0.5 tablets (12.5 mg) every 12 hours as needed (anxiety).  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

## 2023-06-08 NOTE — PLAN OF CARE
PRIMARY DIAGNOSIS: Fall  OUTPATIENT/OBSERVATION GOALS TO BE MET BEFORE DISCHARGE:  1. Pain Status: Improved-controlled with oral pain medications.     2. Return to near baseline physical activity: No     3. Cleared for discharge by consultants (if involved): No     Discharge Planner Nurse   Safe discharge environment identified:  PT consult   Barriers to discharge: Yes       Entered by: Mita Pablo RN 06/07/2023 10:37 PM  Please review provider order for any additional goals.   Nurse to notify provider when observation goals have been met and patient is ready for discharge.     Temp: 97.9  F (36.6  C) Temp src: Oral BP: 137/62 Pulse: 50   Resp: 16 SpO2: 99 % O2 Device: None (Room air)        A&Ox4. Up Ax1 with walker and gait belt. Can be incontinent at times. Pt has declined needing prn pain meds. Pt fitted for brace, brace on- per pt this makes him feel more secure/helps with pain. Plan- PT consult, brace, neurosurgery following- upright xray with brace on tomorrow- if stable neurosurgery will sign off and pt will need to follow-up outpatient in 6 weeks, pain control.

## 2023-06-08 NOTE — PROGRESS NOTES
Neurosurgery Progress     Dx:     Subacute superimposed on chronic compression fracture deformity involving the L3 vertebral body.     Neuro stable.     TODAY'S PLAN:     Will need to obtain an upright xr in brace. If stable, will sign off and plan to see as an outpatient in 6 weeks with XR prior.      In the event that patient's symptoms worsen or change we would appreciate being contacted. We did discuss signs of a worsening problem that he should seek being evaluated.     We did review the above information with the patient whom agrees with the plan and did verbalize understanding.   ________________________________________________________________     /61 (BP Location: Right arm)   Pulse 62   Temp 98.4  F (36.9  C) (Oral)   Resp 16   SpO2 96%      Pt in bed. Appears comfortable and in no apparent distress, moving all extremities.   CN II-XII intact, alert and appropriate with conversation and following commands.   Bilateral upper and lower extremities with appropriate strength. Spine is non tender to palpation throughout. Sensation intact throughout. Acceptable ROM.   Calves soft and non-tender bilaterally.     All pertinent labs and updated imaging reviewed in EPIC.     Phil Arredondo PA-C   Neurosurgery   [phone] (P)     Reviewed with Dr. Kennedy.

## 2023-06-08 NOTE — PLAN OF CARE
Goal Outcome Evaluation:        PRIMARY DIAGNOSIS: FALL  OUTPATIENT/OBSERVATION GOALS TO BE MET BEFORE DISCHARGE:  1. ADLs back to baseline: No    2. Activity and level of assistance: Up with A1-2 gaitbelt and walker    3. Pain status: Improved-controlled with oral pain medications.    4. Return to near baseline physical activity: No     Discharge Planner Nurse   Safe discharge environment identified: No  Barriers to discharge: Yes       Entered by: Zoë Gauthier RN 06/08/2023     Please review provider order for any additional goals.   Nurse to notify provider when observation goals have been met and patient is ready for discharge.    Alert-oriented x4, forgetful. Up SBA walker. Brace on when OOB. Pain in back tolerable on scheduled Tylenol for most of day- PRn oxy available but not needed yet this shift. Appears comfortable in bed. Plan for discharge back to memory care later today.

## 2023-06-08 NOTE — CONSULTS
Care Management Initial Consult    General Information  Assessment completed with: Patient, Care Team Member, Patient and RN at facility  Type of CM/SW Visit: Initial Assessment    Primary Care Provider verified and updated as needed: Yes   Readmission within the last 30 days: no previous admission in last 30 days      Reason for Consult: discharge planning  Advance Care Planning: Advance Care Planning Reviewed: present on chart          Communication Assessment  Patient's communication style: spoken language (English or Bilingual)    Hearing Difficulty or Deaf: no        Cognitive  Cognitive/Neuro/Behavioral: .WDL except  Level of Consciousness: alert  Arousal Level: opens eyes spontaneously  Orientation: oriented x 4  Mood/Behavior: calm, cooperative  Best Language: 0 - No aphasia  Speech: clear, logical    Living Environment:   People in home: facility resident     Current living Arrangements: assisted living  Name of Facility: [facility] Fax [phone]   Able to return to prior arrangements: yes       Family/Social Support:  Care provided by: self, other (see comments) (facility staff)  Provides care for: no one, unable/limited ability to care for self  Marital Status:   Wife, Children          Description of Support System: Supportive, Involved    Support Assessment: Adequate family and caregiver support    Current Resources:   Patient receiving home care services: No     Community Resources:    Equipment currently used at home: walker, rolling  Supplies currently used at home:      Employment/Financial:  Employment Status:          Financial Concerns:        Finance Comments: Has POA    Does the patient's insurance plan have a 3 day qualifying hospital stay waiver?  Yes   Will the waiver be used for post-acute placement? No    Lifestyle & Psychosocial Needs:  Social Determinants of Health     Tobacco Use: Low Risk  (6/2/2023)    Patient History      Smoking Tobacco Use: Never      Smokeless  Tobacco Use: Never      Passive Exposure: Not on file   Alcohol Use: Not on file   Financial Resource Strain: Not on file   Food Insecurity: Not on file   Transportation Needs: Not on file   Physical Activity: Not on file   Stress: Not on file   Social Connections: Not on file   Intimate Partner Violence: Not on file   Depression: Not at risk (1/17/2023)    PHQ-2      PHQ-2 Score: 1   Housing Stability: Not on file       Functional Status:  Prior to admission patient needed assistance:   Dependent ADLs:: Ambulation-walker, Bathing, Dressing, Transfers, Positioning (STand by assist at baseline)  Dependent IADLs:: Cleaning, Cooking, Laundry, Shopping, Meal Preparation, Medication Management, Money Management, Transportation, Incontinence  Assesssment of Functional Status: Not at baseline with mobility, Not at baseline with ADL Functioning    Mental Health Status:  Mental Health Status: No Current Concerns       Chemical Dependency Status:  Chemical Dependency Status: No Current Concerns             Values/Beliefs:  Spiritual, Cultural Beliefs, Holiness Practices, Values that affect care:                 Care Management Discharge Note    Discharge Date: 06/08/2023       Discharge Disposition: Home Care    Discharge Services: None    Discharge DME: None    Discharge Transportation: agency    Private pay costs discussed: transportation costs    Does the patient's insurance plan have a 3 day qualifying hospital stay waiver?  Yes   Will the waiver be used for post-acute placement? No    PAS Confirmation Code:    Patient/family educated on Medicare website which has current facility and service quality ratings: yes    Education Provided on the Discharge Plan:    Persons Notified of Discharge Plans: Patient, custodial, daughter, RN  Patient/Family in Agreement with the Plan: yes    Handoff Referral Completed: No    Additional Information:  SW met with patient at bedside. Patient was alert x4. He had some forgetfulness but easily  redirected.     Patient lives at [facility]. His wife lives in the same building but is not in . They talk every day and she eats dinner with him most days. Patient is a stand by assist for bathing, dressing, grooming, and transfers. He uses a walker. He is agreeable to adding home care. Home care referral sent to [facility] and SW awaiting a response.     Reviewed out of pocket cost for [facility] transport, $81.80 for base rate and $5.26 per mile to the destination. Spoke with patient and daughter, they expressed understanding and are agreeable to this.     Patient gave  permission to contact his IVETTE and family. SW spoke with daughter Coral who is listed as financial POA. She agrees to transport costs.     Transport arranged for [id number]. Orders were faxed. RN updated.      is asking for a dose of pain meds before discharge. [facility] Fax: [phone]    FRIEDA Jones, LGSW  Emergency Room   Please contact the SW on the floor in which the patient is staying for any questions or concerns

## 2023-06-08 NOTE — PLAN OF CARE
Goal Outcome Evaluation:        PRIMARY DIAGNOSIS: FALL  OUTPATIENT/OBSERVATION GOALS TO BE MET BEFORE DISCHARGE:  1. ADLs back to baseline: No    2. Activity and level of assistance: Up with A1-2 gaitbelt and walker    3. Pain status: Improved-controlled with oral pain medications.    4. Return to near baseline physical activity: No     Discharge Planner Nurse   Safe discharge environment identified: No  Barriers to discharge: Yes       Entered by: Zoë Gauthier RN 06/08/2023     Please review provider order for any additional goals.   Nurse to notify provider when observation goals have been met and patient is ready for discharge.

## 2023-06-13 ENCOUNTER — ASSISTED LIVING VISIT (OUTPATIENT)
Dept: GERIATRICS | Facility: CLINIC | Age: 83
End: 2023-06-13
Payer: COMMERCIAL

## 2023-06-13 VITALS
WEIGHT: 227 LBS | SYSTOLIC BLOOD PRESSURE: 136 MMHG | HEART RATE: 65 BPM | DIASTOLIC BLOOD PRESSURE: 70 MMHG | HEIGHT: 68 IN | RESPIRATION RATE: 16 BRPM | BODY MASS INDEX: 34.4 KG/M2 | OXYGEN SATURATION: 95 %

## 2023-06-13 DIAGNOSIS — K22.719 BARRETT'S ESOPHAGUS WITH DYSPLASIA: ICD-10-CM

## 2023-06-13 DIAGNOSIS — Z87.81 FRACTURE, HEALED: ICD-10-CM

## 2023-06-13 DIAGNOSIS — E87.1 HYPOSMOLALITY SYNDROME: ICD-10-CM

## 2023-06-13 DIAGNOSIS — R05.3 CHRONIC COUGH: ICD-10-CM

## 2023-06-13 DIAGNOSIS — I71.20 THORACIC AORTIC ANEURYSM WITHOUT RUPTURE, UNSPECIFIED PART (H): ICD-10-CM

## 2023-06-13 DIAGNOSIS — C61 MALIGNANT NEOPLASM OF PROSTATE (H): ICD-10-CM

## 2023-06-13 DIAGNOSIS — S22.41XD CLOSED FRACTURE OF MULTIPLE RIBS OF RIGHT SIDE WITH ROUTINE HEALING, SUBSEQUENT ENCOUNTER: ICD-10-CM

## 2023-06-13 DIAGNOSIS — E22.2 SIADH (SYNDROME OF INAPPROPRIATE ADH PRODUCTION) (H): ICD-10-CM

## 2023-06-13 DIAGNOSIS — F03.90 SENILE DEMENTIA, UNCOMPLICATED (H): ICD-10-CM

## 2023-06-13 DIAGNOSIS — K59.09 CHRONIC CONSTIPATION: ICD-10-CM

## 2023-06-13 DIAGNOSIS — F41.1 GENERALIZED ANXIETY DISORDER: ICD-10-CM

## 2023-06-13 DIAGNOSIS — D64.9 ANEMIA, UNSPECIFIED TYPE: ICD-10-CM

## 2023-06-13 DIAGNOSIS — D64.9 NORMOCYTIC ANEMIA: ICD-10-CM

## 2023-06-13 DIAGNOSIS — R26.9 ABNORMALITY OF GAIT: ICD-10-CM

## 2023-06-13 DIAGNOSIS — M15.9 OSTEOARTHRITIS OF MULTIPLE JOINTS, UNSPECIFIED OSTEOARTHRITIS TYPE: ICD-10-CM

## 2023-06-13 DIAGNOSIS — F43.21 ADJUSTMENT DISORDER WITH DEPRESSED MOOD: ICD-10-CM

## 2023-06-13 DIAGNOSIS — R13.13 PHARYNGEAL DYSPHAGIA: ICD-10-CM

## 2023-06-13 DIAGNOSIS — F10.988 ALCOHOL-INDUCED COGNITIVE DYSFUNCTION (H): ICD-10-CM

## 2023-06-13 DIAGNOSIS — Z91.81 HISTORY OF RECENT FALL: Primary | ICD-10-CM

## 2023-06-13 DIAGNOSIS — I48.20 CHRONIC ATRIAL FIBRILLATION (H): ICD-10-CM

## 2023-06-13 DIAGNOSIS — I10 ESSENTIAL HYPERTENSION, BENIGN: ICD-10-CM

## 2023-06-13 DIAGNOSIS — S32.039D CLOSED FRACTURE OF THIRD LUMBAR VERTEBRA WITH ROUTINE HEALING, UNSPECIFIED FRACTURE MORPHOLOGY, SUBSEQUENT ENCOUNTER: ICD-10-CM

## 2023-06-13 DIAGNOSIS — I70.0 ATHEROSCLEROSIS OF AORTA (H): ICD-10-CM

## 2023-06-13 PROCEDURE — 2894A PR PROLONGED OFFICE/OUTPATIENT E/M SVC, W OR W/O PT CONTACT EA 15 MIN: CPT | Performed by: NURSE PRACTITIONER

## 2023-06-13 PROCEDURE — 99417 PROLNG OP E/M EACH 15 MIN: CPT | Performed by: NURSE PRACTITIONER

## 2023-06-13 PROCEDURE — 99350 HOME/RES VST EST HIGH MDM 60: CPT | Performed by: NURSE PRACTITIONER

## 2023-06-13 NOTE — LETTER
"    6/13/2023        RE: Jamie Valdivia  C/o Coral De La Garza  [address]  [facility] [place] [address]        [facility] GERIATRICS    Chief Complaint   Patient presents with     Hospital F/U     HPI:  Jamie Valdivia is a 83 year old  (1940) PMH significant for GERD with esophagitis, OA BL knees, pharyngeal dysphagia, edema, abdominal wall hernia, chronic atrial fibrillation, HTN, osteoporosis with hx of vertebral fracture, dementia, anemia, hx of COVID-19, who is being seen today for an episodic care visit at: [facility]) [61]    Resident hospitalized from 6/7 to 6/8 after mechanical fall.  Found to have 9th/10th right rib fracture and chronic L3 veretral compression fracture. Neurosurgery consulted, brace ordered, plan for outpatient follow-up with x-rays,  PT consulted. Follow-up schedule for 7/24. Discharged on regular diet with \"slightly tickened liquids.\" Pain mgmt with Tylenol, Oxycodone, and tizanidine.     Today's concern is:   Follow-up today after hospitalization.   Wife Gisela present and assists in providing history.    Has back brace.  Wears when he is up and about.  Used one dose of Tizanidine on 6/9/23.  Taking oxycodone daily.    Use of PRN oxycodone:      Pain is getting better.  Feels like recliner is really helping to position body for comfort.  Some pain in right and lower back.   Oxycodone is \"making me able to live.\"    Chronic cough, increased pain with cough in setting of rib fractures.  No SOB.   No fever.  Sometimes feels chilled.   Taking Miralax.   Initially denies constipation.   Reports he wants his \"H Pill\" - does not really stopping hyoscyamine after risk/benefit discussion.  No abd pain, but actually worse constipation when drill down on symptoms.    No dysuria.  UC negative for infection.   Still having urinary frequency after radiation therapy for prostate cancer.    Discussed neuropsych follow-up at length with resident and his wife.  Reviewed process " "for obtaining medical records.  Reports he will hire a private .   Unable to say how he would get money.   Paranoid about calls being bugged.   Thinks people are being treated differently.   Request I follow-up with danielito.     Allergies, and PMH/PSH reviewed in [facility] today.    MED REC REQUIRED  Post Medication Reconciliation Status:  Discharge medications reconciled, continue medications without change    REVIEW OF SYSTEMS:  4 point ROS including Respiratory, CV, GI and , other than that noted in the HPI,  is negative    Objective:   /70   Pulse 65   Resp 16   Ht 1.727 m (5' 8\")   Wt 103 kg (227 lb)   SpO2 95%   BMI 34.52 kg/m    GENERAL APPEARANCE:  Alert, in no distress, seated in recliner chair.  RESP:  Non-labored breathing, no respiratory distress, Lung sounds clear but decreased at BL bases, no adventitious breath sounds, patient is on room air.   CV: Rate and rhythm regular, no murmur, no rub or gallop.   VASCULAR: 2-3+ edema bilateral lower extremities, not wearing Velcro compression wraps.   ABDOMEN:  Normal bowel sounds, soft, nontender, no grimacing or guarding with palpation,   + umblical hernia, soft/non-tender.   M/S:   Pain to palpation over lumbar spine and adjacent muscles, pain to palpation over right ribs. 4/5 strength with seated hip flexion and knee extension.  PSYCH: Awake and alert, speech fluent,  insight and judgement impaired, perseverative/paranoid.     Recent labs in [facility] reviewed by me today.    CBC RESULTS: Recent Labs   Lab Test 06/08/23  0624 06/07/23  0957   WBC 5.8 10.8   RBC 3.76* 3.93*   HGB 10.2* 10.6*   HCT 32.7* 33.4*   MCV 87 85   MCH 27.1 27.0   MCHC 31.2* 31.7   RDW 15.9* 15.5*    191       Last Basic Metabolic Panel:  Recent Labs   Lab Test 06/07/23  0957 03/30/23  0659    141   POTASSIUM 4.1 4.4   CHLORIDE 101 103   JOSUE 9.1 9.0   CO2 28 26   BUN 11.5 7.9*   CR 0.62* 0.68   GLC 92 72       Liver Function Studies -   Recent Labs   Lab Test " 06/07/23  0957 01/05/23  1000   PROTTOTAL 7.2 7.6   ALBUMIN 4.0 3.6   BILITOTAL 0.4 0.5   ALKPHOS 58 64   AST 23 18   ALT 11 13       TSH   Date Value Ref Range Status   06/07/2023 1.68 0.30 - 4.20 uIU/mL Final   10/06/2022 1.67 0.30 - 4.20 uIU/mL Final   04/28/2022 2.77 0.40 - 4.00 mU/L Final   04/07/2022 3.94 0.40 - 4.00 mU/L Final     Lab Results   Component Value Date    A1C 5.5 04/28/2022    A1C 5.6 04/07/2022     Assessment/Plan:  (Z91.81) History of recent fall  (primary encounter diagnosis)  (S22.41XD) Closed fracture of multiple ribs of right side with routine healing, subsequent encounter - 8th/9th  (S32.039D) Closed fracture of third lumbar vertebra with routine healing, unspecified fracture morphology, subsequent encounter  Comment: Stable s/p unwitnessed fall.  Pain well controlled, participating in therapy.    Plan:   - HH PT/OT  - Continue Tylenol 500 mg PO BID and BID PRN  - Continue Oxycodone 5 mg PO q 6 hours PRN >> discussed this medication is for acute pain and will actively look to wean in coming weeks  - Continue gabapentin >> room to increased if needed  - Falls precautions  - Continue back brace when OOB  - Follow-up with neurosurgery for repeat imaging 7/24    (Z87.81) Hx of compression fracture of spine  (R26.9) Abnormal gait  (M15.9) OA Multiple Sites  Comment: Chronic  In ER imaging showed L3 veretral compression fracture acute on chronic.  DJD multiple sites.   Hx of T8-12 pedicle screw fixation with cement by Dr. Behzad Sabit on 8/4/20.  Hx of multiple spinal compression fractures per chart review in setting of recurrent falls.   Looks to have been on Fosamax in past.   Plan:  - Continue Tylenol 500 mg PO BID and BID PRN  - Continue gabapentin   - Neurosurgery follow-up and back brace  - Consider treatment for osteoporosis, would not be good candidate for bisphosphonate due to swallowing issues, consider endocrine referral, would need family to get him to appointment and current not  speaking with children, consider Reclast or Prolia - discuss at care conference    (F03.90) Major neurocognitive disorder (H) - dementia   Comment: Reviewed recent neuro-psych testing at length.   Recommendation that resident remain in memory care unit at this time for safety, can discuss living outside memory care in alternative care setting with significant oversight due to cognitive impairment.  Oriana is now following case.   Plan:   - Discussed neuropsych testing at length with resident/wife, reiterated he needs assistance for decision making and has a major neurocognitive discorder  - Discussed case with oriana Gann at resident request. Would benefit from care conference, will discuss with facility, family, and  from [facility].   - Gave resident/wife/family number/email for medical records to obtain copy of chart notes  - Continue Seroquel due to paranoid delusions causing distress (ie thinks phones are bugged)  - Discussed Neurology follow-up and second opinion regarding cognitive testing, could need family help to accomplish this, discuss at care conference  -  Message sent to [facility]  and DHS requesting assistance in locating Health Care Directive   - Consider ophthalmology follow-up for field cut    (C61) Prostate Cancer - adenocarcinoma Trinidad 4+3=7  Comment: Improved  Prostate biopsy 1/11/2023,pathology positive for Haily 7 prostate cancer.   Stopped Trospium due to anticholinergic side effects, no change in urinary symptoms.  Followed by Dr. Niraj [facility] - last visit 6/2/23.  Completed external beam radiation therapy w/o androgen depravation therapy with [facility] under care of Dr Fleming.   Some dysuria s/p treatment, UC negative for infection.  Plan:  - Urology follow-up 9/2023 with PSA prior   - Monitor new or worsening post radiation symptoms    (R05.3) Chronic cough  Comment: Chronic  Possible post-nasal gtt,  discussed Flonase but not good option due to recurrent epistaxis.  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  Previous hyponatremia multifactorial, occurred in setting of SIADH, dose increase of selective serotonin reuptake inhibitor (Celexa), Bactrim, hypovolemia.  Stopped Celexa 9/29/22, now on Mirtazapine  .  Plan:   - Avoid medications which could exacerbate hyponatremia   - Avoid excessive free water intake  - Monitor serial BMP q 6 months    (F10.21) Alcohol dependence in remission  (F43.21) Adjustment reaction with depression  (F41.1) JOSÉ MANUEL  Comment:Chronic  episodes of assumed anxiety over last week due to multiple life stressors. Finds gabapentin helpful.  Cognitive impairment in setting of history of years of ETOH abuse with recurrent falls and hospitalizations. Currently no access to ETOH and thus is abstinent.  Chronic low mood, suspect JOSÉ MANUEL with panic attacks/chest pain for many years.   On memory care unit due to safety concerns.  Plan:   - Increase Gabapentin to 100 mg q AM and 200 mg PO q HS and 100 mg BID PRN for anxiety and pain  - Continue mirtazpaine 15 mg PO q HS (started 11/17/22, dose increase 1/27/23)  - Continue Seroquel 12.5 mg BID and BID PRN for psychotic features - paranoid delusions. Previously on Zyprexa. Look to wean as able.  - ACP pysch counselor following closely  - Offered psychiatry referral in past, but pt/family prefer onsite care, [facility] PharmD follows  - Would like to move off memory care unit, but per neuropsych testing needs assistance for decision making, working to schedule care conference with IDT.    (R60.0) Lower extremity edema  Comment: Chronic, improved with compression wraps, but does not always wear.   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  Reported recurrent coughing up blood, but not recently.  Saw ENT in 2022, no explanation of symptoms.  Last esophagram May 2022 as above, presbyesophagus, proximal luminal narrowing.  Recent saw SLP 8/2022, no overt dysphagia.  Recurrent pneumonia, last 8/4/22.  CXR 10/1/22, 3/28/23 without concern for PNA.  Chest CT showed, \"Trace scattered atelectasis and/or fibrosis.  Elevated left hemidiaphragm. No effusions or pneumothorax.\"  Plan:   - No symptoms at present, monitor clinically  - Continue swallowing strategies outlined by SLP    (I71.2) Thoracic aortic aneurysm without rupture (H) - 6/30/22 4.6 cm  (I70.0) Atherosclerosis of aorta - noted by vascular  Comment: Chronic  Noted in ER June 2022 and on outside records  Followed by vascular Dr. Rodriguez.  4.6 cm ascending thoracic aortic aneurysm since 2020.  Last Echo 9/30/22: ascending aorta is Moderately dilated. Sinus 4.8cm, ascending 4.4cm.  Mild - mod aortic valve stenosis.  1/5/23: LDL 60  Plan:   - Plan for interval Echo June 2023 given wish for no aggressive intervention or testing, needs assist to schedule and get to appointments  - Follows with [facility] Dr. Rodriguez - discuss follow-up at care conference  - HR and BP control without medications at present     (D64.9) Normocytic Anemia  Comment: Chronic, mild.  Hgb 10.2 on 6/8/23  No symptoms of acute bleed.  Plan:   - Follow serial Hgb    (K59.04) Chronic constipation  Comment: Chronic.   Having regular BMs.  Bowel habits change some s/p prostate radiation.  Long hx of Levsin multiple times per day for abd pain, but stopped by urology when " started trospium, no recurrent pain.   Plan:   - Chronic polyethylene glycol (MIRALAX) 17 GM/Dose powder; 1-2 capfuls in 8 oz of ORANGE JUICE PO one time each morning and 1-2 capfuls 8 oz of ORANGE JUICE PO one time daily PRN for constipation.   - GI following   - Increased risk for constipation on oxycodone, discussed today. Can add PRN senna if needed.    (I48.91) Atrial fibrillation (H)  Comment: Chronic  Not anticoagulated due to ETOH use and recurrent falls, rate controlled off medication.  [id number]: 3 (age, HTN), 3.2% risk of stroke per year  Plan:   - Monitor VS and clinical status  - Would not add AC due to falls   - Consider cardiology follow-up after procedure, family has wanted to hold off due to multiple specialist appointments.    (I10) Essential hypertension, benign  Comment: Chronic  BP at goal of < 150/90 given age and comorbid conditions  Stopped ASA due to hemoptysis  BP Readings from Last 3 Encounters:   23 120/66   23 136/70   23 (!) 157/77   Plan:   - Monitor routine VS and labs off anti-hypertensive medications    (E66.09,  Z68.35) Class 2 obesity  Comment: Body mass index is 34.52 kg/m .with cardiovascular diease and OA.  Plan:   - Encourage increased activity and portion control    Orders:  - No new orders    Total time with an established patient visit in the assisted livin minutes includin:30 - 12:48 PM (78 minutes) Patient visit, review of hospitalization, long discussion with patient and wife regarding neuro-psych testing, concerns with living situation, concerns with family involvement in care. Agreeable to care conference with ombudsman, family, and facility present to determine plan of care. Resident increasingly frustrated/paraonid during conversation.     3:12 - 3:33 PM (21 minutes) Spoke to [facility] at resident request. Plan to setup care conference with IDT to assist with living situation and QoL concerns.    Electronically signed  by: MARICHUY Baislio CNP     06/15/23  Spoke to HH PT. Will add in SLP, OT, and SW.   will plan to attend care conference.  Message sent to [facility]  who will follow-up with resident regarding care conference.        Sincerely,        MARICHUY Basilio CNP

## 2023-06-13 NOTE — PROGRESS NOTES
"[facility] GERIATRICS    Chief Complaint   Patient presents with     Hospital F/U     HPI:  Jamie Valdivia is a 83 year old  (1940) PMH significant for GERD with esophagitis, OA BL knees, pharyngeal dysphagia, edema, abdominal wall hernia, chronic atrial fibrillation, HTN, osteoporosis with hx of vertebral fracture, dementia, anemia, hx of COVID-19, who is being seen today for an episodic care visit at: [facility]) [61]    Resident hospitalized from 6/7 to 6/8 after mechanical fall.  Found to have 9th/10th right rib fracture and chronic L3 veretral compression fracture. Neurosurgery consulted, brace ordered, plan for outpatient follow-up with x-rays,  PT consulted. Follow-up schedule for 7/24. Discharged on regular diet with \"slightly tickened liquids.\" Pain mgmt with Tylenol, Oxycodone, and tizanidine.     Today's concern is:   Follow-up today after hospitalization.   Wife Gisela present and assists in providing history.    Has back brace.  Wears when he is up and about.  Used one dose of Tizanidine on 6/9/23.  Taking oxycodone daily.    Use of PRN oxycodone:      Pain is getting better.  Feels like recliner is really helping to position body for comfort.  Some pain in right and lower back.   Oxycodone is \"making me able to live.\"    Chronic cough, increased pain with cough in setting of rib fractures.  No SOB.   No fever.  Sometimes feels chilled.   Taking Miralax.   Initially denies constipation.   Reports he wants his \"H Pill\" - does not really stopping hyoscyamine after risk/benefit discussion.  No abd pain, but actually worse constipation when drill down on symptoms.    No dysuria.  UC negative for infection.   Still having urinary frequency after radiation therapy for prostate cancer.    Discussed neuropsych follow-up at length with resident and his wife.  Reviewed process for obtaining medical records.  Reports he will hire a private .   Unable to say how he would get " "money.   Paranoid about calls being bugged.   Thinks people are being treated differently.   Request I follow-up with danielito.     Allergies, and PMH/PSH reviewed in [facility] today.    MED REC REQUIRED  Post Medication Reconciliation Status:  Discharge medications reconciled, continue medications without change    REVIEW OF SYSTEMS:  4 point ROS including Respiratory, CV, GI and , other than that noted in the HPI,  is negative    Objective:   /70   Pulse 65   Resp 16   Ht 1.727 m (5' 8\")   Wt 103 kg (227 lb)   SpO2 95%   BMI 34.52 kg/m    GENERAL APPEARANCE:  Alert, in no distress, seated in recliner chair.  RESP:  Non-labored breathing, no respiratory distress, Lung sounds clear but decreased at BL bases, no adventitious breath sounds, patient is on room air.   CV: Rate and rhythm regular, no murmur, no rub or gallop.   VASCULAR: 2-3+ edema bilateral lower extremities, not wearing Velcro compression wraps.   ABDOMEN:  Normal bowel sounds, soft, nontender, no grimacing or guarding with palpation,   + umblical hernia, soft/non-tender.   M/S:   Pain to palpation over lumbar spine and adjacent muscles, pain to palpation over right ribs. 4/5 strength with seated hip flexion and knee extension.  PSYCH: Awake and alert, speech fluent,  insight and judgement impaired, perseverative/paranoid.     Recent labs in [facility] reviewed by me today.    CBC RESULTS: Recent Labs   Lab Test 06/08/23  0624 06/07/23  0957   WBC 5.8 10.8   RBC 3.76* 3.93*   HGB 10.2* 10.6*   HCT 32.7* 33.4*   MCV 87 85   MCH 27.1 27.0   MCHC 31.2* 31.7   RDW 15.9* 15.5*    191       Last Basic Metabolic Panel:  Recent Labs   Lab Test 06/07/23  0957 03/30/23  0659    141   POTASSIUM 4.1 4.4   CHLORIDE 101 103   JOSUE 9.1 9.0   CO2 28 26   BUN 11.5 7.9*   CR 0.62* 0.68   GLC 92 72       Liver Function Studies -   Recent Labs   Lab Test 06/07/23  0957 01/05/23  1000   PROTTOTAL 7.2 7.6   ALBUMIN 4.0 3.6   BILITOTAL 0.4 0.5   ALKPHOS 58 64 "   AST 23 18   ALT 11 13       TSH   Date Value Ref Range Status   06/07/2023 1.68 0.30 - 4.20 uIU/mL Final   10/06/2022 1.67 0.30 - 4.20 uIU/mL Final   04/28/2022 2.77 0.40 - 4.00 mU/L Final   04/07/2022 3.94 0.40 - 4.00 mU/L Final     Lab Results   Component Value Date    A1C 5.5 04/28/2022    A1C 5.6 04/07/2022     Assessment/Plan:  (Z91.81) History of recent fall  (primary encounter diagnosis)  (S22.41XD) Closed fracture of multiple ribs of right side with routine healing, subsequent encounter - 8th/9th  (S32.039D) Closed fracture of third lumbar vertebra with routine healing, unspecified fracture morphology, subsequent encounter  Comment: Stable s/p unwitnessed fall.  Pain well controlled, participating in therapy.    Plan:   - HH PT/OT  - Continue Tylenol 500 mg PO BID and BID PRN  - Continue Oxycodone 5 mg PO q 6 hours PRN >> discussed this medication is for acute pain and will actively look to wean in coming weeks  - Continue gabapentin >> room to increased if needed  - Falls precautions  - Continue back brace when OOB  - Follow-up with neurosurgery for repeat imaging 7/24    (Z87.81) Hx of compression fracture of spine  (R26.9) Abnormal gait  (M15.9) OA Multiple Sites  Comment: Chronic  In ER imaging showed L3 veretral compression fracture acute on chronic.  DJD multiple sites.   Hx of T8-12 pedicle screw fixation with cement by Dr. Behzad Sabit on 8/4/20.  Hx of multiple spinal compression fractures per chart review in setting of recurrent falls.   Looks to have been on Fosamax in past.   Plan:  - Continue Tylenol 500 mg PO BID and BID PRN  - Continue gabapentin   - Neurosurgery follow-up and back brace  - Consider treatment for osteoporosis, would not be good candidate for bisphosphonate due to swallowing issues, consider endocrine referral, would need family to get him to appointment and current not speaking with children, consider Reclast or Prolia - discuss at care conference    (F03.90) Major  neurocognitive disorder (H) - dementia   Comment: Reviewed recent neuro-psych testing at length.   Recommendation that resident remain in memory care unit at this time for safety, can discuss living outside memory care in alternative care setting with significant oversight due to cognitive impairment.  Oriana is now following case.   Plan:   - Discussed neuropsych testing at length with resident/wife, reiterated he needs assistance for decision making and has a major neurocognitive discorder  - Discussed case with oriana Gann at resident request. Would benefit from care conference, will discuss with facility, family, and  from [facility].   - Gave resident/wife/family number/email for medical records to obtain copy of chart notes  - Continue Seroquel due to paranoid delusions causing distress (ie thinks phones are bugged)  - Discussed Neurology follow-up and second opinion regarding cognitive testing, could need family help to accomplish this, discuss at care conference  -  Message sent to [facility]  and DHS requesting assistance in locating Health Care Directive   - Consider ophthalmology follow-up for field cut    (C61) Prostate Cancer - adenocarcinoma Haily 4+3=7  Comment: Improved  Prostate biopsy 1/11/2023,pathology positive for Haily 7 prostate cancer.   Stopped Trospium due to anticholinergic side effects, no change in urinary symptoms.  Followed by Dr. Niraj Larry [facility] - last visit 6/2/23.  Completed external beam radiation therapy w/o androgen depravation therapy with [facility] under care of Dr Fleming.   Some dysuria s/p treatment, UC negative for infection.  Plan:  - Urology follow-up 9/2023 with PSA prior   - Monitor new or worsening post radiation symptoms    (R05.3) Chronic cough  Comment: Chronic  Possible post-nasal gtt, discussed Flonase but not good option due to recurrent epistaxis.  Plan:   - Continue loratidine 10 mg  daily  - Change guaiFENesin PRN   - Notify nursing of new or worsening symptoms    (E22.2) SIADH (syndrome of inappropriate ADH production) (H)  (E87.1) Hyponatremia    Comment: Stable.  Previous hyponatremia multifactorial, occurred in setting of SIADH, dose increase of selective serotonin reuptake inhibitor (Celexa), Bactrim, hypovolemia.  Stopped Celexa 9/29/22, now on Mirtazapine  .  Plan:   - Avoid medications which could exacerbate hyponatremia   - Avoid excessive free water intake  - Monitor serial BMP q 6 months    (F10.21) Alcohol dependence in remission  (F43.21) Adjustment reaction with depression  (F41.1) JOSÉ MANUEL  Comment:Chronic  episodes of assumed anxiety over last week due to multiple life stressors. Finds gabapentin helpful.  Cognitive impairment in setting of history of years of ETOH abuse with recurrent falls and hospitalizations. Currently no access to ETOH and thus is abstinent.  Chronic low mood, suspect JOSÉ MANUEL with panic attacks/chest pain for many years.   On memory care unit due to safety concerns.  Plan:   - Increase Gabapentin to 100 mg q AM and 200 mg PO q HS and 100 mg BID PRN for anxiety and pain  - Continue mirtazpaine 15 mg PO q HS (started 11/17/22, dose increase 1/27/23)  - Continue Seroquel 12.5 mg BID and BID PRN for psychotic features - paranoid delusions. Previously on Zyprexa. Look to wean as able.  - [facility] pysch counselor following closely  - Offered psychiatry referral in past, but pt/family prefer onsite care, [facility] Pharm[facility] follows  - Would like to move off memory care unit, but per neuropsych testing needs assistance for decision making, working to schedule care conference with IDT.    (R60.0) Lower extremity edema  Comment: Chronic, improved with compression wraps, but does not always wear.   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  Reported recurrent coughing up blood, but not recently.  Saw ENT in 2022, no explanation of symptoms.  Last esophagram May 2022 as above, presbyesophagus, proximal luminal narrowing.  Recent saw SLP 8/2022, no overt dysphagia.  Recurrent pneumonia, last 8/4/22.  CXR 10/1/22, 3/28/23 without concern for PNA.  Chest CT showed, \"Trace scattered atelectasis and/or fibrosis.  Elevated left hemidiaphragm. No effusions or pneumothorax.\"  Plan:   - No symptoms at present, monitor clinically  - Continue swallowing strategies outlined by SLP    (I71.2) Thoracic aortic aneurysm without rupture (H) - 6/30/22 4.6 cm  (I70.0) Atherosclerosis of aorta - noted by vascular  Comment: Chronic  Noted in ER June 2022 and on outside records  Followed by vascular Dr. Rodriguez.  4.6 cm ascending thoracic aortic aneurysm since 2020.  Last Echo 9/30/22: ascending aorta is Moderately dilated. Sinus 4.8cm, ascending 4.4cm.  Mild - mod aortic valve stenosis.  1/5/23: LDL 60  Plan:   - Plan for interval Echo June 2023 given wish for no aggressive intervention or testing, needs assist to schedule and get to appointments  - Follows with [facility] Dr. Rodriguez - discuss follow-up at care conference  - HR and BP control without medications at present     (D64.9) Normocytic Anemia  Comment: Chronic, mild.  Hgb 10.2 on 6/8/23  No symptoms of acute bleed.  Plan:   - Follow serial Hgb    (K59.04) Chronic constipation  Comment: Chronic.   Having regular BMs.  Bowel habits change some s/p prostate radiation.  Long hx of Levsin multiple times per day for abd pain, but stopped by urology when started trospium, no recurrent pain.   Plan:   - Chronic polyethylene glycol (MIRALAX) 17 GM/Dose " powder; 1-2 capfuls in 8 oz of ORANGE JUICE PO one time each morning and 1-2 capfuls 8 oz of ORANGE JUICE PO one time daily PRN for constipation.   - GI following   - Increased risk for constipation on oxycodone, discussed today. Can add PRN senna if needed.    (I48.91) Atrial fibrillation (H)  Comment: Chronic  Not anticoagulated due to ETOH use and recurrent falls, rate controlled off medication.  [id number]: 3 (age, HTN), 3.2% risk of stroke per year  Plan:   - Monitor VS and clinical status  - Would not add AC due to falls   - Consider cardiology follow-up after procedure, family has wanted to hold off due to multiple specialist appointments.    (I10) Essential hypertension, benign  Comment: Chronic  BP at goal of < 150/90 given age and comorbid conditions  Stopped ASA due to hemoptysis  BP Readings from Last 3 Encounters:   23 120/66   23 136/70   23 (!) 157/77   Plan:   - Monitor routine VS and labs off anti-hypertensive medications    (E66.09,  Z68.35) Class 2 obesity  Comment: Body mass index is 34.52 kg/m .with cardiovascular diease and OA.  Plan:   - Encourage increased activity and portion control    Orders:  - No new orders    Total time with an established patient visit in the assisted livin minutes includin:30 - 12:48 PM (78 minutes) Patient visit, review of hospitalization, long discussion with patient and wife regarding neuro-psych testing, concerns with living situation, concerns with family involvement in care. Agreeable to care conference with budsman, family, and facility present to determine plan of care. Resident increasingly frustrated/paraonid during conversation.     3:12 - 3:33 PM (21 minutes) Spoke to [facility] at resident request. Plan to setup care conference with IDT to assist with living situation and QoL concerns.    Electronically signed by: MARICHUY Basilio CNP     06/15/23  Spoke to  PT. Will add in SLP, OT, and SW.   will  plan to attend care conference.  Message sent to [facility]  who will follow-up with resident regarding care conference.

## 2023-06-16 DIAGNOSIS — S32.030A CLOSED WEDGE COMPRESSION FRACTURE OF L3 VERTEBRA, INITIAL ENCOUNTER (H): ICD-10-CM

## 2023-06-16 RX ORDER — OXYCODONE HYDROCHLORIDE 5 MG/1
5 TABLET ORAL EVERY 6 HOURS PRN
Qty: 3 TABLET | Refills: 0 | Status: SHIPPED | OUTPATIENT
Start: 2023-06-16 | End: 2023-06-18

## 2023-06-18 DIAGNOSIS — S32.030A CLOSED WEDGE COMPRESSION FRACTURE OF L3 VERTEBRA, INITIAL ENCOUNTER (H): ICD-10-CM

## 2023-06-18 RX ORDER — OXYCODONE HYDROCHLORIDE 5 MG/1
5 TABLET ORAL EVERY 6 HOURS PRN
Qty: 10 TABLET | Refills: 0 | Status: SHIPPED | OUTPATIENT
Start: 2023-06-18 | End: 2023-06-27

## 2023-06-19 ENCOUNTER — ASSISTED LIVING VISIT (OUTPATIENT)
Dept: GERIATRICS | Facility: CLINIC | Age: 83
End: 2023-06-19
Payer: COMMERCIAL

## 2023-06-19 VITALS
OXYGEN SATURATION: 99 % | HEIGHT: 68 IN | SYSTOLIC BLOOD PRESSURE: 120 MMHG | HEART RATE: 59 BPM | WEIGHT: 227 LBS | BODY MASS INDEX: 34.4 KG/M2 | DIASTOLIC BLOOD PRESSURE: 66 MMHG

## 2023-06-19 DIAGNOSIS — S32.039D CLOSED FRACTURE OF THIRD LUMBAR VERTEBRA WITH ROUTINE HEALING, UNSPECIFIED FRACTURE MORPHOLOGY, SUBSEQUENT ENCOUNTER: Primary | ICD-10-CM

## 2023-06-19 DIAGNOSIS — S22.41XD CLOSED FRACTURE OF MULTIPLE RIBS OF RIGHT SIDE WITH ROUTINE HEALING, SUBSEQUENT ENCOUNTER: ICD-10-CM

## 2023-06-19 DIAGNOSIS — M15.9 OSTEOARTHRITIS OF MULTIPLE JOINTS, UNSPECIFIED OSTEOARTHRITIS TYPE: ICD-10-CM

## 2023-06-19 DIAGNOSIS — Z87.81 FRACTURE, HEALED: ICD-10-CM

## 2023-06-19 DIAGNOSIS — R26.9 ABNORMALITY OF GAIT: ICD-10-CM

## 2023-06-19 DIAGNOSIS — E66.01 MORBID OBESITY (H): ICD-10-CM

## 2023-06-19 PROCEDURE — 99349 HOME/RES VST EST MOD MDM 40: CPT | Performed by: NURSE PRACTITIONER

## 2023-06-19 RX ORDER — ACETAMINOPHEN 500 MG
TABLET ORAL
Qty: 120 TABLET | Refills: 98 | Status: SHIPPED | OUTPATIENT
Start: 2023-06-19 | End: 2023-09-05

## 2023-06-19 NOTE — PATIENT INSTRUCTIONS
Jamie Valdivia  1940  ORDERS:  - Discontinue oxycodone  - Oxycodone 2.5 mg PO q 6 hours PRN for pain   - Discontinue Tylenol   - acetaminophen (TYLENOL) 500 MG tablet; Take 2 tablets (1,000 mg) by mouth 2 times daily. May also take 2 tablets (1,000 mg) daily as needed for mild pain.   Electronically signed by:   MARICHUY Basilio CNP  06/19/23 4:16 PM

## 2023-06-19 NOTE — PROGRESS NOTES
"[facility] GERIATRICS    Chief Complaint   Patient presents with     RECHECK     Back pain      HPI:  Jamie Valdivia is a 83 year old  (1940) PMH significant for GERD with esophagitis, OA BL knees, pharyngeal dysphagia, edema, abdominal wall hernia, chronic atrial fibrillation, HTN, osteoporosis with hx of vertebral fracture, dementia, anemia, hx of COVID-19, who is being seen today for an episodic care visit at: [facility] ([facility]) [61].     Today's concern is:   Follow-up today to assess pain.  Due for oxycodone refill.     Reports over all pain is okay.  \"A little tender.\"  No severe pain.  Muscle relaxer Tizanidine given 6/9.    Recent use of oxycodone:      No SOB or CP.  No constipation or loose stools reported.    No dysuria.      PT called last week to report resident is not donning brace at all times.    Allergies, and PMH/PSH reviewed in [facility] today.    MED REC REQUIRED  Post Medication Reconciliation Status:  Patient was not discharged from an inpatient facility or TCU but medications were reconciled per facility EMR.    REVIEW OF SYSTEMS:  4 point ROS including Respiratory, CV, GI and , other than that noted in the HPI,  is negative    Objective:   /66   Pulse 59   Ht 1.727 m (5' 8\")   Wt 103 kg (227 lb)   SpO2 99%   BMI 34.52 kg/m    GENERAL APPEARANCE:  Alert, in no distress, seated in recliner chair.  RESP:  Non-labored breathing, no respiratory distress, Lung sounds clear but decreased at BL bases, no adventitious breath sounds, patient is on room air.   CV: Rate and rhythm regular, no murmur, no rub or gallop.   VASCULAR: 2-3+ edema bilateral lower extremities, not wearing Velcro compression wraps.   ABDOMEN:  Normal bowel sounds, soft, nontender, no grimacing or guarding with palpation,   + umblical hernia, soft/non-tender.   M/S:   Pain to palpation over lumbar spine and adjacent muscles, pain to palpation over right ribs.  PSYCH: Awake and alert, speech " fluent,  insight and judgement impaired.    Labs done in SNF are in [place] EPIC. Please refer to them using EPIC/Care Everywhere.    Assessment/Plan:  (S22.41XD) Closed fracture of multiple ribs of right side with routine healing, subsequent encounter - 8th/9th  (S32.039D) Closed fracture of third lumbar vertebra with routine healing, unspecified fracture morphology, subsequent encounter  Comment: Stable s/p unwitnessed fall.  Pain well controlled, participating in therapy.    Plan:   -  PT/OT  - Decrease oxycodone to 2.5 mg PO q 6 hours PRN  - Increase Tylenol to 1,000 mg PO BID and daily PRN   - Continue gabapentin  - Falls precautions  - Follow-up with neurosurgery for repeat imaging 7/24    (Z87.81) Hx of compression fracture of spine  (R26.9) Abnormal gait  (M15.9) OA Multiple Sites  (E66.01) Morbid obesity   Comment: Chronic  In ER imaging showed L3 veretral compression fracture acute on chronic.  DJD multiple sites.   Hx of T8-12 pedicle screw fixation with cement by Dr. Behzad Sabit on 8/4/20.  Hx of multiple spinal compression fractures per chart review in setting of recurrent falls.   Looks to have been on Fosamax in past.   Body mass index is 34.52 kg/m  would benefit from weight loss, which would improve mobility and joint pain.  Plan:  -  PT/OT  - Message sent to neurosurgery PA regarding back brace orders, okay to use PRN?  - Increase Tylenol to 1,000 mg PO BID and daily PRN   - Continue gabapentin   - Neurosurgery follow-up as above  - Consider treatment for osteoporosis, would not be good candidate for bisphosphonate due to swallowing issues, consider endocrine referral, would need family to get him to appointment and currently not speaking with children, consider Reclast or Prolia - discuss at care conference    Orders:  - Decrease oxycodone to 2.5 mg PO q 6 hours PRN  - Increase Tylenol to 1,000 mg PO BID and daily PRN     Electronically signed by: MARICHUY Basilio CNP

## 2023-06-19 NOTE — LETTER
_  Medication List        Prepared on: Jan 26, 2023     Bring your Medication List when you go to the doctor, hospital, or   emergency room. And, share it with your family or caregivers.     Note any changes to how you take your medications.  Cross out medications when you no longer use them.    Medication How I take it Why I use it Prescriber   acetaminophen (TYLENOL) 500 MG tablet Take 1 tablet (500 mg) by mouth 2 times daily. May also take 1 tablet (500 mg) 2 times daily as needed for mild pain. Osteoarthritis of multiple joints, unspecified osteoarthritis type MARICHUY Pepper CNP   alum & mag hydroxide-simethicone (MAALOX MAX) 400-400-40 MG/5ML SUSP suspension Take 30 mLs by mouth every 6 hours as needed for indigestion Gastroesophageal reflux disease, unspecified whether esophagitis present MARICHUY Pepper CNP   calcium carbonate 750 MG CHEW Take 750 mg by mouth as needed (Self-administers) reflux Patient Reported   DEEP SEA NASAL SPRAY 0.65 % nasal spray INHALE 2 SPRAYS IN EACH NOSTRIL ONCE DAILY Nasal Congestion JerricaMARICHUY Gross CNP   diazepam (VALIUM) 5 MG tablet Take 1 tablet (5 mg) by mouth every 6 hours as needed (prior to CT scan) Prostate Cancer (H) Niraj Larry MD   gabapentin (NEURONTIN) 100 MG capsule TAKE TWO CAPSULES (200MG) BY MOUTH EVERY NIGHT AT BEDTIME Neuropathy, anxiety Kaley MARICHUY Leon CNP   gabapentin (NEURONTIN) 100 MG capsule Take 1 capsule (100 mg) by mouth 2 times daily as needed (Anxiety) anxiety MARICHUY Pepper CNP   guaiFENesin (ROBITUSSIN) 100 MG/5ML liquid GIVE 10ML (200 MG) BY MOUTH TWICE DAILY;AND TWICE DAILY AS NEEDED FOR COUGH Cough MARICHUY Pepper CNP   menthol-zinc oxide (CALMOSEPTINE) 0.44-20.6 % OINT ointment Apply 1 g topically 2 times daily. May also apply 1 g 2 times daily as needed for skin protection. Dermatitis MARICHUY Pepper CNP   mirtazapine (REMERON) 15 MG tablet Take 1 tablet (15 mg) by mouth At  Bedtime Adjustment Reaction with Anxiety and Depression MARICHUY Pepper CNP   nystatin (MYCOSTATIN) 513275 UNIT/GM external powder Apply topically 2 times daily Skin disorder Patient Reported   omeprazole (PRILOSEC) 40 MG DR capsule TAKE 1 CAPSULE BY MOUTH TWICE DAILY Gastroesophageal reflux disease with esophagitis, unspecified whether hemorrhage MARICHUY Pepper CNP   polyethylene glycol (MIRALAX) 17 GM/Dose powder MIX 2 CAPFULS IN 8OZ OF ORANGE JUICE  IN THE MORNING;AND MAY MIX 2 CAPFULS ONCE DAILY AS NEEDED FOR CONSTIPATION. MIX IN FRONT OF PT. HOLD FOR LOOSE STOOL. OK TO GIVE 1 CAPFUL IF RESIDENT REFUSES 2 CAPFULS. Slow Transit Constipation MARICHUY Pepper CNP   polyvinyl alcohol-povidone PF (REFRESH) 1.4-0.6 % ophthalmic solution 1 drop every 4 hours as needed for irritation Dry eyes Patient Reported   QUEtiapine (SEROQUEL) 25 MG tablet Take 0.5 tablets (12.5 mg) by mouth 2 times daily. May also take 0.5 tablets (12.5 mg) every 12 hours as needed (anxiety). 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    Medication How I take it Why I use it Prescriber                          Allergies:      ativan [lorazepam]        Side effects I have had:               Other Information:              My notes and questions:   Cyclosporine Pregnancy And Lactation Text: This medication is Pregnancy Category C and it isn't know if it is safe during pregnancy. This medication is excreted in breast milk.

## 2023-06-22 ENCOUNTER — TELEPHONE (OUTPATIENT)
Dept: GERIATRICS | Facility: CLINIC | Age: 83
End: 2023-06-22
Payer: COMMERCIAL

## 2023-06-22 NOTE — TELEPHONE ENCOUNTER
"Jamie Valdivia  1940  ORDERS:  - Discontinue order regarding back brace  - Wear back brace PRN for comfort (per neurosurgery PA-C, \"If his pain/symptoms are not exacerbated with his brace off he can either liberalize the amount he wears it or stop all together.\"  - Please give copy of this order to  PT  Electronically signed by:   MARICHUY Basilio CNP  06/22/23 10:53 AM      "

## 2023-06-27 ENCOUNTER — ASSISTED LIVING VISIT (OUTPATIENT)
Dept: GERIATRICS | Facility: CLINIC | Age: 83
End: 2023-06-27
Payer: COMMERCIAL

## 2023-06-27 VITALS — HEIGHT: 68 IN | WEIGHT: 227 LBS | BODY MASS INDEX: 34.4 KG/M2

## 2023-06-27 DIAGNOSIS — S32.039D CLOSED FRACTURE OF THIRD LUMBAR VERTEBRA WITH ROUTINE HEALING, UNSPECIFIED FRACTURE MORPHOLOGY, SUBSEQUENT ENCOUNTER: ICD-10-CM

## 2023-06-27 DIAGNOSIS — S22.41XD MULTIPLE FRACTURES OF RIBS OF RIGHT SIDE WITH ROUTINE HEALING: Primary | ICD-10-CM

## 2023-06-27 DIAGNOSIS — F03.90 MAJOR NEUROCOGNITIVE DISORDER (H): ICD-10-CM

## 2023-06-27 PROCEDURE — 99349 HOME/RES VST EST MOD MDM 40: CPT | Performed by: NURSE PRACTITIONER

## 2023-06-27 NOTE — PROGRESS NOTES
"[facility] GERIATRICS    Chief Complaint   Patient presents with     RECHECK     Pain      HPI:  Jamie Valdivia is a 83 year old  (1940) PMH significant for GERD with esophagitis, OA BL knees, pharyngeal dysphagia, edema, abdominal wall hernia, chronic atrial fibrillation, HTN, osteoporosis with hx of vertebral fracture, dementia, anemia, hx of COVID-19, who is being seen today for an episodic care visit at: [facility]) [61].     Today's concern is:   Follow-up today to to assess pain.    Prior to visit received call from ACP therapist requesting review of neuropsych testing.   Discussed results again with therapist Kimberly Wick and resident.  Reviewed again how to get copy of results.  Patient signed release of information for ACP therapist to get to copy of results.    Pain in back is getting better, participating in therapy.  Cleared to use brace PRN.    Recent use of PRN oxycodone:       Allergies, and PMH/PSH reviewed in [facility]IC today.    MED REC REQUIRED  Post Medication Reconciliation Status:  Patient was not discharged from an inpatient facility or TCU  but medications were reconciled per facility EMR.    Social Hx : Adventism    REVIEW OF SYSTEMS:  4 point ROS including Respiratory, CV, GI and , other than that noted in the HPI,  is negative    Objective:   Ht 1.727 m (5' 8\")   Wt 103 kg (227 lb)   BMI 34.52 kg/m    GENERAL APPEARANCE:  Alert, in no distress, seated in recliner chair.  RESP:  Non-labored breathing.  VASCULAR: 2-3+ edema bilateral lower extremities, not wearing Velcro compression wraps.   PSYCH: Awake and alert, speech fluent,  insight and judgement impaired.    Patient is on hospice/palliative care and labs are not recommended     Assessment/Plan:  (S22.41XD) Closed fracture of multiple ribs of right side with routine healing, subsequent encounter - 8th/9th  (S32.578D) Closed fracture of third lumbar vertebra with routine healing, unspecified fracture " morphology, subsequent encounter  Comment: Stable s/p unwitnessed fall.  Pain well-controlled, participating in therapy, still using low dose oxycodone.    Plan:   - Continue HH PT/OT  - Continue oxycodone to 2.5 mg PO q 6 hours PRN for pain until current supply complete then stop, discussed this today.  - Continue Tylenol to 1,000 mg PO BID and daily PRN   - Continue gabapentin - room to increased after oxycodone weaned  - Falls precautions  - Follow-up with neurosurgery for repeat imaging 7/24    (F03.90) Major neurocognitive disorder (H) - dementia   Comment: Per patient and therapist request eviewed recent neuro-psych testing again at length.   Recommendation that resident remain in memory care unit at this time for safety, can discuss living outside memory care in alternative care setting with significant oversight due to cognitive impairment.  Oriana is now following case.   Plan:   - Reviewed again plan for IDT care conference when patient ready, plan had been for him to contact Oriana prior to scheduling this, reviewed his previous wishes again today.  - Reviewed again process to obtain medical records: number/email for medical records written down for resident and daughter printed release  - Continue Seroquel due to paranoid delusions causing distress (ie thinks phones are bugged)  -  P  aware of resident wish to leave memory care, she will assist with care conference coordination.  - Consider ophthalmology follow-up for field cut    Orders:  No new orders.    Electronically signed by: MARICHUY Basilio CNP

## 2023-06-27 NOTE — LETTER
"    6/27/2023        RE: Jamie Valdivia  C/o Coral De La Garza  [address]  [facility] [place] [address]        [facility] GERIATRICS    Chief Complaint   Patient presents with     RECHECK     Pain      HPI:  Jamie Valdivia is a 83 year old  (1940) PMH significant for GERD with esophagitis, OA BL knees, pharyngeal dysphagia, edema, abdominal wall hernia, chronic atrial fibrillation, HTN, osteoporosis with hx of vertebral fracture, dementia, anemia, hx of COVID-19, who is being seen today for an episodic care visit at: [facility]) [61].     Today's concern is:   Follow-up today to to assess pain.    Prior to visit received call from ACP therapist requesting review of neuropsych testing.   Discussed results again with therapist Kimberly Wick and resident.  Reviewed again how to get copy of results.  Patient signed release of information for ACP therapist to get to copy of results.    Pain in back is getting better, participating in therapy.  Cleared to use brace PRN.    Recent use of PRN oxycodone:       Allergies, and PMH/PSH reviewed in [employer] today.    MED REC REQUIRED  Post Medication Reconciliation Status:  Patient was not discharged from an inpatient facility or TCU  but medications were reconciled per facility EMR.    Social Hx : Congregational    REVIEW OF SYSTEMS:  4 point ROS including Respiratory, CV, GI and , other than that noted in the HPI,  is negative    Objective:   Ht 1.727 m (5' 8\")   Wt 103 kg (227 lb)   BMI 34.52 kg/m    GENERAL APPEARANCE:  Alert, in no distress, seated in recliner chair.  RESP:  Non-labored breathing.  VASCULAR: 2-3+ edema bilateral lower extremities, not wearing Velcro compression wraps.   PSYCH: Awake and alert, speech fluent,  insight and judgement impaired.    Patient is on hospice/palliative care and labs are not recommended     Assessment/Plan:  (S22.60XD) Closed fracture of multiple ribs of right side with routine healing, subsequent encounter - " 8th/9th  (S32.039D) Closed fracture of third lumbar vertebra with routine healing, unspecified fracture morphology, subsequent encounter  Comment: Stable s/p unwitnessed fall.  Pain well-controlled, participating in therapy, still using low dose oxycodone.    Plan:   - Continue HH PT/OT  - Continue oxycodone to 2.5 mg PO q 6 hours PRN for pain until current supply complete then stop, discussed this today.  - Continue Tylenol to 1,000 mg PO BID and daily PRN   - Continue gabapentin - room to increased after oxycodone weaned  - Falls precautions  - Follow-up with neurosurgery for repeat imaging 7/24    (F03.90) Major neurocognitive disorder (H) - dementia   Comment: Per patient and therapist request eviewed recent neuro-psych testing again at length.   Recommendation that resident remain in memory care unit at this time for safety, can discuss living outside memory care in alternative care setting with significant oversight due to cognitive impairment.  Oriana is now following case.   Plan:   - Reviewed again plan for IDT care conference when patient ready, plan had been for him to contact Oriana prior to scheduling this, reviewed his previous wishes again today.  - Reviewed again process to obtain medical records: number/email for medical records written down for resident and daughter printed release  - Continue Seroquel due to paranoid delusions causing distress (ie thinks phones are bugged)  -  P  aware of resident wish to leave memory care, she will assist with care conference coordination.  - Consider ophthalmology follow-up for field cut    Orders:  No new orders.    Electronically signed by: MARICHUY Basilio CNP         Sincerely,        MARICHUY Basilio CNP

## 2023-06-29 ENCOUNTER — TELEPHONE (OUTPATIENT)
Dept: GERIATRICS | Facility: CLINIC | Age: 83
End: 2023-06-29
Payer: COMMERCIAL

## 2023-06-29 NOTE — TELEPHONE ENCOUNTER
[facility] GERIATRICS TELEPHONE NOTE    Returned call from Anya Gann from office the [facility] requesting to setup care conference.    No answer.    Left message referring her to   to assist in coordinating care conference:    Gisela Hlal RN  [facility]  [phone]    Left NP direct cell# for any further questions/concerns.    MARICHUY Basilio CNP  06/29/23 10:07 AM

## 2023-07-07 DIAGNOSIS — H04.129 DRY EYE: Primary | ICD-10-CM

## 2023-07-07 RX ORDER — POLYVINYL ALCOHOL, POVIDONE 14; 6 MG/ML; MG/ML
SOLUTION/ DROPS OPHTHALMIC
Qty: 50 EACH | Refills: 97 | Status: SHIPPED | OUTPATIENT
Start: 2023-07-07 | End: 2023-11-28

## 2023-07-11 ENCOUNTER — ASSISTED LIVING VISIT (OUTPATIENT)
Dept: GERIATRICS | Facility: CLINIC | Age: 83
End: 2023-07-11
Payer: COMMERCIAL

## 2023-07-11 DIAGNOSIS — I48.91 ATRIAL FIBRILLATION, UNSPECIFIED TYPE (H): ICD-10-CM

## 2023-07-11 DIAGNOSIS — R41.9 NEUROCOGNITIVE DISORDER: ICD-10-CM

## 2023-07-11 DIAGNOSIS — S32.039D CLOSED FRACTURE OF THIRD LUMBAR VERTEBRA WITH ROUTINE HEALING, UNSPECIFIED FRACTURE MORPHOLOGY, SUBSEQUENT ENCOUNTER: ICD-10-CM

## 2023-07-11 DIAGNOSIS — F03.90 MAJOR NEUROCOGNITIVE DISORDER (H): ICD-10-CM

## 2023-07-11 DIAGNOSIS — S22.41XD MULTIPLE FRACTURES OF RIBS OF RIGHT SIDE WITH ROUTINE HEALING: ICD-10-CM

## 2023-07-11 DIAGNOSIS — I71.20 THORACIC AORTIC ANEURYSM WITHOUT RUPTURE, UNSPECIFIED PART (H): ICD-10-CM

## 2023-07-11 DIAGNOSIS — R00.1 BRADYCARDIA: Primary | ICD-10-CM

## 2023-07-11 PROCEDURE — 99349 HOME/RES VST EST MOD MDM 40: CPT | Performed by: NURSE PRACTITIONER

## 2023-07-11 NOTE — LETTER
"    7/11/2023        RE: Jamie Valdivia  C/o Coral De La Garza  [address]  [facility] [address]        [facility] GERIATRICS    Chief Complaint   Patient presents with     RECHECK     Bradycardia      HPI:  Jamie Valdivia is a 83 year old  (1940) PMH significant for GERD with esophagitis, OA BL knees, pharyngeal dysphagia, edema, abdominal wall hernia, chronic atrial fibrillation, HTN, osteoporosis with hx of vertebral fracture, dementia, anemia, hx of COVID-19, who is being seen today for an episodic care visit at: [facility]) [61].     Resident hospitalized from 6/7 to 6/8 after mechanical fall.  Found to have 9th/10th right rib fracture and chronic L3 veretral compression fracture. Neurosurgery consulted, brace ordered, plan for outpatient follow-up with x-rays,  PT consulted. Follow-up schedule for 7/24. Discharged on regular diet with \"slightly tickened liquids.\" Pain mgmt with Tylenol, Oxycodone, and tizanidine.     Today's concern is:   Follow-up requested today by PT due low heart rate.     Wife Gisela present and assists in providing history.    No dizziness or syncope.  No chest pain  No shortness of breath  No orthopnea  No PND    Pain management is improved  No use of PRN oxycodone since 7/7.    Resident's most significant concern continues to be neuropsych testing.  Reports he and his wife would like to work.  They are planning to go to [place].  Vague discussion of legal action he is taking and second opinions that are being sought, but unclear with whom.  Called in [facility] old PCP's office.  Reviewed HIPAA laws about health information and sharing.  Still has not obtained medical records, but seems to have form filled out. Advised that nursing staff could assist in faxing this into [place].     QTc values over last year:      Oxycodone use:       Allergies, and PMH/PSH reviewed in [facility] today.    MED REC REQUIRED  Post Medication Reconciliation Status:  Patient was not " "discharged from an inpatient facility or TCU but medications were reconciled per facility EMR.    REVIEW OF SYSTEMS:  4 point ROS including Respiratory, CV, GI and , other than that noted in the HPI,  is negative    Objective:   BP (!) 148/67   Pulse 52   Resp 16   Ht 1.727 m (5' 8\")   Wt 101.8 kg (224 lb 6.4 oz)   SpO2 99%   BMI 34.12 kg/m    GENERAL APPEARANCE:  Alert, in no distress, seated in recliner chair.  RESP:  Non-labored breathing, no respiratory distress, Lung sounds clear but decreased at BL bases, no adventitious breath sounds, patient is on room air.   CV: Rate and rhythm regular, no murmur, no rub or gallop.   VASCULAR: 2-3+ edema bilateral lower extremities, not wearing Velcro compression wraps.   ABDOMEN:  Normal bowel sounds, soft, nontender, no grimacing or guarding with palpation.  M/S: No pain to palpation over lumbar spine and adjacent muscles, pain to palpation over right ribs.  PSYCH: Awake and alert, speech fluent,  insight and judgement impaired.    Recent labs in [facility] reviewed by me today.    CBC RESULTS: Recent Labs   Lab Test 06/08/23  0624 06/07/23  0957   WBC 5.8 10.8   RBC 3.76* 3.93*   HGB 10.2* 10.6*   HCT 32.7* 33.4*   MCV 87 85   MCH 27.1 27.0   MCHC 31.2* 31.7   RDW 15.9* 15.5*    191     Last Basic Metabolic Panel:  Recent Labs   Lab Test 06/07/23  0957 03/30/23  0659    141   POTASSIUM 4.1 4.4   CHLORIDE 101 103   JOSUE 9.1 9.0   CO2 28 26   BUN 11.5 7.9*   CR 0.62* 0.68   GLC 92 72     Liver Function Studies -   Recent Labs   Lab Test 06/07/23  0957 01/05/23  1000   PROTTOTAL 7.2 7.6   ALBUMIN 4.0 3.6   BILITOTAL 0.4 0.5   ALKPHOS 58 64   AST 23 18   ALT 11 13     TSH   Date Value Ref Range Status   06/07/2023 1.68 0.30 - 4.20 uIU/mL Final   10/06/2022 1.67 0.30 - 4.20 uIU/mL Final   04/28/2022 2.77 0.40 - 4.00 mU/L Final   04/07/2022 3.94 0.40 - 4.00 mU/L Final           Assessment/Plan:  (R00.1) Bradycardia  (primary encounter diagnosis)  (I48.91) " Afib  Comment: Incidentally noted in physical therapy  No symptoms  History of atrial fibrillation, low heart rate on previous ECG while in hospital.  No offending medications, reviewed Pharm.D., tizanidine and Seroquel could contribute to bradycardia  Plan:   - Adult Cardiology Eval  Referral  - ECG  - Discontinue tizandiine   -Monitor on serial exam    (I71.2) Thoracic aortic aneurysm without rupture (H) - 6/30/22 4.6 cm  Comment: New finding in 2022, follows with vascular  Plan:   -Repeat echo scheduled for this month, daughter does not think patient is aware appointment  -Message sent to  to assist with getting transportation    (S22.41XD) Closed fracture of multiple ribs of right side with routine healing, subsequent encounter - 8th/9th  (S32.039D) Closed fracture of third lumbar vertebra with routine healing, unspecified fracture morphology, subsequent encounter  Comment: Stable s/p unwitnessed fall.  Pain well-controlled, participating in therapy.  No significant pain today, okay with stopping opioid and muscle relaxer  Plan:   - Continue HH PT/OT  - Discontinue oxycodone and tizanidine  - Continue Tylenol to 1,000 mg PO BID and daily PRN   - Continue gabapentin  - Falls precautions  - Follow-up with neurosurgery for repeat imaging 7/24, unclear patient is aware of this appointment, reviewed today,  to help with transportation    (F03.90) Major neurocognitive disorder (H) - dementia   Comment: Patient again requested to discuss neuro-psych testing.   Like to return to work and living with his wife, despite concern for dementia neuropsych testing  Oriana is now following case.   Plan:   - Neurology referral for second opinion regarding cognition   - Reviewed again plan for IDT care conference -email sent to oriana, , and facility DHS.  Family is aware of plan of care conference.  - Reviewed again process to obtain medical records -suggested patient and wife  ask for nursing assistance to fax signed form  - Continue Seroquel due to paranoid delusions causing distress (ie thinks phones are bugged, paranoid) if QTc prolongation evident on ECG would need to reduce dose  - Consider ophthalmology follow-up for field cut - Difficulty out to appointments at present, can refer to onsite eye care    Orders:  - ECG  - Cardiology referral  - Discontinue tizandiine   - Discontinue oxycodone   - Neurology referral     Electronically signed by: MARICHUY Basilio CNP           Sincerely,        MARICHUY Basilio CNP

## 2023-07-11 NOTE — PATIENT INSTRUCTIONS
Jamie Valdivia  1940  ORDERS:  - ECG dx bradycardia, update triage with result   - Discontinue oxycodone  - Discontinue tizanidine   - Please give patient # to call cardiology and neurology to set up appointment: [phone].   Electronically signed by:   MARICHUY Basilio CNP  07/11/23 3:41 PM

## 2023-07-11 NOTE — PROGRESS NOTES
"[facility] GERIATRICS    Chief Complaint   Patient presents with     RECHECK     Bradycardia      HPI:  Jamie Valdivia is a 83 year old  (1940) PMH significant for GERD with esophagitis, OA BL knees, pharyngeal dysphagia, edema, abdominal wall hernia, chronic atrial fibrillation, HTN, osteoporosis with hx of vertebral fracture, dementia, anemia, hx of COVID-19, who is being seen today for an episodic care visit at: [facility]) [61].     Resident hospitalized from 6/7 to 6/8 after mechanical fall.  Found to have 9th/10th right rib fracture and chronic L3 veretral compression fracture. Neurosurgery consulted, brace ordered, plan for outpatient follow-up with x-rays,  PT consulted. Follow-up schedule for 7/24. Discharged on regular diet with \"slightly tickened liquids.\" Pain mgmt with Tylenol, Oxycodone, and tizanidine.     Today's concern is:   Follow-up requested today by PT due low heart rate.     Wife Gisela present and assists in providing history.    No dizziness or syncope.  No chest pain  No shortness of breath  No orthopnea  No PND    Pain management is improved  No use of PRN oxycodone since 7/7.    Resident's most significant concern continues to be neuropsych testing.  Reports he and his wife would like to work.  They are planning to go to [place].  Vague discussion of legal action he is taking and second opinions that are being sought, but unclear with whom.  Called in [facility] old PCP's office.  Reviewed HIPAA laws about health information and sharing.  Still has not obtained medical records, but seems to have form filled out. Advised that nursing staff could assist in faxing this into [place].     QTc values over last year:      Oxycodone use:       Allergies, and PMH/PSH reviewed in [facility] today.    MED REC REQUIRED  Post Medication Reconciliation Status:  Patient was not discharged from an inpatient facility or TCU but medications were reconciled per facility EMR.    REVIEW " "OF SYSTEMS:  4 point ROS including Respiratory, CV, GI and , other than that noted in the HPI,  is negative    Objective:   BP (!) 148/67   Pulse 52   Resp 16   Ht 1.727 m (5' 8\")   Wt 101.8 kg (224 lb 6.4 oz)   SpO2 99%   BMI 34.12 kg/m    GENERAL APPEARANCE:  Alert, in no distress, seated in recliner chair.  RESP:  Non-labored breathing, no respiratory distress, Lung sounds clear but decreased at BL bases, no adventitious breath sounds, patient is on room air.   CV: Rate and rhythm regular, no murmur, no rub or gallop.   VASCULAR: 2-3+ edema bilateral lower extremities, not wearing Velcro compression wraps.   ABDOMEN:  Normal bowel sounds, soft, nontender, no grimacing or guarding with palpation.  M/S: No pain to palpation over lumbar spine and adjacent muscles, pain to palpation over right ribs.  PSYCH: Awake and alert, speech fluent,  insight and judgement impaired.    Recent labs in [facility] reviewed by me today.    CBC RESULTS: Recent Labs   Lab Test 06/08/23  0624 06/07/23  0957   WBC 5.8 10.8   RBC 3.76* 3.93*   HGB 10.2* 10.6*   HCT 32.7* 33.4*   MCV 87 85   MCH 27.1 27.0   MCHC 31.2* 31.7   RDW 15.9* 15.5*    191     Last Basic Metabolic Panel:  Recent Labs   Lab Test 06/07/23  0957 03/30/23  0659    141   POTASSIUM 4.1 4.4   CHLORIDE 101 103   JOSUE 9.1 9.0   CO2 28 26   BUN 11.5 7.9*   CR 0.62* 0.68   GLC 92 72     Liver Function Studies -   Recent Labs   Lab Test 06/07/23  0957 01/05/23  1000   PROTTOTAL 7.2 7.6   ALBUMIN 4.0 3.6   BILITOTAL 0.4 0.5   ALKPHOS 58 64   AST 23 18   ALT 11 13     TSH   Date Value Ref Range Status   06/07/2023 1.68 0.30 - 4.20 uIU/mL Final   10/06/2022 1.67 0.30 - 4.20 uIU/mL Final   04/28/2022 2.77 0.40 - 4.00 mU/L Final   04/07/2022 3.94 0.40 - 4.00 mU/L Final           Assessment/Plan:  (R00.1) Bradycardia  (primary encounter diagnosis)  (I48.91) Afib  Comment: Incidentally noted in physical therapy  No symptoms  History of atrial fibrillation, low heart " rate on previous ECG while in hospital.  No offending medications, reviewed Pharm.D., tizanidine and Seroquel could contribute to bradycardia  Plan:   - Adult Cardiology Eval  Referral  - ECG  - Discontinue tizandiine   -Monitor on serial exam    (I71.2) Thoracic aortic aneurysm without rupture (H) - 6/30/22 4.6 cm  Comment: New finding in 2022, follows with vascular  Plan:   -Repeat echo scheduled for this month, daughter does not think patient is aware appointment  -Message sent to  to assist with getting transportation    (S22.41XD) Closed fracture of multiple ribs of right side with routine healing, subsequent encounter - 8th/9th  (S32.039D) Closed fracture of third lumbar vertebra with routine healing, unspecified fracture morphology, subsequent encounter  Comment: Stable s/p unwitnessed fall.  Pain well-controlled, participating in therapy.  No significant pain today, okay with stopping opioid and muscle relaxer  Plan:   - Continue HH PT/OT  - Discontinue oxycodone and tizanidine  - Continue Tylenol to 1,000 mg PO BID and daily PRN   - Continue gabapentin  - Falls precautions  - Follow-up with neurosurgery for repeat imaging 7/24, unclear patient is aware of this appointment, reviewed today,  to help with transportation    (F03.90) Major neurocognitive disorder (H) - dementia   Comment: Patient again requested to discuss neuro-psych testing.   Like to return to work and living with his wife, despite concern for dementia neuropsych testing  Oriana is now following case.   Plan:   - Neurology referral for second opinion regarding cognition   - Reviewed again plan for IDT care conference -email sent to oriana, , and facility DHS.  Family is aware of plan of care conference.  - Reviewed again process to obtain medical records -suggested patient and wife ask for nursing assistance to fax signed form  - Continue Seroquel due to paranoid delusions causing distress  (ie thinks phones are bugged, paranoid) if QTc prolongation evident on ECG would need to reduce dose  - Consider ophthalmology follow-up for field cut - Difficulty out to appointments at present, can refer to onsite eye care    Orders:  - ECG  - Cardiology referral  - Discontinue tizandiine   - Discontinue oxycodone   - Neurology referral     Electronically signed by: MARICHUY Basilio CNP

## 2023-07-12 ENCOUNTER — TRANSFERRED RECORDS (OUTPATIENT)
Dept: HEALTH INFORMATION MANAGEMENT | Facility: CLINIC | Age: 83
End: 2023-07-12
Payer: COMMERCIAL

## 2023-07-15 VITALS
HEART RATE: 52 BPM | OXYGEN SATURATION: 99 % | RESPIRATION RATE: 16 BRPM | WEIGHT: 224.4 LBS | BODY MASS INDEX: 34.01 KG/M2 | SYSTOLIC BLOOD PRESSURE: 148 MMHG | DIASTOLIC BLOOD PRESSURE: 67 MMHG | HEIGHT: 68 IN

## 2023-07-18 ENCOUNTER — PATIENT OUTREACH (OUTPATIENT)
Dept: GERIATRIC MEDICINE | Facility: CLINIC | Age: 83
End: 2023-07-18
Payer: COMMERCIAL

## 2023-07-18 NOTE — PROGRESS NOTES
[facility] Care Coordination Contact      [facility] Six-Month Telephone Assessment    6 month telephone assessment completed on 7/18/23.    ER visits: No  Hospitalizations: Yes -  [facility]  TCU stays: No  Significant health status change: No  Falls/Injuries: Yes: Closed fracture of multiple ribs.   ADL/IADL changes: No  Changes in services: No    Caregiver Assessment follow up:  NA    Goals: See POC in chart for goal progress documentation.    Member has been saying that he should not be living in Memory care anymore and is getting a . He also shared that him and his wife do not need assisted living. When writer questioned him on where he would like to live, he was not able to give an answer. Care conference is currently being set up with PCP, AL nursing, Jamie and wife, and care coordinator.     Will see member in 6 months for an annual health risk assessment.   Encouraged member to call CC with any questions or concerns in the meantime.       Gisela Hall RN  [facility]  [phone]

## 2023-07-19 ENCOUNTER — PATIENT OUTREACH (OUTPATIENT)
Dept: GERIATRIC MEDICINE | Facility: CLINIC | Age: 83
End: 2023-07-19
Payer: COMMERCIAL

## 2023-07-19 NOTE — PROGRESS NOTES
[facility] Care Coordination Contact    Arranged transportation thru [facility] [phone] for the below appt:  Appt Date & Time:   Monday, July 24th at 12:30pm    Clinic Name & Address:  .  [facility]-   [address]   [facility], [address]    Transportation Provider:   [employer]   [phone]     time:  11:40 -12:05pm   Return ride  time: Will call     Notified Care Coordinator of  time.    Nikia Joseph  Care Management Specialist  [facility]  [phone]

## 2023-07-19 NOTE — PROGRESS NOTES
[facility] Care Coordination Contact    Arranged transportation thru [facility] [phone] for the below appt:  Appt Date & Time:   Friday, July 28th at 8:30am     Clinic Name & Address:  .  [facility]  [address]   [place] [address] Department   Phone: [phone]    Transportation Provider:   [employer]   [phone]     time:  7:15am -7:55am  Return ride  time: Will call       Notified Care Coordinator of  time.    Nikia Joseph  Care Management Specialist  [facility]  [phone]

## 2023-07-19 NOTE — PROGRESS NOTES
[facility] Care Coordination Contact        Arranged transportation thru [facility] [phone] for the below appt:  Appt Date & Time:   Friday July 21, 2023 at 8:30am     Clinic Name & Address:  .  [facility]-   [id number] [place] [address], [facility] [address]   [facility], [address]    Transportation Provider:   [employer]   [phone]     time:  7:15-7:55am  Return ride  time:  Will Call     Notified Care Coordinator of  time.    Nikia Joseph  Care Management Specialist  [facility]  [phone]

## 2023-07-21 ENCOUNTER — HOSPITAL ENCOUNTER (OUTPATIENT)
Dept: CARDIOLOGY | Facility: CLINIC | Age: 83
Discharge: HOME | End: 2023-07-21
Attending: INTERNAL MEDICINE | Admitting: INTERNAL MEDICINE
Payer: COMMERCIAL

## 2023-07-21 DIAGNOSIS — I25.10 CORONARY ARTERY DISEASE INVOLVING NATIVE CORONARY ARTERY OF NATIVE HEART WITHOUT ANGINA PECTORIS: ICD-10-CM

## 2023-07-21 DIAGNOSIS — I48.20 CHRONIC ATRIAL FIBRILLATION (H): ICD-10-CM

## 2023-07-21 DIAGNOSIS — I35.0 AORTIC VALVE STENOSIS, ETIOLOGY OF CARDIAC VALVE DISEASE UNSPECIFIED: ICD-10-CM

## 2023-07-21 LAB — LVEF ECHO: NORMAL

## 2023-07-21 PROCEDURE — 93306 TTE W/DOPPLER COMPLETE: CPT

## 2023-07-21 PROCEDURE — 93306 TTE W/DOPPLER COMPLETE: CPT | Mod: 26 | Performed by: INTERNAL MEDICINE

## 2023-07-25 ENCOUNTER — PATIENT OUTREACH (OUTPATIENT)
Dept: GERIATRIC MEDICINE | Facility: CLINIC | Age: 83
End: 2023-07-25

## 2023-07-25 ENCOUNTER — ASSISTED LIVING VISIT (OUTPATIENT)
Dept: GERIATRICS | Facility: CLINIC | Age: 83
End: 2023-07-25
Payer: COMMERCIAL

## 2023-07-25 VITALS — BODY MASS INDEX: 34.01 KG/M2 | WEIGHT: 224.4 LBS | HEIGHT: 68 IN

## 2023-07-25 DIAGNOSIS — F03.90 MAJOR NEUROCOGNITIVE DISORDER (H): Primary | ICD-10-CM

## 2023-07-25 PROCEDURE — 99350 HOME/RES VST EST HIGH MDM 60: CPT | Performed by: NURSE PRACTITIONER

## 2023-07-25 PROCEDURE — 99417 PROLNG OP E/M EACH 15 MIN: CPT | Performed by: NURSE PRACTITIONER
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
## 2023-07-26 NOTE — PROGRESS NOTES
"[facility] Care Coordination Contact  CC attended care conference for member on 7/25/23 at  [facility]   via TEAMS meeting.   Present at care conference was member, this care coordinator, spouse (Gisela), adult daughter (Coral), adult son (Markos), and Director of Nursing at [facility] (Dania Bueno), Members PCP (Sylvia Bautista, APRN, CNP) ACP LCISW (Kimberly Wick), [facility] SW and OT, Ombudsman (Anya Gann) .    Care conference for Jamie was organized to discuss his concerns about wanting to move out of memory care.    OT started off care conference with the review of his cognitive testing. Per her review: Recent SLUMS 27/30, CPT 4.8/5.7. This was a slight increase from when he was discharged from TCU in 2022: SLUMS 24/30, CPT 4.5/5.7. Increase in scores may be related to his sobriety. Recommendations based on these tests is that Jamie needs is daily assistance. He needs assistance with higher level tasks such as managing his medications, finances, meal planning, shopping, limiting access to alcohol.    NP reviewed neuropsych testing from 5/12/23. Per Mihaela Brock PsyD notes: \"While Jamie can clearly communicate a choice (a tenet of decision-making capacity) he fails at the other three: Jamie demonstrates difficulties in appropriately understanding the context and situation around him, appreciating the consequences of his actions, and his rationalization/reasoning is not appropriate within the reality that he faces. As such, I believe that Jamie is not capable of making reasoned decisions. He should continue to receive a high-level of care that includes other managing medications, finances, and limiting access to alcohol...While memory care may not be the best option given intact memory scores on today's evaluation, that level of care may be appropriate given Jamie' challenges with cognitive and metacognitive aspects of executive functioning, along with " "impairments in reasoned decision-making noted above. If the facility or family decide that memory care is too strict for Jamie, it may be important to put parameters in place or have discussions around caring for his wife (without placing too high of care-partner burden on her), and restricting his access to alcohol.\"    Jamie was asking what he wanted. He expressed his frustrations about his past alcohol use being brought up continually, having multiple losses in his family, discussed his work and finances, and also discussed his concerns about his children going to the neuropsych testing and giving a history that affected the results. It was challenging to get him to stay on topic and answer the question of what he wanted. ACP redirected him and he was able to identify several wishes.  - \"Fix this phone system. We both believe we're being bugged.\"  - \"I would like for the both of us to be able to work towards dirivng.\"  -Return to work.  -Gisela says they would like some freedom. Example: Jaime can't leave the unit to go to [employer] on Sundays with Gisela.    Family discussed their strong stance on Jamie needing the supports that are in place and wanting things to remain as they are. They are united in these thoughts and will not help them with anything anymore if Jamie and Gisela decide to move.     Health care agent/Guardian was discussed. Gisela shared that she wanted to keep it within the family and didn't want a guardian.Jamie went on a tangent about an annuity issue and finances. Several attempts were made to get him back on topic but he discussed these issues for the last 10 minutes of the meeting.    Final decision made for guardian/conservator. SW will help get the process started.     Last couple minutes of the meeting, Jamie asked about an alternative medical provider.     Care coordinator will continue to follow and provide any assistance as needed. Current plan is for them to stay where they are " until they can get a guardian to help them make decisions. Nory Waller will work with Paul on letting them go to Judaism together.     Gisela Hall RN  [employer]  [phone]

## 2023-07-27 ENCOUNTER — PATIENT OUTREACH (OUTPATIENT)
Dept: GERIATRIC MEDICINE | Facility: CLINIC | Age: 83
End: 2023-07-27
Payer: COMMERCIAL

## 2023-07-27 NOTE — PROGRESS NOTES
[facility] Care Coordination Contact    Member has telephone appointment with vascular doctor scheduled 7/28/23 at 8:30am. Care coordinator has been trying to get a hold of Jamie and wife today but not able to reach them. Jamie phone has the busy signal. Care coordinator left voicemail for wife, Gisela informing her of telephone appointment tomorrow. Care coordinator also called and spoke with daughter Coral and she said she would also try to contact Gisela.    Gisela Hall RN  [facility]  [phone]

## 2023-07-27 NOTE — PROGRESS NOTES
Per CC, appt on 7/28 is a phone appt, so no ride is needed.  This CMS called [facility] PAR and cancelled ride.    Pat Varma  Case Management Specialist  [facility]  [phone]

## 2023-07-28 ENCOUNTER — VIRTUAL VISIT (OUTPATIENT)
Dept: OTHER | Facility: CLINIC | Age: 83
End: 2023-07-28
Attending: INTERNAL MEDICINE
Payer: COMMERCIAL

## 2023-07-28 DIAGNOSIS — I35.0 AORTIC VALVE STENOSIS, ETIOLOGY OF CARDIAC VALVE DISEASE UNSPECIFIED: ICD-10-CM

## 2023-07-28 DIAGNOSIS — I77.810 ASCENDING AORTA DILATATION (H): Primary | ICD-10-CM

## 2023-07-28 DIAGNOSIS — I25.10 CORONARY ARTERY DISEASE INVOLVING NATIVE CORONARY ARTERY OF NATIVE HEART WITHOUT ANGINA PECTORIS: ICD-10-CM

## 2023-07-28 DIAGNOSIS — I48.20 CHRONIC ATRIAL FIBRILLATION (H): ICD-10-CM

## 2023-07-28 DIAGNOSIS — I70.0 ATHEROSCLEROSIS OF AORTA (H): ICD-10-CM

## 2023-07-28 PROCEDURE — G0463 HOSPITAL OUTPT CLINIC VISIT: HCPCS | Mod: TEL

## 2023-07-28 PROCEDURE — 99214 OFFICE O/P EST MOD 30 MIN: CPT | Mod: 95 | Performed by: INTERNAL MEDICINE

## 2023-07-28 NOTE — PROGRESS NOTES
Jamie is a 83 year old who is being evaluated via a billable telephone visit.      What phone number would you like to be contacted at? [phone]  How would you like to obtain your AVS? Rick    Distant Location (provider location):  On-site      Objective         Vitals:  No vitals were obtained today due to virtual visit.    SEBASTIAN DURBIN    Provider visit note:    Chief complaint:  Follow-up visit  Review of recent echocardiogram  Taking medications as prescribed  Known history of ascending aorta dilatation aortic root dilatation and valvular abnormalities    History of present illness:  For full details please see my initial consult note    This is a very pleasant 83-year-old male initially seen in 2022 for Evaluation and management of thoracic aortic aneurysm ranging anywhere from 4.4 to 4.6 cm over the last few years with complex and complicated past medical and past surgical history   He recently underwent echocardiogram results as delineated below are essentially unchanged size  He denies any chest pain, shortness of breath    Review of systems: Reviewed all 12 point review of systems as per HPI otherwise unremarkable    Physical exam:( no physical exam done this is virtual visit)    Reviewed recent laboratory tests, imaging studies in the epic and updated chart  [facility]  Echocardiography Laboratory  [address] [place] [address]     Name: JAMIE CRUMP  MRN: [id number]  : 1940  Study Date: 2023 08:40 AM  Age: 83 yrs  Gender: Male  Patient [facility]: [facility]  Reason For Study: Aortic valve stenosis, etiology of cardiac valve disease  unspeci  Ordering Physician: WATSON LEWIS  Referring Physician: WATSON LEWIS  Performed By: Hao Dai RDCS     BSA: 2.1 m2  Height: 68 in  Weight: 224 lb  HR: 67  BP: 165/84 mmHg  ______________________________________________________________________________  Procedure  Complete Echo  Adult.  ______________________________________________________________________________  Interpretation Summary     The visual ejection fraction is 60-65%.  Left ventricular systolic function is normal.  There is diminshed motion of all three aortic valve leaflets consistent with  at least modearte aortic stenosis. The findings are consistent with low  gradient moderate aortic stenosis.  Moderate aortic root dilatation.  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  The rhythm was atrial fibrillation.  ______________________________________________________________________________  Left Ventricle  The left ventricle is normal in size. There is mild concentric left  ventricular hypertrophy. The visual ejection fraction is 60-65%. Left  ventricular systolic function is normal. Diastolic function not assessed due  to atrial fibrillation.     Right Ventricle  The right ventricle is normal in size and function.     Atria  The left atrium is mild to moderately dilated. Right atrial size is normal.  There is no color Doppler evidence of an atrial shunt.     Mitral Valve  There is mild to moderate mitral annular calcification. There is trace mitral  regurgitation.     Tricuspid Valve  There is mild (1+) tricuspid regurgitation. The right ventricular systolic  pressure is approximated at 37.5 mmHg plus the right atrial pressure.     Aortic Valve  The aortic valve is trileaflet. There is severe trileaflet aortic sclerosis.  There is diminshed motion of all three aortic valve leaflets consistent with  at least modearte aortic stenosis. The findings are consistent with low  gradient moderate aortic stenosis. There is trace aortic regurgitation. The  calculated aortic valve are is 1.3 cm^2. The peak AoV pressure gradient is  20.0 mmHg. The mean AoV pressure gradient is 11.0 mmHg. Moderate valvular  aortic stenosis.     Vessels  Moderate aortic root dilatation. 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  Inferior vena cava not well  visualized for estimation of right atrial  pressure.     Pericardium  There is no pericardial effusion.     Rhythm  The rhythm was atrial fibrillation.     ______________________________________________________________________________  MMode/2D Measurements & Calculations  IVSd: 1.2 cm  LVIDd: 4.2 cm  LVIDs: 3.1 cm  LVPWd: 1.3 cm  FS: 26.6 %  LV mass(C)d: 183.9 grams  LV mass(C)dI: 85.7 grams/m2     Ao root diam: 4.5 cm  asc Aorta Diam: 4.5 cm  LVOT diam: 2.2 cm  LVOT area: 3.8 cm2  LA Volume (BP): 87.3 ml  LA Volume Index (BP): 40.8 ml/m2  RWT: 0.59  TAPSE: 1.9 cm     Time Measurements  Aortic HR: 67.0 BPM     Doppler Measurements & Calculations  MV E max shelton: 114.8 cm/sec  MV max P.4 mmHg  MV mean P.9 mmHg  MV V2 VTI: 33.0 cm  MVA(VTI): 2.0 cm2  Ao V2 max: 221.0 cm/sec  Ao max P.0 mmHg  Ao V2 mean: 154.0 cm/sec  Ao mean P.0 mmHg  Ao V2 VTI: 48.0 cm  LEONILA(I,D): 1.3 cm2  LEONILA(V,D): 1.4 cm2  LV V1 max PG: 3.1 mmHg  LV V1 max: 79.9 cm/sec  LV V1 VTI: 17.1 cm  CO(LVOT): 4.3 l/min  CI(LVOT): 2.0 l/min/m2  SV(LVOT): 64.3 ml  SI(LVOT): 30.0 ml/m2  PA acc time: 0.07 sec  TR max shelton: 305.9 cm/sec  TR max P.5 mmHg  AV Shelton Ratio (DI): 0.36  LEONILA Index (cm2/m2): 0.62  E/E' avg: 10.3  Lateral E/e': 9.9  Medial E/e': 10.7  RV S Shelton: 11.3 cm/sec     ______________________________________________________________________________  Report approved by: Dari Collazo 2023 09:37 AM         Assessment and plan:    1. Ascending aorta dilatation (H) 4.4 to 4.6 cm on echo, CT since       2. Aortic valve stenosis, etiology of cardiac valve disease unspecified ( mild to moderate on echo 10/2022)     3. Chronic atrial fibrillation (H)     4. Coronary artery disease involving native coronary artery of native heart without angina pectoris     5. Atherosclerosis of aorta (H)  6. Pulmonary hypertension (H), moderate echo         This is a very pleasant elderly 83-year-old male with complex and  complicated past medical and past surgical history with known history of ascending aortic dilatation dated back last few years and ranging anywhere from 4.4 to 4.6 cm there was a discrepancy between the echo readings and the CT readings and most recent CT scan within the [facility] system on June 30, 2022 reported 4.6 cm on echo 4.8 now 4.5 cm   Reviewed [facility] records and it was reported 4.4 to 4.5 cm of aneurysm in 2020.  He has history of chronic A. fib because of previous alcohol use and recurrent falls not anticoagulated  He is non-smoker  Blood pressure is well controlled  No recent lipids  He has a known history of coronary artery disease and currently not on any statin.        Patient and family does not want any aggressive testing or intervention  They decided to go with a short interval echocardiogram which is reasonable     Suggested patient to get fasting lipids through primary care physician's office and if LDL is greater than 70 initiate either atorvastatin or Crestor     Avoid falls     Plan for echocardiogram in June 2024 then followed by office visit order placed     Rest of the medical management per primary care physician     Phone visit start time: 8:25 AM  Location of the patient; at his home  Location of the provider: [facility]     30  minutes spent on the date of the encounter doing chart review, history and exam, documentation, and further activities as noted above.     This note was dictated by utilizing Dragon software     Copy of this note to primary care provider     Loyd Dominguez MD, BINA, FSVM, FNLA, FACP  Vascular Medicine  Clinical Hypertension specialist  Clinical lipidologist                  This visit is being conducted as a virtual visit due to the emphasis on mitigation of the COVID-19 virus pandemic. The clinician has decided that the risk of an in-office visit outweighs the benefit for this patient.

## 2023-07-28 NOTE — PATIENT INSTRUCTIONS
Your recent echocardiogram looks stable    Continue current medications and plan    Repeat echocardiogram in spring 2024 then followed by virtual or office visit

## 2023-08-15 ENCOUNTER — PATIENT OUTREACH (OUTPATIENT)
Dept: GERIATRIC MEDICINE | Facility: CLINIC | Age: 83
End: 2023-08-15
Payer: COMMERCIAL

## 2023-08-15 NOTE — TELEPHONE ENCOUNTER
[facility] Care Coordination Contact    Arranged transportation thru [employer] [phone] for the below appts:  Appt Date & Time: 08/25/2023 1:50pm  Clinic Name & Address:  [facility]  [address] [facility] [address]  Transportation Provider: [employer] [phone]   time:  12:30-12:50pm  Return ride  time: will call    Appt Date & Time: 09/08/2023 1:00pm  Clinic Name & Address:  [facility] [address] [facility] [address]  Transportation Provider: [employer] [phone]   time:  11:40am-12:10pm   Return ride  time: will call    Both rides scheduled for two passengers - spouse will ride with.    Notified member of  times.    Alyssa Brito  Care Management Specialist  [facility]  [phone]

## 2023-08-17 ENCOUNTER — PATIENT OUTREACH (OUTPATIENT)
Dept: GERIATRIC MEDICINE | Facility: CLINIC | Age: 83
End: 2023-08-17
Payer: COMMERCIAL

## 2023-08-17 NOTE — PROGRESS NOTES
"[facility] Care Coordination Contact    Family was interested in pursuing guardianship for Jamie due to differences on where he should live. Care coordinator contacted [employer] ([employer]) and was given information including that it is the last resort and hard to obtain. Jamie's ombudsman, Anya Gann, shared that he does have the right to make complainants on his living situation and it would be up to him to make the changes if he can carry it out.  Family says they will not support it and will not assist with this. Care coordinator informed daughter that we do not help with legal matters like this and gave her the [employer] phone number for them to ask questions and find out what steps they need to take. Daughter left message with writer on 8/15 and stated that they wanted to move forward with the guardianship regardless if member stays where he is and wants \"whoever initiated the care conference\" to carry it out. Care coordinator called daughter and left message for her informing her that we would not be the ones pursing it and that they would have to. Care coordinator encouraged her to call the [employer].     Gisela Hall RN  [facility]  [phone]    "

## 2023-08-25 ENCOUNTER — TELEPHONE (OUTPATIENT)
Dept: CARDIOLOGY | Facility: CLINIC | Age: 83
End: 2023-08-25

## 2023-08-25 ENCOUNTER — OFFICE VISIT (OUTPATIENT)
Dept: CARDIOLOGY | Facility: CLINIC | Age: 83
End: 2023-08-25
Attending: NURSE PRACTITIONER
Payer: COMMERCIAL

## 2023-08-25 VITALS
BODY MASS INDEX: 33.95 KG/M2 | DIASTOLIC BLOOD PRESSURE: 75 MMHG | WEIGHT: 224 LBS | HEIGHT: 68 IN | HEART RATE: 85 BPM | SYSTOLIC BLOOD PRESSURE: 133 MMHG

## 2023-08-25 DIAGNOSIS — I27.20 PULMONARY HYPERTENSION (H): ICD-10-CM

## 2023-08-25 DIAGNOSIS — R00.1 BRADYCARDIA: ICD-10-CM

## 2023-08-25 DIAGNOSIS — I48.20 CHRONIC ATRIAL FIBRILLATION (H): ICD-10-CM

## 2023-08-25 DIAGNOSIS — I71.21 ANEURYSM OF ASCENDING AORTA WITHOUT RUPTURE (H): ICD-10-CM

## 2023-08-25 DIAGNOSIS — I48.91 ATRIAL FIBRILLATION, UNSPECIFIED TYPE (H): Primary | ICD-10-CM

## 2023-08-25 DIAGNOSIS — F10.27 DEMENTIA ASSOCIATED WITH ALCOHOLISM WITH BEHAVIORAL DISTURBANCE (H): ICD-10-CM

## 2023-08-25 DIAGNOSIS — R29.6 REPEATED FALLS: ICD-10-CM

## 2023-08-25 DIAGNOSIS — R26.89 IMPAIRED GAIT AND MOBILITY: ICD-10-CM

## 2023-08-25 DIAGNOSIS — E66.9 OBESITY (BMI 30-39.9): ICD-10-CM

## 2023-08-25 PROCEDURE — 99205 OFFICE O/P NEW HI 60 MIN: CPT | Performed by: INTERNAL MEDICINE

## 2023-08-25 NOTE — TELEPHONE ENCOUNTER
Message from Dr. Ross:  Alex Ross MD  [facility] Heart Team 2  This is a huge problem.  Just saw a gentleman with slow atrial fibrillation.  He has a long history of alcoholism and may have dementia.  He comes with his wife who may also have dementia.  He lives in a memory care center.  His wife lives in a senior living center.  I have a report from a family care conference discussing medical decision making.  We need to get in touch with whoever to decide who makes decisions.  Patient appears to have a significant falling risk and nosebleeds.  The question are do we start anticoagulation.  Do we consider a watchman.  Who has these discussions and who makes the decisions.  Thanks.  I have ordered a 7-day Zio patch and will follow up after that.  Both he and his wife want to keep children out and the nursing facility out of decision making.  Nursing facility states they have concerns they are going to elope and just leave.  As a matter fact they did not realize he was not there today and that he was here.    Per care conference 7/25/2023:  Health care agent/Guardian was discussed. Gisela shared that she wanted to keep it within the family and didn't want a guardian.Jamie went on a tangent about an annuity issue and finances. Several attempts were made to get him back on topic but he discussed these issues for the last 10 minutes of the meeting.  Final decision made for guardian/conservator. SW will help get the process started.   Care coordinator will continue to follow and provide any assistance as needed. Current plan is for them to stay where they are until they can get a guardian to help them make decisions. [facility] will work with Jamie and Gisela on letting them go to [facility].     8/25/2023 Attempted to contact Gisela Hall RN / [employer] / [phone] to verify patient's guardian/decision-maker. Left a message requesting a call back.    8/29/2023 called [facility] to clarify/verify who is his care manager.  Spoke with the facility director and they are also trying to help the patient figure whom he will accept to help with his care.  Spoke with NP Sylvia Stein and she will work with the patient to talk about the Watchman choice versus AC meds and she will work with his casemanager team to get the ziopatch started. Reviewed that there is an option to have the staff there place the unit.

## 2023-08-25 NOTE — PROGRESS NOTES
HPI and Plan:   Mr. Garg is a very nice 83-year-old gentleman who I am seeing today in consultation I think for bradycardia, permanent atrial fibrillation, multiple frequent falls.  He is here from a memory care center brought by his wife.  In an effort to get more details my medical assistant called the memory care center who was not aware that he was not there.    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  There was concerns about his wife having dementia.  There appears to be family conflict with the children who feel they are not safe to be home alone.  I am not clear as to who has decision-making authority.    Patient relates he thinks he had a heart attack 30 years ago.  Review of care everywhere including [facility] in [place] [facility] I can find no documentation.  It does appear to be that he has had atrial fibrillation with a slow ventricular response for years.  Looking through the records I see 3 admissions in the last year for falls.  Most of which appear to point to a mechanical reason.  There is also reference to frequent nosebleeds.    Patient is tangential in his answers to questions.  He relates to having occasional chest discomfort.  In talking to both him and his wife it does not appear that he has any consistent exertional chest discomfort.  He denies shortness of breath orthopnea or PND.  He does not think he has had a cerebrovascular accident.  He denies any bleeding problems but his  wife points out the nosebleeds.    Review of records from a variety of sources including a recent echocardiogram showing ejection fraction of 60 to 65% with mild concentric left ventricle hypertrophy he had moderate aortic insufficiency with a mean gradient of 11 mmHg given a calculated valve area of 1.3.  Pulmonary pressures estimated to be 38 mmHg plus the right atrial pressure.  IVC could not clearly be seen.  Ascending aorta and ascending aortic root were both measured at 4.5 cm.  Twelve-lead EKG shows atrial fibrillation with a rate of 58.  He had normal thyroid function test done in June.  He has normal creatinine and electrolytes.  He has mild anemia at 10.2 that appears to be fairly chronic.    Assessment and plan.  I have no clear story that he has ischemic symptoms.  His echocardiogram does not demonstrate any wall motion abnormalities with a good ejection fraction.  At this time I am not planning on any stress testing    No clear congestive heart failure.  Again he has excellent systolic function he could be at risk for diastolic dysfunction and atrial fibrillation.  We will evaluate this further going forward.    He clearly has slow atrial fibrillation.  I will place a 7-day monitor to evaluate if he is having any significant heart block or significant arrhythmias that may be contributing to his multiple falls.    He clearly is too high a fall risk to consider anticoagulation.  We could consider a Watchman device.  I think it is most important to figure out who I needed to have these discussions with and who is the appropriate decision maker.  He and his wife both do not want the children involved.  He wants communication directly from us and not through his facility.  He has had neurocognitive testing and we will need to figure out how to proceed.    We did talk about the fact that he is high risk for cerebrovascular accident given his A-fib but at this time anticoagulation appears to be  contraindicated.    I will follow him up after his event monitor.  We will proceed as appropriate.    Today's clinic visit entailed:  Review of external notes as documented elsewhere in note  Review of the result(s) of each unique test - echocardiogram, lab work  Ordering of each unique test  Prescription drug management  75 minutes spent by me on the date of the encounter doing chart review, history and exam, documentation and further activities per the note  Provider  Link to [facility] Help Grid     The level of medical decision making during this visit was of high complexity.      Orders Placed This Encounter   Procedures    Follow-Up with Cardiology    Leadless EKG Monitor 3 to 7 Days       No orders of the defined types were placed in this encounter.      There are no discontinued medications.      Encounter Diagnoses   Name Primary?    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 May also take 2 tablets (1,000 mg) daily as needed for mild pain. 120 tablet 98    alum & mag hydroxide-simethicone (MAALOX MAX) 400-400-40 MG/5ML SUSP suspension Take 30 mLs by mouth every 6 hours as needed for indigestion 355 mL 3    gabapentin (NEURONTIN) 100 MG capsule Take 1 capsule (100 mg) by mouth daily AND 2 capsules (200 mg) At Bedtime. May also take 1 capsule (100 mg) 2 times daily as needed (anxiety). 100 capsule 98    guaiFENesin (ROBITUSSIN) 20 mg/mL liquid Take 10 mLs by mouth every 4 hours as needed for cough 355 mL 98    loratadine (CLARITIN) 10 MG tablet Take 1  tablet (10 mg) by mouth At Bedtime 30 tablet 98    menthol-zinc oxide (CALMOSEPTINE) 0.44-20.6 % OINT ointment Apply 1 g topically 2 times daily. May also apply 1 g 2 times daily as needed for skin protection. 113 g 98    mirtazapine (REMERON) 15 MG tablet Take 1 tablet (15 mg) by mouth At Bedtime 30 tablet 98    nystatin (MYCOSTATIN) 634533 UNIT/GM external powder Apply topically 2 times daily as needed      omeprazole (PRILOSEC) 40 MG DR capsule TAKE 1 CAPSULE BY MOUTH TWICE DAILY 180 capsule 3    oxymetazoline (AFRIN) 0.05 % nasal spray Spray 2 sprays into both nostrils 2 times daily as needed (nose bleed)      polyethylene glycol (MIRALAX) 17 GM/Dose powder MIX 2 CAPFULS IN 8OZ OF ORANGE JUICE  IN THE MORNING;AND MAY MIX 2 CAPFULS ONCE DAILY AS NEEDED FOR CONSTIPATION. MIX IN FRONT OF PT. HOLD FOR LOOSE STOOL. OK TO GIVE 1 CAPFUL IF RESIDENT REFUSES 2 CAPFULS. 510 g 97    QUEtiapine (SEROQUEL) 25 MG tablet Take 0.5 tablets (12.5 mg) by mouth 2 times daily. May also take 0.5 tablets (12.5 mg) every 12 hours as needed (anxiety). 60 tablet 11    REFRESH 1.4-0.6 % ophthalmic solution INSTILL ONE DROP INTO EYE(S) EVERY 4 HOURS AS NEEDED 50 each 97    White Petrolatum ointment Apply to BL nares q HS 70 g 98       ALLERGIES     Allergies   Allergen Reactions    Ativan [Lorazepam]        PAST MEDICAL HISTORY:  Past Medical History:   Diagnosis Date    Age-related osteoporosis without current pathological fracture 03/30/2022    Alcohol abuse 11/19/2017    Alcohol dependence with withdrawal (H)     Alcoholism (H)     Back pain     Backache 12/19/2018    CAD (coronary artery disease)     Chronic a-fib (H)     Chronic alcohol use 06/11/2015    Formatting of this note might be different from the original. 2/26/2019: serious fall at home with multiple vertebral fractures.  BAL 0.414% on admission.  Denies that he drinks much. 12/18/2018: hospitalized for fall due to alcohol intoxication.  BAL 0.34% on admission. 9/16/2018:  hospitalized for injuries from fall due to alcohol intoxication.  BAL 0.34% on admission    Chronic atrial fibrillation (H) 07/15/2016    Formatting of this note might be different from the original. 2/2019: Multiple falls so started on aspirin only.  Then aspirin stopped due to GI bleed.    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 2/2/21    HTN (hypertension)     Impaired gait and mobility 03/14/2019    Mild cognitive impairment 08/12/2019    Formatting of this note might be different from the original. NON-AMNESTIC TYPE    Mild TBI (traumatic brain injury) (H) 03/14/2019    Mumps     Obesity (BMI 30-39.9) 09/03/2015    Osteoarthritis     Osteoarthritis of both knees 05/13/2015    Osteoporosis     Other idiopathic peripheral autonomic neuropathy 03/30/2022    Other insomnia 03/14/2019    Other seizures (H) 03/30/2022    Palpitations     Peripheral neuropathy     Pharyngeal dysphagia 05/13/2015    Formatting of this note might be different from the original. Likely secondary to GERD with esophagitis, on PPI and H2 blocker with notable improvement, EGD 10/5/15.    Physical deconditioning 03/14/2019    Recurrent major depressive disorder (H) 03/30/2022    Rhabdomyolysis     Thrombocytopenia (H)     Urination disorder     Weakness 04/27/2020       PAST SURGICAL HISTORY:  Past Surgical History:   Procedure Laterality Date    BIOPSY PROSTATE TRANSRECTAL N/A 1/11/2023    Procedure: PROSTATE BIOPSY, RECTAL APPROACH;  Surgeon: Niraj Larry MD;  Location: [facility] OR    COLONOSCOPY       "ESOPHAGOSCOPY, GASTROSCOPY, DUODENOSCOPY (EGD), COMBINED N/A 06/01/2022    Procedure: ESOPHAGOGASTRODUODENOSCOPY (EGD) mngi with biopsies using jumbo cold biopsy forceps;  Surgeon: David Springer MD;  Location:  GI    left hip replacement  2010    left shoulder replacement  2016    ORTHOPEDIC SURGERY      SPINE SURGERY      done in [place]    TONSILLECTOMY      TRANSRECTAL ULTRASONIC SONOGRAM N/A 1/11/2023    Procedure: TRANSRECTAL ULTRASOUND;  Surgeon: Niraj Larry MD;  Location:  OR       FAMILY HISTORY:  Family History   Problem Relation Age of Onset    Osteoporosis Mother     Prostate Cancer Paternal Grandfather     Colon Cancer No family hx of        SOCIAL HISTORY:  Social History     Socioeconomic History    Marital status:      Spouse name: None    Number of children: None    Years of education: None    Highest education level: None   Tobacco Use    Smoking status: Never    Smokeless tobacco: Never   Substance and Sexual Activity    Alcohol use: Not Currently     Comment: not since December 2021    Drug use: Never       Review of Systems:  Skin:  Positive for bruising     Eyes:  Negative      ENT:  Negative      Respiratory:  Negative       Cardiovascular:  Negative;palpitations;syncope or near-syncope;cyanosis;fatigue Positive for;edema;exercise intolerance;lightheadedness;dizziness chest pain, when stressed  Gastroenterology: Positive for reflux;constipation    Genitourinary:  Negative      Musculoskeletal:  Positive for      Neurologic:  Positive for memory problems    Psychiatric:  Negative      Heme/Lymph/Imm:  Negative      Endocrine:  Negative        Physical Exam:  Vitals: /75   Pulse 85   Ht 1.727 m (5' 8\")   Wt 101.6 kg (224 lb)   BMI 34.06 kg/m      Constitutional:  cooperative, alert and oriented, well developed, well nourished, in no acute distress   Hard of hearing, easily confused, tangential    Skin:  warm and dry to the touch, no apparent skin lesions or " masses noted          Head:  normocephalic, no masses or lesions        Eyes:  pupils equal and round, conjunctivae and lids unremarkable, sclera white, no xanthalasma, EOMS intact, no nystagmus        Lymph:      ENT:  no pallor or cyanosis, dentition good        Neck:  no carotid bruit        Respiratory:  normal breath sounds, clear to auscultation, normal A-P diameter, normal symmetry, normal respiratory excursion, no use of accessory muscles         Cardiac:   irregularly irregular rhythm       systolic ejection murmur;grade 1;RUSB   Controlled ventricular response    pulses below the femoral arteries are diminished                                      GI:    obese      Extremities and Muscular Skeletal:  no edema;no spinal abnormalities noted;normal muscle strength and tone              Neurological:  no gross motor deficits        Psych:  affect appropriate, oriented to time, person and place        CC  MARICHUY Pepper CNP  [address] [place] [address]

## 2023-08-25 NOTE — LETTER
8/25/2023    Sylvia Stein, MARICHUY CNP  [address] [facility] [address]    RE: Jamie Valdivia       Dear Colleague,     I had the pleasure of seeing Jamie Valdivia in the [facility].  HPI and Plan:   Mr. Garg is a very nice 83-year-old gentleman who I am seeing today in consultation I think for bradycardia, permanent atrial fibrillation, multiple frequent falls.  He is here from a memory care center brought by his wife.  In an effort to get more details my medical assistant called the memory care center who was not aware that he was not there.    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  There was concerns about his wife having dementia.  There appears to be family conflict with the children who feel they are not safe to be home alone.  I am not clear as to who has decision-making authority.    Patient relates he thinks he had a heart attack 30 years ago.  Review of care everywhere including [facility] in [facility] I can find no documentation.  It does appear to be that he has had atrial fibrillation with a slow ventricular response for years.  Looking through the records I see 3 admissions in the last year for falls.  Most of which appear to point to a mechanical reason.  There is also reference to frequent nosebleeds.    Patient is tangential in his answers to questions.  He relates to having occasional chest discomfort.  In talking to both him and his wife it does  not appear that he has any consistent exertional chest discomfort.  He denies shortness of breath orthopnea or PND.  He does not think he has had a cerebrovascular accident.  He denies any bleeding problems but his wife points out the nosebleeds.    Review of records from a variety of sources including a recent echocardiogram showing ejection fraction of 60 to 65% with mild concentric left ventricle hypertrophy he had moderate aortic insufficiency with a mean gradient of 11 mmHg given a calculated valve area of 1.3.  Pulmonary pressures estimated to be 38 mmHg plus the right atrial pressure.  IVC could not clearly be seen.  Ascending aorta and ascending aortic root were both measured at 4.5 cm.  Twelve-lead EKG shows atrial fibrillation with a rate of 58.  He had normal thyroid function test done in June.  He has normal creatinine and electrolytes.  He has mild anemia at 10.2 that appears to be fairly chronic.    Assessment and plan.  I have no clear story that he has ischemic symptoms.  His echocardiogram does not demonstrate any wall motion abnormalities with a good ejection fraction.  At this time I am not planning on any stress testing    No clear congestive heart failure.  Again he has excellent systolic function he could be at risk for diastolic dysfunction and atrial fibrillation.  We will evaluate this further going forward.    He clearly has slow atrial fibrillation.  I will place a 7-day monitor to evaluate if he is having any significant heart block or significant arrhythmias that may be contributing to his multiple falls.    He clearly is too high a fall risk to consider anticoagulation.  We could consider a Watchman device.  I think it is most important to figure out who I needed to have these discussions with and who is the appropriate decision maker.  He and his wife both do not want the children involved.  He wants communication directly from us and not through his facility.  He has had  neurocognitive testing and we will need to figure out how to proceed.    We did talk about the fact that he is high risk for cerebrovascular accident given his A-fib but at this time anticoagulation appears to be contraindicated.    I will follow him up after his event monitor.  We will proceed as appropriate.    Today's clinic visit entailed:  Review of external notes as documented elsewhere in note  Review of the result(s) of each unique test - echocardiogram, lab work  Ordering of each unique test  Prescription drug management  75 minutes spent by me on the date of the encounter doing chart review, history and exam, documentation and further activities per the note  Provider  Link to [facility] Help Grid     The level of medical decision making during this visit was of high complexity.      Orders Placed This Encounter   Procedures    Follow-Up with Cardiology    Leadless EKG Monitor 3 to 7 Days       No orders of the defined types were placed in this encounter.      There are no discontinued medications.      Encounter Diagnoses   Name Primary?    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 May also take 2 tablets (1,000 mg) daily as needed for mild pain. 120 tablet 98    alum & mag hydroxide-simethicone (MAALOX MAX) 400-400-40 MG/5ML SUSP suspension Take 30 mLs by mouth every 6 hours as needed for indigestion 355 mL 3    gabapentin (NEURONTIN) 100 MG capsule Take 1 capsule (100 mg) by mouth daily AND 2 capsules (200 mg) At Bedtime. May also take 1 capsule  (100 mg) 2 times daily as needed (anxiety). 100 capsule 98    guaiFENesin (ROBITUSSIN) 20 mg/mL liquid Take 10 mLs by mouth every 4 hours as needed for cough 355 mL 98    loratadine (CLARITIN) 10 MG tablet Take 1 tablet (10 mg) by mouth At Bedtime 30 tablet 98    menthol-zinc oxide (CALMOSEPTINE) 0.44-20.6 % OINT ointment Apply 1 g topically 2 times daily. May also apply 1 g 2 times daily as needed for skin protection. 113 g 98    mirtazapine (REMERON) 15 MG tablet Take 1 tablet (15 mg) by mouth At Bedtime 30 tablet 98    nystatin (MYCOSTATIN) 901377 UNIT/GM external powder Apply topically 2 times daily as needed      omeprazole (PRILOSEC) 40 MG DR capsule TAKE 1 CAPSULE BY MOUTH TWICE DAILY 180 capsule 3    oxymetazoline (AFRIN) 0.05 % nasal spray Spray 2 sprays into both nostrils 2 times daily as needed (nose bleed)      polyethylene glycol (MIRALAX) 17 GM/Dose powder MIX 2 CAPFULS IN 8OZ OF ORANGE JUICE  IN THE MORNING;AND MAY MIX 2 CAPFULS ONCE DAILY AS NEEDED FOR CONSTIPATION. MIX IN FRONT OF PT. HOLD FOR LOOSE STOOL. OK TO GIVE 1 CAPFUL IF RESIDENT REFUSES 2 CAPFULS. 510 g 97    QUEtiapine (SEROQUEL) 25 MG tablet Take 0.5 tablets (12.5 mg) by mouth 2 times daily. May also take 0.5 tablets (12.5 mg) every 12 hours as needed (anxiety). 60 tablet 11    REFRESH 1.4-0.6 % ophthalmic solution INSTILL ONE DROP INTO EYE(S) EVERY 4 HOURS AS NEEDED 50 each 97    White Petrolatum ointment Apply to BL nares q HS 70 g 98       ALLERGIES     Allergies   Allergen Reactions    Ativan [Lorazepam]        PAST MEDICAL HISTORY:  Past Medical History:   Diagnosis Date    Age-related osteoporosis without current pathological fracture 03/30/2022    Alcohol abuse 11/19/2017    Alcohol dependence with withdrawal (H)     Alcoholism (H)     Back pain     Backache 12/19/2018    CAD (coronary artery disease)     Chronic a-fib (H)     Chronic alcohol use 06/11/2015    Formatting of this note might be different from the original. 2/26/2019:  serious fall at home with multiple vertebral fractures.  BAL 0.414% on admission.  Denies that he drinks much. 12/18/2018: hospitalized for fall due to alcohol intoxication.  BAL 0.34% on admission. 9/16/2018: hospitalized for injuries from fall due to alcohol intoxication.  BAL 0.34% on admission    Chronic atrial fibrillation (H) 07/15/2016    Formatting of this note might be different from the original. 2/2019: Multiple falls so started on aspirin only.  Then aspirin stopped due to GI bleed.    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 2/2/21    HTN (hypertension)     Impaired gait and mobility 03/14/2019    Mild cognitive impairment 08/12/2019    Formatting of this note might be different from the original. NON-AMNESTIC TYPE    Mild TBI (traumatic brain injury) (H) 03/14/2019    Mumps     Obesity (BMI 30-39.9) 09/03/2015    Osteoarthritis     Osteoarthritis of both knees 05/13/2015    Osteoporosis     Other idiopathic peripheral autonomic neuropathy 03/30/2022    Other insomnia 03/14/2019    Other seizures (H) 03/30/2022    Palpitations     Peripheral neuropathy     Pharyngeal dysphagia 05/13/2015    Formatting of this note might be different from the original. Likely secondary to GERD with esophagitis, on PPI and H2 blocker with notable improvement, EGD 10/5/15.    Physical deconditioning 03/14/2019    Recurrent major depressive disorder (H) 03/30/2022    Rhabdomyolysis     Thrombocytopenia (H)     Urination disorder     Weakness 04/27/2020       PAST SURGICAL HISTORY:  Past  "Surgical History:   Procedure Laterality Date    BIOPSY PROSTATE TRANSRECTAL N/A 1/11/2023    Procedure: PROSTATE BIOPSY, RECTAL APPROACH;  Surgeon: Niraj Larry MD;  Location:  OR    COLONOSCOPY      ESOPHAGOSCOPY, GASTROSCOPY, DUODENOSCOPY (EGD), COMBINED N/A 06/01/2022    Procedure: ESOPHAGOGASTRODUODENOSCOPY (EGD) mngi with biopsies using jumbo cold biopsy forceps;  Surgeon: David Springer MD;  Location:  GI    left hip replacement  2010    left shoulder replacement  2016    ORTHOPEDIC SURGERY      SPINE SURGERY      done in [place]    TONSILLECTOMY      TRANSRECTAL ULTRASONIC SONOGRAM N/A 1/11/2023    Procedure: TRANSRECTAL ULTRASOUND;  Surgeon: Niraj Larry MD;  Location:  OR       FAMILY HISTORY:  Family History   Problem Relation Age of Onset    Osteoporosis Mother     Prostate Cancer Paternal Grandfather     Colon Cancer No family hx of        SOCIAL HISTORY:  Social History     Socioeconomic History    Marital status:      Spouse name: None    Number of children: None    Years of education: None    Highest education level: None   Tobacco Use    Smoking status: Never    Smokeless tobacco: Never   Substance and Sexual Activity    Alcohol use: Not Currently     Comment: not since December 2021    Drug use: Never       Review of Systems:  Skin:  Positive for bruising     Eyes:  Negative      ENT:  Negative      Respiratory:  Negative       Cardiovascular:  Negative;palpitations;syncope or near-syncope;cyanosis;fatigue Positive for;edema;exercise intolerance;lightheadedness;dizziness chest pain, when stressed  Gastroenterology: Positive for reflux;constipation    Genitourinary:  Negative      Musculoskeletal:  Positive for      Neurologic:  Positive for memory problems    Psychiatric:  Negative      Heme/Lymph/Imm:  Negative      Endocrine:  Negative        Physical Exam:  Vitals: /75   Pulse 85   Ht 1.727 m (5' 8\")   Wt 101.6 kg (224 lb)   BMI 34.06 kg/m  "     Constitutional:  cooperative, alert and oriented, well developed, well nourished, in no acute distress   Hard of hearing, easily confused, tangential    Skin:  warm and dry to the touch, no apparent skin lesions or masses noted          Head:  normocephalic, no masses or lesions        Eyes:  pupils equal and round, conjunctivae and lids unremarkable, sclera white, no xanthalasma, EOMS intact, no nystagmus        Lymph:      ENT:  no pallor or cyanosis, dentition good        Neck:  no carotid bruit        Respiratory:  normal breath sounds, clear to auscultation, normal A-P diameter, normal symmetry, normal respiratory excursion, no use of accessory muscles         Cardiac:   irregularly irregular rhythm       systolic ejection murmur;grade 1;RUSB   Controlled ventricular response    pulses below the femoral arteries are diminished                                      GI:    obese      Extremities and Muscular Skeletal:  no edema;no spinal abnormalities noted;normal muscle strength and tone              Neurological:  no gross motor deficits        Psych:  affect appropriate, oriented to time, person and place        CC  MARICHUY Pepper CNP  [address] [place] [address]      Thank you for allowing me to participate in the care of your patient.      Sincerely,     Alex oRss MD     [facility]

## 2023-08-30 ENCOUNTER — LAB REQUISITION (OUTPATIENT)
Dept: LAB | Facility: CLINIC | Age: 83
End: 2023-08-30
Payer: COMMERCIAL

## 2023-08-30 DIAGNOSIS — C61 MALIGNANT NEOPLASM OF PROSTATE (H): ICD-10-CM

## 2023-09-06 ENCOUNTER — TELEPHONE (OUTPATIENT)
Dept: CARDIOLOGY | Facility: CLINIC | Age: 83
End: 2023-09-06

## 2023-09-06 PROCEDURE — P9604 ONE-WAY ALLOW PRORATED TRIP: HCPCS | Mod: ORL | Performed by: STUDENT IN AN ORGANIZED HEALTH CARE EDUCATION/TRAINING PROGRAM

## 2023-09-06 PROCEDURE — 84153 ASSAY OF PSA TOTAL: CPT | Mod: ORL

## 2023-09-06 PROCEDURE — P9604 ONE-WAY ALLOW PRORATED TRIP: HCPCS | Mod: ORL

## 2023-09-06 PROCEDURE — 36415 COLL VENOUS BLD VENIPUNCTURE: CPT | Mod: ORL

## 2023-09-06 PROCEDURE — 36415 COLL VENOUS BLD VENIPUNCTURE: CPT | Mod: ORL | Performed by: STUDENT IN AN ORGANIZED HEALTH CARE EDUCATION/TRAINING PROGRAM

## 2023-09-06 PROCEDURE — 84153 ASSAY OF PSA TOTAL: CPT | Mod: ORL | Performed by: STUDENT IN AN ORGANIZED HEALTH CARE EDUCATION/TRAINING PROGRAM

## 2023-09-06 NOTE — TELEPHONE ENCOUNTER
Attempted to contact the nursing staff at [facility] [phone] to see if patient was willing to wear the ziopatch ordered by Dr. Ross.  Also wanted to follow up on the decision-making and who will be making choices about AC meds and/or a Watchman procedure.  Left message for staff to call back.

## 2023-09-07 LAB — PSA SERPL DL<=0.01 NG/ML-MCNC: 2.67 NG/ML

## 2023-09-07 NOTE — TELEPHONE ENCOUNTER
Message from facility RN: please call the patient's son, Chad, has a POA although not decision-making power, [phone] attempted to contact patient to see if any decision is possible about starting AC meds vs Watchman and whether he would consent to wearing a monitor. Left a message for son to call back.

## 2023-09-11 VITALS
HEART RATE: 74 BPM | OXYGEN SATURATION: 96 % | DIASTOLIC BLOOD PRESSURE: 73 MMHG | TEMPERATURE: 97.4 F | SYSTOLIC BLOOD PRESSURE: 163 MMHG | BODY MASS INDEX: 33.86 KG/M2 | WEIGHT: 223.4 LBS | HEIGHT: 68 IN | RESPIRATION RATE: 18 BRPM

## 2023-09-11 NOTE — PROGRESS NOTES
[facility] GERIATRICS  Chief Complaint   Patient presents with    Annual Comprehensive Exam Assisted Living     [facility] Medical Record Number:  [id number]  Place of Service where encounter took place:  [facility]) [61]    HPI:    Jamie Valdivia  is a 83 year old  (1940) PMH significant for GERD with esophagitis, OA BL knees, pharyngeal dysphagia, edema, abdominal wall hernia, chronic atrial fibrillation, HTN, osteoporosis with hx of vertebral fracture, dementia, anemia, hx of COVID-19, who is being seen today for an annual comprehensive visit.     HPI information obtained from: facility chart records, facility staff, patient report, [facility] chart review, and Care Everywhere [facility] chart review.     Resident of [facility] since March 2022.     Hospitalized at [facility] form 11/1 to 11/5/21 with generalized weakness, dehydration, and concern for UTI but culture came back negative. Symptoms likely related to chronic alcoholism with alcohol withdrawal. Chronic cognitive impairment in setting of long history of alcoholism, hx of hallucinations. Multiple hospitalizations over preceding months per family due to unsafe living situation, well known to ED staff. Spent time in SNF ([facility]) around September 2021, details unclear, but seem to have suffered spinal fracture.     Hospitalized from 12/17 to 12/23/21 at [facility] for falls, weakness, alcoholism, and alcoholic hepatitis; discharge summary not available. Wife (who was primary care giver) was having medical issues and needed neurology care in the [facility]. Family felt resident unable to safely be left alone so they called EMS to transport to hospital for ongoing care/ETOH detox. Ultimately transferred to [facility] where wife was convalescing.      Remained at [facility] from 12/23 and 3/15/22 with largely uneventful stay. Zyprexa dose increased with good effect on mood, prior to  starting medication having hallucinations, delusions, and wandering on unit. Transferred to [facility] for permanent placement.    ER visit on 6/30/22 due to chest pain. Resident was watching a baseball game when he experienced left-sided chest pain.Treated by EMS with aspirin and nitroglycerin with slight relief. Reported pain worse with taking deep breaths and related to anxiety. EKG showed atrial fibrillation with controlled rate; chronic.  Chest CT showed 4.6 cm ascending thoracic aortic aneurysm, but was negative for plumonary embolism. Cardiothoracic surgery consulted given size of aneurysm and lack of other connective tissue disorder recommended outpatient follow-up with repeat chest CT and echocardiogram in 3 months to evaluate for progression. Chest x-ray showed left basilar pneumonia and chest CT showed fluid-filled bronchi bilateral lower lobes. Labs significant for negative troponin, ACS excluded. ER physician noted tenderness over left chest wall and cough over last month. Hx of dysphagia and increased aspiration risk. Symptoms most likely consistent with pneumonia discharged back to assisted living facility with Augmentin.    Repeat CXR on 8/4/22 due to subjective shortness of breath showed possible left lower lobe patchy opacification, as well as low lung volumes with vascular crowding and basilar atelectasis. Dr. Quintana started Augmentin. Symptoms improved.    On 9/13/22 prostate biopsy canceled due to hyponatremia. Subsequently hospitalized at [facility] on 9/30/22 with fall and confusion, found to have severe hyponatremia (Na+ 114). Prior to hospitalization had loose stools and poor intake in setting of prophylactic antibiotic (Bactrim) prescribed prior to planned prostate biospy which was canceled due to low sodium (at 129). Started on IVF due to concern for hypovolemic hyponatremia with some improvement. 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 Treated  "with 1.2 L/day fluid restriction and sodium improved. Confusion improved with treatment of hyponatremia.   Also noted to have back pain after fall. Lumbar CT showed age indeterminate compression deformity of L2-L5 with chronic mild compression deformity at superior endplate of L1. Neurosurgery consulted and recommended conservative mgmt with brace. Recommended PT in TCU at hospital discharge due to difficult with ambulation.   Medications for other chronic conditions continued without change.  Interval Echo completed, with EF 55%, right ventricle mildly dilated with mildy decreased systolic function, aortic valve mild-moderate stenosis, ascending aorta moderately dilated.     Resident recovered at [facility] from 10/6 to 11/03/22. Pain controlled with Tylenol and gabapentin.   Wearing TLSO when out of bed.  Hyponatremia felt to be due to volume depletion and SIADH. Improved and able to discontinue fluid restriction. Discharged back to [facility].    Underwent prostate biopsy 1/11/2023. Pathology positive for Enigma 7 prostate cancer, completed course of radiation.     Complete neuropsych testing with Dr. Brock on 5/12/23 - please see report in Epic, thought to have major neurocognitive disorder due to multiple etiologies (dementia) and need assistance with IADLs. Estranged from family who are listed his health care agents.     Today's Concern:  Follow-up today for annual visit and assessment of multiple chronic health issues as outlined above.    Reports he is, \"getting along.\"     Per Epic chart review has been calling his PCP from [facility] Dr. Shelbi Smith.     Thinks he had labs last week, likely ordered by urology under primary NP name ahead of upcoming visit.   PSA 2.67, reviewed this together and recommendation for follow-up visit with urology team.    Saw Dr. Dominguez on 7/28/23 due to chronic thoracic aortic aneurysm ranging from 4.4 to 4.6 cm over the last few years, reviewed interval Echo from " "7/21/23, plan for follow-up in one year with repeat Echo.     Saw Cardiology Dr. Ross on 8/25/23 due atrial fibrillation and bradycardia.   Resident has trouble recalling details of visit, \"it was a hurry-up deal.\"   Can't recall why referral was made, reviewed bradycardia and afib.  \"We (pt/wife) feel it wasn't an adequate visit.\"  Word finding trouble.  Discussed plan for Zio patch to help determine atrial fibrillation burden, not good candidate for anticoagulation given recurrent falls, could consider Watchman device.   Difficulty tracking for thorough risk-benefit discussion.    More focused on current living situation in memory care apart from wife, which is where his family feels he is safest.    Then accusing NP of, \"contradicting myself.\"  Circular conversation about previous PCP in [place].   Reports of living situation, \"It is going to kill us (pt/wife) one way or another.\"   \"Drinking (alcohol) was better on our systems then the way we are living here!\"   Then moved conversation to Miralax and concerns with medication administration.     Pointing and yelling about medication administration.  Agitated stating, \"Six months ago we knew the phones were tapped!\"   Upset about discussion of Cialis and documentation in medical records which he choose to show his wife. ED listed as medical diagnosis on chart since December 2021 prior to establishing care with current provider team.    Discussed neuropsych testing and recommendation for assistance with IADLs.  Still does not want his family involved in his care, \"I want to get someone I can control!\"  Wants limited medical documentation moving forward.   Upset that physicians and health care providers can see records in Epic.  Discussed release forms he signed when he establish care.    Attempts multiple times to discuss wife's medical care.  \"Gisela is sick day after day after day.\"  Multiple reminders we're not able to discuss wife's medical care without " "her consent.    \"Do you even have a dietician here?\"  Reviewed is an apartment building with limited nursing services, not a long-term care facility.  Begins mocking provider in sing-song voice, \"No, no, no!\"   If you talk to my kids \"tell them to be prepared.\"   \"I want nothing to do with [place]!!!\"   States he would like to \"de-request\" [facility] services.     Fortunately, still has therapeutic relationship with [facility] counselor.   Kimberly still meeting with resident.    ALLERGIES: Ativan [lorazepam]  PAST MEDICAL HISTORY:   Past Medical History:   Diagnosis Date    Age-related osteoporosis without current pathological fracture 03/30/2022    Alcohol abuse 11/19/2017    Alcohol dependence with withdrawal (H)     Alcoholism (H)     Back pain     Backache 12/19/2018    CAD (coronary artery disease)     Chronic a-fib (H)     Chronic alcohol use 06/11/2015    Formatting of this note might be different from the original. 2/26/2019: serious fall at home with multiple vertebral fractures.  BAL 0.414% on admission.  Denies that he drinks much. 12/18/2018: hospitalized for fall due to alcohol intoxication.  BAL 0.34% on admission. 9/16/2018: hospitalized for injuries from fall due to alcohol intoxication.  BAL 0.34% on admission    Chronic atrial fibrillation (H) 07/15/2016    Formatting of this note might be different from the original. 2/2019: Multiple falls so started on aspirin only.  Then aspirin stopped due to GI bleed.    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 2/2/21    HTN (hypertension)     Impaired gait and mobility 03/14/2019    Mild cognitive impairment 08/12/2019    Formatting of this note might be different from the original. NON-AMNESTIC TYPE    Mild TBI (traumatic brain injury) (H) 03/14/2019    Mumps     Obesity (BMI 30-39.9) 09/03/2015    Osteoarthritis     Osteoarthritis of both knees 05/13/2015    Osteoporosis     Other idiopathic peripheral autonomic neuropathy 03/30/2022    Other insomnia 03/14/2019    Other seizures (H) 03/30/2022    Palpitations     Peripheral neuropathy     Pharyngeal dysphagia 05/13/2015    Formatting of this note might be different from the original. Likely secondary to GERD with esophagitis, on PPI and H2 blocker with notable improvement, EGD 10/5/15.    Physical deconditioning 03/14/2019    Recurrent major depressive disorder (H) 03/30/2022    Rhabdomyolysis     Thrombocytopenia (H)     Urination disorder     Weakness 04/27/2020      PAST SURGICAL HISTORY:  has a past surgical history that includes left hip replacement (2010); left shoulder replacement (2016); tonsillectomy; Spine surgery; orthopedic surgery; Esophagoscopy, gastroscopy, duodenoscopy (EGD), combined (N/A, 06/01/2022); colonoscopy; Biopsy prostate transrectal (N/A, 1/11/2023); and Transrectal ultrasonic sonogram (N/A, 1/11/2023).      Current Outpatient Medications:     acetaminophen (TYLENOL) 500 MG tablet, Take 2 tablets (1,000 mg) by mouth 2 times daily. May also take 2 tablets (1,000 mg) daily as needed for pain., Disp: 120 tablet, Rfl: 11    alum & mag hydroxide-simethicone (MAALOX MAX) 400-400-40 MG/5ML SUSP suspension, Take 30 mLs by mouth every 6 hours as needed for indigestion, Disp: 355 mL, Rfl: 3    gabapentin (NEURONTIN) 100 MG capsule, Take 1 capsule (100 mg) by mouth daily AND 2 capsules (200 mg) At Bedtime. May also take 1 capsule (100 mg) 2 times daily as needed (anxiety)., Disp: 100 capsule, Rfl: 98    guaiFENesin  (ROBITUSSIN) 20 mg/mL liquid, Take 10 mLs by mouth every 4 hours as needed for cough, Disp: 355 mL, Rfl: 98    loratadine (CLARITIN) 10 MG tablet, Take 1 tablet (10 mg) by mouth At Bedtime, Disp: 30 tablet, Rfl: 98    menthol-zinc oxide (CALMOSEPTINE) 0.44-20.6 % OINT ointment, Apply 1 g topically 2 times daily. May also apply 1 g 2 times daily as needed for skin protection., Disp: 113 g, Rfl: 98    mirtazapine (REMERON) 15 MG tablet, Take 1 tablet (15 mg) by mouth At Bedtime, Disp: 30 tablet, Rfl: 98    nystatin (MYCOSTATIN) 786591 UNIT/GM external powder, Apply topically 2 times daily as needed, Disp: , Rfl:     omeprazole (PRILOSEC) 40 MG DR capsule, TAKE 1 CAPSULE BY MOUTH TWICE DAILY, Disp: 180 capsule, Rfl: 3    oxymetazoline (AFRIN) 0.05 % nasal spray, Spray 2 sprays into both nostrils 2 times daily as needed (nose bleed), Disp: , Rfl:     polyethylene glycol (MIRALAX) 17 GM/Dose powder, MIX 2 CAPFULS IN 8OZ OF ORANGE JUICE  IN THE MORNING;AND MAY MIX 2 CAPFULS ONCE DAILY AS NEEDED FOR CONSTIPATION. MIX IN FRONT OF PT. HOLD FOR LOOSE STOOL. OK TO GIVE 1 CAPFUL IF RESIDENT REFUSES 2 CAPFULS., Disp: 510 g, Rfl: 97    QUEtiapine (SEROQUEL) 25 MG tablet, Take 0.5 tablets (12.5 mg) by mouth 2 times daily. May also take 0.5 tablets (12.5 mg) every 12 hours as needed (anxiety)., Disp: 60 tablet, Rfl: 11    REFRESH 1.4-0.6 % ophthalmic solution, INSTILL ONE DROP INTO EYE(S) EVERY 4 HOURS AS NEEDED, Disp: 50 each, Rfl: 97    White Petrolatum ointment, Apply to BL nares q HS, Disp: 70 g, Rfl: 98     MED REC REQUIRED  Post Medication Reconciliation Status:  Patient was not discharged from an inpatient facility or TCU  but medications were reconciled per facility EMR.    Most Recent Immunizations   Administered Date(s) Administered    COVID-19 MONOVALENT 12+ (Pfizer) 06/14/2022    Influenza Vaccine 65+ (Fluzone HD) 02/29/2020    Mantoux Tuberculin Skin Test 04/27/2020    Pneumo Conj 13-V (2010&after) 06/16/2016     "Pneumococcal 23 valent 06/11/2015    TDAP (Adacel,Boostrix) 06/11/2015    Tdap (Adult) Unspecified Formulation 06/11/2015    Zoster recombinant adjuvanted (SHINGRIX) 06/11/2015     Case Management:  I have reviewed the Assisted Living care plan, current immunizations and preventive care/cancer screening..  Unable to discuss today due other resident concerns . Patient's desire to return to the community is present, but is not able due to care needs and lack of family supports. Current Level of Care is appropriate, but may be able to move to [facility] with appropriate supports in place.    Advance Directive Discussion:    I reviewed the current advanced directives as reflected in EPIC, the POLST and the facility chart, and verified the congruency of orders. I  did not  review the advance directives with the resident.     Team Discussion:  I communicated with the appropriate disciplines involved with the Plan of Care: Nursing  .   Patient's goal is: maintain independence.     Information reviewed: Medications, vital signs, orders, and nursing notes.    ROS: History as per HPI    Vitals:  BP (!) 163/73   Pulse 74   Temp 97.4  F (36.3  C)   Resp 18   Ht 1.727 m (5' 8\")   Wt 101.3 kg (223 lb 6.4 oz)   SpO2 96%   BMI 33.97 kg/m   Body mass index is 33.97 kg/m .  Exam:  GENERAL APPEARANCE:  Alert, in no distress, clean and well-groomed.  RESP:  Non-labored breathing, no respiratory distress, Lung sounds clear, no adventitious breath sounds, patient is on room air.   CV: Rate and rhythm regular, no murmur, no rub or gallop.   VASCULAR: 2-3+ edema bilateral lower extremities, not wearing Velcro compression wraps.   ABDOMEN:  Normal bowel sounds, soft, nontender, no grimacing or guarding with palpation,   + umblical hernia, soft/non-tender.   M/S:   Pain to palpation over lumbar spine and adjacent muscles, pain to palpation over right ribs.  Ambulates independently with four-wheel walker.  PSYCH: Awake and alert, speech " fluent,  insight and judgement impaired.    Lab/Diagnostic data:   Recent labs in [facility] reviewed by me today.   CBC RESULTS:   Recent Labs   Lab Test 06/08/23  0624 06/07/23  0957   WBC 5.8 10.8   RBC 3.76* 3.93*   HGB 10.2* 10.6*   HCT 32.7* 33.4*   MCV 87 85   MCH 27.1 27.0   MCHC 31.2* 31.7   RDW 15.9* 15.5*    191     Last Basic Metabolic Panel:  Recent Labs   Lab Test 06/07/23  0957 03/30/23  0659    141   POTASSIUM 4.1 4.4   CHLORIDE 101 103   JOSUE 9.1 9.0   CO2 28 26   BUN 11.5 7.9*   CR 0.62* 0.68   GLC 92 72       Liver Function Studies -   Recent Labs   Lab Test 06/07/23  0957 01/05/23  1000   PROTTOTAL 7.2 7.6   ALBUMIN 4.0 3.6   BILITOTAL 0.4 0.5   ALKPHOS 58 64   AST 23 18   ALT 11 13       TSH   Date Value Ref Range Status   06/07/2023 1.68 0.30 - 4.20 uIU/mL Final   10/06/2022 1.67 0.30 - 4.20 uIU/mL Final   04/28/2022 2.77 0.40 - 4.00 mU/L Final   04/07/2022 3.94 0.40 - 4.00 mU/L Final     Lab Results   Component Value Date    A1C 5.5 04/28/2022    A1C 5.6 04/07/2022     Recent Labs   Lab Test 01/05/23  1000 04/28/22  0655   CHOL 123 101   HDL 46 40   LDL 60 50   TRIG 84 54     Prostate Specific Antigen Screen   Date Value Ref Range Status   04/07/2022 11.30 (H) 0.00 - 4.00 ug/L Final     PSA Tumor Marker   Date Value Ref Range Status   09/06/2023 2.67 ng/mL Final     Comment:     No reference ranges have been established for patients over 80 years.         Echocardiogram 7/28/23  Interpretation Summary     The visual ejection fraction is 60-65%.  Left ventricular systolic function is normal.  There is diminshed motion of all three aortic valve leaflets consistent with  at least modearte aortic stenosis. The findings are consistent with low  gradient moderate aortic stenosis.  Moderate aortic root dilatation.  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  The rhythm was atrial fibrillation.    ASSESSMENT/PLAN  (I48.91) Afib (primary encounter diagnosis)  (R00.1) Bradycardia    Comment:  "Incidentally noted in physical therapy  No symptoms  History of atrial fibrillation, low heart rate on previous ECG while in hospital.  No offending medications, reviewed Pharm.D., tizanidine and Seroquel could contribute to bradycardia.  Per cardiology Dr. Ross, \"clearly has slow atrial fibrillation. I will place a 7-day monitor to evaluate if he is having any significant heart block or significant arrhythmias that may be contributing to his multiple falls.  He clearly is too high a fall risk to consider anticoagulation.\"   Could consider Watchman device.  Plan:   -Attempted to discuss cardiology plan with patient, due to major neurocognitive disorder is unable to track to have thorough risk-benefit discussion.  Is currently paranoid about phones being tapped and had many questions about Zio patch and what exactly it is monitoring which we reviewed today.   will assist to get Zio patch set up if patient is willing.  Due to his current living situation and circumstance resident feels his quality of life is quite poor and may not consider work-up of new medical conditions and invasive procedures to prolong his life.  -Discussed cardiology visit with daughter Viola on 9/11, she is aware of recommendation such, that resident is poor candidate for anticoagulation, and could be considered for a Watchman device.  Family is okay with what ever resident decides in this regard and is aware that there is an increased stroke risk if no further steps were taken, they are willing to accept this. [id number]: 3 (age, HTN), 3.2% risk of stroke per year    (I71.2) Thoracic aortic aneurysm without rupture (H) - 6/30/22 4.6 cm  Comment: New finding in 2022, follows with vascular, last visit with  on 7/28/2023  Plan:   - Per vascular notes, \"Plan for echocardiogram in June 2024 then followed by office visit order placed Rest of the medical management per primary care physician.\"     (S22.41XD) Closed " "fracture of multiple ribs of right side with routine healing, subsequent encounter - 8th/9th  (S32.039D) Closed fracture of third lumbar vertebra with routine healing, unspecified fracture morphology, subsequent encounter  Comment: Stable s/p unwitnessed fall.  No pain reported today, mobility at recent baseline.  Plan:   - Nursing reports he has been refusing scheduled Tylenol, will change Tylenol to as needed  - Continue gabapentin  - Falls precautions  - Missed follow-up with neurosurgery in July, great difficulty getting out to follow-up appointments, recommend patient follow-up with neurosurgery, message sent to [place]  to assist with this    (F03.90) Major neurocognitive disorder (H) - dementia   Comment: Chronic, expect slow progressive decline  Please see neuropsych testing from 5/12/2023, \"I believe that Jamie is not capable of making reasoned decisions. He should continue to receive a high-level of care that includes other managing medications, finances, and limiting access to alcohol.\"   Please see interdisciplinary team care conference note from 7/25/2023.   Plan:   -Continues to benefit from supportive care in assisted living setting, could consider moving off memory care unit if he had appropriate supports, unwilling to let family assist him at present and family does not want him moved off of memory care unit as they feel this is safest environment for him at present.  - Continue Seroquel due to paranoid delusions causing distress (ie thinks phones are bugged, paranoid) if QTc prolongation evident on ECG would need to reduce dose  - Consider ophthalmology follow-up for field cut - Difficulty out to appointments at present, can refer to onsite eye care    (C61) Prostate Cancer - adenocarcinoma [place] 4+3=7  Comment: Improved  Prostate biopsy 1/11/2023,pathology positive for Haily 7 prostate cancer.   Stopped Trospium due to anticholinergic side effects, no change in urinary " symptoms.  Followed by Dr. Niraj Larry [facility] - last visit 6/2/23.  Completed external beam radiation therapy w/o androgen depravation therapy with [facility] under care of Dr Fleming.   Some dysuria s/p treatment, UC negative for infection.  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  Previous hyponatremia multifactorial, occurred in setting of SIADH, dose increase of selective serotonin reuptake inhibitor (Celexa), Bactrim, hypovolemia.  Stopped Celexa 9/29/22, now on Mirtazapine.  Last sodium 138 on 6/7/23.  Plan:   - Avoid medications which could exacerbate hyponatremia   - Avoid excessive free water intake  - Monitor serial BMP q 6 months -next December 2023    (F10.21) Alcohol dependence in remission  (F43.21) Adjustment reaction with depression  (F41.1) JOSÉ MANUEL  Comment:Chronic  Finds gabapentin helpful.   Cognitive impairment in setting of history of years of ETOH abuse with recurrent falls and hospitalizations. Currently no access to ETOH and thus is abstinent.  Chronic low mood, suspect JOSÉ MANUEL with panic attacks/chest pain for many years, no documented history of myocardial infarction or ASCVD.   On memory care unit due to safety concerns, needs assistance with IADLs.  Plan:   - Increase Gabapentin to 100 mg q AM and 200 mg PO q HS and 100 mg BID PRN for anxiety and pain  - Continue mirtazpaine 15 mg PO q HS (started 11/17/22, dose increase 1/27/23)  - Continue Seroquel 12.5 mg BID and BID PRN for psychotic features - paranoid delusions. Previously on Zyprexa. Look to  "wean as able.  Last dose of as needed Seroquel was given on 8/12.  - [facility]ee counselor following closely -has excellent relationship with Kimberly Wick.  - Offered psychiatry referral in past, but pt/family prefer onsite care, MTM PharmD follows  - Would like to move off memory care unit, but per neuropsych testing needs assistance for decision making, family is current decision-maker and feels resident is safest on locked memory care unit    (R60.0) Lower extremity edema  Comment: Chronic, improved with compression wraps, but does not always wear.   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  Reported recurrent coughing up blood, but not recently.  Saw ENT in 2022, no explanation of symptoms.  Last esophagram May 2022 as above, presbyesophagus, proximal luminal narrowing.  Recent saw SLP 8/2022, no overt dysphagia.  Recurrent pneumonia, last 8/4/22.  CXR 10/1/22, 3/28/23 without concern for PNA.  Chest CT showed, \"Trace scattered atelectasis and/or fibrosis.  Elevated left hemidiaphragm. No effusions or pneumothorax.\"  Plan:   - No symptoms at present, monitor clinically  - Continue swallowing strategies outlined by SLP    (D64.9) Normocytic Anemia  Comment: Chronic, mild.  Hgb 10.2 on 6/8/23  No symptoms of acute bleed.  Plan:   - Follow serial Hgb, next December 2023    (K59.04) Chronic constipation  Comment: Chronic.   Having regular BMs.  Bowel habits change some s/p prostate radiation.  Long hx of Levsin multiple times per day for abd pain, but stopped by urology when started trospium, no recurrent pain. "   Plan:   - Chronic polyethylene glycol (MIRALAX) 17 GM/Dose powder; 1-2 capfuls in 8 oz of ORANGE JUICE PO one time each morning and 1-2 capfuls 8 oz of ORANGE JUICE PO one time daily PRN for constipation.   - GI following PRN    (I10) Essential hypertension, benign  Comment: Chronic  BP at goal of < 150/90 given age and comorbid conditions  Stopped ASA due to hemoptysis  BP Readings from Last 3 Encounters:   23 (!) 163/73   23 133/75   23 (!) 148/67   Plan:   - Monitor routine VS and labs off anti-hypertensive medications  -Nursing to check manual blood pressure and heart rate next week and report to NP    (E66.09,  Z68.35) Class 2 obesity  Comment: Body mass index is 33.97 kg/m .  Plan:   - Encourage increased activity and portion control    (Z00.00) Preventative health care  Comment: Unable to discuss at length today due to other resident concerns  Plan:   -Needs second Shingrix vaccine  -Check hemoglobin A1c with next labs in 2023 given obesity  -Influenza, COVID, RSV vaccines will be given at facility wide clinic    Orders:  No new orders     Total time with an established patient visit in the assisted livin minutes includin:30 - 11:45 pm (15 minutes) Chart review, medication reconciliation, review of recommendations from recent specialist visits, review of recent labs.  11:45 - 12:45 PM (60 minutes) Patient visit as above discussed multiple resident concerns, treatment options, risk benefit of medications, options for primary care in assisted living setting.  2:00 PM - 2:15 PM (15 minutes) Spoke to cardiology nurse and [facility]  (Gisela Hall) regarding patient plan of care.  Our hope is that resident will complete Zio patch as recommended by cardiology, but patient and family are aware of increase stroke risk if no further action is taken and are willing to accept this.  Resident expressed wish to no longer be followed by [facility]  will follow up with  patient and his wife to give him alternative primary care options, we are happy to help transfer care if this is patient's wish. Reviewed visit with Dr. Quintana, will complete co-visits moving forward.    Electronically signed by:  MARICHUY Basilio CNP

## 2023-09-12 ENCOUNTER — ASSISTED LIVING VISIT (OUTPATIENT)
Dept: GERIATRICS | Facility: CLINIC | Age: 83
End: 2023-09-12
Payer: COMMERCIAL

## 2023-09-12 ENCOUNTER — TELEPHONE (OUTPATIENT)
Dept: CARDIOLOGY | Facility: CLINIC | Age: 83
End: 2023-09-12

## 2023-09-12 DIAGNOSIS — I10 ESSENTIAL HYPERTENSION, BENIGN: ICD-10-CM

## 2023-09-12 DIAGNOSIS — F10.21 ALCOHOL DEPENDENCE IN REMISSION (H): ICD-10-CM

## 2023-09-12 DIAGNOSIS — F03.90: ICD-10-CM

## 2023-09-12 DIAGNOSIS — K59.09 CHRONIC CONSTIPATION: ICD-10-CM

## 2023-09-12 DIAGNOSIS — I71.20 THORACIC AORTIC ANEURYSM WITHOUT RUPTURE, UNSPECIFIED PART (H): ICD-10-CM

## 2023-09-12 DIAGNOSIS — C61 PROSTATE CANCER (H): ICD-10-CM

## 2023-09-12 DIAGNOSIS — I48.91 ATRIAL FIBRILLATION, UNSPECIFIED TYPE (H): Primary | ICD-10-CM

## 2023-09-12 DIAGNOSIS — S32.039D CLOSED FRACTURE OF THIRD LUMBAR VERTEBRA WITH ROUTINE HEALING, UNSPECIFIED FRACTURE MORPHOLOGY, SUBSEQUENT ENCOUNTER: ICD-10-CM

## 2023-09-12 DIAGNOSIS — E22.2 SIADH (SYNDROME OF INAPPROPRIATE ADH PRODUCTION) (H): ICD-10-CM

## 2023-09-12 DIAGNOSIS — F41.1 GAD (GENERALIZED ANXIETY DISORDER): ICD-10-CM

## 2023-09-12 DIAGNOSIS — R00.1 BRADYCARDIA: ICD-10-CM

## 2023-09-12 DIAGNOSIS — E66.812 CLASS 2 OBESITY: ICD-10-CM

## 2023-09-12 DIAGNOSIS — R13.10 DYSPHAGIA, UNSPECIFIED TYPE: ICD-10-CM

## 2023-09-12 DIAGNOSIS — E87.1 HYPONATREMIA: ICD-10-CM

## 2023-09-12 DIAGNOSIS — K22.70 BARRETT'S ESOPHAGUS WITHOUT DYSPLASIA: ICD-10-CM

## 2023-09-12 DIAGNOSIS — R05.3 CHRONIC COUGH: ICD-10-CM

## 2023-09-12 DIAGNOSIS — D64.9 NORMOCYTIC ANEMIA: ICD-10-CM

## 2023-09-12 DIAGNOSIS — R60.0 LOWER EXTREMITY EDEMA: ICD-10-CM

## 2023-09-12 DIAGNOSIS — Z00.00 ENCOUNTER FOR PREVENTIVE CARE: ICD-10-CM

## 2023-09-12 DIAGNOSIS — F43.23 ADJUSTMENT REACTION WITH ANXIETY AND DEPRESSION: ICD-10-CM

## 2023-09-12 DIAGNOSIS — S22.41XD MULTIPLE FRACTURES OF RIBS OF RIGHT SIDE WITH ROUTINE HEALING: ICD-10-CM

## 2023-09-12 PROCEDURE — 2894A PR PROLONGED OFFICE/OUTPATIENT E/M SVC, W OR W/O PT CONTACT EA 15 MIN: CPT | Performed by: NURSE PRACTITIONER

## 2023-09-12 PROCEDURE — 99417 PROLNG OP E/M EACH 15 MIN: CPT | Performed by: NURSE PRACTITIONER

## 2023-09-12 PROCEDURE — 99350 HOME/RES VST EST HIGH MDM 60: CPT | Performed by: NURSE PRACTITIONER

## 2023-09-12 NOTE — LETTER
9/12/2023        RE: Jamie Valdivia  C/o Coral De La Garza  [address]  [facility] [place] [address]        [facility] GERIATRICS  Chief Complaint   Patient presents with     Annual Comprehensive Exam Assisted Living     [facility] Record Number:  [id number]  Place of Service where encounter took place:  [facility] ([facility]) [[id number]]    HPI:    Jamie Valdivia  is a 83 year old  (1940) PMH significant for GERD with esophagitis, OA BL knees, pharyngeal dysphagia, edema, abdominal wall hernia, chronic atrial fibrillation, HTN, osteoporosis with hx of vertebral fracture, dementia, anemia, hx of COVID-19, who is being seen today for an annual comprehensive visit.     HPI information obtained from: facility chart records, facility staff, patient report, [facility] chart review, and [facility].     Resident of [facility] since March 2022.     Hospitalized at [facility] form 11/1 to 11/5/21 with generalized weakness, dehydration, and concern for UTI but culture came back negative. Symptoms likely related to chronic alcoholism with alcohol withdrawal. Chronic cognitive impairment in setting of long history of alcoholism, hx of hallucinations. Multiple hospitalizations over preceding months per family due to unsafe living situation, well known to ED staff. Spent time in SNF ([facility]) around September 2021, details unclear, but seem to have suffered spinal fracture.     Hospitalized from 12/17 to 12/23/21 at [facility] for falls, weakness, alcoholism, and alcoholic hepatitis; discharge summary not available. Wife (who was primary care giver) was having medical issues and needed neurology care in the [facility]. Family felt resident unable to safely be left alone so they called EMS to transport to hospital for ongoing care/ETOH detox. Ultimately transferred to [facility] where wife was convalescing.      Remained at [facility] from  12/23 and 3/15/22 with largely uneventful stay. Zyprexa dose increased with good effect on mood, prior to starting medication having hallucinations, delusions, and wandering on unit. Transferred to [facility] for permanent placement.    ER visit on 6/30/22 due to chest pain. Resident was watching a baseball game when he experienced left-sided chest pain.Treated by EMS with aspirin and nitroglycerin with slight relief. Reported pain worse with taking deep breaths and related to anxiety. EKG showed atrial fibrillation with controlled rate; chronic.  Chest CT showed 4.6 cm ascending thoracic aortic aneurysm, but was negative for plumonary embolism. Cardiothoracic surgery consulted given size of aneurysm and lack of other connective tissue disorder recommended outpatient follow-up with repeat chest CT and echocardiogram in 3 months to evaluate for progression. Chest x-ray showed left basilar pneumonia and chest CT showed fluid-filled bronchi bilateral lower lobes. Labs significant for negative troponin, ACS excluded. ER physician noted tenderness over left chest wall and cough over last month. Hx of dysphagia and increased aspiration risk. Symptoms most likely consistent with pneumonia discharged back to assisted living facility with Augmentin.    Repeat CXR on 8/4/22 due to subjective shortness of breath showed possible left lower lobe patchy opacification, as well as low lung volumes with vascular crowding and basilar atelectasis. Dr. Quintana started Augmentin. Symptoms improved.    On 9/13/22 prostate biopsy canceled due to hyponatremia. Subsequently hospitalized at [facility] on 9/30/22 with fall and confusion, found to have severe hyponatremia (Na+ 114). Prior to hospitalization had loose stools and poor intake in setting of prophylactic antibiotic (Bactrim) prescribed prior to planned prostate biospy which was canceled due to low sodium (at 129). Started on IVF due to concern for hypovolemic hyponatremia with  "some improvement. 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 Treated with 1.2 L/day fluid restriction and sodium improved. Confusion improved with treatment of hyponatremia.   Also noted to have back pain after fall. Lumbar CT showed age indeterminate compression deformity of L2-L5 with chronic mild compression deformity at superior endplate of L1. Neurosurgery consulted and recommended conservative mgmt with brace. Recommended PT in TCU at hospital discharge due to difficult with ambulation.   Medications for other chronic conditions continued without change.  Interval Echo completed, with EF 55%, right ventricle mildly dilated with mildy decreased systolic function, aortic valve mild-moderate stenosis, ascending aorta moderately dilated.     Resident recovered at [facility] from 10/6 to 11/03/22. Pain controlled with Tylenol and gabapentin.   Wearing TLSO when out of bed.  Hyponatremia felt to be due to volume depletion and SIADH. Improved and able to discontinue fluid restriction. Discharged back to [facility].    Underwent prostate biopsy 1/11/2023. Pathology positive for Haily 7 prostate cancer, completed course of radiation.     Complete neuropsych testing with Dr. Brock on 5/12/23 - please see report in Epic, thought to have major neurocognitive disorder due to multiple etiologies (dementia) and need assistance with IADLs. Estranged from family who are listed his health care agents.     Today's Concern:  Follow-up today for annual visit and assessment of multiple chronic health issues as outlined above.    Reports he is, \"getting along.\"     Per Epic chart review has been calling his PCP from [facility] Dr. Shelbi Smith.     Thinks he had labs last week, likely ordered by urology under primary NP name ahead of upcoming visit.   PSA 2.67, reviewed this together and recommendation for follow-up visit with urology team.    Saw Dr. Dominguez on 7/28/23 due to chronic " "thoracic aortic aneurysm ranging from 4.4 to 4.6 cm over the last few years, reviewed interval Echo from 7/21/23, plan for follow-up in one year with repeat Echo.     Saw Cardiology Dr. Ross on 8/25/23 due atrial fibrillation and bradycardia.   Resident has trouble recalling details of visit, \"it was a hurry-up deal.\"   Can't recall why referral was made, reviewed bradycardia and afib.  \"We (pt/wife) feel it wasn't an adequate visit.\"  Word finding trouble.  Discussed plan for Zio patch to help determine atrial fibrillation burden, not good candidate for anticoagulation given recurrent falls, could consider Watchman device.   Difficulty tracking for thorough risk-benefit discussion.    More focused on current living situation in memory care apart from wife, which is where his family feels he is safest.    Then accusing NP of, \"contradicting myself.\"  Circular conversation about previous PCP in [place].   Reports of living situation, \"It is going to kill us (pt/wife) one way or another.\"   \"Drinking (alcohol) was better on our systems then the way we are living here!\"   Then moved conversation to Miralax and concerns with medication administration.     Pointing and yelling about medication administration.  Agitated stating, \"Six months ago we knew the phones were tapped!\"   Upset about discussion of Cialis and documentation in medical records which he choose to show his wife. ED listed as medical diagnosis on chart since December 2021 prior to establishing care with current provider team.    Discussed neuropsych testing and recommendation for assistance with IADLs.  Still does not want his family involved in his care, \"I want to get someone I can control!\"  Wants limited medical documentation moving forward.   Upset that physicians and health care providers can see records in Epic.  Discussed release forms he signed when he establish care.    Attempts multiple times to discuss wife's medical care.  \"Gisela is " "sick day after day after day.\"  Multiple reminders we're not able to discuss wife's medical care without her consent.    \"Do you even have a dietician here?\"  Reviewed is an apartment building with limited nursing services, not a long-term care facility.  Begins mocking provider in sing-song voice, \"No, no, no!\"   If you talk to my kids \"tell them to be prepared.\"   \"I want nothing to do with [place]!!!\"   States he would like to \"de-request\" [facility] services.     Fortunately, still has therapeutic relationship with [facility] counselor.   Kimberly still meeting with resident.    ALLERGIES: Ativan [lorazepam]  PAST MEDICAL HISTORY:   Past Medical History:   Diagnosis Date     Age-related osteoporosis without current pathological fracture 03/30/2022     Alcohol abuse 11/19/2017     Alcohol dependence with withdrawal (H)      Alcoholism (H)      Back pain      Backache 12/19/2018     CAD (coronary artery disease)      Chronic a-fib (H)      Chronic alcohol use 06/11/2015    Formatting of this note might be different from the original. 2/26/2019: serious fall at home with multiple vertebral fractures.  BAL 0.414% on admission.  Denies that he drinks much. 12/18/2018: hospitalized for fall due to alcohol intoxication.  BAL 0.34% on admission. 9/16/2018: hospitalized for injuries from fall due to alcohol intoxication.  BAL 0.34% on admission     Chronic atrial fibrillation (H) 07/15/2016    Formatting of this note might be different from the original. 2/2019: Multiple falls so started on aspirin only.  Then aspirin stopped due to GI bleed.     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 2/2/21     HTN (hypertension)      Impaired gait and mobility 03/14/2019     Mild cognitive impairment 08/12/2019    Formatting of this note might be different from the original. NON-AMNESTIC TYPE     Mild TBI (traumatic brain injury) (H) 03/14/2019     Mumps      Obesity (BMI 30-39.9) 09/03/2015     Osteoarthritis      Osteoarthritis of both knees 05/13/2015     Osteoporosis      Other idiopathic peripheral autonomic neuropathy 03/30/2022     Other insomnia 03/14/2019     Other seizures (H) 03/30/2022     Palpitations      Peripheral neuropathy      Pharyngeal dysphagia 05/13/2015    Formatting of this note might be different from the original. Likely secondary to GERD with esophagitis, on PPI and H2 blocker with notable improvement, EGD 10/5/15.     Physical deconditioning 03/14/2019     Recurrent major depressive disorder (H) 03/30/2022     Rhabdomyolysis      Thrombocytopenia (H)      Urination disorder      Weakness 04/27/2020      PAST SURGICAL HISTORY:  has a past surgical history that includes left hip replacement (2010); left shoulder replacement (2016); tonsillectomy; Spine surgery; orthopedic surgery; Esophagoscopy, gastroscopy, duodenoscopy (EGD), combined (N/A, 06/01/2022); colonoscopy; Biopsy prostate transrectal (N/A, 1/11/2023); and Transrectal ultrasonic sonogram (N/A, 1/11/2023).      Current Outpatient Medications:      acetaminophen (TYLENOL) 500 MG tablet, Take 2 tablets (1,000 mg) by mouth 2 times daily. May also take 2 tablets (1,000 mg) daily as needed for pain., Disp: 120 tablet, Rfl: 11     alum & mag hydroxide-simethicone (MAALOX MAX) 400-400-40 MG/5ML SUSP suspension, Take 30 mLs by mouth every 6 hours as needed for indigestion, Disp: 355 mL, Rfl: 3     gabapentin (NEURONTIN) 100 MG capsule, Take 1 capsule (100 mg) by mouth daily AND 2  capsules (200 mg) At Bedtime. May also take 1 capsule (100 mg) 2 times daily as needed (anxiety)., Disp: 100 capsule, Rfl: 98     guaiFENesin (ROBITUSSIN) 20 mg/mL liquid, Take 10 mLs by mouth every 4 hours as needed for cough, Disp: 355 mL, Rfl: 98     loratadine (CLARITIN) 10 MG tablet, Take 1 tablet (10 mg) by mouth At Bedtime, Disp: 30 tablet, Rfl: 98     menthol-zinc oxide (CALMOSEPTINE) 0.44-20.6 % OINT ointment, Apply 1 g topically 2 times daily. May also apply 1 g 2 times daily as needed for skin protection., Disp: 113 g, Rfl: 98     mirtazapine (REMERON) 15 MG tablet, Take 1 tablet (15 mg) by mouth At Bedtime, Disp: 30 tablet, Rfl: 98     nystatin (MYCOSTATIN) 598663 UNIT/GM external powder, Apply topically 2 times daily as needed, Disp: , Rfl:      omeprazole (PRILOSEC) 40 MG DR capsule, TAKE 1 CAPSULE BY MOUTH TWICE DAILY, Disp: 180 capsule, Rfl: 3     oxymetazoline (AFRIN) 0.05 % nasal spray, Spray 2 sprays into both nostrils 2 times daily as needed (nose bleed), Disp: , Rfl:      polyethylene glycol (MIRALAX) 17 GM/Dose powder, MIX 2 CAPFULS IN 8OZ OF ORANGE JUICE  IN THE MORNING;AND MAY MIX 2 CAPFULS ONCE DAILY AS NEEDED FOR CONSTIPATION. MIX IN FRONT OF PT. HOLD FOR LOOSE STOOL. OK TO GIVE 1 CAPFUL IF RESIDENT REFUSES 2 CAPFULS., Disp: 510 g, Rfl: 97     QUEtiapine (SEROQUEL) 25 MG tablet, Take 0.5 tablets (12.5 mg) by mouth 2 times daily. May also take 0.5 tablets (12.5 mg) every 12 hours as needed (anxiety)., Disp: 60 tablet, Rfl: 11     REFRESH 1.4-0.6 % ophthalmic solution, INSTILL ONE DROP INTO EYE(S) EVERY 4 HOURS AS NEEDED, Disp: 50 each, Rfl: 97     White Petrolatum ointment, Apply to BL nares q HS, Disp: 70 g, Rfl: 98     MED REC REQUIRED  Post Medication Reconciliation Status:  Patient was not discharged from an inpatient facility or TCU  but medications were reconciled per facility EMR.    Most Recent Immunizations   Administered Date(s) Administered     COVID-19 MONOVALENT 12+ (Pfizer)  "06/14/2022     Influenza Vaccine 65+ (Fluzone HD) 02/29/2020     Mantoux Tuberculin Skin Test 04/27/2020     Pneumo Conj 13-V (2010&after) 06/16/2016     Pneumococcal 23 valent 06/11/2015     TDAP (Adacel,Boostrix) 06/11/2015     Tdap (Adult) Unspecified Formulation 06/11/2015     Zoster recombinant adjuvanted (SHINGRIX) 06/11/2015     Case Management:  I have reviewed the Assisted Living care plan, current immunizations and preventive care/cancer screening..  Unable to discuss today due other resident concerns . Patient's desire to return to the community is present, but is not able due to care needs and lack of family supports. Current Level of Care is appropriate, but may be able to move to [facility] with appropriate supports in place.    Advance Directive Discussion:    I reviewed the current advanced directives as reflected in EPIC, the POLST and the facility chart, and verified the congruency of orders. I  did not  review the advance directives with the resident.     Team Discussion:  I communicated with the appropriate disciplines involved with the Plan of Care: Nursing  .   Patient's goal is: maintain independence.     Information reviewed: Medications, vital signs, orders, and nursing notes.    ROS: History as per HPI    Vitals:  BP (!) 163/73   Pulse 74   Temp 97.4  F (36.3  C)   Resp 18   Ht 1.727 m (5' 8\")   Wt 101.3 kg (223 lb 6.4 oz)   SpO2 96%   BMI 33.97 kg/m   Body mass index is 33.97 kg/m .  Exam:  GENERAL APPEARANCE:  Alert, in no distress, clean and well-groomed.  RESP:  Non-labored breathing, no respiratory distress, Lung sounds clear, no adventitious breath sounds, patient is on room air.   CV: Rate and rhythm regular, no murmur, no rub or gallop.   VASCULAR: 2-3+ edema bilateral lower extremities, not wearing Velcro compression wraps.   ABDOMEN:  Normal bowel sounds, soft, nontender, no grimacing or guarding with palpation,   + umblical hernia, soft/non-tender.   M/S:   Pain to palpation over " lumbar spine and adjacent muscles, pain to palpation over right ribs.  Ambulates independently with four-wheel walker.  PSYCH: Awake and alert, speech fluent,  insight and judgement impaired.    Lab/Diagnostic data:   Recent labs in [facility] reviewed by me today.   CBC RESULTS:   Recent Labs   Lab Test 06/08/23  0624 06/07/23  0957   WBC 5.8 10.8   RBC 3.76* 3.93*   HGB 10.2* 10.6*   HCT 32.7* 33.4*   MCV 87 85   MCH 27.1 27.0   MCHC 31.2* 31.7   RDW 15.9* 15.5*    191     Last Basic Metabolic Panel:  Recent Labs   Lab Test 06/07/23  0957 03/30/23  0659    141   POTASSIUM 4.1 4.4   CHLORIDE 101 103   JOSUE 9.1 9.0   CO2 28 26   BUN 11.5 7.9*   CR 0.62* 0.68   GLC 92 72       Liver Function Studies -   Recent Labs   Lab Test 06/07/23  0957 01/05/23  1000   PROTTOTAL 7.2 7.6   ALBUMIN 4.0 3.6   BILITOTAL 0.4 0.5   ALKPHOS 58 64   AST 23 18   ALT 11 13       TSH   Date Value Ref Range Status   06/07/2023 1.68 0.30 - 4.20 uIU/mL Final   10/06/2022 1.67 0.30 - 4.20 uIU/mL Final   04/28/2022 2.77 0.40 - 4.00 mU/L Final   04/07/2022 3.94 0.40 - 4.00 mU/L Final     Lab Results   Component Value Date    A1C 5.5 04/28/2022    A1C 5.6 04/07/2022     Recent Labs   Lab Test 01/05/23  1000 04/28/22  0655   CHOL 123 101   HDL 46 40   LDL 60 50   TRIG 84 54     Prostate Specific Antigen Screen   Date Value Ref Range Status   04/07/2022 11.30 (H) 0.00 - 4.00 ug/L Final     PSA Tumor Marker   Date Value Ref Range Status   09/06/2023 2.67 ng/mL Final     Comment:     No reference ranges have been established for patients over 80 years.         Echocardiogram 7/28/23  Interpretation Summary     The visual ejection fraction is 60-65%.  Left ventricular systolic function is normal.  There is diminshed motion of all three aortic valve leaflets consistent with  at least modearte aortic stenosis. The findings are consistent with low  gradient moderate aortic stenosis.  Moderate aortic root dilatation.  The ascending aorta is  "Moderately dilated.  The rhythm was atrial fibrillation.    ASSESSMENT/PLAN  (I48.91) Afib (primary encounter diagnosis)  (R00.1) Bradycardia    Comment: Incidentally noted in physical therapy  No symptoms  History of atrial fibrillation, low heart rate on previous ECG while in hospital.  No offending medications, reviewed Pharm.D., tizanidine and Seroquel could contribute to bradycardia.  Per cardiology Dr. Ross, \"clearly has slow atrial fibrillation. I will place a 7-day monitor to evaluate if he is having any significant heart block or significant arrhythmias that may be contributing to his multiple falls.  He clearly is too high a fall risk to consider anticoagulation.\"   Could consider Watchman device.  Plan:   -Attempted to discuss cardiology plan with patient, due to major neurocognitive disorder is unable to track to have thorough risk-benefit discussion.  Is currently paranoid about phones being tapped and had many questions about Zio patch and what exactly it is monitoring which we reviewed today.   will assist to get Zio patch set up if patient is willing.  Due to his current living situation and circumstance resident feels his quality of life is quite poor and may not consider work-up of new medical conditions and invasive procedures to prolong his life.  -Discussed cardiology visit with daughter Viola on 9/11, she is aware of recommendation such, that resident is poor candidate for anticoagulation, and could be considered for a Watchman device.  Family is okay with what ever resident decides in this regard and is aware that there is an increased stroke risk if no further steps were taken, they are willing to accept this. [id number]: 3 (age, HTN), 3.2% risk of stroke per year    (I71.2) Thoracic aortic aneurysm without rupture (H) - 6/30/22 4.6 cm  Comment: New finding in 2022, follows with vascular, last visit with  on 7/28/2023  Plan:   - Per vascular notes, \"Plan for " "echocardiogram in June 2024 then followed by office visit order placed Rest of the medical management per primary care physician.\"     (S22.41XD) Closed fracture of multiple ribs of right side with routine healing, subsequent encounter - 8th/9th  (S32.039D) Closed fracture of third lumbar vertebra with routine healing, unspecified fracture morphology, subsequent encounter  Comment: Stable s/p unwitnessed fall.  No pain reported today, mobility at recent baseline.  Plan:   - Nursing reports he has been refusing scheduled Tylenol, will change Tylenol to as needed  - Continue gabapentin  - Falls precautions  - Missed follow-up with neurosurgery in July, great difficulty getting out to follow-up appointments, recommend patient follow-up with neurosurgery, message sent to [employer]  to assist with this    (F03.90) Major neurocognitive disorder (H) - dementia   Comment: Chronic, expect slow progressive decline  Please see neuropsych testing from 5/12/2023, \"I believe that Jamie is not capable of making reasoned decisions. He should continue to receive a high-level of care that includes other managing medications, finances, and limiting access to alcohol.\"   Please see interdisciplinary team care conference note from 7/25/2023.   Plan:   -Continues to benefit from supportive care in assisted living setting, could consider moving off memory care unit if he had appropriate supports, unwilling to let family assist him at present and family does not want him moved off of memory care unit as they feel this is safest environment for him at present.  - Continue Seroquel due to paranoid delusions causing distress (ie thinks phones are bugged, paranoid) if QTc prolongation evident on ECG would need to reduce dose  - Consider ophthalmology follow-up for field cut - Difficulty out to appointments at present, can refer to onsite eye care    (C61) Prostate Cancer - adenocarcinoma Haily 4+3=7  Comment: " Improved  Prostate biopsy 1/11/2023,pathology positive for Haily 7 prostate cancer.   Stopped Trospium due to anticholinergic side effects, no change in urinary symptoms.  Followed by Dr. Niraj [facility] - last visit 6/2/23.  Completed external beam radiation therapy w/o androgen depravation therapy with [facility] under care of Dr Fleming.   Some dysuria s/p treatment, UC negative for infection.  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  Previous hyponatremia multifactorial, occurred in setting of SIADH, dose increase of selective serotonin reuptake inhibitor (Celexa), Bactrim, hypovolemia.  Stopped Celexa 9/29/22, now on Mirtazapine.  Last sodium 138 on 6/7/23.  Plan:   - Avoid medications which could exacerbate hyponatremia   - Avoid excessive free water intake  - Monitor serial BMP q 6 months -next December 2023    (F10.21) Alcohol dependence in remission  (F43.21) Adjustment reaction with depression  (F41.1) JOSÉ MANUEL  Comment:Chronic  Finds gabapentin helpful.   Cognitive impairment in setting of history of years of ETOH abuse with recurrent falls and hospitalizations. Currently no access to ETOH and thus is abstinent.  Chronic low mood, suspect JOSÉ MANUEL with panic attacks/chest pain for many years, no documented history of myocardial infarction or ASCVD.   On memory care unit due to safety concerns, needs assistance with IADLs.  Plan:   - Increase Gabapentin to 100 mg q AM and 200 mg PO q HS and 100 mg BID PRN for anxiety and pain  - Continue mirtazpaine 15 mg PO q HS  "(started 11/17/22, dose increase 1/27/23)  - Continue Seroquel 12.5 mg BID and BID PRN for psychotic features - paranoid delusions. Previously on Zyprexa. Look to wean as able.  Last dose of as needed Seroquel was given on 8/12.  - [facility] -has excellent relationship with Kimberly Wick.  - Offered psychiatry referral in past, but pt/family prefer onsite care, [facility] follows  - Would like to move off memory care unit, but per neuropsych testing needs assistance for decision making, family is current decision-maker and feels resident is safest on locked memory care unit    (R60.0) Lower extremity edema  Comment: Chronic, improved with compression wraps, but does not always wear.   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  Reported recurrent coughing up blood, but not recently.  Saw ENT in 2022, no explanation of symptoms.  Last esophagram May 2022 as above, presbyesophagus, proximal luminal narrowing.  Recent saw SLP 8/2022, no overt dysphagia.  Recurrent pneumonia, last 8/4/22.  CXR 10/1/22, 3/28/23 without concern for PNA.  Chest CT showed, \"Trace scattered atelectasis and/or fibrosis.  Elevated left hemidiaphragm. No effusions or pneumothorax.\"  Plan:   - No symptoms at present, monitor clinically  - Continue swallowing strategies outlined by SLP    (D64.9) Normocytic Anemia  Comment: Chronic, mild.  Hgb 10.2 on 6/8/23  No symptoms of acute bleed.  Plan:   - Follow serial Hgb, next December 2023    (K59.04) Chronic constipation  Comment: Chronic.   Having regular BMs.  Bowel " habits change some s/p prostate radiation.  Long hx of Levsin multiple times per day for abd pain, but stopped by urology when started trospium, no recurrent pain.   Plan:   - Chronic polyethylene glycol (MIRALAX) 17 GM/Dose powder; 1-2 capfuls in 8 oz of ORANGE JUICE PO one time each morning and 1-2 capfuls 8 oz of ORANGE JUICE PO one time daily PRN for constipation.   - GI following PRN    (I10) Essential hypertension, benign  Comment: Chronic  BP at goal of < 150/90 given age and comorbid conditions  Stopped ASA due to hemoptysis  BP Readings from Last 3 Encounters:   23 (!) 163/73   23 133/75   23 (!) 148/67   Plan:   - Monitor routine VS and labs off anti-hypertensive medications  -Nursing to check manual blood pressure and heart rate next week and report to NP    (E66.09,  Z68.35) Class 2 obesity  Comment: Body mass index is 33.97 kg/m .  Plan:   - Encourage increased activity and portion control    (Z00.00) Preventative health care  Comment: Unable to discuss at length today due to other resident concerns  Plan:   -Needs second Shingrix vaccine  -Check hemoglobin A1c with next labs in 2023 given obesity  -Influenza, COVID, RSV vaccines will be given at facility wide clinic    Orders:  No new orders     Total time with an established patient visit in the assisted livin minutes includin:30 - 11:45 pm (15 minutes) Chart review, medication reconciliation, review of recommendations from recent specialist visits, review of recent labs.  11:45 - 12:45 PM (60 minutes) Patient visit as above discussed multiple resident concerns, treatment options, risk benefit of medications, options for primary care in assisted living setting.  2:00 PM - 2:15 PM (15 minutes) Spoke to cardiology nurse and P  (Gisela Hall) regarding patient plan of care.  Our hope is that resident will complete Zio patch as recommended by cardiology, but patient and family are aware of increase  stroke risk if no further action is taken and are willing to accept this.  Resident expressed wish to no longer be followed by [facility],  will follow up with patient and his wife to give him alternative primary care options, we are happy to help transfer care if this is patient's wish. Reviewed visit with Dr. Quintana, will complete co-visits moving forward.    Electronically signed by:  MARICHUY Basilio CNP         Sincerely,        MARICHUY Basilio CNP

## 2023-09-12 NOTE — TELEPHONE ENCOUNTER
"Call from JANEE Stein @ [facility] Daniel Ye: she has met with the patient to try to discuss the idea of a Watchman versus AC meds. She has broached the idea of a ziopatch monitor. JANEE Stein also discussed these issues with the patient's family.    Her assessment is that the patient does not have the cognitive functioning to make good healthcare decisions. However, he refuses help from his family. The family would like to encourage him to accept help, but acknowledge that he is still in charge of his choices and they will accept the consequences of his choices.    Patient declined a monitor as he shared with staff that he thinks it will record \"other things\" besides his heart.    JANEE Stein does not recommend starting AC meds due to the patient's fall risk. He is not willing to have a procedure such as the Watchman.     Patient has follow-up on 10/13/2023 with JANEE Zoë Dalton.    Will message Dr. Ross with update  "

## 2023-09-13 ENCOUNTER — PATIENT OUTREACH (OUTPATIENT)
Dept: GERIATRIC MEDICINE | Facility: CLINIC | Age: 83
End: 2023-09-13
Payer: COMMERCIAL

## 2023-09-13 NOTE — TELEPHONE ENCOUNTER
Alex Ross MD Anderson, Barbara E RN14 hours ago (5:28 PM)     Thanks.  I assume this will be permanently in his chart

## 2023-09-13 NOTE — PROGRESS NOTES
"[facility] Care Coordination Contact    Members wife reached out to care coordinator to ensure to transportation is set up for 9/14. Writer will follow up with CMS staff on transportation set up and follow up with member.     Writer spoke with Gisela regarding report  that Jamie would like a new provider. Gisela gave phone to Jamie and said \"You started all of this so you can speak to her about it.\"    Jamie got on the phone and care coordinator asked member about wanting to establish with a new PCP. Member stated he had a doctor in [place] that he wanted to go back to. Jamie started to talk about an occurrence that happened  that morning that staff needed to get his weight and they already got it the day before. Member started to talk about the past including his memory tests and his drinking history. Writer tried several times to keep him on track and informed him of his options including finding a new provider at an outside clinic. Member stated he doesn't want a new doctor, he just wants to go back to [place] and wanted his kids to find him an apartment and there so he can go back to his doctor there. Writer informed member that he is not stuck there but since the recommendation is memory care for his safety and that is where his kids want him to stay, he will need to locate an apartment on his own and plan the move. Writer also informed him that he is able to switch providers and find one that is near by but member was not interested. PCP notified.    Gisela Hall RN  [facility]  [phone]    "

## 2023-09-14 NOTE — PATIENT INSTRUCTIONS
Jamie Valdivia  1940  ORDERS:  -Discontinue Tylenol  -Please check manual blood pressure and heart rate on 9/21 and report to NP  Electronically signed by:   MARICHUY Basilio CNP  09/14/23 2:32 PM

## 2023-09-18 ENCOUNTER — HOSPITAL ENCOUNTER (OUTPATIENT)
Dept: CARDIOLOGY | Facility: CLINIC | Age: 83
Discharge: HOME | End: 2023-09-18
Attending: INTERNAL MEDICINE | Admitting: INTERNAL MEDICINE
Payer: COMMERCIAL

## 2023-09-18 DIAGNOSIS — I48.91 ATRIAL FIBRILLATION, UNSPECIFIED TYPE (H): ICD-10-CM

## 2023-09-18 DIAGNOSIS — K21.00 GASTROESOPHAGEAL REFLUX DISEASE WITH ESOPHAGITIS, UNSPECIFIED WHETHER HEMORRHAGE: ICD-10-CM

## 2023-09-18 DIAGNOSIS — R00.1 BRADYCARDIA: ICD-10-CM

## 2023-09-18 PROCEDURE — 93244 EXT ECG>48HR<7D REV&INTERPJ: CPT | Performed by: INTERNAL MEDICINE

## 2023-09-18 PROCEDURE — 93242 EXT ECG>48HR<7D RECORDING: CPT

## 2023-09-19 RX ORDER — OMEPRAZOLE 40 MG/1
CAPSULE, DELAYED RELEASE ORAL
Qty: 180 CAPSULE | Refills: 97 | Status: SHIPPED | OUTPATIENT
Start: 2023-09-19 | End: 2024-10-02

## 2023-09-20 ENCOUNTER — TELEPHONE (OUTPATIENT)
Dept: GERIATRICS | Facility: CLINIC | Age: 83
End: 2023-09-20
Payer: COMMERCIAL

## 2023-09-20 DIAGNOSIS — R52 PAIN: Primary | ICD-10-CM

## 2023-09-20 RX ORDER — ACETAMINOPHEN 500 MG
1000 TABLET ORAL 3 TIMES DAILY PRN
Qty: 10 TABLET | Refills: 98 | Status: SHIPPED | OUTPATIENT
Start: 2023-09-20

## 2023-10-03 ENCOUNTER — TELEPHONE (OUTPATIENT)
Dept: CARDIOLOGY | Facility: CLINIC | Age: 83
End: 2023-10-03
Payer: COMMERCIAL

## 2023-10-03 NOTE — TELEPHONE ENCOUNTER
Called patient to verify if he was having any symptoms while wearing the monitor, unable to reach patient at his phone number at [facility] and no option to leave a message. Called nurses station, states there is no documentation in his chart of any reported symptoms or events while he was wearing the monitor. She went to the patient's room to try and confirm with him as well, says he denies any symptoms while wearing the monitor as well. Dr Ross updated.

## 2023-10-03 NOTE — TELEPHONE ENCOUNTER
Leadless monitor [id number]    JANEE OV 10-13-23.     Last Dr. Ross OV 8-25-23  He clearly has slow atrial fibrillation.  I will place a 7-day monitor to evaluate if he is having any significant heart block or significant arrhythmias that may be contributing to his multiple falls.     He clearly is too high a fall risk to consider anticoagulation.  We could consider a Watchman device.  I think it is most important to figure out who I needed to have these discussions with and who is the appropriate decision maker.  He and his wife both do not want the children involved.  He wants communication directly from us and not through his facility.  He has had neurocognitive testing and we will need to figure out how to proceed.

## 2023-10-03 NOTE — TELEPHONE ENCOUNTER
These pauses appear to occur in the early morning hours when he is most likely asleep.  Do we know if any of them are symptomatic?  If not, I would not recommend pacemaker implantation at this time.  Thanks.

## 2023-10-04 ENCOUNTER — PATIENT OUTREACH (OUTPATIENT)
Dept: GERIATRIC MEDICINE | Facility: CLINIC | Age: 83
End: 2023-10-04
Payer: COMMERCIAL

## 2023-10-04 NOTE — PROGRESS NOTES
[facility] Care Coordination Contact    Arranged transportation thru [facility] [phone] for the below appt:  Appt Date & Time: 10/13 at 11am (arrival time 10:50am)  Clinic Name & Address:  [facility], [address], [place]  Transportation Provider: [employer] [phone]   time:  9:50- 10:20am  Return ride  time: will call   2 adults RT - both using walkers    Notified member of  time.    Pat Varma  Case Management Specialist  [facility]  [phone]

## 2023-10-04 NOTE — TELEPHONE ENCOUNTER
Alex Ross MD  You12 hours ago (8:56 PM)     I agree.  Does not appear to meet criteria for pacemaker unless he is having falls due to hypotension. Have we ever figured out who is the decision maker? Do we consider a watchman and who do we have that discussion with.       Per chart review, son Markos has POA but does not have medical decision-making power. Note from conversations 9/12/23 with Sylvia Stein NP at [facility] mentions that patient refuses help from family and while the family encourages him to allow them to participate, they accept his decisions and the consequences. Dr Ross updated.

## 2023-10-05 NOTE — TELEPHONE ENCOUNTER
Alex Ross MD Hiljus, Audrey G, RN  Caller: Unspecified (2 days ago,  1:02 PM)    It is my recollection that he and his wife did not want to proceed with a watchman.  Zoë can ask him again.  I would then close the loop afterwards with his phone call to his son to let him know that he is at increased risk for stroke off anticoagulation, appears to be high risk for falls, and that he does not want to proceed with a Watchman

## 2023-10-13 ENCOUNTER — TELEPHONE (OUTPATIENT)
Dept: CARDIOLOGY | Facility: CLINIC | Age: 83
End: 2023-10-13

## 2023-10-13 NOTE — TELEPHONE ENCOUNTER
"Patient was a \"no show\" for today's JANEE visit to review monitor reports and discuss a possible Watchman procedure again.    Spoke with staff at [facility]. They do not arrange outside appointments so were not aware that the patient had or missed a visit today. They recommend calling the family.    Left a message for patient's son, Markos, that Jamie was a \"no show\" for today's visit.    1320 spoke with patient. He states he didn't know he had an appointment. It was not on his calendar. Patient would like to reschedule. Reviewed we would have scheduling give him a call.  "

## 2023-10-19 ENCOUNTER — PATIENT OUTREACH (OUTPATIENT)
Dept: GERIATRIC MEDICINE | Facility: CLINIC | Age: 83
End: 2023-10-19
Payer: COMMERCIAL

## 2023-10-19 NOTE — PROGRESS NOTES
[facility] Care Coordination Contact    Arranged transportation thru [facility] [phone] for the below appt:  Appt Date & Time: 10/25 at 2pm  Clinic Name & Address:  [facility]  Transportation Provider: [employer] [phone]   time:  1-1:30pm  Return ride  time: will call  2 adults - with 2 walkers    Notified member of  time.    Pat Varma  Case Management Specialist  [facility]  [phone]

## 2023-10-31 ENCOUNTER — PATIENT OUTREACH (OUTPATIENT)
Dept: GERIATRIC MEDICINE | Facility: CLINIC | Age: 83
End: 2023-10-31
Payer: COMMERCIAL

## 2023-10-31 NOTE — PROGRESS NOTES
[facility] Care Coordination Contact    Arranged transportation thru [facility] [phone] for the below appt:  Appt Date & Time: 11/2 at 1pm  Clinic Name & Address:  [facility], [address] [place]  Transportation Provider: [employer] [phone]   time:  12-12;30pm  Return ride  time: will call    Ride set up for 2 adult w/2 walkers    Notified member of  time.    Pat Varma  Case Management Specialist  [facility]  [phone]

## 2023-11-10 ENCOUNTER — PATIENT OUTREACH (OUTPATIENT)
Dept: GERIATRIC MEDICINE | Facility: CLINIC | Age: 83
End: 2023-11-10
Payer: COMMERCIAL

## 2023-11-10 NOTE — PROGRESS NOTES
[facility] Care Coordination Contact    Arranged transportation thru [facility] [phone] for the below appt:  Appt Date & Time: 11/17 at 11am  Clinic Name & Address:  [facility], [address], [place]  Transportation Provider: [employer] [phone]   time:  10-10:15am  Return ride  time: will call    2 adults & 2 walkers    Notified member of  time.    Pat Varma  Case Management Specialist  [facility]  [phone]     [FreeTextEntry1] : Documented by Erik Feliciano acting as a scribe for Reena Barahona on 11/09/2021 . All medical record entries made by the Scribe were at my, Reena Barahona, direction and personally dictated by me on 11/09/2021  . I have reviewed the chart and agree that the record accurately reflects my personal performance of the history, physical exam, assessment and plan. I have also personally directed, reviewed, and agreed with the chart.

## 2023-11-10 NOTE — PROGRESS NOTES
[facility] Care Coordination Contact    Member called asking for a ride on 11/13 for a dental appt at [facility] but being it was not 2 business days in advance - [facility] will not schedule ride.  I encouraged him to reschedule his appt or have family or friend to drive.  Informed him if he calls the [employer] company directly he would need to pay privately each way and that could get costly.  He stated he understood.    Pat Varma  Case Management Specialist  [facility]  [phone]

## 2023-11-18 DIAGNOSIS — F10.27 DEMENTIA ASSOCIATED WITH ALCOHOLISM WITH BEHAVIORAL DISTURBANCE (H): ICD-10-CM

## 2023-11-20 RX ORDER — QUETIAPINE FUMARATE 25 MG/1
TABLET, FILM COATED ORAL
Qty: 180 TABLET | Refills: 97 | Status: SHIPPED | OUTPATIENT
Start: 2023-11-20

## 2023-11-27 VITALS — WEIGHT: 223 LBS | HEIGHT: 68 IN | BODY MASS INDEX: 33.8 KG/M2

## 2023-11-27 NOTE — PROGRESS NOTES
[facility]    Chief Complaint   Patient presents with    RECHECK     routine     HPI:  Jamie Valdivia is a 83 year old  (1940) PMH significant for GERD with esophagitis, OA BL knees, pharyngeal dysphagia, edema, abdominal wall hernia, chronic atrial fibrillation, HTN, osteoporosis with hx of vertebral fracture, dementia, anemia, hx of COVID-19, who is being seen today for an episodic care visit at: [facility]) [61].     Resident of [facility] since March 2022.     Hospitalized at [facility] form 11/1 to 11/5/21 with generalized weakness, dehydration, and concern for UTI but culture came back negative. Symptoms likely related to chronic alcoholism with alcohol withdrawal. Chronic cognitive impairment in setting of long history of alcoholism, hx of hallucinations. Multiple hospitalizations over preceding months per family due to unsafe living situation, well known to ED staff. Spent time in SNF ([facility]) around September 2021, details unclear, but seem to have suffered spinal fracture.     Hospitalized from 12/17 to 12/23/21 at [facility] for falls, weakness, alcoholism, and alcoholic hepatitis; discharge summary not available. Wife (who was primary care giver) was having medical issues and needed neurology care in the [facility]. Family felt resident unable to safely be left alone so they called EMS to transport to hospital for ongoing care/ETOH detox. Ultimately transferred to [facility] where wife was convalescing.      Remained at [facility] from 12/23 and 3/15/22 with largely uneventful stay. Zyprexa dose increased with good effect on mood, prior to starting medication having hallucinations, delusions, and wandering on unit. Transferred to [facility] for permanent placement.    ER visit on 6/30/22 due to chest pain. Resident was watching a baseball game when he experienced left-sided chest pain.Treated by EMS with  aspirin and nitroglycerin with slight relief. Reported pain worse with taking deep breaths and related to anxiety. EKG showed atrial fibrillation with controlled rate; chronic.  Chest CT showed 4.6 cm ascending thoracic aortic aneurysm, but was negative for plumonary embolism. Cardiothoracic surgery consulted given size of aneurysm and lack of other connective tissue disorder recommended outpatient follow-up with repeat chest CT and echocardiogram in 3 months to evaluate for progression. Chest x-ray showed left basilar pneumonia and chest CT showed fluid-filled bronchi bilateral lower lobes. Labs significant for negative troponin, ACS excluded. ER physician noted tenderness over left chest wall and cough over last month. Hx of dysphagia and increased aspiration risk. Symptoms most likely consistent with pneumonia discharged back to assisted living facility with Augmentin.    Repeat CXR on 8/4/22 due to subjective shortness of breath showed possible left lower lobe patchy opacification, as well as low lung volumes with vascular crowding and basilar atelectasis. Dr. Quintana started Augmentin. Symptoms improved.    On 9/13/22 prostate biopsy canceled due to hyponatremia. Subsequently hospitalized at [facility] on 9/30/22 with fall and confusion, found to have severe hyponatremia (Na+ 114). Prior to hospitalization had loose stools and poor intake in setting of prophylactic antibiotic (Bactrim) prescribed prior to planned prostate biospy which was canceled due to low sodium (at 129). Started on IVF due to concern for hypovolemic hyponatremia with some improvement. 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 Treated with 1.2 L/day fluid restriction and sodium improved. Confusion improved with treatment of hyponatremia.   Also noted to have back pain after fall. Lumbar CT showed age indeterminate compression deformity of L2-L5 with chronic mild compression deformity at superior endplate of  "L1. Neurosurgery consulted and recommended conservative mgmt with brace. Recommended PT in TCU at hospital discharge due to difficult with ambulation.   Medications for other chronic conditions continued without change.  Interval Echo completed, with EF 55%, right ventricle mildly dilated with mildy decreased systolic function, aortic valve mild-moderate stenosis, ascending aorta moderately dilated.     Resident recovered at [facility] from 10/6 to 11/03/22. Pain controlled with Tylenol and gabapentin.   Wearing TLSO when out of bed.  Hyponatremia felt to be due to volume depletion and SIADH. Improved and able to discontinue fluid restriction. Discharged back to [facility].    Underwent prostate biopsy 1/11/2023. 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     Complete neuropsych testing with Dr. Brock on 5/12/23 - please see report in Epic, thought to have major neurocognitive disorder (dementia) and need assistance with IADLs. Estranged from family who are listed his health care agents, but have elected not to move him off of memory care unit.     Today's concern is:   Follow-up today for routine [facility] visit and assessment of multiple chronic health issues as outlined above.   Dr. Quintana present for visit.    Skin concern.  Right cheeck lesion.  Present over last year.  No change or growth over last year.  Slightly rasied.  No tenderness.     Breathing \"generally pretty good.\"  Cough with dry throat.    No chest pain episodes recently.  Sometimes will get chest pain with stress, used to use alcohol to cope with these.    No change in bowel movement, having regular BMs    Urination \"okay.\"  Toleting schedule helpful.  Seeing urology soon.    Denies pain.  No recent falls.  Ambulates with four-wheel walker.    Continued to frustration with living environment.  No longer converses with children, but they do still assist him.  Son brought onsite today bringing in some supplies.    Allergies, " "and PMH/PSH reviewed in [facility] today.    MED REC REQUIRED  Post Medication Reconciliation Status:  Patient was not discharged from an inpatient facility or TCU but medications were reconciled per facility EMR.    REVIEW OF SYSTEMS:  Limited secondary to cognitive impairment but today pt reports 4 point ROS including Respiratory, CV, GI and , other than that noted in the HPI,  is negative    Objective:   Ht 1.727 m (5' 8\")   Wt 101.2 kg (223 lb)   BMI 33.91 kg/m    GENERAL APPEARANCE:  Alert, in no distress, seated in recliner chair.  EYES: Sclera clear and conjunctiva normal.  RESP:  Non-labored breathing, palpation of chest normal, no chest wall tenderness, no respiratory distress, Lung sounds clear, patient is on room air.  CV: Rate and rhythm regular, no murmur, no rub or gallop.  VASCULAR: 2+ edema bilateral lower extremities, not wearing Velcro compression wraps.   ABDOMEN:  Normal bowel sounds, soft, nontender, no grimacing or guarding with palpation, + umblical hernia, soft/non-tender.   M/S:   Gait and station ambulates independently with walker around apartment.   SKIN: Tan macule with raised brown crusted nodule to right cheek-see photo below.  NEURO: No facial asymmetry, follows simple commands, moves all extremities symmetrically, no tremor.  PSYCH: Awake and alert, speech fluent,  insight/judgement/memory impaired.    Right Cheek      Recent labs in [facility] reviewed by me today.   CBC RESULTS:   Recent Labs   Lab Test 06/08/23  0624 06/07/23  0957   WBC 5.8 10.8   RBC 3.76* 3.93*   HGB 10.2* 10.6*   HCT 32.7* 33.4*   MCV 87 85   MCH 27.1 27.0   MCHC 31.2* 31.7   RDW 15.9* 15.5*    191       Last Basic Metabolic Panel:  Recent Labs   Lab Test 06/07/23  0957 03/30/23  0659    141   POTASSIUM 4.1 4.4   CHLORIDE 101 103   JOSUE 9.1 9.0   CO2 28 26   BUN 11.5 7.9*   CR 0.62* 0.68   GLC 92 72       Liver Function Studies -   Recent Labs   Lab Test 06/07/23  0957 01/05/23  1000   PROTTOTAL 7.2 7.6 " "  ALBUMIN 4.0 3.6   BILITOTAL 0.4 0.5   ALKPHOS 58 64   AST 23 18   ALT 11 13       TSH   Date Value Ref Range Status   06/07/2023 1.68 0.30 - 4.20 uIU/mL Final   10/06/2022 1.67 0.30 - 4.20 uIU/mL Final   04/28/2022 2.77 0.40 - 4.00 mU/L Final   04/07/2022 3.94 0.40 - 4.00 mU/L Final     Lab Results   Component Value Date    A1C 5.5 04/28/2022    A1C 5.6 04/07/2022     Assessment/Plan:  (I48.91) Afib (primary encounter diagnosis)  (R00.1) Bradycardia    Comment: Chronic and asymptomatic.  History of atrial fibrillation, low heart rate on previous ECG while in hospital.  Per cardiology Dr. Ross, \"clearly has slow atrial fibrillation. I will place a 7-day monitor to evaluate if he is having any significant heart block or significant arrhythmias that may be contributing to his multiple falls.  He clearly is too high a fall risk to consider anticoagulation.\"   Completed Zio patch September 2023 -see report in Epic.  Persistent atrial fibrillation with average heart rate of 58 bpm, pauses of 3.3 seconds, frequent PVCs, intermittent bundle branch block.  Considered Watchman device, but patient declined invasive intervention; pt/family aware of risk.  HAS-BLED 2: 4.1%  CHADs-VASC: 3% (Age, HTN)   Plan:   -Discussed cardiology recommendation again today poor candidate for anticoagulation, and could be considered for a Watchman device.  Pt/family ware of increased stroke risk without intervention. Decline Watchman.  - Cardiology follow-up PRN   - Cardiology follow-up 12/26/23.    (I71.2) Thoracic aortic aneurysm without rupture (H) - 6/30/22 4.6 cm  Comment: New finding in 2022, follows with vascular, last visit with  on 7/28/2023  Plan:   - Per vascular notes, \"Plan for echocardiogram in June 2024 then followed by office visit order placed Rest of the medical management per primary care physician.\"     (S22.41XD) Closed fracture of multiple ribs of right side with routine healing, subsequent encounter - " "8th/9th  (S32.039D) Closed fracture of third lumbar vertebra with routine healing, unspecified fracture morphology, subsequent encounter  Comment: Stable  s/p unwitnessed fall.  No pain reported today.  Mobility at recent baseline.  Plan:   - Continue Tylenol for pain   - Continue gabapentin  - Falls precautions  - Missed follow-up with neurosurgery, follow-up PRN    (F03.90) Major neurocognitive disorder (H) - dementia   Comment: Chronic, expect slow progressive decline  Please see neuropsych testing from 5/12/2023, \"I believe that Jamie is not capable of making reasoned decisions. He should continue to receive a high-level of care that includes other managing medications, finances, and limiting access to alcohol.\"   Please see interdisciplinary team care conference note from 7/25/2023.   Plan:   -Continues to benefit from supportive care in assisted living setting, could consider moving off memory care unit if he had appropriate supports, unwilling to let family assist him at present and family does not want him moved off of memory care unit as they feel this is safest environment for him.  - Continue Seroquel due to paranoid delusions causing distress (ie thinks phones are bugged, paranoid) if QTc prolongation evident on ECG would need to reduce dose  - Consider ophthalmology follow-up for field cut - Difficulty out to appointments without family assist, follow with onsite eye for now    (C61) Prostate Cancer - adenocarcinoma [place] 4+3=7  Comment: Improved  Prostate biopsy 1/11/2023,pathology positive for Haily 7 prostate cancer.   Stopped Trospium due to anticholinergic side effects, no change in urinary symptoms.  Followed by Dr. Niraj Larry [facility] - last visit 6/2/23.  Completed external beam radiation therapy w/o androgen depravation therapy with [facility] under care of Dr Fleming.   Some dysuria s/p treatment, UC negative for infection.  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  Previous hyponatremia multifactorial, occurred in setting of SIADH, dose increase of selective serotonin reuptake inhibitor (Celexa), Bactrim, hypovolemia.  Stopped Celexa 9/29/22, now on Mirtazapine.  Last sodium 138 on 6/7/23.  Plan:   - Avoid medications which could exacerbate hyponatremia   - Avoid excessive free water intake  - Check BMP with next routine visit Jan 2024    (F10.21) Alcohol dependence in remission  (F43.23) Adjustment reaction with depression  (F41.1) JOSÉ MANUEL  Comment:Chronic  Finds gabapentin helpful.   Cognitive impairment in setting of history of years of ETOH abuse with recurrent falls and hospitalizations. Currently no access to ETOH and thus is abstinent.  Chronic low mood, suspect JOSÉ MANUEL with panic attacks/chest pain for many years, no documented history of myocardial infarction or ASCVD.   On memory care unit due to safety concerns, needs assistance with IADLs.  Plan:   - Continue Gabapentin 100 mg q AM and 200 mg PO q HS and 100 mg BID PRN for anxiety and pain. Last PRN dose given 11/6/23.  - Continue mirtazpaine 15 mg PO q HS (started 11/17/22, dose increase 1/27/23)  - Continue Seroquel 12.5 mg BID and BID PRN for psychotic features - paranoid delusions. Previously on Zyprexa. Look to wean as able.  Last dose of as needed Seroquel was given on 8/12/23.  - [facility] beckysch counselor following closely -has excellent relationship with Kimberly Wick.  - Offered psychiatry referral in past, but pt/family prefer onsite care, [facility] Pharm[facility] follows  - Would like to move off memory care unit, but per neuropsych testing needs assistance for  "decision making, family is current decision-maker and feels resident is safest on locked memory care unit    (R60.0) Lower extremity edema  Comment: Chronic, improved with compression wraps, but does not always wear.   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  Reported recurrent coughing up blood, but not recently.  Saw ENT in 2022, no explanation of symptoms.  Last esophagram May 2022 as above, presbyesophagus, proximal luminal narrowing.  Recent saw SLP 8/2022, no overt dysphagia.  Recurrent pneumonia, last 8/4/22.  CXR 10/1/22, 3/28/23 without concern for PNA.  Chest CT showed, \"Trace scattered atelectasis and/or fibrosis.  Elevated left hemidiaphragm. No effusions or pneumothorax.\"  Plan:   - No symptoms at present, monitor clinically  - Continue swallowing strategies outlined by SLP    (D64.9) Normocytic Anemia  Comment: Chronic, mild.  Hgb 10.2 on 6/8/23  No symptoms of acute bleed.  Plan:   - Follow serial Hgb, next with routine visit Jan 2024    (K59.04) Chronic constipation  Comment: Chronic.   Having regular BMs.  Bowel habits change some s/p prostate radiation.  Long hx of Levsin multiple times per day for abd pain, but stopped by urology when started trospium, no recurrent pain.   Plan:   - Chronic polyethylene glycol (MIRALAX) 17 GM/Dose powder; 1-2 capfuls in 8 oz of ORANGE JUICE PO one time each morning and 1-2 capfuls 8 oz of ORANGE JUICE PO one time daily PRN for constipation.   - GI following PRN    (I10) Essential hypertension, benign  Comment: Chronic  BP at goal of < 150/90 given age and comorbid " conditions  Stopped ASA due to hemoptysis.  Plan:   - Monitor routine VS and labs off anti-hypertensive medications  -Nursing to check VS    (E66.09,  Z68.35) Class 2 obesity  Comment:  Body mass index is 33.91 kg/m .  Plan:   - Encourage increased activity and portion control    (L98.9) Skin Lesion  Comment: To right cheek  Photo taken today 11/28/23  Plan:  - Follow on serial exam  - Consider dermatology referral - would need family assist to get to appointment     Orders:  No new orders.    Will plan for routine labs after New Year in Jan 2024.  Clarify vaccines with facility - resident declined vaccine clinics when offered.  Will likely need COVID, Flu, RSV, and 2nd Shingrix when available if will to take vaccines.    Electronically signed by: MARICHUY Basilio CNP

## 2023-11-28 ENCOUNTER — ASSISTED LIVING VISIT (OUTPATIENT)
Dept: GERIATRICS | Facility: CLINIC | Age: 83
End: 2023-11-28
Payer: COMMERCIAL

## 2023-11-28 DIAGNOSIS — F43.23 ADJUSTMENT REACTION WITH ANXIETY AND DEPRESSION: ICD-10-CM

## 2023-11-28 DIAGNOSIS — F03.90 MAJOR NEUROCOGNITIVE DISORDER (H): ICD-10-CM

## 2023-11-28 DIAGNOSIS — R60.0 LOWER EXTREMITY EDEMA: ICD-10-CM

## 2023-11-28 DIAGNOSIS — I10 ESSENTIAL HYPERTENSION, BENIGN: ICD-10-CM

## 2023-11-28 DIAGNOSIS — E87.1 HYPONATREMIA: ICD-10-CM

## 2023-11-28 DIAGNOSIS — K59.09 CHRONIC CONSTIPATION: ICD-10-CM

## 2023-11-28 DIAGNOSIS — E66.09 CLASS 2 OBESITY DUE TO EXCESS CALORIES WITH BODY MASS INDEX (BMI) OF 35.0 TO 35.9 IN ADULT, UNSPECIFIED WHETHER SERIOUS COMORBIDITY PRESENT: ICD-10-CM

## 2023-11-28 DIAGNOSIS — L98.9 SKIN LESION: ICD-10-CM

## 2023-11-28 DIAGNOSIS — K22.70 BARRETT'S ESOPHAGUS WITHOUT DYSPLASIA: ICD-10-CM

## 2023-11-28 DIAGNOSIS — E66.812 CLASS 2 OBESITY DUE TO EXCESS CALORIES WITH BODY MASS INDEX (BMI) OF 35.0 TO 35.9 IN ADULT, UNSPECIFIED WHETHER SERIOUS COMORBIDITY PRESENT: ICD-10-CM

## 2023-11-28 DIAGNOSIS — I48.91 ATRIAL FIBRILLATION, UNSPECIFIED TYPE (H): Primary | ICD-10-CM

## 2023-11-28 DIAGNOSIS — R05.3 CHRONIC COUGH: ICD-10-CM

## 2023-11-28 DIAGNOSIS — R13.13 PHARYNGEAL DYSPHAGIA: ICD-10-CM

## 2023-11-28 DIAGNOSIS — S22.41XD CLOSED FRACTURE OF MULTIPLE RIBS OF RIGHT SIDE WITH ROUTINE HEALING, SUBSEQUENT ENCOUNTER: ICD-10-CM

## 2023-11-28 DIAGNOSIS — E22.2 SIADH (SYNDROME OF INAPPROPRIATE ADH PRODUCTION) (H): ICD-10-CM

## 2023-11-28 DIAGNOSIS — F41.1 GAD (GENERALIZED ANXIETY DISORDER): ICD-10-CM

## 2023-11-28 DIAGNOSIS — F10.21 ALCOHOL DEPENDENCE IN REMISSION (H): ICD-10-CM

## 2023-11-28 DIAGNOSIS — C61 PROSTATE CANCER (H): ICD-10-CM

## 2023-11-28 DIAGNOSIS — I71.20 THORACIC AORTIC ANEURYSM WITHOUT RUPTURE, UNSPECIFIED PART (H): ICD-10-CM

## 2023-11-28 DIAGNOSIS — D64.9 NORMOCYTIC ANEMIA: ICD-10-CM

## 2023-11-28 DIAGNOSIS — S32.039D CLOSED FRACTURE OF THIRD LUMBAR VERTEBRA WITH ROUTINE HEALING, UNSPECIFIED FRACTURE MORPHOLOGY, SUBSEQUENT ENCOUNTER: ICD-10-CM

## 2023-11-28 DIAGNOSIS — R00.1 BRADYCARDIA: ICD-10-CM

## 2023-11-28 DIAGNOSIS — Z00.00 PREVENTATIVE HEALTH CARE: ICD-10-CM

## 2023-11-28 PROBLEM — K70.10 ALCOHOLIC HEPATITIS WITHOUT ASCITES (H): Status: RESOLVED | Noted: 2022-03-30 | Resolved: 2023-11-28

## 2023-11-28 PROCEDURE — 99349 HOME/RES VST EST MOD MDM 40: CPT | Performed by: NURSE PRACTITIONER

## 2023-11-28 NOTE — LETTER
11/28/2023        RE: Jamie Valdivia  [place] [address] [place]  [address]  [facility] [address]        No notes on file      Sincerely,        MARICHUY Basilio CNP

## 2023-12-13 ENCOUNTER — PATIENT OUTREACH (OUTPATIENT)
Dept: GERIATRIC MEDICINE | Facility: CLINIC | Age: 83
End: 2023-12-13
Payer: COMMERCIAL

## 2023-12-13 NOTE — PROGRESS NOTES
[facility] Care Coordination Contact    Called member to schedule annual HRA home visit. HRA has been scheduled for 12/19/12 at 10am.    Gisela Hall RN  [facility]  [phone]

## 2023-12-14 ENCOUNTER — PATIENT OUTREACH (OUTPATIENT)
Dept: GERIATRIC MEDICINE | Facility: CLINIC | Age: 83
End: 2023-12-14
Payer: COMMERCIAL

## 2023-12-14 NOTE — PROGRESS NOTES
[facility] Care Coordination Contact    Arranged transportation thru [facility] -[phone] for the below appt:    Appt Date & Time: 12/26 at  1:30pm (arrival time 1:20pm)  Clinic Name & Address:  Dr. Alex Ross [facility], [address], [place]  Transportation Provider: [employer] [phone]   time:  12:20- 12;50  Return ride  time: will call    Appt Date & Time: 12/27 at 10:30am  Clinic Name & Address:  Lab work - [facility] [address] [place]  Transportation Provider: [employer]  [phone]   time:  9:30-10am  Return ride  time: will call    Appt Date & Time: 1/4 @10am (9:45am arrival time)  Clinic Name & Address:  Dr. Niraj Larry [facility], [address] [facility] [address], [place]  Transportation Provider: [employer]   time:  8:45-9:15am  Return ride  time: will call    2 adults RT - both bringing walker (asked for assistance with walkers if needed)    Notified member via letter and CC of  time.    Pat Varma  Case Management Specialist  [facility]  [phone]

## 2023-12-19 ENCOUNTER — PATIENT OUTREACH (OUTPATIENT)
Dept: GERIATRIC MEDICINE | Facility: CLINIC | Age: 83
End: 2023-12-19
Payer: COMMERCIAL

## 2023-12-19 SDOH — HEALTH STABILITY: PHYSICAL HEALTH: ON AVERAGE, HOW MANY MINUTES DO YOU ENGAGE IN EXERCISE AT THIS LEVEL?: 0 MIN

## 2023-12-19 SDOH — HEALTH STABILITY: PHYSICAL HEALTH: ON AVERAGE, HOW MANY DAYS PER WEEK DO YOU ENGAGE IN MODERATE TO STRENUOUS EXERCISE (LIKE A BRISK WALK)?: 0 DAYS

## 2023-12-19 ASSESSMENT — SOCIAL DETERMINANTS OF HEALTH (SDOH)
IN A TYPICAL WEEK, HOW MANY TIMES DO YOU TALK ON THE PHONE WITH FAMILY, FRIENDS, OR NEIGHBORS?: MORE THAN THREE TIMES A WEEK
HOW OFTEN DO YOU GET TOGETHER WITH FRIENDS OR RELATIVES?: THREE TIMES A WEEK
HOW OFTEN DO YOU ATTENT MEETINGS OF THE CLUB OR ORGANIZATION YOU BELONG TO?: NEVER
HOW OFTEN DO YOU ATTEND CHURCH OR RELIGIOUS SERVICES?: MORE THAN 4 TIMES PER YEAR
DO YOU BELONG TO ANY CLUBS OR ORGANIZATIONS SUCH AS CHURCH GROUPS UNIONS, FRATERNAL OR ATHLETIC GROUPS, OR SCHOOL GROUPS?: NO

## 2023-12-19 ASSESSMENT — LIFESTYLE VARIABLES
SKIP TO QUESTIONS 9-10: 0
HOW MANY STANDARD DRINKS CONTAINING ALCOHOL DO YOU HAVE ON A TYPICAL DAY: 1 OR 2
HOW OFTEN DO YOU HAVE SIX OR MORE DRINKS ON ONE OCCASION: LESS THAN MONTHLY
HOW OFTEN DO YOU HAVE A DRINK CONTAINING ALCOHOL: MONTHLY OR LESS
AUDIT-C TOTAL SCORE: 2

## 2023-12-19 NOTE — COMMUNITY RESOURCES LIST (ENGLISH)
12/19/2023   [facility]  N/A  For questions about this resource list or additional care needs, please contact your primary care clinic or care manager.  Phone: [phone]   Email: N/A   Address: [address] [place] [address]   Hours: N/A        Exercise and Recreation       Gym or workout facility  [address]  [address] [place] [address] - Kickboxing Classes Distance: 7.08 miles      In-Person   [address] [place] [address]  Language: English  Hours: Mon - Fri 6:00 AM - 12:30 PM , Mon - Fri 3:00 PM - 8:00 PM , Sat 8:00 AM - 12:00 PM  Fees: Self Pay   Phone: [phone] [phone] Website: [email or web address]     2  [employer] [facility] Distance: 7.95 miles      In-Person   [address] [place] [address]  Language: English  Hours: Mon - Sun Open 24 Hours  Fees: Insurance, Self Pay, Sliding Fee   Phone: (401) 518-2394 Website: [email or web address]          Important Numbers & Websites       Emergency Services   [address]  [place] [address]  Poison Control   (308) 510-4380  Suicide Prevention Lifeline   (642) 711-5082 (TALK)  Child Abuse Hotline   (493) 856-3016 (4-A-Child)  Sexual Assault Hotline   (222) 120-1284 (HOPE)  National Runaway Safeline   (429) 262-4913 (RUNAWAY)  All-Options Talkline   (191) 243-9873  Substance Abuse Referral   (334) 575-2168 (HELP)

## 2023-12-19 NOTE — COMMUNITY RESOURCES LIST (ENGLISH)
12/19/2023   [facility]  N/A  For questions about this resource list or additional care needs, please contact your primary care clinic or care manager.  Phone: [phone]   Email: N/A   Address: [address] [place] [address]   Hours: N/A        Exercise and Recreation       Gym or workout facility  [address]  [address] [place] [address] - Kickboxing Classes Distance: 7.08 miles      In-Person   [address] [place] [address]  Language: English  Hours: Mon - Fri 6:00 AM - 12:30 PM , Mon - Fri 3:00 PM - 8:00 PM , Sat 8:00 AM - 12:00 PM  Fees: Self Pay   Phone: ([phone] [phone] Website: [email or web address]     2  [employer] [facility] Distance: 7.95 miles      In-Person   [address] [place] [address]  Language: English  Hours: Mon - Sun Open 24 Hours  Fees: Insurance, Self Pay, Sliding Fee   Phone: (620) 633-9221 Website: [email or web address]          Important Numbers & Websites       Emergency Services   [address]  [place] [address]  Poison Control   (707) 430-3948  Suicide Prevention Lifeline   (130) 924-1791 (TALK)  Child Abuse Hotline   (434) 517-9667 (4-A-Child)  Sexual Assault Hotline   (447) 645-8270 (HOPE)  National Runaway Safeline   (312) 549-3475 (RUNAWAY)  All-Options Talkline   (656) 374-8323  Substance Abuse Referral   (819) 433-4878 (HELP)

## 2023-12-19 NOTE — Clinical Note
Uriel Ward,  Just finishing up Jamie's annual assessment. No changes.He seemed happier with the little outings he and Gisela have been going on and being able to go to his own appointments. Let me know If you have concerns or questions.  Thanks! Gisela

## 2023-12-19 NOTE — COMMUNITY RESOURCES LIST (ENGLISH)
12/19/2023   [facility]  N/A  For questions about this resource list or additional care needs, please contact your primary care clinic or care manager.  Phone: [phone]   Email: N/A   Address: [address] [place] [address]   Hours: N/A        Exercise and Recreation       Gym or workout facility  [address]  [address] [place] [address] - Kickboxing Classes Distance: 7.08 miles      In-Person   [address] [place] [address]  Language: English  Hours: Mon - Fri 6:00 AM - 12:30 PM , Mon - Fri 3:00 PM - 8:00 PM , Sat 8:00 AM - 12:00 PM  Fees: Self Pay   Phone: ([phone] 100-7388 Website: [email or web address]     2  [employer] [facility] Distance: 7.95 miles      In-Person   [address] [place] [address]  Language: English  Hours: Mon - Sun Open 24 Hours  Fees: Insurance, Self Pay, Sliding Fee   Phone: (643) 267-3480 Website: [email or web address]          Important Numbers & Websites       Emergency Services   [address]  [place] [address]  Poison Control   (402) 663-6457  Suicide Prevention Lifeline   (517) 179-7622 (TALK)  Child Abuse Hotline   (602) 407-9887 (4-A-Child)  Sexual Assault Hotline   (567) 147-2150 (HOPE)  National Runaway Safeline   (436) 800-7393 (RUNAWAY)  All-Options Talkline   (562) 201-1923  Substance Abuse Referral   (621) 799-6188 (HELP)

## 2023-12-19 NOTE — COMMUNITY RESOURCES LIST (ENGLISH)
12/19/2023   [facility]  N/A  For questions about this resource list or additional care needs, please contact your primary care clinic or care manager.  Phone: [phone]   Email: N/A   Address: [address] [place] [id number]   Hours: N/A        Exercise and Recreation       Gym or workout facility  1  [address] [place] [address] - Kickboxing Classes Distance: 7.08 miles      In-Person   [address] [place] [address]  Language: English  Hours: Mon - Fri 6:00 AM - 12:30 PM , Mon - Fri 3:00 PM - 8:00 PM , Sat 8:00 AM - 12:00 PM  Fees: Self Pay   Phone: [phone] Website: [email or web address]     2  [employer] [facility] Distance: 7.95 miles      In-Person   [address] [place] [address]  Language: English  Hours: Mon - Sun Open 24 Hours  Fees: Insurance, Self Pay, Sliding Fee   Phone: [phone] Website: [email or web address]          Important Numbers & Websites       Emergency Services   [address]  [place] [address]  Poison Control   (480) 352-6377  Suicide Prevention Lifeline   (781) 323-8417 (TALK)  Child Abuse Hotline   (412) 790-1354 (4-A-Child)  Sexual Assault Hotline   (228) 270-5614 (HOPE)  National Runaway Safeline   (548) 373-9707 (RUNAWAY)  All-Options Talkline   (692) 461-8812  Substance Abuse Referral   (516) 604-4933 (HELP)

## 2023-12-20 NOTE — PATIENT INSTRUCTIONS
Jamie Valdivia  1940  ORDERS:  -Please obtain vital signs which were missed for monthly visit and leave and NP folder for review  Electronically signed by:   MARICHUY Basilio CNP

## 2023-12-26 ENCOUNTER — DOCUMENTATION ONLY (OUTPATIENT)
Dept: CARDIOLOGY | Facility: CLINIC | Age: 83
End: 2023-12-26

## 2023-12-26 ENCOUNTER — OFFICE VISIT (OUTPATIENT)
Dept: CARDIOLOGY | Facility: CLINIC | Age: 83
End: 2023-12-26
Payer: COMMERCIAL

## 2023-12-26 VITALS
WEIGHT: 216.5 LBS | BODY MASS INDEX: 32.81 KG/M2 | OXYGEN SATURATION: 98 % | HEART RATE: 70 BPM | SYSTOLIC BLOOD PRESSURE: 144 MMHG | DIASTOLIC BLOOD PRESSURE: 77 MMHG | HEIGHT: 68 IN

## 2023-12-26 DIAGNOSIS — I49.5 SSS (SICK SINUS SYNDROME) (H): ICD-10-CM

## 2023-12-26 DIAGNOSIS — R53.81 PHYSICAL DECONDITIONING: ICD-10-CM

## 2023-12-26 DIAGNOSIS — I48.91 ATRIAL FIBRILLATION, UNSPECIFIED TYPE (H): ICD-10-CM

## 2023-12-26 DIAGNOSIS — E66.9 OBESITY (BMI 30-39.9): ICD-10-CM

## 2023-12-26 DIAGNOSIS — F10.27 DEMENTIA ASSOCIATED WITH ALCOHOLISM WITH BEHAVIORAL DISTURBANCE (H): ICD-10-CM

## 2023-12-26 DIAGNOSIS — I71.21 ANEURYSM OF ASCENDING AORTA WITHOUT RUPTURE (H): ICD-10-CM

## 2023-12-26 DIAGNOSIS — R00.1 BRADYCARDIA: ICD-10-CM

## 2023-12-26 DIAGNOSIS — I48.21 PERMANENT ATRIAL FIBRILLATION (H): Primary | ICD-10-CM

## 2023-12-26 DIAGNOSIS — R60.9 DEPENDENT EDEMA: ICD-10-CM

## 2023-12-26 DIAGNOSIS — G31.84 MILD COGNITIVE IMPAIRMENT: ICD-10-CM

## 2023-12-26 DIAGNOSIS — R29.6 REPEATED FALLS: ICD-10-CM

## 2023-12-26 PROCEDURE — 99215 OFFICE O/P EST HI 40 MIN: CPT | Performed by: INTERNAL MEDICINE

## 2023-12-26 NOTE — PROGRESS NOTES
HPI and Plan:   Mr. Garg is a very nice 83-year-old gentleman who I sought consultation in August for bradycardia, permanent atrial fibrillation, multiple frequent falls.  He is here from a memory care center brought by his wife.       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  There was concerns about his wife having dementia.  There appears to be family conflict with the children who feel they are not safe to be home alone.  I am not clear as to who has decision-making authority.     Patient relates he thinks he had a heart attack 30 years ago.  Review of care everywhere including [employer] and [facility] systems I can find no documentation.  It does appear to be that he has had atrial fibrillation with a slow ventricular response for years.  Looking through the records I see 3 admissions in the last year for falls.  Most of which appear to point to a mechanical reason.  There is also reference to frequent nosebleeds.     Patient is tangential and circuitous in his answers to questions.  He relates to having occasional chest discomfort.  In talking to both him and his wife it does not appear that he has any consistent exertional chest discomfort.  He denies shortness of breath orthopnea or PND.  He does not think he has had a cerebrovascular accident.  Other than nosebleeds he does not think he is having any bleeding problems.  He relates he has had no further falls.     Review of records from a variety  of sources including a July 2023 echocardiogram showing ejection fraction of 60 to 65% with mild concentric left ventricle hypertrophy he had moderate aortic insufficiency with a mean gradient of 11 mmHg given a calculated valve area of 1.3.  Pulmonary pressures estimated to be 38 mmHg plus the right atrial pressure.  IVC could not clearly be seen.  Ascending aorta and ascending aortic root were both measured at 4.5 cm.  Twelve-lead EKG shows atrial fibrillation with a rate of 58.  He had normal thyroid function test done in June.  He has normal creatinine and electrolytes.  He has mild anemia at 10.2 that appears to be fairly chronic.    Since I have seen him last I did a 7-day Zio patch which shows 100% atrial fibrillation with a rate ranging from 28-1 09 averaging 58 bpm.  He was noted to have intermittent bundle branch block and 3 pauses longest of 3.3 seconds.    He returns stating he is drinking much less alcohol.  He states he is exercising daily.  He is being much more careful so he does not fall.  Obviously he is in a more controlled situation now.     Assessment and plan.  I have no clear story that he has ischemic symptoms.  His echocardiogram does not demonstrate any wall motion abnormalities with a good ejection fraction.  At this time I am not planning on any stress testing     No clear congestive heart failure.  Again he has excellent systolic function he could be at risk for diastolic dysfunction and atrial fibrillation.  We will evaluate this further going forward.     He appears to have permanent atrial fibrillation.  He does appear to have a component of sick sinus syndrome.  We talked about a pacemaker but he is not at all interested.    We talked about the fact that he has A-fib which puts him at 5 times the risk for stroke.  He appears to have a [id number] score of approximately 5 which would give a mild risk range of 7.7 to 12 %/year.  We did discuss this.  He states it sounds fairly low.   He does not want to go on anticoagulation at this time     He may be too high a fall risk to consider anticoagulation.  We could consider a Watchman device.  We discussed this option which she is also not inclined to proceed.     think it is most important to figure out who I needed to have these discussions with and who is the appropriate decision maker.  He and his wife both do not want the children involved.  He wants communication directly from us and not through his facility.  He has had neurocognitive testing and we will need to figure out how to proceed.  I will have my nurses follow-up to see if there is more definitive answer or evaluation.     At this time he does not want a pacemaker, anticoagulation or watchman.  I will make an appointment to follow-up with my JANEE in 3 months.  I will follow-up in 6 months.  I will have my nurses contact his memory center to see if there is a determination of who is the decision maker.     Thank you for allow me to participate in this patient's care.  Sincerely,                               Alex Ross MD [facility]      Today's clinic visit entailed:  Review of the result(s) of each unique test - event monitor  Ordering of each unique test  Prescription drug management  42 minutes spent by me on the date of the encounter doing chart review, history and exam, documentation and further activities per the note  Provider  Link to [facility] Help Grid     The level of medical decision making during this visit was of high complexity.      Orders Placed This Encounter   Procedures    Follow-Up with Cardiology    Follow-Up with Cardiology JANEE       No orders of the defined types were placed in this encounter.      There are no discontinued medications.      Encounter Diagnoses   Name Primary?    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)     Permanent atrial fibrillation (H) Yes    SSS (sick sinus  syndrome) (H)     Mild cognitive impairment     Obesity (BMI 30-39.9)     Physical deconditioning     Repeated falls     Dependent edema     Aneurysm of ascending aorta without rupture (H24)        CURRENT MEDICATIONS:  Current Outpatient Medications   Medication Sig Dispense Refill    acetaminophen (TYLENOL) 500 MG tablet Take 2 tablets (1,000 mg) by mouth 3 times daily as needed for mild pain 10 tablet 98    alum & mag hydroxide-simethicone (MAALOX MAX) 400-400-40 MG/5ML SUSP suspension Take 30 mLs by mouth every 6 hours as needed for indigestion 355 mL 3    gabapentin (NEURONTIN) 100 MG capsule Take 1 capsule (100 mg) by mouth daily AND 2 capsules (200 mg) At Bedtime. May also take 1 capsule (100 mg) 2 times daily as needed (anxiety). 100 capsule 98    guaiFENesin (ROBITUSSIN) 20 mg/mL liquid Take 10 mLs by mouth every 4 hours as needed for cough 355 mL 98    loratadine (CLARITIN) 10 MG tablet Take 1 tablet (10 mg) by mouth At Bedtime 30 tablet 98    menthol-zinc oxide (CALMOSEPTINE) 0.44-20.6 % OINT ointment Apply 1 g topically 2 times daily. May also apply 1 g 2 times daily as needed for skin protection. 113 g 98    mirtazapine (REMERON) 15 MG tablet Take 1 tablet (15 mg) by mouth At Bedtime 30 tablet 98    nystatin (MYCOSTATIN) 151693 UNIT/GM external powder Apply topically 2 times daily as needed      omeprazole (PRILOSEC) 40 MG DR capsule TAKE 1 CAPSULE BY MOUTH TWICE DAILY 180 capsule 97    oxymetazoline (AFRIN) 0.05 % nasal spray Spray 2 sprays into both nostrils 2 times daily as needed (nose bleed)      polyethylene glycol (MIRALAX) 17 GM/Dose powder MIX 2 CAPFULS IN 8OZ OF ORANGE JUICE  IN THE MORNING;AND MAY MIX 2 CAPFULS ONCE DAILY AS NEEDED FOR CONSTIPATION. MIX IN FRONT OF PT. HOLD FOR LOOSE STOOL. OK TO GIVE 1 CAPFUL IF RESIDENT REFUSES 2 CAPFULS. 510 g 97    QUEtiapine (SEROQUEL) 25 MG tablet TAKE ONE-HALF TABLET (12.5MG) BY MOUTH TWICE DAILY;& MAY TAKE ONE-HALF TABLET (12.5MG) EVERY 12 HOURS AS  NEEDED 180 tablet 97    White Petrolatum ointment Apply to BL nares q HS 70 g 98       ALLERGIES     Allergies   Allergen Reactions    Ativan [Lorazepam]        PAST MEDICAL HISTORY:  Past Medical History:   Diagnosis Date    Age-related osteoporosis without current pathological fracture 03/30/2022    Alcohol abuse 11/19/2017    Alcohol dependence with withdrawal (H)     Alcoholism (H)     Back pain     Backache 12/19/2018    CAD (coronary artery disease)     Chronic a-fib (H)     Chronic alcohol use 06/11/2015    Formatting of this note might be different from the original. 2/26/2019: serious fall at home with multiple vertebral fractures.  BAL 0.414% on admission.  Denies that he drinks much. 12/18/2018: hospitalized for fall due to alcohol intoxication.  BAL 0.34% on admission. 9/16/2018: hospitalized for injuries from fall due to alcohol intoxication.  BAL 0.34% on admission    Chronic atrial fibrillation (H) 07/15/2016    Formatting of this note might be different from the original. 2/2019: Multiple falls so started on aspirin only.  Then aspirin stopped due to GI bleed.    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 2/2/21    HTN (hypertension)     Impaired gait and mobility 03/14/2019    Mild cognitive impairment 08/12/2019    Formatting of this note might be different from the original. NON-AMNESTIC TYPE    Mild TBI (traumatic brain injury) (H) 03/14/2019    Mumps     Obesity (BMI 30-39.9) 09/03/2015    Osteoarthritis     Osteoarthritis of both knees 05/13/2015    Osteoporosis      Other idiopathic peripheral autonomic neuropathy 03/30/2022    Other insomnia 03/14/2019    Other seizures (H) 03/30/2022    Palpitations     Peripheral neuropathy     Pharyngeal dysphagia 05/13/2015    Formatting of this note might be different from the original. Likely secondary to GERD with esophagitis, on PPI and H2 blocker with notable improvement, EGD 10/5/15.    Physical deconditioning 03/14/2019    Recurrent major depressive disorder (H24) 03/30/2022    Rhabdomyolysis     Thrombocytopenia (H24)     Urination disorder     Weakness 04/27/2020       PAST SURGICAL HISTORY:  Past Surgical History:   Procedure Laterality Date    BIOPSY PROSTATE TRANSRECTAL N/A 1/11/2023    Procedure: PROSTATE BIOPSY, RECTAL APPROACH;  Surgeon: Niraj Larry MD;  Location:  OR    COLONOSCOPY      ESOPHAGOSCOPY, GASTROSCOPY, DUODENOSCOPY (EGD), COMBINED N/A 06/01/2022    Procedure: ESOPHAGOGASTRODUODENOSCOPY (EGD) mngi with biopsies using jumbo cold biopsy forceps;  Surgeon: David Springer MD;  Location:  GI    left hip replacement  2010    left shoulder replacement  2016    ORTHOPEDIC SURGERY      SPINE SURGERY      done in [place]    TONSILLECTOMY      TRANSRECTAL ULTRASONIC SONOGRAM N/A 1/11/2023    Procedure: TRANSRECTAL ULTRASOUND;  Surgeon: Niraj Larry MD;  Location:  OR       FAMILY HISTORY:  Family History   Problem Relation Age of Onset    Osteoporosis Mother     Prostate Cancer Paternal Grandfather     Colon Cancer No family hx of        SOCIAL HISTORY:  Social History     Socioeconomic History    Marital status:      Spouse name: None    Number of children: None    Years of education: None    Highest education level: None   Tobacco Use    Smoking status: Never    Smokeless tobacco: Never   Substance and Sexual Activity    Alcohol use: Not Currently     Comment: not since December 2021    Drug use: Never     Social Determinants of Health     Financial Resource Strain: Low Risk   (12/19/2023)    Financial Resource Strain     Within the past 12 months, have you or your family members you live with been unable to get utilities (heat, electricity) when it was really needed?: No   Food Insecurity: Low Risk  (12/19/2023)    Food Insecurity     Within the past 12 months, did you worry that your food would run out before you got money to buy more?: No     Within the past 12 months, did the food you bought just not last and you didn t have money to get more?: No   Transportation Needs: Low Risk  (12/19/2023)    Transportation Needs     Within the past 12 months, has lack of transportation kept you from medical appointments, getting your medicines, non-medical meetings or appointments, work, or from getting things that you need?: No   Physical Activity: Inactive (12/19/2023)    Exercise Vital Sign     Days of Exercise per Week: 0 days     Minutes of Exercise per Session: 0 min   Stress: No Stress Concern Present (12/19/2023)    [employer] - Occupational Stress Questionnaire     Feeling of Stress : Only a little   Social Connections: Moderately Integrated (12/19/2023)    Social Connection and Isolation Panel [NHANES]     Frequency of Communication with Friends and Family: More than three times a week     Frequency of Social Gatherings with Friends and Family: Three times a week     Attends Synagogue Services: More than 4 times per year     Active Member of Clubs or Organizations: No     Attends Club or Organization Meetings: Never     Marital Status:    Interpersonal Safety: Low Risk  (12/19/2023)    Interpersonal Safety     Do you feel physically and emotionally safe where you currently live?: Yes     Within the past 12 months, have you been hit, slapped, kicked or otherwise physically hurt by someone?: No     Within the past 12 months, have you been humiliated or emotionally abused in other ways by your partner or ex-partner?: No   Housing Stability: Low Risk   "(12/19/2023)    Housing Stability     Do you have housing? : Yes     Are you worried about losing your housing?: No       Review of Systems:  Skin:  Positive for bruising     Eyes:  Negative      ENT:  Negative      Respiratory:  Negative       Cardiovascular:  Negative;palpitations;syncope or near-syncope;cyanosis;fatigue;Negative for;edema Positive for;exercise intolerance;lightheadedness;dizziness some tightness in the chest once in awhile -- about the same.  Gastroenterology: Positive for reflux;constipation treated and controlled  Genitourinary:  Negative      Musculoskeletal:  Negative   pain in the feet, more so right than the left  Neurologic:  Positive for memory problems    Psychiatric:  Negative      Heme/Lymph/Imm:  Negative      Endocrine:  Negative        Physical Exam:  Vitals: BP (!) 144/77 (BP Location: Left arm, Patient Position: Sitting)   Pulse 70   Ht 1.727 m (5' 8\")   Wt 98.2 kg (216 lb 8 oz)   SpO2 98%   BMI 32.92 kg/m      Constitutional:  cooperative, alert and oriented, well developed, well nourished, in no acute distress   Hard of hearing, easily confused, tangential, loquacious    Skin:  warm and dry to the touch, no apparent skin lesions or masses noted          Head:  normocephalic, no masses or lesions        Eyes:  pupils equal and round, conjunctivae and lids unremarkable, sclera white, no xanthalasma, EOMS intact, no nystagmus        Lymph:      ENT:  no pallor or cyanosis, dentition good        Neck:  no carotid bruit        Respiratory:  normal breath sounds, clear to auscultation, normal A-P diameter, normal symmetry, normal respiratory excursion, no use of accessory muscles         Cardiac:   irregularly irregular rhythm       systolic ejection murmur;grade 1;RUSB   Controlled ventricular response    pulses below the femoral arteries are diminished                                      GI:    obese      Extremities and Muscular Skeletal:  no edema;no spinal abnormalities " noted;normal muscle strength and tone              Neurological:  no gross motor deficits        Psych:  affect appropriate, oriented to time, person and place        CC  Alex Ross MD  [address]  [place] [id number]

## 2023-12-26 NOTE — PROGRESS NOTES
"Alex Ross MD  [facility] Heart Team 2  What is the status with medical decision making.  Is he demented or is he able to make his own decisions.  Please check with assisted living etc.      Called [facility] and they said nothing has changed. They were not able to go with through with the court-appointed guardianship/conservatorship process. Family is aware of patient (and wifes) limited cognition but respect his wish to decline Watchman.       Neuropsych MD note 5/2023- \"I believe that Jamie is not capable of making reasoned decisions. He should continue to receive a high-level of care that includes other managing medications, finances, and limiting access to alcohol.\"     Geriatric services progress note from 11/28-  \"Estranged from family who are listed his health care agents, but have elected not to move him off of memory care unit.  Discussed cardiology recommendation again today, poor candidate for anticoagulation, and could be considered for a Watchman device.  Pt/family ware of increased stroke risk without intervention. Decline Watchman.        Addendum 12/29: Copy of cardiology visit note faxed to patient's AL.   "

## 2023-12-26 NOTE — LETTER
12/26/2023    Sylvia Stein, APRN CNP  [address] [facility] [address]    RE: Jamie Galvanalda       Dear Colleague,     I had the pleasure of seeing Jamie Valdivia in the [facility].  HPI and Plan:   Mr. Garg is a very nice 83-year-old gentleman who I sought consultation in August for bradycardia, permanent atrial fibrillation, multiple frequent falls.  He is here from a memory care center brought by his wife.       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  There was concerns about his wife having dementia.  There appears to be family conflict with the children who feel they are not safe to be home alone.  I am not clear as to who has decision-making authority.     Patient relates he thinks he had a heart attack 30 years ago.  Review of care everywhere including [facility] and [facility] systems I can find no documentation.  It does appear to be that he has had atrial fibrillation with a slow ventricular response for years.  Looking through the records I see 3 admissions in the last year for falls.  Most of which appear to point to a mechanical reason.  There is also reference to frequent nosebleeds.     Patient is tangential and circuitous in his answers to questions.  He relates to having occasional chest discomfort.  In talking to both him and his wife it does not appear that he has any consistent exertional chest discomfort.  He denies shortness of breath orthopnea or  PND.  He does not think he has had a cerebrovascular accident.  Other than nosebleeds he does not think he is having any bleeding problems.  He relates he has had no further falls.     Review of records from a variety of sources including a July 2023 echocardiogram showing ejection fraction of 60 to 65% with mild concentric left ventricle hypertrophy he had moderate aortic insufficiency with a mean gradient of 11 mmHg given a calculated valve area of 1.3.  Pulmonary pressures estimated to be 38 mmHg plus the right atrial pressure.  IVC could not clearly be seen.  Ascending aorta and ascending aortic root were both measured at 4.5 cm.  Twelve-lead EKG shows atrial fibrillation with a rate of 58.  He had normal thyroid function test done in June.  He has normal creatinine and electrolytes.  He has mild anemia at 10.2 that appears to be fairly chronic.    Since I have seen him last I did a 7-day Zio patch which shows 100% atrial fibrillation with a rate ranging from 28-1 09 averaging 58 bpm.  He was noted to have intermittent bundle branch block and 3 pauses longest of 3.3 seconds.    He returns stating he is drinking much less alcohol.  He states he is exercising daily.  He is being much more careful so he does not fall.  Obviously he is in a more controlled situation now.     Assessment and plan.  I have no clear story that he has ischemic symptoms.  His echocardiogram does not demonstrate any wall motion abnormalities with a good ejection fraction.  At this time I am not planning on any stress testing     No clear congestive heart failure.  Again he has excellent systolic function he could be at risk for diastolic dysfunction and atrial fibrillation.  We will evaluate this further going forward.     He appears to have permanent atrial fibrillation.  He does appear to have a component of sick sinus syndrome.  We talked about a pacemaker but he is not at all interested.    We talked about the fact that he has A-fib  which puts him at 5 times the risk for stroke.  He appears to have a [id number] score of approximately 5 which would give a mild risk range of 7.7 to 12 %/year.  We did discuss this.  He states it sounds fairly low.  He does not want to go on anticoagulation at this time     He may be too high a fall risk to consider anticoagulation.  We could consider a Watchman device.  We discussed this option which she is also not inclined to proceed.     think it is most important to figure out who I needed to have these discussions with and who is the appropriate decision maker.  He and his wife both do not want the children involved.  He wants communication directly from us and not through his facility.  He has had neurocognitive testing and we will need to figure out how to proceed.  I will have my nurses follow-up to see if there is more definitive answer or evaluation.     At this time he does not want a pacemaker, anticoagulation or watchman.  I will make an appointment to follow-up with my JANEE in 3 months.  I will follow-up in 6 months.  I will have my nurses contact his memory center to see if there is a determination of who is the decision maker.     Thank you for allow me to participate in this patient's care.  Sincerely,                               Alex Ross MD [facility]      Today's clinic visit entailed:  Review of the result(s) of each unique test - event monitor  Ordering of each unique test  Prescription drug management  42 minutes spent by me on the date of the encounter doing chart review, history and exam, documentation and further activities per the note  Provider  Link to [facility] Help Grid     The level of medical decision making during this visit was of high complexity.      Orders Placed This Encounter   Procedures    Follow-Up with Cardiology    Follow-Up with Cardiology JANEE       No orders of the defined types were placed in this encounter.      There are no discontinued medications.      Encounter  Diagnoses   Name Primary?    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)     Permanent atrial fibrillation (H) Yes    SSS (sick sinus syndrome) (H)     Mild cognitive impairment     Obesity (BMI 30-39.9)     Physical deconditioning     Repeated falls     Dependent edema     Aneurysm of ascending aorta without rupture (H24)        CURRENT MEDICATIONS:  Current Outpatient Medications   Medication Sig Dispense Refill    acetaminophen (TYLENOL) 500 MG tablet Take 2 tablets (1,000 mg) by mouth 3 times daily as needed for mild pain 10 tablet 98    alum & mag hydroxide-simethicone (MAALOX MAX) 400-400-40 MG/5ML SUSP suspension Take 30 mLs by mouth every 6 hours as needed for indigestion 355 mL 3    gabapentin (NEURONTIN) 100 MG capsule Take 1 capsule (100 mg) by mouth daily AND 2 capsules (200 mg) At Bedtime. May also take 1 capsule (100 mg) 2 times daily as needed (anxiety). 100 capsule 98    guaiFENesin (ROBITUSSIN) 20 mg/mL liquid Take 10 mLs by mouth every 4 hours as needed for cough 355 mL 98    loratadine (CLARITIN) 10 MG tablet Take 1 tablet (10 mg) by mouth At Bedtime 30 tablet 98    menthol-zinc oxide (CALMOSEPTINE) 0.44-20.6 % OINT ointment Apply 1 g topically 2 times daily. May also apply 1 g 2 times daily as needed for skin protection. 113 g 98    mirtazapine (REMERON) 15 MG tablet Take 1 tablet (15 mg) by mouth At Bedtime 30 tablet 98    nystatin (MYCOSTATIN) 687320 UNIT/GM external powder Apply topically 2 times daily as needed      omeprazole (PRILOSEC) 40 MG DR capsule TAKE 1 CAPSULE BY MOUTH TWICE DAILY 180 capsule 97    oxymetazoline (AFRIN) 0.05 % nasal spray Spray 2 sprays into both nostrils 2 times daily as needed (nose bleed)      polyethylene glycol (MIRALAX) 17 GM/Dose powder MIX 2 CAPFULS IN 8OZ OF ORANGE JUICE  IN THE MORNING;AND MAY MIX 2 CAPFULS ONCE DAILY AS NEEDED FOR CONSTIPATION. MIX IN FRONT OF PT. HOLD  FOR LOOSE STOOL. OK TO GIVE 1 CAPFUL IF RESIDENT REFUSES 2 CAPFULS. 510 g 97    QUEtiapine (SEROQUEL) 25 MG tablet TAKE ONE-HALF TABLET (12.5MG) BY MOUTH TWICE DAILY;& MAY TAKE ONE-HALF TABLET (12.5MG) EVERY 12 HOURS AS NEEDED 180 tablet 97    White Petrolatum ointment Apply to BL nares q HS 70 g 98       ALLERGIES     Allergies   Allergen Reactions    Ativan [Lorazepam]        PAST MEDICAL HISTORY:  Past Medical History:   Diagnosis Date    Age-related osteoporosis without current pathological fracture 03/30/2022    Alcohol abuse 11/19/2017    Alcohol dependence with withdrawal (H)     Alcoholism (H)     Back pain     Backache 12/19/2018    CAD (coronary artery disease)     Chronic a-fib (H)     Chronic alcohol use 06/11/2015    Formatting of this note might be different from the original. 2/26/2019: serious fall at home with multiple vertebral fractures.  BAL 0.414% on admission.  Denies that he drinks much. 12/18/2018: hospitalized for fall due to alcohol intoxication.  BAL 0.34% on admission. 9/16/2018: hospitalized for injuries from fall due to alcohol intoxication.  BAL 0.34% on admission    Chronic atrial fibrillation (H) 07/15/2016    Formatting of this note might be different from the original. 2/2019: Multiple falls so started on aspirin only.  Then aspirin stopped due to GI bleed.    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 2/2/21    HTN (hypertension)     Impaired gait and mobility 03/14/2019    Mild cognitive impairment 08/12/2019    Formatting of this note might be  different from the original. NON-AMNESTIC TYPE    Mild TBI (traumatic brain injury) (H) 03/14/2019    Mumps     Obesity (BMI 30-39.9) 09/03/2015    Osteoarthritis     Osteoarthritis of both knees 05/13/2015    Osteoporosis     Other idiopathic peripheral autonomic neuropathy 03/30/2022    Other insomnia 03/14/2019    Other seizures (H) 03/30/2022    Palpitations     Peripheral neuropathy     Pharyngeal dysphagia 05/13/2015    Formatting of this note might be different from the original. Likely secondary to GERD with esophagitis, on PPI and H2 blocker with notable improvement, EGD 10/5/15.    Physical deconditioning 03/14/2019    Recurrent major depressive disorder (H24) 03/30/2022    Rhabdomyolysis     Thrombocytopenia (H24)     Urination disorder     Weakness 04/27/2020       PAST SURGICAL HISTORY:  Past Surgical History:   Procedure Laterality Date    BIOPSY PROSTATE TRANSRECTAL N/A 1/11/2023    Procedure: PROSTATE BIOPSY, RECTAL APPROACH;  Surgeon: Niraj Larry MD;  Location:  OR    COLONOSCOPY      ESOPHAGOSCOPY, GASTROSCOPY, DUODENOSCOPY (EGD), COMBINED N/A 06/01/2022    Procedure: ESOPHAGOGASTRODUODENOSCOPY (EGD) mngi with biopsies using jumbo cold biopsy forceps;  Surgeon: David Springer MD;  Location:  GI    left hip replacement  2010    left shoulder replacement  2016    ORTHOPEDIC SURGERY      SPINE SURGERY      done in [place]    TONSILLECTOMY      TRANSRECTAL ULTRASONIC SONOGRAM N/A 1/11/2023    Procedure: TRANSRECTAL ULTRASOUND;  Surgeon: Niraj Larry MD;  Location:  OR       FAMILY HISTORY:  Family History   Problem Relation Age of Onset    Osteoporosis Mother     Prostate Cancer Paternal Grandfather     Colon Cancer No family hx of        SOCIAL HISTORY:  Social History     Socioeconomic History    Marital status:      Spouse name: None    Number of children: None    Years of education: None    Highest education level: None   Tobacco Use    Smoking status: Never     Smokeless tobacco: Never   Substance and Sexual Activity    Alcohol use: Not Currently     Comment: not since December 2021    Drug use: Never     Social Determinants of Health     Financial Resource Strain: Low Risk  (12/19/2023)    Financial Resource Strain     Within the past 12 months, have you or your family members you live with been unable to get utilities (heat, electricity) when it was really needed?: No   Food Insecurity: Low Risk  (12/19/2023)    Food Insecurity     Within the past 12 months, did you worry that your food would run out before you got money to buy more?: No     Within the past 12 months, did the food you bought just not last and you didn t have money to get more?: No   Transportation Needs: Low Risk  (12/19/2023)    Transportation Needs     Within the past 12 months, has lack of transportation kept you from medical appointments, getting your medicines, non-medical meetings or appointments, work, or from getting things that you need?: No   Physical Activity: Inactive (12/19/2023)    Exercise Vital Sign     Days of Exercise per Week: 0 days     Minutes of Exercise per Session: 0 min   Stress: No Stress Concern Present (12/19/2023)    [employer] - Occupational Stress Questionnaire     Feeling of Stress : Only a little   Social Connections: Moderately Integrated (12/19/2023)    Social Connection and Isolation Panel [NHANES]     Frequency of Communication with Friends and Family: More than three times a week     Frequency of Social Gatherings with Friends and Family: Three times a week     Attends Tenriism Services: More than 4 times per year     Active Member of Clubs or Organizations: No     Attends Club or Organization Meetings: Never     Marital Status:    Interpersonal Safety: Low Risk  (12/19/2023)    Interpersonal Safety     Do you feel physically and emotionally safe where you currently live?: Yes     Within the past 12 months, have you been hit,  "slapped, kicked or otherwise physically hurt by someone?: No     Within the past 12 months, have you been humiliated or emotionally abused in other ways by your partner or ex-partner?: No   Housing Stability: Low Risk  (12/19/2023)    Housing Stability     Do you have housing? : Yes     Are you worried about losing your housing?: No       Review of Systems:  Skin:  Positive for bruising     Eyes:  Negative      ENT:  Negative      Respiratory:  Negative       Cardiovascular:  Negative;palpitations;syncope or near-syncope;cyanosis;fatigue;Negative for;edema Positive for;exercise intolerance;lightheadedness;dizziness some tightness in the chest once in awhile -- about the same.  Gastroenterology: Positive for reflux;constipation treated and controlled  Genitourinary:  Negative      Musculoskeletal:  Negative   pain in the feet, more so right than the left  Neurologic:  Positive for memory problems    Psychiatric:  Negative      Heme/Lymph/Imm:  Negative      Endocrine:  Negative        Physical Exam:  Vitals: BP (!) 144/77 (BP Location: Left arm, Patient Position: Sitting)   Pulse 70   Ht 1.727 m (5' 8\")   Wt 98.2 kg (216 lb 8 oz)   SpO2 98%   BMI 32.92 kg/m      Constitutional:  cooperative, alert and oriented, well developed, well nourished, in no acute distress   Hard of hearing, easily confused, tangential, loquacious    Skin:  warm and dry to the touch, no apparent skin lesions or masses noted          Head:  normocephalic, no masses or lesions        Eyes:  pupils equal and round, conjunctivae and lids unremarkable, sclera white, no xanthalasma, EOMS intact, no nystagmus        Lymph:      ENT:  no pallor or cyanosis, dentition good        Neck:  no carotid bruit        Respiratory:  normal breath sounds, clear to auscultation, normal A-P diameter, normal symmetry, normal respiratory excursion, no use of accessory muscles         Cardiac:   irregularly irregular rhythm       systolic ejection murmur;grade " 1;RUSB   Controlled ventricular response    pulses below the femoral arteries are diminished                                      GI:    obese      Extremities and Muscular Skeletal:  no edema;no spinal abnormalities noted;normal muscle strength and tone              Neurological:  no gross motor deficits        Psych:  affect appropriate, oriented to time, person and place        CC  Alex Ross MD  [address]  [place] [address]                  Thank you for allowing me to participate in the care of your patient.      Sincerely,     Alex Ross MD     [facility]

## 2023-12-27 ENCOUNTER — LAB (OUTPATIENT)
Dept: LAB | Facility: CLINIC | Age: 83
End: 2023-12-27
Payer: COMMERCIAL

## 2023-12-27 ENCOUNTER — DOCUMENTATION ONLY (OUTPATIENT)
Dept: LAB | Facility: CLINIC | Age: 83
End: 2023-12-27

## 2023-12-27 DIAGNOSIS — C61 PROSTATE CANCER (H): ICD-10-CM

## 2023-12-27 DIAGNOSIS — C61 PROSTATE CANCER (H): Primary | ICD-10-CM

## 2023-12-27 LAB — PSA SERPL DL<=0.01 NG/ML-MCNC: 1.97 NG/ML

## 2023-12-27 PROCEDURE — 84153 ASSAY OF PSA TOTAL: CPT

## 2023-12-27 PROCEDURE — 36415 COLL VENOUS BLD VENIPUNCTURE: CPT

## 2023-12-27 NOTE — PROGRESS NOTES
Jamie Valdivia has an upcoming lab appointment:    Future Appointments   Date Time Provider Department Center   12/27/2023 10:30 AM [facility] LAB [facility]   1/4/2024 10:00 AM Niraj Larry MD [facility]B PHY [facility]   3/14/2024 11:00 AM Lora Talavera PA-C [facility] PSA CLIN   6/28/2024 11:30 AM Alex Ross MD [facility] PSA CLIN     Just need date changed to draw PSA today    There is no order available. Please review and place either future orders or HMPO (Review of Health Maintenance Protocol Orders), as appropriate.    Health Maintenance Due   Topic    MICROALBUMIN     ANNUAL REVIEW OF HM ORDERS     A1C     LIPID      Jackie Power

## 2024-01-04 ENCOUNTER — OFFICE VISIT (OUTPATIENT)
Dept: UROLOGY | Facility: CLINIC | Age: 84
End: 2024-01-04
Payer: COMMERCIAL

## 2024-01-04 VITALS
DIASTOLIC BLOOD PRESSURE: 74 MMHG | HEART RATE: 81 BPM | BODY MASS INDEX: 32.74 KG/M2 | WEIGHT: 216 LBS | SYSTOLIC BLOOD PRESSURE: 149 MMHG | HEIGHT: 68 IN

## 2024-01-04 DIAGNOSIS — R35.0 URINARY FREQUENCY: ICD-10-CM

## 2024-01-04 DIAGNOSIS — C61 PROSTATE CANCER (H): Primary | ICD-10-CM

## 2024-01-04 PROCEDURE — 99213 OFFICE O/P EST LOW 20 MIN: CPT | Performed by: STUDENT IN AN ORGANIZED HEALTH CARE EDUCATION/TRAINING PROGRAM

## 2024-01-04 ASSESSMENT — PAIN SCALES - GENERAL: PAINLEVEL: NO PAIN (0)

## 2024-01-04 NOTE — PROGRESS NOTES
"CHIEF COMPLAINT   Jamie Valdivia who is a 83 year old male returns today for follow-up of of intermediate unfavorable risk prostate cancer (iPSA 11.3, [place] 4+3=7, 3/12 cores, dx 1/11/23) now s/p EBRT w/o ADT, completed 4/12/2023 ([place])    HPI   Jamie Valdivia is a 83 year old male returns today for follow-up of of intermediate unfavorable risk prostate cancer (iPSA 11.3, [place] 4+3=7, 3/12 cores, dx 1/11/23) now s/p EBRT w/o ADT, completed 4/12/2023 ([place]), urinary frequency    He feels ok after radiation, doesn't remember any significant s/e    Still having significant urinary frequency    PHYSICAL EXAM  Patient is a 83 year old  male   Vitals: Blood pressure (!) 149/74, pulse 81, height 1.727 m (5' 8\"), weight 98 kg (216 lb).  Body mass index is 32.84 kg/m .  General Appearance Adult:   Frail, uses walker  HENT: throat/mouth:normal, good dentition  Lungs: no respiratory distress, or pursed lip breathing  Heart: No obvious jugular venous distension present  Abdomen: nondistended  Musculoskeltal: extremities normal, no peripheral edema  Skin: no suspicious lesions or rashes  Neuro: Alert, oriented, speech and mentation normal  Psych: affect and mood normal    Component      Latest Ref Rng 4/27/2023  5:15 AM 9/6/2023  7:59 AM 12/27/2023  10:36 AM   PSA Tumor Marker      ng/mL 4.06  2.67  1.97        ASSESSMENT and PLAN  83 year old male returns today for follow-up of of intermediate unfavorable risk prostate cancer (iPSA 11.3, [place] 4+3=7, 3/12 cores, dx 1/11/23) now s/p EBRT w/o ADT, completed 4/12/2023 ([place])    PSA down to 1.97 (new marcella after radiation)  Will continue to monitor, hopefully does not rise once again    Re: urinary frequency. I discussed starting an alpha blocker but he declines at this time. His wife asks if the radiation may have caused damage to the bladder; urinary symptoms are common after radiation but it is not clear if this is due to the radiation vs. Simply BPH with " obstruction.    Return 6 months with PSA prior, UA, PVR, AUA symptom score    Niraj Larry MD   [facility]  [facility] Phone: [phone]

## 2024-01-04 NOTE — NURSING NOTE
Chief Complaint   Patient presents with    Prostate Cancer     3 months with PSA      April Nolasco CMA

## 2024-01-04 NOTE — LETTER
"1/4/2024       RE: Jamie Valdivia  [address]  [address]  [facility] [address]     Dear Colleague,    Thank you for referring your patient, Jamie Valdivia, to the [facility] [place] [facility]. Please see a copy of my visit note below.    CHIEF COMPLAINT   Jamie Valdivia who is a 83 year old male returns today for follow-up of of intermediate unfavorable risk prostate cancer (iPSA 11.3, [place] 4+3=7, 3/12 cores, dx 1/11/23) now s/p EBRT w/o ADT, completed 4/12/2023 ([place])    HPI   Jamie Valdivia is a 83 year old male returns today for follow-up of of intermediate unfavorable risk prostate cancer (iPSA 11.3, [place] 4+3=7, 3/12 cores, dx 1/11/23) now s/p EBRT w/o ADT, completed 4/12/2023 ([place]), urinary frequency    He feels ok after radiation, doesn't remember any significant s/e    Still having significant urinary frequency    PHYSICAL EXAM  Patient is a 83 year old  male   Vitals: Blood pressure (!) 149/74, pulse 81, height 1.727 m (5' 8\"), weight 98 kg (216 lb).  Body mass index is 32.84 kg/m .  General Appearance Adult:   Frail, uses walker  HENT: throat/mouth:normal, good dentition  Lungs: no respiratory distress, or pursed lip breathing  Heart: No obvious jugular venous distension present  Abdomen: nondistended  Musculoskeltal: extremities normal, no peripheral edema  Skin: no suspicious lesions or rashes  Neuro: Alert, oriented, speech and mentation normal  Psych: affect and mood normal    Component      Latest Ref Rng 4/27/2023  5:15 AM 9/6/2023  7:59 AM 12/27/2023  10:36 AM   PSA Tumor Marker      ng/mL 4.06  2.67  1.97        ASSESSMENT and PLAN  83 year old male returns today for follow-up of of intermediate unfavorable risk prostate cancer (iPSA 11.3, [place] 4+3=7, 3/12 cores, dx 1/11/23) now s/p EBRT w/o ADT, completed 4/12/2023 ([place])    PSA down to 1.97 (new marcella after radiation)  Will continue to " monitor, hopefully does not rise once again    Re: urinary frequency. I discussed starting an alpha blocker but he declines at this time. His wife asks if the radiation may have caused damage to the bladder; urinary symptoms are common after radiation but it is not clear if this is due to the radiation vs. Simply BPH with obstruction.    Return 6 months with PSA prior, UA, PVR, AUA symptom score    Nirja Larry MD   [facility]  [facility] Phone: [phone]

## 2024-01-12 ENCOUNTER — DOCUMENTATION ONLY (OUTPATIENT)
Dept: GERIATRICS | Facility: CLINIC | Age: 84
End: 2024-01-12
Payer: COMMERCIAL

## 2024-01-12 NOTE — PROGRESS NOTES
[facility] Care Coordination Contact    [facility] Home Visit Assessment     Home visit for Health Risk Assessment with Jamie Valdivia completed on December 19, 2023    Type of residence:: Assisted living  Current living arrangement:: I live in assisted living     Assessment completed with:: Patient, Family    Current Care Plan  Member currently receiving the following home care services:  None  Member currently receiving the following community resources: DME    Medication Review  Medication reconciliation completed in Epic: Yes  Medication set-up & administration: RN set up  AL manages meds .  Assisting Living staff administers medications.  Medication Risk Assessment Medication (1 or more, place referral to MTM): N/A: No risk factors identified  MTM Referral Placed: No: No risk factors idenified    Mental/Behavioral Health   Depression Screening:   PHQ-2 Total Score (Adult) - Positive if 3 or more points; Administer PHQ-9 if positive: 0       Mental health DX:: Yes   Mental health DX how managed:: Medication    Falls Assessment:   Fallen 2 or more times in the past year?: No   Any fall with injury in the past year?: No    ADL/IADL Dependencies:   Dependent ADLs:: Ambulation-walker, Bathing, Dressing, Eating, Grooming, Incontinence, Transfers, Toileting  Dependent IADLs:: Cleaning, Cooking, Laundry, Shopping, Meal Preparation, Medication Management, Money Management, Transportation, Incontinence    Health Plan sponsored benefits: [facility]: Shared information regarding [employer]. Reviewed preventative health screening and health plan supplemental benefits/incentives. Reviewed medication disposal form.    PCA Assessment completed at visit: Not Applicable     Elderly Waiver Eligibility: Yes-will continue on EW    Care Plan & Recommendations: Jamie will continue to live at [facility] and receive elderly waiver. Jamie will continue to attend outside appointments with his  wife and [facility] will set up transportation. Jamie reports that staff have allowed more freedoms including going to [employer] and shopping with wife and he really enjoys that. Over this next year, he would like to continue to have these freedoms and no falls.    See [facility] for detailed assessment information.    Follow-Up Plan: Member informed of future contact, plan to f/u with member with a 6 month telephone assessment.  Contact information shared with member and family, encouraged member to call with any questions or concerns at any time.    [facility] care continuum providers: Please see Snapshot and Care Management Flowsheets for Specific details of care plan.    This CC note routed to PCP, Sylvia Stein.    Gisela Hall RN  [facility]  [phone]

## 2024-01-15 ENCOUNTER — PATIENT OUTREACH (OUTPATIENT)
Dept: GERIATRIC MEDICINE | Facility: CLINIC | Age: 84
End: 2024-01-15
Payer: COMMERCIAL

## 2024-01-15 NOTE — LETTER
January 15, 2024      JAMIE CRUMP  [address]  [address]  [place] [id number]    Dear Jamie:    At [facility], we re dedicated to improving your health and wellness. Enclosed is the Care Plan developed with you on 12/19/2023. Please review the Care Plan carefully.    As a reminder, during your visit we talked about:  Ways to manage your physical and mental health  Using health care to maintain and improve your health   Your preventive care needs     Remember to contact your care coordinator if you:  Are hospitalized, or plan to be hospitalized   Have a fall    Have a change in your physical or mental health  Need help finding support or services    If you have questions, or don t agree with your Care Plan, call me at [phone]. You can also call me if your needs change. TTY users, call the [place] [employer] at (444) or 1-983.626.3804 (tmxojs-my-xsrsil relay service).    Sincerely,    Gisela Hall RN, BSN, N  [phone]  [email or web address] ([facility]) is a health plan that contracts with both Medicare and the [place] Medical Assistance (Medicaid) program to provide benefits of both programs to enrollees. Enrollment in [facility] depends on contract renewal.    [id number] accepted    [id number] (07/2022)

## 2024-01-15 NOTE — PROGRESS NOTES
[facility] Care Coordination Contact    Received after visit chart from care coordinator.  Completed following tasks: Mailed copy of care plan/support plan to member, Mailed Safe Medication Disposal , and Updated services in Database   and Mailed copy of CL tool to member, faxed copy to AL facility ([facility]) including Provider Signature Summary letter, and submitted authorization to health plan.    Pat Varma  Case Management Specialist  [facility]  [phone]

## 2024-01-23 ENCOUNTER — ASSISTED LIVING VISIT (OUTPATIENT)
Dept: GERIATRICS | Facility: CLINIC | Age: 84
End: 2024-01-23
Payer: COMMERCIAL

## 2024-01-23 VITALS
RESPIRATION RATE: 18 BRPM | BODY MASS INDEX: 33.65 KG/M2 | HEIGHT: 68 IN | WEIGHT: 222 LBS | HEART RATE: 70 BPM | DIASTOLIC BLOOD PRESSURE: 74 MMHG | OXYGEN SATURATION: 98 % | TEMPERATURE: 97.9 F | SYSTOLIC BLOOD PRESSURE: 130 MMHG

## 2024-01-23 DIAGNOSIS — I71.21 ANEURYSM OF ASCENDING AORTA WITHOUT RUPTURE (H): ICD-10-CM

## 2024-01-23 DIAGNOSIS — C61 PROSTATE CANCER (H): ICD-10-CM

## 2024-01-23 DIAGNOSIS — R00.1 BRADYCARDIA: ICD-10-CM

## 2024-01-23 DIAGNOSIS — S32.039D CLOSED FRACTURE OF THIRD LUMBAR VERTEBRA WITH ROUTINE HEALING, UNSPECIFIED FRACTURE MORPHOLOGY, SUBSEQUENT ENCOUNTER: ICD-10-CM

## 2024-01-23 DIAGNOSIS — F10.27 DEMENTIA ASSOCIATED WITH ALCOHOLISM WITH BEHAVIORAL DISTURBANCE (H): ICD-10-CM

## 2024-01-23 DIAGNOSIS — I48.21 PERMANENT ATRIAL FIBRILLATION (H): Primary | ICD-10-CM

## 2024-01-23 DIAGNOSIS — F03.90 MAJOR NEUROCOGNITIVE DISORDER (H): ICD-10-CM

## 2024-01-23 PROCEDURE — 99349 HOME/RES VST EST MOD MDM 40: CPT | Performed by: INTERNAL MEDICINE

## 2024-01-23 NOTE — LETTER
1/23/2024        RE: Jamie Valdivia  [address]  [address]  [facility] [address]        No notes on file      Sincerely,        Tabatha Quintana MD

## 2024-01-24 ENCOUNTER — PATIENT OUTREACH (OUTPATIENT)
Dept: GERIATRIC MEDICINE | Facility: CLINIC | Age: 84
End: 2024-01-24
Payer: COMMERCIAL

## 2024-01-24 DIAGNOSIS — R32 URINARY INCONTINENCE, UNSPECIFIED TYPE: Primary | ICD-10-CM

## 2024-01-24 NOTE — PROGRESS NOTES
[facility] Care Coordination Contact    Jamie's daughter, Coral received notification from [facility] that they need a prescription renewal for his incontinence supplies. Order set up and pended to provider for signature. Care coordinator will fax to [facility] when it is received.     Gisela Hall RN  [facility]  [phone]

## 2024-01-30 NOTE — PROGRESS NOTES
[facility] Care Coordination Contact    Order for incontinent supplies was signed. Care coordinator faxed order to Evan on 1/30/24.    Gisela Hall RN  [facility]  [phone]

## 2024-01-31 NOTE — PROGRESS NOTES
Per TIERRA, CC notified.    Jamie Valdivia 1940 [address] 11/2/2022 Gisela Begum  DELIVERED 11/3/2022     Pat Varma  Case Management Specialist  [facility]  [phone]     Initiate Treatment: Epsolay 5% cream as spot treatment twice daily Continue Regimen: Benzaderm 10% wash in shower\\nDoxycycline 20 mg 1 pill twice daily\\nRhofade 1% cream in mornings\\nSoolantra 1% cream at night Render In Strict Bullet Format?: No Detail Level: Zone

## 2024-02-06 DIAGNOSIS — F43.23 ADJUSTMENT REACTION WITH ANXIETY AND DEPRESSION: ICD-10-CM

## 2024-02-06 RX ORDER — MIRTAZAPINE 15 MG/1
15 TABLET, FILM COATED ORAL AT BEDTIME
Qty: 90 TABLET | Refills: 3 | Status: SHIPPED | OUTPATIENT
Start: 2024-02-06 | End: 2024-06-25

## 2024-02-08 ENCOUNTER — HOSPITAL ENCOUNTER (EMERGENCY)
Facility: CLINIC | Age: 84
Discharge: HOME | End: 2024-02-09
Attending: EMERGENCY MEDICINE | Admitting: EMERGENCY MEDICINE
Payer: COMMERCIAL

## 2024-02-08 DIAGNOSIS — R04.0 EPISTAXIS: ICD-10-CM

## 2024-02-08 LAB
ERYTHROCYTE [DISTWIDTH] IN BLOOD BY AUTOMATED COUNT: 16.6 % (ref 10–15)
HCT VFR BLD AUTO: 34.1 % (ref 40–53)
HGB BLD-MCNC: 10.6 G/DL (ref 13.3–17.7)
MCH RBC QN AUTO: 27 PG (ref 26.5–33)
MCHC RBC AUTO-ENTMCNC: 31.1 G/DL (ref 31.5–36.5)
MCV RBC AUTO: 87 FL (ref 78–100)
PLATELET # BLD AUTO: 208 10E3/UL (ref 150–450)
RBC # BLD AUTO: 3.92 10E6/UL (ref 4.4–5.9)
WBC # BLD AUTO: 6.7 10E3/UL (ref 4–11)

## 2024-02-08 PROCEDURE — 99283 EMERGENCY DEPT VISIT LOW MDM: CPT

## 2024-02-08 PROCEDURE — 36415 COLL VENOUS BLD VENIPUNCTURE: CPT | Performed by: EMERGENCY MEDICINE

## 2024-02-08 PROCEDURE — 85027 COMPLETE CBC AUTOMATED: CPT | Performed by: EMERGENCY MEDICINE

## 2024-02-08 RX ORDER — LIDOCAINE HYDROCHLORIDE 20 MG/ML
SOLUTION OROPHARYNGEAL
Status: COMPLETED
Start: 2024-02-08 | End: 2024-02-09

## 2024-02-08 RX ORDER — OXYMETAZOLINE HYDROCHLORIDE 0.05 G/100ML
SPRAY NASAL
Status: COMPLETED
Start: 2024-02-08 | End: 2024-02-09

## 2024-02-08 RX ORDER — LIDOCAINE HYDROCHLORIDE AND EPINEPHRINE 10; 10 MG/ML; UG/ML
INJECTION, SOLUTION INFILTRATION; PERINEURAL
Status: COMPLETED
Start: 2024-02-08 | End: 2024-02-09

## 2024-02-08 ASSESSMENT — ACTIVITIES OF DAILY LIVING (ADL): ADLS_ACUITY_SCORE: 38

## 2024-02-09 ENCOUNTER — PATIENT OUTREACH (OUTPATIENT)
Dept: GERIATRIC MEDICINE | Facility: CLINIC | Age: 84
End: 2024-02-09

## 2024-02-09 VITALS
SYSTOLIC BLOOD PRESSURE: 130 MMHG | OXYGEN SATURATION: 98 % | HEART RATE: 57 BPM | WEIGHT: 207.01 LBS | RESPIRATION RATE: 18 BRPM | DIASTOLIC BLOOD PRESSURE: 77 MMHG | TEMPERATURE: 97.9 F | BODY MASS INDEX: 31.48 KG/M2

## 2024-02-09 PROCEDURE — 250N000009 HC RX 250

## 2024-02-09 RX ADMIN — LIDOCAINE HYDROCHLORIDE 15 ML: 20 SOLUTION ORAL at 00:25

## 2024-02-09 RX ADMIN — Medication: at 00:25

## 2024-02-09 RX ADMIN — LIDOCAINE HYDROCHLORIDE,EPINEPHRINE BITARTRATE: 10; .01 INJECTION, SOLUTION INFILTRATION; PERINEURAL at 00:25

## 2024-02-09 ASSESSMENT — ACTIVITIES OF DAILY LIVING (ADL): ADLS_ACUITY_SCORE: 38

## 2024-02-09 NOTE — ED PROVIDER NOTES
History     Chief Complaint:  Epistaxis       LEELEE Valdivia is a 83 year old male who presents with epistaxis that began around 1730 tonight that he, as well as EMS could not get to stop. The patient denies any trauma to the nose, pain, cough, fever, runny nose, or prior cauterization.    Independent Historian:    None     Review of External Notes:  I reviewed the patient's Cardiology note from 12/20/23. It discusses the patient Afib.      Medications:    Neurontin  Robitussin  Remeron  Prilosec  Seroquel     Past Medical History:    Alcohol dependence  Osteoporosis   Aneurysm of ascending aorta   Coronary atherosclerosis   GERD  Morbid obesity   Osteoarthritis   Insomnia   Afib  Hypertension   TBI  JOSÉ MANUEL  Palpitations  Peripheral neuropathy  Seizures  CAD    Past Surgical History:    Prostate biopsy   Colonoscopy   EGD  Left hip replacement  Left shoulder replacement   Spine surgery   Tonsillectomy      Physical Exam   Patient Vitals for the past 24 hrs:   BP Temp Temp src Pulse Resp SpO2 Weight   02/09/24 0031 -- -- -- -- -- 97 % --   02/08/24 2316 (!) 144/76 -- -- -- -- 100 % --   02/08/24 2206 123/73 97.9  F (36.6  C) Axillary 59 18 -- 93.9 kg (207 lb 0.2 oz)      Physical Exam  General:  No respiratory distress    Cardiovascular: Good cap refill.    Respiratory: Breathing non labored.     Musculoskeletal: No tenderness. No bony deformity.     Skin: No rashes or petechiae     Neurologic: non focal      Psychiatric: Appropriate     HEENT: Has dried blood in the posterior pharynx. There is blood in the right patel. A nasal clamp is in place.    Emergency Department Course     Laboratory:  Labs Ordered and Resulted from Time of ED Arrival to Time of ED Departure   CBC WITH PLATELETS - Abnormal       Result Value    WBC Count 6.7      RBC Count 3.92 (*)     Hemoglobin 10.6 (*)     Hematocrit 34.1 (*)     MCV 87      MCH 27.0      MCHC 31.1 (*)     RDW 16.6 (*)     Platelet Count 208        Emergency  Department Course & Assessments:       Interventions:  Medications   silver nitrate (ARZOL) Misc 1 applicator (has no administration in time range)   oxymetazoline (AFRIN) 0.05 % spray (  $Given 2/9/24 0025)   lidocaine (viscous) (XYLOCAINE) 2 % solution (15 mLs  $Given 2/9/24 0025)   lidocaine 1% with EPINEPHrine 1:100,000 1 %-1:025303 injection (  $Given 2/9/24 0025)      Assessments:  2247 I obtained history and examined the patient as noted above.   2254 I rechecked and updated the patient.   2338 I rechecked the patient's nose. There is no active bleeding.  0026 I rechecked and updated the patient. His still has no active bleeding. I deemed the patient safe to discharge home.    Independent Interpretation (X-rays, CTs, rhythm strip):  None    Consultations/Discussion of Management or Tests:  None       Social Determinants of Health affecting care:  None      Disposition:  The patient was discharged to home.     Impression & Plan    CMS Diagnoses: None    Medical Decision Making:  Jamie Valdivia is a 83 year old male who presents for evaluation of nasal bleeding and epistaxis.  The bleeding was controlled with interventions in the ED and therefore supportive outpatient management is indicated.  Close follow-up with ENT and primary care; see discharge instructions. There are no signs of coagulopathy causing the bleeding or a general medical condition (thrombocytopenia, DIC, leukemia, etc) causing the bleeding today.    Diagnosis:    ICD-10-CM    1. Epistaxis  R04.0          Discharge Medications:  New Prescriptions    No medications on file      Scribe Disclosure:  IAlvaro, am serving as a scribe at 10:45 PM on 2/8/2024 to document services personally performed by Victor Manuel Lea MD based on my observations and the provider's statements to me.               Victor Manuel Lea MD  02/09/24 0623

## 2024-02-09 NOTE — DISCHARGE INSTRUCTIONS
"  Discharge Instructions  Epistaxis    Today you were seen for a nosebleed.   Nosebleeds (the medical term is \"epistaxis\") are very common. Almost every person has had at least one in their lifetime.  Although the amount of blood loss can appear dramatic, nosebleeds rarely cause serious problems.  The most common causes are dry air or nose picking, but they also are common in people who have allergies, high blood pressure or are on blood thinners, such as Coumadin, Aspirin or Plavix. If you or your child gets a nosebleed, the important thing is to know how to take care of it. With the right self-care, most nosebleeds will stop on their own.    Return to the Emergency Department if:  Your nose is bleeding a very large amount of blood.  You get very pale, faint, or tired.  You cannot get the bleeding to stop after following these instructions.  A nosebleed happens after recent nasal surgery or if you have a known nasal tumor.  You have new, serious symptoms, such as chest pain.  You get a nosebleed after an injury, such as being hit in the face, and you are concerned that you could have other injuries (like a broken bone).    Treatment:  Your doctor may tell you to use a decongestant nose spray, like Afrin  (oxymetazoline), in both nostrils in the morning and at night for the next three days. Do not use this medicine for more than three days at a time.  If you do it will cause nasal congestion.   You should apply a small amount of Vaseline  to the inside of your nostrils for moisture before bed.  Using a humidifier in your bedroom will help as well.  For the next three days, do not blow your nose or put anything in your nose. You may sniffle, or dab the outside of your nose.  Do not bend with your head below your waist for the next three days. Do not lift anything so heavy that you have to strain.   If you received nasal packing, please do not remove the packing until seen by an Ear Nose and Throat specialist.  If " antibiotics have been given with the packing please take as directed.    If your nose starts to bleed again:  Blow your nose to get rid of some of the clots that have formed inside your nostrils. This may increase the bleeding temporarily, but that's OK.  Spray decongestant nose spray (like Afrin ) into both nostrils to constrict the blood vessels.  Sit or stand while bending forward slightly at the waist. Do not lie down or tilt your head back. This may cause you to swallow blood and can lead to vomiting.   the soft part of BOTH nostrils at the bottom of your nose and squeeze your nose closed for at least 5 minutes (for children) or 10 to 15 minutes (for adults). You can use your fingers, or the clip we gave you here. Use a clock to time yourself. Do not release the pressure every so often to check whether the bleeding has stopped. Many people hurt their chances of stopping the bleeding by releasing the pressure too soon or too often.    If you follow the steps outlined above, and your nose continues to bleed, repeat all the steps once more. Apply pressure for a total of at least 30 minutes. If you continue to bleed even then, return to the Emergency Department or go to an urgent care clinic.    If you keep having nose bleeds, schedule an appointment with your regular doctor, or with an Ear, Nose, and Throat Specialist.  If you were given a prescription for medicine here today, be sure to read all of the information (including the package insert) that comes with your prescription.  This will include important information about the medicine, its side effects, and any warnings that you need to know about.  The pharmacist who fills the prescription can provide more information and answer questions you may have about the medicine.  If you have questions or concerns that the pharmacist cannot address, please call or return to the Emergency Department.   Opioid Medication Information    Pain medications are among the  most commonly prescribed medicines, so we are including this information for all our patients. If you did not receive pain medication or get a prescription for pain medicine, you can ignore it.     You may have been given a prescription for an opioid (narcotic) pain medicine and/or have received a pain medicine while here in the Emergency Department. These medicines can make you drowsy or impaired. You must not drive, operate dangerous equipment, or engage in any other dangerous activities while taking these medications. If you drive while taking these medications, you could be arrested for DUI, or driving under the influence. Do not drink any alcohol while you are taking these medications.     Opioid pain medications can cause addiction. If you have a history of chemical dependency of any type, you are at a higher risk of becoming addicted to pain medications.  Only take these prescribed medications to treat your pain when all other options have been tried. Take it for as short a time and as few doses as possible. Store your pain pills in a secure place, as they are frequently stolen and provide a dangerous opportunity for children or visitors in your house to start abusing these powerful medications. We will not replace any lost or stolen medicine.  As soon as your pain is better, you should flush all your remaining medication.     Many prescription pain medications contain Tylenol  (acetaminophen), including Vicodin , Tylenol #3 , Norco , Lortab , and Percocet .  You should not take any extra pills of Tylenol  if you are using these prescription medications or you can get very sick.  Do not ever take more than 3000 mg of acetaminophen in any 24 hour period.    All opioids tend to cause constipation. Drink plenty of water and eat foods that have a lot of fiber, such as fruits, vegetables, prune juice, apple juice and high fiber cereal.  Take a laxative if you don t move your bowels at least every other day.  Miralax , Milk of Magnesia, Colace , or Senna  can be used to keep you regular.      Remember that you can always come back to the Emergency Department if you are not able to see your regular doctor in the amount of time listed above, if you get any new symptoms, or if there is anything that worries you.

## 2024-02-09 NOTE — ED TRIAGE NOTES
Pt BIBA with bloody nose since 1730 today.  EMS were on site for 30 min attempted to stop bleeding. They were unable to.  Pt comes in from Memory care.      Triage Assessment (Adult)       Row Name 02/08/24 6920          Triage Assessment    Airway WDL WDL        Respiratory WDL    Respiratory WDL WDL        Skin Circulation/Temperature WDL    Skin Circulation/Temperature WDL WDL        Cardiac WDL    Cardiac WDL WDL        Peripheral/Neurovascular WDL    Peripheral Neurovascular WDL WDL        Cognitive/Neuro/Behavioral WDL    Cognitive/Neuro/Behavioral WDL X     Level of Consciousness confused

## 2024-02-09 NOTE — PROGRESS NOTES
[facility] Care Coordination Contact  CC received notification of Emergency Room visit.  ER visit occurred on 2/8/24 at [facility] with Dx of Epistaxis.    CC contacted member and reviewed discharge summary.  Member has a follow-up appointment with PCP: Yes: scheduled on Has onsite [facility]- they will follow up.   Member has had a change in condition: No  New referrals placed: No  Home Visit Needed: No  Care plan reviewed and updated.  PCP notified of ED visit via EMR.    Gisela Hall RN  [facility]  [phone]

## 2024-02-10 PROBLEM — I27.20 PULMONARY HYPERTENSION (H): Status: RESOLVED | Noted: 2023-01-08 | Resolved: 2024-02-10

## 2024-02-10 PROBLEM — I49.5 SSS (SICK SINUS SYNDROME) (H): Status: RESOLVED | Noted: 2023-12-26 | Resolved: 2024-02-10

## 2024-02-10 PROBLEM — F03.90 MAJOR NEUROCOGNITIVE DISORDER (H): Status: ACTIVE | Noted: 2019-08-12

## 2024-02-10 PROBLEM — E66.01 MORBID OBESITY (H): Status: RESOLVED | Noted: 2023-02-13 | Resolved: 2024-02-10

## 2024-02-11 NOTE — PROGRESS NOTES
Jamie Valdivia is a 83 year old male seen January 23, 2024 at [facility] unit where he has resided for 2 years (admit 3/2022) seen to follow up dementia with behaviors.   Pt is seen in his apartment resting abed.   His wife is present as well.   Pt reports he is feeling well, but concerned about his wife who is reporting LE pain.   AL nursing staff has reported paranoia, thinking his phone is bugged, etc.       By chart review, pt has a h/o GERD with esophgitis, significant alcohol abuse, peripheral neuropathy, atrial fib, HTN, osteoporosis with vertebral fracture and OA.  His problem with dysphagia is not new, had EGD in 2019 that showed circumferential ulceration and necrosis at the hypopharynx to UES.   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    Pt had a [facility] stay in November 2021 for weakness, dehydration and alcohol withdrawal.  Has cognitive decline secondary to longstanding alcoholism (lifetime per family) and a h/o hallucinations.   He was hospitalized at [facility] in December 2021 when his wife was hospitalized and he could not manage at home.  Admitted for detox, with weakness and confusion in unsafe living conditions.  He transferred to [facility] TCU for 3 month stay.  Started on olanzapine secondary to hallucinations, delusions and wandering  Worked with therapies and regained ambulation with 4WW.   At TCU discharge he moved to [place] Memory Care unit, with his wife in a different [place] apartment.      H/o variceal bleed in 2002    He also manages symptoms of IBS with Levsin and Miralax   Pt was seen in the ED in June 2022 with CP.  Workup negative for ischemia or PE.   Chest CT did show 4.6cm ascending thoracic aortic aneurysm and fluid-filled bronchioles in the lower lungs.   Also had left basilar pneumonia on CXR   No fever or leukocytosis.  He was treated  with Augmentin and returned to AL.   EGD showed Vickers's esophagus.    Seen by ENT without any findings.  Pt had a [facility] hospitalization in September 2022 for hyponatremia with encephalopathy.   Presented with a fall in which he suffered lumbar compression fractrues.   Initial Na 114, seen by Nephrology and thought to be secondary to diarrhea, and poor po intake.   He was given IVF with some improvement, but then developed pulmonary edema and was but on a fluid restriction.   He improved and discharged to [facility] TCU before transitioning back to [place] Memory Care unit in November 2022.   Depression and anxiety, on mirtazapine and quetiapine but still feels alcohol would better help with these symptoms, specifically his anxiety.  Would like to move to a less restrictive environment.   Pt was diagnosed with prostate cancer by biopsy in January 2023, followed with Dr Larry and underwent XRT   Most recent PSA 1.9 (decreasing)   Has urinary frequency  Pt had a June 2023 [facility] overnight stay following a fall in which he suffered right posterior rib fractures 9-10, and acute on chronic L3 compression fracture.   Seen by Neurosurgery and returned to AL with a brace.       Past Medical History:   Diagnosis Date    Age-related osteoporosis without current pathological fracture 03/30/2022    Alcohol abuse 11/19/2017    Alcohol dependence with withdrawal (H)     Alcoholism (H)     Back pain     Backache 12/19/2018    CAD (coronary artery disease)     Chronic a-fib (H)     Chronic alcohol use 06/11/2015    Formatting of this note might be different from the original. 2/26/2019: serious fall at home with multiple vertebral fractures.  BAL 0.414% on admission.  Denies that he drinks much. 12/18/2018: hospitalized for fall due to alcohol intoxication.  BAL 0.34% on admission. 9/16/2018: hospitalized for injuries from fall due to alcohol intoxication.  BAL 0.34% on admission    Chronic atrial fibrillation (H) 07/15/2016     Formatting of this note might be different from the original. 2/2019: Multiple falls so started on aspirin only.  Then aspirin stopped due to GI bleed.    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 2/2/21    HTN (hypertension)     Impaired gait and mobility 03/14/2019    Mild cognitive impairment 08/12/2019    Formatting of this note might be different from the original. NON-AMNESTIC TYPE    Mild TBI (traumatic brain injury) (H) 03/14/2019    Mumps     Obesity (BMI 30-39.9) 09/03/2015    Osteoarthritis     Osteoarthritis of both knees 05/13/2015    Osteoporosis     Other idiopathic peripheral autonomic neuropathy 03/30/2022    Other insomnia 03/14/2019    Other seizures (H) 03/30/2022    Palpitations     Peripheral neuropathy     Pharyngeal dysphagia 05/13/2015    Formatting of this note might be different from the original. Likely secondary to GERD with esophagitis, on PPI and H2 blocker with notable improvement, EGD 10/5/15.    Physical deconditioning 03/14/2019    Recurrent major depressive disorder (H24) 03/30/2022    Rhabdomyolysis     Thrombocytopenia (H24)     Urination disorder     Weakness 04/27/2020       Past Surgical History:   Procedure Laterality Date    BIOPSY PROSTATE TRANSRECTAL N/A 1/11/2023    Procedure: PROSTATE BIOPSY, RECTAL APPROACH;  Surgeon: Niraj Larry MD;  Location: [facility] OR    COLONOSCOPY      ESOPHAGOSCOPY, GASTROSCOPY, DUODENOSCOPY (EGD), COMBINED N/A 06/01/2022    Procedure: ESOPHAGOGASTRODUODENOSCOPY (EGD) mngi with biopsies using jumbo cold biopsy  "forceps;  Surgeon: David Springer MD;  Location:  GI    left hip replacement  2010    left shoulder replacement  2016    ORTHOPEDIC SURGERY      SPINE SURGERY      done in [place]    TONSILLECTOMY      TRANSRECTAL ULTRASONIC SONOGRAM N/A 1/11/2023    Procedure: TRANSRECTAL ULTRASOUND;  Surgeon: Niraj Larry MD;  Location:  OR      [facility]:  Lived with his wife Gisela, house in [place] prior to move to [place].    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    Non smoker      ROS:   SLUMS 24/30   CPT 4.5    Ambulatory with 4WW   Deaf in left ear, [place] in right ear   Weight in April 2022 was 227 lbs   Wt Readings from Last 5 Encounters:   02/08/24 93.9 kg (207 lb 0.2 oz)   01/23/24 100.7 kg (222 lb)   01/04/24 98 kg (216 lb)   12/26/23 98.2 kg (216 lb 8 oz)   11/27/23 101.2 kg (223 lb)      EXAM: NAD  /74   Pulse 70   Temp 97.9  F (36.6  C)   Resp 18   Ht 1.727 m (5' 8\")   Wt 100.7 kg (222 lb)   SpO2 98%   BMI 33.75 kg/m     Neck supple without adenopathy  Lungs with decreased BS left base, no rales or wheeze  Heart RRR s1s2, frequent ectopy  Abd soft, NT, no distention or guarding, +BS  Ext with 1+ edema    Neuro: limited history, poor insight, Mild dysarthria    Psych: affect flat, disengaged     Lab Results   Component Value Date     06/07/2023    POTASSIUM 4.1 06/07/2023    CHLORIDE 101 06/07/2023    CO2 28 06/07/2023    ANIONGAP 9 06/07/2023    GLC 92 06/07/2023    BUN 11.5 06/07/2023    CR 0.62 (L) 06/07/2023    GFRESTIMATED >90 06/07/2023    JOSUE 9.1 06/07/2023     Lab Results   Component Value Date    AST 23 06/07/2023      ALBUMIN 4.0 06/07/2023      ALKPHOS 58 06/07/2023      TSH   Date Value Ref Range Status   06/07/2023 1.68 0.30 - 4.20 uIU/mL Final   04/28/2022 2.77 0.40 - 4.00 mU/L Final     ECHO 7/2023    The visual ejection fraction is 60-65%.    Left ventricular " systolic function is normal.  There is diminshed motion of all three aortic valve leaflets consistent with at least modearte aortic stenosis. The findings are consistent with low gradient moderate aortic stenosis.  Moderate aortic root dilatation.    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  The rhythm was atrial fibrillation.    CT SCAN OF THE HEAD WITHOUT CONTRAST   6/7/2023  FINDINGS: Unchanged chronic infarct centered in the paramedian right  occipital lobe. Small focus of hypoattenuation along the  posterolateral margin of the right lentiform nucleus may represent a  dilated perivascular space or chronic lacunar infarct, unchanged. Mild  scattered patchy nonspecific hypoattenuation in the cerebral white  matter, which may represent sequela of chronic small vessel ischemic  disease. Mild to moderate generalized brain parenchymal volume loss.  The ventricles appear within normal limits in size and configuration.  Scattered intracranial atherosclerotic calcifications are present. No  definite CT findings of large transcortical acute or subacute infarct.  No acute intracranial hemorrhage, extra-axial fluid collection, or mass effect.                                                            IMPRESSION:   1. No acute intracranial hemorrhage, extra-axial fluid collection, or mass effect.  2. Unchanged chronic infarct centered in the paramedian right occipital lobe.  3. Brain atrophy and presumed chronic small vessel ischemic change, as described.       IMP/PLAN:   (I48.21) Permanent atrial fibrillation (H)    Comment: has not needed rate slowing medications to date   [place]iop[place] in Sept 2023 showed persistent AF with average HR 58 and pauses of 3.3 seconds and frequent PVCs      Plan: CHADS-VASc score 5  Seen by Dr Ross in Dec 2023, pt declined anticoagulation, Watchman and pacemaker.        (W70.404K) Closed fracture of third lumbar vertebra with routine healing, unspecified fracture morphology, subsequent  encounter  Comment: fall in June 2023   Plan: acetaminophen 1000 mg tid PRN and gabapentin 100 mg AM and 200 mg HS, with PRN dose available     (C61) Prostate cancer (H)   Comment: s/p XRT in 2023   Recent PSA 1.9  Plan: follow up with Dr Larry as scheduled       (F41.1) JOSÉ MANUEL (generalized anxiety disorder)   Comment: pt has historically managed this with alcohol, and would continue to do so except for confines of Memory Care unit and no access to alcohol     A variety of medications have been tried   Plan: mirtazapine 15 mg/HS and gabapentin 100 mg bid PRN anxiety   Followed by Psychologist through [facility]    (K22.70) 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  Pt /family declined intervention at that time     Plan: surveillance PRN.     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    Quetiapine 12.5 mg bid and PRN for paranoia and psychosis  He needs a decision maker, has estranged himself from his children due to his being in Memory  Care     (M17.0) Primary osteoarthritis of both knees  Comment: ambulatory with 4WW  Plan: prn acetaminophen, local measures     Ambulation with 4WW      Tabatha Quintana MD

## 2024-02-13 ENCOUNTER — ASSISTED LIVING VISIT (OUTPATIENT)
Dept: GERIATRICS | Facility: CLINIC | Age: 84
End: 2024-02-13
Payer: COMMERCIAL

## 2024-02-13 VITALS
HEIGHT: 68 IN | TEMPERATURE: 97.1 F | BODY MASS INDEX: 33.65 KG/M2 | OXYGEN SATURATION: 97 % | WEIGHT: 222 LBS | DIASTOLIC BLOOD PRESSURE: 84 MMHG | RESPIRATION RATE: 18 BRPM | HEART RATE: 56 BPM | SYSTOLIC BLOOD PRESSURE: 166 MMHG

## 2024-02-13 DIAGNOSIS — R04.0 RECURRENT EPISTAXIS: Primary | ICD-10-CM

## 2024-02-13 PROCEDURE — 99349 HOME/RES VST EST MOD MDM 40: CPT | Performed by: NURSE PRACTITIONER

## 2024-02-13 NOTE — LETTER
"    2/13/2024        RE: Jamie Valdivia  C/o Coral De La Garza  [address]  [facility] [address]        [facility]    Chief Complaint   Patient presents with     RECHECK     Emergency Room follow up     HPI:  Jamie Valdivia is a 83 year old  (1940)  PMH significant for GERD with esophagitis, OA BL knees, pharyngeal dysphagia, edema, abdominal wall hernia, chronic atrial fibrillation, HTN, osteoporosis with hx of vertebral fracture, dementia, anemia, hx of COVID-19, who is being seen today for an episodic care visit at: [facility] ([facility]) [[id number]].     Today's concern is:   Follow-up today after ER visit for epistaxis.  Recurrent issue for resident.    Visit today with wife present who assists in providing history.    Started with nosebleed on 2/8/2024.  Bleeding was controlled in ER by silver nitrate, Afrin nasal spray, lidocaine with epinephrine, and pressure.  Labs reassuring, no sign of coagulopathy. Discharged back to assisted living facility.    Reports no recurrent bleeding since return from ER.  History of recurrent nosebleeds and has seen ENT in the past.  No nose pain.  No trauma to nose or face.    Allergies, and PMH/PSH reviewed in [employer] today.    MED REC REQUIRED  Post Medication Reconciliation Status:  Discharge medications reconciled and changed, see notes/orders    REVIEW OF SYSTEMS:  4 point ROS including Respiratory, CV, GI and , other than that noted in the HPI,  is negative    Objective:   BP (!) 166/84   Pulse 56   Temp 97.1  F (36.2  C)   Resp 18   Ht 1.727 m (5' 8\")   Wt 100.7 kg (222 lb)   SpO2 97%   BMI 33.75 kg/m    GENERAL APPEARANCE:  Alert, in no distress, seated in recliner chair.  HEENT: Sclera clear and conjunctiva normal.  Anterior nares clear of dried red blood bilaterally, mucosa pink and moist without lesions.  Pharynx without injection or exudate.  NECK: No cervical LAD.  RESP:  Non-labored breathing, no respiratory distress, Lung sounds " clear, patient is on room air.  CV: Rate and rhythm irregular no murmur, no rub or gallop.  VASCULAR: 2+ edema bilateral lower extremities, not wearing Velcro compression wraps.   ABDOMEN:  Normal bowel sounds, soft, nontender, no grimacing or guarding with palpation, + umblical hernia, soft/non-tender.   PSYCH: Awake and alert, speech fluent,  insight/judgement/memory impaired.    Recent labs in [facility] reviewed by me today.   CBC RESULTS:   Recent Labs   Lab Test 02/08/24  2349 06/08/23  0624   WBC 6.7 5.8   RBC 3.92* 3.76*   HGB 10.6* 10.2*   HCT 34.1* 32.7*   MCV 87 87   MCH 27.0 27.1   MCHC 31.1* 31.2*   RDW 16.6* 15.9*    184     Last Basic Metabolic Panel:  Recent Labs   Lab Test 06/07/23  0957 03/30/23  0659    141   POTASSIUM 4.1 4.4   CHLORIDE 101 103   JOSUE 9.1 9.0   CO2 28 26   BUN 11.5 7.9*   CR 0.62* 0.68   GLC 92 72       Liver Function Studies -   Recent Labs   Lab Test 06/07/23  0957 01/05/23  1000   PROTTOTAL 7.2 7.6   ALBUMIN 4.0 3.6   BILITOTAL 0.4 0.5   ALKPHOS 58 64   AST 23 18   ALT 11 13       Assessment/Plan:  (R04.0) Recurrent epistaxis  (primary encounter diagnosis)  Comment: Improved  Plan:   - Adult ENT  Referral  -Continue Vaseline at at bedtime  -Saline nasal spray, okay to leave at bedside  -Reviewed resident has as needed Afrin nasal spray for acute bleeds to try long-term care facility first    Orders:  - ENT referral  - Saline nasal spray     Electronically signed by: MARICHUY Basilio CNP         Sincerely,        MARICHUY Basilio CNP

## 2024-02-13 NOTE — PROGRESS NOTES
"[facility]    Chief Complaint   Patient presents with    RECHECK     Emergency Room follow up     HPI:  Jamie Valdivia is a 83 year old  (1940)  PMH significant for GERD with esophagitis, OA BL knees, pharyngeal dysphagia, edema, abdominal wall hernia, chronic atrial fibrillation, HTN, osteoporosis with hx of vertebral fracture, dementia, anemia, hx of COVID-19, who is being seen today for an episodic care visit at: [facility] ([facility]) [61].     Today's concern is:   Follow-up today after ER visit for epistaxis.  Recurrent issue for resident.    Visit today with wife present who assists in providing history.    Started with nosebleed on 2/8/2024.  Bleeding was controlled in ER by silver nitrate, Afrin nasal spray, lidocaine with epinephrine, and pressure.  Labs reassuring, no sign of coagulopathy. Discharged back to assisted living facility.    Reports no recurrent bleeding since return from ER.  History of recurrent nosebleeds and has seen ENT in the past.  No nose pain.  No trauma to nose or face.    Allergies, and PMH/PSH reviewed in [employer] today.    MED REC REQUIRED  Post Medication Reconciliation Status:  Discharge medications reconciled and changed, see notes/orders    REVIEW OF SYSTEMS:  4 point ROS including Respiratory, CV, GI and , other than that noted in the HPI,  is negative    Objective:   BP (!) 166/84   Pulse 56   Temp 97.1  F (36.2  C)   Resp 18   Ht 1.727 m (5' 8\")   Wt 100.7 kg (222 lb)   SpO2 97%   BMI 33.75 kg/m    GENERAL APPEARANCE:  Alert, in no distress, seated in recliner chair.  HEENT: Sclera clear and conjunctiva normal.  Anterior nares clear of dried red blood bilaterally, mucosa pink and moist without lesions.  Pharynx without injection or exudate.  NECK: No cervical LAD.  RESP:  Non-labored breathing, no respiratory distress, Lung sounds clear, patient is on room air.  CV: Rate and rhythm irregular no murmur, no rub or gallop.  VASCULAR: 2+ " edema bilateral lower extremities, not wearing Velcro compression wraps.   ABDOMEN:  Normal bowel sounds, soft, nontender, no grimacing or guarding with palpation, + umblical hernia, soft/non-tender.   PSYCH: Awake and alert, speech fluent,  insight/judgement/memory impaired.    Recent labs in [facility] reviewed by me today.   CBC RESULTS:   Recent Labs   Lab Test 02/08/24  2349 06/08/23  0624   WBC 6.7 5.8   RBC 3.92* 3.76*   HGB 10.6* 10.2*   HCT 34.1* 32.7*   MCV 87 87   MCH 27.0 27.1   MCHC 31.1* 31.2*   RDW 16.6* 15.9*    184     Last Basic Metabolic Panel:  Recent Labs   Lab Test 06/07/23  0957 03/30/23  0659    141   POTASSIUM 4.1 4.4   CHLORIDE 101 103   JOSUE 9.1 9.0   CO2 28 26   BUN 11.5 7.9*   CR 0.62* 0.68   GLC 92 72       Liver Function Studies -   Recent Labs   Lab Test 06/07/23  0957 01/05/23  1000   PROTTOTAL 7.2 7.6   ALBUMIN 4.0 3.6   BILITOTAL 0.4 0.5   ALKPHOS 58 64   AST 23 18   ALT 11 13       Assessment/Plan:  (R04.0) Recurrent epistaxis  (primary encounter diagnosis)  Comment: Improved  Plan:   - Adult ENT  Referral  -Continue Vaseline at at bedtime  -Saline nasal spray, okay to leave at bedside  -Reviewed resident has as needed Afrin nasal spray for acute bleeds to try long-term care facility first    Orders:  - ENT referral  - Saline nasal spray     Electronically signed by: MARICHUY Basilio CNP

## 2024-02-15 NOTE — TELEPHONE ENCOUNTER
FUTURE VISIT INFORMATION      FUTURE VISIT INFORMATION:  Date: 4/30/24  Time: 8 AM  Location: [facility]  REFERRAL INFORMATION:  Referring provider:  Sylvia Stein APRN CNP  Referring providers clinic:  [facility]  Reason for visit/diagnosis:  Recurrent epistaxis [R04.0]  REF BY Sylvia Stein APRN CNP  RECS IN [facility]  SCHED W/PT  CONF ;LOC     RECORDS REQUESTED FROM      Clinic name Comments Records Status Imaging Status   [facility] 2/13/24 referral/ notes- Sylvia Stein APRN CNP [facility] Emergency Dept 2/8/24 ER notes- Victor Manuel Lea MD  [facility] Imaging 6/7/23 CT cervical  6/7/23 CT head Epic PACS

## 2024-02-16 RX ORDER — ECHINACEA PURPUREA EXTRACT 125 MG
1 TABLET ORAL 4 TIMES DAILY PRN
Qty: 88 ML | Refills: 98 | Status: SHIPPED | OUTPATIENT
Start: 2024-02-16 | End: 2024-08-20

## 2024-02-16 RX ORDER — ECHINACEA PURPUREA EXTRACT 125 MG
1 TABLET ORAL 4 TIMES DAILY
Qty: 88 ML | Refills: 98 | Status: SHIPPED | OUTPATIENT
Start: 2024-02-16 | End: 2024-02-16

## 2024-02-16 NOTE — PATIENT INSTRUCTIONS
Jamie Valdivia  1940  ORDERS:  - FYI - Adult ENT  Referral; Future  - sodium chloride (OCEAN) 0.65 % nasal spray; Spray 1 spray in nostril 4 times daily as needed for congestion Okay to leave at bedside  dx epistaxis  Electronically signed by:   MARICHUY Basilio CNP  02/16/24 5:50 AM    
no

## 2024-02-28 ENCOUNTER — PATIENT OUTREACH (OUTPATIENT)
Dept: GERIATRIC MEDICINE | Facility: CLINIC | Age: 84
End: 2024-02-28
Payer: COMMERCIAL

## 2024-02-28 NOTE — PROGRESS NOTES
[facility] Care Coordination Contact    Arranged transportation thru [facility] [phone] for the below appt:  Appt Date & Time: 3/14 at 11am  Clinic Name & Address:  [facility], [address], [place]  Transportation Provider: [employer] [phone]   time:  10-10:30am  Return ride  time: will call     Notified CC & mailed member letter with  time.    Pat Varma  Case Management Specialist  [facility]  [phone]

## 2024-03-12 ENCOUNTER — TELEPHONE (OUTPATIENT)
Dept: GERIATRICS | Facility: CLINIC | Age: 84
End: 2024-03-12
Payer: COMMERCIAL

## 2024-03-12 ENCOUNTER — APPOINTMENT (OUTPATIENT)
Dept: GENERAL RADIOLOGY | Facility: CLINIC | Age: 84
End: 2024-03-12
Attending: EMERGENCY MEDICINE
Payer: COMMERCIAL

## 2024-03-12 ENCOUNTER — HOSPITAL ENCOUNTER (EMERGENCY)
Facility: CLINIC | Age: 84
Discharge: HOME | End: 2024-03-13
Attending: EMERGENCY MEDICINE | Admitting: EMERGENCY MEDICINE
Payer: COMMERCIAL

## 2024-03-12 VITALS
BODY MASS INDEX: 33.34 KG/M2 | HEART RATE: 73 BPM | HEIGHT: 68 IN | TEMPERATURE: 98 F | OXYGEN SATURATION: 95 % | SYSTOLIC BLOOD PRESSURE: 119 MMHG | WEIGHT: 220 LBS | RESPIRATION RATE: 24 BRPM | DIASTOLIC BLOOD PRESSURE: 58 MMHG

## 2024-03-12 DIAGNOSIS — R60.0 PERIPHERAL EDEMA: ICD-10-CM

## 2024-03-12 DIAGNOSIS — E87.1 HYPONATREMIA: ICD-10-CM

## 2024-03-12 LAB
ANION GAP SERPL CALCULATED.3IONS-SCNC: 13 MMOL/L (ref 7–15)
BASOPHILS # BLD AUTO: 0 10E3/UL (ref 0–0.2)
BASOPHILS NFR BLD AUTO: 0 %
BUN SERPL-MCNC: 7.6 MG/DL (ref 8–23)
CALCIUM SERPL-MCNC: 8.2 MG/DL (ref 8.8–10.2)
CHLORIDE SERPL-SCNC: 88 MMOL/L (ref 98–107)
CREAT SERPL-MCNC: 0.64 MG/DL (ref 0.67–1.17)
DEPRECATED HCO3 PLAS-SCNC: 28 MMOL/L (ref 22–29)
EGFRCR SERPLBLD CKD-EPI 2021: >90 ML/MIN/1.73M2
EOSINOPHIL # BLD AUTO: 0.1 10E3/UL (ref 0–0.7)
EOSINOPHIL NFR BLD AUTO: 1 %
ERYTHROCYTE [DISTWIDTH] IN BLOOD BY AUTOMATED COUNT: 16.6 % (ref 10–15)
FLUAV RNA SPEC QL NAA+PROBE: NEGATIVE
FLUBV RNA RESP QL NAA+PROBE: NEGATIVE
GLUCOSE SERPL-MCNC: 139 MG/DL (ref 70–99)
HCT VFR BLD AUTO: 30.2 % (ref 40–53)
HGB BLD-MCNC: 9.7 G/DL (ref 13.3–17.7)
IMM GRANULOCYTES # BLD: 0.1 10E3/UL
IMM GRANULOCYTES NFR BLD: 1 %
LYMPHOCYTES # BLD AUTO: 1.6 10E3/UL (ref 0.8–5.3)
LYMPHOCYTES NFR BLD AUTO: 12 %
MCH RBC QN AUTO: 26.9 PG (ref 26.5–33)
MCHC RBC AUTO-ENTMCNC: 32.1 G/DL (ref 31.5–36.5)
MCV RBC AUTO: 84 FL (ref 78–100)
MONOCYTES # BLD AUTO: 1.3 10E3/UL (ref 0–1.3)
MONOCYTES NFR BLD AUTO: 10 %
NEUTROPHILS # BLD AUTO: 10.1 10E3/UL (ref 1.6–8.3)
NEUTROPHILS NFR BLD AUTO: 76 %
NRBC # BLD AUTO: 0 10E3/UL
NRBC BLD AUTO-RTO: 0 /100
NT-PROBNP SERPL-MCNC: 3144 PG/ML (ref 0–1800)
PLATELET # BLD AUTO: 287 10E3/UL (ref 150–450)
POTASSIUM SERPL-SCNC: 3.5 MMOL/L (ref 3.4–5.3)
RBC # BLD AUTO: 3.61 10E6/UL (ref 4.4–5.9)
RSV RNA SPEC NAA+PROBE: NEGATIVE
SARS-COV-2 RNA RESP QL NAA+PROBE: NEGATIVE
SODIUM SERPL-SCNC: 129 MMOL/L (ref 135–145)
TROPONIN T SERPL HS-MCNC: 14 NG/L
TROPONIN T SERPL HS-MCNC: 16 NG/L
WBC # BLD AUTO: 13.1 10E3/UL (ref 4–11)

## 2024-03-12 PROCEDURE — 80048 BASIC METABOLIC PNL TOTAL CA: CPT | Performed by: EMERGENCY MEDICINE

## 2024-03-12 PROCEDURE — 71046 X-RAY EXAM CHEST 2 VIEWS: CPT

## 2024-03-12 PROCEDURE — 36415 COLL VENOUS BLD VENIPUNCTURE: CPT | Performed by: EMERGENCY MEDICINE

## 2024-03-12 PROCEDURE — 99285 EMERGENCY DEPT VISIT HI MDM: CPT | Mod: 25

## 2024-03-12 PROCEDURE — 93005 ELECTROCARDIOGRAM TRACING: CPT

## 2024-03-12 PROCEDURE — 94640 AIRWAY INHALATION TREATMENT: CPT

## 2024-03-12 PROCEDURE — 87040 BLOOD CULTURE FOR BACTERIA: CPT | Performed by: EMERGENCY MEDICINE

## 2024-03-12 PROCEDURE — 85025 COMPLETE CBC W/AUTO DIFF WBC: CPT | Performed by: EMERGENCY MEDICINE

## 2024-03-12 PROCEDURE — 84484 ASSAY OF TROPONIN QUANT: CPT | Performed by: EMERGENCY MEDICINE

## 2024-03-12 PROCEDURE — 87637 SARSCOV2&INF A&B&RSV AMP PRB: CPT | Performed by: EMERGENCY MEDICINE

## 2024-03-12 PROCEDURE — 250N000009 HC RX 250: Performed by: EMERGENCY MEDICINE

## 2024-03-12 PROCEDURE — 83880 ASSAY OF NATRIURETIC PEPTIDE: CPT | Performed by: EMERGENCY MEDICINE

## 2024-03-12 RX ORDER — IPRATROPIUM BROMIDE AND ALBUTEROL SULFATE 2.5; .5 MG/3ML; MG/3ML
3 SOLUTION RESPIRATORY (INHALATION) ONCE
Status: COMPLETED | OUTPATIENT
Start: 2024-03-12 | End: 2024-03-12

## 2024-03-12 RX ADMIN — IPRATROPIUM BROMIDE AND ALBUTEROL SULFATE 3 ML: .5; 3 SOLUTION RESPIRATORY (INHALATION) at 18:25

## 2024-03-12 ASSESSMENT — ACTIVITIES OF DAILY LIVING (ADL)
ADLS_ACUITY_SCORE: 38

## 2024-03-12 NOTE — ED PROVIDER NOTES
"  History     Chief Complaint:  Shortness of Breath       The history is limited by the condition of the patient.      Jamie Valdivia is a 83 year old male who presents via EMS for evaluation of a fever. The patient states he has had off and on fever for the past couple of days with him measuring his temperature to be approximately 101. During evaluation patient is wheezing with the patient reporting  in the past occasionally wheezing and coughing. Patient states experiencing off and on leg swelling with tenderness. Denies chest pain, shortness of breath, becoming winded form walking, and abdominal pain. Denies normally using oxygen, taking water pills, heart medication, diabetes medication, and blood pressure medication.    Nurse mentions EMS notes patient lives at [facility] in [place]. The patient was on 88% oxygen en route and 98% on arrival. The patient was tested negative for Covid at facility.    Independent Historian:    EMS and patient - They report as noted above    Review of External Notes:  Cardiology note 12/26/23: Patient evaluated by cardiology.  Appears to be his decision-maker.  Does not want children who live in [place] involved.  Patient has permanent atrial fibrillation but was felt to perhaps be too high of an anticoagulation risk.  Watchman device was discussed with the patient did not wish to pursue this.  There is no specific evidence of CHF at that appointment.    Allergies:  Ativan [Lorazepam]     Physical Exam   Patient Vitals for the past 24 hrs:   BP Temp Temp src Pulse Resp SpO2 Height Weight   03/12/24 2215 119/58 -- -- 73 -- 95 % -- --   03/12/24 2130 122/83 -- -- 70 -- 95 % -- --   03/12/24 2015 -- -- -- -- -- 96 % -- --   03/12/24 2000 115/72 -- -- 79 -- 95 % -- --   03/12/24 1945 125/85 -- -- 83 -- -- -- --   03/12/24 1821 135/66 98  F (36.7  C) Oral 78 24 95 % 1.727 m (5' 8\") --   03/12/24 1813 113/68 -- -- -- 20 98 % -- 99.8 kg (220 lb)   03/12/24 1811 -- " -- -- -- -- 99 % -- --   03/12/24 1810 113/68 -- -- 79 -- -- -- --        Physical Exam  Constitutional: Vital signs reviewed as above.   Eyes: PEERL, EOMI B/L  Neck: No JVD noted. FROM   Cardiovascular: normal rate, Regular rhythm and normal heart sounds.  No murmur heard. Equal B/L peripheral pulses.  Pulmonary/Chest: Effort normal and breath sounds normal. No respiratory distress. Patient has bilateral expiratory wheezes. Patient has no rales.   Gastrointestinal: Soft. There is no tenderness.   Musculoskeletal/Extremities:  +2 bilateral pitting edema noted. Normal tone.  Neurological: Alert  Skin: Skin is warm and dry. There is no diaphoresis noted.   Psychiatric: The patient appears calm.     Emergency Department Course   ECG  ECG taken at 1818, ECG read at 1847  Accelerated junctional rhythm   Likely underlying atrial flutter and 4:1 conduction  When compared to prior, atrial fibrillation dated 6/7/23.  Rate 79 bpm. AK interval * ms. QRS duration 84 ms. QT/QTc 386/442 ms. P-R-T axes * -25 14.       Laboratory: Imaging:   Labs Ordered and Resulted from Time of ED Arrival to Time of ED Departure   BASIC METABOLIC PANEL - Abnormal       Result Value    Sodium 129 (*)     Potassium 3.5      Chloride 88 (*)     Carbon Dioxide (CO2) 28      Anion Gap 13      Urea Nitrogen 7.6 (*)     Creatinine 0.64 (*)     GFR Estimate >90      Calcium 8.2 (*)     Glucose 139 (*)    NT PROBNP INPATIENT - Abnormal    N terminal Pro BNP Inpatient 3,144 (*)    CBC WITH PLATELETS AND DIFFERENTIAL - Abnormal    WBC Count 13.1 (*)     RBC Count 3.61 (*)     Hemoglobin 9.7 (*)     Hematocrit 30.2 (*)     MCV 84      MCH 26.9      MCHC 32.1      RDW 16.6 (*)     Platelet Count 287      % Neutrophils 76      % Lymphocytes 12      % Monocytes 10      % Eosinophils 1      % Basophils 0      % Immature Granulocytes 1      NRBCs per 100 WBC 0      Absolute Neutrophils 10.1 (*)     Absolute Lymphocytes 1.6      Absolute Monocytes 1.3       Absolute Eosinophils 0.1      Absolute Basophils 0.0      Absolute Immature Granulocytes 0.1      Absolute NRBCs 0.0     TROPONIN T, HIGH SENSITIVITY - Normal    Troponin T, High Sensitivity 16     INFLUENZA A/B, RSV, & SARS-COV2 PCR - Normal    Influenza A PCR Negative      Influenza B PCR Negative      RSV PCR Negative      SARS CoV2 PCR Negative     TROPONIN T, HIGH SENSITIVITY - Normal    Troponin T, High Sensitivity 14     BLOOD CULTURE   BLOOD CULTURE     XR Chest 2 Views   Final Result   IMPRESSION: Elevated left hemidiaphragm with adjacent atelectasis. Few scattered strands of fibrosis similar to previous. No definite pneumonia.              Procedures       Emergency Department Course & Assessments:    Interventions:  Medications   ipratropium - albuterol 0.5 mg/2.5 mg/3 mL (DUONEB) neb solution 3 mL (3 mLs Nebulization $Given 3/12/24 1825)        Assessments, Independent Interpretation, Consult/Discussion of ManagementTests:  ED Course as of 03/12/24 2302   Tue Mar 12, 2024   1812 Initial evaluation.   2030 Rechecked and updated.   2252 Rechecked and updated.       Social Determinants of Health affecting care:  None    Disposition:  See ED Course and [facility]    Impression & Plan    CMS Diagnoses: None    Code Status: Prior    MIPS (If applicable):  N/A    Medical Decision Making:  This 83-year-old male patient presents to the ED from his care facility due to concern for fever and hypoxia.  Please see the HPI and exam for specifics.  At the time of my evaluation, the patient was on 2 L of oxygen with normal oxygen saturation.  His oxygen was decreased and eventually turned off and he remained with a normal oxygen saturation.  He was afebrile here.    Objectively, he did have some degree of expiratory wheezing which has improved after breathing treatment.  I also note bilateral peripheral edema.  The patient states this is not new.  Normally he elevates his legs but his recliner was not working so he has been  sitting with his feet dangling down.  He states he has never needed diuretic medications before.  BNP today is elevated though it is lower than it was 1 year ago.  I reviewed the cardiology note from December in which it was not felt he had any CHF concerns at that point and his echocardiogram from July 2023 is notable for an EF of 60 to 65% along with moderate aortic stenosis.  The patient is in permanent atrial fibrillation but is not anticoagulated due to fall risk per the cardiology note I reviewed.    I also found the patient to be somewhat hyponatremic.  He reports that he is eating and drinking normally.    2 troponins are negative and I do not suspect ACS at this point.    Chest x-ray is normal.    On reassessment, the patient states he feels fine.  He would like to go home.  We discussed possibly watching him in the hospital for diuresis but the patient states that he will elevate his legs and is able to follow-up outpatient.  Per chart review, it appears he is his decision maker so I will discharge him as requested.  I think that close outpatient follow-up, consideration of support stockings and/or diuresis as well as repeat basic metabolic panel (or at least sodium level) towards the end of this week is a reasonable next step in care.      Critical Care:  None.    Diagnosis:    ICD-10-CM    1. Hyponatremia  E87.1       2. Peripheral edema  R60.9            Discharge Medications:  New Prescriptions    No medications on file        Scribe Disclosure:  I, Ty Bowden, am serving as a scribe at 6:21 PM on 3/12/2024 to document services personally performed by Andrew Quiroz DO based on my observations and the provider's statements to me.    3/12/2024   Andrew Quiroz DO Burns, Bradley Joseph, DO  03/12/24 6541

## 2024-03-12 NOTE — ED TRIAGE NOTES
Pt presents via EMS for c/o feeling unwell for a couple of weeks with intermittent fevers. Tested for covid at facility with negative results. Lives at [facility] in [place], [place].  Staff at [facility] reports oxygen 88% on RA. 98% on arrival with 2L via NC     Triage Assessment (Adult)       Row Name 03/12/24 1814          Triage Assessment    Airway WDL WDL        Respiratory WDL    Respiratory WDL rhythm/pattern     Rhythm/Pattern, Respiratory shallow        Skin Circulation/Temperature WDL    Skin Circulation/Temperature WDL WDL        Cardiac WDL    Cardiac WDL WDL

## 2024-03-12 NOTE — TELEPHONE ENCOUNTER
[facility] Triage Nurse Telephone En[facility]    Provider: MARICHUY Basilio CNP  Facility: [facility]  Facility Type:  [facility]    Caller: Maru  Call Back Number: [phone]    Allergies:    Allergies   Allergen Reactions    Ativan [Lorazepam]         Reason for call: Pt has a fever of 100.7 and O2 sat 88% RA. C/o chills and cough. Lung sounds clear. COVID and Influenza swab negative. /59  HR 83  RR 18. Tylenol PRN given at 1700.    Verbal Order/Direction given by Provider: Send to ER for evaluation    Provider giving Order:  MARICHUY Basilio CNP    Verbal Order given to: Maru Cardoza RN

## 2024-03-12 NOTE — ED NOTES
Bed: ED17  Expected date: 3/12/24  Expected time: 5:58 PM  Means of arrival:   Comments:  Dayanna Rodriguez0

## 2024-03-13 ENCOUNTER — PATIENT OUTREACH (OUTPATIENT)
Dept: GERIATRIC MEDICINE | Facility: CLINIC | Age: 84
End: 2024-03-13

## 2024-03-13 ENCOUNTER — LAB REQUISITION (OUTPATIENT)
Dept: LAB | Facility: CLINIC | Age: 84
End: 2024-03-13
Payer: COMMERCIAL

## 2024-03-13 ENCOUNTER — ASSISTED LIVING VISIT (OUTPATIENT)
Dept: GERIATRICS | Facility: CLINIC | Age: 84
End: 2024-03-13
Payer: COMMERCIAL

## 2024-03-13 VITALS
SYSTOLIC BLOOD PRESSURE: 166 MMHG | WEIGHT: 217.1 LBS | TEMPERATURE: 97.6 F | HEIGHT: 68 IN | HEART RATE: 71 BPM | RESPIRATION RATE: 16 BRPM | BODY MASS INDEX: 32.9 KG/M2 | DIASTOLIC BLOOD PRESSURE: 77 MMHG | OXYGEN SATURATION: 95 %

## 2024-03-13 DIAGNOSIS — R22.0 LOCAL SUPERFICIAL SWELLING OF HEAD: ICD-10-CM

## 2024-03-13 DIAGNOSIS — R60.0 LOWER EXTREMITY EDEMA: ICD-10-CM

## 2024-03-13 DIAGNOSIS — R09.02 HYPOXIA: ICD-10-CM

## 2024-03-13 DIAGNOSIS — D64.9 ANEMIA, UNSPECIFIED: ICD-10-CM

## 2024-03-13 DIAGNOSIS — D72.829 LEUKOCYTOSIS, UNSPECIFIED TYPE: ICD-10-CM

## 2024-03-13 DIAGNOSIS — R50.9 FEVER, UNSPECIFIED FEVER CAUSE: Primary | ICD-10-CM

## 2024-03-13 DIAGNOSIS — E87.1 HYPONATREMIA: ICD-10-CM

## 2024-03-13 DIAGNOSIS — I48.0 PAROXYSMAL ATRIAL FIBRILLATION (H): ICD-10-CM

## 2024-03-13 LAB
ATRIAL RATE - MUSE: NORMAL BPM
DIASTOLIC BLOOD PRESSURE - MUSE: NORMAL MMHG
INTERPRETATION ECG - MUSE: NORMAL
P AXIS - MUSE: NORMAL DEGREES
PR INTERVAL - MUSE: NORMAL MS
QRS DURATION - MUSE: 84 MS
QT - MUSE: 386 MS
QTC - MUSE: 442 MS
R AXIS - MUSE: -25 DEGREES
SYSTOLIC BLOOD PRESSURE - MUSE: NORMAL MMHG
T AXIS - MUSE: 14 DEGREES
VENTRICULAR RATE- MUSE: 79 BPM

## 2024-03-13 PROCEDURE — 99349 HOME/RES VST EST MOD MDM 40: CPT | Performed by: NURSE PRACTITIONER

## 2024-03-13 NOTE — DISCHARGE INSTRUCTIONS
What do you do next:   Continue your home medications unless we have specifically changed them  We discussed the prescription of medications to help reduce the fluid in your legs but you state you want to try elevating them at home.  We also discussed your slightly low sodium but ultimately you were comfortable being discharged to follow-up outpatient.  Follow up as indicated below    When do you return: Review your discharge papers for specifics on reasons to return.    Thank you for allowing us to care for you today.

## 2024-03-13 NOTE — PATIENT INSTRUCTIONS
Jamie Valdivia  1940  ORDERS:  - Please check following labs on 3/14/24: CBC with diff, iron, TIBC, ferritin, B12 dx D64.9, CMP dx hyponatremia   Electronically signed by:   MARICHUY Basilio CNP  03/13/24 1:27 PM    Jamie Valdivia  1940  Additional ORDERS:  - BID temp and SpO2 x 4 days, if temp greater than 99 Fahrenheit or SpO2 less than 99% call provider [phone]  -Hold usual Tylenol x 4 days  -Tylenol 1,000 mg PO TID x 4 days dx pain then resume usual Tylenol dosing  -Request follow-up nursing visit 3/15/2024 for reassessment of respiratory status and right forehead swelling, please call triage to update at [phone]  Electronically signed by:   MARICHUY Basilio CNP  03/13/24 4:08 PM

## 2024-03-13 NOTE — ED NOTES
Spouse Gisela states she will meet transport at the door and open as there is no staff available after hours.

## 2024-03-13 NOTE — LETTER
3/13/2024        RE: Jamie Valdivia  [address]  [address]  [facility] [address]        [facility]    Chief Complaint   Patient presents with     RECHECK     Emergency Room follow up     HPI:  Jamie Valdivia is a 83 year old  (1940) PMH significant for GERD with esophagitis, OA BL knees, pharyngeal dysphagia, edema, abdominal wall hernia, chronic atrial fibrillation, HTN, osteoporosis with hx of vertebral fracture, dementia, anemia, hx of COVID-19, who is being seen today for an episodic care visit at: [facility] ([facility]) [[id number]].     Today's concern is:   Follow-up today after ER visit.  Resident is poor historian and lives in memory care unit at assisted living facility; he needs assistance for complex decision making.  Please see neuropsych testing in Epic.    Sent to ER due to fever and hypoxia.  Patient was placed on supplemental oxygen by EMS and sats oxygen saturation improved.   Weaned off of oxygen doing during ER stay after receiving DuoNeb.  ER physician noted expiratory wheezing which improved after after nebulizer treatment.  BNP elevated on labs but lower than it was 1 year ago.  Chronic bilateral lower extremity swelling, stopped wearing Velcro compression wraps per patient preference.  No history of congestive heart failure or taking diuretics in the past last echo in July 2023 with ejection fraction of 60 to 65% along with moderate aortic stenosis, resident in permanent atrial fibrillation but not anticoagulated due to fall risk.  Troponin negative x 2, ACS not suspected by ER team.  Chest x-ray did not show any acute abnormality.  Viral respiratory panel was negative.  Labs are significant for hyponatremia and leukocytosis.  Physician offered admission for diuresis and monitoring but patient refused and was discharged back to his assisted living.    Nursing notes reviewed patient returned to facility at 1 AM today.  On return from ER, VS: /58,  "P 73 R 24 T 98.0, O2 95% RA.       Today seen in apartment with his wife Gisela.  Reports he has not been feeling well for several days.    Denies any dyspnea \"not really.\"  No chest pain or palpitations.  No significant cough.  Does not feel he is wheezing.  Does not feel leg swelling is any worse, refuses to wear Velcro compression wraps and unable to elevate legs due to broken mechanical lift chair.    No diarrhea, abdominal pain, nausea vomiting.  No constipation.  No dysuria, hematuria, urinary frequency.    Staff report he did not come out of room today to eat.  Drinking plenty fluid in apartment.  Has snacks in apartment as well.    At end of visit notes he is troubled by swelling to his right forehead.  Does not recall any trauma.  Sleeps in bed and may have bumped his head on his handrail or during transport to and from hospital.  No headache.  No change in vision.  Mental status at baseline.    Allergies, and PMH/PSH reviewed in [facility] today.    MED REC REQUIRED  Post Medication Reconciliation Status:  Discharge medications reconciled, continue medications without change but medications were reconciled per facility EMR.    REVIEW OF SYSTEMS:History as per HPI.      Objective:   BP (!) 166/77   Pulse 71   Temp 97.6  F (36.4  C)   Resp 16   Ht 1.727 m (5' 8\")   Wt 98.5 kg (217 lb 1.6 oz)   SpO2 95%   BMI 33.01 kg/m    GENERAL APPEARANCE:  Alert, in no distress, seated in recliner chair with wife.  HEENT: Sclera clear and conjunctiva normal.  No nasal drainage.  Pharynx without injection or exudate.  Bilateral TMs partially obstructed by cerumen but are pearly gray and intact.   NECK: No cervical LAD.  RESP:  Non-labored breathing, no respiratory distress, Lung sounds decreased throughout, no adventitious breath sounds, patient is on room air. SpO2 95%.  CV: Rate and rhythm irregular no murmur, no rub or gallop. HR 71.  VASCULAR: 3+ edema bilateral lower extremities, not wearing Velcro compression wraps. "   ABDOMEN:  Normal bowel sounds, soft, nontender, no grimacing or guarding with palpation, + umblical hernia, soft/non-tender.   SKIN: Slight ecchymosis over right forehead with bony prominence, tenderness to palpation, no erythema or increased warmth -see photo below.  PSYCH: Awake and alert, speech fluent,  insight/judgement/memory impaired.      Right Forehead      Whole Face      Recent labs in [facility] reviewed by me today.   CBC RESULTS:   Recent Labs   Lab Test 03/12/24 1852 02/08/24  2349   WBC 13.1* 6.7   RBC 3.61* 3.92*   HGB 9.7* 10.6*   HCT 30.2* 34.1*   MCV 84 87   MCH 26.9 27.0   MCHC 32.1 31.1*   RDW 16.6* 16.6*    208     Ferritin   Date Value Ref Range Status   09/15/2022 40 26 - 388 ng/mL Final     Iron   Date Value Ref Range Status   09/15/2022 48 35 - 180 ug/dL Final     Iron Binding Capacity   Date Value Ref Range Status   09/15/2022 374 240 - 430 ug/dL Final     Lab Results   Component Value Date    B12 420 04/28/2022     Last Basic Metabolic Panel:  Recent Labs   Lab Test 03/12/24  1852 06/07/23  0957   * 138   POTASSIUM 3.5 4.1   CHLORIDE 88* 101   JOSUE 8.2* 9.1   CO2 28 28   BUN 7.6* 11.5   CR 0.64* 0.62*   * 92     Liver Function Studies -   Recent Labs   Lab Test 06/07/23  0957 01/05/23  1000   PROTTOTAL 7.2 7.6   ALBUMIN 4.0 3.6   BILITOTAL 0.4 0.5   ALKPHOS 58 64   AST 23 18   ALT 11 13     TSH   Date Value Ref Range Status   06/07/2023 1.68 0.30 - 4.20 uIU/mL Final   10/06/2022 1.67 0.30 - 4.20 uIU/mL Final   04/28/2022 2.77 0.40 - 4.00 mU/L Final   04/07/2022 3.94 0.40 - 4.00 mU/L Final     Lab Results   Component Value Date    A1C 5.5 04/28/2022    A1C 5.6 04/07/2022       EXAM: XR CHEST 2 VIEWS  LOCATION: [facility]  DATE: 3/12/2024     INDICATION: Fever.  COMPARISON: 06/07/2023.                                                                      IMPRESSION: Elevated left hemidiaphragm with adjacent atelectasis. Few scattered strands of  "fibrosis similar to previous. No definite pneumonia.  Assessment/Plan:  (R50.9) Fever, unspecified fever cause  (primary encounter diagnosis)  (D72.829) Leukocytosis, unspecified type  (R09.02) Hypoxia  Comment: Fever and leukocytosis with wheezing and hypoxia which are improved today.  Clear chest x-ray in ER 3/12/424.  Possible viral respiratory illness.  No urinary symptoms.  Plan:   -Reviewed plan for supportive care in [facility] setting  -Twice daily temp and pulse ox through the weekend  -As needed guaifenesin  -Schedule Tylenol 3 times daily for 4 days then resume usual dosing  -Repeat CBC on 3/14/2024  -Blood cultures pending, no growth to date  -Counseled resident and his wife if any new or worsening symptoms would recommend resident return to ER and consider hospitalization for increased monitoring and/or placement to TCU for increased nursing care/monitoring.    (E87.1) Hyponatremia  Comment: Acute recurrent concern, in setting of SIADH, psychotropic meds may contribute, drinking large amounts of water  Sodium 129 on labs in ER  Plan:   -Repeat CMP on 3/14/2024  -Reviewed free water restriction, limit to 6-8 8 ounce glasses per day    (I48.0) Afib   Comment: Chronic and asymptomatic, junctional rhythm in ER  History of atrial fibrillation, low heart rate on previous ECG while in hospital.  Per cardiology Dr. Ross, \"clearly has slow atrial fibrillation. I will place a 7-day monitor to evaluate if he is having any significant heart block or significant arrhythmias that may be contributing to his multiple falls.  He clearly is too high a fall risk to consider anticoagulation.\"   Completed Zio patch September 2023 -see report in Epic.  Persistent atrial fibrillation with average heart rate of 58 bpm, pauses of 3.3 seconds, frequent PVCs, intermittent bundle branch block.  Considered Watchman device, but patient declined invasive intervention; pt/family aware of risk.  HAS-BLED 2: 4.1%  CHADs-VASC: 3% (Age, HTN) " "  Plan:   - Cardiology follow-up scheduled for 3/14/2024    (R60.0) BL lower extremity edema  Comment: Chronic concern  Last echo in July 2023 see result in epic.  Elevated BNP but no history of heart failure exacerbation, no volume overload on chest x-ray.  Plan:  -Mechanical lift chair is broken and keeping legs in a dependent position throughout the day which seems to have exacerbated his lower extremity edema  -Would greatly benefit from wearing his fitted Velcro compression wraps but he declines to do this stating, \" there are pain in the neck.\"   -Could consider diuresis, however would be very difficult to monitor in assisted living setting given labs may only be drawn once weekly  -Repeat renal panel on 3/14/2024  -Strongly recommended compression therapy and elevation as able    (R22.0) Right forehead swelling  Comment: Incidentally noted on exam  Unclear etiology, possibly bumped head on handrail of hospital bed or during transfer to and from ER  Or other etiology altogether.  Plan:   -Nursing to monitor temps and clinical status  -Recommend ER follow-up for any increasing pain or swelling.    Orders:  -CBC, CMP on 3/14/2024  - BID temp and SpO2 x 4 days  -Tylenol 1,000 mg PO TID x 4 days dx pain then resume usual as needed Tylenol dosing  -Request follow-up nursing visit 3/15/2024 for reassessment of respiratory status and right forehead swelling  -Cardiology follow-up 3/14/2024    Electronically signed by: MARICHUY Basilio CNP         Sincerely,        MARICHUY Basilio CNP      "

## 2024-03-13 NOTE — ED NOTES
Patient without sob. States he feels fine and wants to go home. Advised waiting for further lab results.

## 2024-03-13 NOTE — PROGRESS NOTES
[facility] GERIATRICS    Chief Complaint   Patient presents with    RECHECK     Emergency Room follow up     HPI:  Jamie Valdivia is a 83 year old  (1940) PMH significant for GERD with esophagitis, OA BL knees, pharyngeal dysphagia, edema, abdominal wall hernia, chronic atrial fibrillation, HTN, osteoporosis with hx of vertebral fracture, dementia, anemia, hx of COVID-19, who is being seen today for an episodic care visit at: [facility] ([facility]) [61].     Today's concern is:   Follow-up today after ER visit.  Resident is poor historian and lives in memory care unit at assisted living facility; he needs assistance for complex decision making.  Please see neuropsych testing in Epic.    Sent to ER due to fever and hypoxia.  Patient was placed on supplemental oxygen by EMS and sats oxygen saturation improved.   Weaned off of oxygen doing during ER stay after receiving DuoNeb.  ER physician noted expiratory wheezing which improved after after nebulizer treatment.  BNP elevated on labs but lower than it was 1 year ago.  Chronic bilateral lower extremity swelling, stopped wearing Velcro compression wraps per patient preference.  No history of congestive heart failure or taking diuretics in the past last echo in July 2023 with ejection fraction of 60 to 65% along with moderate aortic stenosis, resident in permanent atrial fibrillation but not anticoagulated due to fall risk.  Troponin negative x 2, ACS not suspected by ER team.  Chest x-ray did not show any acute abnormality.  Viral respiratory panel was negative.  Labs are significant for hyponatremia and leukocytosis.  Physician offered admission for diuresis and monitoring but patient refused and was discharged back to his assisted living.    Nursing notes reviewed patient returned to facility at 1 AM today.  On return from ER, VS: /58, P 73 R 24 T 98.0, O2 95% RA.       Today seen in apartment with his wife Gisela.  Reports he has not been  "feeling well for several days.    Denies any dyspnea \"not really.\"  No chest pain or palpitations.  No significant cough.  Does not feel he is wheezing.  Does not feel leg swelling is any worse, refuses to wear Velcro compression wraps and unable to elevate legs due to broken mechanical lift chair.    No diarrhea, abdominal pain, nausea vomiting.  No constipation.  No dysuria, hematuria, urinary frequency.    Staff report he did not come out of room today to eat.  Drinking plenty fluid in apartment.  Has snacks in apartment as well.    At end of visit notes he is troubled by swelling to his right forehead.  Does not recall any trauma.  Sleeps in bed and may have bumped his head on his handrail or during transport to and from hospital.  No headache.  No change in vision.  Mental status at baseline.    Allergies, and PMH/PSH reviewed in EPIC today.    MED REC REQUIRED  Post Medication Reconciliation Status:  Discharge medications reconciled, continue medications without change but medications were reconciled per facility EMR.    REVIEW OF SYSTEMS:History as per HPI.      Objective:   BP (!) 166/77   Pulse 71   Temp 97.6  F (36.4  C)   Resp 16   Ht 1.727 m (5' 8\")   Wt 98.5 kg (217 lb 1.6 oz)   SpO2 95%   BMI 33.01 kg/m    GENERAL APPEARANCE:  Alert, in no distress, seated in recliner chair with wife.  HEENT: Sclera clear and conjunctiva normal.  No nasal drainage.  Pharynx without injection or exudate.  Bilateral TMs partially obstructed by cerumen but are pearly gray and intact.   NECK: No cervical LAD.  RESP:  Non-labored breathing, no respiratory distress, Lung sounds decreased throughout, no adventitious breath sounds, patient is on room air. SpO2 95%.  CV: Rate and rhythm irregular no murmur, no rub or gallop. HR 71.  VASCULAR: 3+ edema bilateral lower extremities, not wearing Velcro compression wraps.   ABDOMEN:  Normal bowel sounds, soft, nontender, no grimacing or guarding with palpation, + umblical " hernia, soft/non-tender.   SKIN: Slight ecchymosis over right forehead with bony prominence, tenderness to palpation, no erythema or increased warmth -see photo below.  PSYCH: Awake and alert, speech fluent,  insight/judgement/memory impaired.      Right Forehead      Whole Face      Recent labs in [facility] reviewed by me today.   CBC RESULTS:   Recent Labs   Lab Test 03/12/24  1852 02/08/24  2349   WBC 13.1* 6.7   RBC 3.61* 3.92*   HGB 9.7* 10.6*   HCT 30.2* 34.1*   MCV 84 87   MCH 26.9 27.0   MCHC 32.1 31.1*   RDW 16.6* 16.6*    208     Ferritin   Date Value Ref Range Status   09/15/2022 40 26 - 388 ng/mL Final     Iron   Date Value Ref Range Status   09/15/2022 48 35 - 180 ug/dL Final     Iron Binding Capacity   Date Value Ref Range Status   09/15/2022 374 240 - 430 ug/dL Final     Lab Results   Component Value Date    B12 420 04/28/2022     Last Basic Metabolic Panel:  Recent Labs   Lab Test 03/12/24  1852 06/07/23  0957   * 138   POTASSIUM 3.5 4.1   CHLORIDE 88* 101   JOSUE 8.2* 9.1   CO2 28 28   BUN 7.6* 11.5   CR 0.64* 0.62*   * 92     Liver Function Studies -   Recent Labs   Lab Test 06/07/23  0957 01/05/23  1000   PROTTOTAL 7.2 7.6   ALBUMIN 4.0 3.6   BILITOTAL 0.4 0.5   ALKPHOS 58 64   AST 23 18   ALT 11 13     TSH   Date Value Ref Range Status   06/07/2023 1.68 0.30 - 4.20 uIU/mL Final   10/06/2022 1.67 0.30 - 4.20 uIU/mL Final   04/28/2022 2.77 0.40 - 4.00 mU/L Final   04/07/2022 3.94 0.40 - 4.00 mU/L Final     Lab Results   Component Value Date    A1C 5.5 04/28/2022    A1C 5.6 04/07/2022       EXAM: XR CHEST 2 VIEWS  LOCATION: [facility]  DATE: 3/12/2024     INDICATION: Fever.  COMPARISON: 06/07/2023.                                                                      IMPRESSION: Elevated left hemidiaphragm with adjacent atelectasis. Few scattered strands of fibrosis similar to previous. No definite pneumonia.  Assessment/Plan:  (R50.9) Fever, unspecified fever  "cause  (primary encounter diagnosis)  (D70.003) Leukocytosis, unspecified type  (R09.02) Hypoxia  Comment: Fever and leukocytosis with wheezing and hypoxia which are improved today.  Clear chest x-ray in ER 3/12/424.  Possible viral respiratory illness.  No urinary symptoms.  Plan:   -Reviewed plan for supportive care in [facility] setting  -Twice daily temp and pulse ox through the weekend  -As needed guaifenesin  -Schedule Tylenol 3 times daily for 4 days then resume usual dosing  -Repeat CBC on 3/14/2024  -Blood cultures pending, no growth to date  -Counseled resident and his wife if any new or worsening symptoms would recommend resident return to ER and consider hospitalization for increased monitoring and/or placement to TCU for increased nursing care/monitoring.    (E87.1) Hyponatremia  Comment: Acute recurrent concern, in setting of SIADH, psychotropic meds may contribute, drinking large amounts of water  Sodium 129 on labs in ER  Plan:   -Repeat CMP on 3/14/2024  -Reviewed free water restriction, limit to 6-8 8 ounce glasses per day    (I48.0) Afib   Comment: Chronic and asymptomatic, junctional rhythm in ER  History of atrial fibrillation, low heart rate on previous ECG while in hospital.  Per cardiology Dr. Ross, \"clearly has slow atrial fibrillation. I will place a 7-day monitor to evaluate if he is having any significant heart block or significant arrhythmias that may be contributing to his multiple falls.  He clearly is too high a fall risk to consider anticoagulation.\"   Completed Zio patch September 2023 -see report in Epic.  Persistent atrial fibrillation with average heart rate of 58 bpm, pauses of 3.3 seconds, frequent PVCs, intermittent bundle branch block.  Considered Watchman device, but patient declined invasive intervention; pt/family aware of risk.  HAS-BLED 2: 4.1%  CHADs-VASC: 3% (Age, HTN)   Plan:   - Cardiology follow-up scheduled for 3/14/2024    (R60.0) BL lower extremity edema  Comment: " "Chronic concern  Last echo in July 2023 see result in epic.  Elevated BNP but no history of heart failure exacerbation, no volume overload on chest x-ray.  Plan:  -Mechanical lift chair is broken and keeping legs in a dependent position throughout the day which seems to have exacerbated his lower extremity edema  -Would greatly benefit from wearing his fitted Velcro compression wraps but he declines to do this stating, \" there are pain in the neck.\"   -Could consider diuresis, however would be very difficult to monitor in assisted living setting given labs may only be drawn once weekly  -Repeat renal panel on 3/14/2024  -Strongly recommended compression therapy and elevation as able    (R22.0) Right forehead swelling  Comment: Incidentally noted on exam  Unclear etiology, possibly bumped head on handrail of hospital bed or during transfer to and from ER  Or other etiology altogether.  Plan:   -Nursing to monitor temps and clinical status  -Recommend ER follow-up for any increasing pain or swelling.    Orders:  -CBC, CMP on 3/14/2024  - BID temp and SpO2 x 4 days  -Tylenol 1,000 mg PO TID x 4 days dx pain then resume usual as needed Tylenol dosing  -Request follow-up nursing visit 3/15/2024 for reassessment of respiratory status and right forehead swelling  -Cardiology follow-up 3/14/2024    Electronically signed by: MARICHUY Basilio CNP       "

## 2024-03-13 NOTE — PROGRESS NOTES
[facility] Care Coordination Contact  CC received notification of Emergency Room visit.  ER visit occurred on 3/12/24 at [facility] with Dx of Hyponatremia and peripheral edema..    CC contacted spouse Gisela  and reviewed discharge summary.  Member has a follow-up appointment with PCP: Yes: scheduled on 3/13/24 with Sylvia Stein CNP  Member has had a change in condition: No  New referrals placed: No  Home Visit Needed: No  Care plan reviewed and updated.  PCP notified of ED visit via EMR.    Gisela Hall RN  [facility]  [phone]

## 2024-03-14 ENCOUNTER — OFFICE VISIT (OUTPATIENT)
Dept: CARDIOLOGY | Facility: CLINIC | Age: 84
End: 2024-03-14
Attending: INTERNAL MEDICINE
Payer: COMMERCIAL

## 2024-03-14 ENCOUNTER — TELEPHONE (OUTPATIENT)
Dept: CARDIOLOGY | Facility: CLINIC | Age: 84
End: 2024-03-14

## 2024-03-14 VITALS
SYSTOLIC BLOOD PRESSURE: 136 MMHG | BODY MASS INDEX: 31.98 KG/M2 | OXYGEN SATURATION: 97 % | HEART RATE: 88 BPM | DIASTOLIC BLOOD PRESSURE: 60 MMHG | WEIGHT: 211 LBS | HEIGHT: 68 IN

## 2024-03-14 DIAGNOSIS — G31.84 MILD COGNITIVE IMPAIRMENT: ICD-10-CM

## 2024-03-14 DIAGNOSIS — E66.9 OBESITY (BMI 30-39.9): ICD-10-CM

## 2024-03-14 DIAGNOSIS — R60.9 DEPENDENT EDEMA: ICD-10-CM

## 2024-03-14 DIAGNOSIS — I71.21 ANEURYSM OF ASCENDING AORTA WITHOUT RUPTURE (H): ICD-10-CM

## 2024-03-14 DIAGNOSIS — I48.21 PERMANENT ATRIAL FIBRILLATION (H): ICD-10-CM

## 2024-03-14 DIAGNOSIS — F10.27 DEMENTIA ASSOCIATED WITH ALCOHOLISM WITH BEHAVIORAL DISTURBANCE (H): ICD-10-CM

## 2024-03-14 DIAGNOSIS — R53.81 PHYSICAL DECONDITIONING: ICD-10-CM

## 2024-03-14 DIAGNOSIS — I49.5 SSS (SICK SINUS SYNDROME) (H): ICD-10-CM

## 2024-03-14 DIAGNOSIS — R00.1 BRADYCARDIA: ICD-10-CM

## 2024-03-14 DIAGNOSIS — I48.91 ATRIAL FIBRILLATION, UNSPECIFIED TYPE (H): ICD-10-CM

## 2024-03-14 DIAGNOSIS — R29.6 REPEATED FALLS: ICD-10-CM

## 2024-03-14 PROCEDURE — 99214 OFFICE O/P EST MOD 30 MIN: CPT | Performed by: PHYSICIAN ASSISTANT

## 2024-03-14 NOTE — TELEPHONE ENCOUNTER
Spoke with patient's daughter, Alexia. She states since the attempt to establish a guardian through the [employer] process the patient and spouse have refused to see or talk with any of their children. The children no longer have access to try to help educate either parent.    Will update JANEE Lora Talavera

## 2024-03-14 NOTE — TELEPHONE ENCOUNTER
Called [facility], spoke to nursing who said they have no updates.     Called patient's care coordinator through primary care for an update, message left to call back.     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    Quetiapine 12.5 mg bid and PRN for paranoia and psychosis  He needs a decision maker, has estranged himself from his children due to his being in Memory Care    Left messages for patient's daughter, Alexia, and son, Markos, to get update on status of medical decision making power and recommendations from cardiology.

## 2024-03-14 NOTE — LETTER
3/14/2024    Sylvia Stein, MARICHUY CNP  [address] [facility] [address]    RE: Jamie Valdivia       Dear Colleague,     I had the pleasure of seeing Jamie Valdivia in the [facility].  Primary Cardiologist: Dr. 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 He denies any new concerns or issues.       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 He reports history of stomach ulcers and is worried about complications of bleeding. I reviewed with them what a watchman device is and how it can be utilized as an alternative to  anticoagulation. His hyponatremia and peripheral edema is being managed by his primary team. He is now on restricted fluids. I am not clear on who is the decision maker for this patient at this time. We will forward my note to his primary team at his facility so decision makers can be informed of our recommendations as well. He is already scheduled to follow up with us in late Spring.    This note was completed in part using Dragon voice recognition software. Although reviewed after completion, some word and grammatical errors may occur.    Orders this Visit:  No orders of the defined types were placed in this encounter.    No orders of the defined types were placed in this encounter.    There are no discontinued medications.      Encounter Diagnoses   Name Primary?    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)     Permanent atrial fibrillation (H)     SSS (sick sinus syndrome) (H)     Mild cognitive impairment     Obesity (BMI 30-39.9)     Physical deconditioning     Repeated falls     Dependent edema     Aneurysm of ascending aorta without rupture (H24)        CURRENT MEDICATIONS:  Current Outpatient Medications   Medication Sig Dispense Refill    acetaminophen (TYLENOL) 500 MG tablet Take 2 tablets (1,000 mg) by mouth 3 times daily as needed for mild pain 10 tablet 98    alum & mag hydroxide-simethicone (MAALOX MAX) 400-400-40 MG/5ML SUSP suspension Take 30 mLs by mouth every 6 hours as needed for indigestion 355 mL 3    gabapentin (NEURONTIN) 100 MG capsule Take 1 capsule (100 mg) by mouth daily AND 2 capsules (200 mg) At Bedtime. May also take 1 capsule (100 mg) 2 times daily as needed (anxiety). 100 capsule 98    guaiFENesin (ROBITUSSIN) 20 mg/mL liquid Take 10 mLs by mouth every 4 hours as needed for cough 355 mL 98    loratadine (CLARITIN) 10 MG tablet Take 1 tablet (10 mg) by mouth At Bedtime 30 tablet 98    menthol-zinc  oxide (CALMOSEPTINE) 0.44-20.6 % OINT ointment Apply 1 g topically 2 times daily. May also apply 1 g 2 times daily as needed for skin protection. 113 g 98    mirtazapine (REMERON) 15 MG tablet TAKE 1 TABLET BY MOUTH AT BEDTIME 90 tablet 3    nystatin (MYCOSTATIN) 552276 UNIT/GM external powder Apply topically 2 times daily as needed      omeprazole (PRILOSEC) 40 MG DR capsule TAKE 1 CAPSULE BY MOUTH TWICE DAILY 180 capsule 97    oxymetazoline (AFRIN) 0.05 % nasal spray Spray 2 sprays into both nostrils 2 times daily as needed (nose bleed)      polyethylene glycol (MIRALAX) 17 GM/Dose powder MIX 2 CAPFULS IN 8OZ OF ORANGE JUICE  IN THE MORNING;AND MAY MIX 2 CAPFULS ONCE DAILY AS NEEDED FOR CONSTIPATION. MIX IN FRONT OF PT. HOLD FOR LOOSE STOOL. OK TO GIVE 1 CAPFUL IF RESIDENT REFUSES 2 CAPFULS. 510 g 97    QUEtiapine (SEROQUEL) 25 MG tablet TAKE ONE-HALF TABLET (12.5MG) BY MOUTH TWICE DAILY;& MAY TAKE ONE-HALF TABLET (12.5MG) EVERY 12 HOURS AS NEEDED 180 tablet 97    sodium chloride (OCEAN) 0.65 % nasal spray Spray 1 spray in nostril 4 times daily as needed for congestion Okay to leave at bedside 88 mL 98    White Petrolatum ointment Apply to BL nares q HS 70 g 98       ALLERGIES     Allergies   Allergen Reactions    Ativan [Lorazepam]        PAST MEDICAL HISTORY:  Past Medical History:   Diagnosis Date    Age-related osteoporosis without current pathological fracture 03/30/2022    Alcohol abuse 11/19/2017    Alcohol dependence with withdrawal (H)     Alcoholism (H)     Back pain     Backache 12/19/2018    CAD (coronary artery disease)     Chronic a-fib (H)     Chronic alcohol use 06/11/2015    Formatting of this note might be different from the original. 2/26/2019: serious fall at home with multiple vertebral fractures.  BAL 0.414% on admission.  Denies that he drinks much. 12/18/2018: hospitalized for fall due to alcohol intoxication.  BAL 0.34% on admission. 9/16/2018: hospitalized for injuries from fall due to  alcohol intoxication.  BAL 0.34% on admission    Chronic atrial fibrillation (H) 07/15/2016    Formatting of this note might be different from the original. 2/2019: Multiple falls so started on aspirin only.  Then aspirin stopped due to GI bleed.    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 2/2/21    HTN (hypertension)     Impaired gait and mobility 03/14/2019    Mild cognitive impairment 08/12/2019    Formatting of this note might be different from the original. NON-AMNESTIC TYPE    Mild TBI (traumatic brain injury) (H) 03/14/2019    Mumps     Obesity (BMI 30-39.9) 09/03/2015    Osteoarthritis     Osteoarthritis of both knees 05/13/2015    Osteoporosis     Other idiopathic peripheral autonomic neuropathy 03/30/2022    Other insomnia 03/14/2019    Other seizures (H) 03/30/2022    Palpitations     Peripheral neuropathy     Pharyngeal dysphagia 05/13/2015    Formatting of this note might be different from the original. Likely secondary to GERD with esophagitis, on PPI and H2 blocker with notable improvement, EGD 10/5/15.    Physical deconditioning 03/14/2019    Recurrent major depressive disorder (H24) 03/30/2022    Rhabdomyolysis     Thrombocytopenia (H24)     Urination disorder     Weakness 04/27/2020       PAST SURGICAL HISTORY:  Past Surgical History:   Procedure Laterality Date    BIOPSY PROSTATE TRANSRECTAL N/A 1/11/2023    Procedure: PROSTATE BIOPSY, RECTAL APPROACH;  Surgeon: Niraj Larry MD;  Location: [facility] OR    COLONOSCOPY      ESOPHAGOSCOPY, GASTROSCOPY, DUODENOSCOPY  (EGD), COMBINED N/A 06/01/2022    Procedure: ESOPHAGOGASTRODUODENOSCOPY (EGD) mngi with biopsies using jumbo cold biopsy forceps;  Surgeon: David Springer MD;  Location:  GI    left hip replacement  2010    left shoulder replacement  2016    ORTHOPEDIC SURGERY      SPINE SURGERY      done in [place]    TONSILLECTOMY      TRANSRECTAL ULTRASONIC SONOGRAM N/A 1/11/2023    Procedure: TRANSRECTAL ULTRASOUND;  Surgeon: Niraj Larry MD;  Location:  OR       FAMILY HISTORY:  Family History   Problem Relation Age of Onset    Osteoporosis Mother     Prostate Cancer Paternal Grandfather     Colon Cancer No family hx of        SOCIAL HISTORY:  Social History     Socioeconomic History    Marital status:      Spouse name: None    Number of children: None    Years of education: None    Highest education level: None   Tobacco Use    Smoking status: Never    Smokeless tobacco: Never   Substance and Sexual Activity    Alcohol use: Not Currently     Comment: not since December 2021    Drug use: Never     Social Determinants of Health     Financial Resource Strain: Low Risk  (12/19/2023)    Financial Resource Strain     Within the past 12 months, have you or your family members you live with been unable to get utilities (heat, electricity) when it was really needed?: No   Food Insecurity: Low Risk  (12/19/2023)    Food Insecurity     Within the past 12 months, did you worry that your food would run out before you got money to buy more?: No     Within the past 12 months, did the food you bought just not last and you didn t have money to get more?: No   Transportation Needs: Low Risk  (12/19/2023)    Transportation Needs     Within the past 12 months, has lack of transportation kept you from medical appointments, getting your medicines, non-medical meetings or appointments, work, or from getting things that you need?: No   Physical Activity: Inactive (12/19/2023)    Exercise Vital Sign     Days of Exercise per  "Week: 0 days     Minutes of Exercise per Session: 0 min   Stress: No Stress Concern Present (12/19/2023)    [employer] [place] [employer] - Occupational Stress Questionnaire     Feeling of Stress : Only a little   Social Connections: Moderately Integrated (12/19/2023)    Social Connection and Isolation Panel [NHANES]     Frequency of Communication with Friends and Family: More than three times a week     Frequency of Social Gatherings with Friends and Family: Three times a week     Attends Mandaen Services: More than 4 times per year     Active Member of Clubs or Organizations: No     Attends Club or Organization Meetings: Never     Marital Status:    Interpersonal Safety: Low Risk  (12/19/2023)    Interpersonal Safety     Do you feel physically and emotionally safe where you currently live?: Yes     Within the past 12 months, have you been hit, slapped, kicked or otherwise physically hurt by someone?: No     Within the past 12 months, have you been humiliated or emotionally abused in other ways by your partner or ex-partner?: No   Housing Stability: Low Risk  (12/19/2023)    Housing Stability     Do you have housing? : Yes     Are you worried about losing your housing?: No       Review of Systems:  Skin:        Eyes:       ENT:       Respiratory:       Cardiovascular:       Gastroenterology:      Genitourinary:       Musculoskeletal:       Neurologic:       Psychiatric:       Heme/Lymph/Imm:       Endocrine:         Physical Exam:  Vitals: /60 (BP Location: Right arm, Patient Position: Sitting, Cuff Size: Adult Regular)   Pulse 88   Ht 1.727 m (5' 8\")   Wt 95.7 kg (211 lb)   SpO2 97%   BMI 32.08 kg/m       GEN:  NAD  NECK: No JVD  C/V:  Regular rate and rhythm, no murmur, rub or gallop.  RESP: Clear to auscultation bilaterally without wheezing, rales, or rhonchi.  GI: Abdomen soft, nontender, nondistended.   EXTREM: No pitting LE edema.   NEURO: Alert and oriented, cooperative. No " "obvious focal deficits.   PSYCH: Normal affect.  SKIN: Warm and dry.       Recent Lab Results:  LIPID RESULTS:  Lab Results   Component Value Date    CHOL 123 01/05/2023    HDL 46 01/05/2023    LDL 60 01/05/2023    TRIG 84 01/05/2023       LIVER ENZYME RESULTS:  Lab Results   Component Value Date    AST 23 06/07/2023    ALT 11 06/07/2023       CBC RESULTS:  Lab Results   Component Value Date    WBC 13.1 (H) 03/12/2024    RBC 3.61 (L) 03/12/2024    HGB 9.7 (L) 03/12/2024    HCT 30.2 (L) 03/12/2024    MCV 84 03/12/2024    MCH 26.9 03/12/2024    MCHC 32.1 03/12/2024    RDW 16.6 (H) 03/12/2024     03/12/2024       BMP RESULTS:  Lab Results   Component Value Date     (L) 03/12/2024    POTASSIUM 3.5 03/12/2024    POTASSIUM 4.2 01/05/2023    CHLORIDE 88 (L) 03/12/2024    CHLORIDE 102 01/05/2023    CO2 28 03/12/2024    CO2 27 01/05/2023    ANIONGAP 13 03/12/2024    ANIONGAP 8 01/05/2023     (H) 03/12/2024     (H) 01/12/2023     (H) 01/05/2023    BUN 7.6 (L) 03/12/2024    BUN 15 01/05/2023    CR 0.64 (L) 03/12/2024    GFRESTIMATED >90 03/12/2024    JOSUE 8.2 (L) 03/12/2024        A1C RESULTS:  Lab Results   Component Value Date    A1C 5.5 04/28/2022       INR RESULTS:  No results found for: \"INR\"        Lora Talavera PA-C  March 13, 2024       Thank you for allowing me to participate in the care of your patient.      Sincerely,     Lora Talavera PA-C     [facility]  cc:   Alex Ross MD  [address]  [place] [id number]      "

## 2024-03-14 NOTE — TELEPHONE ENCOUNTER
Lora Talavera PA-C Anderson, Barbara E, RN  Caller: Unspecified (Today,  3:06 PM)  Ok, thank you for looking into this. Tough situation.    Lora

## 2024-03-15 ENCOUNTER — DOCUMENTATION ONLY (OUTPATIENT)
Dept: OTHER | Facility: CLINIC | Age: 84
End: 2024-03-15
Payer: COMMERCIAL

## 2024-03-18 LAB
BACTERIA BLD CULT: NO GROWTH
BACTERIA BLD CULT: NO GROWTH

## 2024-03-19 ENCOUNTER — TELEPHONE (OUTPATIENT)
Dept: GERIATRICS | Facility: CLINIC | Age: 84
End: 2024-03-19

## 2024-03-19 ENCOUNTER — APPOINTMENT (OUTPATIENT)
Dept: CT IMAGING | Facility: CLINIC | Age: 84
End: 2024-03-19
Attending: EMERGENCY MEDICINE
Payer: COMMERCIAL

## 2024-03-19 ENCOUNTER — HOSPITAL ENCOUNTER (OUTPATIENT)
Facility: CLINIC | Age: 84
Setting detail: OBSERVATION
Discharge: NURSING, REHAB OR LONG-TERM CARE | End: 2024-03-20
Attending: EMERGENCY MEDICINE | Admitting: HOSPITALIST
Payer: COMMERCIAL

## 2024-03-19 ENCOUNTER — APPOINTMENT (OUTPATIENT)
Dept: GENERAL RADIOLOGY | Facility: CLINIC | Age: 84
End: 2024-03-19
Attending: EMERGENCY MEDICINE
Payer: COMMERCIAL

## 2024-03-19 DIAGNOSIS — G93.40 ENCEPHALOPATHY: ICD-10-CM

## 2024-03-19 DIAGNOSIS — E87.1 HYPONATREMIA: ICD-10-CM

## 2024-03-19 DIAGNOSIS — R60.0 LOWER EXTREMITY EDEMA: ICD-10-CM

## 2024-03-19 DIAGNOSIS — R79.89 ELEVATED BRAIN NATRIURETIC PEPTIDE (BNP) LEVEL: ICD-10-CM

## 2024-03-19 DIAGNOSIS — R53.1 GENERALIZED WEAKNESS: Primary | ICD-10-CM

## 2024-03-19 LAB
ANION GAP SERPL CALCULATED.3IONS-SCNC: 14 MMOL/L (ref 7–15)
ATRIAL RATE - MUSE: 388 BPM
BASOPHILS # BLD AUTO: 0 10E3/UL (ref 0–0.2)
BASOPHILS NFR BLD AUTO: 0 %
BUN SERPL-MCNC: 6.7 MG/DL (ref 8–23)
CALCIUM SERPL-MCNC: 8.1 MG/DL (ref 8.8–10.2)
CHLORIDE SERPL-SCNC: 82 MMOL/L (ref 98–107)
CK SERPL-CCNC: 11 U/L (ref 39–308)
CREAT SERPL-MCNC: 0.48 MG/DL (ref 0.67–1.17)
DEPRECATED HCO3 PLAS-SCNC: 32 MMOL/L (ref 22–29)
DIASTOLIC BLOOD PRESSURE - MUSE: NORMAL MMHG
EGFRCR SERPLBLD CKD-EPI 2021: >90 ML/MIN/1.73M2
EOSINOPHIL # BLD AUTO: 0.1 10E3/UL (ref 0–0.7)
EOSINOPHIL NFR BLD AUTO: 1 %
ERYTHROCYTE [DISTWIDTH] IN BLOOD BY AUTOMATED COUNT: 16.8 % (ref 10–15)
FLUAV RNA SPEC QL NAA+PROBE: NEGATIVE
FLUBV RNA RESP QL NAA+PROBE: NEGATIVE
GLUCOSE SERPL-MCNC: 103 MG/DL (ref 70–99)
HCT VFR BLD AUTO: 29.7 % (ref 40–53)
HGB BLD-MCNC: 9.5 G/DL (ref 13.3–17.7)
HOLD SPECIMEN: NORMAL
HOLD SPECIMEN: NORMAL
IMM GRANULOCYTES # BLD: 0.1 10E3/UL
IMM GRANULOCYTES NFR BLD: 1 %
INTERPRETATION ECG - MUSE: NORMAL
LACTATE SERPL-SCNC: 1.2 MMOL/L (ref 0.7–2)
LYMPHOCYTES # BLD AUTO: 1 10E3/UL (ref 0.8–5.3)
LYMPHOCYTES NFR BLD AUTO: 9 %
MAGNESIUM SERPL-MCNC: 1.3 MG/DL (ref 1.7–2.3)
MAGNESIUM SERPL-MCNC: 1.4 MG/DL (ref 1.7–2.3)
MCH RBC QN AUTO: 26.1 PG (ref 26.5–33)
MCHC RBC AUTO-ENTMCNC: 32 G/DL (ref 31.5–36.5)
MCV RBC AUTO: 82 FL (ref 78–100)
MONOCYTES # BLD AUTO: 0.6 10E3/UL (ref 0–1.3)
MONOCYTES NFR BLD AUTO: 5 %
NEUTROPHILS # BLD AUTO: 9.1 10E3/UL (ref 1.6–8.3)
NEUTROPHILS NFR BLD AUTO: 84 %
NRBC # BLD AUTO: 0 10E3/UL
NRBC BLD AUTO-RTO: 0 /100
NT-PROBNP SERPL-MCNC: 3403 PG/ML (ref 0–1800)
P AXIS - MUSE: NORMAL DEGREES
PLATELET # BLD AUTO: 438 10E3/UL (ref 150–450)
POTASSIUM SERPL-SCNC: 3.3 MMOL/L (ref 3.4–5.3)
POTASSIUM SERPL-SCNC: 3.4 MMOL/L (ref 3.4–5.3)
POTASSIUM SERPL-SCNC: 3.8 MMOL/L (ref 3.4–5.3)
PR INTERVAL - MUSE: NORMAL MS
QRS DURATION - MUSE: 88 MS
QT - MUSE: 422 MS
QTC - MUSE: 455 MS
R AXIS - MUSE: -22 DEGREES
RBC # BLD AUTO: 3.64 10E6/UL (ref 4.4–5.9)
RSV RNA SPEC NAA+PROBE: NEGATIVE
SARS-COV-2 RNA RESP QL NAA+PROBE: NEGATIVE
SODIUM SERPL-SCNC: 128 MMOL/L (ref 135–145)
SYSTOLIC BLOOD PRESSURE - MUSE: NORMAL MMHG
T AXIS - MUSE: 30 DEGREES
TROPONIN T SERPL HS-MCNC: 13 NG/L
VENTRICULAR RATE- MUSE: 70 BPM
WBC # BLD AUTO: 10.9 10E3/UL (ref 4–11)

## 2024-03-19 PROCEDURE — 99222 1ST HOSP IP/OBS MODERATE 55: CPT | Performed by: HOSPITALIST

## 2024-03-19 PROCEDURE — 36415 COLL VENOUS BLD VENIPUNCTURE: CPT | Performed by: HOSPITALIST

## 2024-03-19 PROCEDURE — 250N000011 HC RX IP 250 OP 636: Performed by: HOSPITALIST

## 2024-03-19 PROCEDURE — 87040 BLOOD CULTURE FOR BACTERIA: CPT | Performed by: EMERGENCY MEDICINE

## 2024-03-19 PROCEDURE — 93005 ELECTROCARDIOGRAM TRACING: CPT

## 2024-03-19 PROCEDURE — 83735 ASSAY OF MAGNESIUM: CPT | Performed by: HOSPITALIST

## 2024-03-19 PROCEDURE — 250N000011 HC RX IP 250 OP 636: Performed by: EMERGENCY MEDICINE

## 2024-03-19 PROCEDURE — 83735 ASSAY OF MAGNESIUM: CPT | Performed by: EMERGENCY MEDICINE

## 2024-03-19 PROCEDURE — 96374 THER/PROPH/DIAG INJ IV PUSH: CPT

## 2024-03-19 PROCEDURE — 250N000013 HC RX MED GY IP 250 OP 250 PS 637: Performed by: EMERGENCY MEDICINE

## 2024-03-19 PROCEDURE — 99285 EMERGENCY DEPT VISIT HI MDM: CPT | Mod: 25

## 2024-03-19 PROCEDURE — 36415 COLL VENOUS BLD VENIPUNCTURE: CPT | Performed by: EMERGENCY MEDICINE

## 2024-03-19 PROCEDURE — 80048 BASIC METABOLIC PNL TOTAL CA: CPT | Performed by: EMERGENCY MEDICINE

## 2024-03-19 PROCEDURE — G0378 HOSPITAL OBSERVATION PER HR: HCPCS

## 2024-03-19 PROCEDURE — 84300 ASSAY OF URINE SODIUM: CPT | Performed by: HOSPITALIST

## 2024-03-19 PROCEDURE — 71046 X-RAY EXAM CHEST 2 VIEWS: CPT

## 2024-03-19 PROCEDURE — 84484 ASSAY OF TROPONIN QUANT: CPT | Performed by: EMERGENCY MEDICINE

## 2024-03-19 PROCEDURE — 83880 ASSAY OF NATRIURETIC PEPTIDE: CPT | Performed by: EMERGENCY MEDICINE

## 2024-03-19 PROCEDURE — 70450 CT HEAD/BRAIN W/O DYE: CPT

## 2024-03-19 PROCEDURE — 81001 URINALYSIS AUTO W/SCOPE: CPT | Performed by: EMERGENCY MEDICINE

## 2024-03-19 PROCEDURE — 83935 ASSAY OF URINE OSMOLALITY: CPT | Performed by: HOSPITALIST

## 2024-03-19 PROCEDURE — 84132 ASSAY OF SERUM POTASSIUM: CPT | Performed by: HOSPITALIST

## 2024-03-19 PROCEDURE — 83605 ASSAY OF LACTIC ACID: CPT | Performed by: EMERGENCY MEDICINE

## 2024-03-19 PROCEDURE — 82550 ASSAY OF CK (CPK): CPT | Performed by: EMERGENCY MEDICINE

## 2024-03-19 PROCEDURE — 96375 TX/PRO/DX INJ NEW DRUG ADDON: CPT

## 2024-03-19 PROCEDURE — 250N000013 HC RX MED GY IP 250 OP 250 PS 637: Performed by: HOSPITALIST

## 2024-03-19 PROCEDURE — 85025 COMPLETE CBC W/AUTO DIFF WBC: CPT | Performed by: EMERGENCY MEDICINE

## 2024-03-19 PROCEDURE — 87637 SARSCOV2&INF A&B&RSV AMP PRB: CPT | Performed by: EMERGENCY MEDICINE

## 2024-03-19 RX ORDER — GABAPENTIN 100 MG/1
200 CAPSULE ORAL AT BEDTIME
COMMUNITY
End: 2024-03-29

## 2024-03-19 RX ORDER — ONDANSETRON 2 MG/ML
4 INJECTION INTRAMUSCULAR; INTRAVENOUS EVERY 6 HOURS PRN
Status: DISCONTINUED | OUTPATIENT
Start: 2024-03-19 | End: 2024-03-20 | Stop reason: HOSPADM

## 2024-03-19 RX ORDER — MIRTAZAPINE 15 MG/1
15 TABLET, FILM COATED ORAL AT BEDTIME
Status: DISCONTINUED | OUTPATIENT
Start: 2024-03-19 | End: 2024-03-20 | Stop reason: HOSPADM

## 2024-03-19 RX ORDER — GABAPENTIN 100 MG/1
100 CAPSULE ORAL DAILY
Status: DISCONTINUED | OUTPATIENT
Start: 2024-03-20 | End: 2024-03-20 | Stop reason: HOSPADM

## 2024-03-19 RX ORDER — MAGNESIUM OXIDE 400 MG/1
400 TABLET ORAL ONCE
Status: COMPLETED | OUTPATIENT
Start: 2024-03-19 | End: 2024-03-19

## 2024-03-19 RX ORDER — CARBOXYMETHYLCELLULOSE SODIUM 5 MG/ML
2 SOLUTION/ DROPS OPHTHALMIC 3 TIMES DAILY
Status: DISCONTINUED | OUTPATIENT
Start: 2024-03-19 | End: 2024-03-20 | Stop reason: HOSPADM

## 2024-03-19 RX ORDER — AMOXICILLIN 250 MG
1 CAPSULE ORAL 2 TIMES DAILY PRN
Status: DISCONTINUED | OUTPATIENT
Start: 2024-03-19 | End: 2024-03-20 | Stop reason: HOSPADM

## 2024-03-19 RX ORDER — FUROSEMIDE 10 MG/ML
20 INJECTION INTRAMUSCULAR; INTRAVENOUS ONCE
Status: COMPLETED | OUTPATIENT
Start: 2024-03-19 | End: 2024-03-19

## 2024-03-19 RX ORDER — ACETAMINOPHEN 650 MG/1
650 SUPPOSITORY RECTAL EVERY 4 HOURS PRN
Status: DISCONTINUED | OUTPATIENT
Start: 2024-03-19 | End: 2024-03-20 | Stop reason: HOSPADM

## 2024-03-19 RX ORDER — GABAPENTIN 100 MG/1
200 CAPSULE ORAL AT BEDTIME
Status: DISCONTINUED | OUTPATIENT
Start: 2024-03-19 | End: 2024-03-20 | Stop reason: HOSPADM

## 2024-03-19 RX ORDER — PANTOPRAZOLE SODIUM 40 MG/1
40 TABLET, DELAYED RELEASE ORAL 2 TIMES DAILY
Status: DISCONTINUED | OUTPATIENT
Start: 2024-03-19 | End: 2024-03-20 | Stop reason: HOSPADM

## 2024-03-19 RX ORDER — MAGNESIUM SULFATE HEPTAHYDRATE 40 MG/ML
4 INJECTION, SOLUTION INTRAVENOUS ONCE
Status: COMPLETED | OUTPATIENT
Start: 2024-03-19 | End: 2024-03-19

## 2024-03-19 RX ORDER — ACETAMINOPHEN 325 MG/1
650 TABLET ORAL EVERY 4 HOURS PRN
Status: DISCONTINUED | OUTPATIENT
Start: 2024-03-19 | End: 2024-03-20 | Stop reason: HOSPADM

## 2024-03-19 RX ORDER — GABAPENTIN 100 MG/1
100 CAPSULE ORAL DAILY
COMMUNITY
End: 2024-03-29

## 2024-03-19 RX ORDER — ONDANSETRON 4 MG/1
4 TABLET, ORALLY DISINTEGRATING ORAL EVERY 6 HOURS PRN
Status: DISCONTINUED | OUTPATIENT
Start: 2024-03-19 | End: 2024-03-20 | Stop reason: HOSPADM

## 2024-03-19 RX ORDER — AMOXICILLIN 250 MG
2 CAPSULE ORAL 2 TIMES DAILY PRN
Status: DISCONTINUED | OUTPATIENT
Start: 2024-03-19 | End: 2024-03-20 | Stop reason: HOSPADM

## 2024-03-19 RX ORDER — POTASSIUM CHLORIDE 1500 MG/1
40 TABLET, EXTENDED RELEASE ORAL ONCE
Status: COMPLETED | OUTPATIENT
Start: 2024-03-19 | End: 2024-03-19

## 2024-03-19 RX ADMIN — MAGNESIUM SULFATE HEPTAHYDRATE 4 G: 4 INJECTION, SOLUTION INTRAVENOUS at 21:08

## 2024-03-19 RX ADMIN — POTASSIUM CHLORIDE 40 MEQ: 1500 TABLET, EXTENDED RELEASE ORAL at 22:27

## 2024-03-19 RX ADMIN — CARBOXYMETHYLCELLULOSE SODIUM 2 DROP: 5 SOLUTION/ DROPS OPHTHALMIC at 20:41

## 2024-03-19 RX ADMIN — MAGNESIUM OXIDE TAB 400 MG (241.3 MG ELEMENTAL MG) 400 MG: 400 (241.3 MG) TAB at 15:08

## 2024-03-19 RX ADMIN — FUROSEMIDE 20 MG: 10 INJECTION, SOLUTION INTRAMUSCULAR; INTRAVENOUS at 15:09

## 2024-03-19 RX ADMIN — ACETAMINOPHEN 650 MG: 325 TABLET, FILM COATED ORAL at 20:37

## 2024-03-19 RX ADMIN — MIRTAZAPINE 15 MG: 15 TABLET, FILM COATED ORAL at 20:43

## 2024-03-19 RX ADMIN — GABAPENTIN 200 MG: 100 CAPSULE ORAL at 20:42

## 2024-03-19 RX ADMIN — QUETIAPINE FUMARATE 12.5 MG: 25 TABLET ORAL at 20:37

## 2024-03-19 RX ADMIN — PANTOPRAZOLE SODIUM 40 MG: 40 TABLET, DELAYED RELEASE ORAL at 20:37

## 2024-03-19 ASSESSMENT — ACTIVITIES OF DAILY LIVING (ADL)
ADLS_ACUITY_SCORE: 45
ADLS_ACUITY_SCORE: 41
ADLS_ACUITY_SCORE: 38
ADLS_ACUITY_SCORE: 47
ADLS_ACUITY_SCORE: 38
ADLS_ACUITY_SCORE: 47
ADLS_ACUITY_SCORE: 38
ADLS_ACUITY_SCORE: 45
ADLS_ACUITY_SCORE: 43
ADLS_ACUITY_SCORE: 38

## 2024-03-19 ASSESSMENT — COLUMBIA-SUICIDE SEVERITY RATING SCALE - C-SSRS
2. HAVE YOU ACTUALLY HAD ANY THOUGHTS OF KILLING YOURSELF IN THE PAST MONTH?: NO
6. HAVE YOU EVER DONE ANYTHING, STARTED TO DO ANYTHING, OR PREPARED TO DO ANYTHING TO END YOUR LIFE?: NO
1. IN THE PAST MONTH, HAVE YOU WISHED YOU WERE DEAD OR WISHED YOU COULD GO TO SLEEP AND NOT WAKE UP?: NO

## 2024-03-19 NOTE — ED TRIAGE NOTES
Lives in SNF. Staff state patient is declining. Worsening headache, decreased appetite, increasing weakness. Hx of dementia. . Paperwork states patient sent here for concerns for hyponatremia, fluid overload, headaches, pallor, insomnia, eye irritation, abrasion to right forehead

## 2024-03-19 NOTE — H&P
[facility]    History and Physical  Hospitalist       Date of Admission:  3/19/2024    Assessment & Plan   Jamie Valdivia is a 83 year old male with a past medical history of atrial fibrillation not on anticoagulation due to multiple falls and GI bleeds in the past, prostate cancer treated with radiation, past history of alcohol use disorder, GERD, osteoporosis, dementia lives in memory care who presents with weakness.    # Generalized weakness suspect secondary to acute on chronic hyponatremia in the setting of dementia. Rule out UTI: Patient was sent from his care facility due to generalized weakness, headache along with concern for volume overload.  He had also developed some right eye irritation in the last few days.  He reportedly had a low-grade fever at his facility about 4 to 5 days ago.  No falls.  Patient denies any abdominal pain.  No diarrhea.  No nausea vomiting.  He was seen by facility provider today and sent to the ED for evaluation for concern of recurrent hyponatremia and volume overload.  -ED, patient afebrile and nontachycardic.  Normotensive to mildly hypertensive.  Saturating okay on room air.  CBC shows no major acute abnormality.  BMP with mild hyponatremia with sodium of 128 (was 129 on 3/12/2024), potassium 3.3.  Lactic acid within normal limits.  His BNP was elevated at 3400 but stable from check on 3/12.  COVID and influenza negative.  His x-ray did not show any acute abnormality or significant fluid overload.  His CT head showed no acute abnormality.  Patient was given 20 mg of IV Lasix.  -Patient had admission back in October 2022 for severe hyponatremia.  At that time his sodium was 114 on admission.  He had preceding poor p.o. intake with diarrhea.  He was initially placed on IV fluids with improvement up to 122 with subsequent stalling of his sodium levels.  At that point, there was concern for contribution of SIADH and he was placed on a fluid restriction  with improvement in his sodium levels further.  -We will recheck another urine sodium and urine osmolality.  He received 20 mg of IV Lasix in the ER for possible volume overload.  Hold further Lasix tonight.  Place fluid restriction.  Recheck BMP tomorrow AM.  -Check UA to rule out UTI.  -Fall precautions.  Bed and chair alarm.  Physical therapy consulted.    #Elevated BNP: His BNP was elevated at around 3101-week ago.  Essentially stable today at 3400.  Noted that it was up close to 11,000 back in October 2022.  He does have moderate bilateral edema.  His lungs are clear.  He did have an echo in July 2023 that showed moderate aortic stenosis with a EF 60-65%.  RV was normal size and function.    -He received a dose of IV Lasix.  Hold off on further Lasix dosing as I suspect his BNP is chronically elevated at this point.  Fluid restriction as above.    #Right eye irritation: Patient has some conjunctival injection of the right eye.  He did not have any trauma.  We will try some artificial tears.  Monitor.    #Chronic dysphagia: Patient is on nectar thickened liquids with regular diet at baseline.  Ordered regular diet with thickened liquids.    # Paroxysmal A-fib not on anticoagulation: Due to history of falls and reported GI bleed in the past he is not on blood thinners.  He is rate controlled.  #Dementia: Lives in memory care.  Resume home meds once verified.    DVT Prophylaxis: Pneumatic Compression Devices  Code Status: Full Code.  Consistent with facility paperwork and POLST form reviewed.  Dispo: [facility] to observation    Terence Ramirez MD    Primary Care Physician   Sylvia Stein    Chief Complaint   Generalized weakness    History is obtained from the patient, patient's chart and discussed with ER physician.    History of Present Illness   Jamie Valdivia is a 83 year old male with a past medical history of atrial fibrillation not on anticoagulation due to multiple falls and GI bleeds in the past, prostate  cancer treated with radiation, past history of alcohol use disorder, GERD, osteoporosis, dementia lives in memory care who presents with weakness.    Patient was sent from his care facility due to generalized weakness, headache along with concern for volume overload.  He had also developed some right eye irritation in the last few days.  He reportedly had a low-grade fever at his facility about 4 to 5 days ago.  No falls.  Patient denies any abdominal pain.  No diarrhea.  No nausea vomiting.  He was seen by facility provider today and sent to the ED for evaluation for concern of recurrent hyponatremia and volume overload.    In the ED, patient afebrile and nontachycardic.  Normotensive to mildly hypertensive.  Saturating okay on room air.  CBC shows no major acute abnormality.  BMP with mild hyponatremia with sodium of 128 (was 129 on 3/12/2024), potassium 3.3.  Lactic acid within normal limits.  His BNP was elevated at 3400 but stable from check on 3/12.  COVID and influenza negative.  His x-ray did not show any acute abnormality or significant fluid overload.  His CT head showed no acute abnormality.  Patient was given 20 mg of IV Lasix.    Past Medical History    I have reviewed this patient's medical history and updated it with pertinent information if needed.   Past Medical History:   Diagnosis Date    Age-related osteoporosis without current pathological fracture 03/30/2022    Alcohol abuse 11/19/2017    Alcohol dependence with withdrawal (H)     Alcoholism (H)     Back pain     Backache 12/19/2018    CAD (coronary artery disease)     Chronic a-fib (H)     Chronic alcohol use 06/11/2015    Formatting of this note might be different from the original. 2/26/2019: serious fall at home with multiple vertebral fractures.  BAL 0.414% on admission.  Denies that he drinks much. 12/18/2018: hospitalized for fall due to alcohol intoxication.  BAL 0.34% on admission. 9/16/2018: hospitalized for injuries from fall due to  alcohol intoxication.  BAL 0.34% on admission    Chronic atrial fibrillation (H) 07/15/2016    Formatting of this note might be different from the original. 2/2019: Multiple falls so started on aspirin only.  Then aspirin stopped due to GI bleed.    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 2/2/21    HTN (hypertension)     Impaired gait and mobility 03/14/2019    Mild cognitive impairment 08/12/2019    Formatting of this note might be different from the original. NON-AMNESTIC TYPE    Mild TBI (traumatic brain injury) (H) 03/14/2019    Mumps     Obesity (BMI 30-39.9) 09/03/2015    Osteoarthritis     Osteoarthritis of both knees 05/13/2015    Osteoporosis     Other idiopathic peripheral autonomic neuropathy 03/30/2022    Other insomnia 03/14/2019    Other seizures (H) 03/30/2022    Palpitations     Peripheral neuropathy     Pharyngeal dysphagia 05/13/2015    Formatting of this note might be different from the original. Likely secondary to GERD with esophagitis, on PPI and H2 blocker with notable improvement, EGD 10/5/15.    Physical deconditioning 03/14/2019    Recurrent major depressive disorder (H24) 03/30/2022    Rhabdomyolysis     Thrombocytopenia (H24)     Urination disorder     Weakness 04/27/2020       Past Surgical History   I have reviewed this patient's surgical history and updated it with pertinent information if needed.  Past Surgical History:   Procedure Laterality Date    BIOPSY PROSTATE TRANSRECTAL N/A 1/11/2023    Procedure: PROSTATE BIOPSY, RECTAL APPROACH;  Surgeon:  Niraj Larry MD;  Location:  OR    COLONOSCOPY      ESOPHAGOSCOPY, GASTROSCOPY, DUODENOSCOPY (EGD), COMBINED N/A 06/01/2022    Procedure: ESOPHAGOGASTRODUODENOSCOPY (EGD) mngi with biopsies using jumbo cold biopsy forceps;  Surgeon: David Springer MD;  Location:  GI    left hip replacement  2010    left shoulder replacement  2016    ORTHOPEDIC SURGERY      SPINE SURGERY      done in [place]    TONSILLECTOMY      TRANSRECTAL ULTRASONIC SONOGRAM N/A 1/11/2023    Procedure: TRANSRECTAL ULTRASOUND;  Surgeon: Niraj Larry MD;  Location:  OR       Prior to Admission Medications   Prior to Admission Medications   Prescriptions Last Dose Informant Patient Reported? Taking?   QUEtiapine (SEROQUEL) 25 MG tablet   No No   Sig: TAKE ONE-HALF TABLET (12.5MG) BY MOUTH TWICE DAILY;& MAY TAKE ONE-HALF TABLET (12.5MG) EVERY 12 HOURS AS NEEDED   White Petrolatum ointment   No No   Sig: Apply to BL nares q HS   acetaminophen (TYLENOL) 500 MG tablet   No No   Sig: Take 2 tablets (1,000 mg) by mouth 3 times daily as needed for mild pain   alum & mag hydroxide-simethicone (MAALOX MAX) 400-400-40 MG/5ML SUSP suspension  Nursing Home No No   Sig: Take 30 mLs by mouth every 6 hours as needed for indigestion   gabapentin (NEURONTIN) 100 MG capsule   No No   Sig: Take 1 capsule (100 mg) by mouth daily AND 2 capsules (200 mg) At Bedtime. May also take 1 capsule (100 mg) 2 times daily as needed (anxiety).   guaiFENesin (ROBITUSSIN) 20 mg/mL liquid   No No   Sig: Take 10 mLs by mouth every 4 hours as needed for cough   loratadine (CLARITIN) 10 MG tablet   No No   Sig: Take 1 tablet (10 mg) by mouth At Bedtime   menthol-zinc oxide (CALMOSEPTINE) 0.44-20.6 % OINT ointment  Nursing Home No No   Sig: Apply 1 g topically 2 times daily. May also apply 1 g 2 times daily as needed for skin protection.   mirtazapine (REMERON) 15 MG tablet   No No   Sig: TAKE 1 TABLET BY MOUTH AT BEDTIME   nystatin (MYCOSTATIN) 051791 UNIT/GM  external powder  Nursing Home Yes No   Sig: Apply topically 2 times daily as needed   omeprazole (PRILOSEC) 40 MG DR capsule   No No   Sig: TAKE 1 CAPSULE BY MOUTH TWICE DAILY   oxymetazoline (AFRIN) 0.05 % nasal spray   No No   Sig: Spray 2 sprays into both nostrils 2 times daily as needed (nose bleed)   polyethylene glycol (MIRALAX) 17 GM/Dose powder  Nursing Home No No   Sig: MIX 2 CAPFULS IN 8OZ OF ORANGE JUICE  IN THE MORNING;AND MAY MIX 2 CAPFULS ONCE DAILY AS NEEDED FOR CONSTIPATION. MIX IN FRONT OF PT. HOLD FOR LOOSE STOOL. OK TO GIVE 1 CAPFUL IF RESIDENT REFUSES 2 CAPFULS.   sodium chloride (OCEAN) 0.65 % nasal spray   No No   Sig: Spray 1 spray in nostril 4 times daily as needed for congestion Okay to leave at bedside      Facility-Administered Medications: None     Allergies   Allergies   Allergen Reactions    Ativan [Lorazepam]        Social History   I have reviewed this patient's social history and updated it with pertinent information if needed. Jamie Valdivia  reports that he has never smoked. He has never used smokeless tobacco. He reports that he does not currently use alcohol. He reports that he does not use drugs.    Family History   I have reviewed this patient's family history and updated it with pertinent information if needed.   Family History   Problem Relation Age of Onset    Osteoporosis Mother     Prostate Cancer Paternal Grandfather     Colon Cancer No family hx of        Review of Systems   The 10 point Review of Systems is negative other than noted in the HPI or here.     Physical Exam   Temp: 98.1  F (36.7  C) Temp src: Temporal BP: 138/73 Pulse: 69   Resp: 15 SpO2: 94 %      Vital Signs with Ranges  Temp:  [98.1  F (36.7  C)] 98.1  F (36.7  C)  Pulse:  [67-69] 69  Resp:  [15-20] 15  BP: (138-149)/(65-73) 138/73  SpO2:  [93 %-96 %] 94 %  0 lbs 0 oz    Constitutional: Elderly male, chronically deconditioned.  Answers simple questions appropriately.  He is able to tell me his wife's  name and his daughter's names.  HEENT: Normocephalic, MMM, no elevation of JVD noted.  He does have some conjunctival injection of the right eye compared to the left.  Respiratory: Nl WOB, Clear bilaterally, No wheezes, no crackles  Cardiovascular: Irregular, systolic murmur noted.  GI: Obese, mild distention.  No significant tenderness to palpation.  Lymph/Hematologic: Scattered bruising.  Skin: No rash  Musculoskeletal: Nl Tone, moderate bilateral edema noted.  Neurologic: A&Ox3, Answers appropriately. CNII-XII intact. Moves all extremities. No tremor  Psychiatric: Calm    Data   Data reviewed today:  I personally reviewed   Recent Labs   Lab 03/19/24  1232 03/12/24  1852   WBC 10.9 13.1*   HGB 9.5* 9.7*   MCV 82 84    287   * 129*   POTASSIUM 3.3* 3.5   CHLORIDE 82* 88*   CO2 32* 28   BUN 6.7* 7.6*   CR 0.48* 0.64*   ANIONGAP 14 13   JOSUE 8.1* 8.2*   * 139*       Recent Results (from the past 24 hour(s))   CT Head w/o Contrast    Narrative    EXAM: CT HEAD W/O CONTRAST  3/19/2024 1:53 PM     HISTORY:  Severe headache       COMPARISON:  Head CT 6/7/2023    TECHNIQUE: Using multidetector thin collimation helical acquisition  technique, axial, coronal and sagittal CT images from the skull base  to the vertex were obtained without intravenous contrast.   (topogram) image(s) also obtained and reviewed. Dose reduction  techniques were performed.    FINDINGS:  No intracranial hemorrhage, mass effect, or midline shift. No acute  loss of gray-white matter differentiation in the cerebral hemispheres.  Ventricles are proportionate to the cerebral sulci. Clear basal  cisterns. Right parieto-occipital encephalomalacia and gliosis. Mild  diffuse cerebral volume loss. Mild periventricular hypoattenuation,  consistent with chronic small ischemic disease. Chronic lacunar  infarct in the right basal ganglia.    The bony calvaria and the bones of the skull base are normal. The  visualized portions of the  "paranasal sinuses are clear. Mild bilateral  mastoid effusions. Grossly normal orbits.       Impression    IMPRESSION:   1. No acute intracranial pathology.   2. Stable chronic changes.    BRENDA DAMON MD         SYSTEM ID:  [id number]   XR Chest 2 Views    Narrative    CHEST TWO VIEWS March 19, 2024 2:05 PM     HISTORY: Rule out pulmonary congestion.    COMPARISON: Chest radiograph 3/12/2024.       Impression    IMPRESSION: Similar elevated left hemidiaphragm and adjacent  atelectasis. No new focal consolidation, pleural effusion or focal  pulmonary edema. Similar cardiomediastinal silhouette. Spinal fusion  hardware and left shoulder arthroplasty.       Clinically Significant Risk Factors Present on Admission        # Hypokalemia: Lowest K = 3.3 mmol/L in last 2 days, will replace as needed  # Hyponatremia: Lowest Na = 128 mmol/L in last 2 days, will monitor as appropriate    # Hypomagnesemia: Lowest Mg = 1.4 mg/dL in last 2 days, will replace as needed         # Dementia: noted on problem list    # Obesity: Estimated body mass index is 32.08 kg/m  as calculated from the following:    Height as of 3/14/24: 1.727 m (5' 8\").    Weight as of 3/14/24: 95.7 kg (211 lb).                 "

## 2024-03-19 NOTE — PHARMACY-ADMISSION MEDICATION HISTORY
Pharmacist Admission Medication History    Admission medication history is complete. The information provided in this note is only as accurate as the sources available at the time of the update.    Information Source(s): Facility (U/NH/) medication list/MAR     Pertinent Information: -    Changes made to PTA medication list:  Added: None  Deleted: None  Changed: split entry gabapentin    Allergies reviewed with patient and updates made in EHR: no    Medication History Completed By: Chet Blanc [facility] 3/19/2024 3:44 PM    PTA Med List   Medication Sig Last Dose    acetaminophen (TYLENOL) 500 MG tablet Take 2 tablets (1,000 mg) by mouth 3 times daily as needed for mild pain Unknown at PRN    alum & mag hydroxide-simethicone (MAALOX MAX) 400-400-40 MG/5ML SUSP suspension Take 30 mLs by mouth every 6 hours as needed for indigestion Unknown at PRN    gabapentin (NEURONTIN) 100 MG capsule Take 100 mg by mouth daily 3/19/2024 at am    gabapentin (NEURONTIN) 100 MG capsule Take 200 mg by mouth at bedtime 3/18/2024 at hs    guaiFENesin (ROBITUSSIN) 20 mg/mL liquid Take 10 mLs by mouth every 4 hours as needed for cough Unknown at PRN    loratadine (CLARITIN) 10 MG tablet Take 1 tablet (10 mg) by mouth At Bedtime 3/18/2024 at hs    menthol-zinc oxide (CALMOSEPTINE) 0.44-20.6 % OINT ointment Apply topically 2 times daily 3/19/2024 at X1    mirtazapine (REMERON) 15 MG tablet TAKE 1 TABLET BY MOUTH AT BEDTIME 3/18/2024 at hs    nystatin (MYCOSTATIN) [id number] UNIT/GM external powder Apply topically 2 times daily as needed Unknown at PRN    omeprazole (PRILOSEC) 40 MG DR capsule TAKE 1 CAPSULE BY MOUTH TWICE DAILY 3/19/2024 at x1    oxymetazoline (AFRIN) 0.05 % nasal spray Spray 2 sprays into both nostrils 2 times daily as needed (nose bleed) Unknown at PRN    polyethylene glycol (MIRALAX) 17 GM/Dose powder MIX 2 CAPFULS IN 8OZ OF ORANGE JUICE  IN THE MORNING;AND MAY MIX 2 CAPFULS ONCE DAILY AS NEEDED FOR CONSTIPATION. MIX IN  FRONT OF PT. HOLD FOR LOOSE STOOL. OK TO GIVE 1 CAPFUL IF RESIDENT REFUSES 2 CAPFULS. Past Week at PRN    QUEtiapine (SEROQUEL) 25 MG tablet TAKE ONE-HALF TABLET (12.5MG) BY MOUTH TWICE DAILY;& MAY TAKE ONE-HALF TABLET (12.5MG) EVERY 12 HOURS AS NEEDED 3/19/2024 at AM    sodium chloride (OCEAN) 0.65 % nasal spray Spray 1 spray in nostril 4 times daily as needed for congestion Okay to leave at bedside Unknown at PRN    White Petrolatum ointment Apply to BL nares q HS (Patient taking differently: Apply to bilateral nares every night at bedtime) 3/18/2024 at hs

## 2024-03-19 NOTE — TELEPHONE ENCOUNTER
[facility] GERIATRICS TELEPHONE NOTE    Nursing updated provider that resident is lethargic with severe headache.  Wife, Gisela, stopped provider in hallway reports new wound to resident head.    Reviewed nursing notes, at nursing follow-up 3/15 resident reported new severe head pain.   Per nursing staff EMS called an refused to transport patient, recommend Tylenol and ice pack.   Unfortunately, headache persisted over last 4 days and resident had developed a wound over painful and swollen area of his forehead concerning for soft tissue infection. Notes also report right eye irritation.   Staff report fatigue and poor appetite, concern for worsening hyponatremia.  Unable to get labs in [facility] setting until 3/21/24.    ASSESSMENT/PLAN:  Recommend transfer to ER due to persistent, severe headache with swelling/wound to forehead, lethargy and anorexia for urgent labs and head imaging.     Wife and daughter Coral aware of transfer and will meet resident in ER. Resident lives in locked memory care unit - see neuropsych testing 5/12/2023.     MARICHUY Basilio CNP  03/19/24 12:46 PM

## 2024-03-19 NOTE — ED NOTES
[facility]  ED Nurse Handoff Report    ED Chief complaint: Generalized Weakness  . ED Diagnosis:   Final diagnoses:   Hyponatremia   Encephalopathy   Elevated brain natriuretic peptide (BNP) level   Lower extremity edema       Allergies:   Allergies   Allergen Reactions    Ativan [Lorazepam]        Code Status: DNR / DNI    Activity level - Baseline/Home:  standby.  Activity Level - Current:   assist of 1.   Lift room needed: No.   Bariatric: No   Needed: No   Isolation: No.   Infection: Not Applicable.     Respiratory status: Room air    Vital Signs (within 30 minutes):   Vitals:    03/19/24 1207 03/19/24 1245 03/19/24 1256   BP: (!) 149/65 138/73    Pulse: 69 67 69   Resp: 20 15 15   Temp: 98.1  F (36.7  C)     TempSrc: Temporal     SpO2: 96% 93% 94%       Cardiac Rhythm:  ,      Pain level:    Patient confused: Yes.   Patient Falls Risk: patient and family education.   Elimination Status: Has voided     Patient Report - Initial Complaint: Headache .   Focused Assessment: Generalized weakness, headache     Abnormal Results:   Labs Ordered and Resulted from Time of ED Arrival to Time of ED Departure   BASIC METABOLIC PANEL - Abnormal       Result Value    Sodium 128 (*)     Potassium 3.3 (*)     Chloride 82 (*)     Carbon Dioxide (CO2) 32 (*)     Anion Gap 14      Urea Nitrogen 6.7 (*)     Creatinine 0.48 (*)     GFR Estimate >90      Calcium 8.1 (*)     Glucose 103 (*)    CBC WITH PLATELETS AND DIFFERENTIAL - Abnormal    WBC Count 10.9      RBC Count 3.64 (*)     Hemoglobin 9.5 (*)     Hematocrit 29.7 (*)     MCV 82      MCH 26.1 (*)     MCHC 32.0      RDW 16.8 (*)     Platelet Count 438      % Neutrophils 84      % Lymphocytes 9      % Monocytes 5      % Eosinophils 1      % Basophils 0      % Immature Granulocytes 1      NRBCs per 100 WBC 0      Absolute Neutrophils 9.1 (*)     Absolute Lymphocytes 1.0      Absolute Monocytes 0.6      Absolute Eosinophils 0.1      Absolute  Basophils 0.0      Absolute Immature Granulocytes 0.1      Absolute NRBCs 0.0     MAGNESIUM - Abnormal    Magnesium 1.4 (*)    NT PROBNP INPATIENT - Abnormal    N terminal Pro BNP Inpatient 3,403 (*)    CK TOTAL - Abnormal    CK 11 (*)    TROPONIN T, HIGH SENSITIVITY - Normal    Troponin T, High Sensitivity 13     INFLUENZA A/B, RSV, & SARS-COV2 PCR - Normal    Influenza A PCR Negative      Influenza B PCR Negative      RSV PCR Negative      SARS CoV2 PCR Negative     LACTIC ACID WHOLE BLOOD - Normal    Lactic Acid 1.2     ROUTINE UA WITH MICROSCOPIC REFLEX TO CULTURE   SODIUM RANDOM URINE   OSMOLALITY, RANDOM URINE   BLOOD CULTURE   BLOOD CULTURE        XR Chest 2 Views   Final Result   IMPRESSION: Similar elevated left hemidiaphragm and adjacent   atelectasis. No new focal consolidation, pleural effusion or focal   pulmonary edema. Similar cardiomediastinal silhouette. Spinal fusion   hardware and left shoulder arthroplasty.      NILSON MILLER MD            SYSTEM ID:  [id number]      CT Head w/o Contrast   Final Result   IMPRESSION:    1. No acute intracranial pathology.    2. Stable chronic changes.      BRENDA DAMON MD            SYSTEM ID:  [id number]          Treatments provided: diagnostics, oral medications  Family Comments: N/A  OBS brochure/video discussed/provided to patient:  Yes  ED Medications:   Medications   magnesium oxide (MAG-OX) tablet 400 mg (400 mg Oral $Given 3/19/24 1508)   furosemide (LASIX) injection 20 mg (20 mg Intravenous $Given 3/19/24 1509)       Drips infusing:  No  For the majority of the shift this patient was Green.   Interventions performed were N/A.    Sepsis treatment initiated: No    Cares/treatment/interventions/medications to be completed following ED care: Education, reminders    ED Nurse Name: Eugene Reynoso RN  3:28 PM

## 2024-03-19 NOTE — ED PROVIDER NOTES
History     Chief Complaint:  Generalized Weakness       HPI   Jamie Valdivia is a 83 year old male with history of CAD, A-fib, dementia presenting today from [facility] facility with generalized weakness.  Reportedly has had a progressive decline in his health.  He reports feeling weak.  States that he has had no chest pain or shortness of breath.  States that he had has a right-sided frontal headache that is been ongoing for several weeks.  Reports no falls or injury.  He states that he has had decrease in appetite recently.      Independent Historian:   None - Patient Only    Review of External Notes:   Progress note from [facility] was brought in with the patient.  Today at 10:30 AM in the progress note there was noted to be a change in condition and patient has had progressive decline.  He was seen by [facility] nurse practitioner today who noted a decline.  A concern for low sodiums, fluid overload, severe headaches, decreased appetite, pallor, insomnia.  Patient has complained of eye irritation and redness.  Complains of pain to the right side of his forehead.  He was sent in today by EMS.    Reviewed patient's recent medical records he was recently seen in the ER March 12, 2024 for shortness of breath.  During that visit he has had reported intermittent lower extremity swelling, occasional wheezing and cough.  Reportedly at the assisted living he was 88% per EMS but was 98% on arrival to the ER.  Patient was worked up in the ER and then discharged back to his facility.      Medications:    acetaminophen (TYLENOL) 500 MG tablet  alum & mag hydroxide-simethicone (MAALOX MAX) 400-400-40 MG/5ML SUSP suspension  gabapentin (NEURONTIN) 100 MG capsule  guaiFENesin (ROBITUSSIN) 20 mg/mL liquid  loratadine (CLARITIN) 10 MG tablet  menthol-zinc oxide (CALMOSEPTINE) 0.44-20.6 % OINT ointment  mirtazapine (REMERON) 15 MG tablet  nystatin (MYCOSTATIN) 541558 UNIT/GM external  powder  omeprazole (PRILOSEC) 40 MG DR capsule  oxymetazoline (AFRIN) 0.05 % nasal spray  polyethylene glycol (MIRALAX) 17 GM/Dose powder  QUEtiapine (SEROQUEL) 25 MG tablet  sodium chloride (OCEAN) 0.65 % nasal spray  White Petrolatum ointment        Past Medical History:    Past Medical History:   Diagnosis Date    Age-related osteoporosis without current pathological fracture 03/30/2022    Alcohol abuse 11/19/2017    Alcohol dependence with withdrawal (H)     Alcoholism (H)     Back pain     Backache 12/19/2018    CAD (coronary artery disease)     Chronic a-fib (H)     Chronic alcohol use 06/11/2015    Chronic atrial fibrillation (H) 07/15/2016    Claustrophobia 05/13/2015    Constipation     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    ED (erectile dysfunction)     Edema     Encephalopathy 3/19/2024    Falling     JOSÉ MANUEL (generalized anxiety disorder)     Generalized anxiety disorder 04/27/2020    GERD (gastroesophageal reflux disease)     GI bleeding     H/O carpal tunnel syndrome     History of 2019 novel coronavirus disease (COVID-19) 02/08/2021    HTN (hypertension)     Impaired gait and mobility 03/14/2019    Mild cognitive impairment 08/12/2019    Mild TBI (traumatic brain injury) (H) 03/14/2019    Mumps     Obesity (BMI 30-39.9) 09/03/2015    Osteoarthritis     Osteoarthritis of both knees 05/13/2015    Osteoporosis     Other idiopathic peripheral autonomic neuropathy 03/30/2022    Other insomnia 03/14/2019    Other seizures (H) 03/30/2022    Palpitations     Peripheral neuropathy     Pharyngeal dysphagia 05/13/2015    Physical deconditioning 03/14/2019    Recurrent major depressive disorder (H24) 03/30/2022    Rhabdomyolysis     Thrombocytopenia (H24)     Urination disorder     Weakness 04/27/2020       Past Surgical History:    Past Surgical History:   Procedure Laterality Date    BIOPSY PROSTATE TRANSRECTAL N/A 1/11/2023    Procedure: PROSTATE BIOPSY, RECTAL APPROACH;   Surgeon: Niraj Larry MD;  Location:  OR    COLONOSCOPY      ESOPHAGOSCOPY, GASTROSCOPY, DUODENOSCOPY (EGD), COMBINED N/A 06/01/2022    Procedure: ESOPHAGOGASTRODUODENOSCOPY (EGD) mngi with biopsies using jumbo cold biopsy forceps;  Surgeon: David Springer MD;  Location:  GI    left hip replacement  2010    left shoulder replacement  2016    ORTHOPEDIC SURGERY      SPINE SURGERY      done in [place]    TONSILLECTOMY      TRANSRECTAL ULTRASONIC SONOGRAM N/A 1/11/2023    Procedure: TRANSRECTAL ULTRASOUND;  Surgeon: Niraj Larry MD;  Location:  OR        Physical Exam   Patient Vitals for the past 24 hrs:   BP Temp Temp src Pulse Resp SpO2   03/19/24 1256 -- -- -- 69 15 94 %   03/19/24 1245 138/73 -- -- 67 15 93 %   03/19/24 1207 (!) 149/65 98.1  F (36.7  C) Temporal 69 20 96 %        Physical Exam  Vitals reviewed.   Constitutional:       General: He is not in acute distress.     Appearance: He is not ill-appearing.   HENT:      Head: Normocephalic and atraumatic.   Eyes:      Extraocular Movements: Extraocular movements intact.   Cardiovascular:      Rate and Rhythm: Normal rate and regular rhythm.   Pulmonary:      Effort: Pulmonary effort is normal. No respiratory distress.      Breath sounds: Normal breath sounds. No wheezing.   Abdominal:      Palpations: Abdomen is soft.      Tenderness: There is no abdominal tenderness. There is no guarding.   Musculoskeletal:      Cervical back: Normal range of motion.      Right lower leg: Edema present.      Left lower leg: Edema present.   Skin:     General: Skin is warm and dry.   Neurological:      Mental Status: He is alert and oriented to person, place, and time.      GCS: GCS eye subscore is 4. GCS verbal subscore is 5. GCS motor subscore is 6.   Psychiatric:         Behavior: Behavior normal.           Emergency Department Course   ECG  ECG taken at 1249, ECG read at 1:16 PM   Atrial flutter     Rate 70 bpm. WI interval * ms. QRS duration 88  ms. QT/QTc 422/455 ms. P-R-T axes * -22 30.     Imaging:  XR Chest 2 Views   Final Result   IMPRESSION: Similar elevated left hemidiaphragm and adjacent   atelectasis. No new focal consolidation, pleural effusion or focal   pulmonary edema. Similar cardiomediastinal silhouette. Spinal fusion   hardware and left shoulder arthroplasty.      NILSON MILLER MD            SYSTEM ID:  [id number]      CT Head w/o Contrast   Final Result   IMPRESSION:    1. No acute intracranial pathology.    2. Stable chronic changes.      BRENDA DAMON MD            SYSTEM ID:  [id number]             Laboratory:  Labs Ordered and Resulted from Time of ED Arrival to Time of ED Departure   BASIC METABOLIC PANEL - Abnormal       Result Value    Sodium 128 (*)     Potassium 3.3 (*)     Chloride 82 (*)     Carbon Dioxide (CO2) 32 (*)     Anion Gap 14      Urea Nitrogen 6.7 (*)     Creatinine 0.48 (*)     GFR Estimate >90      Calcium 8.1 (*)     Glucose 103 (*)    CBC WITH PLATELETS AND DIFFERENTIAL - Abnormal    WBC Count 10.9      RBC Count 3.64 (*)     Hemoglobin 9.5 (*)     Hematocrit 29.7 (*)     MCV 82      MCH 26.1 (*)     MCHC 32.0      RDW 16.8 (*)     Platelet Count 438      % Neutrophils 84      % Lymphocytes 9      % Monocytes 5      % Eosinophils 1      % Basophils 0      % Immature Granulocytes 1      NRBCs per 100 WBC 0      Absolute Neutrophils 9.1 (*)     Absolute Lymphocytes 1.0      Absolute Monocytes 0.6      Absolute Eosinophils 0.1      Absolute Basophils 0.0      Absolute Immature Granulocytes 0.1      Absolute NRBCs 0.0     MAGNESIUM - Abnormal    Magnesium 1.4 (*)    NT PROBNP INPATIENT - Abnormal    N terminal Pro BNP Inpatient 3,403 (*)    CK TOTAL - Abnormal    CK 11 (*)    TROPONIN T, HIGH SENSITIVITY - Normal    Troponin T, High Sensitivity 13     INFLUENZA A/B, RSV, & SARS-COV2 PCR - Normal    Influenza A PCR Negative      Influenza B PCR Negative      RSV PCR Negative      SARS CoV2 PCR Negative      LACTIC ACID WHOLE BLOOD - Normal    Lactic Acid 1.2     ROUTINE UA WITH MICROSCOPIC REFLEX TO CULTURE   SODIUM RANDOM URINE   OSMOLALITY, RANDOM URINE   BLOOD CULTURE   BLOOD CULTURE        Procedures       Emergency Department Course & Assessments:    Interventions:  Medications   magnesium oxide (MAG-OX) tablet 400 mg (has no administration in time range)   furosemide (LASIX) injection 20 mg (has no administration in time range)            Independent Interpretation (X-rays, CTs, rhythm strip):  X-ray with cardiomegaly no obvious pulmonary congestion or infiltrates.      ED Course as of 24 1447   Tue Mar 19, 2024   1354 Reviewed CAT scan of the head without contrast no evidence of any obvious intracranial hemorrhage.   1445 Updated on results and plan for admission.  Discussed case with hospitalist Dr. Ramirez patient placed under observation.       Social Determinants of Health affecting care:   None    Disposition:  The patient was admitted to the hospital under the care of Dr. Ramirez.     Impression & Plan        Medical Decision Makin-year-old male presenting today with increased weakness.  He reports having worsening headache over the last several days, decreased appetite and increasing weakness.  He was assessed by nurse practitioner in his facility prior to arrival and sent in for further evaluation.  He was seen in the ER 2 days ago found to be slightly hyponatremic initial complaint then was shortness of breath.  He was discharged home in stable condition.  He reports no increase in his shortness of breath.  No recent fevers.  He is noted to have significant lower extremity edema.  He is neurologically intact.  Reports a right-sided headache at this time.  CT of the head was ordered that showed no obvious intracranial hemorrhage.  His sodium is slightly low at 128 magnesium low at 1.4 that was replaced p.o.  BNP slightly elevated compared to prior.  X-ray did not show evidence of pulmonary  congestion.  He is not hypoxic at this time.  He was given dose of Lasix.  Given his progressive decline patient placed under observation to the hospitalist service.      Diagnosis:    ICD-10-CM    1. Hyponatremia  E87.1       2. Encephalopathy  G93.40       3. Elevated brain natriuretic peptide (BNP) level  R79.89       4. Lower extremity edema  R60.0                Tegan Castaneda DO  3/19/2024   Tegan Castaneda DO Doan, Tiffani, DO  03/19/24 1447

## 2024-03-20 ENCOUNTER — PATIENT OUTREACH (OUTPATIENT)
Dept: GERIATRIC MEDICINE | Facility: CLINIC | Age: 84
End: 2024-03-20
Payer: COMMERCIAL

## 2024-03-20 ENCOUNTER — APPOINTMENT (OUTPATIENT)
Dept: PHYSICAL THERAPY | Facility: CLINIC | Age: 84
End: 2024-03-20
Attending: HOSPITALIST
Payer: COMMERCIAL

## 2024-03-20 VITALS
OXYGEN SATURATION: 95 % | DIASTOLIC BLOOD PRESSURE: 55 MMHG | WEIGHT: 210.1 LBS | SYSTOLIC BLOOD PRESSURE: 136 MMHG | RESPIRATION RATE: 16 BRPM | HEIGHT: 68 IN | HEART RATE: 71 BPM | TEMPERATURE: 97.7 F | BODY MASS INDEX: 31.84 KG/M2

## 2024-03-20 LAB
ALBUMIN UR-MCNC: NEGATIVE MG/DL
ANION GAP SERPL CALCULATED.3IONS-SCNC: 8 MMOL/L (ref 7–15)
APPEARANCE UR: CLEAR
BILIRUB UR QL STRIP: NEGATIVE
BUN SERPL-MCNC: 8.3 MG/DL (ref 8–23)
CALCIUM SERPL-MCNC: 8.1 MG/DL (ref 8.8–10.2)
CHLORIDE SERPL-SCNC: 89 MMOL/L (ref 98–107)
COLOR UR AUTO: ABNORMAL
CREAT SERPL-MCNC: 0.6 MG/DL (ref 0.67–1.17)
DEPRECATED HCO3 PLAS-SCNC: 34 MMOL/L (ref 22–29)
EGFRCR SERPLBLD CKD-EPI 2021: >90 ML/MIN/1.73M2
GLUCOSE SERPL-MCNC: 128 MG/DL (ref 70–99)
GLUCOSE UR STRIP-MCNC: NEGATIVE MG/DL
HGB UR QL STRIP: NEGATIVE
KETONES UR STRIP-MCNC: NEGATIVE MG/DL
LEUKOCYTE ESTERASE UR QL STRIP: NEGATIVE
MAGNESIUM SERPL-MCNC: 2.3 MG/DL (ref 1.7–2.3)
MAGNESIUM SERPL-MCNC: 2.4 MG/DL (ref 1.7–2.3)
MUCOUS THREADS #/AREA URNS LPF: PRESENT /LPF
NITRATE UR QL: NEGATIVE
OSMOLALITY UR: 175 MMOL/KG (ref 100–1200)
PH UR STRIP: 5.5 [PH] (ref 5–7)
POTASSIUM SERPL-SCNC: 3.7 MMOL/L (ref 3.4–5.3)
POTASSIUM SERPL-SCNC: 3.8 MMOL/L (ref 3.4–5.3)
RBC URINE: <1 /HPF
SODIUM SERPL-SCNC: 131 MMOL/L (ref 135–145)
SODIUM UR-SCNC: 20 MMOL/L
SP GR UR STRIP: 1.01 (ref 1–1.03)
UROBILINOGEN UR STRIP-MCNC: NORMAL MG/DL
WBC URINE: <1 /HPF

## 2024-03-20 PROCEDURE — 97161 PT EVAL LOW COMPLEX 20 MIN: CPT | Mod: GP | Performed by: PHYSICAL THERAPIST

## 2024-03-20 PROCEDURE — 83735 ASSAY OF MAGNESIUM: CPT | Performed by: HOSPITALIST

## 2024-03-20 PROCEDURE — 97530 THERAPEUTIC ACTIVITIES: CPT | Mod: GP | Performed by: PHYSICAL THERAPIST

## 2024-03-20 PROCEDURE — 36415 COLL VENOUS BLD VENIPUNCTURE: CPT | Performed by: HOSPITALIST

## 2024-03-20 PROCEDURE — G0378 HOSPITAL OBSERVATION PER HR: HCPCS

## 2024-03-20 PROCEDURE — 80048 BASIC METABOLIC PNL TOTAL CA: CPT | Performed by: HOSPITALIST

## 2024-03-20 PROCEDURE — 84132 ASSAY OF SERUM POTASSIUM: CPT | Performed by: HOSPITALIST

## 2024-03-20 PROCEDURE — 99239 HOSP IP/OBS DSCHRG MGMT >30: CPT | Performed by: HOSPITALIST

## 2024-03-20 PROCEDURE — 250N000013 HC RX MED GY IP 250 OP 250 PS 637: Performed by: HOSPITALIST

## 2024-03-20 PROCEDURE — 97116 GAIT TRAINING THERAPY: CPT | Mod: GP | Performed by: PHYSICAL THERAPIST

## 2024-03-20 RX ADMIN — PANTOPRAZOLE SODIUM 40 MG: 40 TABLET, DELAYED RELEASE ORAL at 09:18

## 2024-03-20 RX ADMIN — QUETIAPINE FUMARATE 12.5 MG: 25 TABLET ORAL at 09:18

## 2024-03-20 RX ADMIN — ACETAMINOPHEN 650 MG: 325 TABLET, FILM COATED ORAL at 09:18

## 2024-03-20 RX ADMIN — ACETAMINOPHEN 650 MG: 325 TABLET, FILM COATED ORAL at 03:47

## 2024-03-20 RX ADMIN — CARBOXYMETHYLCELLULOSE SODIUM 2 DROP: 5 SOLUTION/ DROPS OPHTHALMIC at 09:22

## 2024-03-20 RX ADMIN — GABAPENTIN 100 MG: 100 CAPSULE ORAL at 09:17

## 2024-03-20 ASSESSMENT — ACTIVITIES OF DAILY LIVING (ADL)
ADLS_ACUITY_SCORE: 47
ADLS_ACUITY_SCORE: 46
ADLS_ACUITY_SCORE: 47
ADLS_ACUITY_SCORE: 47
ADLS_ACUITY_SCORE: 46
ADLS_ACUITY_SCORE: 47
ADLS_ACUITY_SCORE: 46
ADLS_ACUITY_SCORE: 47
ADLS_ACUITY_SCORE: 46
ADLS_ACUITY_SCORE: 46

## 2024-03-20 NOTE — PROGRESS NOTES
Care Management Discharge Note    Discharge Date: 03/20/2024     Discharge Disposition:  return to [facility]    Discharge Services:  [facility] - PT/OT    Discharge DME:      Discharge Transportation: [employer] w/c    Private pay costs discussed: transportation costs    Patient/Family in Agreement with the Plan:  yes    Handoff Referral Completed: No    Additional Information:  SHALINI following for discharge planning.     Pt is discharging back to [facility] today p:[phone] (NORBERTO Flakito) f:[phone]. Spoke with pt's spouse Gisela who is in agreement with discharge plan. Received return call Vm from pt's dtr Coral, who is POA, who approves transport being arranged today.     [employer] w/c ride arranged for today between 0124-4870.     [facility] home care has accepted for PT/OT.     Orders faxed. Updated nursing.     TYLER Higgins, LSW  Care Coordination  [phone]    TYLER Bhatt

## 2024-03-20 NOTE — PROGRESS NOTES
[facility]  OUTPATIENT PHYSICAL THERAPY EVALUATION  PLAN OF TREATMENT FOR OUTPATIENT REHABILITATION  (COMPLETE FOR INITIAL CLAIMS ONLY)  Patient's Last Name, First Name, M.I.  YOB: 1940  Jamie Valdivia                        Provider's Name  [facility] Medical Record No.  [id number]                             Onset Date:  03/19/24   Start of Care Date:  03/20/24   Type:     _X_PT   ___OT   ___SLP Medical Diagnosis:  Generalized weakness              PT Diagnosis:  Impaired functional mobility Visits from SOC:  1     See note for plan of treatment, functional goals and certification details    I CERTIFY THE NEED FOR THESE SERVICES FURNISHED UNDER        THIS PLAN OF TREATMENT AND WHILE UNDER MY CARE     (Physician co-signature of this document indicates review and certification of the therapy plan).               03/20/24 1027   Appointment Info   Signing Clinician's Name / Credentials (PT) Therese Fish DPT   Quick Adds   Quick Adds Certification   Living Environment   People in Home facility resident   Current Living Arrangements assisted living   Home Accessibility no concerns   Transportation Anticipated family or friend will provide   Self-Care   Usual Activity Tolerance good   Current Activity Tolerance moderate   Regular Exercise No   Equipment Currently Used at Home walker, rolling   Fall history within last six months no   General Information   Onset of Illness/Injury or Date of Surgery 03/19/24   Referring Physician Terence Ramirez MD   Patient/Family Therapy Goals Statement (PT) Return home   Pertinent History of Current Problem (include personal factors and/or comorbidities that impact the POC) Jamie Valdivia is a 83 year old male with a past medical history of atrial fibrillation not on anticoagulation due to multiple falls and GI bleeds in the past, prostate cancer treated with radiation, past history of alcohol use  disorder, GERD, osteoporosis, dementia lives in memory care who presents with weakness.   Existing Precautions/Restrictions fall   Cognition   Affect/Mental Status (Cognition) confused   Orientation Status (Cognition) oriented to;person;place   Follows Commands (Cognition) WFL   Safety Deficit (Cognition) awareness of need for assistance;insight into deficits/self-awareness;problem-solving   Pain Assessment   Patient Currently in Pain Yes, see Vital Sign flowsheet  (headache as documented in chart)   Integumentary/Edema   Integumentary/Edema no deficits were identifed   Posture    Posture Forward head position;Protracted shoulders   Range of Motion (ROM)   Range of Motion ROM is WFL   Strength (Manual Muscle Testing)   Strength (Manual Muscle Testing) Deficits observed during functional mobility   Strength Comments Requires UE support for transfers   Bed Mobility   Comment, (Bed Mobility) sit<>supine with CGA; heavy use of bed rails   Transfers   Comment, (Transfers) sit<>stand with Diandra   Gait/Stairs (Locomotion)   Comment, (Gait/Stairs) Diandra w/FWW x 5'   Balance   Balance Comments Requires B UE support for dynamic mobility   Sensory Examination   Sensory Perception patient reports no sensory changes   Coordination   Coordination no deficits were identified   Muscle Tone   Muscle Tone no deficits were identified   Clinical Impression   Criteria for Skilled Therapeutic Intervention Yes, treatment indicated   PT Diagnosis (PT) Impaired functional mobility   Influenced by the following impairments Weakness, deconditioning, cognition   Functional limitations due to impairments Difficulty with bed mobility, transfers, ambulation   Clinical Presentation (PT Evaluation Complexity) stable   Clinical Presentation Rationale medically progressing, clear POC   Clinical Decision Making (Complexity) low complexity   Planned Therapy Interventions (PT) balance training;bed mobility training;gait training;home exercise  program;patient/family education;strengthening;stretching;transfer training;progressive activity/exercise;home program guidelines   Risk & Benefits of therapy have been explained evaluation/treatment results reviewed;care plan/treatment goals reviewed;risks/benefits reviewed;current/potential barriers reviewed;participants voiced agreement with care plan;participants included;patient   PT Total Evaluation Time   PT Eval, Low Complexity Minutes (12381) 12   Therapy Certification   Start of care date 03/20/24   Certification date from 03/20/24   Certification date to 03/25/24   Medical Diagnosis Generalized weakness   Physical Therapy Goals   PT Frequency 5x/week   PT Predicted Duration/Target Date for Goal Attainment 03/25/24   PT Goals Bed Mobility;Gait;Transfers   PT: Bed Mobility Supervision/stand-by assist;Supine to/from sit   PT: Transfers Supervision/stand-by assist;Sit to/from stand   PT: Gait Supervision/stand-by assist;Rolling walker;100 feet   PT Discharge Planning   PT Discharge Recommendation (DC Rec) home with assist;home with home care physical therapy   PT Rationale for DC Rec Pt is Ax1 w/FWW; limited participation in session. Anticipate that the pt would be best served by returning to previous living situation with Ax1 for all mobility/cares. HHPT and HHOT to maximize safety and indep with mobility and ADLs in the home environment.   PT Brief overview of current status Ax1 WW; pt self limiting to in room mobility   Total Session Time   Total Session Time (sum of timed and untimed services) 12

## 2024-03-20 NOTE — PLAN OF CARE
"PRIMARY DIAGNOSIS: GENERALIZED WEAKNESS    OUTPATIENT/OBSERVATION GOALS TO BE MET BEFORE DISCHARGE  1. Orthostatic performed: No    2. Tolerating PO medications: Yes crushed per pt's request    3. Return to near baseline physical activity: Yes    4. Cleared for discharge by consultants (if involved): No    Discharge Planner Nurse   Safe discharge environment identified: No  Barriers to discharge: Yes       Entered by: Phyllis Orlando RN 03/20/2024 6:17 AM    Patient asleep in between cares. Complained of headache PRN Tylenol given. On 1800 ml fluid restriction.      Please review provider order for any additional goals.   Nurse to notify provider when observation goals have been met and patient is ready for discharge.     Please review provider order for any additional goals.   Nurse to notify provider when observation goals have been met and patient is ready for discharge.  Problem: Adult Inpatient Plan of Care  Goal: Plan of Care Review  Description: The Plan of Care Review/Shift note should be completed every shift.  The Outcome Evaluation is a brief statement about your assessment that the patient is improving, declining, or no change.  This information will be displayed automatically on your shift  note.  Outcome: Progressing  Flowsheets (Taken 3/20/2024 0610)  Plan of Care Reviewed With: patient  Goal: Patient-Specific Goal (Individualized)  Description: You can add care plan individualizations to a care plan. Examples of Individualization might be:  \"Parent requests to be called daily at 9am for status\", \"I have a hard time hearing out of my right ear\", or \"Do not touch me to wake me up as it startles  me\".  Outcome: Progressing  Goal: Absence of Hospital-Acquired Illness or Injury  Outcome: Progressing  Intervention: Identify and Manage Fall Risk  Recent Flowsheet Documentation  Taken 3/20/2024 0201 by Phyllis Orlando RN  Safety Promotion/Fall Prevention: activity supervised  Intervention: Prevent Skin " Injury  Recent Flowsheet Documentation  Taken 3/20/2024 0122 by Phyllis Orlando, RN  Body Position: weight shifting  Intervention: Prevent Infection  Recent Flowsheet Documentation  Taken 3/20/2024 0201 by Phyllis Orlando, RN  Infection Prevention: rest/sleep promoted  Goal: Optimal Comfort and Wellbeing  Outcome: Progressing  Goal: Readiness for Transition of Care  Outcome: Progressing     Problem: Electrolyte Imbalance  Goal: Electrolyte Balance  Outcome: Progressing   Goal Outcome Evaluation:      Plan of Care Reviewed With: patient

## 2024-03-20 NOTE — PLAN OF CARE
PRIMARY DIAGNOSIS: GENERALIZED WEAKNESS    OUTPATIENT/OBSERVATION GOALS TO BE MET BEFORE DISCHARGE  1. Orthostatic performed: No    2. Tolerating PO medications: Yes    3. Return to near baseline physical activity: Yes    4. Cleared for discharge by consultants (if involved): No    Discharge Planner Nurse   Safe discharge environment identified: No  Barriers to discharge: Yes       Entered by: Phyllis Orlando RN 03/20/2024 6:08 AM     Please review provider order for any additional goals.   Nurse to notify provider when observation goals have been met and patient is ready for discharge.    Patient alert and oriented. Urine specimen sent pending results.

## 2024-03-20 NOTE — CONSULTS
Care Management Follow Up    Length of Stay (days): 0    Expected Discharge Date: 03/20/2024     Concerns to be Addressed:  discharge planning     Patient plan of care discussed at interdisciplinary rounds: Yes    Anticipated Discharge Disposition:  Return to [facility]     Anticipated Discharge Services:  home care PT/OT  Anticipated Discharge DME:      Education Provided on the Discharge Plan:    Patient/Family in Agreement with the Plan:      Referrals Placed by CM/SW:    Private pay costs discussed: Not applicable    Additional Information:  Received CM/SW consult for discharge planning. Pt admitted with Generalized weakness, noted to have unplanned readmission risk of 22%.      Pt resides at [facility] in [place] [phone]. Call placed to discuss pt returning today. Spoke with RN Emily. Emily states she will call writer back regarding pt's return.     PT has evaluated and recommending home PT/OT.     Attempted to call pt's spouse Gisela, no answer.     ADDENDUM:     Received return call from Flakito p:[phone], director of nursing at [facility]. She is agreeable to pt's return today and requests orders be faxed to f:[phone]. They prefer pt back by 1400. Their preferred home care agency is [facility].     VM left for pt's dtr Coral who is POA regarding transportation being arranged.     Social work will continue to follow and assist with discharge planning as needed.    TYLER Higgins, W  Care Coordination  [phone]    TYLER Bhatt

## 2024-03-20 NOTE — Clinical Note
Please see transition hand-in for current inpatient.   Gisela Hall RN [facility] [phone]

## 2024-03-20 NOTE — PLAN OF CARE
Patient's After Visit Summary was reviewed with patient.   Patient verbalized understanding of After Visit Summary, recommended follow up and was given an opportunity to ask questions.   Discharge medications sent home with patient/family: Not applicable   Discharged with transport tech via w/c back to [facility]. IATF sent with transport tech.

## 2024-03-20 NOTE — DISCHARGE SUMMARY
"[facility]  Hospitalist Discharge Summary      Date of Admission:  3/19/2024  Date of Discharge:  3/20/2024  Discharging Provider: Marvel Li MD  Discharge Service: Hospitalist Service    Discharge Diagnoses   Weakness, hyponatremia    Clinically Significant Risk Factors     # Obesity: Estimated body mass index is 31.95 kg/m  as calculated from the following:    Height as of this encounter: 1.727 m (5' 8\").    Weight as of this encounter: 95.3 kg (210 lb 1.6 oz).       Follow-ups Needed After Discharge   Follow-up Appointments     Follow-up and recommended labs and tests       Follow up with primary care provider, Sylvia Stein, within 7 days for   hospital follow- up.  The following labs/tests are recommended: chem 7.          Unresulted Labs Ordered in the Past 30 Days of this Admission       Date and Time Order Name Status Description    3/19/2024 12:52 PM Blood Culture Arm, Left Preliminary     3/19/2024 12:52 PM Blood Culture Peripheral Blood Preliminary         These results will be followed up by hospitalist group    Discharge Disposition   Discharged to home  Condition at discharge: Stable    Hospital Course   Jamie Valdivia is a 83 year old male with a past medical history of atrial fibrillation not on anticoagulation due to multiple falls and GI bleeds in the past, prostate cancer treated with radiation, past history of alcohol use disorder, GERD, osteoporosis, dementia lives in memory care who presents with weakness.     Patient was sent from his care facility due to generalized weakness, headache along with concern for volume overload.  He had also developed some right eye irritation in the last few days.  He reportedly had a low-grade fever at his facility about 4 to 5 days ago.  No falls.  Patient denies any abdominal pain.  No diarrhea.  No nausea vomiting.  He was seen by facility provider today and sent to the ED for evaluation for concern of recurrent hyponatremia and " volume overload.     In the ED, patient afebrile and nontachycardic.  Normotensive to mildly hypertensive.  Saturating okay on room air.  CBC shows no major acute abnormality.  BMP with mild hyponatremia with sodium of 128 (was 129 on 3/12/2024), potassium 3.3.  Lactic acid within normal limits.  His BNP was elevated at 3400 but stable from check on 3/12.  COVID and influenza negative.  His x-ray did not show any acute abnormality or significant fluid overload.  His CT head showed no acute abnormality.  Patient was given 20 mg of IV Lasix.    I assumed care of the patient today.  He is doing well.  He has no complaints.  He states he is eating and drinking.  His workup here was fairly unrevealing.  His chest x-ray was clear.  His CT scan was normal.  He did have a mild late low sodium.  This is improved.  It is unclear if he was drinking or eating very well at home.  His urine studies show a mixed picture.  His urine sodium is low, but his awesome's are high.  I spoke to his daughter and his wife.  Patient will discharge home.  He will have home PT and OT.    Consultations This Hospital Stay   PHYSICAL THERAPY ADULT IP CONSULT  WOUND OSTOMY CONTINENCE NURSE  IP CONSULT  CARE MANAGEMENT / SOCIAL WORK IP CONSULT    Code Status   Full Code    Time Spent on this Encounter   I, Marvel Li MD, personally saw the patient today and spent greater than 30 minutes discharging this patient.       Marvel Li MD  [facility] OBSERVATION DEPT  [address] [place] [address]  Phone: [phone]  ______________________________________________________________________    Physical Exam   Vital Signs: Temp: 97.7  F (36.5  C) Temp src: Oral BP: 135/63 Pulse: 67   Resp: 16 SpO2: 94 % O2 Device: None (Room air)    Weight: 210 lbs 1.6 oz  Constitutional: awake, alert, cooperative, no apparent distress, and appears stated age  Eyes: Lids and lashes normal, pupils equal, round and reactive to light,  extra ocular muscles intact, sclera clear, conjunctiva normal  ENT: Normocephalic, without obvious abnormality, atraumatic, sinuses nontender on palpation, external ears without lesions, oral pharynx with moist mucous membranes, tonsils without erythema or exudates, gums normal and good dentition.  Respiratory: No increased work of breathing, good air exchange, clear to auscultation bilaterally, no crackles or wheezing  Cardiovascular: Normal apical impulse, regular rate and rhythm, normal S1 and S2, no S3 or S4, and no murmur noted       Primary Care Physician   Sylvia Stein    Discharge Orders      Home Care Referral      Reason for your hospital stay    Weakness, hyponatremia (low sodium).     Follow-up and recommended labs and tests     Follow up with primary care provider, Sylvia Stein, within 7 days for hospital follow- up.  The following labs/tests are recommended: chem 7.     Activity    Your activity upon discharge: activity as tolerated     Diet    Follow this diet upon discharge: Orders Placed This Encounter      Fluid restriction 1800 ML FLUID      Combination Diet Regular Diet; Liquidized/Moderately Thick (level 3)  You need to make sure you are eating and drinking enough throughout the day       Significant Results and Procedures   Most Recent 3 CBC's:  Recent Labs   Lab Test 03/19/24  1232 03/12/24  1852 02/08/24  2349   WBC 10.9 13.1* 6.7   HGB 9.5* 9.7* 10.6*   MCV 82 84 87    287 208     Most Recent 3 BMP's:  Recent Labs   Lab Test 03/20/24  0514 03/20/24  0240 03/19/24  2120 03/19/24  1921 03/19/24  1232 03/12/24  1852   *  --   --   --  128* 129*   POTASSIUM 3.8 3.7 3.4   < > 3.3* 3.5   CHLORIDE 89*  --   --   --  82* 88*   CO2 34*  --   --   --  32* 28   BUN 8.3  --   --   --  6.7* 7.6*   CR 0.60*  --   --   --  0.48* 0.64*   ANIONGAP 8  --   --   --  14 13   JOSUE 8.1*  --   --   --  8.1* 8.2*   *  --   --   --  103* 139*    < > = values in this interval not displayed.      Most Recent 2 LFT's:  Recent Labs   Lab Test 06/07/23  0957 01/05/23  1000   AST 23 18   ALT 11 13   ALKPHOS 58 64   BILITOTAL 0.4 0.5   ,   Results for orders placed or performed during the hospital encounter of 03/19/24   CT Head w/o Contrast    Narrative    EXAM: CT HEAD W/O CONTRAST  3/19/2024 1:53 PM     HISTORY:  Severe headache       COMPARISON:  Head CT 6/7/2023    TECHNIQUE: Using multidetector thin collimation helical acquisition  technique, axial, coronal and sagittal CT images from the skull base  to the vertex were obtained without intravenous contrast.   (topogram) image(s) also obtained and reviewed. Dose reduction  techniques were performed.    FINDINGS:  No intracranial hemorrhage, mass effect, or midline shift. No acute  loss of gray-white matter differentiation in the cerebral hemispheres.  Ventricles are proportionate to the cerebral sulci. Clear basal  cisterns. Right parieto-occipital encephalomalacia and gliosis. Mild  diffuse cerebral volume loss. Mild periventricular hypoattenuation,  consistent with chronic small ischemic disease. Chronic lacunar  infarct in the right basal ganglia.    The bony calvaria and the bones of the skull base are normal. The  visualized portions of the paranasal sinuses are clear. Mild bilateral  mastoid effusions. Grossly normal orbits.       Impression    IMPRESSION:   1. No acute intracranial pathology.   2. Stable chronic changes.    BRENDA DAMON MD         SYSTEM ID:  [id number]   XR Chest 2 Views    Narrative    CHEST TWO VIEWS March 19, 2024 2:05 PM     HISTORY: Rule out pulmonary congestion.    COMPARISON: Chest radiograph 3/12/2024.       Impression    IMPRESSION: Similar elevated left hemidiaphragm and adjacent  atelectasis. No new focal consolidation, pleural effusion or focal  pulmonary edema. Similar cardiomediastinal silhouette. Spinal fusion  hardware and left shoulder arthroplasty.    NILSON MILLER MD         SYSTEM ID:   [id number]       Discharge Medications   Current Discharge Medication List        CONTINUE these medications which have NOT CHANGED    Details   acetaminophen (TYLENOL) 500 MG tablet Take 2 tablets (1,000 mg) by mouth 3 times daily as needed for mild pain  Qty: 10 tablet, Refills: 98    Associated Diagnoses: Pain      alum & mag hydroxide-simethicone (MAALOX MAX) 400-400-40 MG/5ML SUSP suspension Take 30 mLs by mouth every 6 hours as needed for indigestion  Qty: 355 mL, Refills: 3    Associated Diagnoses: Gastroesophageal reflux disease, unspecified whether esophagitis present      !! gabapentin (NEURONTIN) 100 MG capsule Take 100 mg by mouth daily      !! gabapentin (NEURONTIN) 100 MG capsule Take 200 mg by mouth at bedtime      guaiFENesin (ROBITUSSIN) 20 mg/mL liquid Take 10 mLs by mouth every 4 hours as needed for cough  Qty: 355 mL, Refills: 98    Associated Diagnoses: Acute cough      loratadine (CLARITIN) 10 MG tablet Take 1 tablet (10 mg) by mouth At Bedtime  Qty: 30 tablet, Refills: 98    Associated Diagnoses: Chronic cough      menthol-zinc oxide (CALMOSEPTINE) 0.44-20.6 % OINT ointment Apply topically 2 times daily      mirtazapine (REMERON) 15 MG tablet TAKE 1 TABLET BY MOUTH AT BEDTIME  Qty: 90 tablet, Refills: 3    Comments: Please authorize 90 day supply cycle refill for an assisted living pt. Thanks.  Associated Diagnoses: Adjustment reaction with anxiety and depression      nystatin (MYCOSTATIN) 404244 UNIT/GM external powder Apply topically 2 times daily as needed      omeprazole (PRILOSEC) 40 MG DR capsule TAKE 1 CAPSULE BY MOUTH TWICE DAILY  Qty: 180 capsule, Refills: 97    Comments: CYCLE FILL REQUEST FOR CYCLE THAT STARTS 10/05/2023  Associated Diagnoses: Gastroesophageal reflux disease with esophagitis, unspecified whether hemorrhage      oxymetazoline (AFRIN) 0.05 % nasal spray Spray 2 sprays into both nostrils 2 times daily as needed (nose bleed)    Associated Diagnoses: Epistaxis       polyethylene glycol (MIRALAX) 17 GM/Dose powder MIX 2 CAPFULS IN 8OZ OF ORANGE JUICE  IN THE MORNING;AND MAY MIX 2 CAPFULS ONCE DAILY AS NEEDED FOR CONSTIPATION. MIX IN FRONT OF PT. HOLD FOR LOOSE STOOL. OK TO GIVE 1 CAPFUL IF RESIDENT REFUSES 2 CAPFULS.  Qty: 510 g, Refills: 97    Associated Diagnoses: Slow transit constipation      QUEtiapine (SEROQUEL) 25 MG tablet TAKE ONE-HALF TABLET (12.5MG) BY MOUTH TWICE DAILY;& MAY TAKE ONE-HALF TABLET (12.5MG) EVERY 12 HOURS AS NEEDED  Qty: 180 tablet, Refills: 97    Comments: Please authorize quantity 90 day supply with PRN refills for assisted living patient. Their cycle restarts 11/30/23. Thank you!  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 - Potential duplicate medications found. Please discuss with provider.        Allergies   Allergies   Allergen Reactions    Ativan [Lorazepam]

## 2024-03-20 NOTE — PLAN OF CARE
Physical Therapy Discharge Summary    Reason for therapy discharge:    Discharged to home with home therapy.    Progress towards therapy goal(s). See goals on Care Plan in [facility] electronic health record for goal details.  Goals not met.  Barriers to achieving goals:   discharge on same date as initial evaluation.    Therapy recommendation(s):    Continued therapy is recommended.  Rationale/Recommendations:  HHPT/OT to maximize safety with mobility and ADL management in the home environment.

## 2024-03-20 NOTE — Clinical Note
Please see transition hand-in for current inpatient.  Gisela Hall RN [facility] [phone]

## 2024-03-20 NOTE — PLAN OF CARE
PRIMARY DIAGNOSIS: GENERALIZED WEAKNESS    OUTPATIENT/OBSERVATION GOALS TO BE MET BEFORE DISCHARGE  1. Orthostatic performed: N/A    2. Tolerating PO medications: Yes, crushed w/ applesauce     3. Return to near baseline physical activity: No    4. Cleared for discharge by consultants (if involved): No, PT, WOC, SW consults pending    Discharge Planner Nurse   Safe discharge environment identified: TBD  Barriers to discharge: Yes       Entered by: Macy Oglesby RN 03/20/2024      Please review provider order for any additional goals.   Nurse to notify provider when observation goals have been met and patient is ready for discharge.

## 2024-03-20 NOTE — Clinical Note
Uriel Ward,  This was a quick one! Jamie admitted and discharged home before I could send you an update. Please let me know if you have questions.  Thanks!  Gisela

## 2024-03-20 NOTE — PLAN OF CARE
"/62 (BP Location: Right arm)   Pulse 82   Temp 97.9  F (36.6  C) (Oral)   Resp 20   Ht 1.727 m (5' 8\")   Wt 97.5 kg (215 lb)   SpO2 95%   BMI 32.69 kg/m       PRIMARY DIAGNOSIS: Encephalopathy, generalized weakness  OUTPATIENT/OBSERVATION GOALS TO BE MET BEFORE DISCHARGE:  ADLs back to baseline: No    Activity and level of assistance: Up with maximum assistance. Consider SW and/or PT evaluation.     Pain status: Improved-controlled with oral pain medications.    Return to near baseline physical activity: No     Discharge Planner Nurse   Safe discharge environment identified: Yes Memory Care unit at [facility]  Barriers to discharge: Yes       Entered by: Felicia Saucedo RN 03/19/2024 11:00pm    Patient A&Ox3 with generalization about why here, states it's due to his headaches. Mild confusion Patient is on regular diet, with level 3 Mod thickened liquids & 1800cc FR. Has orders to collect a urine specimen but has refused straight cath to collect and has not had or wanted to use the bathroom since he arrived around 1830. External catheter placed.  Inner Gluteal cleft visualized and has skin breakdown, barrier ointment applied. Has +3 pitting edema with numbness to BLE.  Magnesium and Potassium RN managed protocol orders, Magnesium and IV Magnesium 4g/100mL initiated & oral Potassium given. Medications tolerated in apple sauce.      Please review provider order for any additional goals.   Nurse to notify provider when observation goals have been met and patient is ready for discharge.  Problem: Adult Inpatient Plan of Care  Goal: Plan of Care Review  Description: The Plan of Care Review/Shift note should be completed every shift.  The Outcome Evaluation is a brief statement about your assessment that the patient is improving, declining, or no change.  This information will be displayed automatically on your shift  note.  Outcome: Progressing  Flowsheets (Taken 3/19/2024 1800)  Plan " "of Care Reviewed With: patient  Goal: Patient-Specific Goal (Individualized)  Description: You can add care plan individualizations to a care plan. Examples of Individualization might be:  \"Parent requests to be called daily at 9am for status\", \"I have a hard time hearing out of my right ear\", or \"Do not touch me to wake me up as it startles  me\".  Outcome: Progressing  Goal: Absence of Hospital-Acquired Illness or Injury  Outcome: Progressing  Goal: Optimal Comfort and Wellbeing  Outcome: Progressing  Goal: Readiness for Transition of Care  Outcome: Progressing     Problem: Electrolyte Imbalance  Goal: Electrolyte Balance  Outcome: Progressing   Goal Outcome Evaluation:      Plan of Care Reviewed With: patient                 "

## 2024-03-20 NOTE — PROGRESS NOTES
[facility] Care Coordination Contact    [facility]  Ambulatory Care Coordination to Inpatient Care Management   Hand-In Communication    Date:  March 20, 2024  Name: Jamie Valdivia is enrolled in [facility] Care Coordination program and I am the Lead Care Coordinator.  CC Contact Information: Gisela Hall RN/[phone]  Payor Source: Payor: [employer] / Plan: [facility] DUAL / Product Type: HMO /   Current services in place: Customized Living- Memory care at [facility]  Please see the CC Snaphot and Care Management Flowsheets for specific details of this Jamie Valdivia care plan.     Additional details/specific concerns r/t this admission: No additional concerns at this time .    I will follow this admission in Epic. Please feel free to contact me with questions or for further collaboration in discharge planning.    Gisela Hall RN  [facility]  [phone]

## 2024-03-20 NOTE — PROGRESS NOTES
TRANSITIONS OF CARE (ISAAC) LOG    ISAAC tasks should be completed by the CC within one (1) business day of notification of each transition. Follow up contact with member is required after return to their usual care setting.  Note:  If CC finds out about the transitions fifteen (15) days or more after the member has returned to their usual care setting, no ISAAC log is needed. However, the CC should check in with the member to discuss the transition process, any changes needed to the care plan and document it in a case note.     Member Name:  Jamie Valdivia O Name:  clarice O/Health Plan Member ID#:  [id number]   Product: [facility] Care Coordinator Contact:  Gisela Hall RN, BSN, PHN Agency/County/Care System: [facility]   Transition Communication Actions from Care Management Contact   Transition #1   Notification Date: 3/20/24 Transition Date:   [facility]     Is this the member s usual care setting?               yes Transition To: [facility]   Transition Type:  Unplanned    Documentation from conversation with the member/responsible party, provider, discharging and receiving facility:   Date: 3/20/24: Received notification of admission to hospital with dx of weakness and hyponatremia  CC contacted Hospital /discharge planner, Palma Jung ([phone]) and via the [employer] Transitional Care Hand-In Process, with community care plan included.  CC reached out to spouse Gisela  regarding transition and offered support as needed.  Reviewed and update care plan as needed.  Notified community service providers and placed services Assisted Living on hold as needed.  Transition log initiated.   PCP, Sylvia Stein, notified of hospitalization via EMR.    Gisela Hall RN  [facility]  [phone]       Transition #2   Notification Date: 3/20/24 Transition Date:   3/20/24 Transition From: [facility]     Is this the member s usual care setting?               no Transition To: Assisted Living, [place]   Transition Type:  Planned    Documentation from conversation with the member/responsible party, provider, discharging and receiving facility:   Date: 3/20/24: Received notification of transition to home.  CC contacted spouse Gisela  and reviewed discharge summary.  Member has a follow-up appointment with PCP in 7 days: Yes: scheduled on On-site team (Sylvia Stein CNP) will follow up.     Member has had a change in condition: No  Home visit needed: No  Care plan reviewed and updated.  The following home based services Assisted Living were resumed.  New referrals placed: No  Transition log completed.   PCP, Sylvia Stein, notified of transition back to home via EMR.    Gisela Hall RN  [facility]  [phone]               *RETURN TO USUAL CARE SETTING: *Complete tasks below when the member is discharging TO their usual care setting within one (1) business day of notification..      For situations where the Care Coordinator is notified of the discharge prior to the date of discharge, the Care Coordinator must follow up with the member or designated representative to confirm that discharge actually occurred and discuss required ISAAC tasks as outlined in the ISAAC Instructions.  (This includes situations where it may be a  new  usual care setting for the member. (i.e., a community member who decides upon permanent nursing home placement following hospitalization and rehab).    Discuss with Member/Responsible Party:    Check  Yes  - if the member, family member and/or SNF/facility staff manages the following:    If  No  provide explanation in the comments section.          Date completed: 3/20/24 Communicated with member or their designated representative about the following:  care transition process; about changes to the member s health status; plan of care updates; education about transitions  and how to prevent unplanned transitions/readmissions    Four Pillars for Optimal Transition:    Check  Yes  - if the member, family member and/or SNF/facility staff manages the following:    If  No  provide explanation in the comments section.          [x]  Yes     []  No Does the member have a follow-up appointment scheduled with primary care or specialist? (Mental health hospitalizations--the appt. should be w/in 7 days)              For mental health hospitalizations:  []  Yes     []  No     Does the member have a follow-up appointment scheduled with a mental health practitioner within 7 days of discharge?  [x]  Yes     []  No     Has a medication review been completed with member? If no, refer to PCP, home care nurse, MTM, pharmacist  [x]  Yes     []  No     Can the member manage their medications or is there a system in place to manage medications (e.g. home care set-up)?         [x]  Yes     []  No     Can the member verbalize warning signs and symptoms to watch for and how to respond?  [x]  Yes     []  No     Does the member have a copy of and understand their discharge instructions?  If no, assist to obtain copy of discharge instructions, review discharge instructions, and assist to contact PCP to discuss questions about their recent hospitalization.  [x]  Yes     []  No     Does the member have adequate food, housing and transportation?  If no, add goal and discuss additional supports available to the member                                                                                                                                                                                 [x]  Yes     []  No     Is the member safe in their home?  If no, document needs and support provided                                                                                                                                                                          []  Yes     [x]  No     Are there any concerns of  vulnerability, abuse, or neglect?  If yes, document concerns and actions taken by Care Coordinator as a mandated                                                                                                                                                                              [x]  Yes     []  No     Does the member use a Personal Health Care Record?  Check  Yes  if visit summary, discharge summary, and/or healthcare summary are being used as a PHR.                                                                                                                                                                                  [x]  Yes     []  No     Have you reviewed the discharge summary with the member? If  No  provide explanation in comments.  [x]  Yes     []  No     Have you updated the member s care plan/support plan? Add new diagnosis, medications, treatments, goals & interventions, as applicable. If No, provide explanation in comments.    Comments:     Homecare PT/OT ordered at discharge as well    Notes from conversation with the member/responsible party, provider, discharging and receiving facility (as applicable):       Gisela Hall RN  [facility]  [phone]

## 2024-03-20 NOTE — PROGRESS NOTES
ROOM # 203    Living Situation (if not independent, order SW consult):  Facility name: [facility]  : Alexia (daughter) [phone]    Activity level at baseline: Assist x 1  Activity level on admit: Assist x 1 walker, gait belt    Who will be transporting you at discharge: Medical Transport    Patient registered to observation; given Patient Bill of Rights; given the opportunity to ask questions about observation status and their plan of care.  Patient has been oriented to the observation room, bathroom and call light is in place.    Discussed discharge goals and expectations with patient/family.

## 2024-03-21 ENCOUNTER — TELEPHONE (OUTPATIENT)
Dept: GERIATRICS | Facility: CLINIC | Age: 84
End: 2024-03-21

## 2024-03-21 LAB
ALBUMIN SERPL BCG-MCNC: 2.9 G/DL (ref 3.5–5.2)
ALP SERPL-CCNC: 125 U/L (ref 40–150)
ALT SERPL W P-5'-P-CCNC: <5 U/L (ref 0–70)
ANION GAP SERPL CALCULATED.3IONS-SCNC: 13 MMOL/L (ref 7–15)
AST SERPL W P-5'-P-CCNC: 14 U/L (ref 0–45)
BILIRUB SERPL-MCNC: 0.2 MG/DL
BUN SERPL-MCNC: 5.7 MG/DL (ref 8–23)
CALCIUM SERPL-MCNC: 8.5 MG/DL (ref 8.8–10.2)
CHLORIDE SERPL-SCNC: 90 MMOL/L (ref 98–107)
CREAT SERPL-MCNC: 0.46 MG/DL (ref 0.67–1.17)
DEPRECATED HCO3 PLAS-SCNC: 30 MMOL/L (ref 22–29)
EGFRCR SERPLBLD CKD-EPI 2021: >90 ML/MIN/1.73M2
ERYTHROCYTE [DISTWIDTH] IN BLOOD BY AUTOMATED COUNT: 17.2 % (ref 10–15)
GLUCOSE SERPL-MCNC: 85 MG/DL (ref 70–99)
HCT VFR BLD AUTO: 30.1 % (ref 40–53)
HGB BLD-MCNC: 9.4 G/DL (ref 13.3–17.7)
IRON BINDING CAPACITY (ROCHE): 192 UG/DL (ref 240–430)
IRON SATN MFR SERPL: 8 % (ref 15–46)
IRON SERPL-MCNC: 15 UG/DL (ref 61–157)
MCH RBC QN AUTO: 26 PG (ref 26.5–33)
MCHC RBC AUTO-ENTMCNC: 31.2 G/DL (ref 31.5–36.5)
MCV RBC AUTO: 83 FL (ref 78–100)
PLATELET # BLD AUTO: 433 10E3/UL (ref 150–450)
POTASSIUM SERPL-SCNC: 3.7 MMOL/L (ref 3.4–5.3)
PROT SERPL-MCNC: 7 G/DL (ref 6.4–8.3)
RBC # BLD AUTO: 3.62 10E6/UL (ref 4.4–5.9)
SODIUM SERPL-SCNC: 133 MMOL/L (ref 135–145)
WBC # BLD AUTO: 9.9 10E3/UL (ref 4–11)

## 2024-03-21 PROCEDURE — P9604 ONE-WAY ALLOW PRORATED TRIP: HCPCS | Mod: ORL | Performed by: NURSE PRACTITIONER

## 2024-03-21 PROCEDURE — 82728 ASSAY OF FERRITIN: CPT | Mod: ORL | Performed by: NURSE PRACTITIONER

## 2024-03-21 PROCEDURE — 36415 COLL VENOUS BLD VENIPUNCTURE: CPT | Mod: ORL | Performed by: NURSE PRACTITIONER

## 2024-03-21 PROCEDURE — 80053 COMPREHEN METABOLIC PANEL: CPT | Mod: ORL | Performed by: NURSE PRACTITIONER

## 2024-03-21 PROCEDURE — 82607 VITAMIN B-12: CPT | Mod: ORL | Performed by: NURSE PRACTITIONER

## 2024-03-21 PROCEDURE — 85027 COMPLETE CBC AUTOMATED: CPT | Mod: ORL | Performed by: NURSE PRACTITIONER

## 2024-03-21 PROCEDURE — 83550 IRON BINDING TEST: CPT | Mod: ORL | Performed by: NURSE PRACTITIONER

## 2024-03-21 RX ORDER — FERROUS SULFATE 325(65) MG
325 TABLET ORAL
COMMUNITY
End: 2024-04-04

## 2024-03-21 NOTE — TELEPHONE ENCOUNTER
[facility]iatric[facility] Lab Note     Provider: MARICHUY Basilio CNP  Facility: [facility] Type:  AL    Allergies   Allergen Reactions    Ativan [Lorazepam]        Labs Reviewed by provider: CMP, Iron and CBC     Verbal Order/Direction given by Provider: Ferrous Sulfate 325 mg PO once daily on Monday, Wednesday, Friday. Dx anemia     Provider giving Order:  MARICHUY Basilio CNP    Verbal Order given to: Emily Garcia RN

## 2024-03-22 LAB
FERRITIN SERPL-MCNC: 96 NG/ML (ref 31–409)
VIT B12 SERPL-MCNC: 1433 PG/ML (ref 232–1245)

## 2024-03-24 LAB
BACTERIA BLD CULT: NO GROWTH
BACTERIA BLD CULT: NO GROWTH

## 2024-03-26 ENCOUNTER — ASSISTED LIVING VISIT (OUTPATIENT)
Dept: GERIATRICS | Facility: CLINIC | Age: 84
End: 2024-03-26
Payer: COMMERCIAL

## 2024-03-26 VITALS
HEART RATE: 72 BPM | TEMPERATURE: 97.9 F | HEIGHT: 68 IN | BODY MASS INDEX: 32.74 KG/M2 | OXYGEN SATURATION: 95 % | RESPIRATION RATE: 20 BRPM | DIASTOLIC BLOOD PRESSURE: 71 MMHG | WEIGHT: 216 LBS | SYSTOLIC BLOOD PRESSURE: 135 MMHG

## 2024-03-26 DIAGNOSIS — E66.09 CLASS 1 OBESITY DUE TO EXCESS CALORIES WITH SERIOUS COMORBIDITY AND BODY MASS INDEX (BMI) OF 32.0 TO 32.9 IN ADULT: ICD-10-CM

## 2024-03-26 DIAGNOSIS — E66.811 CLASS 1 OBESITY DUE TO EXCESS CALORIES WITH SERIOUS COMORBIDITY AND BODY MASS INDEX (BMI) OF 32.0 TO 32.9 IN ADULT: ICD-10-CM

## 2024-03-26 DIAGNOSIS — R53.81 PHYSICAL DECONDITIONING: Primary | ICD-10-CM

## 2024-03-26 DIAGNOSIS — F41.1 GAD (GENERALIZED ANXIETY DISORDER): ICD-10-CM

## 2024-03-26 DIAGNOSIS — F10.21 ALCOHOL DEPENDENCE IN REMISSION (H): ICD-10-CM

## 2024-03-26 DIAGNOSIS — E22.2 SIADH (SYNDROME OF INAPPROPRIATE ADH PRODUCTION) (H): ICD-10-CM

## 2024-03-26 DIAGNOSIS — F41.9 ANXIETY: ICD-10-CM

## 2024-03-26 DIAGNOSIS — E87.1 HYPONATREMIA: ICD-10-CM

## 2024-03-26 DIAGNOSIS — F03.90 MAJOR NEUROCOGNITIVE DISORDER (H): ICD-10-CM

## 2024-03-26 DIAGNOSIS — R60.0 LOWER EXTREMITY EDEMA: ICD-10-CM

## 2024-03-26 DIAGNOSIS — F43.23 ADJUSTMENT REACTION WITH ANXIETY AND DEPRESSION: ICD-10-CM

## 2024-03-26 PROCEDURE — 99349 HOME/RES VST EST MOD MDM 40: CPT | Performed by: NURSE PRACTITIONER

## 2024-03-26 NOTE — LETTER
3/26/2024        RE: Jamie Valdivia  [address]  [address]  [facility] [address]        [facility] GERIATRICS    Chief Complaint   Patient presents with     Assisted Living Acute     Emergency Room follow up     HPI:  Jamie Valdivia is a 83 year old  (1940) PMH significant for GERD with esophagitis, OA BL knees, pharyngeal dysphagia, edema, abdominal wall hernia, chronic atrial fibrillation, HTN, osteoporosis with hx of vertebral fracture, dementia, anemia, hx of COVID-19, who is being seen today for an episodic care visit at: [facility] ([facility]) [61].     Sent to ER 3/12/2024 due to fever and hypoxia.  Patient was placed on supplemental oxygen by EMS and sats oxygen saturation improved. Weaned off of oxygen doing during ER stay after receiving DuoNeb. ER physician noted expiratory wheezing which improved after after nebulizer treatment. BNP elevated on labs but lower than it was 1 year ago. Chronic bilateral lower extremity swelling, stopped wearing Velcro compression wraps per patient preference.  No history of congestive heart failure or taking diuretics in the past last echo in July 2023 with ejection fraction of 60 to 65% along with moderate aortic stenosis, resident in permanent atrial fibrillation but not anticoagulated due to fall risk.  Troponin negative x 2, ACS not suspected by ER team. Chest x-ray did not show any acute abnormality.  Viral respiratory panel was negative. Labs are significant for hyponatremia and leukocytosis. Physician offered admission for diuresis and monitoring but patient refused and was discharged back to his assisted living.    Hospitalized at [facility] from 3/19 to 3/20/2024 and observation with generalized weakness and headache.  Per hospital discharge: In ED patient afebrile and not tachycardic, normotensive to mildly hypertensive.  No hypoxia, CBC without acute abnormality.  BMP with mild hyponatremia with sodium of 128  "(was 129 on 3/12/2024), and potassium 3.3.  Lactic acid within normal limits.  BNP elevated at 3400 but stable from check on 3/12.  COVID and influenza negative.  Chest x-ray did not show any acute abnormality or significant fluid overload. CT head showed no acute abnormality.  Treated 20 mg of IV Lasix.  During hospitalization remained stable and able to take p.o.'s.  Discharged back to assisted living with plan for PT/OT.     Today's concern is:   Visit today for hospital follow-up.  Wife Gisela present for visit and assists in providing history.    Reports he has been feeling okay since return from hospital, somewhat deconditioned.    Chief complaint is, \"I think I've just been feeling down.\"   Gets chest pain episodes when anxious and has had this for many years.  Used to manage this discomfort with alcohol.  No SOB.  Followed up with cardiology.   Stress brings on chest pain, issues with family.   Regarding stress and anxiety, \"It's palatable.\"  Mind races at night.   Having some trouble sleeping.  ACP therapist is coming today and he is looking forward to this.    No constipation or diarrhea.  No dysuria.     Chair is \"half way fixed.\"    Legs of chair still do not elevate.  Not wearing Velcro wraps.    Allergies, and PMH/PSH reviewed in [place] today.    MED REC REQUIRED  Post Medication Reconciliation Status:  Discharge medications reconciled and changed, see notes/orders    REVIEW OF SYSTEMS:  Limited secondary to cognitive impairment but today pt reports history as per HPI.    Objective:   /71   Pulse 72   Temp 97.9  F (36.6  C)   Resp 20   Ht 1.727 m (5' 8\")   Wt 98 kg (216 lb)   SpO2 95%   BMI 32.84 kg/m    GENERAL APPEARANCE:  Alert, in no distress, seated in recliner chair with wife.  HEENT: Sclera clear and conjunctiva normal.    RESP:  Non-labored breathing, no respiratory distress, Lung sounds decreased throughout, no adventitious breath sounds, patient is on room air.   CV: Rate and " rhythm irregular no murmur, no rub or gallop.   VASCULAR: 3+ edema bilateral lower extremities, not wearing Velcro compression wraps.   ABDOMEN:  Normal bowel sounds, soft, nontender, no grimacing or guarding with palpation, + umblical hernia, soft/non-tender.   SKIN: Limited exam as fully dressed.  PSYCH: Awake and alert, speech fluent,  insight/judgement/memory impaired.    Recent labs in [facility] reviewed by me today.   CBC RESULTS:   Recent Labs   Lab Test 03/21/24  0651 03/19/24  1232   WBC 9.9 10.9   RBC 3.62* 3.64*   HGB 9.4* 9.5*   HCT 30.1* 29.7*   MCV 83 82   MCH 26.0* 26.1*   MCHC 31.2* 32.0   RDW 17.2* 16.8*    438     Ferritin   Date Value Ref Range Status   03/21/2024 96 31 - 409 ng/mL Final     Iron   Date Value Ref Range Status   03/21/2024 15 (L) 61 - 157 ug/dL Final     Iron Binding Capacity   Date Value Ref Range Status   03/21/2024 192 (L) 240 - 430 ug/dL Final   09/15/2022 374 240 - 430 ug/dL Final     Last Basic Metabolic Panel:  Recent Labs   Lab Test 03/21/24  0651 03/20/24  0514   * 131*   POTASSIUM 3.7 3.8   CHLORIDE 90* 89*   JOSUE 8.5* 8.1*   CO2 30* 34*   BUN 5.7* 8.3   CR 0.46* 0.60*   GLC 85 128*     Liver Function Studies -   Recent Labs   Lab Test 03/21/24  0651 06/07/23  0957   PROTTOTAL 7.0 7.2   ALBUMIN 2.9* 4.0   BILITOTAL 0.2 0.4   ALKPHOS 125 58   AST 14 23   ALT <5 11       TSH   Date Value Ref Range Status   06/07/2023 1.68 0.30 - 4.20 uIU/mL Final   10/06/2022 1.67 0.30 - 4.20 uIU/mL Final   04/28/2022 2.77 0.40 - 4.00 mU/L Final   04/07/2022 3.94 0.40 - 4.00 mU/L Final     Lab Results   Component Value Date    A1C 5.5 04/28/2022    A1C 5.6 04/07/2022     CHEST TWO VIEWS March 19, 2024 2:05 PM      HISTORY: Rule out pulmonary congestion.     COMPARISON: Chest radiograph 3/12/2024.                                                                       IMPRESSION: Similar elevated left hemidiaphragm and adjacent  atelectasis. No new focal consolidation, pleural effusion  "or focal  pulmonary edema. Similar cardiomediastinal silhouette. Spinal fusion  hardware and left shoulder arthroplasty.     NILSON MILLER MD         SYSTEM ID:  [id number]    Assessment/Plan:  (R53.81) Physical deconditioning  (primary encounter diagnosis)  Comment: Possible viral illness versus fluid volume overload resulting in ER visit and observation stay, etiology of symptoms not entirely clear.  Plan:   - Home Care Referral: PT/OT to work on strength, balance, endurance, functional mobility         (R60.0) BL lower extremity edema  (E66.09, Z68.32) Obesity - Body mass index is 32.84 kg/m .  Comment: Chronic concern  Last echo in July 2023 see result in epic.  Elevated BNP but no history of heart failure exacerbation, no volume overload on chest x-ray.  Treated with 1 dose of IV Lasix in hospital.  Legs are swollen in setting of dependent edema and obesity.  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  Stopped Celexa 9/29/22, now on Mirtazapine.  Sodium on 3/21/2024: 133, no apparent symptoms with mild hyponatremia.  Plan:   -Follow interval labs, check BMP on 4/11  -Reviewed free water restriction, limit to 6-8 8 ounce glasses per day    (F03.90) Major neurocognitive disorder (H) - dementia   Comment: Chronic, expect slow progressive decline  Please see neuropsych testing from 5/12/2023, \"I believe that Jamie is not capable of making reasoned decisions. He should continue to receive a " "high-level of care that includes other managing medications, finances, and limiting access to alcohol.\"   Please see interdisciplinary team care conference note from 7/25/2023.   Wife and family to assist with decision making as these are his next of kin and there is no healthcare directive.  Plan:   -Continues to benefit from supportive care in assisted living setting, could consider moving off memory care unit if he had appropriate supports, unwilling to let family assist him at present and family does not want him moved off of memory care unit as they feel this is safest environment for him.  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  Cognitive impairment in setting of history of years of ETOH abuse with recurrent falls and hospitalizations. Currently no access to ETOH and thus is abstinent.  Chronic low mood, suspect JOSÉ MANUEL with panic attacks/chest pain for many years, no documented history of myocardial infarction or ASCVD.   On memory care unit due to safety concerns, needs assistance with IADLs.  Plan:   -Increase at bedtime dose of gabapentin to 300 mg  - Continue Gabapentin 100 mg q AM and 100 mg BID PRN for anxiety and pain.   - Continue mirtazpaine 15 mg PO q HS (started 11/17/22, dose increase 1/27/23)  - Continue Seroquel 12.5 mg BID and BID PRN for psychotic features - paranoid delusions. Previously on Zyprexa. Look to wean as able.  Last dose of as needed Seroquel was given on 8/12/23.  - [facility] brandee counselor following closely -has excellent relationship with Kimberly Wick.  - Offered psychiatry " referral in past, but pt/family prefer onsite care, [facility] Pharm[facility] follows  - Would like to move off memory care unit, but per neuropsych testing needs assistance for decision making, family is current decision-maker and feels resident is safest on locked memory care unit    Orders:  - Increase HS gabapentin to 300 mg and give at 7 pm   - BMP, A1C 4/11  -  PT/OT    Electronically signed by: MARICHUY Basilio CNP         Documentation of Face-to-Face and Certification for Home Health Services     Patient: Jamie Valdivia   YOB: 1940  MR Number: [id number]  Today's Date: 3/26/2024    I certify that patient: Jamie Valdivia is under my care and that I, or a nurse practitioner or physician's assistant working with me, had a face-to-face encounter that meets the physician face-to-face encounter requirements with this patient on: 3/26/2024.    This encounter with the patient was in whole, or in part, for the following medical condition, which is the primary reason for home health care:   (R53.81) Physical deconditioning  (primary encounter diagnosis)  (R60.0) Lower extremity edema  (F03.90) Major neurocognitive disorder    I certify that, based on my findings, the following services are medically necessary home health services: Occupational Therapy and Physical Therapy.    My clinical findings support the need for the above services because: Occupational Therapy Services are needed to assess and treat cognitive ability and address ADL safety due to impairment in functional mobility. and Physical Therapy Services are needed to assess and treat the following functional impairments: gait, strength, balance.    Further, I certify that my clinical findings support that this patient is homebound (i.e. absences from home require considerable and taxing effort and are for medical reasons or [employer] services or infrequently or of short duration when for other reasons) because: Requires assistance of another  person or specialized equipment to access medical services because patient: Is prone to wander/get lost without assistance.    Based on the above findings. I certify that this patient is confined to the home and needs intermittent skilled nursing care, physical therapy and/or speech therapy.  The patient is under my care, and I have initiated the establishment of the plan of care.  This patient will be followed by a physician who will periodically review the plan of care.  Physician/Provider to provide follow up care: Sylvia Stein    Responsible Medicare certified [place] Physician: Dr.Gretchen Quintana. MD   Physician Signature: See electronic signature associated with these discharge orders.  Date: 3/26/2024          Sincerely,        MARICHUY Basilio CNP

## 2024-03-26 NOTE — PROGRESS NOTES
[facility]    Chief Complaint   Patient presents with    Assisted Living Acute     Emergency Room follow up     HPI:  Jamie Valdivia is a 83 year old  (1940) PMH significant for GERD with esophagitis, OA BL knees, pharyngeal dysphagia, edema, abdominal wall hernia, chronic atrial fibrillation, HTN, osteoporosis with hx of vertebral fracture, dementia, anemia, hx of COVID-19, who is being seen today for an episodic care visit at: [facility] ([facility]) [[id number]].     Sent to ER 3/12/2024 due to fever and hypoxia.  Patient was placed on supplemental oxygen by EMS and sats oxygen saturation improved. Weaned off of oxygen doing during ER stay after receiving DuoNeb. ER physician noted expiratory wheezing which improved after after nebulizer treatment. BNP elevated on labs but lower than it was 1 year ago. Chronic bilateral lower extremity swelling, stopped wearing Velcro compression wraps per patient preference.  No history of congestive heart failure or taking diuretics in the past last echo in July 2023 with ejection fraction of 60 to 65% along with moderate aortic stenosis, resident in permanent atrial fibrillation but not anticoagulated due to fall risk.  Troponin negative x 2, ACS not suspected by ER team. Chest x-ray did not show any acute abnormality.  Viral respiratory panel was negative. Labs are significant for hyponatremia and leukocytosis. Physician offered admission for diuresis and monitoring but patient refused and was discharged back to his assisted living.    Hospitalized at [facility] from 3/19 to 3/20/2024 and observation with generalized weakness and headache.  Per hospital discharge: In ED patient afebrile and not tachycardic, normotensive to mildly hypertensive.  No hypoxia, CBC without acute abnormality.  BMP with mild hyponatremia with sodium of 128 (was 129 on 3/12/2024), and potassium 3.3.  Lactic acid within normal limits.  BNP elevated at 3400 but  "stable from check on 3/12.  COVID and influenza negative.  Chest x-ray did not show any acute abnormality or significant fluid overload. CT head showed no acute abnormality.  Treated 20 mg of IV Lasix.  During hospitalization remained stable and able to take p.o.'s.  Discharged back to assisted living with plan for PT/OT.     Today's concern is:   Visit today for hospital follow-up.  Wife Gisela present for visit and assists in providing history.    Reports he has been feeling okay since return from hospital, somewhat deconditioned.    Chief complaint is, \"I think I've just been feeling down.\"   Gets chest pain episodes when anxious and has had this for many years.  Used to manage this discomfort with alcohol.  No SOB.  Followed up with cardiology.   Stress brings on chest pain, issues with family.   Regarding stress and anxiety, \"It's palatable.\"  Mind races at night.   Having some trouble sleeping.  ACP therapist is coming today and he is looking forward to this.    No constipation or diarrhea.  No dysuria.     Chair is \"half way fixed.\"    Legs of chair still do not elevate.  Not wearing Velcro wraps.    Allergies, and PMH/PSH reviewed in [facility] today.    MED REC REQUIRED  Post Medication Reconciliation Status:  Discharge medications reconciled and changed, see notes/orders    REVIEW OF SYSTEMS:  Limited secondary to cognitive impairment but today pt reports history as per HPI.    Objective:   /71   Pulse 72   Temp 97.9  F (36.6  C)   Resp 20   Ht 1.727 m (5' 8\")   Wt 98 kg (216 lb)   SpO2 95%   BMI 32.84 kg/m    GENERAL APPEARANCE:  Alert, in no distress, seated in recliner chair with wife.  HEENT: Sclera clear and conjunctiva normal.    RESP:  Non-labored breathing, no respiratory distress, Lung sounds decreased throughout, no adventitious breath sounds, patient is on room air.   CV: Rate and rhythm irregular no murmur, no rub or gallop.   VASCULAR: 3+ edema bilateral lower extremities, not wearing " Velcro compression wraps.   ABDOMEN:  Normal bowel sounds, soft, nontender, no grimacing or guarding with palpation, + umblical hernia, soft/non-tender.   SKIN: Limited exam as fully dressed.  PSYCH: Awake and alert, speech fluent,  insight/judgement/memory impaired.    Recent labs in [facility] reviewed by me today.   CBC RESULTS:   Recent Labs   Lab Test 03/21/24  0651 03/19/24  1232   WBC 9.9 10.9   RBC 3.62* 3.64*   HGB 9.4* 9.5*   HCT 30.1* 29.7*   MCV 83 82   MCH 26.0* 26.1*   MCHC 31.2* 32.0   RDW 17.2* 16.8*    438     Ferritin   Date Value Ref Range Status   03/21/2024 96 31 - 409 ng/mL Final     Iron   Date Value Ref Range Status   03/21/2024 15 (L) 61 - 157 ug/dL Final     Iron Binding Capacity   Date Value Ref Range Status   03/21/2024 192 (L) 240 - 430 ug/dL Final   09/15/2022 374 240 - 430 ug/dL Final     Last Basic Metabolic Panel:  Recent Labs   Lab Test 03/21/24  0651 03/20/24  0514   * 131*   POTASSIUM 3.7 3.8   CHLORIDE 90* 89*   JOSUE 8.5* 8.1*   CO2 30* 34*   BUN 5.7* 8.3   CR 0.46* 0.60*   GLC 85 128*     Liver Function Studies -   Recent Labs   Lab Test 03/21/24  0651 06/07/23  0957   PROTTOTAL 7.0 7.2   ALBUMIN 2.9* 4.0   BILITOTAL 0.2 0.4   ALKPHOS 125 58   AST 14 23   ALT <5 11       TSH   Date Value Ref Range Status   06/07/2023 1.68 0.30 - 4.20 uIU/mL Final   10/06/2022 1.67 0.30 - 4.20 uIU/mL Final   04/28/2022 2.77 0.40 - 4.00 mU/L Final   04/07/2022 3.94 0.40 - 4.00 mU/L Final     Lab Results   Component Value Date    A1C 5.5 04/28/2022    A1C 5.6 04/07/2022     CHEST TWO VIEWS March 19, 2024 2:05 PM      HISTORY: Rule out pulmonary congestion.     COMPARISON: Chest radiograph 3/12/2024.                                                                       IMPRESSION: Similar elevated left hemidiaphragm and adjacent  atelectasis. No new focal consolidation, pleural effusion or focal  pulmonary edema. Similar cardiomediastinal silhouette. Spinal fusion  hardware and left shoulder  "arthroplasty.     NILSON MILLER MD         SYSTEM ID:  [id number]    Assessment/Plan:  (R53.81) Physical deconditioning  (primary encounter diagnosis)  Comment: Possible viral illness versus fluid volume overload resulting in ER visit and observation stay, etiology of symptoms not entirely clear.  Plan:   - Home Care Referral: PT/OT to work on strength, balance, endurance, functional mobility         (R60.0) BL lower extremity edema  (E66.09, Z68.32) Obesity - Body mass index is 32.84 kg/m .  Comment: Chronic concern  Last echo in July 2023 see result in epic.  Elevated BNP but no history of heart failure exacerbation, no volume overload on chest x-ray.  Treated with 1 dose of IV Lasix in hospital.  Legs are swollen in setting of dependent edema and obesity.  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  Stopped Celexa 9/29/22, now on Mirtazapine.  Sodium on 3/21/2024: 133, no apparent symptoms with mild hyponatremia.  Plan:   -Follow interval labs, check BMP on 4/11  -Reviewed free water restriction, limit to 6-8 8 ounce glasses per day    (F03.90) Major neurocognitive disorder (H) - dementia   Comment: Chronic, expect slow progressive decline  Please see neuropsych testing from 5/12/2023, \"I believe that Jamie is not capable of making reasoned decisions. He should continue to receive a high-level of care that includes other managing medications, finances, and limiting access to alcohol.\" "   Please see interdisciplinary team care conference note from 7/25/2023.   Wife and family to assist with decision making as these are his next of kin and there is no healthcare directive.  Plan:   -Continues to benefit from supportive care in assisted living setting, could consider moving off memory care unit if he had appropriate supports, unwilling to let family assist him at present and family does not want him moved off of memory care unit as they feel this is safest environment for him.  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  Cognitive impairment in setting of history of years of ETOH abuse with recurrent falls and hospitalizations. Currently no access to ETOH and thus is abstinent.  Chronic low mood, suspect JOSÉ MANUEL with panic attacks/chest pain for many years, no documented history of myocardial infarction or ASCVD.   On memory care unit due to safety concerns, needs assistance with IADLs.  Plan:   -Increase at bedtime dose of gabapentin to 300 mg  - Continue Gabapentin 100 mg q AM and 100 mg BID PRN for anxiety and pain.   - Continue mirtazpaine 15 mg PO q HS (started 11/17/22, dose increase 1/27/23)  - Continue Seroquel 12.5 mg BID and BID PRN for psychotic features - paranoid delusions. Previously on Zyprexa. Look to wean as able.  Last dose of as needed Seroquel was given on 8/12/23.  - [facility] pysch counselor following closely -has excellent relationship with Kimberly Wick.  - Offered psychiatry referral in past, but pt/family prefer onsite care, [facility] Pharm[facility] follows  - Would like to move off memory care  unit, but per neuropsych testing needs assistance for decision making, family is current decision-maker and feels resident is safest on locked memory care unit    Orders:  - Increase HS gabapentin to 300 mg and give at 7 pm   - BMP, A1C 4/11  -  PT/OT    Electronically signed by: MARICHUY Basilio CNP         Documentation of Face-to-Face and Certification for Home Health Services     Patient: Jamie Valdivia   YOB: 1940  MR Number: [id number]  Today's Date: 3/26/2024    I certify that patient: Jamie Valdivia is under my care and that I, or a nurse practitioner or physician's assistant working with me, had a face-to-face encounter that meets the physician face-to-face encounter requirements with this patient on: 3/26/2024.    This encounter with the patient was in whole, or in part, for the following medical condition, which is the primary reason for home health care:   (R53.81) Physical deconditioning  (primary encounter diagnosis)  (R60.0) Lower extremity edema  (F03.90) Major neurocognitive disorder    I certify that, based on my findings, the following services are medically necessary home health services: Occupational Therapy and Physical Therapy.    My clinical findings support the need for the above services because: Occupational Therapy Services are needed to assess and treat cognitive ability and address ADL safety due to impairment in functional mobility. and Physical Therapy Services are needed to assess and treat the following functional impairments: gait, strength, balance.    Further, I certify that my clinical findings support that this patient is homebound (i.e. absences from home require considerable and taxing effort and are for medical reasons or [employer] services or infrequently or of short duration when for other reasons) because: Requires assistance of another person or specialized equipment to access medical services because patient: Is prone to wander/get lost without  assistance.    Based on the above findings. I certify that this patient is confined to the home and needs intermittent skilled nursing care, physical therapy and/or speech therapy.  The patient is under my care, and I have initiated the establishment of the plan of care.  This patient will be followed by a physician who will periodically review the plan of care.  Physician/Provider to provide follow up care: Sylvia Stein    Responsible Medicare certified [facility]: Dr.Gretchen Quintana. MD   Physician Signature: See electronic signature associated with these discharge orders.  Date: 3/26/2024

## 2024-03-29 RX ORDER — GABAPENTIN 100 MG/1
CAPSULE ORAL
COMMUNITY
Start: 2024-03-29 | End: 2024-07-12

## 2024-03-29 RX ORDER — GABAPENTIN 300 MG/1
300 CAPSULE ORAL AT BEDTIME
Qty: 30 CAPSULE | Refills: 98 | Status: SHIPPED | OUTPATIENT
Start: 2024-03-29 | End: 2024-05-09

## 2024-03-29 NOTE — PATIENT INSTRUCTIONS
Jamie Valdivia  1940  ORDERS:  - Please check BMP on 4/11/24 dx hyponatremia, Hgb A1C dx obesity, screening for DMTII  -  PT/OT (order in Epic)  - Discontinue current scheduled gabapentin dose at HS and increase dose as follows to 300 mg capsules. Continue 100 mg 8 AM dose and PRN as ordered.  - gabapentin (NEURONTIN) 300 MG capsule; Take 1 capsule (300 mg) by mouth at bedtime dx anxiety/pain  Electronically signed by:   MARICHUY Basilio CNP  03/29/24 10:52 AM

## 2024-04-03 DIAGNOSIS — D64.9 ANEMIA, UNSPECIFIED: Primary | ICD-10-CM

## 2024-04-04 RX ORDER — FERROUS SULFATE 325(65) MG
TABLET ORAL
Qty: 36 TABLET | Refills: 97 | Status: SHIPPED | OUTPATIENT
Start: 2024-04-04 | End: 2024-04-29

## 2024-04-10 ENCOUNTER — LAB REQUISITION (OUTPATIENT)
Dept: LAB | Facility: CLINIC | Age: 84
End: 2024-04-10
Payer: COMMERCIAL

## 2024-04-10 ENCOUNTER — TELEPHONE (OUTPATIENT)
Dept: GERIATRICS | Facility: CLINIC | Age: 84
End: 2024-04-10
Payer: COMMERCIAL

## 2024-04-10 DIAGNOSIS — R53.1 WEAKNESS: ICD-10-CM

## 2024-04-10 NOTE — TELEPHONE ENCOUNTER
Jamie Valdivia  1940  ORDERS:  - On 4/11/24 please check CBC, CMP, TSH dx weakness  Electronically signed by:   MARICHUY Basilio CNP  04/10/24 1:03 PM

## 2024-04-11 ENCOUNTER — MEDICAL CORRESPONDENCE (OUTPATIENT)
Dept: HEALTH INFORMATION MANAGEMENT | Facility: CLINIC | Age: 84
End: 2024-04-11

## 2024-04-11 ENCOUNTER — ASSISTED LIVING VISIT (OUTPATIENT)
Dept: GERIATRICS | Facility: CLINIC | Age: 84
End: 2024-04-11
Payer: COMMERCIAL

## 2024-04-11 VITALS
WEIGHT: 211 LBS | TEMPERATURE: 97.8 F | SYSTOLIC BLOOD PRESSURE: 132 MMHG | HEART RATE: 73 BPM | RESPIRATION RATE: 17 BRPM | OXYGEN SATURATION: 95 % | DIASTOLIC BLOOD PRESSURE: 61 MMHG | HEIGHT: 68 IN | BODY MASS INDEX: 31.98 KG/M2

## 2024-04-11 DIAGNOSIS — C61 PROSTATE CANCER (H): ICD-10-CM

## 2024-04-11 DIAGNOSIS — E87.6 HYPOKALEMIA: ICD-10-CM

## 2024-04-11 DIAGNOSIS — K22.70 BARRETT'S ESOPHAGUS WITHOUT DYSPLASIA: ICD-10-CM

## 2024-04-11 DIAGNOSIS — F43.23 ADJUSTMENT REACTION WITH ANXIETY AND DEPRESSION: ICD-10-CM

## 2024-04-11 DIAGNOSIS — I48.0 PAROXYSMAL ATRIAL FIBRILLATION (H): ICD-10-CM

## 2024-04-11 DIAGNOSIS — R60.0 LOWER EXTREMITY EDEMA: ICD-10-CM

## 2024-04-11 DIAGNOSIS — R13.13 PHARYNGEAL DYSPHAGIA: ICD-10-CM

## 2024-04-11 DIAGNOSIS — M62.81 MUSCLE WEAKNESS (GENERALIZED): Primary | ICD-10-CM

## 2024-04-11 DIAGNOSIS — L25.8 INCONTINENCE ASSOCIATED DERMATITIS: ICD-10-CM

## 2024-04-11 DIAGNOSIS — R32 INCONTINENCE ASSOCIATED DERMATITIS: ICD-10-CM

## 2024-04-11 DIAGNOSIS — E87.1 HYPONATREMIA: ICD-10-CM

## 2024-04-11 DIAGNOSIS — F03.90 MAJOR NEUROCOGNITIVE DISORDER (H): ICD-10-CM

## 2024-04-11 DIAGNOSIS — E22.2 SIADH (SYNDROME OF INAPPROPRIATE ADH PRODUCTION) (H): ICD-10-CM

## 2024-04-11 DIAGNOSIS — I71.20 THORACIC AORTIC ANEURYSM WITHOUT RUPTURE, UNSPECIFIED PART (H): ICD-10-CM

## 2024-04-11 DIAGNOSIS — F41.1 GAD (GENERALIZED ANXIETY DISORDER): ICD-10-CM

## 2024-04-11 DIAGNOSIS — F10.21 ALCOHOL DEPENDENCE IN REMISSION (H): ICD-10-CM

## 2024-04-11 DIAGNOSIS — I10 ESSENTIAL HYPERTENSION, BENIGN: ICD-10-CM

## 2024-04-11 DIAGNOSIS — R32 URINARY INCONTINENCE, UNSPECIFIED TYPE: ICD-10-CM

## 2024-04-11 LAB
ALBUMIN SERPL BCG-MCNC: 2.7 G/DL (ref 3.5–5.2)
ALP SERPL-CCNC: 126 U/L (ref 40–150)
ALT SERPL W P-5'-P-CCNC: <5 U/L (ref 0–70)
ANION GAP SERPL CALCULATED.3IONS-SCNC: 11 MMOL/L (ref 7–15)
AST SERPL W P-5'-P-CCNC: 13 U/L (ref 0–45)
BILIRUB SERPL-MCNC: 0.2 MG/DL
BUN SERPL-MCNC: 3.1 MG/DL (ref 8–23)
CALCIUM SERPL-MCNC: 7.7 MG/DL (ref 8.8–10.2)
CHLORIDE SERPL-SCNC: 90 MMOL/L (ref 98–107)
CREAT SERPL-MCNC: 0.43 MG/DL (ref 0.67–1.17)
DEPRECATED HCO3 PLAS-SCNC: 33 MMOL/L (ref 22–29)
EGFRCR SERPLBLD CKD-EPI 2021: >90 ML/MIN/1.73M2
ERYTHROCYTE [DISTWIDTH] IN BLOOD BY AUTOMATED COUNT: 18.4 % (ref 10–15)
GLUCOSE SERPL-MCNC: 112 MG/DL (ref 70–99)
HCT VFR BLD AUTO: 28.9 % (ref 40–53)
HGB BLD-MCNC: 9 G/DL (ref 13.3–17.7)
MCH RBC QN AUTO: 25.4 PG (ref 26.5–33)
MCHC RBC AUTO-ENTMCNC: 31.1 G/DL (ref 31.5–36.5)
MCV RBC AUTO: 82 FL (ref 78–100)
PLATELET # BLD AUTO: 468 10E3/UL (ref 150–450)
POTASSIUM SERPL-SCNC: 3.2 MMOL/L (ref 3.4–5.3)
PROT SERPL-MCNC: 6.9 G/DL (ref 6.4–8.3)
RBC # BLD AUTO: 3.54 10E6/UL (ref 4.4–5.9)
SODIUM SERPL-SCNC: 134 MMOL/L (ref 135–145)
TSH SERPL DL<=0.005 MIU/L-ACNC: 1.69 UIU/ML (ref 0.3–4.2)
WBC # BLD AUTO: 10.4 10E3/UL (ref 4–11)

## 2024-04-11 PROCEDURE — 85027 COMPLETE CBC AUTOMATED: CPT | Mod: ORL | Performed by: NURSE PRACTITIONER

## 2024-04-11 PROCEDURE — 36415 COLL VENOUS BLD VENIPUNCTURE: CPT | Mod: ORL | Performed by: NURSE PRACTITIONER

## 2024-04-11 PROCEDURE — 99350 HOME/RES VST EST HIGH MDM 60: CPT | Performed by: NURSE PRACTITIONER

## 2024-04-11 PROCEDURE — P9604 ONE-WAY ALLOW PRORATED TRIP: HCPCS | Mod: ORL | Performed by: NURSE PRACTITIONER

## 2024-04-11 PROCEDURE — 84443 ASSAY THYROID STIM HORMONE: CPT | Mod: ORL | Performed by: NURSE PRACTITIONER

## 2024-04-11 PROCEDURE — 80053 COMPREHEN METABOLIC PANEL: CPT | Mod: ORL | Performed by: NURSE PRACTITIONER

## 2024-04-11 RX ORDER — POTASSIUM CHLORIDE 1500 MG/1
20 TABLET, EXTENDED RELEASE ORAL DAILY
Qty: 3 TABLET | Refills: 0 | Status: SHIPPED | OUTPATIENT
Start: 2024-04-11 | End: 2024-04-14

## 2024-04-11 NOTE — LETTER
4/11/2024        RE: Jamie Valdivia  C/o Coral De La Garza  [address]  [facility] [address]        [facility] GERIATRICS    Chief Complaint   Patient presents with     Recheck Medication     Generalized Weakness     HPI:  Jamie Valdivia is a 83 year old  (1940) PMH significant for GERD with esophagitis, OA BL knees, pharyngeal dysphagia, edema, abdominal wall hernia, chronic atrial fibrillation (decline AC or Watchman), HTN, osteoporosis with hx of vertebral fracture, dementia, anemia, hx of COVID-19, who is being seen today for an episodic care visit at: [facility]) [61].     Resident of [facility] since March 2022.     Hospitalized at [facility] form 11/1 to 11/5/21 with generalized weakness, dehydration, and concern for UTI but culture came back negative. Symptoms likely related to chronic alcoholism with alcohol withdrawal. Chronic cognitive impairment in setting of long history of alcoholism, hx of hallucinations. Multiple hospitalizations over preceding months per family due to unsafe living situation, well known to ED staff. Spent time in SNF ([facility]) around September 2021, details unclear, but seem to have suffered spinal fracture.     Hospitalized from 12/17 to 12/23/21 at [facility] for falls, weakness, alcoholism, and alcoholic hepatitis; discharge summary not available. Wife (who was primary care giver) was having medical issues and needed neurology care in the [facility]. Family felt resident unable to safely be left alone so they called EMS to transport to hospital for ongoing care/ETOH detox. Ultimately [place] [facility] where wife was convalescing.      Remained [place] [facility] from 12/23 and 3/15/22 with largely uneventful stay. Zyprexa dose increased with good effect on mood, prior to starting medication having hallucinations, delusions, and wandering on unit. Transferred to [facility] for  permanent placement.    ER visit on 6/30/22 due to chest pain. Resident was watching a baseball game when he experienced left-sided chest pain.Treated by EMS with aspirin and nitroglycerin with slight relief. Reported pain worse with taking deep breaths and related to anxiety. EKG showed atrial fibrillation with controlled rate; chronic.   hest CT showed 4.6 cm ascending thoracic aortic aneurysm, but was negative for plumonary embolism. Cardiothoracic surgery consulted given size of aneurysm and lack of other connective tissue disorder recommended outpatient follow-up with repeat chest CT and echocardiogram in 3 months to evaluate for progression. Chest x-ray showed left basilar pneumonia and chest CT showed fluid-filled bronchi bilateral lower lobes. Labs significant for negative troponin, ACS excluded. ER physician noted tenderness over left chest wall and cough over last month. Hx of dysphagia and increased aspiration risk. Symptoms most likely consistent with pneumonia discharged back to assisted living facility with Augmentin.    Repeat CXR on 8/4/22 due to subjective shortness of breath showed possible left lower lobe patchy opacification, as well as low lung volumes with vascular crowding and basilar atelectasis. Started Augmentin and symptoms improved.    On 9/13/22 prostate biopsy canceled due to hyponatremia. Subsequently hospitalized at [facility] on 9/30/22 with fall and confusion, found to have severe hyponatremia (Na+ 114). Prior to hospitalization had loose stools and poor intake in setting of prophylactic antibiotic (Bactrim) prescribed prior to planned prostate biospy which was canceled due to low sodium (at 129). Started on IVF due to concern for hypovolemic hyponatremia with some improvement. 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 Treated with 1.2 L/day fluid restriction and sodium improved. Confusion improved with treatment of hyponatremia.   Also noted to  have back pain after fall. Lumbar CT showed age indeterminate compression deformity of L2-L5 with chronic mild compression deformity at superior endplate of L1. Neurosurgery consulted and recommended conservative mgmt with brace. Recommended PT in TCU at hospital discharge due to difficult with ambulation.   Medications for other chronic conditions continued without change.  Interval Echo completed, with EF 55%, right ventricle mildly dilated with mildy decreased systolic function, aortic valve mild-moderate stenosis, ascending aorta moderately dilated.     Resident recovered at [facility] from 10/6 to 11/03/22. Pain controlled with Tylenol and gabapentin.  Wearing TLSO when out of bed.  Hyponatremia felt to be due to volume depletion and SIADH. Improved and able to discontinue fluid restriction. Discharged back to [facility].    Underwent prostate biopsy 1/11/2023. Pathology positive for [place] 7 prostate cancer, completed course of radiation therapy.     Complete neuropsych testing with Dr. Brock on 5/12/23 - please see report in Epic, thought to have major neurocognitive disorder (dementia) and need assistance with IADLs. Estranged from family who are listed his health care agents, but have elected not to move him off of memory care unit.     Started with nosebleed on 2/8/2024.  Bleeding was controlled in ER by silver nitrate, Afrin nasal spray, lidocaine with epinephrine, and pressure.  Labs reassuring, no sign of coagulopathy. Discharged back to assisted living facility.    Sent to ER 3/12/2024 due to fever and hypoxia. Patient was placed on supplemental oxygen by EMS and sats oxygen saturation improved. Weaned off of oxygen doing during ER stay after receiving DuoNeb. ER physician noted expiratory wheezing which improved after after nebulizer treatment. BNP elevated on labs but lower than it was 1 year ago. Chronic bilateral lower extremity swelling, stopped wearing Velcro compression wraps per patient preference.   No history of congestive heart failure or taking diuretics in the past last echo in July 2023 with ejection fraction of 60 to 65% along with moderate aortic stenosis, resident in permanent atrial fibrillation but not anticoagulated due to fall risk.  Troponin negative x 2, ACS not suspected by ER team. Chest x-ray did not show any acute abnormality.  Viral respiratory panel was negative. Labs are significant for hyponatremia and leukocytosis. Physician offered admission for diuresis and monitoring but patient refused and was discharged back to his assisted living.    Hospitalized at [facility] from 3/19 to 3/20/2024 and observation with generalized weakness and headache.  Per hospital discharge: In ED patient afebrile and not tachycardic, normotensive to mildly hypertensive.  No hypoxia, CBC without acute abnormality.  BMP with mild hyponatremia with sodium of 128 (was 129 on 3/12/2024), and potassium 3.3.  Lactic acid within normal limits.  BNP elevated at 3400 but stable from check on 3/12.  COVID and influenza negative.  Chest x-ray did not show any acute abnormality or significant fluid overload. CT head showed no acute abnormality.  Treated 20 mg of IV Lasix.  During hospitalization remained stable and able to take p.o.'s.  Discharged back to assisted living with plan for PT/OT.    Today's concern is:   Follow-up today requested urgently by nursing due to weakness and failure to thrive.  They report he is urinating and defecating in his incontinence brief.  Normally ambulates to bathroom.  Skin starting to breakdown.  Not eating much.    Visit today with patient and wife.  Difficult to obtain meaningful history.  Repeats frustrations with multiple facility issues.  Unable to elaborate health concerns expect that he generally does not feel well, in part due to the fact he has to live apart from his wife who resides in regular AL apartment.    Nursing feels strongly they are no longer able to meet  "care needs due to weakness, staying in bed, making staff complete ADL care instead of staff.  Willing to go to TCU to get more nursing care and therapy, as well as social work.  Followed by ACP psych Kimberly Wick.    Allergies, and PMH/PSH reviewed in [facility] today.    MED REC REQUIRED  Post Medication Reconciliation Status:  Patient was not discharged from an inpatient facility or TCU but medications were reconciled per facility EMR.    REVIEW OF SYSTEMS:  Limited secondary to cognitive impairment but today pt reports history as per HPI.    Objective:   /61   Pulse 73   Temp 97.8  F (36.6  C)   Resp 17   Ht 1.727 m (5' 8\")   Wt 95.7 kg (211 lb)   SpO2 95%   BMI 32.08 kg/m    GENERAL APPEARANCE:  Alert, in no distress, laying in bed with wife at bedside.  HEENT: Sclera clear and conjunctiva normal. NC/AT.   RESP:  Non-labored breathing, no respiratory distress, Lung sounds decreased throughout, no adventitious breath sounds, patient is on room air. + dry cough. Limited to AP and lateral exam as cannot turn in bed.   CV: Rate and rhythm irregular no murmur, no rub or gallop.   VASCULAR: 3+ edema bilateral lower extremities, not wearing Velcro compression wraps.   ABDOMEN:  Normal bowel sounds, soft, nontender, no grimacing or guarding with palpation, + umblical hernia, soft/non-tender.   SKIN: Limited exam as fully dressed. Will not allow for exam of perineal area.  PSYCH: Awake and alert, speech fluent,  insight/judgement/memory impaired.    Recent labs in [facility] reviewed by me today.   CBC RESULTS:   Recent Labs   Lab Test 04/11/24  0724 03/21/24  0651   WBC 10.4 9.9   RBC 3.54* 3.62*   HGB 9.0* 9.4*   HCT 28.9* 30.1*   MCV 82 83   MCH 25.4* 26.0*   MCHC 31.1* 31.2*   RDW 18.4* 17.2*   * 433     Ferritin   Date Value Ref Range Status   03/21/2024 96 31 - 409 ng/mL Final     Iron   Date Value Ref Range Status   03/21/2024 15 (L) 61 - 157 ug/dL Final     Iron Binding Capacity   Date Value Ref Range " Status   03/21/2024 192 (L) 240 - 430 ug/dL Final   09/15/2022 374 240 - 430 ug/dL Final     Lab Results   Component Value Date    B12 1,433 03/21/2024     Last Basic Metabolic Panel:  Recent Labs   Lab Test 04/11/24  0724 03/21/24  0651   * 133*   POTASSIUM 3.2* 3.7   CHLORIDE 90* 90*   JOSUE 7.7* 8.5*   CO2 33* 30*   BUN 3.1* 5.7*   CR 0.43* 0.46*   * 85     Liver Function Studies -   Recent Labs   Lab Test 04/11/24  0724 03/21/24  0651   PROTTOTAL 6.9 7.0   ALBUMIN 2.7* 2.9*   BILITOTAL 0.2 0.2   ALKPHOS 126 125   AST 13 14   ALT <5 <5     TSH   Date Value Ref Range Status   04/11/2024 1.69 0.30 - 4.20 uIU/mL Final   06/07/2023 1.68 0.30 - 4.20 uIU/mL Final   04/28/2022 2.77 0.40 - 4.00 mU/L Final   04/07/2022 3.94 0.40 - 4.00 mU/L Final     Lab Results   Component Value Date    A1C 5.5 04/28/2022    A1C 5.6 04/07/2022     EXAM: CT HEAD W/O CONTRAST  3/19/2024 1:53 PM      HISTORY:  Severe headache        COMPARISON:  Head CT 6/7/2023     TECHNIQUE: Using multidetector thin collimation helical acquisition  technique, axial, coronal and sagittal CT images from the skull base  to the vertex were obtained without intravenous contrast.   (topogram) image(s) also obtained and reviewed. Dose reduction  techniques were performed.     FINDINGS:  No intracranial hemorrhage, mass effect, or midline shift. No acute  loss of gray-white matter differentiation in the cerebral hemispheres.  Ventricles are proportionate to the cerebral sulci. Clear basal  cisterns. Right parieto-occipital encephalomalacia and gliosis. Mild  diffuse cerebral volume loss. Mild periventricular hypoattenuation,  consistent with chronic small ischemic disease. Chronic lacunar  infarct in the right basal ganglia.     The bony calvaria and the bones of the skull base are normal. The  visualized portions of the paranasal sinuses are clear. Mild bilateral  mastoid effusions. Grossly normal orbits.                                            "                            IMPRESSION:   1. No acute intracranial pathology.   2. Stable chronic changes.     BRENDA DAMON MD         SYSTEM ID:  [id number]    Assessment/Plan:  (M62.81) Generalized weakness (primary encounter diagnosis)  (R53.81) Physical deconditioning    Comment: New acute concern  [facility] staff they are no longer able to meet care needs, newly incontinent of bowel and bladder, not going to dinner room for meals.  Plan:   - Recommend TCU transfer for PT/OT/SLP and increased nursing care  - Able to secure bed, orders completed    (E87.6) Hypokalemia  Comment: New acute concern, suspect in setting of decreased appetite   Plan:   - potassium chloride asya ER (KLOR-CON M20) 20 MEQ CR tablet x days  - [facility] 4/15  - Dietician consult     (R32) Urinary incontinence   (L25.8,  R32) Incontinence associated dermatitis  Comment: New acute concern,worsening incontinence.  Unable to assess skin today.   Plan:   - TCU staff to assess skin  - Start menthol-zinc oxide (CALMOSEPTINE) 0.44-20.6 % OINT ointment QID  - UA/UC    (F03.90) Major neurocognitive disorder (H) - dementia   Comment: Chronic  Slightly more confused today, irritable, delusion about \"people out to get him\" and \"room being bugged.\"  Please see neuropsych testing from 5/12/2023, \"I believe that Jamie is not capable of making reasoned decisions. He should continue to receive a high-level of care that includes other managing medications, finances, and limiting access to alcohol.\"   Please see interdisciplinary team care conference note from 7/25/2023.   Wife and family to assist with decision making as these are his next of kin and there is no healthcare directive. Considered moving off memory care unit if he had appropriate supports, unwilling to let family assist him at present and family does not want him moved off of memory care unit as they feel this is safest environment for him.  Plan:   - Transfer to TCU for repeat cog testing and increased " nursing care for safety   - Continue Seroquel due to paranoid delusions causing distress (ie thinks phones are bugged, people out to get him, paranoid)  - Consider ophthalmology follow-up for field cut. 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 Currently no access to ETOH and thus is abstinent.  Chronic low mood, suspect JOSÉ MANUEL with panic attacks/chest pain for many years, no documented history of myocardial infarction or ASCVD.   On memory care unit due to safety concerns, needs assistance with IADLs/ADLs.  Finds gabapentin helpful.  Plan:   - Continue gabapentin 300 mg q HS (dose increase 4/1/24)  - Continue Gabapentin 100 mg q AM and 100 mg BID PRN for anxiety and pain.   - Continue mirtazpaine 15 mg PO q HS (started 11/17/22, dose increase 1/27/23)  - Continue Seroquel 12.5 mg BID and BID PRN for psychotic features   - [facility] counselor following closely -has excellent relationship with Kimberly Wick.  - Offered psychiatry referral in past, but pt/family prefer onsite care, [facility] PharmD follows         (R60.0) BL lower extremity edema  Comment: Chronic concern  Last echo in July 2023 see result in epic.  Elevated BNP but no history of heart failure exacerbation, no volume overload on chest x-ray.  Treated with 1 dose of IV Lasix in hospital in March.  Legs are swollen in setting of dependent edema and obesity.  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 Solidum stable today.  Previous hyponatremia multifactorial, occurred in setting of SIADH, dose increase of selective serotonin reuptake inhibitor (Celexa), Bactrim, hypovolemia.  Stopped Celexa 9/29/22, now on Mirtazapine.  Plan:   -Fluid restriction 1800 mL/day  -Follow interval labs, check BMP on 4/15    (I48.0) Afib   Comment: Chronic and asymptomatic, junctional rhythm in ER  History of atrial fibrillation, low heart rate on previous ECG while in hospital.  Per cardiology Dr. Ross, \"clearly has slow atrial fibrillation. I will place a 7-day monitor to evaluate if he is having any significant heart block or significant arrhythmias that may be contributing to his multiple falls.  He clearly is too high a fall risk to consider anticoagulation.\"   Completed Zio patch September 2023 -see report in Epic.  Persistent atrial fibrillation with average heart rate of 58 bpm, pauses of 3.3 seconds, frequent PVCs, intermittent bundle branch block.  Considered Watchman device, but patient declined invasive intervention; pt/family aware of risk.  HAS-BLED 2: 4.1%  CHADs-VASC: 3% (Age, HTN)   See cardiology notes in [facility].  Plan:   - Cardiology follow-up 6/28/24 with Dr. Ross     (I10) Essential hypertension, benign  Comment: Chronic  BP at goal of < 150/90 given age and comorbid conditions  Stopped ASA due to hemoptysis.  BP Readings from Last 3 Encounters:   04/11/24 132/61   03/26/24 135/71   03/20/24 136/55   Plan:   - Monitor routine VS and labs off anti-hypertensive medications  -Nursing to check VS    (I71.2) Thoracic aortic aneurysm without rupture (H) - 6/30/22 4.6 cm  Comment: New finding in 2022, follows with vascular, last visit with  on 7/28/2023.  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   Stopped Trospium due to anticholinergic side effects, no change in urinary symptoms.  Followed by Dr. Niraj Larry [facility] - last visit 6/2/23.  Completed external beam radiation therapy w/o androgen depravation therapy with [facility] under care of Dr Fleming.   Some dysuria s/p treatment, UC negative for infection.  PSA is now 2.67.  Last urology visit 1/4/24.  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  Reported recurrent coughing up blood, but not recently.  Saw ENT in 2022, no explanation of symptoms.  Last esophagram May 2022 as above, presbyesophagus, proximal luminal narrowing.  Recent saw SLP 8/2022, no overt dysphagia.  Recurrent pneumonia, last 8/4/22.  CXR 10/1/22, 3/28/23 without concern for PNA.  Chest CT showed, \"Trace scattered atelectasis and/or fibrosis.  Elevated left hemidiaphragm. No effusions or pneumothorax.\"  Plan:   - Dry cough on exam, check CXR due to weakness  - SLP consult  - Check labs 4/15    (D64.9) Normocytic Anemia  Comment: Chronic, mild, baseline ~ 10.  Recent restart on iron supplement.  Hgb down to 9.0 today.  No symptoms of acute bleed.  Plan:   - Follow serial Hgb, next 4/15  - Continue iron supplement (started 3/25/24)  - Seeing ENT for recurrent nose bleed on 4/03/24    (K59.04) Chronic constipation  Comment: Chronic.   Having regular BMs.  Bowel habits change some s/p prostate radiation.  Long hx of Levsin multiple " times per day for abd pain, but stopped by urology when started trospium, no recurrent pain.   Plan:   - Chronic polyethylene glycol (MIRALAX) 17 GM/Dose powder; 1-2 capfuls in 8 oz of ORANGE JUICE PO one time each morning and 1-2 capfuls 8 oz of ORANGE JUICE PO one time daily PRN for constipation.   - GI following PRN    Orders:  - Potassium chloride 20 mEq  daily x 3 days  - Labs on Monday  - TCU transfer for PT/OT/SLP  - CXR  - UA/UC    See TCU admission orders at [facility].    Total time with an established patient visit in the assisted livin minutes including: discussion multiple times with nursing staff regarding concerns, chart review, lab review, medication reconciliation, specialist plan review, discuss of clinical status and treatment option risk/benefit with patient/wife, completion of TCU admission orders.     Electronically signed by: MARICHUY Basilio CNP             Sincerely,        MARICHUY Basilio CNP

## 2024-04-11 NOTE — PROGRESS NOTES
[facility] GERIATRICS    Chief Complaint   Patient presents with    Recheck Medication    Generalized Weakness     HPI:  Jamie Valdivia is a 83 year old  (1940) PMH significant for GERD with esophagitis, OA BL knees, pharyngeal dysphagia, edema, abdominal wall hernia, chronic atrial fibrillation (decline AC or Watchman), HTN, osteoporosis with hx of vertebral fracture, dementia, anemia, hx of COVID-19, who is being seen today for an episodic care visit at: [facility]) [61].     Resident of [facility] since March 2022.     Hospitalized at [facility] form 11/1 to 11/5/21 with generalized weakness, dehydration, and concern for UTI but culture came back negative. Symptoms likely related to chronic alcoholism with alcohol withdrawal. Chronic cognitive impairment in setting of long history of alcoholism, hx of hallucinations. Multiple hospitalizations over preceding months per family due to unsafe living situation, well known to ED staff. Spent time in SNF ([facility] [place]note 2]) around September 2021, details unclear, but seem to have suffered spinal fracture.     Hospitalized from 12/17 to 12/23/21 at [facility] for falls, weakness, alcoholism, and alcoholic hepatitis; discharge summary not available. Wife (who was primary care giver) was having medical issues and needed neurology care in the [facility]. Family felt resident unable to safely be left alone so they called EMS to transport to hospital for ongoing care/ETOH detox. Ultimately transferred to [facility] where wife was convalescing.      Remained at [facility] from 12/23 and 3/15/22 with largely uneventful stay. Zyprexa dose increased with good effect on mood, prior to starting medication having hallucinations, delusions, and wandering on unit. Transferred to [facility] for permanent placement.    ER visit on 6/30/22 due to chest pain. Resident was watching a baseball game when he  experienced left-sided chest pain.Treated by EMS with aspirin and nitroglycerin with slight relief. Reported pain worse with taking deep breaths and related to anxiety. EKG showed atrial fibrillation with controlled rate; chronic.   hest CT showed 4.6 cm ascending thoracic aortic aneurysm, but was negative for plumonary embolism. Cardiothoracic surgery consulted given size of aneurysm and lack of other connective tissue disorder recommended outpatient follow-up with repeat chest CT and echocardiogram in 3 months to evaluate for progression. Chest x-ray showed left basilar pneumonia and chest CT showed fluid-filled bronchi bilateral lower lobes. Labs significant for negative troponin, ACS excluded. ER physician noted tenderness over left chest wall and cough over last month. Hx of dysphagia and increased aspiration risk. Symptoms most likely consistent with pneumonia discharged back to assisted living facility with Augmentin.    Repeat CXR on 8/4/22 due to subjective shortness of breath showed possible left lower lobe patchy opacification, as well as low lung volumes with vascular crowding and basilar atelectasis. Started Augmentin and symptoms improved.    On 9/13/22 prostate biopsy canceled due to hyponatremia. Subsequently hospitalized at [facility] on 9/30/22 with fall and confusion, found to have severe hyponatremia (Na+ 114). Prior to hospitalization had loose stools and poor intake in setting of prophylactic antibiotic (Bactrim) prescribed prior to planned prostate biospy which was canceled due to low sodium (at 129). Started on IVF due to concern for hypovolemic hyponatremia with some improvement. 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 Treated with 1.2 L/day fluid restriction and sodium improved. Confusion improved with treatment of hyponatremia.   Also noted to have back pain after fall. Lumbar CT showed age indeterminate compression deformity of L2-L5 with chronic mild  compression deformity at superior endplate of L1. Neurosurgery consulted and recommended conservative mgmt with brace. Recommended PT in TCU at hospital discharge due to difficult with ambulation.   Medications for other chronic conditions continued without change.  Interval Echo completed, with EF 55%, right ventricle mildly dilated with mildy decreased systolic function, aortic valve mild-moderate stenosis, ascending aorta moderately dilated.     Resident recovered at [facility] from 10/6 to 11/03/22. Pain controlled with Tylenol and gabapentin.  Wearing TLSO when out of bed.  Hyponatremia felt to be due to volume depletion and SIADH. Improved and able to discontinue fluid restriction. Discharged back to senior care.    Underwent prostate biopsy 1/11/2023. Pathology positive for Seligman 7 prostate cancer, completed course of radiation therapy.     Complete neuropsych testing with Dr. Brock on 5/12/23 - please see report in Epic, thought to have major neurocognitive disorder (dementia) and need assistance with IADLs. Estranged from family who are listed his health care agents, but have elected not to move him off of memory care unit.     Started with nosebleed on 2/8/2024.  Bleeding was controlled in ER by silver nitrate, Afrin nasal spray, lidocaine with epinephrine, and pressure.  Labs reassuring, no sign of coagulopathy. Discharged back to assisted living facility.    Sent to ER 3/12/2024 due to fever and hypoxia. Patient was placed on supplemental oxygen by EMS and sats oxygen saturation improved. Weaned off of oxygen doing during ER stay after receiving DuoNeb. ER physician noted expiratory wheezing which improved after after nebulizer treatment. BNP elevated on labs but lower than it was 1 year ago. Chronic bilateral lower extremity swelling, stopped wearing Velcro compression wraps per patient preference.  No history of congestive heart failure or taking diuretics in the past last echo in July 2023 with ejection  fraction of 60 to 65% along with moderate aortic stenosis, resident in permanent atrial fibrillation but not anticoagulated due to fall risk.  Troponin negative x 2, ACS not suspected by ER team. Chest x-ray did not show any acute abnormality.  Viral respiratory panel was negative. Labs are significant for hyponatremia and leukocytosis. Physician offered admission for diuresis and monitoring but patient refused and was discharged back to his assisted living.    Hospitalized at [facility] from 3/19 to 3/20/2024 and observation with generalized weakness and headache.  Per hospital discharge: In ED patient afebrile and not tachycardic, normotensive to mildly hypertensive.  No hypoxia, CBC without acute abnormality.  BMP with mild hyponatremia with sodium of 128 (was 129 on 3/12/2024), and potassium 3.3.  Lactic acid within normal limits.  BNP elevated at 3400 but stable from check on 3/12.  COVID and influenza negative.  Chest x-ray did not show any acute abnormality or significant fluid overload. CT head showed no acute abnormality.  Treated 20 mg of IV Lasix.  During hospitalization remained stable and able to take p.o.'s.  Discharged back to assisted living with plan for PT/OT.    Today's concern is:   Follow-up today requested urgently by nursing due to weakness and failure to thrive.  They report he is urinating and defecating in his incontinence brief.  Normally ambulates to bathroom.  Skin starting to breakdown.  Not eating much.    Visit today with patient and wife.  Difficult to obtain meaningful history.  Repeats frustrations with multiple facility issues.  Unable to elaborate health concerns expect that he generally does not feel well, in part due to the fact he has to live apart from his wife who resides in regular AL apartment.    Nursing feels strongly they are no longer able to meet care needs due to weakness, staying in bed, making wife complete ADL care instead of staff.  Willing to go to  "TCU to get more nursing care and therapy, as well as social work.  Followed by [facility] psych Kimberly Wick.    Allergies, and PMH/PSH reviewed in [facility] today.    MED REC REQUIRED  Post Medication Reconciliation Status:  Patient was not discharged from an inpatient facility or TCU but medications were reconciled per facility EMR.    REVIEW OF SYSTEMS:  Limited secondary to cognitive impairment but today pt reports history as per HPI.    Objective:   /61   Pulse 73   Temp 97.8  F (36.6  C)   Resp 17   Ht 1.727 m (5' 8\")   Wt 95.7 kg (211 lb)   SpO2 95%   BMI 32.08 kg/m    GENERAL APPEARANCE:  Alert, in no distress, laying in bed with wife at bedside.  HEENT: Sclera clear and conjunctiva normal. NC/AT.   RESP:  Non-labored breathing, no respiratory distress, Lung sounds decreased throughout, no adventitious breath sounds, patient is on room air. + dry cough. Limited to AP and lateral exam as cannot turn in bed.   CV: Rate and rhythm irregular no murmur, no rub or gallop.   VASCULAR: 3+ edema bilateral lower extremities, not wearing Velcro compression wraps.   ABDOMEN:  Normal bowel sounds, soft, nontender, no grimacing or guarding with palpation, + umblical hernia, soft/non-tender.   SKIN: Limited exam as fully dressed. Will not allow for exam of perineal area.  PSYCH: Awake and alert, speech fluent,  insight/judgement/memory impaired.    Recent labs in [facility] reviewed by me today.   CBC RESULTS:   Recent Labs   Lab Test 04/11/24  0724 03/21/24  0651   WBC 10.4 9.9   RBC 3.54* 3.62*   HGB 9.0* 9.4*   HCT 28.9* 30.1*   MCV 82 83   MCH 25.4* 26.0*   MCHC 31.1* 31.2*   RDW 18.4* 17.2*   * 433     Ferritin   Date Value Ref Range Status   03/21/2024 96 31 - 409 ng/mL Final     Iron   Date Value Ref Range Status   03/21/2024 15 (L) 61 - 157 ug/dL Final     Iron Binding Capacity   Date Value Ref Range Status   03/21/2024 192 (L) 240 - 430 ug/dL Final   09/15/2022 374 240 - 430 ug/dL Final     Lab Results "   Component Value Date    B12 1,433 03/21/2024     Last Basic Metabolic Panel:  Recent Labs   Lab Test 04/11/24  0724 03/21/24  0651   * 133*   POTASSIUM 3.2* 3.7   CHLORIDE 90* 90*   JOSUE 7.7* 8.5*   CO2 33* 30*   BUN 3.1* 5.7*   CR 0.43* 0.46*   * 85     Liver Function Studies -   Recent Labs   Lab Test 04/11/24  0724 03/21/24  0651   PROTTOTAL 6.9 7.0   ALBUMIN 2.7* 2.9*   BILITOTAL 0.2 0.2   ALKPHOS 126 125   AST 13 14   ALT <5 <5     TSH   Date Value Ref Range Status   04/11/2024 1.69 0.30 - 4.20 uIU/mL Final   06/07/2023 1.68 0.30 - 4.20 uIU/mL Final   04/28/2022 2.77 0.40 - 4.00 mU/L Final   04/07/2022 3.94 0.40 - 4.00 mU/L Final     Lab Results   Component Value Date    A1C 5.5 04/28/2022    A1C 5.6 04/07/2022     EXAM: CT HEAD W/O CONTRAST  3/19/2024 1:53 PM      HISTORY:  Severe headache        COMPARISON:  Head CT 6/7/2023     TECHNIQUE: Using multidetector thin collimation helical acquisition  technique, axial, coronal and sagittal CT images from the skull base  to the vertex were obtained without intravenous contrast.   (topogram) image(s) also obtained and reviewed. Dose reduction  techniques were performed.     FINDINGS:  No intracranial hemorrhage, mass effect, or midline shift. No acute  loss of gray-white matter differentiation in the cerebral hemispheres.  Ventricles are proportionate to the cerebral sulci. Clear basal  cisterns. Right parieto-occipital encephalomalacia and gliosis. Mild  diffuse cerebral volume loss. Mild periventricular hypoattenuation,  consistent with chronic small ischemic disease. Chronic lacunar  infarct in the right basal ganglia.     The bony calvaria and the bones of the skull base are normal. The  visualized portions of the paranasal sinuses are clear. Mild bilateral  mastoid effusions. Grossly normal orbits.                                                                       IMPRESSION:   1. No acute intracranial pathology.   2. Stable chronic  "changes.     BRENDA DAMON MD         SYSTEM ID:  [id number]    Assessment/Plan:  (M62.81) Generalized weakness (primary encounter diagnosis)  (R53.81) Physical deconditioning    Comment: New acute concern  residential staff they are no longer able to meet care needs, newly incontinent of bowel and bladder, not going to dinner room for meals.  Plan:   - Recommend TCU transfer for PT/OT/SLP and increased nursing care  - Able to secure bed, orders completed    (E87.6) Hypokalemia  Comment: New acute concern, suspect in setting of decreased appetite   Plan:   - potassium chloride asya ER (KLOR-CON M20) 20 MEQ CR tablet x days  - BMP 4/15  - Dietician consult     (R32) Urinary incontinence   (L25.8,  R32) Incontinence associated dermatitis  Comment: New acute concern,worsening incontinence.  Unable to assess skin today.   Plan:   - TCU staff to assess skin  - Start menthol-zinc oxide (CALMOSEPTINE) 0.44-20.6 % OINT ointment QID  - UA/UC    (F03.90) Major neurocognitive disorder (H) - dementia   Comment: Chronic  Slightly more confused today, irritable, delusion about \"people out to get him\" and \"room being bugged.\"  Please see neuropsych testing from 5/12/2023, \"I believe that Jamie is not capable of making reasoned decisions. He should continue to receive a high-level of care that includes other managing medications, finances, and limiting access to alcohol.\"   Please see interdisciplinary team care conference note from 7/25/2023.   Wife and family to assist with decision making as these are his next of kin and there is no healthcare directive. Considered moving off memory care unit if he had appropriate supports, unwilling to let family assist him at present and family does not want him moved off of memory care unit as they feel this is safest environment for him.  Plan:   - Transfer to TCU for repeat cog testing and increased nursing care for safety   - Continue Seroquel due to paranoid delusions causing distress (ie " thinks phones are bugged, people out to get him, paranoid)  - Consider ophthalmology follow-up for field cut. 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 Currently no access to ETOH and thus is abstinent.  Chronic low mood, suspect JOSÉ MANUEL with panic attacks/chest pain for many years, no documented history of myocardial infarction or ASCVD.   On memory care unit due to safety concerns, needs assistance with IADLs/ADLs.  Finds gabapentin helpful.  Plan:   - Continue gabapentin 300 mg q HS (dose increase 4/1/24)  - Continue Gabapentin 100 mg q AM and 100 mg BID PRN for anxiety and pain.   - Continue mirtazpaine 15 mg PO q HS (started 11/17/22, dose increase 1/27/23)  - Continue Seroquel 12.5 mg BID and BID PRN for psychotic features   - [facility] counselor following closely -has excellent relationship with Kimberly Wick.  - Offered psychiatry referral in past, but pt/family prefer onsite care, [facility] PharmD follows         (R60.0) BL lower extremity edema  Comment: Chronic concern  Last echo in July 2023 see result in epic.  Elevated BNP but no history of heart failure exacerbation, no volume overload on chest x-ray.  Treated with 1 dose of IV Lasix in hospital in March.  Legs are swollen in setting of dependent edema and obesity.  Plan:  - TCU transfer  - PT/OT  - Consider Lasix with close lab monitoring, unable to complete in IVETTE setting  -Strongly recommended compression and elevation as able    (E87.1) Hyponatremia  (E22.2) SIADH (syndrome of inappropriate ADH production) (H)  Comment: Acute recurrent concern, in setting of SIADH, psychotropic meds may contribute, drinking large amounts of water. Solidum stable today.  Previous hyponatremia  "multifactorial, occurred in setting of SIADH, dose increase of selective serotonin reuptake inhibitor (Celexa), Bactrim, hypovolemia.  Stopped Celexa 9/29/22, now on Mirtazapine.  Plan:   -Fluid restriction 1800 mL/day  -Follow interval labs, check BMP on 4/15    (I48.0) Afib   Comment: Chronic and asymptomatic, junctional rhythm in ER  History of atrial fibrillation, low heart rate on previous ECG while in hospital.  Per cardiology Dr. Ross, \"clearly has slow atrial fibrillation. I will place a 7-day monitor to evaluate if he is having any significant heart block or significant arrhythmias that may be contributing to his multiple falls.  He clearly is too high a fall risk to consider anticoagulation.\"   Completed Zio patch September 2023 -see report in Epic.  Persistent atrial fibrillation with average heart rate of 58 bpm, pauses of 3.3 seconds, frequent PVCs, intermittent bundle branch block.  Considered Watchman device, but patient declined invasive intervention; pt/family aware of risk.  HAS-BLED 2: 4.1%  CHADs-VASC: 3% (Age, HTN)   See cardiology notes in [facility].  Plan:   - Cardiology follow-up 6/28/24 with Dr. Ross     (I10) Essential hypertension, benign  Comment: Chronic  BP at goal of < 150/90 given age and comorbid conditions  Stopped ASA due to hemoptysis.  BP Readings from Last 3 Encounters:   04/11/24 132/61   03/26/24 135/71   03/20/24 136/55   Plan:   - Monitor routine VS and labs off anti-hypertensive medications  -Nursing to check VS    (I71.2) Thoracic aortic aneurysm without rupture (H) - 6/30/22 4.6 cm  Comment: New finding in 2022, follows with vascular, last visit with  on 7/28/2023.  Plan:   - Per vascular notes, \"Plan for echocardiogram in June 2024 then followed by office visit order placed Rest of the medical management per primary care physician.\"     (C61) Prostate Cancer - adenocarcinoma Morgan 4+3=7  Comment: Improved  Prostate biopsy 1/11/2023,pathology positive for " "Haily 7 prostate cancer.   Stopped Trospium due to anticholinergic side effects, no change in urinary symptoms.  Followed by Dr. Niraj Larry [facility] - last visit 6/2/23.  Completed external beam radiation therapy w/o androgen depravation therapy with [facility] under care of Dr Fleming.   Some dysuria s/p treatment, UC negative for infection.  PSA is now 2.67.  Last urology visit 1/4/24.  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  Reported recurrent coughing up blood, but not recently.  Saw ENT in 2022, no explanation of symptoms.  Last esophagram May 2022 as above, presbyesophagus, proximal luminal narrowing.  Recent saw SLP 8/2022, no overt dysphagia.  Recurrent pneumonia, last 8/4/22.  CXR 10/1/22, 3/28/23 without concern for PNA.  Chest CT showed, \"Trace scattered atelectasis and/or fibrosis.  Elevated left hemidiaphragm. No effusions or pneumothorax.\"  Plan:   - Dry cough on exam, check CXR due to weakness  - SLP consult  - Check labs 4/15    (D64.9) Normocytic Anemia  Comment: Chronic, mild, baseline ~ 10.  Recent restart on iron supplement.  Hgb down to 9.0 today.  No symptoms of acute bleed.  Plan:   - Follow serial Hgb, next 4/15  - Continue iron supplement (started 3/25/24)  - Seeing ENT for recurrent nose bleed on 4/03/24  - Continue PPI    (K59.04) Chronic constipation  Comment: Chronic.   Having regular BMs.  Bowel habits change some s/p prostate radiation.  Long hx of Levsin multiple times per day for abd pain, but stopped by urology when started trospium, no " recurrent pain.   Plan:   - Chronic polyethylene glycol (MIRALAX) 17 GM/Dose powder; 1-2 capfuls in 8 oz of ORANGE JUICE PO one time each morning and 1-2 capfuls 8 oz of ORANGE JUICE PO one time daily PRN for constipation.   - GI following PRN    Orders:  - Potassium chloride 20 mEq  daily x 3 days  - Labs on Monday  - TCU transfer for PT/OT/SLP  - CXR  - UA/UC    [place] [facility]    Total time with an established patient visit in the assisted livin minutes including: discussion multiple times with nursing staff regarding concerns, chart review, lab review, medication reconciliation, specialist plan review, discuss of clinical status and treatment option risk/benefit with patient/wife, completion of TCU admission orders.     Electronically signed by: MARICHUY Basilio CNP

## 2024-04-12 ENCOUNTER — TRANSITIONAL CARE UNIT VISIT (OUTPATIENT)
Dept: GERIATRICS | Facility: CLINIC | Age: 84
End: 2024-04-12
Payer: COMMERCIAL

## 2024-04-12 ENCOUNTER — PATIENT OUTREACH (OUTPATIENT)
Dept: GERIATRIC MEDICINE | Facility: CLINIC | Age: 84
End: 2024-04-12

## 2024-04-12 ENCOUNTER — DOCUMENTATION ONLY (OUTPATIENT)
Dept: GERIATRICS | Facility: CLINIC | Age: 84
End: 2024-04-12

## 2024-04-12 VITALS
SYSTOLIC BLOOD PRESSURE: 142 MMHG | HEIGHT: 68 IN | BODY MASS INDEX: 31.86 KG/M2 | RESPIRATION RATE: 18 BRPM | HEART RATE: 74 BPM | WEIGHT: 210.2 LBS | OXYGEN SATURATION: 95 % | TEMPERATURE: 98 F | DIASTOLIC BLOOD PRESSURE: 74 MMHG

## 2024-04-12 DIAGNOSIS — I48.0 PAROXYSMAL ATRIAL FIBRILLATION (H): ICD-10-CM

## 2024-04-12 DIAGNOSIS — R53.81 PHYSICAL DECONDITIONING: ICD-10-CM

## 2024-04-12 DIAGNOSIS — R32 INCONTINENCE ASSOCIATED DERMATITIS: ICD-10-CM

## 2024-04-12 DIAGNOSIS — F41.9 ANXIETY: ICD-10-CM

## 2024-04-12 DIAGNOSIS — F10.21 ALCOHOL DEPENDENCE IN REMISSION (H): ICD-10-CM

## 2024-04-12 DIAGNOSIS — R60.0 LOWER EXTREMITY EDEMA: ICD-10-CM

## 2024-04-12 DIAGNOSIS — I71.20 THORACIC AORTIC ANEURYSM WITHOUT RUPTURE, UNSPECIFIED PART (H): ICD-10-CM

## 2024-04-12 DIAGNOSIS — I10 ESSENTIAL HYPERTENSION, BENIGN: ICD-10-CM

## 2024-04-12 DIAGNOSIS — M62.81 MUSCLE WEAKNESS (GENERALIZED): Primary | ICD-10-CM

## 2024-04-12 DIAGNOSIS — C61 PROSTATE CANCER (H): ICD-10-CM

## 2024-04-12 DIAGNOSIS — E87.6 HYPOKALEMIA: ICD-10-CM

## 2024-04-12 DIAGNOSIS — F03.90 MAJOR NEUROCOGNITIVE DISORDER (H): ICD-10-CM

## 2024-04-12 DIAGNOSIS — E87.1 HYPONATREMIA: ICD-10-CM

## 2024-04-12 DIAGNOSIS — L25.8 INCONTINENCE ASSOCIATED DERMATITIS: ICD-10-CM

## 2024-04-12 DIAGNOSIS — F41.1 GAD (GENERALIZED ANXIETY DISORDER): ICD-10-CM

## 2024-04-12 DIAGNOSIS — K22.70 BARRETT'S ESOPHAGUS WITHOUT DYSPLASIA: ICD-10-CM

## 2024-04-12 DIAGNOSIS — E22.2 SIADH (SYNDROME OF INAPPROPRIATE ADH PRODUCTION) (H): ICD-10-CM

## 2024-04-12 PROCEDURE — 99310 SBSQ NF CARE HIGH MDM 45: CPT | Performed by: NURSE PRACTITIONER

## 2024-04-12 NOTE — Clinical Note
Sylvia, you are probably already aware since you did a lot of the work but Jamie admitted to [facility] yesterday at 5pm. I will follow this admission and keep you posted!

## 2024-04-12 NOTE — LETTER
4/12/2024        RE: Jamie Valdivia  C/o Coral De La Garza  [address]  [place] [address]        [facility]  PRIMARY CARE PROVIDER AND CLINIC:  MARICHUY Basilio [facility], [address] [facility] [address] [facility] / [place] [address]  Chief Complaint   Patient presents with     [facility]     [place] Medical Record Number:  [id number]  Place of Service where encounter took place[facility] [[id number]]    Jamie Valdivia  is a 83 year old  (1940),  admitted to the above facility on 4/11/24 from [facility] .  Admitted to this facility for  rehab, medical management, and nursing care.    HPI:    HPI information obtained from: facility chart records, facility staff, and patient report.   Brief Summary of Hospital Course:   Updates on Status Since Skilled nursing Admission:     Patient Jamie Garg is a 83 yr old male admitted to [facility] for rehabilitation from [facility] due to weakness and failure to thrive    S/post recent hospitalization per epic note:   nosebleed 2/8/2024.  Controlled in ER by silver nitrate, Afrin nasal spray, lidocaine with epinephrine, and pressure.  Labs reassuring, no sign of coagulopathy.      3/12/2024 ER for fever and hypoxia. Weaned off oxygen after rDuoNeb. Chronic bilateral lower extremity swelling, stopped wearing Velcro compression wraps per patient preference. No history congestive heart failure or diuretics. Echo July 2023 ejection fraction of 60 to 65% along with moderate aortic stenosis, resident in permanent atrial fibrillation but not anticoagulated due to fall risk.   roponin negative x 2, ACS not suspected by ER team. Chest x-ray did not show any acute abnormality.  Viral respiratory panel negative. Labs significant hyponatremia and leukocytosis. Offered admission for diuresis but patient refused and discharged back assisted living.     Hospitalized [facility] from  3/19 to 3/20/2024 and observation with generalized weakness and headache.  Per hospital discharge: In ED patient afebrile and not tachycardic, normotensive to mildly hypertensive.  No hypoxia, CBC without acute abnormality.  BMP with mild hyponatremia with sodium of 128 (was 129 on 3/12/2024), and potassium 3.3.  Lactic acid within normal limits.  BNP elevated at 3400 but stable from check on 3/12.  COVID and influenza negative.  Chest x-ray did not show any acute abnormality or significant fluid overload. CT head showed no acute abnormality.  Treated 20 mg of IV Lasix.  During hospitalization remained stable and able to take p.o.'s.      PMHx hyponatremia (due to oral intake and SIADH), Etoh abuse, dementia with behaviors (paranoia, hallucinations)  (neuropsych testing Dr. 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 Niraj Larry [facility]) (Completed external beam radiation therapy w/o androgen depravation therapy with [facility] under care of Dr Fleming), hx of COVID-19, hard of hearing   Lives in [facility] memory care and may need higher level of care  primary contact for Jamie Valdivia (Magi Espino) spouse Gisela     Today  Patient eating meals, denies gastrointestinal upset  Reports has difficulty chewing and swallowing at times (dentures as [facility], however, reports difficult to remove and does not allows wear & has known dysphagia)-states  he takes small bites and chews well  Seen by speech therapy and new orders diet changed to DD3 with moderately thick liquids after swallow evaluation completed today.     Sign full code, primary contact for Jamie Valdivia (Magi Espino) and per Magi, spouse and Jamie are full code per previous conversations    Reports some skin breakdown buttocks and agreeable to APM    Has redness left hand, states had for 3 wks, unclear source. Not appear infected    Patient has agitation and paranoia at times,   Plan follow up house psychiatrist, may need increase Seroquel due to some paranoia  Tolerating Seroquel and feels Neurontin helps  States main stressor is not living with wife  (Wife lives [facility] and has cognitive impairment)  Children involved     Negative chest xray today, pending UC  Vitals stable  No signs and symptoms infection at this time  Denies chest pain, palpitation, shortness of breath, gastrointestinal upset    Walks with walker and uses wheelchair   Pain controlled with Neurontin and tylenol   History OA knees and lumbar compression fractures    At times incontinent, more so recently with skin irritation related  Pending UC        CODE STATUS/ADVANCE DIRECTIVES DISCUSSION:   CPR/Full code   Patient's living condition:  lives at [facility].  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) (11/19/2021), ED (erectile dysfunction), Edema, Encephalopathy (3/19/2024), Falling, JOSÉ MANUEL (generalized anxiety  disorder), Generalized anxiety disorder (04/27/2020), GERD (gastroesophageal reflux disease), GI bleeding, H/O carpal tunnel syndrome, History of 2019 novel coronavirus disease (COVID-19) (02/08/2021), HTN (hypertension), Impaired gait and mobility (03/14/2019), Mild cognitive impairment (08/12/2019), Mild TBI (traumatic brain injury) (H) (03/14/2019), Mumps, Obesity (BMI 30-39.9) (09/03/2015), Osteoarthritis, Osteoarthritis of both knees (05/13/2015), Osteoporosis, Other idiopathic peripheral autonomic neuropathy (03/30/2022), Other insomnia (03/14/2019), Other seizures (H) (03/30/2022), Palpitations, Peripheral neuropathy, Pharyngeal dysphagia (05/13/2015), Physical deconditioning (03/14/2019), Recurrent major depressive disorder (H24) (03/30/2022), Rhabdomyolysis, Thrombocytopenia (H24), Urination disorder, and Weakness (04/27/2020).    He has no past medical history of Asymptomatic human immunodeficiency virus (HIV) infection status (H), Blood in semen, Complication of anesthesia, Congenital renal agenesis and dysgenesis, Epididymitis, bilateral, Epididymitis, left, Epididymitis, right, Goiter, Gout, Hernia, abdominal, History of spinal cord injury, History of thrombophlebitis, Horseshoe kidney, Malignant hyperthermia, Orchitis, epididymitis, and epididymo-orchitis, with abscess, Parkinsons disease, Penile discharge, Prostate infection, Spider veins, Spina bifida (H), STD (sexually transmitted disease), Swelling of testicle, Tethered cord (H), or Tuberculosis.  PAST SURGICAL HISTORY:   has a past surgical history that includes left hip replacement (2010); left shoulder replacement (2016); tonsillectomy; Spine surgery; orthopedic surgery; Esophagoscopy, gastroscopy, duodenoscopy (EGD), combined (N/A, 06/01/2022); colonoscopy; Biopsy prostate transrectal (N/A, 1/11/2023); and Transrectal ultrasonic sonogram (N/A, 1/11/2023).  FAMILY HISTORY: family history includes Osteoporosis in his mother; Prostate Cancer in  his paternal grandfather.  SOCIAL HISTORY:   reports that he has never smoked. He has never used smokeless tobacco. He reports that he does not currently use alcohol. He reports that he does not use drugs.    Post Discharge Medication Reconciliation Status: discharge medications reconciled and changed, per note/orders    Current Outpatient Medications   Medication Sig Dispense Refill     acetaminophen (TYLENOL) 500 MG tablet Take 2 tablets (1,000 mg) by mouth 3 times daily as needed for mild pain 10 tablet 98     alum & mag hydroxide-simethicone (MAALOX MAX) 400-400-40 MG/5ML SUSP suspension Take 30 mLs by mouth every 6 hours as needed for indigestion 355 mL 3     FEROSUL 325 (65 Fe) MG tablet TAKE ONE TABLET BY MOUTH ON MONDAY, WEDNESDAY AND FRIDAY 36 tablet 97     gabapentin (NEURONTIN) 100 MG capsule Take 1 capsule (100 mg) by mouth daily. May also take 1 capsule (100 mg) 2 times daily as needed for neuropathic pain.       gabapentin (NEURONTIN) 300 MG capsule Take 1 capsule (300 mg) by mouth at bedtime 30 capsule 98     guaiFENesin (ROBITUSSIN) 20 mg/mL liquid Take 10 mLs by mouth every 4 hours as needed for cough 355 mL 98     loratadine (CLARITIN) 10 MG tablet Take 1 tablet (10 mg) by mouth At Bedtime 30 tablet 98     menthol-zinc oxide (CALMOSEPTINE) 0.44-20.6 % OINT ointment Apply topically 4 times daily To perineal area. Send to [facility]. 113 g 98     mirtazapine (REMERON) 15 MG tablet TAKE 1 TABLET BY MOUTH AT BEDTIME 90 tablet 3     nystatin (MYCOSTATIN) 498140 UNIT/GM external powder Apply topically 2 times daily as needed       omeprazole (PRILOSEC) 40 MG DR capsule TAKE 1 CAPSULE BY MOUTH TWICE DAILY 180 capsule 97     oxymetazoline (AFRIN) 0.05 % nasal spray Spray 2 sprays into both nostrils 2 times daily as needed (nose bleed)       polyethylene glycol (MIRALAX) 17 GM/Dose powder MIX 2 CAPFULS IN 8OZ OF ORANGE JUICE  IN THE MORNING;AND MAY MIX 2 CAPFULS ONCE DAILY AS NEEDED FOR CONSTIPATION. MIX IN  "FRONT OF PT. HOLD FOR LOOSE STOOL. OK TO GIVE 1 CAPFUL IF RESIDENT REFUSES 2 CAPFULS. 510 g 97     potassium chloride asya ER (KLOR-CON M20) 20 MEQ CR tablet Take 1 tablet (20 mEq) by mouth daily for 3 days 3 tablet 0     QUEtiapine (SEROQUEL) 25 MG tablet TAKE ONE-HALF TABLET (12.5MG) BY MOUTH TWICE DAILY;& MAY TAKE ONE-HALF TABLET (12.5MG) EVERY 12 HOURS AS NEEDED 180 tablet 97     sodium chloride (OCEAN) 0.65 % nasal spray Spray 1 spray in nostril 4 times daily as needed for congestion Okay to leave at bedside 88 mL 98     ROS:  10 point ROS of systems including Constitutional, Eyes, Respiratory, Cardiovascular, Gastroenterology, Genitourinary, Integumentary, Musculoskeletal, Psychiatric were all negative except for pertinent positives noted in my HPI.    Vitals:  BP (!) 142/74   Pulse 74   Temp 98  F (36.7  C)   Resp 18   Ht 1.727 m (5' 8\")   Wt 95.3 kg (210 lb 3.2 oz)   SpO2 95%   BMI 31.96 kg/m    Exam:  GENERAL APPEARANCE:  Alert, in no distress  ENT:  Mouth and posterior oropharynx normal, moist mucous membranes  EYES:  EOM, conjunctivae, lids, pupils and irises normal  NECK:  No adenopathy,masses or thyromegaly  RESP:  respiratory effort and palpation of chest normal, lungs clear to auscultation , no respiratory distress  CV:  Palpation and auscultation of heart done , regular rate and rhythm  ABDOMEN:  normal bowel sounds, soft, nontender  M/S:   sitting in WC  SKIN:  Inspection of skin and subcutaneous tissue+2 edema bilateral LE  NEURO:   Cranial nerves 2-12 are normal tested and grossly at patient's baseline  PSYCH:  memory impaired , affect and mood normal    Lab/Diagnostic data:  Labs done in SNF are in [address] EPIC. Please refer to them using [facility]Care Everywhere.    2022  Interval Echo completed, with EF 55%, right ventricle mildly dilated with mildy decreased systolic function, aortic valve mild-moderate stenosis, ascending aorta moderately dilated.     Last Comprehensive Metabolic " "Panel:  Lab Results   Component Value Date     (L) 04/11/2024    POTASSIUM 3.2 (L) 04/11/2024    CHLORIDE 90 (L) 04/11/2024    CO2 33 (H) 04/11/2024    ANIONGAP 11 04/11/2024     (H) 04/11/2024    BUN 3.1 (L) 04/11/2024    CR 0.43 (L) 04/11/2024    GFRESTIMATED >90 04/11/2024    JOSUE 7.7 (L) 04/11/2024       Lab Results   Component Value Date    WBC 10.4 04/11/2024     Lab Results   Component Value Date    RBC 3.54 04/11/2024     Lab Results   Component Value Date    HGB 9.0 04/11/2024     Lab Results   Component Value Date    HCT 28.9 04/11/2024     No components found for: \"MCT\"  Lab Results   Component Value Date    MCV 82 04/11/2024     Lab Results   Component Value Date    MCH 25.4 04/11/2024     Lab Results   Component Value Date    MCHC 31.1 04/11/2024     Lab Results   Component Value Date    RDW 18.4 04/11/2024     Lab Results   Component Value Date     04/11/2024         ASSESSMENT/PLAN:  Assessment/Plan:  Generalized weakness (primary encounter diagnosis)  Physical deconditioning    [facility] staff they are no longer able to meet care needs, newly incontinent of bowel and bladder, not going to dinner room for meals.  PT/OT/SLP and increased nursing care    involved in safe plan home, may need higher level of care     Hypokalemia  suspect in setting of decreased appetite   potassium chloride asya ER (KLOR-CON M20) 20 MEQ CR tablet x 3days  BMP 4/15  Dietary involved  Monitor      Urinary incontinence   Incontinence associated dermatitis  worsening incontinence  menthol-zinc oxide (CALMOSEPTINE) 0.44-20.6 % OINT ointment QID  - UA/UC pending, denies dysuria  Order APM due to concern skin breakdown  Good eveline-cares     Major neurocognitive disorder (H) - dementia   Per epic note  Slightly more confused today, irritable, delusion about \"people out to get him\" and \"room being bugged.\"  Please see neuropsych testing from 5/12/2023, \"I believe that Jamie is not capable of making " "reasoned decisions. He should continue to receive a high-level of care that includes other managing medications, finances, and limiting access to alcohol.\"   Please see interdisciplinary team care conference note from 7/25/2023.   Wife and family to assist with decision making as these are his next of kin and there is no healthcare directive. Considered moving off memory care unit if he had appropriate supports, unwilling to let family assist him at present and family does not want him moved off of memory care unit as they feel this is safest environment for him.  Plan:     Pending repeat cognitive testing  Continue Seroquel due to paranoid delusions causing distress (ie thinks phones are bugged, people out to get him, paranoid)  Referral house psychiatrist, consider increase Seroquel due to paranoia continue and cause stress     JOSÉ MANUEL  Adjustment reaction with depression/anxiety  Alcohol dependence in remission  Per epic note  Cognitive impairment in setting of history of years of ETOH abuse with recurrent falls and hospitalizations. Currently no access to ETOH and thus is abstinent.  Chronic low mood, suspect JOSÉ MANUEL with panic attacks/chest pain for many years, no documented history of myocardial infarction or ASCVD.   On memory care unit due to safety concerns, needs assistance with IADLs/ADLs.  Finds gabapentin helpful.  Plan:   - Continue gabapentin 300 mg q HS (dose increase 4/1/24)  - Continue Gabapentin 100 mg q AM and 100 mg BID PRN for anxiety and pain.   - Continue mirtazpaine 15 mg PO q HS (started 11/17/22, dose increase 1/27/23)  - Continue Seroquel 12.5 mg BID and BID PRN for psychotic features   - [facility] brandee counselor following closely -has excellent relationship with Kimberly Wick.    Chronic BL lower extremity edema  Legs are swollen in setting of dependent edema and obesity.  No history CHF  Consider Lasix with close lab monitoring, encourage leg elevation and compression stockings   " "  Hyponatremia  SIADH (syndrome of inappropriate ADH production)  Per epic note:   Acute recurrent concern, in setting of SIADH, psychotropic meds may contribute, drinking large amounts of water. Solidum stable today.  Previous hyponatremia multifactorial, occurred in setting of SIADH, dose increase of selective serotonin reuptake inhibitor (Celexa), Bactrim, hypovolemia.  Stopped Celexa 9/29/22, now on Mirtazapine.  Plan:   -Fluid restriction 1800 mL/day  -Follow interval labs, check BMP on 4/15     Afib   Per epic note  Chronic and asymptomatic, junctional rhythm in ER  History of atrial fibrillation, low heart rate on previous ECG while in hospital.  Per cardiology Dr. Ross, \"clearly has slow atrial fibrillation. I will place a 7-day monitor to evaluate if he is having any significant heart block or significant arrhythmias that may be contributing to his multiple falls.  He clearly is too high a fall risk to consider anticoagulation.\"   Completed Zio patch September 2023 -see report in Epic.  Persistent atrial fibrillation with average heart rate of 58 bpm, pauses of 3.3 seconds, frequent PVCs, intermittent bundle branch block.  Considered Watchman device, but patient declined invasive intervention; pt/family aware of risk.  HAS-BLED 2: 4.1%  CHADs-VASC: 3% (Age, HTN)   See cardiology [facility] in [facility].  Plan:   - Cardiology follow-up 6/28/24 with Dr. Ross      Essential hypertension, benign  BP at goal of < 150/90 given age and comorbid conditions  Stopped ASA due to hemoptysis  Chronic managed   Not on medications to treat  Monitor      Thoracic aortic aneurysm without rupture (H) - 6/30/22 4.6 cm  New finding in 2022, follows with vascular, last visit with  on 7/28/2023.  Plan:   - Per vascular notes, \"Plan for echocardiogram in June 2024 then followed by office visit order placed Rest of the medical management per primary care physician.\"      Prostate Cancer - adenocarcinoma Haily " "4+3=7  Comment: Improved  Prostate biopsy 1/11/2023,pathology positive for [place] 7 prostate cancer.   Stopped Trospium due to anticholinergic side effects, no change in urinary symptoms.  Followed by Dr. Niraj Larry [facility] - last visit 6/2/23.  Completed external beam radiation therapy w/o androgen depravation therapy with [facility] under care of Dr Fleming.   Some dysuria s/p treatment, UC negative for infection.  PSA is now 2.67.  Last urology visit 1/4/24.  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  Reported recurrent coughing up blood, but not recently.  Saw ENT in 2022, no explanation of symptoms.  Last esophagram May 2022 as above, presbyesophagus, proximal luminal narrowing.  Recent saw SLP 8/2022, no overt dysphagia.  Recurrent pneumonia, last 8/4/22.  CXR 10/1/22, 3/28/23 without concern for PNA.  Chest CT showed, \"Trace scattered atelectasis and/or fibrosis.  Elevated left hemidiaphragm. No effusions or pneumothorax.\"    CXR today negative today for acute findings  - SLP consult. .Seen by speech therapy and new orders diet changed to DD3 with moderately thick liquids after swallow evaluation completed today     Normocytic Anemia  Chronic, mild, baseline ~ 10.  Recent restart on iron supplement.  Hgb down to 9.0 this week, No symptoms of acute bleed.  Follow serial Hgb, next 4/15  Continue iron supplement (started 3/25/24)  ENT for recurrent nose bleed on 4/03/24  Continue PPI     Chronic constipation  Having regular BMs.  Bowel habits change some s/p prostate radiation.  Long hx of Levsin " multiple times per day for abd pain, but stopped by urology when started trospium, no recurrent pain.   Continue polyethylene glycol (MIRALAX) 17 GM/Dose powder; 1-2 capfuls in 8 oz of ORANGE JUICE PO one time each morning and 1-2 capfuls 8 oz of ORANGE JUICE PO one time daily PRN for constipation.     Impaired vision  Consider ophthalmology follow-up for field cut. Difficulty out to appointments without family assist, follow with onsite eye for now     Advanced care planning  Elects full code        - Consider ophthalmology follow-up for field cut. Difficulty out to appointments without family assist, follow with onsite eye for now    Apr 30, 2024  8:00 AM  (Arrive by 7:45 AM)  NEW NOSE BLEED with Saad Roberts MD  [facility] ([facility] ) [phone]     Jun 28, 2024 11:30 AM  (Arrive by 11:25 AM)  Return Cardiology with Alex Ross MD  [facility] [place] ([facility] ) [phone]     Jul 02, 2024  9:30 AM  LAB with [facility]  [facility] ([facility] ) [phone]     Jul 09, 2024 10:00 AM  (Arrive by 9:45 AM)  Return Patient with Niraj Larry MD  [facility] [place] ([facility] ) [phone]              Total time spent with patient visit at the skilled nursing facility was 45 min including patient visit and review of past records. Greater than 50% of total time spent with counseling and coordinating care due to discussion on functional goals, pain management, bowel movement management and discharge planning    Electronically signed by:  MARICHUY Mendoza CNP                       Sincerely,        MARICHUY Mendoza CNP

## 2024-04-12 NOTE — Clinical Note
Jamie Sullivan discharged back to [facility] yesterday. I spoke with Dania and it sounds like he is at baseline. They are recommending more assistance however, he is declining. That is not new though. Please let me know if you have any questions or concerns.  Thanks! Gisela

## 2024-04-12 NOTE — PROGRESS NOTES
TRANSITIONS OF CARE (ISAAC) LOG    ISAAC tasks should be completed by the CC within one (1) business day of notification of each transition. Follow up contact with member is required after return to their usual care setting.  Note:  If CC finds out about the transitions fifteen (15) days or more after the member has returned to their usual care setting, no ISAAC log is needed. However, the CC should check in with the member to discuss the transition process, any changes needed to the care plan and document it in a case note.     Member Name:  Jamie Valdivia [facility] Member ID#:  [id number]   Product: [facility] Care Coordinator Contact:  Gisela Hall RN, BSN, PHN Agency/County/Care System: [facility]   Transition Communication Actions from Care Management Contact   Transition #1   Notification Date: 4/12/24 Transition Date:   [facility]     Is this the member s usual care setting?               yes Transition To: [facility] [place], [facility]   Transition Type:  Planned    Documentation from conversation with the member/responsible party, provider, discharging and receiving facility:   Date: 4/12/24: Received notification of direct admission to TCU.3  OBRA [facility].   CC contacted Nursing Home LSW ([email or web address]) for Name and Phone Number) and reviewed community POC. CC requested to be notified of concerns, care conference dates and discharge planning.   CC reached out to member regarding transition and offered support as needed.    Transition log updated.   PCP, Sylvia Stein, notified of transition to TCU via EMR.      Gisela Hall RN  [facility]  [phone]       Transition #2   Notification Date: 5/8/24 [facility] Date[facility]   [facility]     Is this the member s usual care setting?               no Transition  To: Assisted Living, [facility]   Transition Type:  Planned    Documentation from conversation with the member/responsible party, provider, discharging and receiving facility:   Date: 5/8/24: Received notification of transition to home.  CC contacted member and reviewed discharge summary.  Member has a follow-up appointment with PCP in 7 days: Yes: scheduled on On site provider will follow up    Member has had a change in condition: No  Home visit needed: No  Care plan reviewed and updated.  The following home based services Assisted Living were resumed.  New referrals placed: No  Transition log completed.   PCP, Sylvia Stein, notified of transition back to home via EMR.    Gisela Hall RN  [facility]  [phone]            *RETURN TO USUAL CARE SETTING: *Complete tasks below when the member is discharging TO their usual care setting within one (1) business day of notification..      For situations where the Care Coordinator is notified of the discharge prior to the date of discharge, the Care Coordinator must follow up with the member or designated representative to confirm that discharge actually occurred and discuss required ISAAC tasks as outlined in the ISAAC Instructions.  (This includes situations where it may be a  new  usual care setting for the member. (i.e., a community member who decides upon permanent nursing home placement following hospitalization and rehab).    Discuss with Member/Responsible Party:    Check  Yes  - if the member, family member and/or SNF/facility staff manages the following:    If  No  provide explanation in the comments section.          Date completed: 5/8/24 Communicated with member or their designated representative about the following:  care transition process; about changes to the member s health status; plan of care updates; education about transitions and how to prevent unplanned transitions/readmissions    Four Pillars for Optimal Transition:     Check  Yes  - if the member, family member and/or SNF/facility staff manages the following:    If  No  provide explanation in the comments section.          [x]  Yes     []  No Does the member have a follow-up appointment scheduled with primary care or specialist? (Mental health hospitalizations--the appt. should be w/in 7 days)              For mental health hospitalizations:  []  Yes     []  No     Does the member have a follow-up appointment scheduled with a mental health practitioner within 7 days of discharge?  [x]  Yes     []  No     Has a medication review been completed with member? If no, refer to PCP, home care nurse, MTM, pharmacist  [x]  Yes     []  No     Can the member manage their medications or is there a system in place to manage medications (e.g. home care set-up)?         [x]  Yes     []  No     Can the member verbalize warning signs and symptoms to watch for and how to respond?  [x]  Yes     []  No     Does the member have a copy of and understand their discharge instructions?  If no, assist to obtain copy of discharge instructions, review discharge instructions, and assist to contact PCP to discuss questions about their recent hospitalization.  [x]  Yes     []  No     Does the member have adequate food, housing and transportation?  If no, add goal and discuss additional supports available to the member                                                                                                                                                                                 [x]  Yes     []  No     Is the member safe in their home?  If no, document needs and support provided                                                                                                                                                                          []  Yes     [x]  No     Are there any concerns of vulnerability, abuse, or neglect?  If yes, document concerns and actions taken by Care Coordinator as a  mandated                                                                                                                                                                              [x]  Yes     []  No     Does the member use a Personal Health Care Record?  Check  Yes  if visit summary, discharge summary, and/or healthcare summary are being used as a PHR.                                                                                                                                                                                  [x]  Yes     []  No     Have you reviewed the discharge summary with the member? If  No  provide explanation in comments.  [x]  Yes     []  No     Have you updated the member s care plan/support plan? Add new diagnosis, medications, treatments, goals & interventions, as applicable. If No, provide explanation in comments.    Comments:    Notes from conversation with the member/responsible party, provider, discharging and receiving facility (as applicable):     Care coordinator also contacted the director of nursing, Dania at [facility] to discuss any changes. Dania stated that more help is recommended but he refuses like he has in the past. Previous assessment and RS tool were reviewed and Jamie has dependencies and time for the ADL's he continues to decline including dressing, grooming, and continence care.     Gisela Hall RN  [facility]  [phone]

## 2024-04-12 NOTE — PROGRESS NOTES
[place] [facility]  PRIMARY CARE PROVIDER AND CLINIC:  MARICHUY Basilio CNP, [address] [facility] / [facility] [id number]  Chief Complaint   Patient presents with    [facility]     [facility] Medical Record Number:  [id number]  Place of Service where encounter took place:  [facility] ([facility]) [[id number]]    Jamie Valdivia  is a 83 year old  (1940),  admitted to the above facility on 4/11/24 from [facility] .  Admitted to this facility for  rehab, medical management, and nursing care.    HPI:    HPI information obtained from: facility chart records, facility staff, and patient report.   Brief Summary of Hospital Course:   Updates on Status Since Skilled nursing Admission:     Patient Jamie Garg is a 83 yr old male admitted to [facility] due to weakness and failure to thrive    S/post recent hospitalization per epic note:   nosebleed 2/8/2024.  Controlled in ER by silver nitrate, Afrin nasal spray, lidocaine with epinephrine, and pressure.  Labs reassuring, no sign of coagulopathy.      3/12/2024 ER for fever and hypoxia. Weaned off oxygen after rDuoNeb. Chronic bilateral lower extremity swelling, stopped wearing Velcro compression wraps per patient preference. No history congestive heart failure or diuretics. Echo July 2023 ejection fraction of 60 to 65% along with moderate aortic stenosis, resident in permanent atrial fibrillation but not anticoagulated due to fall risk.   roponin negative x 2, ACS not suspected by ER team. Chest x-ray did not show any acute abnormality.  Viral respiratory panel negative. Labs significant hyponatremia and leukocytosis. Offered admission for diuresis but patient refused and discharged back assisted living.     Hospitalized [facility] from 3/19 to 3/20/2024 and observation with generalized weakness and headache.  Per hospital discharge: In ED  patient afebrile and not tachycardic, normotensive to mildly hypertensive.  No hypoxia, CBC without acute abnormality.  BMP with mild hyponatremia with sodium of 128 (was 129 on 3/12/2024), and potassium 3.3.  Lactic acid within normal limits.  BNP elevated at 3400 but stable from check on 3/12.  COVID and influenza negative.  Chest x-ray did not show any acute abnormality or significant fluid overload. CT head showed no acute abnormality.  Treated 20 mg of IV Lasix.  During hospitalization remained stable and able to take p.o.'s.      PMHx hyponatremia (due to oral intake and SIADH), Etoh abuse, dementia with behaviors (paranoia, hallucinations)  (neuropsych testing Dr. 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 Niraj Larry [facility]) (Completed external beam radiation therapy w/o androgen depravation therapy with [facility] under care of Dr Fleming), hx of COVID-19, hard of hearing   Lives in [facility] memory care and may need higher level of care  primary contact for Jamie Valdivia (Magi Espino) spouse Gisela     Today  Patient eating meals, denies gastrointestinal upset  Reports has difficulty chewing and swallowing at times (dentures as [facility], however, reports difficult to remove and does not allows wear & has known dysphagia)-states he takes small bites and chews well  Seen by speech therapy and new orders diet changed to DD3 with  moderately thick liquids after swallow evaluation completed today.     Sign full code, primary contact for Jamie Valdivia (Magi Espino) and per Magi, spouse and Jamie are full code per previous conversations    Reports some skin breakdown buttocks and agreeable to APM    Has redness left hand, states had for 3 wks, unclear source. Not appear infected    Patient has agitation and paranoia at times,   Plan follow up house psychiatrist, may need increase Seroquel due to some paranoia  Tolerating Seroquel and feels Neurontin helps  States main stressor is not living with wife  (Wife lives [facility] and has cognitive impairment)  Children involved     Negative chest xray today, pending UC  Vitals stable  No signs and symptoms infection at this time  Denies chest pain, palpitation, shortness of breath, gastrointestinal upset    Walks with walker and uses wheelchair   Pain controlled with Neurontin and tylenol   History OA knees and lumbar compression fractures    At times incontinent, more so recently with skin irritation related  Pending UC        CODE STATUS/ADVANCE DIRECTIVES DISCUSSION:   CPR/Full code   Patient's living condition:  lives at [facility].  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) (11/19/2021), ED (erectile dysfunction), Edema, Encephalopathy (3/19/2024), Falling, JOSÉ MANUEL (generalized anxiety disorder), Generalized anxiety disorder (04/27/2020), GERD (gastroesophageal reflux disease), GI bleeding,  H/O carpal tunnel syndrome, History of 2019 novel coronavirus disease (COVID-19) (02/08/2021), HTN (hypertension), Impaired gait and mobility (03/14/2019), Mild cognitive impairment (08/12/2019), Mild TBI (traumatic brain injury) (H) (03/14/2019), Mumps, Obesity (BMI 30-39.9) (09/03/2015), Osteoarthritis, Osteoarthritis of both knees (05/13/2015), Osteoporosis, Other idiopathic peripheral autonomic neuropathy (03/30/2022), Other insomnia (03/14/2019), Other seizures (H) (03/30/2022), Palpitations, Peripheral neuropathy, Pharyngeal dysphagia (05/13/2015), Physical deconditioning (03/14/2019), Recurrent major depressive disorder (H24) (03/30/2022), Rhabdomyolysis, Thrombocytopenia (H24), Urination disorder, and Weakness (04/27/2020).    He has no past medical history of Asymptomatic human immunodeficiency virus (HIV) infection status (H), Blood in semen, Complication of anesthesia, Congenital renal agenesis and dysgenesis, Epididymitis, bilateral, Epididymitis, left, Epididymitis, right, Goiter, Gout, Hernia, abdominal, History of spinal cord injury, History of thrombophlebitis, Horseshoe kidney, Malignant hyperthermia, Orchitis, epididymitis, and epididymo-orchitis, with abscess, Parkinsons disease, Penile discharge, Prostate infection, Spider veins, Spina bifida (H), STD (sexually transmitted disease), Swelling of testicle, Tethered cord (H), or Tuberculosis.  PAST SURGICAL HISTORY:   has a past surgical history that includes left hip replacement (2010); left shoulder replacement (2016); tonsillectomy; Spine surgery; orthopedic surgery; Esophagoscopy, gastroscopy, duodenoscopy (EGD), combined (N/A, 06/01/2022); colonoscopy; Biopsy prostate transrectal (N/A, 1/11/2023); and Transrectal ultrasonic sonogram (N/A, 1/11/2023).  FAMILY HISTORY: family history includes Osteoporosis in his mother; Prostate Cancer in his paternal grandfather.  SOCIAL HISTORY:   reports that he has never smoked. He has never used smokeless  tobacco. He reports that he does not currently use alcohol. He reports that he does not use drugs.    Post Discharge Medication Reconciliation Status: discharge medications reconciled and changed, per note/orders    Current Outpatient Medications   Medication Sig Dispense Refill    acetaminophen (TYLENOL) 500 MG tablet Take 2 tablets (1,000 mg) by mouth 3 times daily as needed for mild pain 10 tablet 98    alum & mag hydroxide-simethicone (MAALOX MAX) 400-400-40 MG/5ML SUSP suspension Take 30 mLs by mouth every 6 hours as needed for indigestion 355 mL 3    FEROSUL 325 (65 Fe) MG tablet TAKE ONE TABLET BY MOUTH ON MONDAY, WEDNESDAY AND FRIDAY 36 tablet 97    gabapentin (NEURONTIN) 100 MG capsule Take 1 capsule (100 mg) by mouth daily. May also take 1 capsule (100 mg) 2 times daily as needed for neuropathic pain.      gabapentin (NEURONTIN) 300 MG capsule Take 1 capsule (300 mg) by mouth at bedtime 30 capsule 98    guaiFENesin (ROBITUSSIN) 20 mg/mL liquid Take 10 mLs by mouth every 4 hours as needed for cough 355 mL 98    loratadine (CLARITIN) 10 MG tablet Take 1 tablet (10 mg) by mouth At Bedtime 30 tablet 98    menthol-zinc oxide (CALMOSEPTINE) 0.44-20.6 % OINT ointment Apply topically 4 times daily To perineal area. Send to [facility]. 113 g 98    mirtazapine (REMERON) 15 MG tablet TAKE 1 TABLET BY MOUTH AT BEDTIME 90 tablet 3    nystatin (MYCOSTATIN) 482044 UNIT/GM external powder Apply topically 2 times daily as needed      omeprazole (PRILOSEC) 40 MG DR capsule TAKE 1 CAPSULE BY MOUTH TWICE DAILY 180 capsule 97    oxymetazoline (AFRIN) 0.05 % nasal spray Spray 2 sprays into both nostrils 2 times daily as needed (nose bleed)      polyethylene glycol (MIRALAX) 17 GM/Dose powder MIX 2 CAPFULS IN 8OZ OF ORANGE JUICE  IN THE MORNING;AND MAY MIX 2 CAPFULS ONCE DAILY AS NEEDED FOR CONSTIPATION. MIX IN FRONT OF PT. HOLD FOR LOOSE STOOL. OK TO GIVE 1 CAPFUL IF RESIDENT REFUSES 2 CAPFULS. 510 g 97    potassium chloride asya  "ER (KLOR-CON M20) 20 MEQ CR tablet Take 1 tablet (20 mEq) by mouth daily for 3 days 3 tablet 0    QUEtiapine (SEROQUEL) 25 MG tablet TAKE ONE-HALF TABLET (12.5MG) BY MOUTH TWICE DAILY;& MAY TAKE ONE-HALF TABLET (12.5MG) EVERY 12 HOURS AS NEEDED 180 tablet 97    sodium chloride (OCEAN) 0.65 % nasal spray Spray 1 spray in nostril 4 times daily as needed for congestion Okay to leave at bedside 88 mL 98     ROS:  10 point ROS of systems including Constitutional, Eyes, Respiratory, Cardiovascular, Gastroenterology, Genitourinary, Integumentary, Musculoskeletal, Psychiatric were all negative except for pertinent positives noted in my HPI.    Vitals:  BP (!) 142/74   Pulse 74   Temp 98  F (36.7  C)   Resp 18   Ht 1.727 m (5' 8\")   Wt 95.3 kg (210 lb 3.2 oz)   SpO2 95%   BMI 31.96 kg/m    Exam:  GENERAL APPEARANCE:  Alert, in no distress  ENT:  Mouth and posterior oropharynx normal, moist mucous membranes  EYES:  EOM, conjunctivae, lids, pupils and irises normal  NECK:  No adenopathy,masses or thyromegaly  RESP:  respiratory effort and palpation of chest normal, lungs clear to auscultation , no respiratory distress  CV:  Palpation and auscultation of heart done , regular rate and rhythm  ABDOMEN:  normal bowel sounds, soft, nontender  M/S:   sitting in WC  SKIN:  Inspection of skin and subcutaneous tissue+2 edema bilateral LE  NEURO:   Cranial nerves 2-12 are normal tested and grossly at patient's baseline  PSYCH:  memory impaired , affect and mood normal    Lab/Diagnostic data:  Labs done in SNF are in [facility] EPIC. Please refer to them using [employer]/Care Everywhere.    2022  Interval Echo completed, with EF 55%, right ventricle mildly dilated with mildy decreased systolic function, aortic valve mild-moderate stenosis, ascending aorta moderately dilated.     Last Comprehensive Metabolic Panel:  Lab Results   Component Value Date     (L) 04/11/2024    POTASSIUM 3.2 (L) 04/11/2024    CHLORIDE 90 (L) 04/11/2024    " "CO2 33 (H) 04/11/2024    ANIONGAP 11 04/11/2024     (H) 04/11/2024    BUN 3.1 (L) 04/11/2024    CR 0.43 (L) 04/11/2024    GFRESTIMATED >90 04/11/2024    JOSUE 7.7 (L) 04/11/2024       Lab Results   Component Value Date    WBC 10.4 04/11/2024     Lab Results   Component Value Date    RBC 3.54 04/11/2024     Lab Results   Component Value Date    HGB 9.0 04/11/2024     Lab Results   Component Value Date    HCT 28.9 04/11/2024     No components found for: \"MCT\"  Lab Results   Component Value Date    MCV 82 04/11/2024     Lab Results   Component Value Date    MCH 25.4 04/11/2024     Lab Results   Component Value Date    MCHC 31.1 04/11/2024     Lab Results   Component Value Date    RDW 18.4 04/11/2024     Lab Results   Component Value Date     04/11/2024         ASSESSMENT/PLAN:  Assessment/Plan:  Generalized weakness (primary encounter diagnosis)  Physical deconditioning    [facility] staff they are no longer able to meet care needs, newly incontinent of bowel and bladder, not going to dinner room for meals.  PT/OT/SLP and increased nursing care    involved in safe plan home, may need higher level of care     Hypokalemia  suspect in setting of decreased appetite   potassium chloride asya ER (KLOR-CON M20) 20 MEQ CR tablet x 3days  BMP 4/15  Dietary involved  Monitor      Urinary incontinence   Incontinence associated dermatitis  worsening incontinence  menthol-zinc oxide (CALMOSEPTINE) 0.44-20.6 % OINT ointment QID  - UA/UC pending, denies dysuria  Order APM due to concern skin breakdown  Good eveline-cares     Major neurocognitive disorder (H) - dementia   Per epic note  Slightly more confused today, irritable, delusion about \"people out to get him\" and \"room being bugged.\"  Please see neuropsych testing from 5/12/2023, \"I believe that Jamie is not capable of making reasoned decisions. He should continue to receive a high-level of care that includes other managing medications, finances, and limiting " "access to alcohol.\"   Please see interdisciplinary team care conference note from 7/25/2023.   Wife and family to assist with decision making as these are his next of kin and there is no healthcare directive. Considered moving off memory care unit if he had appropriate supports, unwilling to let family assist him at present and family does not want him moved off of memory care unit as they feel this is safest environment for him.  Plan:     Pending repeat cognitive testing  Continue Seroquel due to paranoid delusions causing distress (ie thinks phones are bugged, people out to get him, paranoid)  Referral house psychiatrist, consider increase Seroquel due to paranoia continue and cause stress     JOSÉ MANUEL  Adjustment reaction with depression/anxiety  Alcohol dependence in remission  Per epic note  Cognitive impairment in setting of history of years of ETOH abuse with recurrent falls and hospitalizations. Currently no access to ETOH and thus is abstinent.  Chronic low mood, suspect JOSÉ MANUEL with panic attacks/chest pain for many years, no documented history of myocardial infarction or ASCVD.   On memory care unit due to safety concerns, needs assistance with IADLs/ADLs.  Finds gabapentin helpful.  Plan:   - Continue gabapentin 300 mg q HS (dose increase 4/1/24)  - Continue Gabapentin 100 mg q AM and 100 mg BID PRN for anxiety and pain.   - Continue mirtazpaine 15 mg PO q HS (started 11/17/22, dose increase 1/27/23)  - Continue Seroquel 12.5 mg BID and BID PRN for psychotic features   - [facility] brandee counselor following closely -has excellent relationship with Kimberly Wick.    Chronic BL lower extremity edema  Legs are swollen in setting of dependent edema and obesity.  No history CHF  Consider Lasix with close lab monitoring, encourage leg elevation and compression stockings     Hyponatremia  SIADH (syndrome of inappropriate ADH production)  Per epic note:   Acute recurrent concern, in setting of SIADH, psychotropic meds may " "contribute, drinking large amounts of water. Solidum stable today.  Previous hyponatremia multifactorial, occurred in setting of SIADH, dose increase of selective serotonin reuptake inhibitor (Celexa), Bactrim, hypovolemia.  Stopped Celexa 9/29/22, now on Mirtazapine.  Plan:   -Fluid restriction 1800 mL/day  -Follow interval labs, check BMP on 4/15     Afib   Per epic note  Chronic and asymptomatic, junctional rhythm in ER  History of atrial fibrillation, low heart rate on previous ECG while in hospital.  Per cardiology Dr. Ross, \"clearly has slow atrial fibrillation. I will place a 7-day monitor to evaluate if he is having any significant heart block or significant arrhythmias that may be contributing to his multiple falls.  He clearly is too high a fall risk to consider anticoagulation.\"   Completed Zio patch September 2023 -see report in Epic.  Persistent atrial fibrillation with average heart rate of 58 bpm, pauses of 3.3 seconds, frequent PVCs, intermittent bundle branch block.  Considered Watchman device, but patient declined invasive intervention; pt/family aware of risk.  HAS-BLED 2: 4.1%  CHADs-VASC: 3% (Age, HTN)   See cardiology notes in [facility].  Plan:   - Cardiology follow-up 6/28/24 with Dr. Ross      Essential hypertension, benign  BP at goal of < 150/90 given age and comorbid conditions  Stopped ASA due to hemoptysis  Chronic managed   Not on medications to treat  Monitor      Thoracic aortic aneurysm without rupture (H) - 6/30/22 4.6 cm  New finding in 2022, follows with vascular, last visit with  on 7/28/2023.  Plan:   - Per vascular notes, \"Plan for echocardiogram in June 2024 then followed by office visit order placed Rest of the medical management per primary care physician.\"      Prostate Cancer - adenocarcinoma Haily 4+3=7  Comment: Improved  Prostate biopsy 1/11/2023,pathology positive for Haily 7 prostate cancer.   Stopped Trospium due to anticholinergic side effects, no " "change in urinary symptoms.  Followed by Dr. Niraj Larry [facility] - last visit 6/2/23.  Completed external beam radiation therapy w/o androgen depravation therapy with [facility] under care of Dr Fleming.   Some dysuria s/p treatment, UC negative for infection.  PSA is now 2.67.  Last urology visit 1/4/24.  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  Reported recurrent coughing up blood, but not recently.  Saw ENT in 2022, no explanation of symptoms.  Last esophagram May 2022 as above, presbyesophagus, proximal luminal narrowing.  Recent saw SLP 8/2022, no overt dysphagia.  Recurrent pneumonia, last 8/4/22.  CXR 10/1/22, 3/28/23 without concern for PNA.  Chest CT showed, \"Trace scattered atelectasis and/or fibrosis.  Elevated left hemidiaphragm. No effusions or pneumothorax.\"    CXR today negative today for acute findings  - SLP consult. .Seen by speech therapy and new orders diet changed to DD3 with moderately thick liquids after swallow evaluation completed today     Normocytic Anemia  Chronic, mild, baseline ~ 10.  Recent restart on iron supplement.  Hgb down to 9.0 this week, No symptoms of acute bleed.  Follow serial Hgb, next 4/15  Continue iron supplement (started 3/25/24)  ENT for recurrent nose bleed on 4/03/24  Continue PPI     Chronic constipation  Having regular BMs.  Bowel habits change some s/p prostate radiation.  Long hx of Levsin multiple times per day for abd pain, but stopped by urology when started trospium, no recurrent pain.   Continue polyethylene glycol (MIRALAX) 17 GM/Dose powder; " 1-2 capfuls in 8 oz of ORANGE JUICE PO one time each morning and 1-2 capfuls 8 oz of ORANGE JUICE PO one time daily PRN for constipation.     Impaired vision  Consider ophthalmology follow-up for field cut. Difficulty out to appointments without family assist, follow with onsite eye for now     Advanced care planning  Elects full code        - Consider ophthalmology follow-up for field cut. Difficulty out to appointments without family assist, follow with onsite eye for now    Apr 30, 2024  8:00 AM  (Arrive by 7:45 AM)  NEW NOSE BLEED with Saad Roberts MD  [facility] [place] ([facility] ) [phone]     Jun 28, 2024 11:30 AM  (Arrive by 11:25 AM)  Return Cardiology with Alex Ross MD  [facility] [place] ([facility] ) [phone]     Jul 02, 2024  9:30 AM  LAB with [facility]  [facility] ([facility] ) [phone]     Jul 09, 2024 10:00 AM  (Arrive by 9:45 AM)  Return Patient with Niraj Larry MD  [facility] ([facility] ) [phone]              Total time spent with patient visit at the skilled nursing facility was 45 min including patient visit and review of past records. Greater than 50% of total time spent with counseling and coordinating care due to discussion on functional goals, pain management, bowel movement management and discharge planning    Electronically signed by:  MARICHUY Mendoza CNP

## 2024-04-12 NOTE — PATIENT INSTRUCTIONS
Jamie Valdivia  1940  ORDERS:  - Fluid restriction 1800 mL/day  - ENT Follow-up 4/30/24, appointment in Psychiatric, please schedule ride  Electronically signed by:   MARICHUY Basilio CNP  04/11/24 10:18 PM

## 2024-04-13 ENCOUNTER — LAB REQUISITION (OUTPATIENT)
Dept: LAB | Facility: CLINIC | Age: 84
End: 2024-04-13

## 2024-04-13 DIAGNOSIS — N39.0 URINARY TRACT INFECTION, SITE NOT SPECIFIED: ICD-10-CM

## 2024-04-13 PROCEDURE — 87086 URINE CULTURE/COLONY COUNT: CPT | Performed by: NURSE PRACTITIONER

## 2024-04-13 PROCEDURE — 81003 URINALYSIS AUTO W/O SCOPE: CPT | Performed by: NURSE PRACTITIONER

## 2024-04-14 LAB
ALBUMIN UR-MCNC: NEGATIVE MG/DL
APPEARANCE UR: CLEAR
BILIRUB UR QL STRIP: NEGATIVE
COLOR UR AUTO: ABNORMAL
GLUCOSE UR STRIP-MCNC: NEGATIVE MG/DL
HGB UR QL STRIP: NEGATIVE
KETONES UR STRIP-MCNC: NEGATIVE MG/DL
LEUKOCYTE ESTERASE UR QL STRIP: NEGATIVE
NITRATE UR QL: NEGATIVE
PH UR STRIP: 7.5 [PH] (ref 5–7)
RBC URINE: <1 /HPF
SP GR UR STRIP: 1.01 (ref 1–1.03)
SQUAMOUS EPITHELIAL: <1 /HPF
UROBILINOGEN UR STRIP-MCNC: 2 MG/DL
WBC URINE: <1 /HPF

## 2024-04-15 ENCOUNTER — TRANSITIONAL CARE UNIT VISIT (OUTPATIENT)
Dept: GERIATRICS | Facility: CLINIC | Age: 84
End: 2024-04-15
Payer: COMMERCIAL

## 2024-04-15 ENCOUNTER — DOCUMENTATION ONLY (OUTPATIENT)
Dept: OTHER | Facility: CLINIC | Age: 84
End: 2024-04-15

## 2024-04-15 VITALS
HEIGHT: 68 IN | TEMPERATURE: 97.8 F | SYSTOLIC BLOOD PRESSURE: 136 MMHG | BODY MASS INDEX: 31.55 KG/M2 | HEART RATE: 87 BPM | RESPIRATION RATE: 18 BRPM | DIASTOLIC BLOOD PRESSURE: 71 MMHG | OXYGEN SATURATION: 92 % | WEIGHT: 208.2 LBS

## 2024-04-15 DIAGNOSIS — M62.81 MUSCLE WEAKNESS (GENERALIZED): Primary | ICD-10-CM

## 2024-04-15 DIAGNOSIS — E87.1 HYPONATREMIA: ICD-10-CM

## 2024-04-15 DIAGNOSIS — F10.21 ALCOHOL DEPENDENCE IN REMISSION (H): ICD-10-CM

## 2024-04-15 DIAGNOSIS — E87.6 HYPOKALEMIA: ICD-10-CM

## 2024-04-15 DIAGNOSIS — F41.1 GAD (GENERALIZED ANXIETY DISORDER): ICD-10-CM

## 2024-04-15 DIAGNOSIS — F03.90 MAJOR NEUROCOGNITIVE DISORDER (H): ICD-10-CM

## 2024-04-15 LAB — BACTERIA UR CULT: NORMAL

## 2024-04-15 PROCEDURE — 99309 SBSQ NF CARE MODERATE MDM 30: CPT | Performed by: NURSE PRACTITIONER

## 2024-04-15 NOTE — PROGRESS NOTES
[place] [facility]  [place] [facility] Record Number:  [id number]  Place of Service where encounter took place:  [facility] (TCU) [[id number]]  Chief Complaint   Patient presents with    Nursing Home Acute       HPI:    Jamie Valdivia  is a 83 year old (1940), who is being seen today for an episodic care visit.  HPI information obtained from: facility chart records, facility staff, and patient report. Today's concern is:    Patient Jamie Garg is a 83 yr old male admitted to [facility] due to weakness and failure to thrive    S/post recent hospitalization per epic note:   nosebleed 2/8/2024.  Controlled in ER by silver nitrate, Afrin nasal spray, lidocaine with epinephrine, and pressure.  Labs reassuring, no sign of coagulopathy.      3/12/2024 ER for fever and hypoxia. Weaned off oxygen after rDuoNeb. Chronic bilateral lower extremity swelling, stopped wearing Velcro compression wraps per patient preference. No history congestive heart failure or diuretics. Echo July 2023 ejection fraction of 60 to 65% along with moderate aortic stenosis, resident in permanent atrial fibrillation but not anticoagulated due to fall risk.   roponin negative x 2, ACS not suspected by ER team. Chest x-ray did not show any acute abnormality.  Viral respiratory panel negative. Labs significant hyponatremia and leukocytosis. Offered admission for diuresis but patient refused and discharged back assisted living.     Hospitalized [facility] from 3/19 to 3/20/2024 and observation with generalized weakness and headache.  Per hospital discharge: In ED patient afebrile and not tachycardic, normotensive to mildly hypertensive.  No hypoxia, CBC without acute abnormality.  BMP with mild hyponatremia with sodium of 128 (was 129 on 3/12/2024), and potassium 3.3.  Lactic acid within normal limits.  BNP elevated at 3400 but stable  from check on 3/12.  COVID and influenza negative.  Chest x-ray did not show any acute abnormality or significant fluid overload. CT head showed no acute abnormality.  Treated 20 mg of IV Lasix.  During hospitalization remained stable and able to take p.o.'s.      PMHx hyponatremia (due to oral intake and SIADH), Etoh abuse, dementia with behaviors (paranoia, hallucinations)  (neuropsych testing Dr. 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 Niraj Larry [facility]) (Completed external beam radiation therapy w/o androgen depravation therapy with [facility] under care of Dr Fleming), hx of COVID-19, hard of hearing   Lives in [facility] care and may need higher level of care  primary contact for Jamie Valdivia (Magi Espino) spouse Gisela        Muscle weakness (generalized)  Major neurocognitive disorder (H)  JOSÉ MANUEL (generalized anxiety disorder)  Hypokalemia  Alcohol dependence in remission (H)  Hyponatremia    Mood appropriate  He is upset not living with wife and had liked it when wife could come over to visit  Agreeable to therapies   Labs missed today  UA and chest xray negative for acute findings  Has no other acute complaints  States eating and regular elimination, denies pain concerns, shortness of breath or chest pain       Past Medical and Surgical History reviewed in Epic today.    MEDICATIONS:    Current Outpatient Medications   Medication Sig Dispense  Refill    acetaminophen (TYLENOL) 500 MG tablet Take 2 tablets (1,000 mg) by mouth 3 times daily as needed for mild pain 10 tablet 98    alum & mag hydroxide-simethicone (MAALOX MAX) 400-400-40 MG/5ML SUSP suspension Take 30 mLs by mouth every 6 hours as needed for indigestion 355 mL 3    FEROSUL 325 (65 Fe) MG tablet TAKE ONE TABLET BY MOUTH ON MONDAY, WEDNESDAY AND FRIDAY 36 tablet 97    gabapentin (NEURONTIN) 100 MG capsule Take 1 capsule (100 mg) by mouth daily. May also take 1 capsule (100 mg) 2 times daily as needed for neuropathic pain.      gabapentin (NEURONTIN) 300 MG capsule Take 1 capsule (300 mg) by mouth at bedtime 30 capsule 98    guaiFENesin (ROBITUSSIN) 20 mg/mL liquid Take 10 mLs by mouth every 4 hours as needed for cough 355 mL 98    loratadine (CLARITIN) 10 MG tablet Take 1 tablet (10 mg) by mouth At Bedtime 30 tablet 98    menthol-zinc oxide (CALMOSEPTINE) 0.44-20.6 % OINT ointment Apply topically 4 times daily To perineal area. Send to [facility]. 113 g 98    mirtazapine (REMERON) 15 MG tablet TAKE 1 TABLET BY MOUTH AT BEDTIME 90 tablet 3    nystatin (MYCOSTATIN) 625534 UNIT/GM external powder Apply topically 2 times daily as needed      omeprazole (PRILOSEC) 40 MG DR capsule TAKE 1 CAPSULE BY MOUTH TWICE DAILY 180 capsule 97    oxymetazoline (AFRIN) 0.05 % nasal spray Spray 2 sprays into both nostrils 2 times daily as needed (nose bleed)      polyethylene glycol (MIRALAX) 17 GM/Dose powder MIX 2 CAPFULS IN 8OZ OF ORANGE JUICE  IN THE MORNING;AND MAY MIX 2 CAPFULS ONCE DAILY AS NEEDED FOR CONSTIPATION. MIX IN FRONT OF PT. HOLD FOR LOOSE STOOL. OK TO GIVE 1 CAPFUL IF RESIDENT REFUSES 2 CAPFULS. 510 g 97    QUEtiapine (SEROQUEL) 25 MG tablet TAKE ONE-HALF TABLET (12.5MG) BY MOUTH TWICE DAILY;& MAY TAKE ONE-HALF TABLET (12.5MG) EVERY 12 HOURS AS NEEDED 180 tablet 97    sodium chloride (OCEAN) 0.65 % nasal spray Spray 1 spray in nostril 4 times daily as needed for congestion Okay to leave at bedside  "88 mL 98         REVIEW OF SYSTEMS:  4 point ROS including Respiratory, CV, GI and , other than that noted in the HPI,  is negative    Objective:  /71   Pulse 87   Temp 97.8  F (36.6  C)   Resp 18   Ht 1.727 m (5' 8\")   Wt 94.4 kg (208 lb 3.2 oz)   SpO2 92%   BMI 31.66 kg/m    Exam:  GENERAL APPEARANCE:  Alert, in no distress  RESP:  respiratory effort and palpation of chest normal, lungs clear to auscultation, no respiratory distress  CV:  Palpation and auscultation of heart done , regular rate and rhythm  ABDOMEN:  normal bowel sounds, soft, nontender  M/S:   sitting in WC  SKIN:  Inspection of skin and subcutaneous tissue+2 edema bilateral  NEURO:   Cranial nerves 2-12 are normal tested and grossly at patient's baseline  PSYCH:  oriented X 3    Labs:   Labs done in SNF are in [place] EPIC. Please refer to them using [employer] Everywhere.    Last Comprehensive Metabolic Panel:  Lab Results   Component Value Date     (L) 04/11/2024    POTASSIUM 3.2 (L) 04/11/2024    CHLORIDE 90 (L) 04/11/2024    CO2 33 (H) 04/11/2024    ANIONGAP 11 04/11/2024     (H) 04/11/2024    BUN 3.1 (L) 04/11/2024    CR 0.43 (L) 04/11/2024    GFRESTIMATED >90 04/11/2024    JOSUE 7.7 (L) 04/11/2024       Lab Results   Component Value Date    WBC 10.4 04/11/2024     Lab Results   Component Value Date    RBC 3.54 04/11/2024     Lab Results   Component Value Date    HGB 9.0 04/11/2024     Lab Results   Component Value Date    HCT 28.9 04/11/2024     No components found for: \"MCT\"  Lab Results   Component Value Date    MCV 82 04/11/2024     Lab Results   Component Value Date    MCH 25.4 04/11/2024     Lab Results   Component Value Date    MCHC 31.1 04/11/2024     Lab Results   Component Value Date    RDW 18.4 04/11/2024     Lab Results   Component Value Date     04/11/2024         ASSESSMENT/PLAN:     Muscle weakness (generalized)  Major neurocognitive disorder (H)  JOSÉ MANUEL (generalized anxiety " disorder)  Hypokalemia  Alcohol dependence in remission (H)  Hyponatremia    Mood appropriate, continue Seroquel  [place] psychologist and psychiatrist to follow  He is upset not living with wife and had liked it when wife could come over to visit  Agreeable to therapies, continue therapies at this time    involved in safe plan home  Labs missed today  Order BMP,CBC 4/17/24, labs missed today  UA and chest xray negative for acute findings      Electronically signed by:  MARICHUY Mendoza CNP

## 2024-04-16 ENCOUNTER — LAB REQUISITION (OUTPATIENT)
Dept: LAB | Facility: CLINIC | Age: 84
End: 2024-04-16

## 2024-04-16 DIAGNOSIS — E87.1 HYPO-OSMOLALITY AND HYPONATREMIA: ICD-10-CM

## 2024-04-17 LAB
ANION GAP SERPL CALCULATED.3IONS-SCNC: 11 MMOL/L (ref 7–15)
BUN SERPL-MCNC: 8.8 MG/DL (ref 8–23)
CALCIUM SERPL-MCNC: 8.2 MG/DL (ref 8.8–10.2)
CHLORIDE SERPL-SCNC: 97 MMOL/L (ref 98–107)
CREAT SERPL-MCNC: 0.5 MG/DL (ref 0.67–1.17)
DEPRECATED HCO3 PLAS-SCNC: 29 MMOL/L (ref 22–29)
EGFRCR SERPLBLD CKD-EPI 2021: >90 ML/MIN/1.73M2
ERYTHROCYTE [DISTWIDTH] IN BLOOD BY AUTOMATED COUNT: 19.5 % (ref 10–15)
GLUCOSE SERPL-MCNC: 69 MG/DL (ref 70–99)
HCT VFR BLD AUTO: 28.6 % (ref 40–53)
HGB BLD-MCNC: 8.5 G/DL (ref 13.3–17.7)
MCH RBC QN AUTO: 25.4 PG (ref 26.5–33)
MCHC RBC AUTO-ENTMCNC: 29.7 G/DL (ref 31.5–36.5)
MCV RBC AUTO: 86 FL (ref 78–100)
PLATELET # BLD AUTO: 442 10E3/UL (ref 150–450)
POTASSIUM SERPL-SCNC: 4.3 MMOL/L (ref 3.4–5.3)
RBC # BLD AUTO: 3.34 10E6/UL (ref 4.4–5.9)
SODIUM SERPL-SCNC: 137 MMOL/L (ref 135–145)
WBC # BLD AUTO: 8.3 10E3/UL (ref 4–11)

## 2024-04-17 PROCEDURE — 82374 ASSAY BLOOD CARBON DIOXIDE: CPT | Performed by: NURSE PRACTITIONER

## 2024-04-17 PROCEDURE — P9604 ONE-WAY ALLOW PRORATED TRIP: HCPCS | Performed by: NURSE PRACTITIONER

## 2024-04-17 PROCEDURE — 85014 HEMATOCRIT: CPT | Performed by: NURSE PRACTITIONER

## 2024-04-17 PROCEDURE — 36415 COLL VENOUS BLD VENIPUNCTURE: CPT | Performed by: NURSE PRACTITIONER

## 2024-04-18 ENCOUNTER — LAB REQUISITION (OUTPATIENT)
Dept: LAB | Facility: CLINIC | Age: 84
End: 2024-04-18

## 2024-04-18 VITALS
HEART RATE: 67 BPM | HEIGHT: 68 IN | WEIGHT: 208.2 LBS | SYSTOLIC BLOOD PRESSURE: 135 MMHG | TEMPERATURE: 97.6 F | DIASTOLIC BLOOD PRESSURE: 72 MMHG | BODY MASS INDEX: 31.55 KG/M2 | OXYGEN SATURATION: 94 % | RESPIRATION RATE: 18 BRPM

## 2024-04-18 VITALS
DIASTOLIC BLOOD PRESSURE: 72 MMHG | HEIGHT: 68 IN | HEART RATE: 67 BPM | TEMPERATURE: 97.6 F | WEIGHT: 208.2 LBS | OXYGEN SATURATION: 94 % | RESPIRATION RATE: 18 BRPM | SYSTOLIC BLOOD PRESSURE: 135 MMHG | BODY MASS INDEX: 31.55 KG/M2

## 2024-04-18 DIAGNOSIS — D64.9 ANEMIA, UNSPECIFIED: ICD-10-CM

## 2024-04-18 NOTE — PROGRESS NOTES
[facility] GERIATRICS    PRIMARY CARE PROVIDER AND CLINIC:  MARICHUY Basilio CNP, [address] [facility] [address]  Chief Complaint   Patient presents with    Hospital F/U      [place] Medical Record Number:  [id number]  Place of Service where encounter took place:  [facility]     Jamie Valdivia  is a 83 year old  (1940), admitted to the above facility from [facility] on 4/11/24..     Circumstances leading to admission to the nursing home were reviewed.    Patient is a past medical history of GERD with esophagitis, EtOH abuse, chronic anemia, osteoarthritis of the knees, dysphagia, chronic atrial fibrillation, intermittent hyponatremia, history of prostate cancer, hypertension, osteoporosis, dementia.  He was admitted to the skilled nursing facility from the assisted living for the management of failure to thrive with deconditioning.  Patient had been hospitalized at [facility] 3/19/2024 through 3/20/2024 on observation for the evaluation of generalized weakness and headache.  Evaluation there was remarkable for a sodium 128, normal lactic acid level, elevated BN P without evidence of congestive heart failure.  Chest x-ray was unremarkable CT of the head was unremarkable.  Patient was discharged back to assisted living with plans for therapy.  However, patient was admitted to the TCU because of progressive weakness with inability to be cared for in the assisted living setting.  Most recent medical evaluation remarkable for mild hypokalemia, stable mild hyponatremia, anemia with hemoglobin of 9.0.    Patient is a pleasant though somewhat vague historian who is not able to give me insights as to why he has been admitted to the nursing home from his assisted living facility.  He only states that he wants to go back and live with his wife.  He notes that headache this morning, denies nausea, vomiting, chest pain, shortness of breath.  He  states his appetite has been fair.  He denies difficulty swallowing, cough.    Per charting, patient has had episodes of agitation and paranoia.  He is receiving Seroquel and gabapentin.  Per charting, patient has been incontinent of urine and stool        CODE STATUS/ADVANCE DIRECTIVES DISCUSSION:  Prior  CPR/Full code   ALLERGIES:   Allergies   Allergen Reactions    Ativan [Lorazepam]       PAST MEDICAL HISTORY:   Past Medical History:   Diagnosis Date    Age-related osteoporosis without current pathological fracture 03/30/2022    Alcohol abuse 11/19/2017    Alcohol dependence with withdrawal (H)     Alcoholism (H)     Back pain     Backache 12/19/2018    CAD (coronary artery disease)     Chronic a-fib (H)     Chronic alcohol use 06/11/2015    Formatting of this note might be different from the original. 2/26/2019: serious fall at home with multiple vertebral fractures.  BAL 0.414% on admission.  Denies that he drinks much. 12/18/2018: hospitalized for fall due to alcohol intoxication.  BAL 0.34% on admission. 9/16/2018: hospitalized for injuries from fall due to alcohol intoxication.  BAL 0.34% on admission    Chronic atrial fibrillation (H) 07/15/2016    Formatting of this note might be different from the original. 2/2019: Multiple falls so started on aspirin only.  Then aspirin stopped due to GI bleed.    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 2/2/21    HTN (hypertension)     Impaired gait and mobility 03/14/2019    Mild cognitive impairment  08/12/2019    Formatting of this note might be different from the original. NON-AMNESTIC TYPE    Mild TBI (traumatic brain injury) (H) 03/14/2019    Mumps     Obesity (BMI 30-39.9) 09/03/2015    Osteoarthritis     Osteoarthritis of both knees 05/13/2015    Osteoporosis     Other idiopathic peripheral autonomic neuropathy 03/30/2022    Other insomnia 03/14/2019    Other seizures (H) 03/30/2022    Palpitations     Peripheral neuropathy     Pharyngeal dysphagia 05/13/2015    Formatting of this note might be different from the original. Likely secondary to GERD with esophagitis, on PPI and H2 blocker with notable improvement, EGD 10/5/15.    Physical deconditioning 03/14/2019    Recurrent major depressive disorder (H24) 03/30/2022    Rhabdomyolysis     Thrombocytopenia (H24)     Urination disorder     Weakness 04/27/2020      PAST SURGICAL HISTORY:   has a past surgical history that includes left hip replacement (2010); left shoulder replacement (2016); tonsillectomy; Spine surgery; orthopedic surgery; Esophagoscopy, gastroscopy, duodenoscopy (EGD), combined (N/A, 06/01/2022); colonoscopy; Biopsy prostate transrectal (N/A, 1/11/2023); and Transrectal ultrasonic sonogram (N/A, 1/11/2023).  FAMILY HISTORY: family history includes Osteoporosis in his mother; Prostate Cancer in his paternal grandfather.  SOCIAL HISTORY:   reports that he has never smoked. He has never used smokeless tobacco. He reports that he does not currently use alcohol. He reports that he does not use drugs.  Patient's living condition: lives in an assisted living facility    Current medications were reviewed by me today    Current Outpatient Medications   Medication Sig Dispense Refill    acetaminophen (TYLENOL) 500 MG tablet Take 2 tablets (1,000 mg) by mouth 3 times daily as needed for mild pain 10 tablet 98    alum & mag hydroxide-simethicone (MAALOX MAX) 400-400-40 MG/5ML SUSP suspension Take 30 mLs by mouth every 6 hours as needed for indigestion  355 mL 3    FEROSUL 325 (65 Fe) MG tablet TAKE ONE TABLET BY MOUTH ON MONDAY, WEDNESDAY AND FRIDAY 36 tablet 97    gabapentin (NEURONTIN) 100 MG capsule Take 1 capsule (100 mg) by mouth daily. May also take 1 capsule (100 mg) 2 times daily as needed for neuropathic pain.      gabapentin (NEURONTIN) 300 MG capsule Take 1 capsule (300 mg) by mouth at bedtime 30 capsule 98    guaiFENesin (ROBITUSSIN) 20 mg/mL liquid Take 10 mLs by mouth every 4 hours as needed for cough 355 mL 98    loratadine (CLARITIN) 10 MG tablet Take 1 tablet (10 mg) by mouth At Bedtime 30 tablet 98    menthol-zinc oxide (CALMOSEPTINE) 0.44-20.6 % OINT ointment Apply topically 4 times daily To perineal area. Send to [facility]. 113 g 98    mirtazapine (REMERON) 15 MG tablet TAKE 1 TABLET BY MOUTH AT BEDTIME 90 tablet 3    nystatin (MYCOSTATIN) 503154 UNIT/GM external powder Apply topically 2 times daily as needed      omeprazole (PRILOSEC) 40 MG DR capsule TAKE 1 CAPSULE BY MOUTH TWICE DAILY 180 capsule 97    oxymetazoline (AFRIN) 0.05 % nasal spray Spray 2 sprays into both nostrils 2 times daily as needed (nose bleed)      polyethylene glycol (MIRALAX) 17 GM/Dose powder MIX 2 CAPFULS IN 8OZ OF ORANGE JUICE  IN THE MORNING;AND MAY MIX 2 CAPFULS ONCE DAILY AS NEEDED FOR CONSTIPATION. MIX IN FRONT OF PT. HOLD FOR LOOSE STOOL. OK TO GIVE 1 CAPFUL IF RESIDENT REFUSES 2 CAPFULS. 510 g 97    QUEtiapine (SEROQUEL) 25 MG tablet TAKE ONE-HALF TABLET (12.5MG) BY MOUTH TWICE DAILY;& MAY TAKE ONE-HALF TABLET (12.5MG) EVERY 12 HOURS AS NEEDED 180 tablet 97    sodium chloride (OCEAN) 0.65 % nasal spray Spray 1 spray in nostril 4 times daily as needed for congestion Okay to leave at bedside 88 mL 98     No current facility-administered medications for this visit.       ROS:  10 point ROS of systems including Constitutional, Eyes, Respiratory, Cardiovascular, Gastroenterology, Genitourinary, Integumentary, Musculoskeletal, Psychiatric were all negative except for  "pertinent positives noted in my HPI.    Vitals:  /72   Pulse 67   Temp 97.6  F (36.4  C)   Resp 18   Ht 1.727 m (5' 8\")   Wt 94.4 kg (208 lb 3.2 oz)   SpO2 94%   BMI 31.66 kg/m    Exam:  Pleasant, obese, male, lying in bed.  He appears to be comfortable.  HEENT oral mucosa moist.  No drooling noted.  Hard of hearing.  Neck supple, no masses noted  Lungs clear  CV generally regular, heart rate 60s  Abdomen soft, protuberant  Lower extremities wrapped, 1+ ankle edema bilaterally.  Neuro: Alert, oriented to person place and general circumstances.  He is repetitive in his answers, primarily focused on going back to [place] to live in proximity to his wife.  Insight very limited in terms of the circumstances that led him to require nursing home placement.  Cranial nerves intact.  Upper extremity strength intact  Generalized though nonfocal lower extremity weakness as assessed with patient lying in bed  No tremor or rigidity.    Lab/Diagnostic data:  Most Recent 3 CBC's:  Recent Labs   Lab Test 04/19/24  0846 04/17/24  0915 04/11/24  0724 03/21/24  0651   WBC  --  8.3 10.4 9.9   HGB 8.9* 8.5* 9.0* 9.4*   MCV  --  86 82 83   PLT  --  442 468* 433     Most Recent 3 BMP's:  Recent Labs   Lab Test 04/17/24  0915 04/11/24  0724 03/21/24  0651    134* 133*   POTASSIUM 4.3 3.2* 3.7   CHLORIDE 97* 90* 90*   CO2 29 33* 30*   BUN 8.8 3.1* 5.7*   CR 0.50* 0.43* 0.46*   ANIONGAP 11 11 13   JOSUE 8.2* 7.7* 8.5*   GLC 69* 112* 85     Most Recent 2 LFT's:  Recent Labs   Lab Test 04/11/24  0724 03/21/24  0651   AST 13 14   ALT <5 <5   ALKPHOS 126 125   BILITOTAL 0.2 0.2     Most Recent 3 BNP's:  Recent Labs   Lab Test 03/19/24  1232 03/12/24  1852 10/01/22  1432   NTBNPI 3,403* 3,144* 10,767*     Most Recent TSH and T4:  Recent Labs   Lab Test 04/11/24  0724   TSH 1.69     Most Recent Urinalysis:  Recent Labs   Lab Test 04/13/24  2142 10/01/22  0951 08/05/22  0912   COLOR Light Yellow   < > Yellow   APPEARANCE Clear "   < > Clear   URINEGLC Negative   < > Negative   URINEBILI Negative   < > Negative   URINEKETONE Negative   < > Negative   SG 1.012   < > 1.020   UBLD Negative   < > Negative   URINEPH 7.5*   < > 5.5   PROTEIN Negative   < > Negative   UROBILINOGEN  --   --  0.2   NITRITE Negative   < > Negative   LEUKEST Negative   < > Trace*   RBCU <1   < >  --    WBCU <1   < >  --     < > = values in this interval not displayed.     Most Recent Anemia Panel:  Recent Labs   Lab Test 04/19/24  0846 04/17/24  0915 04/11/24  0724 03/21/24  0651   WBC  --  8.3   < > 9.9   HGB 8.9* 8.5*   < > 9.4*   HCT  --  28.6*   < > 30.1*   MCV  --  86   < > 83   PLT  --  442   < > 433   IRON 34*  --   --  15*   IRONSAT 18  --   --  8*   *  --   --  192*   CAMILA  --   --   --  96   B12  --   --   --  1,433*    < > = values in this interval not displayed.       ASSESSMENT/PLAN:      Generalized weakness, failure to thrive with inability to be cared for in the assisted living setting.  This has occurred in the setting of a hospital observation admission in March for fever and hypoxia, likely related to a viral illness.  Patient has multiple superimposed medical and psychiatric issues as outlined below which are likely contributing to his gradual decline.  Plan: Therapies including speech therapy.  Unclear if patient will be able to return to assisted living    Recent episode of fever and hypoxia, appears to have resolved most likely represent a viral illness.  Most recent chest x-ray March 2024 unremarkable for acute infiltrate or CHF  Plan: Monitor vitals    New urinary and at times bowel incontinence  Most likely related to generalized weakness, possible functional component  Possibly related to prostate cancer versus adverse effects of radiation  Doubt neurologic etiology  CT head recently unremarkable.  No complaints of back pain to suggest a spinal process  Urinalysis unremarkable  Plan: Monitor bowel and bladder function, assure no  evidence of urinary retention    Anemia, normochromic normocytic, with unremarkable iron studies, B12 level  Etiology unclear but may be on a nutritional basis\  Plan: Monitor hemoglobin, maximize nutritional status.  If persistent anemia, obtain peripheral smear for morphology  Iron has been started empirically    Chronic lower extremity edema  History of congestive heart failure with chronically elevated BNP but no evidence otherwise of CHF  Gabapentin may be contributing  Plan: Lower extremity compression treatment.  No clear indication for diuretics    Chronic A-fib/flutter  Vital signs have been stable  Not on rate controlling medications  Not on anticoagulation related to fall risk  Patient has declined consideration of Watchman device  Plan: Monitor vitals.  Cardiology follow-up    Cognitive impairment  History of ethanol dependence in remission  History of depression and anxiety  Patient remains on quetiapine, mirtazapine, gabapentin.  Unclear to what degree recent failure to thrive is related to cognitive impairment.  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  Patient  currently is on a dysphagia diet 3 with moderately thick liquids.      Tyler Weiss MD

## 2024-04-18 NOTE — PROGRESS NOTES
[facility]  [place] Medical Record Number:  [id number]  Place of Service where encounter took place:  [facility] ([facility]) [[id number]]  Chief Complaint   Patient presents with    Nursing Home Acute       HPI:    Jaime Valdivia  is a 83 year old (1940), who is being seen today for an episodic care visit.  HPI information obtained from: facility chart records, facility staff, and patient report. Today's concern is:    Patient Jamie Garg is a 83 yr old male admitted to [facility] due to weakness and failure to thrive    S/post recent hospitalization per epic note:   nosebleed 2/8/2024.  Controlled in ER by silver nitrate, Afrin nasal spray, lidocaine with epinephrine, and pressure.  Labs reassuring, no sign of coagulopathy.      3/12/2024 ER for fever and hypoxia. Weaned off oxygen after rDuoNeb. Chronic bilateral lower extremity swelling, stopped wearing Velcro compression wraps per patient preference. No history congestive heart failure or diuretics. Echo July 2023 ejection fraction of 60 to 65% along with moderate aortic stenosis, resident in permanent atrial fibrillation but not anticoagulated due to fall risk.   roponin negative x 2, ACS not suspected by ER team. Chest x-ray did not show any acute abnormality.  Viral respiratory panel negative. Labs significant hyponatremia and leukocytosis. Offered admission for diuresis but patient refused and discharged back assisted living.     Hospitalized [facility] from 3/19 to 3/20/2024 and observation with generalized weakness and headache.  Per hospital discharge: In ED patient afebrile and not tachycardic, normotensive to mildly hypertensive.  No hypoxia, CBC without acute abnormality.  BMP with mild hyponatremia with sodium of 128 (was 129 on 3/12/2024), and potassium 3.3.  Lactic acid within normal limits.  BNP elevated at 3400 but stable  from check on 3/12.  COVID and influenza negative.  Chest x-ray did not show any acute abnormality or significant fluid overload. CT head showed no acute abnormality.  Treated 20 mg of IV Lasix.  During hospitalization remained stable and able to take p.o.'s.      PMHx hyponatremia (due to oral intake and SIADH), Etoh abuse, dementia with behaviors (paranoia, hallucinations)  (neuropsych testing Dr. 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 Niraj Larry [facility]) (Completed external beam radiation therapy w/o androgen depravation therapy with [facility] under care of Dr Fleming), hx of COVID-19, hard of hearing   [facility] care and may need higher level of care  primary contact for Jamie Skip (Magi Espino) spouse Gisela      Muscle weakness (generalized)  Major neurocognitive disorder (H)  JOSÉ MANUEL (generalized anxiety disorder)  Anemia, unspecified type  Esophageal dysphagia  Other dysphagia    iron panel noted, stool occult x 3 (4/19/24) pending  Likely component iron deficiency and anemia of chronic disease, consider increase in iron (patient does not want adjustment in medications at this time)  Recent nose bleeds, none while at TCU. Has humidifier in assisted    Has agitation with fixation on not living with wife  Wife has visited while in TCU, however, he states it is longer walk for her than when he was in memory care  Consulted nursing and they are  looking into ways to assist with visits  Patient does not want adjustment in medications for mood at this time.    Called primary contact dgtr Coral De La Garza for update  Patient upset with his children due to feel they have taken away his independence and  him from wife. Patient has impaired cognition with memory impairment, paranoia, delusion thoughts and lacks insight into deficits.      Swallowing difficulty today, [place] speech therapy to visit & make recommendation,         Past Medical and Surgical History reviewed in Epic today.    MEDICATIONS:    Current Outpatient Medications   Medication Sig Dispense Refill    acetaminophen (TYLENOL) 500 MG tablet Take 2 tablets (1,000 mg) by mouth 3 times daily as needed for mild pain 10 tablet 98    alum & mag hydroxide-simethicone (MAALOX MAX) 400-400-40 MG/5ML SUSP suspension Take 30 mLs by mouth every 6 hours as needed for indigestion 355 mL 3    FEROSUL 325 (65 Fe) MG tablet TAKE ONE TABLET BY MOUTH ON MONDAY, WEDNESDAY AND FRIDAY 36 tablet 97    gabapentin (NEURONTIN) 100 MG capsule Take 1 capsule (100 mg) by mouth daily. May also take 1 capsule (100 mg) 2 times daily as needed for neuropathic pain.      gabapentin (NEURONTIN) 300 MG capsule Take 1 capsule (300 mg) by mouth at bedtime 30 capsule 98    guaiFENesin (ROBITUSSIN) 20 mg/mL liquid Take 10 mLs by mouth every 4 hours as needed for cough 355 mL 98    loratadine (CLARITIN) 10 MG tablet Take 1 tablet (10 mg) by mouth At Bedtime 30 tablet 98    menthol-zinc oxide (CALMOSEPTINE) 0.44-20.6 % OINT ointment Apply topically 4 times daily To perineal area. Send to [facility]. 113 g 98    mirtazapine (REMERON) 15 MG tablet TAKE 1 TABLET BY MOUTH AT BEDTIME 90 tablet 3    nystatin (MYCOSTATIN) 962100 UNIT/GM external powder Apply topically 2 times daily as needed      omeprazole (PRILOSEC) 40 MG DR capsule TAKE 1 CAPSULE BY MOUTH TWICE DAILY 180 capsule 97    oxymetazoline (AFRIN) 0.05 % nasal spray Spray 2  "sprays into both nostrils 2 times daily as needed (nose bleed)      polyethylene glycol (MIRALAX) 17 GM/Dose powder MIX 2 CAPFULS IN 8OZ OF ORANGE JUICE  IN THE MORNING;AND MAY MIX 2 CAPFULS ONCE DAILY AS NEEDED FOR CONSTIPATION. MIX IN FRONT OF PT. HOLD FOR LOOSE STOOL. OK TO GIVE 1 CAPFUL IF RESIDENT REFUSES 2 CAPFULS. 510 g 97    QUEtiapine (SEROQUEL) 25 MG tablet TAKE ONE-HALF TABLET (12.5MG) BY MOUTH TWICE DAILY;& MAY TAKE ONE-HALF TABLET (12.5MG) EVERY 12 HOURS AS NEEDED 180 tablet 97    sodium chloride (OCEAN) 0.65 % nasal spray Spray 1 spray in nostril 4 times daily as needed for congestion Okay to leave at bedside 88 mL 98         REVIEW OF SYSTEMS:  4 point ROS including Respiratory, CV, GI and , other than that noted in the HPI,  is negative    Objective:  /72   Pulse 67   Temp 97.6  F (36.4  C)   Resp 18   Ht 1.727 m (5' 8\")   Wt 94.4 kg (208 lb 3.2 oz)   SpO2 94%   BMI 31.66 kg/m    Exam:  GENERAL APPEARANCE:  Alert, in no distress  RESP:  respiratory effort and palpation of chest normal, lungs clear to auscultation , no respiratory distress  CV:  Palpation and auscultation of heart done , regular rate and rhythm, no murmur, rub, or gallop  ABDOMEN:  normal bowel sounds, soft, nontender, no hepatosplenomegaly or other masses  M/S:   sitting on bed  SKIN:  Inspection of skin and subcutaneous tissue+3 edema bilateral LE  NEURO:   Cranial nerves 2-12 are normal tested and grossly at patient's baseline  PSYCH:  memory impaired , anxious    Labs:   Labs done in SNF are in [place] EPIC. Please refer to them using EPIC/Care Everywhere.    Last Comprehensive Metabolic Panel:  Lab Results   Component Value Date     04/17/2024    POTASSIUM 4.3 04/17/2024    CHLORIDE 97 (L) 04/17/2024    CO2 29 04/17/2024    ANIONGAP 11 04/17/2024    GLC 69 (L) 04/17/2024    BUN 8.8 04/17/2024    CR 0.50 (L) 04/17/2024    GFRESTIMATED >90 04/17/2024    JOSUE 8.2 (L) 04/17/2024       Lab Results   Component " "Value Date    WBC 8.3 04/17/2024     Lab Results   Component Value Date    RBC 3.34 04/17/2024     Lab Results   Component Value Date    HGB 8.5 04/17/2024     Lab Results   Component Value Date    HCT 28.6 04/17/2024     No components found for: \"MCT\"  Lab Results   Component Value Date    MCV 86 04/17/2024     Lab Results   Component Value Date    MCH 25.4 04/17/2024     Lab Results   Component Value Date    MCHC 29.7 04/17/2024     Lab Results   Component Value Date    RDW 19.5 04/17/2024     Lab Results   Component Value Date     04/17/2024         ASSESSMENT/PLAN:     Muscle weakness (generalized)  Major neurocognitive disorder (H)  JOSÉ MANUEL (generalized anxiety disorder)  Anemia, unspecified type  Esophageal dysphagia  Other dysphagia    iron panel noted, stool occult x 3 (4/19/24) pending  Likely component iron deficiency and anemia of chronic disease, consider increase in iron (patient does not want adjustment in medications at this time)  Recent nose bleeds, none while at TCU. [facility] humidifier in [facility]  Has follow up ENT for recurrent nose bleeds  Monitor     Has agitation with fixation on not living with wife  Wife has visited while in TCU, however, he states it is longer walk for her than when he was in memory care  Consulted nursing and they are looking into ways to assist with visits  consider increase Seroquel due to agitation, paranoia.  Patient does not want adjustment in medications at this time.    Called primary contact dgtr Coral De La Garza for update  Patient upset with his children due to feel they have taken away his independence and  him from wife. Patient has impaired cognition with memory impairment, paranoia, delusion thoughts and lacks insight into deficits.  Consider further discussion with spouse regarding medications vs directly with children; spouse often then communicates with daughter.     Swallowing difficulty today, house speech therapy to visit & make recommendation, " recommend pureed diet until seen;   Discontinue fluid restrictions due to patient dislikes and NA within normal limits , encourage patient to eat in dining room  Order follow up [facility] for dysphagia and Barretts esophagus. 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

## 2024-04-19 ENCOUNTER — TRANSITIONAL CARE UNIT VISIT (OUTPATIENT)
Dept: GERIATRICS | Facility: CLINIC | Age: 84
End: 2024-04-19
Payer: COMMERCIAL

## 2024-04-19 DIAGNOSIS — R13.19 OTHER DYSPHAGIA: ICD-10-CM

## 2024-04-19 DIAGNOSIS — F03.90 MAJOR NEUROCOGNITIVE DISORDER (H): ICD-10-CM

## 2024-04-19 DIAGNOSIS — R13.19 ESOPHAGEAL DYSPHAGIA: ICD-10-CM

## 2024-04-19 DIAGNOSIS — F41.1 GAD (GENERALIZED ANXIETY DISORDER): ICD-10-CM

## 2024-04-19 DIAGNOSIS — M62.81 MUSCLE WEAKNESS (GENERALIZED): Primary | ICD-10-CM

## 2024-04-19 DIAGNOSIS — D64.9 ANEMIA, UNSPECIFIED TYPE: ICD-10-CM

## 2024-04-19 LAB
HGB BLD-MCNC: 8.9 G/DL (ref 13.3–17.7)
IRON BINDING CAPACITY (ROCHE): 194 UG/DL (ref 240–430)
IRON SATN MFR SERPL: 18 % (ref 15–46)
IRON SERPL-MCNC: 34 UG/DL (ref 61–157)

## 2024-04-19 PROCEDURE — 99310 SBSQ NF CARE HIGH MDM 45: CPT | Performed by: NURSE PRACTITIONER

## 2024-04-19 PROCEDURE — 85018 HEMOGLOBIN: CPT | Performed by: NURSE PRACTITIONER

## 2024-04-19 PROCEDURE — P9604 ONE-WAY ALLOW PRORATED TRIP: HCPCS | Performed by: NURSE PRACTITIONER

## 2024-04-19 PROCEDURE — 36415 COLL VENOUS BLD VENIPUNCTURE: CPT | Performed by: NURSE PRACTITIONER

## 2024-04-19 PROCEDURE — 83550 IRON BINDING TEST: CPT | Performed by: NURSE PRACTITIONER

## 2024-04-19 PROCEDURE — 82728 ASSAY OF FERRITIN: CPT | Performed by: NURSE PRACTITIONER

## 2024-04-20 ENCOUNTER — TRANSITIONAL CARE UNIT VISIT (OUTPATIENT)
Dept: GERIATRICS | Facility: CLINIC | Age: 84
End: 2024-04-20
Payer: COMMERCIAL

## 2024-04-20 DIAGNOSIS — R13.19 ESOPHAGEAL DYSPHAGIA: ICD-10-CM

## 2024-04-20 DIAGNOSIS — R13.19 OTHER DYSPHAGIA: ICD-10-CM

## 2024-04-20 DIAGNOSIS — M62.81 MUSCLE WEAKNESS (GENERALIZED): Primary | ICD-10-CM

## 2024-04-20 DIAGNOSIS — K22.70 BARRETT'S ESOPHAGUS WITHOUT DYSPLASIA: ICD-10-CM

## 2024-04-20 DIAGNOSIS — F03.90 MAJOR NEUROCOGNITIVE DISORDER (H): ICD-10-CM

## 2024-04-20 DIAGNOSIS — C61 PROSTATE CANCER (H): ICD-10-CM

## 2024-04-20 LAB — FERRITIN SERPL-MCNC: 138 NG/ML (ref 31–409)

## 2024-04-20 PROCEDURE — 99305 1ST NF CARE MODERATE MDM 35: CPT | Performed by: INTERNAL MEDICINE

## 2024-04-20 NOTE — LETTER
4/20/2024        RE: Jamie Valdivia  C/o Coral De La Garza  [address]  [facility] [place] [address]        [facility]IATRICS    PRIMARY CARE PROVIDER AND CLINIC:  MARICHUY Basilio [facility], [address] [facility] [place] [id number]  Chief Complaint   Patient presents with     Hospital F/U      [facility] Medical Record Number:  [id number]  Place of Service where encounter took place:  [facility] ([facility])     Jamie Valdivia  is a 83 year old  (1940), admitted to the above facility from [facility] on 4/11/24..     Circumstances leading to admission to the nursing home were reviewed.    Patient is a past medical history of GERD with esophagitis, EtOH abuse, chronic anemia, osteoarthritis of the knees, dysphagia, chronic atrial fibrillation, intermittent hyponatremia, history of prostate cancer, hypertension, osteoporosis, dementia.  He was admitted to the skilled nursing facility from the assisted living for the management of failure to thrive with deconditioning.  Patient had been hospitalized at [facility] 3/19/2024 through 3/20/2024 on observation for the evaluation of generalized weakness and headache.  Evaluation there was remarkable for a sodium 128, normal lactic acid level, elevated BN P without evidence of congestive heart failure.  Chest x-ray was unremarkable CT of the head was unremarkable.  Patient was discharged back to assisted living with plans for therapy.  However, patient was admitted to the TCU because of progressive weakness with inability to be cared for in the assisted living setting.  Most recent medical evaluation remarkable for mild hypokalemia, stable mild hyponatremia, anemia with hemoglobin of 9.0.    Patient is a pleasant though somewhat vague historian who is not able to give me insights as to why he has been admitted to the nursing home from his assisted living facility.  He only states that he wants to go back and live with his  wife.  He notes that headache this morning, denies nausea, vomiting, chest pain, shortness of breath.  He states his appetite has been fair.  He denies difficulty swallowing, cough.    Per charting, patient has had episodes of agitation and paranoia.  He is receiving Seroquel and gabapentin.  Per charting, patient has been incontinent of urine and stool        CODE STATUS/ADVANCE DIRECTIVES DISCUSSION:  Prior  CPR/Full code   ALLERGIES:   Allergies   Allergen Reactions     Ativan [Lorazepam]       PAST MEDICAL HISTORY:   Past Medical History:   Diagnosis Date     Age-related osteoporosis without current pathological fracture 03/30/2022     Alcohol abuse 11/19/2017     Alcohol dependence with withdrawal (H)      Alcoholism (H)      Back pain      Backache 12/19/2018     CAD (coronary artery disease)      Chronic a-fib (H)      Chronic alcohol use 06/11/2015    Formatting of this note might be different from the original. 2/26/2019: serious fall at home with multiple vertebral fractures.  BAL 0.414% on admission.  Denies that he drinks much. 12/18/2018: hospitalized for fall due to alcohol intoxication.  BAL 0.34% on admission. 9/16/2018: hospitalized for injuries from fall due to alcohol intoxication.  BAL 0.34% on admission     Chronic atrial fibrillation (H) 07/15/2016    Formatting of this note might be different from the original. 2/2019: Multiple falls so started on aspirin only.  Then aspirin stopped due to GI bleed.     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 2/2/21     HTN (hypertension)      Impaired gait and mobility 03/14/2019     Mild cognitive impairment 08/12/2019    Formatting of this note might be different from the original. NON-AMNESTIC TYPE     Mild TBI (traumatic brain injury) (H) 03/14/2019     Mumps      Obesity (BMI 30-39.9) 09/03/2015     Osteoarthritis      Osteoarthritis of both knees 05/13/2015     Osteoporosis      Other idiopathic peripheral autonomic neuropathy 03/30/2022     Other insomnia 03/14/2019     Other seizures (H) 03/30/2022     Palpitations      Peripheral neuropathy      Pharyngeal dysphagia 05/13/2015    Formatting of this note might be different from the original. Likely secondary to GERD with esophagitis, on PPI and H2 blocker with notable improvement, EGD 10/5/15.     Physical deconditioning 03/14/2019     Recurrent major depressive disorder (H24) 03/30/2022     Rhabdomyolysis      Thrombocytopenia (H24)      Urination disorder      Weakness 04/27/2020      PAST SURGICAL HISTORY:   has a past surgical history that includes left hip replacement (2010); left shoulder replacement (2016); tonsillectomy; Spine surgery; orthopedic surgery; Esophagoscopy, gastroscopy, duodenoscopy (EGD), combined (N/A, 06/01/2022); colonoscopy; Biopsy prostate transrectal (N/A, 1/11/2023); and Transrectal ultrasonic sonogram (N/A, 1/11/2023).  FAMILY HISTORY: family history includes Osteoporosis in his mother; Prostate Cancer in his paternal grandfather.  SOCIAL HISTORY:   reports that he has never smoked. He has never used smokeless tobacco. He reports that he does not currently use alcohol. He reports that he does not use drugs.  Patient's living condition: lives in an assisted living facility    Current medications were reviewed by me today    Current Outpatient Medications   Medication Sig Dispense Refill     acetaminophen (TYLENOL) 500 MG tablet Take 2 tablets (1,000 mg) by mouth 3 times daily as needed for mild pain 10  tablet 98     alum & mag hydroxide-simethicone (MAALOX MAX) 400-400-40 MG/5ML SUSP suspension Take 30 mLs by mouth every 6 hours as needed for indigestion 355 mL 3     FEROSUL 325 (65 Fe) MG tablet TAKE ONE TABLET BY MOUTH ON MONDAY, WEDNESDAY AND FRIDAY 36 tablet 97     gabapentin (NEURONTIN) 100 MG capsule Take 1 capsule (100 mg) by mouth daily. May also take 1 capsule (100 mg) 2 times daily as needed for neuropathic pain.       gabapentin (NEURONTIN) 300 MG capsule Take 1 capsule (300 mg) by mouth at bedtime 30 capsule 98     guaiFENesin (ROBITUSSIN) 20 mg/mL liquid Take 10 mLs by mouth every 4 hours as needed for cough 355 mL 98     loratadine (CLARITIN) 10 MG tablet Take 1 tablet (10 mg) by mouth At Bedtime 30 tablet 98     menthol-zinc oxide (CALMOSEPTINE) 0.44-20.6 % OINT ointment Apply topically 4 times daily To perineal area. [facility]. 113 g 98     mirtazapine (REMERON) 15 MG tablet TAKE 1 TABLET BY MOUTH AT BEDTIME 90 tablet 3     nystatin (MYCOSTATIN) 765345 UNIT/GM external powder Apply topically 2 times daily as needed       omeprazole (PRILOSEC) 40 MG DR capsule TAKE 1 CAPSULE BY MOUTH TWICE DAILY 180 capsule 97     oxymetazoline (AFRIN) 0.05 % nasal spray Spray 2 sprays into both nostrils 2 times daily as needed (nose bleed)       polyethylene glycol (MIRALAX) 17 GM/Dose powder MIX 2 CAPFULS IN 8OZ OF ORANGE JUICE  IN THE MORNING;AND MAY MIX 2 CAPFULS ONCE DAILY AS NEEDED FOR CONSTIPATION. MIX IN FRONT OF PT. HOLD FOR LOOSE STOOL. OK TO GIVE 1 CAPFUL IF RESIDENT REFUSES 2 CAPFULS. 510 g 97     QUEtiapine (SEROQUEL) 25 MG tablet TAKE ONE-HALF TABLET (12.5MG) BY MOUTH TWICE DAILY;& MAY TAKE ONE-HALF TABLET (12.5MG) EVERY 12 HOURS AS NEEDED 180 tablet 97     sodium chloride (OCEAN) 0.65 % nasal spray Spray 1 spray in nostril 4 times daily as needed for congestion Okay to leave at bedside 88 mL 98     No current facility-administered medications for this visit.       ROS:  10 point ROS of systems  "including Constitutional, Eyes, Respiratory, Cardiovascular, Gastroenterology, Genitourinary, Integumentary, Musculoskeletal, Psychiatric were all negative except for pertinent positives noted in my HPI.    Vitals:  /72   Pulse 67   Temp 97.6  F (36.4  C)   Resp 18   Ht 1.727 m (5' 8\")   Wt 94.4 kg (208 lb 3.2 oz)   SpO2 94%   BMI 31.66 kg/m    Exam:  Pleasant, obese, male, lying in bed.  He appears to be comfortable.  HEENT oral mucosa moist.  No drooling noted.  Hard of hearing.  Neck supple, no masses noted  Lungs clear  CV generally regular, heart rate 60s  Abdomen soft, protuberant  Lower extremities wrapped, 1+ ankle edema bilaterally.  Neuro: Alert, oriented to person place and general circumstances.  He is repetitive in his answers, primarily focused on going back to [place] to live in proximity to his wife.  Insight very limited in terms of the circumstances that led him to require nursing home placement.  Cranial nerves intact.  Upper extremity strength intact  Generalized though nonfocal lower extremity weakness as assessed with patient lying in bed  No tremor or rigidity.    Lab/Diagnostic data:  Most Recent 3 CBC's:  Recent Labs   Lab Test 04/19/24  0846 04/17/24  0915 04/11/24  0724 03/21/24  0651   WBC  --  8.3 10.4 9.9   HGB 8.9* 8.5* 9.0* 9.4*   MCV  --  86 82 83   PLT  --  442 468* 433     Most Recent 3 BMP's:  Recent Labs   Lab Test 04/17/24  0915 04/11/24  0724 03/21/24  0651    134* 133*   POTASSIUM 4.3 3.2* 3.7   CHLORIDE 97* 90* 90*   CO2 29 33* 30*   BUN 8.8 3.1* 5.7*   CR 0.50* 0.43* 0.46*   ANIONGAP 11 11 13   JOSUE 8.2* 7.7* 8.5*   GLC 69* 112* 85     Most Recent 2 LFT's:  Recent Labs   Lab Test 04/11/24  0724 03/21/24  0651   AST 13 14   ALT <5 <5   ALKPHOS 126 125   BILITOTAL 0.2 0.2     Most Recent 3 BNP's:  Recent Labs   Lab Test 03/19/24  1232 03/12/24  1852 10/01/22  1432   NTBNPI 3,403* 3,144* 10,767*     Most Recent TSH and T4:  Recent Labs   Lab Test " 04/11/24  0724   TSH 1.69     Most Recent Urinalysis:  Recent Labs   Lab Test 04/13/24  2142 10/01/22  0951 08/05/22  0912   COLOR Light Yellow   < > Yellow   APPEARANCE Clear   < > Clear   URINEGLC Negative   < > Negative   URINEBILI Negative   < > Negative   URINEKETONE Negative   < > Negative   SG 1.012   < > 1.020   UBLD Negative   < > Negative   URINEPH 7.5*   < > 5.5   PROTEIN Negative   < > Negative   UROBILINOGEN  --   --  0.2   NITRITE Negative   < > Negative   LEUKEST Negative   < > Trace*   RBCU <1   < >  --    WBCU <1   < >  --     < > = values in this interval not displayed.     Most Recent Anemia Panel:  Recent Labs   Lab Test 04/19/24  0846 04/17/24  0915 04/11/24  0724 03/21/24  0651   WBC  --  8.3   < > 9.9   HGB 8.9* 8.5*   < > 9.4*   HCT  --  28.6*   < > 30.1*   MCV  --  86   < > 83   PLT  --  442   < > 433   IRON 34*  --   --  15*   IRONSAT 18  --   --  8*   *  --   --  192*   CAMILA  --   --   --  96   B12  --   --   --  1,433*    < > = values in this interval not displayed.       ASSESSMENT/PLAN:      Generalized weakness, failure to thrive with inability to be cared for in the assisted living setting.  This has occurred in the setting of a hospital observation admission in March for fever and hypoxia, likely related to a viral illness.  Patient has multiple superimposed medical and psychiatric issues as outlined below which are likely contributing to his gradual decline.  Plan: Therapies including speech therapy.  Unclear if patient will be able to return to assisted living    Recent episode of fever and hypoxia, appears to have resolved most likely represent a viral illness.  Most recent chest x-ray March 2024 unremarkable for acute infiltrate or CHF  Plan: Monitor vitals    New urinary and at times bowel incontinence  Most likely related to generalized weakness, possible functional component  Possibly related to prostate cancer versus adverse effects of radiation  Doubt neurologic  etiology  CT head recently unremarkable.  No complaints of back pain to suggest a spinal process  Urinalysis unremarkable  Plan: Monitor bowel and bladder function, assure no evidence of urinary retention    Anemia, normochromic normocytic, with unremarkable iron studies, B12 level  Etiology unclear but may be on a nutritional basis\  Plan: Monitor hemoglobin, maximize nutritional status.  If persistent anemia, obtain peripheral smear for morphology  Iron has been started empirically    Chronic lower extremity edema  History of congestive heart failure with chronically elevated BNP but no evidence otherwise of CHF  Gabapentin may be contributing  Plan: Lower extremity compression treatment.  No clear indication for diuretics    Chronic A-fib/flutter  Vital signs have been stable  Not on rate controlling medications  Not on anticoagulation related to fall risk  Patient has declined consideration of Watchman device  Plan: Monitor vitals.  Cardiology follow-up    Cognitive impairment  History of ethanol dependence in remission  History of depression and anxiety  Patient remains on quetiapine, mirtazapine, gabapentin.  Unclear to what degree recent failure to thrive is related to cognitive impairment.  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  Patient currently is on a dysphagia diet 3 with moderately thick liquids.      Tyler Weiss MD      Sincerely,        Tyler Weiss MD

## 2024-04-22 ENCOUNTER — TRANSITIONAL CARE UNIT VISIT (OUTPATIENT)
Dept: GERIATRICS | Facility: CLINIC | Age: 84
End: 2024-04-22
Payer: COMMERCIAL

## 2024-04-22 VITALS
OXYGEN SATURATION: 96 % | BODY MASS INDEX: 31.55 KG/M2 | HEART RATE: 64 BPM | RESPIRATION RATE: 18 BRPM | WEIGHT: 208.2 LBS | SYSTOLIC BLOOD PRESSURE: 157 MMHG | HEIGHT: 68 IN | TEMPERATURE: 97.8 F | DIASTOLIC BLOOD PRESSURE: 75 MMHG

## 2024-04-22 DIAGNOSIS — R60.9 CHRONIC EDEMA: ICD-10-CM

## 2024-04-22 DIAGNOSIS — M62.81 MUSCLE WEAKNESS (GENERALIZED): Primary | ICD-10-CM

## 2024-04-22 DIAGNOSIS — F03.90 MAJOR NEUROCOGNITIVE DISORDER (H): ICD-10-CM

## 2024-04-22 DIAGNOSIS — R13.19 ESOPHAGEAL DYSPHAGIA: ICD-10-CM

## 2024-04-22 PROCEDURE — 99309 SBSQ NF CARE MODERATE MDM 30: CPT | Performed by: NURSE PRACTITIONER

## 2024-04-22 NOTE — PROGRESS NOTES
[facility]  [facility] Medical Record Number:  [id number]  Place of Service where encounter took place:  [facility] ([facility]) [[id number]]  Chief Complaint   Patient presents with    Nursing Home Acute       HPI:    Jamie Valdivia  is a 83 year old (1940), who is being seen today for an episodic care visit.  HPI information obtained from: facility chart records, facility staff, and patient report. Today's concern is:    Patient Jamie Garg is a 83 yr old male admitted to [facility] due to weakness and failure to thrive    S/post recent hospitalization per epic note:   nosebleed 2/8/2024.  Controlled in ER by silver nitrate, Afrin nasal spray, lidocaine with epinephrine, and pressure.  Labs reassuring, no sign of coagulopathy.      3/12/2024 ER for fever and hypoxia. Weaned off oxygen after rDuoNeb. Chronic bilateral lower extremity swelling, stopped wearing Velcro compression wraps per patient preference. No history congestive heart failure or diuretics. Echo July 2023 ejection fraction of 60 to 65% along with moderate aortic stenosis, resident in permanent atrial fibrillation but not anticoagulated due to fall risk.   roponin negative x 2, ACS not suspected by ER team. Chest x-ray did not show any acute abnormality.  Viral respiratory panel negative. Labs significant hyponatremia and leukocytosis. Offered admission for diuresis but patient refused and discharged back assisted living.     Hospitalized [facility] from 3/19 to 3/20/2024 and observation with generalized weakness and headache.  Per hospital discharge: In ED patient afebrile and not tachycardic, normotensive to mildly hypertensive.  No hypoxia, CBC without acute abnormality.  BMP with mild hyponatremia with sodium of 128 (was 129 on 3/12/2024), and potassium 3.3.  Lactic acid within normal limits.  BNP elevated at 3400 but stable  from check on 3/12.  COVID and influenza negative.  Chest x-ray did not show any acute abnormality or significant fluid overload. CT head showed no acute abnormality.  Treated 20 mg of IV Lasix.  During hospitalization remained stable and able to take p.o.'s.      PMHx hyponatremia (due to oral intake and SIADH), Etoh abuse, dementia with behaviors (paranoia, hallucinations)  (neuropsych testing Dr. 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 Niraj Larry [facility]) (Completed external beam radiation therapy w/o androgen depravation therapy with [facility] under care of Dr Fleming), hx of COVID-19, hard of hearing   [facility] and may need higher level of care  primary contact for Jamie Valdivia (Magi Espino) spouse Gisela        Muscle weakness (generalized)  Esophageal dysphagia  Major neurocognitive disorder (H)  Chronic edema    Mood appear appropriate  States wife Lisa visiting often and talking often on the phone    Eating meals and states not have choking episode over the weekend  Speech therapy order regular diet, Mechanical (DD2) texture, Honey consistency    Discussed lymphedema treatment today and patient reports he is agreeable to try lymphedema therapy    Did repeat himself often and forget what discussed 5 minutes prior        Past Medical and Surgical History reviewed in Epic today.    MEDICATIONS:    Current Outpatient Medications    Medication Sig Dispense Refill    acetaminophen (TYLENOL) 500 MG tablet Take 2 tablets (1,000 mg) by mouth 3 times daily as needed for mild pain 10 tablet 98    alum & mag hydroxide-simethicone (MAALOX MAX) 400-400-40 MG/5ML SUSP suspension Take 30 mLs by mouth every 6 hours as needed for indigestion 355 mL 3    FEROSUL 325 (65 Fe) MG tablet TAKE ONE TABLET BY MOUTH ON MONDAY, WEDNESDAY AND FRIDAY 36 tablet 97    gabapentin (NEURONTIN) 100 MG capsule Take 1 capsule (100 mg) by mouth daily. May also take 1 capsule (100 mg) 2 times daily as needed for neuropathic pain.      gabapentin (NEURONTIN) 300 MG capsule Take 1 capsule (300 mg) by mouth at bedtime 30 capsule 98    guaiFENesin (ROBITUSSIN) 20 mg/mL liquid Take 10 mLs by mouth every 4 hours as needed for cough 355 mL 98    loratadine (CLARITIN) 10 MG tablet Take 1 tablet (10 mg) by mouth At Bedtime 30 tablet 98    menthol-zinc oxide (CALMOSEPTINE) 0.44-20.6 % OINT ointment Apply topically 4 times daily To perineal area. Send to [facility]. 113 g 98    mirtazapine (REMERON) 15 MG tablet TAKE 1 TABLET BY MOUTH AT BEDTIME 90 tablet 3    nystatin (MYCOSTATIN) 866374 UNIT/GM external powder Apply topically 2 times daily as needed      omeprazole (PRILOSEC) 40 MG DR capsule TAKE 1 CAPSULE BY MOUTH TWICE DAILY 180 capsule 97    oxymetazoline (AFRIN) 0.05 % nasal spray Spray 2 sprays into both nostrils 2 times daily as needed (nose bleed)      polyethylene glycol (MIRALAX) 17 GM/Dose powder MIX 2 CAPFULS IN 8OZ OF ORANGE JUICE  IN THE MORNING;AND MAY MIX 2 CAPFULS ONCE DAILY AS NEEDED FOR CONSTIPATION. MIX IN FRONT OF PT. HOLD FOR LOOSE STOOL. OK TO GIVE 1 CAPFUL IF RESIDENT REFUSES 2 CAPFULS. 510 g 97    QUEtiapine (SEROQUEL) 25 MG tablet TAKE ONE-HALF TABLET (12.5MG) BY MOUTH TWICE DAILY;& MAY TAKE ONE-HALF TABLET (12.5MG) EVERY 12 HOURS AS NEEDED 180 tablet 97    sodium chloride (OCEAN) 0.65 % nasal spray Spray 1 spray in nostril 4 times daily as needed for congestion  "Okay to leave at bedside 88 mL 98         REVIEW OF SYSTEMS:  4 point ROS including Respiratory, CV, GI and , other than that noted in the HPI,  is negative    Objective:  BP (!) 157/75   Pulse 64   Temp 97.8  F (36.6  C)   Resp 18   Ht 1.727 m (5' 8\")   Wt 94.4 kg (208 lb 3.2 oz)   SpO2 96%   BMI 31.66 kg/m    Exam:  GENERAL APPEARANCE:  Alert, in no distress  RESP:  respiratory effort and palpation of chest normal, lungs clear to auscultation , no respiratory distress  CV:  Palpation and auscultation of heart done , regular rate and rhythm   ABDOMEN:  normal bowel sounds, soft, nontender  M/S:   sitting on bed  SKIN:  Inspection of skin and subcutaneous tissue+3 edema bilateral LE  NEURO:   Cranial nerves 2-12 are normal tested and grossly at patient's baseline  PSYCH:  memory impaired , affect and mood normal    Labs:   Labs done in SNF are in [facility] EPIC. Please refer to them using [facility] Everywhere.    Last Comprehensive Metabolic Panel:  Lab Results   Component Value Date     04/17/2024    POTASSIUM 4.3 04/17/2024    CHLORIDE 97 (L) 04/17/2024    CO2 29 04/17/2024    ANIONGAP 11 04/17/2024    GLC 69 (L) 04/17/2024    BUN 8.8 04/17/2024    CR 0.50 (L) 04/17/2024    GFRESTIMATED >90 04/17/2024    JOSUE 8.2 (L) 04/17/2024       Lab Results   Component Value Date    WBC 8.3 04/17/2024     Lab Results   Component Value Date    RBC 3.34 04/17/2024     Lab Results   Component Value Date    HGB 8.9 04/19/2024     Lab Results   Component Value Date    HCT 28.6 04/17/2024     No components found for: \"MCT\"  Lab Results   Component Value Date    MCV 86 04/17/2024     Lab Results   Component Value Date    MCH 25.4 04/17/2024     Lab Results   Component Value Date    MCHC 29.7 04/17/2024     Lab Results   Component Value Date    RDW 19.5 04/17/2024     Lab Results   Component Value Date     04/17/2024         ASSESSMENT/PLAN:     Muscle weakness (generalized)  Esophageal dysphagia  Major " neurocognitive disorder (H)  Chronic edema    Mood appear appropriate  repeat himself often and forget what discussed 5 minutes prior  States wife Lisa visiting often and talking often on the phone  Will continue mood medication at current dosages at this time. Consider increase in Seroquel if agitation noted    Eating meals and states not have choking episode over the weekend  Speech therapy order regular diet, Mechanical (DD2) texture, Honey consistency  Speech therapy evaluate and treat   Follow up gastrointestinal specialist     Discussed lymphedema treatment today and patient reports he is agreeable to try lymphedema therapy  Ordered lymphedema therapy    Participating and progressing in therapies  Continue           Electronically signed by:  MARICHUY Mendoza CNP

## 2024-04-23 ENCOUNTER — PATIENT OUTREACH (OUTPATIENT)
Dept: GERIATRIC MEDICINE | Facility: CLINIC | Age: 84
End: 2024-04-23
Payer: COMMERCIAL

## 2024-04-23 NOTE — PROGRESS NOTES
[facility] Care Coordination Contact  CC attended care conference for member on 4/23/24 at [facility] T[facility].   Present at care conference member, this care coordinator, NH  (Megan), NH RN (.), NH Therapy (.), and NH Dietician (.).    Nutrition: DD2 diet; eating well in the dining room. Previously on a fluid restriction. Weights are stable.    Therapy:  Ambulation: 60ft CGA/SBA  Transfers CGA  Bed mobility: Supervision.  Toileting: SBA  UB Dressing: IND    Will continue with strengthening needs 1-2 more weeks      CC Report:  care coordinator notified TCU team of 30 day waiver closure requirement, waiver will close on 5/11. Reviewed that if member discharges after the 30th day, will need to be re-screened for elderly waiver prior to discharge.      Gisela Hall RN  [facility]  [phone]

## 2024-04-25 ENCOUNTER — LAB REQUISITION (OUTPATIENT)
Dept: LAB | Facility: CLINIC | Age: 84
End: 2024-04-25

## 2024-04-25 DIAGNOSIS — E87.1 HYPO-OSMOLALITY AND HYPONATREMIA: ICD-10-CM

## 2024-04-26 LAB
ANION GAP SERPL CALCULATED.3IONS-SCNC: 11 MMOL/L (ref 7–15)
BUN SERPL-MCNC: 11.9 MG/DL (ref 8–23)
CALCIUM SERPL-MCNC: 8.4 MG/DL (ref 8.8–10.2)
CHLORIDE SERPL-SCNC: 100 MMOL/L (ref 98–107)
CREAT SERPL-MCNC: 0.46 MG/DL (ref 0.67–1.17)
DEPRECATED HCO3 PLAS-SCNC: 28 MMOL/L (ref 22–29)
EGFRCR SERPLBLD CKD-EPI 2021: >90 ML/MIN/1.73M2
ERYTHROCYTE [DISTWIDTH] IN BLOOD BY AUTOMATED COUNT: 20.2 % (ref 10–15)
GLUCOSE SERPL-MCNC: 89 MG/DL (ref 70–99)
HCT VFR BLD AUTO: 27.7 % (ref 40–53)
HGB BLD-MCNC: 8.3 G/DL (ref 13.3–17.7)
MCH RBC QN AUTO: 25.3 PG (ref 26.5–33)
MCHC RBC AUTO-ENTMCNC: 30 G/DL (ref 31.5–36.5)
MCV RBC AUTO: 85 FL (ref 78–100)
PLATELET # BLD AUTO: 350 10E3/UL (ref 150–450)
POTASSIUM SERPL-SCNC: 4.1 MMOL/L (ref 3.4–5.3)
RBC # BLD AUTO: 3.28 10E6/UL (ref 4.4–5.9)
SODIUM SERPL-SCNC: 139 MMOL/L (ref 135–145)
WBC # BLD AUTO: 8.4 10E3/UL (ref 4–11)

## 2024-04-26 PROCEDURE — P9604 ONE-WAY ALLOW PRORATED TRIP: HCPCS | Performed by: NURSE PRACTITIONER

## 2024-04-26 PROCEDURE — 80048 BASIC METABOLIC PNL TOTAL CA: CPT | Performed by: NURSE PRACTITIONER

## 2024-04-26 PROCEDURE — 36415 COLL VENOUS BLD VENIPUNCTURE: CPT | Performed by: NURSE PRACTITIONER

## 2024-04-26 PROCEDURE — 85027 COMPLETE CBC AUTOMATED: CPT | Performed by: NURSE PRACTITIONER

## 2024-04-29 ENCOUNTER — TRANSITIONAL CARE UNIT VISIT (OUTPATIENT)
Dept: GERIATRICS | Facility: CLINIC | Age: 84
End: 2024-04-29
Payer: COMMERCIAL

## 2024-04-29 VITALS
RESPIRATION RATE: 18 BRPM | DIASTOLIC BLOOD PRESSURE: 74 MMHG | OXYGEN SATURATION: 97 % | TEMPERATURE: 98.2 F | SYSTOLIC BLOOD PRESSURE: 116 MMHG | HEIGHT: 68 IN | BODY MASS INDEX: 31.55 KG/M2 | HEART RATE: 61 BPM | WEIGHT: 208.2 LBS

## 2024-04-29 DIAGNOSIS — F41.1 GAD (GENERALIZED ANXIETY DISORDER): ICD-10-CM

## 2024-04-29 DIAGNOSIS — R13.19 ESOPHAGEAL DYSPHAGIA: ICD-10-CM

## 2024-04-29 DIAGNOSIS — D50.8 OTHER IRON DEFICIENCY ANEMIA: ICD-10-CM

## 2024-04-29 DIAGNOSIS — R60.9 CHRONIC EDEMA: ICD-10-CM

## 2024-04-29 DIAGNOSIS — K22.70 BARRETT'S ESOPHAGUS WITHOUT DYSPLASIA: ICD-10-CM

## 2024-04-29 DIAGNOSIS — M62.81 MUSCLE WEAKNESS (GENERALIZED): Primary | ICD-10-CM

## 2024-04-29 DIAGNOSIS — Z53.9 DIAGNOSIS NOT YET DEFINED: Primary | ICD-10-CM

## 2024-04-29 PROCEDURE — 99309 SBSQ NF CARE MODERATE MDM 30: CPT | Performed by: NURSE PRACTITIONER

## 2024-04-29 PROCEDURE — G0180 MD CERTIFICATION HHA PATIENT: HCPCS | Performed by: NURSE PRACTITIONER

## 2024-04-29 RX ORDER — FERROUS SULFATE 325(65) MG
325 TABLET ORAL
COMMUNITY
End: 2024-05-29

## 2024-04-29 NOTE — PROGRESS NOTES
[facility]  [place] Medical Record Number:  [id number]  Place of Service where encounter took place:  [facility] ([facility]) [[id number]]  Chief Complaint   Patient presents with    Nursing Home Acute       HPI:    Jamie Valdivia  is a 83 year old (1940), who is being seen today for an episodic care visit.  HPI information obtained from: facility chart records, facility staff, and patient report. Today's concern is:    Patient Jamie Garg is a 83 yr old male admitted to [facility] for rehabilitation from [facility] due to weakness and failure to thrive    S/post recent hospitalization per epic note:   nosebleed 2/8/2024. Controlled in ER by silver nitrate, Afrin nasal spray, lidocaine with epinephrine, and pressure.  Labs reassuring, no sign of coagulopathy.      3/12/2024 ER for fever and hypoxia. Weaned off oxygen after rDuoNeb. Chronic bilateral lower extremity swelling, stopped wearing Velcro compression wraps per patient preference. No history congestive heart failure or diuretics. Echo July 2023 ejection fraction of 60 to 65% along with moderate aortic stenosis, resident in permanent atrial fibrillation but not anticoagulated due to fall risk.   roponin negative x 2, ACS not suspected by ER team. Chest x-ray did not show any acute abnormality.  Viral respiratory panel negative. Labs significant hyponatremia and leukocytosis. Offered admission for diuresis but patient refused and discharged back assisted living.     Hospitalized [facility] from 3/19 to 3/20/2024 and observation with generalized weakness and headache.  Per hospital discharge: In ED patient afebrile and not tachycardic, normotensive to mildly hypertensive.  No hypoxia, CBC without acute abnormality.  BMP with mild hyponatremia with sodium of 128 (was 129 on 3/12/2024), and potassium 3.3.  Lactic acid within normal limits.  BNP elevated at 3400 but stable  from check on 3/12.  COVID and influenza negative.  Chest x-ray did not show any acute abnormality or significant fluid overload. CT head showed no acute abnormality.  Treated 20 mg of IV Lasix.  During hospitalization remained stable and able to take p.o.'s.      PMHx hyponatremia (due to oral intake and SIADH), Etoh abuse, dementia with behaviors (paranoia, hallucinations)  (neuropsych testing Dr. 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 Niraj Larry [facility]) (Completed external beam radiation therapy w/o androgen depravation therapy with [facility] under care of Dr Fleming), hx of COVID-19, hard of hearing   Lives in [facility] memory care and may need higher level of care  primary contact for Jamie Valdivia (Magi Espino) spouse Gisela        Muscle weakness (generalized)  Esophageal dysphagia  Chronic edema  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  JOSÉ MANUEL (generalized anxiety disorder)  Other iron deficiency anemia    Mood appropriate, wife visiting from [facility]     States eating and drinking fine with no choking episodes recently. Encouraged patient to get up for meals and go to dining room    C/o right great to pain. Noted to have right great toe nail ingrown  Podiatrist referral due to ingrown toenail right great toe  No signs and symptoms infection, no redness on toe or foot  Encourage patient wear shoe when up with therapies   Patient  report tylenol effective for pain management   Encourage patient to participate in therapies.    Seen by lymphedema specialist and now has TG shapes he wears and no concerns regarding at this time    Hemoglobin   Date Value Ref Range Status   04/26/2024 8.3 (L) 13.3 - 17.7 g/dL Final   Patient tolerating daily iron, recently increased from every other day       Past Medical and Surgical History reviewed in Epic today.    MEDICATIONS:    Current Outpatient Medications   Medication Sig Dispense Refill    ferrous sulfate (FEROSUL) 325 (65 Fe) MG tablet Take 325 mg by mouth daily (with breakfast)      acetaminophen (TYLENOL) 500 MG tablet Take 2 tablets (1,000 mg) by mouth 3 times daily as needed for mild pain 10 tablet 98    alum & mag hydroxide-simethicone (MAALOX MAX) 400-400-40 MG/5ML SUSP suspension Take 30 mLs by mouth every 6 hours as needed for indigestion 355 mL 3    gabapentin (NEURONTIN) 100 MG capsule Take 1 capsule (100 mg) by mouth daily. May also take 1 capsule (100 mg) 2 times daily as needed for neuropathic pain.      gabapentin (NEURONTIN) 300 MG capsule Take 1 capsule (300 mg) by mouth at bedtime 30 capsule 98    guaiFENesin (ROBITUSSIN) 20 mg/mL liquid Take 10 mLs by mouth every 4 hours as needed for cough 355 mL 98    loratadine (CLARITIN) 10 MG tablet Take 1 tablet (10 mg) by mouth At Bedtime 30 tablet 98    menthol-zinc oxide (CALMOSEPTINE) 0.44-20.6 % OINT ointment Apply topically 4 times daily To perineal area. Send to [facility]. 113 g 98    mirtazapine (REMERON) 15 MG tablet TAKE 1 TABLET BY MOUTH AT BEDTIME 90 tablet 3    nystatin (MYCOSTATIN) 388781 UNIT/GM external powder Apply topically 2 times daily as needed      omeprazole (PRILOSEC) 40 MG DR capsule TAKE 1 CAPSULE BY MOUTH TWICE DAILY 180 capsule 97    oxymetazoline (AFRIN) 0.05 % nasal spray Spray 2 sprays into both nostrils 2 times daily as needed (nose bleed)      polyethylene glycol (MIRALAX) 17 GM/Dose powder MIX 2 CAPFULS IN 8OZ OF  "ORANGE JUICE  IN THE MORNING;AND MAY MIX 2 CAPFULS ONCE DAILY AS NEEDED FOR CONSTIPATION. MIX IN FRONT OF PT. HOLD FOR LOOSE STOOL. OK TO GIVE 1 CAPFUL IF RESIDENT REFUSES 2 CAPFULS. 510 g 97    QUEtiapine (SEROQUEL) 25 MG tablet TAKE ONE-HALF TABLET (12.5MG) BY MOUTH TWICE DAILY;& MAY TAKE ONE-HALF TABLET (12.5MG) EVERY 12 HOURS AS NEEDED 180 tablet 97    sodium chloride (OCEAN) 0.65 % nasal spray Spray 1 spray in nostril 4 times daily as needed for congestion Okay to leave at bedside 88 mL 98         REVIEW OF SYSTEMS:  4 point ROS including Respiratory, CV, GI and , other than that noted in the HPI,  is negative    Objective:  /74   Pulse 61   Temp 98.2  F (36.8  C)   Resp 18   Ht 1.727 m (5' 8\")   Wt 94.4 kg (208 lb 3.2 oz)   SpO2 97%   BMI 31.66 kg/m    Exam:  GENERAL APPEARANCE:  Alert, in no distress  RESP:  respiratory effort and palpation of chest normal, lungs clear to auscultation , no respiratory distress  CV:  Palpation and auscultation of heart done , regular rate and rhythm  ABDOMEN:  normal bowel sounds, soft, nontender  M/S:   sitting in bed  SKIN:  Inspection of skin and subcutaneous tissue baseline, +3 edema bilateral   NEURO:   Cranial nerves 2-12 are normal tested and grossly at patient's baseline  PSYCH:  oriented X 3    Labs:   Labs done in SNF are in [place] EPIC. Please refer to them using [facility] Everywhere.    Last Comprehensive Metabolic Panel:  Lab Results   Component Value Date     04/26/2024    POTASSIUM 4.1 04/26/2024    CHLORIDE 100 04/26/2024    CO2 28 04/26/2024    ANIONGAP 11 04/26/2024    GLC 89 04/26/2024    BUN 11.9 04/26/2024    CR 0.46 (L) 04/26/2024    GFRESTIMATED >90 04/26/2024    JOSUE 8.4 (L) 04/26/2024       .  Lab Results   Component Value Date    WBC 8.4 04/26/2024     Lab Results   Component Value Date    RBC 3.28 04/26/2024     Lab Results   Component Value Date    HGB 8.3 04/26/2024     Lab Results   Component Value Date    HCT 27.7 04/26/2024 " "    No components found for: \"MCT\"  Lab Results   Component Value Date    MCV 85 04/26/2024     Lab Results   Component Value Date    MCH 25.3 04/26/2024     Lab Results   Component Value Date    MCHC 30.0 04/26/2024     Lab Results   Component Value Date    RDW 20.2 04/26/2024     Lab Results   Component Value Date     04/26/2024         ASSESSMENT/PLAN:     Muscle weakness (generalized)  Esophageal dysphagia  Chronic edema  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  JOSÉ MANUEL (generalized anxiety disorder)  Other iron deficiency anemia    Mood appropriate, wife visiting from [facility] eating and drinking fine with no choking episodes recently. Encouraged patient to get up for meals and go to dining room    C/o right great to pain. Noted to have right great toe nail ingrown  Podiatrist referral due to ingrown toenail right great toe  No signs and symptoms infection, no redness on toe or foot  Encourage patient wear shoe when up with therapies   Patient report tylenol effective for pain management   Encourage patient to participate in therapies.    Seen by lymphedema specialist and now has TG shapes he wears and no concerns regarding at this time    Other patient request tall bed, updated charge nurse regarding  Hemoglobin   Date Value Ref Range Status   04/26/2024 8.3 (L) 13.3 - 17.7 g/dL Final   Patient tolerating daily iron, recently increased from every other day   Monitor       Electronically signed by:  MARICHUY Mendoza CNP           "

## 2024-04-30 ENCOUNTER — LAB REQUISITION (OUTPATIENT)
Dept: LAB | Facility: CLINIC | Age: 84
End: 2024-04-30

## 2024-04-30 ENCOUNTER — PRE VISIT (OUTPATIENT)
Dept: OTOLARYNGOLOGY | Facility: CLINIC | Age: 84
End: 2024-04-30

## 2024-04-30 ENCOUNTER — OFFICE VISIT (OUTPATIENT)
Dept: OTOLARYNGOLOGY | Facility: CLINIC | Age: 84
End: 2024-04-30
Attending: NURSE PRACTITIONER
Payer: COMMERCIAL

## 2024-04-30 ENCOUNTER — MEDICAL CORRESPONDENCE (OUTPATIENT)
Dept: HEALTH INFORMATION MANAGEMENT | Facility: CLINIC | Age: 84
End: 2024-04-30

## 2024-04-30 VITALS — HEIGHT: 68 IN | WEIGHT: 210 LBS | BODY MASS INDEX: 31.83 KG/M2

## 2024-04-30 DIAGNOSIS — D64.9 ANEMIA, UNSPECIFIED: ICD-10-CM

## 2024-04-30 DIAGNOSIS — R04.0 RECURRENT EPISTAXIS: ICD-10-CM

## 2024-04-30 DIAGNOSIS — H90.5 SENSORY HEARING LOSS, UNILATERAL: Primary | ICD-10-CM

## 2024-04-30 PROCEDURE — 99203 OFFICE O/P NEW LOW 30 MIN: CPT | Mod: 25 | Performed by: STUDENT IN AN ORGANIZED HEALTH CARE EDUCATION/TRAINING PROGRAM

## 2024-04-30 PROCEDURE — 31231 NASAL ENDOSCOPY DX: CPT | Performed by: STUDENT IN AN ORGANIZED HEALTH CARE EDUCATION/TRAINING PROGRAM

## 2024-04-30 ASSESSMENT — PAIN SCALES - GENERAL: PAINLEVEL: SEVERE PAIN (6)

## 2024-04-30 NOTE — LETTER
4/30/2024       RE: Jmaie Valdivia  [address]  [address]  [place] [address]     Dear Colleague,    Thank you for referring your patient, Jamie Valdivia, to the [facility] [place] at [facility]. Please see a copy of my visit note below.    Audio    Nose bleed plan  Possibly nidhi/michael cameron      [facility] - Rhinology  New Patient Visit      Encounter date:   April 30, 2024    Referring Provider:  MARICHUY Pepper CNP  [address] [place] [address]    Assessment, Decision Making, and Plan:  (H90.5) Sensory hearing loss, unilateral  (primary encounter diagnosis)  Comment:   --notes hearing loss  Plan: Adult Audiology  Referral  --audio  --pending results referral to otology    (R04.0) Recurrent epistaxis  Comment:   --suspect winter time nasal crusting  --resolved today, no prominent vessels, no posterior source of epistaxis  Plan: IMAGESTREAM RECORDING ORDER, Adult Audiology          Referral          Nose bleed prevention:   Use AYR gel 2-3 times a day daily.  Place a small amount in the front of the nose on both sides and gently pinch your nose to coat the inside of the nose.  Use saline spray throughout the day as needed if your nose feels dry  Run a humidifier at the bedside  If you use a CPAP machine please ensure that it is humidified.  In case of a nose bleed: spray afrin (generic: oxymetazoline) 4-5 times in both sides of the nose and hold firm pressure over the fleshy part of the nose for 15 minutes.  Thereafter, check to see if you are still bleeding.  If you are, repeat.  If you have done this twice and are continuing to bleed please seek medical attention either in the ED or send a message to our office through [employer] or via phone call.     Chief Complaint: epistaxis    History of Present Illness:   Jamie Valdivia is a 83 year old male who  presents for consultation regarding recurrent epistaxis.    Over the past few months noted intermittent L nose bleed  Difficult to stop, nurse at home had difficulty  Occ ASA  No other AC  No facial trauma or sinonasal surgery  No O2 or CPAP    Started humidifier and then had improvement of nose bleeds    Review of systems: A 14-point review of systems has been conducted and was negative for any notable symptoms, except as dictated in the history of present illness.     Medical History:  Past Medical History:   Diagnosis Date     Age-related osteoporosis without current pathological fracture 03/30/2022     Alcohol abuse 11/19/2017     Alcohol dependence with withdrawal (H)      Alcoholism (H)      Back pain      Backache 12/19/2018     CAD (coronary artery disease)      Chronic a-fib (H)      Chronic alcohol use 06/11/2015    Formatting of this note might be different from the original. 2/26/2019: serious fall at home with multiple vertebral fractures.  BAL 0.414% on admission.  Denies that he drinks much. 12/18/2018: hospitalized for fall due to alcohol intoxication.  BAL 0.34% on admission. 9/16/2018: hospitalized for injuries from fall due to alcohol intoxication.  BAL 0.34% on admission     Chronic atrial fibrillation (H) 07/15/2016    Formatting of this note might be different from the original. 2/2019: Multiple falls so started on aspirin only.  Then aspirin stopped due to GI bleed.     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 2/2/21      HTN (hypertension)      Impaired gait and mobility 03/14/2019     Mild cognitive impairment 08/12/2019    Formatting of this note might be different from the original. NON-AMNESTIC TYPE     Mild TBI (traumatic brain injury) (H) 03/14/2019     Mumps      Obesity (BMI 30-39.9) 09/03/2015     Osteoarthritis      Osteoarthritis of both knees 05/13/2015     Osteoporosis      Other idiopathic peripheral autonomic neuropathy 03/30/2022     Other insomnia 03/14/2019     Other seizures (H) 03/30/2022     Palpitations      Peripheral neuropathy      Pharyngeal dysphagia 05/13/2015    Formatting of this note might be different from the original. Likely secondary to GERD with esophagitis, on PPI and H2 blocker with notable improvement, EGD 10/5/15.     Physical deconditioning 03/14/2019     Recurrent major depressive disorder (H24) 03/30/2022     Rhabdomyolysis      Thrombocytopenia (H24)      Urination disorder      Weakness 04/27/2020        Surgical History:   Past Surgical History:   Procedure Laterality Date     BIOPSY PROSTATE TRANSRECTAL N/A 1/11/2023    Procedure: PROSTATE BIOPSY, RECTAL APPROACH;  Surgeon: Niraj Larry MD;  Location:  OR     COLONOSCOPY       ESOPHAGOSCOPY, GASTROSCOPY, DUODENOSCOPY (EGD), COMBINED N/A 06/01/2022    Procedure: ESOPHAGOGASTRODUODENOSCOPY (EGD) mngi with biopsies using jumbo cold biopsy forceps;  Surgeon: David Springer MD;  Location:  GI     left hip replacement  2010     left shoulder replacement  2016     ORTHOPEDIC SURGERY       SPINE SURGERY      done in [place]     TONSILLECTOMY       TRANSRECTAL ULTRASONIC SONOGRAM N/A 1/11/2023    Procedure: TRANSRECTAL ULTRASOUND;  Surgeon: Niraj Larry MD;  Location:  OR        Family History:  Family History   Problem Relation Age of Onset     Osteoporosis Mother      Prostate Cancer Paternal Grandfather      Colon Cancer No family hx of         Social History:   Social History     Socioeconomic History     Marital  status:      Spouse name: None     Number of children: None     Years of education: None     Highest education level: None   Tobacco Use     Smoking status: Never     Smokeless tobacco: Never   Substance and Sexual Activity     Alcohol use: Not Currently     Comment: not since December 2021     Drug use: Never     Social Determinants of Health     Financial Resource Strain: Low Risk  (12/19/2023)    Financial Resource Strain      Within the past 12 months, have you or your family members you live with been unable to get utilities (heat, electricity) when it was really needed?: No   Food Insecurity: Low Risk  (12/19/2023)    Food Insecurity      Within the past 12 months, did you worry that your food would run out before you got money to buy more?: No      Within the past 12 months, did the food you bought just not last and you didn t have money to get more?: No   Transportation Needs: Low Risk  (12/19/2023)    Transportation Needs      Within the past 12 months, has lack of transportation kept you from medical appointments, getting your medicines, non-medical meetings or appointments, work, or from getting things that you need?: No   Physical Activity: Inactive (12/19/2023)    Exercise Vital Sign      Days of Exercise per Week: 0 days      Minutes of Exercise per Session: 0 min   Stress: No Stress Concern Present (12/19/2023)    [employer] [place] [employer] - Occupational Stress Questionnaire      Feeling of Stress : Only a little   Social Connections: Moderately Integrated (12/19/2023)    Social Connection and Isolation Panel [NHANES]      Frequency of Communication with Friends and Family: More than three times a week      Frequency of Social Gatherings with Friends and Family: Three times a week      Attends Gnosticism Services: More than 4 times per year      Active Member of Clubs or Organizations: No      Attends Club or Organization Meetings: Never      Marital Status:   "  Interpersonal Safety: Low Risk  (12/19/2023)    Interpersonal Safety      Do you feel physically and emotionally safe where you currently live?: Yes      Within the past 12 months, have you been hit, slapped, kicked or otherwise physically hurt by someone?: No      Within the past 12 months, have you been humiliated or emotionally abused in other ways by your partner or ex-partner?: No   Housing Stability: Low Risk  (12/19/2023)    Housing Stability      Do you have housing? : Yes      Are you worried about losing your housing?: No        Physical Exam:  Vital signs: Ht 1.727 m (5' 8\")   Wt 95.3 kg (210 lb)   BMI 31.93 kg/m     General Appearance: No acute distress, appropriate demeanor, conversant  Eyes: moist conjunctivae; EOMI; pupils symmetric; visual acuity grossly intact; no proptosis  Head: normocephalic; overall symmetric appearance without deformity  Face: overall symmetric without deformity  Ears: Normal appearance of external ear; external meatus normal in appearance; TMs intact without perforation bilaterally;   Nose: No external deformity; septum (Slight inferior DNS to the right) ; inferior turbinates (non hypertrophic)   Oral Cavity/oropharynx: Normal appearance of mucosa; tongue midline; no mass or lesions; oropharynx without obvious mucosal abnormality  Neck: no palpable lymphadenopathy; thyroid without palpable nodules  Lungs: symmetric chest rise; no wheezing  CV: Good distal perfusion; normal hear rate  Extremities: No deformity  Neurologic Exam: Cranial nerves II-XII are grossly intact; no focal deficit    Procedure Note  Procedure performed: Rigid nasal endoscopy  Indication: To evaluate for sinonasal pathology not visualized on routine anterior rhinoscopy  Anesthesia: 4% topical lidocaine with 0.05% oxymetazoline  Description of procedure: A 30 degree, 3 mm rigid endoscope was inserted into bilateral nasal cavities and the nasal valves, nasal cavity, middle meatus, sphenoethmoid recess, " and nasopharynx were thoroughly evaluated for evidence of obstruction, edema, purulence, polyps and/or mass/lesion.     Findings:    DNS to the right  MM SER NP clear  No significant septal crusting or prominent KP vessels    The patient tolerated the procedure well without complication.     Saad Roberts MD    [facility]  Rhinology  Endoscopic Skull Base Surgery  [facility]  Department of Otolaryngology - Head & Neck Surgery          Again, thank you for allowing me to participate in the care of your patient.      Sincerely,    Saad Roberts MD

## 2024-04-30 NOTE — PATIENT INSTRUCTIONS
You were seen in the ENT Clinic today by Dr. Roberts. If you have any questions or concerns after your appointment, please contact us (see below)    The following has been recommended for you based upon your appointment today:  Nose bleed prevention (patient can perform on his own; most important during the winter):   Use AYR gel 2-3 times a day daily.  Place a small amount in the front of the nose on both sides and gently pinch your nose to coat the inside of the nose.  Use saline spray throughout the day as needed if your nose feels dry  Run a humidifier at the bedside  In case of a nose bleed: spray afrin (generic: oxymetazoline) 4-5 times in both sides of the nose and hold firm pressure over the fleshy part of the nose for 15 minutes.  Thereafter, check to see if you are still bleeding.  If you are, repeat.  If you have done this twice and are continuing to bleed please seek medical attention either in the ED or send a message to our office through [employer] or via phone call.   Hearing test- will call with results    Please return to clinic to be determined    How to Contact Us:  Send a [employer] message to your provider. Our team will respond to you via [employer]. Occasionally, we will need to call you to get further information.  For urgent matters (Monday-Friday), call the ENT Clinic: [phone] and speak with a call center team member - they will route your call appropriately.   If you'd like to speak directly with a nurse, please find our contact information below. We do our best to check voicemail frequently throughout the day, and will work to call you back within 1-2 days. For urgent matters, please use the general clinic phone numbers listed above.    Ena RITCHIE RN, BSN   RN Care Coordinator, ENT Clinic  [facility]  Direct: [phone]  Sarina PAPPAS LPN  Direct: [phone]

## 2024-04-30 NOTE — PROGRESS NOTES
Audio    Nose bleed plan  Possibly jana cameron      [facility] - Rhinology  New Patient Visit      Encounter date:   April 30, 2024    Referring Provider:  MARICHUY Pepper CNP  [address] [place] [address]    Assessment, Decision Making, and Plan:  (H90.5) Sensory hearing loss, unilateral  (primary encounter diagnosis)  Comment:   --notes hearing loss  Plan: Adult Audiology  Referral  --audio  --pending results referral to otology    (R04.0) Recurrent epistaxis  Comment:   --suspect winter time nasal crusting  --resolved today, no prominent vessels, no posterior source of epistaxis  Plan: IMAGESTREAM RECORDING ORDER, Adult Audiology          Referral          Nose bleed prevention:   Use AYR gel 2-3 times a day daily.  Place a small amount in the front of the nose on both sides and gently pinch your nose to coat the inside of the nose.  Use saline spray throughout the day as needed if your nose feels dry  Run a humidifier at the bedside  If you use a CPAP machine please ensure that it is humidified.  In case of a nose bleed: spray afrin (generic: oxymetazoline) 4-5 times in both sides of the nose and hold firm pressure over the fleshy part of the nose for 15 minutes.  Thereafter, check to see if you are still bleeding.  If you are, repeat.  If you have done this twice and are continuing to bleed please seek medical attention either in the ED or send a message to our office through [employer] or via phone call.     Chief Complaint: epistaxis    History of Present Illness:   Jamie Valdivia is a 83 year old male who presents for consultation regarding recurrent epistaxis.    Over the past few months noted intermittent L nose bleed  Difficult to stop, nurse at home had difficulty  Occ ASA  No other AC  No facial trauma or sinonasal surgery  No O2 or CPAP    Started humidifier and then had improvement of nose bleeds    Review of systems: A 14-point review of  systems has been conducted and was negative for any notable symptoms, except as dictated in the history of present illness.     Medical History:  Past Medical History:   Diagnosis Date    Age-related osteoporosis without current pathological fracture 03/30/2022    Alcohol abuse 11/19/2017    Alcohol dependence with withdrawal (H)     Alcoholism (H)     Back pain     Backache 12/19/2018    CAD (coronary artery disease)     Chronic a-fib (H)     Chronic alcohol use 06/11/2015    Formatting of this note might be different from the original. 2/26/2019: serious fall at home with multiple vertebral fractures.  BAL 0.414% on admission.  Denies that he drinks much. 12/18/2018: hospitalized for fall due to alcohol intoxication.  BAL 0.34% on admission. 9/16/2018: hospitalized for injuries from fall due to alcohol intoxication.  BAL 0.34% on admission    Chronic atrial fibrillation (H) 07/15/2016    Formatting of this note might be different from the original. 2/2019: Multiple falls so started on aspirin only.  Then aspirin stopped due to GI bleed.    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 2/2/21    HTN (hypertension)     Impaired gait and mobility 03/14/2019    Mild cognitive impairment 08/12/2019    Formatting of this note might be different from the original. NON-AMNESTIC TYPE    Mild TBI (traumatic brain injury) (H) 03/14/2019    Mumps     Obesity (BMI 30-39.9) 09/03/2015    Osteoarthritis     Osteoarthritis of both knees 05/13/2015    Osteoporosis     Other  idiopathic peripheral autonomic neuropathy 03/30/2022    Other insomnia 03/14/2019    Other seizures (H) 03/30/2022    Palpitations     Peripheral neuropathy     Pharyngeal dysphagia 05/13/2015    Formatting of this note might be different from the original. Likely secondary to GERD with esophagitis, on PPI and H2 blocker with notable improvement, EGD 10/5/15.    Physical deconditioning 03/14/2019    Recurrent major depressive disorder (H24) 03/30/2022    Rhabdomyolysis     Thrombocytopenia (H24)     Urination disorder     Weakness 04/27/2020        Surgical History:   Past Surgical History:   Procedure Laterality Date    BIOPSY PROSTATE TRANSRECTAL N/A 1/11/2023    Procedure: PROSTATE BIOPSY, RECTAL APPROACH;  Surgeon: Niraj Larry MD;  Location:  OR    COLONOSCOPY      ESOPHAGOSCOPY, GASTROSCOPY, DUODENOSCOPY (EGD), COMBINED N/A 06/01/2022    Procedure: ESOPHAGOGASTRODUODENOSCOPY (EGD) mngi with biopsies using jumbo cold biopsy forceps;  Surgeon: David Springer MD;  Location:  GI    left hip replacement  2010    left shoulder replacement  2016    ORTHOPEDIC SURGERY      SPINE SURGERY      done in [place]    TONSILLECTOMY      TRANSRECTAL ULTRASONIC SONOGRAM N/A 1/11/2023    Procedure: TRANSRECTAL ULTRASOUND;  Surgeon: Niraj Larry MD;  Location:  OR        Family History:  Family History   Problem Relation Age of Onset    Osteoporosis Mother     Prostate Cancer Paternal Grandfather     Colon Cancer No family hx of         Social History:   Social History     Socioeconomic History    Marital status:      Spouse name: None    Number of children: None    Years of education: None    Highest education level: None   Tobacco Use    Smoking status: Never    Smokeless tobacco: Never   Substance and Sexual Activity    Alcohol use: Not Currently     Comment: not since December 2021    Drug use: Never     Social Determinants of Health     Financial Resource Strain: Low Risk  (12/19/2023)     Financial Resource Strain     Within the past 12 months, have you or your family members you live with been unable to get utilities (heat, electricity) when it was really needed?: No   Food Insecurity: Low Risk  (12/19/2023)    Food Insecurity     Within the past 12 months, did you worry that your food would run out before you got money to buy more?: No     Within the past 12 months, did the food you bought just not last and you didn t have money to get more?: No   Transportation Needs: Low Risk  (12/19/2023)    Transportation Needs     Within the past 12 months, has lack of transportation kept you from medical appointments, getting your medicines, non-medical meetings or appointments, work, or from getting things that you need?: No   Physical Activity: Inactive (12/19/2023)    Exercise Vital Sign     Days of Exercise per Week: 0 days     Minutes of Exercise per Session: 0 min   Stress: No Stress Concern Present (12/19/2023)    [employer] [place] [employer] - Occupational Stress Questionnaire     Feeling of Stress : Only a little   Social Connections: Moderately Integrated (12/19/2023)    Social Connection and Isolation Panel [NHANES]     Frequency of Communication with Friends and Family: More than three times a week     Frequency of Social Gatherings with Friends and Family: Three times a week     Attends Yazidi Services: More than 4 times per year     Active Member of Clubs or Organizations: No     Attends Club or Organization Meetings: Never     Marital Status:    Interpersonal Safety: Low Risk  (12/19/2023)    Interpersonal Safety     Do you feel physically and emotionally safe where you currently live?: Yes     Within the past 12 months, have you been hit, slapped, kicked or otherwise physically hurt by someone?: No     Within the past 12 months, have you been humiliated or emotionally abused in other ways by your partner or ex-partner?: No   Housing Stability: Low Risk  (12/19/2023)     "Housing Stability     Do you have housing? : Yes     Are you worried about losing your housing?: No        Physical Exam:  Vital signs: Ht 1.727 m (5' 8\")   Wt 95.3 kg (210 lb)   BMI 31.93 kg/m     General Appearance: No acute distress, appropriate demeanor, conversant  Eyes: moist conjunctivae; EOMI; pupils symmetric; visual acuity grossly intact; no proptosis  Head: normocephalic; overall symmetric appearance without deformity  Face: overall symmetric without deformity  Ears: Normal appearance of external ear; external meatus normal in appearance; TMs intact without perforation bilaterally;   Nose: No external deformity; septum (Slight inferior DNS to the right) ; inferior turbinates (non hypertrophic)   Oral Cavity/oropharynx: Normal appearance of mucosa; tongue midline; no mass or lesions; oropharynx without obvious mucosal abnormality  Neck: no palpable lymphadenopathy; thyroid without palpable nodules  Lungs: symmetric chest rise; no wheezing  CV: Good distal perfusion; normal hear rate  Extremities: No deformity  Neurologic Exam: Cranial nerves II-XII are grossly intact; no focal deficit    Procedure Note  Procedure performed: Rigid nasal endoscopy  Indication: To evaluate for sinonasal pathology not visualized on routine anterior rhinoscopy  Anesthesia: 4% topical lidocaine with 0.05% oxymetazoline  Description of procedure: A 30 degree, 3 mm rigid endoscope was inserted into bilateral nasal cavities and the nasal valves, nasal cavity, middle meatus, sphenoethmoid recess, and nasopharynx were thoroughly evaluated for evidence of obstruction, edema, purulence, polyps and/or mass/lesion.     Findings:    DNS to the right  MM SER NP clear  No significant septal crusting or prominent KP vessels    The patient tolerated the procedure well without complication.     Saad Roberts MD    [facility]  Rhinology  Endoscopic Skull Base Surgery  [facility]  Department of " Otolaryngology - Head & Neck Surgery

## 2024-05-01 LAB — HGB BLD-MCNC: 8.3 G/DL (ref 13.3–17.7)

## 2024-05-01 PROCEDURE — 85018 HEMOGLOBIN: CPT | Performed by: NURSE PRACTITIONER

## 2024-05-01 PROCEDURE — P9604 ONE-WAY ALLOW PRORATED TRIP: HCPCS | Performed by: NURSE PRACTITIONER

## 2024-05-01 PROCEDURE — 36415 COLL VENOUS BLD VENIPUNCTURE: CPT | Performed by: NURSE PRACTITIONER

## 2024-05-02 VITALS
OXYGEN SATURATION: 95 % | RESPIRATION RATE: 18 BRPM | HEART RATE: 67 BPM | DIASTOLIC BLOOD PRESSURE: 70 MMHG | WEIGHT: 208.2 LBS | BODY MASS INDEX: 31.55 KG/M2 | TEMPERATURE: 97.9 F | HEIGHT: 68 IN | SYSTOLIC BLOOD PRESSURE: 145 MMHG

## 2024-05-02 NOTE — PROGRESS NOTES
[facility] DISCHARGE SUMMARY  PATIENT'S NAME: Jamie Valdivia  YOB: 1940  MEDICAL RECORD NUMBER:  [id number]  Place of Service where encounter took place:  [facility] (U) [[id number]]    PRIMARY CARE PROVIDER AND CLINIC RESPONSIBLE AFTER TRANSFER:   MARICHUY Basilio CNP, [address] [facility] [id number]    Assisted Living: [facility] ()     Transferring providers: MARICHUY Mendoza CNP; Tyler Weiss MD  Date of SNF Admission:  4/11/24  Date of SNF (anticipated) Discharge:  5/7/24  Discharged to: previous assisted living  Cognitive Scores/ Physical Function/ DME: Transfers SBA, SBA with bed mobility, ambulating 222ft FWW CGA, independent with eating and grooming, supervision with dressing and toileting. LCD set for 5/6/24    CODE STATUS/ADVANCE DIRECTIVES DISCUSSION:  Full Code   ALLERGIES: Ativan [lorazepam]    DISCHARGE DIAGNOSIS/NURSING FACILITY COURSE:     Patient Jamie Garg is a 83 yr old male admitted [facility] due to weakness and failure to thrive    S/post recent hospitalization per epic note:   nosebleed 2/8/2024. Controlled in ER by silver nitrate, Afrin nasal spray, lidocaine with epinephrine, and pressure.  Labs reassuring, no sign of coagulopathy.      3/12/2024 ER for fever and hypoxia. Weaned off oxygen after rDuoNeb. Chronic bilateral lower extremity swelling, stopped wearing Velcro compression wraps per patient preference. No history congestive heart failure or diuretics. Echo July 2023 ejection fraction of 60 to 65% along with moderate aortic stenosis, resident in permanent atrial fibrillation but not anticoagulated due to fall risk.   roponin negative x 2, ACS not suspected by ER team. Chest x-ray did not show any acute abnormality.  Viral respiratory panel negative. Labs significant hyponatremia and leukocytosis. Offered  admission for diuresis but patient refused and discharged back assisted living.     Hospitalized [facility] from 3/19 to 3/20/2024 and observation with generalized weakness and headache.  Per hospital discharge: In ED patient afebrile and not tachycardic, normotensive to mildly hypertensive.  No hypoxia, CBC without acute abnormality.  BMP with mild hyponatremia with sodium of 128 (was 129 on 3/12/2024), and potassium 3.3.  Lactic acid within normal limits.  BNP elevated at 3400 but stable from check on 3/12.  COVID and influenza negative.  Chest x-ray did not show any acute abnormality or significant fluid overload. CT head showed no acute abnormality.  Treated 20 mg of IV Lasix.  During hospitalization remained stable and able to take p.o.'s.      PMHx hyponatremia (due to oral intake and SIADH), Etoh abuse, dementia with behaviors (paranoia, hallucinations)  (neuropsych testing Dr. 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 Niraj Larry [facility]) (Completed external beam radiation therapy w/o androgen depravation therapy with [facility] under care of Dr Fleming), hx of COVID-19, hard of hearing   [facility] and may need higher level of care  primary contact for Jamie Valdivia (Magi Espino) spouse Gisela     Assessment/Plan:  Generalized weakness (primary encounter diagnosis)  Physical deconditioning    Progressing in therapies and  "pl[facility].  Continue therapies with homecare  Encourage patient to go to meals  Avoid Etoh     Hypokalemia  suspect in setting of decreased appetite   Resolved   Dietary involved  Monitor      Urinary incontinence   Incontinence associated dermatitis  worsening incontinence  menthol-zinc oxide (CALMOSEPTINE) 0.44-20.6 % OINT ointment QID  - UA/UC neg, denies dysuria  Order APM due to concern skin breakdown while at TCU  Good eveline-cares     Major neurocognitive disorder (H) - dementia   Per epic note  Slightly more confused, irritable, delusion about \"people out to get him\" and \"room being bugged.\"  Please see neuropsych testing from 5/12/2023, \"I believe that Jamie is not capable of making reasoned decisions. He should continue to receive a high-level of care that includes other managing medications, finances, and limiting access to alcohol.\"   Please see interdisciplinary team care conference note from 7/25/2023.   Wife and family to assist with decision making as these are his next of kin and there is no healthcare directive. Considered moving off memory care unit if he had appropriate supports, unwilling to let family assist him at present and family does not want him moved off of memory care unit as they feel this is safest environment for him.  Plan:     Continue Seroquel due to paranoid delusions causing distress (ie thinks phones are bugged, people out to get him, paranoid)       JOSÉ MANUEL  Adjustment reaction with depression/anxiety  Alcohol dependence in remission  Per epic note  Cognitive impairment in setting of history of years of ETOH abuse with recurrent falls and hospitalizations. Currently no access to ETOH and thus is abstinent.  Chronic low mood, suspect JOSÉ MANUEL with panic attacks/chest pain for many years, no documented history of myocardial infarction or ASCVD.   On memory care unit due to safety concerns, needs assistance with IADLs/ADLs.  Finds " "gabapentin helpful.  Plan:   - Continue gabapentin 300 mg q HS (dose increase 4/1/24)  - Continue Gabapentin 100 mg q AM and 100 mg BID PRN for anxiety and pain.   - Continue mirtazpaine 15 mg PO q HS (started 11/17/22, dose increase 1/27/23)  - Continue Seroquel 12.5 mg BID and BID PRN for psychotic features   - [facility] counselor following closely -Kimberly Wick.  Mood improved since admission to TCU, consider further increase in Seroquel if develop worsening agitation and paranoia    Chronic BL lower extremity edema  Legs are swollen in setting of dependent edema and obesity.  No history CHF  Patient agreeable to compression stockings after further discussion and has sock aid to assist putting on   encourage leg elevation     Hyponatremia  SIADH (syndrome of inappropriate ADH production)  Per epic note:   Acute recurrent concern, in setting of SIADH, psychotropic meds may contribute, drinking large amounts of water. Solidum stable today.  Previous hyponatremia multifactorial, occurred in setting of SIADH, dose increase of selective serotonin reuptake inhibitor (Celexa), Bactrim, hypovolemia.  Stopped Celexa 9/29/22, now on Mirtazapine.    No longer on fluid restrictions  Na within normal limits, monitor      Afib   Per epic note  Chronic and asymptomatic, junctional rhythm in ER  History of atrial fibrillation, low heart rate on previous ECG while in hospital.  Per cardiology Dr. Ross, \"clearly has slow atrial fibrillation. I will place a 7-day monitor to evaluate if he is having any significant heart block or significant arrhythmias that may be contributing to his multiple falls.  He clearly is too high a fall risk to consider anticoagulation.\"   Completed Zio patch September 2023 -see report in Epic.  Persistent atrial fibrillation with average heart rate of 58 bpm, pauses of 3.3 seconds, frequent PVCs, intermittent bundle branch block.  Considered Watchman device, but patient declined invasive " "intervention; pt/family aware of risk.  HAS-BLED 2: 4.1%  CHADs-VASC: 3% (Age, HTN)   See cardiology notes in [facility].  Plan:   - Cardiology follow-up 6/28/24 with Dr. Ross      Essential hypertension, benign  BP at goal of < 150/90 given age and comorbid conditions  Stopped ASA due to hemoptysis  Chronic managed   Not on medications to treat  Monitor      Thoracic aortic aneurysm without rupture (H) - 6/30/22 4.6 cm  New finding in 2022, follows with vascular, last visit with  on 7/28/2023.  Plan:   - Per vascular notes, \"Plan for echocardiogram in June 2024 then followed by office visit order placed Rest of the medical management per primary care physician.\"      Prostate Cancer - adenocarcinoma Haily 4+3=7  Comment: Improved  Prostate biopsy 1/11/2023,pathology positive for Haily 7 prostate cancer.   Stopped Trospium due to anticholinergic side effects, no change in urinary symptoms.  Followed by Dr. Niraj Larry [facility] - last visit 6/2/23.  Completed external beam radiation therapy w/o androgen depravation therapy with [facility] under care of Dr Fleming.   Some dysuria s/p treatment, UC negative for infection.  PSA is now 2.67.  Last urology visit 1/4/24.  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  Reported recurrent coughing up blood, but not recently.  Saw ENT in 2022, no explanation of symptoms.  Last esophagram May 2022 as above, presbyesophagus, proximal luminal narrowing.  Recent saw SLP " "8/2022, no overt dysphagia.  Recurrent pneumonia, last 8/4/22.  CXR 10/1/22, 3/28/23 without concern for PNA.  Chest CT showed, \"Trace scattered atelectasis and/or fibrosis.  Elevated left hemidiaphragm. No effusions or pneumothorax.\"    CXR negative TCU for acute findings  - SLP consult.   Diet Regular diet, Mechanical (DD2) texture, [place] consistency  Denies trouble chewing or swallowing recently     Normocytic Anemia  Chronic, mild, baseline ~ 10.  Hgb down to 9.0 prior to TCU admission and trend down 8.3 at TCU, No symptoms of acute bleed.  Hemoglobin   Date Value Ref Range Status   05/01/2024 8.3 (L) 13.3 - 17.7 g/dL Final   Recent restart on iron supplement and increased dose at TCU   Recent nose bleed  ENT for recurrent nose bleed on 4/03/24,   Monitor trend and consider further work up gastrointestinal specialist regarding if continue to trend down  Continue PPI     Chronic constipation  Having regular BMs.  Bowel habits change some s/p prostate radiation.  Long hx of Levsin multiple times per day for abd pain, but stopped by urology when started trospium, no recurrent pain.  No longer on Trospium (see note above)  Continue polyethylene glycol (MIRALAX) 17 GM/Dose powder; 1-2 capfuls in 8 oz of ORANGE JUICE PO one time each morning and 1-2 capfuls 8 oz of ORANGE JUICE PO one time daily PRN for constipation.     Impaired vision  Consider ophthalmology follow-up for field cut. 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) (11/19/2021), ED (erectile dysfunction), Edema, Encephalopathy (3/19/2024), Falling, JOSÉ MANUEL (generalized anxiety disorder), Generalized anxiety disorder (04/27/2020), GERD (gastroesophageal reflux disease), GI bleeding, H/O carpal tunnel syndrome, History of 2019 novel coronavirus disease (COVID-19) (02/08/2021), HTN (hypertension), Impaired gait and mobility (03/14/2019), Mild cognitive impairment (08/12/2019), Mild TBI (traumatic brain injury) (H) (03/14/2019), Mumps, Obesity (BMI 30-39.9) (09/03/2015), Osteoarthritis, Osteoarthritis of both knees (05/13/2015), Osteoporosis, Other idiopathic peripheral autonomic neuropathy (03/30/2022), Other insomnia (03/14/2019), Other seizures (H) (03/30/2022), Palpitations, Peripheral neuropathy, Pharyngeal dysphagia (05/13/2015), Physical deconditioning (03/14/2019), Recurrent major depressive disorder (H24) (03/30/2022), Rhabdomyolysis, Thrombocytopenia (H24), Urination disorder, and Weakness (04/27/2020).    He has no past medical history of Asymptomatic human immunodeficiency virus (HIV) infection status (H), Blood in semen, Complication of anesthesia, Congenital renal agenesis and dysgenesis, Epididymitis, bilateral, Epididymitis, left, Epididymitis, right, Goiter, Gout, Hernia, abdominal, History of spinal cord injury, History of thrombophlebitis, Horseshoe kidney, Malignant hyperthermia, Orchitis, epididymitis, and epididymo-orchitis, with abscess, Parkinsons disease (H), Penile discharge, Prostate infection, Spider veins, Spina bifida (H), STD (sexually transmitted disease), Swelling of testicle, Tethered cord (H), or Tuberculosis.    Discharge Medications:    Current Outpatient Medications   Medication Sig Dispense Refill    acetaminophen (TYLENOL) 500 MG tablet Take 2 tablets (1,000 mg) by mouth 3 times daily as needed for mild pain 10 tablet 98    alum & mag  hydroxide-simethicone (MAALOX MAX) 400-400-40 MG/5ML SUSP suspension Take 30 mLs by mouth every 6 hours as needed for indigestion 355 mL 3    ferrous sulfate (FEROSUL) 325 (65 Fe) MG tablet Take 325 mg by mouth daily (with breakfast)      gabapentin (NEURONTIN) 100 MG capsule Take 1 capsule (100 mg) by mouth daily. May also take 1 capsule (100 mg) 2 times daily as needed for neuropathic pain.      gabapentin (NEURONTIN) 300 MG capsule Take 1 capsule (300 mg) by mouth at bedtime 30 capsule 98    guaiFENesin (ROBITUSSIN) 20 mg/mL liquid Take 10 mLs by mouth every 4 hours as needed for cough 355 mL 98    loratadine (CLARITIN) 10 MG tablet Take 1 tablet (10 mg) by mouth At Bedtime 30 tablet 98    menthol-zinc oxide (CALMOSEPTINE) 0.44-20.6 % OINT ointment Apply topically 4 times daily To perineal area. Send to [facility]. 113 g 98    mirtazapine (REMERON) 15 MG tablet TAKE 1 TABLET BY MOUTH AT BEDTIME 90 tablet 3    nystatin (MYCOSTATIN) 141734 UNIT/GM external powder Apply topically 2 times daily as needed      omeprazole (PRILOSEC) 40 MG DR capsule TAKE 1 CAPSULE BY MOUTH TWICE DAILY 180 capsule 97    oxymetazoline (AFRIN) 0.05 % nasal spray Spray 2 sprays into both nostrils 2 times daily as needed (nose bleed)      polyethylene glycol (MIRALAX) 17 GM/Dose powder MIX 2 CAPFULS IN 8OZ OF ORANGE JUICE  IN THE MORNING;AND MAY MIX 2 CAPFULS ONCE DAILY AS NEEDED FOR CONSTIPATION. MIX IN FRONT OF PT. HOLD FOR LOOSE STOOL. OK TO GIVE 1 CAPFUL IF RESIDENT REFUSES 2 CAPFULS. 510 g 97    QUEtiapine (SEROQUEL) 25 MG tablet TAKE ONE-HALF TABLET (12.5MG) BY MOUTH TWICE DAILY;& MAY TAKE ONE-HALF TABLET (12.5MG) EVERY 12 HOURS AS NEEDED 180 tablet 97    sodium chloride (OCEAN) 0.65 % nasal spray Spray 1 spray in nostril 4 times daily as needed for congestion Okay to leave at bedside 88 mL 98       Medication Changes/Rationale:   See HPI    Controlled medications sent with patient:   not applicable/none     ROS:   4 point ROS including  "Respiratory, CV, GI and , other than that noted in the HPI,  is negative    Physical Exam:   Vitals: BP (!) 145/70   Pulse 67   Temp 97.9  F (36.6  C)   Resp 18   Ht 1.727 m (5' 8\")   Wt 94.4 kg (208 lb 3.2 oz)   SpO2 95%   BMI 31.66 kg/m    BMI= Body mass index is 31.66 kg/m .  GENERAL APPEARANCE:  Alert, in no distress  RESP:  respiratory effort and palpation of chest normal, lungs clear to auscultation , no respiratory distress  CV:  Palpation and auscultation of heart done , regular rate and rhythm,  ABDOMEN:  normal bowel sounds, soft, nontender  M/S:   sitting in bed  SKIN:  Inspection of skin and subcutaneous tissue baseline  NEURO:   Cranial nerves 2-12 are normal tested and grossly at patient's baseline  PSYCH:  oriented X 3     SNF labs: Labs done in SNF are in [place] EPIC. Please refer to them using [employer]Care Everywhere.  Last Comprehensive Metabolic Panel:  Lab Results   Component Value Date     04/26/2024    POTASSIUM 4.1 04/26/2024    CHLORIDE 100 04/26/2024    CO2 28 04/26/2024    ANIONGAP 11 04/26/2024    GLC 89 04/26/2024    BUN 11.9 04/26/2024    CR 0.46 (L) 04/26/2024    GFRESTIMATED >90 04/26/2024    JOSUE 8.4 (L) 04/26/2024       Lab Results   Component Value Date    WBC 8.4 04/26/2024     Lab Results   Component Value Date    RBC 3.28 04/26/2024     Lab Results   Component Value Date    HGB 8.3 05/01/2024     Lab Results   Component Value Date    HCT 27.7 04/26/2024     No components found for: \"MCT\"  Lab Results   Component Value Date    MCV 85 04/26/2024     Lab Results   Component Value Date    MCH 25.3 04/26/2024     Lab Results   Component Value Date    MCHC 30.0 04/26/2024     Lab Results   Component Value Date    RDW 20.2 04/26/2024     Lab Results   Component Value Date     04/26/2024         DISCHARGE PLAN:  Follow up labs: No labs orders/due  Medical Follow Up:      Follow up with primary care provider in 1-2 weeks  MTM referral needed and placed by this " provider: No  Current [place] scheduled appointments:         May 07, 2024  9:30 AM  ENT Audio with Cynthia Hernandez  [facility] ([facility] ) [phone]     Jun 28, 2024 11:30 AM  (Arrive by 11:25 AM)  Return Cardiology with Alex Ross MD  [facility] ([facility] ) [phone]     Jul 02, 2024  9:30 AM  LAB with [facility]  [facility] ([facility] ) [phone]     Jul 09, 2024 10:00 AM  (Arrive by 9:45 AM)  Return Patient with Niraj Larry MD  [facility] ([facility] ) [phone]            Discharge Services: Home Care:  Occupational Therapy, Physical Therapy, Home Health Aide, and From:  [facility]        TOTAL DISCHARGE TIME:   Greater than 30 minutes  Electronically signed by:  MARICHUY Mendoza CNP     Home care Face to Face documentation done in [facility] attached to Home care orders for [facility].

## 2024-05-03 ENCOUNTER — DISCHARGE SUMMARY NURSING HOME (OUTPATIENT)
Dept: GERIATRICS | Facility: CLINIC | Age: 84
End: 2024-05-03
Payer: COMMERCIAL

## 2024-05-03 DIAGNOSIS — R13.19 ESOPHAGEAL DYSPHAGIA: ICD-10-CM

## 2024-05-03 DIAGNOSIS — D64.9 ANEMIA, UNSPECIFIED TYPE: ICD-10-CM

## 2024-05-03 DIAGNOSIS — K22.70 BARRETT'S ESOPHAGUS WITHOUT DYSPLASIA: ICD-10-CM

## 2024-05-03 DIAGNOSIS — R60.9 CHRONIC EDEMA: ICD-10-CM

## 2024-05-03 DIAGNOSIS — C61 PROSTATE CANCER (H): ICD-10-CM

## 2024-05-03 DIAGNOSIS — F41.1 GAD (GENERALIZED ANXIETY DISORDER): ICD-10-CM

## 2024-05-03 DIAGNOSIS — M62.81 MUSCLE WEAKNESS (GENERALIZED): Primary | ICD-10-CM

## 2024-05-03 PROCEDURE — 99316 NF DSCHRG MGMT 30 MIN+: CPT | Performed by: NURSE PRACTITIONER

## 2024-05-03 NOTE — LETTER
5/3/2024        RE: Jamie Valdivia  C/o Coral De La Garza  [address]  [place] [address]        [facility] DISCHARGE SUMMARY  PATIENT'S NAME: Jamie Valdivia  YOB: 1940  MEDICAL RECORD NUMBER:  [id number]  Place of Service where encounter took place:  [facility] (TCU) [[id number]]    PRIMARY CARE PROVIDER AND CLINIC RESPONSIBLE AFTER TRANSFER:   MARICHUY Basilio CNP, [address] [facility] / [facility] [address]    Assisted Living: [facility] ()     Transferring providers: MARICHUY Mendoza CNP; Tyler Weiss MD  Date of SNF Admission:  4/11/24  Date of SNF (anticipated) Discharge:  5/7/24  Discharged to: previous assisted living  Cognitive Scores/ Physical Function/ DME: Transfers SBA, SBA with bed mobility, ambulating 222ft FWW CGA, independent with eating and grooming, supervision with dressing and toileting. LCD set for 5/6/24    CODE STATUS/ADVANCE DIRECTIVES DISCUSSION:  Full Code   ALLERGIES: Ativan [lorazepam]    DISCHARGE DIAGNOSIS/NURSING FACILITY COURSE:     Patient Jamie Garg is a 83 yr old male admitted [facility] due to weakness and failure to thrive    S/post recent hospitalization per epic note:   nosebleed 2/8/2024. Controlled in ER by silver nitrate, Afrin nasal spray, lidocaine with epinephrine, and pressure.  Labs reassuring, no sign of coagulopathy.      3/12/2024 ER for fever and hypoxia. Weaned off oxygen after rDuoNeb. Chronic bilateral lower extremity swelling, stopped wearing Velcro compression wraps per patient preference. No history congestive heart failure or diuretics. Echo July 2023 ejection fraction of 60 to 65% along with moderate aortic stenosis, resident in permanent atrial fibrillation but not anticoagulated due to fall risk.   roponin negative x 2, ACS not suspected by ER team. Chest x-ray did not show any acute  abnormality.  Viral respiratory panel negative. Labs significant hyponatremia and leukocytosis. Offered admission for diuresis but patient refused and discharged back assisted living.     Hospitalized [facility] from 3/19 to 3/20/2024 and observation with generalized weakness and headache.  Per hospital discharge: In ED patient afebrile and not tachycardic, normotensive to mildly hypertensive.  No hypoxia, CBC without acute abnormality.  BMP with mild hyponatremia with sodium of 128 (was 129 on 3/12/2024), and potassium 3.3.  Lactic acid within normal limits.  BNP elevated at 3400 but stable from check on 3/12.  COVID and influenza negative.  Chest x-ray did not show any acute abnormality or significant fluid overload. CT head showed no acute abnormality.  Treated 20 mg of IV Lasix.  During hospitalization remained stable and able to take p.o.'s.      PMHx hyponatremia (due to oral intake and SIADH), Etoh abuse, dementia with behaviors (paranoia, hallucinations)  (neuropsych testing Dr. 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 Niraj Larry [facility]) (Completed external beam radiation therapy w/o androgen depravation therapy with [facility] under care of Dr Fleming), hx of COVID-19, hard of hearing   Lives in [facility] memory care and may need higher level of care  primary contact for Jamie Valdivia (Magi Espino) spouse Gisela  "    Assessment/Plan:  Generalized weakness (primary encounter diagnosis)  Physical deconditioning    Progressing in therapies and plans to discharge back to [facility] care.  Continue therapies with homecare  Encourage patient to go to meals  Avoid Etoh     Hypokalemia  suspect in setting of decreased appetite   Resolved   Dietary involved  Monitor      Urinary incontinence   Incontinence associated dermatitis  worsening incontinence  menthol-zinc oxide (CALMOSEPTINE) 0.44-20.6 % OINT ointment QID  - UA/UC neg, denies dysuria  Order APM due to concern skin breakdown while at TCU  Good eveline-cares     Major neurocognitive disorder (H) - dementia   Per epic note  Slightly more confused, irritable, delusion about \"people out to get him\" and \"room being bugged.\"  Please see neuropsych testing from 5/12/2023, \"I believe that Jamie is not capable of making reasoned decisions. He should continue to receive a high-level of care that includes other managing medications, finances, and limiting access to alcohol.\"   Please see interdisciplinary team care conference note from 7/25/2023.   Wife and family to assist with decision making as these are his next of kin and there is no healthcare directive. Considered moving off memory care unit if he had appropriate supports, unwilling to let family assist him at present and family does not want him moved off of memory care unit as they feel this is safest environment for him.  Plan:     Continue Seroquel due to paranoid delusions causing distress (ie thinks phones are bugged, people out to get him, paranoid)       JOSÉ MANUEL  Adjustment reaction with depression/anxiety  Alcohol dependence in remission  Per epic note  Cognitive impairment in setting of history of years of ETOH abuse with recurrent falls and hospitalizations. Currently no access to ETOH and thus is abstinent.  Chronic low mood, suspect JOSÉ MANUEL with panic attacks/chest pain for many years, no documented " "history of myocardial infarction or ASCVD.   On memory care unit due to safety concerns, needs assistance with IADLs/ADLs.  Finds gabapentin helpful.  Plan:   - Continue gabapentin 300 mg q HS (dose increase 4/1/24)  - Continue Gabapentin 100 mg q AM and 100 mg BID PRN for anxiety and pain.   - Continue mirtazpaine 15 mg PO q HS (started 11/17/22, dose increase 1/27/23)  - Continue Seroquel 12.5 mg BID and BID PRN for psychotic features   - [facility] counselor following closely -Kimberly Wick.  Mood improved since admission to TCU, consider further increase in Seroquel if develop worsening agitation and paranoia    Chronic BL lower extremity edema  Legs are swollen in setting of dependent edema and obesity.  No history CHF  Patient agreeable to compression stockings after further discussion and has sock aid to assist putting on   encourage leg elevation     Hyponatremia  SIADH (syndrome of inappropriate ADH production)  Per epic note:   Acute recurrent concern, in setting of SIADH, psychotropic meds may contribute, drinking large amounts of water. Solidum stable today.  Previous hyponatremia multifactorial, occurred in setting of SIADH, dose increase of selective serotonin reuptake inhibitor (Celexa), Bactrim, hypovolemia.  Stopped Celexa 9/29/22, now on Mirtazapine.    No longer on fluid restrictions  Na within normal limits, monitor      Afib   Per epic note  Chronic and asymptomatic, junctional rhythm in ER  History of atrial fibrillation, low heart rate on previous ECG while in hospital.  Per cardiology Dr. Ross, \"clearly has slow atrial fibrillation. I will place a 7-day monitor to evaluate if he is having any significant heart block or significant arrhythmias that may be contributing to his multiple falls.  He clearly is too high a fall risk to consider anticoagulation.\"   Completed Zio patch September 2023 -see report in Epic.  Persistent atrial fibrillation with average heart rate of 58 bpm, pauses of " "3.3 seconds, frequent PVCs, intermittent bundle branch block.  Considered Watchman device, but patient declined invasive intervention; pt/family aware of risk.  HAS-BLED 2: 4.1%  CHADs-VASC: 3% (Age, HTN)   See cardiology notes in [facility].  Plan:   - Cardiology follow-up 6/28/24 with Dr. Ross      Essential hypertension, benign  BP at goal of < 150/90 given age and comorbid conditions  Stopped ASA due to hemoptysis  Chronic managed   Not on medications to treat  Monitor      Thoracic aortic aneurysm without rupture (H) - 6/30/22 4.6 cm  New finding in 2022, follows with vascular, last visit with  on 7/28/2023.  Plan:   - Per vascular notes, \"Plan for echocardiogram in June 2024 then followed by office visit order placed Rest of the medical management per primary care physician.\"      Prostate Cancer - adenocarcinoma [place] 4+3=7  Comment: Improved  Prostate biopsy 1/11/2023,pathology positive for [place] 7 prostate cancer.   Stopped Trospium due to anticholinergic side effects, no change in urinary symptoms.  Followed by Dr. Niraj Larry [facility] - last visit 6/2/23.  Completed external beam radiation therapy w/o androgen depravation therapy with [facility] under care of Dr Fleming.   Some dysuria s/p treatment, UC negative for infection.  PSA is now 2.67.  Last urology visit 1/4/24.  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  Reported recurrent coughing up blood, but not recently.  Saw ENT in 2022, no " "explanation of symptoms.  Last esophagram May 2022 as above, presbyesophagus, proximal luminal narrowing.  Recent saw SLP 8/2022, no overt dysphagia.  Recurrent pneumonia, last 8/4/22.  CXR 10/1/22, 3/28/23 without concern for PNA.  Chest CT showed, \"Trace scattered atelectasis and/or fibrosis.  Elevated left hemidiaphragm. No effusions or pneumothorax.\"    CXR negative TCU for acute findings  - SLP consult.   Diet Regular diet, Mechanical (DD2) texture, [place] consistency  Denies trouble chewing or swallowing recently     Normocytic Anemia  Chronic, mild, baseline ~ 10.  Hgb down to 9.0 prior to TCU admission and trend down 8.3 at TCU, No symptoms of acute bleed.  Hemoglobin   Date Value Ref Range Status   05/01/2024 8.3 (L) 13.3 - 17.7 g/dL Final   Recent restart on iron supplement and increased dose at TCU   Recent nose bleed  ENT for recurrent nose bleed on 4/03/24,   Monitor trend and consider further work up gastrointestinal specialist regarding if continue to trend down  Continue PPI     Chronic constipation  Having regular BMs.  Bowel habits change some s/p prostate radiation.  Long hx of Levsin multiple times per day for abd pain, but stopped by urology when started trospium, no recurrent pain.  No longer on Trospium (see note above)  Continue polyethylene glycol (MIRALAX) 17 GM/Dose powder; 1-2 capfuls in 8 oz of ORANGE JUICE PO one time each morning and 1-2 capfuls 8 oz of ORANGE JUICE PO one time daily PRN for constipation.     Impaired vision  Consider ophthalmology follow-up for field cut. 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) (11/19/2021), ED (erectile dysfunction), Edema, Encephalopathy (3/19/2024), Falling, JOSÉ MANUEL (generalized anxiety disorder), Generalized anxiety disorder (04/27/2020), GERD (gastroesophageal reflux disease), GI bleeding, H/O carpal tunnel syndrome, History of 2019 novel coronavirus disease (COVID-19) (02/08/2021), HTN (hypertension), Impaired gait and mobility (03/14/2019), Mild cognitive impairment (08/12/2019), Mild TBI (traumatic brain injury) (H) (03/14/2019), Mumps, Obesity (BMI 30-39.9) (09/03/2015), Osteoarthritis, Osteoarthritis of both knees (05/13/2015), Osteoporosis, Other idiopathic peripheral autonomic neuropathy (03/30/2022), Other insomnia (03/14/2019), Other seizures (H) (03/30/2022), Palpitations, Peripheral neuropathy, Pharyngeal dysphagia (05/13/2015), Physical deconditioning (03/14/2019), Recurrent major depressive disorder (H24) (03/30/2022), Rhabdomyolysis, Thrombocytopenia (H24), Urination disorder, and Weakness (04/27/2020).    He has no past medical history of Asymptomatic human immunodeficiency virus (HIV) infection status (H), Blood in semen, Complication of anesthesia, Congenital renal agenesis and dysgenesis, Epididymitis, bilateral, Epididymitis, left, Epididymitis, right, Goiter, Gout, Hernia, abdominal, History of spinal cord injury, History of thrombophlebitis, Horseshoe kidney, Malignant hyperthermia, Orchitis, epididymitis, and epididymo-orchitis, with abscess, Parkinsons disease (H), Penile discharge, Prostate infection, Spider veins, Spina bifida (H), STD (sexually transmitted disease), Swelling of testicle, Tethered cord (H), or Tuberculosis.    Discharge Medications:    Current Outpatient Medications   Medication Sig Dispense Refill     acetaminophen (TYLENOL) 500 MG tablet  Take 2 tablets (1,000 mg) by mouth 3 times daily as needed for mild pain 10 tablet 98     alum & mag hydroxide-simethicone (MAALOX MAX) 400-400-40 MG/5ML SUSP suspension Take 30 mLs by mouth every 6 hours as needed for indigestion 355 mL 3     ferrous sulfate (FEROSUL) 325 (65 Fe) MG tablet Take 325 mg by mouth daily (with breakfast)       gabapentin (NEURONTIN) 100 MG capsule Take 1 capsule (100 mg) by mouth daily. May also take 1 capsule (100 mg) 2 times daily as needed for neuropathic pain.       gabapentin (NEURONTIN) 300 MG capsule Take 1 capsule (300 mg) by mouth at bedtime 30 capsule 98     guaiFENesin (ROBITUSSIN) 20 mg/mL liquid Take 10 mLs by mouth every 4 hours as needed for cough 355 mL 98     loratadine (CLARITIN) 10 MG tablet Take 1 tablet (10 mg) by mouth At Bedtime 30 tablet 98     menthol-zinc oxide (CALMOSEPTINE) 0.44-20.6 % OINT ointment Apply topically 4 times daily To perineal area. Send to [facility]. 113 g 98     mirtazapine (REMERON) 15 MG tablet TAKE 1 TABLET BY MOUTH AT BEDTIME 90 tablet 3     nystatin (MYCOSTATIN) 540069 UNIT/GM external powder Apply topically 2 times daily as needed       omeprazole (PRILOSEC) 40 MG DR capsule TAKE 1 CAPSULE BY MOUTH TWICE DAILY 180 capsule 97     oxymetazoline (AFRIN) 0.05 % nasal spray Spray 2 sprays into both nostrils 2 times daily as needed (nose bleed)       polyethylene glycol (MIRALAX) 17 GM/Dose powder MIX 2 CAPFULS IN 8OZ OF ORANGE JUICE  IN THE MORNING;AND MAY MIX 2 CAPFULS ONCE DAILY AS NEEDED FOR CONSTIPATION. MIX IN FRONT OF PT. HOLD FOR LOOSE STOOL. OK TO GIVE 1 CAPFUL IF RESIDENT REFUSES 2 CAPFULS. 510 g 97     QUEtiapine (SEROQUEL) 25 MG tablet TAKE ONE-HALF TABLET (12.5MG) BY MOUTH TWICE DAILY;& MAY TAKE ONE-HALF TABLET (12.5MG) EVERY 12 HOURS AS NEEDED 180 tablet 97     sodium chloride (OCEAN) 0.65 % nasal spray Spray 1 spray in nostril 4 times daily as needed for congestion Okay to leave at bedside 88 mL 98       Medication  "Changes/Rationale:   See HPI    Controlled medications sent with patient:   not applicable/none     ROS:   4 point ROS including Respiratory, CV, GI and , other than that noted in the HPI,  is negative    Physical Exam:   Vitals: BP (!) 145/70   Pulse 67   Temp 97.9  F (36.6  C)   Resp 18   Ht 1.727 m (5' 8\")   Wt 94.4 kg (208 lb 3.2 oz)   SpO2 95%   BMI 31.66 kg/m    BMI= Body mass index is 31.66 kg/m .  GENERAL APPEARANCE:  Alert, in no distress  RESP:  respiratory effort and palpation of chest normal, lungs clear to auscultation , no respiratory distress  CV:  Palpation and auscultation of heart done , regular rate and rhythm,  ABDOMEN:  normal bowel sounds, soft, nontender  M/S:   sitting in bed  SKIN:  Inspection of skin and subcutaneous tissue baseline  NEURO:   Cranial nerves 2-12 are normal tested and grossly at patient's baseline  PSYCH:  oriented X 3     SNF labs: Labs done in SNF are in [place] EPIC. Please refer to them using Send[employer]/Care Everywhere.  Last Comprehensive Metabolic Panel:  Lab Results   Component Value Date     04/26/2024    POTASSIUM 4.1 04/26/2024    CHLORIDE 100 04/26/2024    CO2 28 04/26/2024    ANIONGAP 11 04/26/2024    GLC 89 04/26/2024    BUN 11.9 04/26/2024    CR 0.46 (L) 04/26/2024    GFRESTIMATED >90 04/26/2024    JOSUE 8.4 (L) 04/26/2024       Lab Results   Component Value Date    WBC 8.4 04/26/2024     Lab Results   Component Value Date    RBC 3.28 04/26/2024     Lab Results   Component Value Date    HGB 8.3 05/01/2024     Lab Results   Component Value Date    HCT 27.7 04/26/2024     No components found for: \"MCT\"  Lab Results   Component Value Date    MCV 85 04/26/2024     Lab Results   Component Value Date    MCH 25.3 04/26/2024     Lab Results   Component Value Date    MCHC 30.0 04/26/2024     Lab Results   Component Value Date    RDW 20.2 04/26/2024     Lab Results   Component Value Date     04/26/2024         DISCHARGE PLAN:  Follow up labs: No labs " orders/due  Medical Follow Up:      Follow up with primary care provider in 1-2 weeks  MT referral needed and placed by this provider: No  Current Bessie scheduled appointments:         May 07, 2024  9:30 AM  ENT Audio with Cynthia Hernandez  [facility] ([facility] ) [phone]     Jun 28, 2024 11:30 AM  (Arrive by 11:25 AM)  Return Cardiology with Alex Ross MD  [facility] ([facility] ) [phone]     Jul 02, 2024  9:30 AM  LAB with [facility]  [facility] ([facility] ) [phone]     Jul 09, 2024 10:00 AM  (Arrive by 9:45 AM)  Return Patient with Niraj Larry MD  [facility] ([facility] ) [phone]            Discharge Services: Home Care:  Occupational Therapy, Physical Therapy, Home Health Aide, and From:  [facility]        TOTAL DISCHARGE TIME:   Greater than 30 minutes  Electronically signed by:  MARICHUY Mendoza CNP     Home care Face to Face documentation done in EPIC attached to Home care orders for [facility].                   Sincerely,        MARICHUY Mendoza CNP

## 2024-05-09 DIAGNOSIS — F41.9 ANXIETY: ICD-10-CM

## 2024-05-09 DIAGNOSIS — R60.0 LOWER EXTREMITY EDEMA: ICD-10-CM

## 2024-05-09 DIAGNOSIS — R05.3 CHRONIC COUGH: ICD-10-CM

## 2024-05-09 RX ORDER — GABAPENTIN 300 MG/1
300 CAPSULE ORAL AT BEDTIME
Qty: 90 CAPSULE | Refills: 97 | Status: ON HOLD | OUTPATIENT
Start: 2024-05-09 | End: 2024-06-30

## 2024-05-09 RX ORDER — LORATADINE 10 MG/1
1 TABLET ORAL AT BEDTIME
Qty: 90 TABLET | Refills: 97 | Status: SHIPPED | OUTPATIENT
Start: 2024-05-09

## 2024-05-10 ENCOUNTER — DOCUMENTATION ONLY (OUTPATIENT)
Dept: GERIATRICS | Facility: CLINIC | Age: 84
End: 2024-05-10
Payer: COMMERCIAL

## 2024-05-13 VITALS
TEMPERATURE: 97.2 F | SYSTOLIC BLOOD PRESSURE: 114 MMHG | DIASTOLIC BLOOD PRESSURE: 66 MMHG | RESPIRATION RATE: 16 BRPM | BODY MASS INDEX: 31.52 KG/M2 | HEART RATE: 62 BPM | WEIGHT: 208 LBS | OXYGEN SATURATION: 92 % | HEIGHT: 68 IN

## 2024-05-13 NOTE — PROGRESS NOTES
[facility] GERIATRICS    Chief Complaint   Patient presents with    RECHECK     TCU follow up     HPI:  Jamie Valdivia is a 83 year old  (1940) PMH significant for GERD with esophagitis, OA BL knees, pharyngeal dysphagia, edema, abdominal wall hernia, chronic atrial fibrillation (decline AC or Watchman), HTN, osteoporosis with hx of vertebral fracture, dementia, anemia, hx of COVID-19, who is being seen today for an episodic care visit at: [facility] ([facility]) [[id number]].     Resident of [facility] since March 2022.     Hospitalized at [facility] form 11/1 to 11/5/21 with generalized weakness, dehydration, and concern for UTI but culture came back negative. Symptoms likely related to chronic alcoholism with alcohol withdrawal. Chronic cognitive impairment in setting of long history of alcoholism, hx of hallucinations. Multiple hospitalizations over preceding months per family due to unsafe living situation, well known to ED staff. Spent time in SNF ([facility]) around September 2021, details unclear, but seem to have suffered spinal fracture.     Hospitalized from 12/17 to 12/23/21 [place] [facility] [place] for [place], weakness, alcoholism, and alcoholic hepatitis; discharge summary not available. Wife (who was primary care giver) was having medical issues and needed neurology care in the [facility]. Family felt resident unable to safely be left alone so they called EMS to transport to hospital for ongoing care/ETOH detox. [place] [facility] [place] was convalescing.      [place] [facility] [place]23 and 3/15/22 with largely uneventful stay. Zyprexa dose increased with good effect on mood, prior to starting medication having hallucinations, delusions, and wandering on unit. Transferred to [facility] for permanent placement.    ER visit on 6/30/22 due to chest pain. Resident was watching a baseball game when he experienced left-sided  chest pain.Treated by EMS with aspirin and nitroglycerin with slight relief. Reported pain worse with taking deep breaths and related to anxiety. EKG showed atrial fibrillation with controlled rate; chronic. Chest CT showed 4.6 cm ascending thoracic aortic aneurysm, but was negative for plumonary embolism. Cardiothoracic surgery consulted given size of aneurysm and lack of other connective tissue disorder recommended outpatient follow-up with repeat chest CT and echocardiogram in 3 months to evaluate for progression. Chest x-ray showed left basilar pneumonia and chest CT showed fluid-filled bronchi bilateral lower lobes. Labs significant for negative troponin, ACS excluded. ER physician noted tenderness over left chest wall and cough over last month. Hx of dysphagia and increased aspiration risk. Symptoms most likely consistent with pneumonia discharged back to assisted living facility with Augmentin.    Repeat CXR on 8/4/22 due to subjective shortness of breath showed possible left lower lobe patchy opacification, as well as low lung volumes with vascular crowding and basilar atelectasis. Started Augmentin and symptoms improved.    On 9/13/22 prostate biopsy canceled due to hyponatremia. Subsequently hospitalized at [facility] on 9/30/22 with fall and confusion, found to have severe hyponatremia (Na+ 114). Prior to hospitalization had loose stools and poor intake in setting of prophylactic antibiotic (Bactrim) prescribed prior to planned prostate biospy which was canceled due to low sodium (at 129). Started on IVF due to concern for hypovolemic hyponatremia with some improvement. 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 Treated with 1.2 L/day fluid restriction and sodium improved. Confusion improved with treatment of hyponatremia.   Also noted to have back pain after fall. Lumbar CT showed age indeterminate compression deformity of L2-L5 with chronic mild compression deformity at  superior endplate of L1. Neurosurgery consulted and recommended conservative mgmt with brace. Recommended PT in TCU at hospital discharge due to difficult with ambulation.   Medications for other chronic conditions continued without change.  Interval Echo completed, with EF 55%, right ventricle mildly dilated with mildy decreased systolic function, aortic valve mild-moderate stenosis, ascending aorta moderately dilated.     Resident recovered at [facility] from 10/6 to 11/03/22. Pain controlled with Tylenol and gabapentin.  Wearing TLSO when out of bed.  Hyponatremia felt to be due to volume depletion and SIADH. Improved and able to discontinue fluid restriction. Discharged back to [facility].    Underwent prostate biopsy 1/11/2023. Pathology positive for Haily 7 prostate cancer, completed course of radiation therapy.     Complete neuropsych testing with Dr. Brock on 5/12/23 - please see report in Epic, thought to have major neurocognitive disorder (dementia) and need assistance with IADLs. Estranged from family who are listed his health care agents, but have elected not to move him off of memory care unit.     Started with nosebleed on 2/8/2024.  Bleeding was controlled in ER by silver nitrate, Afrin nasal spray, lidocaine with epinephrine, and pressure.  Labs reassuring, no sign of coagulopathy. Discharged back to assisted living facility.    Sent to ER 3/12/2024 due to fever and hypoxia. Patient was placed on supplemental oxygen by EMS and sats oxygen saturation improved. Weaned off of oxygen doing during ER stay after receiving DuoNeb. ER physician noted expiratory wheezing which improved after after nebulizer treatment. BNP elevated on labs but lower than it was 1 year ago. Chronic bilateral lower extremity swelling, stopped wearing Velcro compression wraps per patient preference.  No history of congestive heart failure or taking diuretics in the past last echo in July 2023 with ejection fraction of 60 to 65% along  with moderate aortic stenosis, resident in permanent atrial fibrillation but not anticoagulated due to fall risk.  Troponin negative x 2, ACS not suspected by ER team. Chest x-ray did not show any acute abnormality.  Viral respiratory panel was negative. Labs are significant for hyponatremia and leukocytosis. Physician offered admission for diuresis and monitoring but patient refused and was discharged back to his assisted living.    Hospitalized at [facility] from 3/19 to 3/20/2024 and observation with generalized weakness and headache.  Per hospital discharge: In ED patient afebrile and not tachycardic, normotensive to mildly hypertensive.  No hypoxia, CBC without acute abnormality.  BMP with mild hyponatremia with sodium of 128 (was 129 on 3/12/2024), and potassium 3.3.  Lactic acid within normal limits.  BNP elevated at 3400 but stable from check on 3/12.  COVID and influenza negative.  Chest x-ray did not show any acute abnormality or significant fluid overload. CT head showed no acute abnormality.  Treated 20 mg of IV Lasix.  During hospitalization remained stable and able to take p.o.'s.  Discharged back to assisted living with plan for PT/OT.    On 4/11 nursing requested urgent visit due to increased care needs, new incontinence and weakness. Nursing felt strongly they were unable to meet care needs due to weakness, staying in bed, making wife complete ADL care instead of staff. Willing to go to TCU to get more nursing care and therapy.    Today's concern is:   TCU follow-up visit.    Remained at [facility] from 4/11 to 5/7/24 to completed therapy.   Good progress with therapy: Transfers SBA, SBA with bed mobility, ambulating 222ft FWW CGA, independent with eating and grooming, supervision with dressing and toileting.   During TCU stay UA/UC negative.   No change to psychotropic medications.  Agreeable to compression socks, learned to use sock aid to don socks independently.   SLP saw resident  "during TCU stay recommended regular diet with DD2 consistency and nectar thick liquids.  Saw ENT for nose bleed on 4/30 - see note in Epic.  Worsening anemia during TCU stay with Hgb trending down, started on iron supplement and continued on PPI.    Of TCU stay reports, \"It was a quick!\"  Reports he felt he was \"dumped\" over there - does not recall with accuracy events leading up to transfer.    No SOB.  No chest pain today.  CP \"always a concern\" - ongoing for 30 years, related to anxiety \"not getting too stressed out.  Cough \"at times\" - can't indentify what starts or stops it.  Discussed modified diet. Not following diet here. Trouble swallowing foods with specific consistency.   Feels he hasn't \"seen the right doctor.\"    EGD 6/1/22  - Normal examined duodenum.               - Normal stomach.               - Esophageal mucosal changes suggestive of short-segment Vickers's esophagus              - Abnormal esophageal motility, suspicious for presbyesophagus.     Bowels \"pretty good.\"  No change in urinary habits, no dysuria.   Chronic incontinence, wear incontinence brief.     Sleeping in recliner or hospital bed.  Not wearing compression socks \"that is a pain in the neck!\"     Spirits not holding up well.  \"We're not solving any problems.\"  Discussed concerns.  Doesn't want to live apart from wife with limited him he has left.   Reports he is talking to attorneys about [place] and worried about issues \"pulling family apart.\"   Feels there are \"untruths\" - cannot coherently expound on this.   Family still in discord, kids told him \"its too late.\"  Unable to name alternative decision maker.   Family meeting within the last year to discuss these issues - danielito present and felt memory [facility] appropriate.    Allergies, and PMH/PSH reviewed in [employer] today.    MED REC REQUIRED  Post Medication Reconciliation Status:  Discharge medications reconciled and changed, see notes/orders    REVIEW OF SYSTEMS:  Limited " "secondary to cognitive impairment but today pt reports history as per HPI.    Objective:   /66   Pulse 62   Temp 97.2  F (36.2  C)   Resp 16   Ht 1.727 m (5' 8\")   Wt 94.3 kg (208 lb)   SpO2 92%   BMI 31.63 kg/m    GENERAL APPEARANCE:  Alert, in no distress, sitting up in chair.  HEENT: Sclera clear and conjunctiva normal. NC/AT. No pharyngeal injection or exudate.  RESP:  Non-labored breathing, no respiratory distress, Lung sounds decreased throughout, no adventitious breath sounds, patient is on room air.  CV: Rate and rhythm irregular no murmur, no rub or gallop.   VASCULAR: 3+ edema bilateral lower extremities, not wearing compression wraps.   ABDOMEN:  Normal bowel sounds, soft, nontender, no grimacing or guarding with palpation, + umblical hernia, soft/non-tender.   SKIN: Limited exam as fully dressed. Tan patch with central purple nodule to right cheek - see photo below.  PSYCH: Awake and alert, speech fluent,  insight/judgement/memory impaired.    Right Cheek      Recent labs in [facility] reviewed by me today.   CBC RESULTS:   Recent Labs   Lab Test 05/01/24  0501 04/26/24  1328 04/19/24  0846 04/17/24  0915   WBC  --  8.4  --  8.3   RBC  --  3.28*  --  3.34*   HGB 8.3* 8.3*   < > 8.5*   HCT  --  27.7*  --  28.6*   MCV  --  85  --  86   MCH  --  25.3*  --  25.4*   MCHC  --  30.0*  --  29.7*   RDW  --  20.2*  --  19.5*   PLT  --  350  --  442    < > = values in this interval not displayed.       Last Basic Metabolic Panel:  Recent Labs   Lab Test 04/26/24  1328 04/17/24  0915    137   POTASSIUM 4.1 4.3   CHLORIDE 100 97*   JOSUE 8.4* 8.2*   CO2 28 29   BUN 11.9 8.8   CR 0.46* 0.50*   GLC 89 69*     Ferritin   Date Value Ref Range Status   04/19/2024 138 31 - 409 ng/mL Final     Iron   Date Value Ref Range Status   04/19/2024 34 (L) 61 - 157 ug/dL Final     Iron Binding Capacity   Date Value Ref Range Status   04/19/2024 194 (L) 240 - 430 ug/dL Final   09/15/2022 374 240 - 430 ug/dL Final     Liver " "Function Studies -   Recent Labs   Lab Test 04/11/24  0724 03/21/24  0651   PROTTOTAL 6.9 7.0   ALBUMIN 2.7* 2.9*   BILITOTAL 0.2 0.2   ALKPHOS 126 125   AST 13 14   ALT <5 <5       TSH   Date Value Ref Range Status   04/11/2024 1.69 0.30 - 4.20 uIU/mL Final   06/07/2023 1.68 0.30 - 4.20 uIU/mL Final   04/28/2022 2.77 0.40 - 4.00 mU/L Final   04/07/2022 3.94 0.40 - 4.00 mU/L Final     Lab Results   Component Value Date    A1C 5.5 04/28/2022    A1C 5.6 04/07/2022     Recent Labs   Lab Test 01/05/23  1000 04/28/22  0655   CHOL 123 101   HDL 46 40   LDL 60 50   TRIG 84 54     Assessment/Plan:  (M62.81) Generalized weakness (primary encounter diagnosis)  (R53.81) Physical deconditioning    Comment: Chronic insetting of multi-comorbidity.   Improved after course of therapy in TCU.  Plan:   - HH PT/OT continue  - Continue to be benefit from supportive care in [facility] memory care setting    (R32) Urinary incontinence   (L25.8,  R32) Incontinence associated dermatitis  Comment: Chronic  S/p treatment for prostate cancer as above.  Plan:   - Continue menthol-zinc oxide (CALMOSEPTINE) 0.44-20.6 % OINT ointment QID  - Staff to assist with toileting and incontinence care    (F03.90) Major neurocognitive disorder (H) - dementia   Comment: Chronic  History of delusions and paranoia.  Please see neuropsych testing from 5/12/2023, \"I believe that Jamie is not capable of making reasoned decisions. He should continue to receive a high-level of care that includes other managing medications, finances, and limiting access to alcohol.\"   Please see interdisciplinary team care conference note from 7/25/2023.   Wife and family to assist with decision making as these are his next of kin and there is no healthcare directive. Considered moving off memory care unit if he had appropriate supports, unwilling to let family assist him at present and family does not want him moved off of memory care unit as they feel this is safest environment for " him.  Plan:   - Continues to benefit from supportive care in memory care senior care setting  - Continue Seroquel due to paranoid delusions causing distress (ie thinks phones are bugged, people out to get him)  - Consider ophthalmology follow-up for field cut. 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   Currently no access to ETOH and thus is abstinent.  Chronic low mood, suspect JOSÉ MANUEL with panic attacks/chest pain for many years, no documented history of myocardial infarction or ASCVD.   On memory care unit due to safety concerns, needs assistance with IADLs/ADLs.  Finds gabapentin helpful.  Plan:   - Continue gabapentin 300 mg q HS (dose increase 4/1/24)  - Continue Gabapentin 100 mg q AM and 100 mg BID PRN for anxiety and pain.   - Continue mirtazpaine 15 mg PO q HS (started 11/17/22, dose increase 1/27/23)  - Continue Seroquel 12.5 mg BID and BID PRN for psychotic features   [facility] -has excellent relationship with Kimberly Wick.  - Offered psychiatry referral in past, but pt/family prefer onsite care, MTM PharmD follows         (R60.0) BL lower extremity edema  Comment: Chronic concern  Last echo in July 2023 see result in epic.  Elevated BNP but no history of heart failure exacerbation, no volume overload on chest x-ray.  Treated with 1 dose of IV Lasix in hospital in March.  Legs are swollen in setting of dependent edema and obesity.  Plan:  -  PT/OT  -Strongly recommended compression and elevation, reviewed this again today    (E87.1) Hyponatremia  (E22.2) SIADH (syndrome of inappropriate ADH production) (H)  Comment: Acute recurrent concern, in setting of SIADH, psychotropic meds may contribute, drinking  "large amounts of water. Solidum stable today.  Previous hyponatremia multifactorial, occurred in setting of SIADH, dose increase of selective serotonin reuptake inhibitor (Celexa), Bactrim, hypovolemia.  Stopped Celexa 9/29/22, now on Mirtazapine.  Last Na+ 139 WNL on 4/26/24  Plan:   -Fluid restriction 1800 mL/day, no monitoring of this in nursing home setting   -Follow interval labs    (I48.0) Afib   Comment: Chronic and asymptomatic, junctional rhythm in ER  History of atrial fibrillation, low heart rate on previous ECG while in hospital.  F/b cardiology Dr. Ross.  Completed Zio patch September 2023 -see report in Epic.  Persistent atrial fibrillation with average heart rate of 58 bpm, pauses of 3.3 seconds, frequent PVCs, intermittent bundle branch block.  Considered Watchman device, but patient declined invasive intervention; pt/family aware of risk.  HAS-BLED 2: 4.1%  CHADs-VASC: 3% (Age, HTN)   See cardiology notes in [facility].  Plan:   - Cardiology follow-up 6/28/24 with Dr. Ross     (I10) Essential hypertension, benign  Comment: Chronic  BP at goal of < 150/90 given age and comorbid conditions  Stopped ASA due to hemoptysis.  BP Readings from Last 3 Encounters:   05/13/24 114/66   05/02/24 (!) 145/70   04/29/24 116/74   Plan:   - Monitor routine VS and labs off anti-hypertensive medications    (I71.2) Thoracic aortic aneurysm without rupture (H) - 6/30/22 4.6 cm  Comment: New finding in 2022, follows with vascular, last visit with  on 7/28/2023.  Plan:   - Per vascular notes, \"Plan for echocardiogram in June 2024 then followed by office visit order placed Rest of the medical management per primary care physician.\"     (C61) Prostate Cancer - adenocarcinoma Rienzi 4+3=7  Comment: Improved  Prostate biopsy 1/11/2023,pathology positive for Rienzi 7 prostate cancer.   Stopped Trospium due to anticholinergic side effects, no change in urinary symptoms.  Followed by Dr. Niraj Larry [facility] " "- last visit 6/2/23.  Completed external beam radiation therapy w/o androgen depravation therapy with [facility] under care of Dr Fleming.   Some dysuria s/p treatment, UC negative for infection.  PSA is now 2.67.  Last urology visit 1/4/24.  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  Reported recurrent coughing up blood, but not recently.  Saw ENT in 2022, no explanation of symptoms.  Last esophagram May 2022 as above, presbyesophagus, proximal luminal narrowing.  Recent saw SLP 8/2022, no overt dysphagia.  Recurrent pneumonia, last 8/4/22.  CXR 10/1/22, 3/28/23 without concern for PNA.  Chest CT showed, \"Trace scattered atelectasis and/or fibrosis.  Elevated left hemidiaphragm. No effusions or pneumothorax.\"  Plan:   - SLP consult, requested add to HH orders  - GI referral per patient request    (D64.9) Normocytic Anemia  Comment: Chronic, mild, baseline ~ 10.  Hgb down to 8.3 on 5/1/24.  No symptoms of acute bleed.  Plan:   - Check CBC with diff, iron, TIBC, Ferritin, B12, Folate   - Continue iron supplement (started 3/25/24)  - Continue PPI    (K59.04) Chronic constipation  Comment: Chronic.   Having regular BMs.  Bowel habits change some s/p prostate radiation.  Long hx of Levsin multiple times per day for abd pain, but stopped by urology when started trospium, no recurrent pain.   Plan:   - Chronic polyethylene glycol (MIRALAX) 17 GM/Dose powder; 1-2 capfuls in 8 oz of ORANGE JUICE PO one time each morning and 1-2 capfuls 8 oz of ORANGE JUICE PO one time daily PRN for " constipation.   - GI following PRN    Orders:  - Add  SLP  - GI referral   - Dermatology referral   - Message sent to P  to assist with appointments    Total time spent with patient visit at the [facility] was 60 minutes includin:30 AM to 12:15 PM (45 minutes) Initial chart review, medication reconciliation, and patient visit for H&P.   12:15 PM to 12:30 pm (15 minutes) Additional chart review and medication reconciliation.    Electronically signed by: MARICHUY Basilio CNP

## 2024-05-14 ENCOUNTER — ASSISTED LIVING VISIT (OUTPATIENT)
Dept: GERIATRICS | Facility: CLINIC | Age: 84
End: 2024-05-14
Payer: COMMERCIAL

## 2024-05-14 DIAGNOSIS — L25.8 INCONTINENCE ASSOCIATED DERMATITIS: ICD-10-CM

## 2024-05-14 DIAGNOSIS — F03.90 MAJOR NEUROCOGNITIVE DISORDER (H): ICD-10-CM

## 2024-05-14 DIAGNOSIS — R60.0 LOWER EXTREMITY EDEMA: ICD-10-CM

## 2024-05-14 DIAGNOSIS — I71.20 THORACIC AORTIC ANEURYSM WITHOUT RUPTURE, UNSPECIFIED PART (H): ICD-10-CM

## 2024-05-14 DIAGNOSIS — D64.9 ANEMIA, UNSPECIFIED TYPE: ICD-10-CM

## 2024-05-14 DIAGNOSIS — F43.23 ADJUSTMENT REACTION WITH ANXIETY AND DEPRESSION: ICD-10-CM

## 2024-05-14 DIAGNOSIS — M62.81 MUSCLE WEAKNESS (GENERALIZED): Primary | ICD-10-CM

## 2024-05-14 DIAGNOSIS — F41.1 GAD (GENERALIZED ANXIETY DISORDER): ICD-10-CM

## 2024-05-14 DIAGNOSIS — E22.2 SIADH (SYNDROME OF INAPPROPRIATE ADH PRODUCTION) (H): ICD-10-CM

## 2024-05-14 DIAGNOSIS — K59.04 CHRONIC IDIOPATHIC CONSTIPATION: ICD-10-CM

## 2024-05-14 DIAGNOSIS — E87.1 HYPONATREMIA: ICD-10-CM

## 2024-05-14 DIAGNOSIS — R32 INCONTINENCE ASSOCIATED DERMATITIS: ICD-10-CM

## 2024-05-14 DIAGNOSIS — R32 URINARY INCONTINENCE, UNSPECIFIED TYPE: ICD-10-CM

## 2024-05-14 DIAGNOSIS — I10 ESSENTIAL HYPERTENSION, BENIGN: ICD-10-CM

## 2024-05-14 DIAGNOSIS — L98.9 SKIN LESION: ICD-10-CM

## 2024-05-14 DIAGNOSIS — R53.81 PHYSICAL DECONDITIONING: ICD-10-CM

## 2024-05-14 DIAGNOSIS — I48.0 PAROXYSMAL ATRIAL FIBRILLATION (H): ICD-10-CM

## 2024-05-14 DIAGNOSIS — C61 PROSTATE CANCER (H): ICD-10-CM

## 2024-05-14 DIAGNOSIS — K22.70 BARRETT'S ESOPHAGUS WITHOUT DYSPLASIA: ICD-10-CM

## 2024-05-14 DIAGNOSIS — F10.21 ALCOHOL DEPENDENCE IN REMISSION (H): ICD-10-CM

## 2024-05-14 DIAGNOSIS — R13.13 PHARYNGEAL DYSPHAGIA: ICD-10-CM

## 2024-05-14 PROCEDURE — 99350 HOME/RES VST EST HIGH MDM 60: CPT | Performed by: NURSE PRACTITIONER

## 2024-05-14 NOTE — LETTER
5/14/2024        RE: Jamie Valdivia  C/o Coral De La Garza  [address]  [facility] [address]        [facility] GERIATRICS    Chief Complaint   Patient presents with     RECHECK     TCU follow up     HPI:  Jamie Valdivia is a 83 year old  (1940) PMH significant for GERD with esophagitis, OA BL knees, pharyngeal dysphagia, edema, abdominal wall hernia, chronic atrial fibrillation (decline AC or Watchman), HTN, osteoporosis with hx of vertebral fracture, dementia, anemia, hx of COVID-19, who is being seen today for an episodic care visit at: [facility]) [61].     Resident of [facility] since March 2022.     Hospitalized at [facility] form 11/1 to 11/5/21 with generalized weakness, dehydration, and concern for UTI but culture came back negative. Symptoms likely related to chronic alcoholism with alcohol withdrawal. Chronic cognitive impairment in setting of long history of alcoholism, hx of hallucinations. Multiple hospitalizations over preceding months per family due to unsafe living situation, well known to ED staff. Spent time in SNF ([facility] [place] around September 2021, details unclear, but seem to have suffered spinal fracture.     Hospitalized from 12/17 to 12/23/21 at [facility] for falls, weakness, alcoholism, and alcoholic hepatitis; discharge summary not available. Wife (who was primary care giver) was having medical issues and needed neurology care in the [facility]. Family felt resident unable to safely be left alone so they called EMS to transport to hospital for ongoing care/ETOH detox. [facility] where wife was convalescing.      [facility]ained [facility] from 12/23 and 3/15/22 with largely uneventful stay. Zyprexa dose increased with good effect on mood, prior to starting medication having hallucinations, delusions, and wandering on unit. Transferred to [facility] for permanent placement.    ER  visit on 6/30/22 due to chest pain. Resident was watching a baseball game when he experienced left-sided chest pain.Treated by EMS with aspirin and nitroglycerin with slight relief. Reported pain worse with taking deep breaths and related to anxiety. EKG showed atrial fibrillation with controlled rate; chronic. Chest CT showed 4.6 cm ascending thoracic aortic aneurysm, but was negative for plumonary embolism. Cardiothoracic surgery consulted given size of aneurysm and lack of other connective tissue disorder recommended outpatient follow-up with repeat chest CT and echocardiogram in 3 months to evaluate for progression. Chest x-ray showed left basilar pneumonia and chest CT showed fluid-filled bronchi bilateral lower lobes. Labs significant for negative troponin, ACS excluded. ER physician noted tenderness over left chest wall and cough over last month. Hx of dysphagia and increased aspiration risk. Symptoms most likely consistent with pneumonia discharged back to assisted living facility with Augmentin.    Repeat CXR on 8/4/22 due to subjective shortness of breath showed possible left lower lobe patchy opacification, as well as low lung volumes with vascular crowding and basilar atelectasis. Started Augmentin and symptoms improved.    On 9/13/22 prostate biopsy canceled due to hyponatremia. Subsequently hospitalized at [facility] on 9/30/22 with fall and confusion, found to have severe hyponatremia (Na+ 114). Prior to hospitalization had loose stools and poor intake in setting of prophylactic antibiotic (Bactrim) prescribed prior to planned prostate biospy which was canceled due to low sodium (at 129). Started on IVF due to concern for hypovolemic hyponatremia with some improvement. 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 Treated with 1.2 L/day fluid restriction and sodium improved. Confusion improved with treatment of hyponatremia.   Also noted to have back pain after fall.  Lumbar CT showed age indeterminate compression deformity of L2-L5 with chronic mild compression deformity at superior endplate of L1. Neurosurgery consulted and recommended conservative mgmt with brace. Recommended PT in TCU at hospital discharge due to difficult with ambulation.   Medications for other chronic conditions continued without change.  Interval Echo completed, with EF 55%, right ventricle mildly dilated with mildy decreased systolic function, aortic valve mild-moderate stenosis, ascending aorta moderately dilated.     Resident recovered at [facility] from 10/6 to 11/03/22. Pain controlled with Tylenol and gabapentin.  Wearing TLSO when out of bed.  Hyponatremia felt to be due to volume depletion and SIADH. Improved and able to discontinue fluid restriction. Discharged back to [facility].    Underwent prostate biopsy 1/11/2023. Pathology positive for [place] 7 prostate cancer, completed course of radiation therapy.     Complete neuropsych testing with Dr. Brock on 5/12/23 - please see report in Epic, thought to have major neurocognitive disorder (dementia) and need assistance with IADLs. Estranged from family who are listed his health care agents, but have elected not to move him off of memory care unit.     Started with nosebleed on 2/8/2024.  Bleeding was controlled in ER by silver nitrate, Afrin nasal spray, lidocaine with epinephrine, and pressure.  Labs reassuring, no sign of coagulopathy. Discharged back to assisted living facility.    Sent to ER 3/12/2024 due to fever and hypoxia. Patient was placed on supplemental oxygen by EMS and sats oxygen saturation improved. Weaned off of oxygen doing during ER stay after receiving DuoNeb. ER physician noted expiratory wheezing which improved after after nebulizer treatment. BNP elevated on labs but lower than it was 1 year ago. Chronic bilateral lower extremity swelling, stopped wearing Velcro compression wraps per patient preference.  No history of congestive  heart failure or taking diuretics in the past last echo in July 2023 with ejection fraction of 60 to 65% along with moderate aortic stenosis, resident in permanent atrial fibrillation but not anticoagulated due to fall risk.  Troponin negative x 2, ACS not suspected by ER team. Chest x-ray did not show any acute abnormality.  Viral respiratory panel was negative. Labs are significant for hyponatremia and leukocytosis. Physician offered admission for diuresis and monitoring but patient refused and was discharged back to his assisted living.    Hospitalized at [facility] from 3/19 to 3/20/2024 and observation with generalized weakness and headache.  Per hospital discharge: In ED patient afebrile and not tachycardic, normotensive to mildly hypertensive.  No hypoxia, CBC without acute abnormality.  BMP with mild hyponatremia with sodium of 128 (was 129 on 3/12/2024), and potassium 3.3.  Lactic acid within normal limits.  BNP elevated at 3400 but stable from check on 3/12.  COVID and influenza negative.  Chest x-ray did not show any acute abnormality or significant fluid overload. CT head showed no acute abnormality.  Treated 20 mg of IV Lasix.  During hospitalization remained stable and able to take p.o.'s.  Discharged back to assisted living with plan for PT/OT.    On 4/11 nursing requested urgent visit due to increased care needs, new incontinence and weakness. Nursing felt strongly they were unable to meet care needs due to weakness, staying in bed, making wife complete ADL care instead of staff. Willing to go to TCU to get more nursing care and therapy.    Today's concern is:   TCU follow-up visit.    Remained at [facility] from 4/11 to 5/7/24 to completed therapy.   Good progress with therapy: Transfers SBA, SBA with bed mobility, ambulating 222ft FWW CGA, independent with eating and grooming, supervision with dressing and toileting.   During TCU stay UA/UC negative.   No change to psychotropic  "medications.  Agreeable to compression socks, learned to use sock aid to don socks independently.   SLP saw resident during TCU stay recommended regular diet with DD2 consistency and nectar thick liquids.  Saw ENT for nose bleed on 4/30 - see note in Epic.  Worsening anemia during TCU stay with Hgb trending down, started on iron supplement and continued on PPI.    Of TCU stay reports, \"It was a quick!\"  Reports he felt he was \"dumped\" over there - does not recall with accuracy events leading up to transfer.    No SOB.  No chest pain today.  CP \"always a concern\" - ongoing for 30 years, related to anxiety \"not getting too stressed out.  Cough \"at times\" - can't indentify what starts or stops it.  Discussed modified diet. Not following diet here. Trouble swallowing foods with specific consistency.   Feels he hasn't \"seen the right doctor.\"    EGD 6/1/22  - Normal examined duodenum.               - Normal stomach.               - Esophageal mucosal changes suggestive of short-segment Vickers's esophagus              - Abnormal esophageal motility, suspicious for presbyesophagus.     Bowels \"pretty good.\"  No change in urinary habits, no dysuria.   Chronic incontinence, wear incontinence brief.     Sleeping in recliner or hospital bed.  Not wearing compression socks \"that is a pain in the neck!\"     Spirits not holding up well.  \"We're not solving any problems.\"  Discussed concerns.  Doesn't want to live apart from wife with limited him he has left.   Reports he is talking to attorneys about [place] and worried about issues \"pulling family apart.\"   Feels there are \"untruths\" - cannot coherently expound on this.   Family still in discord, kids told him \"its too late.\"  Unable to name alternative decision maker.   Family meeting within the last year to discuss these issues - ombufranchescaman present and felt memory long-term appropriate.    Allergies, and PMH/PSH reviewed in [facility] today.    MED REC REQUIRED  Post Medication " "Reconciliation Status:  Discharge medications reconciled and changed, see notes/orders    REVIEW OF SYSTEMS:  Limited secondary to cognitive impairment but today pt reports history as per HPI.    Objective:   /66   Pulse 62   Temp 97.2  F (36.2  C)   Resp 16   Ht 1.727 m (5' 8\")   Wt 94.3 kg (208 lb)   SpO2 92%   BMI 31.63 kg/m    GENERAL APPEARANCE:  Alert, in no distress, sitting up in chair.  HEENT: Sclera clear and conjunctiva normal. NC/AT. No pharyngeal injection or exudate.  RESP:  Non-labored breathing, no respiratory distress, Lung sounds decreased throughout, no adventitious breath sounds, patient is on room air.  CV: Rate and rhythm irregular no murmur, no rub or gallop.   VASCULAR: 3+ edema bilateral lower extremities, not wearing compression wraps.   ABDOMEN:  Normal bowel sounds, soft, nontender, no grimacing or guarding with palpation, + umblical hernia, soft/non-tender.   SKIN: Limited exam as fully dressed. Tan patch with central purple nodule to right cheek - see photo below.  PSYCH: Awake and alert, speech fluent,  insight/judgement/memory impaired.    Right Cheek      Recent labs in [facility] reviewed by me today.   CBC RESULTS:   Recent Labs   Lab Test 05/01/24  0501 04/26/24  1328 04/19/24  0846 04/17/24  0915   WBC  --  8.4  --  8.3   RBC  --  3.28*  --  3.34*   HGB 8.3* 8.3*   < > 8.5*   HCT  --  27.7*  --  28.6*   MCV  --  85  --  86   MCH  --  25.3*  --  25.4*   MCHC  --  30.0*  --  29.7*   RDW  --  20.2*  --  19.5*   PLT  --  350  --  442    < > = values in this interval not displayed.       Last Basic Metabolic Panel:  Recent Labs   Lab Test 04/26/24  1328 04/17/24  0915    137   POTASSIUM 4.1 4.3   CHLORIDE 100 97*   JOSUE 8.4* 8.2*   CO2 28 29   BUN 11.9 8.8   CR 0.46* 0.50*   GLC 89 69*     Ferritin   Date Value Ref Range Status   04/19/2024 138 31 - 409 ng/mL Final     Iron   Date Value Ref Range Status   04/19/2024 34 (L) 61 - 157 ug/dL Final     Iron Binding Capacity " "  Date Value Ref Range Status   04/19/2024 194 (L) 240 - 430 ug/dL Final   09/15/2022 374 240 - 430 ug/dL Final     Liver Function Studies -   Recent Labs   Lab Test 04/11/24  0724 03/21/24  0651   PROTTOTAL 6.9 7.0   ALBUMIN 2.7* 2.9*   BILITOTAL 0.2 0.2   ALKPHOS 126 125   AST 13 14   ALT <5 <5       TSH   Date Value Ref Range Status   04/11/2024 1.69 0.30 - 4.20 uIU/mL Final   06/07/2023 1.68 0.30 - 4.20 uIU/mL Final   04/28/2022 2.77 0.40 - 4.00 mU/L Final   04/07/2022 3.94 0.40 - 4.00 mU/L Final     Lab Results   Component Value Date    A1C 5.5 04/28/2022    A1C 5.6 04/07/2022     Recent Labs   Lab Test 01/05/23  1000 04/28/22  0655   CHOL 123 101   HDL 46 40   LDL 60 50   TRIG 84 54     Assessment/Plan:  (M62.81) Generalized weakness (primary encounter diagnosis)  (R53.81) Physical deconditioning    Comment: Chronic insetting of multi-comorbidity.   Improved after course of therapy in TCU.  Plan:   - HH PT/OT continue  - Continue to be benefit from supportive care in [facility] memory care setting    (R32) Urinary incontinence   (L25.8,  R32) Incontinence associated dermatitis  Comment: Chronic  S/p treatment for prostate cancer as above.  Plan:   - Continue menthol-zinc oxide (CALMOSEPTINE) 0.44-20.6 % OINT ointment QID  - Staff to assist with toileting and incontinence care    (F03.90) Major neurocognitive disorder (H) - dementia   Comment: Chronic  History of delusions and paranoia.  Please see neuropsych testing from 5/12/2023, \"I believe that Jamie is not capable of making reasoned decisions. He should continue to receive a high-level of care that includes other managing medications, finances, and limiting access to alcohol.\"   Please see interdisciplinary team care conference note from 7/25/2023.   Wife and family to assist with decision making as these are his next of kin and there is no healthcare directive. Considered moving off memory care unit if he had appropriate supports, unwilling to let family assist " him at present and family does not want him moved off of memory care unit as they feel this is safest environment for him.  Plan:   - Continues to benefit from supportive care in memory care [facility] setting  - Continue Seroquel due to paranoid delusions causing distress (ie thinks phones are bugged, people out to get him)  - Consider ophthalmology follow-up for field cut. 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   Currently no access to ETOH and thus is abstinent.  Chronic low mood, suspect JOSÉ MANUEL with panic attacks/chest pain for many years, no documented history of myocardial infarction or ASCVD.   On memory care unit due to safety concerns, needs assistance with IADLs/ADLs.  Finds gabapentin helpful.  Plan:   - Continue gabapentin 300 mg q HS (dose increase 4/1/24)  - Continue Gabapentin 100 mg q AM and 100 mg BID PRN for anxiety and pain.   - Continue mirtazpaine 15 mg PO q HS (started 11/17/22, dose increase 1/27/23)  - Continue Seroquel 12.5 mg BID and BID PRN for psychotic features   - [facility] -has excellent relationship with Kimberly Wick.  - Offered psychiatry referral in past, but pt/family prefer onsite care, [facility] PharmD follows         (R60.0) BL lower extremity edema  Comment: Chronic concern  Last echo in July 2023 see result in epic.  Elevated BNP but no history of heart failure exacerbation, no volume overload on chest x-ray.  Treated with 1 dose of IV Lasix in hospital in March.  Legs are swollen in setting of dependent edema and obesity.  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 Solidum stable today.  Previous hyponatremia multifactorial, occurred in setting of SIADH, dose increase of selective serotonin reuptake inhibitor (Celexa), Bactrim, hypovolemia.  Stopped Celexa 9/29/22, now on Mirtazapine.  Last Na+ 139 WNL on 4/26/24  Plan:   -Fluid restriction 1800 mL/day, no monitoring of this in [facility] setting   -Follow interval labs    (I48.0) Afib   Comment: Chronic and asymptomatic, junctional rhythm in ER  History of atrial fibrillation, low heart rate on previous ECG while in hospital.  F/b cardiology Dr. Ross.  Completed Zio patch September 2023 -see report in Epic.  Persistent atrial fibrillation with average heart rate of 58 bpm, pauses of 3.3 seconds, frequent PVCs, intermittent bundle branch block.  Considered Watchman device, but patient declined invasive intervention; pt/family aware of risk.  HAS-BLED 2: 4.1%  CHADs-VASC: 3% (Age, HTN)   See cardiology notes in [facility].  Plan:   - Cardiology follow-up 6/28/24 with Dr. Ross     (I10) Essential hypertension, benign  Comment: Chronic  BP at goal of < 150/90 given age and comorbid conditions  Stopped ASA due to hemoptysis.  BP Readings from Last 3 Encounters:   05/13/24 114/66   05/02/24 (!) 145/70   04/29/24 116/74   Plan:   - Monitor routine VS and labs off anti-hypertensive medications    (I71.2) Thoracic aortic aneurysm without rupture (H) - 6/30/22 4.6 cm  Comment: New finding in 2022, follows with vascular, last visit with  on 7/28/2023.  Plan:   - Per vascular notes, \"Plan for echocardiogram in June 2024 then followed by office visit order placed Rest of the medical management per primary care physician.\"     (C61) Prostate Cancer - adenocarcinoma Haily 4+3=7  Comment: Improved  Prostate biopsy 1/11/2023,pathology positive for Haily 7 prostate cancer.   Stopped Trospium " "due to anticholinergic side effects, no change in urinary symptoms.  Followed by Dr. Niraj Larry [facility] - last visit 6/2/23.  Completed external beam radiation therapy w/o androgen depravation therapy with [facility] under care of Dr Fleming.   Some dysuria s/p treatment, UC negative for infection.  PSA is now 2.67.  Last urology visit 1/4/24.  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  Reported recurrent coughing up blood, but not recently.  Saw ENT in 2022, no explanation of symptoms.  Last esophagram May 2022 as above, presbyesophagus, proximal luminal narrowing.  Recent saw SLP 8/2022, no overt dysphagia.  Recurrent pneumonia, last 8/4/22.  CXR 10/1/22, 3/28/23 without concern for PNA.  Chest CT showed, \"Trace scattered atelectasis and/or fibrosis.  Elevated left hemidiaphragm. No effusions or pneumothorax.\"  Plan:   - SLP consult, requested add to HH orders  - GI referral per patient request    (D64.9) Normocytic Anemia  Comment: Chronic, mild, baseline ~ 10.  Hgb down to 8.3 on 5/1/24.  No symptoms of acute bleed.  Plan:   - Check CBC with diff, iron, TIBC, Ferritin, B12, Folate   - Continue iron supplement (started 3/25/24)  - Continue PPI    (K59.04) Chronic constipation  Comment: Chronic.   Having regular BMs.  Bowel habits change some s/p prostate radiation.  Long hx of Levsin multiple times per day for abd pain, but stopped by urology when started trospium, no recurrent pain.   Plan:   - Chronic polyethylene glycol (MIRALAX) 17 GM/Dose powder; 1-2 capfuls in " 8 oz of ORANGE JUICE PO one time each morning and 1-2 capfuls 8 oz of ORANGE JUICE PO one time daily PRN for constipation.   - GI following PRN    Orders:  - Add  SLP  - GI referral   - Dermatology referral   - Message sent to [facility]  to assist with appointments    Total time spent with patient visit at the [facility] was 60 minutes includin:30 AM to 12:15 PM (45 minutes) Initial chart review, medication reconciliation, and patient visit for H&P.   12:15 PM to 12:30 pm (15 minutes) Additional chart review and medication reconciliation.    Electronically signed by: MARICHUY Basilio CNP         Sincerely,        MARICHUY Basilio CNP

## 2024-05-22 ENCOUNTER — DOCUMENTATION ONLY (OUTPATIENT)
Dept: GASTROENTEROLOGY | Facility: CLINIC | Age: 84
End: 2024-05-22
Payer: COMMERCIAL

## 2024-05-22 NOTE — PROGRESS NOTES
Requesting previous gastroenterology records for patient:    Who requested the records: Milton PAPPAS  Date requested:05/22/24 @ 1138  Why records were requested:needed for triage process  What records have been requested:other records x 3 years    When records were received:05/22/24 @ 0075    Where were records requested from:    [facility]  [address]  [address] (use the outside entrance)  [place] [address]  [facility]    Ph: [phone]    Felicia Lomas LPN

## 2024-05-22 NOTE — PATIENT INSTRUCTIONS
Jamie Valdivia  1940  ORDERS:  - 5/30/24: CBC with diff, iron, TIBC, Ferritin, B12, Folate dx anemia   Electronically signed by:   MARICHUY Basilio CNP  05/21/24 10:37 PM

## 2024-05-24 ENCOUNTER — PATIENT OUTREACH (OUTPATIENT)
Dept: GERIATRIC MEDICINE | Facility: CLINIC | Age: 84
End: 2024-05-24
Payer: COMMERCIAL

## 2024-05-24 NOTE — PROGRESS NOTES
[facility] Care Coordination Contact    Arranged Special transportation thru [facility] -[phone] for the below appt:  Appt Date & Time: 6/6 at 1:15pm  Clinic Name & Address:  [facility], [address] [place] [address]  Transportation Provider: JANIE [phone]   time:  12:15pm  Return ride  time: will call    2 adults w/walkers (assistance folding walkers)     Notified member of  time.    Pat Varma  Case Management Specialist  [facility]  [phone]

## 2024-05-28 DIAGNOSIS — D64.9 ANEMIA, UNSPECIFIED TYPE: Primary | ICD-10-CM

## 2024-05-29 ENCOUNTER — LAB REQUISITION (OUTPATIENT)
Dept: LAB | Facility: CLINIC | Age: 84
End: 2024-05-29
Payer: COMMERCIAL

## 2024-05-29 DIAGNOSIS — D64.9 ANEMIA, UNSPECIFIED: ICD-10-CM

## 2024-05-29 RX ORDER — FERROUS SULFATE 325(65) MG
325 TABLET ORAL
Qty: 90 TABLET | Refills: 3 | Status: SHIPPED | OUTPATIENT
Start: 2024-05-29

## 2024-05-30 LAB
BASOPHILS # BLD AUTO: 0 10E3/UL (ref 0–0.2)
BASOPHILS NFR BLD AUTO: 0 %
EOSINOPHIL # BLD AUTO: 0.2 10E3/UL (ref 0–0.7)
EOSINOPHIL NFR BLD AUTO: 2 %
ERYTHROCYTE [DISTWIDTH] IN BLOOD BY AUTOMATED COUNT: 18.8 % (ref 10–15)
FOLATE SERPL-MCNC: 3.6 NG/ML (ref 4.6–34.8)
HCT VFR BLD AUTO: 27.8 % (ref 40–53)
HGB BLD-MCNC: 8.2 G/DL (ref 13.3–17.7)
IMM GRANULOCYTES # BLD: 0 10E3/UL
IMM GRANULOCYTES NFR BLD: 0 %
IRON BINDING CAPACITY (ROCHE): 230 UG/DL (ref 240–430)
IRON SATN MFR SERPL: 11 % (ref 15–46)
IRON SERPL-MCNC: 25 UG/DL (ref 61–157)
LYMPHOCYTES # BLD AUTO: 1.7 10E3/UL (ref 0.8–5.3)
LYMPHOCYTES NFR BLD AUTO: 22 %
MCH RBC QN AUTO: 25.2 PG (ref 26.5–33)
MCHC RBC AUTO-ENTMCNC: 29.5 G/DL (ref 31.5–36.5)
MCV RBC AUTO: 85 FL (ref 78–100)
MONOCYTES # BLD AUTO: 0.7 10E3/UL (ref 0–1.3)
MONOCYTES NFR BLD AUTO: 9 %
NEUTROPHILS # BLD AUTO: 5.1 10E3/UL (ref 1.6–8.3)
NEUTROPHILS NFR BLD AUTO: 67 %
NRBC # BLD AUTO: 0 10E3/UL
NRBC BLD AUTO-RTO: 0 /100
PLATELET # BLD AUTO: 385 10E3/UL (ref 150–450)
RBC # BLD AUTO: 3.26 10E6/UL (ref 4.4–5.9)
WBC # BLD AUTO: 7.7 10E3/UL (ref 4–11)

## 2024-05-30 PROCEDURE — P9604 ONE-WAY ALLOW PRORATED TRIP: HCPCS | Mod: ORL | Performed by: NURSE PRACTITIONER

## 2024-05-30 PROCEDURE — 82607 VITAMIN B-12: CPT | Mod: ORL | Performed by: NURSE PRACTITIONER

## 2024-05-30 PROCEDURE — 83550 IRON BINDING TEST: CPT | Mod: ORL | Performed by: NURSE PRACTITIONER

## 2024-05-30 PROCEDURE — 85025 COMPLETE CBC W/AUTO DIFF WBC: CPT | Mod: ORL | Performed by: NURSE PRACTITIONER

## 2024-05-30 PROCEDURE — 82746 ASSAY OF FOLIC ACID SERUM: CPT | Mod: ORL | Performed by: NURSE PRACTITIONER

## 2024-05-31 ENCOUNTER — TELEPHONE (OUTPATIENT)
Dept: GERIATRICS | Facility: CLINIC | Age: 84
End: 2024-05-31
Payer: COMMERCIAL

## 2024-05-31 LAB — VIT B12 SERPL-MCNC: 193 PG/ML (ref 232–1245)

## 2024-05-31 RX ORDER — FOLIC ACID 1 MG/1
1 TABLET ORAL DAILY
COMMUNITY
Start: 2024-05-31 | End: 2024-06-18

## 2024-05-31 NOTE — TELEPHONE ENCOUNTER
[facility] Lab Note     Provider: MARICHUY Basilio CNP  Facility: [facility] Type:  AL    Allergies   Allergen Reactions    Ativan [Lorazepam]        Labs Reviewed by provider: Heme 1, iron studies, B12, folate---from 5/30/24     Verbal Order/Direction given by Provider: - Send stool for FIT test   - Start Folic Acid 1 mg PO once daily dx folic deficiency     May need IV iron, not absorbing oral, Transferrin Saturation = 10.87%, will schedule for visit next week to address.     Provider giving Order:  MARICHUY Basilio CNP    Verbal Order given to: Landy([phone])    Quinn Cash RN

## 2024-06-03 ENCOUNTER — TELEPHONE (OUTPATIENT)
Dept: GERIATRICS | Facility: CLINIC | Age: 84
End: 2024-06-03
Payer: COMMERCIAL

## 2024-06-03 RX ORDER — LANOLIN ALCOHOL/MO/W.PET/CERES
1000 CREAM (GRAM) TOPICAL DAILY
COMMUNITY
Start: 2024-06-03 | End: 2024-06-18

## 2024-06-03 NOTE — PROGRESS NOTES
[facility] GERIATRICS    Chief Complaint   Patient presents with    Assisted Living Acute     anemia     HPI:  Jamie Valdivia is a 84 year old  (1940) PMH significant for GERD with esophagitis, OA BL knees, pharyngeal dysphagia, edema, abdominal wall hernia, chronic atrial fibrillation (decline AC or Watchman), HTN, osteoporosis with hx of vertebral fracture, dementia, anemia, hx of COVID-19, who is being seen today for an episodic care visit at: [facility]) [61].     Resident of [facility] since March 2022.    Today's concern is:   Follow-up today due to anemia.     Reviewed recent history.   Hospitalized from 3/19 to 3/20/24 with generalized weakness and headache. Work-up negative except for hyponatremia and elevated BNP. Treated with IV Laisx and discharged.  After return from hospital had sharp decline in functional mobility. Transferred as direct admission to [facility] TCU where he completed course of therapy. Ultimately  able to ambulate 222 feet and was independent or stand by assist for ADL care.    Unfortunately, staff report resident is again having decline in functional mobility.   Now wearing three continence briefs so he doesn't need to get up to use the bathroom.  Refusing medications.  Declines to wear compression stockings for significant BL LE edema.  When asked about this starts yelling about raised toilet seat.  Denies any alcohol consumption.  Wife resides in regular [facility] apartment and comes and goes freely from memory care unit.    Just prior to visit having episode of epistaxis.   Happens q 3-4 months.  Saw ENT on 4/30/24.  Recommendation from note reviewed.     Wife Gisela present and assists in providing history.  When asked about how he's feeling starts to discuss concerns with facility again.    No melena or hematochezia.  No hematuria.    GI follow-up ordered one month ago due to dysphagia, thinks he has upcoming appointment, looks to be  "scheduled for Feb 2025.    Difficult to obtain coherent history.  Insulting provider and yelling.  After several insults, provider attempted to leave the room, after offering to come back at different time.  Resident yells, \"You can't handle this!\" & \"You're not strong enough.\"  Wife then yells \"SHUT UP!\"  Provider excused herself when nursing staff came to administer Afrin as requested.    Allergies, and PMH/PSH reviewed in [facility] today.    MED REC REQUIRED  Post Medication Reconciliation Status:  Patient was not discharged from an inpatient facility or TCU but medications were reconciled per facility EMR.    REVIEW OF SYSTEMS:  Limited secondary to cognitive impairment but today pt reports  history as per HPI.    Objective:   Pulse 71   Ht 1.727 m (5' 8\")   Wt 92.5 kg (204 lb)   SpO2 93%   BMI 31.02 kg/m    Resident verbally aggressive, allows for limited exam.  GENERAL APPEARANCE:  Alert, chronically ill appearing, sitting up in chair.  HEENT: Sclera clear and conjunctiva normal. NC/AT. Irregular excoriations to palate, no pharyngeal injection or exudate. + epistaxis from left nare, unable to visualize source of bleeding with otoscope. Instructed staff to instill 4 sprays of Aftrin in both nares and hold pressure x 15 minutes.  RESP:  Non-labored breathing, no respiratory distress, Lung sounds decreased throughout, no adventitious breath sounds, patient is on room air, decreased BL bases.  CV: Rate and rhythm regular, no murmur, no rub or gallop.   VASCULAR: 3+ edema bilateral lower extremities, not wearing compression wraps.   ABDOMEN:  Normal bowel sounds, soft, nontender, no grimacing or guarding with palpation, + umblical hernia, soft/non-tender.   SKIN: Limited exam as fully dressed. Tan patch with central purple nodule to right cheek.  PSYCH: Awake and alert, speech fluent,  insight/judgement/memory impaired.    Recent labs in [employer] reviewed by me today.   CBC RESULTS:   Recent Labs   Lab Test " 05/30/24  0630 05/01/24  0501 04/26/24  1328   WBC 7.7  --  8.4   RBC 3.26*  --  3.28*   HGB 8.2* 8.3* 8.3*   HCT 27.8*  --  27.7*   MCV 85  --  85   MCH 25.2*  --  25.3*   MCHC 29.5*  --  30.0*   RDW 18.8*  --  20.2*     --  350     Ferritin   Date Value Ref Range Status   04/19/2024 138 31 - 409 ng/mL Final     Iron   Date Value Ref Range Status   05/30/2024 25 (L) 61 - 157 ug/dL Final     Iron Binding Capacity   Date Value Ref Range Status   05/30/2024 230 (L) 240 - 430 ug/dL Final   09/15/2022 374 240 - 430 ug/dL Final     B12      Folate      Last Basic Metabolic Panel:  Recent Labs   Lab Test 04/26/24  1328 04/17/24  0915    137   POTASSIUM 4.1 4.3   CHLORIDE 100 97*   JOSUE 8.4* 8.2*   CO2 28 29   BUN 11.9 8.8   CR 0.46* 0.50*   GLC 89 69*     Liver Function Studies -   Recent Labs   Lab Test 04/11/24  0724 03/21/24  0651   PROTTOTAL 6.9 7.0   ALBUMIN 2.7* 2.9*   BILITOTAL 0.2 0.2   ALKPHOS 126 125   AST 13 14   ALT <5 <5       TSH   Date Value Ref Range Status   04/11/2024 1.69 0.30 - 4.20 uIU/mL Final   06/07/2023 1.68 0.30 - 4.20 uIU/mL Final   04/28/2022 2.77 0.40 - 4.00 mU/L Final   04/07/2022 3.94 0.40 - 4.00 mU/L Final     Lab Results   Component Value Date    A1C 5.5 04/28/2022    A1C 5.6 04/07/2022     Recent Labs   Lab Test 01/05/23  1000 04/28/22  0655   CHOL 123 101   HDL 46 40   LDL 60 50   TRIG 84 54     EGD 6/1/2022  Impression:        - Normal examined duodenum.                             - Normal stomach.                             - Esophageal mucosal changes suggestive of                             short-segment Vickers's esophagus. Biopsied.                             - Abnormal esophageal motility, suspicious for                             presbyesophagus.       Assessment/Plan:  (D64.9) Normocytic Anemia  Comment: Chronic, mild, baseline ~ 10.  Hgb down to 8.2 on 5/30 with B12, Folate, and iron deficiency.  No symptoms of acute bleed.  Resident does not deny using  "alcohol, but theoretically has no access in locked memory care unit.  Plan:   - Check CBC with diff, iron, TIBC, Ferritin, B12, Folate   - Continue iron supplement (started 3/25/24)  - Continue B12 supplement (started 6/5/24)  - Continue Folate supplement (started 6/1/24)  - Recheck Hgb and BMP 6/6  - Continue PPI in place for BE    (M62.81) Generalized weakness   (R53.81) Physical deconditioning    Comment: Chronic insetting of multi-comorbidity.   Improved after course of therapy in TCU, but now staff concerned he is again not accepting of assistance. High risk for skin breakdown in setting of urinary incontinence.   Plan:   - Continues to be benefit [facility], but may do better in LTC with more staff assistance.  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  Wearing x3 briefs so he doesn't have to get up to bathroom  Plan:   - Continue menthol-zinc oxide (CALMOSEPTINE) 0.44-20.6 % OINT ointment QID as resident will allow  - Staff to assist with toileting and incontinence care as able  - Reviewed risk for skin breakdown with patient and wife today, recommend resident allow staff to assist in keeping skin clean and dry    (F03.90) Major neurocognitive disorder (H) - dementia   Comment: Chronic  History of delusions and paranoia.  Please see neuropsych testing from 5/12/2023, \"I believe that Jamie is not capable of making reasoned decisions. He should continue to receive a high-level of care that includes other managing medications, finances, and limiting access to alcohol.\"   Please see interdisciplinary team care conference note from 7/25/2023.   Wife and family to assist with decision making as these are his next of kin and there is no healthcare directive. Considered moving off memory care unit if he had appropriate supports, unwilling to let family " assist him at present and family does not want him moved off of memory care unit as they feel this is safest environment for him.  Verbally aggressive today.  Plan:   - Continues to benefit from supportive care in memory care [facility] setting  - Continue Seroquel due to paranoid delusions causing distress (ie thinks phones are bugged, people out to get him)  -Continue Mirtazapine 15 mg q HS  - Consider ophthalmology follow-up for field cut. 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   Currently no access to ETOH and thus is abstinent, but does not deny alcohol consumption today.  Chronic low mood, suspect JOSÉ MANUEL with panic attacks/chest pain for many years, no documented history of myocardial infarction or ASCVD.   On memory care unit due to safety concerns, needs assistance with IADLs/ADLs.  Finds gabapentin helpful.  Plan:   - Continue gabapentin 300 mg q HS (dose increase 4/1/24)  - Continue Gabapentin 100 mg q AM and 100 mg BID PRN for anxiety and pain.   - Continue mirtazpaine 15 mg PO q HS (started 11/17/22, dose increase 1/27/23)  - Continue Seroquel 12.5 mg BID and BID PRN for psychotic features   - [facility] brandee counselor following closely -has excellent relationship with Kimberly Wick.  - Offered psychiatry referral in past, but pt/family prefer onsite care  - [facility]TM PharmD follows         (R60.0) BL lower extremity edema  Comment: Chronic concern  Last echo in July 2023 see result in epic.  Elevated BNP but no history of heart failure exacerbation, no volume overload on chest x-ray.  Treated with 1 dose of IV Lasix in hospital in March.  Legs are swollen in setting of dependent edema and obesity.  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 Solidum stable today.  Previous hyponatremia multifactorial, occurred in setting of SIADH, dose increase of selective serotonin reuptake inhibitor (Celexa), Bactrim, hypovolemia.  Stopped Celexa 9/29/22, now on Mirtazapine.  Last Na+ 139 WNL on 4/26/24  Plan:   -Fluid restriction 1800 mL/day, no monitoring of this in group home setting, no likely following  -Follow interval labs, BMP for 6/6    Orders:  - Discontinue guaifenesin, no use since August   - Discontinue Mintox, no use since July   - On 6/6/24: CMP, Hgb     Electronically signed by: MARICHUY Basilio CNP

## 2024-06-03 NOTE — TELEPHONE ENCOUNTER
[facility] Lab Note     Provider: MARICHUY Basilio CNP  Facility: [facility] Type:  AL    Allergies   Allergen Reactions    Ativan [Lorazepam]        Labs Reviewed by provider: B12---from 5/30/24     Verbal Order/Direction given by Provider: start vitamin B12 1000 mcg PO once  daily, dx vitamin B12 deficiency     Provider giving Order:  MARICHUY Lindsey CNP    Verbal Order given to: Landy Cash RN

## 2024-06-04 ENCOUNTER — ASSISTED LIVING VISIT (OUTPATIENT)
Dept: GERIATRICS | Facility: CLINIC | Age: 84
End: 2024-06-04
Payer: COMMERCIAL

## 2024-06-04 VITALS — HEIGHT: 68 IN | HEART RATE: 71 BPM | BODY MASS INDEX: 30.92 KG/M2 | OXYGEN SATURATION: 93 % | WEIGHT: 204 LBS

## 2024-06-04 DIAGNOSIS — D64.9 NORMOCYTIC ANEMIA: ICD-10-CM

## 2024-06-04 DIAGNOSIS — L25.8 INCONTINENCE ASSOCIATED DERMATITIS: ICD-10-CM

## 2024-06-04 DIAGNOSIS — F10.21 ALCOHOL DEPENDENCE IN REMISSION (H): ICD-10-CM

## 2024-06-04 DIAGNOSIS — R32 INCONTINENCE ASSOCIATED DERMATITIS: ICD-10-CM

## 2024-06-04 DIAGNOSIS — F03.90 MAJOR NEUROCOGNITIVE DISORDER (H): ICD-10-CM

## 2024-06-04 DIAGNOSIS — E22.2 SIADH (SYNDROME OF INAPPROPRIATE ADH PRODUCTION) (H): ICD-10-CM

## 2024-06-04 DIAGNOSIS — F43.23 ADJUSTMENT REACTION WITH ANXIETY AND DEPRESSION: ICD-10-CM

## 2024-06-04 DIAGNOSIS — M62.81 GENERALIZED MUSCLE WEAKNESS: ICD-10-CM

## 2024-06-04 DIAGNOSIS — R32 URINARY INCONTINENCE, UNSPECIFIED TYPE: ICD-10-CM

## 2024-06-04 DIAGNOSIS — R53.81 PHYSICAL DECONDITIONING: Primary | ICD-10-CM

## 2024-06-04 DIAGNOSIS — E87.1 HYPONATREMIA: ICD-10-CM

## 2024-06-04 DIAGNOSIS — R60.0 LOWER EXTREMITY EDEMA: ICD-10-CM

## 2024-06-04 DIAGNOSIS — F41.1 GAD (GENERALIZED ANXIETY DISORDER): ICD-10-CM

## 2024-06-04 PROCEDURE — 99349 HOME/RES VST EST MOD MDM 40: CPT | Performed by: NURSE PRACTITIONER

## 2024-06-04 NOTE — PATIENT INSTRUCTIONS
Jamie Valdivia  1940  ORDERS:  - Discontinue guaifenesin, no use since August   - Discontinue Mintox, no use since July   - On 6/6/24: CMP, Hgb dx anemia   Electronically signed by:   MARICHUY Basilio CNP  06/04/24 4:45 PM

## 2024-06-04 NOTE — LETTER
6/4/2024        RE: Jamie Valdivia  [address]  [address]  [facility] [address]        [facility] GERIATRICS    Chief Complaint   Patient presents with     Assisted Living Acute     anemia     HPI:  Jamie Valdivia is a 84 year old  (1940) PMH significant for GERD with esophagitis, OA BL knees, pharyngeal dysphagia, edema, abdominal wall hernia, chronic atrial fibrillation (decline AC or Watchman), HTN, osteoporosis with hx of vertebral fracture, dementia, anemia, hx of COVID-19, who is being seen today for an episodic care visit at: [facility]) [61].     Resident of [facility] since March 2022.    Today's concern is:   Follow-up today due to anemia.     Reviewed recent history.   Hospitalized from 3/19 to 3/20/24 with generalized weakness and headache. Work-up negative except for hyponatremia and elevated BNP. Treated with IV Laisx and discharged.  After return from hospital had sharp decline in functional mobility. Transferred as direct admission to [facility] TCU where he completed course of therapy. Ultimately  able to ambulate 222 feet and was independent or stand by assist for ADL care.    Unfortunately, staff report resident is again having decline in functional mobility.   Now wearing three continence briefs so he doesn't need to get up to use the bathroom.  Refusing medications.  Declines to wear compression stockings for significant BL LE edema.  When asked about this starts yelling about raised toilet seat.  Denies any alcohol consumption.  Wife resides in regular [facility] apartment and comes and goes freely from memory care unit.    Just prior to visit having episode of epistaxis.   Happens q 3-4 months.  Saw ENT on 4/30/24.  Recommendation from note reviewed.     Wife Gisela present and assists in providing history.  When asked about how he's feeling starts to discuss concerns with facility again.    No melena or hematochezia.  No hematuria.    GI  "follow-up ordered one month ago due to dysphagia, thinks he has upcoming appointment, looks to be scheduled for Feb 2025.    Difficult to obtain coherent history.  Insulting provider and yelling.  After several insults, provider attempted to leave the room, after offering to come back at different time.  Resident yells, \"You can't handle this!\" & \"You're not strong enough.\"  Wife then yells \"SHUT UP!\"  Provider excused herself when nursing staff came to administer Afrin as requested.    Allergies, and PMH/PSH reviewed in [facility] today.    MED REC REQUIRED  Post Medication Reconciliation Status:  Patient was not discharged from an inpatient facility or TCU but medications were reconciled per facility EMR.    REVIEW OF SYSTEMS:  Limited secondary to cognitive impairment but today pt reports  history as per HPI.    Objective:   Pulse 71   Ht 1.727 m (5' 8\")   Wt 92.5 kg (204 lb)   SpO2 93%   BMI 31.02 kg/m    Resident verbally aggressive, allows for limited exam.  GENERAL APPEARANCE:  Alert, chronically ill appearing, sitting up in chair.  HEENT: Sclera clear and conjunctiva normal. NC/AT. Irregular excoriations to palate, no pharyngeal injection or exudate. + epistaxis from left nare, unable to visualize source of bleeding with otoscope. Instructed staff to instill 4 sprays of Aftrin in both nares and hold pressure x 15 minutes.  RESP:  Non-labored breathing, no respiratory distress, Lung sounds decreased throughout, no adventitious breath sounds, patient is on room air, decreased BL bases.  CV: Rate and rhythm regular, no murmur, no rub or gallop.   VASCULAR: 3+ edema bilateral lower extremities, not wearing compression wraps.   ABDOMEN:  Normal bowel sounds, soft, nontender, no grimacing or guarding with palpation, + umblical hernia, soft/non-tender.   SKIN: Limited exam as fully dressed. Tan patch with central purple nodule to right cheek.  PSYCH: Awake and alert, speech fluent,  insight/judgement/memory " impaired.    Recent labs in [facility] reviewed by me today.   CBC RESULTS:   Recent Labs   Lab Test 05/30/24  0630 05/01/24  0501 04/26/24  1328   WBC 7.7  --  8.4   RBC 3.26*  --  3.28*   HGB 8.2* 8.3* 8.3*   HCT 27.8*  --  27.7*   MCV 85  --  85   MCH 25.2*  --  25.3*   MCHC 29.5*  --  30.0*   RDW 18.8*  --  20.2*     --  350     Ferritin   Date Value Ref Range Status   04/19/2024 138 31 - 409 ng/mL Final     Iron   Date Value Ref Range Status   05/30/2024 25 (L) 61 - 157 ug/dL Final     Iron Binding Capacity   Date Value Ref Range Status   05/30/2024 230 (L) 240 - 430 ug/dL Final   09/15/2022 374 240 - 430 ug/dL Final     B12      Folate      Last Basic Metabolic Panel:  Recent Labs   Lab Test 04/26/24  1328 04/17/24  0915    137   POTASSIUM 4.1 4.3   CHLORIDE 100 97*   JOSUE 8.4* 8.2*   CO2 28 29   BUN 11.9 8.8   CR 0.46* 0.50*   GLC 89 69*     Liver Function Studies -   Recent Labs   Lab Test 04/11/24  0724 03/21/24  0651   PROTTOTAL 6.9 7.0   ALBUMIN 2.7* 2.9*   BILITOTAL 0.2 0.2   ALKPHOS 126 125   AST 13 14   ALT <5 <5       TSH   Date Value Ref Range Status   04/11/2024 1.69 0.30 - 4.20 uIU/mL Final   06/07/2023 1.68 0.30 - 4.20 uIU/mL Final   04/28/2022 2.77 0.40 - 4.00 mU/L Final   04/07/2022 3.94 0.40 - 4.00 mU/L Final     Lab Results   Component Value Date    A1C 5.5 04/28/2022    A1C 5.6 04/07/2022     Recent Labs   Lab Test 01/05/23  1000 04/28/22  0655   CHOL 123 101   HDL 46 40   LDL 60 50   TRIG 84 54     EGD 6/1/2022  Impression:        - Normal examined duodenum.                             - Normal stomach.                             - Esophageal mucosal changes suggestive of                             short-segment Vickers's esophagus. Biopsied.                             - Abnormal esophageal motility, suspicious for                             presbyesophagus.       Assessment/Plan:  (D64.9) Normocytic Anemia  Comment: Chronic, mild, baseline ~ 10.  Hgb down to 8.2 on 5/30 with B12,  "Folate, and iron deficiency.  No symptoms of acute bleed.  Resident does not deny using alcohol, but theoretically has no access in locked memory care unit.  Plan:   - Check CBC with diff, iron, TIBC, Ferritin, B12, Folate   - Continue iron supplement (started 3/25/24)  - Continue B12 supplement (started 6/5/24)  - Continue Folate supplement (started 6/1/24)  - Recheck Hgb and BMP 6/6  - Continue PPI in place for BE    (M62.81) Generalized weakness   (R53.81) Physical deconditioning    Comment: Chronic insetting of multi-comorbidity.   Improved after course of therapy in TCU, but now staff concerned he is again not accepting of assistance. High risk for skin breakdown in setting of urinary incontinence.   Plan:   - Continues to be benefit from supportive care in [facility] memory care setting, but may do better in LTC with more staff assistance.  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  Wearing x3 briefs so he doesn't have to get up to bathroom  Plan:   - Continue menthol-zinc oxide (CALMOSEPTINE) 0.44-20.6 % OINT ointment QID as resident will allow  - Staff to assist with toileting and incontinence care as able  - Reviewed risk for skin breakdown with patient and wife today, recommend resident allow staff to assist in keeping skin clean and dry    (F03.90) Major neurocognitive disorder (H) - dementia   Comment: Chronic  History of delusions and paranoia.  Please see neuropsych testing from 5/12/2023, \"I believe that Jamie is not capable of making reasoned decisions. He should continue to receive a high-level of care that includes other managing medications, finances, and limiting access to alcohol.\"   Please see interdisciplinary team care conference note from 7/25/2023.   Wife and family to assist with decision making as these are his next of kin and there is no healthcare directive. " Considered moving off memory care unit if he had appropriate supports, unwilling to let family assist him at present and family does not want him moved off of memory care unit as they feel this is safest environment for him.  Verbally aggressive today.  Plan:   - Continues to benefit from supportive care in memory care senior care setting  - Continue Seroquel due to paranoid delusions causing distress (ie thinks phones are bugged, people out to get him)  -Continue Mirtazapine 15 mg q HS  - Consider ophthalmology follow-up for field cut. 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   Currently no access to ETOH and thus is abstinent, but does not deny alcohol consumption today.  Chronic low mood, suspect JOSÉ MANUEL with panic attacks/chest pain for many years, no documented history of myocardial infarction or ASCVD.   On memory care unit due to safety concerns, needs assistance with IADLs/ADLs.  Finds gabapentin helpful.  Plan:   - Continue gabapentin 300 mg q HS (dose increase 4/1/24)  - Continue Gabapentin 100 mg q AM and 100 mg BID PRN for anxiety and pain.   - Continue mirtazpaine 15 mg PO q HS (started 11/17/22, dose increase 1/27/23)  - Continue Seroquel 12.5 mg BID and BID PRN for psychotic features   - [facility] brandee counselor following closely -has excellent relationship with Kimberly Wick.  - Offered psychiatry referral in past, but pt/family prefer onsite care  - [facility]TM PharmD follows         (R60.0) BL lower extremity edema  Comment: Chronic concern  Last echo in July 2023 see result in epic.  Elevated BNP but no history of heart failure exacerbation, no volume overload on chest x-ray.  Treated with 1 dose of IV Lasix in hospital in March.  Legs are  swollen in setting of dependent edema and obesity.  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 Solidum stable today.  Previous hyponatremia multifactorial, occurred in setting of SIADH, dose increase of selective serotonin reuptake inhibitor (Celexa), Bactrim, hypovolemia.  Stopped Celexa 9/29/22, now on Mirtazapine.  Last Na+ 139 WNL on 4/26/24  Plan:   -Fluid restriction 1800 mL/day, no monitoring of this in group home setting, no likely following  -Follow interval labs, BMP for 6/6    Orders:  - Discontinue guaifenesin, no use since August   - Discontinue Mintox, no use since July   - On 6/6/24: CMP, Hgb     Electronically signed by: MARICHUY Basilio CNP       Sincerely,        MARICHUY Basilio CNP       [No Acute Distress] : no acute distress [Normal Voice/Communication] : normal voice communication [Normal Sclera/Conjunctiva] : normal sclera/conjunctiva [PERRL] : pupils equal, round and reactive to light [Normal Outer Ear/Nose] : the ears and nose were normal in appearance [Supple] : the neck was supple [No Respiratory Distress] : no respiratory distress [Clear to Auscultation] : lungs were clear to auscultation bilaterally [Normal Rate] : heart rate was normal  [Regular Rhythm] : with a regular rhythm [Normal S1, S2] : normal S1 and S2 [No Murmurs] : no murmurs heard [Pedal Pulses Present] : the pedal pulses are present [Normal Bowel Sounds] : normal bowel sounds [Non Tender] : non-tender [Soft] : abdomen soft [Not Distended] : not distended [No Spinal Tenderness] : no spinal tenderness [No Skin Lesions] : no skin lesions [Cranial Nerves Intact] : cranial nerves 2-12 were intact [No Gross Sensory Deficits] : no gross sensory deficits [Oriented x3] : oriented to person, place, and time [Normal Affect] : the affect was normal [Normal Mood] : the mood was normal [Normal Insight/Judgement] : insight and judgment were intact [Kyphosis] : no kyphosis present [de-identified] : pupils dilated [de-identified] : Edema [de-identified] : iraheta draining yellow urine [de-identified] : bed-bound, non-ambulatory. right side weaker than left  [de-identified] : L arm with red/scaly rash [de-identified] : bed-bound. generalized weakness. right side weaker than left  [de-identified] : denies SI

## 2024-06-05 ENCOUNTER — LAB REQUISITION (OUTPATIENT)
Dept: LAB | Facility: CLINIC | Age: 84
End: 2024-06-05
Payer: COMMERCIAL

## 2024-06-05 DIAGNOSIS — D64.9 ANEMIA, UNSPECIFIED: ICD-10-CM

## 2024-06-06 ENCOUNTER — OFFICE VISIT (OUTPATIENT)
Dept: AUDIOLOGY | Facility: CLINIC | Age: 84
End: 2024-06-06
Payer: COMMERCIAL

## 2024-06-06 DIAGNOSIS — H90.3 ASYMMETRIC SNHL (SENSORINEURAL HEARING LOSS): Primary | ICD-10-CM

## 2024-06-06 PROCEDURE — 92550 TYMPANOMETRY & REFLEX THRESH: CPT | Performed by: AUDIOLOGIST

## 2024-06-06 PROCEDURE — 92557 COMPREHENSIVE HEARING TEST: CPT | Performed by: AUDIOLOGIST

## 2024-06-06 NOTE — Clinical Note
FYI patients word rec is concerning, however this is a conservative estimate as he was hard to understand himself. However with the asymmetric word rec and the asymmetric pure tones I wanted to let you know incase you wanted to recommend imaging. I tried to encourage a hearing aid trial. He tried some many years ago and did not feel they were helpful. I explained both his hearing and hearing aids have changed so he should try again, but he felt cupping his right ear provided enough benefit. If we ever want to consider a CI though, we need a recent hearing aid trial.   Thank you for the referral, Rika Guadalupe, CCC-A

## 2024-06-06 NOTE — PROGRESS NOTES
AUDIOLOGY REPORT    SUBJECTIVE:  Jamie Valdivia is a 84 year old male who was seen in the Audiology Clinic at the [facility] for audiologic evaluation, referred by Saad Roberts M.D.. The patient reports a gradual hearing loss bilaterally, but worse in the left. The patient states he has bilateral tinnitus. Patient also mentions bilateral otalgia (started in the right with associated jaw pain and radiating to the left). The patient denies bilateral drainage, bilateral aural fullness, and recent noise exposure.  The patient notes difficulty with communication in a variety of listening situations. They were accompanied today by their wife.    OBJECTIVE:  Abuse Screening:  Do you feel unsafe at home or work/school? No  Do you feel threatened by someone? No  Does anyone try to keep you from having contact with others, or doing things outside of your home? No  Physical signs of abuse present? No     Fall Risk Screen:  1. Have you fallen two or more times in the past year? No  2. Have you fallen and had an injury in the past year? No    Otoscopic exam indicates cerumen bilaterally     Pure Tone Thresholds assessed using conventional audiometry with good  reliability from 250-8000 Hz bilaterally using insert earphones and circumaural headphones     RIGHT:  normal sloping to profound sensorineural hearing loss    LEFT:    moderate-severe sloping to profound rising to moderately-severe sensorineural hearing loss    Tympanogram:    RIGHT: normal eardrum mobility    LEFT:   normal eardrum mobility    Reflexes (reported by stimulus ear):  RIGHT: Ipsilateral is present at normal levels  RIGHT: Contralateral is present at normal levels  LEFT:   Ipsilateral is present at normal levels  LEFT:   Contralateral is present at normal levels      Speech Reception Threshold:    RIGHT: 35 dB HL    LEFT:   55 dB HL  Word Recognition Score:     RIGHT: 48% at 90 dB HL using NU-6 recorded word  list.    LEFT:   20% at 100 dB HL using NU-6 recorded word list.      ASSESSMENT:   Today s results were discussed with the patient in detail. Bilateral hearing aids are recommended. However, the patient does not believe he will see an improvement from the hearing aids since he already tried 4 different pairs in the past and did not see any benefit.     PLAN:  Patient was counseled regarding hearing loss and impact on communication. Patient is a good candidate for amplification at this time, but he does not want  to pursue them at this time.Encouraged him to try again as it has been several years since he tried them.   It is recommended that the patient follow-up with ENT due to significant asymmetry of word recognition and pure tones. .  Please call this clinic with questions regarding these results or recommendations.    MIKO SingletonA  Audiology Doctoral Extern    I was present with the patient for the entire Audiology appointment including all procedures/testing performed by the AuD student, and agree with the student s assessment and plan as documented.       Rika Guadalupe, [facility]  Licensed Audiologist  [place] #[id number]

## 2024-06-07 ENCOUNTER — TELEPHONE (OUTPATIENT)
Dept: OTOLARYNGOLOGY | Facility: CLINIC | Age: 84
End: 2024-06-07
Payer: COMMERCIAL

## 2024-06-07 DIAGNOSIS — H90.3 SENSORINEURAL HEARING LOSS, BILATERAL: Primary | ICD-10-CM

## 2024-06-07 NOTE — TELEPHONE ENCOUNTER
We need to find out if they can take valium for MRI. Where he wants it sent and make sure he has a  for it.    Sarina Wolf LPN

## 2024-06-07 NOTE — TELEPHONE ENCOUNTER
Left a msg with spouse along with left VM on his phone. Pt needs a MRI per Dr Roberts.    Sarina Wolf LPN     Kylah Cervantes, RN

## 2024-06-10 NOTE — TELEPHONE ENCOUNTER
Spoke to the pt who says Valium doesn't do nothing for him for sedation. He is claustrophobic and afraid he might choke as he has swallowing concerns. He is not so sure if he wants to pursue a MRI for the hearing loss. He is not sure if he wants hearing aids. His hearing is low on the list of his concerns as his cancer is more important. Will let Dr Roberts know and decide if he should pursue it or not. Will call back if needs it done.     Sarina Wolf LPN

## 2024-06-11 NOTE — TELEPHONE ENCOUNTER
Spoke to Dr Roberts, who ok'd holding off on MRI at this time. No need to call pt, he knows I would only call if we needed to schedule a MRI.    Sarina Wolf LPN

## 2024-06-12 ENCOUNTER — LAB REQUISITION (OUTPATIENT)
Dept: LAB | Facility: CLINIC | Age: 84
End: 2024-06-12
Payer: COMMERCIAL

## 2024-06-12 DIAGNOSIS — D64.9 ANEMIA, UNSPECIFIED: ICD-10-CM

## 2024-06-18 ENCOUNTER — HOSPITAL ENCOUNTER (EMERGENCY)
Facility: CLINIC | Age: 84
Discharge: NURSING, REHAB OR LONG-TERM CARE | End: 2024-06-18
Attending: EMERGENCY MEDICINE | Admitting: EMERGENCY MEDICINE
Payer: COMMERCIAL

## 2024-06-18 ENCOUNTER — APPOINTMENT (OUTPATIENT)
Dept: CT IMAGING | Facility: CLINIC | Age: 84
End: 2024-06-18
Attending: EMERGENCY MEDICINE
Payer: COMMERCIAL

## 2024-06-18 ENCOUNTER — APPOINTMENT (OUTPATIENT)
Dept: GENERAL RADIOLOGY | Facility: CLINIC | Age: 84
End: 2024-06-18
Attending: SOCIAL WORKER
Payer: COMMERCIAL

## 2024-06-18 VITALS
TEMPERATURE: 97.8 F | SYSTOLIC BLOOD PRESSURE: 159 MMHG | RESPIRATION RATE: 18 BRPM | DIASTOLIC BLOOD PRESSURE: 81 MMHG | HEART RATE: 75 BPM | OXYGEN SATURATION: 100 %

## 2024-06-18 DIAGNOSIS — S01.01XA SCALP LACERATION, INITIAL ENCOUNTER: ICD-10-CM
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
## 2024-06-18 DIAGNOSIS — D64.9 ANEMIA, UNSPECIFIED: Primary | ICD-10-CM

## 2024-06-18 LAB
ATRIAL RATE - MUSE: 91 BPM
ATRIAL RATE - MUSE: 92 BPM
DIASTOLIC BLOOD PRESSURE - MUSE: NORMAL MMHG
DIASTOLIC BLOOD PRESSURE - MUSE: NORMAL MMHG
GLUCOSE BLDC GLUCOMTR-MCNC: 137 MG/DL (ref 70–99)
INTERPRETATION ECG - MUSE: NORMAL
INTERPRETATION ECG - MUSE: NORMAL
P AXIS - MUSE: NORMAL DEGREES
P AXIS - MUSE: NORMAL DEGREES
PR INTERVAL - MUSE: NORMAL MS
PR INTERVAL - MUSE: NORMAL MS
QRS DURATION - MUSE: 140 MS
QRS DURATION - MUSE: 90 MS
QT - MUSE: 372 MS
QT - MUSE: 406 MS
QTC - MUSE: 452 MS
QTC - MUSE: 488 MS
R AXIS - MUSE: -26 DEGREES
R AXIS - MUSE: -35 DEGREES
SYSTOLIC BLOOD PRESSURE - MUSE: NORMAL MMHG
SYSTOLIC BLOOD PRESSURE - MUSE: NORMAL MMHG
T AXIS - MUSE: 118 DEGREES
T AXIS - MUSE: 70 DEGREES
VENTRICULAR RATE- MUSE: 87 BPM
VENTRICULAR RATE- MUSE: 89 BPM

## 2024-06-18 PROCEDURE — 82962 GLUCOSE BLOOD TEST: CPT

## 2024-06-18 PROCEDURE — 93005 ELECTROCARDIOGRAM TRACING: CPT

## 2024-06-18 PROCEDURE — 70450 CT HEAD/BRAIN W/O DYE: CPT

## 2024-06-18 PROCEDURE — 250N000011 HC RX IP 250 OP 636: Mod: JZ | Performed by: EMERGENCY MEDICINE

## 2024-06-18 PROCEDURE — 73110 X-RAY EXAM OF WRIST: CPT | Mod: LT

## 2024-06-18 PROCEDURE — 99285 EMERGENCY DEPT VISIT HI MDM: CPT | Mod: 25

## 2024-06-18 PROCEDURE — 250N000013 HC RX MED GY IP 250 OP 250 PS 637: Performed by: SOCIAL WORKER

## 2024-06-18 PROCEDURE — 90715 TDAP VACCINE 7 YRS/> IM: CPT | Mod: JZ | Performed by: EMERGENCY MEDICINE

## 2024-06-18 PROCEDURE — 90471 IMMUNIZATION ADMIN: CPT | Performed by: EMERGENCY MEDICINE

## 2024-06-18 PROCEDURE — 250N000013 HC RX MED GY IP 250 OP 250 PS 637: Performed by: EMERGENCY MEDICINE

## 2024-06-18 PROCEDURE — 12002 RPR S/N/AX/GEN/TRNK2.6-7.5CM: CPT

## 2024-06-18 PROCEDURE — 72125 CT NECK SPINE W/O DYE: CPT

## 2024-06-18 RX ORDER — LIDOCAINE HYDROCHLORIDE AND EPINEPHRINE 10; 10 MG/ML; UG/ML
INJECTION, SOLUTION INFILTRATION; PERINEURAL
Status: DISCONTINUED
Start: 2024-06-18 | End: 2024-06-18 | Stop reason: HOSPADM

## 2024-06-18 RX ORDER — QUETIAPINE FUMARATE 25 MG/1
25 TABLET, FILM COATED ORAL ONCE
Status: COMPLETED | OUTPATIENT
Start: 2024-06-18 | End: 2024-06-18

## 2024-06-18 RX ORDER — FOLIC ACID 1 MG/1
1000 TABLET ORAL DAILY
Qty: 90 TABLET | Refills: 97 | Status: SHIPPED | OUTPATIENT
Start: 2024-06-18

## 2024-06-18 RX ORDER — LANOLIN ALCOHOL/MO/W.PET/CERES
1000 CREAM (GRAM) TOPICAL DAILY
Qty: 90 TABLET | Refills: 97 | Status: SHIPPED | OUTPATIENT
Start: 2024-06-18

## 2024-06-18 RX ORDER — ACETAMINOPHEN 500 MG
1000 TABLET ORAL ONCE
Status: COMPLETED | OUTPATIENT
Start: 2024-06-18 | End: 2024-06-18

## 2024-06-18 RX ADMIN — QUETIAPINE FUMARATE 25 MG: 25 TABLET ORAL at 09:01

## 2024-06-18 RX ADMIN — ACETAMINOPHEN 1000 MG: 500 TABLET, FILM COATED ORAL at 06:14

## 2024-06-18 RX ADMIN — CLOSTRIDIUM TETANI TOXOID ANTIGEN (FORMALDEHYDE INACTIVATED), CORYNEBACTERIUM DIPHTHERIAE TOXOID ANTIGEN (FORMALDEHYDE INACTIVATED), BORDETELLA PERTUSSIS TOXOID ANTIGEN (GLUTARALDEHYDE INACTIVATED), BORDETELLA PERTUSSIS FILAMENTOUS HEMAGGLUTININ ANTIGEN (FORMALDEHYDE INACTIVATED), BORDETELLA PERTUSSIS PERTACTIN ANTIGEN, AND BORDETELLA PERTUSSIS FIMBRIAE 2/3 ANTIGEN 0.5 ML: 5; 2; 2.5; 5; 3; 5 INJECTION, SUSPENSION INTRAMUSCULAR at 02:36

## 2024-06-18 ASSESSMENT — ACTIVITIES OF DAILY LIVING (ADL)
ADLS_ACUITY_SCORE: 39
ADLS_ACUITY_SCORE: 39
ADLS_ACUITY_SCORE: 38
ADLS_ACUITY_SCORE: 38
ADLS_ACUITY_SCORE: 39
ADLS_ACUITY_SCORE: 38
ADLS_ACUITY_SCORE: 39

## 2024-06-18 NOTE — DISCHARGE INSTRUCTIONS
YOUR STITCHES SHOULD BE REMOVED IN 10-14 DAYS    Discharge Instructions  Head Injury    You have been seen today for a head injury. Your evaluation included a history and physical examination. You may have had a CT (CAT) scan performed, though most head injuries do not require a scan. Based on this evaluation, your provider today does not feel that your head injury is serious.    Generally, every Emergency Department visit should have a follow-up clinic visit with either a primary or a specialty clinic/provider. Please follow-up as instructed by your emergency provider today.  Return to the Emergency Department if:  You are confused or you are not acting right.  Your headache gets worse or you start to have a really bad headache even with your recommended treatment plan.  You vomit (throw up) more than once.  You have a seizure.  You have trouble walking.  You have weakness or paralysis (cannot move) in an arm or a leg.  You have blood or fluid coming from your ears or nose.  You have new symptoms or anything that worries you.    Sleeping:  It is okay for you to sleep, but someone should wake you up if instructed by your provider, and someone should check on you at your usual time to wake up.     Activity:  Do not drive for at least 24 hours.  Do not drive if you have dizzy spells or trouble concentrating, or remembering things.  Do not return to any contact sports until cleared by your regular provider.     MORE INFORMATION:    Concussion:  A concussion is a minor head injury that may cause temporary problems with the way the brain works. Although concussions are important, they are generally not an emergency or a reason that a person needs to be hospitalized. Some concussion symptoms include confusion, amnesia (forgetful), nausea (sick to your stomach) and vomiting (throwing up), dizziness, fatigue, memory or concentration problems, irritability and sleep problems. For most people, concussions are mild and  temporary but some will have more severe and persistent symptoms that require on-going care and treatment.  CT Scans: Your evaluation today may have included a CT scan (CAT scan) to look for things like bleeding or a skull fracture (broken bone).  CT scans involve radiation and too many CT scans can cause serious health problems like cancer, especially in children.  Because of this, your provider may not have ordered a CT scan today if they think you are at low risk for a serious or life threatening problem.    If you were given a prescription for medicine here today, be sure to read all of the information (including the package insert) that comes with your prescription.  This will include important information about the medicine, its side effects, and any warnings that you need to know about.  The pharmacist who fills the prescription can provide more information and answer questions you may have about the medicine.  If you have questions or concerns that the pharmacist cannot address, please call or return to the Emergency Department.     Remember that you can always come back to the Emergency Department if you are not able to see your regular provider in the amount of time listed above, if you get any new symptoms, or if there is anything that worries you.

## 2024-06-18 NOTE — ED TRIAGE NOTES
84/M came in via ems from assisted living facility. Pt had a fall tonight while sitting, Slid on his reclining chair. Pt denies loss of consciousness, Pt had a headache before the fall and chronic neck pain from previous accident, Noted a laceration on the back of his head. Pt is not taking any blood thinners. A/O x4, normally using walker, BG 137mg/dl. Wife is at the bedside.

## 2024-06-18 NOTE — ED PROVIDER NOTES
Emergency Department Note      History of Present Illness     Chief Complaint  Fall    HPI  Jamie Valdivia is a 84 year old male with a history of neck fracture presenting from assisted living for evaluation after sustaining a fall tonight. Jamie notes an increase in exhaustion over the past four days (6/15/24). Tonight, he got out of his chair to use the restroom. He was ambulating with his walker, and decided to turn back to the chair, he fell head over heels and sustained head trauma. No loss of consciousness. He was experiencing a headache prior to falling. The patient notes the posterior scalp area is painful upon examination. Denies chest pain or shortness of breath. No current blood thinner use, but he reports taking aspirin 81 mg regularly. He denies any other injuries. No current lightheadedness or dizziness. No chest pain,shortness of breath, abdominal pain. He denies any other symptoms. No numbness, tingling, or weakness.     Independent Historian  Patient's wife present at bedside and corroborates the above.     Review of External Notes  I reviewed the patient's facesheet, past medical history and POLST which arrived with him from his living facility.  Past Medical History   Medical History and Problem List  Age-related osteoporosis   Alcohol abuse  Alcohol dependence with withdrawal   Alcoholism   Anemia   Actinic keratosis   CAD   Chronic a-fib   Chronic atrial fibrillation   Claustrophobia  Dementia  Delirium   ED   Edema  Encephalopathy  [place]EL   GERD  GI bleeding  carpal tunnel syndrome  HTN   HAP  Hypoglycemia   Impaired gait and mobility  Mild cognitive impairment  Mild TBI   Mumps  Osteoarthritis  Other idiopathic peripheral autonomic neuropathy  insomnia  seizures   Palpitations  Peripheral neuropathy  Pharyngeal dysphagia  Physical deconditioning  Recurrent major depressive disorder   Rhabdomyolysis  Thrombocytopenia   Urination disorder    Medications  Aspirin 81 mg  Gabapentin  Mirtazapine    Nystatin   Omeprazole   Olanzapine   Quetiapine     Surgical History   Prostate biopsy   Colonoscopy   EGD  Left hip replacement    Left shoulder replacement   Spinal surgery   Tonsillectomy   Transrectal ultrasonic sonogram     Physical Exam   Patient Vitals for the past 24 hrs:   BP Temp Temp src Pulse Resp SpO2   06/18/24 0422 -- -- -- 76 -- 91 %   06/18/24 0407 -- -- -- 80 -- 93 %   06/18/24 0352 -- -- -- 79 -- 91 %   06/18/24 0337 (!) 141/66 -- -- 70 -- --   06/18/24 0322 -- -- -- 82 -- --   06/18/24 0307 (!) 141/93 -- -- -- -- --   06/18/24 0252 -- -- -- -- -- 90 %   06/18/24 0237 -- -- -- 78 -- 90 %   06/18/24 0137 (!) 192/82 97.8  F (36.6  C) Oral 86 18 95 %     Physical Exam  General: Elderly frail. Resting comfortably  Head:  Posterior scalp laceration to the right posterior scalp measuring 3cm.  Mild surrounding soft tissue swelling and hematoma.  No active bleeding.  No foreign body seen.  No palpable skull fracture or bony step-offs.  Remainder of the head and scalp is otherwise normal and atraumatic.  Eyes:  Pupils are equal, round, reactive to light EOMI, no nystagmus     Conjunctivae non-injected and sclerae white  ENT:    The external nose is normal    Pinnae are normal  Neck:  Mild kyphosis of the cervical spine with limited range of motion due to chronic arthritis.  No definite focal bony tenderness but patient reports some posterior neck pain.  No bony step-offs.    Trachea is in the midline  CV:  Regular rate and rhythm     Normal S1/S2, no S3/S4    2 out of 6 systolic murmur.  No rub. Radial pulses 2+ bilaterally.  Resp:  Lungs are clear and equal bilaterally  There is no tachypnea    No increased work of breathing    No rales, wheezing, or rhonchi  GI:  Abdomen is soft, no rigidity or guarding    No distension, or mass    No tenderness or rebound tenderness   MS:  Normal muscular tone. Full painless ROM of all extremities. Extremities non tender to palpation and atraumatic.     Symmetric  motor strength    Chronic lymphedema of the bilateral lower extremities.  Skin:  No rash or acute skin lesions noted  Neuro:  A&Ox3, GCS 15    CN II - XII intact    Speech clear, fluent, and normal    Strength 5/5 and symmetric in bilateral upper and lower extremities.    No pronator drift. No leg drift. SILT throughout.    No ankle clonus    FTN testing normal. No tremor.     No meningismus   Psych: Normal affect. Appropriate interactions.    Diagnostics   Lab Results   Labs Ordered and Resulted from Time of ED Arrival to Time of ED Departure   GLUCOSE BY METER - Abnormal       Result Value    GLUCOSE BY METER POCT 137 (*)      Imaging  CT Head w/o Contrast   Final Result   IMPRESSION:   1.  No CT evidence for acute intracranial process.   2.  Chronic changes as above.      CT Cervical Spine w/o Contrast   Final Result   IMPRESSION:   1.  No evidence of an acute displaced fracture.        EKG   ECG taken at 0146, ECG read at 0209  Probable sinus rhythm, artifact limits interpretation   Otherwise normal ECG   Rate 89 bpm. HI interval * ms. QRS duration 90 ms. QT/QTc 372/452 ms. P-R-T axes * -26 70.    Independent Interpretation  See below     ED Course    Medications Administered  Medications   lidocaine 1% with EPINEPHrine 1:100,000 1 %-1:982396 injection (has no administration in time range)   Tdap (tetanus-diphtheria-acell pertussis) (ADACEL) injection 0.5 mL (0.5 mLs Intramuscular $Given 6/18/24 0236)     [facility]    -Laceration Repair    Date/Time: 6/18/2024 3:19 AM    Performed by: Reji Washington MD  Authorized by: Reji Washington MD    Risks, benefits and alternatives discussed.      ANESTHESIA (see MAR for exact dosages):     Anesthesia method:  Local infiltration    Local anesthetic:  Lidocaine 1% WITH epi  LACERATION DETAILS     Location:  Scalp    Scalp location:  Occipital    Length (cm):  3    REPAIR TYPE:     Repair type:  Simple    EXPLORATION:     Hemostasis  achieved with:  Direct pressure    Wound exploration: entire depth of wound probed and visualized      Wound extent: no foreign body, no signs of injury, no underlying fracture and no vascular damage      Contaminated: no      TREATMENT:     Area cleansed with:  Saline    Amount of cleaning:  Standard    SKIN REPAIR     Repair method:  Sutures    Suture size:  3-0    Suture material:  Nylon    Suture technique:  Simple interrupted    Number of sutures:  3    APPROXIMATION     Approximation:  Close    POST-PROCEDURE DETAILS     Dressing:  Antibiotic ointment      PROCEDURE    Patient Tolerance:  Patient tolerated the procedure well with no immediate complications       Discussion of Management  None    Social Determinants of Health adding to complexity of care  None    ED Course  ED Course as of 06/18/24 0437   e Jun 18, 2024   0157 I obtained the history and examined the patient as noted above.      0320 I performed an independent interpretation of the patient's Head CT. No large volume intracranial hemorrhage or midline shift seen.    0342 I numbed the patient's wound in preparation for repair.    0415 I repaired the patient's wound.     Medical Decision Making / Diagnosis   CMS Diagnoses: None    MIPS     None    [facility]  Jamie Valdivia is a 84 year old male who presents to the emergency department for evaluation after a fall at home.  Fall was mechanical.  No syncope.  He sustained a laceration to the posterior scalp.  No other significant injuries on head to toe evaluation.  CT head negative for fracture, intracranial hemorrhage or any other acute findings.  CT cervical spine also negative for any acute traumatic injury.  Tdap updated.  No neurologic deficits.  Patient is otherwise at his baseline per his wife.  Scalp laceration was repaired as noted above.  Sutures should be removed in 10 to 14 days.  Remainder of ED evaluation is otherwise unremarkable.  He is stable for discharge back home.  No evidence of  significant concussion.  Patient resides at an assisted living facility.  Patient was discharged in stable improved condition.    Disposition  The patient was discharged.     ICD-10 Codes:    ICD-10-CM    1. Fall at home, initial encounter  W19.XXXA     Y92.009       2. Scalp laceration, initial encounter  S01.01XA          Discharge Medications  New Prescriptions    No medications on file     Scribe Disclosure:  Eleonora MONGE, am serving as a scribe at 2:13 AM on 6/18/2024 to document services personally performed by Reji Washington MD based on my observations and the provider's statements to me.        Reji Washington MD  06/19/24 5639

## 2024-06-18 NOTE — ED PROVIDER NOTES
Patient seen by Dr. Washington, please see his H&P.     Patient was pending discharge when I was informed by RN that he was mentioning L wrist pain. XR imaging without signs of fracture. Spoke with patient, he clarified that this was not new. Tech also noted that patent appeared to have a new rhythm on cardiac monitor, although was asymptomatic. EKG was obtained which showed a likely junctional rhythm with LBBB, no Sgarbossa criteria and on review of prior EKG, morphology appeared to be the same. Spoke with patient and his wife, they are still comfortable with plan to discharge home and wife confirms patient is at his baseline. Will proceed with ambo transport back to facility.     MD Magdaleno Sweet Connie, MD  06/19/24 3128

## 2024-06-18 NOTE — ED NOTES
"Pt c/o L wirst pain, L wrist does seem more edematous than R wrist. Pt also saying that he feels like he \"is having a traumatic event, this normally doesn't happen to me and I just lost a lot of friends and I am feeling traumatized\"    MD notified  "

## 2024-06-18 NOTE — ED NOTES
Report called to patient's facility ([facility]), report given to Jenny KIM. Pt to be taken back to facility via w/c van.

## 2024-06-18 NOTE — ED TRIAGE NOTES
Triage Assessment (Adult)       Row Name 06/18/24 0139          Triage Assessment    Airway WDL WDL        Respiratory WDL    Respiratory WDL WDL        Peripheral/Neurovascular WDL    Peripheral Neurovascular WDL WDL        Cognitive/Neuro/Behavioral WDL    Cognitive/Neuro/Behavioral WDL WDL

## 2024-06-19 ENCOUNTER — PATIENT OUTREACH (OUTPATIENT)
Dept: GERIATRIC MEDICINE | Facility: CLINIC | Age: 84
End: 2024-06-19
Payer: COMMERCIAL

## 2024-06-19 NOTE — PROGRESS NOTES
[facility] Care Coordination Contact  CC received notification of Emergency Room visit.  ER visit occurred on 6/19 at [facility] with Dx of fall.    CC contacted member 2 times and left a message requesting a return call.  Member has a follow-up appointment with PCP: Yes: scheduled on On site provider will follow up  Member has had a change in condition: No  New referrals placed: No  Home Visit Needed: No  Care plan reviewed and updated.  PCP notified of ED visit via EMR.    Gisela Hall RN  [facility]  [phone]

## 2024-06-19 NOTE — Clinical Note
Uriel Ward,  Just in case you didn't hear..Jamie was in the ER yesterday after a fall. Let me know if you have questions.  Thanks! Gisela

## 2024-06-20 ENCOUNTER — PATIENT OUTREACH (OUTPATIENT)
Dept: GERIATRIC MEDICINE | Facility: CLINIC | Age: 84
End: 2024-06-20

## 2024-06-20 LAB
ALBUMIN SERPL BCG-MCNC: 2.7 G/DL (ref 3.5–5.2)
ALP SERPL-CCNC: 117 U/L (ref 40–150)
ALT SERPL W P-5'-P-CCNC: <5 U/L (ref 0–70)
ANION GAP SERPL CALCULATED.3IONS-SCNC: 19 MMOL/L (ref 7–15)
AST SERPL W P-5'-P-CCNC: 14 U/L (ref 0–45)
BILIRUB SERPL-MCNC: 0.4 MG/DL
BUN SERPL-MCNC: 6.2 MG/DL (ref 8–23)
CALCIUM SERPL-MCNC: 8.4 MG/DL (ref 8.8–10.2)
CHLORIDE SERPL-SCNC: 82 MMOL/L (ref 98–107)
CREAT SERPL-MCNC: 0.46 MG/DL (ref 0.67–1.17)
DEPRECATED HCO3 PLAS-SCNC: 25 MMOL/L (ref 22–29)
EGFRCR SERPLBLD CKD-EPI 2021: >90 ML/MIN/1.73M2
GLUCOSE SERPL-MCNC: 89 MG/DL (ref 70–99)
HGB BLD-MCNC: 8.4 G/DL (ref 13.3–17.7)
POTASSIUM SERPL-SCNC: 3.6 MMOL/L (ref 3.4–5.3)
PROT SERPL-MCNC: 6.9 G/DL (ref 6.4–8.3)
SODIUM SERPL-SCNC: 126 MMOL/L (ref 135–145)

## 2024-06-20 PROCEDURE — 36415 COLL VENOUS BLD VENIPUNCTURE: CPT | Mod: ORL | Performed by: NURSE PRACTITIONER

## 2024-06-20 PROCEDURE — 80053 COMPREHEN METABOLIC PANEL: CPT | Mod: ORL | Performed by: NURSE PRACTITIONER

## 2024-06-20 PROCEDURE — P9604 ONE-WAY ALLOW PRORATED TRIP: HCPCS | Mod: ORL | Performed by: NURSE PRACTITIONER

## 2024-06-20 PROCEDURE — 85018 HEMOGLOBIN: CPT | Mod: ORL | Performed by: NURSE PRACTITIONER

## 2024-06-20 NOTE — LETTER
June 20, 2024      JAMIE CRUMP  [facility]  [address]  [facility] [address]        Dear Jamie:     I m your care coordinator. I ve been unable to reach you by phone. I am writing to ask you or an authorized representative to call me at [phone]. If you reach my voicemail, please leave a message with your daytime telephone number. . Include a date and time that I can call you. If you are hearing impaired, call the [place] [employer] at [phone] or [phone] ([email or web address] relay service).     The reason I am trying to reach you is:    [] To schedule an assessment  [x] For your six (6)-month check-in  [] Other:      Please call me as soon as you receive this letter. I look forward to speaking with you.    Sincerely,    Gisela Hall RN, BSN, N  [phone]  [email or web address]) is a health plan that contracts with both Medicare and the [place] Medical Assistance (Medicaid) program to provide benefits of both programs to enrollees. Enrollment in [facility] depends on contract renewal.    [id number] accepted  [id number]                                                                         A (08/2022)

## 2024-06-20 NOTE — PROGRESS NOTES
"[facility] Care Coordination Contact    Per CC, mailed client an \"Unable to Contact\" letter.      Pat Varma  Case Management Specialist  [facility]  [phone]        "

## 2024-06-25 ENCOUNTER — ASSISTED LIVING VISIT (OUTPATIENT)
Dept: GERIATRICS | Facility: CLINIC | Age: 84
End: 2024-06-25
Payer: COMMERCIAL

## 2024-06-25 ENCOUNTER — PATIENT OUTREACH (OUTPATIENT)
Dept: GERIATRIC MEDICINE | Facility: CLINIC | Age: 84
End: 2024-06-25

## 2024-06-25 VITALS
HEIGHT: 68 IN | RESPIRATION RATE: 16 BRPM | WEIGHT: 204 LBS | HEART RATE: 70 BPM | DIASTOLIC BLOOD PRESSURE: 72 MMHG | BODY MASS INDEX: 30.92 KG/M2 | OXYGEN SATURATION: 92 % | SYSTOLIC BLOOD PRESSURE: 132 MMHG | TEMPERATURE: 97.6 F

## 2024-06-25 DIAGNOSIS — R41.9 NEUROCOGNITIVE DISORDER: ICD-10-CM

## 2024-06-25 DIAGNOSIS — D64.9 NORMOCYTIC ANEMIA: ICD-10-CM

## 2024-06-25 DIAGNOSIS — R53.81 PHYSICAL DECONDITIONING: Primary | ICD-10-CM

## 2024-06-25 DIAGNOSIS — E22.2 SIADH (SYNDROME OF INAPPROPRIATE ADH PRODUCTION) (H): ICD-10-CM

## 2024-06-25 DIAGNOSIS — M17.0 PRIMARY OSTEOARTHRITIS OF BOTH KNEES: ICD-10-CM

## 2024-06-25 DIAGNOSIS — S01.01XD LACERATION OF SCALP, SUBSEQUENT ENCOUNTER: ICD-10-CM

## 2024-06-25 DIAGNOSIS — E66.09 CLASS 1 OBESITY DUE TO EXCESS CALORIES WITH SERIOUS COMORBIDITY AND BODY MASS INDEX (BMI) OF 31.0 TO 31.9 IN ADULT: ICD-10-CM

## 2024-06-25 DIAGNOSIS — Z91.81 HISTORY OF RECENT FALL: ICD-10-CM

## 2024-06-25 DIAGNOSIS — E66.811 CLASS 1 OBESITY DUE TO EXCESS CALORIES WITH SERIOUS COMORBIDITY AND BODY MASS INDEX (BMI) OF 31.0 TO 31.9 IN ADULT: ICD-10-CM

## 2024-06-25 DIAGNOSIS — E87.1 HYPONATREMIA: ICD-10-CM

## 2024-06-25 DIAGNOSIS — F32.A DEPRESSION, UNSPECIFIED DEPRESSION TYPE: ICD-10-CM

## 2024-06-25 PROCEDURE — 99349 HOME/RES VST EST MOD MDM 40: CPT | Performed by: NURSE PRACTITIONER

## 2024-06-25 RX ORDER — MIRTAZAPINE 7.5 MG/1
7.5 TABLET, FILM COATED ORAL AT BEDTIME
Qty: 30 TABLET | Refills: 98 | Status: ON HOLD | OUTPATIENT
Start: 2024-06-25 | End: 2024-06-30

## 2024-06-25 NOTE — LETTER
6/25/2024      Jamie Valdivia  C/o Coral De La Garza  [address]  [facility] [place] [address]        [facility] GERIATRICS    Chief Complaint   Patient presents with     Assisted Living Acute     Emergency Department follow up     HPI:  Jamie Valdivia is a 84 year old  (1940) PMH significant for GERD with esophagitis, OA BL knees, pharyngeal dysphagia, edema, abdominal wall hernia, chronic atrial fibrillation (decline AC or Watchman), HTN, osteoporosis with hx of vertebral fracture, dementia, anemia, hx of COVID-19, who is being seen today for an episodic care visit at: [facility]) [61].     Dr. Quintana present for visit today.    Today's concern is:   ER follow-up.  Present to [facility] ER after fall on 6/18/24.  Reported getting out of the chair to use the bathroom and decided to turn back, but fell and sustained head strike with laceration to occipital area. Take ASA 81 mg daily. Fortunately head CT cervical spine CT negative for acute process.  Given Tdap. 3 cm laceration closed with 3 simple sutures.   No labs were checks, except glucose which was WNL.    Sodium low last week at 126, intermittent concern for resident.  Mental status at baseline today.  Discussed with PharmD last week and order sent to triage team to decrease Mirtazapine but was not processed.   Drinking fluids, including apple juice t/o the day.    Seen today in recline with wife Gisela.  Reports he is feeling okay, but tired.  Denies SOB or chest pain.  No increased cough.  No abd pain.  No change in bowel or bladder habits.  Despite significant leg swelling declines to wear Velcro compression wraps as ordered.    Staff report slow progress decline in functional status over last month.  Increasing incontinence episodes.  Refusing cares and medications at times.  Now agreeable to to  PT/OT assessment.    Allergies, and PMH/PSH reviewed in EPIC today.    MED REC REQUIRED  Post Medication Reconciliation Status:  Discharge  "medications reconciled, continue medications without change    REVIEW OF SYSTEMS:  history as per HPI.    Objective:   /72   Pulse 70   Temp 97.6  F (36.4  C)   Resp 16   Ht 1.727 m (5' 8\")   Wt 92.5 kg (204 lb)   SpO2 92%   BMI 31.02 kg/m    GENERAL APPEARANCE:  Alert, chronically ill appearing and frail, sitting up in chair.  HEENT: Sclera clear and conjunctiva normal. Pupils 2+ equal and round. Laceration to right occipital area (see skin).  RESP:  Non-labored breathing, no respiratory distress, Lung sounds decreased throughout, no adventitious breath sounds, patient is on room air, decreased BL bases.  CV: Rate and rhythm regular, no murmur, no rub or gallop.   VASCULAR: 3+ edema bilateral lower extremities, not wearing compression wraps. Calves are non-tender.  ABDOMEN:  Normal bowel sounds, soft, nontender, no grimacing or guarding with palpation, + umblical hernia, soft/non-tender.   SKIN: 3 cm laceration to R occipital area, well approximate, dry, with 3 simple sutures in place. Tan patch with central purple nodule to right cheek.  PSYCH: Awake and alert, speech fluent,  insight/judgement/memory impaired.    Recent labs in [facility] reviewed by me today.   CBC RESULTS:   Recent Labs   Lab Test 06/20/24  0747 05/30/24  0630 05/01/24  0501 04/26/24  1328   WBC  --  7.7  --  8.4   RBC  --  3.26*  --  3.28*   HGB 8.4* 8.2*   < > 8.3*   HCT  --  27.8*  --  27.7*   MCV  --  85  --  85   MCH  --  25.2*  --  25.3*   MCHC  --  29.5*  --  30.0*   RDW  --  18.8*  --  20.2*   PLT  --  385  --  350    < > = values in this interval not displayed.     Ferritin   Date Value Ref Range Status   04/19/2024 138 31 - 409 ng/mL Final     Iron   Date Value Ref Range Status   05/30/2024 25 (L) 61 - 157 ug/dL Final     Iron Binding Capacity   Date Value Ref Range Status   05/30/2024 230 (L) 240 - 430 ug/dL Final   09/15/2022 374 240 - 430 ug/dL Final     Lab Results   Component Value Date    B12 193 05/30/2024       Last " Basic Metabolic Panel:  Recent Labs   Lab Test 06/20/24  0747 06/18/24  0138 04/26/24  1328   *  --  139   POTASSIUM 3.6  --  4.1   CHLORIDE 82*  --  100   JOSUE 8.4*  --  8.4*   CO2 25  --  28   BUN 6.2*  --  11.9   CR 0.46*  --  0.46*   GLC 89 137* 89       Liver Function Studies -   Recent Labs   Lab Test 06/20/24  0747 04/11/24  0724   PROTTOTAL 6.9 6.9   ALBUMIN 2.7* 2.7*   BILITOTAL 0.4 0.2   ALKPHOS 117 126   AST 14 13   ALT <5 <5       TSH   Date Value Ref Range Status   04/11/2024 1.69 0.30 - 4.20 uIU/mL Final   06/07/2023 1.68 0.30 - 4.20 uIU/mL Final   04/28/2022 2.77 0.40 - 4.00 mU/L Final   04/07/2022 3.94 0.40 - 4.00 mU/L Final     Lab Results   Component Value Date    A1C 5.5 04/28/2022    A1C 5.6 04/07/2022           Assessment/Plan:  (R53.81) Physical deconditioning  (primary encounter diagnosis)  (Z91.81) Hx of recent fall  (S01.01XD) Laceration of Scalp  (E66.09,  Z68.31) Class 1 obesity  (M17.0) Primary osteoarthritis of both knees  Comment: Chronic in setting of multi-comorbidity, in decline over last several weeks, refusing care at times.  Recent fall 6/18 with head strike  Plan:   - Continues to be benefit from supportive care in [facility] memory care setting, but may do better in LTC with more staff assistance.  - Add  PT/OT to assess gait, strength, balance, functional mobility. Concern resident may need higher level of care if he does not accept assistance from staff.  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   Previous hyponatremia multifactorial, occurred in setting of SIADH, dose increase of selective serotonin reuptake inhibitor (Celexa), Bactrim, hypovolemia.  Stopped Celexa 9/29/22, now on Mirtazapine.  Last Na+ 126 on 6/20 down from 139 on 4/26.  Plan:   -  "Decrease Mirtazapine, Seroquel may also contribute, but resident with ongoing paranoia (thinks phones are bugged etc)  -Fluid restriction 1800 mL/day, no monitoring of this in [facility] setting, not likely following, but review again today at length with patient and his wife  -Follow interval labs, BMP for 6/27  - If sodium worse or symptoms may need higher level of care    (D64.9) Normocytic Anemia  Comment: Chronic, mild, baseline ~ 10.  Hgb down to 8.2 on 5/30 with B12, Folate, and iron deficiency.  Hgb stable at 8.4 on 6/20.  No symptoms of acute bleed.  Resident does not deny using alcohol, but theoretically has no access in locked memory care unit.  Plan:   - Continue iron supplement (started 3/25/24)  - Continue B12 supplement (started 6/5/24)  - Continue Folate supplement (started 6/1/24)  - Continue PPI in place for BE  - Follow serial labs    (F03.90) Major neurocognitive disorder (H) - dementia   Comment: Chronic  History of delusions and paranoia.  Please see neuropsych testing from 5/12/2023, \"I believe that Jamie is not capable of making reasoned decisions. He should continue to receive a high-level of care that includes other managing medications, finances, and limiting access to alcohol.\"   Please see interdisciplinary team care conference note from 7/25/2023.   Wife and family to assist with decision making as these are his next of kin and there is no healthcare directive. Considered moving off memory care unit if he had appropriate supports, unwilling to let family assist him at present and family does not want him moved off of memory care unit as they feel this is safest environment for him.  Family is involved on as needed basis, wife Gisela updates children, but resident declines providers contact children directly unless there is an acute concern.  Plan:   - Continues to benefit from supportive care in memory care [facility] setting  - Continue Seroquel due to paranoid delusions causing distress (ie thinks " phones are bugged, people out to get him)  -Decrease Mirtazapine to 7.5 mg q HS, at lower dose may help more with sleep, less likely to contribute to low sodium, may need to stop complete if Na+ remains low  - Consider ophthalmology follow-up for field cut. 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   Currently no access to ETOH and thus is abstinent, but does not deny alcohol consumption today.  Chronic low mood, suspect JOSÉ MANUEL with panic attacks/chest pain for many years, no documented history of myocardial infarction or ASCVD.   On memory care unit due to safety concerns, needs assistance with IADLs/ADLs.  Finds gabapentin helpful.  Plan:   - Decrease mirtazpaine to 7.5 mg PO q HS (started 11/17/22, dose increase 1/27/23, dose decrease 6/25/24)  - Continue gabapentin 300 mg q HS (dose increase 4/1/24)  - Continue Gabapentin 100 mg q AM and 100 mg BID PRN for anxiety and pain.   - Continue Seroquel 12.5 mg BID and BID PRN for psychotic features   [facility] -has excellent relationship with Kimberly Wick.  - Offered psychiatry referral in past, but pt/family prefer onsite care  - MTM PharmD follows         (R60.0) BL lower extremity edema  Comment: Chronic concern  Last echo in July 2023 see result in epic.  Elevated BNP but no history of heart failure exacerbation, no volume overload on chest x-ray.  Treated with 1 dose of IV Lasix in hospital in March 2024.  Legs are swollen in setting of dependent edema and obesity.  Plan:  -Strongly recommended compression and elevation, reviewed this again today, but patient declines to follow recommendations  -  lymph OT ordered    Orders:  - Dose reduce Miratazapine to  7.5  - BMP 6/27  - Nursing to provide sign and more education about fluid restriction  - Sutures should be removed in 10 to 14 days.   -  PT/OT/lymph    Electronically signed by: MARICHUY Basilio CNP         Documentation of Face-to-Face and Certification for Home Health Services     Patient: Jamie Valdivia   YOB: 1940  MR Number: [id number]  Today's Date: 6/25/2024    I certify that patient: Jamie Valdivia is under my care and that I, or a nurse practitioner or physician's assistant working with me, had a face-to-face encounter that meets the physician face-to-face encounter requirements with this patient on: 6/25/2024.    This encounter with the patient was in whole, or in part, for the following medical condition, which is the primary reason for home health care:   (R53.81) Physical deconditioning  (primary encounter diagnosis)  (R41.9) Neurocognitive disorder  (M17.0) Primary osteoarthritis of both knees    I certify that, based on my findings, the following services are medically necessary home health services: Occupational Therapy and Physical Therapy.    My clinical findings support the need for the above services because: Occupational Therapy Services are needed to assess and treat cognitive ability and address ADL safety due to impairment in functional mobility. and Physical Therapy Services are needed to assess and treat the following functional impairments: gait, strength, balance.    Further, I certify that my clinical findings support that this patient is homebound (i.e. absences from home require considerable and taxing effort and are for medical reasons or [employer] services or infrequently or of short duration when for other reasons) because: Requires assistance of another person or specialized equipment to access medical services because patient: Is unable to walk greater than 100 feet without rest.    Based on the above findings. I certify that this patient is confined to the  home and needs intermittent skilled nursing care, physical therapy and/or speech therapy.  The patient is under my care, and I have initiated the establishment of the plan of care.  This patient will be followed by a physician who will periodically review the plan of care.  Physician/Provider to provide follow up care: Sylvia Stein    Responsible Medicare certified PEC[employer] Physician: Dr. Tabatha Quintana MD  Physician Signature: See electronic signature associated with these discharge orders.  Date: 6/25/2024        Sincerely,        MARICHUY Basilio CNP

## 2024-06-25 NOTE — PROGRESS NOTES
[facility] GERIATRICS    Chief Complaint   Patient presents with    Assisted Living Acute     Emergency Department follow up     HPI:  Jamie Valdivia is a 84 year old  (1940) PMH significant for GERD with esophagitis, OA BL knees, pharyngeal dysphagia, edema, abdominal wall hernia, chronic atrial fibrillation (decline AC or Watchman), HTN, osteoporosis with hx of vertebral fracture, dementia, anemia, hx of COVID-19, who is being seen today for an episodic care visit at: [facility] ([facility]) [[id number]].     Dr. Quintana present for visit today.    Today's concern is:   ER follow-up.  Present to [facility] ER after fall on 6/18/24.  Reported getting out of the chair to use the bathroom and decided to turn back, but fell and sustained head strike with laceration to occipital area. Take ASA 81 mg daily. Fortunately head CT cervical spine CT negative for acute process.  Given Tdap. 3 cm laceration closed with 3 simple sutures.   No labs were checks, except glucose which was WNL.    Sodium low last week at 126, intermittent concern for resident.  Mental status at baseline today.  Discussed with PharmD last week and order sent to triage team to decrease Mirtazapine but was not processed.   Drinking fluids, including apple juice t/o the day.    Seen today in recline with wife Gisela.  Reports he is feeling okay, but tired.  Denies SOB or chest pain.  No increased cough.  No abd pain.  No change in bowel or bladder habits.  Despite significant leg swelling declines to wear Velcro compression wraps as ordered.    Staff report slow progress decline in functional status over last month.  Increasing incontinence episodes.  Refusing cares and medications at times.  Now agreeable to to  PT/OT assessment.    Allergies, and PMH/PSH reviewed in EPIC today.    MED REC REQUIRED  Post Medication Reconciliation Status:  Discharge medications reconciled, continue medications without change    REVIEW OF SYSTEMS:  history as per  "HPI.    Objective:   /72   Pulse 70   Temp 97.6  F (36.4  C)   Resp 16   Ht 1.727 m (5' 8\")   Wt 92.5 kg (204 lb)   SpO2 92%   BMI 31.02 kg/m    GENERAL APPEARANCE:  Alert, chronically ill appearing and frail, sitting up in chair.  HEENT: Sclera clear and conjunctiva normal. Pupils 2+ equal and round. Laceration to right occipital area (see skin).  RESP:  Non-labored breathing, no respiratory distress, Lung sounds decreased throughout, no adventitious breath sounds, patient is on room air, decreased BL bases.  CV: Rate and rhythm regular, no murmur, no rub or gallop.   VASCULAR: 3+ edema bilateral lower extremities, not wearing compression wraps. Calves are non-tender.  ABDOMEN:  Normal bowel sounds, soft, nontender, no grimacing or guarding with palpation, + umblical hernia, soft/non-tender.   SKIN: 3 cm laceration to R occipital area, well approximate, dry, with 3 simple sutures in place. Tan patch with central purple nodule to right cheek.  PSYCH: Awake and alert, speech fluent,  insight/judgement/memory impaired.    Recent labs in [facility] reviewed by me today.   CBC RESULTS:   Recent Labs   Lab Test 06/20/24  0747 05/30/24  0630 05/01/24  0501 04/26/24  1328   WBC  --  7.7  --  8.4   RBC  --  3.26*  --  3.28*   HGB 8.4* 8.2*   < > 8.3*   HCT  --  27.8*  --  27.7*   MCV  --  85  --  85   MCH  --  25.2*  --  25.3*   MCHC  --  29.5*  --  30.0*   RDW  --  18.8*  --  20.2*   PLT  --  385  --  350    < > = values in this interval not displayed.     Ferritin   Date Value Ref Range Status   04/19/2024 138 31 - 409 ng/mL Final     Iron   Date Value Ref Range Status   05/30/2024 25 (L) 61 - 157 ug/dL Final     Iron Binding Capacity   Date Value Ref Range Status   05/30/2024 230 (L) 240 - 430 ug/dL Final   09/15/2022 374 240 - 430 ug/dL Final     Lab Results   Component Value Date    B12 193 05/30/2024       Last Basic Metabolic Panel:  Recent Labs   Lab Test 06/20/24  0747 06/18/24  0138 04/26/24  1328   * "  --  139   POTASSIUM 3.6  --  4.1   CHLORIDE 82*  --  100   JOSUE 8.4*  --  8.4*   CO2 25  --  28   BUN 6.2*  --  11.9   CR 0.46*  --  0.46*   GLC 89 137* 89       Liver Function Studies -   Recent Labs   Lab Test 06/20/24  0747 04/11/24  0724   PROTTOTAL 6.9 6.9   ALBUMIN 2.7* 2.7*   BILITOTAL 0.4 0.2   ALKPHOS 117 126   AST 14 13   ALT <5 <5       TSH   Date Value Ref Range Status   04/11/2024 1.69 0.30 - 4.20 uIU/mL Final   06/07/2023 1.68 0.30 - 4.20 uIU/mL Final   04/28/2022 2.77 0.40 - 4.00 mU/L Final   04/07/2022 3.94 0.40 - 4.00 mU/L Final     Lab Results   Component Value Date    A1C 5.5 04/28/2022    A1C 5.6 04/07/2022           Assessment/Plan:  (R53.81) Physical deconditioning  (primary encounter diagnosis)  (Z91.81) Hx of recent fall  (S01.01XD) Laceration of Scalp  (E66.09,  Z68.31) Class 1 obesity  (M17.0) Primary osteoarthritis of both knees  Comment: Chronic in setting of multi-comorbidity, in decline over last several weeks, refusing care at times.  Recent fall 6/18 with head strike  Plan:   - Continues to be benefit from supportive care in [facility] memory care setting, but may do better in LTC with more staff assistance.  - Add  PT/OT to assess gait, strength, balance, functional mobility. Concern resident may need higher level of care if he does not accept assistance from staff.  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   Previous hyponatremia multifactorial, occurred in setting of SIADH, dose increase of selective serotonin reuptake inhibitor (Celexa), Bactrim, hypovolemia.  Stopped Celexa 9/29/22, now on Mirtazapine.  Last Na+ 126 on 6/20 down from 139 on 4/26.  Plan:   - Decrease Mirtazapine, Seroquel may also contribute, but resident with ongoing paranoia (thinks phones are  "bugged etc)  -Fluid restriction 1800 mL/day, no monitoring of this in detention setting, not likely following, but review again today at length with patient and his wife  -Follow interval labs, BMP for 6/27  - If sodium worse or symptoms may need higher level of care    (D64.9) Normocytic Anemia  Comment: Chronic, mild, baseline ~ 10.  Hgb down to 8.2 on 5/30 with B12, Folate, and iron deficiency.  Hgb stable at 8.4 on 6/20.  No symptoms of acute bleed.  Resident does not deny using alcohol, but theoretically has no access in locked memory care unit.  Plan:   - Continue iron supplement (started 3/25/24)  - Continue B12 supplement (started 6/5/24)  - Continue Folate supplement (started 6/1/24)  - Continue PPI in place for BE  - Follow serial labs    (F03.90) Major neurocognitive disorder (H) - dementia   Comment: Chronic  History of delusions and paranoia.  Please see neuropsych testing from 5/12/2023, \"I believe that Jamie is not capable of making reasoned decisions. He should continue to receive a high-level of care that includes other managing medications, finances, and limiting access to alcohol.\"   Please see interdisciplinary team care conference note from 7/25/2023.   Wife and family to assist with decision making as these are his next of kin and there is no healthcare directive. Considered moving off memory care unit if he had appropriate supports, unwilling to let family assist him at present and family does not want him moved off of memory care unit as they feel this is safest environment for him.  Family is involved on as needed basis, wife Gisela updates children, but resident declines providers contact children directly unless there is an acute concern.  Plan:   - Continues to benefit from supportive care in memory care detention setting  - Continue Seroquel due to paranoid delusions causing distress (ie thinks phones are bugged, people out to get him)  -Decrease Mirtazapine to 7.5 mg q HS, at lower dose may help " more with sleep, less likely to contribute to low sodium, may need to stop complete if Na+ remains low  - Consider ophthalmology follow-up for field cut. 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   Currently no access to ETOH and thus is abstinent, but does not deny alcohol consumption today.  Chronic low mood, suspect JOSÉ MANUEL with panic attacks/chest pain for many years, no documented history of myocardial infarction or ASCVD.   On memory care unit due to safety concerns, needs assistance with IADLs/ADLs.  Finds gabapentin helpful.  Plan:   - Decrease mirtazpaine to 7.5 mg PO q HS (started 11/17/22, dose increase 1/27/23, dose decrease 6/25/24)  - Continue gabapentin 300 mg q HS (dose increase 4/1/24)  - Continue Gabapentin 100 mg q AM and 100 mg BID PRN for anxiety and pain.   - Continue Seroquel 12.5 mg BID and BID PRN for psychotic features   - [facility] brandee counselor following closely -has excellent relationship with Kimberly Wick.  - Offered psychiatry referral in past, but pt/family prefer onsite care  - MTM PharmD follows         (R60.0) BL lower extremity edema  Comment: Chronic concern  Last echo in July 2023 see result in epic.  Elevated BNP but no history of heart failure exacerbation, no volume overload on chest x-ray.  Treated with 1 dose of IV Lasix in hospital in March 2024.  Legs are swollen in setting of dependent edema and obesity.  Plan:  -Strongly recommended compression and elevation, reviewed this again today, but patient declines to follow recommendations  -  lymph OT ordered    Orders:  - Dose reduce Miratazapine to 7.5  - BMP 6/27  - Nursing to provide sign and more education about fluid restriction  - Sutures should  be removed in 10 to 14 days.   -  PT/OT/lymph    Electronically signed by: MARICHUY Basilio CNP         Documentation of Face-to-Face and Certification for Home Health Services     Patient: Jamie Valdivia   YOB: 1940  MR Number: [id number]  Today's Date: 6/25/2024    I certify that patient: Jamie Valdivia is under my care and that I, or a nurse practitioner or physician's assistant working with me, had a face-to-face encounter that meets the physician face-to-face encounter requirements with this patient on: 6/25/2024.    This encounter with the patient was in whole, or in part, for the following medical condition, which is the primary reason for home health care:   (R53.81) Physical deconditioning  (primary encounter diagnosis)  (R41.9) Neurocognitive disorder  (M17.0) Primary osteoarthritis of both knees    I certify that, based on my findings, the following services are medically necessary home health services: Occupational Therapy and Physical Therapy.    My clinical findings support the need for the above services because: Occupational Therapy Services are needed to assess and treat cognitive ability and address ADL safety due to impairment in functional mobility. and Physical Therapy Services are needed to assess and treat the following functional impairments: gait, strength, balance.    Further, I certify that my clinical findings support that this patient is homebound (i.e. absences from home require considerable and taxing effort and are for medical reasons or [employer] services or infrequently or of short duration when for other reasons) because: Requires assistance of another person or specialized equipment to access medical services because patient: Is unable to walk greater than 100 feet without rest.    Based on the above findings. I certify that this patient is confined to the home and needs intermittent skilled nursing care, physical therapy and/or speech therapy.  The patient  is under my care, and I have initiated the establishment of the plan of care.  This patient will be followed by a physician who will periodically review the plan of care.  Physician/Provider to provide follow up care: Sylvia Stein    Responsible Medicare certified PECOS Physician: Dr. Tabatha Quintana MD  Physician Signature: See electronic signature associated with these discharge orders.  Date: 6/25/2024

## 2024-06-25 NOTE — PATIENT INSTRUCTIONS
Jamie Valdivia  1940  ORDERS:  - Decrease Mirtazapine to 7.5 mg PO q HS.  - Check BMP and serum osmolality on 6/27/24 dx hyponatremia  - Please tell resident he only to drink 6 to 8, 8 oz cups of liquids per day including meals due to low sodium, please place reminder sign in room  Electronically signed by:   MARICHUY Basilio CNP  06/25/24 5:02 PM    Jamie Valdivia  1940  ADDITIONAL ORDERS:  - HH PT/OT ordered in Epic  - Please remove sutures 7/1/24 and update NP when removed  Electronically signed by:   MARICHUY Basilio CNP  06/28/24 9:34 AM

## 2024-06-25 NOTE — PROGRESS NOTES
[facility] Care Coordination Contact    Arranged transportation thru [facility] -[phone] for the below appt:  Appt Date & Time: 6/28 at 11:30am  Clinic Name & Address:  [facility] - [address], [address] , [address], [place]  Transportation Provider: JANIE [phone]   time:  10:25 - 10:45am  Return ride  time: will call    Appt Date & Time: 7/2 at 9:30am  Clinic Name & Address:  [facility], [address], [facility]  Transportation Provider: JANIE   time:  8:25- 8:45am  Return ride  time: will call    Appt Date & Time: 7/3 at 1:45pm  Clinic Name & Address:  [facility] - Echocardiogram, [address], [place]  Transportation Provider: JANIE   time:  12:40 - 1pm  Return ride  time:will call    Appt Date & Time: 7/9 at 10am  Clinic Name & Address:  [facility], [address], [place]  Transportation Provider: JANIE   time:  8:50-9:15am  Return ride  time: will call    Appt Date & Time: 7/10 at 4pm (TELEPHONE)  Clinic Name & Address:  [facility] - [address]  Transportation Provider: n/a   time:  n/a  Return ride  time: n/a    Notified member via phone and letter of  times.    Pat Varma  Case Management Specialist  [facility]  [phone]

## 2024-06-26 ENCOUNTER — LAB REQUISITION (OUTPATIENT)
Dept: LAB | Facility: CLINIC | Age: 84
End: 2024-06-26
Payer: COMMERCIAL

## 2024-06-26 DIAGNOSIS — E87.1 HYPO-OSMOLALITY AND HYPONATREMIA: ICD-10-CM

## 2024-06-27 ENCOUNTER — TELEPHONE (OUTPATIENT)
Dept: GERIATRICS | Facility: CLINIC | Age: 84
End: 2024-06-27

## 2024-06-27 LAB
ANION GAP SERPL CALCULATED.3IONS-SCNC: 15 MMOL/L (ref 7–15)
BUN SERPL-MCNC: 3.4 MG/DL (ref 8–23)
CALCIUM SERPL-MCNC: 8.4 MG/DL (ref 8.8–10.2)
CHLORIDE SERPL-SCNC: 79 MMOL/L (ref 98–107)
CREAT SERPL-MCNC: 0.38 MG/DL (ref 0.67–1.17)
DEPRECATED HCO3 PLAS-SCNC: 29 MMOL/L (ref 22–29)
EGFRCR SERPLBLD CKD-EPI 2021: >90 ML/MIN/1.73M2
GLUCOSE SERPL-MCNC: 118 MG/DL (ref 70–99)
OSMOLALITY SERPL: 263 MMOL/KG (ref 280–301)
POTASSIUM SERPL-SCNC: 3.9 MMOL/L (ref 3.4–5.3)
SODIUM SERPL-SCNC: 123 MMOL/L (ref 135–145)

## 2024-06-27 PROCEDURE — P9604 ONE-WAY ALLOW PRORATED TRIP: HCPCS | Mod: ORL | Performed by: NURSE PRACTITIONER

## 2024-06-27 PROCEDURE — 83930 ASSAY OF BLOOD OSMOLALITY: CPT | Mod: ORL | Performed by: NURSE PRACTITIONER

## 2024-06-27 PROCEDURE — 80048 BASIC METABOLIC PNL TOTAL CA: CPT | Mod: ORL | Performed by: NURSE PRACTITIONER

## 2024-06-27 PROCEDURE — 36415 COLL VENOUS BLD VENIPUNCTURE: CPT | Mod: ORL | Performed by: NURSE PRACTITIONER

## 2024-06-27 NOTE — TELEPHONE ENCOUNTER
[facility] GERIATRICS TELEPHONE NOTE    Sodium trending down.  Resident of memory care unit, likely not following fluid restriction.  Seroquel and Mirtazapine may contribute, attempted to reduce Mirtazapine dose.    Nursing staff report resident with headache this morning.  Also with progressive functional decline over last month.    Sent to ER due to mod-severe hyponatremia (hx of SIADH) with headache.  Maybe benefit from SNF level care at this time.  Orders called to director of nursing.    MARICHUY Basilio CNP  06/27/24 4:46 PM

## 2024-06-28 ENCOUNTER — HOSPITAL ENCOUNTER (INPATIENT)
Facility: CLINIC | Age: 84
LOS: 3 days | Discharge: NURSING, REHAB OR LONG-TERM CARE | DRG: 644 | End: 2024-07-01
Attending: EMERGENCY MEDICINE | Admitting: STUDENT IN AN ORGANIZED HEALTH CARE EDUCATION/TRAINING PROGRAM
Payer: COMMERCIAL

## 2024-06-28 ENCOUNTER — APPOINTMENT (OUTPATIENT)
Dept: CT IMAGING | Facility: CLINIC | Age: 84
DRG: 644 | End: 2024-06-28
Attending: EMERGENCY MEDICINE
Payer: COMMERCIAL

## 2024-06-28 ENCOUNTER — PATIENT OUTREACH (OUTPATIENT)
Dept: GERIATRIC MEDICINE | Facility: CLINIC | Age: 84
End: 2024-06-28

## 2024-06-28 DIAGNOSIS — R53.1 GENERALIZED WEAKNESS: Primary | ICD-10-CM

## 2024-06-28 DIAGNOSIS — Z78.9 FAILURE OF OUTPATIENT TREATMENT: ICD-10-CM

## 2024-06-28 DIAGNOSIS — E87.1 HYPONATREMIA: ICD-10-CM

## 2024-06-28 LAB
ALBUMIN UR-MCNC: NEGATIVE MG/DL
ANION GAP SERPL CALCULATED.3IONS-SCNC: 11 MMOL/L (ref 7–15)
ANION GAP SERPL CALCULATED.3IONS-SCNC: 9 MMOL/L (ref 7–15)
APPEARANCE UR: CLEAR
ATRIAL RATE - MUSE: NORMAL BPM
BASOPHILS # BLD AUTO: 0 10E3/UL (ref 0–0.2)
BASOPHILS NFR BLD AUTO: 0 %
BILIRUB UR QL STRIP: NEGATIVE
BUN SERPL-MCNC: 4.8 MG/DL (ref 8–23)
BUN SERPL-MCNC: 5 MG/DL (ref 8–23)
CALCIUM SERPL-MCNC: 8 MG/DL (ref 8.8–10.2)
CALCIUM SERPL-MCNC: 8.5 MG/DL (ref 8.8–10.2)
CHLORIDE SERPL-SCNC: 80 MMOL/L (ref 98–107)
CHLORIDE SERPL-SCNC: 83 MMOL/L (ref 98–107)
COLOR UR AUTO: NORMAL
CREAT SERPL-MCNC: 0.45 MG/DL (ref 0.67–1.17)
CREAT SERPL-MCNC: 0.45 MG/DL (ref 0.67–1.17)
DEPRECATED HCO3 PLAS-SCNC: 34 MMOL/L (ref 22–29)
DEPRECATED HCO3 PLAS-SCNC: 35 MMOL/L (ref 22–29)
DIASTOLIC BLOOD PRESSURE - MUSE: NORMAL MMHG
EGFRCR SERPLBLD CKD-EPI 2021: >90 ML/MIN/1.73M2
EGFRCR SERPLBLD CKD-EPI 2021: >90 ML/MIN/1.73M2
EOSINOPHIL # BLD AUTO: 0.1 10E3/UL (ref 0–0.7)
EOSINOPHIL NFR BLD AUTO: 1 %
ERYTHROCYTE [DISTWIDTH] IN BLOOD BY AUTOMATED COUNT: 16.4 % (ref 10–15)
GLUCOSE SERPL-MCNC: 130 MG/DL (ref 70–99)
GLUCOSE SERPL-MCNC: 140 MG/DL (ref 70–99)
GLUCOSE UR STRIP-MCNC: NEGATIVE MG/DL
HCT VFR BLD AUTO: 28.1 % (ref 40–53)
HGB BLD-MCNC: 9.1 G/DL (ref 13.3–17.7)
HGB UR QL STRIP: NEGATIVE
HOLD SPECIMEN: NORMAL
IMM GRANULOCYTES # BLD: 0.2 10E3/UL
IMM GRANULOCYTES NFR BLD: 1 %
INTERPRETATION ECG - MUSE: NORMAL
KETONES UR STRIP-MCNC: NEGATIVE MG/DL
LEUKOCYTE ESTERASE UR QL STRIP: NEGATIVE
LYMPHOCYTES # BLD AUTO: 1.1 10E3/UL (ref 0.8–5.3)
LYMPHOCYTES NFR BLD AUTO: 9 %
MCH RBC QN AUTO: 25.6 PG (ref 26.5–33)
MCHC RBC AUTO-ENTMCNC: 32.4 G/DL (ref 31.5–36.5)
MCV RBC AUTO: 79 FL (ref 78–100)
MONOCYTES # BLD AUTO: 1.1 10E3/UL (ref 0–1.3)
MONOCYTES NFR BLD AUTO: 9 %
NEUTROPHILS # BLD AUTO: 10.1 10E3/UL (ref 1.6–8.3)
NEUTROPHILS NFR BLD AUTO: 80 %
NITRATE UR QL: NEGATIVE
NRBC # BLD AUTO: 0 10E3/UL
NRBC BLD AUTO-RTO: 0 /100
OSMOLALITY UR: 114 MMOL/KG (ref 100–1200)
P AXIS - MUSE: NORMAL DEGREES
PH UR STRIP: 7 [PH] (ref 5–7)
PLATELET # BLD AUTO: 375 10E3/UL (ref 150–450)
POTASSIUM SERPL-SCNC: 3.3 MMOL/L (ref 3.4–5.3)
POTASSIUM SERPL-SCNC: 3.6 MMOL/L (ref 3.4–5.3)
PR INTERVAL - MUSE: NORMAL MS
QRS DURATION - MUSE: 90 MS
QT - MUSE: 464 MS
QTC - MUSE: 478 MS
R AXIS - MUSE: -20 DEGREES
RBC # BLD AUTO: 3.56 10E6/UL (ref 4.4–5.9)
RBC URINE: <1 /HPF
SODIUM SERPL-SCNC: 126 MMOL/L (ref 135–145)
SODIUM SERPL-SCNC: 126 MMOL/L (ref 135–145)
SODIUM UR-SCNC: <20 MMOL/L
SP GR UR STRIP: 1 (ref 1–1.03)
SYSTOLIC BLOOD PRESSURE - MUSE: NORMAL MMHG
T AXIS - MUSE: 48 DEGREES
TSH SERPL DL<=0.005 MIU/L-ACNC: 2.11 UIU/ML (ref 0.3–4.2)
UROBILINOGEN UR STRIP-MCNC: NORMAL MG/DL
VENTRICULAR RATE- MUSE: 64 BPM
WBC # BLD AUTO: 12.6 10E3/UL (ref 4–11)
WBC URINE: <1 /HPF

## 2024-06-28 PROCEDURE — 84300 ASSAY OF URINE SODIUM: CPT

## 2024-06-28 PROCEDURE — 250N000013 HC RX MED GY IP 250 OP 250 PS 637: Performed by: HOSPITALIST

## 2024-06-28 PROCEDURE — 93005 ELECTROCARDIOGRAM TRACING: CPT

## 2024-06-28 PROCEDURE — 36415 COLL VENOUS BLD VENIPUNCTURE: CPT

## 2024-06-28 PROCEDURE — 83935 ASSAY OF URINE OSMOLALITY: CPT

## 2024-06-28 PROCEDURE — 99223 1ST HOSP IP/OBS HIGH 75: CPT

## 2024-06-28 PROCEDURE — 80048 BASIC METABOLIC PNL TOTAL CA: CPT

## 2024-06-28 PROCEDURE — 120N000001 HC R&B MED SURG/OB

## 2024-06-28 PROCEDURE — 258N000003 HC RX IP 258 OP 636

## 2024-06-28 PROCEDURE — 96360 HYDRATION IV INFUSION INIT: CPT

## 2024-06-28 PROCEDURE — 36415 COLL VENOUS BLD VENIPUNCTURE: CPT | Performed by: EMERGENCY MEDICINE

## 2024-06-28 PROCEDURE — 80048 BASIC METABOLIC PNL TOTAL CA: CPT | Performed by: STUDENT IN AN ORGANIZED HEALTH CARE EDUCATION/TRAINING PROGRAM

## 2024-06-28 PROCEDURE — 258N000003 HC RX IP 258 OP 636: Performed by: EMERGENCY MEDICINE

## 2024-06-28 PROCEDURE — 84295 ASSAY OF SERUM SODIUM: CPT

## 2024-06-28 PROCEDURE — 999N000040 HC STATISTIC CONSULT NO CHARGE VASC ACCESS

## 2024-06-28 PROCEDURE — 85004 AUTOMATED DIFF WBC COUNT: CPT | Performed by: EMERGENCY MEDICINE

## 2024-06-28 PROCEDURE — 999N000128 HC STATISTIC PERIPHERAL IV START W/O US GUIDANCE

## 2024-06-28 PROCEDURE — 84443 ASSAY THYROID STIM HORMONE: CPT

## 2024-06-28 PROCEDURE — 81001 URINALYSIS AUTO W/SCOPE: CPT | Performed by: EMERGENCY MEDICINE

## 2024-06-28 PROCEDURE — 70450 CT HEAD/BRAIN W/O DYE: CPT

## 2024-06-28 PROCEDURE — 99285 EMERGENCY DEPT VISIT HI MDM: CPT | Mod: 25

## 2024-06-28 PROCEDURE — 36415 COLL VENOUS BLD VENIPUNCTURE: CPT | Performed by: STUDENT IN AN ORGANIZED HEALTH CARE EDUCATION/TRAINING PROGRAM

## 2024-06-28 PROCEDURE — 250N000013 HC RX MED GY IP 250 OP 250 PS 637

## 2024-06-28 RX ORDER — GABAPENTIN 100 MG/1
100 CAPSULE ORAL 2 TIMES DAILY PRN
Status: DISCONTINUED | OUTPATIENT
Start: 2024-06-28 | End: 2024-07-01 | Stop reason: HOSPADM

## 2024-06-28 RX ORDER — POTASSIUM CHLORIDE 1500 MG/1
40 TABLET, EXTENDED RELEASE ORAL ONCE
Status: COMPLETED | OUTPATIENT
Start: 2024-06-28 | End: 2024-06-28

## 2024-06-28 RX ORDER — HYDRALAZINE HYDROCHLORIDE 10 MG/1
10 TABLET, FILM COATED ORAL EVERY 4 HOURS PRN
Status: DISCONTINUED | OUTPATIENT
Start: 2024-06-28 | End: 2024-07-01 | Stop reason: HOSPADM

## 2024-06-28 RX ORDER — LANOLIN ALCOHOL/MO/W.PET/CERES
1000 CREAM (GRAM) TOPICAL DAILY
Status: DISCONTINUED | OUTPATIENT
Start: 2024-06-29 | End: 2024-07-01 | Stop reason: HOSPADM

## 2024-06-28 RX ORDER — FOLIC ACID 1 MG/1
1000 TABLET ORAL DAILY
Status: DISCONTINUED | OUTPATIENT
Start: 2024-06-29 | End: 2024-07-01 | Stop reason: HOSPADM

## 2024-06-28 RX ORDER — GABAPENTIN 100 MG/1
100 CAPSULE ORAL EVERY MORNING
Status: DISCONTINUED | OUTPATIENT
Start: 2024-06-29 | End: 2024-07-01 | Stop reason: HOSPADM

## 2024-06-28 RX ORDER — POTASSIUM CHLORIDE 1.5 G/1.58G
40 POWDER, FOR SOLUTION ORAL ONCE
Status: COMPLETED | OUTPATIENT
Start: 2024-06-28 | End: 2024-06-28

## 2024-06-28 RX ORDER — FERROUS SULFATE 325(65) MG
325 TABLET ORAL
Status: DISCONTINUED | OUTPATIENT
Start: 2024-06-30 | End: 2024-07-01 | Stop reason: HOSPADM

## 2024-06-28 RX ORDER — MIRTAZAPINE 7.5 MG/1
7.5 TABLET, FILM COATED ORAL AT BEDTIME
Status: DISCONTINUED | OUTPATIENT
Start: 2024-06-28 | End: 2024-07-01 | Stop reason: HOSPADM

## 2024-06-28 RX ORDER — GABAPENTIN 300 MG/1
300 CAPSULE ORAL AT BEDTIME
Status: DISCONTINUED | OUTPATIENT
Start: 2024-06-28 | End: 2024-07-01 | Stop reason: HOSPADM

## 2024-06-28 RX ORDER — HYDRALAZINE HYDROCHLORIDE 20 MG/ML
10 INJECTION INTRAMUSCULAR; INTRAVENOUS EVERY 4 HOURS PRN
Status: DISCONTINUED | OUTPATIENT
Start: 2024-06-28 | End: 2024-07-01 | Stop reason: HOSPADM

## 2024-06-28 RX ORDER — ACETAMINOPHEN 650 MG/1
650 SUPPOSITORY RECTAL EVERY 4 HOURS PRN
Status: DISCONTINUED | OUTPATIENT
Start: 2024-06-28 | End: 2024-07-01 | Stop reason: HOSPADM

## 2024-06-28 RX ORDER — LIDOCAINE 40 MG/G
CREAM TOPICAL
Status: DISCONTINUED | OUTPATIENT
Start: 2024-06-28 | End: 2024-07-01 | Stop reason: HOSPADM

## 2024-06-28 RX ORDER — SODIUM CHLORIDE 9 MG/ML
INJECTION, SOLUTION INTRAVENOUS CONTINUOUS
Status: DISCONTINUED | OUTPATIENT
Start: 2024-06-28 | End: 2024-06-28

## 2024-06-28 RX ORDER — SODIUM CHLORIDE 9 MG/ML
1000 INJECTION, SOLUTION INTRAVENOUS CONTINUOUS
Status: DISCONTINUED | OUTPATIENT
Start: 2024-06-28 | End: 2024-06-30

## 2024-06-28 RX ORDER — PANTOPRAZOLE SODIUM 40 MG/1
40 TABLET, DELAYED RELEASE ORAL 2 TIMES DAILY
Status: DISCONTINUED | OUTPATIENT
Start: 2024-06-28 | End: 2024-07-01 | Stop reason: HOSPADM

## 2024-06-28 RX ORDER — ACETAMINOPHEN 325 MG/1
650 TABLET ORAL EVERY 4 HOURS PRN
Status: DISCONTINUED | OUTPATIENT
Start: 2024-06-28 | End: 2024-07-01 | Stop reason: HOSPADM

## 2024-06-28 RX ADMIN — SODIUM CHLORIDE, PRESERVATIVE FREE 1000 ML: 5 INJECTION INTRAVENOUS at 17:55

## 2024-06-28 RX ADMIN — PANTOPRAZOLE SODIUM 40 MG: 40 TABLET, DELAYED RELEASE ORAL at 22:51

## 2024-06-28 RX ADMIN — QUETIAPINE 12.5 MG: 25 TABLET, FILM COATED ORAL at 21:27

## 2024-06-28 RX ADMIN — POTASSIUM CHLORIDE 40 MEQ: 1.5 POWDER, FOR SOLUTION ORAL at 23:10

## 2024-06-28 RX ADMIN — GABAPENTIN 300 MG: 300 CAPSULE ORAL at 21:27

## 2024-06-28 RX ADMIN — ACETAMINOPHEN 650 MG: 325 TABLET, FILM COATED ORAL at 22:54

## 2024-06-28 RX ADMIN — ACETAMINOPHEN 650 MG: 325 TABLET, FILM COATED ORAL at 17:59

## 2024-06-28 RX ADMIN — SODIUM CHLORIDE: 9 INJECTION, SOLUTION INTRAVENOUS at 13:26

## 2024-06-28 ASSESSMENT — COLUMBIA-SUICIDE SEVERITY RATING SCALE - C-SSRS: IS THE PATIENT NOT ABLE TO COMPLETE C-SSRS: UNABLE TO VERBALIZE

## 2024-06-28 ASSESSMENT — ACTIVITIES OF DAILY LIVING (ADL)
ADLS_ACUITY_SCORE: 38
ADLS_ACUITY_SCORE: 63
ADLS_ACUITY_SCORE: 38
ADLS_ACUITY_SCORE: 63
ADLS_ACUITY_SCORE: 38

## 2024-06-28 NOTE — PROGRESS NOTES
[facility] Care Coordination Contact    4 attempts made to reach member to complete six month assessment. Left a message requesting a return call.    Gisela Hall RN  [facility]  [phone]

## 2024-06-28 NOTE — ED PROVIDER NOTES
Emergency Department Note      History of Present Illness     Chief Complaint   Abnormal Labs      HPI   Jamie Valdivia is a 84 year old male with a history of coronary artery disease, atrial fibrillation, hypertension, who presents via EMS from [facility] for low sodium. Patient endorse a headache. Patient reports that he likely drank more water than permitted under his novel water-restriction due to a medication change. He endorses vision changes bilateral leg edema.    Review of External Notes   Geriatrics note reviewed from June 25, 2024.  The patient was seen in follow-up.  The patient is known to have hyponatremia which is felt to be related to SIADH.  On the most recent lab check from June 20 the patient was found to have a sodium of 126.  This was down from 139 on April 26.  The plan was to decrease the dose of mirtazapine and fluid restrict.  Despite this on repeat check of labs yesterday the sodium trended down to 123 and hospital admission was requested.    Past Medical History     Medical History and Problem List   Alcohol abuse  Atrial fibrillation  Dementia  Encephalopathy  Anxiety  GI bleeding  Hypertension  Impaired gait  Osteoarthritis  Osteoporosis of both knees  Seizures  Palpitations  Peripheral neuropathy  Pharyngeal dysphagia  Depression  Rhabdomyolysis  Thrombocytopenia    Medications   Folvite  Neurontin  Remeron  Prilosec  Seroquel    Surgical History   Prostate biopsy  Left hip replacement  Left shoulder replacement  Spine surgery  Tonsillectomy    Physical Exam     Patient Vitals for the past 24 hrs:   BP Temp Temp src Pulse Resp SpO2 Weight   06/28/24 1139 131/64 96.8  F (36  C) Temporal 68 20 95 % 94.5 kg (208 lb 6.4 oz)     Physical Exam  Constitutional:       General: He is not in acute distress.     Appearance: Normal appearance. He is not toxic-appearing.   HENT:      Head: Atraumatic.      Right Ear: External ear normal.      Left Ear: External ear normal.      Mouth/Throat:       Mouth: Mucous membranes are dry.   Eyes:      General: No scleral icterus.     Conjunctiva/sclera: Conjunctivae normal.   Cardiovascular:      Rate and Rhythm: Normal rate and regular rhythm.      Heart sounds: Normal heart sounds.   Pulmonary:      Effort: Pulmonary effort is normal. No respiratory distress.      Breath sounds: Normal breath sounds.   Abdominal:      Palpations: Abdomen is soft.      Tenderness: There is no abdominal tenderness.   Musculoskeletal:         General: No deformity.      Cervical back: Neck supple.      Right lower leg: Edema present.      Left lower leg: Edema present.   Skin:     General: Skin is warm.      Capillary Refill: Capillary refill takes less than 2 seconds.   Neurological:      General: No focal deficit present.      Mental Status: He is alert and oriented to person, place, and time.   Psychiatric:         Mood and Affect: Mood normal.         Behavior: Behavior normal.           Diagnostics   Labs Ordered and Resulted from Time of ED Arrival to Time of ED Departure - No data to display   Imaging   CT Head w/o Contrast   Final Result   IMPRESSION:    1. No acute intracranial pathology.    2. Chronic small vessel ischemic disease, generalized cerebral volume   loss and right occipital chronic infarct.      BRENDA DAMON MD            SYSTEM ID:  [id number]        ECG  ECG taken at 1249, ECG read at 1255  Junctional rhythm with frequent premature ventricular complexes  Low voltage QRS  Septal infarct, age undetermined  No changes as compared to prior, dated 06/18/2024.  Rate 64 bpm. PA interval * ms. QRS duration 90 ms. QT/QTc 464/478 ms. P-R-T axes * -20 48.     ED Course      Medications Administered   Medications   sodium chloride 0.9 % infusion ( Intravenous $New Bag 6/28/24 1326)     Social Determinants of Health adding to complexity of care   Lives in a memory unit.    ED Course   ED Course as of 06/28/24 1455   Fri Jun 28, 2024   1230 I obtained history and examined  the patient as noted above         Medical Decision Making / Diagnosis     SARAH Valdivia is a 84 year old male who presents to the ED for evaluation of hyponatremia.  He has been attempting fluid restriction although it is not clear if he is completely compliant.  His mirtazapine dose was decreased.  Despite these measures the patient's sodium is downtrending.  He was referred into the ED with hospital mission requested.  The patient's only complaint is a headache.  Head CT is negative.  He is otherwise hemodynamically stable.  We will slowly administer normal saline.  The patient will be admitted to the hospital service.    Disposition   The patient was admitted to the hospital.     Diagnosis     ICD-10-CM    1. Hyponatremia  E87.1       2. Failure of outpatient treatment  Z78.9          Scribe Disclosure:  I, Andrea Belcher, am serving as a scribe at 12:53 PM on 6/28/2024 to document services personally performed by Vin Springer MD based on my observations and the provider's statements to me.        Vin Springer MD  06/28/24 5278

## 2024-06-28 NOTE — PROGRESS NOTES
RECEIVING UNIT ED HANDOFF REVIEW    ED Nurse Handoff Report was reviewed by: Dejuan Garcia RN on June 28, 2024 at 3:49 PM

## 2024-06-28 NOTE — ED NOTES
Bed: ST03  Expected date:   Expected time:   Means of arrival:   Comments:  Dayanna 521 83m, dizzy, high sodium

## 2024-06-28 NOTE — ED TRIAGE NOTES
Pt presents via EMS from Memory care with abnormal sodium. Pt is chronically low, 126 previous week, 123 this week.      Triage Assessment (Adult)       Row Name 06/28/24 1141          Triage Assessment    Airway WDL WDL        Cognitive/Neuro/Behavioral WDL    Cognitive/Neuro/Behavioral WDL orientation     Orientation disoriented to;situation;time;place                      - - -

## 2024-06-28 NOTE — ED NOTES
[facility]  ED Nurse Handoff Report    ED Chief complaint: Abnormal Labs      ED Diagnosis:   Final diagnoses:   Hyponatremia   Failure of outpatient treatment       Code Status: Full Code    Allergies:   Allergies   Allergen Reactions    Ativan [Lorazepam]        Patient Story:   Pt comes in from memory care with low sodium    Focused Assessment:    Neuro: Alert, oriented x 2  Respiratory:Clear lung sounds, on room air   Cardiology:  Sinus   Gastrointestinal: soft, non tender, non distended   Genitourinary/Renal:  Depends  Musculoskeletal:  extremities stiff, contracted left arm  Skin: Intact skin   Lines: PIV by US    Labs Ordered and Resulted from Time of ED Arrival to Time of ED Departure   CBC WITH PLATELETS AND DIFFERENTIAL - Abnormal       Result Value    WBC Count 12.6 (*)     RBC Count 3.56 (*)     Hemoglobin 9.1 (*)     Hematocrit 28.1 (*)     MCV 79      MCH 25.6 (*)     MCHC 32.4      RDW 16.4 (*)     Platelet Count 375      % Neutrophils 80      % Lymphocytes 9      % Monocytes 9      % Eosinophils 1      % Basophils 0      % Immature Granulocytes 1      NRBCs per 100 WBC 0      Absolute Neutrophils 10.1 (*)     Absolute Lymphocytes 1.1      Absolute Monocytes 1.1      Absolute Eosinophils 0.1      Absolute Basophils 0.0      Absolute Immature Granulocytes 0.2      Absolute NRBCs 0.0     ROUTINE UA WITH MICROSCOPIC REFLEX TO CULTURE        CT Head w/o Contrast   Final Result   IMPRESSION:    1. No acute intracranial pathology.    2. Chronic small vessel ischemic disease, generalized cerebral volume   loss and right occipital chronic infarct.      BRENDA DAMON MD            SYSTEM ID:  [id number]            Treatments and/or interventions provided:      Medications   sodium chloride 0.9 % infusion ( Intravenous $New Bag 6/28/24 1327)        Patient's response to treatments and/or interventions:   Resting comfortably    To be done/followed up on inpatient unit:    See any in-patient  orders    Does this patient have any cognitive concerns?: Alzheimer's    Activity level - Baseline/Home:    Total Care    Activity Level - Current:     Total Care    Patient's Preferred language: English     Needed?: No    Isolation: None  Infection: Not Applicable  Patient tested for COVID 19 prior to admission: NO    Bariatric?: No    Vital Signs:   Vitals:    06/28/24 1139 06/28/24 1500   BP: 131/64 (!) 140/73   Pulse: 68 78   Resp: 20 14   Temp: 96.8  F (36  C)    TempSrc: Temporal    SpO2: 95% 96%   Weight: 94.5 kg (208 lb 6.4 oz)        Cardiac Rhythm:     Was the PSS-3 completed:   Yes    Family Comments: Wife at [facility], they live there together    For the majority of the shift this patient's behavior was Green.   Behavioral interventions performed were None    ED NURSE PHONE NUMBER: RN 3

## 2024-06-28 NOTE — H&P
[facility]    History and Physical - Hospitalist Service       Date of Admission:  6/28/2024    Assessment & Plan      Jamie Valdivia is a 84 year old male admitted on 6/28/2024 for acute hyponatremia. He has a past medical history of coronary artery disease, atrial fib, hypertension, dementia.      Hyponatremia  Presents with a sodium of 123. 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 He was recently seen in clinic on 6/20 and found to have a sodium of 126. Prior to this, his sodium level was 139 in late April 2024. He was placed on a fluid restriction and his dose of mirtazapine was decreased. Despite this, his recheck Sodium on 6/27 was 123. Complains of mild headache only.  *Head CT: No acute intracranial pathology. Chronic small vessel ischemic disease, generalized cerebral volume loss and right occipital chronic infarct.   *Sodium level 6/27: 123-->6/28: 126  *Urine osmolarity 6/27: 263  *Serum osmolarity 114  -Recheck urine osmolarity-pending  -Urine sodium <20  -Continuous telemetry monitoring  -Q6h sodium checks   -Strict intake and output  -Hold PO Remeron  -Check BMP 6/28 and 6/29  -0.9% Saline 75ml/h    Hypertension  *Normotensive in ED  -Not on any PTA antihypertensives    History of A-fib  *EKG 6/28: Junctional rhythm with frequent Premature ventricular complexes   *Not on anticoagulation. Seen by cardiology 3/14/24 and per documentation, refuses OAC.  -Continuous telemetry monitoring    Dementia  -Resume PTA Seroquel     Diet:  Regular  DVT Prophylaxis: Pneumatic Compression Devices  Lucio Catheter: Not present  Lines: None     Cardiac Monitoring: None  Code Status:  Full Code    Clinically Significant Risk Factors Present on Admission         # Hyponatremia: Lowest Na = 123 mmol/L in last 2 days, will monitor as appropriate            # Dementia: noted on problem list      # Obesity: Estimated body mass index is 31.69 kg/m  as calculated from  "the following:    Height as of 6/25/24: 1.727 m (5' 8\").    Weight as of this encounter: 94.5 kg (208 lb 6.4 oz).            Disposition Plan     Medically Ready for Discharge: Anticipated in 2-4 Days     The patient's care was discussed with the Attending Physician, Dr. Stewart .    Sacha Key NP  Hospitalist Service  [facility]  Securely message with HOLL[employer] (more info)  Text page via linkedÃ¼ Paging/Directory     ___________________________________________________  Chief Complaint   Low sodium    History is obtained from the patient    History of Present Illness   Jamie Valdivia is a 84 year old male who resides in a memory care unit. He presents with hyponatremia. 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 In April 2024, his sodium level was 139. However, when it was rechecked on 6/20 it was noted to be 126. His dose of Remeron was decreased and he was placed on a 1800 ml daily fluid restriction. His sodium was rechecked again on 6/27 and, despite the aforementioned changes, dropped again to 123. He was advised to come to the ED for closely monitored inpatient sodium correction.   He currently endorses a moderate headache, but denies the presence of any acute vision changes, chest pain, shortness of breath, abdominal pain, nausea, vomiting, diarrhea, constipation or urinary symptoms.  Plan to monitor sodium q6h. Will check urine sodium and osmolarity as well as serum osmolarity. He was started on 0.9% saline in the ED.     Past Medical History    Past Medical History:   Diagnosis Date    Age-related osteoporosis without current pathological fracture 03/30/2022    Alcohol abuse 11/19/2017    Alcohol dependence with withdrawal (H)     Alcoholism (H)     Back pain     Backache 12/19/2018    CAD (coronary artery disease)     Chronic a-fib (H)     Chronic alcohol use 06/11/2015    Formatting of this note might be different from the original. 2/26/2019: " serious fall at home with multiple vertebral fractures.  BAL 0.414% on admission.  Denies that he drinks much. 12/18/2018: hospitalized for fall due to alcohol intoxication.  BAL 0.34% on admission. 9/16/2018: hospitalized for injuries from fall due to alcohol intoxication.  BAL 0.34% on admission    Chronic atrial fibrillation (H) 07/15/2016    Formatting of this note might be different from the original. 2/2019: Multiple falls so started on aspirin only.  Then aspirin stopped due to GI bleed.    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 2/2/21    HTN (hypertension)     Impaired gait and mobility 03/14/2019    Mild cognitive impairment 08/12/2019    Formatting of this note might be different from the original. NON-AMNESTIC TYPE    Mild TBI (traumatic brain injury) (H) 03/14/2019    Mumps     Obesity (BMI 30-39.9) 09/03/2015    Osteoarthritis     Osteoarthritis of both knees 05/13/2015    Osteoporosis     Other idiopathic peripheral autonomic neuropathy 03/30/2022    Other insomnia 03/14/2019    Other seizures (H) 03/30/2022    Palpitations     Peripheral neuropathy     Pharyngeal dysphagia 05/13/2015    Formatting of this note might be different from the original. Likely secondary to GERD with esophagitis, on PPI and H2 blocker with notable improvement, EGD 10/5/15.    Physical deconditioning 03/14/2019    Recurrent major depressive disorder (H24) 03/30/2022    Rhabdomyolysis     Thrombocytopenia (H24)     Urination disorder     Weakness 04/27/2020        Past Surgical History   Past Surgical History:   Procedure Laterality Date    BIOPSY PROSTATE TRANSRECTAL N/A 1/11/2023    Procedure: PROSTATE BIOPSY, RECTAL APPROACH;  Surgeon: Niraj Larry MD;  Location:  OR    COLONOSCOPY      ESOPHAGOSCOPY, GASTROSCOPY, DUODENOSCOPY (EGD), COMBINED N/A 06/01/2022    Procedure: ESOPHAGOGASTRODUODENOSCOPY (EGD) mngi with biopsies using jumbo cold biopsy forceps;  Surgeon: David Springer MD;  Location:  GI    left hip replacement  2010    left shoulder replacement  2016    ORTHOPEDIC SURGERY      SPINE SURGERY      done in [place]    TONSILLECTOMY      TRANSRECTAL ULTRASONIC SONOGRAM N/A 1/11/2023    Procedure: TRANSRECTAL ULTRASOUND;  Surgeon: Niraj Larry MD;  Location:  OR       Prior to Admission Medications   Prior to Admission Medications   Prescriptions Last Dose Informant Patient Reported? Taking?   FEROSUL 325 (65 Fe) MG tablet Unknown  No Yes   Sig: TAKE 1 TABLET BY MOUTH ONCE DAILY WITH BREAKFAST   QUEtiapine (SEROQUEL) 25 MG tablet Unknown  No Yes   Sig: TAKE ONE-HALF TABLET (12.5MG) BY MOUTH TWICE DAILY;& MAY TAKE ONE-HALF TABLET (12.5MG) EVERY 12 HOURS AS NEEDED   acetaminophen (TYLENOL) 500 MG tablet Unknown  No Yes   Sig: Take 2 tablets (1,000 mg) by mouth 3 times daily as needed for mild pain   cyanocobalamin (VITAMIN B-12) 1000 MCG tablet Unknown  No Yes   Sig: TAKE 1 TABLET BY MOUTH ONCE DAILY   folic acid (FOLVITE) 1 MG tablet Unknown  No Yes   Sig: TAKE 1 TABLET BY MOUTH ONCE DAILY   gabapentin (NEURONTIN) 100 MG capsule Unknown  Yes Yes   Sig: Take 1 capsule (100 mg) by mouth daily. May also take 1 capsule (100 mg) 2 times daily as needed for neuropathic pain.   gabapentin (NEURONTIN) 300 MG capsule Unknown  No Yes   Sig: TAKE 1 CAPSULE BY MOUTH AT BEDTIME   loratadine (CLARITIN) 10 MG tablet Unknown  No Yes   Sig: TAKE 1 TABLET BY MOUTH AT BEDTIME   menthol-zinc oxide (CALMOSEPTINE) 0.44-20.6 % OINT ointment Unknown  No Yes    Sig: Apply topically 4 times daily To perineal area. Send to [facility].   mirtazapine (REMERON) 7.5 MG tablet Unknown  No Yes   Sig: Take 1 tablet (7.5 mg) by mouth at bedtime   nystatin (MYCOSTATIN) 327279 UNIT/GM external powder Unknown Nursing Home Yes Yes   Sig: Apply topically 2 times daily as needed (redness, rash under left breast)   omeprazole (PRILOSEC) 40 MG DR capsule Unknown  No Yes   Sig: TAKE 1 CAPSULE BY MOUTH TWICE DAILY   oxymetazoline (AFRIN) 0.05 % nasal spray Unknown  No Yes   Sig: Spray 2 sprays into both nostrils 2 times daily as needed (nose bleed)   polyethylene glycol (MIRALAX) 17 GM/Dose powder Unknown Nursing Home No Yes   Sig: MIX 2 CAPFULS IN 8OZ OF ORANGE JUICE  IN THE MORNING;AND MAY MIX 2 CAPFULS ONCE DAILY AS NEEDED FOR CONSTIPATION. MIX IN FRONT OF PT. HOLD FOR LOOSE STOOL. OK TO GIVE 1 CAPFUL IF RESIDENT REFUSES 2 CAPFULS.   sodium chloride (OCEAN) 0.65 % nasal spray Unknown  No Yes   Sig: Spray 1 spray in nostril 4 times daily as needed for congestion Okay to leave at bedside      Facility-Administered Medications: None      Physical Exam   Vital Signs: Temp: 96.8  F (36  C) Temp src: Temporal BP: 131/64 Pulse: 68   Resp: 20 SpO2: 95 % O2 Device: None (Room air)    Weight: 208 lbs 6.4 oz  Physical Exam  Vitals and nursing note reviewed.   Cardiovascular:      Rate and Rhythm: Normal rate and regular rhythm.      Heart sounds: Normal heart sounds.   Pulmonary:      Effort: Pulmonary effort is normal.      Breath sounds: Normal breath sounds and air entry.   Abdominal:      General: Bowel sounds are normal.      Palpations: Abdomen is soft.      Tenderness: There is no abdominal tenderness.   Neurological:      General: No focal deficit present.      Mental Status: He is alert.   Psychiatric:         Attention and Perception: Attention normal.         Mood and Affect: Mood normal.         Speech: Speech normal.         Behavior: Behavior is cooperative.         Thought Content:  Thought content normal.         Cognition and Memory: Cognition normal. Memory is impaired.     Medical Decision Making   75 MINUTES SPENT BY ME on the date of service doing chart review, history, exam, documentation & further activities per the note.      Data     I have personally reviewed the following data over the past 24 hrs:    12.6 (H)  \   9.1 (L)   / 375     N/A N/A N/A /  N/A   N/A N/A N/A \       Imaging results reviewed over the past 24 hrs:   Recent Results (from the past 24 hour(s))   CT Head w/o Contrast    Narrative    EXAM: CT HEAD W/O CONTRAST  6/28/2024 2:00 PM     HISTORY:  Headache; Headache; Acute HA (< 3 months), no complicating  features       COMPARISON:  Head CT 6/18/2024    TECHNIQUE: Using multidetector thin collimation helical acquisition  technique, axial, coronal and sagittal CT images from the skull base  to the vertex were obtained without intravenous contrast.   (topogram) image(s) also obtained and reviewed. Dose reduction  techniques were performed.    FINDINGS:  No intracranial hemorrhage, mass effect, or midline shift. No acute  loss of gray-white matter differentiation in the cerebral hemispheres.  Ventricles are proportionate to the cerebral sulci. Clear basal  cisterns. Chronic right occipital infarct. Mild periventricular  hypoattenuation, consistent with chronic small vessel ischemic  disease. Moderate generalized cerebral volume loss.    The bony calvaria and the bones of the skull base are normal. The  visualized portions of the paranasal sinuses are clear. Left greater  than right mastoid effusions. Grossly normal orbits.       Impression    IMPRESSION:   1. No acute intracranial pathology.   2. Chronic small vessel ischemic disease, generalized cerebral volume  loss and right occipital chronic infarct.    BRENDA DAMON MD         SYSTEM ID:  [id number]

## 2024-06-28 NOTE — PHARMACY-ADMISSION MEDICATION HISTORY
Pharmacist Admission Medication History    Admission medication history is complete. The information provided in this note is only as accurate as the sources available at the time of the update.    Information Source(s): Facility (TCU/NH/) medication list/MAR via phone    Pertinent Information: Called [facility] for last dose info    Changes made to PTA medication list:  Added: None  Deleted: None  Changed: None    Allergies reviewed with patient and updates made in EHR: no    Medication History Completed By: Camacho Aparicio RPH 6/28/2024 2:27 PM    PTA Med List   Medication Sig Note Last Dose    acetaminophen (TYLENOL) 500 MG tablet Take 2 tablets (1,000 mg) by mouth 3 times daily as needed for mild pain  Unknown    cyanocobalamin (VITAMIN B-12) 1000 MCG tablet TAKE 1 TABLET BY MOUTH ONCE DAILY  6/28/2024 at am    FEROSUL 325 (65 Fe) MG tablet TAKE 1 TABLET BY MOUTH ONCE DAILY WITH BREAKFAST  6/28/2024 at am    folic acid (FOLVITE) 1 MG tablet TAKE 1 TABLET BY MOUTH ONCE DAILY  6/28/2024 at am    gabapentin (NEURONTIN) 100 MG capsule Take 1 capsule (100 mg) by mouth daily. May also take 1 capsule (100 mg) 2 times daily as needed for neuropathic pain.  6/28/2024 at am    gabapentin (NEURONTIN) 300 MG capsule TAKE 1 CAPSULE BY MOUTH AT BEDTIME  6/27/2024 at hs    loratadine (CLARITIN) 10 MG tablet TAKE 1 TABLET BY MOUTH AT BEDTIME  6/27/2024 at hs    menthol-zinc oxide (CALMOSEPTINE) 0.44-20.6 % OINT ointment Apply topically 4 times daily To perineal area. Send to [facility].  Unknown    mirtazapine (REMERON) 7.5 MG tablet Take 1 tablet (7.5 mg) by mouth at bedtime  6/27/2024 at hs    nystatin (MYCOSTATIN) 435892 UNIT/GM external powder Apply topically 2 times daily as needed (redness, rash under left breast)  Unknown    omeprazole (PRILOSEC) 40 MG DR capsule TAKE 1 CAPSULE BY MOUTH TWICE DAILY 6/28/2024: Opened and sprinkled into applesauce 6/28/2024 at am    oxymetazoline (AFRIN) 0.05 % nasal spray  Spray 2 sprays into both nostrils 2 times daily as needed (nose bleed)  Unknown    polyethylene glycol (MIRALAX) 17 GM/Dose powder MIX 2 CAPFULS IN 8OZ OF ORANGE JUICE  IN THE MORNING;AND MAY MIX 2 CAPFULS ONCE DAILY AS NEEDED FOR CONSTIPATION. MIX IN FRONT OF PT. HOLD FOR LOOSE STOOL. OK TO GIVE 1 CAPFUL IF RESIDENT REFUSES 2 CAPFULS.  6/28/2024 at am    QUEtiapine (SEROQUEL) 25 MG tablet TAKE ONE-HALF TABLET (12.5MG) BY MOUTH TWICE DAILY;& MAY TAKE ONE-HALF TABLET (12.5MG) EVERY 12 HOURS AS NEEDED  6/28/2024 at am    sodium chloride (OCEAN) 0.65 % nasal spray Spray 1 spray in nostril 4 times daily as needed for congestion Okay to leave at bedside  Unknown

## 2024-06-28 NOTE — PHARMACY-CONSULT NOTE
Meds reviewed per consult to review medications for causes of Syndrome of Inappropriate Antidiuretic Hormone secretion (SIADH) or Hyponatremia.        PPIs (Omeprazole/Pantoprazole) and SSRIs (Mirtazipine) are associated with SIADH.  There have been cases of Quetiapine associated with hyponatremia.     Vilma, ChristianD

## 2024-06-29 ENCOUNTER — APPOINTMENT (OUTPATIENT)
Dept: SPEECH THERAPY | Facility: CLINIC | Age: 84
DRG: 644 | End: 2024-06-29
Attending: HOSPITALIST
Payer: COMMERCIAL

## 2024-06-29 LAB
ANION GAP SERPL CALCULATED.3IONS-SCNC: 8 MMOL/L (ref 7–15)
BUN SERPL-MCNC: 5.2 MG/DL (ref 8–23)
CALCIUM SERPL-MCNC: 7.8 MG/DL (ref 8.8–10.2)
CHLORIDE SERPL-SCNC: 87 MMOL/L (ref 98–107)
CREAT SERPL-MCNC: 0.45 MG/DL (ref 0.67–1.17)
DEPRECATED HCO3 PLAS-SCNC: 32 MMOL/L (ref 22–29)
EGFRCR SERPLBLD CKD-EPI 2021: >90 ML/MIN/1.73M2
ERYTHROCYTE [DISTWIDTH] IN BLOOD BY AUTOMATED COUNT: 17 % (ref 10–15)
GLUCOSE SERPL-MCNC: 113 MG/DL (ref 70–99)
HCT VFR BLD AUTO: 26 % (ref 40–53)
HGB BLD-MCNC: 8.2 G/DL (ref 13.3–17.7)
MCH RBC QN AUTO: 25.2 PG (ref 26.5–33)
MCHC RBC AUTO-ENTMCNC: 31.5 G/DL (ref 31.5–36.5)
MCV RBC AUTO: 80 FL (ref 78–100)
PLATELET # BLD AUTO: 389 10E3/UL (ref 150–450)
POTASSIUM SERPL-SCNC: 3.7 MMOL/L (ref 3.4–5.3)
POTASSIUM SERPL-SCNC: 3.7 MMOL/L (ref 3.4–5.3)
RBC # BLD AUTO: 3.26 10E6/UL (ref 4.4–5.9)
SODIUM SERPL-SCNC: 127 MMOL/L (ref 135–145)
SODIUM SERPL-SCNC: 127 MMOL/L (ref 135–145)
SODIUM SERPL-SCNC: 130 MMOL/L (ref 135–145)
SODIUM SERPL-SCNC: 130 MMOL/L (ref 135–145)
SODIUM SERPL-SCNC: 132 MMOL/L (ref 135–145)
WBC # BLD AUTO: 7.6 10E3/UL (ref 4–11)

## 2024-06-29 PROCEDURE — 120N000001 HC R&B MED SURG/OB

## 2024-06-29 PROCEDURE — 36415 COLL VENOUS BLD VENIPUNCTURE: CPT | Performed by: HOSPITALIST

## 2024-06-29 PROCEDURE — 84295 ASSAY OF SERUM SODIUM: CPT

## 2024-06-29 PROCEDURE — 250N000013 HC RX MED GY IP 250 OP 250 PS 637: Performed by: HOSPITALIST

## 2024-06-29 PROCEDURE — 80048 BASIC METABOLIC PNL TOTAL CA: CPT

## 2024-06-29 PROCEDURE — 258N000003 HC RX IP 258 OP 636

## 2024-06-29 PROCEDURE — 92610 EVALUATE SWALLOWING FUNCTION: CPT | Mod: GN

## 2024-06-29 PROCEDURE — 36415 COLL VENOUS BLD VENIPUNCTURE: CPT

## 2024-06-29 PROCEDURE — 85027 COMPLETE CBC AUTOMATED: CPT

## 2024-06-29 PROCEDURE — 250N000013 HC RX MED GY IP 250 OP 250 PS 637

## 2024-06-29 PROCEDURE — 99232 SBSQ HOSP IP/OBS MODERATE 35: CPT | Performed by: HOSPITALIST

## 2024-06-29 PROCEDURE — 84295 ASSAY OF SERUM SODIUM: CPT | Performed by: HOSPITALIST

## 2024-06-29 RX ORDER — OXYCODONE HYDROCHLORIDE 5 MG/1
5 TABLET ORAL ONCE
Status: COMPLETED | OUTPATIENT
Start: 2024-06-29 | End: 2024-06-29

## 2024-06-29 RX ORDER — GUAIFENESIN 200 MG/10ML
200 LIQUID ORAL EVERY 4 HOURS PRN
Status: DISCONTINUED | OUTPATIENT
Start: 2024-06-29 | End: 2024-07-01 | Stop reason: HOSPADM

## 2024-06-29 RX ADMIN — PANTOPRAZOLE SODIUM 40 MG: 40 TABLET, DELAYED RELEASE ORAL at 08:22

## 2024-06-29 RX ADMIN — QUETIAPINE 12.5 MG: 25 TABLET, FILM COATED ORAL at 21:16

## 2024-06-29 RX ADMIN — ACETAMINOPHEN 650 MG: 325 TABLET, FILM COATED ORAL at 06:06

## 2024-06-29 RX ADMIN — GABAPENTIN 100 MG: 100 CAPSULE ORAL at 08:22

## 2024-06-29 RX ADMIN — FOLIC ACID 1000 MCG: 1 TABLET ORAL at 08:22

## 2024-06-29 RX ADMIN — ACETAMINOPHEN 650 MG: 325 TABLET, FILM COATED ORAL at 15:50

## 2024-06-29 RX ADMIN — GUAIFENESIN 200 MG: 200 SOLUTION ORAL at 11:43

## 2024-06-29 RX ADMIN — SODIUM CHLORIDE, PRESERVATIVE FREE 1000 ML: 5 INJECTION INTRAVENOUS at 06:06

## 2024-06-29 RX ADMIN — GABAPENTIN 300 MG: 300 CAPSULE ORAL at 21:16

## 2024-06-29 RX ADMIN — CYANOCOBALAMIN TAB 1000 MCG 1000 MCG: 1000 TAB at 08:22

## 2024-06-29 RX ADMIN — PANTOPRAZOLE SODIUM 40 MG: 40 TABLET, DELAYED RELEASE ORAL at 21:16

## 2024-06-29 RX ADMIN — QUETIAPINE 12.5 MG: 25 TABLET, FILM COATED ORAL at 08:22

## 2024-06-29 RX ADMIN — SODIUM CHLORIDE, PRESERVATIVE FREE 1000 ML: 5 INJECTION INTRAVENOUS at 17:58

## 2024-06-29 RX ADMIN — OXYCODONE HYDROCHLORIDE 5 MG: 5 TABLET ORAL at 18:01

## 2024-06-29 ASSESSMENT — ACTIVITIES OF DAILY LIVING (ADL)
ADLS_ACUITY_SCORE: 61
ADLS_ACUITY_SCORE: 63
ADLS_ACUITY_SCORE: 61
ADLS_ACUITY_SCORE: 63
ADLS_ACUITY_SCORE: 61
ADLS_ACUITY_SCORE: 63
ADLS_ACUITY_SCORE: 61
ADLS_ACUITY_SCORE: 63
ADLS_ACUITY_SCORE: 63
ADLS_ACUITY_SCORE: 61
ADLS_ACUITY_SCORE: 63
ADLS_ACUITY_SCORE: 61
ADLS_ACUITY_SCORE: 63
ADLS_ACUITY_SCORE: 61
DEPENDENT_IADLS:: CLEANING;COOKING;LAUNDRY;SHOPPING;MEAL PREPARATION;MEDICATION MANAGEMENT;TRANSPORTATION;INCONTINENCE
ADLS_ACUITY_SCORE: 63
ADLS_ACUITY_SCORE: 63
ADLS_ACUITY_SCORE: 61

## 2024-06-29 NOTE — PROGRESS NOTES
"  Speech-Language Pathology: Clinical Swallow Evaluation     06/29/24 1400   Appointment Info   Signing Clinician's Name / Credentials (SLP) Eleonora Neri MA CCC-SLP   General Information   Onset of Illness/Injury or Date of Surgery 06/29/24   Referring Physician Piero Nicholas MD   Patient/Family Therapy Goal Statement (SLP) \"I don't need any soft food and I never want thick liquids again.\"   Pertinent History of Current Problem Jamie Valdivia is a 84 year old male admitted on 6/28/2024 for acute hyponatremia. He has a past medical history of coronary artery disease, atrial fib, hypertension, dementia, and dysphagia. VFSS completed 3/11/2019: intermittent penetration without aspiration, CP bar, and early zenker's diverticulum noted per radiology report; unable to locate slp report. Referred to SLP for clinical swallow evaluation; some coughing on thin liquids per nursing. Patient reports some difficulty chewing due to poor dentition; however able to manage and does not want modified diet. Patient reports coughing episodes that \"come on at any time.\"   General Observations Patient alert, attentive, able to make needs known, easily distracted. Demonstrates insight regarding chewing and swallowing difficulties.   Type of Evaluation   Type of Evaluation Swallow Evaluation   Oral Motor   Mucosal Quality dry   Dentition (Oral Motor)   Dentition (Oral Motor) significant number of missing teeth;poor condition   Lip Function (Oral Motor)   Lip Strength (Oral Motor) bilateral;mild impairment   Lip Coordination (Oral Motor) minimal impairment   Tongue Function (Oral Motor)   Tongue Strength (Oral Motor) bilateral;minimal impairment   Tongue ROM (Oral Motor) WNL   Jaw Function (Oral Motor)   Jaw Function (Oral Motor) WNL   Cough/Swallow/Gag Reflex (Oral Motor)   Volitional Throat Clear/Cough (Oral Motor) reduced strength   Volitional Swallow (Oral Motor) WNL   Vocal Quality/Secretion Management (Oral Motor) "   Vocal Quality (Oral Motor) dysphonic;phonation breaks;pitch breaks;strained/strangled   Secretion Management (Oral Motor) WNL   General Swallowing Observations   Past History of Dysphagia VFSS completed 3/11/2019: intermittent penetration without aspiration, CP bar, and early zenker's diverticulum noted per radiology report; unable to locate slp report. Patient reports consuming regular diet and thin liquids at LTC; does not want modified diet.   Respiratory Support room air   Current Diet/Method of Nutritional Intake (General Swallowing Observations, NIS) regular diet   Swallowing Evaluation Clinical swallow evaluation   Clinical Swallow Evaluation   Feeding Assistance set up only required   Clinical Swallow Evaluation Textures Trialed thin liquids;solid foods;pureed   Clinical Swallow Eval: Thin Liquid Texture Trial   Mode of Presentation, Thin Liquids cup   Volume of Liquid or Food Presented 2 oz   Oral Phase of Swallow delayed AP movement   Pharyngeal Phase of Swallow intact   Diagnostic Statement mild impairment, no overt s/sx via single sips from cup   Clinical Swallow Evaluation: Puree Solid Texture Trial   Mode of Presentation, Puree spoon   Volume of Puree Presented tsp x5   Oral Phase, Puree WFL   Pharyngeal Phase, Puree intact;repeated swallows   Diagnostic Statement WFL   Clinical Swallow Evaluation: Solid Food Texture Trial   Mode of Presentation self-fed   Volume Presented single cracker   Oral Phase delayed AP movement;other (see comments)  (prolonged however adequate mastication, multiple swallows to clear oral cavity)   Pharyngeal Phase intact   Diagnostic Statement mild impairment   Esophageal Phase of Swallow   Esophageal comments GERD   Swallowing Recommendations   Diet Consistency Recommendations regular diet;thin liquids (level 0)   Supervision Level for Intake close supervision needed   Mode of Delivery Recommendations bolus size, small;no straws;food moistened;slow rate of intake    Swallowing Maneuver Recommendations alternate food and liquid intake   Monitoring/Assistance Required (Eating/Swallowing) monitor for cough or change in vocal quality with intake   Recommended Feeding/Eating Techniques (Swallow Eval) maintain upright sitting position for eating;minimize distractions during oral intake   Medication Administration Recommendations, Swallowing (SLP) large pills crushed, smaller one at a time with liquids   Instrumental Assessment Recommendations instrumental evaluation not recommended at this time   Clinical Impression   Criteria for Skilled Therapeutic Interventions Met (SLP Eval) Evaluation only   SLP Diagnosis mild dysphagia   Risks & Benefits of therapy have been explained evaluation/treatment results reviewed;care plan/treatment goals reviewed;risks/benefits reviewed;current/potential barriers reviewed;participants voiced agreement with care plan;participants included;patient   Clinical Impression Comments Clinical swallow evaluation completed. Patient presents with mild oral and suspected mild pharyngeal dysphagia. Poor dentition in poor condition. Patient states he does not want modified diet for improved ease of mastication; reports he manages well. Dysphagia characterized by mild prolonged a-p transit, prolonged however adequate mastication with mutliple swallows to clear oral cavity with solids, suspected premature pharyngeal entry with liquids, and no overt s/sx noted with solids, pureed, or thin liquids via single sips. Recommend continue regular diet and thin liquids, large pills crushed, ok for smaller pills whole with pureed or thin as tolerated. Precautions: upright, slow rate, small single bites/sips, monitor for s/sx of aspiration.   SLP Total Evaluation Time   Eval: oral/pharyngeal swallow function, clinical swallow Minutes (45898) 22   SLP Goals   Therapy Frequency (SLP Eval) one time eval and treatment only   SLP Discharge Planning   SLP Plan d/c SLP services    SLP Discharge Recommendation home;extended care facility   SLP Rationale for DC Rec swallow at baseline   SLP Brief overview of current status  mild dysphagia. Recommend continue regular diet and thin liquids, large pills crushed, ok for smaller pills whole with pureed or thin as tolerated. Precautions: upright, slow rate, small single bites/sips, monitor for s/sx of aspiration.   Total Session Time   Total Session Time (sum of timed and untimed services) 22

## 2024-06-29 NOTE — UTILIZATION REVIEW
Admission Status; Secondary Review Determination         Under the authority of the Utilization Management Committee, the utilization review process indicated a secondary review on the above patient.  The review outcome is based on review of the medical records, discussions with staff, and applying clinical experience noted on the date of the review.        (x)      Inpatient Status Appropriate - This patient's medical care is consistent with medical management for inpatient care and reasonable inpatient medical practice.     RATIONALE FOR DETERMINATION   The patient is a 84-year-old male admitted on 6/28/2024.  Patient was transferred from [facility] to the emergency room for evaluation of low sodium.  Initial sodium was 123.  Patient does have a history of SIADH and has been on water restrictions.  Patient is also noted to have bilateral lower extremity swelling.  He has a history of coronary artery disease and chronic A-fib along with dementia.  Head CT is negative for acute status change.  He is on continuous telemetry monitoring.  Every 6 hours sodium checks.  This morning sodium is going down somewhat from previous sodium last night to 127 and most recent sodium is back up to 130.  Hemoglobin is also low at 8.2 and urinalysis is normal.  Urine osmolality is 263 on 6/27.  Based on need for additional midnight stay to stabilize sodium, recommend continuation of inpatient status.      The severity of illness, intensity of service provided, expected LOS and risk for adverse outcome make the care complex, high risk and appropriate for hospital admission.        The information on this document is developed by the utilization review team in order for the business office to ensure compliance.  This only denotes the appropriateness of proper admission status and does not reflect the quality of care rendered.         The definitions of Inpatient Status and Observation Status used in making the determination above  are those provided in the CMS Coverage Manual, Chapter 1 and Chapter 6, section 70.4.      Sincerely,     Ronaldo Titus MD  Physician Advisor  Utilization Review/ Case Management  [facility].

## 2024-06-29 NOTE — PROVIDER NOTIFICATION
MD Notification    Notified Person: MD    Notified Person Name:Dr. Yu Harry    Notification Date/Time:06/29@6823    Notification Interaction:Vocera    Purpose of Notification:Pt is requesting for strong pain meds because Tylenol is not effective, and he stated that he is very anxious and needs something to relax and sleep.    Orders Received:I ordered his PTA Remeron, which was initially held for the hyponatremia   one time oxycodone 5 mg  Comments: Oxy given

## 2024-06-29 NOTE — PLAN OF CARE
Goal Outcome Evaluation:    6/28/24  [id number]      Orientation: A/Ox4, with int confusion, [place], able to make his needs known  Aggression Stop Light: Green  Activity: A2 GBW, T/R as pt allows  Diet/BS Checks: Reg  Tele:  SR  IV Access/Drains: R PIV infusing to NS 75ml/hr  Pain Management: mild HA, tylenol given 2x  Abnormal VS/Results: VSS,RA  Bowel/Bladder: Incont both, ext cath in place, no bm   Skin/Wounds: Scattered bruising, sutures at the back of the head, moist with redness groin, L buttock wound, redness blanchable bottom-mepi in place  Consults: PT  D/C Disposition: pending  Other Info: Noted coughing episodes when drinking thin liquids, not new per pt. Recommended for speech consult but refused intervention

## 2024-06-29 NOTE — PROVIDER NOTIFICATION
MD Notification    Notified Person: MD    Notified Person Name:Dr. Yu Addisonirahman    Notification Date/Time:06/29@1652    Notification Interaction:Vocera    Purpose of Notification:As per care coordinator's notes, pt's sutures at the back of the head needs to come out yesterday, do we need to take it today or will wait to see by WOC     Orders Received:If the WOC team can remove the sutures today, that would be great. Otherwise, we will do them tomorrow     Comments:

## 2024-06-29 NOTE — CONSULTS
Care Management Initial Consult    General Information  Assessment completed with: Patient, Spouse or significant other,    Type of CM/SW Visit: Initial Assessment    Primary Care Provider verified and updated as needed: No   Readmission within the last 30 days: no previous admission in last 30 days      Reason for Consult: discharge planning  Advance Care Planning: Advance Care Planning Reviewed: present on chart          Communication Assessment  Patient's communication style: spoken language (English or Bilingual)    Hearing Difficulty or Deaf: yes   Wear Glasses or Blind: yes    Cognitive  Cognitive/Neuro/Behavioral: .WDL except  Level of Consciousness: alert, intermittent confusion  Arousal Level: opens eyes spontaneously  Orientation: oriented x 4  Mood/Behavior: calm, behavior appropriate to situation  Best Language: 0 - No aphasia  Speech: clear, spontaneous, logical    Living Environment:   People in home: facility resident     Current living Arrangements: assisted living, other (see comments) (memory care unit)  [facility] Facility[facility]   Able to return to prior arrangements: yes       Family/Social Support:  Care provided by: self (staff)  Provides care for: no one, unable/limited ability to care for self  Marital Status:   Wife  Gisela       Description of Support System: Supportive, Involved    Support Assessment: Other (see comments) (spouse states they are somewhat estranged from their children; patient asked that children not be called - only spouse)    Current Resources:   Patient receiving home care services: No     Community Resources:    Equipment currently used at home: walker, rolling  Supplies currently used at home: Incontinence Supplies    Employment/Financial:  Employment Status: retired        Financial Concerns: none           Does the patient's insurance plan have a 3 day qualifying hospital stay waiver?  No    Lifestyle & Psychosocial Needs:  Social Determinants of  Health     Food Insecurity: Low Risk  (12/19/2023)    Food Insecurity     Within the past 12 months, did you worry that your food would run out before you got money to buy more?: No     Within the past 12 months, did the food you bought just not last and you didn t have money to get more?: No   Depression: Not at risk (3/14/2024)    PHQ-2     PHQ-2 Score: 0   Housing Stability: Low Risk  (12/19/2023)    Housing Stability     Do you have housing? : Yes     Are you worried about losing your housing?: No   Tobacco Use: Low Risk  (5/3/2024)    Patient History     Smoking Tobacco Use: Never     Smokeless Tobacco Use: Never     Passive Exposure: Not on file   Financial Resource Strain: Low Risk  (12/19/2023)    Financial Resource Strain     Within the past 12 months, have you or your family members you live with been unable to get utilities (heat, electricity) when it was really needed?: No   Alcohol Use: Not At Risk (12/19/2023)    AUDIT-C     Frequency of Alcohol Consumption: Monthly or less     Average Number of Drinks: 1 or 2     Frequency of Binge Drinking: Less than monthly   Transportation Needs: Low Risk  (12/19/2023)    Transportation Needs     Within the past 12 months, has lack of transportation kept you from medical appointments, getting your medicines, non-medical meetings or appointments, work, or from getting things that you need?: No   Physical Activity: Inactive (12/19/2023)    Exercise Vital Sign     Days of Exercise per Week: 0 days     Minutes of Exercise per Session: 0 min   Interpersonal Safety: Low Risk  (12/19/2023)    Interpersonal Safety     Do you feel physically and emotionally safe where you currently live?: Yes     Within the past 12 months, have you been hit, slapped, kicked or otherwise physically hurt by someone?: No     Within the past 12 months, have you been humiliated or emotionally abused in other ways by your partner or ex-partner?: No   Stress: No Stress Concern Present (12/19/2023)  "   [facility] Occupational [employer] - Occupational Stress Questionnaire     Feeling of Stress : Only a little   Social Connections: Moderately Integrated (12/19/2023)    Social Connection and Isolation Panel [NHANES]     Frequency of Communication with Friends and Family: More than three times a week     Frequency of Social Gatherings with Friends and Family: Three times a week     Attends Judaism Services: More than 4 times per year     Active Member of Clubs or Organizations: No     Attends Club or Organization Meetings: Never     Marital Status:    Health Literacy: Not on file       Functional Status:  Prior to admission patient needed assistance:   Dependent ADLs:: Ambulation-walker, bathing, dressing  Dependent IADLs:: Cleaning, Cooking, Laundry, Shopping, Meal Preparation, Medication Management, Transportation, Incontinence       Mental Health Status:          Chemical Dependency Status:                Values/Beliefs:  Spiritual, Cultural Beliefs, Judaism Practices, Values that affect care: no               Additional Information:  Met patient briefly as he was working with SLP; he asked that his children NOT be contacted and only his spouse as his family contact.  Spoke with spouse Gisela over the phone.  Did not speak with nursing at the [facility] as only an on-call nurse was available over the weekend.    Patient admitted from the facility with a low sodium on a recent lab check there.    Spouse explains that the patient [facility] ([phone]).  She lives in the [facility] on a different floor.  She states that the patient is independent in his daily ADL's however the patient tells me he gets assistance with dressing and bathing as well.  Patient tells me he is somewhat estranged from his children.  He states he is frustrated that he is in memory care; states he has scored well on testing and feels he has been \"branded\" and has affected the way people see him.  " He states he would like to know why he is in memory care.       Gisela states we will have to provide transportation at the time of discharge.  She states the last time he was here the insurance covered the cost.  If a TCU were to be recommended, she would like him to go to the [facility] on campus.  She is also open to have home care for him; she states the nurse had a recent discussion about PT but has not been initiated yet.    Patient has stitches on his scalp from a recent fall and ER encounter; the spouse states they were due to come out on 6/28.  Message left on the chart for the hospitalist.    PT has yet to evaluate the patient; he was too tired this afternoon and they will revisit tomorrow.    SLP has recommended home with regular diet and thin liquids, large pills crushed.    Care management will continue to follow for discharge needs.      Azul Marte RN  Inpatient Float Care Coordinator  [facility]

## 2024-06-29 NOTE — PLAN OF CARE
06/29/[id number]     Summary: Jamie Valdivia is a 84 year old male admitted on 6/28/2024 for acute hyponatremia from senior care     Primary Diagnosis: Hyponatremia  Dementia     Orientation: A&OX4, intermittent confusion, [place]     Aggression Stop Light: Green     Activity: AX2, not assessed, A-1 walker baseline, T/R q2h     Diet/BS Checks: Reg, strict I/O     Tele:  NSR     IV Access/Drains: R PIV infusing NS @75 ml/hr     Pain Management: Tylenol     Abnormal VS/Results: VSS on RA, Na-130,      Bowel/Bladder: Incontinent, ext cath in place     Skin/Wounds: bruise on both forearm, sacrum/ coccyx, 4x sutures on R back of head, BLE edema, small open area on L inner thigh, rashes on groin, pannus, and L inner thigh.      Consults: PT, WOC, SLP, SW     D/C Disposition: TBD     Other Info: Na checks Q8H. On K replacement protocol- AM draw. PRN Robitussin given for cough. Sutures needs to come off tomorrow if WOC not consulted. One time Oxycodone given for pain at neck and back of head. Appetite is poor.

## 2024-06-29 NOTE — PROGRESS NOTES
"CLINICAL NUTRITION SERVICES  -  ASSESSMENT NOTE      Recommendations Ordered by Registered Dietitian (RD):   Ensure Enlive once daily (350 kcal, 20 g protein)      Malnutrition:   Moderate malnutrition           REASON FOR ASSESSMENT  Jamie Valdivia is a 84 year old male seen by Registered Dietitian for Admission Nutrition Risk Screen for positive weight loss and reduced appetite.     PMHx of CAD, A fib, HTN, dementia for which he resides in a memory care unit, hx of hyponatremia r/t SIADH. Presents with hyponatremia.      NUTRITION HISTORY  Pt reported he drank more water than permitted under water-restriction prior to admission. He states poor appetite for some time now which he attributes to stress, but is unsure of weight hx. Per nursing, pt coughing with liquid consumption (reportedly not new) but declined SLP evaluation.       CURRENT NUTRITION ORDERS  Diet Order:     Regular     Current Intake/Tolerance:  Pt reports poor intake. Taking bites of pudding and sips of water during our discussion.        NUTRITION FOCUSED PHYSICAL ASSESSMENT FOR DIAGNOSING MALNUTRITION)  Yes         Observed:    Muscle wasting (refer to documentation in Malnutrition section)  Bilateral lower leg edema     ANTHROPOMETRICS  Height: 68\"  Weight: 212 lbs 15.43 oz  Body mass index is 32.38 kg/m . (96.6 kg)   Weight Status:  Obesity Grade I BMI 30-34.9  IBW: 70 kg  % IBW: 138%  Weight History: Based on records, pt has lost 4 lbs (1.9% body weight) over the past month. Now weight is up +8 lbs. Cannot rule out weight fluctuations 2/2 edema and IV fluids.   Wt Readings from Last 10 Encounters:   06/29/24 96.6 kg (212 lb 15.4 oz)   06/25/24 92.5 kg (204 lb)   06/03/24 92.5 kg (204 lb)   05/13/24 94.3 kg (208 lb)   05/02/24 94.4 kg (208 lb 3.2 oz)   04/30/24 95.3 kg (210 lb)   04/29/24 94.4 kg (208 lb 3.2 oz)   04/22/24 94.4 kg (208 lb 3.2 oz)   04/18/24 94.4 kg (208 lb 3.2 oz)   04/18/24 94.4 kg (208 lb 3.2 oz)         LABS  Labs " reviewed  Hgb 8.2, Na 127, Cl 87, CO2 32, Cr 0.4 (suggestive of low lean body mass)     MEDICATIONS  Medications reviewed  Vit B12 1000 mcg, Iron 325 mg, Folic acid 1000 mcg, Protonix BID, NS @ 75 ml/hr        ASSESSED NUTRITION NEEDS PER APPROVED PRACTICE GUIDELINES:    Dosing Weight 76 kg (adjusted, based on IBW of 70 kg and driest weight of 92.5 kg on 6/25/24)  Estimated Energy Needs: 5674-8029 kcals (25-30 Kcal/Kg)  Justification: maintenance  Estimated Protein Needs: 75-90 grams protein (1-1.2 g pro/Kg)  Justification: Repletion  Estimated Fluid Needs: 3078-8111  mL (25-30 mL/kg)  Justification: Maintenance, or per provider pending fluid status    MALNUTRITION:  % Weight Loss:  Weight loss does not meet criteria for malnutrition   % Intake:  </= 75% for >/= 1 month (severe malnutrition)  Subcutaneous Fat Loss:  None observed  Muscle Loss:  Temporal region mild depletion, Clavicle bone region mild depletion, Acromion bone region mild depletion, Scapular bone region mild depletion, and Dorsal hand region mild depletion  Fluid Retention:  Mild     Malnutrition Diagnosis: Moderate malnutrition  In Context of:  Acute illness or injury  Chronic illness or disease    NUTRITION DIAGNOSIS:  Inadequate oral intake related to reduced appetite as evidenced by pt report, weight loss (1.9% in past month), observed muscle loss.       NUTRITION INTERVENTIONS  Recommendations / Nutrition Prescription  See above       Implementation  Nutrition education: Reviewed importance of adequate oral intake to promote recovery/healing. Discussed oral supplements available while inpatient. He does not feel he could consume more than one supplement per day.     Nutrition Goals  PO intake of >/= 50% three meals daily (or equivalent via snacks/supplments).       MONITORING AND EVALUATION:  Progress towards goals will be monitored and evaluated per protocol and Practice Guidelines        Wanda Dominguez RD, LD, CNSC

## 2024-06-29 NOTE — PLAN OF CARE
06/28/[id number]    Summary: Jamie Valdivia is a 84 year old male admitted on 6/28/2024 for acute hyponatremia from [facility]    Primary Diagnosis: Hyponatremia  Dementia    Orientation: A&OX4, intermittent confusion, [place]    Aggression Stop Light: Green    Activity: AX2, not assessed, A-1 walker baseline    Diet/BS Checks: Reg, strict I/O    Tele:  NSR    IV Access/Drains: R PIV infusing NS @75 ml/hr    Pain Management: Tylenol    Abnormal VS/Results: VSS on RA, Na-123, WBC-12.3    Bowel/Bladder: Incontinent, ext cath in place    Skin/Wounds: bruise on both forearm, sacrum/ coccyx, 4x sutures on R back of head, BLE edema, small open area on L inner thigh, rashes on groin, pannus, and L inner thigh.     Consults: PT    D/C Disposition: TBD    Other Info: Received from ED at 1630. 2 RN skin assessment completed. Strict I/O. Na checks Q6H. On tele.

## 2024-06-29 NOTE — PROVIDER NOTIFICATION
/MD Notification@    Notified Person: MD    Notified Person Name:Dr.Madar Harry    Notification Date/Time:06/29/24@0829    Notification Interaction:Vocera    Purpose of Notification:Pt is requesting for cough drops    Orders Received:Robitussin 200 mg Q4H PRN    Comments:

## 2024-06-30 ENCOUNTER — APPOINTMENT (OUTPATIENT)
Dept: PHYSICAL THERAPY | Facility: CLINIC | Age: 84
DRG: 644 | End: 2024-06-30
Payer: COMMERCIAL

## 2024-06-30 LAB
POTASSIUM SERPL-SCNC: 3.7 MMOL/L (ref 3.4–5.3)
SODIUM SERPL-SCNC: 131 MMOL/L (ref 135–145)
SODIUM SERPL-SCNC: 132 MMOL/L (ref 135–145)
SODIUM SERPL-SCNC: 133 MMOL/L (ref 135–145)

## 2024-06-30 PROCEDURE — 120N000001 HC R&B MED SURG/OB

## 2024-06-30 PROCEDURE — 250N000013 HC RX MED GY IP 250 OP 250 PS 637: Performed by: HOSPITALIST

## 2024-06-30 PROCEDURE — 99231 SBSQ HOSP IP/OBS SF/LOW 25: CPT | Performed by: HOSPITALIST

## 2024-06-30 PROCEDURE — 36415 COLL VENOUS BLD VENIPUNCTURE: CPT | Performed by: HOSPITALIST

## 2024-06-30 PROCEDURE — 84295 ASSAY OF SERUM SODIUM: CPT | Performed by: HOSPITALIST

## 2024-06-30 PROCEDURE — 999N000128 HC STATISTIC PERIPHERAL IV START W/O US GUIDANCE

## 2024-06-30 PROCEDURE — 97161 PT EVAL LOW COMPLEX 20 MIN: CPT | Mod: GP

## 2024-06-30 PROCEDURE — 250N000013 HC RX MED GY IP 250 OP 250 PS 637

## 2024-06-30 PROCEDURE — 97530 THERAPEUTIC ACTIVITIES: CPT | Mod: GP

## 2024-06-30 PROCEDURE — 84132 ASSAY OF SERUM POTASSIUM: CPT | Performed by: HOSPITALIST

## 2024-06-30 PROCEDURE — 97110 THERAPEUTIC EXERCISES: CPT | Mod: GP

## 2024-06-30 RX ORDER — SENNOSIDES 8.6 MG
1-2 TABLET ORAL 2 TIMES DAILY PRN
Status: DISCONTINUED | OUTPATIENT
Start: 2024-06-30 | End: 2024-07-01 | Stop reason: HOSPADM

## 2024-06-30 RX ORDER — MICONAZOLE NITRATE 20 MG/G
CREAM TOPICAL 2 TIMES DAILY
Status: CANCELLED | OUTPATIENT
Start: 2024-06-30

## 2024-06-30 RX ORDER — DOCUSATE SODIUM 100 MG/1
100 CAPSULE, LIQUID FILLED ORAL 2 TIMES DAILY PRN
Status: DISCONTINUED | OUTPATIENT
Start: 2024-06-30 | End: 2024-07-01 | Stop reason: HOSPADM

## 2024-06-30 RX ADMIN — ACETAMINOPHEN 650 MG: 325 TABLET, FILM COATED ORAL at 11:47

## 2024-06-30 RX ADMIN — CYANOCOBALAMIN TAB 1000 MCG 1000 MCG: 1000 TAB at 08:43

## 2024-06-30 RX ADMIN — PANTOPRAZOLE SODIUM 40 MG: 40 TABLET, DELAYED RELEASE ORAL at 20:56

## 2024-06-30 RX ADMIN — GABAPENTIN 300 MG: 300 CAPSULE ORAL at 21:47

## 2024-06-30 RX ADMIN — GABAPENTIN 100 MG: 100 CAPSULE ORAL at 08:42

## 2024-06-30 RX ADMIN — QUETIAPINE 12.5 MG: 25 TABLET, FILM COATED ORAL at 20:56

## 2024-06-30 RX ADMIN — FERROUS SULFATE TAB 325 MG (65 MG ELEMENTAL FE) 325 MG: 325 (65 FE) TAB at 08:43

## 2024-06-30 RX ADMIN — QUETIAPINE 12.5 MG: 25 TABLET, FILM COATED ORAL at 08:41

## 2024-06-30 RX ADMIN — SENNOSIDES 2 TABLET: 8.6 TABLET, FILM COATED ORAL at 20:56

## 2024-06-30 RX ADMIN — ACETAMINOPHEN 650 MG: 325 TABLET, FILM COATED ORAL at 01:01

## 2024-06-30 RX ADMIN — FOLIC ACID 1000 MCG: 1 TABLET ORAL at 08:42

## 2024-06-30 RX ADMIN — ACETAMINOPHEN 650 MG: 325 TABLET, FILM COATED ORAL at 07:38

## 2024-06-30 RX ADMIN — ACETAMINOPHEN 650 MG: 325 TABLET, FILM COATED ORAL at 18:29

## 2024-06-30 RX ADMIN — PANTOPRAZOLE SODIUM 40 MG: 40 TABLET, DELAYED RELEASE ORAL at 08:41

## 2024-06-30 ASSESSMENT — ACTIVITIES OF DAILY LIVING (ADL)
ADLS_ACUITY_SCORE: 64
ADLS_ACUITY_SCORE: 61
ADLS_ACUITY_SCORE: 61
ADLS_ACUITY_SCORE: 62
ADLS_ACUITY_SCORE: 64
ADLS_ACUITY_SCORE: 61
ADLS_ACUITY_SCORE: 64
ADLS_ACUITY_SCORE: 62
ADLS_ACUITY_SCORE: 64
ADLS_ACUITY_SCORE: 62
ADLS_ACUITY_SCORE: 64
ADLS_ACUITY_SCORE: 61
ADLS_ACUITY_SCORE: 62
ADLS_ACUITY_SCORE: 61
ADLS_ACUITY_SCORE: 64
ADLS_ACUITY_SCORE: 64
ADLS_ACUITY_SCORE: 62
ADLS_ACUITY_SCORE: 64
ADLS_ACUITY_SCORE: 62

## 2024-06-30 NOTE — PROVIDER NOTIFICATION
MD Notification    Notified Person: MD    Notified Person Name:Tamirar Piero    Notification Date/Time:06/03/24@1606    Notification Interaction:Vocera    Purpose of Notification:As per SW pt can't go back to his facility, [facility] accepted him for tomorrow, pt is not able to walk as he was before, he is heavy assist of 2 with yoan steady.    Orders Received:cancelled discharge order.    Comments:

## 2024-06-30 NOTE — PROGRESS NOTES
Care Management Follow Up    Length of Stay (days): 2    Expected Discharge Date: 07/01/2024     Concerns to be Addressed: discharge planning     Patient plan of care discussed at interdisciplinary rounds: Yes    Anticipated Discharge Disposition:       Anticipated Discharge Services:    Anticipated Discharge DME:      Patient/family educated on Medicare website which has current facility and service quality ratings:    Education Provided on the Discharge Plan:    Patient/Family in Agreement with the Plan:      Referrals Placed by CM/SW:    Private pay costs discussed: Not applicable    Additional Information:  Writer reviewed Care Coordinator note from 6/29. Per PT Note from today, patient would benefit from TCU. Writer sent referral to [facility] for TCU as patient is from their assisted.    Addendum 1200: Writer heard from Ivet at [facility] who states that they can accept patient for tomorrow     Addendum 1500: Writer noticed discharge orders. Writer reached out to provider to see if patient is really discharging as SW was not aware and has been following. Patient is from an assisted and we have not been able to reach them for patient to return, writer has a TCU bed secured for tomorrow at [facility]. MD acknowledged message    GUICHO Cruz

## 2024-06-30 NOTE — PROGRESS NOTES
[facility]    Medicine Progress Note - Hospitalist Service    Date of Admission:  6/28/2024    Assessment & Plan      Jamie Valdivia is a 84 year old male admitted on 6/28/2024 for acute hyponatremia. He has a past medical history of coronary artery disease, atrial fib, hypertension, dementia.      Hyponatremia     Latest Reference Range & Units 06/29/24 13:56 06/29/24 22:36 06/30/24 05:46   Sodium 135 - 145 mmol/L 130 (L) 132 (L) 133 (L)     Presents with a sodium of 123.   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 He was recently seen in clinic on 6/20 and found to have a sodium of 126. Prior to this, his sodium level was 139 in late April 2024. He was placed on a fluid restriction and his dose of mirtazapine was decreased. Despite this, his recheck Sodium on 6/27 was 123. Complains of mild headache only.  *Head CT: No acute intracranial pathology. Chronic small vessel ischemic disease, generalized cerebral volume loss and right occipital chronic infarct.   *Urine osmolarity 6/27: 263  *Serum osmolarity 114  -Recheck urine osmolarity-pending  -Urine sodium <20  -Continuous telemetry monitoring  -Q8h sodium checks   -Strict intake and output  -Hold PO Remeron  - will discontinue 0.9% Saline 75ml/h today, and encourage oral intake.  - Repeat labs in the morning    Pressure ulcers,  Nursing staff noted pressure sores on the buttock.  Multiple bruising in that area.  -Consulted wound care team.  Hypertension  *Normotensive in ED  -Not on any PTA antihypertensives    History of A-fib  *EKG 6/28: Junctional rhythm with frequent Premature ventricular complexes   *Not on anticoagulation. Seen by cardiology 3/14/24 and per documentation, refuses OAC.  -Continuous telemetry monitoring    Dementia  -Resume PTA Seroquel     Diet:  Regular  DVT Prophylaxis: Pneumatic Compression Devices  Lucio Catheter: Not present  Lines: None     Cardiac Monitoring: None  Code Status:   "Full Code          Diet: Room Service  Snacks/Supplements Adult: Ensure Enlive; With Meals  Combination Diet Regular Diet Adult; Regular Diet; Thin Liquids (level 0) (crush large meds, smaller pills whole-one at a time, fully upright, no straw, small/single bites/sips)  Diet    DVT Prophylaxis: Pneumatic Compression Devices  Lucio Catheter: Not present  Lines: None     Cardiac Monitoring: ACTIVE order. Indication: Electrolyte Imbalance (24 hours)- Magnesium <1.3 mg/ml; Potassium < =2.8 or > 5.5 mg/ml  Code Status: Full Code      Clinically Significant Risk Factors        # Hypokalemia: Lowest K = 3.3 mmol/L in last 2 days, will replace as needed  # Hyponatremia: Lowest Na = 126 mmol/L in last 2 days, will monitor as appropriate              # Dementia: noted on problem list              # Obesity: Estimated body mass index is 32.38 kg/m  as calculated from the following:    Height as of 6/25/24: 1.727 m (5' 8\").    Weight as of this encounter: 96.6 kg (212 lb 15.4 oz)., PRESENT ON ADMISSION  # Moderate Malnutrition: based on nutrition assessment, PRESENT ON ADMISSION   # Financial/Environmental Concerns: none         Disposition Plan     Medically Ready for Discharge:  has be accepted to TCU for Monday             Piero Nicholas MD  Hospitalist Service  [facility]  Securely message with [employer] (more info)  Text page via [facility] Paging/Directory   ______________________________________________________________________    Interval History   Feeling stable, comfortably lying in bed, no new complaints.    Physical Exam   Vital Signs: Temp: 97.3  F (36.3  C) Temp src: Oral BP: 139/66 Pulse: 68   Resp: 16 SpO2: 94 % O2 Device: None (Room air)    Weight: 212 lbs 15.43 oz    General Appearance: Alert, pleasantly confused, in no acute distress  Respiratory: CTA no wheezing or crackles.  Cardiovascular: S1-S2 normal.  GI: The manage soft, positive bowel sounds, no tenderness, no " guarding.      Medical Decision Making       35 MINUTES SPENT BY ME on the date of service doing chart review, history, exam, documentation & further activities per the note.      Data     I have personally reviewed the following data over the past 24 hrs:    N/A  \   N/A   / N/A     133 (L) N/A N/A /  N/A   3.7 N/A N/A \

## 2024-06-30 NOTE — PLAN OF CARE
06/30/-1930     Summary: Jamie Valdivia is a 84 year old male admitted on 6/28/2024 for acute hyponatremia from retirement     Primary Diagnosis: Hyponatremia  Dementia     Orientation: A&OX4, [place]     Aggression Stop Light: Green     Activity: AX2, yoan steady, T/R q2h     Diet/BS Checks: Reg, strict I/O     Tele:  discontinued     IV Access/Drains: R PIV SL     Pain Management: Tylenol     Abnormal VS/Results: VSS on RA, Na-131, HTN     Bowel/Bladder: Incontinent, ext cath in place     Skin/Wounds: bruise on both forearm, sacrum/ coccyx, 4x sutures on R back of head, BLE edema, small open area on L inner thigh, rashes on groin, pannus, and L inner thigh.      Consults: PT, WOC, SLP, SW     D/C Disposition: To [facility] -possible tomorrow     Other Info: Na checks Q8H. On K replacement protocol- AM draw.  Sutures removed by resource nurse. Updated to spouse Gisela via phone. Pt refused reposition at times. Pt requested not to wake up at night if he is sleeping. NOC nurse updated.

## 2024-06-30 NOTE — PROGRESS NOTES
"   06/30/24 0900   Appointment Info   Signing Clinician's Name / Credentials (PT) Brinda Garcia, PT, DPT   Living Environment   People in Home facility resident   Current Living Arrangements assisted living  (Memory care)   Home Accessibility no concerns   Transportation Anticipated agency;health plan transportation   Living Environment Comments Pt lives at [facility], spouse lives on [place] side of [facility].   Self-Care   Usual Activity Tolerance moderate   Current Activity Tolerance poor   Regular Exercise No   Equipment Currently Used at Home walker, rolling   Fall history within last six months yes   Number of times patient has fallen within last six months 1   Activity/Exercise/Self-Care Comment Per pt report, he is IND w/ 4WW for all functional mobility and ADL's, only recieves assist w/ bathing and dressing.   General Information   Onset of Illness/Injury or Date of Surgery 06/28/24   Referring Physician Anthony Beyer MD   Patient/Family Therapy Goals Statement (PT) \"To go back home\"   Pertinent History of Current Problem (include personal factors and/or comorbidities that impact the POC) Pt  is a 84 year old male admitted on 6/28/2024 for acute hyponatremia. He has a past medical history of coronary artery disease, atrial fib, hypertension, dementia.   Existing Precautions/Restrictions fall   General Observations Pt is [place].   Cognition   Affect/Mental Status (Cognition) WFL;confused  (Intermittent confusion)   Orientation Status (Cognition) oriented x 4   Follows Commands (Cognition) WFL   Pain Assessment   Patient Currently in Pain No   Integumentary/Edema   Integumentary/Edema other (describe)   Integumentary/Edema Comments Pt has B LE edema present, reports this is increased from baseline   Posture    Posture Forward head position;Protracted shoulders;Kyphosis   Range of Motion (ROM)   Range of Motion ROM deficits secondary to swelling;ROM deficits secondary to weakness   Strength " (Manual Muscle Testing)   Strength (Manual Muscle Testing) Able to perform R SLR;Able to perform L SLR;Deficits observed during functional mobility   Bed Mobility   Comment, (Bed Mobility) Pt approached sitting up in recliner, unable to assess at this time   Transfers   Comment, (Transfers) Sit>stand completed w/ Mod Ax2   Gait/Stairs (Locomotion)   Comment, (Gait/Stairs) Pt ambulated ~5 steps w/ 4WW and Mod Ax1   Balance   Balance other (describe)   Balance Comments Pt demonstrated good sitting balance, required Ax1-2 for standing and dynamic balance   Sensory Examination   Sensory Perception patient reports no sensory changes   Coordination   Coordination no deficits were identified   Muscle Tone   Muscle Tone no deficits were identified   Clinical Impression   Criteria for Skilled Therapeutic Intervention Yes, treatment indicated   PT Diagnosis (PT) Impaired functional mobility   Influenced by the following impairments Weakness, impaired abalance, impaired activity tolerance, B LE edema   Functional limitations due to impairments Impaired functional mobility and inability to complete ADL's   Clinical Presentation (PT Evaluation Complexity) stable   Clinical Presentation Rationale Clinical judgment   Clinical Decision Making (Complexity) low complexity   Planned Therapy Interventions (PT) balance training;bed mobility training;gait training;home exercise program;motor coordination training;neuromuscular re-education;patient/family education;postural re-education;ROM (range of motion);stair training;strengthening;stretching;transfer training;progressive activity/exercise;risk factor education;home program guidelines   Risk & Benefits of therapy have been explained evaluation/treatment results reviewed;risks/benefits reviewed;care plan/treatment goals reviewed;current/potential barriers reviewed;participants voiced agreement with care plan;participants included;patient   PT Total Evaluation Time   PT Eval, Low  Complexity Minutes (89821) 10   Physical Therapy Goals   PT Frequency 5x/week   PT Predicted Duration/Target Date for Goal Attainment 07/07/24   PT Goals Bed Mobility;Transfers;Gait   PT: Bed Mobility Modified independent;Supine to/from sit;Rolling;Bridging   PT: Transfers Modified independent;Sit to/from stand;Bed to/from chair;Assistive device   PT: Gait Modified independent;Rolling walker;150 feet   Interventions   Interventions Quick Adds Therapeutic Activity;Therapeutic Procedure;Gait Training   Therapeutic Procedure/Exercise   Ther. Procedure: strength, endurance, ROM, flexibillity Minutes (88208) 8   Treatment Detail/Skilled Intervention Pt instructed in and completed seated LE exercises x 10 reps each in order to increase LE circulation and strength. Hip flexion marching, LAQ, ankle pumps, and SLR. Pt educated on dosing and frequency.   Therapeutic Activity   Therapeutic Activities: dynamic activities to improve functional performance Minutes (24289) 10   Treatment Detail/Skilled Intervention Evaluation completed and treatment indicated. Sit>stand completed x 5 reps, initially attempted Ax1, pt unable to maintain stanidng, demonstrated significant retrolean. Pt then cued for wide DELIA and to push up from chair armrests when moving to standing. Pt then able to stand w/ Mod Ax2 and 4WW in front. Pt cued for marching in place, able to complete w/ Min-Mod Ax1. Pt ambulated ~5 steps forward w/ Mod Ax1 andclose chair follow. Pt sat quickly when fatigued. Pt educated on discharge planning. Pt left sitting in recliner with all needs in [facility] and RN notified.   Gait Training   Treatment Detail/Skilled Intervention Pre-gait tasks completed, see TA for details.   PT Discharge Planning   PT Plan Assess B LE edema, Repeated sit>stands w/ 4WW, progress gait distance w/ 4WW and close very chair follow as pt demonstrates retrolean while ambulating, progress LE exercises   PT Discharge Recommendation (DC Rec) Transitional  Care Facility   PT Rationale for DC Rec Pt is moving significantly below reported baseline. At baseline, pt is IND w/ 4WW for most ADL's and mobility, only recieves assist with dressing and bathing at baseline. Currently, pt requiring Ax2 for sit>stands and only able to take ~5 steps w/ 4WW and lew unsteady at this time. Recommend TCU at this time in order to further address deficits prior to returning to [facility] care. If pt is able to have Ax2 w/ yoan stedy device at [facility], could return with HCPT.   PT Brief overview of current status Recommend Yoan stedy and Ax2 w/ nursing   Total Session Time   Timed Code Treatment Minutes 18   Total Session Time (sum of timed and untimed services) 28

## 2024-06-30 NOTE — DISCHARGE SUMMARY
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)    Chronic alcohol use    Major neurocognitive disorder (H)    Generalized anxiety disorder    Gastro-esophageal reflux disease without esophagitis    History of 2019 novel coronavirus disease (COVID-19)    Impaired gait and mobility    Obesity (BMI 30-39.9)    Osteoarthritis of both knees    Other insomnia    Pharyngeal dysphagia    Physical deconditioning    Repeated falls    Coronary atherosclerosis of native coronary artery    Anemia    Other idiopathic peripheral autonomic neuropathy    Age-related osteoporosis without current pathological fracture    Constipation    Dependent edema    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    Atherosclerosis of aorta (H24)    Alcohol dependence in remission (H)    Prostate cancer (H) - Dx January 2023    Chronic cough    Fall, initial encounter    Closed fracture of multiple ribs of right side, initial encounter    Closed wedge compression fracture of L3 vertebra, initial encounter (H)    Aneurysm of ascending aorta without rupture (H24)    Preventative health care    Bradycardia    Encephalopathy    Generalized weakness    Failure of outpatient treatment        Clinically Significant Risk Factors     # Obesity: Estimated body mass index is 32.38 kg/m  as calculated from the following:    Height as of 6/25/24: 1.727 m (5' 8\").    Weight as of this encounter: 96.6 kg (212 lb 15.4 oz).  # Moderate " Malnutrition: based on nutrition assessment      Follow-ups Needed After Discharge   Follow-up Appointments     Follow-up and recommended labs and tests       Follow up with primary care provider, Sylvia Stein, within 7 days for   hospital follow- up.  No follow up labs or test are needed.        {Additional follow-up instructions/to-do's for PCP    : for routine cares  Unresulted Labs Ordered in the Past 30 Days of this Admission       No orders found from 5/29/2024 to 6/29/2024.        These results will be followed up by PCP    Discharge Disposition   Discharged to home  Condition at discharge: Stable    Hospital Course   Jamie Valdivia is a 84 year old male admitted on 6/28/2024 for acute hyponatremia. He has a past medical history of coronary artery disease, atrial fib, hypertension, dementia.      Hyponatremia: improved to baseline   Latest Reference Range & Units 06/30/24 14:47 06/30/24 22:28 07/01/24 08:04 07/01/24 13:52   Sodium 135 - 145 mmol/L 131 (L) 132 (L) 133 (L) 132 (L)   (L): Data is abnormally low    Presents with a sodium of 123.   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 He was recently seen in clinic on 6/20 and found to have a sodium of 126. Prior to this, his sodium level was 139 in late April 2024. He was placed on a fluid restriction and his dose of mirtazapine was decreased. Despite this, his recheck Sodium on 6/27 was 123. Complains of mild headache only.  *Head CT: No acute intracranial pathology. Chronic small vessel ischemic disease, generalized cerebral volume loss and right occipital chronic infarct.   *Urine osmolarity 6/27: 263  *Serum osmolarity 114  -Recheck urine osmolarity-pending    -Pressure ulcers,  Nursing staff noted pressure sores on the buttock.  Multiple bruising in that area.  -Consulted wound care team.  Hypertension  *Normotensive in ED  -Not on any PTA antihypertensives    History of A-fib  *EKG 6/28: Junctional rhythm with frequent  Premature ventricular complexes   *Not on anticoagulation. Seen by cardiology 3/14/24 and per documentation, refuses OAC.  -Continuous telemetry monitoring    Dementia  -Resume PTA Seroquel     Diet:  Regular  DVT Prophylaxis: Pneumatic Compression Devices  Lucio Catheter: Not present  Lines: None     Cardiac Monitoring: None  Code Status:  Full Code    Consultations This Hospital Stay   NURSING TO CONSULT FOR VASCULAR ACCESS CARE IP CONSULT  PHYSICAL THERAPY ADULT IP CONSULT  PHARMACY IP CONSULT  VASCULAR ACCESS ADULT IP CONSULT  CARE MANAGEMENT / SOCIAL WORK IP CONSULT  SPEECH LANGUAGE PATH ADULT IP CONSULT  WOUND OSTOMY CONTINENCE NURSE  IP CONSULT  VASCULAR ACCESS ADULT IP CONSULT    Code Status   Full Code    Time Spent on this Encounter   I, Piero Nicholas MD, personally saw the patient today and spent greater than 30 minutes discharging this patient.       Piero Nicholas MD  [place] [id number] [facility]  [address] [place] [address], [address]  [facility] [address]  Phone: [phone]  ______________________________________________________________________    Physical Exam   Vital Signs: Temp: 97.3  F (36.3  C) Temp src: Oral BP: 139/66 Pulse: 68   Resp: 16 SpO2: 94 % O2 Device: None (Room air)    Weight: 212 lbs 15.43 oz  General Appearance:  Alert, pleasantly confused, in no acute distress  Respiratory: CTA no wheezing or crackles.  Cardiovascular: S1-S2 normal.  GI: The manage soft, positive bowel sounds, no tenderness, no guarding.          Primary Care Physician   Sylvia Stein    Discharge Orders      Reason for your hospital stay    Hyponatremia     Follow-up and recommended labs and tests     Follow up with primary care provider, Sylvia Stein, within 7 days for hospital follow- up.  No follow up labs or test are needed.     Activity    Your activity upon discharge: activity as tolerated     Diet    Follow this diet upon discharge: Orders Placed This Encounter      Room Service       "Snacks/Supplements Adult: Ensure Enlive; With Meals      Combination Diet Regular Diet Adult; Regular Diet; Thin Liquids (level 0) (crush large meds, smaller pills whole-one at a time, fully upright, no straw, small/single bites/sips)       Significant Results and Procedures   Most Recent 3 CBC's:  Recent Labs   Lab Test 06/29/24  0512 06/28/24  1443 06/20/24  0747 05/30/24  0630   WBC 7.6 12.6*  --  7.7   HGB 8.2* 9.1* 8.4* 8.2*   MCV 80 79  --  85    375  --  385       Discharge Medications   Current Discharge Medication List        CONTINUE these medications which have NOT CHANGED    Details   acetaminophen (TYLENOL) 500 MG tablet Take 2 tablets (1,000 mg) by mouth 3 times daily as needed for mild pain  Qty: 10 tablet, Refills: 98    Associated Diagnoses: Pain      cyanocobalamin (VITAMIN B-12) 1000 MCG tablet TAKE 1 TABLET BY MOUTH ONCE DAILY  Qty: 90 tablet, Refills: 97    Comments: \"Please authorize quantity 90 day supply with PRN refills for assisted living patient. Thank you!\"  Associated Diagnoses: Anemia, unspecified      FEROSUL 325 (65 Fe) MG tablet TAKE 1 TABLET BY MOUTH ONCE DAILY WITH BREAKFAST  Qty: 90 tablet, Refills: 3    Comments: Please authorize qty 90 day supply with prn refills for cycle. Thanks.  Associated Diagnoses: Anemia, unspecified type      folic acid (FOLVITE) 1 MG tablet TAKE 1 TABLET BY MOUTH ONCE DAILY  Qty: 90 tablet, Refills: 97    Comments: \"Please authorize quantity 90 day supply with PRN refills for assisted living patient. Thank you!\"  Associated Diagnoses: Anemia, unspecified      gabapentin (NEURONTIN) 100 MG capsule Take 1 capsule (100 mg) by mouth daily. May also take 1 capsule (100 mg) 2 times daily as needed for neuropathic pain.      loratadine (CLARITIN) 10 MG tablet TAKE 1 TABLET BY MOUTH AT BEDTIME  Qty: 90 tablet, Refills: 97    Comments: REFILL REQUEST FOR CYCLE THAT STARTS ON 5/23/24  Associated Diagnoses: Chronic cough      menthol-zinc oxide " (CALMOSEPTINE) 0.44-20.6 % OINT ointment Apply topically 4 times daily To perineal area. Send to [facility] T[facility].  Qty: 113 g, Refills: 98    Associated Diagnoses: Incontinence associated dermatitis      nystatin (MYCOSTATIN) 188381 UNIT/GM external powder Apply topically 2 times daily as needed (redness, rash under left breast)      omeprazole (PRILOSEC) 40 MG DR capsule TAKE 1 CAPSULE BY MOUTH TWICE DAILY  Qty: 180 capsule, Refills: 97    Comments: CYCLE FILL REQUEST FOR CYCLE THAT STARTS 10/05/2023  Associated Diagnoses: Gastroesophageal reflux disease with esophagitis, unspecified whether hemorrhage      oxymetazoline (AFRIN) 0.05 % nasal spray Spray 2 sprays into both nostrils 2 times daily as needed (nose bleed)    Associated Diagnoses: Epistaxis      polyethylene glycol (MIRALAX) 17 GM/Dose powder MIX 2 CAPFULS IN 8OZ OF ORANGE JUICE  IN THE MORNING;AND MAY MIX 2 CAPFULS ONCE DAILY AS NEEDED FOR CONSTIPATION. MIX IN FRONT OF PT. HOLD FOR LOOSE STOOL. OK TO GIVE 1 CAPFUL IF RESIDENT REFUSES 2 CAPFULS.  Qty: 510 g, Refills: 97    Associated Diagnoses: Slow transit constipation      QUEtiapine (SEROQUEL) 25 MG tablet TAKE ONE-HALF TABLET (12.5MG) BY MOUTH TWICE DAILY;& MAY TAKE ONE-HALF TABLET (12.5MG) EVERY 12 HOURS AS NEEDED  Qty: 180 tablet, Refills: 97    Comments: Please authorize quantity 90 day supply with PRN refills for assisted living patient. Their cycle restarts 11/30/23. Thank you!  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

## 2024-06-30 NOTE — PLAN OF CARE
Goal Outcome Evaluation:       Summary: history of a-fib, CAD, Dementia, SIADH  Primary Diagnosis: admitted with a compliant of headache. Hyponatremia with a level of 123. On serial sodium checks.       ONLY POST BELOW FOR END OF SHIFT NOTE     Orientation: alert and oriented  Aggression Stop Light: green  Activity: yoan elsady   Diet/BS Checks: regular room service  Tele:  sinus rhythm with A-flutter. MD aware. PVC's noted  IV Access/Drains: Peripheral on right lower arm infiltrated. Unable to replace access with multiple personnel. Vascular access consult placed in.   Pain Management: headache. Mostly related to positioning and history of neck fracture   Abnormal VS/Results: vitals are notable for high blood pressure (166/68). Recheck was 148/59. Solidum level 132.  Bowel/Bladder: incontinent  of urine. External catheter in place.  Skin/Wounds: dry flaky lower extremities. Right forearm with multiple ecchymosis.   Consults: speech/ SW  D/C Disposition: pending clinical course  Other Info: patient is on Q6H sodium check. Last result at 22:00 was 132. Will need normal saline infusion once Peripheral IV access is established.

## 2024-07-01 ENCOUNTER — LAB REQUISITION (OUTPATIENT)
Dept: LAB | Facility: CLINIC | Age: 84
End: 2024-07-01

## 2024-07-01 ENCOUNTER — APPOINTMENT (OUTPATIENT)
Dept: GENERAL RADIOLOGY | Facility: CLINIC | Age: 84
DRG: 644 | End: 2024-07-01
Attending: HOSPITALIST
Payer: COMMERCIAL

## 2024-07-01 ENCOUNTER — DOCUMENTATION ONLY (OUTPATIENT)
Dept: GERIATRICS | Facility: CLINIC | Age: 84
End: 2024-07-01
Payer: COMMERCIAL

## 2024-07-01 VITALS
SYSTOLIC BLOOD PRESSURE: 153 MMHG | DIASTOLIC BLOOD PRESSURE: 66 MMHG | OXYGEN SATURATION: 93 % | TEMPERATURE: 98.1 F | WEIGHT: 206.79 LBS | RESPIRATION RATE: 16 BRPM | HEART RATE: 72 BPM | BODY MASS INDEX: 31.44 KG/M2

## 2024-07-01 DIAGNOSIS — Z11.1 ENCOUNTER FOR SCREENING FOR RESPIRATORY TUBERCULOSIS: ICD-10-CM

## 2024-07-01 LAB
POTASSIUM SERPL-SCNC: 4.1 MMOL/L (ref 3.4–5.3)
SODIUM SERPL-SCNC: 132 MMOL/L (ref 135–145)
SODIUM SERPL-SCNC: 133 MMOL/L (ref 135–145)

## 2024-07-01 PROCEDURE — 36415 COLL VENOUS BLD VENIPUNCTURE: CPT | Performed by: HOSPITALIST

## 2024-07-01 PROCEDURE — 84295 ASSAY OF SERUM SODIUM: CPT | Performed by: HOSPITALIST

## 2024-07-01 PROCEDURE — 250N000013 HC RX MED GY IP 250 OP 250 PS 637

## 2024-07-01 PROCEDURE — 99239 HOSP IP/OBS DSCHRG MGMT >30: CPT | Performed by: HOSPITALIST

## 2024-07-01 PROCEDURE — 84132 ASSAY OF SERUM POTASSIUM: CPT | Performed by: HOSPITALIST

## 2024-07-01 PROCEDURE — 73110 X-RAY EXAM OF WRIST: CPT | Mod: LT

## 2024-07-01 PROCEDURE — G0463 HOSPITAL OUTPT CLINIC VISIT: HCPCS

## 2024-07-01 RX ORDER — DOCUSATE SODIUM 100 MG/1
100 CAPSULE, LIQUID FILLED ORAL 2 TIMES DAILY PRN
DISCHARGE
Start: 2024-07-01 | End: 2024-08-20

## 2024-07-01 RX ORDER — SENNOSIDES 8.6 MG
1-2 TABLET ORAL 2 TIMES DAILY PRN
DISCHARGE
Start: 2024-07-01 | End: 2024-08-20

## 2024-07-01 RX ADMIN — ACETAMINOPHEN 650 MG: 325 TABLET, FILM COATED ORAL at 10:37

## 2024-07-01 RX ADMIN — FERROUS SULFATE TAB 325 MG (65 MG ELEMENTAL FE) 325 MG: 325 (65 FE) TAB at 09:53

## 2024-07-01 RX ADMIN — CYANOCOBALAMIN TAB 1000 MCG 1000 MCG: 1000 TAB at 09:53

## 2024-07-01 RX ADMIN — GABAPENTIN 100 MG: 100 CAPSULE ORAL at 09:52

## 2024-07-01 RX ADMIN — PANTOPRAZOLE SODIUM 40 MG: 40 TABLET, DELAYED RELEASE ORAL at 09:54

## 2024-07-01 RX ADMIN — ACETAMINOPHEN 650 MG: 325 TABLET, FILM COATED ORAL at 01:16

## 2024-07-01 RX ADMIN — QUETIAPINE 12.5 MG: 25 TABLET, FILM COATED ORAL at 09:54

## 2024-07-01 RX ADMIN — FOLIC ACID 1000 MCG: 1 TABLET ORAL at 09:53

## 2024-07-01 ASSESSMENT — ACTIVITIES OF DAILY LIVING (ADL)
ADLS_ACUITY_SCORE: 62

## 2024-07-01 NOTE — PROVIDER NOTIFICATION
Notified Person: WOC group    Notification Date/Time: July 1, 2024 at 1028    Notification Interaction: Amcom    Purpose of Notification: SAROJ Valdivia 1940 #[id number]  Pt has a wound care consult and is discharging today, are you able to come see him soon? Thank you!   Mis KIM call back #[phone]    Orders Received:    Comments:

## 2024-07-01 NOTE — PROVIDER NOTIFICATION
MD Notification    Notified Person: MD    Notified Person Name: Gold NANCE     Notification Date/Time: July 1, 2024 at 1211    Notification Interaction: Vocera message    Purpose of Notification:     FYI Pt became short of breath when head of bed was down, reports this has been happening for 40 years due to anxiety. Ride is set up for 1. X-ray completed.       Orders Received: MD aware    Comments:

## 2024-07-01 NOTE — DISCHARGE INSTRUCTIONS
Wound cares  Location: buttock   Care: provided qshift   Provide routine incontinence care every 2 hours   Apply barrier cream

## 2024-07-01 NOTE — PROGRESS NOTES
Care Management Discharge Note    Discharge Date: 07/01/2024       Discharge Disposition:      Discharge Services:      Discharge DME:      Discharge Transportation: agency, health plan transportation    Private pay costs discussed: transportation costs    Does the patient's insurance plan have a 3 day qualifying hospital stay waiver?  No    PAS Confirmation Code:    Patient/family educated on Medicare website which has current facility and service quality ratings:      Education Provided on the Discharge Plan:    Persons Notified of Discharge Plans: Patient, spouse, [facility], bedside nurse, [facility].   Patient/Family in Agreement with the Plan:  Yes    Handoff Referral Completed: No    Additional Information:  Writer was informed by doctor that patient would be ready to go today as long as x-ray comes back okay. Writer reached out to Doreen at [facility] to confirm time they wanted patient. Doreen stated they could take him anytime after 1230. Patient reviewed chart and saw that family had requested transportation be set up. Writer contacted bedside nurse to see if patient was needing wheelchair or stretcher, bedside nurse stated they thought stretcher would be most appropriate as patient is a full lift and baseline dementia. Writer called [facility] transport to set up a stretcher ride, they had a service window of [phone]. Writer let HUC, bedside nurse, patient and patients wife know of the plan. Writer paged Dr. Malcolm to let her know of ride time and that orders will be needed by 12:15. PAS and PCS completed.    PAS-RR    D: Per DHS regulation, SHALINI completed and submitted PAS-RR to [place] [employer] Direct Connect via the [employer].  PAS-RR confirmation # is : [id number]    I: SHALINI spoke with patient and they are aware a PAS-RR has been submitted.  SHALINI reviewed with patient that they may be contacted for a follow up appointment within 10 days of hospital discharge if their SNF stay is < 30 days.   Contact information for [employer] Line was also provided.    A: patient verbalized understanding.    P: Further questions may be directed to Senior [employer] Line at #[phone], option #4 for [facility]- staff.     AILEEN HEARN [facility]  INPATIENT CARE COORDINATION

## 2024-07-01 NOTE — PROGRESS NOTES
Members wife called member is in hospital.  We canceled 7/2/24 lab appointment and canceled the transportation also.      Sylvia Lepe  Care Management Specialist  [facility]  [phone]

## 2024-07-01 NOTE — PLAN OF CARE
Goal Outcome Evaluation:        Summary: history of a-fib, CAD, Dementia, SIADH  Primary Diagnosis: admitted with a compliant of headache. Hyponatremia with a level of 123. On serial sodium checks.        ONLY POST BELOW FOR END OF SHIFT NOTE      Orientation: alert and oriented  Aggression Stop Light: green  Activity: yoan mo   Diet/BS Checks: regular room service  Tele: none   IV Access/Drains: right hand PIV  Pain Management: headache. Mostly related to positioning and history of neck fracture  Abnormal VS/Results: vitals are notable for high blood pressure (143/73). Sodium level 132.  Bowel/Bladder: incontinent  of urine. External catheter in place. Good urine output  Skin/Wounds: dry flaky lower extremities. Right forearm with multiple ecchymosis. Groin folds some redness. Left waist area redness. Powder applied  Consults: speech/ SW  D/C Disposition: likely today to TCU at [facility].    Other Info: patient is on Q8H sodium check. Last result at 22:00 was 132.

## 2024-07-01 NOTE — PLAN OF CARE
Physical Therapy Discharge Summary    Reason for therapy discharge:    Discharged to transitional care facility.    Progress towards therapy goal(s). See goals on Care Plan in [facility] electronic health record for goal details.  Goals partially met.  Barriers to achieving goals:   discharge from facility.    Therapy recommendation(s):    Continued therapy is recommended.  Rationale/Recommendations: Pt is moving significantly below reported baseline. At baseline, pt is IND w/ 4WW for most ADL's and mobility, only recieves assist with dressing and bathing at baseline. Currently, pt requiring Ax2 for sit>stands and only able to take ~5 steps w/ 4WW and lew unsteady at this time. Recommend TCU at this time in order to further address deficits prior to returning to memory care. If pt is able to have Ax2 w/ yoan stedy device at [facility] care, could return with HCPT.  PT Brief overview of current status: Recommend Yoan stedy and Ax2 w/ nursing    Recommendation above provided by last treating therapist.

## 2024-07-01 NOTE — CONSULTS
"[facility] Nurse Inpatient Assessment     Consulted for: Wound buttom    Summary: patient with POA mild masd and signs of chronic pressure not to the level of a stageable pressure injury with blanchable purple discoloration to buttock, initial [facility] visit 7/1    Patient History (according to provider note(s):      \"84 year old male admitted on 6/28/2024 for acute hyponatremia. He has a past medical history of coronary artery disease, atrial fib, hypertension, dementia. \"    Assessment:      Areas visualized during today's visit: Focused: and Sacrum/coccyx    Wound location: buttock left     Last photo: 7/1  Wound due to: Moisture Associated Skin Damage (MASD) and signs of chronic pressure not to the level of a stageable injury with blanchable purple discoloration   Wound history/plan of care: found with optifoam in use   Wound base:  epidermis and dermis     Palpation of the wound bed: normal      Drainage: scant     Description of drainage: serous and serosanguinous     Measurements (length x width x depth, in cm): largest open area noted less than 0.5  x 0.5  x  0.1 cm      Tunneling: N/A     Undermining: N/A  Periwound skin: Intact and Erythema- blanchable      Color: pink and purple      Temperature: normal   Odor: none  Pain: denies  and no grimacing or signs of discomfort, none  Pain interventions prior to dressing change: patient tolerated well  Treatment goal: Heal  and Protection  STATUS: initial assessment  Supplies ordered: supplies stored on unit       Treatment Plan:       Wound care  Start:  07/01/24 1156,   EVERY SHIFT,   Routine        Comments: Location: buttock  Care: provided qshift by primary RN  1. Cleanse qshift with elvin cleanser and soft dry wipes  2. Apply criticiad barrier paste to perianal  3. Do not apply diaper when in bed, apply cardinal covidien pad instead  4. Do not apply mepilex or optifoam bandages for this patients buttock / posterior thighs    "       Orders: Written    RECOMMEND PRIMARY TEAM ORDER: None, at this time  Education provided: plan of care, Moisture management, and Hygiene  Discussed plan of care with: Patient and Nurse  [facility] nurse follow-up plan: weekly  Notify WO if wound(s) deteriorate.  Nursing to notify the Provider(s) and re-consult the [facility] Nurse if new skin concern.    DATA:     Current support surface: Standard  Standard Isoflex gel  Containment of urine/stool: Incontinence Protocol, Incontinent pad in bed, and Suction based external urinary catheter   BMI: Body mass index is 32.38 kg/m .   Active diet order: Orders Placed This Encounter      Combination Diet Regular Diet Adult; Regular Diet; Thin Liquids (level 0) (crush large meds, smaller pills whole-one at a time, fully upright, no straw, small/single bites/sips)      Diet     Output: I/O last 3 completed shifts:  In: 366 [P.O.:360; I.V.:6]  Out: 750 [Urine:750]     Labs:   Recent Labs   Lab 06/29/24  0512   HGB 8.2*   WBC 7.6     Pressure injury risk assessment:   Sensory Perception: 3-->slightly limited  Moisture: 3-->occasionally moist  Activity: 3-->walks occasionally  Mobility: 2-->very limited  Nutrition: 2-->probably inadequate  Friction and Shear: 1-->problem  Sumit Score: 14    Kriss CWOCN   1st choice: Securely message with [employer] ([facility] [employer])   (2nd option: [facility] Office Phone [phone], messages checked periodically Mon-Fri 8a-4p)

## 2024-07-01 NOTE — PROVIDER NOTIFICATION
MD Notification    Notified Person: MD    Notified Person Name:Dr.Abbood Dewittder    Notification Date/Time:06/30@1837    Notification Interaction:Vocera    Purpose of Notification:Could you please order stool softener for him, no BM for 3-4 days.    Orders Received:Colace 100 mg BID PRN, Senokot 1-2 tab BID PRN    Comments:

## 2024-07-01 NOTE — PLAN OF CARE
Goal Outcome Evaluation:      Plan of Care Reviewed With: patient, spouse               Pt discharged at 1415 with cart transport to TCU. Packet sent with EMS and Pt, medications to be filled at TCU. Discharge instructions provided to Pt and wife, both verbalized understanding of discharge instructions and necessary follow up appointments.     Reason for Admission: acute hyponatremia,    Cognitive/Mentation: A/Ox 4  VS: BP (!) 153/66 (BP Location: Left arm)   Pulse 72   Temp 98.1  F (36.7  C) (Oral)   Resp 16   Wt 93.8 kg (206 lb 12.7 oz)   SpO2 93%   BMI 31.44 kg/m    .   /GI: Incontinent. Voiding adequately. Last BM 6/28, passing gas.   Pulmonary: LS diminished.  Pain: Pt reported headache which improved with PRN Tylenol.     Drains/Lines: Male purewick, IV  Skin: blanchable purple discoloration to buttocks, scattered bruising, dry BLE. Redness to groin folds (powder in place). Dry scabs present on back of head from old sutures.   Activity: Assist x 2 with Aga steady.  Diet: Reg with thin liquids, no straws. Takes pills whole one at a time or crushed.     Therapies recs: TCU  Discharge: Pt discharged to TCU at 1415    End of shift summary: Na resulted 133 and 132. X-ray of L wrist done, MD will call to review results with wife. Was seen by wound care nurse this shift, wound care orders in. On K protocol, Last potassium value of 4.1, no replacement. BLE edema present, elevated. BUE slight edema present.

## 2024-07-02 ENCOUNTER — PATIENT OUTREACH (OUTPATIENT)
Dept: GERIATRIC MEDICINE | Facility: CLINIC | Age: 84
End: 2024-07-02

## 2024-07-02 VITALS
OXYGEN SATURATION: 96 % | DIASTOLIC BLOOD PRESSURE: 66 MMHG | HEIGHT: 68 IN | SYSTOLIC BLOOD PRESSURE: 128 MMHG | HEART RATE: 88 BPM | BODY MASS INDEX: 31.55 KG/M2 | RESPIRATION RATE: 20 BRPM | TEMPERATURE: 97 F | WEIGHT: 208.2 LBS

## 2024-07-02 PROCEDURE — 36415 COLL VENOUS BLD VENIPUNCTURE: CPT | Performed by: NURSE PRACTITIONER

## 2024-07-02 PROCEDURE — P9604 ONE-WAY ALLOW PRORATED TRIP: HCPCS | Performed by: NURSE PRACTITIONER

## 2024-07-02 PROCEDURE — 86481 TB AG RESPONSE T-CELL SUSP: CPT | Performed by: NURSE PRACTITIONER

## 2024-07-02 NOTE — DISCHARGE SUMMARY
[facility]    Discharge Summary  Hospitalist    Date of Admission:  6/28/2024  Date of Discharge:  7/2/2024    Discharge Diagnoses      Hyponatremia  Failure of outpatient treatment  Generalized weakness    History of Present Illness   Jamie Valdivia is an 84 year old male who presented with hyponatremia     Hospital Course   Jamie Valdivia was admitted on 6/28/2024.  The following problems were addressed during his hospitalization:    Jamie Valdivia is a 84 year old male admitted on 6/28/2024 for acute hyponatremia. He has a past medical history of coronary artery disease, atrial fib, hypertension, dementia.      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 He was recently seen in clinic on 6/20 and found to have a sodium of 126. Prior to this, his sodium level was 139 in late April 2024. He was placed on a fluid restriction and his dose of mirtazapine was decreased. Despite this, his recheck Sodium on 6/27 was 123. Complains of mild headache only.  *Head CT: No acute intracranial pathology. Chronic small vessel ischemic disease, generalized cerebral volume loss and right occipital chronic infarct.   *Urine osmolarity 6/27: 263  *Serum osmolarity 114  -Recheck urine osmolarity-pending  -Urine sodium <20  -Continuous telemetry monitoring  -Q8h sodium checks   -Strict intake and output  -Hold PO Remeron  -Was started on IV normal saline 75 cc an hour with which the sodium improved.  Fluids but eventually discontinued.  - sodium stable at 132 at the time of discharge     Pressure ulcers,  Nursing staff noted pressure sores on the buttock.  Multiple bruising in that area.  -Consulted wound care team.    Hypertension  *Normotensive in ED  -Not  "on any PTA antihypertensives    History of A-fib  *EKG 6/28: Junctional rhythm with frequent Premature ventricular complexes   *Not on anticoagulation. Seen by cardiology 3/14/24 and per documentation, refuses OAC.  -Continuous telemetry monitoring    Dementia  -Resume PTA Seroquel     Diet:  Regular  DVT Prophylaxis: Pneumatic Compression Devices  Lucio Catheter: Not present  Lines: None     Cardiac Monitoring: None  Code Status:  Full Code     Clinically Significant Risk Factors                      # Dementia: noted on problem list          # Obesity: Estimated body mass index is 31.44 kg/m  as calculated from the following:    Height as of 6/25/24: 1.727 m (5' 8\").    Weight as of this encounter: 93.8 kg (206 lb 12.7 oz)., PRESENT ON ADMISSION  # Moderate Malnutrition: based on nutrition assessment, PRESENT ON ADMISSION     # Financial/Environmental Concerns: none          Wedny Malcolm MD, MD        Code Status   Full Code       Primary Care Physician   Sylvia Stein    Physical Exam                      Vitals:    06/28/24 1700 06/29/24 0534 07/01/24 1235   Weight: 95.2 kg (209 lb 14.1 oz) 96.6 kg (212 lb 15.4 oz) 93.8 kg (206 lb 12.7 oz)     Vital Signs with Ranges     I/O last 3 completed shifts:  In: 366 [P.O.:360; I.V.:6]  Out: 750 [Urine:750]    Physical Exam  Constitutional:       Comments: Old and frail    Cardiovascular:      Rate and Rhythm: Normal rate. Rhythm irregular.   Pulmonary:      Effort: Pulmonary effort is normal.   Abdominal:      General: Abdomen is flat.   Musculoskeletal:      Comments: Left wrist pain    Neurological:      Comments: Generalised weakness            Discharge Disposition   Discharged to short-term care facility  Condition at discharge: Stable    Consultations This Hospital Stay   NURSING TO CONSULT FOR VASCULAR ACCESS CARE IP CONSULT  PHYSICAL THERAPY ADULT IP CONSULT  PHARMACY IP CONSULT  VASCULAR ACCESS ADULT IP CONSULT  CARE MANAGEMENT / SOCIAL WORK IP " CONSULT  SPEECH LANGUAGE PATH ADULT IP CONSULT  WOUND OSTOMY CONTINENCE NURSE  IP CONSULT  VASCULAR ACCESS ADULT IP CONSULT  PHYSICAL THERAPY ADULT IP CONSULT  OCCUPATIONAL THERAPY ADULT IP CONSULT    Time Spent on this Encounter   I, Wendy Malcolm MD, personally saw the patient today and spent greater than 30 minutes discharging this patient.    Discharge Orders      Reason for your hospital stay    Hyponatremia     Activity    Your activity upon discharge: activity as tolerated     General info for SNF    Length of Stay Estimate: Short Term Care: Estimated # of Days <30  Condition at Discharge: Stable  Level of care:skilled   Rehabilitation Potential: Good  Admission H&P remains valid and up-to-date: Yes  Recent Chemotherapy: N/A  Use Nursing Home Standing Orders: Yes     Mantoux instructions    Give two-step Mantoux (PPD) Per Facility Policy Yes     Reason for your hospital stay    Hyponatremia     Activity - Up ad loretta     Follow Up and recommended labs and tests    Follow up with [facility] physician.  The following labs/tests are recommended: cbc, bmp in 1 week .  Follow up with ortho regarding left wrist pain     Physical Therapy Adult Consult    Evaluate and treat as clinically indicated.    Reason:  weakness     Occupational Therapy Adult Consult    Evaluate and treat as clinically indicated.    Reason:  weakness     Fall precautions     Diet    Follow this diet upon discharge: Orders Placed This Encounter      Room Service      Snacks/Supplements Adult: Ensure Enlive; With Meals      Combination Diet Regular Diet Adult; Regular Diet; Thin Liquids (level 0) (crush large meds, smaller pills whole-one at a time, fully upright, no straw, small/single bites/sips)      Diet     Discharge Medications   Discharge Medication List as of 7/1/2024  2:01 PM        START taking these medications    Details   docusate sodium (COLACE) 100 MG capsule Take 1 capsule (100 mg) by mouth 2 times daily as needed for  "constipation, Transitional      sennosides (SENOKOT) 8.6 MG tablet Take 1-2 tablets by mouth 2 times daily as needed for constipation, Transitional           CONTINUE these medications which have NOT CHANGED    Details   acetaminophen (TYLENOL) 500 MG tablet Take 2 tablets (1,000 mg) by mouth 3 times daily as needed for mild pain, Disp-10 tablet, R-98, E-Prescribe      cyanocobalamin (VITAMIN B-12) 1000 MCG tablet TAKE 1 TABLET BY MOUTH ONCE DAILY, Disp-90 tablet, R-97, E-Prescribe\"Please authorize quantity 90 day supply with PRN refills for assisted living patient. Thank you!\"      FEROSUL 325 (65 Fe) MG tablet TAKE 1 TABLET BY MOUTH ONCE DAILY WITH BREAKFAST, Disp-90 tablet, R-3, E-PrescribePlease authorize qty 90 day supply with prn refills for cycle. Thanks.      folic acid (FOLVITE) 1 MG tablet TAKE 1 TABLET BY MOUTH ONCE DAILY, Disp-90 tablet, R-97, E-Prescribe\"Please authorize quantity 90 day supply with PRN refills for assisted living patient. Thank you!\"      gabapentin (NEURONTIN) 100 MG capsule Take 1 capsule (100 mg) by mouth daily. May also take 1 capsule (100 mg) 2 times daily as needed for neuropathic pain., Historical      loratadine (CLARITIN) 10 MG tablet TAKE 1 TABLET BY MOUTH AT BEDTIME, Disp-90 tablet, R-97, E-PrescribeREFILL REQUEST FOR CYCLE THAT STARTS ON 5/23/24      menthol-zinc oxide (CALMOSEPTINE) 0.44-20.6 % OINT ointment Apply topically 4 times daily To perineal area. [facility] to [facility].Disp-113 g, [id number]      nystatin (MYCOSTATIN) 860046 UNIT/GM external powder Apply topically 2 times daily as needed (redness, rash under left breast)Historical      omeprazole (PRILOSEC) 40 MG DR capsule TAKE 1 CAPSULE BY MOUTH TWICE DAILY, Disp-180 capsule, R-97, E-PrescribeCYCLE FILL REQUEST FOR CYCLE THAT STARTS 10/05/2023      oxymetazoline (AFRIN) 0.05 % nasal spray Spray 2 sprays into both nostrils 2 times daily as needed (nose bleed), No Print Out      polyethylene glycol (MIRALAX) 17 " GM/Dose powder MIX 2 CAPFULS IN 8OZ OF ORANGE JUICE  IN THE MORNING;AND MAY MIX 2 CAPFULS ONCE DAILY AS NEEDED FOR CONSTIPATION. MIX IN FRONT OF PT. HOLD FOR LOOSE STOOL. OK TO GIVE 1 CAPFUL IF RESIDENT REFUSES 2 CAPFULS., Disp-510 g, R-97, E-Prescribe      QUEtiapine (SEROQUEL) 25 MG tablet TAKE ONE-HALF TABLET (12.5MG) BY MOUTH TWICE DAILY;& MAY TAKE ONE-HALF TABLET (12.5MG) EVERY 12 HOURS AS NEEDED, Disp-180 tablet, R-97, E-PrescribePlease authorize quantity 90 day supply with PRN refills for assisted living patient. Their cycle restarts  11/30/23. Thank you!      sodium chloride (OCEAN) 0.65 % nasal spray Spray 1 spray in nostril 4 times daily as needed for congestion Okay to leave at bedside, Disp-88 mL, R-98, E-Prescribe           STOP taking these medications       mirtazapine (REMERON) 7.5 MG tablet Comments:   Reason for Stopping:             Allergies   Allergies   Allergen Reactions    Ativan [Lorazepam]      Data   Recent Labs   Lab Test 06/29/24  0512 06/28/24  1443 06/20/24  0747 05/30/24  0630   WBC 7.6 12.6*  --  7.7   HGB 8.2* 9.1* 8.4* 8.2*   MCV 80 79  --  85    375  --  385      Recent Labs   Lab Test 07/01/24  1352 07/01/24  0804 06/30/24  2228 06/30/24  1447 06/30/24  0546 06/29/24  1213 06/29/24  0512 06/28/24  2358 06/28/24  2050 06/28/24  1327   * 133* 132*   < > 133*   < > 127*   < > 126* 126*   POTASSIUM  --  4.1  --   --  3.7  --  3.7  3.7  --  3.3* 3.6   CHLORIDE  --   --   --   --   --   --  87*  --  83* 80*   CO2  --   --   --   --   --   --  32*  --  34* 35*   BUN  --   --   --   --   --   --  5.2*  --  5.0* 4.8*   CR  --   --   --   --   --   --  0.45*  --  0.45* 0.45*   ANIONGAP  --   --   --   --   --   --  8  --  9 11   JOSUE  --   --   --   --   --   --  7.8*  --  8.0* 8.5*   GLC  --   --   --   --   --   --  113*  --  140* 130*    < > = values in this interval not displayed.         Results for orders placed or performed during the hospital encounter of 06/28/24   CT  Head w/o Contrast    Narrative    EXAM: CT HEAD W/O CONTRAST  6/28/2024 2:00 PM     HISTORY:  Headache; Headache; Acute HA (< 3 months), no complicating  features       COMPARISON:  Head CT 6/18/2024    TECHNIQUE: Using multidetector thin collimation helical acquisition  technique, axial, coronal and sagittal CT images from the skull base  to the vertex were obtained without intravenous contrast.   (topogram) image(s) also obtained and reviewed. Dose reduction  techniques were performed.    FINDINGS:  No intracranial hemorrhage, mass effect, or midline shift. No acute  loss of gray-white matter differentiation in the cerebral hemispheres.  Ventricles are proportionate to the cerebral sulci. Clear basal  cisterns. Chronic right occipital infarct. Mild periventricular  hypoattenuation, consistent with chronic small vessel ischemic  disease. Moderate generalized cerebral volume loss.    The bony calvaria and the bones of the skull base are normal. The  visualized portions of the paranasal sinuses are clear. Left greater  than right mastoid effusions. Grossly normal orbits.       Impression    IMPRESSION:   1. No acute intracranial pathology.   2. Chronic small vessel ischemic disease, generalized cerebral volume  loss and right occipital chronic infarct.    BRENDA DAMON MD         SYSTEM ID:  [id number]   XR Wrist Left G/E 3 Views    Narrative    WRIST LEFT THREE OR MORE VIEWS July 1, 2024 11:24 AM     HISTORY: Persistent severe pain.    COMPARISON: Radiographs from 6/18/2024.      Impression    IMPRESSION: Diffuse bone demineralization and multifocal  osteoarthrosis again noted. No new findings. No evidence of fracture.    FLOYD PALMA MD         SYSTEM ID:  [id number]

## 2024-07-02 NOTE — PROGRESS NOTES
[facility]  PRIMARY CARE PROVIDER AND CLINIC:  Sylvia Stein, MARICHUY CNP, [address] [facility] [address]  Chief Complaint   Patient presents with    [facility] Medical Record Number:  [id number]  Place of Service where encounter took place:  [facility] (TCU) [[id number]]    Jamie Valdivia  is a 84 year old  (1940), admitted to the above facility from  [facility]. Hospital stay 6/28/24 through 7/2/24..  Admitted to this facility for  rehab, medical management, and nursing care.    HPI:    HPI information obtained from: facility chart records, facility staff, and patient report.   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 Niraj Larry [facility]) (Completed external beam radiation therapy w/o androgen depravation therapy with [facility] under care of Dr Fleming), hx of COVID-19, hard of hearing     [facility] and may need higher  level of care  primary contact for Jamie Valdivia (Magi Espino) spouse Gisela     Today   Denies shortness of breath or chest pain  States eating, denies cough or trouble swallowing.   Some confusion noted  Mild swelling left wrist, denies injury and using left arm  Reports regular elimination  Does c/o of ongoing ear pain, seen ENT for prior and states may follow up. Decline debrox ears. Denies change in hearing          CODE STATUS/ADVANCE DIRECTIVES DISCUSSION:   CPR/Full code   Patient's living condition: lives in an assisted living facility  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) (11/19/2021), ED (erectile dysfunction), Edema, Encephalopathy (3/19/2024), Falling, JOSÉ MANUEL (generalized anxiety disorder), Generalized anxiety disorder (04/27/2020), GERD (gastroesophageal reflux disease), GI bleeding, H/O carpal tunnel syndrome, History of 2019 novel coronavirus disease (COVID-19) (02/08/2021), HTN (hypertension), Impaired gait and mobility (03/14/2019), Mild cognitive impairment (08/12/2019), Mild TBI (traumatic brain injury) (H) (03/14/2019), Mumps, Obesity (BMI 30-39.9) (09/03/2015), Osteoarthritis, Osteoarthritis of both knees (05/13/2015), Osteoporosis, Other idiopathic peripheral autonomic neuropathy (03/30/2022), Other insomnia (03/14/2019), Other seizures (H) (03/30/2022), Palpitations, Peripheral neuropathy, Pharyngeal dysphagia (05/13/2015), Physical deconditioning (03/14/2019), Recurrent major depressive disorder (H24) (03/30/2022),  Rhabdomyolysis, Thrombocytopenia (H24), Urination disorder, and Weakness (04/27/2020).    He has no past medical history of Asymptomatic human immunodeficiency virus (HIV) infection status (H), Blood in semen, Complication of anesthesia, Congenital renal agenesis and dysgenesis, Epididymitis, bilateral, Epididymitis, left, Epididymitis, right, Goiter, Gout, Hernia, abdominal, History of spinal cord injury, History of thrombophlebitis, Horseshoe kidney, Malignant hyperthermia, Orchitis, epididymitis, and epididymo-orchitis, with abscess, Parkinsons disease (H), Penile discharge, Prostate infection, Spider veins, Spina bifida (H), STD (sexually transmitted disease), Swelling of testicle, Tethered cord (H), or Tuberculosis.  PAST SURGICAL HISTORY:   has a past surgical history that includes left hip replacement (2010); left shoulder replacement (2016); tonsillectomy; Spine surgery; orthopedic surgery; Esophagoscopy, gastroscopy, duodenoscopy (EGD), combined (N/A, 06/01/2022); colonoscopy; Biopsy prostate transrectal (N/A, 1/11/2023); and Transrectal ultrasonic sonogram (N/A, 1/11/2023).  FAMILY HISTORY: family history includes Osteoporosis in his mother; Prostate Cancer in his paternal grandfather.  SOCIAL HISTORY:   reports that he has never smoked. He has never used smokeless tobacco. He reports that he does not currently use alcohol. He reports that he does not use drugs.    Post Discharge Medication Reconciliation Status: discharge medications reconciled and changed, per note/orders    Current Outpatient Medications   Medication Sig Dispense Refill    acetaminophen (TYLENOL) 500 MG tablet Take 2 tablets (1,000 mg) by mouth 3 times daily as needed for mild pain 10 tablet 98    cyanocobalamin (VITAMIN B-12) 1000 MCG tablet TAKE 1 TABLET BY MOUTH ONCE DAILY 90 tablet 97    docusate sodium (COLACE) 100 MG capsule Take 1 capsule (100 mg) by mouth 2 times daily as needed for constipation      FEROSUL 325 (65 Fe) MG  "tablet TAKE 1 TABLET BY MOUTH ONCE DAILY WITH BREAKFAST 90 tablet 3    folic acid (FOLVITE) 1 MG tablet TAKE 1 TABLET BY MOUTH ONCE DAILY 90 tablet 97    gabapentin (NEURONTIN) 100 MG capsule Take 1 capsule (100 mg) by mouth daily. May also take 1 capsule (100 mg) 2 times daily as needed for neuropathic pain.      loratadine (CLARITIN) 10 MG tablet TAKE 1 TABLET BY MOUTH AT BEDTIME 90 tablet 97    menthol-zinc oxide (CALMOSEPTINE) 0.44-20.6 % OINT ointment Apply topically 4 times daily To perineal area. Send to [facility]. 113 g 98    nystatin (MYCOSTATIN) 002818 UNIT/GM external powder Apply topically 2 times daily as needed (redness, rash under left breast)      omeprazole (PRILOSEC) 40 MG DR capsule TAKE 1 CAPSULE BY MOUTH TWICE DAILY 180 capsule 97    oxymetazoline (AFRIN) 0.05 % nasal spray Spray 2 sprays into both nostrils 2 times daily as needed (nose bleed)      polyethylene glycol (MIRALAX) 17 GM/Dose powder MIX 2 CAPFULS IN 8OZ OF ORANGE JUICE  IN THE MORNING;AND MAY MIX 2 CAPFULS ONCE DAILY AS NEEDED FOR CONSTIPATION. MIX IN FRONT OF PT. HOLD FOR LOOSE STOOL. OK TO GIVE 1 CAPFUL IF RESIDENT REFUSES 2 CAPFULS. 510 g 97    QUEtiapine (SEROQUEL) 25 MG tablet TAKE ONE-HALF TABLET (12.5MG) BY MOUTH TWICE DAILY;& MAY TAKE ONE-HALF TABLET (12.5MG) EVERY 12 HOURS AS NEEDED 180 tablet 97    sennosides (SENOKOT) 8.6 MG tablet Take 1-2 tablets by mouth 2 times daily as needed for constipation      sodium chloride (OCEAN) 0.65 % nasal spray Spray 1 spray in nostril 4 times daily as needed for congestion Okay to leave at bedside 88 mL 98       ROS:  10 point ROS of systems including Constitutional, Eyes, Respiratory, Cardiovascular, Gastroenterology, Genitourinary, Integumentary, Musculoskeletal, Psychiatric were all negative except for pertinent positives noted in my HPI.    Vitals:  /66   Pulse 88   Temp 97  F (36.1  C)   Resp 20   Ht 1.727 m (5' 8\")   Wt 94.4 kg (208 lb 3.2 oz)   SpO2 96%   BMI 31.66 " "kg/m    Exam:  GENERAL APPEARANCE:  Alert, in no distress  ENT:  Mouth and posterior oropharynx normal, moist mucous membranes  EYES:  EOM, conjunctivae, lids, pupils and irises normal  NECK:  No adenopathy,masses or thyromegaly  RESP:  respiratory effort and palpation of chest normal, lungs clear to auscultation, no respiratory distress  CV:  Palpation and auscultation of heart done, regular rate and rhythm  ABDOMEN:  normal bowel sounds, soft, nontender  M/S:   Gait and station normal, mild swelling left wrist, no redness  SKIN:  Inspection of skin and subcutaneous tissue+2-3 edema bilateral, no redness  NEURO:   Cranial nerves 2-12 are normal tested and grossly at patient's baseline  PSYCH:  oriented X 3, memory impaired , affect and mood normal    Lab/Diagnostic data:  Labs done in SNF are in [place] EPIC. Please refer to them using [employer]/Care Everywhere.    Last Comprehensive Metabolic Panel:  Lab Results   Component Value Date     (L) 07/01/2024    POTASSIUM 4.1 07/01/2024    CHLORIDE 87 (L) 06/29/2024    CO2 32 (H) 06/29/2024    ANIONGAP 8 06/29/2024     (H) 06/29/2024    BUN 5.2 (L) 06/29/2024    CR 0.45 (L) 06/29/2024    GFRESTIMATED >90 06/29/2024    JOSUE 7.8 (L) 06/29/2024       Lab Results   Component Value Date    WBC 7.6 06/29/2024     Lab Results   Component Value Date    RBC 3.26 06/29/2024     Lab Results   Component Value Date    HGB 8.2 06/29/2024     Lab Results   Component Value Date    HCT 26.0 06/29/2024     No components found for: \"MCT\"  Lab Results   Component Value Date    MCV 80 06/29/2024     Lab Results   Component Value Date    MCH 25.2 06/29/2024     Lab Results   Component Value Date    MCHC 31.5 06/29/2024     Lab Results   Component Value Date    RDW 17.0 06/29/2024     Lab Results   Component Value Date     06/29/2024     WRIST LEFT THREE OR MORE VIEWS July 1, 2024 11:24 AM      HISTORY: Persistent severe pain.     COMPARISON: Radiographs from 6/18/2024.         "                                                              IMPRESSION: Diffuse bone demineralization and multifocal  osteoarthrosis again noted. No new findings. No evidence of fracture.    ASSESSMENT/PLAN:    Hyponatremia  Per hospital report:        Latest Reference Range & Units 06/29/24 13:56 06/29/24 22:36 06/30/24 05:46   Sodium 135 - 145 mmol/L 130 (L) 132 (L) 133 (L)      Presents with a sodium of 123.   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  Recently seen in clinic on 6/20 and found to have a sodium of 126. Prior to this, his sodium level was 139 in late April 2024. He was placed on a fluid restriction and his dose of mirtazapine was decreased. Despite this, his recheck Sodium on 6/27 was 123. Complains of mild headache only.  *Head CT: No acute intracranial pathology. Chronic small vessel ischemic disease, generalized cerebral volume loss and right occipital chronic infarct.   Remeron stopped, received 0.9% Saline 75ml/h today, and encourage oral intake.  Follow up BMP, CBC 7/8/24     Pressure ulcers  sores on the buttock. Multiple bruising in that area.  APM ordered  Barrier cream    Essential hypertension, benign  CAD  BP at goal of < 150/90 given age and comorbid conditions  Stopped ASA due to hemoptysis  Chronic managed   Not on medications to treat  Monitor      Thoracic aortic aneurysm without rupture (H) - 6/30/22 4.6 cm  New finding in 2022, follows with vascular, last visit with  on 7/28/2023.  Plan follow up echo     History of A-fib  EKG 6/28: Junctional rhythm with frequent Premature ventricular complexes   Not on anticoagulation. Seen by cardiology 3/14/24 and per documentation, refuses OAC  Blood pressure, hr managed    JOSÉ MANUEL  Adjustment reaction with depression/anxiety  Alcohol dependence in remission  Dementia  Mood appropriate  continue Seroquel  Continue neurontin and vitamins  ACP pysch counselor following closely -Kimberly Wick.  Monitor     Urinary  incontinence   Incontinence associated dermatitis  menthol-zinc oxide (CALMOSEPTINE) 0.44-20.6 % OINT ointment QID  APM due to concern skin breakdown while at TCU  Good eveline-cares    Constipation  Continue Senna and miralax    Impaired vision  Consider ophthalmology follow-up for field cut. Difficulty out to appointments without family assist, follow with onsite eye for now     Chronic BL lower extremity edema  Legs are swollen in setting of dependent edema and obesity.  No history CHF  Continue elevate legs, and TG shapes    Left wrist pain/swelling  Negative for fracture in hospital  Potential IV infiltrated in hospital, no redness   Mild tender to palpitation, will check venous ultrasound to rule out DVT  Offer tylenol for pain  Staff to update if not improving      Ear pain  Monitor, staff to update if not improve   Consider schedule follow up ENT    Prostate Cancer - adenocarcinoma [place] 4+3=7  Per epic note  Prostate biopsy 1/11/2023,pathology positive for [place] 7 prostate cancer.   Stopped Trospium due to anticholinergic side effects, no change in urinary symptoms.  Completed external beam radiation therapy w/o androgen depravation therapy with [facility] under care of Dr Fleming.   Followed by Dr. 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  Saw ENT in 2022, no explanation of symptoms.  Last esophagram May 2022 as above, presbyesophagus, proximal luminal narrowing.    Denies trouble chewing or swallowing and on Regular diet, Regular (DD4) texture, Regular consistency  Has needed nectar thick liquids in past  Monitor      Normocytic Anemia  Hemoglobin   Date Value Ref Range Status   06/29/2024 8.2 (L) 13.3 - 17.7  g/dL Final   No signs and symptoms bleeding  Hgb baseline~8-10  ENT for recurrent nose bleed on 4/03/24   Continue PPI and iron     Deconditioned  Comment: d/t recent hospitalization & comorbidity, expect delay rehabilitation d/t age & comorbidity  Plan: PT/OT eval & treat, monitor    Advanced care planning   Full code     Jul 03, 2024 1:45 PM  (Arrive by 1:30 PM)  ECHO COMPLETE with [id number]  [facility] ([facility] ) [phone]     Jul 09, 2024 10:00 AM  (Arrive by 9:45 AM)  Return Patient with Niraj Larry MD  [facility] [place] ([facility] [place] ) [phone]     Jul 10, 2024 4:00 PM  (Arrive by 3:45 PM)  Return Vascular Patient with Loyd Dominguez MD  [facility] [place] ([facility] ) [phone]     Dec 16, 2024 10:30 AM  (Arrive by 10:15 AM)  New Dermatology with Edgardo Greenberg MD  [facility] ([facility] ) [phone]     Feb 03, 2025 1:00 PM  (Arrive by 12:45 PM)  NEW PATIENT with Colby Mercado DO  [facility] [place] ([facility] ) [phone]                  Total time spent with patient visit at the skilled nursing facility was 35 min including patient visit and review of past records. Greater than 50% of total time spent with counseling and coordinating care due to discussion on dysphagia diet, hyponatremia management, functional goals, pain management and discharge planning and discussion with wife.     Electronically signed by:  MARICHUY Mendoza CNP

## 2024-07-02 NOTE — PROGRESS NOTES
TRANSITIONS OF CARE (ISAAC) LOG    ISAAC tasks should be completed by the CC within one (1) business day of notification of each transition. Follow up contact with member is required after return to their usual care setting.  Note:  If CC finds out about the transitions fifteen (15) days or more after the member has returned to their usual care setting, no ISAAC log is needed. However, the CC should check in with the member to discuss the transition process, any changes needed to the care plan and document it in a case note.     Member Name:  Jamie [facility] [id number]   Product: [facility] [place] Coordinator Contact:  Pat Godwin RN Agency/County/Care System: [facility]   Transition Communication Actions from Care Management Contact   Transition #1   Notification Date: 7/2/24 Transition Date:   6/28/24 Transition From: Home     Is this the member s usual care setting?               yes Transition To: [facility]   Transition Type:  Unplanned    Documentation from conversation with the member/responsible party, provider, discharging and receiving facility:   Date: 7/2/24: Received notification of admission to hospital with dx of Hyponatremia, dememtia, wound on buttocks  CC contacted Hospital /discharge planner, DARSHAN Ayala was discharged from hospital to TCU see transition below.   Notified community service providers and placed services Assisted Living on hold as needed.  Transition log initiated.   PCP, Sylvia Stein, notified of hospitalization via EMR.  Pat Godwin RN [facility] [phone]   Transition #2   Notification Date:7/2/24 Transition Date:   7/1/24 Transition From: [facility]     Is this the member s usual care setting?             NO} Transition To: [facility]   Transition Type:  Planned    Documentation from conversation with the  member/responsible party, provider, discharging and receiving facility:   Date: 7/2/24: Received notification of admission TCU for  post hospital stay.  OBRA 1 right faxed to group home admissions office.   CC contacted [facility] [phone] left a message informing SHALINI that this CC  is covering for member's care coordinator Gisela. Contact information left for myself and Raul. Reviewed community POC as well requested to be notified of concerns, care conferences and discharge planning. Informed [facility] that Jamie has upcoming appointments for ECHO tomorrow, urology and vascular and want to make nursing aware so transportation arrangements made or need to reschedule  appointments.   CC reached out to spouse Gisela   regarding transition and offered support as needed.  Discussed Jamie' upcoming medical appointments. Gisela reports that she was informed that transportation arrangements have been made for ECHO tomorrow ( 7/3/24) and she will be going with Jamie to the appointment. Informed Gisela that I have notified [facility] of upcoming appointments and encouraged Gisela to check with nursing staff to be sure they are aware.   TC from [facility] and reports she she will follow up on upcoming appointments as needed.    Care coordinator updated [facility] nursing staff and [facility]  .  PCP, Sylvia Stein, notified of transition to TCU via EMR.  Pat Godwin RN [facility] [phone]    07/11/24: Emailed TCU SHALINI Liang ([phone], [email or web address]) for an update on progress, care conferences  scheduled, and if a discharge date has been scheduled. Received an email from [facility]. They had a care conference yesterday with his wife. He did not want any other family members present. TCU is recommending long term care. [facility] repeated refusals of care and low sodium status (needs more frequent lab draws, perhaps). He feels he can return to  his apartment at [facility]. SW thinks his wife agrees with LTC, but has a hard time getting to the TCU by herself and she likes to visit daily. Suggested LTC at a facility closer (if available) to where the wife resides if family decides on LTC. Adrienne Almeida, GUICHO [facility] [phone], Fax: [phone]    Transition #3   Notification Date: 8/22/24 Transition Date:   8/22/24 Transition From: Rehabiliation Facility, [facility]     Is this the member s usual care setting?               no Transition To: Assisted Living, [facility]   Transition Type:  Planned    Documentation from conversation with the member/responsible party, provider, discharging and receiving facility:   Date: 8/22/24: Received notification of transition to home.  CC contacted member and AL nursing  and left a message requesting a return call. Made 2 attempts each to reach member and [facility] nursing.   Member has a follow-up appointment with PCP in 7 days: Yes: scheduled on Onsite AL will follow up    Member has had a change in condition: No  Home visit needed: No  Care plan reviewed and updated.  The following home based services Assisted Living were resumed.  New referrals placed: No  Transition log completed.   PCP, Sylvia Stein, notified of transition back to home via EMR. ( Sylvia already aware of transition back and is set to see him on 8/27/24.)    Gisela Hall RN  [facility]  [phone]              *RETURN TO USUAL CARE SETTING: *Complete tasks below when the member is discharging TO their usual care setting within one (1) business day of notification..      For situations where the Care Coordinator is notified of the discharge prior to the date of discharge, the Care Coordinator must follow up with the member or designated representative to confirm that discharge actually occurred and discuss required ISAAC tasks as outlined in the ISAAC Instructions.  (This includes situations where  it may be a  new  usual care setting for the member. (i.e., a community member who decides upon permanent nursing home placement following hospitalization and rehab).    Discuss with Member/Responsible Party:    Check  Yes  - if the member, family member and/or SNF/facility staff manages the following:    If  No  provide explanation in the comments section.          Date completed: 8/22/24 Communicated with member or their designated representative about the following:  care transition process; about changes to the member s health status; plan of care updates; education about transitions and how to prevent unplanned transitions/readmissions    Four Pillars for Optimal Transition:    Check  Yes  - if the member, family member and/or SNF/facility staff manages the following:    If  No  provide explanation in the comments section.          [x]  Yes     []  No Does the member have a follow-up appointment scheduled with primary care or specialist? (Mental health hospitalizations--the appt. should be w/in 7 days)              For mental health hospitalizations:  []  Yes     []  No     Does the member have a follow-up appointment scheduled with a mental health practitioner within 7 days of discharge?  []  Yes     [x]  No     Has a medication review been completed with member? If no, refer to PCP, home care nurse, MTM, pharmacist  [x]  Yes     []  No     Can the member manage their medications or is there a system in place to manage medications (e.g. home care set-up)?         [x]  Yes     []  No     Can the member verbalize warning signs and symptoms to watch for and how to respond?  [x]  Yes     []  No     Does the member have a copy of and understand their discharge instructions?  If no, assist to obtain copy of discharge instructions, review discharge instructions, and assist to contact PCP to discuss questions about their recent hospitalization.  [x]  Yes     []  No     Does the member have adequate food, housing and  transportation?  If no, add goal and discuss additional supports available to the member                                                                                                                                                                                 [x]  Yes     []  No     Is the member safe in their home?  If no, document needs and support provided                                                                                                                                                                          []  Yes     [x]  No     Are there any concerns of vulnerability, abuse, or neglect?  If yes, document concerns and actions taken by Care Coordinator as a mandated                                                                                                                                                                              []  Yes     [x]  No     Does the member use a Personal Health Care Record?  Check  Yes  if visit summary, discharge summary, and/or healthcare summary are being used as a PHR.                                                                                                                                                                                  []  Yes     [x]  No     Have you reviewed the discharge summary with the member? If  No  provide explanation in comments.  [x]  Yes     []  No     Have you updated the member s care plan/support plan? Add new diagnosis, medications, treatments, goals & interventions, as applicable. If No, provide explanation in comments.    Comments:           Notes from conversation with the member/responsible party, provider, discharging and receiving facility (as applicable):      Care coordinator made 2 attempts to contact member and AL nursing. Care coordinator spoke with SW at [facility] and confirmed the recommended services are included in his last assessment. He was declining them and according to the SW, he  continues to decline them but they are still in place for his safety. Elderly waiver will reopen effective 8/22/24.             Gisela Hall RN  [facility]  [phone]

## 2024-07-03 ENCOUNTER — TRANSITIONAL CARE UNIT VISIT (OUTPATIENT)
Dept: GERIATRICS | Facility: CLINIC | Age: 84
End: 2024-07-03
Payer: COMMERCIAL

## 2024-07-03 ENCOUNTER — HOSPITAL ENCOUNTER (OUTPATIENT)
Dept: CARDIOLOGY | Facility: CLINIC | Age: 84
Discharge: HOME | End: 2024-07-03
Attending: INTERNAL MEDICINE | Admitting: INTERNAL MEDICINE
Payer: COMMERCIAL

## 2024-07-03 DIAGNOSIS — C61 PROSTATE CANCER (H): ICD-10-CM

## 2024-07-03 DIAGNOSIS — E87.1 HYPONATREMIA: ICD-10-CM

## 2024-07-03 DIAGNOSIS — R13.13 PHARYNGEAL DYSPHAGIA: ICD-10-CM

## 2024-07-03 DIAGNOSIS — I77.810 ASCENDING AORTA DILATATION (H): ICD-10-CM

## 2024-07-03 DIAGNOSIS — I35.0 AORTIC VALVE STENOSIS, ETIOLOGY OF CARDIAC VALVE DISEASE UNSPECIFIED: ICD-10-CM

## 2024-07-03 DIAGNOSIS — R41.9 NEUROCOGNITIVE DISORDER: ICD-10-CM

## 2024-07-03 DIAGNOSIS — M62.81 GENERALIZED MUSCLE WEAKNESS: ICD-10-CM

## 2024-07-03 DIAGNOSIS — R53.81 PHYSICAL DECONDITIONING: Primary | ICD-10-CM

## 2024-07-03 DIAGNOSIS — F41.1 GENERALIZED ANXIETY DISORDER: ICD-10-CM

## 2024-07-03 DIAGNOSIS — I48.20 CHRONIC ATRIAL FIBRILLATION (H): ICD-10-CM

## 2024-07-03 DIAGNOSIS — E22.2 SIADH (SYNDROME OF INAPPROPRIATE ADH PRODUCTION) (H): ICD-10-CM

## 2024-07-03 DIAGNOSIS — I70.0 ATHEROSCLEROSIS OF AORTA (H): ICD-10-CM

## 2024-07-03 DIAGNOSIS — F32.A DEPRESSION, UNSPECIFIED DEPRESSION TYPE: ICD-10-CM

## 2024-07-03 DIAGNOSIS — I25.10 CORONARY ARTERY DISEASE INVOLVING NATIVE CORONARY ARTERY OF NATIVE HEART WITHOUT ANGINA PECTORIS: ICD-10-CM

## 2024-07-03 DIAGNOSIS — F10.27 DEMENTIA ASSOCIATED WITH ALCOHOLISM WITH BEHAVIORAL DISTURBANCE (H): ICD-10-CM

## 2024-07-03 LAB
GAMMA INTERFERON BACKGROUND BLD IA-ACNC: 0.06 IU/ML
LVEF ECHO: NORMAL
M TB IFN-G BLD-IMP: ABNORMAL
M TB IFN-G CD4+ BCKGRND COR BLD-ACNC: 0.32 IU/ML
MITOGEN IGNF BCKGRD COR BLD-ACNC: 0 IU/ML
MITOGEN IGNF BCKGRD COR BLD-ACNC: 0.01 IU/ML
QUANTIFERON MITOGEN: 0.38 IU/ML
QUANTIFERON NIL TUBE: 0.06 IU/ML
QUANTIFERON TB1 TUBE: 0.06 IU/ML
QUANTIFERON TB2 TUBE: 0.07

## 2024-07-03 PROCEDURE — 99310 SBSQ NF CARE HIGH MDM 45: CPT | Performed by: NURSE PRACTITIONER

## 2024-07-03 PROCEDURE — 93306 TTE W/DOPPLER COMPLETE: CPT | Mod: 26 | Performed by: INTERNAL MEDICINE

## 2024-07-03 PROCEDURE — 93306 TTE W/DOPPLER COMPLETE: CPT

## 2024-07-03 NOTE — LETTER
7/3/2024      Jamie Valdivia  C/o Coral De La Garza  [address]  [place] [address]        [facility]  PRIMARY CARE PROVIDER AND CLINIC:  MARICHUY Basilio CNP, [address] [facility] / [place] [address]  Chief Complaint   Patient presents with     [facility] Medical Record Number:  [id number]  Place of Service where encounter took place:  [facility] ([facility]) [[id number]]    Jamie Valdivia  is a 84 year old  (1940), admitted to the above facility from  [facility]. Hospital stay 6/28/24 through 7/2/24..  Admitted to this facility for  rehab, medical management, and nursing care.    HPI:    HPI information obtained from: facility chart records, facility staff, and patient report.   Brief Summary of Hospital Course:   Updates on Status Since Skilled nursing Admission:     Patient Jamie Valdivia is a 84 yr old male admitted to [facility] ([facility] for rehabilitation s/post hospitalization [facility] 6/28-7/2/24 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 Niraj Larry [facility]) (Completed external beam radiation therapy w/o androgen depravation therapy with [facility] under care of Dr Fleming), hx of  COVID-19, hard of hearing     Lives in [facility] memory care and may need higher level of care  primary contact for Jamie Valdivia (Magi Espino) spouse Gisela     Today   Denies shortness of breath or chest pain  States eating, denies cough or trouble swallowing.   Some confusion noted  Mild swelling left wrist, denies injury and using left arm  Reports regular elimination  Does c/o of ongoing ear pain, seen ENT for prior and states may follow up. Decline debrox ears. Denies change in hearing          CODE STATUS/ADVANCE DIRECTIVES DISCUSSION:   CPR/Full code   Patient's living condition: lives in an assisted living facility  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) (11/19/2021), ED (erectile dysfunction), Edema, Encephalopathy (3/19/2024), Falling, JOSÉ MANUEL (generalized anxiety disorder), Generalized anxiety disorder (04/27/2020), GERD (gastroesophageal reflux disease), GI bleeding, H/O carpal tunnel syndrome, History of 2019 novel coronavirus disease (COVID-19) (02/08/2021), HTN (hypertension), Impaired gait and mobility (03/14/2019), Mild cognitive impairment (08/12/2019), Mild TBI (traumatic brain injury) (H) (03/14/2019), Mumps, Obesity (BMI 30-39.9) (09/03/2015), Osteoarthritis, Osteoarthritis of both knees (05/13/2015), Osteoporosis, Other idiopathic peripheral autonomic neuropathy (03/30/2022), Other insomnia (03/14/2019), Other seizures (H) (03/30/2022), Palpitations, Peripheral neuropathy, Pharyngeal dysphagia  (05/13/2015), Physical deconditioning (03/14/2019), Recurrent major depressive disorder (H24) (03/30/2022), Rhabdomyolysis, Thrombocytopenia (H24), Urination disorder, and Weakness (04/27/2020).    He has no past medical history of Asymptomatic human immunodeficiency virus (HIV) infection status (H), Blood in semen, Complication of anesthesia, Congenital renal agenesis and dysgenesis, Epididymitis, bilateral, Epididymitis, left, Epididymitis, right, Goiter, Gout, Hernia, abdominal, History of spinal cord injury, History of thrombophlebitis, Horseshoe kidney, Malignant hyperthermia, Orchitis, epididymitis, and epididymo-orchitis, with abscess, Parkinsons disease (H), Penile discharge, Prostate infection, Spider veins, Spina bifida (H), STD (sexually transmitted disease), Swelling of testicle, Tethered cord (H), or Tuberculosis.  PAST SURGICAL HISTORY:   has a past surgical history that includes left hip replacement (2010); left shoulder replacement (2016); tonsillectomy; Spine surgery; orthopedic surgery; Esophagoscopy, gastroscopy, duodenoscopy (EGD), combined (N/A, 06/01/2022); colonoscopy; Biopsy prostate transrectal (N/A, 1/11/2023); and Transrectal ultrasonic sonogram (N/A, 1/11/2023).  FAMILY HISTORY: family history includes Osteoporosis in his mother; Prostate Cancer in his paternal grandfather.  SOCIAL HISTORY:   reports that he has never smoked. He has never used smokeless tobacco. He reports that he does not currently use alcohol. He reports that he does not use drugs.    Post Discharge Medication Reconciliation Status: discharge medications reconciled and changed, per note/orders    Current Outpatient Medications   Medication Sig Dispense Refill     acetaminophen (TYLENOL) 500 MG tablet Take 2 tablets (1,000 mg) by mouth 3 times daily as needed for mild pain 10 tablet 98     cyanocobalamin (VITAMIN B-12) 1000 MCG tablet TAKE 1 TABLET BY MOUTH ONCE DAILY 90 tablet 97     docusate sodium (COLACE) 100 MG  capsule Take 1 capsule (100 mg) by mouth 2 times daily as needed for constipation       FEROSUL 325 (65 Fe) MG tablet TAKE 1 TABLET BY MOUTH ONCE DAILY WITH BREAKFAST 90 tablet 3     folic acid (FOLVITE) 1 MG tablet TAKE 1 TABLET BY MOUTH ONCE DAILY 90 tablet 97     gabapentin (NEURONTIN) 100 MG capsule Take 1 capsule (100 mg) by mouth daily. May also take 1 capsule (100 mg) 2 times daily as needed for neuropathic pain.       loratadine (CLARITIN) 10 MG tablet TAKE 1 TABLET BY MOUTH AT BEDTIME 90 tablet 97     menthol-zinc oxide (CALMOSEPTINE) 0.44-20.6 % OINT ointment Apply topically 4 times daily To perineal area. Send to [facility]. 113 g 98     nystatin (MYCOSTATIN) 141235 UNIT/GM external powder Apply topically 2 times daily as needed (redness, rash under left breast)       omeprazole (PRILOSEC) 40 MG DR capsule TAKE 1 CAPSULE BY MOUTH TWICE DAILY 180 capsule 97     oxymetazoline (AFRIN) 0.05 % nasal spray Spray 2 sprays into both nostrils 2 times daily as needed (nose bleed)       polyethylene glycol (MIRALAX) 17 GM/Dose powder MIX 2 CAPFULS IN 8OZ OF ORANGE JUICE  IN THE MORNING;AND MAY MIX 2 CAPFULS ONCE DAILY AS NEEDED FOR CONSTIPATION. MIX IN FRONT OF PT. HOLD FOR LOOSE STOOL. OK TO GIVE 1 CAPFUL IF RESIDENT REFUSES 2 CAPFULS. 510 g 97     QUEtiapine (SEROQUEL) 25 MG tablet TAKE ONE-HALF TABLET (12.5MG) BY MOUTH TWICE DAILY;& MAY TAKE ONE-HALF TABLET (12.5MG) EVERY 12 HOURS AS NEEDED 180 tablet 97     sennosides (SENOKOT) 8.6 MG tablet Take 1-2 tablets by mouth 2 times daily as needed for constipation       sodium chloride (OCEAN) 0.65 % nasal spray Spray 1 spray in nostril 4 times daily as needed for congestion Okay to leave at bedside 88 mL 98       ROS:  10 point ROS of systems including Constitutional, Eyes, Respiratory, Cardiovascular, Gastroenterology, Genitourinary, Integumentary, Musculoskeletal, Psychiatric were all negative except for pertinent positives noted in my HPI.    Vitals:  /66    "Pulse 88   Temp 97  F (36.1  C)   Resp 20   Ht 1.727 m (5' 8\")   Wt 94.4 kg (208 lb 3.2 oz)   SpO2 96%   BMI 31.66 kg/m    Exam:  GENERAL APPEARANCE:  Alert, in no distress  ENT:  Mouth and posterior oropharynx normal, moist mucous membranes  EYES:  EOM, conjunctivae, lids, pupils and irises normal  NECK:  No adenopathy,masses or thyromegaly  RESP:  respiratory effort and palpation of chest normal, lungs clear to auscultation, no respiratory distress  CV:  Palpation and auscultation of heart done, regular rate and rhythm  ABDOMEN:  normal bowel sounds, soft, nontender  M/S:   Gait and station normal, mild swelling left wrist, no redness  SKIN:  Inspection of skin and subcutaneous tissue+2-3 edema bilateral, no redness  NEURO:   Cranial nerves 2-12 are normal tested and grossly at patient's baseline  PSYCH:  oriented X 3, memory impaired , affect and mood normal    Lab/Diagnostic data:  Labs done in SNF are in [place] EPIC. Please refer to them using [employer] Everywhere.    Last Comprehensive Metabolic Panel:  Lab Results   Component Value Date     (L) 07/01/2024    POTASSIUM 4.1 07/01/2024    CHLORIDE 87 (L) 06/29/2024    CO2 32 (H) 06/29/2024    ANIONGAP 8 06/29/2024     (H) 06/29/2024    BUN 5.2 (L) 06/29/2024    CR 0.45 (L) 06/29/2024    GFRESTIMATED >90 06/29/2024    JOSUE 7.8 (L) 06/29/2024       Lab Results   Component Value Date    WBC 7.6 06/29/2024     Lab Results   Component Value Date    RBC 3.26 06/29/2024     Lab Results   Component Value Date    HGB 8.2 06/29/2024     Lab Results   Component Value Date    HCT 26.0 06/29/2024     No components found for: \"MCT\"  Lab Results   Component Value Date    MCV 80 06/29/2024     Lab Results   Component Value Date    MCH 25.2 06/29/2024     Lab Results   Component Value Date    MCHC 31.5 06/29/2024     Lab Results   Component Value Date    RDW 17.0 06/29/2024     Lab Results   Component Value Date     06/29/2024     WRIST LEFT THREE OR " MORE VIEWS July 1, 2024 11:24 AM      HISTORY: Persistent severe pain.     COMPARISON: Radiographs from 6/18/2024.                                                                      IMPRESSION: Diffuse bone demineralization and multifocal  osteoarthrosis again noted. No new findings. No evidence of fracture.    ASSESSMENT/PLAN:    Hyponatremia  Per hospital report:        Latest Reference Range & Units 06/29/24 13:56 06/29/24 22:36 06/30/24 05:46   Sodium 135 - 145 mmol/L 130 (L) 132 (L) 133 (L)      Presents with a sodium of 123.   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  Recently seen in clinic on 6/20 and found to have a sodium of 126. Prior to this, his sodium level was 139 in late April 2024. He was placed on a fluid restriction and his dose of mirtazapine was decreased. Despite this, his recheck Sodium on 6/27 was 123. Complains of mild headache only.  *Head CT: No acute intracranial pathology. Chronic small vessel ischemic disease, generalized cerebral volume loss and right occipital chronic infarct.   Remeron stopped, received 0.9% Saline 75ml/h today, and encourage oral intake.  Follow up BMP, CBC 7/8/24     Pressure ulcers  sores on the buttock. Multiple bruising in that area.  APM ordered  Barrier cream    Essential hypertension, benign  CAD  BP at goal of < 150/90 given age and comorbid conditions  Stopped ASA due to hemoptysis  Chronic managed   Not on medications to treat  Monitor      Thoracic aortic aneurysm without rupture (H) - 6/30/22 4.6 cm  New finding in 2022, follows with vascular, last visit with  on 7/28/2023.  Plan follow up echo     History of A-fib  EKG 6/28: Junctional rhythm with frequent Premature ventricular complexes   Not on anticoagulation. Seen by cardiology 3/14/24 and per documentation, refuses OAC  Blood pressure, hr managed    JOSÉ MANUEL  Adjustment reaction with depression/anxiety  Alcohol dependence in remission  Dementia  Mood appropriate  continue  Seroquel  Continue neurontin and vitamins  [facility] -Kimberly Wick.  Monitor     Urinary incontinence   Incontinence associated dermatitis  menthol-zinc oxide (CALMOSEPTINE) 0.44-20.6 % OINT ointment QID  APM due to concern skin breakdown while at TCU  Good eveline-cares    Constipation  Continue Senna and miralax    Impaired vision  Consider ophthalmology follow-up for field cut. Difficulty out to appointments without family assist, follow with onsite eye for now     Chronic BL lower extremity edema  Legs are swollen in setting of dependent edema and obesity.  No history CHF  Continue elevate legs, and TG shapes    Left wrist pain/swelling  Negative for fracture in hospital  Potential IV infiltrated in hospital, no redness   Mild tender to palpitation, will check venous ultrasound to rule out DVT  Offer tylenol for pain  Staff to update if not improving      Ear pain  Monitor, staff to update if not improve   Consider schedule follow up ENT    Prostate Cancer - adenocarcinoma [place] 4+3=7  Per epic note  Prostate biopsy 1/11/2023,pathology positive for [place] 7 prostate cancer.   Stopped Trospium due to anticholinergic side effects, no change in urinary symptoms.  Completed external beam radiation therapy w/o androgen depravation therapy with [facility] under care of Dr Fleming.   Followed by Dr. 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  Saw ENT in 2022, no explanation of symptoms.  Last esophagram May 2022 as above, presbyesophagus, proximal luminal narrowing.    Denies trouble chewing or swallowing and on Regular diet, Regular (DD4) texture, Regular consistency  Has needed nectar thick  liquids in past  Monitor      Normocytic Anemia  Hemoglobin   Date Value Ref Range Status   06/29/2024 8.2 (L) 13.3 - 17.7 g/dL Final   No signs and symptoms bleeding  Hgb baseline~8-10  ENT for recurrent nose bleed on 4/03/24   Continue PPI and iron     Deconditioned  Comment: d/t recent hospitalization & comorbidity, expect delay rehabilitation d/t age & comorbidity  Plan: PT/OT eval & treat, monitor    Advanced care planning   Full code     Jul 03, 2024 1:45 PM  (Arrive by 1:30 PM)  ECHO COMPLETE with [id number]  [facility] ([facility] ) [phone]     Jul 09, 2024 10:00 AM  (Arrive by 9:45 AM)  Return Patient with Niraj Larry MD  [facility] [place] ([facility] [place] ) [phone]     Jul 10, 2024 4:00 PM  (Arrive by 3:45 PM)  Return Vascular Patient with Loyd Dominguez MD  [facility] [place] ([facility] ) [phone]     Dec 16, 2024 10:30 AM  (Arrive by 10:15 AM)  New Dermatology with Edgardo Greenberg MD  [facility] ([facility] ) [phone]     Feb 03, 2025 1:00 PM  (Arrive by 12:45 PM)  NEW PATIENT with Colby Mercado DO  [facility] [place] ([facility] ) [phone]                  Total time spent with patient visit at the skilled nursing facility was 35 min including patient visit and review of past records. Greater than 50% of total time spent with counseling and coordinating care due to discussion on dysphagia diet, hyponatremia management, functional goals, pain management and discharge planning and discussion with wife.     Electronically signed by:  MARICHUY Mendoza CNP                       Sincerely,        Jerrica Zimmer  MARICHUY Rome CNP

## 2024-07-05 ENCOUNTER — TELEPHONE (OUTPATIENT)
Dept: GERIATRICS | Facility: CLINIC | Age: 84
End: 2024-07-05
Payer: COMMERCIAL

## 2024-07-05 ENCOUNTER — LAB REQUISITION (OUTPATIENT)
Dept: LAB | Facility: CLINIC | Age: 84
End: 2024-07-05

## 2024-07-05 DIAGNOSIS — E87.1 HYPO-OSMOLALITY AND HYPONATREMIA: ICD-10-CM

## 2024-07-05 RX ORDER — FUROSEMIDE 20 MG
20 TABLET ORAL DAILY
COMMUNITY
End: 2024-09-11 | Stop reason: DRUGHIGH

## 2024-07-05 NOTE — TELEPHONE ENCOUNTER
[facility] Triage Nurse Telephone Encounter    Provider: MARICHUY Bravo CNP   Facility: [facility] Facility Type:  TCU    Caller: Mallorie  Call Back Number: [phone]    Allergies:    Allergies   Allergen Reactions    Ativan [Lorazepam]         Reason for call: .  Patient had a chest x-ray done due to indeterminant QFT.         Diminished lung sounds in right lower lobe.  Patient denies SOB.  Edema present to both feet and ankles which is baseline for patient.  VS: 149/78, 67, 18, 97.2, and O2 97% on RA.  Does not have a diagnosis of heart failure.  Not on any diuretics.        Verbal Order/Direction given by Provider: Start Lasix 20 mg po daily.  Daily weights.  Check BMP on 7/8.    Provider giving Order:  Tyler Weiss MD    Verbal Order given to: Mallorie Rankin RN

## 2024-07-08 ENCOUNTER — TRANSITIONAL CARE UNIT VISIT (OUTPATIENT)
Dept: GERIATRICS | Facility: CLINIC | Age: 84
End: 2024-07-08
Payer: COMMERCIAL

## 2024-07-08 VITALS
SYSTOLIC BLOOD PRESSURE: 146 MMHG | TEMPERATURE: 97.1 F | HEIGHT: 68 IN | OXYGEN SATURATION: 96 % | RESPIRATION RATE: 18 BRPM | DIASTOLIC BLOOD PRESSURE: 74 MMHG | HEART RATE: 74 BPM | BODY MASS INDEX: 30.4 KG/M2 | WEIGHT: 200.6 LBS

## 2024-07-08 DIAGNOSIS — E22.2 SIADH (SYNDROME OF INAPPROPRIATE ADH PRODUCTION) (H): ICD-10-CM

## 2024-07-08 DIAGNOSIS — R53.81 PHYSICAL DECONDITIONING: Primary | ICD-10-CM

## 2024-07-08 DIAGNOSIS — F32.A DEPRESSION, UNSPECIFIED DEPRESSION TYPE: ICD-10-CM

## 2024-07-08 DIAGNOSIS — M62.81 GENERALIZED MUSCLE WEAKNESS: ICD-10-CM

## 2024-07-08 DIAGNOSIS — R60.9 FLUID RETENTION: ICD-10-CM

## 2024-07-08 DIAGNOSIS — D64.9 ANEMIA, UNSPECIFIED TYPE: ICD-10-CM

## 2024-07-08 LAB
ANION GAP SERPL CALCULATED.3IONS-SCNC: 10 MMOL/L (ref 7–15)
BUN SERPL-MCNC: 7.7 MG/DL (ref 8–23)
CALCIUM SERPL-MCNC: 8.1 MG/DL (ref 8.8–10.2)
CHLORIDE SERPL-SCNC: 93 MMOL/L (ref 98–107)
CREAT SERPL-MCNC: 0.49 MG/DL (ref 0.67–1.17)
DEPRECATED HCO3 PLAS-SCNC: 29 MMOL/L (ref 22–29)
EGFRCR SERPLBLD CKD-EPI 2021: >90 ML/MIN/1.73M2
ERYTHROCYTE [DISTWIDTH] IN BLOOD BY AUTOMATED COUNT: 17.7 % (ref 10–15)
GLUCOSE SERPL-MCNC: 102 MG/DL (ref 70–99)
HCT VFR BLD AUTO: 25.7 % (ref 40–53)
HGB BLD-MCNC: 7.6 G/DL (ref 13.3–17.7)
MCH RBC QN AUTO: 24.1 PG (ref 26.5–33)
MCHC RBC AUTO-ENTMCNC: 29.6 G/DL (ref 31.5–36.5)
MCV RBC AUTO: 82 FL (ref 78–100)
PLATELET # BLD AUTO: 293 10E3/UL (ref 150–450)
POTASSIUM SERPL-SCNC: 3.7 MMOL/L (ref 3.4–5.3)
RBC # BLD AUTO: 3.15 10E6/UL (ref 4.4–5.9)
SODIUM SERPL-SCNC: 132 MMOL/L (ref 135–145)
WBC # BLD AUTO: 6.4 10E3/UL (ref 4–11)

## 2024-07-08 PROCEDURE — 82374 ASSAY BLOOD CARBON DIOXIDE: CPT | Performed by: NURSE PRACTITIONER

## 2024-07-08 PROCEDURE — 99309 SBSQ NF CARE MODERATE MDM 30: CPT | Performed by: NURSE PRACTITIONER

## 2024-07-08 PROCEDURE — P9604 ONE-WAY ALLOW PRORATED TRIP: HCPCS | Performed by: NURSE PRACTITIONER

## 2024-07-08 PROCEDURE — 84295 ASSAY OF SERUM SODIUM: CPT | Performed by: NURSE PRACTITIONER

## 2024-07-08 PROCEDURE — 85027 COMPLETE CBC AUTOMATED: CPT | Performed by: NURSE PRACTITIONER

## 2024-07-08 PROCEDURE — 80048 BASIC METABOLIC PNL TOTAL CA: CPT | Performed by: NURSE PRACTITIONER

## 2024-07-08 PROCEDURE — 36415 COLL VENOUS BLD VENIPUNCTURE: CPT | Performed by: NURSE PRACTITIONER

## 2024-07-08 NOTE — PROGRESS NOTES
[place] [facility]  [place] Medical Record Number:  [id number]  Place of Service where encounter took place:  [facility] (TC[facility]) [[id number]]  Chief Complaint   Patient presents with    Nursing Home Acute       HPI:    Jamie Valdivia  is a 84 year old (1940), who is being seen today for an episodic care visit.  HPI information obtained from: facility chart records, facility staff, and patient report. Today's concern is:    Patient Jamie Valdivia is a 84 yr old male admitted to [facility] for rehabilitation s/post hospitalization [facility] 6/28-7/2/24 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)  Generalized muscle weakness  Fluid retention  Anemia, unspecified type    Chest xray on 7/4/24 note  moderate CHF, interstitial edema, cardiomegaly, pulmonary vascular redistribution  -perebronchial cuffing, left base infiltrate    Denies feeling ill   Started on lasix and tolerating  Wt Readings from Last 2 Encounters:   07/08/24 91 kg (200 lb 9.6 oz)   07/02/24 94.4 kg (208 lb 3.2 oz)     Not appear fluid up  Labs noted today    Hgb trend low, no signs and symptoms bleeding  On iron, B12, folic acid and PPI  Na stable    Mood appropriate     participating in therapies     Denies any acute concerns    Past Medical and Surgical History reviewed in Epic today.    MEDICATIONS:    Current Outpatient Medications   Medication Sig Dispense Refill    acetaminophen (TYLENOL) 500 MG tablet Take 2 tablets (1,000 mg) by mouth 3 times daily as needed for mild pain 10 tablet 98    cyanocobalamin (VITAMIN B-12) 1000 MCG tablet TAKE 1 TABLET BY MOUTH ONCE DAILY 90 tablet 97    docusate sodium (COLACE) 100 MG capsule Take 1 capsule (100 mg) by mouth 2 times daily as needed for constipation      FEROSUL 325 (65 Fe) MG tablet TAKE 1 TABLET BY MOUTH ONCE DAILY WITH BREAKFAST 90 tablet 3    folic acid (FOLVITE) 1 MG tablet TAKE 1 TABLET BY MOUTH ONCE DAILY 90 tablet 97    furosemide (LASIX) 20 MG tablet Take 20 mg by mouth daily      gabapentin (NEURONTIN) 100 MG capsule Take 1 capsule (100 mg) by mouth daily. May also take 1 capsule (100 mg) 2 times daily as needed for neuropathic pain.      loratadine (CLARITIN) 10 MG tablet TAKE 1 TABLET BY MOUTH AT BEDTIME 90 tablet 97    menthol-zinc oxide (CALMOSEPTINE) 0.44-20.6 % OINT ointment Apply topically 4 times daily To perineal area. Send to [facility]. 113 g 98    nystatin (MYCOSTATIN) 031382 UNIT/GM external powder Apply topically 2 times daily as needed (redness, rash under left breast)      omeprazole (PRILOSEC) 40 MG DR capsule TAKE 1 CAPSULE BY MOUTH TWICE DAILY 180 capsule 97    oxymetazoline (AFRIN) 0.05 % nasal spray Spray 2 sprays into both nostrils 2 times daily as needed (nose  "bleed)      polyethylene glycol (MIRALAX) 17 GM/Dose powder MIX 2 CAPFULS IN 8OZ OF ORANGE JUICE  IN THE MORNING;AND MAY MIX 2 CAPFULS ONCE DAILY AS NEEDED FOR CONSTIPATION. MIX IN FRONT OF PT. HOLD FOR LOOSE STOOL. OK TO GIVE 1 CAPFUL IF RESIDENT REFUSES 2 CAPFULS. 510 g 97    QUEtiapine (SEROQUEL) 25 MG tablet TAKE ONE-HALF TABLET (12.5MG) BY MOUTH TWICE DAILY;& MAY TAKE ONE-HALF TABLET (12.5MG) EVERY 12 HOURS AS NEEDED 180 tablet 97    sennosides (SENOKOT) 8.6 MG tablet Take 1-2 tablets by mouth 2 times daily as needed for constipation      sodium chloride (OCEAN) 0.65 % nasal spray Spray 1 spray in nostril 4 times daily as needed for congestion Okay to leave at bedside 88 mL 98         REVIEW OF SYSTEMS:  4 point ROS including Respiratory, CV, GI and , other than that noted in the HPI,  is negative    Objective:  BP (!) 146/74   Pulse 74   Temp 97.1  F (36.2  C)   Resp 18   Ht 1.727 m (5' 8\")   Wt 91 kg (200 lb 9.6 oz)   SpO2 96%   BMI 30.50 kg/m    Exam:  GENERAL APPEARANCE:  Alert, in no distress  RESP:  respiratory effort and palpation of chest normal, lungs clear to auscultation , no respiratory distress  CV:  Palpation and auscultation of heart done , regular rate and rhythm  ABDOMEN:  normal bowel sounds, soft, nontender  M/S:   sitting in bed  SKIN:  Inspection of skin and subcutaneous tissue+2-3 edema bilateral legs  NEURO:   Cranial nerves 2-12 are normal tested and grossly at patient's baseline  PSYCH:  oriented X 3    Labs:   Labs done in SNF are in [place] EPIC. Please refer to them using EPIC/Care Everywhere.    Last Comprehensive Metabolic Panel:  Lab Results   Component Value Date     (L) 07/08/2024    POTASSIUM 3.7 07/08/2024    CHLORIDE 93 (L) 07/08/2024    CO2 29 07/08/2024    ANIONGAP 10 07/08/2024     (H) 07/08/2024    BUN 7.7 (L) 07/08/2024    CR 0.49 (L) 07/08/2024    GFRESTIMATED >90 07/08/2024    JOSUE 8.1 (L) 07/08/2024     Lab Results   Component Value Date    WBC " "6.4 07/08/2024     Lab Results   Component Value Date    RBC 3.15 07/08/2024     Lab Results   Component Value Date    HGB 7.6 07/08/2024     Lab Results   Component Value Date    HCT 25.7 07/08/2024     No components found for: \"MCT\"  Lab Results   Component Value Date    MCV 82 07/08/2024     Lab Results   Component Value Date    MCH 24.1 07/08/2024     Lab Results   Component Value Date    MCHC 29.6 07/08/2024     Lab Results   Component Value Date    RDW 17.7 07/08/2024     Lab Results   Component Value Date     07/08/2024         Lab Results   Component Value Date    WBC 7.6 06/29/2024     Lab Results   Component Value Date    RBC 3.26 06/29/2024     Lab Results   Component Value Date    HGB 8.2 06/29/2024     Lab Results   Component Value Date    HCT 26.0 06/29/2024     No components found for: \"MCT\"  Lab Results   Component Value Date    MCV 80 06/29/2024     Lab Results   Component Value Date    MCH 25.2 06/29/2024     Lab Results   Component Value Date    MCHC 31.5 06/29/2024     Lab Results   Component Value Date    RDW 17.0 06/29/2024     Lab Results   Component Value Date     06/29/2024         ASSESSMENT/PLAN:     Physical deconditioning  Depression, unspecified depression type  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)  Generalized muscle weakness  Fluid retention  Anemia, unspecified type    Chest xray on 7/4/24 note moderate CHF, interstitial edema, cardiomegaly, pulmonary vascular redistribution  -perebronchial cuffing, left base infiltrate  Vials stable room air, monitor   No C/O shortness of breath or chest pain    Denies feeling ill   Started on lasix and tolerating, continue  Wt Readings from Last 2 Encounters:   07/08/24 91 kg (200 lb 9.6 oz)   07/02/24 94.4 kg (208 lb 3.2 oz)     Not appear fluid up  Labs noted today    Hgb trend low, no signs and symptoms bleeding  On iron, B12, folic acid and PPI  Follow up hgb 7/10/24, stool occult x3  Consider referral " gastrointestinal specialist    Na stable  Patient reports eating meals  Monitor     Mood appropriate   Continue Neurontin and Seroquel    participating in therapies   Continue,  involved in safe plan home    Denies any acute concerns  Monitor     Electronically signed by:  MARICHUY Mendoza CNP

## 2024-07-09 ENCOUNTER — LAB (OUTPATIENT)
Dept: LAB | Facility: CLINIC | Age: 84
End: 2024-07-09
Payer: COMMERCIAL

## 2024-07-09 ENCOUNTER — DOCUMENTATION ONLY (OUTPATIENT)
Dept: OTHER | Facility: CLINIC | Age: 84
End: 2024-07-09

## 2024-07-09 VITALS
HEIGHT: 68 IN | WEIGHT: 199.9 LBS | HEART RATE: 77 BPM | RESPIRATION RATE: 20 BRPM | BODY MASS INDEX: 30.3 KG/M2 | TEMPERATURE: 97 F | OXYGEN SATURATION: 96 % | SYSTOLIC BLOOD PRESSURE: 133 MMHG | DIASTOLIC BLOOD PRESSURE: 77 MMHG

## 2024-07-09 DIAGNOSIS — C61 PROSTATE CANCER (H): ICD-10-CM

## 2024-07-09 PROCEDURE — 84153 ASSAY OF PSA TOTAL: CPT

## 2024-07-09 PROCEDURE — 36415 COLL VENOUS BLD VENIPUNCTURE: CPT

## 2024-07-09 NOTE — PROGRESS NOTES
[place] [facility]  [place] Medical Record Number:  [id number]  Place of Service where encounter took place:  [facility] (TCU) [[id number]]  Chief Complaint   Patient presents with    Nursing Home Acute       HPI:    Jamie Valdivia  is a 84 year old (1940), who is being seen today for an episodic care visit.  HPI information obtained from: facility chart records, facility staff, and patient report. Today's concern is:    Patient Jaime Valdivia is a 84 yr old male admitted to [facility] for rehabilitation s/post hospitalization [facility] 6/28-7/2/24 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 Niraj Larry [facility]) (Completed external beam radiation therapy w/o androgen depravation therapy with [facility] under care of Dr Fleming), hx of COVID-19, hard of hearing     Lives in [facility] and may need higher level of care  primary contact for Jamie Valdivia (Magi Espino) spouse Gisela        Physical deconditioning  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)  Generalized muscle weakness  Anemia, unspecified type  Prostate cancer (H)    Progressing in therapies   Therapies report   Transfers mod  Bed  mobility SBA/CGA  Ambulating 86' CGA FWW, w/c follow of second person  Eating ind  Grooming/hygiene ind  UB dressing min  LB dressing mod-max  Toileting mod    Denies pain  Plan continue on current medications to treat for pain management    Hgb trending down, order stool occult x3 and repeat hgb today (patient refused labs, encouraged patient to allow labs on Friday)  States eating fine and voiding and regular stools    PSA per lab trending down, patient followed by urologist (appointment rescheduled for August patient reports)    Not appear fluid up, weights trend down  NA stable, continue lasix 20mg daily    Past Medical and Surgical History reviewed in Epic today.    MEDICATIONS:    Current Outpatient Medications   Medication Sig Dispense Refill    acetaminophen (TYLENOL) 500 MG tablet Take 2 tablets (1,000 mg) by mouth 3 times daily as needed for mild pain 10 tablet 98    cyanocobalamin (VITAMIN B-12) 1000 MCG tablet TAKE 1 TABLET BY MOUTH ONCE DAILY 90 tablet 97    docusate sodium (COLACE) 100 MG capsule Take 1 capsule (100 mg) by mouth 2 times daily as needed for constipation      FEROSUL 325 (65 Fe) MG tablet TAKE 1 TABLET BY MOUTH ONCE DAILY WITH BREAKFAST 90 tablet 3    folic acid (FOLVITE) 1 MG tablet TAKE 1 TABLET BY MOUTH ONCE DAILY 90 tablet 97    furosemide (LASIX) 20 MG tablet Take 20 mg by mouth daily      gabapentin (NEURONTIN) 100 MG capsule Take 1 capsule (100 mg) by mouth daily. May also take 1 capsule (100 mg) 2 times daily as needed for neuropathic pain.      loratadine (CLARITIN) 10 MG tablet TAKE 1 TABLET BY MOUTH AT BEDTIME 90 tablet 97    menthol-zinc oxide (CALMOSEPTINE) 0.44-20.6 % OINT ointment Apply topically 4 times daily To perineal area. Send to [facility]. 113 g 98    nystatin (MYCOSTATIN) 754994 UNIT/GM external powder Apply topically 2 times daily as needed (redness, rash under left breast)      omeprazole (PRILOSEC) 40 MG DR capsule TAKE 1 CAPSULE BY MOUTH TWICE DAILY 180 capsule  "97    oxymetazoline (AFRIN) 0.05 % nasal spray Spray 2 sprays into both nostrils 2 times daily as needed (nose bleed)      polyethylene glycol (MIRALAX) 17 GM/Dose powder MIX 2 CAPFULS IN 8OZ OF ORANGE JUICE  IN THE MORNING;AND MAY MIX 2 CAPFULS ONCE DAILY AS NEEDED FOR CONSTIPATION. MIX IN FRONT OF PT. HOLD FOR LOOSE STOOL. OK TO GIVE 1 CAPFUL IF RESIDENT REFUSES 2 CAPFULS. 510 g 97    QUEtiapine (SEROQUEL) 25 MG tablet TAKE ONE-HALF TABLET (12.5MG) BY MOUTH TWICE DAILY;& MAY TAKE ONE-HALF TABLET (12.5MG) EVERY 12 HOURS AS NEEDED 180 tablet 97    sennosides (SENOKOT) 8.6 MG tablet Take 1-2 tablets by mouth 2 times daily as needed for constipation      sodium chloride (OCEAN) 0.65 % nasal spray Spray 1 spray in nostril 4 times daily as needed for congestion Okay to leave at bedside 88 mL 98         REVIEW OF SYSTEMS:  4 point ROS including Respiratory, CV, GI and , other than that noted in the HPI,  is negative    Objective:  /77   Pulse 77   Temp 97  F (36.1  C)   Resp 20   Ht 1.727 m (5' 8\")   Wt 90.7 kg (199 lb 14.4 oz)   SpO2 96%   BMI 30.39 kg/m    Exam:  GENERAL APPEARANCE:  Alert, in no distress  RESP:  respiratory effort and palpation of chest normal, lungs clear to auscultation , no respiratory distress  CV:  Palpation and auscultation of heart done , regular rate and rhythm  ABDOMEN:  normal bowel sounds, soft, nontender  M/S:   sitting in WC  SKIN:  Inspection of skin and subcutaneous tissue+2-3 edema bilateral LE  NEURO:   Cranial nerves 2-12 are normal tested and grossly at patient's baseline  PSYCH:  oriented X 3    Labs:   Labs done in SNF are in [place] EPIC. Please refer to them using EPIC/Care Everywhere.    Last Comprehensive Metabolic Panel:  Lab Results   Component Value Date     (L) 07/08/2024    POTASSIUM 3.7 07/08/2024    CHLORIDE 93 (L) 07/08/2024    CO2 29 07/08/2024    ANIONGAP 10 07/08/2024     (H) 07/08/2024    BUN 7.7 (L) 07/08/2024    CR 0.49 (L) 07/08/2024 " "   GFRESTIMATED >90 07/08/2024    JOSUE 8.1 (L) 07/08/2024       Lab Results   Component Value Date    WBC 6.4 07/08/2024     Lab Results   Component Value Date    RBC 3.15 07/08/2024     Lab Results   Component Value Date    HGB 7.6 07/08/2024     Lab Results   Component Value Date    HCT 25.7 07/08/2024     No components found for: \"MCT\"  Lab Results   Component Value Date    MCV 82 07/08/2024     Lab Results   Component Value Date    MCH 24.1 07/08/2024     Lab Results   Component Value Date    MCHC 29.6 07/08/2024     Lab Results   Component Value Date    RDW 17.7 07/08/2024     Lab Results   Component Value Date     07/08/2024         ASSESSMENT/PLAN:     Physical deconditioning  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)  Generalized muscle weakness  Anemia, unspecified type  Prostate cancer (H)    Progressing in therapies   Therapies report   Transfers mod  Bed mobility SBA/CGA  Ambulating 86' CGA FWW, w/c follow of second person  Eating ind  Grooming/hygiene ind  UB dressing min  LB dressing mod-max  Toileting mod  Continue therapies,  involved in safe plan home    Denies pain  Plan continue on current medications to treat for pain management    Hgb trending down, order stool occult x3 and repeat hgb today (patient refused labs, encouraged patient to allow labs on Friday)  States eating fine and voiding and regular stools  Continue B12, folic acid and iron  Repeat iron panel and CBC Friday  If hgb <7 send hospital evaluate and treat due to concern acute blood loss  Consider referral gastrointestinal specialist if stool occult positive    PSA per lab trending down, patient followed by urologist (appointment rescheduled for August patient reports)    Not appear fluid up, weights trend down  NA stable, continue lasix 20mg daily      Jul 10, 2024 4:00 PM  (Arrive by 3:45 PM)  Return Vascular Patient with Loyd Dominguez MD  [facility] [place] (M " [facility] ) [phone]     Aug 01, 2024 11:00 AM  (Arrive by 10:45 AM)  Return Patient with Niraj Larry MD  [facility] [place] ([facility] [place] ) [phone]     Dec 16, 2024 10:30 AM  (Arrive by 10:15 AM)  New Dermatology with Edgardo Greenberg MD  [facility] ([facility] ) [phone]     Feb 03, 2025 1:00 PM  (Arrive by 12:45 PM)  NEW PATIENT with Colby Mercado DO  [facility] [place] ([facility] ) [phone]              Electronically signed by:  MARICHUY Mendoza CNP

## 2024-07-10 ENCOUNTER — TRANSITIONAL CARE UNIT VISIT (OUTPATIENT)
Dept: GERIATRICS | Facility: CLINIC | Age: 84
End: 2024-07-10
Payer: COMMERCIAL

## 2024-07-10 ENCOUNTER — VIRTUAL VISIT (OUTPATIENT)
Dept: OTHER | Facility: CLINIC | Age: 84
End: 2024-07-10
Attending: INTERNAL MEDICINE
Payer: COMMERCIAL

## 2024-07-10 DIAGNOSIS — I48.20 CHRONIC ATRIAL FIBRILLATION (H): ICD-10-CM

## 2024-07-10 DIAGNOSIS — D64.9 ANEMIA, UNSPECIFIED TYPE: ICD-10-CM

## 2024-07-10 DIAGNOSIS — R53.81 PHYSICAL DECONDITIONING: Primary | ICD-10-CM

## 2024-07-10 DIAGNOSIS — E22.2 SIADH (SYNDROME OF INAPPROPRIATE ADH PRODUCTION) (H): ICD-10-CM

## 2024-07-10 DIAGNOSIS — C61 PROSTATE CANCER (H): ICD-10-CM

## 2024-07-10 DIAGNOSIS — M62.81 GENERALIZED MUSCLE WEAKNESS: ICD-10-CM

## 2024-07-10 DIAGNOSIS — I70.0 ATHEROSCLEROSIS OF AORTA (H): ICD-10-CM

## 2024-07-10 DIAGNOSIS — I77.810 ASCENDING AORTA DILATATION (H): ICD-10-CM

## 2024-07-10 DIAGNOSIS — I25.10 CORONARY ARTERY DISEASE INVOLVING NATIVE CORONARY ARTERY OF NATIVE HEART WITHOUT ANGINA PECTORIS: ICD-10-CM

## 2024-07-10 DIAGNOSIS — I35.0 AORTIC VALVE STENOSIS, ETIOLOGY OF CARDIAC VALVE DISEASE UNSPECIFIED: ICD-10-CM

## 2024-07-10 LAB — PSA SERPL DL<=0.01 NG/ML-MCNC: 0.88 NG/ML

## 2024-07-10 PROCEDURE — 99443 PR PHYSICIAN TELEPHONE EVALUATION 21-30 MIN: CPT | Mod: 93 | Performed by: INTERNAL MEDICINE

## 2024-07-10 PROCEDURE — 99309 SBSQ NF CARE MODERATE MDM 30: CPT | Performed by: NURSE PRACTITIONER

## 2024-07-10 NOTE — PATIENT INSTRUCTIONS
Follow up with primary and monitor sodium etc     Repeat ECHO in one year then virtual or office visit    Take medications as prescribed

## 2024-07-10 NOTE — PROGRESS NOTES
"Jamie is a 84 year old who is being evaluated via a billable telephone visit.    What phone number would you like to be contacted at? [phone]   How would you like to obtain your AVS? MyChart  Originating Location (pt. Location): Home    Distant Location (provider location):  On-site    Subjective   Jamie is a 84 year old, presenting for the following health issues:  Telephone ([phone]/Follow-up to 7/28/23/Echocardiogram 7/10/24/Follow up one week later - may be virtual or in-clinic *gbn /)      Objective         Vitals:  No vitals were obtained today due to virtual visit.    SEBASTIAN DURBIN      Provider visit note:    Chief complaint:  Follow-up visit  Recently discharged from the hospital due to hyponatremia/SIADH  History of A-fib refused anticoagulation seen by cardiology in the hospital, frequent falls   Known history of ascending aorta dilatation aortic root dilatation and valvular abnormalities    Underwent echocardiogram on July 3, 2024    \"   1. The left ventricle is normal in structure, function and size. The visual  ejection fraction is estimated at 55%.  2. The right ventricle is normal in structure, function and size.  3. Mild (35-45mmHg) pulmonary hypertension is present. The right ventricular  systolic pressure is approximated at 37mmHg plus the right atrial pressure.  4. Mild valvular aortic stenosis. Mean 9mmHg. Valve appears more stenotic than  gradient suggests.  5. Ascending aorta dilatation is present, 4.8cm. Sinus 4.4cm.     Echo 7/2023 showed EF 55%, AS with mean 11mmHg, aorta 4.5cm.\"    History of present illness:    For full details please see my notes    This is a very pleasant 84-year-old male initially seen in November 2022 for Evaluation and management of thoracic aortic aneurysm ranging anywhere from 4.4 to 4.6 cm over the last few years with complex and complicated past medical and past surgical history   He recently underwent echocardiogram results as delineated above ascending " aorta 4.8 cm and at sinus 4.4 cm  mild valvular aortic stenosis    He denies any chest pain, shortness of breath    Review of systems: Reviewed all 12 point review of systems as per HPI otherwise unremarkable    Physical exam:( no physical exam done this is virtual visit)    Reviewed recent laboratory tests, imaging studies in the epic and updated chart      Assessment and plan:    1. Ascending aorta dilatation (H) 4.4 to 4.6 cm on echo, CT since 2020 ,   Audiogram in July 2024 4.8 cm     2. Aortic valve stenosis, etiology of cardiac valve disease unspecified ( mild to moderate on echo 10/2022)     3. Chronic atrial fibrillation (H) patient declined anticoagulation     4. Coronary artery disease involving native coronary artery of native heart without angina pectoris     5. Atherosclerosis of aorta (H)  6. Pulmonary hypertension (H), moderate echo 2020,         This is a very pleasant elderly 84-year-old male with complex and complicated past medical and past surgical history with known history of ascending aortic dilatation dated back last few years and ranging anywhere from 4.4 to 4.6 cm, now increased to 4.8 cm on echo there was a discrepancy between the echo readings and the CT readings and most recent CT scan within the [facility] system on June 30, 2022 reported 4.6 cm on echo 4.8 now 4.5 cm   Reviewed [place] records and it was reported 4.4 to 4.5 cm of aneurysm in 2020.  He has history of chronic A. fib because of previous alcohol use and recurrent falls not anticoagulated  He is non-smoker  Blood pressure is well controlled    Patient and family does not want any aggressive testing or intervention  They decided to go with a short interval echocardiogram which is reasonable       Avoid falls     Plan for echocardiogram in June 2025 then followed by office or virtual visit      Rest of the medical management per primary care physician     Phone visit start time: 4:00 PM  Phone visit end time: 4:24 PM  Location  of the patient; at his home  Location of the provider: [facility]     More than 21 minutes spent on the date of the encounter doing chart review, review of recent hospitalization records, echocardiogram, history, documentation and addressed above-mentioned issues      This visit is being conducted as a virtual visit due to the emphasis on mitigation of the COVID-19 virus pandemic. The clinician has decided that the risk of an in-office visit outweighs the benefit for this patient.         Loyd Dominguez MD, FALOTTIE, FSVM, FNLA, FACP  Vascular Medicine  Clinical Hypertension specialist  Clinical lipidologist

## 2024-07-12 ENCOUNTER — HOSPITAL ENCOUNTER (OUTPATIENT)
Facility: CLINIC | Age: 84
Setting detail: OBSERVATION
Discharge: NURSING, REHAB OR LONG-TERM CARE | End: 2024-07-13
Attending: EMERGENCY MEDICINE | Admitting: STUDENT IN AN ORGANIZED HEALTH CARE EDUCATION/TRAINING PROGRAM
Payer: COMMERCIAL

## 2024-07-12 ENCOUNTER — APPOINTMENT (OUTPATIENT)
Dept: GENERAL RADIOLOGY | Facility: CLINIC | Age: 84
End: 2024-07-12
Attending: EMERGENCY MEDICINE
Payer: COMMERCIAL

## 2024-07-12 DIAGNOSIS — R07.9 CHEST PAIN, UNSPECIFIED TYPE: ICD-10-CM

## 2024-07-12 LAB
ALBUMIN SERPL BCG-MCNC: 3 G/DL (ref 3.5–5.2)
ALP SERPL-CCNC: 133 U/L (ref 40–150)
ALT SERPL W P-5'-P-CCNC: 6 U/L (ref 0–70)
ANION GAP SERPL CALCULATED.3IONS-SCNC: 6 MMOL/L (ref 7–15)
AST SERPL W P-5'-P-CCNC: 15 U/L (ref 0–45)
BASOPHILS # BLD AUTO: 0.1 10E3/UL (ref 0–0.2)
BASOPHILS NFR BLD AUTO: 1 %
BILIRUB SERPL-MCNC: 0.3 MG/DL
BUN SERPL-MCNC: 6.3 MG/DL (ref 8–23)
CALCIUM SERPL-MCNC: 8.4 MG/DL (ref 8.8–10.2)
CHLORIDE SERPL-SCNC: 93 MMOL/L (ref 98–107)
CREAT SERPL-MCNC: 0.49 MG/DL (ref 0.67–1.17)
DEPRECATED HCO3 PLAS-SCNC: 34 MMOL/L (ref 22–29)
EGFRCR SERPLBLD CKD-EPI 2021: >90 ML/MIN/1.73M2
EOSINOPHIL # BLD AUTO: 0.2 10E3/UL (ref 0–0.7)
EOSINOPHIL NFR BLD AUTO: 2 %
ERYTHROCYTE [DISTWIDTH] IN BLOOD BY AUTOMATED COUNT: 17.4 % (ref 10–15)
GLUCOSE SERPL-MCNC: 108 MG/DL (ref 70–99)
HCT VFR BLD AUTO: 26.8 % (ref 40–53)
HGB BLD-MCNC: 8.3 G/DL (ref 13.3–17.7)
HOLD SPECIMEN: NORMAL
HOLD SPECIMEN: NORMAL
IMM GRANULOCYTES # BLD: 0 10E3/UL
IMM GRANULOCYTES NFR BLD: 1 %
LYMPHOCYTES # BLD AUTO: 1.5 10E3/UL (ref 0.8–5.3)
LYMPHOCYTES NFR BLD AUTO: 17 %
MCH RBC QN AUTO: 25.2 PG (ref 26.5–33)
MCHC RBC AUTO-ENTMCNC: 31 G/DL (ref 31.5–36.5)
MCV RBC AUTO: 82 FL (ref 78–100)
MONOCYTES # BLD AUTO: 0.5 10E3/UL (ref 0–1.3)
MONOCYTES NFR BLD AUTO: 6 %
NEUTROPHILS # BLD AUTO: 6.6 10E3/UL (ref 1.6–8.3)
NEUTROPHILS NFR BLD AUTO: 75 %
NRBC # BLD AUTO: 0 10E3/UL
NRBC BLD AUTO-RTO: 0 /100
PLATELET # BLD AUTO: 390 10E3/UL (ref 150–450)
POTASSIUM SERPL-SCNC: 4 MMOL/L (ref 3.4–5.3)
PROT SERPL-MCNC: 7.2 G/DL (ref 6.4–8.3)
RBC # BLD AUTO: 3.29 10E6/UL (ref 4.4–5.9)
SODIUM SERPL-SCNC: 133 MMOL/L (ref 135–145)
TROPONIN T SERPL HS-MCNC: 16 NG/L
WBC # BLD AUTO: 8.8 10E3/UL (ref 4–11)

## 2024-07-12 PROCEDURE — 80053 COMPREHEN METABOLIC PANEL: CPT | Performed by: EMERGENCY MEDICINE

## 2024-07-12 PROCEDURE — 99285 EMERGENCY DEPT VISIT HI MDM: CPT | Mod: 25

## 2024-07-12 PROCEDURE — 85025 COMPLETE CBC W/AUTO DIFF WBC: CPT | Performed by: EMERGENCY MEDICINE

## 2024-07-12 PROCEDURE — 71046 X-RAY EXAM CHEST 2 VIEWS: CPT

## 2024-07-12 PROCEDURE — 84484 ASSAY OF TROPONIN QUANT: CPT | Performed by: EMERGENCY MEDICINE

## 2024-07-12 PROCEDURE — 36415 COLL VENOUS BLD VENIPUNCTURE: CPT | Performed by: EMERGENCY MEDICINE

## 2024-07-12 PROCEDURE — 93005 ELECTROCARDIOGRAM TRACING: CPT

## 2024-07-12 RX ORDER — NYSTATIN 100000 [USP'U]/G
POWDER TOPICAL 2 TIMES DAILY PRN
COMMUNITY
End: 2024-08-20

## 2024-07-12 RX ORDER — GABAPENTIN 100 MG/1
100 CAPSULE ORAL AT BEDTIME
COMMUNITY

## 2024-07-12 RX ORDER — GABAPENTIN 100 MG/1
100 CAPSULE ORAL 2 TIMES DAILY PRN
COMMUNITY
End: 2024-08-20

## 2024-07-12 ASSESSMENT — ACTIVITIES OF DAILY LIVING (ADL)
ADLS_ACUITY_SCORE: 39
ADLS_ACUITY_SCORE: 39

## 2024-07-13 VITALS
RESPIRATION RATE: 16 BRPM | HEIGHT: 68 IN | SYSTOLIC BLOOD PRESSURE: 149 MMHG | HEART RATE: 69 BPM | WEIGHT: 198 LBS | DIASTOLIC BLOOD PRESSURE: 67 MMHG | BODY MASS INDEX: 30.01 KG/M2 | TEMPERATURE: 98.2 F | OXYGEN SATURATION: 98 %

## 2024-07-13 PROBLEM — R07.9 CHEST PAIN, UNSPECIFIED TYPE: Status: ACTIVE | Noted: 2024-07-13

## 2024-07-13 LAB
ANION GAP SERPL CALCULATED.3IONS-SCNC: 7 MMOL/L (ref 7–15)
BASOPHILS # BLD AUTO: 0.1 10E3/UL (ref 0–0.2)
BASOPHILS NFR BLD AUTO: 1 %
BUN SERPL-MCNC: 5.5 MG/DL (ref 8–23)
CALCIUM SERPL-MCNC: 8.4 MG/DL (ref 8.8–10.2)
CHLORIDE SERPL-SCNC: 95 MMOL/L (ref 98–107)
CREAT SERPL-MCNC: 0.48 MG/DL (ref 0.67–1.17)
DEPRECATED HCO3 PLAS-SCNC: 31 MMOL/L (ref 22–29)
EGFRCR SERPLBLD CKD-EPI 2021: >90 ML/MIN/1.73M2
EOSINOPHIL # BLD AUTO: 0.2 10E3/UL (ref 0–0.7)
EOSINOPHIL NFR BLD AUTO: 3 %
ERYTHROCYTE [DISTWIDTH] IN BLOOD BY AUTOMATED COUNT: 17.5 % (ref 10–15)
GLUCOSE SERPL-MCNC: 90 MG/DL (ref 70–99)
HCT VFR BLD AUTO: 28.1 % (ref 40–53)
HGB BLD-MCNC: 8.6 G/DL (ref 13.3–17.7)
IMM GRANULOCYTES # BLD: 0 10E3/UL
IMM GRANULOCYTES NFR BLD: 0 %
LYMPHOCYTES # BLD AUTO: 1.7 10E3/UL (ref 0.8–5.3)
LYMPHOCYTES NFR BLD AUTO: 23 %
MCH RBC QN AUTO: 24.8 PG (ref 26.5–33)
MCHC RBC AUTO-ENTMCNC: 30.6 G/DL (ref 31.5–36.5)
MCV RBC AUTO: 81 FL (ref 78–100)
MONOCYTES # BLD AUTO: 0.5 10E3/UL (ref 0–1.3)
MONOCYTES NFR BLD AUTO: 7 %
NEUTROPHILS # BLD AUTO: 5 10E3/UL (ref 1.6–8.3)
NEUTROPHILS NFR BLD AUTO: 67 %
NRBC # BLD AUTO: 0 10E3/UL
NRBC BLD AUTO-RTO: 0 /100
PLATELET # BLD AUTO: 382 10E3/UL (ref 150–450)
POTASSIUM SERPL-SCNC: 4.3 MMOL/L (ref 3.4–5.3)
RBC # BLD AUTO: 3.47 10E6/UL (ref 4.4–5.9)
SODIUM SERPL-SCNC: 133 MMOL/L (ref 135–145)
TROPONIN T SERPL HS-MCNC: 18 NG/L
TROPONIN T SERPL HS-MCNC: 18 NG/L
TROPONIN T SERPL HS-MCNC: 19 NG/L
WBC # BLD AUTO: 7.5 10E3/UL (ref 4–11)

## 2024-07-13 PROCEDURE — G0378 HOSPITAL OBSERVATION PER HR: HCPCS

## 2024-07-13 PROCEDURE — 99236 HOSP IP/OBS SAME DATE HI 85: CPT | Performed by: HOSPITALIST

## 2024-07-13 PROCEDURE — 84295 ASSAY OF SERUM SODIUM: CPT | Performed by: HOSPITALIST

## 2024-07-13 PROCEDURE — 99207 PR APP CREDIT; MD BILLING SHARED VISIT: CPT | Performed by: STUDENT IN AN ORGANIZED HEALTH CARE EDUCATION/TRAINING PROGRAM

## 2024-07-13 PROCEDURE — 250N000013 HC RX MED GY IP 250 OP 250 PS 637: Performed by: STUDENT IN AN ORGANIZED HEALTH CARE EDUCATION/TRAINING PROGRAM

## 2024-07-13 PROCEDURE — 250N000013 HC RX MED GY IP 250 OP 250 PS 637: Performed by: HOSPITALIST

## 2024-07-13 PROCEDURE — 85025 COMPLETE CBC W/AUTO DIFF WBC: CPT | Performed by: HOSPITALIST

## 2024-07-13 PROCEDURE — 84484 ASSAY OF TROPONIN QUANT: CPT | Performed by: HOSPITALIST

## 2024-07-13 PROCEDURE — 36415 COLL VENOUS BLD VENIPUNCTURE: CPT | Performed by: HOSPITALIST

## 2024-07-13 PROCEDURE — 84484 ASSAY OF TROPONIN QUANT: CPT | Performed by: EMERGENCY MEDICINE

## 2024-07-13 PROCEDURE — 36415 COLL VENOUS BLD VENIPUNCTURE: CPT | Performed by: EMERGENCY MEDICINE

## 2024-07-13 RX ORDER — ONDANSETRON 2 MG/ML
4 INJECTION INTRAMUSCULAR; INTRAVENOUS EVERY 6 HOURS PRN
Status: DISCONTINUED | OUTPATIENT
Start: 2024-07-13 | End: 2024-07-13 | Stop reason: HOSPADM

## 2024-07-13 RX ORDER — LANOLIN ALCOHOL/MO/W.PET/CERES
3 CREAM (GRAM) TOPICAL
Status: DISCONTINUED | OUTPATIENT
Start: 2024-07-13 | End: 2024-07-13 | Stop reason: HOSPADM

## 2024-07-13 RX ORDER — GUAIFENESIN/DEXTROMETHORPHAN 100-10MG/5
10 SYRUP ORAL EVERY 4 HOURS PRN
Status: DISCONTINUED | OUTPATIENT
Start: 2024-07-13 | End: 2024-07-13 | Stop reason: HOSPADM

## 2024-07-13 RX ORDER — ONDANSETRON 4 MG/1
4 TABLET, ORALLY DISINTEGRATING ORAL EVERY 6 HOURS PRN
Status: DISCONTINUED | OUTPATIENT
Start: 2024-07-13 | End: 2024-07-13 | Stop reason: HOSPADM

## 2024-07-13 RX ORDER — NITROGLYCERIN 0.4 MG/1
0.4 TABLET SUBLINGUAL EVERY 5 MIN PRN
Status: DISCONTINUED | OUTPATIENT
Start: 2024-07-13 | End: 2024-07-13 | Stop reason: HOSPADM

## 2024-07-13 RX ORDER — GABAPENTIN 100 MG/1
100 CAPSULE ORAL AT BEDTIME
Status: DISCONTINUED | OUTPATIENT
Start: 2024-07-13 | End: 2024-07-13 | Stop reason: HOSPADM

## 2024-07-13 RX ORDER — LIDOCAINE 40 MG/G
CREAM TOPICAL
Status: DISCONTINUED | OUTPATIENT
Start: 2024-07-13 | End: 2024-07-13 | Stop reason: HOSPADM

## 2024-07-13 RX ORDER — ASPIRIN 81 MG/1
81 TABLET ORAL DAILY
Status: DISCONTINUED | OUTPATIENT
Start: 2024-07-14 | End: 2024-07-13 | Stop reason: HOSPADM

## 2024-07-13 RX ORDER — ACETAMINOPHEN 325 MG/1
650 TABLET ORAL EVERY 4 HOURS PRN
Status: DISCONTINUED | OUTPATIENT
Start: 2024-07-13 | End: 2024-07-13 | Stop reason: HOSPADM

## 2024-07-13 RX ORDER — MAGNESIUM HYDROXIDE/ALUMINUM HYDROXICE/SIMETHICONE 120; 1200; 1200 MG/30ML; MG/30ML; MG/30ML
30 SUSPENSION ORAL EVERY 4 HOURS PRN
Status: DISCONTINUED | OUTPATIENT
Start: 2024-07-13 | End: 2024-07-13 | Stop reason: HOSPADM

## 2024-07-13 RX ORDER — ACETAMINOPHEN 500 MG
1000 TABLET ORAL 3 TIMES DAILY PRN
Status: DISCONTINUED | OUTPATIENT
Start: 2024-07-13 | End: 2024-07-13

## 2024-07-13 RX ORDER — PANTOPRAZOLE SODIUM 40 MG/1
40 TABLET, DELAYED RELEASE ORAL 2 TIMES DAILY
Status: DISCONTINUED | OUTPATIENT
Start: 2024-07-13 | End: 2024-07-13 | Stop reason: HOSPADM

## 2024-07-13 RX ORDER — ACETAMINOPHEN 650 MG/1
650 SUPPOSITORY RECTAL EVERY 4 HOURS PRN
Status: DISCONTINUED | OUTPATIENT
Start: 2024-07-13 | End: 2024-07-13 | Stop reason: HOSPADM

## 2024-07-13 RX ORDER — GABAPENTIN 100 MG/1
100 CAPSULE ORAL 2 TIMES DAILY PRN
Status: DISCONTINUED | OUTPATIENT
Start: 2024-07-13 | End: 2024-07-13 | Stop reason: HOSPADM

## 2024-07-13 RX ORDER — ASPIRIN 81 MG/1
162 TABLET, CHEWABLE ORAL ONCE
Status: COMPLETED | OUTPATIENT
Start: 2024-07-13 | End: 2024-07-13

## 2024-07-13 RX ORDER — FUROSEMIDE 20 MG
20 TABLET ORAL DAILY
Status: DISCONTINUED | OUTPATIENT
Start: 2024-07-13 | End: 2024-07-13 | Stop reason: HOSPADM

## 2024-07-13 RX ADMIN — PANTOPRAZOLE SODIUM 40 MG: 40 TABLET, DELAYED RELEASE ORAL at 12:33

## 2024-07-13 RX ADMIN — QUETIAPINE 12.5 MG: 25 TABLET, FILM COATED ORAL at 12:33

## 2024-07-13 RX ADMIN — FUROSEMIDE 20 MG: 20 TABLET ORAL at 12:33

## 2024-07-13 RX ADMIN — ACETAMINOPHEN 650 MG: 325 TABLET, FILM COATED ORAL at 05:57

## 2024-07-13 RX ADMIN — ASPIRIN 81 MG CHEWABLE TABLET 162 MG: 81 TABLET CHEWABLE at 05:57

## 2024-07-13 ASSESSMENT — ACTIVITIES OF DAILY LIVING (ADL)
ADLS_ACUITY_SCORE: 55
ADLS_ACUITY_SCORE: 39
ADLS_ACUITY_SCORE: 55
ADLS_ACUITY_SCORE: 45
ADLS_ACUITY_SCORE: 55
ADLS_ACUITY_SCORE: 39
ADLS_ACUITY_SCORE: 55
ADLS_ACUITY_SCORE: 55
ADLS_ACUITY_SCORE: 39
ADLS_ACUITY_SCORE: 55

## 2024-07-13 NOTE — PHARMACY-ADMISSION MEDICATION HISTORY
Pharmacist Admission Medication History    Admission medication history is complete. The information provided in this note is only as accurate as the sources available at the time of the update.    Information Source(s): Facility (TCU/NH/) medication list/MAR via in-person    Pertinent Information:     Changes made to PTA medication list:  Added: None  Deleted: None  Changed: None    Allergies reviewed with patient and updates made in EHR: unable to assess    Medication History Completed By: Akil Aliheyder, RPH 7/12/2024 10:30 PM    PTA Med List   Medication Sig Last Dose    acetaminophen (TYLENOL) 500 MG tablet Take 2 tablets (1,000 mg) by mouth 3 times daily as needed for mild pain     cyanocobalamin (VITAMIN B-12) 1000 MCG tablet TAKE 1 TABLET BY MOUTH ONCE DAILY 7/12/2024    docusate sodium (COLACE) 100 MG capsule Take 1 capsule (100 mg) by mouth 2 times daily as needed for constipation     FEROSUL 325 (65 Fe) MG tablet TAKE 1 TABLET BY MOUTH ONCE DAILY WITH BREAKFAST 7/12/2024    folic acid (FOLVITE) 1 MG tablet TAKE 1 TABLET BY MOUTH ONCE DAILY 7/12/2024    furosemide (LASIX) 20 MG tablet Take 20 mg by mouth daily 7/12/2024    gabapentin (NEURONTIN) 100 MG capsule Take 100 mg by mouth at bedtime     gabapentin (NEURONTIN) 100 MG capsule Take 100 mg by mouth 2 times daily as needed Past Week    loratadine (CLARITIN) 10 MG tablet TAKE 1 TABLET BY MOUTH AT BEDTIME 7/11/2024    menthol-zinc oxide (CALMOSEPTINE) 0.44-20.6 % OINT ointment Apply topically 4 times daily To perineal area. Send to [facility]. 7/12/2024 at x4    nystatin (MYCOSTATIN) 029182 UNIT/GM external powder Apply topically 2 times daily as needed     nystatin (MYCOSTATIN) 605629 UNIT/GM external powder Apply topically 2 times daily 7/12/2024 at x2    omeprazole (PRILOSEC) 40 MG DR capsule TAKE 1 CAPSULE BY MOUTH TWICE DAILY 7/12/2024 at am    oxymetazoline (AFRIN) 0.05 % nasal spray Spray 2 sprays into both nostrils 2 times daily as needed (nose  bleed)     polyethylene glycol (MIRALAX) 17 GM/Dose powder MIX 2 CAPFULS IN 8OZ OF ORANGE JUICE  IN THE MORNING;AND MAY MIX 2 CAPFULS ONCE DAILY AS NEEDED FOR CONSTIPATION. MIX IN FRONT OF PT. HOLD FOR LOOSE STOOL. OK TO GIVE 1 CAPFUL IF RESIDENT REFUSES 2 CAPFULS. 7/12/2024    QUEtiapine (SEROQUEL) 25 MG tablet TAKE ONE-HALF TABLET (12.5MG) BY MOUTH TWICE DAILY;& MAY TAKE ONE-HALF TABLET (12.5MG) EVERY 12 HOURS AS NEEDED 7/12/2024 at am    sennosides (SENOKOT) 8.6 MG tablet Take 1-2 tablets by mouth 2 times daily as needed for constipation     sodium chloride (OCEAN) 0.65 % nasal spray Spray 1 spray in nostril 4 times daily as needed for congestion Okay to leave at bedside

## 2024-07-13 NOTE — H&P
[facility]    History and Physical  Hospitalist       Date of Admission:  7/12/2024  Date of Service (when I saw the patient): 07/13/24    ASSESSMENT  Jamie Valdivia is a pleasant 84 year old gentleman with past medical history that is most notable for CAD, as well as chronic atrial fibrillation with slow response, aortic root dilation, aortic stenosis, chronic dementia, and severe alcohol use disorder, among others; who presents with chest pain.    PLAN     Chest pain: Of note, Mr. Valdivia has been seen in the past by [facility] cardiology. He has reported a remote history of MI many years ago, for which we have no records now. He has chronic afib, and has declined offered systemic anticoagulation. He has dementia and resides in a nursing home. Recent TTE done 7/3/24 showed preserved LVEF, mild pulmonary hypertension, mild to moderate aortic valvular stenosis, and ascending aorta dilation to 4.8 cm.    Now, he presents for evaluation of chest pain. It has resolved now. In the ED, he is afebrile, without hypotension, tachycardia, or hypoxia. WBC is normal. Troponin T levels are 16 and then 19. EKG shows junctional rhythm with rate 77. CXR shows elevated left hemidiaphragm with left basilar atelectasis, without pulmonary infiltrate, pneumothorax or pleural effusion. Overall, the cause of his chest pain is unclear. It could be due to GERD, in the setting of active ETOH use, vs ACS.       -- Observation. Telemetry, serial enzymes. If these are negative, the most appropriate provocative test for him would be a Lexiscan which are not available here on the weekend, and could safely be performed as soon as possible as an outpatient at discharge    -- Repeat CBC and BMP in AM. SW consulted for disposition planning.    Chronic hyponatremia due to SIADH: Na is 133 tonight. It had been 123 on recent admission here from 6/28/24 through 7/2/24. Felt to be related to hypovolemia and loose stool.       --  Repeat BMP in AM    Chronic dementia: Resume home prn seroquel when verified    Severe alcohol use disorder: With prior withdrawal reportedly. Monitor very closely for signs of that while he is here    -- Resume Neurontin when verified    Chronic anemia: Monitor while hospitalized. Repeat CBC this AM  Recent Labs   Lab Test 07/12/24  2147 07/08/24  1102 06/29/24  0512   HGB 8.3* 7.6* 8.2*     History of prostate cancer: Status post XRT. Followed by  Urology. Noted    I have spent 60 minutes on the date of service doing chart review, history, examination, documentation, and further activities per the note.    Chief Complaint   Chest pain    History is obtained from the patient, his wife at the bedside, and the ED physician whom I have spoken with    History of Present Illness   Jamie Valdivia is a pleasant 84 year old gentleman who presents with chest pain. He says that he has had a very stressful day at his nursing home today, though he is unable to elaborate further on what has happened exactly. In any event he says that tonight while feeling agitated, he developed sudden severe left sided chest pain that was sharp like a bolt, through the upper arm as well. He tried an aspirin and three shots of whiskey without relief. He was brought in for further evaluation. The pain has resolved now after nitroglycerin given en route. He otherwise denies associated dyspnea or nausea, or abdominal pain, or cough or fever, or other acute complaints.    In the ED,   07/12 2128 140/79  97.8  F (36.6  C) 75 19 97 %     CBC and CMP were notable for HGB 8.3, Na 133, Cl 93, CO2 34, BUN 6.3, Cr 0.49, Ca 8.4, Alb 3.0, Glucose 108, otherwise were within the normal reference range. WBC was 8.8. Troponin T levels were 16 and then 19. EKG showed junctional rhythm with rate 77.    Recent Results (from the past 24 hour(s))   XR Chest 2 Views    Narrative    EXAM: XR CHEST 2 VIEWS  LOCATION: [facility]  DATE:  "7/12/2024    INDICATION: Chest pain.  COMPARISON: 3/19/2024.    FINDINGS: The heart is enlarged. There is pulmonary vascular congestion without pulmonary edema. The thoracic aorta is calcified. The left hemidiaphragm is elevated and there is left basilar probable atelectasis. No other pulmonary infiltrate. No   pneumothorax or pleural effusion. Left shoulder arthroplasty and spinal fusion.      Impression    IMPRESSION: Pulmonary vascular congestion. Mild left basilar atelectasis.       PHYSICAL EXAM  Blood pressure 123/65, pulse 67, temperature 97.8  F (36.6  C), temperature source Oral, resp. rate 14, height 1.727 m (5' 8\"), weight 89.8 kg (198 lb), SpO2 94%.  Constitutional: Alert and oriented to person, place and time; no apparent distress; very hard of hearing  Respiratory: lungs clear to auscultation bilaterally  Cardiovascular: regular S1 S2, 3/6 jagdish at upper sternum  GI: abdomen soft non tender non distended bowel sounds positive  Musculoskeletal: no clubbing, cyanosis or edema     DVT Prophylaxis: Pneumatic Compression Devices  Code Status: Full Code    Disposition: Expected discharge in 0-1 days    Bro Majano MD, MD    Past Medical History    I have reviewed this patient's medical history and updated it with pertinent information if needed.   Past Medical History:   Diagnosis Date    Age-related osteoporosis without current pathological fracture 03/30/2022    Alcohol use disorder     Aortic root aneurysm (H24)     CAD (coronary artery disease)     Chronic a-fib (H)     Chronic atrial fibrillation (H) 07/15/2016    Formatting of this note might be different from the original. 2/2019: Multiple falls so started on aspirin only.  Then aspirin stopped due to GI bleed.    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 2/2/21    HTN (hypertension)     Impaired gait and mobility 03/14/2019    Frequent falls    Mild TBI (traumatic brain injury) (H) 03/14/2019    Mumps     Obesity (BMI 30-39.9) 09/03/2015    Osteoarthritis of both knees 05/13/2015    Osteoporosis     Other idiopathic peripheral autonomic neuropathy 03/30/2022    Other seizures (H) 03/30/2022    Pharyngeal dysphagia 05/13/2015    Formatting of this note might be different from the original. Likely secondary to GERD with esophagitis, on PPI and H2 blocker with notable improvement, EGD 10/5/15.    Prostate cancer (H)     Recurrent major depressive disorder (H24) 03/30/2022    Rhabdomyolysis     Thrombocytopenia (H24)        Past Surgical History   I have reviewed this patient's surgical history and updated it with pertinent information if needed.  Past Surgical History:   Procedure Laterality Date    BIOPSY PROSTATE TRANSRECTAL N/A 1/11/2023    Procedure: PROSTATE BIOPSY, RECTAL APPROACH;  Surgeon: Niraj Larry MD;  Location:  OR    COLONOSCOPY      ESOPHAGOSCOPY, GASTROSCOPY, DUODENOSCOPY (EGD), COMBINED N/A 06/01/2022    Procedure: ESOPHAGOGASTRODUODENOSCOPY (EGD) mngi with biopsies using jumbo cold biopsy forceps;  Surgeon: David Springer MD;  Location:  GI    left hip replacement  2010    left shoulder replacement  2016    ORTHOPEDIC SURGERY      SPINE SURGERY      done in [place]    TONSILLECTOMY      TRANSRECTAL ULTRASONIC SONOGRAM N/A 1/11/2023    Procedure: TRANSRECTAL ULTRASOUND;  Surgeon: Niraj Larry MD;  Location:  OR       Prior to Admission Medications   Prior to Admission Medications   Prescriptions Last Dose Informant Patient Reported? Taking?   FEROSUL 325 (65 Fe) MG tablet 7/12/2024  No  Yes   Sig: TAKE 1 TABLET BY MOUTH ONCE DAILY WITH BREAKFAST   QUEtiapine (SEROQUEL) 25 MG tablet 7/12/2024 at am  No Yes   Sig: TAKE ONE-HALF TABLET (12.5MG) BY MOUTH TWICE DAILY;& MAY TAKE ONE-HALF TABLET (12.5MG) EVERY 12 HOURS AS NEEDED   acetaminophen (TYLENOL) 500 MG tablet   No Yes   Sig: Take 2 tablets (1,000 mg) by mouth 3 times daily as needed for mild pain   cyanocobalamin (VITAMIN B-12) 1000 MCG tablet 7/12/2024  No Yes   Sig: TAKE 1 TABLET BY MOUTH ONCE DAILY   docusate sodium (COLACE) 100 MG capsule   No Yes   Sig: Take 1 capsule (100 mg) by mouth 2 times daily as needed for constipation   folic acid (FOLVITE) 1 MG tablet 7/12/2024  No Yes   Sig: TAKE 1 TABLET BY MOUTH ONCE DAILY   furosemide (LASIX) 20 MG tablet 7/12/2024  Yes Yes   Sig: Take 20 mg by mouth daily   gabapentin (NEURONTIN) 100 MG capsule   Yes Yes   Sig: Take 100 mg by mouth at bedtime   gabapentin (NEURONTIN) 100 MG capsule Past Week  Yes Yes   Sig: Take 100 mg by mouth 2 times daily as needed   loratadine (CLARITIN) 10 MG tablet 7/11/2024  No Yes   Sig: TAKE 1 TABLET BY MOUTH AT BEDTIME   menthol-zinc oxide (CALMOSEPTINE) 0.44-20.6 % OINT ointment 7/12/2024 at x4  No Yes   Sig: Apply topically 4 times daily To perineal area. Send to [facility].   nystatin (MYCOSTATIN) 672121 UNIT/GM external powder 7/12/2024 at x2 Nursing Home Yes Yes   Sig: Apply topically 2 times daily   nystatin (MYCOSTATIN) [id number] UNIT/GM external powder   Yes Yes   Sig: Apply topically 2 times daily as needed   omeprazole (PRILOSEC) 40 MG DR capsule 7/12/2024 at am  No Yes   Sig: TAKE 1 CAPSULE BY MOUTH TWICE DAILY   oxymetazoline (AFRIN) 0.05 % nasal spray   No Yes   Sig: Spray 2 sprays into both nostrils 2 times daily as needed (nose bleed)   polyethylene glycol (MIRALAX) 17 GM/Dose powder 7/12/2024 Nursing Home No Yes   Sig: MIX 2 CAPFULS IN 8OZ OF ORANGE JUICE  IN THE MORNING;AND MAY MIX 2 CAPFULS ONCE DAILY AS NEEDED FOR CONSTIPATION. MIX IN FRONT OF PT. HOLD  FOR LOOSE STOOL. OK TO GIVE 1 CAPFUL IF RESIDENT REFUSES 2 CAPFULS.   sennosides (SENOKOT) 8.6 MG tablet   No Yes   Sig: Take 1-2 tablets by mouth 2 times daily as needed for constipation   sodium chloride (OCEAN) 0.65 % nasal spray   No Yes   Sig: Spray 1 spray in nostril 4 times daily as needed for congestion Okay to leave at bedside      Facility-Administered Medications: None     Allergies   Allergies   Allergen Reactions    Ativan [Lorazepam]        Social History   I have reviewed this patient's social history and updated it with pertinent information if needed. Jamie Valdivia  reports that he has never smoked. He has never used smokeless tobacco. He reports that he does not currently use alcohol. He reports that he does not use drugs.    Family History   Family history assessed and, except as above, is non-contributory.    Family History   Problem Relation Age of Onset    Osteoporosis Mother     Prostate Cancer Paternal Grandfather     Colon Cancer No family hx of        Review of Systems   The 10 point Review of Systems is negative other than noted in the HPI or here.     Primary Care Physician   Sylvia Stein    Data   Labs Ordered and Resulted from Time of ED Arrival to Time of ED Departure   COMPREHENSIVE METABOLIC PANEL - Abnormal       Result Value    Sodium 133 (*)     Potassium 4.0      Carbon Dioxide (CO2) 34 (*)     Anion Gap 6 (*)     Urea Nitrogen 6.3 (*)     Creatinine 0.49 (*)     GFR Estimate >90      Calcium 8.4 (*)     Chloride 93 (*)     Glucose 108 (*)     Alkaline Phosphatase 133      AST 15      ALT 6      Protein Total 7.2      Albumin 3.0 (*)     Bilirubin Total 0.3     CBC WITH PLATELETS AND DIFFERENTIAL - Abnormal    WBC Count 8.8      RBC Count 3.29 (*)     Hemoglobin 8.3 (*)     Hematocrit 26.8 (*)     MCV 82      MCH 25.2 (*)     MCHC 31.0 (*)     RDW 17.4 (*)     Platelet Count 390      % Neutrophils 75      % Lymphocytes 17      % Monocytes 6      % Eosinophils 2      %  Basophils 1      % Immature Granulocytes 1      NRBCs per 100 WBC 0      Absolute Neutrophils 6.6      Absolute Lymphocytes 1.5      Absolute Monocytes 0.5      Absolute Eosinophils 0.2      Absolute Basophils 0.1      Absolute Immature Granulocytes 0.0      Absolute NRBCs 0.0     TROPONIN T, HIGH SENSITIVITY - Normal    Troponin T, High Sensitivity 16     TROPONIN T, HIGH SENSITIVITY - Normal    Troponin T, High Sensitivity 19         Data reviewed today:  I personally reviewed the EKG tracing showing junctional rhythm with rate 77 and the chest x-ray image(s) showing no acute pathology .

## 2024-07-13 NOTE — CONSULTS
Care Management Initial Consult    General Information  Assessment completed with: Patient, VM-chart review, jamila  Type of CM/SW Visit: Initial Assessment    Primary Care Provider verified and updated as needed: Yes   Readmission within the last 30 days: no previous admission in last 30 days      Reason for Consult: facility placement  Advance Care Planning: Advance Care Planning Reviewed: present on chart     General Information Comments: Lives at [facility], but comes from [facility]    Communication Assessment  Patient's communication style: spoken language (English or Bilingual)    Hearing Difficulty or Deaf: yes        Cognitive  Cognitive/Neuro/Behavioral: WDL                      Living Environment:   People in home: spouse  Gisela  Current living Arrangements: assisted living  Name of Facility: [facility]   Able to return to prior arrangements: yes       Family/Social Support:  Care provided by: self  Provides care for: no one  Marital Status:   Wife  Gisela       Description of Support System: Supportive         Current Resources:   Patient receiving home care services:       Community Resources:    Equipment currently used at home:    Supplies currently used at home:      Employment/Financial:  Employment Status: retired        Financial Concerns: none   Referral to Financial Worker: No       Does the patient's insurance plan have a 3 day qualifying hospital stay waiver?  Yes     Which insurance plan 3 day waiver is available? Alternative insurance waiver    Will the waiver be used for post-acute placement? No    Lifestyle & Psychosocial Needs:  Social Determinants of Health     Food Insecurity: Low Risk  (12/19/2023)    Food Insecurity     Within the past 12 months, did you worry that your food would run out before you got money to buy more?: No     Within the past 12 months, did the food you bought just not last and you didn t have money to get more?: No   Depression: Not at risk (3/14/2024)     PHQ-2     PHQ-2 Score: 0   Housing Stability: Low Risk  (12/19/2023)    Housing Stability     Do you have housing? : Yes     Are you worried about losing your housing?: No   Tobacco Use: Low Risk  (7/10/2024)    Patient History     Smoking Tobacco Use: Never     Smokeless Tobacco Use: Never     Passive Exposure: Not on file   Financial Resource Strain: Low Risk  (12/19/2023)    Financial Resource Strain     Within the past 12 months, have you or your family members you live with been unable to get utilities (heat, electricity) when it was really needed?: No   Alcohol Use: Not At Risk (12/19/2023)    AUDIT-C     Frequency of Alcohol Consumption: Monthly or less     Average Number of Drinks: 1 or 2     Frequency of Binge Drinking: Less than monthly   Transportation Needs: Low Risk  (12/19/2023)    Transportation Needs     Within the past 12 months, has lack of transportation kept you from medical appointments, getting your medicines, non-medical meetings or appointments, work, or from getting things that you need?: No   Physical Activity: Inactive (12/19/2023)    Exercise Vital Sign     Days of Exercise per Week: 0 days     Minutes of Exercise per Session: 0 min   Interpersonal Safety: Low Risk  (12/19/2023)    Interpersonal Safety     Do you feel physically and emotionally safe where you currently live?: Yes     Within the past 12 months, have you been hit, slapped, kicked or otherwise physically hurt by someone?: No     Within the past 12 months, have you been humiliated or emotionally abused in other ways by your partner or ex-partner?: No   Stress: No Stress Concern Present (12/19/2023)    [employer] - Occupational Stress Questionnaire     Feeling of Stress : Only a little   Social Connections: Moderately Integrated (12/19/2023)    Social Connection and Isolation Panel [NHANES]     Frequency of Communication with Friends and Family: More than three times a week     Frequency of  "Social Gatherings with Friends and Family: Three times a week     Attends Taoism Services: More than 4 times per year     Active Member of Clubs or Organizations: No     Attends Club or Organization Meetings: Never     Marital Status:    Health Literacy: Not on file       Functional Status:  Prior to admission patient needed assistance:              Mental Health Status:          Chemical Dependency Status:                Values/Beliefs:  Spiritual, Cultural Beliefs, Taoism Practices, Values that affect care:                 Additional Information:  CM Initial Assessment    Per H&P, Jamie Valdivia is a pleasant 84 year old gentleman with past medical history that is most notable for CAD, as well as chronic atrial fibrillation with slow response, aortic root dilation, aortic stenosis, chronic dementia, and severe alcohol use disorder, among others; who presents with chest pain.     Care management was consulted for discharge planning/disposition.  Writer met with patient to complete consult.     Prior to consult, writer contacted the [facility] [phone] and spoke with Mckenzie who clarified the patient [facility] the T[facility], [facility]".     Man[facility] ([phone]) and spoke with Jina. Jina stated they were not sure if they had a bed hold, but they would be able to take the patient today. Jina agreed to a 1330 ride time.    Writer sent orders to [facility] via DOD.     Writer stopped by the patient's room and updated. Patient stated his main goal was to return back to [place] and be with his wife. Writer stated for now they will focus on his recovery. Patient agreed and consented to move back to the TCU. Patient requested a ride. Writer informed him he will speak with the bedside nurse for the safest transportation method.    Writer was advised by nursing staff the patient was ok to go via W/C. Writer informed the patient with his insurance, he would most likely " be covered.     Writer contacted [facility] transport and set up a ride between [phone]. Writer updated the nursing staff and the patient. Writer updated Jina at [facility].    GUICHO Lopez  [facility]  Social Work

## 2024-07-13 NOTE — DISCHARGE SUMMARY
"[facility]  Hospitalist Discharge Summary      Date of Admission:  7/12/2024  Date of Discharge:  7/13/2024  Discharging Provider: Barby Workman DO  Discharge Service: Hospitalist Service    Discharge Diagnoses   Chest pain  Chronic hyponatremia due to SIADH  History of prostate cancer  Chronic dementia:    Clinically Significant Risk Factors     # Obesity: Estimated body mass index is 30.11 kg/m  as calculated from the following:    Height as of this encounter: 1.727 m (5' 8\").    Weight as of this encounter: 89.8 kg (198 lb).       Follow-ups Needed After Discharge   Follow-up Appointments     Follow Up and recommended labs and tests      Follow up stress test and then PCP or cardiology follow up afterwards   pending the results            Discharge Disposition   Discharged to short-term care facility  Condition at discharge: Stable    Hospital Course   Jamie Valdivia is a pleasant 84 year old gentleman with past medical history that is most notable for CAD, as well as chronic atrial fibrillation with slow response, aortic root dilation, aortic stenosis, chronic dementia, and severe alcohol use disorder, among others; who presented with chest pain. Episode described as a thunder strike suddenly to his L chest, sharp and stabbing. Upon admission here chest pain had resolved. Troponin's trended negative and EKG non ischemic. Discharge home with outpatient stress test next week vs ongoing monitoring in the hospital discussed with patient and his wife. Plan to discharge home back to TCU. Nuclear stress test order placed. Chest pain sounded MSK given the sharp nature of the pain. May benefit from ongoing pain adjustments at TCU.      Consultations This Hospital Stay   CARE MANAGEMENT / SOCIAL WORK IP CONSULT  PHYSICAL THERAPY ADULT IP CONSULT  OCCUPATIONAL THERAPY ADULT IP CONSULT    Code Status   Full Code    Time Spent on this Encounter   IBarby DO, personally saw the " patient today and spent greater than 30 minutes discharging this patient.       Barby Workman DO  [facility] EXTENDED RECOVERY AND SHORT STAY  [address] [address]  Phone: [phone]  ______________________________________________________________________    Physical Exam   Vital Signs: Temp: 98.2  F (36.8  C) Temp src: Oral BP: 132/62 Pulse: 69   Resp: 16 SpO2: 98 % O2 Device: None (Room air)    Weight: 198 lbs 0 oz         Primary Care Physician   Sylvia Stein    Discharge Orders      General info for SNF    Length of Stay Estimate: Short Term Care: Estimated # of Days <30  Condition at Discharge: Stable  Level of care:skilled   Rehabilitation Potential: Fair  Admission H&P remains valid and up-to-date: Yes  Recent Chemotherapy: N/A  Use Nursing Home Standing Orders: Yes     Mantoux instructions    Give two-step Mantoux (PPD) Per Facility Policy Yes     Follow Up and recommended labs and tests    Follow up stress test and then PCP or cardiology follow up afterwards pending the results     Reason for your hospital stay    You presented for chest pain. Your troponin or heart enzymes were negative and your pain resolved. It was likely not related to your heart but you should have stress test in clinic  given your heart history and this pain.     Activity - Up with assistive device     Physical Therapy Adult Consult    Evaluate and treat as clinically indicated.    Reason:  resume PT as per previous     Occupational Therapy Adult Consult    Evaluate and treat as clinically indicated.    Reason:  resume OT as per previous     Fall precautions     Diet    Follow this diet upon discharge: Orders Placed This Encounter      Combination Diet Low Saturated Fat Na <2400mg Diet, No Caffeine Diet     NM Lexiscan stress test       Significant Results and Procedures   Results for orders placed or performed during the hospital encounter of 07/12/24   XR Chest 2 Views    Narrative     "EXAM: XR CHEST 2 VIEWS  LOCATION: [facility]  DATE: 7/12/2024    INDICATION: Chest pain.  COMPARISON: 3/19/2024.    FINDINGS: The heart is enlarged. There is pulmonary vascular congestion without pulmonary edema. The thoracic aorta is calcified. The left hemidiaphragm is elevated and there is left basilar probable atelectasis. No other pulmonary infiltrate. No   pneumothorax or pleural effusion. Left shoulder arthroplasty and spinal fusion.      Impression    IMPRESSION: Pulmonary vascular congestion. Mild left basilar atelectasis.       Discharge Medications   Current Discharge Medication List        CONTINUE these medications which have NOT CHANGED    Details   acetaminophen (TYLENOL) 500 MG tablet Take 2 tablets (1,000 mg) by mouth 3 times daily as needed for mild pain  Qty: 10 tablet, Refills: 98    Associated Diagnoses: Pain      cyanocobalamin (VITAMIN B-12) 1000 MCG tablet TAKE 1 TABLET BY MOUTH ONCE DAILY  Qty: 90 tablet, Refills: 97    Comments: \"Please authorize quantity 90 day supply with PRN refills for assisted living patient. Thank you!\"  Associated Diagnoses: Anemia, unspecified      docusate sodium (COLACE) 100 MG capsule Take 1 capsule (100 mg) by mouth 2 times daily as needed for constipation    Associated Diagnoses: Hyponatremia; Generalized weakness      FEROSUL 325 (65 Fe) MG tablet TAKE 1 TABLET BY MOUTH ONCE DAILY WITH BREAKFAST  Qty: 90 tablet, Refills: 3    Comments: Please authorize qty 90 day supply with prn refills for cycle. Thanks.  Associated Diagnoses: Anemia, unspecified type      folic acid (FOLVITE) 1 MG tablet TAKE 1 TABLET BY MOUTH ONCE DAILY  Qty: 90 tablet, Refills: 97    Comments: \"Please authorize quantity 90 day supply with PRN refills for assisted living patient. Thank you!\"  Associated Diagnoses: Anemia, unspecified      furosemide (LASIX) 20 MG tablet Take 20 mg by mouth daily      !! gabapentin (NEURONTIN) 100 MG capsule Take 100 mg by mouth at " bedtime      !! gabapentin (NEURONTIN) 100 MG capsule Take 100 mg by mouth 2 times daily as needed      loratadine (CLARITIN) 10 MG tablet TAKE 1 TABLET BY MOUTH AT BEDTIME  Qty: 90 tablet, Refills: 97    Comments: REFILL REQUEST FOR CYCLE THAT STARTS ON 5/23/24  Associated Diagnoses: Chronic cough      menthol-zinc oxide (CALMOSEPTINE) 0.44-20.6 % OINT ointment Apply topically 4 times daily To perineal area. Send to [facility].  Qty: 113 g, Refills: 98    Associated Diagnoses: Incontinence associated dermatitis      !! nystatin (MYCOSTATIN) 333769 UNIT/GM external powder Apply topically 2 times daily as needed      !! nystatin (MYCOSTATIN) 585648 UNIT/GM external powder Apply topically 2 times daily      omeprazole (PRILOSEC) 40 MG DR capsule TAKE 1 CAPSULE BY MOUTH TWICE DAILY  Qty: 180 capsule, Refills: 97    Comments: CYCLE FILL REQUEST FOR CYCLE THAT STARTS 10/05/2023  Associated Diagnoses: Gastroesophageal reflux disease with esophagitis, unspecified whether hemorrhage      oxymetazoline (AFRIN) 0.05 % nasal spray Spray 2 sprays into both nostrils 2 times daily as needed (nose bleed)    Associated Diagnoses: Epistaxis      polyethylene glycol (MIRALAX) 17 GM/Dose powder MIX 2 CAPFULS IN 8OZ OF ORANGE JUICE  IN THE MORNING;AND MAY MIX 2 CAPFULS ONCE DAILY AS NEEDED FOR CONSTIPATION. MIX IN FRONT OF PT. HOLD FOR LOOSE STOOL. OK TO GIVE 1 CAPFUL IF RESIDENT REFUSES 2 CAPFULS.  Qty: 510 g, Refills: 97    Associated Diagnoses: Slow transit constipation      QUEtiapine (SEROQUEL) 25 MG tablet TAKE ONE-HALF TABLET (12.5MG) BY MOUTH TWICE DAILY;& MAY TAKE ONE-HALF TABLET (12.5MG) EVERY 12 HOURS AS NEEDED  Qty: 180 tablet, Refills: 97    Comments: Please authorize quantity 90 day supply with PRN refills for assisted living patient. Their cycle restarts 11/30/23. Thank you!  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 - Potential duplicate medications found. Please discuss with provider.        Allergies   Allergies   Allergen Reactions    Ativan [Lorazepam]

## 2024-07-13 NOTE — ED PROVIDER NOTES
Emergency Department Note      History of Present Illness     Chief Complaint   Chest Pain    HPI   Jamie Valdivia is a 84 year old male with history of dementia, atrial fibrillation, alcohol abuse and withdrawal, hypertension, CAD, and prostate cancer who presents with his spouse via EMS from [facility] for evaluation of chest pain. The patient reports that around 1900 this evening, he felt a sudden 10/10 in severity chest pain that felt like he was punched in the chest. Adds that the pain radiated to his left arm and had no obvious trigger. States that after taking nitroglycerin and aspirin, his pain is now a 1/10 on the severity scale. Adds that he had PT/OT earlier in the day without any chest pain. States that he has cardiac history and has had discussion about starting anticoagulants, but has pushed this off due to his frequent severe epistaxis. The patient's spouse adds that he has been getting frequent, severe headaches recently for which he takes Tylenol. He does not have a headache on evaluation. He denies history of coronary stent placement. He denies fever, chills, rhinorrhea, nasal congestion, cough, shortness of breath, diaphoresis, nausea, vomiting, numbness, weakness, and urinary or bowel symptoms.     Independent Historian   Wife as detailed above.    Review of External Notes   I reviewed [facility] discharge summary from 7/1/24.  I reviewed echocardiogram from 7/3/24.    Echocardiogram Interpretation Summary 7/3/24  1. The left ventricle is normal in structure, function and size. The visual ejection fraction is estimated at 55%.  2. The right ventricle is normal in structure, function and size.  3. Mild (35-45mmHg) pulmonary hypertension is present. The right ventricular systolic pressure is approximated at 37mmHg plus the right atrial pressure.  4. Mild valvular aortic stenosis. Mean 9mmHg. Valve appears more stenotic than gradient suggests.  5. Ascending aorta dilatation is present, 4.8cm.  "Sinus 4.4cm.     Echo 7/2023 showed EF 55%, AS with mean 11mmHg, aorta 4.5cm.    Past Medical History     Medical History and Problem List   Alcohol abuse  Alcohol withdrawal  CAD  Atrial fibrillation  Dementia  Erectile dysfunction  Encephalopathy  JOSÉ MANUEL  GERD  GI bleed  Hypertension   TBI  Obesity   Osteoarthritis  Insomnia  Seizure  Peripheral neuropathy   Rhabdomyolysis  Prostate cancer  Aneurysm of ascending aorta   Pneumonia   Compression fracture, thoracic and lumbar spine     Medications   Loratadine  Omeprazole  Miralax  Quetiapine  Senna   Furosemide  Gabapentin     Surgical History   Transrectal prostate biopsy  Hip arthroplasty, left  Shoulder arthroplasty, left  Tonsillectomy     Physical Exam     Patient Vitals for the past 24 hrs:   BP Temp Temp src Pulse Resp SpO2 Height Weight   07/13/24 0451 (!) 132/90 -- -- 66 -- 96 % -- --   07/13/24 0450 -- -- -- 66 20 93 % -- --   07/13/24 0033 -- -- -- 67 14 94 % -- --   07/13/24 0028 123/65 -- -- 70 25 96 % -- --   07/12/24 2300 124/60 -- -- 72 -- 93 % -- --   07/12/24 2200 129/65 -- -- 70 16 95 % -- --   07/12/24 2145 125/62 -- -- 69 22 96 % -- --   07/12/24 2128 (!) 140/79 97.8  F (36.6  C) Oral 75 19 97 % 1.727 m (5' 8\") 89.8 kg (198 lb)     Physical Exam  Constitutional:       General: Not in acute distress.     Appearance: Normal appearance.   HENT:      Head: Normocephalic and atraumatic.   Eyes:      Extraocular Movements: Extraocular movements intact.      Conjunctiva/sclera: Conjunctivae normal.   Cardiovascular:      Rate and Rhythm: Regular rate and rhythm.  Pulmonary:      Effort: Pulmonary effort is normal. No respiratory distress.      Breath sounds: Normal breath sounds.  Abdominal:      General: Abdomen is flat. There is no distension.      Palpations: Abdomen is soft.      Tenderness: There is no abdominal tenderness.   Musculoskeletal:      Cervical back: Normal range of motion. No rigidity.      Right lower leg: No edema.      Left lower leg: " No edema.   Skin:     General: Skin is warm and dry.   Neurological:      General: No focal deficit present.      Mental Status: Alert and oriented to person, place, and time.   Psychiatric:         Mood and Affect: Mood normal.         Behavior: Behavior normal.    Diagnostics     Lab Results   Labs Ordered and Resulted from Time of ED Arrival to Time of ED Departure   COMPREHENSIVE METABOLIC PANEL - Abnormal       Result Value    Sodium 133 (*)     Potassium 4.0      Carbon Dioxide (CO2) 34 (*)     Anion Gap 6 (*)     Urea Nitrogen 6.3 (*)     Creatinine 0.49 (*)     GFR Estimate >90      Calcium 8.4 (*)     Chloride 93 (*)     Glucose 108 (*)     Alkaline Phosphatase 133      AST 15      ALT 6      Protein Total 7.2      Albumin 3.0 (*)     Bilirubin Total 0.3     CBC WITH PLATELETS AND DIFFERENTIAL - Abnormal    WBC Count 8.8      RBC Count 3.29 (*)     Hemoglobin 8.3 (*)     Hematocrit 26.8 (*)     MCV 82      MCH 25.2 (*)     MCHC 31.0 (*)     RDW 17.4 (*)     Platelet Count 390      % Neutrophils 75      % Lymphocytes 17      % Monocytes 6      % Eosinophils 2      % Basophils 1      % Immature Granulocytes 1      NRBCs per 100 WBC 0      Absolute Neutrophils 6.6      Absolute Lymphocytes 1.5      Absolute Monocytes 0.5      Absolute Eosinophils 0.2      Absolute Basophils 0.1      Absolute Immature Granulocytes 0.0      Absolute NRBCs 0.0     TROPONIN T, HIGH SENSITIVITY - Normal    Troponin T, High Sensitivity 16     TROPONIN T, HIGH SENSITIVITY - Normal    Troponin T, High Sensitivity 19         Imaging   XR Chest 2 Views   Final Result   IMPRESSION: Pulmonary vascular congestion. Mild left basilar atelectasis.          EKG   ECG taken at 2128, ECG read at 2130  Accelerated junctional rhythm   Rate 77 bpm. RI interval * ms. QRS duration 82 ms. QT/QTc 384/434 ms. P-R-T axes * -22 34.    Independent Interpretation   On my independent interpretation of chest x-ray, there is increased bilateral interstitial  pulmonary markings.  No obvious focal opacity.  No new mediastinal widening.  No pneumothorax.    ED Course      Medications Administered   None    Procedures   Procedures     Discussion of Management   Admitting HospitalistGretel    ED Course   ED Course as of 07/13/24 0533   Fri Jul 12, 2024 2203 I obtained history and examined the patient as noted above.    2212 EKG 12-lead, tracing only  Difficult to discern P waves, but QRS complexes are regular.  Likely normal sinus rhythm versus accelerated junctional rhythm.  Rate of 77.  QRS of 82.  QTc of 434.  No acute ST elevation or depression as compared with 6/28/2024 EKG.   2325 Hemoglobin(!): 8.3  At baseline   2342 XR Chest 2 Views  On my independent interpretation of chest x-ray, there is increased bilateral interstitial pulmonary markings.  No obvious focal opacity.  No new mediastinal widening.  No pneumothorax.   Sat Jul 13, 2024   0114 Troponin T, High Sensitivity: 19  Delta of 3   0116 Discussed patient with hospitalist Dr. Majano who accepted patient for admission.       Optional/Additional Documentation  None    Medical Decision Making / Diagnosis     CMS Diagnoses: None    MIPS       None    HEART Score  Background  Calculates the overall risk of adverse event in patient's presenting with chest pain.  Based on 5 criteria (each assigned 0-2 points) including suspiciousness of history, EKG, age, risk factors and troponin.    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); Chronic alcohol use; Major neurocognitive disorder (H); Generalized anxiety disorder; Gastro-esophageal reflux disease without esophagitis; History of 2019 novel coronavirus disease (COVID-19); Impaired gait and mobility; Obesity (BMI 30-39.9); Osteoarthritis of both knees; Other insomnia; Pharyngeal dysphagia; Physical deconditioning; Repeated falls; Coronary atherosclerosis of native coronary artery; Anemia; Other idiopathic  peripheral autonomic neuropathy; Age-related osteoporosis without current pathological fracture; Constipation; Dependent edema; 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; Hyponatremia; Closed wedge compression fracture of L2 vertebra with routine healing; Compression fracture of lumbar vertebra -compression deformity of L2-L5 with chronic mild compression deformity at superior endplate of L1.; Atherosclerosis of aorta (H24); Alcohol dependence in remission (H); Prostate cancer (H) - Dx January 2023; Chronic cough; Fall, initial encounter; Closed fracture of multiple ribs of right side, initial encounter; Closed wedge compression fracture of L3 vertebra, initial encounter (H); Aneurysm of ascending aorta without rupture (H24); Preventative health care; Bradycardia; Encephalopathy; Generalized weakness; Failure of outpatient treatment; and Chest pain, unspecified type on their problem list.   reports that he has never smoked. He has never used smokeless tobacco.  family history includes Osteoporosis in his mother; Prostate Cancer in his paternal grandfather.  Recent Labs   Lab 07/13/24  0016 07/12/24  2147   CTROPT 19 16     Criteria   0-2 points for each of 5 items (maximum of 10 points):  Score 1- History moderately suspicious for coronary syndrome  Score 1- EKG with Non-specific repolarization disturbance  Score 2- Age 65 years or older  Score 2- Three or more risk factors for or history of atherosclerotic disease  Score 0- Within normal limits for troponin levels  Interpretation  Risk of adverse outcome  Heart Score: 6  Total Score 4-6- Adverse Outcome Risk 20.3% - Supports admission with standard rule-out management -serial troponins and stress testing    SARAH Valdivia is an 84-year-old male as described above presents to the emergency department for chest pain.  Patient hemodynamically stable at time of evaluation.  Afebrile.  Known history of CAD.  Patient reports that he was previously told that he may  potentially need a cardiac catheterization.  At this time, patient reports the pain is 1 out of 10 after receiving nitroglycerin.  Prior to nitroglycerin and aspirin administration, chest pain was 10 out of 10.  Unclear if exertional in nature.  Patient hemodynamically stable at time of evaluation.  Afebrile.  Differential diagnosis considered includes, but not limited to, ACS, aortic aneurysm/dissection, and pulmonary embolism.  At this time, patient denies any shortness of breath or pleuritic type symptoms.  No evidence of hypoxia, unlikely pulmonary embolism.  Will obtain chest x-ray for evaluation for any other acute cardiopulmonary processes including mediastinal widening for screening for aortic dissection.  Patient is preliminary heart score of 6.  Anticipate admission for completion of cardiac workup.  Discussed care plan with patient who voiced understanding and agreement with plan.  Answered all questions.  Additional workup and orders as listed in chart.     Please refer to ED course above as part of continuation of MDM for details on the patient's treatment course and any changes or updates in care plan beyond my initial evaluation and MDM creation.    Disposition   The patient was admitted to the hospital.     Diagnosis     ICD-10-CM    1. Chest pain, unspecified type  R07.9          Scribe Disclosure:  Gloria MONGE, am serving as a scribe at 12:13 AM on 7/13/2024 to document services personally performed by Evin Boo DO based on my observations and the provider's statements to me.        Evin Boo DO  07/13/24 0533

## 2024-07-13 NOTE — PROGRESS NOTES
RECEIVING UNIT ED HANDOFF REVIEW    ED Nurse Handoff Report was reviewed by: Ellie Huerta RN on July 13, 2024 at 4:53 AM

## 2024-07-13 NOTE — PLAN OF CARE
PRIMARY Concern: chest pain   SAFETY RISK Concerns (fall risk, behaviors, etc.): Fall      Isolation/Type: n/a  Tests/Procedures for NEXT shift: AM Labs  Consults? (Pending/following, signed-off?) SW pending  Where is patient from? (Home, TCU, etc.): [facility]   Other Important info for NEXT shift: Chest pain resolved   Anticipated DC date & active delays: TBD  _____________________________________________________________________________  SUMMARY NOTE:  Orientation/Cognitive: A/O x4, forgetful  Observation Goals (Met/ Not Met): Not met  Mobility Level/Assist Equipment: Ax2/GB/W/lift  Antibiotics & Plan (IV/po, length of tx left): n/a  Pain Management: Prn Tylenol   Tele/VS/O2: VSS on RA  ABNL Lab/BG: Na 133, Ctr 0.49, Hgb 8.3  Diet: Cardiac   Bowel/Bladder: Incon B&B  Skin Concerns: Scattered bruising/scabs. Blanchable redness buttocks/sacrum    Drains/Devices: PIV SL  Patient Stated Goal for Today: Rest       Observation goals  PRIOR TO DISCHARGE        Comments: List all goals to be met before discharge home:  - Serial troponins and stress test complete: In progress   - Seen and cleared by consultant if applicable:: Not met  - Adequate pain control on oral analgesia: Met  - Vital signs normal or at patient baseline: Not met, HTN  - Safe disposition plan has been identified: Not met    - Nurse to notify provider when observation goals have been met and patient is ready for discharge.

## 2024-07-13 NOTE — PROGRESS NOTES
- Serial troponins and stress test complete. - yes  - Seen and cleared by consultant if applicable - n/a  - Adequate pain control on oral analgesia - yes  - Vital signs normal or at patient baseline - yes  - Safe disposition plan has been identified - yes, return to [facility]  - Nurse to notify provider when observation goals have been met and patient is ready for discharge.

## 2024-07-13 NOTE — PROGRESS NOTES
Care Management Discharge Note    Discharge Date: 07/13/2024       Discharge Disposition: Skilled Nursing Facility    Discharge Services: None    Discharge DME: None    Discharge Transportation: agency    Private pay costs discussed: transportation costs    Does the patient's insurance plan have a 3 day qualifying hospital stay waiver?  Yes     Which insurance plan 3 day waiver is available? Alternative insurance waiver    Will the waiver be used for post-acute placement? No    PAS Confirmation Code:  Not Needed.   Patient/family educated on Medicare website which has current facility and service quality ratings: no    Education Provided on the Discharge Plan: Yes  Persons Notified of Discharge Plans: Patient, TCU, and [facility] staff  Patient/Family in Agreement with the Plan: yes    Handoff Referral Completed: No    Additional Information:  Patient will discharge from [facility] to [facility] with W/C ride  at [phone].  Please refer to  progress notes for further details.      GUICHO Lopez  [facility]  Social Work

## 2024-07-13 NOTE — ED TRIAGE NOTES
Pt BIBA from [facility] for sudden onset of 10/10 chest pain. Pt said he felt like he was being punched in the chest. Pt was given 2 nitroglycerin by [facility] which brought pain to a 6. Given 1 nitroglycerin by EMS and 324 mg aspirin. 12-lead EKG sinus for EMS. Hx of MI and afib.     Triage Assessment (Adult)       Row Name 07/12/24 1944          Triage Assessment    Airway WDL WDL        Respiratory WDL    Respiratory WDL WDL        Skin Circulation/Temperature WDL    Skin Circulation/Temperature WDL WDL        Cardiac WDL    Cardiac WDL X;chest pain     Cardiac Rhythm NSR        Cognitive/Neuro/Behavioral WDL    Cognitive/Neuro/Behavioral WDL WDL

## 2024-07-13 NOTE — PROGRESS NOTES
"Dr. Jacinta wynn- Pt had onset of fatigue and chest pain after standing up and being changed by the NA. Upon assessment, Pt was back in bed and resting. Vitals collected; stable. He denied further chest pain and said, he \"just got worked up\" and \"I just need to calm down\". Confirmed with patient again directly prior to discharge that he is not experiencing chest pain at this time. Per provider, okay to discharge under these conditions.   Discharged to [facility] via WC transport.   "

## 2024-07-13 NOTE — PROGRESS NOTES
Admission/Transfer from: ED Admit   2 RN skin assessment completed. Yes  Name of 2nd RN: JUDY Paredes  Significant findings include: Scattered bruising/scabs. Blanchable redness buttocks/sacrum   WOC Nurse Consult Ordered? No

## 2024-07-15 ENCOUNTER — TRANSITIONAL CARE UNIT VISIT (OUTPATIENT)
Dept: GERIATRICS | Facility: CLINIC | Age: 84
End: 2024-07-15
Payer: COMMERCIAL

## 2024-07-15 VITALS
WEIGHT: 190.1 LBS | HEART RATE: 79 BPM | TEMPERATURE: 96.4 F | SYSTOLIC BLOOD PRESSURE: 131 MMHG | RESPIRATION RATE: 18 BRPM | BODY MASS INDEX: 28.81 KG/M2 | OXYGEN SATURATION: 93 % | HEIGHT: 68 IN | DIASTOLIC BLOOD PRESSURE: 67 MMHG

## 2024-07-15 DIAGNOSIS — R07.9 CHEST PAIN, UNSPECIFIED TYPE: Primary | ICD-10-CM

## 2024-07-15 DIAGNOSIS — M62.81 GENERALIZED MUSCLE WEAKNESS: ICD-10-CM

## 2024-07-15 DIAGNOSIS — R53.81 PHYSICAL DECONDITIONING: ICD-10-CM

## 2024-07-15 DIAGNOSIS — E22.2 SIADH (SYNDROME OF INAPPROPRIATE ADH PRODUCTION) (H): ICD-10-CM

## 2024-07-15 DIAGNOSIS — D64.9 ANEMIA, UNSPECIFIED TYPE: ICD-10-CM

## 2024-07-15 DIAGNOSIS — M25.532 LEFT WRIST PAIN: ICD-10-CM

## 2024-07-15 LAB
ATRIAL RATE - MUSE: NORMAL BPM
DIASTOLIC BLOOD PRESSURE - MUSE: NORMAL MMHG
INTERPRETATION ECG - MUSE: NORMAL
P AXIS - MUSE: NORMAL DEGREES
PR INTERVAL - MUSE: NORMAL MS
QRS DURATION - MUSE: 82 MS
QT - MUSE: 384 MS
QTC - MUSE: 434 MS
R AXIS - MUSE: -22 DEGREES
SYSTOLIC BLOOD PRESSURE - MUSE: NORMAL MMHG
T AXIS - MUSE: 34 DEGREES
VENTRICULAR RATE- MUSE: 77 BPM

## 2024-07-15 PROCEDURE — 99310 SBSQ NF CARE HIGH MDM 45: CPT | Performed by: NURSE PRACTITIONER

## 2024-07-15 NOTE — PROGRESS NOTES
[facility]  [place] Medical Record Number:  [id number]  Place of Service where encounter took place:  [facility] (TCU) [[id number]]  Chief Complaint   Patient presents with    Nursing Home Acute       HPI:    Jamie Valdivia  is a 84 year old (1940), who is being seen today for an episodic care visit.  HPI information obtained from: facility chart records, facility staff, and patient report. Today's concern is:    Patient Jamie Valdivia is a 84 yr old male admitted to [facility] for rehabilitation s/post hospitalization [facility] 6/28-7/2/24 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 Niraj Larry [facility]) (Completed external beam radiation therapy w/o androgen depravation therapy with [facility] under care of Dr Fleming), hx of COVID-19, hard of hearing     [facility] and may need higher level of care  primary contact for Jamie Valdivia (Magi Espino) spouse Gisela     Seen at [facility] ER 7/12/24-7/13/24 for chest pain       Chest pain, unspecified type  Physical deconditioning  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)  Generalized muscle weakness  Anemia, unspecified type  Left wrist  pain    Chest pain over weekend and sent to ER, no acute findings. Troponin's trended negative and EKG non ischemic.  thought to be muscular in nature  Patient report resolved, denies shortness of breath   Vitals stable  Weights trend down   Wt Readings from Last 2 Encounters:   07/15/24 86.2 kg (190 lb 1.6 oz)   07/12/24 89.8 kg (198 lb)     Pulmonary congestion noted chest xray, no pulmonary edema      States eating and regular elimination  States tolerating current diet     NA and hgb near baseline     Participating in therapies   Therapies report   Transfers mod  Bed mobility SBA/CGA  Ambulating 86' CGA FWW, w/c follow of second person  Eating ind  Grooming/hygiene ind  UB dressing min  LB dressing mod-max  Toileting mod    C/o left wrist pain at times and wants repeat xray to rule out fracture, less swelling       Past Medical and Surgical History reviewed in Epic today.    MEDICATIONS:    Current Outpatient Medications   Medication Sig Dispense Refill    acetaminophen (TYLENOL) 500 MG tablet Take 2 tablets (1,000 mg) by mouth 3 times daily as needed for mild pain 10 tablet 98    cyanocobalamin (VITAMIN B-12) 1000 MCG tablet TAKE 1 TABLET BY MOUTH ONCE DAILY 90 tablet 97    docusate sodium (COLACE) 100 MG capsule Take 1 capsule (100 mg) by mouth 2 times daily as needed for constipation      FEROSUL 325 (65 Fe) MG tablet TAKE 1 TABLET BY MOUTH ONCE DAILY WITH BREAKFAST 90 tablet 3    folic acid (FOLVITE) 1 MG tablet TAKE 1 TABLET BY MOUTH ONCE DAILY 90 tablet 97    furosemide (LASIX) 20 MG tablet Take 20 mg by mouth daily      gabapentin (NEURONTIN) 100 MG capsule Take 100 mg by mouth at bedtime      gabapentin (NEURONTIN) 100 MG capsule Take 100 mg by mouth 2 times daily as needed      loratadine (CLARITIN) 10 MG tablet TAKE 1 TABLET BY MOUTH AT BEDTIME 90 tablet 97    menthol-zinc oxide (CALMOSEPTINE) 0.44-20.6 % OINT ointment Apply topically 4 times daily To perineal area. Send to [facility]. 113 g 98     "nystatin (MYCOSTATIN) 411059 UNIT/GM external powder Apply topically 2 times daily as needed      nystatin (MYCOSTATIN) 532466 UNIT/GM external powder Apply topically 2 times daily      omeprazole (PRILOSEC) 40 MG DR capsule TAKE 1 CAPSULE BY MOUTH TWICE DAILY 180 capsule 97    oxymetazoline (AFRIN) 0.05 % nasal spray Spray 2 sprays into both nostrils 2 times daily as needed (nose bleed)      polyethylene glycol (MIRALAX) 17 GM/Dose powder MIX 2 CAPFULS IN 8OZ OF ORANGE JUICE  IN THE MORNING;AND MAY MIX 2 CAPFULS ONCE DAILY AS NEEDED FOR CONSTIPATION. MIX IN FRONT OF PT. HOLD FOR LOOSE STOOL. OK TO GIVE 1 CAPFUL IF RESIDENT REFUSES 2 CAPFULS. 510 g 97    QUEtiapine (SEROQUEL) 25 MG tablet TAKE ONE-HALF TABLET (12.5MG) BY MOUTH TWICE DAILY;& MAY TAKE ONE-HALF TABLET (12.5MG) EVERY 12 HOURS AS NEEDED 180 tablet 97    sennosides (SENOKOT) 8.6 MG tablet Take 1-2 tablets by mouth 2 times daily as needed for constipation      sodium chloride (OCEAN) 0.65 % nasal spray Spray 1 spray in nostril 4 times daily as needed for congestion Okay to leave at bedside 88 mL 98         REVIEW OF SYSTEMS:  10 point ROS of systems including Constitutional, Eyes, Respiratory, Cardiovascular, Gastroenterology, Genitourinary, Integumentary, Musculoskeletal, Psychiatric were all negative except for pertinent positives noted in my HPI.    Objective:  /67   Pulse 79   Temp (!) 96.4  F (35.8  C)   Resp 18   Ht 1.727 m (5' 8\")   Wt 86.2 kg (190 lb 1.6 oz)   SpO2 93%   BMI 28.90 kg/m    Exam:  GENERAL APPEARANCE:  Alert, in no distress  ENT:  Mouth and posterior oropharynx normal, moist mucous membranes  EYES:  EOM, conjunctivae, lids, pupils and irises normal  NECK:  No adenopathy,masses or thyromegaly  RESP:  respiratory effort and palpation of chest normal, lungs clear to auscultation , no respiratory distress  CV:  Palpation and auscultation of heart done , regular rate and rhythm  ABDOMEN:  normal bowel sounds, soft, " "nontender  M/S:   sitting in bed, no swelling noted left wrist, minor tenderness with manipulation, ROM within normal limits   SKIN:  Inspection of skin and subcutaneous tissue baseline, +2 edema bilateral lower extremity   NEURO:   Cranial nerves 2-12 are normal tested and grossly at patient's baseline  PSYCH:  oriented X 3    Labs:   Labs done in SNF are in [facility] EPIC. Please refer to them using EPIC/Care Everywhere.    Last Comprehensive Metabolic Panel:  Lab Results   Component Value Date     (L) 07/13/2024    POTASSIUM 4.3 07/13/2024    CHLORIDE 95 (L) 07/13/2024    CO2 31 (H) 07/13/2024    ANIONGAP 7 07/13/2024    GLC 90 07/13/2024    BUN 5.5 (L) 07/13/2024    CR 0.48 (L) 07/13/2024    GFRESTIMATED >90 07/13/2024    JOSUE 8.4 (L) 07/13/2024       Lab Results   Component Value Date    WBC 7.5 07/13/2024     Lab Results   Component Value Date    RBC 3.47 07/13/2024     Lab Results   Component Value Date    HGB 8.6 07/13/2024     Lab Results   Component Value Date    HCT 28.1 07/13/2024     No components found for: \"MCT\"  Lab Results   Component Value Date    MCV 81 07/13/2024     Lab Results   Component Value Date    MCH 24.8 07/13/2024     Lab Results   Component Value Date    MCHC 30.6 07/13/2024     Lab Results   Component Value Date    RDW 17.5 07/13/2024     Lab Results   Component Value Date     07/13/2024         ASSESSMENT/PLAN:     Chest pain, unspecified type  Physical deconditioning  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)  Generalized muscle weakness  Anemia, unspecified type  Left wrist pain    Chest pain over weekend and sent to ER, no acute findings. Troponin's trended negative and EKG non ischemic.  Thought to be muscular in nature  Patient report resolved, denies shortness of breath   Plan follow up cardiologist (followed by ) to set up stress test for chest pain   Vitals stable  Weights trend down   Wt Readings from Last 2 Encounters:   07/15/24 86.2 kg " (190 lb 1.6 oz)   07/12/24 89.8 kg (198 lb)     Pulmonary congestion noted chest xray in hospital , no pulmonary edema  Will repeat chest xray  On lasix 20mg daily, will continue  Follow up BMP,CBC 7/17/24    States eating and regular elimination  States tolerating current diet   Monitor     NA and hgb near baseline   Monitor   Follow up BMP,CBC 7/17/24    Participating in therapies   Therapies report   Transfers mod  Bed mobility SBA/CGA  Ambulating 86' CGA FWW, w/c follow of second person  Eating ind  Grooming/hygiene ind  UB dressing min  LB dressing mod-max  Toileting mod  Continue therapies    involved in safe plan home    C/o left wrist pain at times and wants repeat xray to rule out fracture, less swelling   Participating in therapies   ordered        Aug 01, 2024 11:00 AM  (Arrive by 10:45 AM)  Return Patient with Niraj Larry MD  [facility] ([facility] ) [phone]     Dec 16, 2024 10:30 AM  (Arrive by 10:15 AM)  New Dermatology with Edgardo Greenberg MD  [facility] ([facility] ) [phone]     Feb 03, 2025 1:00 PM  (Arrive by 12:45 PM)  NEW PATIENT with Colby Mercado DO  [facility] [place] ([facility] ) [phone]              Electronically signed by:  MARICHUY Mendoza CNP

## 2024-07-16 ENCOUNTER — LAB REQUISITION (OUTPATIENT)
Dept: LAB | Facility: CLINIC | Age: 84
End: 2024-07-16

## 2024-07-16 ENCOUNTER — TRANSITIONAL CARE UNIT VISIT (OUTPATIENT)
Dept: GERIATRICS | Facility: CLINIC | Age: 84
End: 2024-07-16
Payer: COMMERCIAL

## 2024-07-16 VITALS
OXYGEN SATURATION: 98 % | DIASTOLIC BLOOD PRESSURE: 69 MMHG | RESPIRATION RATE: 18 BRPM | BODY MASS INDEX: 28.78 KG/M2 | WEIGHT: 189.9 LBS | HEART RATE: 70 BPM | HEIGHT: 68 IN | TEMPERATURE: 97.8 F | SYSTOLIC BLOOD PRESSURE: 135 MMHG

## 2024-07-16 DIAGNOSIS — R07.9 CHEST PAIN, UNSPECIFIED TYPE: Primary | ICD-10-CM

## 2024-07-16 DIAGNOSIS — C61 PROSTATE CANCER (H): ICD-10-CM

## 2024-07-16 DIAGNOSIS — D64.9 ANEMIA, UNSPECIFIED TYPE: ICD-10-CM

## 2024-07-16 DIAGNOSIS — R41.9 NEUROCOGNITIVE DISORDER: ICD-10-CM

## 2024-07-16 DIAGNOSIS — E87.1 HYPONATREMIA: ICD-10-CM

## 2024-07-16 DIAGNOSIS — M62.81 GENERALIZED MUSCLE WEAKNESS: ICD-10-CM

## 2024-07-16 DIAGNOSIS — I50.9 HEART FAILURE, UNSPECIFIED (H): ICD-10-CM

## 2024-07-16 PROCEDURE — 99305 1ST NF CARE MODERATE MDM 35: CPT | Performed by: INTERNAL MEDICINE

## 2024-07-16 NOTE — PROGRESS NOTES
[facility]    PRIMARY CARE PROVIDER AND CLINIC:  Sylvia Stein, MARICHUY CNP, [address] [facility] [address]    Chief Complaint   Patient presents with    Hospital F/U      [place] Medical Record Number:  [id number]  Place of Service where encounter took place:  [facility] ([facility])      Jamie Valdivia  is a 84 year old  (1940), admitted to the above facility from  [facility]. Hospital stay 6/28/24 through 7/1/24. Patient was also admitted to [facility] from 7/12/24 through 7/13/24.      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  Sodium improved with IV saline suggesting component of dehydration.  Medical issues addressed included pressure ulcers on his buttocks associated with extensive bruising, hypertension, history of A-fib not on anticoagulation.  Patient was continued on prior to admission Seroquel.  He was admitted to the hospital for therapy secondary to deconditioning.    Patient lives in an assisted living on the [facility].    Since admission to the U, patient was assessed in the ER on 7/12/2024 for chest pain, felt to be noncardiac.  Nuclear stress test ordered in the outpatient setting  Chest x-ray obtained 7/4/2024 for evaluation of shortness of breath revealed changes consistent with CHF.  Furosemide started 20 mg daily    Patient denies further chest pain.  He thinks chest pain may be related to anxiety as he is quite anxious to discharge from the facility  Appetite has been good  He is participating in therapies  He is ambulating short distances with a  walker.  He is continues to require some assistance with cares including transfers and toileting.    Patient's main complaint is that of left wrist pain which has been present for the last few days without specific trauma.  X-ray ordered  He denies cough, chest pain, shortness of breath.      CODE STATUS/ADVANCE DIRECTIVES DISCUSSION:  Prior  CPR/Full code   ALLERGIES:   Allergies   Allergen Reactions    Ativan [Lorazepam]       PAST MEDICAL HISTORY:   Past Medical History:   Diagnosis Date    Age-related osteoporosis without current pathological fracture 03/30/2022    Alcohol use disorder     Aortic root aneurysm (H24)     CAD (coronary artery disease)     Chronic a-fib (H)     Chronic atrial fibrillation (H) 07/15/2016    Formatting of this note might be different from the original. 2/2019: Multiple falls so started on aspirin only.  Then aspirin stopped due to GI bleed.    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 2/2/21    HTN (hypertension)     Impaired gait and mobility 03/14/2019    Frequent falls    Mild TBI (traumatic brain injury) (H) 03/14/2019    Mumps     Obesity (BMI 30-39.9) 09/03/2015    Osteoarthritis of both knees 05/13/2015    Osteoporosis     Other idiopathic peripheral autonomic neuropathy 03/30/2022    Other seizures (H) 03/30/2022    Pharyngeal dysphagia 05/13/2015    Formatting of this note might be different from the original. Likely secondary to GERD with esophagitis, on PPI and H2 blocker with notable improvement, EGD 10/5/15.    Prostate cancer (H)     Recurrent  major depressive disorder (H24) 03/30/2022    Rhabdomyolysis     Thrombocytopenia (H24)       PAST SURGICAL HISTORY:   has a past surgical history that includes left hip replacement (2010); left shoulder replacement (2016); tonsillectomy; Spine surgery; orthopedic surgery; Esophagoscopy, gastroscopy, duodenoscopy (EGD), combined (N/A, 06/01/2022); colonoscopy; Biopsy prostate transrectal (N/A, 1/11/2023); and Transrectal ultrasonic sonogram (N/A, 1/11/2023).  FAMILY HISTORY: family history includes Osteoporosis in his mother; Prostate Cancer in his paternal grandfather.  SOCIAL HISTORY:   reports that he has never smoked. He has never used smokeless tobacco. He reports that he does not currently use alcohol. He reports that he does not use drugs.  Patient's living condition: lives in an assisted living facility    Current medications reviewed by me today      Current Outpatient Medications   Medication Sig Dispense Refill    acetaminophen (TYLENOL) 500 MG tablet Take 2 tablets (1,000 mg) by mouth 3 times daily as needed for mild pain 10 tablet 98    cyanocobalamin (VITAMIN B-12) 1000 MCG tablet TAKE 1 TABLET BY MOUTH ONCE DAILY 90 tablet 97    docusate sodium (COLACE) 100 MG capsule Take 1 capsule (100 mg) by mouth 2 times daily as needed for constipation      FEROSUL 325 (65 Fe) MG tablet TAKE 1 TABLET BY MOUTH ONCE DAILY WITH BREAKFAST 90 tablet 3    folic acid (FOLVITE) 1 MG tablet TAKE 1 TABLET BY MOUTH ONCE DAILY 90 tablet 97    furosemide (LASIX) 20 MG tablet Take 20 mg by mouth daily      gabapentin (NEURONTIN) 100 MG capsule Take 100 mg by mouth at bedtime      gabapentin (NEURONTIN) 100 MG capsule Take 100 mg by mouth 2 times daily as needed      loratadine (CLARITIN) 10 MG tablet TAKE 1 TABLET BY MOUTH AT BEDTIME 90 tablet 97    menthol-zinc oxide (CALMOSEPTINE) 0.44-20.6 % OINT ointment Apply topically 4 times daily To perineal area. [facility] [place] [facility]. 113 g 98    nystatin (MYCOSTATIN) 805158 UNIT/GM  "external powder Apply topically 2 times daily as needed      nystatin (MYCOSTATIN) 129136 UNIT/GM external powder Apply topically 2 times daily      omeprazole (PRILOSEC) 40 MG DR capsule TAKE 1 CAPSULE BY MOUTH TWICE DAILY 180 capsule 97    oxymetazoline (AFRIN) 0.05 % nasal spray Spray 2 sprays into both nostrils 2 times daily as needed (nose bleed)      polyethylene glycol (MIRALAX) 17 GM/Dose powder MIX 2 CAPFULS IN 8OZ OF ORANGE JUICE  IN THE MORNING;AND MAY MIX 2 CAPFULS ONCE DAILY AS NEEDED FOR CONSTIPATION. MIX IN FRONT OF PT. HOLD FOR LOOSE STOOL. OK TO GIVE 1 CAPFUL IF RESIDENT REFUSES 2 CAPFULS. 510 g 97    QUEtiapine (SEROQUEL) 25 MG tablet TAKE ONE-HALF TABLET (12.5MG) BY MOUTH TWICE DAILY;& MAY TAKE ONE-HALF TABLET (12.5MG) EVERY 12 HOURS AS NEEDED 180 tablet 97    sennosides (SENOKOT) 8.6 MG tablet Take 1-2 tablets by mouth 2 times daily as needed for constipation      sodium chloride (OCEAN) 0.65 % nasal spray Spray 1 spray in nostril 4 times daily as needed for congestion Okay to leave at bedside 88 mL 98     No current facility-administered medications for this visit.       ROS:  10 point ROS of systems including Constitutional, Eyes, Respiratory, Cardiovascular, Gastroenterology, Genitourinary, Integumentary, Musculoskeletal, Psychiatric were all negative except for pertinent positives noted in my HPI.    Vitals:  /69   Pulse 70   Temp 97.8  F (36.6  C)   Resp 18   Ht 1.727 m (5' 8\")   Wt 86.1 kg (189 lb 14.4 oz)   SpO2 98%   BMI 28.87 kg/m    Exam:  Pale appearing male, lying comfortably in bed.  Hard of hearing  HEENT oral mucosa moist  Lungs clear  CV regular rate and rhythm.  Systolic murmur  Abdomen soft, protuberant  Extremities: 1+ ankle edema bilaterally  Minimal discomfort with range of motion left wrist.  No swelling noted  Neuro: Oriented x 3.  No focal weakness.  Mild tenderness to palpation over left upper chest area  Skin: Extensive bruising over arms.  Buttocks area not " examined    Lab/Diagnostic data:  Most Recent 3 CBC's:  Recent Labs   Lab Test 07/13/24  0535 07/12/24 2147 07/08/24  1102   WBC 7.5 8.8 6.4   HGB 8.6* 8.3* 7.6*   MCV 81 82 82    390 293     Most Recent 3 BMP's:  Recent Labs   Lab Test 07/13/24  0535 07/12/24 2147 07/08/24  1102   * 133* 132*   POTASSIUM 4.3 4.0 3.7   CHLORIDE 95* 93* 93*   CO2 31* 34* 29   BUN 5.5* 6.3* 7.7*   CR 0.48* 0.49* 0.49*   ANIONGAP 7 6* 10   JOSUE 8.4* 8.4* 8.1*   GLC 90 108* 102*     Most Recent 2 LFT's:  Recent Labs   Lab Test 07/12/24 2147 06/20/24  0747   AST 15 14   ALT 6 <5   ALKPHOS 133 117   BILITOTAL 0.3 0.4     Most Recent Anemia Panel:  Recent Labs   Lab Test 07/13/24  0535 06/20/24  0747 05/30/24  0630 04/26/24  1328 04/19/24  0846   WBC 7.5   < > 7.7   < >  --    HGB 8.6*   < > 8.2*   < > 8.9*   HCT 28.1*   < > 27.8*   < >  --    MCV 81   < > 85   < >  --       < > 385   < >  --    IRON  --   --  25*  --  34*   IRONSAT  --   --  11*  --  18   FEB  --   --  230*  --  194*   CAMILA  --   --   --   --  138   B12  --   --  193*  --   --    FOLIC  --   --  3.6*  --   --     < > = values in this interval not displayed.       ASSESSMENT/PLAN:    Recent hospitalization for acute on chronic hyponatremia.  Hyponatremia felt secondary to volume depletion, improved partially with saline.  It has been also reduced for concern this might be causing SIADH as well  Sodium has been stable since admission to TCU  Patient has been restarted on prior to admission furosemide  Plan: Monitor BMP.    Left-sided chest pain, likely noncardiac, possibly GI versus muscle skeletal  Remote history of coronary artery disease with MI  History of GERD with esophagitis.  Recent echo revealed mild pulmonary hypertension, mild to moderate aortic stenosis and aortic dilatation  Plan: Continue PPI.  Stress test was ordered by the ER    Chronic A-fib  Volume overload noted on chest x-ray following admission to TCU for which patient has been  started on furosemide  Echocardiogram as above  History of thoracic aortic aneurysm, currently being monitored  Not on anticoagulation per patient choice  Plan: Monitor vitals.  Continue low-dose furosemide   CV surgery follow-up    Chronic anemia  MCV 82  Iron studies recently unremarkable  Possibly on a nutritional basis  Plan: Continue empiric iron.  Monitor hemoglobin.  Monitor for occult blood loss    History of prostate cancer status post x-ray treatment  Plan: Urology follow-up  Plan: Monitor vitals    Left wrist discomfort  No clear history of injury  Pain appears to be improving without specific therapy  Plan: Monitor    History of dementia  History of alcohol use  Deconditioning in the setting of chronic illness, generalized weakness  Presumed recent falls based on location of bruising and injuries  Plan: Therapies.  Assure safe discharge          Tyler Weiss MD

## 2024-07-16 NOTE — LETTER
7/16/2024      Jamie Valdivia  C/o Coral De La Garza  [address]  [place] [address]        [facility] GERIATRICS    PRIMARY CARE PROVIDER AND CLINIC:  MARICHUY Basilio [facility], [address] [facility] / [place] [address]    Chief Complaint   Patient presents with     Hospital F/U      [place] Medical Record Number:  [id number]  Place of Service where encounter took place:  [facility] ([facility])      Jamie Valdivia  is a 84 year old  (1940), admitted to the above facility from  [facility]. Hospital stay 6/28/24 through 7/1/24. Patient was also admitted to [facility] from 7/12/24 through 7/13/24.      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  Sodium improved with IV saline suggesting component of dehydration.  Medical issues addressed included pressure ulcers on his buttocks associated with extensive bruising, hypertension, history of A-fib not on anticoagulation.  Patient was continued on prior to admission Seroquel.  He was admitted to the hospital for therapy secondary to deconditioning.    Patient lives in an assisted living on the [facility].    Since admission to the U, patient was assessed in the ER on 7/12/2024 for chest pain, felt to be noncardiac.  Nuclear stress test ordered in the outpatient setting  Chest x-ray obtained 7/4/2024 for evaluation of shortness of breath revealed changes consistent with CHF.  Furosemide started 20 mg daily    Patient denies further chest pain.  He thinks chest pain may be related to anxiety as he is quite anxious to discharge from the  facility  Appetite has been good  He is participating in therapies  He is ambulating short distances with a walker.  He is continues to require some assistance with cares including transfers and toileting.    Patient's main complaint is that of left wrist pain which has been present for the last few days without specific trauma.  X-ray ordered  He denies cough, chest pain, shortness of breath.      CODE STATUS/ADVANCE DIRECTIVES DISCUSSION:  Prior  CPR/Full code   ALLERGIES:   Allergies   Allergen Reactions     Ativan [Lorazepam]       PAST MEDICAL HISTORY:   Past Medical History:   Diagnosis Date     Age-related osteoporosis without current pathological fracture 03/30/2022     Alcohol use disorder      Aortic root aneurysm (H24)      CAD (coronary artery disease)      Chronic a-fib (H)      Chronic atrial fibrillation (H) 07/15/2016    Formatting of this note might be different from the original. 2/2019: Multiple falls so started on aspirin only.  Then aspirin stopped due to GI bleed.     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 2/2/21     HTN (hypertension)      Impaired gait and mobility 03/14/2019    Frequent falls     Mild TBI (traumatic brain injury) (H) 03/14/2019     Mumps      Obesity (BMI 30-39.9) 09/03/2015     Osteoarthritis of both knees 05/13/2015     Osteoporosis      Other idiopathic peripheral autonomic neuropathy 03/30/2022     Other seizures (H) 03/30/2022     Pharyngeal dysphagia 05/13/2015    Formatting of this note might be different from the original. Likely  secondary to GERD with esophagitis, on PPI and H2 blocker with notable improvement, EGD 10/5/15.     Prostate cancer (H)      Recurrent major depressive disorder (H24) 03/30/2022     Rhabdomyolysis      Thrombocytopenia (H24)       PAST SURGICAL HISTORY:   has a past surgical history that includes left hip replacement (2010); left shoulder replacement (2016); tonsillectomy; Spine surgery; orthopedic surgery; Esophagoscopy, gastroscopy, duodenoscopy (EGD), combined (N/A, 06/01/2022); colonoscopy; Biopsy prostate transrectal (N/A, 1/11/2023); and Transrectal ultrasonic sonogram (N/A, 1/11/2023).  FAMILY HISTORY: family history includes Osteoporosis in his mother; Prostate Cancer in his paternal grandfather.  SOCIAL HISTORY:   reports that he has never smoked. He has never used smokeless tobacco. He reports that he does not currently use alcohol. He reports that he does not use drugs.  Patient's living condition: lives in an assisted living facility    Current medications reviewed by me today      Current Outpatient Medications   Medication Sig Dispense Refill     acetaminophen (TYLENOL) 500 MG tablet Take 2 tablets (1,000 mg) by mouth 3 times daily as needed for mild pain 10 tablet 98     cyanocobalamin (VITAMIN B-12) 1000 MCG tablet TAKE 1 TABLET BY MOUTH ONCE DAILY 90 tablet 97     docusate sodium (COLACE) 100 MG capsule Take 1 capsule (100 mg) by mouth 2 times daily as needed for constipation       FEROSUL 325 (65 Fe) MG tablet TAKE 1 TABLET BY MOUTH ONCE DAILY WITH BREAKFAST 90 tablet 3     folic acid (FOLVITE) 1 MG tablet TAKE 1 TABLET BY MOUTH ONCE DAILY 90 tablet 97     furosemide (LASIX) 20 MG tablet Take 20 mg by mouth daily       gabapentin (NEURONTIN) 100 MG capsule Take 100 mg by mouth at bedtime       gabapentin (NEURONTIN) 100 MG capsule Take 100 mg by mouth 2 times daily as needed       loratadine (CLARITIN) 10 MG tablet TAKE 1 TABLET BY MOUTH AT BEDTIME 90 tablet 97     menthol-zinc oxide  "(CALMOSEPTINE) 0.44-20.6 % OINT ointment Apply topically 4 times daily To perineal area. Send to [facility]. 113 g 98     nystatin (MYCOSTATIN) 207817 UNIT/GM external powder Apply topically 2 times daily as needed       nystatin (MYCOSTATIN) 762855 UNIT/GM external powder Apply topically 2 times daily       omeprazole (PRILOSEC) 40 MG DR capsule TAKE 1 CAPSULE BY MOUTH TWICE DAILY 180 capsule 97     oxymetazoline (AFRIN) 0.05 % nasal spray Spray 2 sprays into both nostrils 2 times daily as needed (nose bleed)       polyethylene glycol (MIRALAX) 17 GM/Dose powder MIX 2 CAPFULS IN 8OZ OF ORANGE JUICE  IN THE MORNING;AND MAY MIX 2 CAPFULS ONCE DAILY AS NEEDED FOR CONSTIPATION. MIX IN FRONT OF PT. HOLD FOR LOOSE STOOL. OK TO GIVE 1 CAPFUL IF RESIDENT REFUSES 2 CAPFULS. 510 g 97     QUEtiapine (SEROQUEL) 25 MG tablet TAKE ONE-HALF TABLET (12.5MG) BY MOUTH TWICE DAILY;& MAY TAKE ONE-HALF TABLET (12.5MG) EVERY 12 HOURS AS NEEDED 180 tablet 97     sennosides (SENOKOT) 8.6 MG tablet Take 1-2 tablets by mouth 2 times daily as needed for constipation       sodium chloride (OCEAN) 0.65 % nasal spray Spray 1 spray in nostril 4 times daily as needed for congestion Okay to leave at bedside 88 mL 98     No current facility-administered medications for this visit.       ROS:  10 point ROS of systems including Constitutional, Eyes, Respiratory, Cardiovascular, Gastroenterology, Genitourinary, Integumentary, Musculoskeletal, Psychiatric were all negative except for pertinent positives noted in my HPI.    Vitals:  /69   Pulse 70   Temp 97.8  F (36.6  C)   Resp 18   Ht 1.727 m (5' 8\")   Wt 86.1 kg (189 lb 14.4 oz)   SpO2 98%   BMI 28.87 kg/m    Exam:  Pale appearing male, lying comfortably in bed.  Hard of hearing  HEENT oral mucosa moist  Lungs clear  CV regular rate and rhythm.  Systolic murmur  Abdomen soft, protuberant  Extremities: 1+ ankle edema bilaterally  Minimal discomfort with range of motion left wrist.  No " swelling noted  Neuro: Oriented x 3.  No focal weakness.  Mild tenderness to palpation over left upper chest area  Skin: Extensive bruising over arms.  Buttocks area not examined    Lab/Diagnostic data:  Most Recent 3 CBC's:  Recent Labs   Lab Test 07/13/24  0535 07/12/24 2147 07/08/24  1102   WBC 7.5 8.8 6.4   HGB 8.6* 8.3* 7.6*   MCV 81 82 82    390 293     Most Recent 3 BMP's:  Recent Labs   Lab Test 07/13/24 0535 07/12/24 2147 07/08/24  1102   * 133* 132*   POTASSIUM 4.3 4.0 3.7   CHLORIDE 95* 93* 93*   CO2 31* 34* 29   BUN 5.5* 6.3* 7.7*   CR 0.48* 0.49* 0.49*   ANIONGAP 7 6* 10   JOSUE 8.4* 8.4* 8.1*   GLC 90 108* 102*     Most Recent 2 LFT's:  Recent Labs   Lab Test 07/12/24 2147 06/20/24  0747   AST 15 14   ALT 6 <5   ALKPHOS 133 117   BILITOTAL 0.3 0.4     Most Recent Anemia Panel:  Recent Labs   Lab Test 07/13/24  0535 06/20/24  0747 05/30/24  0630 04/26/24  1328 04/19/24  0846   WBC 7.5   < > 7.7   < >  --    HGB 8.6*   < > 8.2*   < > 8.9*   HCT 28.1*   < > 27.8*   < >  --    MCV 81   < > 85   < >  --       < > 385   < >  --    IRON  --   --  25*  --  34*   IRONSAT  --   --  11*  --  18   FEB  --   --  230*  --  194*   CAMILA  --   --   --   --  138   B12  --   --  193*  --   --    FOLIC  --   --  3.6*  --   --     < > = values in this interval not displayed.       ASSESSMENT/PLAN:    Recent hospitalization for acute on chronic hyponatremia.  Hyponatremia felt secondary to volume depletion, improved partially with saline.  It has been also reduced for concern this might be causing SIADH as well  Sodium has been stable since admission to TCU  Patient has been restarted on prior to admission furosemide  Plan: Monitor BMP.    Left-sided chest pain, likely noncardiac, possibly GI versus muscle skeletal  Remote history of coronary artery disease with MI  History of GERD with esophagitis.  Recent echo revealed mild pulmonary hypertension, mild to moderate aortic stenosis and aortic  dilatation  Plan: Continue PPI.  Stress test was ordered by the ER    Chronic A-fib  Volume overload noted on chest x-ray following admission to TCU for which patient has been started on furosemide  Echocardiogram as above  History of thoracic aortic aneurysm, currently being monitored  Not on anticoagulation per patient choice  Plan: Monitor vitals.  Continue low-dose furosemide   CV surgery follow-up    Chronic anemia  MCV 82  Iron studies recently unremarkable  Possibly on a nutritional basis  Plan: Continue empiric iron.  Monitor hemoglobin.  Monitor for occult blood loss    History of prostate cancer status post x-ray treatment  Plan: Urology follow-up  Plan: Monitor vitals    Left wrist discomfort  No clear history of injury  Pain appears to be improving without specific therapy  Plan: Monitor    History of dementia  History of alcohol use  Deconditioning in the setting of chronic illness, generalized weakness  Presumed recent falls based on location of bruising and injuries  Plan: Therapies.  Assure safe discharge          Tyler Weiss MD      Sincerely,        Tyler Weiss MD

## 2024-07-17 LAB
ANION GAP SERPL CALCULATED.3IONS-SCNC: 12 MMOL/L (ref 7–15)
BUN SERPL-MCNC: 6.3 MG/DL (ref 8–23)
CALCIUM SERPL-MCNC: 9 MG/DL (ref 8.8–10.4)
CHLORIDE SERPL-SCNC: 92 MMOL/L (ref 98–107)
CREAT SERPL-MCNC: 0.48 MG/DL (ref 0.67–1.17)
EGFRCR SERPLBLD CKD-EPI 2021: >90 ML/MIN/1.73M2
ERYTHROCYTE [DISTWIDTH] IN BLOOD BY AUTOMATED COUNT: 17.3 % (ref 10–15)
GLUCOSE SERPL-MCNC: 102 MG/DL (ref 70–99)
HCO3 SERPL-SCNC: 31 MMOL/L (ref 22–29)
HCT VFR BLD AUTO: 31.4 % (ref 40–53)
HGB BLD-MCNC: 9.5 G/DL (ref 13.3–17.7)
MCH RBC QN AUTO: 24.9 PG (ref 26.5–33)
MCHC RBC AUTO-ENTMCNC: 30.3 G/DL (ref 31.5–36.5)
MCV RBC AUTO: 82 FL (ref 78–100)
PLATELET # BLD AUTO: 408 10E3/UL (ref 150–450)
POTASSIUM SERPL-SCNC: 3.5 MMOL/L (ref 3.4–5.3)
RBC # BLD AUTO: 3.81 10E6/UL (ref 4.4–5.9)
SODIUM SERPL-SCNC: 135 MMOL/L (ref 135–145)
WBC # BLD AUTO: 6.9 10E3/UL (ref 4–11)

## 2024-07-17 PROCEDURE — 36415 COLL VENOUS BLD VENIPUNCTURE: CPT | Performed by: NURSE PRACTITIONER

## 2024-07-17 PROCEDURE — 85027 COMPLETE CBC AUTOMATED: CPT | Performed by: NURSE PRACTITIONER

## 2024-07-17 PROCEDURE — P9604 ONE-WAY ALLOW PRORATED TRIP: HCPCS | Performed by: NURSE PRACTITIONER

## 2024-07-17 PROCEDURE — 80048 BASIC METABOLIC PNL TOTAL CA: CPT | Performed by: NURSE PRACTITIONER

## 2024-07-29 ENCOUNTER — TELEPHONE (OUTPATIENT)
Dept: UROLOGY | Facility: CLINIC | Age: 84
End: 2024-07-29
Payer: COMMERCIAL

## 2024-07-29 NOTE — TELEPHONE ENCOUNTER
[facility] Call Center    Phone Message    May a detailed message be left on voicemail: yes     Reason for Call: Order(s): Other:   Reason for requested: Labs for 8/1 appointment   Date needed: ASAP  Provider name: Kendy Rivera from [facility] nursing facility called requesting any lab orders that are needed for 8/1/24 appointment are faxed over to the [facility] nursing facility. States they are able to have all these labs done prior if the clinic can fax them ASAP. Please reach out to the charge nurse at the number listed below if further is needed.  Charge nurse # - [phone]  Fax for orders- [phone]    Action Taken: Other: UROLOGY    Travel Screening: Not Applicable

## 2024-07-30 VITALS
WEIGHT: 189.4 LBS | HEART RATE: 88 BPM | SYSTOLIC BLOOD PRESSURE: 127 MMHG | DIASTOLIC BLOOD PRESSURE: 70 MMHG | HEIGHT: 68 IN | BODY MASS INDEX: 28.7 KG/M2 | RESPIRATION RATE: 20 BRPM | TEMPERATURE: 97 F | OXYGEN SATURATION: 96 %

## 2024-07-30 NOTE — PROGRESS NOTES
[facility]  [place] Medical Record Number:  [id number]  Place of Service where encounter took place:  [facility] (TCU) [[id number]]  Chief Complaint   Patient presents with    Nursing Home Acute       HPI:    Jamie Valdivia  is a 84 year old (1940), who is being seen today for an episodic care visit.  HPI information obtained from: facility chart records, facility staff, and patient report. Today's concern is:      Patient Jamie Valdivia is a 84 yr old male admitted to [facility] for rehabilitation s/post hospitalization [facility] 6/28-7/2/24 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 Niraj Larry [facility]) (Completed external beam radiation therapy w/o androgen depravation therapy with [facility] under care of Dr Fleming), hx of COVID-19, hard of hearing     [facility] and may need higher level of care  primary contact for Jamie Valdivia (Magi Espino) spouse Gisela     Seen at [facility] ER 7/12/24-7/13/24 for chest pain       COVID-19  Anemia, unspecified type  Generalized muscle weakness  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)  Depression, unspecified depression type  Generalized  anxiety disorder    Therapies progressing and had plans to discharge back to [facility] next week, however, on hold now that has COVID19  Participating in therapies and does state is more tired. Therapies noted patient more tired    Transfers CGA  Bed mobility CGA  Stairs N/A  Ambulating 70 feet CGA  BLE edema  Eating s/u  Grooming/hygiene SBA  UB min  LB mod  Toileting transfer CGA, CM mod  Discharge plan and any ID barriers to discharge: Therapy plans to continue at this time. Goal to discharge back to [facility].    Pain managed    Bowel & bladder reports within normal limits   states eating fine and no c/o dysphagia    denies shortness of breath or chest pain (state had history of this with anxiety)  Positive for COVID19 yesterday, has congestion phlegm cough (has been ongoing prior to COVID too)  History COVID19, patient did not remember   Today reports mild scratchy throat, would like cough drops or cough syrup  Decline antiviral, reviewed risk vs benefit and declined yesterday  Decreased lungs sound right lower lobe, agreeable to chest xray and antibiotics if show pneumonia  Not appear fluid up  Wt Readings from Last 2 Encounters:   07/30/24 85.9 kg (189 lb 6.4 oz)   07/16/24 86.1 kg (189 lb 14.4 oz)   Vitals stable on room air      Patient vaccinated prior for Covid19  Immunization History   Administered Date(s) Administered    COVID-19 MONOVALENT 12+ (Pfizer) 03/18/2021, 04/08/2021, 06/14/2022    Influenza Vaccine 65+ (Fluzone HD) 02/29/2020    Mantoux Tuberculin Skin Test 04/27/2020    Pneumo Conj 13-V (2010&after) 09/27/2011, 06/16/2016    Pneumococcal 23 valent 06/11/2015    TDAP (Adacel,Boostrix) 06/11/2015, 06/18/2024    Tdap (Adult) Unspecified Formulation 06/11/2015    Zoster recombinant adjuvanted (SHINGRIX) 06/11/2015   Refused COVID19 recent vaccine 23/24    Follow up labs Friday   Na 7/17/24 within normal limits     Mood, less anxious.     Wt Readings from Last 2 Encounters:    07/30/24 85.9 kg (189 lb 6.4 oz)   07/16/24 86.1 kg (189 lb 14.4 oz)     Stress test date working on scheduling and updated [facility]          Past Medical and Surgical History reviewed in Epic today.    MEDICATIONS:    Current Outpatient Medications   Medication Sig Dispense Refill    acetaminophen (TYLENOL) 500 MG tablet Take 2 tablets (1,000 mg) by mouth 3 times daily as needed for mild pain 10 tablet 98    cyanocobalamin (VITAMIN B-12) 1000 MCG tablet TAKE 1 TABLET BY MOUTH ONCE DAILY 90 tablet 97    docusate sodium (COLACE) 100 MG capsule Take 1 capsule (100 mg) by mouth 2 times daily as needed for constipation      FEROSUL 325 (65 Fe) MG tablet TAKE 1 TABLET BY MOUTH ONCE DAILY WITH BREAKFAST 90 tablet 3    folic acid (FOLVITE) 1 MG tablet TAKE 1 TABLET BY MOUTH ONCE DAILY 90 tablet 97    furosemide (LASIX) 20 MG tablet Take 20 mg by mouth daily      gabapentin (NEURONTIN) 100 MG capsule Take 100 mg by mouth at bedtime      gabapentin (NEURONTIN) 100 MG capsule Take 100 mg by mouth 2 times daily as needed      loratadine (CLARITIN) 10 MG tablet TAKE 1 TABLET BY MOUTH AT BEDTIME 90 tablet 97    menthol-zinc oxide (CALMOSEPTINE) 0.44-20.6 % OINT ointment Apply topically 4 times daily To perineal area. Send to [facility]. 113 g 98    nystatin (MYCOSTATIN) 998035 UNIT/GM external powder Apply topically 2 times daily as needed      nystatin (MYCOSTATIN) 554396 UNIT/GM external powder Apply topically 2 times daily      omeprazole (PRILOSEC) 40 MG DR capsule TAKE 1 CAPSULE BY MOUTH TWICE DAILY 180 capsule 97    oxymetazoline (AFRIN) 0.05 % nasal spray Spray 2 sprays into both nostrils 2 times daily as needed (nose bleed)      polyethylene glycol (MIRALAX) 17 GM/Dose powder MIX 2 CAPFULS IN 8OZ OF ORANGE JUICE  IN THE MORNING;AND MAY MIX 2 CAPFULS ONCE DAILY AS NEEDED FOR CONSTIPATION. MIX IN FRONT OF PT. HOLD FOR LOOSE STOOL. OK TO GIVE 1 CAPFUL IF RESIDENT REFUSES 2 CAPFULS. 510 g 97    QUEtiapine (SEROQUEL) 25 MG tablet  "TAKE ONE-HALF TABLET (12.5MG) BY MOUTH TWICE DAILY;& MAY TAKE ONE-HALF TABLET (12.5MG) EVERY 12 HOURS AS NEEDED 180 tablet 97    sennosides (SENOKOT) 8.6 MG tablet Take 1-2 tablets by mouth 2 times daily as needed for constipation      sodium chloride (OCEAN) 0.65 % nasal spray Spray 1 spray in nostril 4 times daily as needed for congestion Okay to leave at bedside 88 mL 98         REVIEW OF SYSTEMS:  4 point ROS including Respiratory, CV, GI and , other than that noted in the HPI,  is negative    Objective:  /70   Pulse 88   Temp 97  F (36.1  C)   Resp 20   Ht 1.727 m (5' 8\")   Wt 85.9 kg (189 lb 6.4 oz)   SpO2 96%   BMI 28.80 kg/m    07/28/2024 10:35 189.4 Lbs (Sitting)  MDS: -5.0% change over 30 day  (s) [ Comparison Weight  07/06/2024, 201.0 lbs, -5.5%,  -11.0 lbs]  07/27/2024 09:22 189.8 Lbs (Sitting)  MDS: -5.0% change over 30 day  (s) [ Comparison Weight  07/06/2024, 201.0 lbs, -5.5%,  -11.0 lbs]  07/26/2024 13:33 190 Lbs (Sitting)  07/24/2024 12:32 190.4 Lbs (Sitting)  07/21/2024 14:41 190.4 Lbs (Sitting)  07/20/2024 10:33 190.2 Lbs (Wheelchair)  07/19/2024 13:53 190.5 Lbs (Sitting)  07/19/2024 13:33 190.5 Lbs (Sitting)  -10.0% change [ Comparison  Weight 04/11/2024, 210.2 lbs,  -10.1%, -21.2 lbs]  07/16/2024 13:09 189 Lbs (Sitting)  07/15/2024 13:55 189.9 Lbs (Sitting)  Exam:  GENERAL APPEARANCE:  Alert, in no distress, face slightly flush  RESP:  respiratory effort and palpation of chest normal, lungs clear to auscultation , no respiratory distress, slightly diminished right lower lobe  CV:  Palpation and auscultation of heart done , regular rate and rhythm  ABDOMEN:  normal bowel sounds, soft, nontender  M/S:   sitting in bed  SKIN:  Inspection of skin and subcutaneous tissue+1-2 edema bilateral lower extremity   NEURO:   Cranial nerves 2-12 are normal tested and grossly at patient's baseline  PSYCH:  oriented X 3    Labs:   Labs done in SNF are in [place] EPIC. Please refer to them using " "EPIC/Care Everywhere.    Last Comprehensive Metabolic Panel:  Lab Results   Component Value Date     07/17/2024    POTASSIUM 3.5 07/17/2024    CHLORIDE 92 (L) 07/17/2024    CO2 31 (H) 07/17/2024    ANIONGAP 12 07/17/2024     (H) 07/17/2024    BUN 6.3 (L) 07/17/2024    CR 0.48 (L) 07/17/2024    GFRESTIMATED >90 07/17/2024    JOSUE 9.0 07/17/2024       Lab Results   Component Value Date    WBC 6.9 07/17/2024     Lab Results   Component Value Date    RBC 3.81 07/17/2024     Lab Results   Component Value Date    HGB 9.5 07/17/2024     Lab Results   Component Value Date    HCT 31.4 07/17/2024     No components found for: \"MCT\"  Lab Results   Component Value Date    MCV 82 07/17/2024     Lab Results   Component Value Date    MCH 24.9 07/17/2024     Lab Results   Component Value Date    MCHC 30.3 07/17/2024     Lab Results   Component Value Date    RDW 17.3 07/17/2024     Lab Results   Component Value Date     07/17/2024         ASSESSMENT/PLAN:     COVID-19  Anemia, unspecified type  Generalized muscle weakness  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)  Depression, unspecified depression type  Generalized anxiety disorder    Therapies progressing and [facility], however, on hold now that has COVID19  Participating in therapies and does state is more tired. Therapies noted patient more tired    Transfers CGA  Bed mobility CGA  Stairs N/A  Ambulating 70 feet CGA  BLE edema  Eating s/u  Grooming/hygiene SBA  UB min  LB mod  Toileting transfer CGA, CM mod  Discharge plan and any ID barriers to discharge: Therapy plans to continue at this time. Goal to discharge back to [facility].    Pain managed  Continue on current medications to treat for pain management     Bowel & bladder reports within normal limits   Continue on current medications to treat for bowel movement management   states eating fine and no c/o dysphagia  Monitor     denies " shortness of breath or chest pain (state had history of this with anxiety)  Positive for COVID19 yesterday, has congestion phlegm cough (has been ongoing prior to COVID too)  History COVID19, patient did not remember   Today reports mild scratchy throat, would like cough drops or cough syrup, will order   Decline antiviral, reviewed risk vs benefit and declined yesterday  Decreased lungs sound right lower lobe, agreeable to chest xray and antibiotics if show pneumonia  Not appear fluid up  Wt Readings from Last 2 Encounters:   07/30/24 85.9 kg (189 lb 6.4 oz)   07/16/24 86.1 kg (189 lb 14.4 oz)   Vitals stable on room air      Patient vaccinated prior for Covid19  Immunization History   Administered Date(s) Administered    COVID-19 MONOVALENT 12+ (Pfizer) 03/18/2021, 04/08/2021, 06/14/2022    Influenza Vaccine 65+ (Fluzone HD) 02/29/2020    Mantoux Tuberculin Skin Test 04/27/2020    Pneumo Conj 13-V (2010&after) 09/27/2011, 06/16/2016    Pneumococcal 23 valent 06/11/2015    TDAP (Adacel,Boostrix) 06/11/2015, 06/18/2024    Tdap (Adult) Unspecified Formulation 06/11/2015    Zoster recombinant adjuvanted (SHINGRIX) 06/11/2015   Refused COVID19 recent vaccine 23/24    Follow up labs Friday   Na 7/17/24 within normal limits     Mood, less anxious.     Wt Readings from Last 2 Encounters:   07/30/24 85.9 kg (189 lb 6.4 oz)   07/16/24 86.1 kg (189 lb 14.4 oz)     Stress test date working on scheduling and updated [facility]    Appointment 10/10 10:15am Dr. Larry: [facility] [address] [phone]    Oct 31, 2024 10:15 AM  (Arrive by 10:00 AM)  Return Patient with Niraj Larry MD  [facility] ([facility] ) [phone]     Dec 16, 2024 10:30 AM  (Arrive by 10:15 AM)  [facility]at[facility] [phone]     Feb 03,  2025 1:00 PM  (Arrive by 12:45 PM)  NEW PATIENT with Colby Mercado DO  [facility] [place] ) [phone]              Electronically signed by:  MARICHUY Mendoza CNP

## 2024-07-31 ENCOUNTER — TRANSITIONAL CARE UNIT VISIT (OUTPATIENT)
Dept: GERIATRICS | Facility: CLINIC | Age: 84
End: 2024-07-31
Payer: COMMERCIAL

## 2024-07-31 DIAGNOSIS — D64.9 ANEMIA, UNSPECIFIED TYPE: ICD-10-CM

## 2024-07-31 DIAGNOSIS — U07.1 COVID-19: Primary | ICD-10-CM

## 2024-07-31 DIAGNOSIS — E22.2 SIADH (SYNDROME OF INAPPROPRIATE ADH PRODUCTION) (H): ICD-10-CM

## 2024-07-31 DIAGNOSIS — F41.1 GENERALIZED ANXIETY DISORDER: ICD-10-CM

## 2024-07-31 DIAGNOSIS — M62.81 GENERALIZED MUSCLE WEAKNESS: ICD-10-CM

## 2024-07-31 DIAGNOSIS — F32.A DEPRESSION, UNSPECIFIED DEPRESSION TYPE: ICD-10-CM

## 2024-07-31 PROCEDURE — 99310 SBSQ NF CARE HIGH MDM 45: CPT | Performed by: NURSE PRACTITIONER

## 2024-07-31 RX ORDER — GUAIFENESIN 200 MG/10ML
200 LIQUID ORAL 3 TIMES DAILY
COMMUNITY
Start: 2024-07-31 | End: 2024-08-09

## 2024-08-01 ENCOUNTER — LAB REQUISITION (OUTPATIENT)
Dept: LAB | Facility: CLINIC | Age: 84
End: 2024-08-01

## 2024-08-01 VITALS
WEIGHT: 184.7 LBS | SYSTOLIC BLOOD PRESSURE: 118 MMHG | RESPIRATION RATE: 18 BRPM | OXYGEN SATURATION: 95 % | HEART RATE: 88 BPM | DIASTOLIC BLOOD PRESSURE: 66 MMHG | BODY MASS INDEX: 27.99 KG/M2 | HEIGHT: 68 IN | TEMPERATURE: 97.2 F

## 2024-08-01 DIAGNOSIS — U07.1 COVID-19: ICD-10-CM

## 2024-08-01 NOTE — PROGRESS NOTES
[place] [facility]  [place] Medical Record Number:  [id number]  Place of Service where encounter took place:  [facility] ([facility]U) [[id number]]  Chief Complaint   Patient presents with    Nursing Home Acute       HPI:    Jamie Valdivia  is a 84 year old (1940), who is being seen today for an episodic care visit.  HPI information obtained from: facility chart records, facility staff, and patient report. Today's concern is:    Patient Jamie Valdivia is a 84 yr old male admitted to [facility] for rehabilitation s/post hospitalization [facility] 6/28-7/2/24 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 Niraj Larry [facility]) (Completed external beam radiation therapy w/o androgen depravation therapy with [facility] under care of Dr Fleming), hx of COVID-19, hard of hearing     [facility] and may need higher level of care  primary contact for Jamie Valdivia (Magi Espino) spouse Gisela     Seen at [facility] ER 7/12/24-7/13/24 for chest pain     COVID-19  Generalized muscle weakness  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)  Physical deconditioning    Vitals stable on room air, denies shortness of  breath  Still has some cough and some fatigue  Staff did report statement about paranoia with staff making him stay here, on Seroquel  Wife aware covid19, patient decline antivirals  States doing some therapies, however, was too tired yesterday  Chest xray negative for acute findings,  Labs noted  Therapies reported   Transfers CGA-SBA  Bed mobility SBA  Ambulating 150' FWW  Grooming/hygiene SBA  UB dressing min  LB dressing mod/max  Toileting mod  Discharge plan and any ID barriers to discharge: Covid + status as of 7/30/24. Variable at this time. Therapy plans to continue.    Past Medical and Surgical History reviewed in Epic today.    MEDICATIONS:    Current Outpatient Medications   Medication Sig Dispense Refill    acetaminophen (TYLENOL) 500 MG tablet Take 2 tablets (1,000 mg) by mouth 3 times daily as needed for mild pain 10 tablet 98    cyanocobalamin (VITAMIN B-12) 1000 MCG tablet TAKE 1 TABLET BY MOUTH ONCE DAILY 90 tablet 97    docusate sodium (COLACE) 100 MG capsule Take 1 capsule (100 mg) by mouth 2 times daily as needed for constipation      FEROSUL 325 (65 Fe) MG tablet TAKE 1 TABLET BY MOUTH ONCE DAILY WITH BREAKFAST 90 tablet 3    folic acid (FOLVITE) 1 MG tablet TAKE 1 TABLET BY MOUTH ONCE DAILY 90 tablet 97    furosemide (LASIX) 20 MG tablet Take 20 mg by mouth daily      gabapentin (NEURONTIN) 100 MG capsule Take 100 mg by mouth at bedtime      gabapentin (NEURONTIN) 100 MG capsule Take 100 mg by mouth 2 times daily as needed      guaiFENesin (ROBITUSSIN) 20 mg/mL liquid Take 200 mg by mouth 3 times daily      loratadine (CLARITIN) 10 MG tablet TAKE 1 TABLET BY MOUTH AT BEDTIME 90 tablet 97    menthol-zinc oxide (CALMOSEPTINE) 0.44-20.6 % OINT ointment Apply topically 4 times daily To perineal area. Send to [facility] T[facility]. 113 g 98    nystatin (MYCOSTATIN) 782269 UNIT/GM external powder Apply topically 2 times daily as needed      nystatin (MYCOSTATIN) 170172 UNIT/GM external powder Apply topically 2  "times daily      omeprazole (PRILOSEC) 40 MG DR capsule TAKE 1 CAPSULE BY MOUTH TWICE DAILY 180 capsule 97    oxymetazoline (AFRIN) 0.05 % nasal spray Spray 2 sprays into both nostrils 2 times daily as needed (nose bleed)      polyethylene glycol (MIRALAX) 17 GM/Dose powder MIX 2 CAPFULS IN 8OZ OF ORANGE JUICE  IN THE MORNING;AND MAY MIX 2 CAPFULS ONCE DAILY AS NEEDED FOR CONSTIPATION. MIX IN FRONT OF PT. HOLD FOR LOOSE STOOL. OK TO GIVE 1 CAPFUL IF RESIDENT REFUSES 2 CAPFULS. 510 g 97    QUEtiapine (SEROQUEL) 25 MG tablet TAKE ONE-HALF TABLET (12.5MG) BY MOUTH TWICE DAILY;& MAY TAKE ONE-HALF TABLET (12.5MG) EVERY 12 HOURS AS NEEDED 180 tablet 97    sennosides (SENOKOT) 8.6 MG tablet Take 1-2 tablets by mouth 2 times daily as needed for constipation      sodium chloride (OCEAN) 0.65 % nasal spray Spray 1 spray in nostril 4 times daily as needed for congestion Okay to leave at bedside 88 mL 98         REVIEW OF SYSTEMS:  4 point ROS including Respiratory, CV, GI and , other than that noted in the HPI,  is negative    Objective:  /66   Pulse 88   Temp 97.2  F (36.2  C)   Resp 18   Ht 1.727 m (5' 8\")   Wt 83.8 kg (184 lb 11.2 oz)   SpO2 95%   BMI 28.08 kg/m    Exam:  GENERAL APPEARANCE:  Alert, in no distress  RESP:  respiratory effort and palpation of chest normal, lungs clear to auscultation , no respiratory distress  CV:  Palpation and auscultation of heart done , regular rate and rhythm, no murmur, rub, or gallop  ABDOMEN:  normal bowel sounds, soft, nontender, no hepatosplenomegaly or other masses  M/S:   sitting in bed  SKIN:  Inspection of skin and subcutaneous tissue+1-2 edema bilateral lower extremity   NEURO:   Cranial nerves 2-12 are normal tested and grossly at patient's baseline  PSYCH:  oriented X 3    Labs:   Labs done in SNF are in [place] EPIC. Please refer to them using EPIC/Care Everywhere.    Last Comprehensive Metabolic Panel:  Lab Results   Component Value Date     07/17/2024 " "   POTASSIUM 3.5 07/17/2024    CHLORIDE 92 (L) 07/17/2024    CO2 31 (H) 07/17/2024    ANIONGAP 12 07/17/2024     (H) 07/17/2024    BUN 6.3 (L) 07/17/2024    CR 0.48 (L) 07/17/2024    GFRESTIMATED >90 07/17/2024    JOSUE 9.0 07/17/2024       Lab Results   Component Value Date    WBC 6.9 07/17/2024     Lab Results   Component Value Date    RBC 3.81 07/17/2024     Lab Results   Component Value Date    HGB 9.5 07/17/2024     Lab Results   Component Value Date    HCT 31.4 07/17/2024     No components found for: \"MCT\"  Lab Results   Component Value Date    MCV 82 07/17/2024     Lab Results   Component Value Date    MCH 24.9 07/17/2024     Lab Results   Component Value Date    MCHC 30.3 07/17/2024     Lab Results   Component Value Date    RDW 17.3 07/17/2024     Lab Results   Component Value Date     07/17/2024         ASSESSMENT/PLAN:     COVID-19  Generalized muscle weakness  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)  Physical deconditioning    Vitals stable on room air, denies shortness of breath  Still has some cough and some fatigue  Wife aware covid19, patient decline antivirals  Staff did report statement about paranoia with staff making him stay here, on Seroquel (consider increase if signs and symptoms worsen)  States doing some therapies, however, was too tired yesterday  Chest xray negative for acute findings,  Labs noted  Therapies reported   Transfers CGA-SBA  Bed mobility SBA  Ambulating 150' FWW  Grooming/hygiene SBA  UB dressing min  LB dressing mod/max  Toileting mod  Discharge plan and any ID barriers to discharge: Covid + status as of 7/30/24. Variable at this time. Therapy plans to continue.    Electronically signed by:  MARICHUY Mendoza CNP           "

## 2024-08-02 ENCOUNTER — TRANSITIONAL CARE UNIT VISIT (OUTPATIENT)
Dept: GERIATRICS | Facility: CLINIC | Age: 84
End: 2024-08-02
Payer: COMMERCIAL

## 2024-08-02 DIAGNOSIS — R53.81 PHYSICAL DECONDITIONING: ICD-10-CM

## 2024-08-02 DIAGNOSIS — M62.81 GENERALIZED MUSCLE WEAKNESS: ICD-10-CM

## 2024-08-02 DIAGNOSIS — U07.1 COVID-19: Primary | ICD-10-CM

## 2024-08-02 DIAGNOSIS — E22.2 SIADH (SYNDROME OF INAPPROPRIATE ADH PRODUCTION) (H): ICD-10-CM

## 2024-08-02 LAB
ANION GAP SERPL CALCULATED.3IONS-SCNC: 11 MMOL/L (ref 7–15)
BUN SERPL-MCNC: 10.3 MG/DL (ref 8–23)
CALCIUM SERPL-MCNC: 8.8 MG/DL (ref 8.8–10.4)
CHLORIDE SERPL-SCNC: 96 MMOL/L (ref 98–107)
CREAT SERPL-MCNC: 0.57 MG/DL (ref 0.67–1.17)
EGFRCR SERPLBLD CKD-EPI 2021: >90 ML/MIN/1.73M2
ERYTHROCYTE [DISTWIDTH] IN BLOOD BY AUTOMATED COUNT: 18.2 % (ref 10–15)
GLUCOSE SERPL-MCNC: 80 MG/DL (ref 70–99)
HCO3 SERPL-SCNC: 28 MMOL/L (ref 22–29)
HCT VFR BLD AUTO: 28 % (ref 40–53)
HGB BLD-MCNC: 8.4 G/DL (ref 13.3–17.7)
MCH RBC QN AUTO: 24 PG (ref 26.5–33)
MCHC RBC AUTO-ENTMCNC: 30 G/DL (ref 31.5–36.5)
MCV RBC AUTO: 80 FL (ref 78–100)
PLATELET # BLD AUTO: 243 10E3/UL (ref 150–450)
POTASSIUM SERPL-SCNC: 3.6 MMOL/L (ref 3.4–5.3)
RBC # BLD AUTO: 3.5 10E6/UL (ref 4.4–5.9)
SODIUM SERPL-SCNC: 135 MMOL/L (ref 135–145)
WBC # BLD AUTO: 4.7 10E3/UL (ref 4–11)

## 2024-08-02 PROCEDURE — 82947 ASSAY GLUCOSE BLOOD QUANT: CPT | Performed by: NURSE PRACTITIONER

## 2024-08-02 PROCEDURE — 99309 SBSQ NF CARE MODERATE MDM 30: CPT | Performed by: NURSE PRACTITIONER

## 2024-08-02 PROCEDURE — 85027 COMPLETE CBC AUTOMATED: CPT | Performed by: NURSE PRACTITIONER

## 2024-08-02 PROCEDURE — 36415 COLL VENOUS BLD VENIPUNCTURE: CPT | Performed by: NURSE PRACTITIONER

## 2024-08-02 PROCEDURE — P9604 ONE-WAY ALLOW PRORATED TRIP: HCPCS | Performed by: NURSE PRACTITIONER

## 2024-08-02 PROCEDURE — 82374 ASSAY BLOOD CARBON DIOXIDE: CPT | Performed by: NURSE PRACTITIONER

## 2024-08-06 NOTE — PROGRESS NOTES
[place] [facility]  [place] Medical Record Number:  [id number]  Place of Service where encounter took place:  [facility] ([facility]) [[id number]]  Chief Complaint   Patient presents with    RECHECK       HPI:    Jamie Valdivia  is a 84 year old (1940), who is being seen today for an episodic care visit.  HPI information obtained from: facility chart records, facility staff, and patient report. Today's concern is:    Patient Jamie Valdivia is a 84 yr old male admitted to [facility] for rehabilitation s/post hospitalization [facility] 6/28-7/2/24 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 Niraj Larry [facility]) (Completed external beam radiation therapy w/o androgen depravation therapy with [facility] under care of Dr Fleming), hx of COVID-19, hard of hearing     [facility] and may need higher level of care  primary contact for Jamie Valdivia (Magi Espino) spouse Gisela     Seen at [facility] ER 7/12/24-7/13/24 for chest pain       COVID-19  Generalized muscle weakness  Physical deconditioning  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)    States participating in therapies and had a good session  yesterday  Reports tired today due to staff got him up early  occasional cough. Denies shortness of breath or chest pain  States eating and regular elimination  NA within normal limits 8/2/24        Past Medical and Surgical History reviewed in Epic today.    MEDICATIONS:    Current Outpatient Medications   Medication Sig Dispense Refill    acetaminophen (TYLENOL) 500 MG tablet Take 2 tablets (1,000 mg) by mouth 3 times daily as needed for mild pain 10 tablet 98    cyanocobalamin (VITAMIN B-12) 1000 MCG tablet TAKE 1 TABLET BY MOUTH ONCE DAILY 90 tablet 97    docusate sodium (COLACE) 100 MG capsule Take 1 capsule (100 mg) by mouth 2 times daily as needed for constipation      FEROSUL 325 (65 Fe) MG tablet TAKE 1 TABLET BY MOUTH ONCE DAILY WITH BREAKFAST 90 tablet 3    folic acid (FOLVITE) 1 MG tablet TAKE 1 TABLET BY MOUTH ONCE DAILY 90 tablet 97    furosemide (LASIX) 20 MG tablet Take 20 mg by mouth daily      gabapentin (NEURONTIN) 100 MG capsule Take 100 mg by mouth at bedtime      gabapentin (NEURONTIN) 100 MG capsule Take 100 mg by mouth 2 times daily as needed      guaiFENesin (ROBITUSSIN) 20 mg/mL liquid Take 200 mg by mouth 3 times daily      loratadine (CLARITIN) 10 MG tablet TAKE 1 TABLET BY MOUTH AT BEDTIME 90 tablet 97    menthol-zinc oxide (CALMOSEPTINE) 0.44-20.6 % OINT ointment Apply topically 4 times daily To perineal area. Send to [facility]. 113 g 98    nystatin (MYCOSTATIN) 092939 UNIT/GM external powder Apply topically 2 times daily as needed      nystatin (MYCOSTATIN) 981901 UNIT/GM external powder Apply topically 2 times daily      omeprazole (PRILOSEC) 40 MG DR capsule TAKE 1 CAPSULE BY MOUTH TWICE DAILY 180 capsule 97    oxymetazoline (AFRIN) 0.05 % nasal spray Spray 2 sprays into both nostrils 2 times daily as needed (nose bleed)      polyethylene glycol (MIRALAX) 17 GM/Dose powder MIX 2 CAPFULS IN 8OZ OF ORANGE JUICE  IN THE MORNING;AND MAY MIX 2 CAPFULS ONCE DAILY AS NEEDED FOR CONSTIPATION.  "MIX IN FRONT OF PT. HOLD FOR LOOSE STOOL. OK TO GIVE 1 CAPFUL IF RESIDENT REFUSES 2 CAPFULS. 510 g 97    QUEtiapine (SEROQUEL) 25 MG tablet TAKE ONE-HALF TABLET (12.5MG) BY MOUTH TWICE DAILY;& MAY TAKE ONE-HALF TABLET (12.5MG) EVERY 12 HOURS AS NEEDED 180 tablet 97    sennosides (SENOKOT) 8.6 MG tablet Take 1-2 tablets by mouth 2 times daily as needed for constipation      sodium chloride (OCEAN) 0.65 % nasal spray Spray 1 spray in nostril 4 times daily as needed for congestion Okay to leave at bedside 88 mL 98         REVIEW OF SYSTEMS:  4 point ROS including Respiratory, CV, GI and , other than that noted in the HPI,  is negative    Objective:  /58   Pulse 64   Temp 97.5  F (36.4  C)   Resp 18   Ht 1.727 m (5' 8\")   Wt 84.5 kg (186 lb 4.8 oz)   SpO2 95%   BMI 28.33 kg/m    Wt Readings from Last 2 Encounters:   08/07/24 84.5 kg (186 lb 4.8 oz)   08/01/24 83.8 kg (184 lb 11.2 oz)       Exam:  GENERAL APPEARANCE:  Alert, in no distress  RESP:  respiratory effort and palpation of chest normal, lungs clear to auscultation , no respiratory distress  CV:  Palpation and auscultation of heart done , regular rate and rhythm  ABDOMEN:  normal bowel sounds, soft, nontender  M/S:   sitting in bed  SKIN:  Inspection of skin and subcutaneous tissue baseline, +1 -2 edema bilateral lower extremity   NEURO:   Cranial nerves 2-12 are normal tested and grossly at patient's baseline  PSYCH:  oriented X 3    Labs:   Labs done in SNF are in [place] EPIC. Please refer to them using EPIC/Care Everywhere.    Last Comprehensive Metabolic Panel:  Lab Results   Component Value Date     08/02/2024    POTASSIUM 3.6 08/02/2024    CHLORIDE 96 (L) 08/02/2024    CO2 28 08/02/2024    ANIONGAP 11 08/02/2024    GLC 80 08/02/2024    BUN 10.3 08/02/2024    CR 0.57 (L) 08/02/2024    GFRESTIMATED >90 08/02/2024    JOSUE 8.8 08/02/2024           Last Comprehensive Metabolic Panel:  Lab Results   Component Value Date     08/02/2024 " "   POTASSIUM 3.6 08/02/2024    CHLORIDE 96 (L) 08/02/2024    CO2 28 08/02/2024    ANIONGAP 11 08/02/2024    GLC 80 08/02/2024    BUN 10.3 08/02/2024    CR 0.57 (L) 08/02/2024    GFRESTIMATED >90 08/02/2024    JOSUE 8.8 08/02/2024       Lab Results   Component Value Date    WBC 4.7 08/02/2024     Lab Results   Component Value Date    RBC 3.50 08/02/2024     Lab Results   Component Value Date    HGB 8.4 08/02/2024     Lab Results   Component Value Date    HCT 28.0 08/02/2024     No components found for: \"MCT\"  Lab Results   Component Value Date    MCV 80 08/02/2024     Lab Results   Component Value Date    MCH 24.0 08/02/2024     Lab Results   Component Value Date    MCHC 30.0 08/02/2024     Lab Results   Component Value Date    RDW 18.2 08/02/2024     Lab Results   Component Value Date     08/02/2024         ASSESSMENT/PLAN:     COVID-19  Generalized muscle weakness  Physical deconditioning  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)    States participating in therapies and had a good session yesterday  Continue therapies   Reports tired today due to staff got him up early  COVID19 infection, discharge on hold  Declined antiviral  Continue with Robitussin for occasional cough. Denies shortness of breath or chest pain  States eating and regular elimination  NA within normal limits 8/2/24, repeat labs 8/9/24  Remains on lasix  Monitor     Electronically signed by:  MARICHUY Mendoza CNP          "

## 2024-08-06 NOTE — PROGRESS NOTES
[facility] Care Coordination Contact    Member was admitted to TCU on 7/1/24. 30th day is 7/30/24. Care coordinator spoke with SHALINI and she stated that there was no discharge date set. Elderly waiver temporarily closed and SW notified that care coordinator will need to do an assessment before he discharges if there has been any changes. DTR for customized living requested.    Gisela Hall RN  [facility]  [phone]

## 2024-08-07 ENCOUNTER — TRANSITIONAL CARE UNIT VISIT (OUTPATIENT)
Dept: GERIATRICS | Facility: CLINIC | Age: 84
End: 2024-08-07
Payer: COMMERCIAL

## 2024-08-07 VITALS
OXYGEN SATURATION: 95 % | RESPIRATION RATE: 18 BRPM | SYSTOLIC BLOOD PRESSURE: 127 MMHG | DIASTOLIC BLOOD PRESSURE: 58 MMHG | TEMPERATURE: 97.5 F | WEIGHT: 186.3 LBS | HEART RATE: 64 BPM | HEIGHT: 68 IN | BODY MASS INDEX: 28.23 KG/M2

## 2024-08-07 DIAGNOSIS — U07.1 COVID-19: Primary | ICD-10-CM

## 2024-08-07 DIAGNOSIS — M62.81 GENERALIZED MUSCLE WEAKNESS: ICD-10-CM

## 2024-08-07 DIAGNOSIS — R53.81 PHYSICAL DECONDITIONING: ICD-10-CM

## 2024-08-07 DIAGNOSIS — E22.2 SIADH (SYNDROME OF INAPPROPRIATE ADH PRODUCTION) (H): ICD-10-CM

## 2024-08-07 PROCEDURE — 99309 SBSQ NF CARE MODERATE MDM 30: CPT | Performed by: NURSE PRACTITIONER

## 2024-08-08 ENCOUNTER — LAB REQUISITION (OUTPATIENT)
Dept: LAB | Facility: CLINIC | Age: 84
End: 2024-08-08

## 2024-08-08 DIAGNOSIS — U07.1 COVID-19: ICD-10-CM

## 2024-08-09 LAB
ANION GAP SERPL CALCULATED.3IONS-SCNC: 11 MMOL/L (ref 7–15)
BUN SERPL-MCNC: 9.9 MG/DL (ref 8–23)
CALCIUM SERPL-MCNC: 9.1 MG/DL (ref 8.8–10.4)
CHLORIDE SERPL-SCNC: 97 MMOL/L (ref 98–107)
CREAT SERPL-MCNC: 0.53 MG/DL (ref 0.67–1.17)
EGFRCR SERPLBLD CKD-EPI 2021: >90 ML/MIN/1.73M2
ERYTHROCYTE [DISTWIDTH] IN BLOOD BY AUTOMATED COUNT: 18 % (ref 10–15)
GLUCOSE SERPL-MCNC: 95 MG/DL (ref 70–99)
HCO3 SERPL-SCNC: 31 MMOL/L (ref 22–29)
HCT VFR BLD AUTO: 35 % (ref 40–53)
HGB BLD-MCNC: 10.4 G/DL (ref 13.3–17.7)
MCH RBC QN AUTO: 24.2 PG (ref 26.5–33)
MCHC RBC AUTO-ENTMCNC: 29.7 G/DL (ref 31.5–36.5)
MCV RBC AUTO: 82 FL (ref 78–100)
PLATELET # BLD AUTO: 353 10E3/UL (ref 150–450)
POTASSIUM SERPL-SCNC: 3.7 MMOL/L (ref 3.4–5.3)
RBC # BLD AUTO: 4.29 10E6/UL (ref 4.4–5.9)
SODIUM SERPL-SCNC: 139 MMOL/L (ref 135–145)
WBC # BLD AUTO: 7.9 10E3/UL (ref 4–11)

## 2024-08-09 PROCEDURE — 85027 COMPLETE CBC AUTOMATED: CPT | Performed by: NURSE PRACTITIONER

## 2024-08-09 PROCEDURE — 82947 ASSAY GLUCOSE BLOOD QUANT: CPT | Performed by: NURSE PRACTITIONER

## 2024-08-09 PROCEDURE — 82565 ASSAY OF CREATININE: CPT | Performed by: NURSE PRACTITIONER

## 2024-08-09 PROCEDURE — 36415 COLL VENOUS BLD VENIPUNCTURE: CPT | Performed by: NURSE PRACTITIONER

## 2024-08-09 PROCEDURE — 82374 ASSAY BLOOD CARBON DIOXIDE: CPT | Performed by: NURSE PRACTITIONER

## 2024-08-12 ENCOUNTER — PATIENT OUTREACH (OUTPATIENT)
Dept: GERIATRIC MEDICINE | Facility: CLINIC | Age: 84
End: 2024-08-12
Payer: COMMERCIAL

## 2024-08-12 NOTE — PROGRESS NOTES
[facility] Care Coordination Contact    Received a request to submit a DTR for the terminated of Assisted Living and EW. Documentation completed and faxed to the health plan. Care Coordinator aware.    Arabella Langford RN  Utilization   [facility]  [phone]

## 2024-08-14 ENCOUNTER — TRANSITIONAL CARE UNIT VISIT (OUTPATIENT)
Dept: GERIATRICS | Facility: CLINIC | Age: 84
End: 2024-08-14
Payer: COMMERCIAL

## 2024-08-14 VITALS
WEIGHT: 175.8 LBS | DIASTOLIC BLOOD PRESSURE: 72 MMHG | OXYGEN SATURATION: 95 % | BODY MASS INDEX: 26.64 KG/M2 | RESPIRATION RATE: 18 BRPM | TEMPERATURE: 97.7 F | HEIGHT: 68 IN | HEART RATE: 60 BPM | SYSTOLIC BLOOD PRESSURE: 132 MMHG

## 2024-08-14 DIAGNOSIS — E22.2 SIADH (SYNDROME OF INAPPROPRIATE ADH PRODUCTION) (H): ICD-10-CM

## 2024-08-14 DIAGNOSIS — M62.81 GENERALIZED MUSCLE WEAKNESS: ICD-10-CM

## 2024-08-14 DIAGNOSIS — R53.81 PHYSICAL DECONDITIONING: ICD-10-CM

## 2024-08-14 DIAGNOSIS — F32.A DEPRESSION, UNSPECIFIED DEPRESSION TYPE: ICD-10-CM

## 2024-08-14 DIAGNOSIS — F41.1 GENERALIZED ANXIETY DISORDER: ICD-10-CM

## 2024-08-14 DIAGNOSIS — U07.1 COVID-19: Primary | ICD-10-CM

## 2024-08-14 PROCEDURE — 99309 SBSQ NF CARE MODERATE MDM 30: CPT | Performed by: NURSE PRACTITIONER

## 2024-08-14 NOTE — PROGRESS NOTES
[place] [facility]  [place] Medical Record Number:  [id number]  Place of Service where encounter took place:  [facility] ([facility]) [[id number]]  Chief Complaint   Patient presents with    Nursing Home Acute       HPI:    Jamie Valdivia  is a 84 year old (1940), who is being seen today for an episodic care visit.  HPI information obtained from: facility chart records, facility staff, and patient report. Today's concern is:    Patient Jamie Valdivia is a 84 yr old male admitted to [facility] for rehabilitation s/post hospitalization [facility] 6/28-7/2/24 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 Niraj Larry [facility]) (Completed external beam radiation therapy w/o androgen depravation therapy with [facility] under care of Dr Fleming), hx of COVID-19, hard of hearing     [facility] and may need higher level of care  primary contact for Jamie Valdivia (Magi Espino) spouse Gisela     Seen at [facility] ER 7/12/24-7/13/24 for chest pain       COVID-19  Physical deconditioning  Generalized muscle weakness  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)  Depression, unspecified depression type  Generalized  anxiety disorder    Reports feeling well, denies chest pain or shortness of breath. States has occasional ongoing cough  Reports eating and regular elimination. States Miralax helps his digestion  Mood appropriate, He is upset his wife and him can not live in independent apartment and is tired of being told what to do and regulations of nursing home.  He is wanting to get back to [facility].  Vitals stable  Not appear fluid up  Wt Readings from Last 2 Encounters:   08/14/24 79.7 kg (175 lb 12.8 oz)   08/07/24 84.5 kg (186 lb 4.8 oz)       Past Medical and Surgical History reviewed in Epic today.    MEDICATIONS:    Current Outpatient Medications   Medication Sig Dispense Refill    acetaminophen (TYLENOL) 500 MG tablet Take 2 tablets (1,000 mg) by mouth 3 times daily as needed for mild pain 10 tablet 98    cyanocobalamin (VITAMIN B-12) 1000 MCG tablet TAKE 1 TABLET BY MOUTH ONCE DAILY 90 tablet 97    docusate sodium (COLACE) 100 MG capsule Take 1 capsule (100 mg) by mouth 2 times daily as needed for constipation      FEROSUL 325 (65 Fe) MG tablet TAKE 1 TABLET BY MOUTH ONCE DAILY WITH BREAKFAST 90 tablet 3    folic acid (FOLVITE) 1 MG tablet TAKE 1 TABLET BY MOUTH ONCE DAILY 90 tablet 97    furosemide (LASIX) 20 MG tablet Take 20 mg by mouth daily      gabapentin (NEURONTIN) 100 MG capsule Take 100 mg by mouth at bedtime      gabapentin (NEURONTIN) 100 MG capsule Take 100 mg by mouth 2 times daily as needed      loratadine (CLARITIN) 10 MG tablet TAKE 1 TABLET BY MOUTH AT BEDTIME 90 tablet 97    menthol-zinc oxide (CALMOSEPTINE) 0.44-20.6 % OINT ointment Apply topically 4 times daily To perineal area. Send to [facility]. 113 g 98    nystatin (MYCOSTATIN) [id number] UNIT/GM external powder Apply topically 2 times daily as needed      nystatin (MYCOSTATIN) 876740 UNIT/GM external powder Apply topically 2 times daily      omeprazole (PRILOSEC) 40 MG DR capsule TAKE 1 CAPSULE BY MOUTH TWICE DAILY 180 capsule  "97    oxymetazoline (AFRIN) 0.05 % nasal spray Spray 2 sprays into both nostrils 2 times daily as needed (nose bleed)      polyethylene glycol (MIRALAX) 17 GM/Dose powder MIX 2 CAPFULS IN 8OZ OF ORANGE JUICE  IN THE MORNING;AND MAY MIX 2 CAPFULS ONCE DAILY AS NEEDED FOR CONSTIPATION. MIX IN FRONT OF PT. HOLD FOR LOOSE STOOL. OK TO GIVE 1 CAPFUL IF RESIDENT REFUSES 2 CAPFULS. 510 g 97    QUEtiapine (SEROQUEL) 25 MG tablet TAKE ONE-HALF TABLET (12.5MG) BY MOUTH TWICE DAILY;& MAY TAKE ONE-HALF TABLET (12.5MG) EVERY 12 HOURS AS NEEDED 180 tablet 97    sennosides (SENOKOT) 8.6 MG tablet Take 1-2 tablets by mouth 2 times daily as needed for constipation      sodium chloride (OCEAN) 0.65 % nasal spray Spray 1 spray in nostril 4 times daily as needed for congestion Okay to leave at bedside 88 mL 98         REVIEW OF SYSTEMS:  4 point ROS including Respiratory, CV, GI and , other than that noted in the HPI,  is negative    Objective:  /72   Pulse 60   Temp 97.7  F (36.5  C)   Resp 18   Ht 1.727 m (5' 8\")   Wt 79.7 kg (175 lb 12.8 oz)   SpO2 95%   BMI 26.73 kg/m    Exam:  GENERAL APPEARANCE:  Alert, in no distress  RESP:  respiratory effort and palpation of chest normal, lungs clear to auscultation , no respiratory distress  CV:  Palpation and auscultation of heart done , regular rate and rhythm  ABDOMEN:  normal bowel sounds, soft, nontender  M/S:   sitting in bed  SKIN:  Inspection of skin and subcutaneous tissue baseline, +1-2 edema bilateral lower extremity   NEURO:   Cranial nerves 2-12 are normal tested and grossly at patient's baseline  PSYCH:  oriented X 3    Labs:   Labs done in SNF are in [place] EPIC. Please refer to them using EPIC/Care Everywhere.    Last Comprehensive Metabolic Panel:  Lab Results   Component Value Date     08/09/2024    POTASSIUM 3.7 08/09/2024    CHLORIDE 97 (L) 08/09/2024    CO2 31 (H) 08/09/2024    ANIONGAP 11 08/09/2024    GLC 95 08/09/2024    BUN 9.9 08/09/2024    CR " "0.53 (L) 08/09/2024    GFRESTIMATED >90 08/09/2024    JOSUE 9.1 08/09/2024       Lab Results   Component Value Date    WBC 7.9 08/09/2024     Lab Results   Component Value Date    RBC 4.29 08/09/2024     Lab Results   Component Value Date    HGB 10.4 08/09/2024     Lab Results   Component Value Date    HCT 35.0 08/09/2024     No components found for: \"MCT\"  Lab Results   Component Value Date    MCV 82 08/09/2024     Lab Results   Component Value Date    MCH 24.2 08/09/2024     Lab Results   Component Value Date    MCHC 29.7 08/09/2024     Lab Results   Component Value Date    RDW 18.0 08/09/2024     Lab Results   Component Value Date     08/09/2024         ASSESSMENT/PLAN:     COVID-19  Physical deconditioning  Generalized muscle weakness  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)  Depression, unspecified depression type  Generalized anxiety disorder    Reports feeling well, denies chest pain or shortness of breath. States has occasional ongoing cough  Monitor   Reports eating and regular elimination. States Miralax helps his digestion  Continue on current medications to treat for bowel movement management     Mood appropriate, He is upset his wife and him can not live in independent apartment and is tired of being told what to do and regulations of nursing home.  Continue on current medications to treat for mood    He is wanting to get back to [facility]  Continue therapies    involved in safe plan home    Vitals stable  Not appear fluid up  Wt Readings from Last 2 Encounters:   08/14/24 79.7 kg (175 lb 12.8 oz)   08/07/24 84.5 kg (186 lb 4.8 oz)   Continue current dose lasix  Monitor     NA within normal limits     Electronically signed by:  MARICHUY Mendoza CNP           "

## 2024-08-19 NOTE — PROGRESS NOTES
[facility] GERIATRICS DISCHARGE SUMMARY  PATIENT'S NAME: Jamie Valdivia  YOB: 1940  MEDICAL RECORD NUMBER:  [id number]  Place of Service where encounter took place:  [facility] ([facility]) [[id number]]    PRIMARY CARE PROVIDER AND CLINIC RESPONSIBLE AFTER TRANSFER:   MARICHUY Basilio CNP, [address] [facility] / [facility] [address]    Assisted Living: [facility] ()     Transferring providers: MARICHUY Mendoza CNP; Tyler Weiss MD  Recent Hospitalization/ED:  [facility] Hospital stay 6/28/24 to 7/2/24.  Date of SNF Admission:  7/2/24  Date of SNF (anticipated) Discharge: 8/22/24  Discharged to: previous assisted living  Cognitive Scores/ Physical Function/ DME:   Transfers CGA-SBA  Bed mobility sup  Ambulating 30' CGA-SBA FWW  UB dressing min  LB dressing mod  Discharge plan and any ID barriers to discharge: LCD 8/21, discharging to  8    CODE STATUS/ADVANCE DIRECTIVES DISCUSSION:  Full Code   ALLERGIES: Ativan [lorazepam]    NURSING FACILITY COURSE   Medication Changes/Rationale:   See HPI    Summary of nursing facility stay:     Patient Jamie Valdivia is a 84 yr old male admitted to [facility] for rehabilitation s/post hospitalization [facility] 6/28-7/2/24 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 Niraj Larry [facility]) (Completed external beam radiation therapy w/o androgen depravation therapy with [facility] under care of Dr Fleming), hx of COVID-19, hard of hearing     Lives in [facility] care and may need higher level of care  primary contact for Jamie Valdivia: Alexia Espino,spouse Gisela       Hyponatremia  Per hospital report:   Sodium   Date Value Ref Range Status   08/09/2024 139 135 - 145 mmol/L Final     Presents with a sodium of 123.   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  Recently seen in clinic on 6/20 and found to have a sodium of 126. Prior to this, his sodium level was 139 in late April 2024. He was placed on a fluid restriction and his dose of mirtazapine was decreased. Despite this, his recheck Sodium on 6/27 was 123. Complains of mild headache only.  *Head CT: No acute intracranial pathology. Chronic small vessel ischemic disease, generalized cerebral volume loss and right occipital chronic infarct.   Remeron stopped, received 0.9% Saline 75ml/h today, and encourage oral intake.  Resolved   Monitor periodically      Essential hypertension, benign  CAD  BP at goal of < 150/90 given age and comorbid conditions  Stopped ASA due to hemoptysis  Chronic managed   Not on medications to treat  Monitor      Thoracic aortic aneurysm without rupture (H) - 6/30/22 4.6 cm  New finding in 2022, follows with vascular, last visit with  on 7/28/2023.  Plan follow up echo     History of A-fib  EKG 6/28: Junctional rhythm with frequent Premature ventricular complexes   Not on anticoagulation. Seen by cardiology 3/14/24 and per documentation, refuses OAC  Blood pressure, hr managed    JOSÉ MANUEL  Adjustment reaction with depression/anxiety  Alcohol dependence in remission  Dementia  Mood appropriate  continue Seroquel  Continue neurontin  and vitamins  ACP pysch counselor following closely -Kimberly Wick.  Monitor     Urinary incontinence   Incontinence associated dermatitis  menthol-zinc oxide (CALMOSEPTINE) 0.44-20.6 % OINT ointment QID  Good eveline-cares    Constipation  Continue miralax    Chronic BL lower extremity edema  Legs are swollen in setting of dependent edema and obesity.  No history CHF  Continue lasix   Continue elevate legs, and TG shapes    Prostate Cancer - adenocarcinoma Haily 4+3=7  Per epic note  Prostate biopsy 1/11/2023,pathology positive for Compton 7 prostate cancer.   Stopped Trospium due to anticholinergic side effects, no change in urinary symptoms.  Completed external beam radiation therapy w/o androgen depravation therapy with [facility] under care of Dr Fleming.   Followed by Dr. 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  Saw ENT in 2022, no explanation of symptoms.  Last esophagram May 2022 as above, presbyesophagus, proximal luminal narrowing.    Denies trouble chewing or swallowing and on Regular diet, Regular (DD4) texture, Regular consistency  Has needed nectar thick liquids in past  Monitor      Normocytic Anemia  Hemoglobin   Date Value Ref Range Status   08/09/2024 10.4 (L) 13.3 - 17.7 g/dL Final   No signs and symptoms bleeding  Hgb baseline~8-10  Continue PPI and iron     Deconditioned  Comment: d/t recent hospitalization & comorbidity, expect delay rehabilitation d/t age & comorbidity  Plan: PT/OT eval & treat, monitor  Progressing in therapies and plans to discharge back to assistive living memory care with homecare  Cognitive impairment  Family has been contact, however, likely should have guardian given at  times patient refuse to have family involved    Advanced care planning   Full code       Med review: discontinue PRNs not using: senna, afrin, colace, Neurontin, nystatin    Discharge Medications:  MED REC REQUIRED  Post Medication Reconciliation Status: discharge medications reconciled and changed, per note/orders       Current Outpatient Medications   Medication Sig Dispense Refill    acetaminophen (TYLENOL) 500 MG tablet Take 2 tablets (1,000 mg) by mouth 3 times daily as needed for mild pain 10 tablet 98    cyanocobalamin (VITAMIN B-12) 1000 MCG tablet TAKE 1 TABLET BY MOUTH ONCE DAILY 90 tablet 97    FEROSUL 325 (65 Fe) MG tablet TAKE 1 TABLET BY MOUTH ONCE DAILY WITH BREAKFAST 90 tablet 3    folic acid (FOLVITE) 1 MG tablet TAKE 1 TABLET BY MOUTH ONCE DAILY 90 tablet 97    furosemide (LASIX) 20 MG tablet Take 20 mg by mouth daily      gabapentin (NEURONTIN) 100 MG capsule Take 100 mg by mouth at bedtime      loratadine (CLARITIN) 10 MG tablet TAKE 1 TABLET BY MOUTH AT BEDTIME 90 tablet 97    menthol-zinc oxide (CALMOSEPTINE) 0.44-20.6 % OINT ointment Apply topically 4 times daily To perineal area. [facility] [place] [facility]. 113 g 98    omeprazole (PRILOSEC) 40 MG DR capsule TAKE 1 CAPSULE BY MOUTH TWICE DAILY 180 capsule 97    polyethylene glycol (MIRALAX) 17 GM/Dose powder MIX 2 CAPFULS IN 8OZ OF ORANGE JUICE  IN THE MORNING;AND MAY MIX 2 CAPFULS ONCE DAILY AS NEEDED FOR CONSTIPATION. MIX IN FRONT OF PT. HOLD FOR LOOSE STOOL. OK TO GIVE 1 CAPFUL IF RESIDENT REFUSES 2 CAPFULS. 510 g 97    QUEtiapine (SEROQUEL) 25 MG tablet TAKE ONE-HALF TABLET (12.5MG) BY MOUTH TWICE DAILY;& MAY TAKE ONE-HALF TABLET (12.5MG) EVERY 12 HOURS AS NEEDED 180 tablet 97          Controlled medications:   Send no more than 2 wks supply     Past Medical History:   Past Medical History:   Diagnosis Date    Age-related osteoporosis without current pathological fracture 03/30/2022    Alcohol use disorder     Aortic root aneurysm (H24)     CAD  "(coronary artery disease)     Chronic a-fib (H)     Chronic atrial fibrillation (H) 07/15/2016    Formatting of this note might be different from the original. 2/2019: Multiple falls so started on aspirin only.  Then aspirin stopped due to GI bleed.    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 2/2/21    HTN (hypertension)     Impaired gait and mobility 03/14/2019    Frequent falls    Mild TBI (traumatic brain injury) (H) 03/14/2019    Mumps     Obesity (BMI 30-39.9) 09/03/2015    Osteoarthritis of both knees 05/13/2015    Osteoporosis     Other idiopathic peripheral autonomic neuropathy 03/30/2022    Other seizures (H) 03/30/2022    Pharyngeal dysphagia 05/13/2015    Formatting of this note might be different from the original. Likely secondary to GERD with esophagitis, on PPI and H2 blocker with notable improvement, EGD 10/5/15.    Prostate cancer (H)     Recurrent major depressive disorder (H24) 03/30/2022    Rhabdomyolysis     Thrombocytopenia (H24)      Physical Exam:   Vitals: BP (!) 145/63   Pulse 58   Temp 97.5  F (36.4  C)   Resp 18   Ht 1.727 m (5' 8\")   Wt 81.6 kg (180 lb)   SpO2 98%   BMI 27.37 kg/m    BMI: Body mass index is 27.37 kg/m .  GENERAL APPEARANCE:  Alert, in no distress  RESP:  respiratory effort and palpation of chest normal, lungs clear to auscultation , no respiratory distress  CV:  regular rate and rhythm  ABDOMEN:  normal bowel sounds, soft, nontender  M/S:   sitting in bed  SKIN:  Inspection of skin and subcutaneous tissue+1-2 edema bilateral lower " "extremity   NEURO:   Cranial nerves 2-12 are normal tested and grossly at patient's baseline  PSYCH:  oriented X 3     SNF labs: Labs done in SNF are in [place] EPIC. Please refer to them using EPIC/Care Everywhere.    Last Comprehensive Metabolic Panel:  Lab Results   Component Value Date     08/09/2024    POTASSIUM 3.7 08/09/2024    CHLORIDE 97 (L) 08/09/2024    CO2 31 (H) 08/09/2024    ANIONGAP 11 08/09/2024    GLC 95 08/09/2024    BUN 9.9 08/09/2024    CR 0.53 (L) 08/09/2024    GFRESTIMATED >90 08/09/2024    JOSUE 9.1 08/09/2024       Lab Results   Component Value Date    WBC 7.9 08/09/2024     Lab Results   Component Value Date    RBC 4.29 08/09/2024     Lab Results   Component Value Date    HGB 10.4 08/09/2024     Lab Results   Component Value Date    HCT 35.0 08/09/2024     No components found for: \"MCT\"  Lab Results   Component Value Date    MCV 82 08/09/2024     Lab Results   Component Value Date    MCH 24.2 08/09/2024     Lab Results   Component Value Date    MCHC 29.7 08/09/2024     Lab Results   Component Value Date    RDW 18.0 08/09/2024     Lab Results   Component Value Date     08/09/2024         DISCHARGE PLAN:  Follow up labs: No labs orders/due  Medical Follow Up:      Follow up with primary care provider in 1-2 weeks  Current [place] scheduled appointments:    Aug 27, 2024 9:30 AM  (Arrive by 9:15 AM)  NM INJECTION with SCINMINJ  [facility] ([facility] ) [phone]     Oct 31, 2024 10:15 AM  (Arrive by 10:00 AM)  Return Patient with Niraj Larry MD  [facility] [place] ([facility] [place] ) [phone]     Dec 16, 2024 10:30 AM  (Arrive by 10:15 AM)  New Dermatology with Edgardo Greenberg MD  [facility] ([facility] ) [phone]     Feb 03, 2025 1:00 PM  (Arrive by 12:45 PM)  NEW " PATIENT with Colby Mercado DO  [facility] [place] ([facility] ) [phone]           Discharge Services: Home Care:  Occupational Therapy, Physical Therapy, Registered Nurse, Home Health Aide, and From:  [facility]    Appointments in Next Year        Aug 27, 2024 9:30 AM  (Arrive by 9:15 AM)  NM INJECTION with SCINMINJ  [facility] ([facility] ) [phone]     Oct 31, 2024 10:15 AM  (Arrive by 10:00 AM)  Return Patient with Niraj Larry MD  [facility] [place] ([facility] [place] ) [phone]     Dec 16, 2024 10:30 AM  (Arrive by 10:15 AM)  New Dermatology with Edgardo Greenberg MD  [facility] ([facility] ) [phone]     Feb 03, 2025 1:00 PM  (Arrive by 12:45 PM)  NEW PATIENT with Colby Mercado DO  [facility] [place] ([facility] ) [phone]              TOTAL DISCHARGE TIME:   Greater than 30 minutes  Electronically signed by:  MARICHUY Mendoza CNP     Home care Face to Face documentation done in [facility] attached to Home care orders for [facility].

## 2024-08-21 ENCOUNTER — DISCHARGE SUMMARY NURSING HOME (OUTPATIENT)
Dept: GERIATRICS | Facility: CLINIC | Age: 84
End: 2024-08-21
Payer: COMMERCIAL

## 2024-08-21 VITALS
OXYGEN SATURATION: 98 % | DIASTOLIC BLOOD PRESSURE: 63 MMHG | WEIGHT: 180 LBS | HEIGHT: 68 IN | TEMPERATURE: 97.5 F | RESPIRATION RATE: 18 BRPM | HEART RATE: 58 BPM | BODY MASS INDEX: 27.28 KG/M2 | SYSTOLIC BLOOD PRESSURE: 145 MMHG

## 2024-08-21 DIAGNOSIS — R53.81 PHYSICAL DECONDITIONING: Primary | ICD-10-CM

## 2024-08-21 DIAGNOSIS — E22.2 SIADH (SYNDROME OF INAPPROPRIATE ADH PRODUCTION) (H): ICD-10-CM

## 2024-08-21 DIAGNOSIS — D64.9 ANEMIA, UNSPECIFIED TYPE: ICD-10-CM

## 2024-08-21 DIAGNOSIS — F32.A DEPRESSION, UNSPECIFIED DEPRESSION TYPE: ICD-10-CM

## 2024-08-21 DIAGNOSIS — F41.1 GENERALIZED ANXIETY DISORDER: ICD-10-CM

## 2024-08-21 DIAGNOSIS — E87.1 HYPONATREMIA: ICD-10-CM

## 2024-08-21 DIAGNOSIS — M62.81 GENERALIZED MUSCLE WEAKNESS: ICD-10-CM

## 2024-08-21 PROCEDURE — 99316 NF DSCHRG MGMT 30 MIN+: CPT | Performed by: NURSE PRACTITIONER

## 2024-08-21 NOTE — LETTER
8/21/2024      Jamie Valdivia  C/o Coral De La Garza  [address]  [place] [address]        [facility] GERIATRICS DISCHARGE SUMMARY  PATIENT'S NAME: Jamie Valdivia  YOB: 1940  MEDICAL RECORD NUMBER:  [id number]  Place of Service where encounter took place:  [facility] ([facility]) [[id number]]    PRIMARY CARE PROVIDER AND CLINIC RESPONSIBLE AFTER TRANSFER:   MARICHUY Basilio CNP, [address] [facility] [address]    Assisted Living: [facility] ()     Transferring providers: MARICHUY Mendoza CNP; Tyler Weiss MD  Recent Hospitalization/ED:  [facility] stay 6/28/24 to 7/2/24.  Date of SNF Admission:  7/2/24  Date of SNF (anticipated) Discharge: 8/22/24  Discharged to: previous assisted living  Cognitive Scores/ Physical Function/ DME:   Transfers CGA-SBA  Bed mobility sup  Ambulating 30' CGA-SBA FWW  UB dressing min  LB dressing mod  Discharge plan and any ID barriers to discharge: LCD 8/21, discharging to  8    CODE STATUS/ADVANCE DIRECTIVES DISCUSSION:  Full Code   ALLERGIES: Ativan [lorazepam]    NURSING FACILITY COURSE   Medication Changes/Rationale:   See HPI    Summary of nursing facility stay:     Patient Jamie Valdivia is a 84 yr old male admitted to [facility] for rehabilitation s/post hospitalization [facility] 6/28-7/2/24 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 Niraj [facility]) (Completed external beam radiation therapy w/o androgen depravation therapy with [facility] under care of Dr Fleming), hx of COVID-19, hard of hearing     Lives in [facility] memory care and may need higher level of care  primary contact for Jamie Valdivia: Alexia Espino,spouse Gisela       Hyponatremia  Per hospital report:   Sodium   Date Value Ref Range Status   08/09/2024 139 135 - 145 mmol/L Final     Presents with a sodium of 123.   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  Recently seen in clinic on 6/20 and found to have a sodium of 126. Prior to this, his sodium level was 139 in late April 2024. He was placed on a fluid restriction and his dose of mirtazapine was decreased. Despite this, his recheck Sodium on 6/27 was 123. Complains of mild headache only.  *Head CT: No acute intracranial pathology. Chronic small vessel ischemic disease, generalized cerebral volume loss and right occipital chronic infarct.   Remeron stopped, received 0.9% Saline 75ml/h today, and encourage oral intake.  Resolved   Monitor periodically      Essential hypertension, benign  CAD  BP at goal of < 150/90 given age and comorbid conditions  Stopped ASA due to hemoptysis  Chronic managed   Not on medications to treat  Monitor      Thoracic aortic aneurysm without rupture (H) - 6/30/22 4.6 cm  New finding in 2022, follows with vascular, last visit with  on 7/28/2023.  Plan follow up echo     History of A-fib  EKG 6/28: Junctional rhythm with frequent Premature ventricular complexes   Not on anticoagulation. Seen by cardiology 3/14/24 and per documentation, refuses OAC  Blood pressure, hr managed    JOSÉ MANUEL  Adjustment reaction with  depression/anxiety  Alcohol dependence in remission  Dementia  Mood appropriate  continue Seroquel  Continue neurontin and vitamins  [facility] -Kimberly Wick.  Monitor     Urinary incontinence   Incontinence associated dermatitis  menthol-zinc oxide (CALMOSEPTINE) 0.44-20.6 % OINT ointment QID  Good eveline-cares    Constipation  Continue miralax    Chronic BL lower extremity edema  Legs are swollen in setting of dependent edema and obesity.  No history CHF  Continue lasix   Continue elevate legs, and TG shapes    Prostate Cancer - adenocarcinoma Haily 4+3=7  Per epic note  Prostate biopsy 1/11/2023,pathology positive for [place] 7 prostate cancer.   Stopped Trospium due to anticholinergic side effects, no change in urinary symptoms.  Completed external beam radiation therapy w/o androgen depravation therapy with [facility] under care of Dr Fleming.   Followed by Dr. 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  Saw ENT in 2022, no explanation of symptoms.  Last esophagram May 2022 as above, presbyesophagus, proximal luminal narrowing.    Denies trouble chewing or swallowing and on Regular diet, Regular (DD4) texture, Regular consistency  Has needed nectar thick liquids in past  Monitor      Normocytic Anemia  Hemoglobin   Date Value Ref Range Status   08/09/2024 10.4 (L) 13.3 - 17.7 g/dL Final   No signs and symptoms bleeding  Hgb baseline~8-10  Continue PPI and iron     Deconditioned  Comment: d/t recent hospitalization & comorbidity, expect delay rehabilitation d/t age & comorbidity  Plan: PT/OT eval & treat, monitor  Progressing in therapies and plans to discharge back to assistive living  memory care with homecare  Cognitive impairment  Family has been contact, however, likely should have guardian given at times patient refuse to have family involved    Advanced care planning   Full code       Med review: discontinue PRNs not using: senna, afrin, colace, Neurontin, nystatin    Discharge Medications:  MED REC REQUIRED  Post Medication Reconciliation Status: discharge medications reconciled and changed, per note/orders       Current Outpatient Medications   Medication Sig Dispense Refill     acetaminophen (TYLENOL) 500 MG tablet Take 2 tablets (1,000 mg) by mouth 3 times daily as needed for mild pain 10 tablet 98     cyanocobalamin (VITAMIN B-12) 1000 MCG tablet TAKE 1 TABLET BY MOUTH ONCE DAILY 90 tablet 97     FEROSUL 325 (65 Fe) MG tablet TAKE 1 TABLET BY MOUTH ONCE DAILY WITH BREAKFAST 90 tablet 3     folic acid (FOLVITE) 1 MG tablet TAKE 1 TABLET BY MOUTH ONCE DAILY 90 tablet 97     furosemide (LASIX) 20 MG tablet Take 20 mg by mouth daily       gabapentin (NEURONTIN) 100 MG capsule Take 100 mg by mouth at bedtime       loratadine (CLARITIN) 10 MG tablet TAKE 1 TABLET BY MOUTH AT BEDTIME 90 tablet 97     menthol-zinc oxide (CALMOSEPTINE) 0.44-20.6 % OINT ointment Apply topically 4 times daily To perineal area. Send to [facility]. 113 g 98     omeprazole (PRILOSEC) 40 MG DR capsule TAKE 1 CAPSULE BY MOUTH TWICE DAILY 180 capsule 97     polyethylene glycol (MIRALAX) 17 GM/Dose powder MIX 2 CAPFULS IN 8OZ OF ORANGE JUICE  IN THE MORNING;AND MAY MIX 2 CAPFULS ONCE DAILY AS NEEDED FOR CONSTIPATION. MIX IN FRONT OF PT. HOLD FOR LOOSE STOOL. OK TO GIVE 1 CAPFUL IF RESIDENT REFUSES 2 CAPFULS. 510 g 97     QUEtiapine (SEROQUEL) 25 MG tablet TAKE ONE-HALF TABLET (12.5MG) BY MOUTH TWICE DAILY;& MAY TAKE ONE-HALF TABLET (12.5MG) EVERY 12 HOURS AS NEEDED 180 tablet 97          Controlled medications:   Send no more than 2 wks supply     Past Medical History:   Past Medical History:   Diagnosis Date      "Age-related osteoporosis without current pathological fracture 03/30/2022     Alcohol use disorder      Aortic root aneurysm (H24)      CAD (coronary artery disease)      Chronic a-fib (H)      Chronic atrial fibrillation (H) 07/15/2016    Formatting of this note might be different from the original. 2/2019: Multiple falls so started on aspirin only.  Then aspirin stopped due to GI bleed.     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 2/2/21     HTN (hypertension)      Impaired gait and mobility 03/14/2019    Frequent falls     Mild TBI (traumatic brain injury) (H) 03/14/2019     Mumps      Obesity (BMI 30-39.9) 09/03/2015     Osteoarthritis of both knees 05/13/2015     Osteoporosis      Other idiopathic peripheral autonomic neuropathy 03/30/2022     Other seizures (H) 03/30/2022     Pharyngeal dysphagia 05/13/2015    Formatting of this note might be different from the original. Likely secondary to GERD with esophagitis, on PPI and H2 blocker with notable improvement, EGD 10/5/15.     Prostate cancer (H)      Recurrent major depressive disorder (H24) 03/30/2022     Rhabdomyolysis      Thrombocytopenia (H24)      Physical Exam:   Vitals: BP (!) 145/63   Pulse 58   Temp 97.5  F (36.4  C)   Resp 18   Ht 1.727 m (5' 8\")   Wt 81.6 kg (180 lb)   SpO2 98%   BMI 27.37 kg/m    BMI: Body mass index is 27.37 kg/m .  GENERAL APPEARANCE:  Alert, in no distress  RESP:  respiratory effort and palpation of chest normal, lungs clear to auscultation , no respiratory distress  CV:  regular rate " "and rhythm  ABDOMEN:  normal bowel sounds, soft, nontender  M/S:   sitting in bed  SKIN:  Inspection of skin and subcutaneous tissue+1-2 edema bilateral lower extremity   NEURO:   Cranial nerves 2-12 are normal tested and grossly at patient's baseline  PSYCH:  oriented X 3     SNF labs: Labs done in SNF are in [facility] EPIC. Please refer to them using EPIC/Care Everywhere.    Last Comprehensive Metabolic Panel:  Lab Results   Component Value Date     08/09/2024    POTASSIUM 3.7 08/09/2024    CHLORIDE 97 (L) 08/09/2024    CO2 31 (H) 08/09/2024    ANIONGAP 11 08/09/2024    GLC 95 08/09/2024    BUN 9.9 08/09/2024    CR 0.53 (L) 08/09/2024    GFRESTIMATED >90 08/09/2024    JOSUE 9.1 08/09/2024       Lab Results   Component Value Date    WBC 7.9 08/09/2024     Lab Results   Component Value Date    RBC 4.29 08/09/2024     Lab Results   Component Value Date    HGB 10.4 08/09/2024     Lab Results   Component Value Date    HCT 35.0 08/09/2024     No components found for: \"MCT\"  Lab Results   Component Value Date    MCV 82 08/09/2024     Lab Results   Component Value Date    MCH 24.2 08/09/2024     Lab Results   Component Value Date    MCHC 29.7 08/09/2024     Lab Results   Component Value Date    RDW 18.0 08/09/2024     Lab Results   Component Value Date     08/09/2024         DISCHARGE PLAN:  Follow up labs: No labs orders/due  Medical Follow Up:      Follow up with primary care provider in 1-2 weeks  Current [facility] scheduled appointments:    Aug 27, 2024 9:30 AM  (Arrive by 9:15 AM)  NM INJECTION with SCINMINJ  [facility] ([facility] ) [phone]     Oct 31, 2024 10:15 AM  (Arrive by 10:00 AM)  Return Patient with Niraj Larry MD  [facility] [place] ([facility] [place] ) [phone]     Dec 16, 2024 10:30 AM  (Arrive by 10:15 AM)  New Dermatology with Edgardo Greenberg MD  " [facility] ([facility] ) [phone]     Feb 03, 2025 1:00 PM  (Arrive by 12:45 PM)  NEW PATIENT with Colby Mercado DO  [facility] [place] ([facility] ) [phone]           Discharge Services: Home Care:  Occupational Therapy, Physical Therapy, Registered Nurse, Home Health Aide, and From:  [facility]    Appointments in Next Year        Aug 27, 2024 9:30 AM  (Arrive by 9:15 AM)  NM INJECTION with SCINMINJ  [facility] ([facility] ) [phone]     Oct 31, 2024 10:15 AM  (Arrive by 10:00 AM)  Return Patient with Niraj Larry MD  [facility] [place] ([facility] [place] ) [phone]     Dec 16, 2024 10:30 AM  (Arrive by 10:15 AM)  New Dermatology with Edgardo Greenberg MD  [facility] ([facility] ) [phone]     Feb 03, 2025 1:00 PM  (Arrive by 12:45 PM)  NEW PATIENT with Colby Mercado DO  [facility] [place] ([facility] [place] ) [phone]              TOTAL DISCHARGE TIME:   Greater than 30 minutes  Electronically signed by:  MARICHUY Mendoza CNP     Home care Face to Face documentation done in EPIC attached to Home care orders for [facility].               Sincerely,        MARICHUY Mendoza CNP

## 2024-08-26 ENCOUNTER — DOCUMENTATION ONLY (OUTPATIENT)
Dept: GERIATRICS | Facility: CLINIC | Age: 84
End: 2024-08-26
Payer: COMMERCIAL

## 2024-08-26 NOTE — PROGRESS NOTES
[facility] GERIATRICS    PRIMARY CARE PROVIDER AND CLINIC:  Sylvia Stein, MARICHUY CNP, [address] [facility] [address]  Chief Complaint   Patient presents with    Hospital F/U     TCU follow up      [place] Medical Record Number:  [id number]  Place of Service where encounter took place:  [facility]) [61]    Jamie Valdivia  is a 84 year old  (1940),  returned to the above facility from [facility]CU where he stayed from 7/2/24 - 8/22/24 following a hospital stay at [facility] from 6/28/24 - 7/2/24 .     Resident of [facility] since March 2022.    Hospitalized at [facility] form 11/1 to 11/5/21 with generalized weakness, dehydration, and concern for UTI but culture came back negative. Symptoms likely related to chronic alcoholism with alcohol withdrawal. Chronic cognitive impairment in setting of long history of alcoholism, hx of hallucinations. Multiple hospitalizations over preceding months per family due to unsafe living situation, well known to ED staff. Spent time in SNF ([facility]) around September 2021, details unclear, but seem to have suffered spinal fracture.     Hospitalized from 12/17 to 12/23/21 at [facility] for falls, weakness, alcoholism, and alcoholic hepatitis; discharge summary not available. Wife (who was primary care giver) was having medical issues and needed neurology care in the [facility]. Family felt resident unable to safely be left alone so they called EMS to transport to hospital for ongoing care/ETOH detox. Ultimately transferred to [facility] where wife was convalescing.      Remained at [facility] from 12/23 and 3/15/22 with largely uneventful stay. Zyprexa dose increased with good effect on mood, prior to starting medication having hallucinations, delusions, and wandering on unit. Transferred to [facility] for permanent placement.    ER visit on 6/30/22 due  to chest pain. Resident was watching a baseball game when he experienced left-sided chest pain.Treated by EMS with aspirin and nitroglycerin with slight relief. Reported pain worse with taking deep breaths and related to anxiety. EKG showed atrial fibrillation with controlled rate; chronic. Chest CT showed 4.6 cm ascending thoracic aortic aneurysm, but was negative for plumonary embolism. Cardiothoracic surgery consulted given size of aneurysm and lack of other connective tissue disorder recommended outpatient follow-up with repeat chest CT and echocardiogram in 3 months to evaluate for progression. Chest x-ray showed left basilar pneumonia and chest CT showed fluid-filled bronchi bilateral lower lobes. Labs significant for negative troponin, ACS excluded. ER physician noted tenderness over left chest wall and cough over last month. Hx of dysphagia and increased aspiration risk. Symptoms most likely consistent with pneumonia discharged back to assisted living facility with Augmentin.    Repeat CXR on 8/4/22 due to subjective shortness of breath showed possible left lower lobe patchy opacification, as well as low lung volumes with vascular crowding and basilar atelectasis. Started Augmentin and symptoms improved.    On 9/13/22 prostate biopsy canceled due to hyponatremia. Subsequently hospitalized at [facility] on 9/30/22 with fall and confusion, found to have severe hyponatremia (Na+ 114). Prior to hospitalization had loose stools and poor intake in setting of prophylactic antibiotic (Bactrim) prescribed prior to planned prostate biospy which was canceled due to low sodium (at 129). Started on IVF due to concern for hypovolemic hyponatremia with some improvement. 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 Treated with 1.2 L/day fluid restriction and sodium improved. Confusion improved with treatment of hyponatremia.   Also noted to have back pain after fall. Lumbar CT showed age  indeterminate compression deformity of L2-L5 with chronic mild compression deformity at superior endplate of L1. Neurosurgery consulted and recommended conservative mgmt with brace. Recommended PT in TCU at hospital discharge due to difficult with ambulation.   Medications for other chronic conditions continued without change.  Interval Echo completed, with EF 55%, right ventricle mildly dilated with mildy decreased systolic function, aortic valve mild-moderate stenosis, ascending aorta moderately dilated.     Resident recovered at [facility] from 10/6 to 11/03/22. Pain controlled with Tylenol and gabapentin.  Wearing TLSO when out of bed.  Hyponatremia felt to be due to volume depletion and SIADH. Improved and able to discontinue fluid restriction. Discharged back to [facility].    Underwent prostate biopsy 1/11/2023. 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     Complete neuropsych testing with Dr. Brock on 5/12/23 - please see report in Epic, thought to have major neurocognitive disorder (dementia) and need assistance with IADLs. Estranged from family who are listed his health care agents, but have elected not to move him off of memory care unit.     Started with nosebleed on 2/8/2024.  Bleeding was controlled in ER by silver nitrate, Afrin nasal spray, lidocaine with epinephrine, and pressure.  Labs reassuring, no sign of coagulopathy. Discharged back to assisted living facility.    Sent to ER 3/12/2024 due to fever and hypoxia. Patient was placed on supplemental oxygen by EMS and sats oxygen saturation improved. Weaned off of oxygen doing during ER stay after receiving DuoNeb. ER physician noted expiratory wheezing which improved after after nebulizer treatment. BNP elevated on labs but lower than it was 1 year ago. Chronic bilateral lower extremity swelling, stopped wearing Velcro compression wraps per patient preference.  No history of congestive heart failure or  taking diuretics in the past last echo in July 2023 with ejection fraction of 60 to 65% along with moderate aortic stenosis, resident in permanent atrial fibrillation but not anticoagulated due to fall risk.  Troponin negative x 2, ACS not suspected by ER team. Chest x-ray did not show any acute abnormality.  Viral respiratory panel was negative. Labs are significant for hyponatremia and leukocytosis. Physician offered admission for diuresis and monitoring but patient refused and was discharged back to his assisted living.    Hospitalized at [facility] from 3/19 to 3/20/2024 and observation with generalized weakness and headache.  Per hospital discharge: In ED patient afebrile and not tachycardic, normotensive to mildly hypertensive.  No hypoxia, CBC without acute abnormality.  BMP with mild hyponatremia with sodium of 128 (was 129 on 3/12/2024), and potassium 3.3.  Lactic acid within normal limits.  BNP elevated at 3400 but stable from check on 3/12.  COVID and influenza negative.  Chest x-ray did not show any acute abnormality or significant fluid overload. CT head showed no acute abnormality.  Treated 20 mg of IV Lasix.  During hospitalization remained stable and able to take p.o.'s.  Discharged back to assisted living with plan for PT/OT.    On 4/11/24 nursing requested urgent visit due to increased care needs, new incontinence and weakness. Nursing felt strongly they were unable to meet care needs due to weakness, staying in bed, making wife complete ADL care instead of staff. Willing to go to TCU to get more nursing care and therapy.    [facility] ER after fall on 6/18/24. Reported getting out of the chair to use the bathroom and decided to turn back, but fell and sustained head strike with laceration to occipital area. Take ASA 81 mg daily. Fortunately head CT cervical spine CT negative for acute process.  Given Tdap. 3 cm laceration closed with 3 simple sutures.   No labs were checks, except glucose  "which was WNL.    Hospitalized at [facility] from 6/28 to 7/2/24 with hyponatremia, sodium 123 on admission after being placed on fluid restriction and undergoing dose reduction of mirtazapine. *Head CT: No acute intracranial pathology. Chronic small vessel ischemic disease, generalized cerebral volume loss and right occipital chronic infarct. Treated with IV normal saline with improvement in serum sodium to 132 at time of discharge. Noted with pressure sores to buttocks, WOCN consulted. Discharged to [facility].    Admitted to [facility] from 7/12 to 7/13/24 from [facility] with chest pain. Per discharge, \"Episode described as a thunder strike suddenly to his L chest, sharp and stabbing. Upon admission here chest pain had resolved. Troponin's trended negative and EKG non ischemic. Discharge home with outpatient stress test next week vs ongoing monitoring in the hospital discussed with patient and his wife. Plan to discharge home back to TCU. Nuclear stress test order placed. Chest pain sounded MSK given the sharp nature of the pain. May benefit from ongoing pain adjustments at TCU.\"    Recovered at [facility] from 7/2 to 8/22/24     Therapy status at discharge:  Transfers CGA-SBA  Bed mobility sup  Ambulating 30' CGA-SBA FWW  UB dressing min  LB dressing mod      Today's Concerns:  Follow-up today after TCU stay.     Limited meaningful history due to cognitive impairment, but resident reports:   Reports he is working on weight loss purposefully.     \"Today Sylvia I'm doing really well.\"  Reports he found system that is going to cure his problems.    Then apologies for previous episode of irritation several months ago.   Throughout visit mockingly states, \"oh I will stop talking so loudly.\"    Becomes frustrated that there was COVID in the TCU while he was there.   Starts to discuss when he came to [facility] campus, feels this was done under false circumstances.   Yells, \"I am here on outright lies!\"   References neuropsych " "testing.     \"We have a problem with briefs, cancer, and men.\"  Then talks about how \"your computer and rules are shortening my life.\"  Moves to talking about raised toilet seat at length, feels this precipitated move to [facility].  Unclear if he has home health services at present.   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   Insists on wearing multiple briefs so that staff don't need to assist to clean and dry his skin throughout the day.   Expounds on staff at TCU \"you need different training.\"    Difficult to redirect.    CODE STATUS/ADVANCE DIRECTIVES DISCUSSION:  CPR/Full code   ALLERGIES:   Allergies   Allergen Reactions    Ativan [Lorazepam]       PAST MEDICAL HISTORY:   Past Medical History:   Diagnosis Date    Age-related osteoporosis without current pathological fracture 03/30/2022    Alcohol use disorder     Aortic root aneurysm (H24)     CAD (coronary artery disease)     Chronic a-fib (H)     Chronic atrial fibrillation (H) 07/15/2016    Formatting of this note might be different from the original. 2/2019: Multiple falls so started on aspirin only.  Then aspirin stopped due to GI bleed.    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 2/2/21    HTN (hypertension)     Impaired gait and mobility 03/14/2019    Frequent falls    Mild TBI (traumatic brain injury) (H) 03/14/2019    Mumps     Obesity (BMI 30-39.9) 09/03/2015    Osteoarthritis of both knees 05/13/2015    Osteoporosis     Other idiopathic peripheral autonomic neuropathy 03/30/2022 "    Other seizures (H) 03/30/2022    Pharyngeal dysphagia 05/13/2015    Formatting of this note might be different from the original. Likely secondary to GERD with esophagitis, on PPI and H2 blocker with notable improvement, EGD 10/5/15.    Prostate cancer (H)     Recurrent major depressive disorder (H24) 03/30/2022    Rhabdomyolysis     Thrombocytopenia (H24)       PAST SURGICAL HISTORY:   has a past surgical history that includes left hip replacement (2010); left shoulder replacement (2016); tonsillectomy; Spine surgery; orthopedic surgery; Esophagoscopy, gastroscopy, duodenoscopy (EGD), combined (N/A, 06/01/2022); colonoscopy; Biopsy prostate transrectal (N/A, 1/11/2023); and Transrectal ultrasonic sonogram (N/A, 1/11/2023).  FAMILY HISTORY: family history includes Osteoporosis in his mother; Prostate Cancer in his paternal grandfather.  SOCIAL HISTORY:   reports that he has never smoked. He has never used smokeless tobacco. He reports that he does not currently use alcohol. He reports that he does not use drugs.  Patient's living condition: lives in an assisted living facility    Post Discharge Medication Reconciliation Status:   MED REC REQUIRED  Post Medication Reconciliation Status:  Discharge medications reconciled, continue medications without change       Current Outpatient Medications   Medication Sig Dispense Refill    acetaminophen (TYLENOL) 500 MG tablet Take 2 tablets (1,000 mg) by mouth 3 times daily as needed for mild pain 10 tablet 98    cyanocobalamin (VITAMIN B-12) 1000 MCG tablet TAKE 1 TABLET BY MOUTH ONCE DAILY 90 tablet 97    FEROSUL 325 (65 Fe) MG tablet TAKE 1 TABLET BY MOUTH ONCE DAILY WITH BREAKFAST 90 tablet 3    folic acid (FOLVITE) 1 MG tablet TAKE 1 TABLET BY MOUTH ONCE DAILY 90 tablet 97    furosemide (LASIX) 20 MG tablet Take 20 mg by mouth daily      gabapentin (NEURONTIN) 100 MG capsule Take 100 mg by mouth at bedtime      loratadine (CLARITIN) 10 MG tablet TAKE 1 TABLET BY MOUTH  "AT BEDTIME 90 tablet 97    menthol-zinc oxide (CALMOSEPTINE) 0.44-20.6 % OINT ointment Apply topically 4 times daily To perineal area. Send to [facility]. 113 g 98    omeprazole (PRILOSEC) 40 MG DR capsule TAKE 1 CAPSULE BY MOUTH TWICE DAILY 180 capsule 97    polyethylene glycol (MIRALAX) 17 GM/Dose powder MIX 2 CAPFULS IN 8OZ OF ORANGE JUICE  IN THE MORNING;AND MAY MIX 2 CAPFULS ONCE DAILY AS NEEDED FOR CONSTIPATION. MIX IN FRONT OF PT. HOLD FOR LOOSE STOOL. OK TO GIVE 1 CAPFUL IF RESIDENT REFUSES 2 CAPFULS. 510 g 97    QUEtiapine (SEROQUEL) 25 MG tablet TAKE ONE-HALF TABLET (12.5MG) BY MOUTH TWICE DAILY;& MAY TAKE ONE-HALF TABLET (12.5MG) EVERY 12 HOURS AS NEEDED 180 tablet 97     No current facility-administered medications for this visit.     ROS:  Limited secondary to cognitive impairment but today pt report 4 point ROS including Respiratory, CV, GI and , other than that noted in the HPI,  is negative    Vitals:  BP (!) 142/60   Pulse 70   Temp (!) 96.4  F (35.8  C)   Resp 17   Ht 1.727 m (5' 8\")   Wt 81.6 kg (180 lb)   SpO2 98%   BMI 27.37 kg/m    Exam:  GENERAL APPEARANCE:  Alert, chronically ill appearing and frail, sitting up in chair.  HEENT: Sclera clear and conjunctiva normal. Pupils 2+ equal and round.   RESP:  Non-labored breathing, no respiratory distress, Lung sounds decreased throughout, no adventitious breath sounds, patient is on room air, decreased BL bases.  CV: Rate and rhythm regular, no murmur, no rub or gallop.   VASCULAR: 2+ edema bilateral lower extremities, not wearing compression wraps. Calves are non-tender.  ABDOMEN:  Normal bowel sounds, soft, nontender, no grimacing or guarding with palpation, + umblical hernia, soft/non-tender.   PSYCH: Awake and alert, speech fluent,  insight/judgement/memory impaired.    Lab/Diagnostic data:  Recent labs in [facility] reviewed by me today.   CBC RESULTS:   Recent Labs   Lab Test 08/09/24  0600 08/02/24  0547   WBC 7.9 4.7   RBC 4.29* 3.50*   HGB " 10.4* 8.4*   HCT 35.0* 28.0*   MCV 82 80   MCH 24.2* 24.0*   MCHC 29.7* 30.0*   RDW 18.0* 18.2*    243       Last Basic Metabolic Panel:  Recent Labs   Lab Test 08/09/24  0600 08/02/24  0547    135   POTASSIUM 3.7 3.6   CHLORIDE 97* 96*   JOSUE 9.1 8.8   CO2 31* 28   BUN 9.9 10.3   CR 0.53* 0.57*   GLC 95 80       Liver Function Studies -   Recent Labs   Lab Test 07/12/24  2147 06/20/24  0747   PROTTOTAL 7.2 6.9   ALBUMIN 3.0* 2.7*   BILITOTAL 0.3 0.4   ALKPHOS 133 117   AST 15 14   ALT 6 <5       TSH   Date Value Ref Range Status   06/28/2024 2.11 0.30 - 4.20 uIU/mL Final   04/11/2024 1.69 0.30 - 4.20 uIU/mL Final   04/28/2022 2.77 0.40 - 4.00 mU/L Final   04/07/2022 3.94 0.40 - 4.00 mU/L Final     Lab Results   Component Value Date    A1C 5.5 04/28/2022    A1C 5.6 04/07/2022     Recent Labs   Lab Test 01/05/23  1000 04/28/22  0655   CHOL 123 101   HDL 46 40   LDL 60 50   TRIG 84 54     ASSESSMENT/PLAN:  (I25.10) ASCVD (arteriosclerotic cardiovascular disease)  (primary encounter diagnosis)  Comment: Episodic chest pain. None recently.  Remote hx of CAD with possible MI vs anxiety vs other  Noted to be likely MSK  Stopped ASA due to hemoptysis.  Plan:   - Scheduled for stress text on 10/8  - Cardiology follow-up there after  - Monitor on serial exam    (I10) Essential hypertension, benign  Comment: Chronic  BP at goal of < 150/90 given age and comorbid conditions  Started on Lasix during TCU stay due to volume overload on CXR.  Last Echo revealed mild pulmonary hypertension, mild to moderate aortic stenosis and aortic dilatation.   Plan:   - Continue Lasix 20 mg dialy  - Monitor routine VS and labs     (I48.0) Afib   Comment: Chronic and asymptomatic, junctional rhythm in ER  History of atrial fibrillation, low heart rate on previous ECG while in hospital.  F/b cardiology Dr. Ross.  Completed Zio patch September 2023 -see report in Epic.  Persistent atrial fibrillation with average heart rate of 58  "bpm, pauses of 3.3 seconds, frequent PVCs, intermittent bundle branch block.  Considered Watchman device, but patient declined invasive intervention; pt/family aware of risk.  HAS-BLED 2: 4.1%  CHADs-VASC: 3% (Age, HTN)   See cardiology notes in [facility].  Plan:   - Cardiology follow-up with Dr. Ross   - Decline anticoagulation therapy, wife/family aware    (I71.2) Thoracic aortic aneurysm without rupture (H) - 6/30/22 4.6 cm  Comment: New finding in 2022, follows with vascular, last visit with  on 7/10/24  4.4 to 4.6 cm on echo, CT since 2020, in July 2024 4.8 cm  Plan:   - Per vascular notes, \"Plan for echocardiogram in June 2025 then followed by office or virtual visit.\"    (M17.0) Primary osteoarthritis of both knees  (R53.81) Physical deconditioning    Comment: Chronic in setting of multi-comorbidity, in decline over last several weeks, refusing care at times.  Plan:   - Continues to be benefit from supportive care in [facility] memory care setting, but may do better in LTC with more staff assistance, patient declined this option after discussion on TCU discharge.  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   Previous hyponatremia multifactorial, occurred in setting of SIADH, dose increase of selective serotonin reuptake inhibitor (Celexa), Bactrim, hypovolemia.  Stopped Celexa 9/29/22, Mirtazapine stopped July 2024.  Na+ 139 on 8/9/24 WNL.  Plan:   -Follow interval labs  - If sodium worse or symptoms may need higher level of care    (D64.9) Normocytic Anemia  Comment: Chronic, mild, baseline ~ 10.  Hgb down to 8.2 on 5/30 with B12, Folate, and iron deficiency.  Hgb stable at 10.4 on 8/9/24.  No symptoms of acute bleed.  Resident does not deny using alcohol, but theoretically has no access in locked memory care unit.  Plan:   - Continue " "iron supplement (started 3/25/24)  - Continue B12 supplement (started 6/5/24)  - Continue Folate supplement (started 6/1/24)  - Continue PPI in place for BE  - Follow serial labs    (F03.90) Major neurocognitive disorder (H) - dementia   Comment: Chronic  History of delusions and paranoia.  Please see neuropsych testing from 5/12/2023, \"I believe that Jamie is not capable of making reasoned decisions. He should continue to receive a high-level of care that includes other managing medications, finances, and limiting access to alcohol.\"   Please see interdisciplinary team care conference note from 7/25/2023.   Wife and family to assist with decision making as these are his next of kin and there is no healthcare directive. Considered moving off memory care unit if he had appropriate supports, unwilling to let family assist him at present and family does not want him moved off of memory care unit as they feel this is safest environment for him.  Family is involved on as needed basis, wife Gisela updates children, but resident declines providers contact children directly unless there is an acute concern.  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   Currently no access to ETOH and thus is abstinent, but does not deny alcohol consumption today.  Chronic low mood, suspect JOSÉ MANUEL with panic attacks/chest pain for many years, no documented history of myocardial infarction or ASCVD.   On memory care unit due to safety concerns, needs assistance with IADLs/ADLs.  Finds gabapentin helpful, dose reduced during hospitalization/TCU stays.  Plan: "   - Continue gabapentin 100 mg q HS   - Continue Seroquel 12.5 mg BID and BID PRN for psychotic features   - [facility] counselor following closely -has excellent relationship with Kimberly Wick.  - Offered psychiatry referral in past, but pt/family prefer onsite care  - [facility] PharmD follows         (R60.0) BL lower extremity edema  Comment: Chronic concern  Last echo in July 2023 see result in epic.  Elevated BNP but no history of heart failure exacerbation, no volume overload on chest x-ray.  Treated with 1 dose of IV Lasix in hospital in March 2024.  Legs are swollen in setting of dependent edema and obesity.  Plan:  -Strongly recommended compression and elevation, reviewed this again today, but patient declines to follow recommendations    (C61) Prostate Cancer - adenocarcinoma Cuervo 4+3=7  Comment: Improved  Prostate biopsy 1/11/2023,pathology positive for Haily 7 prostate cancer.   Stopped Trospium due to anticholinergic side effects, no change in urinary symptoms.  Followed by Dr. Niraj Larry [facility] - last visit 6/2/23.  Completed external beam radiation therapy w/o androgen depravation therapy with [facility] under care of Dr Fleming.   Some dysuria s/p treatment, UC negative for infection.  PSA is now 2.67.  Last urology visit 1/4/24.  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  Reported recurrent coughing up blood, but not recently.  Saw ENT in 2022, no explanation of symptoms.  Last esophagram May 2022 as above, presbyesophagus, proximal luminal narrowing.  Recent saw  "SLP 2022, no overt dysphagia.  Recurrent pneumonia, last 22.  CXR 10/1/22, 3/28/23 without concern for PNA.  Chest CT showed, \"Trace scattered atelectasis and/or fibrosis.  Elevated left hemidiaphragm. No effusions or pneumothorax.\"  Plan:   - GI referral per patient request scheduled for 2025    (K59.04) Chronic constipation  Comment: Chronic.   Having regular BMs.  Bowel habits change some s/p prostate radiation.  Long hx of Levsin multiple times per day for abd pain, but stopped by urology when started trospium, no recurrent pain.   Plan:   - Chronic polyethylene glycol (MIRALAX) 17 GM/Dose powder; 1-2 capfuls in 8 oz of ORANGE JUICE PO one time each morning and 1-2 capfuls 8 oz of ORANGE JUICE PO one time daily PRN for constipation.   - GI follow-up     Orders:  No new orders    Total time with an established patient visit in the assisted liv[facility] minutes includin:50 AM - 12:35 PM (45 minutes) In patient visit for H+P, discussion of medications, plan of care  Updated medication list.   15 additional minutes in chart review and medication reconcilation    Electronically signed by:  MARICHUY Basilio CNP                 "

## 2024-08-27 ENCOUNTER — ASSISTED LIVING VISIT (OUTPATIENT)
Dept: GERIATRICS | Facility: CLINIC | Age: 84
End: 2024-08-27
Payer: COMMERCIAL

## 2024-08-27 VITALS
DIASTOLIC BLOOD PRESSURE: 60 MMHG | OXYGEN SATURATION: 98 % | WEIGHT: 180 LBS | RESPIRATION RATE: 17 BRPM | SYSTOLIC BLOOD PRESSURE: 142 MMHG | TEMPERATURE: 96.4 F | HEART RATE: 70 BPM | HEIGHT: 68 IN | BODY MASS INDEX: 27.28 KG/M2

## 2024-08-27 DIAGNOSIS — E87.1 HYPONATREMIA: ICD-10-CM

## 2024-08-27 DIAGNOSIS — M17.0 PRIMARY OSTEOARTHRITIS OF BOTH KNEES: ICD-10-CM

## 2024-08-27 DIAGNOSIS — F41.1 GAD (GENERALIZED ANXIETY DISORDER): ICD-10-CM

## 2024-08-27 DIAGNOSIS — I48.91 ATRIAL FIBRILLATION, UNSPECIFIED TYPE (H): ICD-10-CM

## 2024-08-27 DIAGNOSIS — R13.10 DYSPHAGIA, UNSPECIFIED TYPE: ICD-10-CM

## 2024-08-27 DIAGNOSIS — F43.23 ADJUSTMENT DISORDER WITH MIXED ANXIETY AND DEPRESSED MOOD: ICD-10-CM

## 2024-08-27 DIAGNOSIS — K22.70 BARRETT'S ESOPHAGUS WITHOUT DYSPLASIA: ICD-10-CM

## 2024-08-27 DIAGNOSIS — F10.21 ALCOHOL DEPENDENCE IN REMISSION (H): ICD-10-CM

## 2024-08-27 DIAGNOSIS — K59.09 CHRONIC CONSTIPATION: ICD-10-CM

## 2024-08-27 DIAGNOSIS — C61 PROSTATE CANCER (H): ICD-10-CM

## 2024-08-27 DIAGNOSIS — I10 ESSENTIAL HYPERTENSION, BENIGN: ICD-10-CM

## 2024-08-27 DIAGNOSIS — R53.81 PHYSICAL DECONDITIONING: ICD-10-CM

## 2024-08-27 DIAGNOSIS — R60.0 LOWER EXTREMITY EDEMA: ICD-10-CM

## 2024-08-27 DIAGNOSIS — I71.20 THORACIC AORTIC ANEURYSM WITHOUT RUPTURE, UNSPECIFIED PART (H): ICD-10-CM

## 2024-08-27 DIAGNOSIS — I25.10 ASCVD (ARTERIOSCLEROTIC CARDIOVASCULAR DISEASE): Primary | ICD-10-CM

## 2024-08-27 DIAGNOSIS — D64.9 NORMOCYTIC ANEMIA: ICD-10-CM

## 2024-08-27 DIAGNOSIS — E22.2 SIADH (SYNDROME OF INAPPROPRIATE ADH PRODUCTION) (H): ICD-10-CM

## 2024-08-27 DIAGNOSIS — F03.90 MAJOR NEUROCOGNITIVE DISORDER (H): ICD-10-CM

## 2024-08-27 PROCEDURE — 99350 HOME/RES VST EST HIGH MDM 60: CPT | Performed by: NURSE PRACTITIONER

## 2024-08-27 NOTE — LETTER
8/27/2024      Jamie Valdivia  C/o Coral De La Garza  [address]  [place] [address]        [facility] GERIATRICS    PRIMARY CARE PROVIDER AND CLINIC:  MARICHUY Basilio CNP, [address] [facility] / [place] [address]  Chief Complaint   Patient presents with     Hospital F/U     TCU follow up      [place] Medical Record Number:  [id number]  Place of Service where encounter took place:  [facility] ([facility]) [61]    Jamie Valdivia  is a 84 year old  (1940),  returned to the above facility from [facility] TCU where he stayed from 7/2/24 - 8/22/24 following a hospital stay at [facility] from 6/28/24 - 7/2/24 .     Resident of [facility] since March 2022.    Hospitalized at [facility] form 11/1 to 11/5/21 with generalized weakness, dehydration, and concern for UTI but culture came back negative. Symptoms likely related to chronic alcoholism with alcohol withdrawal. Chronic cognitive impairment in setting of long history of alcoholism, hx of hallucinations. Multiple hospitalizations over preceding months per family due to unsafe living situation, well known to ED staff. Spent time in SNF ([facility]) around September 2021, details unclear, but seem to have suffered spinal fracture.     Hospitalized from 12/17 to 12/23/21 at [facility] for falls, weakness, alcoholism, and alcoholic hepatitis; discharge summary not available. Wife (who was primary care giver) was having medical issues and needed neurology care in the [facility]. Family felt resident unable to safely be left alone so they called EMS to transport to hospital for ongoing care/ETOH detox. Ultimately transferred to [facility] TCU where wife was convalescing.      [facility] from [facility]23 and 3/15/22 with largely uneventful stay. Zyprexa dose increased with good effect on mood, prior to starting medication having hallucinations, delusions, and  wandering on unit. Transferred to [facility] for permanent placement.    ER visit on 6/30/22 due to chest pain. Resident was watching a baseball game when he experienced left-sided chest pain.Treated by EMS with aspirin and nitroglycerin with slight relief. Reported pain worse with taking deep breaths and related to anxiety. EKG showed atrial fibrillation with controlled rate; chronic. Chest CT showed 4.6 cm ascending thoracic aortic aneurysm, but was negative for plumonary embolism. Cardiothoracic surgery consulted given size of aneurysm and lack of other connective tissue disorder recommended outpatient follow-up with repeat chest CT and echocardiogram in 3 months to evaluate for progression. Chest x-ray showed left basilar pneumonia and chest CT showed fluid-filled bronchi bilateral lower lobes. Labs significant for negative troponin, ACS excluded. ER physician noted tenderness over left chest wall and cough over last month. Hx of dysphagia and increased aspiration risk. Symptoms most likely consistent with pneumonia discharged back to assisted living facility with Augmentin.    Repeat CXR on 8/4/22 due to subjective shortness of breath showed possible left lower lobe patchy opacification, as well as low lung volumes with vascular crowding and basilar atelectasis. Started Augmentin and symptoms improved.    On 9/13/22 prostate biopsy canceled due to hyponatremia. Subsequently hospitalized at [facility] on 9/30/22 with fall and confusion, found to have severe hyponatremia (Na+ 114). Prior to hospitalization had loose stools and poor intake in setting of prophylactic antibiotic (Bactrim) prescribed prior to planned prostate biospy which was canceled due to low sodium (at 129). Started on IVF due to concern for hypovolemic hyponatremia with some improvement. 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 Treated with 1.2 L/day fluid restriction and sodium improved. Confusion  improved with treatment of hyponatremia.   Also noted to have back pain after fall. Lumbar CT showed age indeterminate compression deformity of L2-L5 with chronic mild compression deformity at superior endplate of L1. Neurosurgery consulted and recommended conservative mgmt with brace. Recommended PT in TCU at hospital discharge due to difficult with ambulation.   Medications for other chronic conditions continued without change.  Interval Echo completed, with EF 55%, right ventricle mildly dilated with mildy decreased systolic function, aortic valve mild-moderate stenosis, ascending aorta moderately dilated.     Resident recovered at [facility] from 10/6 to 11/03/22. Pain controlled with Tylenol and gabapentin.  Wearing TLSO when out of bed.  Hyponatremia felt to be due to volume depletion and SIADH. Improved and able to discontinue fluid restriction. Discharged back to [facility].    Underwent prostate biopsy 1/11/2023. Pathology positive for [place] 7 prostate cancer, completed course of radiation therapy.     Complete neuropsych testing with Dr. Brock on 5/12/23 - please see report in Epic, thought to have major neurocognitive disorder (dementia) and need assistance with IADLs. Estranged from family who are listed his health care agents, but have elected not to move him off of memory care unit.     Started with nosebleed on 2/8/2024.  Bleeding was controlled in ER by silver nitrate, Afrin nasal spray, lidocaine with epinephrine, and pressure.  Labs reassuring, no sign of coagulopathy. Discharged back to assisted living facility.    Sent to ER 3/12/2024 due to fever and hypoxia. Patient was placed on supplemental oxygen by EMS and sats oxygen saturation improved. Weaned off of oxygen doing during ER stay after receiving DuoNeb. ER physician noted expiratory wheezing which improved after after nebulizer treatment. BNP elevated on labs but lower than it was 1 year ago. Chronic bilateral lower extremity swelling, stopped  wearing Velcro compression wraps per patient preference.  No history of congestive heart failure or taking diuretics in the past last echo in July 2023 with ejection fraction of 60 to 65% along with moderate aortic stenosis, resident in permanent atrial fibrillation but not anticoagulated due to fall risk.  Troponin negative x 2, ACS not suspected by ER team. Chest x-ray did not show any acute abnormality.  Viral respiratory panel was negative. Labs are significant for hyponatremia and leukocytosis. Physician offered admission for diuresis and monitoring but patient refused and was discharged back to his assisted living.    Hospitalized at [facility] from 3/19 to 3/20/2024 and observation with generalized weakness and headache.  Per hospital discharge: In ED patient afebrile and not tachycardic, normotensive to mildly hypertensive.  No hypoxia, CBC without acute abnormality.  BMP with mild hyponatremia with sodium of 128 (was 129 on 3/12/2024), and potassium 3.3.  Lactic acid within normal limits.  BNP elevated at 3400 but stable from check on 3/12.  COVID and influenza negative.  Chest x-ray did not show any acute abnormality or significant fluid overload. CT head showed no acute abnormality.  Treated 20 mg of IV Lasix.  During hospitalization remained stable and able to take p.o.'s.  Discharged back to assisted living with plan for PT/OT.    On 4/11/24 nursing requested urgent visit due to increased care needs, new incontinence and weakness. Nursing felt strongly they were unable to meet care needs due to weakness, staying in bed, making wife complete ADL care instead of staff. Willing to go to TCU to get more nursing care and therapy.    [facility] ER after fall on 6/18/24. Reported getting out of the chair to use the bathroom and decided to turn back, but fell and sustained head strike with laceration to occipital area. Take ASA 81 mg daily. Fortunately head CT cervical spine CT negative for acute  "process.  Given Tdap. 3 cm laceration closed with 3 simple sutures.   No labs were checks, except glucose which was WNL.    Hospitalized at [facility] from 6/28 to 7/2/24 with hyponatremia, sodium 123 on admission after being placed on fluid restriction and undergoing dose reduction of mirtazapine. *Head CT: No acute intracranial pathology. Chronic small vessel ischemic disease, generalized cerebral volume loss and right occipital chronic infarct. Treated with IV normal saline with improvement in serum sodium to 132 at time of discharge. Noted with pressure sores to buttocks, WOCN consulted. Discharged to [facility].    Admitted to [facility] from 7/12 to 7/13/24 from [facility] with chest pain. Per discharge, \"Episode described as a thunder strike suddenly to his L chest, sharp and stabbing. Upon admission here chest pain had resolved. Troponin's trended negative and EKG non ischemic. Discharge home with outpatient stress test next week vs ongoing monitoring in the hospital discussed with patient and his wife. Plan to discharge home back to TCU. Nuclear stress test order placed. Chest pain sounded MSK given the sharp nature of the pain. May benefit from ongoing pain adjustments at TCU.\"    Recovered at [facility]CU from 7/2 to 8/22/24     Therapy status at discharge:  Transfers CGA-SBA  Bed mobility sup  Ambulating 30' CGA-SBA FWW  UB dressing min  LB dressing mod      Today's Concerns:  Follow-up today after TCU stay.     Limited meaningful history due to cognitive impairment, but resident reports:   Reports he is working on weight loss purposefully.     \"Today Sylvia I'm doing really well.\"  Reports he found system that is going to cure his problems.    Then apologies for previous episode of irritation several months ago.   Throughout visit mockingly states, \"oh I will stop talking so loudly.\"    Becomes frustrated that there was COVID in the TCU while he was there.   Starts to discuss when he came to [facility] campus, feels this " "was done under false circumstances.   Yells, \"I am here on outright lies!\"   References neuropsych testing.     \"We have a problem with briefs, cancer, and men.\"  Then talks about how \"your computer and rules are shortening my life.\"  Moves to talking about raised toilet seat at length, feels this precipitated move to [facility] TCU.  Unclear if he has home health services at present.   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   Insists on wearing multiple briefs so that staff don't need to assist to clean and dry his skin throughout the day.   Expounds on staff at U \"you need different training.\"    Difficult to redirect.    CODE STATUS/ADVANCE DIRECTIVES DISCUSSION:  CPR/Full code   ALLERGIES:   Allergies   Allergen Reactions     Ativan [Lorazepam]       PAST MEDICAL HISTORY:   Past Medical History:   Diagnosis Date     Age-related osteoporosis without current pathological fracture 03/30/2022     Alcohol use disorder      Aortic root aneurysm (H24)      CAD (coronary artery disease)      Chronic a-fib (H)      Chronic atrial fibrillation (H) 07/15/2016    Formatting of this note might be different from the original. 2/2019: Multiple falls so started on aspirin only.  Then aspirin stopped due to GI bleed.     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 2/2/21     HTN (hypertension)      Impaired gait and mobility 03/14/2019    Frequent falls     Mild TBI (traumatic brain injury) (H) 03/14/2019     Mumps      Obesity (BMI 30-39.9) 09/03/2015     " Osteoarthritis of both knees 05/13/2015     Osteoporosis      Other idiopathic peripheral autonomic neuropathy 03/30/2022     Other seizures (H) 03/30/2022     Pharyngeal dysphagia 05/13/2015    Formatting of this note might be different from the original. Likely secondary to GERD with esophagitis, on PPI and H2 blocker with notable improvement, EGD 10/5/15.     Prostate cancer (H)      Recurrent major depressive disorder (H24) 03/30/2022     Rhabdomyolysis      Thrombocytopenia (H24)       PAST SURGICAL HISTORY:   has a past surgical history that includes left hip replacement (2010); left shoulder replacement (2016); tonsillectomy; Spine surgery; orthopedic surgery; Esophagoscopy, gastroscopy, duodenoscopy (EGD), combined (N/A, 06/01/2022); colonoscopy; Biopsy prostate transrectal (N/A, 1/11/2023); and Transrectal ultrasonic sonogram (N/A, 1/11/2023).  FAMILY HISTORY: family history includes Osteoporosis in his mother; Prostate Cancer in his paternal grandfather.  SOCIAL HISTORY:   reports that he has never smoked. He has never used smokeless tobacco. He reports that he does not currently use alcohol. He reports that he does not use drugs.  Patient's living condition: lives in an assisted living facility    Post Discharge Medication Reconciliation Status:   MED REC REQUIRED  Post Medication Reconciliation Status:  Discharge medications reconciled, continue medications without change       Current Outpatient Medications   Medication Sig Dispense Refill     acetaminophen (TYLENOL) 500 MG tablet Take 2 tablets (1,000 mg) by mouth 3 times daily as needed for mild pain 10 tablet 98     cyanocobalamin (VITAMIN B-12) 1000 MCG tablet TAKE 1 TABLET BY MOUTH ONCE DAILY 90 tablet 97     FEROSUL 325 (65 Fe) MG tablet TAKE 1 TABLET BY MOUTH ONCE DAILY WITH BREAKFAST 90 tablet 3     folic acid (FOLVITE) 1 MG tablet TAKE 1 TABLET BY MOUTH ONCE DAILY 90 tablet 97     furosemide (LASIX) 20 MG tablet Take 20 mg by mouth daily        "gabapentin (NEURONTIN) 100 MG capsule Take 100 mg by mouth at bedtime       loratadine (CLARITIN) 10 MG tablet TAKE 1 TABLET BY MOUTH AT BEDTIME 90 tablet 97     menthol-zinc oxide (CALMOSEPTINE) 0.44-20.6 % OINT ointment Apply topically 4 times daily To perineal area. Send to [facility] TCU. 113 g 98     omeprazole (PRILOSEC) 40 MG DR capsule TAKE 1 CAPSULE BY MOUTH TWICE DAILY 180 capsule 97     polyethylene glycol (MIRALAX) 17 GM/Dose powder MIX 2 CAPFULS IN 8OZ OF ORANGE JUICE  IN THE MORNING;AND MAY MIX 2 CAPFULS ONCE DAILY AS NEEDED FOR CONSTIPATION. MIX IN FRONT OF PT. HOLD FOR LOOSE STOOL. OK TO GIVE 1 CAPFUL IF RESIDENT REFUSES 2 CAPFULS. 510 g 97     QUEtiapine (SEROQUEL) 25 MG tablet TAKE ONE-HALF TABLET (12.5MG) BY MOUTH TWICE DAILY;& MAY TAKE ONE-HALF TABLET (12.5MG) EVERY 12 HOURS AS NEEDED 180 tablet 97     No current facility-administered medications for this visit.     ROS:  Limited secondary to cognitive impairment but today pt report 4 point ROS including Respiratory, CV, GI and , other than that noted in the HPI,  is negative    Vitals:  BP (!) 142/60   Pulse 70   Temp (!) 96.4  F (35.8  C)   Resp 17   Ht 1.727 m (5' 8\")   Wt 81.6 kg (180 lb)   SpO2 98%   BMI 27.37 kg/m    Exam:  GENERAL APPEARANCE:  Alert, chronically ill appearing and frail, sitting up in chair.  HEENT: Sclera clear and conjunctiva normal. Pupils 2+ equal and round.   RESP:  Non-labored breathing, no respiratory distress, Lung sounds decreased throughout, no adventitious breath sounds, patient is on room air, decreased BL bases.  CV: Rate and rhythm regular, no murmur, no rub or gallop.   VASCULAR: 2+ edema bilateral lower extremities, not wearing compression wraps. Calves are non-tender.  ABDOMEN:  Normal bowel sounds, soft, nontender, no grimacing or guarding with palpation, + umblical hernia, soft/non-tender.   PSYCH: Awake and alert, speech fluent,  insight/judgement/memory impaired.    Lab/Diagnostic data:  Recent labs " in EPIC reviewed by me today.   CBC RESULTS:   Recent Labs   Lab Test 08/09/24  0600 08/02/24  0547   WBC 7.9 4.7   RBC 4.29* 3.50*   HGB 10.4* 8.4*   HCT 35.0* 28.0*   MCV 82 80   MCH 24.2* 24.0*   MCHC 29.7* 30.0*   RDW 18.0* 18.2*    243       Last Basic Metabolic Panel:  Recent Labs   Lab Test 08/09/24  0600 08/02/24  0547    135   POTASSIUM 3.7 3.6   CHLORIDE 97* 96*   JOSUE 9.1 8.8   CO2 31* 28   BUN 9.9 10.3   CR 0.53* 0.57*   GLC 95 80       Liver Function Studies -   Recent Labs   Lab Test 07/12/24  2147 06/20/24  0747   PROTTOTAL 7.2 6.9   ALBUMIN 3.0* 2.7*   BILITOTAL 0.3 0.4   ALKPHOS 133 117   AST 15 14   ALT 6 <5       TSH   Date Value Ref Range Status   06/28/2024 2.11 0.30 - 4.20 uIU/mL Final   04/11/2024 1.69 0.30 - 4.20 uIU/mL Final   04/28/2022 2.77 0.40 - 4.00 mU/L Final   04/07/2022 3.94 0.40 - 4.00 mU/L Final     Lab Results   Component Value Date    A1C 5.5 04/28/2022    A1C 5.6 04/07/2022     Recent Labs   Lab Test 01/05/23  1000 04/28/22  0655   CHOL 123 101   HDL 46 40   LDL 60 50   TRIG 84 54     ASSESSMENT/PLAN:  (I25.10) ASCVD (arteriosclerotic cardiovascular disease)  (primary encounter diagnosis)  Comment: Episodic chest pain. None recently.  Remote hx of CAD with possible MI vs anxiety vs other  Noted to be likely MSK  Stopped ASA due to hemoptysis.  Plan:   - Scheduled for stress text on 10/8  - Cardiology follow-up there after  - Monitor on serial exam    (I10) Essential hypertension, benign  Comment: Chronic  BP at goal of < 150/90 given age and comorbid conditions  Started on Lasix during TCU stay due to volume overload on CXR.  Last Echo revealed mild pulmonary hypertension, mild to moderate aortic stenosis and aortic dilatation.   Plan:   - Continue Lasix 20 mg dialy  - Monitor routine VS and labs     (I48.0) Afib   Comment: Chronic and asymptomatic, junctional rhythm in ER  History of atrial fibrillation, low heart rate on previous ECG while in hospital.  F/b  "cardiology Dr. Ross.  Completed Zio patch September 2023 -see report in Epic.  Persistent atrial fibrillation with average heart rate of 58 bpm, pauses of 3.3 seconds, frequent PVCs, intermittent bundle branch block.  Considered Watchman device, but patient declined invasive intervention; pt/family aware of risk.  HAS-BLED 2: 4.1%  CHADs-VASC: 3% (Age, HTN)   See cardiology [facility] in [facility].  Plan:   - Cardiology follow-up with Dr. Ross   - Decline anticoagulation therapy, wife/family aware    (I71.2) Thoracic aortic aneurysm without rupture (H) - 6/30/22 4.6 cm  Comment: New finding in 2022, follows with vascular, last visit with  on 7/10/24  4.4 to 4.6 cm on echo, CT since 2020, in July 2024 4.8 cm  Plan:   - Per vascular notes, \"Plan for echocardiogram in June 2025 then followed by office or virtual visit.\"    (M17.0) Primary osteoarthritis of both knees  (R53.81) Physical deconditioning    Comment: Chronic in setting of multi-comorbidity, in decline over last several weeks, refusing care at times.  Plan:   - Continues to be benefit from supportive care in [facility] memory care setting, but may do better in LTC with more staff assistance, patient declined this option after discussion on TCU discharge.  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   Previous hyponatremia multifactorial, occurred in setting of SIADH, dose increase of selective serotonin reuptake inhibitor (Celexa), Bactrim, hypovolemia.  Stopped Celexa 9/29/22, Mirtazapine stopped July 2024.  Na+ 139 on 8/9/24 WNL.  Plan:   -Follow interval labs  - If sodium worse or symptoms may need higher level of care    (D64.9) Normocytic Anemia  Comment: Chronic, mild, baseline ~ 10.  Hgb down to 8.2 on 5/30 with B12, Folate, and iron deficiency.  Hgb stable at 10.4 on 8/9/24.  No " "symptoms of acute bleed.  Resident does not deny using alcohol, but theoretically has no access in locked memory care unit.  Plan:   - Continue iron supplement (started 3/25/24)  - Continue B12 supplement (started 6/5/24)  - Continue Folate supplement (started 6/1/24)  - Continue PPI in place for BE  - Follow serial labs    (F03.90) Major neurocognitive disorder (H) - dementia   Comment: Chronic  History of delusions and paranoia.  Please see neuropsych testing from 5/12/2023, \"I believe that aJmie is not capable of making reasoned decisions. He should continue to receive a high-level of care that includes other managing medications, finances, and limiting access to alcohol.\"   Please see interdisciplinary team care conference note from 7/25/2023.   Wife and family to assist with decision making as these are his next of kin and there is no healthcare directive. Considered moving off memory care unit if he had appropriate supports, unwilling to let family assist him at present and family does not want him moved off of memory care unit as they feel this is safest environment for him.  Family is involved on as needed basis, wife Gisela updates children, but resident declines providers contact children directly unless there is an acute concern.  Plan:   - Continues to benefit from supportive care in memory care skilled nursing setting  - Continue Seroquel due to paranoid delusions causing distress (ie thinks phones are bugged, people out to get him)    (F41.1) JOSÉ MANUEL  (F43.23) Adjustment reaction with depression/anxiety  (F10.21) Alcohol dependence in remission  Comment: Chronic  Cognitive impairment in setting of history of years of ETOH abuse with recurrent falls and hospitalizations.   Currently no access to ETOH and thus is abstinent, but does not deny alcohol consumption today.  Chronic low mood, suspect JOSÉ MANUEL with panic attacks/chest pain for many years, no documented history of myocardial infarction or ASCVD.   On memory care " unit due to safety concerns, needs assistance with IADLs/ADLs.  Finds gabapentin helpful, dose reduced during hospitalization/TCU stays.  Plan:   - Continue gabapentin 100 mg q HS   - Continue Seroquel 12.5 mg BID and BID PRN for psychotic features   - ACP brandee counselor following closely -has excellent relationship with Kimberly Wick.  - Offered psychiatry referral in past, but pt/family prefer onsite care  - MTM PharmD follows         (R60.0) BL lower extremity edema  Comment: Chronic concern  Last echo in July 2023 see result in epic.  Elevated BNP but no history of heart failure exacerbation, no volume overload on chest x-ray.  Treated with 1 dose of IV Lasix in hospital in March 2024.  Legs are swollen in setting of dependent edema and obesity.  Plan:  -Strongly recommended compression and elevation, reviewed this again today, but patient declines to follow recommendations    (C61) Prostate Cancer - adenocarcinoma [place] 4+3=7  Comment: Improved  Prostate biopsy 1/11/2023,pathology positive for [place] 7 prostate cancer.   Stopped Trospium due to anticholinergic side effects, no change in urinary symptoms.  Followed by Dr. Niraj [facility] - last visit 6/2/23.  Completed external beam radiation therapy w/o androgen depravation therapy with [facility] under care of Dr Fleming.   Some dysuria s/p treatment, UC negative for infection.  PSA is now 2.67.  Last urology visit 1/4/24.  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  Reported recurrent coughing up blood, but not  "recently.  Saw ENT in , no explanation of symptoms.  Last esophagram May 2022 as above, presbyesophagus, proximal luminal narrowing.  Recent saw SLP 2022, no overt dysphagia.  Recurrent pneumonia, last 22.  CXR 10/1/22, 3/28/23 without concern for PNA.  Chest CT showed, \"Trace scattered atelectasis and/or fibrosis.  Elevated left hemidiaphragm. No effusions or pneumothorax.\"  Plan:   - GI referral per patient request scheduled for 2025    (K59.04) Chronic constipation  Comment: Chronic.   Having regular BMs.  Bowel habits change some s/p prostate radiation.  Long hx of Levsin multiple times per day for abd pain, but stopped by urology when started trospium, no recurrent pain.   Plan:   - Chronic polyethylene glycol (MIRALAX) 17 GM/Dose powder; 1-2 capfuls in 8 oz of ORANGE JUICE PO one time each morning and 1-2 capfuls 8 oz of ORANGE JUICE PO one time daily PRN for constipation.   - GI follow-up     Orders:  No new orders    Total time with an established patient visit in the assisted livin minutes includin:50 AM - 12:35 PM (45 minutes) In patient visit for H+P, discussion of medications, plan of care  Updated medication list.   15 additional minutes in chart review and medication reconcilation    Electronically signed by:  MARICHUY Basilio CNP                   Sincerely,        MARICHUY Basilio CNP      "

## 2024-08-28 ENCOUNTER — TELEPHONE (OUTPATIENT)
Dept: UROLOGY | Facility: CLINIC | Age: 84
End: 2024-08-28
Payer: COMMERCIAL

## 2024-08-28 NOTE — TELEPHONE ENCOUNTER
[facility] Call Center    Phone Message    May a detailed message be left on voicemail: no     Reason for Call: Other: pt calling  about his cancer,  has questions, please call him  Has questions on why his lab was cancelled  Action Taken: Other: urology    Travel Screening: Not Applicable     Date of Service:

## 2024-08-29 ENCOUNTER — PATIENT OUTREACH (OUTPATIENT)
Dept: GERIATRIC MEDICINE | Facility: CLINIC | Age: 84
End: 2024-08-29
Payer: COMMERCIAL

## 2024-08-29 NOTE — PROGRESS NOTES
CC emailed today to set up ride for member for tomorrow. CMS contacted [employer] and [facility] required at least 2 business day to book ride. Notified CC. Member   appointment: 08/31/2024 at 9:30 am.  Clinic is locate at [address], [place] [address], Ph: [phone]    Silvio Arambula  Care Management Specialist  [facility]  [phone]

## 2024-09-10 ENCOUNTER — TELEPHONE (OUTPATIENT)
Dept: GERIATRICS | Facility: CLINIC | Age: 84
End: 2024-09-10
Payer: COMMERCIAL

## 2024-09-10 NOTE — TELEPHONE ENCOUNTER
Resident reported dizziness to nursing, see orders below.    Jamie Valdivia  1940  ORDERS:  - Check labs on 9/12/24: CBC, iron, TIBC, ferritin, B12 dx D64, CMP dx hyponatremia  - Check orthostatic blood pressure and report to NP   Electronically signed by:   MARICHUY Basilio CNP  09/10/24 1:59 PM

## 2024-09-11 ENCOUNTER — TELEPHONE (OUTPATIENT)
Dept: GERIATRICS | Facility: CLINIC | Age: 84
End: 2024-09-11
Payer: COMMERCIAL

## 2024-09-11 ENCOUNTER — LAB REQUISITION (OUTPATIENT)
Dept: LAB | Facility: CLINIC | Age: 84
End: 2024-09-11
Payer: COMMERCIAL

## 2024-09-11 DIAGNOSIS — D64.9 ANEMIA, UNSPECIFIED: ICD-10-CM

## 2024-09-11 RX ORDER — FUROSEMIDE 20 MG
10 TABLET ORAL DAILY
COMMUNITY

## 2024-09-11 NOTE — TELEPHONE ENCOUNTER
[facility] Triage Nurse Telephone Encounter    Provider: MARICHUY Basilio CNP  Facility: [facility] Type:  AL    Allergies:    Allergies   Allergen Reactions    Ativan [Lorazepam]         Reason for call: Ortho BP update.        Verbal Order/Direction given by Provider:     Provider giving Order:  MARICHUY Basilio CNP    Verbal Order given to: Maru Rankin RN

## 2024-09-12 LAB
ALBUMIN SERPL BCG-MCNC: 3.7 G/DL (ref 3.5–5.2)
ALP SERPL-CCNC: 87 U/L (ref 40–150)
ALT SERPL W P-5'-P-CCNC: 6 U/L (ref 0–70)
ANION GAP SERPL CALCULATED.3IONS-SCNC: 14 MMOL/L (ref 7–15)
AST SERPL W P-5'-P-CCNC: 18 U/L (ref 0–45)
BILIRUB SERPL-MCNC: 0.3 MG/DL
BUN SERPL-MCNC: 10.2 MG/DL (ref 8–23)
CALCIUM SERPL-MCNC: 9 MG/DL (ref 8.8–10.4)
CHLORIDE SERPL-SCNC: 97 MMOL/L (ref 98–107)
CREAT SERPL-MCNC: 0.61 MG/DL (ref 0.67–1.17)
EGFRCR SERPLBLD CKD-EPI 2021: >90 ML/MIN/1.73M2
ERYTHROCYTE [DISTWIDTH] IN BLOOD BY AUTOMATED COUNT: 21.5 % (ref 10–15)
FERRITIN SERPL-MCNC: 137 NG/ML (ref 31–409)
GLUCOSE SERPL-MCNC: 132 MG/DL (ref 70–99)
HCO3 SERPL-SCNC: 25 MMOL/L (ref 22–29)
HCT VFR BLD AUTO: 34.2 % (ref 40–53)
HGB BLD-MCNC: 10.6 G/DL (ref 13.3–17.7)
IRON BINDING CAPACITY (ROCHE): 286 UG/DL (ref 240–430)
IRON SATN MFR SERPL: 12 % (ref 15–46)
IRON SERPL-MCNC: 33 UG/DL (ref 61–157)
MCH RBC QN AUTO: 25.7 PG (ref 26.5–33)
MCHC RBC AUTO-ENTMCNC: 31 G/DL (ref 31.5–36.5)
MCV RBC AUTO: 83 FL (ref 78–100)
PLATELET # BLD AUTO: 280 10E3/UL (ref 150–450)
POTASSIUM SERPL-SCNC: 3.8 MMOL/L (ref 3.4–5.3)
PROT SERPL-MCNC: 7.7 G/DL (ref 6.4–8.3)
RBC # BLD AUTO: 4.12 10E6/UL (ref 4.4–5.9)
SODIUM SERPL-SCNC: 136 MMOL/L (ref 135–145)
VIT B12 SERPL-MCNC: 901 PG/ML (ref 232–1245)
WBC # BLD AUTO: 6.4 10E3/UL (ref 4–11)

## 2024-09-12 PROCEDURE — 36415 COLL VENOUS BLD VENIPUNCTURE: CPT | Mod: ORL | Performed by: NURSE PRACTITIONER

## 2024-09-12 PROCEDURE — 82728 ASSAY OF FERRITIN: CPT | Mod: ORL | Performed by: NURSE PRACTITIONER

## 2024-09-12 PROCEDURE — 83550 IRON BINDING TEST: CPT | Mod: ORL | Performed by: NURSE PRACTITIONER

## 2024-09-12 PROCEDURE — 82607 VITAMIN B-12: CPT | Mod: ORL | Performed by: NURSE PRACTITIONER

## 2024-09-12 PROCEDURE — 85027 COMPLETE CBC AUTOMATED: CPT | Mod: ORL | Performed by: NURSE PRACTITIONER

## 2024-09-12 PROCEDURE — 80053 COMPREHEN METABOLIC PANEL: CPT | Mod: ORL | Performed by: NURSE PRACTITIONER

## 2024-10-02 DIAGNOSIS — K21.00 GASTROESOPHAGEAL REFLUX DISEASE WITH ESOPHAGITIS, UNSPECIFIED WHETHER HEMORRHAGE: ICD-10-CM

## 2024-10-02 RX ORDER — OMEPRAZOLE 40 MG/1
CAPSULE, DELAYED RELEASE ORAL
Qty: 180 CAPSULE | Refills: 97 | Status: SHIPPED | OUTPATIENT
Start: 2024-10-02

## 2024-10-07 ENCOUNTER — PATIENT OUTREACH (OUTPATIENT)
Dept: GERIATRIC MEDICINE | Facility: CLINIC | Age: 84
End: 2024-10-07
Payer: COMMERCIAL

## 2024-10-07 NOTE — PROGRESS NOTES
[facility] Care Coordination Contact    This CMS supports member in getting rides to medical appointments.  When speaking with member about upcoming scan on 10/8 at [facility], member has decided he will not be attending the scheduled appt.  This CMS called 10/2 and 10/4 to discuss, notified CC, CNP who also contacted AL staff.  This CMS called [facility] and cancelled the scans scheduled for 10/8.    Pat Varma  Case Management Specialist  [facility]  [phone]

## 2024-10-09 ENCOUNTER — PATIENT OUTREACH (OUTPATIENT)
Dept: GERIATRIC MEDICINE | Facility: CLINIC | Age: 84
End: 2024-10-09
Payer: COMMERCIAL

## 2024-10-09 NOTE — PROGRESS NOTES
[facility] Care Coordination Contact    Called member's daughter (Magi) and left a voice mail message to remind member to schedule, Eye exam, Dental and Wellness  exam(s).  Left contact info.     ALEXANDREA Mullins  [facility]  [phone]

## 2024-10-12 DIAGNOSIS — R60.9 DEPENDENT EDEMA: Primary | ICD-10-CM

## 2024-10-15 RX ORDER — FUROSEMIDE 20 MG/1
TABLET ORAL
Qty: 45 TABLET | Refills: 97 | Status: SHIPPED | OUTPATIENT
Start: 2024-10-15

## 2024-10-21 NOTE — PROGRESS NOTES
[facility] GERIATRICS    Chief Complaint   Patient presents with    RECHECK     Routine     HPI:  Jamie Valdivia is a 84 year old  (1940) PMH significant for GERD with esophagitis, OA BL knees, pharyngeal dysphagia, edema, abdominal wall hernia, chronic atrial fibrillation (decline AC or Watchman), HTN, osteoporosis with hx of vertebral fracture, dementia, anemia, hx of COVID-19, who is being seen today for an episodic care visit at: [facility]) [61].     Resident of [facility] since March 2022.     Hospitalized at [facility] form 11/1 to 11/5/21 with generalized weakness, dehydration, and concern for UTI but culture came back negative. Symptoms likely related to chronic alcoholism with alcohol withdrawal. Chronic cognitive impairment in setting of long history of alcoholism, hx of hallucinations. Multiple hospitalizations over preceding months per family due to unsafe living situation, well known to ED staff. Spent time in SNF ([facility]) around September 2021, details unclear, but seem to have suffered spinal fracture.     Hospitalized from 12/17 to 12/23/21 at [facility] for falls, weakness, alcoholism, and alcoholic hepatitis; discharge summary not available. Wife (who was primary care giver) was having medical issues and needed neurology care in the [facility]. Family felt resident unable to safely be left alone so they called EMS to transport to hospital for ongoing care/ETOH detox. Ultimately transferred to [facility] where wife was convalescing.      Remained at [facility] from 12/23 and 3/15/22 with largely uneventful stay. Zyprexa dose increased with good effect on mood, prior to starting medication having hallucinations, delusions, and wandering on unit. Transferred to [facility] for permanent placement.    ER visit on 6/30/22 due to chest pain. Resident was watching a baseball game when he experienced left-sided chest  pain.Treated by EMS with aspirin and nitroglycerin with slight relief. Reported pain worse with taking deep breaths and related to anxiety. EKG showed atrial fibrillation with controlled rate; chronic. Chest CT showed 4.6 cm ascending thoracic aortic aneurysm, but was negative for plumonary embolism. Cardiothoracic surgery consulted given size of aneurysm and lack of other connective tissue disorder recommended outpatient follow-up with repeat chest CT and echocardiogram in 3 months to evaluate for progression. Chest x-ray showed left basilar pneumonia and chest CT showed fluid-filled bronchi bilateral lower lobes. Labs significant for negative troponin, ACS excluded. ER physician noted tenderness over left chest wall and cough over last month. Hx of dysphagia and increased aspiration risk. Symptoms most likely consistent with pneumonia discharged back to assisted living facility with Augmentin.    Repeat CXR on 8/4/22 due to subjective shortness of breath showed possible left lower lobe patchy opacification, as well as low lung volumes with vascular crowding and basilar atelectasis. Started Augmentin and symptoms improved.    On 9/13/22 prostate biopsy canceled due to hyponatremia. Subsequently hospitalized at [facility] on 9/30/22 with fall and confusion, found to have severe hyponatremia (Na+ 114). Prior to hospitalization had loose stools and poor intake in setting of prophylactic antibiotic (Bactrim) prescribed prior to planned prostate biospy which was canceled due to low sodium (at 129). Started on IVF due to concern for hypovolemic hyponatremia with some improvement. 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 Treated with 1.2 L/day fluid restriction and sodium improved. Confusion improved with treatment of hyponatremia.   Also noted to have back pain after fall. Lumbar CT showed age indeterminate compression deformity of L2-L5 with chronic mild compression deformity at  superior endplate of L1. Neurosurgery consulted and recommended conservative mgmt with brace. Recommended PT in TCU at hospital discharge due to difficult with ambulation.   Medications for other chronic conditions continued without change.  Interval Echo completed, with EF 55%, right ventricle mildly dilated with mildy decreased systolic function, aortic valve mild-moderate stenosis, ascending aorta moderately dilated.     Resident recovered at [facility] from 10/6 to 11/03/22. Pain controlled with Tylenol and gabapentin.  Wearing TLSO when out of bed.  Hyponatremia felt to be due to volume depletion and SIADH. Improved and able to discontinue fluid restriction. Discharged back to IVETTE.    Underwent prostate biopsy 1/11/2023. Pathology positive for Haily 7 prostate cancer, completed course of radiation therapy.     Complete neuropsych testing with Dr. Brock on 5/12/23 - please see report in Epic, thought to have major neurocognitive disorder (dementia) and need assistance with IADLs. Estranged from family who are listed his health care agents, but have elected not to move him off of memory care unit.     Started with nosebleed on 2/8/2024.  Bleeding was controlled in ER by silver nitrate, Afrin nasal spray, lidocaine with epinephrine, and pressure.  Labs reassuring, no sign of coagulopathy. Discharged back to assisted living facility.    Sent to ER 3/12/2024 due to fever and hypoxia. Patient was placed on supplemental oxygen by EMS and sats oxygen saturation improved. Weaned off of oxygen doing during ER stay after receiving DuoNeb. ER physician noted expiratory wheezing which improved after after nebulizer treatment. BNP elevated on labs but lower than it was 1 year ago. Chronic bilateral lower extremity swelling, stopped wearing Velcro compression wraps per patient preference.  No history of congestive heart failure or taking diuretics in the past last echo in July 2023 with ejection fraction of 60 to 65% along  with moderate aortic stenosis, resident in permanent atrial fibrillation but not anticoagulated due to fall risk.  Troponin negative x 2, ACS not suspected by ER team. Chest x-ray did not show any acute abnormality.  Viral respiratory panel was negative. Labs are significant for hyponatremia and leukocytosis. Physician offered admission for diuresis and monitoring but patient refused and was discharged back to his assisted living.    Hospitalized at [facility] from 3/19 to 3/20/2024 and observation with generalized weakness and headache.  Per hospital discharge: In ED patient afebrile and not tachycardic, normotensive to mildly hypertensive.  No hypoxia, CBC without acute abnormality.  BMP with mild hyponatremia with sodium of 128 (was 129 on 3/12/2024), and potassium 3.3.  Lactic acid within normal limits.  BNP elevated at 3400 but stable from check on 3/12.  COVID and influenza negative.  Chest x-ray did not show any acute abnormality or significant fluid overload. CT head showed no acute abnormality.  Treated 20 mg of IV Lasix.  During hospitalization remained stable and able to take p.o.'s.  Discharged back to assisted living with plan for PT/OT.    On 4/11 nursing requested urgent visit due to increased care needs, new incontinence and weakness. Nursing felt strongly they were unable to meet care needs due to weakness, staying in bed, making wife complete ADL care instead of staff. Willing to go to TCU to get more nursing care and therapy.      [facility] ER after fall on 6/18/24. Reported getting out of the chair to use the bathroom and decided to turn back, but fell and sustained head strike with laceration to occipital area. Take ASA 81 mg daily. Fortunately head CT cervical spine CT negative for acute process.  Given Tdap. 3 cm laceration closed with 3 simple sutures.   No labs were checks, except glucose which was WNL.    Hospitalized at [facility] from 6/28 to 7/2/24 with hyponatremia, sodium 123 on  "admission after being placed on fluid restriction and undergoing dose reduction of mirtazapine. *Head CT: No acute intracranial pathology. Chronic small vessel ischemic disease, generalized cerebral volume loss and right occipital chronic infarct. Treated with IV normal saline with improvement in serum sodium to 132 at time of discharge. Noted with pressure sores to buttocks, WOCN consulted. Discharged to [facility].    Admitted to [facility] from 7/12 to 7/13/24 from [facility] with chest pain. Per discharge, \"Episode described as a thunder strike suddenly to his L chest, sharp and stabbing. Upon admission here chest pain had resolved. Troponin's trended negative and EKG non ischemic. Discharge home with outpatient stress test next week vs ongoing monitoring in the hospital discussed with patient and his wife. Plan to discharge home back to TCU. Nuclear stress test order placed. Chest pain sounded MSK given the sharp nature of the pain. May benefit from ongoing pain adjustments at TCU.\"    Recovered at [facility] from 7/2 to 8/22/24.  Completed course of therapy, at time of discharge able to transfer with contact-guard and standby assist and walk 30 feet with 2 wheel walker and supervision, continue to need help with ADL care.    Today's concern is:   Follow-up today for routine IVETTE visit and assessment of multiple chronic health issues as outlined above.     Visit today with Dr. Quintana present.  Wife Gisela also present for visit.    Resident found laying in his hospital bed in bedroom as his recliner chair is broken and he is not sure how to fix it.    Difficult to obtain meaningful history from resident due to multiple tangents and circular conversation.  Continues to mock provider by asking if he speaking to loudly repeatedly.    Unable to redirect from previous grievances regarding laxatives and neuropsychological testing.    Spends portion of visit recounting news article related to  [facility] [place] and issue " "with obstetric care, becomes irritated when provider is unaware of details of this case.    Reports that he would like a different primary care provider.  And care everywhere able to see how he has been calling his previous physician Dr. Shelbi Smith in [facility].   Per phone note from [facility]: \"Spoke with patient and he expressed his situation about moving back to have Dr. Smith be his PCP again.\"  Discussed patient is free to choose a new primary care provider and will ask his [facility]  to assist with this.    Allergies, and PMH/PSH reviewed in [employer] [employer] today.    MED REC REQUIRED  Post Medication Reconciliation Status:  Patient was not discharged from an inpatient facility or TCU but medications were reconciled per facility EMR.    REVIEW OF SYSTEMS:  Unobtainable secondary to cognitive impairment.     Objective:   /78   Pulse 68   Temp 97.2  F (36.2  C)   Resp 18   Ht 1.727 m (5' 8\")   Wt 84.4 kg (186 lb)   SpO2 90%   BMI 28.28 kg/m    GENERAL APPEARANCE:  Alert, chronically ill appearing, laying in hospital bed.  HEENT: Sclera clear and conjunctiva normal. Pupils 2+ equal and round.   RESP:  Non-labored breathing.  PSYCH: Awake and alert, speech fluent,  insight/judgement/memory impaired.    Recent labs in [facility] reviewed by me today.   CBC RESULTS:   Recent Labs   Lab Test 09/12/24  1248 08/09/24  0600   WBC 6.4 7.9   RBC 4.12* 4.29*   HGB 10.6* 10.4*   HCT 34.2* 35.0*   MCV 83 82   MCH 25.7* 24.2*   MCHC 31.0* 29.7*   RDW 21.5* 18.0*    353     Ferritin   Date Value Ref Range Status   09/12/2024 137 31 - 409 ng/mL Final     Iron   Date Value Ref Range Status   09/12/2024 33 (L) 61 - 157 ug/dL Final     Iron Binding Capacity   Date Value Ref Range Status   09/12/2024 286 240 - 430 ug/dL Final   09/15/2022 374 240 - 430 ug/dL Final     Last Basic Metabolic Panel:  Recent Labs   Lab Test 09/12/24  1248 08/09/24  0600    139   POTASSIUM " 3.8 3.7   CHLORIDE 97* 97*   JOSUE 9.0 9.1   CO2 25 31*   BUN 10.2 9.9   CR 0.61* 0.53*   * 95     Liver Function Studies -   Recent Labs   Lab Test 09/12/24  1248 07/12/24  2147   PROTTOTAL 7.7 7.2   ALBUMIN 3.7 3.0*   BILITOTAL 0.3 0.3   ALKPHOS 87 133   AST 18 15   ALT 6 6       TSH   Date Value Ref Range Status   06/28/2024 2.11 0.30 - 4.20 uIU/mL Final   04/11/2024 1.69 0.30 - 4.20 uIU/mL Final   04/28/2022 2.77 0.40 - 4.00 mU/L Final   04/07/2022 3.94 0.40 - 4.00 mU/L Final     Lab Results   Component Value Date    A1C 5.5 04/28/2022    A1C 5.6 04/07/2022         Assessment/Plan:  (I25.10) ASCVD (arteriosclerotic cardiovascular disease)  (primary encounter diagnosis)  Comment: By history.  Remote hx of CAD with possible MI vs anxiety vs other  Episodic chest pain, possible musculoskeletal.  Declined further workup for coronary artery disease.  Stopped ASA due to hemoptysis.  Plan:   -Would recommend resident reschedule stress test with cardiology follow-up  - Monitor on serial exam    (I10) Essential hypertension, benign  Comment: Chronic  BP at goal of < 150/90 given age and comorbid conditions  Started on Lasix during TCU stay due to volume overload on CXR.  Last Echo revealed mild pulmonary hypertension, mild to moderate aortic stenosis and aortic dilatation.   BP Readings from Last 3 Encounters:   10/21/24 130/78   08/26/24 (!) 142/60   08/20/24 (!) 145/63   Plan:   - Continue Lasix 20 mg dialy  - Monitor routine VS and labs     (I48.0) Afib   Comment: Chronic and asymptomatic  History of atrial fibrillation, low heart rate on previous ECG while in hospital.  F/b cardiology Dr. Ross.  Completed Zio patch September 2023 -see report in Epic.  Persistent atrial fibrillation with average heart rate of 58 bpm, pauses of 3.3 seconds, frequent PVCs, intermittent bundle branch block.  Considered Watchman device, but patient declined invasive intervention; pt/family aware of risk.  HAS-BLED 2:  "4.1%  CHADs-VASC: 3% (Age, HTN)   See cardiology notes in Epic.  Plan:   - Cardiology follow-up with Dr. Ross   - Declined anticoagulation therapy, wife/family aware    (I71.2) Thoracic aortic aneurysm without rupture (H) - 6/30/22 4.6 cm  Comment: New finding in 2022, follows with vascular, last visit with  on 7/10/24  4.4 to 4.6 cm on echo, CT since 2020, in July 2024 4.8 cm  Plan:   - Per vascular notes, \"Plan for echocardiogram in June 2025 then followed by office or virtual visit.\"    (M17.0) Primary osteoarthritis of both knees  (R53.81) Physical deconditioning    Comment: Chronic in setting of multi-comorbidity, in decline over last several weeks, refusing care at times.  No pain reported today.  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   Previous hyponatremia multifactorial, occurred in setting of SIADH, dose increase of selective serotonin reuptake inhibitor (Celexa), Bactrim, hypovolemia.  Stopped Celexa 9/29/22, Mirtazapine stopped July 2024.  Na+ 136 on 9/12/2024 WNL.  Plan:   -Follow interval labs    (D64.9) Normocytic Anemia  Comment: Chronic, mild, baseline ~ 10.  Hgb down to 8.2 on 5/30 with B12, Folate, and iron deficiency.  Hgb stable at 10.6 on 9/12/2024.  No symptoms of acute bleed.  Resident does not deny using alcohol, but theoretically has no access in locked memory care unit.  Plan:   -Discontinue iron supplement, resident does not want to take this any longer  - Continue B12 supplement (started 6/5/24)  - Continue Folate supplement (started 6/1/24)  - Continue PPI in place for BE  - Follow serial labs    (F03.90) Major neurocognitive disorder (H) - dementia   Comment: Chronic  History of delusions and paranoia.  Please see neuropsych " "testing from 5/12/2023, \"I believe that Jamie is not capable of making reasoned decisions. He should continue to receive a high-level of care that includes other managing medications, finances, and limiting access to alcohol.\"   Please see interdisciplinary team care conference note from 7/25/2023.   Wife and family to assist with decision making as these are his next of kin and there is no healthcare directive. Considered moving off memory care unit if he had appropriate supports, unwilling to let family assist him at present and family does not want him moved off of memory care unit as they feel this is safest environment for him.  Family is involved on as needed basis for acute issues, wife Gisela updates children, but resident declines providers contact children directly.  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   Currently no access to ETOH and thus is abstinent, but does not deny alcohol consumption today.  Chronic low mood, suspect JOSÉ MANUEL with panic attacks/chest pain for many years, no documented history of myocardial infarction or ASCVD.   On memory care unit due to safety concerns, needs assistance with IADLs/ADLs.  Finds gabapentin helpful, dose reduced during hospitalization/TCU stays.  Plan:   - Continue gabapentin 100 mg q HS   - Continue Seroquel 12.5 mg BID and BID PRN for psychotic features   - [facility] counselor following closely -has good relationship with Kimberly Wick.  - MTM PharmD follows as resident will allow and as needed         (R60.0) BL lower extremity edema  Comment: Chronic concern  Last echo in " "July 2023 see result in epic.  Elevated BNP but no history of heart failure exacerbation, no volume overload on chest x-ray.  Treated with 1 dose of IV Lasix in hospital in March 2024.  Legs are swollen in setting of dependent edema and obesity.  Plan:  -Repeatedly  reccommended compression and elevation    (C61) Prostate Cancer - adenocarcinoma Haily 4+3=7  Comment: Improved  Prostate biopsy 1/11/2023,pathology positive for Haily 7 prostate cancer.   Stopped Trospium due to anticholinergic side effects, no change in urinary symptoms.  Followed by Dr. Niraj Larry [facility] - last visit 6/2/23.  Completed external beam radiation therapy w/o androgen depravation therapy with [facility] under care of Dr Fleming.   Some dysuria s/p treatment, UC negative for infection.    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  Reported recurrent coughing up blood, but not recently.  Saw ENT in 2022, no explanation of symptoms.  Last esophagram May 2022 as above, presbyesophagus, proximal luminal narrowing.  Recent saw SLP 8/2022, no overt dysphagia.  Recurrent pneumonia, last 8/4/22.  CXR 10/1/22, 3/28/23 without concern for PNA.  Chest CT showed, \"Trace scattered atelectasis and/or fibrosis. Elevated left hemidiaphragm. No effusions or pneumothorax.\"  Plan:   - GI referral per patient request scheduled for 2/2025    (K59.04) Chronic constipation  Comment: Chronic.   Having regular BMs.  Bowel habits change some s/p prostate radiation.  Long hx of Levsin multiple times per day for abd pain, but " stopped by urology when started trospium, no recurrent pain.   Plan:   - Chronic polyethylene glycol (MIRALAX) 17 GM/Dose powder; 1-2 capfuls in 8 oz of ORANGE JUICE PO one time each morning and 1-2 capfuls 8 oz of ORANGE JUICE PO one time daily PRN for constipation.   - GI follow-up Feb 20225    Orders:  -Discontinue iron per patient preference  - [facility]  to assist with transitioning to community based primary care     Electronically signed by: MARICHUY Basilio CNP        Unable to assess

## 2024-10-22 ENCOUNTER — ASSISTED LIVING VISIT (OUTPATIENT)
Dept: GERIATRICS | Facility: CLINIC | Age: 84
End: 2024-10-22
Payer: COMMERCIAL

## 2024-10-22 DIAGNOSIS — I48.0 PAROXYSMAL ATRIAL FIBRILLATION (H): ICD-10-CM

## 2024-10-22 DIAGNOSIS — F43.23 ADJUSTMENT REACTION WITH ANXIETY AND DEPRESSION: ICD-10-CM

## 2024-10-22 DIAGNOSIS — F03.90 MAJOR NEUROCOGNITIVE DISORDER (H): ICD-10-CM

## 2024-10-22 DIAGNOSIS — R13.10 DYSPHAGIA, UNSPECIFIED TYPE: ICD-10-CM

## 2024-10-22 DIAGNOSIS — R53.81 PHYSICAL DECONDITIONING: ICD-10-CM

## 2024-10-22 DIAGNOSIS — E22.2 SIADH (SYNDROME OF INAPPROPRIATE ADH PRODUCTION) (H): ICD-10-CM

## 2024-10-22 DIAGNOSIS — C61 PROSTATE CANCER (H): ICD-10-CM

## 2024-10-22 DIAGNOSIS — R60.0 LEG EDEMA: ICD-10-CM

## 2024-10-22 DIAGNOSIS — F41.1 GAD (GENERALIZED ANXIETY DISORDER): ICD-10-CM

## 2024-10-22 DIAGNOSIS — I25.10 ASCVD (ARTERIOSCLEROTIC CARDIOVASCULAR DISEASE): Primary | ICD-10-CM

## 2024-10-22 DIAGNOSIS — K59.09 CHRONIC CONSTIPATION: ICD-10-CM

## 2024-10-22 DIAGNOSIS — F10.21 ALCOHOL DEPENDENCE IN REMISSION (H): ICD-10-CM

## 2024-10-22 DIAGNOSIS — K22.719 BARRETT'S ESOPHAGUS WITH DYSPLASIA: ICD-10-CM

## 2024-10-22 DIAGNOSIS — I71.21 ANEURYSM OF ASCENDING AORTA WITHOUT RUPTURE (H): ICD-10-CM

## 2024-10-22 DIAGNOSIS — I10 ESSENTIAL HYPERTENSION, BENIGN: ICD-10-CM

## 2024-10-22 DIAGNOSIS — M17.0 PRIMARY OSTEOARTHRITIS OF BOTH KNEES: ICD-10-CM

## 2024-10-22 DIAGNOSIS — D64.9 NORMOCYTIC ANEMIA: ICD-10-CM

## 2024-10-22 PROCEDURE — 99349 HOME/RES VST EST MOD MDM 40: CPT | Performed by: NURSE PRACTITIONER

## 2024-10-22 NOTE — LETTER
10/22/2024      Jamie Valdivia  [address]  [address]  [place] [address]        No notes on file      Sincerely,        MARICHUY Basilio CNP

## 2024-11-05 ENCOUNTER — PATIENT OUTREACH (OUTPATIENT)
Dept: GERIATRIC MEDICINE | Facility: CLINIC | Age: 84
End: 2024-11-05
Payer: COMMERCIAL

## 2024-11-05 NOTE — PROGRESS NOTES
[facility] Care Coordination Contact    Arranged transportation thru [place] [phone] for the below appt:  Appt Date & Time: 11/26 at 10am  Clinic Name & Address:  [facility], [address], [place]  Transportation Provider: JANIE [phone]   time:  9:05-9:25am  Return ride  time: will call     Notified member of  time.    Pat Varma  Case Management Specialist  [facility]  [phone]

## 2024-11-08 ENCOUNTER — PATIENT OUTREACH (OUTPATIENT)
Dept: GERIATRIC MEDICINE | Facility: CLINIC | Age: 84
End: 2024-11-08
Payer: COMMERCIAL

## 2024-11-08 DIAGNOSIS — G90.09 OTHER IDIOPATHIC PERIPHERAL AUTONOMIC NEUROPATHY: Primary | ICD-10-CM

## 2024-11-08 NOTE — PROGRESS NOTES
[facility] Care Coordination Contact    Called member to schedule annual HRA home visit. HRA has been scheduled for 11/21/24 at 10:30am.    Gisela Hall RN  [facility]  [phone]

## 2024-11-09 VITALS
HEART RATE: 68 BPM | WEIGHT: 186 LBS | BODY MASS INDEX: 28.19 KG/M2 | RESPIRATION RATE: 18 BRPM | SYSTOLIC BLOOD PRESSURE: 130 MMHG | HEIGHT: 68 IN | OXYGEN SATURATION: 90 % | DIASTOLIC BLOOD PRESSURE: 78 MMHG | TEMPERATURE: 97.2 F

## 2024-11-09 RX ORDER — GABAPENTIN 100 MG/1
100 CAPSULE ORAL AT BEDTIME
Qty: 90 CAPSULE | Refills: 97 | Status: SHIPPED | OUTPATIENT
Start: 2024-11-09

## 2024-11-09 NOTE — PATIENT INSTRUCTIONS
Jamie Valdivia  1940  ORDERS:  -Discontinue ferrous sulfate per patient preference  - FYI -request sent to [facility]  to find new primary care provider per patient and primary team preference  Electronically signed by:   MARICHUY Basilio CNP  11/09/24 11:37 AM

## 2024-11-11 ENCOUNTER — TELEPHONE (OUTPATIENT)
Dept: GERIATRICS | Facility: CLINIC | Age: 84
End: 2024-11-11
Payer: COMMERCIAL

## 2024-11-11 NOTE — TELEPHONE ENCOUNTER
[facility]   2024     Name: Jamie Valdivia   : 1940     Nurse called to report resident is having chest pain.    Resides on locked memory care unit and does not have capacity for independent decision making.  Resident recently canceled Lexiscan which was ordered by cardiology due to previous episodes of chest pain to rule out ischemic heart disease. Followed by [facility] cardiology.  Recommended resident be assessed in ED as current care goals are restorative and resident is a full code. Per nursing, report EMS arrived to assess patient, recommended that patient be transferred to ED.  Resident refused hospital transfer.  Children were updated and declined to transport patient to hospital, recommended he go by ambulance.  Resident continues to refuse hospital transfer.  Discussed case with Dr. Quintana.  Again recommended that resident transfer to hospital, nursing to speak with resident and give this recommendation again.    Electronically signed by MARICHUY Basilio CNP

## 2024-11-20 ENCOUNTER — LAB REQUISITION (OUTPATIENT)
Dept: LAB | Facility: CLINIC | Age: 84
End: 2024-11-20
Payer: COMMERCIAL

## 2024-11-20 ENCOUNTER — TELEPHONE (OUTPATIENT)
Dept: GERIATRICS | Facility: CLINIC | Age: 84
End: 2024-11-20
Payer: COMMERCIAL

## 2024-11-20 DIAGNOSIS — R30.0 DYSURIA: ICD-10-CM

## 2024-11-20 LAB
ALBUMIN UR-MCNC: 20 MG/DL
APPEARANCE UR: ABNORMAL
BACTERIA #/AREA URNS HPF: ABNORMAL /HPF
BILIRUB UR QL STRIP: NEGATIVE
COLOR UR AUTO: ABNORMAL
GLUCOSE UR STRIP-MCNC: NEGATIVE MG/DL
HGB UR QL STRIP: ABNORMAL
KETONES UR STRIP-MCNC: NEGATIVE MG/DL
LEUKOCYTE ESTERASE UR QL STRIP: ABNORMAL
NITRATE UR QL: NEGATIVE
PH UR STRIP: 6 [PH] (ref 5–7)
RBC URINE: 9 /HPF
SP GR UR STRIP: 1.01 (ref 1–1.03)
UROBILINOGEN UR STRIP-MCNC: NORMAL MG/DL
WBC CLUMPS #/AREA URNS HPF: PRESENT /HPF
WBC URINE: >182 /HPF

## 2024-11-20 PROCEDURE — 81001 URINALYSIS AUTO W/SCOPE: CPT | Mod: ORL | Performed by: NURSE PRACTITIONER

## 2024-11-20 PROCEDURE — 87086 URINE CULTURE/COLONY COUNT: CPT | Mod: ORL | Performed by: NURSE PRACTITIONER

## 2024-11-20 NOTE — TELEPHONE ENCOUNTER
"[facility] Triage Call    Provider: MARICHUY Basilio CNP  Facility: [facility]  Facility Type:  AL    Caller: Maru  Call Back Number: [phone]    Allergies:    Allergies   Allergen Reactions    Ativan [Lorazepam]         SBAR:     S-(situation): Pt c/o dysuria.    B-(background): H/o prostate cancer.    A-(assessment): Pt reports experiencing penile pain rated 20:10 overnight while urinating. Describes as a burning sensation. Today the pain/discomfort is rated 4:10.  Vitals: BP:  157/98  P:: 71  R:: 18  SPO2: 98% R/A Temp.:  97.5      R-(recommendations): Requesting to check UA/UC to r/o UTI       Telephone encounter sent to:  MARICHUY Basilio CNP    Please send response/orders to \"Geriatrics Nurse Pool\"    Lenka Cardoza RN      "

## 2024-11-22 ENCOUNTER — PATIENT OUTREACH (OUTPATIENT)
Dept: GERIATRIC MEDICINE | Facility: CLINIC | Age: 84
End: 2024-11-22
Payer: COMMERCIAL

## 2024-11-22 LAB — BACTERIA UR CULT: NORMAL

## 2024-11-22 SDOH — HEALTH STABILITY: PHYSICAL HEALTH: ON AVERAGE, HOW MANY MINUTES DO YOU ENGAGE IN EXERCISE AT THIS LEVEL?: 0 MIN

## 2024-11-22 SDOH — HEALTH STABILITY: PHYSICAL HEALTH: ON AVERAGE, HOW MANY DAYS PER WEEK DO YOU ENGAGE IN MODERATE TO STRENUOUS EXERCISE (LIKE A BRISK WALK)?: 0 DAYS

## 2024-11-22 ASSESSMENT — SOCIAL DETERMINANTS OF HEALTH (SDOH)
HOW OFTEN DO YOU GET TOGETHER WITH FRIENDS OR RELATIVES?: THREE TIMES A WEEK
IN A TYPICAL WEEK, HOW MANY TIMES DO YOU TALK ON THE PHONE WITH FAMILY, FRIENDS, OR NEIGHBORS?: MORE THAN THREE TIMES A WEEK
HOW OFTEN DO YOU ATTENT MEETINGS OF THE CLUB OR ORGANIZATION YOU BELONG TO?: NEVER
HOW OFTEN DO YOU ATTEND CHURCH OR RELIGIOUS SERVICES?: MORE THAN 4 TIMES PER YEAR
DO YOU BELONG TO ANY CLUBS OR ORGANIZATIONS SUCH AS CHURCH GROUPS UNIONS, FRATERNAL OR ATHLETIC GROUPS, OR SCHOOL GROUPS?: NO

## 2024-11-22 ASSESSMENT — LIFESTYLE VARIABLES
HOW OFTEN DO YOU HAVE SIX OR MORE DRINKS ON ONE OCCASION: LESS THAN MONTHLY
AUDIT-C TOTAL SCORE: 2
HOW MANY STANDARD DRINKS CONTAINING ALCOHOL DO YOU HAVE ON A TYPICAL DAY: 1 OR 2
SKIP TO QUESTIONS 9-10: 0
HOW OFTEN DO YOU HAVE A DRINK CONTAINING ALCOHOL: MONTHLY OR LESS

## 2024-11-22 NOTE — Clinical Note
Uriel Ward,  Just wrapping up Jamie's annual. No changes being made. Let me know if you have any questions!  Thanks! Gisela

## 2024-11-26 ENCOUNTER — OFFICE VISIT (OUTPATIENT)
Dept: UROLOGY | Facility: CLINIC | Age: 84
End: 2024-11-26
Payer: COMMERCIAL

## 2024-11-26 VITALS
BODY MASS INDEX: 26.07 KG/M2 | WEIGHT: 172 LBS | HEIGHT: 68 IN | DIASTOLIC BLOOD PRESSURE: 70 MMHG | SYSTOLIC BLOOD PRESSURE: 128 MMHG

## 2024-11-26 DIAGNOSIS — R39.89 SUSPECTED UTI: ICD-10-CM

## 2024-11-26 DIAGNOSIS — N52.9 ERECTILE DYSFUNCTION, UNSPECIFIED ERECTILE DYSFUNCTION TYPE: ICD-10-CM

## 2024-11-26 DIAGNOSIS — R39.14 BENIGN PROSTATIC HYPERPLASIA WITH INCOMPLETE BLADDER EMPTYING: ICD-10-CM

## 2024-11-26 DIAGNOSIS — R35.0 URINARY FREQUENCY: Primary | ICD-10-CM

## 2024-11-26 DIAGNOSIS — C61 PROSTATE CANCER (H): ICD-10-CM

## 2024-11-26 DIAGNOSIS — N40.1 BENIGN PROSTATIC HYPERPLASIA WITH INCOMPLETE BLADDER EMPTYING: ICD-10-CM

## 2024-11-26 RX ORDER — TADALAFIL 5 MG/1
5 TABLET ORAL EVERY 24 HOURS
Qty: 90 TABLET | Refills: 3 | Status: SHIPPED | OUTPATIENT
Start: 2024-11-26

## 2024-11-26 RX ORDER — NITROFURANTOIN 25; 75 MG/1; MG/1
100 CAPSULE ORAL 2 TIMES DAILY
Qty: 14 CAPSULE | Refills: 0 | Status: SHIPPED | OUTPATIENT
Start: 2024-11-26 | End: 2024-12-03

## 2024-11-26 ASSESSMENT — PAIN SCALES - GENERAL: PAINLEVEL_OUTOF10: NO PAIN (0)

## 2024-11-26 NOTE — PROGRESS NOTES
CHIEF COMPLAINT   Jamie Valdivia who is a 84 year old male returns today for follow-up of intermediate unfavorable risk prostate cancer (iPSA 11.3, [place] 4+3=7, 3/12 cores, dx 1/11/23) now s/p EBRT w/o ADT, completed 4/12/2023 (Bernadette) .      HPI   Jamie Valdivia is a 84 year old male returns today for follow-up of intermediate unfavorable risk prostate cancer (iPSA 11.3, [place] 4+3=7, 3/12 cores, dx 1/11/23) now s/p EBRT w/o ADT, completed 4/12/2023 (Bernadette)     Last seen Jan 2024.    Having dysuria    Elevated  ml.    Was not treated with antibiotics at last visit 11/20/2024 to urgent care (urine culture <10k cfu/ml mixed dung)    Had been on cranberry juice and his pain is improved      PHYSICAL EXAM  Patient is a 84 year old  male   Vitals: There were no vitals taken for this visit.  There is no height or weight on file to calculate BMI.  General Appearance Adult:   Alert, no acute distress, oriented  HENT: throat/mouth:normal, good dentition  Lungs: no respiratory distress, or pursed lip breathing  Heart: No obvious jugular venous distension present  Abdomen: nondistended  Musculoskeltal: extremities normal, no peripheral edema  Skin: no suspicious lesions or rashes  Neuro: Alert, oriented, speech and mentation normal  Psych: affect and mood normal  : deferred       PSA Tumor Marker   Latest Ref Rng ng/mL   4/27/2023  5:15 AM 4.06    9/6/2023  7:59 AM 2.67    12/27/2023  10:36 AM 1.97    7/9/2024  10:17 AM 0.88       ml    Component      Latest Ref Rng 11/20/2024  4:45 PM   Color Urine      Colorless, Straw, Light Yellow, Yellow  Straw    Appearance Urine      Clear  Slightly Cloudy !    Glucose Urine      Negative mg/dL Negative    Bilirubin Urine      Negative  Negative    Ketones Urine      Negative mg/dL Negative    Specific Gravity Urine      1.003 - 1.035  1.008    Blood Urine      Negative  Small !    pH Urine      5.0 - 7.0  6.0    Protein Albumin Urine      Negative mg/dL 20 !     Urobilinogen mg/dL      Normal, 2.0 mg/dL Normal    Nitrite Urine      Negative  Negative    Leukocyte Esterase Urine      Negative  Large !    Bacteria Urine      None Seen /HPF Few !    WBC Clumps      None Seen /HPF Present !    RBC Urine      <=2 /HPF 9 (H)    WBC Urine      <=5 /HPF >182 (H)       Legend:  ! Abnormal  (H) High    ASSESSMENT and PLAN   84 year old male returns today for follow-up of intermediate unfavorable risk prostate cancer (iPSA 11.3, [place] 4+3=7, 3/12 cores, dx 1/11/23) now s/p EBRT w/o ADT, completed 4/12/2023 (Bernadette) .      Patient having dysuria, cloudy urine today which is unable to be analyzed by the office lab. Suspect UTI; note that prior culture 11/20/2024 showed <10k cfu/ml mixed urogenital dung despite significantly pyuria    Will give empiric macrobid while awaiting urine culture results (rx drug management)    Re: incomplete bladder emptying, I offered 5ARI but he is worried about potential effect on his sexual function. He says his erections are not good at this point. He has issues with lightheadedness and dizziness and he is worried about falls    He is also complaining of erectile dysfunction. I offered him daily tadalafil at 5 mg a day dosing which should help with this as well as his urinary symptoms    Re: prostate cancer, iPSA 11.3, PSA with new marcella at 0.88 ng/ml, will recheck PSA at next visit    Will return in about 3 months with PSA prior. If still having issues with infections, will consider starting methenamine    - tadalafil 5 mg daily  - nitrofurantoin empiric course, culture pending  - 3 months with PSA prior, for UA, PVR, AUA symptom score      Niraj Larry MD   [facility]  [facility] Phone: [phone]

## 2024-11-26 NOTE — LETTER
11/26/2024       RE: Jamie Valdivia  [address] [address] [place]  [address]  [facility] [address]     Dear Colleague,    Thank you for referring your patient, Jamie Valdivia, to the [facility] [place] at [facility]. Please see a copy of my visit note below.    CHIEF COMPLAINT   Jamie Valdivia who is a 84 year old male returns today for follow-up of intermediate unfavorable risk prostate cancer (iPSA 11.3, [place] 4+3=7, 3/12 cores, dx 1/11/23) now s/p EBRT w/o ADT, completed 4/12/2023 (Bernadette) .      HPI   Jamie Valdivia is a 84 year old male returns today for follow-up of intermediate unfavorable risk prostate cancer (iPSA 11.3, [place] 4+3=7, 3/12 cores, dx 1/11/23) now s/p EBRT w/o ADT, completed 4/12/2023 (Bernadette)     Last seen Jan 2024.    Having dysuria    Elevated  ml.    Was not treated with antibiotics at last visit 11/20/2024 to urgent care (urine culture <10k cfu/ml mixed dung)    Had been on cranberry juice and his pain is improved      PHYSICAL EXAM  Patient is a 84 year old  male   Vitals: There were no vitals taken for this visit.  There is no height or weight on file to calculate BMI.  General Appearance Adult:   Alert, no acute distress, oriented  HENT: throat/mouth:normal, good dentition  Lungs: no respiratory distress, or pursed lip breathing  Heart: No obvious jugular venous distension present  Abdomen: nondistended  Musculoskeltal: extremities normal, no peripheral edema  Skin: no suspicious lesions or rashes  Neuro: Alert, oriented, speech and mentation normal  Psych: affect and mood normal  : deferred       PSA Tumor Marker   Latest Ref Rng ng/mL   4/27/2023  5:15 AM 4.06    9/6/2023  7:59 AM 2.67    12/27/2023  10:36 AM 1.97    7/9/2024  10:17 AM 0.88       ml    Component      Latest Ref Rng 11/20/2024  4:45 PM   Color Urine      Colorless, Straw, Light Yellow, Yellow  Straw    Appearance Urine       Clear  Slightly Cloudy !    Glucose Urine      Negative mg/dL Negative    Bilirubin Urine      Negative  Negative    Ketones Urine      Negative mg/dL Negative    Specific Gravity Urine      1.003 - 1.035  1.008    Blood Urine      Negative  Small !    pH Urine      5.0 - 7.0  6.0    Protein Albumin Urine      Negative mg/dL 20 !    Urobilinogen mg/dL      Normal, 2.0 mg/dL Normal    Nitrite Urine      Negative  Negative    Leukocyte Esterase Urine      Negative  Large !    Bacteria Urine      None Seen /HPF Few !    WBC Clumps      None Seen /HPF Present !    RBC Urine      <=2 /HPF 9 (H)    WBC Urine      <=5 /HPF >182 (H)       Legend:  ! Abnormal  (H) High    ASSESSMENT and PLAN   84 year old male returns today for follow-up of intermediate unfavorable risk prostate cancer (iPSA 11.3, [place] 4+3=7, 3/12 cores, dx 1/11/23) now s/p EBRT w/o ADT, completed 4/12/2023 (Bernadette) .      Patient having dysuria, cloudy urine today which is unable to be analyzed by the office lab. Suspect UTI; note that prior culture 11/20/2024 showed <10k cfu/ml mixed urogenital dung despite significantly pyuria    Will give empiric macrobid while awaiting urine culture results (rx drug management)    Re: incomplete bladder emptying, I offered 5ARI but he is worried about potential effect on his sexual function. He says his erections are not good at this point. He has issues with lightheadedness and dizziness and he is worried about falls    He is also complaining of erectile dysfunction. I offered him daily tadalafil at 5 mg a day dosing which should help with this as well as his urinary symptoms    Re: prostate cancer, iPSA 11.3, PSA with new marcella at 0.88 ng/ml, will recheck PSA at next visit    Will return in about 3 months with PSA prior. If still having issues with infections, will consider starting methenamine    - tadalafil 5 mg daily  - nitrofurantoin empiric course, culture pending  - 3 months with PSA prior, for UA, PVR, AUA  symptom score      Niraj Larry MD   [facility] [place] [facility]  [facility] Phone: [phone]      .      Again, thank you for allowing me to participate in the care of your patient.      Sincerely,    Niraj Larry MD

## 2024-11-26 NOTE — PATIENT INSTRUCTIONS
Take macrobid twice a day    You should check your urine culture in a few days to make sure the culture is sensitive to this antibiotic      Start taking daily tadalafil 5 mg

## 2024-11-26 NOTE — NURSING NOTE
Chief Complaint   Patient presents with    Prostate Cancer     6 month follow up      Pt states he is having frequency and pain with urination.     PVR: 185 mL    April Nolasco CMA

## 2024-11-28 LAB
BACTERIA UR CULT: NORMAL
BACTERIA UR CULT: NORMAL

## 2024-12-04 LAB
BACTERIA UR CULT: ABNORMAL
BACTERIA UR CULT: ABNORMAL

## 2024-12-08 DIAGNOSIS — F10.27 DEMENTIA ASSOCIATED WITH ALCOHOLISM WITH BEHAVIORAL DISTURBANCE (H): ICD-10-CM

## 2024-12-09 RX ORDER — QUETIAPINE FUMARATE 25 MG/1
TABLET, FILM COATED ORAL
Qty: 180 TABLET | Refills: 97 | Status: SHIPPED | OUTPATIENT
Start: 2024-12-09

## 2024-12-14 ENCOUNTER — HOSPITAL ENCOUNTER (EMERGENCY)
Facility: CLINIC | Age: 84
Discharge: HOME | End: 2024-12-14
Attending: EMERGENCY MEDICINE | Admitting: EMERGENCY MEDICINE
Payer: COMMERCIAL

## 2024-12-14 VITALS
DIASTOLIC BLOOD PRESSURE: 98 MMHG | SYSTOLIC BLOOD PRESSURE: 146 MMHG | OXYGEN SATURATION: 100 % | RESPIRATION RATE: 16 BRPM | HEART RATE: 64 BPM | TEMPERATURE: 97.8 F

## 2024-12-14 DIAGNOSIS — R04.0 EPISTAXIS: ICD-10-CM

## 2024-12-14 LAB
ANION GAP SERPL CALCULATED.3IONS-SCNC: 9 MMOL/L (ref 7–15)
BASOPHILS # BLD AUTO: 0.1 10E3/UL (ref 0–0.2)
BASOPHILS NFR BLD AUTO: 1 %
BUN SERPL-MCNC: 14 MG/DL (ref 8–23)
CALCIUM SERPL-MCNC: 9 MG/DL (ref 8.8–10.4)
CHLORIDE SERPL-SCNC: 101 MMOL/L (ref 98–107)
CREAT SERPL-MCNC: 0.64 MG/DL (ref 0.67–1.17)
EGFRCR SERPLBLD CKD-EPI 2021: >90 ML/MIN/1.73M2
EOSINOPHIL # BLD AUTO: 0.3 10E3/UL (ref 0–0.7)
EOSINOPHIL NFR BLD AUTO: 4 %
ERYTHROCYTE [DISTWIDTH] IN BLOOD BY AUTOMATED COUNT: 13.8 % (ref 10–15)
GLUCOSE SERPL-MCNC: 111 MG/DL (ref 70–99)
HCO3 SERPL-SCNC: 29 MMOL/L (ref 22–29)
HCT VFR BLD AUTO: 32.2 % (ref 40–53)
HGB BLD-MCNC: 10.5 G/DL (ref 13.3–17.7)
IMM GRANULOCYTES # BLD: 0 10E3/UL
IMM GRANULOCYTES NFR BLD: 0 %
INR PPP: 1.15 (ref 0.85–1.15)
LYMPHOCYTES # BLD AUTO: 2 10E3/UL (ref 0.8–5.3)
LYMPHOCYTES NFR BLD AUTO: 27 %
MCH RBC QN AUTO: 27.6 PG (ref 26.5–33)
MCHC RBC AUTO-ENTMCNC: 32.6 G/DL (ref 31.5–36.5)
MCV RBC AUTO: 85 FL (ref 78–100)
MONOCYTES # BLD AUTO: 0.6 10E3/UL (ref 0–1.3)
MONOCYTES NFR BLD AUTO: 8 %
NEUTROPHILS # BLD AUTO: 4.4 10E3/UL (ref 1.6–8.3)
NEUTROPHILS NFR BLD AUTO: 60 %
NRBC # BLD AUTO: 0 10E3/UL
NRBC BLD AUTO-RTO: 0 /100
PLATELET # BLD AUTO: 241 10E3/UL (ref 150–450)
POTASSIUM SERPL-SCNC: 4.3 MMOL/L (ref 3.4–5.3)
RBC # BLD AUTO: 3.8 10E6/UL (ref 4.4–5.9)
SODIUM SERPL-SCNC: 139 MMOL/L (ref 135–145)
WBC # BLD AUTO: 7.4 10E3/UL (ref 4–11)

## 2024-12-14 PROCEDURE — 36415 COLL VENOUS BLD VENIPUNCTURE: CPT | Performed by: EMERGENCY MEDICINE

## 2024-12-14 PROCEDURE — 30901 CONTROL OF NOSEBLEED: CPT

## 2024-12-14 PROCEDURE — 99284 EMERGENCY DEPT VISIT MOD MDM: CPT

## 2024-12-14 PROCEDURE — 80048 BASIC METABOLIC PNL TOTAL CA: CPT | Performed by: EMERGENCY MEDICINE

## 2024-12-14 PROCEDURE — 85004 AUTOMATED DIFF WBC COUNT: CPT | Performed by: EMERGENCY MEDICINE

## 2024-12-14 PROCEDURE — 85610 PROTHROMBIN TIME: CPT | Performed by: EMERGENCY MEDICINE

## 2024-12-14 ASSESSMENT — ACTIVITIES OF DAILY LIVING (ADL)
ADLS_ACUITY_SCORE: 61

## 2024-12-14 ASSESSMENT — COLUMBIA-SUICIDE SEVERITY RATING SCALE - C-SSRS
1. IN THE PAST MONTH, HAVE YOU WISHED YOU WERE DEAD OR WISHED YOU COULD GO TO SLEEP AND NOT WAKE UP?: NO
6. HAVE YOU EVER DONE ANYTHING, STARTED TO DO ANYTHING, OR PREPARED TO DO ANYTHING TO END YOUR LIFE?: NO
2. HAVE YOU ACTUALLY HAD ANY THOUGHTS OF KILLING YOURSELF IN THE PAST MONTH?: NO

## 2024-12-14 NOTE — ED TRIAGE NOTES
Patient arrives from  [facility] with a nose bleed that started around 1800 tonight. Shelby like he could feel it running down his throat and dripping from left nare. Is asymptomatic, just that the bleeding has not stopped. Not on thinners.

## 2024-12-14 NOTE — ED NOTES
Bed: ED20  Expected date: 12/14/24  Expected time: 12:25 AM  Means of arrival: Ambulance  Comments:  Dayanna 542 84M nose bleed x 6 hours

## 2024-12-14 NOTE — ED PROVIDER NOTES
Emergency Department Note      History of Present Illness     Chief Complaint   Epistaxis      LEELEE Valdivia is a 84 year old male who presents from [facility] to the ED via EMS for epistaxis. The patient's wife is at bedside providing history. She reports that the patient has been bleeding from his left nostril since this afternoon. The patient didn't do anything to trigger his nose bleed. As his nose bleed continued, it began to drain down his throat. He also endorses some mild lightheadedness and dizziness. The patient comes into the ED to stop his epistaxis. Denies congestion, taking blood thinners, or history of a bleeding disorder.     Independent Historian   Wife as detailed above.    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    ED (erectile dysfunction)     JOSÉ MANUEL (generalized anxiety disorder)     Generalized anxiety disorder 04/27/2020    GERD (gastroesophageal reflux disease)     GI bleeding     H/O carpal tunnel syndrome     History of 2019 novel coronavirus disease (COVID-19) 02/08/2021    HTN (hypertension)     Impaired gait and mobility 03/14/2019    Mild TBI (traumatic brain injury) (H) 03/14/2019    Mumps     Obesity (BMI 30-39.9) 09/03/2015    Osteoarthritis of both knees 05/13/2015    Osteoporosis     Other idiopathic peripheral autonomic neuropathy 03/30/2022    Other seizures (H) 03/30/2022    Pharyngeal dysphagia 05/13/2015    Prostate cancer (H)     Recurrent major depressive disorder (H) 03/30/2022    Rhabdomyolysis     Thrombocytopenia (H)         Medications   acetaminophen (TYLENOL) 500 MG tablet  cyanocobalamin (VITAMIN B-12) 1000 MCG tablet  FEROSUL 325 (65 Fe) MG tablet  folic acid (FOLVITE) 1 MG tablet  furosemide (LASIX) 20 MG tablet  gabapentin (NEURONTIN) 100 MG capsule  loratadine (CLARITIN) 10 MG tablet  menthol-zinc oxide (CALMOSEPTINE) 0.44-20.6 % OINT ointment  omeprazole (PRILOSEC) 40 MG DR capsule  polyethylene glycol (MIRALAX) 17 GM/Dose powder  QUEtiapine (SEROQUEL) 25 MG tablet  tadalafil (CIALIS) 5 MG tablet        Surgical History   Past Surgical History:   Procedure Laterality Date    BIOPSY PROSTATE TRANSRECTAL N/A 1/11/2023    Procedure: PROSTATE BIOPSY, RECTAL APPROACH;  Surgeon: Niraj Larry MD;  Location:  OR    COLONOSCOPY      ESOPHAGOSCOPY, GASTROSCOPY, DUODENOSCOPY (EGD), COMBINED N/A 06/01/2022    Procedure: ESOPHAGOGASTRODUODENOSCOPY (EGD) mngi with biopsies using [employer] cold biopsy forceps;  Surgeon: David Springer MD;  Location:  GI    left hip replacement  2010    left shoulder replacement  2016    ORTHOPEDIC SURGERY      SPINE SURGERY      done in [place]    TONSILLECTOMY      TRANSRECTAL ULTRASONIC SONOGRAM N/A 1/11/2023    Procedure: TRANSRECTAL ULTRASOUND;  Surgeon: Niraj Larry MD;  Location:  OR       Physical Exam     Patient Vitals for the past 24 hrs:   BP Temp Temp src Pulse Resp SpO2   12/14/24 0034 137/67 97.8  F (36.6  C) Oral 67 18 98 %     Physical Exam  Constitutional: Well appearing.  HEENT: Atraumatic.  PERRL.  EOMI. active bleeding from the left nostril.  No obvious deformity, bruising, or swelling of the nose.  Moist mucous membranes.  Neck: Soft.  Supple.  No JVD.  Respiratory: No respiratory distress.  Abdomen: Soft and nontender.  No  guarding.  Nondistended.  Musculoskeletal: No edema.  Normal range of motion.  Neurologic: Alert and oriented.  Normal tone and bulk.  .  Skin: No rashes.  No edema.  Psych: Normal affect.  Normal behavior.            Diagnostics     Lab  Results   Labs Ordered and Resulted from Time of ED Arrival to Time of ED Departure   BASIC METABOLIC PANEL - Abnormal       Result Value    Sodium 139      Potassium 4.3      Chloride 101      Carbon Dioxide (CO2) 29      Anion Gap 9      Urea Nitrogen 14.0      Creatinine 0.64 (*)     GFR Estimate >90      Calcium 9.0      Glucose 111 (*)    CBC WITH PLATELETS AND DIFFERENTIAL - Abnormal    WBC Count 7.4      RBC Count 3.80 (*)     Hemoglobin 10.5 (*)     Hematocrit 32.2 (*)     MCV 85      MCH 27.6      MCHC 32.6      RDW 13.8      Platelet Count 241      % Neutrophils 60      % Lymphocytes 27      % Monocytes 8      % Eosinophils 4      % Basophils 1      % Immature Granulocytes 0      NRBCs per 100 WBC 0      Absolute Neutrophils 4.4      Absolute Lymphocytes 2.0      Absolute Monocytes 0.6      Absolute Eosinophils 0.3      Absolute Basophils 0.1      Absolute Immature Granulocytes 0.0      Absolute NRBCs 0.0     INR - Normal    INR 1.15         Imaging   No orders to display       EKG       Independent Interpretation   None    ED Course      Medications Administered   Medications - No data to display    Procedures   [facility]    -Epistaxis Mgmt    Date/Time: 12/14/2024 1:34 AM    Performed by: Sacha Ramirez MD  Authorized by: Sacha Ramirez MD    Risks, benefits and alternatives discussed.      ANESTHESIA (see MAR for exact dosages)     Anesthesia method:  Topical application    Topical anesthetic:  Lidocaine gel    PROCEDURE DETAILS     Treatment site:  L septum    Treatment method:  Silver nitrate    Treatment complexity:  Limited    Treatment episode: no recurrence of recent bleed      POST PROCEDURE     Assessment:  Bleeding stopped    PROCEDURE    Patient Tolerance:  Patient tolerated the procedure well with no immediate complications       Discussion of Management   None    ED Course   ED Course as of 12/14/24 0114   Sat Dec 14, 2024   0104 I obtained history and  performed physical exam as noted above.        Additional Documentation  None    Medical Decision Making / Diagnosis     CMS Diagnoses: None    MIPS       None    MDM   Jamie Valdivia is a 84 year old male who is afebrile and hemodynamically stable.  He has bleeding from his left nostril.  I placed Afrin followed by gauze soaked in anesthetic and epinephrine in the left nostril.  This is allowed to sit head and after this was removed, he had no further bleeding.  He did appear to be an area of potential bleed on the left septum and I did perform sever nitrate cautery as above.  He was observed here and had no further bleeding.  Lab workup is unrevealing.  He is hemodynamically stable.  No indication for hospitalization or other further emergent workup or treatment.  We counseled him on epistaxis treatment and need for close ENT follow-up and strict return precautions were given.  I stressed that if he does return due to bleeding he may require packing and there is standing.  They would like to avoid that and I think that is reasonable at this time.  He was in no distress at time of discharge.    Disposition   The patient was discharged.     Diagnosis     ICD-10-CM    1. Epistaxis  R04.0            Discharge Medications   New Prescriptions    No medications on file         Scribe Disclosure:  Ty MONGE, am serving as a scribe at 1:13 AM on 12/14/2024 to document services personally performed by Sacha Ramirez MD based on my observations and the provider's statements to me.        Sacha Ramirez MD  12/14/24 0694

## 2024-12-16 NOTE — PROGRESS NOTES
"[facility] GERIATRICS    Chief Complaint   Patient presents with    RECHECK     Emergency Room follow up     HPI:  Jamie Valdivia is a 84 year old  (1940) PMH significant for GERD with esophagitis, OA BL knees, pharyngeal dysphagia, edema, abdominal wall hernia, chronic atrial fibrillation (decline AC or Watchman), HTN, osteoporosis with hx of vertebral fracture, dementia, anemia, hx of COVID-19, who is being seen today for an episodic care visit at: [facility] ([facility]) [[id number]].     Jamie Valdivia is a 84 year old male who is afebrile and hemodynamically stable.  He has bleeding from his left nostril.  I placed Afrin followed by gauze soaked in anesthetic and epinephrine in the left nostril.  This is allowed to sit head and after this was removed, he had no further bleeding.  He did appear to be an area of potential bleed on the left septum and I did perform sever nitrate cautery as above.  He was observed here and had no further bleeding.  Lab workup is unrevealing.  He is hemodynamically stable.  No indication for hospitalization or other further emergent workup or treatment.  We counseled him on epistaxis treatment and need for close ENT follow-up and strict return precautions were given.  I stressed that if he does return due to bleeding he may require packing and there is standing.  They would like to avoid that and I think that is reasonable at this time.  He was in no distress at time of discharge.     Today's concern is:   Follow-up today for post-ER follow-up.   Director of nursing present for visit.     Starts visit holding his ear if he can't hear, then gets irritated at provider speaking loudly.     \"It worked up from multiple things that got screwed up.\"    Reports he was having coughing after chewing on crunchy nugent.  After coughing spelling, spit out \"chunks\" of blood.   Noted that he nose was bleeding.     Digresses into thoughts about children and move to [facility] two years ago. " "  \"I believe that the dementia and the memory was a con job.\"     Starts to talk about physician in [place].  \"She would do everything and take care of everything, she wouldn't have us going to specialists.\"   Discuss how he misses clinic and lab right onsite.   \"We do not want medical assistance.\"   Discussed children's role in financial situation.   Does not want medical information discussed with children at all.  Fixated on money, difficult to redirect back to medical issues.     Attempt to discuss children helping, patient interrupts provider and digresses about ongoing psychosocial issues.   Offered ombudsman, but refused.  Offered ACP for family therapy, but refused.    \"I have dementia on my back, I have memory care on my back\" - yelling/pointing.  Paranoid, \"people are acting different.\"   \"There is a new  in town ([place]) and we might invited to the inaugural ball with our contacts in [place]\"     Allergies, and PMH/PSH reviewed in [employer] today.    MED REC REQUIRED  Post Medication Reconciliation Status:  Patient was not discharged from an inpatient facility or TCU but medications were reconciled per facility EMR.  - No recent use of PRN Seroquel    REVIEW OF SYSTEMS:  {dlmunc40:648179}    Objective:   Pulse 75   Temp 97.6  F (36.4  C)   Resp 17   Ht 1.727 m (5' 8\")   Wt 86.6 kg (191 lb)   SpO2 92%   BMI 29.04 kg/m    {Nursing home physical exam :433906}  GENERAL APPEARANCE:  Alert, chronically ill appearing and frail, sitting up in chair.  HEENT: Sclera clear and conjunctiva normal. Pupils 2+ equal and round.   RESP:  Non-labored breathing, no respiratory distress, Lung sounds decreased throughout, no adventitious breath sounds, patient is on room air, decreased BL bases.  CV: Rate and rhythm regular, no murmur, no rub or gallop.   VASCULAR: 2+ edema bilateral lower extremities, not wearing compression wraps. Calves are non-tender.  ABDOMEN:  Normal bowel sounds, soft, nontender, no " grimacing or guarding with palpation, + umblical hernia, soft/non-tender.   PSYCH: Awake and alert, speech fluent,  insight/judgement/memory impaired.    Recent labs in [facility] reviewed by me today.   CBC RESULTS:   Recent Labs   Lab Test 12/14/24  0148 09/12/24  1248   WBC 7.4 6.4   RBC 3.80* 4.12*   HGB 10.5* 10.6*   HCT 32.2* 34.2*   MCV 85 83   MCH 27.6 25.7*   MCHC 32.6 31.0*   RDW 13.8 21.5*    280       Last Basic Metabolic Panel:  Recent Labs   Lab Test 12/14/24  0148 09/12/24  1248    136   POTASSIUM 4.3 3.8   CHLORIDE 101 97*   JOSUE 9.0 9.0   CO2 29 25   BUN 14.0 10.2   CR 0.64* 0.61*   * 132*     GFR Estimate   Date Value Ref Range Status   12/14/2024 >90 >60 mL/min/1.73m2 Final     Comment:     eGFR calculated using 2021 CKD-EPI equation.       Liver Function Studies -   Recent Labs   Lab Test 09/12/24  1248 07/12/24  2147   PROTTOTAL 7.7 7.2   ALBUMIN 3.7 3.0*   BILITOTAL 0.3 0.3   ALKPHOS 87 133   AST 18 15   ALT 6 6       TSH   Date Value Ref Range Status   06/28/2024 2.11 0.30 - 4.20 uIU/mL Final   04/11/2024 1.69 0.30 - 4.20 uIU/mL Final   04/28/2022 2.77 0.40 - 4.00 mU/L Final   04/07/2022 3.94 0.40 - 4.00 mU/L Final     Lab Results   Component Value Date    A1C 5.5 04/28/2022    A1C 5.6 04/07/2022       EGD 6/1/22  Impression:        - Normal examined duodenum.                             - Normal stomach.                             - Esophageal mucosal changes suggestive of                             short-segment Vickers's esophagus. Biopsied.                             - Abnormal esophageal motility, suspicious for                             presbyesophagus.   Recommendation:            - Patient has a contact number available for      emergencies. The signs and symptoms of potential      delayed complications were discussed with the                             patient. Return to normal activities tomorrow.                             Written discharge instructions were  "provided to the                             patient.                             - Discharge patient to home (ambulatory).                             - Continue omeprazole as current long term.                             - Await pathology.                             - Would not recommend Vickers's follow up in this                             setting.                             - Follow up with [facility] as needed /                             scheduled.                             - No cause for reported coughting up of blood seen                             on this study. Consider pulmonary / ENT evaluation                             if this continues to be an issue.                                                                     Assessment/Plan:  (R04.0) Recurrent epistaxis  (primary encounter diagnosis)  Comment: ***  Plan: ***  ***  - ENT referal    (F03.90) Major neurocognitive disorder (H) - dementia   Comment: Chronic  History of delusions and paranoia.  Please see neuropsych testing from 5/12/2023, \"I believe that Jamie is not capable of making reasoned decisions. He should continue to receive a high-level of care that includes other managing medications, finances, and limiting access to alcohol.\"   Please see interdisciplinary team care conference note from 7/25/2023.   Wife and family to assist with decision making as these are his next of kin and there is no healthcare directive. Considered moving off memory care unit if he had appropriate supports, unwilling to let family assist him at present and family does not want him moved off of memory care unit as they feel this is safest environment for him.  Family is involved on as needed basis for acute issues, wife Gisela updates children, but resident declines providers contact children directly.  Plan:   - Continues to benefit from supportive care in memory care IVETTE setting  - Continue Seroquel due to paranoid delusions causing " "distress (ie thinks phones are bugged, people out to get him)    (I25.10) ASCVD   Comment: By history.  Remote hx of CAD with possible MI vs anxiety vs other  Episodic chest pain, possible musculoskeletal.  Declined further workup for coronary artery disease.  Stopped ASA due to hemoptysis.  Plan:   -Would recommend resident reschedule stress test with cardiology follow-up  - Monitor on serial exam    (I10) Essential hypertension, benign  Comment: Chronic  BP at goal of < 150/90 given age and comorbid conditions  Started on Lasix during TCU stay due to volume overload on CXR.  Last Echo revealed mild pulmonary hypertension, mild to moderate aortic stenosis and aortic dilatation.   Plan:   - Continue Lasix 20 mg dialy  - Monitor routine VS and labs     (I48.0) Afib   Comment: Chronic and asymptomatic  History of atrial fibrillation, low heart rate on previous ECG while in hospital.  F/b cardiology Dr. Ross.  Completed Zio patch September 2023 -see report in Epic.  Persistent atrial fibrillation with average heart rate of 58 bpm, pauses of 3.3 seconds, frequent PVCs, intermittent bundle branch block.  Considered Watchman device, but patient declined invasive intervention; pt/family aware of risk.  HAS-BLED 2: 4.1%  CHADs-VASC: 3% (Age, HTN)   See cardiology notes in [facility].  Plan:   - Cardiology follow-up with Dr. Ross   - Declined anticoagulation therapy, wife/family aware    (I71.2) Thoracic aortic aneurysm without rupture (H) - 6/30/22 4.6 cm  Comment: New finding in 2022, follows with vascular, last visit with  on 7/10/24  4.4 to 4.6 cm on echo, CT since 2020, in July 2024 4.8 cm  Plan:   - Per vascular notes, \"Plan for echocardiogram in June 2025 then followed by office or virtual visit.\"    (M17.0) Primary osteoarthritis of both knees  (R53.81) Physical deconditioning    Comment: Chronic in setting of multi-comorbidity, in decline over last several weeks, refusing care at times.  No pain " reported today.  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   Previous hyponatremia multifactorial, occurred in setting of SIADH, dose increase of selective serotonin reuptake inhibitor (Celexa), Bactrim, hypovolemia.  Stopped Celexa 9/29/22, Mirtazapine stopped July 2024.  Na+ 136 on 9/12/2024 WNL.  Plan:   -Follow interval labs    (D64.9) Normocytic Anemia  Comment: Chronic, mild, baseline ~ 10.  Hgb down to 8.2 on 5/30 with B12, Folate, and iron deficiency.  Hgb stable at 10.6 on 9/12/2024.  No symptoms of acute bleed.  Resident does not deny using alcohol, but theoretically has no access in locked memory care unit.  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   Currently no access to ETOH and thus is abstinent, but does not deny alcohol consumption today.  Chronic low mood, suspect JOSÉ MANUEL with panic attacks/chest pain for many years, no documented history of myocardial infarction or ASCVD.   On memory care unit due to safety concerns, needs assistance with IADLs/ADLs.  Finds gabapentin helpful, dose reduced during hospitalization/TCU stays.  Plan:   - Continue gabapentin 100 mg q HS   - Continue Seroquel 12.5 mg BID and BID PRN for psychotic features   - [facility] brandee counselor following closely -has good relationship with Kimberly Wick.  - MTM PharmD follows as  "resident will allow and as needed         (R60.0) BL lower extremity edema  Comment: Chronic concern  Last echo in July 2023 see result in epic.  Elevated BNP but no history of heart failure exacerbation, no volume overload on chest x-ray.  Treated with 1 dose of IV Lasix in hospital in March 2024.  Legs are swollen in setting of dependent edema and obesity.  Plan:  -Repeatedly  reccommended compression and elevation    (C61) Prostate Cancer - adenocarcinoma Haily 4+3=7  Comment: Improved  Prostate biopsy 1/11/2023,pathology positive for [place] 7 prostate cancer.   Stopped Trospium due to anticholinergic side effects, no change in urinary symptoms.  Followed by Dr. Niraj Larry [facility] - last visit 6/2/23.  Completed external beam radiation therapy w/o androgen depravation therapy with [facility] under care of Dr Fleming.   Some dysuria s/p treatment, UC negative for infection.    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  Reported recurrent coughing up blood, but not recently.  Saw ENT in 2022, no explanation of symptoms.  Last esophagram May 2022 as above, presbyesophagus, proximal luminal narrowing.  Recent saw SLP 8/2022, no overt dysphagia.  Recurrent pneumonia, last 8/4/22.  CXR 10/1/22, 3/28/23 without concern for PNA.  Chest CT showed, \"Trace scattered atelectasis and/or fibrosis. Elevated left hemidiaphragm. No effusions or pneumothorax.\"  Plan:   - GI referral per patient request scheduled for 2/2025    (K59.04) Chronic constipation  Comment: Chronic.   Having regular " BMs.  Bowel habits change some s/p prostate radiation.  Long hx of Levsin multiple times per day for abd pain, but stopped by urology when started trospium, no recurrent pain.   Plan:   - Chronic polyethylene glycol (MIRALAX) 17 GM/Dose powder; 1-2 capfuls in 8 oz of ORANGE JUICE PO one time each morning and 1-2 capfuls 8 oz of ORANGE JUICE PO one time daily PRN for constipation.   - GI follow-up Feb 20225    Orders:  - [facility] Referral  - ENT Referral  - [facility] referral     11:15 am - 12:05 PM      Electronically signed by:   MARICHUY Basilio CNP

## 2024-12-17 ENCOUNTER — PATIENT OUTREACH (OUTPATIENT)
Dept: GERIATRIC MEDICINE | Facility: CLINIC | Age: 84
End: 2024-12-17

## 2024-12-17 ENCOUNTER — ASSISTED LIVING VISIT (OUTPATIENT)
Dept: GERIATRICS | Facility: CLINIC | Age: 84
End: 2024-12-17
Payer: COMMERCIAL

## 2024-12-17 VITALS
OXYGEN SATURATION: 92 % | TEMPERATURE: 97.6 F | HEIGHT: 68 IN | BODY MASS INDEX: 28.95 KG/M2 | WEIGHT: 191 LBS | RESPIRATION RATE: 17 BRPM | HEART RATE: 75 BPM

## 2024-12-17 DIAGNOSIS — F10.21 ALCOHOL DEPENDENCE IN REMISSION (H): ICD-10-CM

## 2024-12-17 DIAGNOSIS — K59.09 CHRONIC CONSTIPATION: ICD-10-CM

## 2024-12-17 DIAGNOSIS — I25.10 ASCVD (ARTERIOSCLEROTIC CARDIOVASCULAR DISEASE): ICD-10-CM

## 2024-12-17 DIAGNOSIS — M17.0 PRIMARY OSTEOARTHRITIS OF BOTH KNEES: ICD-10-CM

## 2024-12-17 DIAGNOSIS — R53.81 PHYSICAL DECONDITIONING: ICD-10-CM

## 2024-12-17 DIAGNOSIS — F41.1 GAD (GENERALIZED ANXIETY DISORDER): ICD-10-CM

## 2024-12-17 DIAGNOSIS — I48.91 ATRIAL FIBRILLATION, UNSPECIFIED TYPE (H): ICD-10-CM

## 2024-12-17 DIAGNOSIS — D64.9 NORMOCYTIC ANEMIA: ICD-10-CM

## 2024-12-17 DIAGNOSIS — E22.2 SIADH (SYNDROME OF INAPPROPRIATE ADH PRODUCTION) (H): ICD-10-CM

## 2024-12-17 DIAGNOSIS — I71.20 THORACIC AORTIC ANEURYSM (TAA), UNSPECIFIED PART, UNSPECIFIED WHETHER RUPTURED (H): ICD-10-CM

## 2024-12-17 DIAGNOSIS — F03.90 MAJOR NEUROCOGNITIVE DISORDER (H): ICD-10-CM

## 2024-12-17 DIAGNOSIS — R60.0 LOWER EXTREMITY EDEMA: ICD-10-CM

## 2024-12-17 DIAGNOSIS — I10 ESSENTIAL HYPERTENSION, BENIGN: ICD-10-CM

## 2024-12-17 DIAGNOSIS — C61 PROSTATE CANCER (H): ICD-10-CM

## 2024-12-17 DIAGNOSIS — F43.23 ADJUSTMENT DISORDER WITH MIXED ANXIETY AND DEPRESSED MOOD: ICD-10-CM

## 2024-12-17 DIAGNOSIS — K22.719 BARRETT'S ESOPHAGUS WITH DYSPLASIA: ICD-10-CM

## 2024-12-17 DIAGNOSIS — R04.0 RECURRENT EPISTAXIS: Primary | ICD-10-CM

## 2024-12-17 DIAGNOSIS — R13.10 DYSPHAGIA, UNSPECIFIED TYPE: ICD-10-CM

## 2024-12-17 DIAGNOSIS — E87.1 HYPONATREMIA: ICD-10-CM

## 2024-12-17 PROCEDURE — 99350 HOME/RES VST EST HIGH MDM 60: CPT | Performed by: NURSE PRACTITIONER

## 2024-12-17 NOTE — LETTER
12/17/2024      Jamie Valdivia  [place]  [address]  [facility] [address]        No notes on file      Sincerely,        MARICHUY Basilio CNP       "bleeds since return from ER.    Digresses into thoughts about children and move to [facility] two years ago.   \"I believe that the dementia and the memory was a con job\" - alluding to neuropsych testing.    Starts to talk about physician in [place].  \"She would do everything and take care of everything, she wouldn't have us going to specialists.\"   Discuss how he misses going out to clinic and having the lab right onsite.   \"We do not want medical assistance.\"   Discussed children's role in financial situation.   Does not want medical information discussed with children at all.  Fixated on money, difficult to redirect back to medical issues.     Attempt to discuss children helping, patient interrupts provider and digresses about ongoing psychosocial issues.   Offered ombudsman, but declined.  Offered ACP for family therapy, but declined.    \"I have dementia on my back, I have memory care on my back\" - yelling/pointing at NP and nursing director.  Paranoid, \"people are acting different\" and treating him differently.   \"There is a new  in [facility] ([facility] and we might invited to [facility] with our contacts in [place]\"     Discussed having more control over his medical care by going our to clinic and not having PCP coming into personal space.  Agreeable to this plan.  Reports he and his wife plan to move back to [place] in the near future.    Allergies, and PMH/PSH reviewed in [facility] today.    MED REC REQUIRED  Post Medication Reconciliation Status:  Patient was not discharged from an inpatient facility or TCU but medications were reconciled per facility EMR.  - No recent use of PRN Seroquel    REVIEW OF SYSTEMS:  Limited secondary to cognitive impairment but today pt reports 4 point ROS including Respiratory, CV, GI and , other than that noted in the HPI,  is negative    Objective:   Pulse 75   Temp 97.6  F (36.4  C)   Resp 17   Ht 1.727 m (5' 8\")   Wt 86.6 kg (191 lb)   SpO2 92%   BMI 29.04 " kg/m    GENERAL APPEARANCE:  Alert, chronically ill appearing.  HEENT: Sclera clear and conjunctiva normal. Pupils 2+ equal and round. Nares are patent without bleeding.  RESP:  Non-labored breathing, no respiratory distress, Lung sounds decreased throughout, no adventitious breath sounds, patient is on room air, decreased BL bases.  CV: Rate and rhythm regular, no murmur, no rub or gallop.   VASCULAR: 2+ edema bilateral lower extremities, not wearing compression wraps.   ABDOMEN:  Normal bowel sounds, soft, nontender, no grimacing or guarding with palpation.  PSYCH: Awake and alert, speech fluent,  insight/judgement/memory impaired.    Recent labs in [facility] reviewed by me today.   CBC RESULTS:   Recent Labs   Lab Test 12/14/24  0148 09/12/24  1248   WBC 7.4 6.4   RBC 3.80* 4.12*   HGB 10.5* 10.6*   HCT 32.2* 34.2*   MCV 85 83   MCH 27.6 25.7*   MCHC 32.6 31.0*   RDW 13.8 21.5*    280       Last Basic Metabolic Panel:  Recent Labs   Lab Test 12/14/24  0148 09/12/24  1248    136   POTASSIUM 4.3 3.8   CHLORIDE 101 97*   JOSUE 9.0 9.0   CO2 29 25   BUN 14.0 10.2   CR 0.64* 0.61*   * 132*     GFR Estimate   Date Value Ref Range Status   12/14/2024 >90 >60 mL/min/1.73m2 Final     Comment:     eGFR calculated using 2021 CKD-EPI equation.       Liver Function Studies -   Recent Labs   Lab Test 09/12/24  1248 07/12/24  2147   PROTTOTAL 7.7 7.2   ALBUMIN 3.7 3.0*   BILITOTAL 0.3 0.3   ALKPHOS 87 133   AST 18 15   ALT 6 6       TSH   Date Value Ref Range Status   06/28/2024 2.11 0.30 - 4.20 uIU/mL Final   04/11/2024 1.69 0.30 - 4.20 uIU/mL Final   04/28/2022 2.77 0.40 - 4.00 mU/L Final   04/07/2022 3.94 0.40 - 4.00 mU/L Final     Lab Results   Component Value Date    A1C 5.5 04/28/2022    A1C 5.6 04/07/2022       EGD 6/1/22  Impression:        - Normal examined duodenum.                             - Normal stomach.                             - Esophageal mucosal changes suggestive of                           "   short-segment Vickers's esophagus. Biopsied.                             - Abnormal esophageal motility, suspicious for                             presbyesophagus.   Recommendation:            - Patient has a contact number available for      emergencies. The signs and symptoms of potential      delayed complications were discussed with the                             patient. Return to normal activities tomorrow.                             Written discharge instructions were provided to the                             patient.                             - Discharge patient to home (ambulatory).                             - Continue omeprazole as current long term.                             - Await pathology.                             - Would not recommend Vickers's follow up in this                             setting.                             - Follow up with [facility] as needed /                             scheduled.                             - No cause for reported coughting up of blood seen                             on this study. Consider pulmonary / ENT evaluation                             if this continues to be an issue.                                                                     Assessment/Plan:  (R04.0) Recurrent epistaxis  (primary encounter diagnosis)  Comment: Stable  No recurrent episodes since return to [facility].  Plan:  - ENT referral  - GI follow-up 2/2025  - Saline nasal spray     (F03.90) Major neurocognitive disorder (H) - dementia   Comment: Chronic  History of delusions and paranoia.  Please see neuropsych testing from 5/12/2023, \"I believe that Jamie is not capable of making reasoned decisions. He should continue to receive a high-level of care that includes other managing medications, finances, and limiting access to alcohol.\"   Please see interdisciplinary team care conference note from 7/25/2023.   Wife and family to assist with decision making as " these are his next of kin and there is no healthcare directive. Facility is now allowing resident off memory care unit but he is unable to move his belongings.  Family is involved on as needed basis for acute issues, wife Gisela updates children, but resident declines providers contact children directly.  Plan:   - Continues to benefit from supportive care in memory care [facility] setting  - Continue Seroquel due to paranoid delusions causing distress (ie thinks phones are bugged, people out to get him)  - Wish to move back to community, agreeable to transferring care to community PCP at this time as a step towards this goal.    (I25.10) ASCVD   Comment: By history.  Remote hx of CAD with possible MI vs anxiety vs other  Episodic chest pain, possible musculoskeletal.  Declined further workup for coronary artery disease.  Stopped ASA due to hemoptysis.  Plan:   -Recommend resident reschedule stress test with cardiology, but patient declines  - Monitor on serial exam    (I10) Essential hypertension, benign  Comment: Chronic  BP at goal of < 150/90 given age and comorbid conditions  Started on Lasix during TCU stay due to volume overload on CXR.  Last Echo revealed mild pulmonary hypertension, mild to moderate aortic stenosis and aortic dilatation.   Blood pressure elevated today but patient is mildly agitated.  Last potassium 4.3 on 12/14/2024  Plan:   -Nursing to repeat blood pressure and report to provider if still elevated  - Continue Lasix 20 mg dialy    (I48.0) Afib   Comment: Chronic and asymptomatic  History of atrial fibrillation, low heart rate on previous ECG while in hospital.  F/b cardiology Dr. oRss.  Completed Zio patch September 2023 -see report in Epic.  Persistent atrial fibrillation with average heart rate of 58 bpm, pauses of 3.3 seconds, frequent PVCs, intermittent bundle branch block.  Considered Watchman device, but patient declined invasive intervention; pt/family aware of risk.  HAS-BLED 2:  "4.1%  CHADs-VASC: 3% (Age, HTN)   See cardiology notes in Epic.  Plan:   - Cardiology follow-up with Dr. Ross   - Declined anticoagulation therapy, wife/family aware    (I71.2) Thoracic aortic aneurysm without rupture (H) - 6/30/22 4.6 cm  Comment: Chronic  New finding in 2022, follows with vascular, last visit with  on 7/10/24  4.4 to 4.6 cm on echo, CT since 2020, in July 2024 4.8 cm  Plan:   - Per vascular notes, \"Plan for echocardiogram in June 2025 then followed by office or virtual visit.\"    (M17.0) Primary osteoarthritis of both knees  (R53.81) Physical deconditioning    Comment: Chronic in setting of multi-comorbidity, in decline over last several weeks, refusing care at times.  No pain reported today.  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   Previous hyponatremia multifactorial, occurred in setting of SIADH, dose increase of selective serotonin reuptake inhibitor (Celexa), Bactrim, hypovolemia.  Stopped Celexa 9/29/22, Mirtazapine stopped July 2024.  Na+ 139 on 12/14/24 WNL  Plan:   -Follow interval labs    (D64.9) Normocytic Anemia  Comment: Chronic, mild, baseline ~ 10.  Hgb down to 8.2 on 5/30 with B12, Folate, and iron deficiency.  Hgb stable at 10.5 on 12/14/24.  Declined to take iron supplement.  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   Currently no access to ETOH and " thus is abstinent, but does not deny alcohol consumption today.  Chronic low mood, suspect JOSÉ MANUEL with panic attacks/chest pain for many years, no documented history of myocardial infarction or ASCVD.   Needs assistance with IADLs/ADLs.  Finds gabapentin helpful, dose reduced during hospitalization/TCU stays.  Plan:   - Continue gabapentin 100 mg q HS   - Continue Seroquel 12.5 mg BID and BID PRN for psychotic features   - [facility] counselor following closely -has good relationship with Kimberly Wick.  - MTM PharmD follows as resident will allow and as needed         (R60.0) BL lower extremity edema  Comment: Chronic concern  Last echo in July 2024 see result in epic.  Elevated BNP but no history of heart failure exacerbation, no volume overload on chest x-ray.  Treated with 1 dose of IV Lasix in hospital in March 2024.  Legs are swollen in setting of dependent edema and obesity.  Plan:  -Repeatedly  reccommended compression and elevation  -Continue Lasix 20 mg daily  - Recommend cardiology follow-up     (C61) Prostate Cancer - adenocarcinoma [place] 4+3=7  Comment: Improved  Prostate biopsy 1/11/2023,pathology positive for [place] 7 prostate cancer.   Stopped Trospium due to anticholinergic side effects, no change in urinary symptoms.  Followed by Dr. Niraj Larry [facility] - last visit 6/2/23.  Completed external beam radiation therapy w/o androgen depravation therapy with [facility] under care of Dr Fleming.   Some dysuria s/p treatment, UC negative for infection.  Last urology visit 11/25/24, started on empiric course of nitrofurantoin and symptoms improved.    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  Reported recurrent coughing up blood, but not recently.  Saw ENT in , no explanation of symptoms.  Last esophagram May 2022 as above, presbyesophagus, proximal luminal narrowing.  Recent saw SLP 2022, no overt dysphagia.  Recurrent pneumonia, last 22.  CXR 10/1/22, 3/28/23 without concern for PNA.  Chest CT showed, \"Trace scattered atelectasis and/or fibrosis. Elevated left hemidiaphragm. No effusions or pneumothorax.\"  Plan:   - Omeprazole 40 mg PO BID, will need lifetime PPI, look to GDR  - GI follow-up 2025    (K59.04) Chronic constipation  Comment: Chronic.   Having regular BMs.  Bowel habits change some s/p prostate radiation.  Long hx of Levsin multiple times per day for abd pain, but stopped by urology when started trospium, no recurrent pain.   Plan:   - Chronic polyethylene glycol (MIRALAX) 17 GM/Dose powder; 1-2 capfuls in 8 oz of ORANGE JUICE PO one time each morning and 1-2 capfuls 8 oz of ORANGE JUICE PO one time daily PRN for constipation.   - GI follow-up     Orders:  - ENT referral, remind patient to call [phone] to schedule   - sodium chloride (OCEAN) 0.65 % nasal spray; Spray 1 spray in nostril 2 times daily.  Dx Recurrent epistaxis     Total time with an established patient visit in the assisted livin minutes includin minutes from 11:15 AM to 12:05 PM spent in patient visit discussion of recent ER visit, medications, chronic health conditions, multiple patient concerns regarding psychosocial situation and living situation, wish to move back to the community.  Other 10 minutes spent in medication reconciliation and chart review    Electronically signed by:   MARICHUY Basilio CNP                     Sincerely,        MARICHUY Basilio CNP      "

## 2024-12-17 NOTE — PROGRESS NOTES
[facility] Care Coordination Contact    CC received notification of Emergency Room visit.  ER visit occurred on 12/14/24 at [facility] with Dx of Epistaxis.    CC contacted member and reviewed discharge summary.  Member has a follow-up appointment with PCP: Yes: scheduled on Onsite provider followed up on 12/17/24.   Member has had a change in condition: No  New referrals placed: No  Home Visit Needed: No  Support Plan reviewed and updated.  PCP not notified of ED visit via EMAR- She already completed follow up.     Gisela Hall RN  [facility]  [phone]

## 2024-12-17 NOTE — PATIENT INSTRUCTIONS
Jamie Valdivia  1940  ORDERS:  - Please check BP and HR on 12/19 when calm/resting and report to NP  Electronically signed by:   MARICHUY Basilio CNP  12/17/24 11:46 AM

## 2024-12-19 ENCOUNTER — TELEPHONE (OUTPATIENT)
Dept: UROLOGY | Facility: CLINIC | Age: 84
End: 2024-12-19
Payer: COMMERCIAL

## 2024-12-19 ENCOUNTER — PATIENT OUTREACH (OUTPATIENT)
Dept: GERIATRIC MEDICINE | Facility: CLINIC | Age: 84
End: 2024-12-19
Payer: COMMERCIAL

## 2024-12-19 NOTE — TELEPHONE ENCOUNTER
[facility] Call Center    Phone Message    May a detailed message be left on voicemail: yes     Reason for Call: Medication Question or concern regarding medication   Prescription Clarification  Name of Medication: cialis  Prescribing Provider: Kendy   Pharmacy:   [facility] - [address]      What on the order needs clarification? Has this medication been discontinued? If so Assisted Living needs a copy of the discontinued order faxed to them. Any questions please call Nory Waller back to discuss.       Action Taken: Message routed to:  [facility] ([facility] [place] [facility]    Travel Screening: Not Applicable     Date of Service:

## 2024-12-19 NOTE — LETTER
December 19, 2024       JAMIE CRUMP  [facility]  [address]  [facility] [address]      Dear Jamie,    At [facility], we re dedicated to improving your health and wellness. Enclosed is the Support Plan developed with you on 11/22/2024. Please review the Support Plan carefully.    As a reminder, during your visit we talked about:   Ways to manage your physical and mental health   Using health care to maintain and improve your health    Your preventive care needs      Remember to contact your care coordinator if you:   Are hospitalized or plan to be hospitalized    Have a fall     Have a change in your physical or mental health   Need help finding support or services    If you have questions or don t agree with your Support Plan, call me at [phone]. You can also call me if your needs change. TTY users call the [place] Relay at 374 or 1-847.963.8636 (qwhfzm-mp-ivjkgh relay service).    Sincerely,       Gisela Hall RN, BSN, PHN  [phone]  [email or web address]                    [id number] accepted     (06/2024)                [address] [place] [address]  [phone]  fax [phone]  [facility]

## 2024-12-19 NOTE — PROGRESS NOTES
[facility] Care Coordination Contact    Received after visit chart from care coordinator.  Completed following tasks: Mailed copy of support plan to member, Mailed [place] Choices signature sheet pages 3-4, Mailed Safe Medication Disposal , Support Plan left open pending provider summary letter., and Updated services in Database.    Mailed copy of CL tool to member and submitted authorization to health plan.  Care Coordinator shared copy with facility ([facility]). Signed provider signature summary letter pending - tracking required. Uploaded to [employer] and [place] Choices as applicable.   CC 1st attempt 12/19/24, CMS 2nd attempt TBD.    Provider Signature - No Support Plan Shared:  Member indicates that they do not want their support plan shared with any EW providers.    Sylvia Odonnell    Care Management Specialist   [facility]  [phone]          No

## 2024-12-19 NOTE — PROGRESS NOTES
[facility] Care Coordination Contact    [facility] Home Visit Assessment     Home visit for Health Risk Assessment with Jamie Valdivia completed on November 22, 2024    Type of residence:: Assisted living  Current living arrangement:: I live in assisted living     Assessment completed with:: Patient, Family    Current Care Plan  Member currently receiving the following home care services: None  Member currently receiving the following community resources: DME      Medication Review  Medication reconciliation completed in Epic: Yes  Medication set-up & administration: RN set up  [place] manages .  Assisting Living staff administers medications.  Medication Risk Assessment Medication (1 or more, place referral to MTM): Recent falls within past year  MTM Referral Placed: No: Member is already followed by MTM. Will follow up with current MTM.    Mental/Behavioral Health   Depression Screening:   PHQ-2 Total Score (Adult) - Positive if 3 or more points; Administer PHQ-9 if positive: 0       Mental health DX:: Yes   Mental health DX how managed:: Medication    Falls Assessment:   Fallen 2 or more times in the past year?: (!) Yes   Any fall with injury in the past year?: No    ADL/IADL Dependencies:   Dependent ADLs:: Ambulation-walker, Bathing, Dressing, Grooming, Incontinence, Transfers, Toileting, Positioning  Dependent IADLs:: Cleaning, Cooking, Laundry, Shopping, Meal Preparation, Medication Management, Money Management, Transportation, Incontinence    Health Plan sponsored benefits: [facility]: Shared information regarding [employer]. Reviewed preventative health screening and health plan supplemental benefits/incentives. Reviewed medication disposal form and transition of care member handout.    CFSS Assessment completed at visit: [place], and assessment indicates that member does not meet access criteria for [place]SS.     Elderly Waiver Eligibility: Yes-will  continue on EW    Care Plan & Recommendations: Jamie will continue to live at [facility] and receive elderly waiver services. Jamie has been verbally aggressive with current provider so care coordinator discussed switching provider to outside clinic but Jamie and his wife would start to talk about going back to their provider in [place], [place]. They wanted care coordinator to set up transportation so they could go see him but care coordinator explained that transportation would not [place] outside the metro area.     -It was challenging to keep Jamie on track during the assessment. Different things him and his wife would often talk about was the strain in their family and past issues they have had with their kids, their phone being bugged, wanting to work and drive, and wanting to move back to [place]. Jamie also talked about going to the [employer] in January.     -Jamie did report sharp pain in his penis. Nursing had reported to care coordinator that UA was completed and was negative. He had a urologist follow up appointment on 11/26 and he was going to address this at the appointment.     See [facility] Assessment for detailed assessment information.    Follow-Up Plan: Member informed of future contact, plan to f/u with member with a 6 month telephone assessment.  Contact information shared with member and family, encouraged member to call with any questions or concerns at any time.    [facility] care continuum providers: Please see Snapshot and Care Management Flowsheets for Specific details of care plan.    This CC note routed to PCP, Sylvia Stein.    Gisela Hall RN  [facility]  [phone]

## 2024-12-30 ENCOUNTER — TELEPHONE (OUTPATIENT)
Dept: OTOLARYNGOLOGY | Facility: CLINIC | Age: 84
End: 2024-12-30
Payer: COMMERCIAL

## 2024-12-30 NOTE — TELEPHONE ENCOUNTER
Health Call Center    Phone Message    May a detailed message be left on voicemail: yes     Reason for Call: Appointment Intake    Referring Provider Name: Sylvia Stein APRN CNP in  GERIATRICS   Diagnosis and/or Symptoms: Recurrent epistaxis [R04.0], Priority: 1-2 Weeks    Per referral to be seen within 1-2 weeks. Dr Roberts's next available is not until 1/28. Please call patient back at [phone] for scheduling. Thank you.    Action Taken: Message routed to:  [facility] ENT    Travel Screening: Not Applicable

## 2024-12-31 NOTE — TELEPHONE ENCOUNTER
Left Voicemail (1st Attempt) for the patient to call back and schedule the following:    Appointment Type: New ENT  Provider: [facility]   Appt date: next open. Add to waitlist  Specialty phone number: [phone]  Additional appointment(s) needed:   Additional Notes:

## 2024-12-31 NOTE — TELEPHONE ENCOUNTER
FEELS LIKE BLOOD IS COMING FROM HIS THROAT NOT HIS NOSE. EX: IF HE GETS A PIECE OF QUINTERO IN THROAT, HE STARTS COUGHING, THEN BLEEDING STARTS, THEN WILL COUGH UP BLOOD. PLEASE REVIEW IF THIS IS SOMETHING ONE OF OUR THROAT PROVIDERS COULD SEE.  I DID GO THROUGH THE QUESTIONS AND IT COULD BE SET UP FOR NEW ENT BUT NO OPENINGS AVAILABLE WITH DR MIRANDA OR DR KAUFMAN. I HAD NOT RECEIVED THE MESSAGE FROM THE REP THAT A TE HAD BEEN SENT, SO I AM ADDING THIS NOTE. THANK YOU.

## 2025-01-02 NOTE — TELEPHONE ENCOUNTER
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   R13.13 (ICD-10-CM) - Pharyngeal dysphagia        FUTURE VISIT INFORMATION:  Appointment Date: 2/3/25     NOTES STATUS DETAILS   OFFICE NOTE from Referring Provider Internal 12/17/24-more in [facility]   OFFICE NOTE from Other Specialist Internal 8/21/24-Jerrica BENEDICT CNP-more in Epic   MEDICATION LIST Internal    PROCEDURES     ENDOSCOPY  Internal/CE 6/1/22  [facility]-3/18/19  [facility] 10/1/15   COLONOSCOPY Care Everywhere [facility]-10/1/15   STOOL TESTING     LABS     PERTINENT LABS Internal    PATHOLOGY REPORTS (RELATED) Internal/CE Internal- EGD 6/1/22  EGD 10/1/15  Colonoscopy 10/1/15   IMAGES     CT Internal 6/7/23, 9/30/22-CT chest/abdomen  1/30/23-CT abd pel   XRAY Internal 7/12/24, 3/19/24-XR chest  5/13/22-XR Esophagram

## 2025-01-20 ENCOUNTER — DOCUMENTATION ONLY (OUTPATIENT)
Dept: GASTROENTEROLOGY | Facility: CLINIC | Age: 85
End: 2025-01-20
Payer: COMMERCIAL

## 2025-01-20 ENCOUNTER — TELEPHONE (OUTPATIENT)
Dept: GASTROENTEROLOGY | Facility: CLINIC | Age: 85
End: 2025-01-20
Payer: COMMERCIAL

## 2025-01-20 NOTE — PROGRESS NOTES
"Called the patient to inform,    \"This message is to remind you of your upcoming appointment with the GI clinic, on 02/03/2025@1:00 pm with Dr. Colby Mercado. This appointment is scheduled as an in-person appt. Please arrive 15 minutes early to check in for your appointment.   You must be in [place] at the time of your visit if your appointment is virtual or over the telephone. Please respond to this message to confirm your appointment.  If you are unable to attend this appointment, please call [phone] to reschedule.\" The call was not answered. Left voice message.    Siddharth Graham MA      "

## 2025-01-20 NOTE — TELEPHONE ENCOUNTER
Pt called to get the information of their upcoming clinic appt with Dr. Mercado. Writer gave the pt the appt details (Address, DrCasie's Name,date and time). Pt wanted it noted that they are not very happy with  [employer] [facility] and what they've had to go through.  Pt would like it noted that they should be the one who is contacted, not their daughter.

## 2025-01-21 ENCOUNTER — PATIENT OUTREACH (OUTPATIENT)
Dept: GERIATRIC MEDICINE | Facility: CLINIC | Age: 85
End: 2025-01-21
Payer: COMMERCIAL

## 2025-01-21 NOTE — PROGRESS NOTES
[facility] Care Coordination Contact    Arranged transportation thru [facility] [phone] for the below appt:  Appt Date & Time: 02/03/2025 12:45p  Clinic Name & Address:  [facility], [address] [facility], [place] [address]  Transportation Provider: JANIE  [phone]   time:  11:30a - 12p  Return ride  time: will call    Arranged transportation thru [facility] [phone] for the below appt:  Appt Date & Time: 02/04/2025 8:40a  Clinic Name & Address:  [facility], [address] [address]  Transportation Provider: JANIE  [phone]   time:  7:10a - 7:50a  Return ride  time: will call    Notified BLANCA Ryan, and  of  time.    Sylvia Odonnell    Care Management Specialist   [facility]  [phone]

## 2025-01-29 ENCOUNTER — PATIENT OUTREACH (OUTPATIENT)
Dept: GERIATRIC MEDICINE | Facility: CLINIC | Age: 85
End: 2025-01-29
Payer: COMMERCIAL

## 2025-01-29 NOTE — PROGRESS NOTES
[facility] Care Coordination Contact    Arranged transportation thru [facility] [phone] for the below appt:  Appt Date & Time: 02/25/2025 10:30a  Clinic Name & Address:  [facility] Appt [address] [place] [address]  Transportation Provider: JANIE  [phone]   time:   9:25am - 9:45am  Return ride  time: will call      Arranged transportation thru [facility] [phone] for the below appt:  Appt Date & Time: Thursday, February 27th at 8:45am  Clinic Name & Address:   Dr. Larry at [address], [facility] [address]  Transportation Provider: JANIE  [phone]   time:  7:40a - 8:00a  Return ride  time: will call    Notified BLANCA and Jamie via letter of  time.    Sylvia Odonnell    Care Management Specialist   [facility]  [phone]

## 2025-02-03 ENCOUNTER — OFFICE VISIT (OUTPATIENT)
Dept: GASTROENTEROLOGY | Facility: CLINIC | Age: 85
End: 2025-02-03
Attending: NURSE PRACTITIONER
Payer: COMMERCIAL

## 2025-02-03 ENCOUNTER — PRE VISIT (OUTPATIENT)
Dept: GASTROENTEROLOGY | Facility: CLINIC | Age: 85
End: 2025-02-03

## 2025-02-03 VITALS
HEIGHT: 68 IN | HEART RATE: 97 BPM | DIASTOLIC BLOOD PRESSURE: 71 MMHG | SYSTOLIC BLOOD PRESSURE: 145 MMHG | OXYGEN SATURATION: 97 % | WEIGHT: 214 LBS | BODY MASS INDEX: 32.43 KG/M2

## 2025-02-03 DIAGNOSIS — K22.70 BARRETT'S ESOPHAGUS WITHOUT DYSPLASIA: ICD-10-CM

## 2025-02-03 DIAGNOSIS — R49.0 DYSPHONIA: ICD-10-CM

## 2025-02-03 DIAGNOSIS — R13.12 OROPHARYNGEAL DYSPHAGIA: ICD-10-CM

## 2025-02-03 DIAGNOSIS — R13.19 ESOPHAGEAL DYSPHAGIA: ICD-10-CM

## 2025-02-03 DIAGNOSIS — R13.13 PHARYNGEAL DYSPHAGIA: ICD-10-CM

## 2025-02-03 DIAGNOSIS — T17.308D CHOKING, SUBSEQUENT ENCOUNTER: Primary | ICD-10-CM

## 2025-02-03 PROCEDURE — 99205 OFFICE O/P NEW HI 60 MIN: CPT | Mod: GC | Performed by: INTERNAL MEDICINE

## 2025-02-03 ASSESSMENT — PAIN SCALES - GENERAL: PAINLEVEL_OUTOF10: NO PAIN (0)

## 2025-02-03 NOTE — PATIENT INSTRUCTIONS
It was a pleasure taking care of you today.  I've included a brief summary of our discussion and care plan from today's visit below.  Please review this information with your primary care provider.      You may decrease the dose of omeprazole from twice a day to once a day. If you have symptoms of reflux that worsen then you can increase to twice a day.   ENT referral placed for voice changes  Please consider a modified barium esophagram   Please consider a timed barium esophagram  Please consider an upper endoscopy to evaluate your difficulty swallowing and to consider a dilation if necessary      If you feel you received exceptional care and are interested in supporting the clinical and research goals of Dr. Mercado or the Division of Gastroenterology, Hepatology, and Nutrition please contact him directly through [employer]  to discuss opportunities to donate.    Please call my nurse Tracie ([phone]) or Felicia ([phone]) with any questions or concerns.     Sincerely,    Colby Mercado DO   of Medicine  Director, Esophageal Disorders Program  Director of Endoscopy, [facility]  Division of Gastroenterology, Hepatology, and Nutrition  [facility]      Please see below for any additional questions and scheduling guidelines.    Sign up for [employer]: [employer] patient portal serves as a secure platform for accessing your medical records from the [facility]. Additionally, [employer] facilitates easy, timely, and secure messaging with your care team. If you have not signed up, you may do so by using the provided code or calling [phone].    Coordinating your care after your visit:  There are multiple options for scheduling your follow-up care based on your provider's recommendation.    How do I schedule a follow-up clinic appointment:   After your appointment, you may receive scheduling assistance with the Clinic Coordinators by having a seat in the  waiting room and a Clinic Coordinator will call you up to schedule.  Virtual visits or after you leave the clinic:  Your provider has placed a follow-up order in the [facility] [employer] portal for scheduling your return appointment. A member of the scheduling team will contact you to schedule.  [facility] Scheduling: Timely scheduling through [facility] is advised to ensure appointment availability.   Call to schedule: You may schedule your follow-up appointment(s) by calling [phone], option 1.    How do I schedule my endoscopy or colonoscopy procedure:  If a procedure, such as a colonoscopy or upper endoscopy was ordered by your provider, the scheduling team will contact you to schedule this procedure. Or you may choose to call to schedule at   [phone], option 2.  Please allow 20-30 minutes when scheduling a procedure.    How do I get my blood work done? To get your blood work done, you need to schedule a lab appointment at an [facility]. There are multiple ways to schedule:   At the clinic: The Clinic Coordinator you meet after your visit can help you schedule a lab appointment.   [facility] scheduling: [facility] offers online lab scheduling at all [facility] laboratory locations.   Call to schedule: You can call [phone] to schedule your lab appointment.    How do I schedule my imaging study: To schedule imaging studies, such as CT scans, ultrasounds, MRIs, or X-rays, contact Imaging Services at [phone].    How do I schedule a referral to another doctor: If your provider recommended a referral to another specialist(s), the referral order was placed by your provider. You will receive a phone call to schedule this referral, or you may choose to call the number attached to the referral to self-schedule.    For Post-Visit Question(s):  For any inquiries following today's visit:  Please utilize [facility] messaging and allow 48 hours for reply or contact the Call Center during normal business hours  at [phone], option 3.  For Emergent After-hours questions, contact the On-Call GI Fellow through the [facility]  at [phone].  In addition, you may contact your Nurse directly using the provided contact information.    Test Results:  Test results will be accessible via [employer] in compliance with the 21st Century Cures Act. This means that your results will be available to you at the same time as your provider. Often you may see your results before your provider does. Results are reviewed by staff within two weeks with communication follow-up. Results may be released in the patient portal prior to your care team review.    Prescription Refill(s):  Medication prescribed by your provider will be addressed during your visit. For future refills, please coordinate with your pharmacy. If you have not had a recent clinic visit or routine labs, for your safety, your provider may not be able to refill your prescription.

## 2025-02-03 NOTE — LETTER
"2/3/2025      Jamie Valdivia  [address]  [address]   [place] [address]      Dear Colleague,    Thank you for referring your patient, Jamie Valdivia, to the [facility] [place]. Please see a copy of my visit note below.    Gastroenterology Visit for: Jamie Valdivia 1940   MRN: [id number]     Reason for Visit:  Difficulty swallowing    Referred by: Emil  / [address] / [facility] [id number]  Patient Care Team:  Sylvia Stein APRN CNP as PCP - General (Internal Medicine)  Asst Yovani Jeter([facility]), [place]  [facility], [place] as Other (see comments)  Tabatha Quintana MD as MD (Internal Medicine)  Elke Vila as Case Management Specialist  Niraj Larry MD as Assigned Surgical Provider  Gisela Hall, RN as Lead Care Coordinator (Primary Care - CC)  Alex Ross MD as MD (Cardiovascular Disease)  Sylvia Stein APRN CNP as Assigned PCP  Deena Flood AuD as Audiologist (Audiology)  Colby Mercado DO as Physician (Gastroenterology)  Sylvia Stein APRN CNP as Nurse Practitioner (Internal Medicine)  Loyd Dominguez MD as Assigned Heart and Vascular Provider  Niraj Larry MD as MD (Urology)    History of Present Illness:   Jamie Valdivia is a 84 year old male with known chronic pharyngeal dysphagia and recent epistaxis requiring ER visit who is presenting as a new patient in consultation at the request of Dr. Stein with a chief complaint of dysphagia and short segment non dysplastic munguia's esophagus.  ---------------------------------------------------------------  Jamie Valdivia states that's he's had trouble swallowing for about 40 years. He had an endoscopy in 2022 with another provider that he describes as a \"fiquinn.\" On that endoscopy he was found to have munguia's esophagus and is on a PPI twice daily. His difficulty swallowing has gotten worse over the years. It occurs with both solid foods and " liquids. Small grains or pieces of food cause him to feel as though he's choking. He takes his time eating and when drinking takes small sips and bends over. He describes often choking to the point that he has nosebleeds. When he went to the ER for this they said the bleeding was from his nose but he's worried it's from his throat. He is also worried there is a small hole in the back of his throat where things are getting stuck while he's eating. His wife Lisa, who is present for the visit, found that holding ice cubes in his mouth will help if he's bleeding from a coughing fit.     ---------------------------------------------------------------     Wt Readings from Last 5 Encounters:   02/03/25 97.1 kg (214 lb)   12/16/24 86.6 kg (191 lb)   11/26/24 78 kg (172 lb)   10/21/24 84.4 kg (186 lb)   08/26/24 81.6 kg (180 lb)        Esophageal Questionnaire(s)    BEDQ Questionnaire       No data to display                   No data to display                Eckardt Questionnaire       No data to display                Promis 10 Questionnaire       No data to display                  STUDIES & PROCEDURES:    EGD:   Date: 6/1/22  Impression:   The examined duodenum was normal.        The entire examined stomach was normal.        The esophagus and gastroesophageal junction were examined with white        light and narrow band imaging (NBI). There were esophageal mucosal        changes suggestive of short-segment Vickers's esophagus. These changes        involved the mucosa at the upper extent of the gastric folds (42 cm from        the incisors) extending to the Z-line (40 cm from the incisors).        Circumferential salmon-colored mucosa was present from 40 to 42 cm and        no visible abnormalities were present. The maximum longitudinal extent        of these esophageal mucosal changes was 2 cm in length. Mucosa was        biopsied with a cold forceps for histology in 4 quadrants at intervals        of 1 cm at 41 cm  from the incisors. One specimen bottle was sent to        pathology. Verification of patient identification for the specimen was        done by the physician, nurse and technician.        Abnormal motility was noted in the esophagus. The cricopharyngeus was        abnormal. There is a decrease in motility and extra peristaltic waves of        the esophageal body. Tertiary peristaltic waves are noted.        The exam of the esophagus was otherwise normal.                                                                                    Impression:               - Normal examined duodenum.                             - Normal stomach.                             - Esophageal mucosal changes suggestive of                             short-segment Vickers's esophagus. Biopsied.                             - Abnormal esophageal motility, suspicious for                             presbyesophagus.   Pathology Report:  A. Esophagus, distal at 41 cm, biopsy:  -Vickers's esophagus with reactive epithelial changes, negative for dysplasia.  -Columnar mucosa with chronic inflammation and goblet cells identified.  -Minute adjacent squamous mucosa with reactive epithelial changes.  -Negative for acute or eosinophilic esophagitis.  -Negative for dysplasia or malignancy.    Colonoscopy:  Date:  Impression:  Pathology Report:     EndoFLIP directed at the UES or LES (8cm (EF-325) balloon length or 16cm (EF-322) balloon length):   Date:  8cm balloon  Balloon inflation Balloon pressure CSA (mm^2) DI (mm^2/mmHg) Dmin (mm) Compliance   20 (ladmark ID)        30        40        50           16cm balloon  Balloon inflation Balloon pressure CSA (mm^2) DI (mm^2/mmHg) Dmin (mm) Compliance   30 (ladmark ID)        40        50        60        70           High Resolution Manometry:  Date:  Impression:    PH/Impedance:  Date:  Impression:     Bravo:  48 or  96hr  Date:  Impression:    CT:  Date:  Impression:    Esophagram:  Date:  Impression:    Modified Barium Esophagram:  Date:  Impression:     Prior medical records were reviewed including, but not limited to, notes from referring providers, lab work, radiographic tests, and other diagnostic tests. Pertinent results were summarized above.     History     Past Medical History:   Diagnosis Date     Age-related osteoporosis without current pathological fracture 03/30/2022     Alcohol use disorder      Aortic root aneurysm      CAD (coronary artery disease)      Chronic a-fib (H)      Chronic atrial fibrillation (H) 07/15/2016    Formatting of this note might be different from the original. 2/2019: Multiple falls so started on aspirin only.  Then aspirin stopped due to GI bleed.     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 2/2/21     HTN (hypertension)      Impaired gait and mobility 03/14/2019    Frequent falls     Mild TBI (traumatic brain injury) (H) 03/14/2019     Mumps      Obesity (BMI 30-39.9) 09/03/2015     Osteoarthritis of both knees 05/13/2015     Osteoporosis      Other idiopathic peripheral autonomic neuropathy 03/30/2022     Other seizures (H) 03/30/2022     Pharyngeal dysphagia 05/13/2015    Formatting of this note might be different from the original. Likely secondary to GERD with esophagitis, on PPI and H2 blocker with notable improvement, EGD 10/5/15.     Prostate cancer (H)      Recurrent major depressive disorder 03/30/2022     Rhabdomyolysis      Thrombocytopenia         Past Surgical History:   Procedure Laterality Date     BIOPSY PROSTATE TRANSRECTAL N/A 1/11/2023    Procedure: PROSTATE BIOPSY, RECTAL APPROACH;  Surgeon: Niraj Larry MD;  Location:  OR     COLONOSCOPY       ESOPHAGOSCOPY, GASTROSCOPY, DUODENOSCOPY (EGD), COMBINED N/A 06/01/2022    Procedure: ESOPHAGOGASTRODUODENOSCOPY (EGD) mngi with biopsies using jumbo cold biopsy forceps;  Surgeon: David Springer MD;  Location:  GI     left hip replacement  2010     left shoulder replacement  2016     ORTHOPEDIC SURGERY       SPINE SURGERY      done in [place]     TONSILLECTOMY       TRANSRECTAL ULTRASONIC SONOGRAM N/A 1/11/2023    Procedure: TRANSRECTAL ULTRASOUND;  Surgeon: Niraj Larry MD;  Location:  OR       Social History     Socioeconomic History     Marital status:      Spouse name: Not on file     Number of children: Not on file     Years of education: Not on file     Highest education level: Not on file   Occupational History     Not on file   Tobacco Use     Smoking status: Never     Smokeless tobacco: Never   Substance and Sexual Activity     Alcohol use: Not Currently     Comment: not since December 2021     Drug use: Never     Sexual activity: Not on file   Other Topics Concern     Not on file   Social History Narrative     Not on file     Social Drivers of Health     Financial Resource Strain: Low Risk  (11/22/2024)    Financial Resource Strain      Within the past 12 months, have you or your family members you live with been unable to get utilities (heat, electricity) when it was really needed?: No   Food Insecurity: Low Risk  (11/22/2024)    Food Insecurity      Within the past 12 months, did you worry that your food would run out before you got money to buy more?: No      Within the past 12 months, did the food you bought just not last and you didn t have money to get more?: No   Transportation Needs: Low Risk  (11/22/2024)    Transportation Needs      Within the past 12  months, has lack of transportation kept you from medical appointments, getting your medicines, non-medical meetings or appointments, work, or from getting things that you need?: No   Physical Activity: Inactive (11/22/2024)    Exercise Vital Sign      Days of Exercise per Week: 0 days      Minutes of Exercise per Session: 0 min   Stress: No Stress Concern Present (11/22/2024)    [employer] [place] [employer] - Occupational Stress Questionnaire      Feeling of Stress : Only a little   Social Connections: Moderately Integrated (11/22/2024)    Social Connection and Isolation Panel [NHANES]      Frequency of Communication with Friends and Family: More than three times a week      Frequency of Social Gatherings with Friends and Family: Three times a week      Attends Jewish Services: More than 4 times per year      Active Member of Clubs or Organizations: No      Attends Club or Organization Meetings: Never      Marital Status:    Interpersonal Safety: Low Risk  (11/22/2024)    Interpersonal Safety      Do you feel physically and emotionally safe where you currently live?: Yes      Within the past 12 months, have you been hit, slapped, kicked or otherwise physically hurt by someone?: No      Within the past 12 months, have you been humiliated or emotionally abused in other ways by your partner or ex-partner?: No   Housing Stability: Low Risk  (11/22/2024)    Housing Stability      Do you have housing? : Yes      Are you worried about losing your housing?: No       Family History   Problem Relation Age of Onset     Osteoporosis Mother      Prostate Cancer Paternal Grandfather      Colon Cancer No family hx of      Family history reviewed and edited as appropriate    Medications and Allergies:     Outpatient Encounter Medications as of 2/3/2025   Medication Sig Dispense Refill     acetaminophen (TYLENOL) 500 MG tablet Take 2 tablets (1,000 mg) by mouth 3 times daily as needed for mild pain 10 tablet 98  "    cyanocobalamin (VITAMIN B-12) 1000 MCG tablet TAKE 1 TABLET BY MOUTH ONCE DAILY 90 tablet 97     folic acid (FOLVITE) 1 MG tablet TAKE 1 TABLET BY MOUTH ONCE DAILY 90 tablet 97     furosemide (LASIX) 20 MG tablet TAKE ONE-HALF TABLET (10MG) BY MOUTH ONCE DAILY 45 tablet 97     gabapentin (NEURONTIN) 100 MG capsule TAKE 1 CAPSULE BY MOUTH AT BEDTIME 90 capsule 97     loratadine (CLARITIN) 10 MG tablet TAKE 1 TABLET BY MOUTH AT BEDTIME 90 tablet 97     menthol-zinc oxide (CALMOSEPTINE) 0.44-20.6 % OINT ointment Apply topically 4 times daily.       omeprazole (PRILOSEC) 40 MG DR capsule TAKE 1 CAPSULE BY MOUTH TWICE DAILY 180 capsule 97     polyethylene glycol (MIRALAX) 17 GM/Dose powder MIX 2 CAPFULS IN 8OZ OF ORANGE JUICE  IN THE MORNING;AND MAY MIX 2 CAPFULS ONCE DAILY AS NEEDED FOR CONSTIPATION. MIX IN FRONT OF PT. HOLD FOR LOOSE STOOL. OK TO GIVE 1 CAPFUL IF RESIDENT REFUSES 2 CAPFULS. 510 g 97     QUEtiapine (SEROQUEL) 25 MG tablet TAKE ONE-HALF TABLET (12.5MG) BY MOUTH TWICE DAILY;& MAY TAKE ONE-HALF TABLET (12.5MG) EVERY 12 HOURS AS NEEDED 180 tablet 97     sodium chloride (OCEAN) 0.65 % nasal spray Spray 1 spray in nostril 2 times daily. 480 mL 98     No facility-administered encounter medications on file as of 2/3/2025.        Allergies   Allergen Reactions     Ativan [Lorazepam]         Review of systems:  A full 10 point review of systems was obtained and was negative except for the pertinent positives and negatives stated within the HPI.    Objective Findings:   Physical Exam:    Constitutional: BP (!) 145/71   Pulse 97   Ht 1.727 m (5' 8\")   Wt 97.1 kg (214 lb)   SpO2 97%   BMI 32.54 kg/m    General: NAD, hard of hearing  Head: Atraumatic, normocephalic  Eyes: EOMI, Sclera anicteric, no obvious conjunctival hemorrhage   Nose: Nares normal, no obvious malformation, no obvious rhinorrhea   Respiratory: Normal WOB on RA  Musculoskeletal: Range of motion intact, utilizes walker for " ambulation  Neurologic: AAOx3  Psychiatric: Normal Affect, appropriate mood  Extremities: Bilateral lower ext edema appreciated     Labs, Radiology, Pathology     Lab Results   Component Value Date    WBC 7.4 12/14/2024    WBC 6.4 09/12/2024    WBC 7.9 08/09/2024    HGB 10.5 (L) 12/14/2024    HGB 10.6 (L) 09/12/2024    HGB 10.4 (L) 08/09/2024     12/14/2024     09/12/2024     08/09/2024    CHOL 123 01/05/2023    CHOL 101 04/28/2022    CHOL 107 04/07/2022    TRIG 84 01/05/2023    TRIG 54 04/28/2022    TRIG 50 04/07/2022    HDL 46 01/05/2023    HDL 40 04/28/2022    HDL 48 04/07/2022    ALT 6 09/12/2024    ALT 6 07/12/2024    ALT <5 06/20/2024    AST 18 09/12/2024    AST 15 07/12/2024    AST 14 06/20/2024     12/14/2024     09/12/2024     08/09/2024    BUN 14.0 12/14/2024    BUN 10.2 09/12/2024    BUN 9.9 08/09/2024    CO2 29 12/14/2024    CO2 25 09/12/2024    CO2 31 (H) 08/09/2024    TSH 2.11 06/28/2024    TSH 1.69 04/11/2024    TSH 1.68 06/07/2023    INR 1.15 12/14/2024        Liver Function Studies -   Recent Labs   Lab Test 09/12/24  1248   PROTTOTAL 7.7   ALBUMIN 3.7   BILITOTAL 0.3   ALKPHOS 87   AST 18   ALT 6          Patient Active Problem List    Diagnosis Date Noted     Choking, subsequent encounter 02/04/2025     Priority: Medium     Oropharyngeal dysphagia 02/04/2025     Priority: Medium     Esophageal dysphagia 02/04/2025     Priority: Medium     Dysphonia 02/04/2025     Priority: Medium     Chest pain, unspecified type 07/13/2024     Priority: Medium     Failure of outpatient treatment 06/28/2024     Priority: Medium     Encephalopathy 03/19/2024     Priority: Medium     Generalized weakness 03/19/2024     Priority: Medium     Bradycardia 12/26/2023     Priority: Medium     Preventative health care 11/28/2023     Priority: Medium     Aneurysm of ascending aorta without rupture 08/25/2023     Priority: Medium     Fall, initial encounter 06/07/2023     Priority:  Medium     Closed fracture of multiple ribs of right side, initial encounter 06/07/2023     Priority: Medium     Closed wedge compression fracture of L3 vertebra, initial encounter (H) 06/07/2023     Priority: Medium     Chronic cough 05/09/2023     Priority: Medium     Prostate cancer (H) - Dx January 2023 01/26/2023     Priority: Medium     Atherosclerosis of aorta 01/08/2023     Priority: Medium     Alcohol dependence in remission (H) 01/08/2023     Priority: Medium     Compression fracture of lumbar vertebra -compression deformity of L2-L5 with chronic mild compression deformity at superior endplate of L1. 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 06/02/2022     Priority: Medium     Repeated falls 03/30/2022     Priority: Medium     Coronary atherosclerosis of native coronary artery 03/30/2022     Priority: Medium     Anemia 03/30/2022     Priority: Medium     Other idiopathic peripheral autonomic neuropathy 03/30/2022     Priority: Medium     Age-related osteoporosis without current pathological fracture 03/30/2022     Priority: Medium     Constipation 03/30/2022     Priority: Medium     Dependent edema 03/30/2022     Priority: Medium     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  2/2/21       Generalized anxiety disorder 04/27/2020     Priority: Medium     Gastro-esophageal reflux disease without esophagitis 04/27/2020     Priority: Medium     Major neurocognitive disorder (H) 08/12/2019     Priority: Medium     Formatting of this note might be different from the original.  NON-AMNESTIC TYPE       Impaired gait and  mobility 03/14/2019     Priority: Medium     Other insomnia 03/14/2019     Priority: Medium     Physical deconditioning 03/14/2019     Priority: Medium     Obesity (BMI 30-39.9) 09/03/2015     Priority: Medium     Chronic alcohol use 06/11/2015     Priority: Medium     Formatting of this note might be different from the original.  2/26/2019: serious fall at home with multiple vertebral fractures.  BAL 0.414% on admission.  Denies that he drinks much.  12/18/2018: hospitalized for fall due to alcohol intoxication.  BAL 0.34% on admission.  9/16/2018: hospitalized for injuries from fall due to alcohol intoxication.  BAL 0.34% on admission       Osteoarthritis of both knees 05/13/2015     Priority: Medium     Pharyngeal dysphagia 05/13/2015     Priority: Medium     Formatting of this note might be different from the original.  Likely secondary to GERD with esophagitis, on PPI and H2 blocker with notable improvement, EGD 10/5/15.        Assessment and Plan   Assessment:    Jamie Valdivia is a 84 year old male with known chronic pharyngeal dysphagia and recent epistaxis requiring ER visit who is presenting as a new patient in consultation at the request of Dr. Stein with a chief complaint of dysphagia and short segment non dysplastic munguia's esophagus. Has had dysphagia for about 40 years. Suspect that dysphagia leads to coughing and blood vessel irritation causing epistaxis. Given report of voice changes, recommend ENT work up.     1. Choking, subsequent encounter    2. Munguia's esophagus without dysplasia    3. Pharyngeal dysphagia    4. Oropharyngeal dysphagia    5. Esophageal dysphagia    6. Dysphonia       Orders Placed This Encounter   Procedures     XR Esophagram     XR Video Swallow with SLP or OT - Order with Speech Therapy Referral     Adult GI  Referral - Procedure Only     Speech Therapy  Referral     Adult ENT  Referral        Plan:    You may decrease the dose of omeprazole  from twice a day to once a day. If you have symptoms of reflux that worsen then you can increase to twice a day.   ENT referral placed for voice changes  Please consider a modified barium esophagram   Please consider a timed barium esophagram  Please consider an upper endoscopy to evaluate your difficulty swallowing and to consider a dilation if necessary    Follow up plan:   Return to clinic 1 year and as needed.    The risks and benefits of my recommendations, as well as other treatment options were discussed with the patient and any available family today. All questions were answered.     Follow up: As planned above. Today, I personally spent 60 minutes in direct face to face time with the patient, of which greater than 50% of the time was spent in patient education and counseling as described above. Approximately 10 minutes were spent on indirect care associated with the patient's consultation including but not limited to review of: patient medical records to date, clinic visits, hospital records, lab results, imaging studies, procedural documentation, and coordinating care with other providers. The findings from this review are summarized in the above note. All of the above accounted for a cumulative time of 70 minutes and was performed on the date of service.     The patient verbalized understanding of the plan and was appreciative for the time spent and information provided during the office visit.     Efe Mario MD  PGY-1, Internal Medicine    Attending Attestation:  I saw the patient with Dr. Mario and agree with the findings and the plan of care as documented in the fellow's note. In summary, the patient is an 84 year old male with a history of dsyphagia. We have been consulted to assess the patient's dysphagia, epistaxis, and short segment non dysplastic munguia's esophagus.    The patient has 40 years of dysphagia and reports choking on foods that leads to coughing and choking and subsequently nose  bleeding. Because of his epistaxis, choking, and noted voice changes I have placed a referral to ENT. I have also recommended a modified barium esophagram for oropharyngeal dysphagia along with a timed barium esophagram to evaluate for a motility abnormality. The patient's wife is concerned about a hole or diverticulum and this may be visualized on modified barium. Because of the dysphagia I have also recommended an upper endoscopy and possible empiric dilation. I also relayed to the patient that he can likely decrease the dose of his PPI to once daily instead of twice daily. A significant amount of time was spent trying to relay this point and it is unclear to me if it was fully understood. But, if the patient decreases the dose and subsequently has heartburn he may want to increase it back to twice daily dosing.     70 minutes were spent on the date of the encounter performing chart review, reviewing of outside and inside available records, review of test results as well as interpretation of tests, the patient visit, documentation, and discussion with other provider(s) and/or discussion with family.     Colby Mercado DO   of Medicine  Director, Esophageal Disorders Program  Director of Endoscopy, [facility]  Division of Gastroenterology, Hepatology, and Nutrition  [facility]    Author:   Colby Mercado DO   of Medicine  Director, Esophageal Disorders Program  Director of Endoscopy, [facility]  Division of Gastroenterology, Hepatology, and Nutrition  [facility]    Dr. Mercado speaks for [employer] regarding dupilumab and has participated in advisory boards for the medication. He receives income for these activities. When discussing the medication, patients were informed of Dr. Mercado's role and potential conflict of interest. All questions and/or concerns were answered during the  encounter.    Dr. Mercado speaks for [employer] regarding vonoprazan. He receives income for these activities. When discussing the medication, patients were informed of Dr. Mercado's role and potential conflict of interest. All questions and/or concerns were answered during the encounter.     Documentation assisted by voice recognition and documentation system.      Again, thank you for allowing me to participate in the care of your patient.        Sincerely,        Colby Mercado, DO    Electronically signed

## 2025-02-03 NOTE — PROGRESS NOTES
"Gastroenterology Visit for: Jamie Valdivai 1940   MRN: [id number]     Reason for Visit:  Difficulty swallowing    Referred by: Emil  / [facility] [address]  Patient Care Team:  Sylvia Stein APRN CNP as PCP - General (Internal Medicine)  [facility] Yovani Jeter([facility]), [place], [place] as Other (see comments)  Tabatha Quintana MD as MD (Internal Medicine)  lEke Vila as Case Management Specialist  Niraj Larry MD as Assigned Surgical Provider  Gisela Hall, RN as Lead Care Coordinator (Primary Care - CC)  Alex Ross MD as MD (Cardiovascular Disease)  Sylvia Stein APRN CNP as Assigned PCP  Deena Flood AuD as Audiologist (Audiology)  Colby Mercado DO as Physician (Gastroenterology)  Sylvia Stein APRN CNP as Nurse Practitioner (Internal Medicine)  Loyd Dominguez MD as Assigned Heart and Vascular Provider  Niraj Larry MD as MD (Urology)    History of Present Illness:   Jamie Valdivia is a 84 year old male with known chronic pharyngeal dysphagia and recent epistaxis requiring ER visit who is presenting as a new patient in consultation at the request of Dr. Stein with a chief complaint of dysphagia and short segment non dysplastic munguia's esophagus.  ---------------------------------------------------------------  Jamie Valdivia states that's he's had trouble swallowing for about 40 years. He had an endoscopy in 2022 with another provider that he describes as a \"fiasco.\" On that endoscopy he was found to have munguia's esophagus and is on a PPI twice daily. His difficulty swallowing has gotten worse over the years. It occurs with both solid foods and liquids. Small grains or pieces of food cause him to feel as though he's choking. He takes his time eating and when drinking takes small sips and bends over. He describes often choking to the point that he has nosebleeds. When he went to the ER for this they said the " bleeding was from his nose but he's worried it's from his throat. He is also worried there is a small hole in the back of his throat where things are getting stuck while he's eating. His wife Lisa, who is present for the visit, found that holding ice cubes in his mouth will help if he's bleeding from a coughing fit.     ---------------------------------------------------------------     Wt Readings from Last 5 Encounters:   02/03/25 97.1 kg (214 lb)   12/16/24 86.6 kg (191 lb)   11/26/24 78 kg (172 lb)   10/21/24 84.4 kg (186 lb)   08/26/24 81.6 kg (180 lb)        Esophageal Questionnaire(s)    BEDQ Questionnaire       No data to display                   No data to display                Eckardt Questionnaire       No data to display                Promis 10 Questionnaire       No data to display                  STUDIES & PROCEDURES:    EGD:   Date: 6/1/22  Impression:   The examined duodenum was normal.        The entire examined stomach was normal.        The esophagus and gastroesophageal junction were examined with white        light and narrow band imaging (NBI). There were esophageal mucosal        changes suggestive of short-segment Vickers's esophagus. These changes        involved the mucosa at the upper extent of the gastric folds (42 cm from        the incisors) extending to the Z-line (40 cm from the incisors).        Circumferential salmon-colored mucosa was present from 40 to 42 cm and        no visible abnormalities were present. The maximum longitudinal extent        of these esophageal mucosal changes was 2 cm in length. Mucosa was        biopsied with a cold forceps for histology in 4 quadrants at intervals        of 1 cm at 41 cm from the incisors. One specimen bottle was sent to        pathology. Verification of patient identification for the specimen was        done by the physician, nurse and technician.        Abnormal motility was noted in the esophagus. The cricopharyngeus was         abnormal. There is a decrease in motility and extra peristaltic waves of        the esophageal body. Tertiary peristaltic waves are noted.        The exam of the esophagus was otherwise normal.                                                                                    Impression:               - Normal examined duodenum.                             - Normal stomach.                             - Esophageal mucosal changes suggestive of                             short-segment Vickers's esophagus. Biopsied.                             - Abnormal esophageal motility, suspicious for                             presbyesophagus.   Pathology Report:  A. Esophagus, distal at 41 cm, biopsy:  -Vickers's esophagus with reactive epithelial changes, negative for dysplasia.  -Columnar mucosa with chronic inflammation and goblet cells identified.  -Minute adjacent squamous mucosa with reactive epithelial changes.  -Negative for acute or eosinophilic esophagitis.  -Negative for dysplasia or malignancy.    Colonoscopy:  Date:  Impression:  Pathology Report:     EndoFLIP directed at the UES or LES (8cm (EF-325) balloon length or 16cm (EF-322) balloon length):   Date:  8cm balloon  Balloon inflation Balloon pressure CSA (mm^2) DI (mm^2/mmHg) Dmin (mm) Compliance   20 (ladmark ID)        30        40        50           16cm balloon  Balloon inflation Balloon pressure CSA (mm^2) DI (mm^2/mmHg) Dmin (mm) Compliance   30 (ladmark ID)        40        50        60        70           High Resolution Manometry:  Date:  Impression:    PH/Impedance:  Date:  Impression:     Bravo:  48 or 96hr  Date:  Impression:    CT:  Date:  Impression:    Esophagram:  Date:  Impression:    Modified Barium Esophagram:  Date:  Impression:     Prior medical records were reviewed including, but not limited to, notes from referring providers, lab work, radiographic tests, and other diagnostic tests. Pertinent results were summarized above.     History      Past Medical History:   Diagnosis Date    Age-related osteoporosis without current pathological fracture 03/30/2022    Alcohol use disorder     Aortic root aneurysm     CAD (coronary artery disease)     Chronic a-fib (H)     Chronic atrial fibrillation (H) 07/15/2016    Formatting of this note might be different from the original. 2/2019: Multiple falls so started on aspirin only.  Then aspirin stopped due to GI bleed.    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 2/2/21    HTN (hypertension)     Impaired gait and mobility 03/14/2019    Frequent falls    Mild TBI (traumatic brain injury) (H) 03/14/2019    Mumps     Obesity (BMI 30-39.9) 09/03/2015    Osteoarthritis of both knees 05/13/2015    Osteoporosis     Other idiopathic peripheral autonomic neuropathy 03/30/2022    Other seizures (H) 03/30/2022    Pharyngeal dysphagia 05/13/2015    Formatting of this note might be different from the original. Likely secondary to GERD with esophagitis, on PPI and H2 blocker with notable improvement, EGD 10/5/15.    Prostate cancer (H)     Recurrent major depressive disorder 03/30/2022    Rhabdomyolysis     Thrombocytopenia        Past Surgical History:   Procedure Laterality Date    BIOPSY PROSTATE TRANSRECTAL N/A 1/11/2023    Procedure: PROSTATE BIOPSY, RECTAL APPROACH;  Surgeon: Niraj Larry MD;  Location: [facility] OR    COLONOSCOPY      ESOPHAGOSCOPY, GASTROSCOPY, DUODENOSCOPY (EGD), COMBINED N/A 06/01/2022    Procedure: ESOPHAGOGASTRODUODENOSCOPY (EGD) mngi with biopsies using jumbo cold biopsy  forceps;  Surgeon: David Springer MD;  Location:  GI    left hip replacement  2010    left shoulder replacement  2016    ORTHOPEDIC SURGERY      SPINE SURGERY      done in [place]    TONSILLECTOMY      TRANSRECTAL ULTRASONIC SONOGRAM N/A 1/11/2023    Procedure: TRANSRECTAL ULTRASOUND;  Surgeon: Niraj Larry MD;  Location:  OR       Social History     Socioeconomic History    Marital status:      Spouse name: Not on file    Number of children: Not on file    Years of education: Not on file    Highest education level: Not on file   Occupational History    Not on file   Tobacco Use    Smoking status: Never    Smokeless tobacco: Never   Substance and Sexual Activity    Alcohol use: Not Currently     Comment: not since December 2021    Drug use: Never    Sexual activity: Not on file   Other Topics Concern    Not on file   Social History Narrative    Not on file     Social Drivers of Health     Financial Resource Strain: Low Risk  (11/22/2024)    Financial Resource Strain     Within the past 12 months, have you or your family members you live with been unable to get utilities (heat, electricity) when it was really needed?: No   Food Insecurity: Low Risk  (11/22/2024)    Food Insecurity     Within the past 12 months, did you worry that your food would run out before you got money to buy more?: No     Within the past 12 months, did the food you bought just not last and you didn t have money to get more?: No   Transportation Needs: Low Risk  (11/22/2024)    Transportation Needs     Within the past 12 months, has lack of transportation kept you from medical appointments, getting your medicines, non-medical meetings or appointments, work, or from getting things that you need?: No   Physical Activity: Inactive (11/22/2024)    Exercise Vital Sign     Days of Exercise per Week: 0 days     Minutes of Exercise per Session: 0 min   Stress: No Stress Concern Present (11/22/2024)    [employer] [place] [employer]  [employer] - Occupational Stress Questionnaire     Feeling of Stress : Only a little   Social Connections: Moderately Integrated (11/22/2024)    Social Connection and Isolation Panel [NHANES]     Frequency of Communication with Friends and Family: More than three times a week     Frequency of Social Gatherings with Friends and Family: Three times a week     Attends [employer] Services: More than 4 times per year     Active Member of Clubs or Organizations: No     Attends Club or Organization Meetings: Never     Marital Status:    Interpersonal Safety: Low Risk  (11/22/2024)    Interpersonal Safety     Do you feel physically and emotionally safe where you currently live?: Yes     Within the past 12 months, have you been hit, slapped, kicked or otherwise physically hurt by someone?: No     Within the past 12 months, have you been humiliated or emotionally abused in other ways by your partner or ex-partner?: No   Housing Stability: Low Risk  (11/22/2024)    Housing Stability     Do you have housing? : Yes     Are you worried about losing your housing?: No       Family History   Problem Relation Age of Onset    Osteoporosis Mother     Prostate Cancer Paternal Grandfather     Colon Cancer No family hx of      Family history reviewed and edited as appropriate    Medications and Allergies:     Outpatient Encounter Medications as of 2/3/2025   Medication Sig Dispense Refill    acetaminophen (TYLENOL) 500 MG tablet Take 2 tablets (1,000 mg) by mouth 3 times daily as needed for mild pain 10 tablet 98    cyanocobalamin (VITAMIN B-12) 1000 MCG tablet TAKE 1 TABLET BY MOUTH ONCE DAILY 90 tablet 97    folic acid (FOLVITE) 1 MG tablet TAKE 1 TABLET BY MOUTH ONCE DAILY 90 tablet 97    furosemide (LASIX) 20 MG tablet TAKE ONE-HALF TABLET (10MG) BY MOUTH ONCE DAILY 45 tablet 97    gabapentin (NEURONTIN) 100 MG capsule TAKE 1 CAPSULE BY MOUTH AT BEDTIME 90 capsule 97    loratadine (CLARITIN) 10 MG tablet TAKE 1 TABLET  "BY MOUTH AT BEDTIME 90 tablet 97    menthol-zinc oxide (CALMOSEPTINE) 0.44-20.6 % OINT ointment Apply topically 4 times daily.      omeprazole (PRILOSEC) 40 MG DR capsule TAKE 1 CAPSULE BY MOUTH TWICE DAILY 180 capsule 97    polyethylene glycol (MIRALAX) 17 GM/Dose powder MIX 2 CAPFULS IN 8OZ OF ORANGE JUICE  IN THE MORNING;AND MAY MIX 2 CAPFULS ONCE DAILY AS NEEDED FOR CONSTIPATION. MIX IN FRONT OF PT. HOLD FOR LOOSE STOOL. OK TO GIVE 1 CAPFUL IF RESIDENT REFUSES 2 CAPFULS. 510 g 97    QUEtiapine (SEROQUEL) 25 MG tablet TAKE ONE-HALF TABLET (12.5MG) BY MOUTH TWICE DAILY;& MAY TAKE ONE-HALF TABLET (12.5MG) EVERY 12 HOURS AS NEEDED 180 tablet 97    sodium chloride (OCEAN) 0.65 % nasal spray Spray 1 spray in nostril 2 times daily. 480 mL 98     No facility-administered encounter medications on file as of 2/3/2025.        Allergies   Allergen Reactions    Ativan [Lorazepam]         Review of systems:  A full 10 point review of systems was obtained and was negative except for the pertinent positives and negatives stated within the HPI.    Objective Findings:   Physical Exam:    Constitutional: BP (!) 145/71   Pulse 97   Ht 1.727 m (5' 8\")   Wt 97.1 kg (214 lb)   SpO2 97%   BMI 32.54 kg/m    General: NAD, hard of hearing  Head: Atraumatic, normocephalic  Eyes: EOMI, Sclera anicteric, no obvious conjunctival hemorrhage   Nose: Nares normal, no obvious malformation, no obvious rhinorrhea   Respiratory: Normal WOB on RA  Musculoskeletal: Range of motion intact, utilizes walker for ambulation  Neurologic: AAOx3  Psychiatric: Normal Affect, appropriate mood  Extremities: Bilateral lower ext edema appreciated     Labs, Radiology, Pathology     Lab Results   Component Value Date    WBC 7.4 12/14/2024    WBC 6.4 09/12/2024    WBC 7.9 08/09/2024    HGB 10.5 (L) 12/14/2024    HGB 10.6 (L) 09/12/2024    HGB 10.4 (L) 08/09/2024     12/14/2024     09/12/2024     08/09/2024    CHOL 123 01/05/2023    CHOL 101 " 04/28/2022    CHOL 107 04/07/2022    TRIG 84 01/05/2023    TRIG 54 04/28/2022    TRIG 50 04/07/2022    HDL 46 01/05/2023    HDL 40 04/28/2022    HDL 48 04/07/2022    ALT 6 09/12/2024    ALT 6 07/12/2024    ALT <5 06/20/2024    AST 18 09/12/2024    AST 15 07/12/2024    AST 14 06/20/2024     12/14/2024     09/12/2024     08/09/2024    BUN 14.0 12/14/2024    BUN 10.2 09/12/2024    BUN 9.9 08/09/2024    CO2 29 12/14/2024    CO2 25 09/12/2024    CO2 31 (H) 08/09/2024    TSH 2.11 06/28/2024    TSH 1.69 04/11/2024    TSH 1.68 06/07/2023    INR 1.15 12/14/2024        Liver Function Studies -   Recent Labs   Lab Test 09/12/24  1248   PROTTOTAL 7.7   ALBUMIN 3.7   BILITOTAL 0.3   ALKPHOS 87   AST 18   ALT 6          Patient Active Problem List    Diagnosis Date Noted    Choking, subsequent encounter 02/04/2025     Priority: Medium    Oropharyngeal dysphagia 02/04/2025     Priority: Medium    Esophageal dysphagia 02/04/2025     Priority: Medium    Dysphonia 02/04/2025     Priority: Medium    Chest pain, unspecified type 07/13/2024     Priority: Medium    Failure of outpatient treatment 06/28/2024     Priority: Medium    Encephalopathy 03/19/2024     Priority: Medium    Generalized weakness 03/19/2024     Priority: Medium    Bradycardia 12/26/2023     Priority: Medium    Preventative health care 11/28/2023     Priority: Medium    Aneurysm of ascending aorta without rupture 08/25/2023     Priority: Medium    Fall, initial encounter 06/07/2023     Priority: Medium    Closed fracture of multiple ribs of right side, initial encounter 06/07/2023     Priority: Medium    Closed wedge compression fracture of L3 vertebra, initial encounter (H) 06/07/2023     Priority: Medium    Chronic cough 05/09/2023     Priority: Medium    Prostate cancer (H) - Dx January 2023 01/26/2023     Priority: Medium    Atherosclerosis of aorta 01/08/2023     Priority: Medium    Alcohol dependence in remission (H) 01/08/2023     Priority:  Medium    Compression fracture of lumbar vertebra -compression deformity of L2-L5 with chronic mild compression deformity at superior endplate of L1. 11/17/2022     Priority: Medium    Closed wedge compression fracture of L2 vertebra with routine healing 10/04/2022     Priority: Medium    Hyponatremia 09/30/2022     Priority: Medium    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 06/02/2022     Priority: Medium    Repeated falls 03/30/2022     Priority: Medium    Coronary atherosclerosis of native coronary artery 03/30/2022     Priority: Medium    Anemia 03/30/2022     Priority: Medium    Other idiopathic peripheral autonomic neuropathy 03/30/2022     Priority: Medium    Age-related osteoporosis without current pathological fracture 03/30/2022     Priority: Medium    Constipation 03/30/2022     Priority: Medium    Dependent edema 03/30/2022     Priority: Medium    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  2/2/21      Generalized anxiety disorder 04/27/2020     Priority: Medium    Gastro-esophageal reflux disease without esophagitis 04/27/2020     Priority: Medium    Major neurocognitive disorder (H) 08/12/2019     Priority: Medium     Formatting of this note might be different from the original.  NON-AMNESTIC TYPE      Impaired gait and mobility 03/14/2019     Priority: Medium    Other insomnia 03/14/2019     Priority: Medium    Physical deconditioning 03/14/2019     Priority: Medium    Obesity (BMI 30-39.9) 09/03/2015     Priority: Medium    Chronic alcohol use 06/11/2015     Priority: Medium     Formatting of this note might be different from the original.  2/26/2019: serious fall at home with multiple vertebral fractures.  BAL 0.414% on admission.  Denies that he drinks much.  12/18/2018: hospitalized for fall  due to alcohol intoxication.  BAL 0.34% on admission.  9/16/2018: hospitalized for injuries from fall due to alcohol intoxication.  BAL 0.34% on admission      Osteoarthritis of both knees 05/13/2015     Priority: Medium    Pharyngeal dysphagia 05/13/2015     Priority: Medium     Formatting of this note might be different from the original.  Likely secondary to GERD with esophagitis, on PPI and H2 blocker with notable improvement, EGD 10/5/15.        Assessment and Plan   Assessment:    Jamie Valdivia is a 84 year old male with known chronic pharyngeal dysphagia and recent epistaxis requiring ER visit who is presenting as a new patient in consultation at the request of Dr. Stein with a chief complaint of dysphagia and short segment non dysplastic munguia's esophagus. Has had dysphagia for about 40 years. Suspect that dysphagia leads to coughing and blood vessel irritation causing epistaxis. Given report of voice changes, recommend ENT work up.     1. Choking, subsequent encounter    2. Munguia's esophagus without dysplasia    3. Pharyngeal dysphagia    4. Oropharyngeal dysphagia    5. Esophageal dysphagia    6. Dysphonia       Orders Placed This Encounter   Procedures    XR Esophagram    XR Video Swallow with SLP or OT - Order with Speech Therapy Referral    Adult GI  Referral - Procedure Only    Speech Therapy  Referral    Adult ENT  Referral        Plan:    You may decrease the dose of omeprazole from twice a day to once a day. If you have symptoms of reflux that worsen then you can increase to twice a day.   ENT referral placed for voice changes  Please consider a modified barium esophagram   Please consider a timed barium esophagram  Please consider an upper endoscopy to evaluate your difficulty swallowing and to consider a dilation if necessary    Follow up plan:   Return to clinic 1 year and as needed.    The risks and benefits of my recommendations, as well as other treatment  options were discussed with the patient and any available family today. All questions were answered.     Follow up: As planned above. Today, I personally spent 60 minutes in direct face to face time with the patient, of which greater than 50% of the time was spent in patient education and counseling as described above. Approximately 10 minutes were spent on indirect care associated with the patient's consultation including but not limited to review of: patient medical records to date, clinic visits, hospital records, lab results, imaging studies, procedural documentation, and coordinating care with other providers. The findings from this review are summarized in the above note. All of the above accounted for a cumulative time of 70 minutes and was performed on the date of service.     The patient verbalized understanding of the plan and was appreciative for the time spent and information provided during the office visit.     Efe Mario MD  PGY-1, Internal Medicine    Attending Attestation:  I saw the patient with Dr. Mario and agree with the findings and the plan of care as documented in the fellow's note. In summary, the patient is an 84 year old male with a history of dsyphagia. We have been consulted to assess the patient's dysphagia, epistaxis, and short segment non dysplastic munguia's esophagus.    The patient has 40 years of dysphagia and reports choking on foods that leads to coughing and choking and subsequently nose bleeding. Because of his epistaxis, choking, and noted voice changes I have placed a referral to ENT. I have also recommended a modified barium esophagram for oropharyngeal dysphagia along with a timed barium esophagram to evaluate for a motility abnormality. The patient's wife is concerned about a hole or diverticulum and this may be visualized on modified barium. Because of the dysphagia I have also recommended an upper endoscopy and possible empiric dilation. I also relayed to the  patient that he can likely decrease the dose of his PPI to once daily instead of twice daily. A significant amount of time was spent trying to relay this point and it is unclear to me if it was fully understood. But, if the patient decreases the dose and subsequently has heartburn he may want to increase it back to twice daily dosing.     70 minutes were spent on the date of the encounter performing chart review, reviewing of outside and inside available records, review of test results as well as interpretation of tests, the patient visit, documentation, and discussion with other provider(s) and/or discussion with family.     Colby Mercado DO   of Medicine  Director, Esophageal Disorders Program  Director of Endoscopy, [facility]  Division of Gastroenterology, Hepatology, and Nutrition  [facility]    Author:   Colby Mercado DO   of Medicine  Director, Esophageal Disorders Program  Director of Endoscopy, [facility]  Division of Gastroenterology, Hepatology, and Nutrition  [facility]    Dr. Mercado speaks for [employer] regarding dupilumab and has participated in advisory boards for the medication. He receives income for these activities. When discussing the medication, patients were informed of Dr. Mercado's role and potential conflict of interest. All questions and/or concerns were answered during the encounter.    Dr. Mercado speaks for [employer] regarding vonoprazan. He receives income for these activities. When discussing the medication, patients were informed of Dr. Mercado's role and potential conflict of interest. All questions and/or concerns were answered during the encounter.     Documentation assisted by voice recognition and documentation system.

## 2025-02-03 NOTE — NURSING NOTE
"Do you have a history of colon cancer in your immediate family? no  If yes who: negative    And what age  were they diagnosed:       Chief Complaint   Patient presents with    Follow Up       Vitals:    02/03/25 1252   BP: (!) 145/71   Pulse: 97   SpO2: 97%   Weight: 97.1 kg (214 lb)   Height: 1.727 m (5' 8\")       Body mass index is 32.54 kg/m .    Jay Jay Ibrahim MA  Blood pressure elevated. Provider notified. Recheck offered.     "

## 2025-02-04 PROBLEM — R49.0 DYSPHONIA: Status: ACTIVE | Noted: 2025-02-04

## 2025-02-04 PROBLEM — R13.19 ESOPHAGEAL DYSPHAGIA: Status: ACTIVE | Noted: 2025-02-04

## 2025-02-04 PROBLEM — R13.12 OROPHARYNGEAL DYSPHAGIA: Status: ACTIVE | Noted: 2025-02-04

## 2025-02-04 PROBLEM — T17.308D CHOKING, SUBSEQUENT ENCOUNTER: Status: ACTIVE | Noted: 2025-02-04

## 2025-02-04 NOTE — TELEPHONE ENCOUNTER
"  REFERRAL INFORMATION:  Referring By: Colby Mercado DO   Referring Clinic: [facility]STROENTEROLOGY   Reason for Visit/Diagnosis: Choking, subsequent encounter  Oropharyngeal dysphagia  Dysphonia     FUTURE VISIT INFORMATION:  Appointment Date: 3/4/25  Appointment Time: 9:45 AM     NOTES STATUS DETAILS   OFFICE NOTE from referring provider Internal 2/3/25 OV -Colby Mercado DO    OFFICE NOTE from other specialist Care Everywhere [place] [facility]  5/7/19 OV -Niraj Patel MD      [facility]   3/5/18 OV -Letitia Fontanez, SLP     ENDOSCOPY  Internal / Care Everywhere [facility] 6/1/22  [place] 3/18/19  [facility] 10/1/15   IMAGING *images and reports*     CT Internal 6/28/24 CT head  6/18/24 CT cervical  6/7/23, 9/30/22-CT chest/abdomen  1/30/23-CT abd pel   XRAYS   Internal 7/12/24, 3/19/24-XR chest  5/13/22-XR Esophagram    2/28/25 XR Video Swallow   2/28/25 XR Esophagram      \"Please notify/message CSS if patient completed outside imaging prior to scheduled appointment and/or any outside records that might have been missed at pre visit -Thanks\"  "

## 2025-02-05 ENCOUNTER — PATIENT OUTREACH (OUTPATIENT)
Dept: GERIATRIC MEDICINE | Facility: CLINIC | Age: 85
End: 2025-02-05
Payer: COMMERCIAL

## 2025-02-05 ENCOUNTER — TELEPHONE (OUTPATIENT)
Dept: GERIATRICS | Facility: CLINIC | Age: 85
End: 2025-02-05
Payer: COMMERCIAL

## 2025-02-05 NOTE — PROGRESS NOTES
[facility] Care Coordination Contact    Message received from rianna Higgins. CHW spoke with Doctor Jamie, to schedule establish care appointment and he wants to know the reason to schedule more appointments.  Additional, member mentioned that he never cancels appointments with his wife.   Doctor expressed that he would like to receive a call from his Care Coordinator.      ALEXANDREA Mullins  [facility]  [phone]

## 2025-02-05 NOTE — TELEPHONE ENCOUNTER
[facility] Geriatrics Triage Nurse Telephone Encounter    Provider: MARICHUY Basilio CNP  Facility: [facility]  Facility Type:  [place]    Caller: Tanvi  Call Back Number: [phone]    Allergies:    Allergies   Allergen Reactions    Ativan [Lorazepam]         Reason for call: Pt having difficulty swallowing food and liquids/medication.     Verbal Order/Direction given by Provider:   - Ok to crush or sprinkle medications to administer with applesauce (Check with pharmacist if meds ok to crush)  - Pt saw Gastroenterology on 2/3 and order was given for Speech Therapy referral    Provider giving Order:  MARICHUY Basilio CNP    Verbal Order given to: Tanvi Cardoza RN

## 2025-02-12 ENCOUNTER — PATIENT OUTREACH (OUTPATIENT)
Dept: GERIATRIC MEDICINE | Facility: CLINIC | Age: 85
End: 2025-02-12
Payer: COMMERCIAL

## 2025-02-13 ENCOUNTER — PATIENT OUTREACH (OUTPATIENT)
Dept: GERIATRIC MEDICINE | Facility: CLINIC | Age: 85
End: 2025-02-13
Payer: COMMERCIAL

## 2025-02-13 NOTE — PROGRESS NOTES
[facility] Care Coordination Contact    Message received from donna Higgins.  CHW spoke with member did not understand why he and his wife need an Establish Care appointment  with a different provider and also he requested  Do not call me again .      ALEXANDREA Mullins  [facility]  [phone]

## 2025-02-13 NOTE — PROGRESS NOTES
[facility] Care Coordination Contact    Arranged transportation thru [facility] [phone] for the below appt:  Appt Date & Time: 2/28/25 12:45pm  Clinic Name & Address:  [address] [address]  Transportation Provider: JANIE [phone]   time:  11:45am   Return ride  time: will call    Arranged transportation thru [facility] [phone] for the below appt:  Appt Date & Time: 03/04/25 9:30a   Clinic Name & Address:  [address] [address]  Transportation Provider: JANIE [phone]   time:  8:30am   Return ride  time: will call    Notified Jamie via letter and cc of  time.    Sylvia Odonnell    Care Management Specialist   [facility]  [phone]

## 2025-02-19 ENCOUNTER — HOSPITAL ENCOUNTER (INPATIENT)
Facility: CLINIC | Age: 85
End: 2025-02-19
Attending: EMERGENCY MEDICINE | Admitting: INTERNAL MEDICINE
Payer: COMMERCIAL

## 2025-02-19 ENCOUNTER — APPOINTMENT (OUTPATIENT)
Dept: GENERAL RADIOLOGY | Facility: CLINIC | Age: 85
DRG: 291 | End: 2025-02-19
Attending: EMERGENCY MEDICINE
Payer: COMMERCIAL

## 2025-02-19 DIAGNOSIS — E87.70 HYPERVOLEMIA, UNSPECIFIED HYPERVOLEMIA TYPE: ICD-10-CM

## 2025-02-19 DIAGNOSIS — I50.33 ACUTE ON CHRONIC DIASTOLIC CONGESTIVE HEART FAILURE (H): Primary | ICD-10-CM

## 2025-02-19 DIAGNOSIS — R06.02 SHORTNESS OF BREATH: ICD-10-CM

## 2025-02-19 DIAGNOSIS — Z78.9 IMPAIRED ACTIVITIES OF DAILY LIVING: ICD-10-CM

## 2025-02-19 DIAGNOSIS — R60.0 BILATERAL LOWER EXTREMITY EDEMA: ICD-10-CM

## 2025-02-19 LAB
ALBUMIN MFR UR ELPH: 12.5 MG/DL
ALBUMIN SERPL BCG-MCNC: 3 G/DL (ref 3.5–5.2)
ALP SERPL-CCNC: 81 U/L (ref 40–150)
ALT SERPL W P-5'-P-CCNC: 6 U/L (ref 0–70)
ANION GAP SERPL CALCULATED.3IONS-SCNC: 10 MMOL/L (ref 7–15)
AST SERPL W P-5'-P-CCNC: 20 U/L (ref 0–45)
BASOPHILS # BLD AUTO: 0 10E3/UL (ref 0–0.2)
BASOPHILS NFR BLD AUTO: 1 %
BILIRUB SERPL-MCNC: 0.3 MG/DL
BUN SERPL-MCNC: 11 MG/DL (ref 8–23)
CA-I BLD-MCNC: 4.3 MG/DL (ref 4.4–5.2)
CALCIUM SERPL-MCNC: 6.8 MG/DL (ref 8.8–10.4)
CHLORIDE SERPL-SCNC: 107 MMOL/L (ref 98–107)
CREAT SERPL-MCNC: 0.54 MG/DL (ref 0.67–1.17)
CREAT UR-MCNC: 44.5 MG/DL
EGFRCR SERPLBLD CKD-EPI 2021: >90 ML/MIN/1.73M2
EOSINOPHIL # BLD AUTO: 0.1 10E3/UL (ref 0–0.7)
EOSINOPHIL NFR BLD AUTO: 2 %
ERYTHROCYTE [DISTWIDTH] IN BLOOD BY AUTOMATED COUNT: 16.9 % (ref 10–15)
GLUCOSE SERPL-MCNC: 86 MG/DL (ref 70–99)
HCO3 SERPL-SCNC: 23 MMOL/L (ref 22–29)
HCT VFR BLD AUTO: 27.3 % (ref 40–53)
HGB BLD-MCNC: 8.1 G/DL (ref 13.3–17.7)
HOLD SPECIMEN: NORMAL
IMM GRANULOCYTES # BLD: 0 10E3/UL
IMM GRANULOCYTES NFR BLD: 0 %
IRON BINDING CAPACITY (ROCHE): 357 UG/DL (ref 240–430)
IRON SATN MFR SERPL: 4 % (ref 15–46)
IRON SERPL-MCNC: 16 UG/DL (ref 61–157)
LYMPHOCYTES # BLD AUTO: 0.9 10E3/UL (ref 0.8–5.3)
LYMPHOCYTES NFR BLD AUTO: 15 %
MAGNESIUM SERPL-MCNC: 1.9 MG/DL (ref 1.7–2.3)
MAGNESIUM SERPL-MCNC: 2 MG/DL (ref 1.7–2.3)
MCH RBC QN AUTO: 23.6 PG (ref 26.5–33)
MCHC RBC AUTO-ENTMCNC: 29.7 G/DL (ref 31.5–36.5)
MCV RBC AUTO: 80 FL (ref 78–100)
MONOCYTES # BLD AUTO: 0.5 10E3/UL (ref 0–1.3)
MONOCYTES NFR BLD AUTO: 8 %
NEUTROPHILS # BLD AUTO: 4.3 10E3/UL (ref 1.6–8.3)
NEUTROPHILS NFR BLD AUTO: 74 %
NRBC # BLD AUTO: 0 10E3/UL
NRBC BLD AUTO-RTO: 0 /100
NT-PROBNP SERPL-MCNC: 2169 PG/ML (ref 0–1800)
PLATELET # BLD AUTO: 189 10E3/UL (ref 150–450)
POTASSIUM SERPL-SCNC: 4 MMOL/L (ref 3.4–5.3)
PROT SERPL-MCNC: 5.8 G/DL (ref 6.4–8.3)
PROT/CREAT 24H UR: 0.28 MG/MG CR (ref 0–0.2)
RBC # BLD AUTO: 3.43 10E6/UL (ref 4.4–5.9)
SODIUM SERPL-SCNC: 140 MMOL/L (ref 135–145)
TROPONIN T SERPL HS-MCNC: 15 NG/L
TROPONIN T SERPL HS-MCNC: 16 NG/L
TSH SERPL DL<=0.005 MIU/L-ACNC: 2.83 UIU/ML (ref 0.3–4.2)
WBC # BLD AUTO: 5.8 10E3/UL (ref 4–11)

## 2025-02-19 PROCEDURE — 250N000011 HC RX IP 250 OP 636: Performed by: INTERNAL MEDICINE

## 2025-02-19 PROCEDURE — 36415 COLL VENOUS BLD VENIPUNCTURE: CPT | Performed by: EMERGENCY MEDICINE

## 2025-02-19 PROCEDURE — 96375 TX/PRO/DX INJ NEW DRUG ADDON: CPT

## 2025-02-19 PROCEDURE — 99223 1ST HOSP IP/OBS HIGH 75: CPT | Performed by: INTERNAL MEDICINE

## 2025-02-19 PROCEDURE — 83735 ASSAY OF MAGNESIUM: CPT | Performed by: INTERNAL MEDICINE

## 2025-02-19 PROCEDURE — 83880 ASSAY OF NATRIURETIC PEPTIDE: CPT | Performed by: EMERGENCY MEDICINE

## 2025-02-19 PROCEDURE — 82040 ASSAY OF SERUM ALBUMIN: CPT | Performed by: EMERGENCY MEDICINE

## 2025-02-19 PROCEDURE — 71046 X-RAY EXAM CHEST 2 VIEWS: CPT

## 2025-02-19 PROCEDURE — 120N000001 HC R&B MED SURG/OB

## 2025-02-19 PROCEDURE — 85004 AUTOMATED DIFF WBC COUNT: CPT | Performed by: EMERGENCY MEDICINE

## 2025-02-19 PROCEDURE — 36415 COLL VENOUS BLD VENIPUNCTURE: CPT | Performed by: INTERNAL MEDICINE

## 2025-02-19 PROCEDURE — 84156 ASSAY OF PROTEIN URINE: CPT | Performed by: INTERNAL MEDICINE

## 2025-02-19 PROCEDURE — 82306 VITAMIN D 25 HYDROXY: CPT | Performed by: INTERNAL MEDICINE

## 2025-02-19 PROCEDURE — 83550 IRON BINDING TEST: CPT | Performed by: INTERNAL MEDICINE

## 2025-02-19 PROCEDURE — 93005 ELECTROCARDIOGRAM TRACING: CPT

## 2025-02-19 PROCEDURE — 99285 EMERGENCY DEPT VISIT HI MDM: CPT | Mod: 25

## 2025-02-19 PROCEDURE — 80053 COMPREHEN METABOLIC PANEL: CPT | Performed by: EMERGENCY MEDICINE

## 2025-02-19 PROCEDURE — 84484 ASSAY OF TROPONIN QUANT: CPT | Performed by: EMERGENCY MEDICINE

## 2025-02-19 PROCEDURE — 96374 THER/PROPH/DIAG INJ IV PUSH: CPT

## 2025-02-19 PROCEDURE — 250N000011 HC RX IP 250 OP 636: Performed by: EMERGENCY MEDICINE

## 2025-02-19 PROCEDURE — 84443 ASSAY THYROID STIM HORMONE: CPT | Performed by: INTERNAL MEDICINE

## 2025-02-19 PROCEDURE — 83540 ASSAY OF IRON: CPT | Performed by: INTERNAL MEDICINE

## 2025-02-19 PROCEDURE — 82728 ASSAY OF FERRITIN: CPT | Performed by: INTERNAL MEDICINE

## 2025-02-19 PROCEDURE — 82330 ASSAY OF CALCIUM: CPT | Performed by: INTERNAL MEDICINE

## 2025-02-19 PROCEDURE — 85014 HEMATOCRIT: CPT | Performed by: EMERGENCY MEDICINE

## 2025-02-19 PROCEDURE — 82607 VITAMIN B-12: CPT | Performed by: INTERNAL MEDICINE

## 2025-02-19 RX ORDER — MORPHINE SULFATE 4 MG/ML
4 INJECTION, SOLUTION INTRAMUSCULAR; INTRAVENOUS ONCE
Status: COMPLETED | OUTPATIENT
Start: 2025-02-19 | End: 2025-02-19

## 2025-02-19 RX ORDER — LIDOCAINE 40 MG/G
CREAM TOPICAL
Status: DISCONTINUED | OUTPATIENT
Start: 2025-02-19 | End: 2025-02-28 | Stop reason: HOSPADM

## 2025-02-19 RX ORDER — POLYETHYLENE GLYCOL 3350 17 G/17G
17 POWDER, FOR SOLUTION ORAL 2 TIMES DAILY PRN
Status: DISCONTINUED | OUTPATIENT
Start: 2025-02-19 | End: 2025-02-28 | Stop reason: HOSPADM

## 2025-02-19 RX ORDER — ACETAMINOPHEN 650 MG/1
650 SUPPOSITORY RECTAL EVERY 4 HOURS PRN
Status: DISCONTINUED | OUTPATIENT
Start: 2025-02-19 | End: 2025-02-28 | Stop reason: HOSPADM

## 2025-02-19 RX ORDER — ACETAMINOPHEN 325 MG/1
650 TABLET ORAL EVERY 4 HOURS PRN
Status: DISCONTINUED | OUTPATIENT
Start: 2025-02-19 | End: 2025-02-28 | Stop reason: HOSPADM

## 2025-02-19 RX ORDER — QUETIAPINE FUMARATE 25 MG/1
25 TABLET, FILM COATED ORAL
Status: DISCONTINUED | OUTPATIENT
Start: 2025-02-19 | End: 2025-02-20

## 2025-02-19 RX ORDER — ONDANSETRON 2 MG/ML
4 INJECTION INTRAMUSCULAR; INTRAVENOUS EVERY 6 HOURS PRN
Status: DISCONTINUED | OUTPATIENT
Start: 2025-02-19 | End: 2025-02-28 | Stop reason: HOSPADM

## 2025-02-19 RX ORDER — AMOXICILLIN 250 MG
2 CAPSULE ORAL 2 TIMES DAILY PRN
Status: DISCONTINUED | OUTPATIENT
Start: 2025-02-19 | End: 2025-02-28 | Stop reason: HOSPADM

## 2025-02-19 RX ORDER — ONDANSETRON 4 MG/1
4 TABLET, ORALLY DISINTEGRATING ORAL EVERY 6 HOURS PRN
Status: DISCONTINUED | OUTPATIENT
Start: 2025-02-19 | End: 2025-02-28 | Stop reason: HOSPADM

## 2025-02-19 RX ORDER — FUROSEMIDE 10 MG/ML
40 INJECTION INTRAMUSCULAR; INTRAVENOUS
Status: DISCONTINUED | OUTPATIENT
Start: 2025-02-20 | End: 2025-02-20

## 2025-02-19 RX ORDER — MAGNESIUM SULFATE HEPTAHYDRATE 40 MG/ML
2 INJECTION, SOLUTION INTRAVENOUS ONCE
Status: COMPLETED | OUTPATIENT
Start: 2025-02-19 | End: 2025-02-19

## 2025-02-19 RX ORDER — CALCIUM CARBONATE 500 MG/1
1000 TABLET, CHEWABLE ORAL 4 TIMES DAILY PRN
Status: DISCONTINUED | OUTPATIENT
Start: 2025-02-19 | End: 2025-02-28 | Stop reason: HOSPADM

## 2025-02-19 RX ORDER — FUROSEMIDE 10 MG/ML
40 INJECTION INTRAMUSCULAR; INTRAVENOUS ONCE
Status: COMPLETED | OUTPATIENT
Start: 2025-02-19 | End: 2025-02-19

## 2025-02-19 RX ORDER — AMOXICILLIN 250 MG
1 CAPSULE ORAL 2 TIMES DAILY PRN
Status: DISCONTINUED | OUTPATIENT
Start: 2025-02-19 | End: 2025-02-28 | Stop reason: HOSPADM

## 2025-02-19 RX ADMIN — FUROSEMIDE 40 MG: 10 INJECTION, SOLUTION INTRAMUSCULAR; INTRAVENOUS at 20:31

## 2025-02-19 RX ADMIN — MAGNESIUM SULFATE HEPTAHYDRATE 2 G: 40 INJECTION, SOLUTION INTRAVENOUS at 22:10

## 2025-02-19 RX ADMIN — MORPHINE SULFATE 4 MG: 4 INJECTION, SOLUTION INTRAMUSCULAR; INTRAVENOUS at 19:00

## 2025-02-19 ASSESSMENT — COLUMBIA-SUICIDE SEVERITY RATING SCALE - C-SSRS
6. HAVE YOU EVER DONE ANYTHING, STARTED TO DO ANYTHING, OR PREPARED TO DO ANYTHING TO END YOUR LIFE?: NO
2. HAVE YOU ACTUALLY HAD ANY THOUGHTS OF KILLING YOURSELF IN THE PAST MONTH?: NO
1. IN THE PAST MONTH, HAVE YOU WISHED YOU WERE DEAD OR WISHED YOU COULD GO TO SLEEP AND NOT WAKE UP?: NO

## 2025-02-19 ASSESSMENT — ACTIVITIES OF DAILY LIVING (ADL)
ADLS_ACUITY_SCORE: 65
ADLS_ACUITY_SCORE: 61
ADLS_ACUITY_SCORE: 65

## 2025-02-19 NOTE — ED TRIAGE NOTES
"BIBA from [employer] d/t a cab not wanting to take Pt back home to [place] w/ the amount of leg swelling he has at present. [employer] called to police for a hopeful transport, but Pt did not fit in the back of their squad car either so EMS was called. Reports having pain 8/10 \"everywhere\". .      Triage Assessment (Adult)       Row Name 02/19/25 1748          Triage Assessment    Airway WDL WDL        Respiratory WDL    Respiratory WDL WDL        Skin Circulation/Temperature WDL    Skin Circulation/Temperature WDL WDL        Cardiac WDL    Cardiac WDL WDL        Peripheral/Neurovascular WDL    Peripheral Neurovascular WDL WDL        Cognitive/Neuro/Behavioral WDL    Cognitive/Neuro/Behavioral WDL X  Dementia at baseline     Level of Consciousness intermittent confusion     Arousal Level opens eyes spontaneously     Orientation oriented x 4     Speech spontaneous;logical     Mood/Behavior calm;cooperative        [place] Coma Scale    Best Eye Response 4-->(E4) spontaneous     Best Motor Response 6-->(M6) obeys commands     Best Verbal Response 5-->(V5) oriented     [place] Coma Scale Score 15                     "

## 2025-02-19 NOTE — ED NOTES
Bed: ED24  Expected date:   Expected time:   Means of arrival:   Comments:  Dayanna 521 81m fluid retention, weakness

## 2025-02-19 NOTE — ED PROVIDER NOTES
Emergency Department Note      History of Present Illness     Chief Complaint   Leg Swelling    HPI   Jamie Valdivia is a 84 year old male with a lateral leg pain and swelling that prevented him from getting in a cab after a business meeting to go back to his assisted living therefore EMS was called and presents to the emergency department.  Patient states he is had increasing lower extremity swelling over the last several months.  In the last few weeks the pain is gotten worse.  He uses a walker and lives at assisted living but states he is usually able to do most things himself.  Patient denies shortness of breath however if he lies down at all from sitting he does get acutely short of breath and states this is been ongoing and he has to prop himself up to sleep.  Denies chest pain.  Denies cough cold or nasal congestion.  Denies taking any sort of Lasix or any diuresis.    Independent Historian   None    Review of External Notes   Progress note from 2/3/2025 for dysphagia.  ED visit for epistaxis 12/14/2024  Echocardiogram from 7/3/2024    Past Medical History     Medical History and Problem List   Alcohol abuse  Alcohol withdrawal  CAD  Atrial fibrillation  Dementia  Erectile dysfunction  Encephalopathy  JOS[place] MANUEL  GERD  GI bleed  Hypertension   TBI  Obesity   Osteoarthritis  Insomnia  Seizure  Peripheral neuropathy   Rhabdomyolysis  Prostate cancer  Aneurysm of ascending aorta   Pneumonia   Compression fracture, thoracic and lumbar spine      Medications   Loratadine  Omeprazole  Miralax  Quetiapine  Senna   Furosemide  Gabapentin      Surgical History   Transrectal prostate biopsy  Hip arthroplasty, left  Shoulder arthroplasty, left  Tonsillectomy     Physical Exam     Patient Vitals for the past 24 hrs:   BP Temp Temp src Pulse Resp SpO2   02/19/25 2000 -- -- -- 53 13 100 %   02/19/25 1945 134/69 -- -- 57 14 (!) 90 %   02/19/25 1904 (!) 158/82 97.8  F (36.6  C) Temporal 60 -- 94 %   02/19/25 1748 (!) 160/81 --  -- -- -- --     Physical Exam  General: Patient is alert and medic appearing with activity.  Acutely short of breath with trying to lie flat  HEENT: Head atraumatic    Eyes: pupils equal and reactive. Conjunctiva clear   Nares: patent   Oropharynx: no lesions, uvula midline, no palatal draping, normal voice, no trismus  Neck: Supple without lymphadenopathy, no meningismus  Chest: Heart regular rate and rhythm.   Lungs: Equal clear to auscultation with no wheeze or rales  Abdomen: Soft, non tender, nondistended, normal bowel sounds  Back: No costovertebral angle tenderness, no midline C, T or L spine tenderness  Neuro: Grossly nonfocal, normal speech, strength equal bilaterally, CN 2-12 intact  Extremities: No deformities, equal radial and DP pulses. No clubbing, cyanosis.  Extensive bilateral pitting edema up to his mid abdomen  Skin: Warm and dry with no rash.       Diagnostics     Lab Results   Labs Ordered and Resulted from Time of ED Arrival to Time of ED Departure   COMPREHENSIVE METABOLIC PANEL - Abnormal       Result Value    Sodium 140      Potassium 4.0      Carbon Dioxide (CO2) 23      Anion Gap 10      Urea Nitrogen 11.0      Creatinine 0.54 (*)     GFR Estimate >90      Calcium 6.8 (*)     Chloride 107      Glucose 86      Alkaline Phosphatase 81      AST 20      ALT 6      Protein Total 5.8 (*)     Albumin 3.0 (*)     Bilirubin Total 0.3     NT PROBNP INPATIENT - Abnormal    N terminal Pro BNP Inpatient 2,169 (*)    CBC WITH PLATELETS AND DIFFERENTIAL - Abnormal    WBC Count 5.8      RBC Count 3.43 (*)     Hemoglobin 8.1 (*)     Hematocrit 27.3 (*)     MCV 80      MCH 23.6 (*)     MCHC 29.7 (*)     RDW 16.9 (*)     Platelet Count 189      % Neutrophils 74      % Lymphocytes 15      % Monocytes 8      % Eosinophils 2      % Basophils 1      % Immature Granulocytes 0      NRBCs per 100 WBC 0      Absolute Neutrophils 4.3      Absolute Lymphocytes 0.9      Absolute Monocytes 0.5      Absolute Eosinophils  0.1      Absolute Basophils 0.0      Absolute Immature Granulocytes 0.0      Absolute NRBCs 0.0     TROPONIN T, HIGH SENSITIVITY - Normal    Troponin T, High Sensitivity 15     TROPONIN T, HIGH SENSITIVITY       Imaging   XR Chest 2 Views   Final Result   IMPRESSION:       Frontal image was acquired with caudal beam angulation. The left hemidiaphragm remains elevated compared to the right and overall the lungs have shallow inflation. Chronic blunting of the right posterior costophrenic sulcus could relate to scarring or    minimal fluid but unchanged.      There are atheromatous calcifications in aortic arch. Cardiac borders are silhouetted on the lateral view and unchanged on the frontal view.      Reverse left shoulder arthroplasty. Vertebroplasty cement is present in several mid and lower thoracic vertebra. Paraspinous rods and pedicle screws in the lower thoracic spine around a mild compression deformity are unchanged.          EKG   ECG results from 02/19/25   EKG 12-lead, tracing only     Value    Systolic Blood Pressure     Diastolic Blood Pressure     Ventricular Rate 64    Atrial Rate 56    IA Interval     QRS Duration 94        QTc 474    P Axis     R AXIS -40    T Axis 61    Interpretation ECG      Undetermined rhythm  Left axis deviation  Septal infarct , age undetermined  Abnormal ECG  When compared with ECG of 12-Jul-2024 21:28,  Current undetermined rhythm precludes rhythm comparison, needs review  Septal infarct is now Present           Independent Interpretation   CXR: No pneumothorax.Pulmonary edema    ED Course      Medications Administered   Medications   morphine (PF) injection 4 mg (4 mg Intravenous $Given 2/19/25 1900)   furosemide (LASIX) injection 40 mg (40 mg Intravenous $Given 2/19/25 2031)       Procedures   Procedures     Discussion of Management   Admitting Hospitalist, Dr. Ammy Sky    ED Course    1821 patient seen and examined by me  1945 patient updated on the findings and  plan  2035 I discussed with hospitalist, Dr. Sky    Additional Documentation  None    Medical Decision Making / Diagnosis     CMS Diagnoses: None    MIPS       None    MDM   Jamie Valdivia is a 84 year old male with a PMH of dementia, A-fib who presents for evaluation of shortness of breath, paroxysmal nocturnal dyspnea, bilateral lower extremity edema making it difficulty to ambulate or get into a taxicab today to leave his meeting..  I considered a broad differential of their dyspnea including CHF exacerbation, PE, dissection, hemothorax, pleural effusion, pneumonia, acute coronary syndrome, reactive airway disease, bronchitis, upper airway obstruction, foreign body, etc.  Given the history and exam plus laboratory findings I feel congestive heart failure is the most likely etiology and would not do extensive workup for other causes as mentioned above at this time.  The patient received IV diuretics in ED .  Patient with no recent echocardiogram on file.  Will need echo.  Diuresis.  Assessment for safety on discharge and safe ambulation.    I would rule them out for ACS in the hospital given their presentation but this seems classic CHF.        Disposition   The patient was admitted to the hospital.     Diagnosis     ICD-10-CM    1. Shortness of breath  R06.02       2. Hypervolemia, unspecified hypervolemia type  E87.70       3. Bilateral lower extremity edema  R60.0       4. Impaired activities of daily living  Z78.9            Discharge Medications   New Prescriptions    No medications on file          Mica Watkins MD  02/19/25 2040

## 2025-02-20 ENCOUNTER — APPOINTMENT (OUTPATIENT)
Dept: CARDIOLOGY | Facility: CLINIC | Age: 85
End: 2025-02-20
Attending: INTERNAL MEDICINE
Payer: COMMERCIAL

## 2025-02-20 ENCOUNTER — PATIENT OUTREACH (OUTPATIENT)
Dept: GERIATRIC MEDICINE | Facility: CLINIC | Age: 85
End: 2025-02-20

## 2025-02-20 ENCOUNTER — APPOINTMENT (OUTPATIENT)
Dept: GENERAL RADIOLOGY | Facility: CLINIC | Age: 85
DRG: 291 | End: 2025-02-20
Attending: NURSE PRACTITIONER
Payer: COMMERCIAL

## 2025-02-20 ENCOUNTER — APPOINTMENT (OUTPATIENT)
Dept: SPEECH THERAPY | Facility: CLINIC | Age: 85
DRG: 291 | End: 2025-02-20
Attending: INTERNAL MEDICINE
Payer: COMMERCIAL

## 2025-02-20 VITALS
WEIGHT: 215 LBS | HEART RATE: 66 BPM | RESPIRATION RATE: 14 BRPM | BODY MASS INDEX: 32.69 KG/M2 | SYSTOLIC BLOOD PRESSURE: 141 MMHG | OXYGEN SATURATION: 92 % | TEMPERATURE: 97.5 F | DIASTOLIC BLOOD PRESSURE: 67 MMHG

## 2025-02-20 LAB
ANION GAP SERPL CALCULATED.3IONS-SCNC: 10 MMOL/L (ref 7–15)
ATRIAL RATE - MUSE: 53 BPM
ATRIAL RATE - MUSE: 56 BPM
BUN SERPL-MCNC: 13 MG/DL (ref 8–23)
CALCIUM SERPL-MCNC: 8.9 MG/DL (ref 8.8–10.4)
CHLORIDE SERPL-SCNC: 100 MMOL/L (ref 98–107)
CREAT SERPL-MCNC: 0.71 MG/DL (ref 0.67–1.17)
DIASTOLIC BLOOD PRESSURE - MUSE: NORMAL MMHG
DIASTOLIC BLOOD PRESSURE - MUSE: NORMAL MMHG
EGFRCR SERPLBLD CKD-EPI 2021: 90 ML/MIN/1.73M2
ERYTHROCYTE [DISTWIDTH] IN BLOOD BY AUTOMATED COUNT: 16.8 % (ref 10–15)
FERRITIN SERPL-MCNC: 19 NG/ML (ref 31–409)
GLUCOSE SERPL-MCNC: 73 MG/DL (ref 70–99)
HCO3 SERPL-SCNC: 31 MMOL/L (ref 22–29)
HCT VFR BLD AUTO: 25.2 % (ref 40–53)
HGB BLD-MCNC: 7.5 G/DL (ref 13.3–17.7)
INTERPRETATION ECG - MUSE: NORMAL
INTERPRETATION ECG - MUSE: NORMAL
LVEF ECHO: NORMAL
MAGNESIUM SERPL-MCNC: 2.4 MG/DL (ref 1.7–2.3)
MCH RBC QN AUTO: 23.7 PG (ref 26.5–33)
MCHC RBC AUTO-ENTMCNC: 29.8 G/DL (ref 31.5–36.5)
MCV RBC AUTO: 80 FL (ref 78–100)
P AXIS - MUSE: NORMAL DEGREES
P AXIS - MUSE: NORMAL DEGREES
PLATELET # BLD AUTO: 168 10E3/UL (ref 150–450)
POTASSIUM SERPL-SCNC: 4.3 MMOL/L (ref 3.4–5.3)
PR INTERVAL - MUSE: NORMAL MS
PR INTERVAL - MUSE: NORMAL MS
QRS DURATION - MUSE: 86 MS
QRS DURATION - MUSE: 94 MS
QT - MUSE: 460 MS
QT - MUSE: 484 MS
QTC - MUSE: 428 MS
QTC - MUSE: 474 MS
R AXIS - MUSE: -32 DEGREES
R AXIS - MUSE: -40 DEGREES
RBC # BLD AUTO: 3.17 10E6/UL (ref 4.4–5.9)
SODIUM SERPL-SCNC: 141 MMOL/L (ref 135–145)
SYSTOLIC BLOOD PRESSURE - MUSE: NORMAL MMHG
SYSTOLIC BLOOD PRESSURE - MUSE: NORMAL MMHG
T AXIS - MUSE: 20 DEGREES
T AXIS - MUSE: 61 DEGREES
TROPONIN T SERPL HS-MCNC: 19 NG/L
TROPONIN T SERPL HS-MCNC: 20 NG/L
VENTRICULAR RATE- MUSE: 47 BPM
VENTRICULAR RATE- MUSE: 64 BPM
VIT B12 SERPL-MCNC: 1039 PG/ML (ref 232–1245)
VIT D+METAB SERPL-MCNC: 16 NG/ML (ref 20–50)
WBC # BLD AUTO: 3 10E3/UL (ref 4–11)

## 2025-02-20 PROCEDURE — 99233 SBSQ HOSP IP/OBS HIGH 50: CPT | Performed by: HOSPITALIST

## 2025-02-20 PROCEDURE — 255N000002 HC RX 255 OP 636: Performed by: INTERNAL MEDICINE

## 2025-02-20 PROCEDURE — 71045 X-RAY EXAM CHEST 1 VIEW: CPT

## 2025-02-20 PROCEDURE — 99418 PROLNG IP/OBS E/M EA 15 MIN: CPT | Performed by: HOSPITALIST

## 2025-02-20 PROCEDURE — 250N000011 HC RX IP 250 OP 636: Performed by: HOSPITALIST

## 2025-02-20 PROCEDURE — 93010 ELECTROCARDIOGRAM REPORT: CPT | Performed by: INTERNAL MEDICINE

## 2025-02-20 PROCEDURE — 93306 TTE W/DOPPLER COMPLETE: CPT | Mod: 26 | Performed by: INTERNAL MEDICINE

## 2025-02-20 PROCEDURE — 999N000157 HC STATISTIC RCP TIME EA 10 MIN

## 2025-02-20 PROCEDURE — 99207 PR NO BILLABLE SERVICE THIS VISIT: CPT | Performed by: NURSE PRACTITIONER

## 2025-02-20 PROCEDURE — 36415 COLL VENOUS BLD VENIPUNCTURE: CPT | Performed by: NURSE PRACTITIONER

## 2025-02-20 PROCEDURE — 80048 BASIC METABOLIC PNL TOTAL CA: CPT | Performed by: NURSE PRACTITIONER

## 2025-02-20 PROCEDURE — 120N000001 HC R&B MED SURG/OB

## 2025-02-20 PROCEDURE — 250N000011 HC RX IP 250 OP 636: Performed by: INTERNAL MEDICINE

## 2025-02-20 PROCEDURE — 92526 ORAL FUNCTION THERAPY: CPT | Mod: GN | Performed by: REHABILITATION PRACTITIONER

## 2025-02-20 PROCEDURE — 93005 ELECTROCARDIOGRAM TRACING: CPT

## 2025-02-20 PROCEDURE — 82435 ASSAY OF BLOOD CHLORIDE: CPT | Performed by: NURSE PRACTITIONER

## 2025-02-20 PROCEDURE — 92610 EVALUATE SWALLOWING FUNCTION: CPT | Mod: GN | Performed by: REHABILITATION PRACTITIONER

## 2025-02-20 PROCEDURE — 85014 HEMATOCRIT: CPT | Performed by: NURSE PRACTITIONER

## 2025-02-20 PROCEDURE — 99222 1ST HOSP IP/OBS MODERATE 55: CPT | Mod: 25 | Performed by: INTERNAL MEDICINE

## 2025-02-20 PROCEDURE — 84484 ASSAY OF TROPONIN QUANT: CPT | Performed by: NURSE PRACTITIONER

## 2025-02-20 PROCEDURE — 250N000013 HC RX MED GY IP 250 OP 250 PS 637: Performed by: INTERNAL MEDICINE

## 2025-02-20 PROCEDURE — 83735 ASSAY OF MAGNESIUM: CPT | Performed by: INTERNAL MEDICINE

## 2025-02-20 RX ORDER — FOLIC ACID 1 MG/1
1000 TABLET ORAL DAILY
Status: DISCONTINUED | OUTPATIENT
Start: 2025-02-20 | End: 2025-02-28 | Stop reason: HOSPADM

## 2025-02-20 RX ORDER — GABAPENTIN 100 MG/1
100 CAPSULE ORAL AT BEDTIME
Status: DISCONTINUED | OUTPATIENT
Start: 2025-02-20 | End: 2025-02-28 | Stop reason: HOSPADM

## 2025-02-20 RX ORDER — LANOLIN ALCOHOL/MO/W.PET/CERES
1000 CREAM (GRAM) TOPICAL DAILY
Status: DISCONTINUED | OUTPATIENT
Start: 2025-02-20 | End: 2025-02-28 | Stop reason: HOSPADM

## 2025-02-20 RX ORDER — CALCIUM CARBONATE 500(1250)
500 TABLET ORAL
Status: DISCONTINUED | OUTPATIENT
Start: 2025-02-20 | End: 2025-02-28 | Stop reason: HOSPADM

## 2025-02-20 RX ORDER — LORATADINE 10 MG/1
10 TABLET ORAL AT BEDTIME
Status: DISCONTINUED | OUTPATIENT
Start: 2025-02-20 | End: 2025-02-28 | Stop reason: HOSPADM

## 2025-02-20 RX ORDER — FUROSEMIDE 10 MG/ML
40 INJECTION INTRAMUSCULAR; INTRAVENOUS EVERY 8 HOURS
Status: DISCONTINUED | OUTPATIENT
Start: 2025-02-20 | End: 2025-02-21

## 2025-02-20 RX ORDER — PANTOPRAZOLE SODIUM 40 MG/1
40 TABLET, DELAYED RELEASE ORAL 2 TIMES DAILY
Status: DISCONTINUED | OUTPATIENT
Start: 2025-02-20 | End: 2025-02-21

## 2025-02-20 RX ORDER — ENOXAPARIN SODIUM 100 MG/ML
40 INJECTION SUBCUTANEOUS EVERY 24 HOURS
Status: DISCONTINUED | OUTPATIENT
Start: 2025-02-20 | End: 2025-02-28 | Stop reason: HOSPADM

## 2025-02-20 RX ORDER — FUROSEMIDE 20 MG/1
10 TABLET ORAL DAILY
Status: DISCONTINUED | OUTPATIENT
Start: 2025-02-20 | End: 2025-02-25

## 2025-02-20 RX ADMIN — FUROSEMIDE 40 MG: 10 INJECTION, SOLUTION INTRAMUSCULAR; INTRAVENOUS at 23:56

## 2025-02-20 RX ADMIN — GABAPENTIN 100 MG: 100 CAPSULE ORAL at 02:29

## 2025-02-20 RX ADMIN — PANTOPRAZOLE SODIUM 40 MG: 40 TABLET, DELAYED RELEASE ORAL at 20:49

## 2025-02-20 RX ADMIN — FUROSEMIDE 40 MG: 10 INJECTION, SOLUTION INTRAMUSCULAR; INTRAVENOUS at 07:23

## 2025-02-20 RX ADMIN — ACETAMINOPHEN 650 MG: 325 TABLET, FILM COATED ORAL at 02:29

## 2025-02-20 RX ADMIN — PERFLUTREN 1.5 ML: 6.52 INJECTION, SUSPENSION INTRAVENOUS at 10:35

## 2025-02-20 RX ADMIN — FOLIC ACID 1000 MCG: 1 TABLET ORAL at 10:35

## 2025-02-20 RX ADMIN — ACETAMINOPHEN 650 MG: 325 TABLET, FILM COATED ORAL at 23:56

## 2025-02-20 RX ADMIN — ENOXAPARIN SODIUM 40 MG: 40 INJECTION SUBCUTANEOUS at 06:28

## 2025-02-20 RX ADMIN — LORATADINE 10 MG: 10 TABLET ORAL at 20:49

## 2025-02-20 RX ADMIN — GABAPENTIN 100 MG: 100 CAPSULE ORAL at 20:49

## 2025-02-20 RX ADMIN — FUROSEMIDE 40 MG: 10 INJECTION, SOLUTION INTRAMUSCULAR; INTRAVENOUS at 16:22

## 2025-02-20 RX ADMIN — QUETIAPINE 12.5 MG: 25 TABLET, FILM COATED ORAL at 23:56

## 2025-02-20 RX ADMIN — ACETAMINOPHEN 650 MG: 325 TABLET, FILM COATED ORAL at 18:59

## 2025-02-20 RX ADMIN — LORATADINE 10 MG: 10 TABLET ORAL at 02:29

## 2025-02-20 ASSESSMENT — ACTIVITIES OF DAILY LIVING (ADL)
ADLS_ACUITY_SCORE: 61
DEPENDENT_IADLS:: CLEANING;COOKING;LAUNDRY;MEAL PREPARATION;MEDICATION MANAGEMENT;TRANSPORTATION
ADLS_ACUITY_SCORE: 61
ADLS_ACUITY_SCORE: 56
ADLS_ACUITY_SCORE: 72
ADLS_ACUITY_SCORE: 56
ADLS_ACUITY_SCORE: 65
ADLS_ACUITY_SCORE: 56
ADLS_ACUITY_SCORE: 61
ADLS_ACUITY_SCORE: 72
ADLS_ACUITY_SCORE: 61
ADLS_ACUITY_SCORE: 65
ADLS_ACUITY_SCORE: 62
ADLS_ACUITY_SCORE: 61
ADLS_ACUITY_SCORE: 56
ADLS_ACUITY_SCORE: 61
ADLS_ACUITY_SCORE: 56
ADLS_ACUITY_SCORE: 56
ADLS_ACUITY_SCORE: 61

## 2025-02-20 NOTE — PROGRESS NOTES
[facility]    Hospitalist Progress Note    Interval History   Patient is awake and alert.  Sitting up in chair.  Does have dyspnea when lying flat and had an RRT this morning when he could not catch his breath.  Chest x-ray shows pulmonary congestion.  Has had 2 L urine output since admission.  Denies active chest pain at this time.    -Data reviewed today: I reviewed all new labs and imaging results over the last 24 hours. I personally reviewed the chest x-ray image(s) showing stable cardiomegaly with small bilateral lung patchy opacities.  Increased interstitial prominence suggestive of edema .    Physical Exam   Temp: 97.8  F (36.6  C) Temp src: Temporal BP: 135/77 Pulse: 59   Resp: 24 SpO2: 97 % O2 Device: None (Room air) Oxygen Delivery: 1 LPM  Vitals:    02/19/25 2135   Weight: 97.5 kg (215 lb)     Vital Signs with Ranges  Temp:  [97.8  F (36.6  C)] 97.8  F (36.6  C)  Pulse:  [44-60] 59  Resp:  [10-24] 24  BP: (126-160)/(56-82) 135/77  SpO2:  [90 %-100 %] 97 %  I/O last 3 completed shifts:  In: -   Out: 1100 [Urine:1100]    Physical Exam  Constitutional:       Appearance: Normal appearance. He is obese.   Cardiovascular:      Rate and Rhythm: Normal rate and regular rhythm.      Pulses: Normal pulses.      Heart sounds: Normal heart sounds.   Pulmonary:      Effort: Pulmonary effort is normal. No respiratory distress.      Breath sounds: Normal breath sounds.   Abdominal:      General: Bowel sounds are normal. There is no distension.      Tenderness: There is no abdominal tenderness. There is no guarding.      Comments: Central obesity   Musculoskeletal:      Right lower leg: Edema present.      Left lower leg: Edema present.      Comments: Diffuse anasarca with edema extending all the way up to the flanks   Skin:     General: Skin is warm and dry.   Neurological:      General: No focal deficit present.           Medications   Current Facility-Administered Medications   Medication  Dose Route Frequency Provider Last Rate Last Admin    Continuing beta blocker from home medication list OR beta blocker order already placed during this visit   Does not apply DOES NOT GO TO Ammy Ndiaye MD        Reason ACE/ARB/ARNI order not selected   Other DOES NOT GO TO Ammy Ndiaye MD         Current Facility-Administered Medications   Medication Dose Route Frequency Provider Last Rate Last Admin    [Held by provider] calcium carbonate 500 mg (elemental) (OSCAL) tablet 500 mg  500 mg Oral TID w/meals Ammy Sky MD        [Held by provider] cyanocobalamin (VITAMIN B-12) tablet 1,000 mcg  1,000 mcg Oral Daily Ammy Sky MD        enoxaparin ANTICOAGULANT (LOVENOX) injection 40 mg  40 mg Subcutaneous Q24H Ammy Sky MD   40 mg at 02/20/25 0628    [Held by provider] folic acid (FOLVITE) tablet 1,000 mcg  1,000 mcg Oral Daily Ammy Sky MD   1,000 mcg at 02/20/25 1035    [Held by provider] furosemide (LASIX) half-tab 10 mg  10 mg Oral Daily Ammy Sky MD        furosemide (LASIX) injection 40 mg  40 mg Intravenous Q8H Wendy Malcolm MD        gabapentin (NEURONTIN) capsule 100 mg  100 mg Oral At Bedtime Ammy Sky MD   100 mg at 02/20/25 0229    loratadine (CLARITIN) tablet 10 mg  10 mg Oral At Bedtime Ammy Sky MD   10 mg at 02/20/25 0229    pantoprazole (PROTONIX) EC tablet 40 mg  40 mg Oral BID Ammy Sky MD        sodium chloride (PF) 0.9% PF flush 3 mL  3 mL Intracatheter Q8H Ammy Sky MD   3 mL at 02/19/25 2139       Data   Recent Labs   Lab 02/20/25  0812 02/19/25  1826   WBC 3.0* 5.8   HGB 7.5* 8.1*   MCV 80 80    189    140   POTASSIUM 4.3 4.0   CHLORIDE 100 107   CO2 31* 23   BUN 13.0 11.0   CR 0.71 0.54*   ANIONGAP 10 10   JOSUE 8.9 6.8*   GLC 73 86   ALBUMIN  --  3.0*   PROTTOTAL  --  5.8*   BILITOTAL  --  0.3   ALKPHOS  --  81   ALT  --  6   AST  --  20       Recent Results (from the past 24 hours)   XR Chest 2 Views     Narrative    EXAM: XR CHEST 2 VIEWS  LOCATION: [facility]  DATE: 2025    INDICATION: shortness of breath  COMPARISON: AP and lateral views of the chest 2024      Impression    IMPRESSION:     Frontal image was acquired with caudal beam angulation. The left hemidiaphragm remains elevated compared to the right and overall the lungs have shallow inflation. Chronic blunting of the right posterior costophrenic sulcus could relate to scarring or   minimal fluid but unchanged.    There are atheromatous calcifications in aortic arch. Cardiac borders are silhouetted on the lateral view and unchanged on the frontal view.    Reverse left shoulder arthroplasty. Vertebroplasty cement is present in several mid and lower thoracic vertebra. Paraspinous rods and pedicle screws in the lower thoracic spine around a mild compression deformity are unchanged.   XR Chest Port 1 View    Narrative    EXAM: XR CHEST PORT 1 VIEW  LOCATION: [facility]  DATE: 2025    INDICATION: Chest pain.  COMPARISON: Chest x-ray 2025.      Impression    IMPRESSION:   Stable cardiomegaly. Stable small bilateral mid lung patchy opacities may be atelectasis or airspace disease. Minimally increased interstitial prominence that may be mildly increased edema. Partial visualization of left shoulder arthroplasty. Spine   fusion appears stable.   Echocardiogram Complete   Result Value    LVEF  60-65%    Narrative    [id number]  [id number]  [id number]  [id number]^NITIN^JAD     [facility]  Echocardiography Laboratory  [address] [place] [address]     Name: CAMILO CRUMP  MRN: [id number]  : 1940  Study Date: 2025 08:52 AM  Age: 84 yrs  Gender: Male  Patient Location: [facility]  Reason For Study: Heart Failure  Ordering Physician: NITIN HOBSON  Performed By: RAJESH Grant     BSA: 2.1 m2  Height: 68 in  Weight: 215 lb  HR: 47  BP: 121/62  mmHg  ______________________________________________________________________________  Procedure  Echocardiogram with two-dimensional, color and spectral Doppler. Definity (NDC  #34880-196) given intravenously. Contrast Definity. Technically difficult  study.  ______________________________________________________________________________  Interpretation Summary     Left ventricular systolic function is normal.The visual ejection fraction is  60-65%.  The right ventricle is mildly dilated.The right ventricular systolic function  is normal.  Severe bi-atrial enlargement,  There is moderate to mod-severe (2-3+) tricuspid regurgitation.There is mild  (1+) aortic regurgitation.Mild valvular aortic stenosis.  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  Pulmonary hypertension- RVSP 51 mm hg +RA.     Echocardiogram dated 07/03/2024 mild tricuspid regurgitation noted with RVSP  37 mm hg + RA, ascending aorta 4.8 cm, aortic root 4.4 cm  ______________________________________________________________________________  Left Ventricle  Left ventricular systolic function is normal. The visual ejection fraction is  60-65%.     Right Ventricle  The right ventricle is mildly dilated. The right ventricular systolic function  is normal.     Atria  The left atrium is severely dilated. The right atrium is severely dilated.  There is no color Doppler evidence of an atrial shunt.     Mitral Valve  There is mild mitral annular calcification. There is mild (1+) mitral  regurgitation. There is no mitral valve stenosis.     Tricuspid Valve  There is moderate to mod-severe (2-3+) tricuspid regurgitation. The right  ventricular systolic pressure is approximated at 50.5 mmHg plus the right  atrial pressure. Pulmonary hypertension.     Aortic Valve  The aortic valve is trileaflet. There is mild (1+) aortic regurgitation. Mild  valvular aortic stenosis. The mean AoV pressure gradient is 10.3 mmHg.      Pulmonic Valve  There is trace pulmonic valvular regurgitation. There is no pulmonic valvular  stenosis.     Vessels  Aortic root aneurysm is present. 4.5 cm. Ascending aorta aneurysm is present.  4.8 cm.     Pericardium  There is no pericardial effusion.     ______________________________________________________________________________  MMode/2D Measurements & Calculations  Ao root diam: 4.5 cm  asc Aorta Diam: 4.8 cm  LVOT diam: 2.1 cm  LVOT area: 3.5 cm2     Ao root diam index Ht(cm/m): 2.6  Ao root diam index BSA (cm/m2): 2.2  Asc Ao diam index BSA (cm/m2): 2.3  Asc Ao diam index Ht(cm/m): 2.8  TAPSE: 2.6 cm     Doppler Measurements & Calculations  MV E max shelton: 126.0 cm/sec  MV A max shelton: 34.4 cm/sec  MV E/A: 3.7  MV max PG: 10.8 mmHg  MV mean P.4 mmHg  MV V2 VTI: 43.0 cm  MVA(VTI): 2.4 cm2  MV dec slope: 652.7 cm/sec2  MV dec time: 0.19 sec  Ao V2 max: 220.0 cm/sec  Ao max P.0 mmHg  Ao V2 mean: 149.1 cm/sec  Ao mean PG: 10.3 mmHg  Ao V2 VTI: 57.8 cm  LEONILA(I,D): 1.8 cm2  LEONILA(V,D): 1.8 cm2  LV V1 max P.3 mmHg  LV V1 max: 114.9 cm/sec  LV V1 VTI: 29.4 cm  SV(LVOT): 102.5 ml  SI(LVOT): 48.6 ml/m2  PA acc time: 0.04 sec  TR max shelton: 355.2 cm/sec  TR max P.5 mmHg  AV Shelton Ratio (DI): 0.52     LEONILA Index (cm2/m2): 0.84  RV S Shelton: 9.1 cm/sec     ______________________________________________________________________________  Report approved by: Dallas Oviedo MD on 2025 12:46 PM               Assessment & Plan      Jamie Valdivia is a delightful 84 year old male with history of permanent atrial fibrillation, has declined anticoagulation, mild dementia, history of alcohol use disorder, GERD with Vickers's esophagus, chronic normocytic anemia, chronic pharyngeal dysphagia, prostate cancer, frequent falls and peripheral neuropathy who presented [place] on 2025 with increased swelling.     Note weight in the clinic has steadily gone up previously around 80 kg as recently as 2024 and last  visit on 2/3/20/2025 he was 97.1 kg.      T97.8, P60, /82, O2 90% on RA, placed on 2 L O2 100%  , K4, CO2 23, CR 0.54, calcium 6.8 (corrected 7.6, 9 on 12/14/2024) albumin 3 (3.7 on 12/14/2024)  Liver panel otherwise unremarkable  BNP 2169.  Troponin normal x 2  WBC 5.8, Hgb 8.1 (prior baseline of 10.5 on 12/14/2024)      Chest x-ray personally reviewed is a chronically elevated left hemidiaphragm with some chronic blunting of the right costophrenic angle I note bilateral interstitial infiltrates that appear to be pulmonary edema with pulmonary vascular congestion. However, this was not noted by radiology. (Does appear to be a notable difference from prior chest x-rays.)      EKG personally reviewed reveals what appears to be atrial flutter with ventricular rate of 64. Left axis deviation and Q waves in V1 and V2 no acute ST deviation to suggest ischemia. Nl QTc at 460     Suspect acute decompensated heart failure  Permanent atrial fibrillation with intermittent slow ventricular response  Declined OAC or LAAO  Zio patch in fall 2023 showed no significant bradycardia arrhythmias  Echo 7/3/2024: EF 55%.  RV normal size and function.  Mild pulmonary hypertension.  Mild valvular aortic stenosis with mean gradient of 9 mmHg.  However valve appears more stenotic than gradient suggested.  Ascending aortic dilatation at 4.8 cm.  CAD listed in medical history - Not mentioned in last cardiology note. Patient a heart attack was noted on EKG several years ago.  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  Will increase it to 3 times daily.  Daily BMP  Daily weight, strict I and Os  Place [place] leon PRN for strict UOP measurement if can not be done with purewick  Echocardiogram shows significant pulmonary hypertension with 2+ tricuspid regurgitation and biatrial enlargement.  EF  60 to 65% with right ventricle mildly dilated.  Telemetry   Cardiology consulted.  Appreciate recommendations.  Strict I's and O's and daily weights.  2 g sodium, 2 L fluid restricted diet.  NT proBNP 2169     Hypocalcemia (7.6), non-severe, asymptomatic   Vitamin D deficiency  QTc normal on EKG.   Ionized calcium borderline low with vitamin D deficiency at 16  If remains low with diuresis, check iPTH.   Calcium carbonate 500 mg elemental calcium TID held for now due to difficulty swallowing       Chronic anemia, normocytic -hemoglobin 8.1 at admission  Severe iron deficiency  Previous baseline around 8 then most recently around 10. No recent sign of bleeding.  Iron panel suggestive of severe iron deficiency.  B12 normal.  TSH normal.  Hemoglobin at 8.1.  Continue to monitor.  Can consider GI workup down the line once more stable.  For now we will hold all supplements while in the hospital.  Can start up on vitamin D supplements on discharge     Frequent falls  Dementia, mild - Noted in chart. However, patient is able to give me a clear history of recent events, although strays off topic often. He knows where he is, the situation the month and the president.   Peripheral neuropathy  History of alcohol use disorder, no recent use since moving to [place] a few years back.   Continue PTA gabapentin 100 mg at bedtime, quetiapine 12.5 mg twice daily as needed  Preserve sleep-wake cycle  Keep as active as possible with shades up during the day  Melatonin ordered at night  PT/OT/CM consulted.     Chronic pharyngeal dysphagia  Chronic dysphonia   GERD with Vickers's esophagus  EGD 2022 found Vickers's esophagus and him started on PPI twice daily  Seen in clinic by gastroenterologist Colby Mercado on 2/3/2025  X-ray esophagram, EGD, ENT referral and speech therapy referral placed at that visit.   Speech Therapy consult   Recently seen by GI with plans for x-ray esophagram, EGD, ENT referral as outpatient  Continue PTA PPI  Ordered  "mechanical soft diet.  Patient states he eats and drinks a normal diet with swallow techniques he learned from speech therapy.      Prostate cancer follows with Dr. Niraj Larry.  S/p EBRT without ADT completed on/12/23    Erectile dysfunction with urgency  Started on tadalafil 5 mg daily by urology on 11/26/2024.     Clinically Significant Risk Factors Present on Admission           # Hypocalcemia: Lowest iCa = 4.3 mg/dL in last 2 days, will monitor and replace as appropriate     # Hypoalbuminemia: Lowest albumin = 3 g/dL at 2/19/2025  6:26 PM, will monitor as appropriate         # Dementia: noted on problem list  # Anemia: based on hgb <11       # Obesity: Estimated body mass index is 32.69 kg/m  as calculated from the following:    Height as of 2/3/25: 1.727 m (5' 8\").    Weight as of this encounter: 97.5 kg (215 lb).       # Financial/Environmental Concerns:            DVT Prophylaxis: Lovenox  Code Status: Full Code  Medically Ready for Discharge: Anticipated in 2-4 Days      Please see A&P for additional details of medical decision making.  55 MINUTES SPENT BY ME on the date of service doing chart review, history, exam, documentation & further activities per the note.    Wendy Malcolm MD, MD  [phone](p)   "

## 2025-02-20 NOTE — ED NOTES
[facility]  ED Nurse Handoff Report    ED Chief complaint: Leg Swelling      ED Diagnosis:   Final diagnoses:   Shortness of breath   Hypervolemia, unspecified hypervolemia type   Bilateral lower extremity edema   Impaired activities of daily living       Code Status: TBD by admitting physician    Allergies:   Allergies   Allergen Reactions    Ativan [Lorazepam]        Patient Story: BIBA from [employer] d/t a cab not wanting to take Pt back home to [place] w/ the amount of leg swelling he had. [employer] called to police for a hopeful transport, but Pt did not fit in the back of their squad car either so EMS was called.   Focused Assessment:  Patient has had increasing bilateral lower extremity swelling and pain over the past several months. BLE appear edematous and swollen. SOB with lying down, RA O2 90%, placed on 2L nasal cannula. Denies chest pain. A/Ox4 at this time but hx dementia.    Treatments and/or interventions provided:   Labs Ordered and Resulted from Time of ED Arrival to Time of ED Departure   COMPREHENSIVE METABOLIC PANEL - Abnormal       Result Value    Sodium 140      Potassium 4.0      Carbon Dioxide (CO2) 23      Anion Gap 10      Urea Nitrogen 11.0      Creatinine 0.54 (*)     GFR Estimate >90      Calcium 6.8 (*)     Chloride 107      Glucose 86      Alkaline Phosphatase 81      AST 20      ALT 6      Protein Total 5.8 (*)     Albumin 3.0 (*)     Bilirubin Total 0.3     NT PROBNP INPATIENT - Abnormal    N terminal Pro BNP Inpatient 2,169 (*)    CBC WITH PLATELETS AND DIFFERENTIAL - Abnormal    WBC Count 5.8      RBC Count 3.43 (*)     Hemoglobin 8.1 (*)     Hematocrit 27.3 (*)     MCV 80      MCH 23.6 (*)     MCHC 29.7 (*)     RDW 16.9 (*)     Platelet Count 189      % Neutrophils 74      % Lymphocytes 15      % Monocytes 8      % Eosinophils 2      % Basophils 1      % Immature Granulocytes 0      NRBCs per 100 WBC 0      Absolute Neutrophils 4.3      Absolute  Lymphocytes 0.9      Absolute Monocytes 0.5      Absolute Eosinophils 0.1      Absolute Basophils 0.0      Absolute Immature Granulocytes 0.0      Absolute NRBCs 0.0     TROPONIN T, HIGH SENSITIVITY - Normal    Troponin T, High Sensitivity 15     TROPONIN T, HIGH SENSITIVITY - Normal    Troponin T, High Sensitivity 16        XR Chest 2 Views   Final Result   IMPRESSION:       Frontal image was acquired with caudal beam angulation. The left hemidiaphragm remains elevated compared to the right and overall the lungs have shallow inflation. Chronic blunting of the right posterior costophrenic sulcus could relate to scarring or    minimal fluid but unchanged.      There are atheromatous calcifications in aortic arch. Cardiac borders are silhouetted on the lateral view and unchanged on the frontal view.      Reverse left shoulder arthroplasty. Vertebroplasty cement is present in several mid and lower thoracic vertebra. Paraspinous rods and pedicle screws in the lower thoracic spine around a mild compression deformity are unchanged.         Medications   morphine (PF) injection 4 mg (4 mg Intravenous $Given 2/19/25 1900)   furosemide (LASIX) injection 40 mg (40 mg Intravenous $Given 2/19/25 2031)      Patient's response to treatments and/or interventions: Tolerated, resting comfortably    To be done/followed up on inpatient unit:  See inpatient orders    Does this patient have any cognitive concerns?: Forgetful    Activity level - Baseline/Home:  [place]  Activity Level - Current:   Total Care    Patient's Preferred language: English   Needed?: No    Isolation: None  Infection: Not Applicable  Patient tested for COVID 19 prior to admission: NO  Bariatric?: Yes    Vital Signs:   Vitals:    02/19/25 1748 02/19/25 1904 02/19/25 1945 02/19/25 2000   BP: (!) 160/81 (!) 158/82 134/69    Pulse:  60 57 53   Resp:   14 13   Temp:  97.8  F (36.6  C)     TempSrc:  Temporal     SpO2:  94% (!) 90% 100%       Cardiac Rhythm:      Was the PSS-3 completed:   Yes  What interventions are required if any?               Family Comments: Wife was at bedside for evening, took taxi home and will be back in the morning.  OBS brochure/video discussed/provided to patient/family: No                 For the majority of the shift this patient's behavior was Green.   Behavioral interventions performed were N/A.    ED NURSE PHONE NUMBER: [phone]

## 2025-02-20 NOTE — Clinical Note
Uriel Ward, Just an update on Jamie..he was admitted to [facility] yesterday for leg swelling and pain. I see you are still listed as the PCP so unsure of who to send this to. I will check again to see if they are willing to set up establish care appointment with another provider after this. Let me know if you have questions.  Thanks! Gisela

## 2025-02-20 NOTE — PROGRESS NOTES
"Speech Language Pathology- Clinical swallow evaluation       02/20/25 1237   Appointment Info   Signing Clinician's Name / Credentials (SLP) Moira Fields MS CCC SLP   General Information   Onset of Illness/Injury or Date of Surgery 02/19/25   Referring Physician Ammy Sky MD   Patient/Family Therapy Goal Statement (SLP) Pt would like to have swallow tests during current hospitalization.   Pertinent History of Current Problem Per chart \"Jamie Valdivia is a delightful 84 year old male with history of permanent atrial fibrillation, has declined anticoagulation, mild dementia, history of alcohol use disorder, GERD with Vickers's esophagus, chronic normocytic anemia, chronic pharyngeal dysphagia, prostate cancer, frequent falls and peripheral neuropathy who presented from [place] on 2/19/2025 with increased swelling.\" SLP consulted due to history of chronic pharyngeal dyphagia. Pt seen recently by GI for dysphagia complaints- see note dated 2/3/25. Pt has appointment for VFSS and esophagram on 2/28, as well as speech path + ENT eval on 3/4. Pt would like to complete tests during current hospitalization as able.   General Observations Pt is pleasant/cooperative, mildly confused and unclear historian. Pt does reports long history of dyphagia and \"choking\" on anything that is square-shaped. He declines modified diets/textures.   Type of Evaluation   Type of Evaluation Swallow Evaluation   Oral Motor   Oral Musculature generally intact   Dentition (Oral Motor)   Dentition (Oral Motor) significant number of missing teeth;poor condition   Facial Symmetry (Oral Motor)   Facial Symmetry (Oral Motor) WNL   Lip Function (Oral Motor)   Lip Range of Motion (Oral Motor) WNL   Lip Strength (Oral Motor) bilateral  (mild generalized weakness)   Tongue Function (Oral Motor)   Tongue Strength (Oral Motor) strength decreased;bilateral   Tongue Coordination/Speed (Oral Motor) reduced rate   Tongue ROM (Oral Motor) WNL   Cough/Swallow/Gag " Reflex (Oral Motor)   Volitional Throat Clear/Cough (Oral Motor) reduced strength   Volitional Swallow (Oral Motor) WNL   Vocal Quality/Secretion Management (Oral Motor)   Vocal Quality (Oral Motor) wet/gurgly;pitch breaks;hoarse;breathy;dysphonic   Secretion Management (Oral Motor) difficulty swallowing secretions   Comment, Vocal Quality/Secretion Management (Oral Motor) wet/gurgly vocal quality remains after pt cued to cough/clear. ? pharyngeal stasis   General Swallowing Observations   Past History of Dysphagia VFSS completed 3/11/2019: intermittent penetration without aspiration, CP bar, and early zenker's diverticulum noted per radiology report.  Clinical swallow eval 6/29/24 recommended continue regular diet and thin liquids, large pills crushed, ok for smaller pills whole with pureed or thin as tolerated. Precautions: upright, slow rate, small single bites/sips, monitor for s/sx of aspiration. Pt has refused diet modifications in the past. Pt seen recently by GI for dysphagia complaints- see note dated 2/3/25. Pt has appointment for VFSS and esophagram on 2/28, as well as speech path + ENT eval on 3/4. Pt would like to complete tests during current hospitalization as able.   Respiratory Support room air   Current Diet/Method of Nutritional Intake (General Swallowing Observations, NIS) thin liquids (level 0);regular diet   Swallowing Evaluation Clinical swallow evaluation   Clinical Swallow Evaluation   Feeding Assistance no assistance needed   Clinical Swallow Evaluation Textures Trialed thin liquids;pureed;solid foods   Clinical Swallow Eval: Thin Liquid Texture Trial   Mode of Presentation, Thin Liquids straw;self-fed   Volume of Liquid or Food Presented 3 oz   Oral Phase of Swallow impaired mastication   Pharyngeal Phase of Swallow   (no overt s/s aspiration)   Clinical Swallow Evaluation: Puree Solid Texture Trial   Mode of Presentation, Puree spoon;self-fed   Volume of Puree Presented 4 bites pudding    Oral Phase, Puree WFL   Pharyngeal Phase, Puree   (no overt s/s aspiration)   Clinical Swallow Evaluation: Solid Food Texture Trial   Mode of Presentation self-fed   Volume Presented lalo crackmary   Oral Phase impaired mastication   Pharyngeal Phase   (no overt s/s aspiration)   Esophageal Phase of Swallow   Patient reports or presents with symptoms of esophageal dysphagia No   Swallowing Recommendations   Diet Consistency Recommendations thin liquids (level 0);regular diet   Supervision Level for Intake close supervision needed   Mode of Delivery Recommendations bolus size, small;slow rate of intake   Swallowing Maneuver Recommendations alternate food and liquid intake   Recommended Feeding/Eating Techniques (Swallow Eval) maintain upright sitting position for eating   Medication Administration Recommendations, Swallowing (SLP) per pt preference. Consider whole vs crushed in puree carrier.   Instrumental Assessment Recommendations VFSS (videofluoroscopic swallowing study)   Clinical Impression   Criteria for Skilled Therapeutic Interventions Met (SLP Eval) Yes, treatment indicated   SLP Diagnosis dysphagia   Risks & Benefits of therapy have been explained evaluation/treatment results reviewed;patient;participants included;participants voiced agreement with care plan   Clinical Impression Comments Clinical swallow evaluation completed. Oral mech exam remarkable for missing dentition in poor condition and dysphonia. Wet/gurly vocal quality noted prior to po trials with pt unable to clear with cues for cough/throat clearing. Of note, prior video swallow study 3/11/19 revealed Cricopharyngeal bar and early Zenker's diverticulum. Pt would likely benefit from softer diet, though today he declined any diet modifications. He prefers to continue regular solids and with self selection of softer/preferred items. Continue regular solids/thin liquids per pt request. Recommend VFSS for further evaluation of pharyngeal  function. Team may wish to consider esophogram as well (has upcoming appointment scheduled 2/28). SLP will follow.   SLP Total Evaluation Time   Eval: oral/pharyngeal swallow function, clinical swallow Minutes (08758) 20   SLP Goals   Therapy Frequency (SLP Eval) daily   SLP Predicted Duration/Target Date for Goal Attainment 03/06/25   SLP Goals Swallow   SLP: Safely tolerate diet without signs/symptoms of aspiration Regular diet;Thin liquids;With use of swallow precautions;With use of compensatory swallow strategies;Independently   Interventions   Interventions Quick Adds Swallowing Dysfunction   Swallowing Intervention   Treatment of Swallowing Dysfunction &/or Oral Function for Feeding Minutes (64348) 10   Treatment Detail/Skilled Intervention Clinician provided pt education in SLP role in POC, eval findings, recommendation for softer diet- pt verbalized understanding but declines. Pt education provided in recommendation for VFSS and swallow strategies; small sips/bits, upright position, alternate liquids/solids. Pt verbalized understanding.   SLP Discharge Planning   SLP Plan VFSS   SLP Discharge Recommendation home;home with home health  (return to [facility])   SLP Rationale for DC Rec Possible need for ongoing dysphagaia management pending results of VFSS.   SLP Brief overview of current status  Continue regular solids/thin liquids per pt request. Recommend VFSS for further evaluation of pharyngeal function. Team may wish to consider esophogram as well (has upcoming appointment scheduled 2/28). SLP will follow.   SLP Time and Intention   Total Session Time (sum of timed and untimed services) 30

## 2025-02-20 NOTE — PLAN OF CARE
Goal Outcome Evaluation:      Plan of Care Reviewed With: patient, caregiver          Outcome Evaluation: back to [facility] vs TCU

## 2025-02-20 NOTE — ED NOTES
Purewick malfunctioned leading to a large amount of urine incontinence in bed. Assist of two for a full bed change. Pt cleaned and new linens provided.

## 2025-02-20 NOTE — PLAN OF CARE
Date/Time: 02/20: [id number]    Trauma/Ortho/Medical (Choose one): Medical    Diagnosis: Decompensated heart failure, BLEs edema  POD#: N/A  Mental Status: Oriented x 4  Activity/dangle: Up with one assist with GB/Walker  Diet: Mechanical soft, 2 gm sodium, 2L FR  Pain: Denies  Lucio/Voiding: External catheter  Tele/Restraints/Iso: Tele is a-fib with CVR  02/LDA: RA. IV SL  D/C Date: TBD, pt is from [facility]  Other Info: Hortencia has +3 edema. Is  on IV Lasix. Good appetite. Meds crushed with applesauce.

## 2025-02-20 NOTE — H&P
[facility]    History and Physical - Hospitalist Service       Date of Admission:  2/19/2025    Assessment & Plan      Jamie Valdivia is a delightful 84 year old male with history of permanent atrial fibrillation, has declined anticoagulation, mild dementia, history of alcohol use disorder, GERD with Vickers's esophagus, chronic normocytic anemia, chronic pharyngeal dysphagia, prostate cancer, frequent falls and peripheral neuropathy who [place] on 2/19/2025 with increased swelling.     Note weight in the clinic has steadily gone up previously around 80 kg as recently as 11/26/2024 and last visit on 2/3/20/2025 he was 97.1 kg.     T97.8, P60, /82, O2 90% on RA, placed on 2 L O2 100%  , K4, CO2 23, CR 0.54, calcium 6.8 (corrected 7.6, 9 on 12/14/2024) albumin 3 (3.7 on 12/14/2024)  Liver panel otherwise unremarkable  BNP 2169.  Troponin normal x 2  WBC 5.8, Hgb 8.1 (prior baseline of 10.5 on 12/14/2024)     Chest x-ray personally reviewed is a chronically elevated left hemidiaphragm with some chronic blunting of the right costophrenic angle I note bilateral interstitial infiltrates that appear to be pulmonary edema with pulmonary vascular congestion. However, this was not noted by radiology. (Does appear to be a notable difference from prior chest x-rays.)     EKG personally reviewed reveals what appears to be atrial flutter with ventricular rate of 64. Left axis deviation and Q waves in V1 and V2 no acute ST deviation to suggest ischemia. Nl QTc at 460    Suspect acute decompensated heart failure  Permanent atrial fibrillation with intermittent slow ventricular response  Declined OAC or LAAO  Zio patch in fall 2023 showed no significant bradycardia arrhythmias  Echo 7/3/2024: EF 55%.  RV normal size and function.  Mild pulmonary hypertension.  Mild valvular aortic stenosis with mean gradient of 9 mmHg.  However valve appears more stenotic than gradient  suggested.  Ascending aortic dilatation at 4.8 cm.  CAD listed in medical history - Not mentioned in last cardiology note. Patient a heart attack was noted on EKG several years ago.  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   Echocardiogram  Telemetry   Given follows with cardiology and this is a new diagnosis, cardiology consulted. '    Hypocalcemia (7.6), non-severe, asymptomatic   QTc normal on EKG.   Check ionized calcium, magnesium, vitamin D  If remains low with diuresis, check iPTH.   Calcium carbonate 500 mg elemental calcium TID    Chronic anemia, normocytic -hemoglobin 8.1 at admission  Previous baseline around 8 then most recently around 10. No recent sign of bleeding.  Check iron panel, ferritin, B12  Continue PTA B12 and folic acid  Follow up CBC ordered.     Frequent falls  Dementia, mild - Noted in chart. However, patient is able to give me a clear history of recent events, although strays off topic often. He knows where he is, the situation the month and the president.   Peripheral neuropathy  History of alcohol use disorder, no recent use since moving [place] years back.   Continue PTA gabapentin 100 mg at bedtime, quetiapine 12.5 mg twice daily as needed  Preserve sleep-wake cycle  Keep as active as possible with shades up during the day  Melatonin ordered at night  PT/OT/CM consulted.    Chronic pharyngeal dysphagia  Chronic dysphonia   GERD with Vickers's esophagus  EGD 2022 found Vickers's esophagus and him started on PPI twice daily  Seen in clinic by gastroenterologist Colby Mercado on 2/3/2025  X-ray esophagram, EGD, ENT referral and speech therapy referral placed at that visit.  "  Speech Therapy consult   Recently seen by GI with plans for x-ray esophagram, EGD, ENT referral as outpatient  Continue PTA PPI  Ordered mechanical soft diet.  Patient states he eats and drinks a normal diet with swallow techniques he learned from speech therapy.     Prostate cancer follows with Dr. Niraj Larry.  S/p EBRT without ADT completed on/12/23  Erectile dysfunction with urgency  Started on tadalafil 5 mg daily by urology on 11/26/2024.    Med reconciliation pending        Diet:  Low salt diet.   DVT Prophylaxis: Heparin SQ  Lucio Catheter: Not present  Lines: None     Cardiac Monitoring: ACTIVE order. Indication: Acute decompensated heart failure (48 hours)  Code Status:  Full code, discussed.    Clinically Significant Risk Factors Present on Admission               # Hypoalbuminemia: Lowest albumin = 3 g/dL at 2/19/2025  6:26 PM, will monitor as appropriate         # Dementia: noted on problem list  # Anemia: based on hgb <11       # Obesity: Estimated body mass index is 32.54 kg/m  as calculated from the following:    Height as of 2/3/25: 1.727 m (5' 8\").    Weight as of 2/3/25: 97.1 kg (214 lb).       # Financial/Environmental Concerns:           Disposition Plan     Medically Ready for Discharge: Anticipated in 2-4 Days           Ammy Sky MD  Hospitalist Service  [facility]  Securely message with [employer] (more info)  Text page via [facility] Paging/Directory     ______________________________________________________________________    Chief Complaint   Swelling    History is obtained from the patient    History of Present Illness   Jamie Valdivia is a delightful 84 year old male with history of permanent atrial fibrillation, has declined anticoagulation, mild dementia, history of alcohol use disorder, GERD with Vickers's esophagus, chronic normocytic anemia, chronic pharyngeal dysphagia, prostate cancer, frequent falls and peripheral neuropathy who presented on " 2/19/2025 with increased swelling    Note weight in the clinic has steadily gone up previously around 80 kg as recently as 11/26/2024 and last visit on 2/3/20/2025 he was 97.1 kg.     Patient tells me he was told to come to the ER by Flakito the nurse at his assisted living in [place], due to progressive swelling, inability to get around. He is still getting around on his own with a walker but has become more difficult due to the swelling.  He does get shortness of breath at times but he states this is related to stress.  When I directly asked him about shortness of breath with lying flat he stated that it is more with anxiety. However, I do see in the ED note he had mentioned needing to prop himself up at night.  He has had occasional chest pains for a years, no change in his chest pain and no recent chest pain.  He denies any fever, chills, nausea.  He has had chronic problem with swallowing for years as outlined below but is still eating a regular diet with thin liquids.  He has chronic constipation with intermittent diarrhea which is chronic. He has noted no new urinary symptoms. He does note that he is urinated a lot with the Lasix given in the ED.     Past Medical History    Permanent atrial fibrillation with intermittent slow ventricular response  Declined OAC or LAAO  Zio patch in fall 2023 showed no significant bradycardia arrhythmias  Echo 7/3/2024: EF 55%.  RV normal size and function.  Mild pulmonary hypertension.  Mild valvular aortic stenosis with mean gradient of 9 mmHg.  However valve appears more stenotic than gradient suggested.  Ascending aortic dilatation at 4.8 cm.  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 Niraj Larry.  S/p EBRT without ADT completed on/12/23  At last clinic visit on 11/26/2024 placed on antibiotics for UTI   Erectile dysfunction with urgency  Started on tadalafil 5 mg daily by urology on 11/26/2024    Past Medical History:   Diagnosis Date    Age-related osteoporosis without current pathological fracture 03/30/2022    Alcohol use disorder     Aortic root aneurysm     CAD (coronary artery disease)     Chronic a-fib (H)     Chronic atrial fibrillation (H) 07/15/2016    Formatting of this note might be different from the original. 2/2019: Multiple falls so started on aspirin only.  Then aspirin stopped due to GI bleed.    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 2/2/21    HTN (hypertension)     Impaired gait and mobility 03/14/2019    Frequent falls    Mild TBI (traumatic brain injury) (H) 03/14/2019    Mumps     Obesity (BMI 30-39.9) 09/03/2015    Osteoarthritis of both knees 05/13/2015    Osteoporosis     Other idiopathic peripheral autonomic neuropathy 03/30/2022    Other seizures (H) 03/30/2022    Pharyngeal dysphagia 05/13/2015    Formatting of this note might be different from the original. Likely secondary to GERD with esophagitis, on PPI and H2 blocker with notable improvement, EGD 10/5/15.     Prostate cancer (H)     Recurrent major depressive disorder 03/30/2022    Rhabdomyolysis     Thrombocytopenia        Past Surgical History   Past Surgical History:   Procedure Laterality Date    BIOPSY PROSTATE TRANSRECTAL N/A 1/11/2023    Procedure: PROSTATE BIOPSY, RECTAL APPROACH;  Surgeon: Niraj Larry MD;  Location:  OR    COLONOSCOPY      ESOPHAGOSCOPY, GASTROSCOPY, DUODENOSCOPY (EGD), COMBINED N/A 06/01/2022    Procedure: ESOPHAGOGASTRODUODENOSCOPY (EGD) mngi with biopsies using jumbo cold biopsy forceps;  Surgeon: David Springer MD;  Location:  GI    left hip replacement  2010    left shoulder replacement  2016    ORTHOPEDIC SURGERY      SPINE SURGERY      done in [place]    TONSILLECTOMY      TRANSRECTAL ULTRASONIC SONOGRAM N/A 1/11/2023    Procedure: TRANSRECTAL ULTRASOUND;  Surgeon: Niraj Larry MD;  Location:  OR       Prior to Admission Medications   Prior to Admission Medications   Prescriptions Last Dose Informant Patient Reported? Taking?   QUEtiapine (SEROQUEL) 25 MG tablet   No No   Sig: TAKE ONE-HALF TABLET (12.5MG) BY MOUTH TWICE DAILY;& MAY TAKE ONE-HALF TABLET (12.5MG) EVERY 12 HOURS AS NEEDED   acetaminophen (TYLENOL) 500 MG tablet   No No   Sig: Take 2 tablets (1,000 mg) by mouth 3 times daily as needed for mild pain   cyanocobalamin (VITAMIN B-12) 1000 MCG tablet   No No   Sig: TAKE 1 TABLET BY MOUTH ONCE DAILY   folic acid (FOLVITE) 1 MG tablet   No No   Sig: TAKE 1 TABLET BY MOUTH ONCE DAILY   furosemide (LASIX) 20 MG tablet   No No   Sig: TAKE ONE-HALF TABLET (10MG) BY MOUTH ONCE DAILY   gabapentin (NEURONTIN) 100 MG capsule   No No   Sig: TAKE 1 CAPSULE BY MOUTH AT BEDTIME   loratadine (CLARITIN) 10 MG tablet   No No   Sig: TAKE 1 TABLET BY MOUTH AT BEDTIME   menthol-zinc oxide (CALMOSEPTINE) 0.44-20.6 % OINT ointment   Yes No   Sig: Apply topically 4 times daily.   omeprazole (PRILOSEC) 40 MG DR capsule   No No   Sig: TAKE 1 CAPSULE BY MOUTH TWICE DAILY    polyethylene glycol (MIRALAX) 17 GM/Dose powder  Nursing Home No No   Sig: MIX 2 CAPFULS IN 8OZ OF ORANGE JUICE  IN THE MORNING;AND MAY MIX 2 CAPFULS ONCE DAILY AS NEEDED FOR CONSTIPATION. MIX IN FRONT OF PT. HOLD FOR LOOSE STOOL. OK TO GIVE 1 CAPFUL IF RESIDENT REFUSES 2 CAPFULS.   sodium chloride (OCEAN) 0.65 % nasal spray   No No   Sig: Spray 1 spray in nostril 2 times daily.      Facility-Administered Medications: None        Social History   I have reviewed this patient's social history and updated it with pertinent information if needed.  He lives with his wife in assisted living at [place] for the last several years  He states he is still getting up and walking around his apartment.  He uses a walker.  He does not smoke.  He states he used to drink a lot and was part of the reason his family wanted him to move to assisted living.  Since then he has not been drinking.    Social History     Tobacco Use    Smoking status: Never    Smokeless tobacco: Never   Substance Use Topics    Alcohol use: Not Currently     Comment: not since December 2021    Drug use: Never        Physical Exam   Vital Signs: Temp: 97.8  F (36.6  C) Temp src: Temporal BP: 134/69 Pulse: 53   Resp: 13 SpO2: 100 % O2 Device: Nasal cannula Oxygen Delivery: 2 LPM  Weight: 0 lbs 0 oz    General: He is in no acute distress he appears comfortable lying on the bed  Head is normal cephalic atraumatic  CV: His heart is a regular he has a loud systolic murmur, extremities are warm with 3+ edema bilaterally.  Abdomen: Is soft nontender non-distended  Skin exam reveals no acute rashes ecchymoses or petechiae no signs of bleeding      Medical Decision Making       Over 60  MINUTES SPENT BY ME on the date of service doing chart review, history, exam, documentation & further activities per the note.      Data     I have personally reviewed the following data over the past 24 hrs:    5.8  \   8.1 (L)   / 189     140 107 11.0 /  86   4.0 23 0.54 (L) \      ALT: 6 AST: 20 AP: 81 TBILI: 0.3   ALB: 3.0 (L) TOT PROTEIN: 5.8 (L) LIPASE: N/A     Trop: 16 BNP: 2,169 (H)     TSH: 2.83 T4: N/A A1C: N/A       Imaging results reviewed over the past 24 hrs:   Recent Results (from the past 24 hours)   XR Chest 2 Views    Narrative    EXAM: XR CHEST 2 VIEWS  LOCATION: [facility]  DATE: 2/19/2025    INDICATION: shortness of breath  COMPARISON: AP and lateral views of the chest 7/12/2024      Impression    IMPRESSION:     Frontal image was acquired with caudal beam angulation. The left hemidiaphragm remains elevated compared to the right and overall the lungs have shallow inflation. Chronic blunting of the right posterior costophrenic sulcus could relate to scarring or   minimal fluid but unchanged.    There are atheromatous calcifications in aortic arch. Cardiac borders are silhouetted on the lateral view and unchanged on the frontal view.    Reverse left shoulder arthroplasty. Vertebroplasty cement is present in several mid and lower thoracic vertebra. Paraspinous rods and pedicle screws in the lower thoracic spine around a mild compression deformity are unchanged.

## 2025-02-20 NOTE — PHARMACY-ADMISSION MEDICATION HISTORY
Pharmacy Intern Admission Medication History    Admission medication history is complete. The information provided in this note is only as accurate as the sources available at the time of the update.    Information Source(s): Facility ([facility]/NH/) medication list/MAR via phone and faxed Medication Administration List    Pertinent Information: Per discussion with memory care nurse, patient has ability to leave the memory care portion of the facility to visit his spouse, who lives in an assisted living apartment in the same facility. When he stays with her in her apartment, he does not receive his medications.     Changes made to PTA medication list:  Added: None  Deleted: None  Changed: Miralax to not taking per medication sheet from [facility] facility     Allergies reviewed with patient and updates made in EHR: yes    Medication History Completed By: Ara Cash 2/20/2025 1:13 PM    PTA Med List   Medication Sig Last Dose/Taking    acetaminophen (TYLENOL) 500 MG tablet Take 2 tablets (1,000 mg) by mouth 3 times daily as needed for mild pain More than a month    cyanocobalamin (VITAMIN B-12) 1000 MCG tablet TAKE 1 TABLET BY MOUTH ONCE DAILY 2/19/2025    folic acid (FOLVITE) 1 MG tablet TAKE 1 TABLET BY MOUTH ONCE DAILY 2/19/2025    furosemide (LASIX) 20 MG tablet TAKE ONE-HALF TABLET (10MG) BY MOUTH ONCE DAILY 2/19/2025    gabapentin (NEURONTIN) 100 MG capsule TAKE 1 CAPSULE BY MOUTH AT BEDTIME Past Week    loratadine (CLARITIN) 10 MG tablet TAKE 1 TABLET BY MOUTH AT BEDTIME Past Week    menthol-zinc oxide (CALMOSEPTINE) 0.44-20.6 % OINT ointment Apply topically 4 times daily. More than a month    omeprazole (PRILOSEC) 40 MG DR capsule TAKE 1 CAPSULE BY MOUTH TWICE DAILY 2/19/2025    QUEtiapine (SEROQUEL) 25 MG tablet TAKE ONE-HALF TABLET (12.5MG) BY MOUTH TWICE DAILY;& MAY TAKE ONE-HALF TABLET (12.5MG) EVERY 12 HOURS AS NEEDED 2/19/2025    sodium chloride (OCEAN) 0.65 % nasal spray Spray 1 spray in nostril 2  times daily. More than a month

## 2025-02-20 NOTE — CODE/RAPID RESPONSE
"[facility]    RRT Note  2/20/2025   Time Called: 653 AM    RRT called for: Chest pain dyspnea    HPI:    Jamie Valdivia is a 84 year old male w/ PMH of permanent atrial fibrillation, has declined anticoagulation, mild dementia, history of alcohol use disorder, GERD with Vickers's esophagus, chronic normocytic anemia, chronic pharyngeal dysphagia, prostate cancer, frequent falls and peripheral neuropathy who presented from assisted living on 2/19/2025 with increased swelling working diagnosis is suspected acute decompensated heart failure    RRT activated for brief SOB and CP.  Pt indicated he felt similar to when he was \"choking and felt panicky\".  There was no associated/concurrent oral intake time of his complaints of chest pain and dyspnea that would respond to choking.  Patient reported he felt like I was \"dying alone,\" On my arrival pt is lying in bed w/o acute distress, VS heart 42-46, /56, SpO2 92% on room air.  +3 bilat LE edema    I personally reviewed the radiographs of the chest showing elevated left hemidiaphragm, blunting of the right cardiac silhouette?  RML infiltrate    Assessment & Plan   CP, dyspnea, self limited  Differential diagnosis: pulm edema from A/CDHF, CP from aortic stenosis, ACS, mucus plug, fall related injury/ MSK, GERD   INTERVENTIONS:  -stat EKG see below  -offered cxr, pt wished to speak w/ his wife first, ICU RN facilitated their discussion by phone  -get AM labs now  -give am scheduled tums now  -telemetry x24h  -cycle troponins 16--.>20    Addendum 12:17 PM, and reviewed the chest x-ray at that time.  Agree with ongoing diuresis as per cardiology recommendations  I note that significant anemia, recheck CBC in a.m.  -BMP shows significantly creased bicarb this may be reflection of diuresis, does not entirely fit with his worsening anemia, no bleeding appreciated during exam    Last 24H PRN:     acetaminophen (TYLENOL) tablet 650 mg, 650 mg at " 02/20/25 0229 **OR** acetaminophen (TYLENOL) Suppository 650 mg    Working diagnosis: Self-limited chest pain and dyspnea in a patient with dementia his reliability as a historian is questionable.    At the end of the RRT labs were being drawn chest x-ray was pending    disposition team current level of care    Discussed with and defer further cares to hospitalist attending physician Dr. Malcolm     Code Status: Full Code    Physical Exam   Vital Signs with Ranges:  Temp:  [97.8  F (36.6  C)] 97.8  F (36.6  C)  Pulse:  [44-60] 49  Resp:  [10-21] 16  BP: (126-160)/(56-82) 126/56  SpO2:  [90 %-100 %] 93 %  I/O last 3 completed shifts:  In: -   Out: 1100 [Urine:1100]    Constitutional: vs as above and/or per EMR  General:  adult pt lying in bed without acute distress   GCS:   Motor 6=Obeys commands   Verbal 4=Confused   Eye Opening 4=Spontaneous   Total: 14     Neuro: +follows commands wiggle toes and show 2 fingers bilat, face symmetric, tongue midline, speech fluent  Head, ENT & mouth: NC/AT,  mouth moist oral mucosa  Neck: supple  CV S1S2 bradycardia  resp: CTAB upper lobes  gi:normoactive bowel sounds, soft, nontender, nondisteded  Ext: +3 bilateral lower extremity, no mottling  Skin: no rashes on exposed skin  Musculoskeletal no bony joint deformities      Data     EKG:  Interpreted by MARICHUY Man CNP  Time reviewed: 740 a.m.  Symptoms at time of EKG: Chest pain  Rhythm: atrial fibrillation - controlled  Rate: 40s  Axis: Left Axis Deviation  Ectopy: none  Conduction: normal  ST Segments/ T Waves: No acute ischemic changes  Q Waves: none  Comparison to prior: Unchanged from July 12, 2024    Clinical Impression: A-fib with controlled ventricular response without acute ischemic     IMAGING: (X-ray/CT/MRI)   Recent Results (from the past 24 hours)   XR Chest 2 Views    Narrative    EXAM: XR CHEST 2 VIEWS  LOCATION: [facility]  DATE: 2/19/2025    INDICATION: shortness of  breath  COMPARISON: AP and lateral views of the chest 7/12/2024      Impression    IMPRESSION:     Frontal image was acquired with caudal beam angulation. The left hemidiaphragm remains elevated compared to the right and overall the lungs have shallow inflation. Chronic blunting of the right posterior costophrenic sulcus could relate to scarring or   minimal fluid but unchanged.    There are atheromatous calcifications in aortic arch. Cardiac borders are silhouetted on the lateral view and unchanged on the frontal view.    Reverse left shoulder arthroplasty. Vertebroplasty cement is present in several mid and lower thoracic vertebra. Paraspinous rods and pedicle screws in the lower thoracic spine around a mild compression deformity are unchanged.       CBC with Diff:  Recent Labs   Lab Test 02/19/25 1826 12/14/24  0148   WBC 5.8 7.4   HGB 8.1* 10.5*   MCV 80 85    241   INR  --  1.15        Comprehensive Metabolic Panel:  Recent Labs   Lab 02/19/25 2115 02/19/25 2024 02/19/25  1826   NA  --   --  140   POTASSIUM  --   --  4.0   CHLORIDE  --   --  107   CO2  --   --  23   ANIONGAP  --   --  10   GLC  --   --  86   BUN  --   --  11.0   CR  --   --  0.54*   GFRESTIMATED  --   --  >90   JOSUE  --   --  6.8*   MAG 1.9   < >  --    PROTTOTAL  --   --  5.8*   ALBUMIN  --   --  3.0*   BILITOTAL  --   --  0.3   ALKPHOS  --   --  81   AST  --   --  20   ALT  --   --  6    < > = values in this interval not displayed.     Time Spent on this Encounter   I spent 17 minutes on the unit/floor managing the care of Jamie Valdivia. Over 50% of my time was spent counseling the patient and/or coordinating care regarding services listed in this note.    MARICHUY Man [facility]  Hospitalist Service  [facility]  Securely message with [employer] (more info)  Text page via [employer] Paging/Directory

## 2025-02-20 NOTE — CONSULTS
[facility]    CARDIOLOGY CONSULTATION       Date of Admission:  2/19/2025    Assessment & Plan       Acute on chronic HFpEF (LVEF 55% on 7/2024 TTE), severely volume overloaded on admission, NYHA III  Permanent AF with intermittently slow ventricular response, without worrisome samir-arrhythmia on last Zio, not on anti-coagulation/no LAAO due to patient refusal  Mild-moderate aortic stenosis  Ascending aortic aneurysm (4.8 cm on 7/2024 TTE)  Mild pulmonary hypertension by echo  Dementia  Prostate cancer  Alcohol use disorder      Patient is severely fluid overloaded on admission.  By weight, he is roughly 40 pounds up from baseline.  Responding well to 40 mg IV Lasix twice daily, would continue.  Goal net -2 to 3 L/day.  Likely will require at least 4-5 days of IV diuresis to approach euvolemia.  Standard HF cares (cardiac diet, strict I's/O, daily weight, telemetry monitoring, replete potassium to 4.0 and magnesium to 2.0)  Repeat TTE ordered and pending, will follow-up on results  Monitor for worrisome samir-arrhythmia on telemetry  Patient and family have previously refused anticoagulation  Eventually could consider SGLT2 inhibitor, though during our discussion patient is very skeptical of the medical system in general and may not be interested in taking this.  We can continue to readdress in the days to come.        Cardiology will continue to follow.      High complexity     Rishabh Vazquez MD        Reason for Consult   Reason for consult: Patient is being evaluated for decompensated heart failure.    History of Present Illness   Jamie Valdivia is a 84 year old male who follows with Dr. Dominguez in our clinic, here with decompensated heart failure.  History is notable for HFpEF, permanent AF with intermittently slow ventricular response, refusal of anticoagulation or left atrial appendage occlusion, mild-moderate aortic stenosis, ascending aortic aneurysm, pulmonary hypertension  by echo, dementia, prostate cancer, and alcohol use disorder.  He was brought in from his assisted living facility on 2/19/2025 due to progressive lower extremity swelling and mild shortness of breath.  His weight is up from a baseline of roughly 180 pounds to 215 pounds on admission.  NT proBNP is elevated at 2100, though most recently in March 2024 it was 3400.  High-sensitivity troponin has been normal on several rechecks.  Hemoglobin has been 8.1 and then 7.5 on recheck, down from prior to admission though he has been in this range before.  ECG showed his typical atrial fibrillation with heart rates in the 40s/50s.  He has received 2 doses of IV Lasix 40 mg at this point and has been urinating very well.  His home dose of Lasix is only 10 mg daily.      Prior to Admission Medications   Prior to Admission Medications   Prescriptions Last Dose Informant Patient Reported? Taking?   QUEtiapine (SEROQUEL) 25 MG tablet   No No   Sig: TAKE ONE-HALF TABLET (12.5MG) BY MOUTH TWICE DAILY;& MAY TAKE ONE-HALF TABLET (12.5MG) EVERY 12 HOURS AS NEEDED   acetaminophen (TYLENOL) 500 MG tablet   No No   Sig: Take 2 tablets (1,000 mg) by mouth 3 times daily as needed for mild pain   cyanocobalamin (VITAMIN B-12) 1000 MCG tablet   No No   Sig: TAKE 1 TABLET BY MOUTH ONCE DAILY   folic acid (FOLVITE) 1 MG tablet   No No   Sig: TAKE 1 TABLET BY MOUTH ONCE DAILY   furosemide (LASIX) 20 MG tablet   No No   Sig: TAKE ONE-HALF TABLET (10MG) BY MOUTH ONCE DAILY   gabapentin (NEURONTIN) 100 MG capsule   No No   Sig: TAKE 1 CAPSULE BY MOUTH AT BEDTIME   loratadine (CLARITIN) 10 MG tablet   No No   Sig: TAKE 1 TABLET BY MOUTH AT BEDTIME   menthol-zinc oxide (CALMOSEPTINE) 0.44-20.6 % OINT ointment   Yes No   Sig: Apply topically 4 times daily.   omeprazole (PRILOSEC) 40 MG DR capsule   No No   Sig: TAKE 1 CAPSULE BY MOUTH TWICE DAILY   polyethylene glycol (MIRALAX) 17 GM/Dose powder  Nursing Home No No   Sig: MIX 2 CAPFULS IN 8OZ OF ORANGE  JUICE  IN THE MORNING;AND MAY MIX 2 CAPFULS ONCE DAILY AS NEEDED FOR CONSTIPATION. MIX IN FRONT OF PT. HOLD FOR LOOSE STOOL. OK TO GIVE 1 CAPFUL IF RESIDENT REFUSES 2 CAPFULS.   sodium chloride (OCEAN) 0.65 % nasal spray   No No   Sig: Spray 1 spray in nostril 2 times daily.      Facility-Administered Medications: None     Current Facility-Administered Medications   Medication Dose Route Frequency Provider Last Rate Last Admin    acetaminophen (TYLENOL) tablet 650 mg  650 mg Oral Q4H PRN Ammy Sky MD   650 mg at 02/20/25 0229    Or    acetaminophen (TYLENOL) Suppository 650 mg  650 mg Rectal Q4H PRN Ammy Sky MD        benzocaine-menthol (CHLORASEPTIC) 6-10 MG lozenge 1 lozenge  1 lozenge Buccal Q1H PRN Ammy Sky MD        calcium carbonate (TUMS) chewable tablet 1,000 mg  1,000 mg Oral 4x Daily PRN Ammy Sky MD        calcium carbonate 500 mg (elemental) (OSCAL) tablet 500 mg  500 mg Oral TID w/meals Ammy Sky MD        Continuing beta blocker from home medication list OR beta blocker order already placed during this visit   Does not apply DOES NOT GO TO Ammy Ndiaye MD        cyanocobalamin (VITAMIN B-12) tablet 1,000 mcg  1,000 mcg Oral Daily Ammy Sky MD        enoxaparin ANTICOAGULANT (LOVENOX) injection 40 mg  40 mg Subcutaneous Q24H Ammy Sky MD   40 mg at 02/20/25 0628    folic acid (FOLVITE) tablet 1,000 mcg  1,000 mcg Oral Daily Ammy Sky MD   1,000 mcg at 02/20/25 1035    [Held by provider] furosemide (LASIX) half-tab 10 mg  10 mg Oral Daily Ammy Sky MD        furosemide (LASIX) injection 40 mg  40 mg Intravenous bid 08 & 14 Ammy Sky MD   40 mg at 02/20/25 0723    gabapentin (NEURONTIN) capsule 100 mg  100 mg Oral At Bedtime Ammy Sky MD   100 mg at 02/20/25 0229    lidocaine (LMX4) cream   Topical Q1H PRN Ammy Sky MD        lidocaine 1 % 0.1-1 mL  0.1-1 mL Other Q1H PRN Ammy Sky MD        loratadine  (CLARITIN) tablet 10 mg  10 mg Oral At Bedtime Ammy Sky MD   10 mg at 02/20/25 0229    melatonin tablet 1 mg  1 mg Oral At Bedtime PRAmmy Ho MD        menthol-zinc oxide (CALMOSEPTINE) 0.44-20.6 % ointment OINT   Topical 4x Daily PRN Ammy Sky MD        ondansetron (ZOFRAN ODT) ODT tab 4 mg  4 mg Oral Q6H PRN Ammy Sky MD        Or    ondansetron (ZOFRAN) injection 4 mg  4 mg Intravenous Q6H PRAmmy Ho MD        pantoprazole (PROTONIX) EC tablet 40 mg  40 mg Oral BID Ammy Sky MD        polyethylene glycol (MIRALAX) Packet 17 g  17 g Oral BID PRAmmy Ho MD        QUEtiapine (SEROquel) half-tab 12.5 mg  12.5 mg Oral BID PRAmmy Ho MD        Reason ACE/ARB/ARNI order not selected   Other DOES NOT GO TO Ammy Ndiaye MD        senna-docusate (SENOKOT-S/PERICOLACE) 8.6-50 MG per tablet 1 tablet  1 tablet Oral BID PRAmmy Ho MD        Or    senna-docusate (SENOKOT-S/PERICOLACE) 8.6-50 MG per tablet 2 tablet  2 tablet Oral BID PRAmmy Ho MD        sodium chloride (PF) 0.9% PF flush 3 mL  3 mL Intracatheter Q8H Ammy Sky MD   3 mL at 02/19/25 2139    sodium chloride (PF) 0.9% PF flush 3 mL  3 mL Intracatheter q1 min prAmmy Ho MD         Current Facility-Administered Medications   Medication Dose Route Frequency Provider Last Rate Last Admin    acetaminophen (TYLENOL) tablet 650 mg  650 mg Oral Q4H PRAmmy Ho MD   650 mg at 02/20/25 0229    Or    acetaminophen (TYLENOL) Suppository 650 mg  650 mg Rectal Q4H PRAmmy Ho MD        benzocaine-menthol (CHLORASEPTIC) 6-10 MG lozenge 1 lozenge  1 lozenge Buccal Q1H PRN Ammy Sky MD        calcium carbonate (TUMS) chewable tablet 1,000 mg  1,000 mg Oral 4x Daily PRN Ammy Sky MD        calcium carbonate 500 mg (elemental) (OSCAL) tablet 500 mg  500 mg Oral TID w/meals Ammy Sky MD        Continuing beta blocker from home  medication list OR beta blocker order already placed during this visit   Does not apply DOES NOT GO TO Ammy Ndiaye MD        cyanocobalamin (VITAMIN B-12) tablet 1,000 mcg  1,000 mcg Oral Daily Ammy Sky MD        enoxaparin ANTICOAGULANT (LOVENOX) injection 40 mg  40 mg Subcutaneous Q24H Ammy Sky MD   40 mg at 02/20/25 0628    folic acid (FOLVITE) tablet 1,000 mcg  1,000 mcg Oral Daily Ammy Sky MD   1,000 mcg at 02/20/25 1035    [Held by provider] furosemide (LASIX) half-tab 10 mg  10 mg Oral Daily Ammy Sky MD        furosemide (LASIX) injection 40 mg  40 mg Intravenous bid 08 & 14 Ammy Sky MD   40 mg at 02/20/25 0723    gabapentin (NEURONTIN) capsule 100 mg  100 mg Oral At Bedtime Ammy Sky MD   100 mg at 02/20/25 0229    lidocaine (LMX4) cream   Topical Q1H PRN Ammy Sky MD        lidocaine 1 % 0.1-1 mL  0.1-1 mL Other Q1H PRN Ammy Sky MD        loratadine (CLARITIN) tablet 10 mg  10 mg Oral At Bedtime Ammy Sky MD   10 mg at 02/20/25 0229    melatonin tablet 1 mg  1 mg Oral At Bedtime PRN Ammy Sky MD        menthol-zinc oxide (CALMOSEPTINE) 0.44-20.6 % ointment OINT   Topical 4x Daily PRN Ammy Sky MD        ondansetron (ZOFRAN ODT) ODT tab 4 mg  4 mg Oral Q6H PRN Ammy Sky MD        Or    ondansetron (ZOFRAN) injection 4 mg  4 mg Intravenous Q6H PRN Ammy Sky MD        pantoprazole (PROTONIX) EC tablet 40 mg  40 mg Oral BID Ammy Sky MD        polyethylene glycol (MIRALAX) Packet 17 g  17 g Oral BID PRN Ammy Sky MD        QUEtiapine (SEROquel) half-tab 12.5 mg  12.5 mg Oral BID PRN Ammy Sky MD        Reason ACE/ARB/ARNI order not selected   Other DOES NOT GO TO Ammy Ndiaye MD senna-docusate (SENOKOT-S/PERICOLACE) 8.6-50 MG per tablet 1 tablet  1 tablet Oral BID PRN Ammy Sky MD        Or    senna-docusate (SENOKOT-S/PERICOLACE) 8.6-50 MG per tablet 2 tablet  2  "tablet Oral BID PRN Ammy Sky MD        sodium chloride (PF) 0.9% PF flush 3 mL  3 mL Intracatheter Q8H Ammy Sky MD   3 mL at 02/19/25 2139    sodium chloride (PF) 0.9% PF flush 3 mL  3 mL Intracatheter q1 min prn Ammy Sky MD         Allergies   Allergies   Allergen Reactions    Ativan [Lorazepam]          Physical Exam   Vital Signs with Ranges  Temp:  [97.8  F (36.6  C)] 97.8  F (36.6  C)  Pulse:  [44-60] 59  Resp:  [10-24] 24  BP: (126-160)/(56-82) 135/77  SpO2:  [90 %-100 %] 97 %  Wt Readings from Last 4 Encounters:   02/19/25 97.5 kg (215 lb)   02/03/25 97.1 kg (214 lb)   12/16/24 86.6 kg (191 lb)   11/26/24 78 kg (172 lb)     I/O last 3 completed shifts:  In: -   Out: 1100 [Urine:1100]      Vitals: /77 (BP Location: Right arm)   Pulse 59   Temp 97.8  F (36.6  C) (Temporal)   Resp 24   Wt 97.5 kg (215 lb)   SpO2 97%   BMI 32.69 kg/m      Physical Exam:   Constitutional: Well-appearing, no acute distress, intermittently tangential in conversation  Respiratory: Normal respiratory effort, mild bibasilar crackles  Cardiovascular: Irregularly irregular, borderline bradycardic, no obvious m/r/g.  JVP 10-15 cm H2O.  There is 3-4+ bilateral LE edema.  Normal carotid upstrokes, no carotid bruits.        Clinically Significant Risk Factors Present on Admission           # Hypocalcemia: Lowest iCa = 4.3 mg/dL in last 2 days, will monitor and replace as appropriate     # Hypoalbuminemia: Lowest albumin = 3 g/dL at 2/19/2025  6:26 PM, will monitor as appropriate         # Dementia: noted on problem list  # Anemia: based on hgb <11       # Obesity: Estimated body mass index is 32.69 kg/m  as calculated from the following:    Height as of 2/3/25: 1.727 m (5' 8\").    Weight as of this encounter: 97.5 kg (215 lb).       # Financial/Environmental Concerns:          Cardiac Arrhythmia: Atrial fibrillation: Chronic  Diastolic acute    Dementia: Unspecified dementia without behavioral " disturbance    Pulmonary Heart Disease (Pulmonary hypertension or Cor pulmonale): Pulmonary Hypertension, unspecified    Chronic Fatigue and Other Debilities: Age-related physical debility

## 2025-02-20 NOTE — Clinical Note
Uriel Bedoya,  [facility]  Thanks! Gisela

## 2025-02-20 NOTE — Clinical Note
"Uriel Ward,  I spoke with him yesterday and today. He said him and Gisela are not willing to accept another provider unless its in [place]. I explained that it was only temporary until they were able to move back to [place] but he still refused because he doesn't want to stay with [place]. I explained that he doesn't have to stay with [place] and could go to another health system like [facility]. He went through his typical rant and talked about going to a motel with Gisela and being done with all of it. I told him I was just concerned that once he was released from your care, he wouldn't have a provider to refill medications and for other things that came up. He says they don't want any medications because they are making them sick. From there he went on to talk about \"making Lisa great again\" and \"they\" won't let us work. I feel like they are only getting worse. I was told by Dania that they aren't pursuing [facility] because it would take 3+ months and it is still not guaranteed. "

## 2025-02-20 NOTE — PROGRESS NOTES
TRANSITIONS OF CARE (ISAAC) LOG    ISAAC tasks should be completed by the CC within one (1) business day of notification of each transition. Follow up contact with member is required after return to their usual care setting.  Note:  If CC finds out about the transitions fifteen (15) days or more after the member has returned to their usual care setting, no ISAAC log is needed. However, the CC should check in with the member to discuss the transition process, any changes needed to the care plan and document it in a case note.     Member Name:  Jamie Valdivia O Name:  clarice O/Health Plan Member ID#: [id number]   [place] [facility] [place] Coordinator Contact:  Gisela Hall RN, BSN, PHN Agency/County/Care System: [facility]   Transition Communication Actions from Care Management Contact   Transition #1   Notification Date: 2/20/25 Transition Date[facility]     Is this the member s usual care setting?               yes Transition To: [facility]   Transition Type:  Unplanned    Documentation from conversation with the member/responsible party, provider, discharging and receiving facility:   Date: 2/20/25: Received notification of admission to hospital with dx of leg pain, leg swelling  CC contacted Hospital /discharge planner, GUICHO Hinkle/[phone] the [facility] Transitional Care Hand-In Process, with community care plan included.  CC reached out to spouse Gisela  regarding transition and offered support as needed.  Reviewed and update support plan as needed.  Notified community service providers and placed services Assisted Living on hold as needed.  Transition log initiated.   PCP, Sylvia Stein, notified of hospitalization via EMR.    Gisela Hall RN  [facility]  [phone]       Transition #2   Notification Date: 3/4/25 Transition Date:   2/28/25 Transition From: [facility]     Is this the member s usual care setting?               no Transition To: [facility], [facility]   Transition Type:  Planned  Documentation from conversation with the member/responsible party, provider, discharging and receiving facility:   Date: 3/4/25: Received notification of admission TCU for rehab post hospital stay.  OBRA 1 right faxed to long term admissions office.   CC contacted Nursing Home LSW ([email or web address] for Name and Phone Number) and reviewed community support plan. CC requested to be notified of concerns, care conference dates and discharge planning.   CC reached out to member regarding transition and offered support as needed.    Transition log updated.   PCP, Sylvia Stein, notified of transition to TCU via EMR.      3/21/25: care coordinator noted that provider at [facility] entered a discharge note. CC reached out to NORBERTO Dania and confirmed that member had not discharged back to [facility] yet and she was not aware of a discharge date.     3/26/25: care coordinator reached out to Dania at [place] and LSW at [facility] to see if there were was a discharge date. No response.     4/11/25: care coordinator reached out to Dania at [place] for an update. Response received on 4/14/25 that Jamie discharged on 4/1.     Gisela Hall RN  [facility]  [phone]       Transition #3   Notification Date: 4/14/25 Transition Date:   [facility]     Is this the member s usual care setting?               [facility]   Transition Type:  Planned    Documentation from conversation with the member/responsible party, provider, discharging and receiving facility:   Date: 4/14/25: Received notification of transition to home.  CC contacted member and reviewed discharge summary.  Member has a follow-up appointment with PCP in 7 days: No:  Offered Assistance with setting up a follow up appointment   Member has had a change in condition: No  Home visit needed: No  Support Plan reviewed and updated.  The following home based services Assisted Living were resumed.  New referrals placed: No  Transition log completed.   PCP, Sylvia Stein, notified of transition back to home via EMR.      4/14/25: Dania THORNTON at [facility] states that Jamie discharged back to [facility] on 4/1.She stated that guardianship did not end up being pursued. She also stated that there were no changes--Jamie was still living in his wife's apartment and refusing care and medications.       Gisela Hall RN  [facility]  [phone]                 *RETURN TO USUAL CARE SETTING: *Complete tasks below when the member is discharging TO their usual care setting within one (1) business day of notification..      For situations where the Care Coordinator is notified of the discharge prior to the date of discharge, the Care Coordinator must follow up with the member or designated representative to confirm that discharge actually occurred and discuss required ISAAC tasks as outlined in the ISAAC Instructions.  (This includes situations where it may be a  new  usual care setting for the member. (i.e., a community member who decides upon permanent nursing home placement following hospitalization and rehab).    Discuss with Member/Responsible Party:    Check  Yes  - if the member, family member and/or SNF/facility staff manages the following:    If  No  provide explanation in the comments section.          Date completed: 4/14/25 Communicated with member or their designated representative about the following:  care transition process; about changes to the member s health status; plan of care updates; education about transitions and how to prevent unplanned transitions/readmissions    Four Pillars for Optimal Transition:    Check  Yes  - if the member, family member and/or  SNF/facility staff manages the following:    If  No  provide explanation in the comments section.          []  Yes     [x]  No Does the member have a follow-up appointment scheduled with primary care or specialist? (Mental health hospitalizations--the appt. should be w/in 7 days)              For mental health hospitalizations:  []  Yes     []  No     Does the member have a follow-up appointment scheduled with a mental health practitioner within 7 days of discharge?  [x]  Yes     []  No     Has a medication review been completed with member? If no, refer to PCP, home care nurse, MTM, pharmacist  [x]  Yes     []  No     Can the member manage their medications or is there a system in place to manage medications (e.g. home care set-up)?         [x]  Yes     []  No     Can the member verbalize warning signs and symptoms to watch for and how to respond?  [x]  Yes     []  No     Does the member have a copy of and understand their discharge instructions?  If no, assist to obtain copy of discharge instructions, review discharge instructions, and assist to contact PCP to discuss questions about their recent hospitalization.  [x]  Yes     []  No     Does the member have adequate food, housing and transportation?  If no, add goal and discuss additional supports available to the member                                                                                                                                                                                 [x]  Yes     []  No     Is the member safe in their home?  If no, document needs and support provided                                                                                                                                                                          []  Yes     [x]  No     Are there any concerns of vulnerability, abuse, or neglect?  If yes, document concerns and actions taken by Care Coordinator as a mandated                                                                                                                                                                               [x]  Yes     []  No     Does the member use a Personal Health Care Record?  Check  Yes  if visit summary, discharge summary, and/or healthcare summary are being used as a PHR.                                                                                                                                                                                  [x]  Yes     []  No     Have you reviewed the discharge summary with the member? If  No  provide explanation in comments.  [x]  Yes     []  No     Have you updated the member s care plan/support plan? Add new diagnosis, medications, treatments, goals & interventions, as applicable. If No, provide explanation in comments.    Gisela Hall RN  [facility]  [phone]

## 2025-02-20 NOTE — PLAN OF CARE
2/19/25    [id number]    Orientation: A/Ox4, intermittently confused    Vitals/Tele: VSS, except bradycardia, 2 L via nasal cannula    IV Access/drains: RPIV    Diet: Mechanical soft, 2 g sodium, fluid restriction, no caffeine.     Mobility: Ax2 Lift    GI/: External cath    Other: Pt was panicked about not being able to breathe.RRT called. See RRT note.  Pt takes pills crushed in applesauce    Consults: SW, speech, PT, OT.    Discharge Plan: TBD      See Flow sheets for assessment

## 2025-02-20 NOTE — Clinical Note
Uriel Ward,  Just an FYI that Jamie was discharged to [facility] on 2/28. There is a care conference set up on 3/11 at 10:30am. I will keep you updated.  Thanks! Gisela

## 2025-02-20 NOTE — ED NOTES
Patient urinating in male purewick, urine output able to be measured. No Lucio indicated at this time

## 2025-02-20 NOTE — CONSULTS
Care Management Initial Consult    General Information  Assessment completed with: Patient, Care Team Member, -chart review, Pt, [facility] nurse Maru  Type of CM/SW Visit: Initial Assessment    Primary Care Provider verified and updated as needed: Yes   Readmission within the last 30 days:        Reason for Consult: discharge planning  Advance Care Planning: Advance Care Planning Reviewed: present on chart          Communication Assessment  Patient's communication style: spoken language (English or Bilingual)    Hearing Difficulty or Deaf: yes   Wear Glasses or Blind: yes    Cognitive  Cognitive/Neuro/Behavioral: WDL  Level of Consciousness: alert  Arousal Level: opens eyes spontaneously  Orientation: oriented x 4  Mood/Behavior: calm, cooperative     Speech: spontaneous, logical    Living Environment:   People in home: facility resident     Current living Arrangements: assisted living  Name of Facility: [employer]   Able to return to prior arrangements: yes       Family/Social Support:  Care provided by: self  Provides care for: no one  Marital Status:   Support system:            Description of Support System:           Current Resources:   Patient receiving home care services: No        Community Resources:    Equipment currently used at home: walker, rolling  Supplies currently used at home:      Employment/Financial:  Employment Status: retired        Financial Concerns:             Does the patient's insurance plan have a 3 day qualifying hospital stay waiver?  No    Lifestyle & Psychosocial Needs:  Social Drivers of Health     Food Insecurity: Low Risk  (11/22/2024)    Food Insecurity     Within the past 12 months, did you worry that your food would run out before you got money to buy more?: No     Within the past 12 months, did the food you bought just not last and you didn t have money to get more?: No   Depression: Not at risk (2/3/2025)    PHQ-2     PHQ-2 Score: 0   Housing Stability: Low Risk   (11/22/2024)    Housing Stability     Do you have housing? : Yes     Are you worried about losing your housing?: No   Tobacco Use: Low Risk  (2/3/2025)    Patient History     Smoking Tobacco Use: Never     Smokeless Tobacco Use: Never     Passive Exposure: Not on file   Financial Resource Strain: Low Risk  (11/22/2024)    Financial Resource Strain     Within the past 12 months, have you or your family members you live with been unable to get utilities (heat, electricity) when it was really needed?: No   Alcohol Use: Not At Risk (11/22/2024)    AUDIT-C     Frequency of Alcohol Consumption: Monthly or less     Average Number of Drinks: 1 or 2     Frequency of Binge Drinking: Less than monthly   Transportation Needs: Low Risk  (11/22/2024)    Transportation Needs     Within the past 12 months, has lack of transportation kept you from medical appointments, getting your medicines, non-medical meetings or appointments, work, or from getting things that you need?: No   Physical Activity: Inactive (11/22/2024)    Exercise Vital Sign     Days of Exercise per Week: 0 days     Minutes of Exercise per Session: 0 min   Interpersonal Safety: Low Risk  (11/22/2024)    Interpersonal Safety     Do you feel physically and emotionally safe where you currently live?: Yes     Within the past 12 months, have you been hit, slapped, kicked or otherwise physically hurt by someone?: No     Within the past 12 months, have you been humiliated or emotionally abused in other ways by your partner or ex-partner?: No   Stress: No Stress Concern Present (11/22/2024)    [employer] - Occupational Stress Questionnaire     Feeling of Stress : Only a little   Social Connections: Moderately Integrated (11/22/2024)    Social Connection and Isolation Panel [NHANES]     Frequency of Communication with Friends and Family: More than three times a week     Frequency of Social Gatherings with Friends and Family: Three times a week  "    Attends Oriental orthodox Services: More than 4 times per year     Active Member of Clubs or Organizations: No     Attends Club or Organization Meetings: Never     Marital Status:    Health Literacy: Inadequate Health Literacy (11/22/2024)     Health Literacy     Frequency of need for help with medical instructions: Often       Functional Status:  Prior to admission patient needed assistance:   Dependent ADLs:: Ambulation-walker, Bathing  Dependent IADLs:: Cleaning, Cooking, Laundry, Meal Preparation, Medication Management, Transportation       Mental Health Status:          Chemical Dependency Status:                Values/Beliefs:  Spiritual, Cultural Beliefs, Oriental orthodox Practices, Values that affect care:                 Discussed  Partnership in Safe Discharge Planning  document with patient/family: No    Additional Information:  Met with patient to discuss role in discharge planning. Pt notes he lives at [facility].  He states he is indep in dressing, bathing.  He is not a great historian and seems to get on tangents and needs to get redirected back to current level of services, etc.  He gets quite fixated on his medications and states after learning things at the hospital they are going to be \"taken off the market\"  He goes on to talk about dynamics with this children and that they \"put him into MA\" and moved him into [facility].  He wants to get back to the [place] area to \"doctors that understood me better.\"  Pt notes he does not want hospital/CC talking with his children and only his wife Gisela who also lives at [place].   CC stated I would call [facility] and discuss current level of service and follow Pt for discharge planning back to [facility] vs TCU if indicated.  Pt seems agreeable to this. CC explained he would need to continue to work with family on move to different facility.     CC called [facility]  [address], [place] [address]   9.9 mi  ([phone] " [phone]  Nurse Memory Care: [phone]  Fax: [phone]  New Meds- A&E (not in epic yet)     Talked with Maru, Nurse.  She noted Pt lives in Memory Care area.  His wife lives in Assisted living (separate area).  Pt is allowed to go and visit his wife.  Maru notes Pt is \"non- compliant\"  will refuses services, medications.  Since Pt spends most of his day with his wife he does not get his medications.  Maru has said staff will not go \"find him\" to give him scheduled medications .  Pt does dress/bathe himself.  Uses a wheeled walker (currently in hospital room) Pt is not open to outside therapy.   Maru notes family has an interesting dynamic.      Maru (and DON who joined conversation) feel Pt should go to TCU at discharge for further monitoring.  They can not monitor diet, labs at [facility]. They have even discussed if Pt should be in LTC.     Awaiting PT/OT recommendations.  penitentiary would like TCU.    If TCU not supported pen[facility]entiary would take back.  Prefer no weekend returns but \"can't say no\"    Transport would need to be arranged      Next Steps: Follow for medical readiness and recommendations.     Kath Layton RN     "

## 2025-02-20 NOTE — Clinical Note
Uriel Ward,  You may have already heard this but I was just notified today that Jamie discharged back to [facility] on 4/1. Guardianship did not end up being pursued. He also continues to live in Gisela's apartment and refuse care/medications. Unsure of where to go from here. I didn't see any visit notes from you and he declined help with setting up an appointment with an outside provider.  Gisela

## 2025-02-21 ENCOUNTER — APPOINTMENT (OUTPATIENT)
Dept: GENERAL RADIOLOGY | Facility: CLINIC | Age: 85
DRG: 291 | End: 2025-02-21
Attending: HOSPITALIST
Payer: COMMERCIAL

## 2025-02-21 ENCOUNTER — APPOINTMENT (OUTPATIENT)
Dept: PHYSICAL THERAPY | Facility: CLINIC | Age: 85
DRG: 291 | End: 2025-02-21
Attending: INTERNAL MEDICINE
Payer: COMMERCIAL

## 2025-02-21 ENCOUNTER — APPOINTMENT (OUTPATIENT)
Dept: SPEECH THERAPY | Facility: CLINIC | Age: 85
DRG: 291 | End: 2025-02-21
Payer: COMMERCIAL

## 2025-02-21 ENCOUNTER — APPOINTMENT (OUTPATIENT)
Dept: OCCUPATIONAL THERAPY | Facility: CLINIC | Age: 85
DRG: 291 | End: 2025-02-21
Attending: INTERNAL MEDICINE
Payer: COMMERCIAL

## 2025-02-21 ENCOUNTER — APPOINTMENT (OUTPATIENT)
Dept: GENERAL RADIOLOGY | Facility: CLINIC | Age: 85
DRG: 291 | End: 2025-02-21
Payer: COMMERCIAL

## 2025-02-21 LAB
ANION GAP SERPL CALCULATED.3IONS-SCNC: 15 MMOL/L (ref 7–15)
BUN SERPL-MCNC: 15.3 MG/DL (ref 8–23)
CALCIUM SERPL-MCNC: 8.7 MG/DL (ref 8.8–10.4)
CHLORIDE SERPL-SCNC: 97 MMOL/L (ref 98–107)
CREAT SERPL-MCNC: 0.75 MG/DL (ref 0.67–1.17)
EGFRCR SERPLBLD CKD-EPI 2021: 89 ML/MIN/1.73M2
ERYTHROCYTE [DISTWIDTH] IN BLOOD BY AUTOMATED COUNT: 17 % (ref 10–15)
GLUCOSE SERPL-MCNC: 84 MG/DL (ref 70–99)
HCO3 SERPL-SCNC: 28 MMOL/L (ref 22–29)
HCT VFR BLD AUTO: 25.6 % (ref 40–53)
HGB BLD-MCNC: 7.6 G/DL (ref 13.3–17.7)
MAGNESIUM SERPL-MCNC: 1.9 MG/DL (ref 1.7–2.3)
MAGNESIUM SERPL-MCNC: 2 MG/DL (ref 1.7–2.3)
MCH RBC QN AUTO: 23.1 PG (ref 26.5–33)
MCHC RBC AUTO-ENTMCNC: 29.7 G/DL (ref 31.5–36.5)
MCV RBC AUTO: 78 FL (ref 78–100)
PLATELET # BLD AUTO: 183 10E3/UL (ref 150–450)
POTASSIUM SERPL-SCNC: 3.8 MMOL/L (ref 3.4–5.3)
POTASSIUM SERPL-SCNC: 3.8 MMOL/L (ref 3.4–5.3)
POTASSIUM SERPL-SCNC: 4.1 MMOL/L (ref 3.4–5.3)
RBC # BLD AUTO: 3.29 10E6/UL (ref 4.4–5.9)
SODIUM SERPL-SCNC: 140 MMOL/L (ref 135–145)
TROPONIN T SERPL HS-MCNC: 21 NG/L
WBC # BLD AUTO: 4.3 10E3/UL (ref 4–11)

## 2025-02-21 PROCEDURE — 92526 ORAL FUNCTION THERAPY: CPT | Mod: GN | Performed by: SPEECH-LANGUAGE PATHOLOGIST

## 2025-02-21 PROCEDURE — 97110 THERAPEUTIC EXERCISES: CPT | Mod: GO

## 2025-02-21 PROCEDURE — 82435 ASSAY OF BLOOD CHLORIDE: CPT

## 2025-02-21 PROCEDURE — 84132 ASSAY OF SERUM POTASSIUM: CPT | Performed by: INTERNAL MEDICINE

## 2025-02-21 PROCEDURE — 84132 ASSAY OF SERUM POTASSIUM: CPT | Performed by: HOSPITALIST

## 2025-02-21 PROCEDURE — 97161 PT EVAL LOW COMPLEX 20 MIN: CPT | Mod: GP

## 2025-02-21 PROCEDURE — 999N000157 HC STATISTIC RCP TIME EA 10 MIN

## 2025-02-21 PROCEDURE — 80048 BASIC METABOLIC PNL TOTAL CA: CPT

## 2025-02-21 PROCEDURE — 99291 CRITICAL CARE FIRST HOUR: CPT

## 2025-02-21 PROCEDURE — 36415 COLL VENOUS BLD VENIPUNCTURE: CPT | Performed by: INTERNAL MEDICINE

## 2025-02-21 PROCEDURE — 71045 X-RAY EXAM CHEST 1 VIEW: CPT

## 2025-02-21 PROCEDURE — 92611 MOTION FLUOROSCOPY/SWALLOW: CPT | Mod: GN | Performed by: SPEECH-LANGUAGE PATHOLOGIST

## 2025-02-21 PROCEDURE — 250N000013 HC RX MED GY IP 250 OP 250 PS 637: Performed by: INTERNAL MEDICINE

## 2025-02-21 PROCEDURE — 85027 COMPLETE CBC AUTOMATED: CPT | Performed by: NURSE PRACTITIONER

## 2025-02-21 PROCEDURE — 999N000128 HC STATISTIC PERIPHERAL IV START W/O US GUIDANCE

## 2025-02-21 PROCEDURE — 36415 COLL VENOUS BLD VENIPUNCTURE: CPT | Performed by: HOSPITALIST

## 2025-02-21 PROCEDURE — 999N000040 HC STATISTIC CONSULT NO CHARGE VASC ACCESS

## 2025-02-21 PROCEDURE — 120N000001 HC R&B MED SURG/OB

## 2025-02-21 PROCEDURE — 250N000011 HC RX IP 250 OP 636: Performed by: INTERNAL MEDICINE

## 2025-02-21 PROCEDURE — 258N000003 HC RX IP 258 OP 636: Performed by: INTERNAL MEDICINE

## 2025-02-21 PROCEDURE — 97140 MANUAL THERAPY 1/> REGIONS: CPT | Mod: GP

## 2025-02-21 PROCEDURE — 99232 SBSQ HOSP IP/OBS MODERATE 35: CPT

## 2025-02-21 PROCEDURE — 999N000127 HC STATISTIC PERIPHERAL IV START W US GUIDANCE

## 2025-02-21 PROCEDURE — 99233 SBSQ HOSP IP/OBS HIGH 50: CPT | Mod: 25 | Performed by: INTERNAL MEDICINE

## 2025-02-21 PROCEDURE — 83735 ASSAY OF MAGNESIUM: CPT | Performed by: INTERNAL MEDICINE

## 2025-02-21 PROCEDURE — 74230 X-RAY XM SWLNG FUNCJ C+: CPT

## 2025-02-21 PROCEDURE — 80048 BASIC METABOLIC PNL TOTAL CA: CPT | Performed by: HOSPITALIST

## 2025-02-21 PROCEDURE — 84484 ASSAY OF TROPONIN QUANT: CPT

## 2025-02-21 PROCEDURE — 97166 OT EVAL MOD COMPLEX 45 MIN: CPT | Mod: GO

## 2025-02-21 PROCEDURE — 83735 ASSAY OF MAGNESIUM: CPT | Performed by: HOSPITALIST

## 2025-02-21 RX ORDER — VITAMIN B COMPLEX
25 TABLET ORAL DAILY
Status: DISCONTINUED | OUTPATIENT
Start: 2025-02-21 | End: 2025-02-28 | Stop reason: HOSPADM

## 2025-02-21 RX ORDER — BARIUM SULFATE 400 MG/ML
SUSPENSION ORAL ONCE
Status: COMPLETED | OUTPATIENT
Start: 2025-02-21 | End: 2025-02-21

## 2025-02-21 RX ORDER — HYDROXYZINE HYDROCHLORIDE 10 MG/1
10 TABLET, FILM COATED ORAL 3 TIMES DAILY PRN
Status: DISCONTINUED | OUTPATIENT
Start: 2025-02-21 | End: 2025-02-28 | Stop reason: HOSPADM

## 2025-02-21 RX ORDER — FUROSEMIDE 10 MG/ML
60 INJECTION INTRAMUSCULAR; INTRAVENOUS
Status: DISCONTINUED | OUTPATIENT
Start: 2025-02-21 | End: 2025-02-23

## 2025-02-21 RX ORDER — MAGNESIUM OXIDE 400 MG/1
400 TABLET ORAL EVERY 4 HOURS
Status: COMPLETED | OUTPATIENT
Start: 2025-02-21 | End: 2025-02-21

## 2025-02-21 RX ORDER — POTASSIUM CHLORIDE 1.5 G/1.58G
20 POWDER, FOR SOLUTION ORAL ONCE
Status: COMPLETED | OUTPATIENT
Start: 2025-02-21 | End: 2025-02-21

## 2025-02-21 RX ORDER — NITROGLYCERIN 0.4 MG/1
0.4 TABLET SUBLINGUAL EVERY 5 MIN PRN
Status: DISCONTINUED | OUTPATIENT
Start: 2025-02-21 | End: 2025-02-28 | Stop reason: HOSPADM

## 2025-02-21 RX ADMIN — Medication 400 MG: at 15:27

## 2025-02-21 RX ADMIN — Medication 400 MG: at 18:34

## 2025-02-21 RX ADMIN — Medication 25 MCG: at 15:27

## 2025-02-21 RX ADMIN — CALCIUM 500 MG: 500 TABLET ORAL at 18:34

## 2025-02-21 RX ADMIN — GABAPENTIN 100 MG: 100 CAPSULE ORAL at 21:13

## 2025-02-21 RX ADMIN — ACETAMINOPHEN 650 MG: 325 TABLET, FILM COATED ORAL at 18:33

## 2025-02-21 RX ADMIN — BARIUM SULFATE 40 ML: 400 SUSPENSION ORAL at 10:49

## 2025-02-21 RX ADMIN — POTASSIUM CHLORIDE 20 MEQ: 1.5 POWDER, FOR SOLUTION ORAL at 15:27

## 2025-02-21 RX ADMIN — IRON SUCROSE 300 MG: 20 INJECTION, SOLUTION INTRAVENOUS at 16:21

## 2025-02-21 RX ADMIN — PANTOPRAZOLE SODIUM 40 MG: 40 TABLET, DELAYED RELEASE ORAL at 09:57

## 2025-02-21 RX ADMIN — FUROSEMIDE 60 MG: 10 INJECTION, SOLUTION INTRAVENOUS at 18:36

## 2025-02-21 RX ADMIN — FUROSEMIDE 60 MG: 10 INJECTION, SOLUTION INTRAVENOUS at 09:50

## 2025-02-21 ASSESSMENT — ACTIVITIES OF DAILY LIVING (ADL)
ADLS_ACUITY_SCORE: 73
ADLS_ACUITY_SCORE: 72
ADLS_ACUITY_SCORE: 68
ADLS_ACUITY_SCORE: 72
ADLS_ACUITY_SCORE: 73
ADLS_ACUITY_SCORE: 72
ADLS_ACUITY_SCORE: 70
ADLS_ACUITY_SCORE: 68
ADLS_ACUITY_SCORE: 66
ADLS_ACUITY_SCORE: 68
ADLS_ACUITY_SCORE: 65
ADLS_ACUITY_SCORE: 65
ADLS_ACUITY_SCORE: 72
ADLS_ACUITY_SCORE: 72
ADLS_ACUITY_SCORE: 68
ADLS_ACUITY_SCORE: 66
ADLS_ACUITY_SCORE: 70
ADLS_ACUITY_SCORE: 66
ADLS_ACUITY_SCORE: 72
ADLS_ACUITY_SCORE: 65
ADLS_ACUITY_SCORE: 72

## 2025-02-21 NOTE — PROGRESS NOTES
[facility]  Cardiology Progress Note  Date of Service: 02/21/2025  Primary Cardiologist: Dr. Ross    Assessment & Plan    Jamie Valdivia is a 84 year old male with past medical history significant for permanent atrial fibrillation, moderate aortic stenosis, hypertension, chronic peripheral edema, moderate aortic root dilatation, frequent falls, and alcohol dependence admitted on 2/19/2025 with increased swelling. Cardiology was consulted for decompensated heart failure    Assessment:  Acute on chronic HFpEF (LVEF 55% on 7/2024 TTE), severely volume overloaded on admission, NYHA III  NTproBNP 2, 169  Echocardiogram shows LVEF 60-65% with mild RV dilation, 2+ tricuspid regurgitation and biatrial enlargement  Wt on admission 215 (no other weights recorded)  Down 3.3 L from admission  Permanent AF with intermittently slow ventricular response, without worrisome samir-arrhythmia on last Zio, not on anti-coagulation/no LAAO due to patient refusal  Heart rate trends on telemetry upper 40s-70 bpm.  No tachycardia seen in past 24 hrs  Mild-moderate aortic stenosis  Echo 2/20/2025: aortic valve has mild AI and mild aortic stenosis with mean gradient 10.3 mmHg  Ascending aortic aneurysm (4.8 cm on 7/2024 TTE)  Echo 2/20/2025 aortic root measures 4.5 cm and ascending aorta measures 4.8 cm, stable  Mild pulmonary hypertension by echo  Dementia  Prostate cancer  Alcohol use disorder    Plan:   Continue diuresis with lasix 60 mg BID with goal net negative 2-3 L/day  Agree with lower extremity compression stockings  Daily standing weights/strict I & Os/repletion of electrolytes to maintain K > 4 and Mag > 2  Continue to monitor on telemetry  Consider addition to SGLT2i if patient agreeable  Cardiology will continue to follow     Rose Godwin CNP  [facility]  Pager: [phone]      Interval History   No acute events overnight. Down 3.3 L from admission.       Physical Exam   Temp: 97.3  F (36.3  " C) Temp src: Oral BP: 122/58 Pulse: 54   Resp: 16 SpO2: 94 % O2 Device: None (Room air)    Vitals:    02/19/25 2135   Weight: 97.5 kg (215 lb)     Older adult male in no apparent distress. Heart rate and rhythm are irregular, + murmur. No rub or gallop. Lung sounds with crackles in bases, no wheezes or rhonchi. 3+ lower extremity edema. Skin warm and dry. Room air.     Clinically Significant Risk Factors           # Hypocalcemia: Lowest iCa = 4.3 mg/dL in last 2 days, will monitor and replace as appropriate     # Hypoalbuminemia: Lowest albumin = 3 g/dL at 2/19/2025  6:26 PM, will monitor as appropriate         # Dementia: noted on problem list        # Obesity: Estimated body mass index is 32.69 kg/m  as calculated from the following:    Height as of 2/3/25: 1.727 m (5' 8\").    Weight as of this encounter: 97.5 kg (215 lb)., PRESENT ON ADMISSION     # Financial/Environmental Concerns:          Medications   Current Facility-Administered Medications   Medication Dose Route Frequency Provider Last Rate Last Admin    Continuing beta blocker from home medication list OR beta blocker order already placed during this visit   Does not apply DOES NOT GO TO Ammy Ndiaye MD        Reason ACE/ARB/ARNI order not selected   Other DOES NOT GO TO Ammy Ndiaye MD         Current Facility-Administered Medications   Medication Dose Route Frequency Provider Last Rate Last Admin    [Held by provider] calcium carbonate 500 mg (elemental) (OSCAL) tablet 500 mg  500 mg Oral TID w/meals Ammy Sky MD        [Held by provider] cyanocobalamin (VITAMIN B-12) tablet 1,000 mcg  1,000 mcg Oral Daily Ammy Sky MD        enoxaparin ANTICOAGULANT (LOVENOX) injection 40 mg  40 mg Subcutaneous Q24H Ammy Sky MD   40 mg at 02/20/25 0628    [Held by provider] folic acid (FOLVITE) tablet 1,000 mcg  1,000 mcg Oral Daily Ammy Sky MD   1,000 mcg at 02/20/25 1035    [Held by provider] furosemide (LASIX) half-tab " 10 mg  10 mg Oral Daily Ammy Sky MD        furosemide (LASIX) injection 40 mg  40 mg Intravenous Q8H Wenyd Malcolm MD   40 mg at 02/20/25 2356    gabapentin (NEURONTIN) capsule 100 mg  100 mg Oral At Bedtime Ammy Sky MD   100 mg at 02/20/25 2049    loratadine (CLARITIN) tablet 10 mg  10 mg Oral At Bedtime Ammy Sky MD   10 mg at 02/20/25 2049    pantoprazole (PROTONIX) EC tablet 40 mg  40 mg Oral BID Ammy Sky MD   40 mg at 02/20/25 2049    sodium chloride (PF) 0.9% PF flush 3 mL  3 mL Intracatheter Q8H Ammy Sky MD   3 mL at 02/20/25 2049       Data   Last 24 hours labs reviewed     Tele: a fib with cvr/svr with occasional PVCs. Bradycardia 39 bpm (lowest) overnight.    Echo 2/20/2025:  Left ventricular systolic function is normal.The visual ejection fraction is 60-65%.  The right ventricle is mildly dilated.The right ventricular systolic function is normal.  Severe bi-atrial enlargement,  There is moderate to mod-severe (2-3+) tricuspid regurgitation.There is mild  (1+) aortic regurgitation.Mild valvular aortic stenosis.  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  Pulmonary hypertension- RVSP 51 mm hg +RA.     Echocardiogram dated 07/03/2024 mild tricuspid regurgitation noted with RVSP  37 mm hg + RA, ascending aorta 4.8 cm, aortic root 4.4 cm

## 2025-02-21 NOTE — PLAN OF CARE
Goal Outcome Evaluation:      Plan of Care Reviewed With: patient    Overall Patient Progress: improvingOverall Patient Progress: improving    Outcome Evaluation: Discharge pending, SW following    Patient vital signs are at baseline: Yes  Patient able to ambulate as they were prior to admission or with assist devices provided by therapies during their stay:  No,  Reason:  Ax2 w/ SaraStedy for Transfers   Patient MUST void prior to discharge:  Yes  Patient able to tolerate oral intake:  Yes  Pain has adequate pain control using Oral analgesics:  Yes  Does patient have an identified :  No,  Reason:  Pt lives in intermediate w/ Spouse  Has goal D/C date and time been discussed with patient:  No,  Reason:  Discharge pending    Dx:Decompensated Heart Failure, SOB  POD#n/a    A&Ox4.   CMS Intact.   VSS on RA.   Up with Ax2 GB and Michael.   Voiding via Male External cath.   Left PIV SL.  Minced/Moist Lvl#5 Diet, Lvl#2 Thick Liquids, 2L Fluid Restriction.  Pain controlled.

## 2025-02-21 NOTE — PROGRESS NOTES
"VIDEO SWALLOW STUDY       02/21/25 1052   Appointment Info   Signing Clinician's Name / Credentials (SLP) Aziza Dooley M.A., CCC-SLP, [facility]   General Information   Onset of Illness/Injury or Date of Surgery 02/19/25   Referring Physician Dr. Sky   Patient/Family Therapy Goal Statement (SLP) Patient wants to improve his swallowing.   Pertinent History of Current Problem Per chart \"Jamie Valdivia is a delightful 84 year old male with history of permanent atrial fibrillation, has declined anticoagulation, mild dementia, history of alcohol use disorder, GERD with Vickers's esophagus, chronic normocytic anemia, chronic pharyngeal dysphagia, prostate cancer, frequent falls and peripheral neuropathy who [place] with increased swelling.\" SLP consulted due to history of chronic pharyngeal dyphagia. Pt seen recently by GI for dysphagia complaints- see note dated 2/3/25. Pt has appointment for VFSS and esophagram on 2/28, as well as speech path + ENT eval on 3/4. Pt would like to complete tests during current hospitalization as able.   Type of Evaluation   Type of Evaluation Swallow Evaluation   General Swallowing Observations   Swallowing Evaluation Videofluoroscopic swallow study (VFSS)   VFSS Evaluation   Views Taken left lateral   VFSS Textures Trialed thin liquids;mildly thick liquids;pureed;minced & moist   VFSS Eval: Thin Liquid Texture Trial   Mode of Presentation, Thin Liquid cup;self-fed   Order of Presentation 3   Preparatory Phase WFL   Oral Phase, Thin Liquid premature pharyngeal entry   Bolus Location When Swallow Triggered pyriforms   Pharyngeal Phase, Thin Liquid impaired hyolaryngeal excursion;impaired pharyngoesophageal segment opening;impaired tongue base retraction;residue in vallecula;residue in pyriform sinus   Rosenbek's Penetration Aspiration Scale: Thin Liquid Trial Results 7 - contrast passes glottis, visible subglottic residue remains despite patient's response " (aspiration)   Response to Aspiration unproductive reflexive cough   Strategies and Compensations double swallow;hard swallow   Diagnostic Statement Spillover from pyriform sinus retention leading to gross aspiration with cough response that is partially productive.   VFSS Eval: Mildly Thick Liquids   Mode of Presentation cup;self-fed   Order of Presentation 1, 2   Preparatory Phase WFL   Oral Phase premature pharyngeal entry   Bolus Location When Swallow Triggered pyriforms   Pharyngeal Phase impaired hyolaryngel excursion;impaired tongue base retraction   Rosenbek's Penetration Aspiration Scale 7 - contrast passes glottis, visible subglottic residue remains despite patient's response (aspiration)   Response to Aspiration unproductive reflexive cough   Strategies and Compensations reduce bolus size;double swallow;hard swallow   Diagnostic Statement Near nasal regurgitation due to pharyngoesophageal restriction.  Chin tuck used ineffectively with excessive movement during swallow and resulting in likely aspiration of pharyngeal residue again. Throat clear response.   VFSS Evaluation: Puree Solid Texture Trial   Mode of Presentation, Puree spoon;self-fed   Order of Presentation 4, 5   Preparatory Phase WFL   Bolus Location When Swallow Triggered valleculae   Pharyngeal Phase, Puree impaired hyolaryngel excursion;residue in vallecula;pharyngeal wall coating;residue in pyriform sinus;impaired pharyngoesophageal segment opening   Rosenbek's Penetration Aspiration Scale: Puree Food Trial Results 2 - contrast enters airway, remains above the vocal cords, no residue remains (penetration)   Strategies and Compensations hard swallow;double swallow   Diagnostic Statement Shallow penetration and severe residue   VFSS Eval: Minced & Moist    Mode of Presentation spoon;self-fed   Order of Presentation 6   Preparatory Phase WFL   Oral Phase premature pharyngeal entry   Bolus Location When Swallow Triggered valleculae   Pharyngeal  Phase impaired hyolaryngel excursion;impaired tongue base retraction;residue in vallecula;residue in pyriform sinus;pharyngeal wall coating;impaired pharyngoesophageal segment opening   Esophageal Phase of Swallow   Patient reports or presents with symptoms of esophageal dysphagia Yes   Esophageal sweep performed during today s vidofluoroscopic exam  No   Esophageal comments Cervical esophagus only with significant deficits identified including obstructive cricopharyngeal bar, Zenker's diverticulum retaining all textures in small amounts, and 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  There was reflux from esophagus to pharynx during study.   Swallowing Recommendations   Diet Consistency Recommendations mildly thick liquids (level 2);minced & moist (level 5)   Supervision Level for Intake close supervision needed   Mode of Delivery Recommendations slow rate of intake;bolus size, small   Swallowing Maneuver Recommendations effortful (hard) swallow;extra swallow;throat clear-swallow  (x3)   Monitoring/Assistance Required (Eating/Swallowing) stop eating activities when fatigue is present   Recommended Feeding/Eating Techniques (Swallow Eval) maintain upright sitting position for eating;maintain upright posture during/after eating for 30 minutes;set-up and prepare tray;provide assist with feeding   Medication Administration Recommendations, Swallowing (SLP) Crush meds with puree   Clinical Impression   Criteria for Skilled Therapeutic Interventions Met (SLP Eval) Yes, treatment indicated   SLP Total Evaluation Time   Evaluation, videofluoroscopic eval of swallow function Minutes (74643) 35   SLP Goals   Therapy Frequency (SLP Eval) 5 times/week   SLP Predicted Duration/Target Date for Goal Attainment 03/07/25   SLP Goals Swallow   SLP: Safely tolerate diet without signs/symptoms of aspiration Soft & bite sized diet;Slightly thick liquids;With use of swallow precautions;With use of compensatory swallow  strategies;Independently   Interventions   Interventions Quick Adds Swallowing Dysfunction   Swallowing Intervention   Treatment of Swallowing Dysfunction &/or Oral Function for Feeding Minutes (08580) 10   Treatment Detail/Skilled Intervention Patient trained re: 3x swallow per bite/sip  strategy and patient required moderate cueing today to implement this, but following verbal commands well and demonstrating improved use toward end of session.   SLP Discharge Planning   SLP Plan Meal follow up - small sips, triple swallow.  ? Allow ice chips for pleasure?   SLP Discharge Recommendation home with outpatient therapy services   SLP Rationale for DC Rec Recommend OP GI return for consideration of CP bar and diverticulum intervention as this is likely source of his aspiration   SLP Brief overview of current status  Video swallow study indicating large amounts of aspiration of thin liquids due to esophageal deficits.  Patient should return to GI OP after discharge to follow up on recommended EGD with possible esophageal dilation.  Recommend minced and moist diet with mildly thick liquids and 3x swallow for each bite and sip, eating only when alert and upright.   SLP Time and Intention   Total Session Time (sum of timed and untimed services) 45

## 2025-02-21 NOTE — PROGRESS NOTES
[facility]    Medicine Progress Note - Hospitalist Service        Date of Admission:  2/19/2025  5:23 PM    Assessment & Plan:   Jamie Valdivia is a delightful 84 year old male with history of permanent atrial fibrillation, has declined anticoagulation, mild dementia, history of alcohol use disorder, GERD with Vickers's esophagus, chronic normocytic anemia, chronic pharyngeal dysphagia, prostate cancer, frequent falls and peripheral neuropathy who [place]25 with increased lower extremity swelling and weight gain and admitted with acute decompensated heart failure.      Acute decompensated heart failure with preserved EF  Chronic lower extremity edema  Permanent atrial fibrillation with intermittent slow ventricular response  Moderate to severe tricuspid regurgitation  Ascending aortic aneurysm at 4.8 cm  -Presented with worsening lower extremity edema and weight gain  -BNP elevated at 2169  -Chest x-ray with elevated left hemidiaphragm and lungs with shallow inflation, no obvious pulmonary edema  -Current echocardiogram shows normal EF of 60-65%, mildly dilated RV, severe biatrial enlargement, moderate to severe tricuspid regurgitation, mild aortic regurgitation and mild aortic stenosis.  -Cardiology following  -Continues on Lasix 40 mg IV 3 times daily  -Monitor renal function daily   -Telemetry  -As far as A-fib is concerned, patient has declined anticoagulation, no LAAO due to patient refusal     Hypocalcemia (7.6), non-severe, asymptomatic   Vitamin D deficiency  -QTc normal on EKG.   -Ionized calcium borderline low with vitamin D deficiency at 16  -Start vitamin D 1000 units daily  -Continue calcium carbonate     Chronic anemia, normocytic -hemoglobin 8.1 at admission  Severe iron deficiency  -Previous baseline around 8 then most recently around 10. No recent sign of bleeding.  -Iron panel suggestive of severe iron deficiency.  B12 normal.  TSH normal.  -Hemoglobin  "slightly lower at 8 today  -Start IV iron, 300 mg IV daily x 2 days  -Pending clinical course, defer workup of iron deficiency anemia as outpatient     Frequent falls  Dementia, mild    Peripheral neuropathy  History of alcohol use disorder, no recent use since moving to [place] a few years back.   -Continue PTA gabapentin 100 mg at bedtime, quetiapine 12.5 mg twice daily as needed  -PT/OT eval     Chronic pharyngeal dysphagia  Chronic dysphonia   GERD with Vickers's esophagus  -EGD 2022 found Vickers's esophagus and him started on PPI twice daily  -Seen in clinic by gastroenterologist Colby Mercado on 2/3/2025  -X-ray esophagram, EGD, ENT referral and speech therapy referral placed at that visit.   -Speech Therapy following, VFSS today  -Continue PTA PPI  -mechanical soft diet.        Prostate cancer follows with Dr. Niraj Larry.  S/p EBRT without ADT completed on/12/23    Diet: Fluid restriction 2000 ML FLUID (and additional linked orders)  Combination Diet Minced and Moist Diet (level 5); Mildly Thick (level 2) (Small sips, 3x swallow for each bite and sip.); 2 gm NA Diet  Room Service     DVT Prophylaxis: Pneumatic Compression Devices   Lucio Catheter: Not present  Code Status: Full Code     Disposition Plan       Expected Discharge Date: 02/24/2025      Destination: assisted living;inpatient rehabilitation facility        Entered: Levi Acevedo MD 02/21/2025, 11:05 AM        Medically Ready for Discharge: Anticipated in 2-4 Days pending adequate diuresis and further plans by cardiology       Clinically Significant Risk Factors           # Hypocalcemia: Lowest iCa = 4.3 mg/dL in last 2 days, will monitor and replace as appropriate     # Hypoalbuminemia: Lowest albumin = 3 g/dL at 2/19/2025  6:26 PM, will monitor as appropriate         # Dementia: noted on problem list        # Obesity: Estimated body mass index is 32.69 kg/m  as calculated from the following:    Height as of 2/3/25: 1.727 m (5' 8\").    Weight as " "of this encounter: 97.5 kg (215 lb)., PRESENT ON ADMISSION     # Financial/Environmental Concerns:              The patient's care was discussed with the Bedside Nurse and Patient.    Medical Decision Making       **CLEAR ALL SELECTIONS**      Labs/Imaging Reviewed:  See Information above and Data section below    Time SPENT BY ME on the date of service doing chart review, history, exam, documentation & further activities per the note:  52 MINUTES    Chart documentation was completed, in part, with [employer] voice-recognition software. Even though reviewed, some grammatical, spelling, and word errors may remain.    Levi Acevedo MD  Hospitalist Service  [facility]  Text Page 7AM-6PM  Securely message with the Vocera Web Console (learn more here)  Text page via [employer] Paging/Directory    ______________________________________________________________________    Interval History     Net -3.3 L diuresis since admission.  Denies significant dyspnea, still has significant lower extremity edema.  Denies chest pain.  Hemoglobin slightly lower however no evidence of bleeding from any source.    Data reviewed today: I reviewed all medications, new labs and imaging results over the last 24 hours. I personally reviewed no images or EKG's today.    Physical Exam   Vital signs:  Temp: 97.3  F (36.3  C) Temp src: Oral BP: 122/58 Pulse: 54   Resp: 16 SpO2: 94 % O2 Device: None (Room air) Oxygen Delivery: 1 LPM   Weight: 97.5 kg (215 lb)  Estimated body mass index is 32.69 kg/m  as calculated from the following:    Height as of 2/3/25: 1.727 m (5' 8\").    Weight as of this encounter: 97.5 kg (215 lb).      Wt Readings from Last 2 Encounters:   02/19/25 97.5 kg (215 lb)   02/03/25 97.1 kg (214 lb)       Gen: AAOX3, NAD, forgetful  HEENT: Supple neck, moist oral mucosa, no pallor  Resp: CTA B/L, normal WOB  CVS: RRR, no murmur  Abd/GI: Soft, non-tender. BS- normoactive.    Skin: Warm, dry no rashes  MSK: 4+ " pedal edema extending up to thigh  Neuro- CN- intact. No focal deficits.        Data   Recent Labs   Lab 02/21/25  0746 02/20/25  0812 02/19/25  1826   WBC 4.3 3.0* 5.8   HGB 7.6* 7.5* 8.1*   MCV 78 80 80    168 189   NA  --  141 140   POTASSIUM 3.8 4.3 4.0   CHLORIDE  --  100 107   CO2  --  31* 23   BUN  --  13.0 11.0   CR  --  0.71 0.54*   ANIONGAP  --  10 10   JOSUE  --  8.9 6.8*   GLC  --  73 86   ALBUMIN  --   --  3.0*   PROTTOTAL  --   --  5.8*   BILITOTAL  --   --  0.3   ALKPHOS  --   --  81   ALT  --   --  6   AST  --   --  20       No results found for this or any previous visit (from the past 24 hours).  Medications   Current Facility-Administered Medications   Medication Dose Route Frequency Provider Last Rate Last Admin    Continuing beta blocker from home medication list OR beta blocker order already placed during this visit   Does not apply DOES NOT GO TO Ammy Ndiaye MD        Reason ACE/ARB/ARNI order not selected   Other DOES NOT GO TO Ammy Ndiaye MD         Current Facility-Administered Medications   Medication Dose Route Frequency Provider Last Rate Last Admin    [Held by provider] calcium carbonate 500 mg (elemental) (OSCAL) tablet 500 mg  500 mg Oral TID w/meals Ammy Sky MD        [Held by provider] cyanocobalamin (VITAMIN B-12) tablet 1,000 mcg  1,000 mcg Oral Daily Ammy Sky MD        enoxaparin ANTICOAGULANT (LOVENOX) injection 40 mg  40 mg Subcutaneous Q24H Ammy Sky MD   40 mg at 02/20/25 0628    [Held by provider] folic acid (FOLVITE) tablet 1,000 mcg  1,000 mcg Oral Daily Ammy Sky MD   1,000 mcg at 02/20/25 1035    [Held by provider] furosemide (LASIX) half-tab 10 mg  10 mg Oral Daily Ammy Sky MD        furosemide (LASIX) injection 60 mg  60 mg Intravenous BID Rishabh Vazquez MD   60 mg at 02/21/25 0950    gabapentin (NEURONTIN) capsule 100 mg  100 mg Oral At Bedtime Ammy Sky MD   100 mg at 02/20/25 5418     loratadine (CLARITIN) tablet 10 mg  10 mg Oral At Bedtime Ammy Sky MD   10 mg at 02/20/25 2049    pantoprazole (PROTONIX) EC tablet 40 mg  40 mg Oral BID Ammy Sky MD   40 mg at 02/21/25 0957    sodium chloride (PF) 0.9% PF flush 3 mL  3 mL Intracatheter Q8H Ammy Sky MD   3 mL at 02/20/25 2049

## 2025-02-21 NOTE — PROGRESS NOTES
"   02/21/25 1400   Appointment Info   Signing Clinician's Name / Credentials (OT) Lissa Baker, OTR/L   Living Environment   People in Home facility resident   Current Living Arrangements assisted living  (memory cares)   Living Environment Comments Pt lives in memory care portion of custodial and wife is living in custodial portion. Per pt they allowed him to be with his wife now however per SW note pt   Self-Care   Usual Activity Tolerance good   Current Activity Tolerance poor   Equipment Currently Used at Home walker, rolling;grab bar, tub/shower;shower chair;grab bar, toilet;lift device;wheelchair, manual   Fall history within last six months yes   Number of times patient has fallen within last six months 1   Activity/Exercise/Self-Care Comment Pt reports he is able to dress himself, bathe himself and takes care of himself.   Instrumental Activities of Daily Living (IADL)   IADL Comments Pt often gets missed meds 2/2 being with his wife visiting and staff does not \"come find him\" to administer meds.   General Information   Onset of Illness/Injury or Date of Surgery 02/19/25   Referring Physician Ammy Sky MD   Additional Occupational Profile Info/Pertinent History of Current Problem Jamie Valdivia is a delightful 84 year old male with history of permanent atrial fibrillation, has declined anticoagulation, mild dementia, history of alcohol use disorder, GERD with Vickers's esophagus, chronic normocytic anemia, chronic pharyngeal dysphagia, prostate cancer, frequent falls and peripheral neuropathy who [place] on 2[place]19/2025 with increased lower extremity swelling and weight gain and admitted with acute decompensated heart failure.   Existing Precautions/Restrictions fall   General Observations and Info \"BIBA from [facility] d/t a cab not wanting to take Pt back home to [place] w/ the amount of leg swelling he has at present. [facility] called to police for a hopeful transport, but Pt did not fit in " "the back of their squad car either so EMS was called.\"   Cognitive Status Examination   Orientation Status orientation to person, place and time  (pt thinks he has been in hospital for \"a week\" although it has only been 2 days)   Safety Deficit moderate deficit;judgment;insight into deficits/self-awareness   Cognitive Status Comments Pt is tangential but slightly disoriented to day of week, aware he came in from hospital   Visual Perception   Visual Impairment/Limitations WFL   Sensory   Sensory Comments severely painful BLEs   Pain Assessment   Patient Currently in Pain Yes, see Vital Sign flowsheet   Range of Motion Comprehensive   Comment, General Range of Motion UE AROM intact   Strength Comprehensive (MMT)   Comment, General Manual Muscle Testing (MMT) Assessment pt able to perform SLR w/ RLE and LLE and maintain for >5 seconds but LEs very heavy and swollen from edema and fluid retention   Bed Mobility   Comment (Bed Mobility) refused to trial, per RN pt is Ax2   Transfers   Transfer Comments Ax2 SS   Balance   Balance Comments impaired   Clinical Impression   Criteria for Skilled Therapeutic Interventions Met (OT) Yes, treatment indicated   OT Diagnosis impaired ADL/IADL IND   OT Problem List-Impairments impacting ADL problems related to;activity tolerance impaired;mobility;strength   Assessment of Occupational Performance 5 or more Performance Deficits   Identified Performance Deficits dressing, bathing, functional mobility, home mgmt, activity tolerance   Planned Therapy Interventions (OT) ADL retraining;IADL retraining;cognition;strengthening;home program guidelines;progressive activity/exercise   Clinical Decision Making Complexity (OT) problem focused assessment/low complexity   Risk & Benefits of therapy have been explained evaluation/treatment results reviewed;risks/benefits reviewed;care plan/treatment goals reviewed;current/potential barriers reviewed;participants voiced agreement with care " plan;participants included;patient   OT Total Evaluation Time   OT Eval, Moderate Complexity Minutes (02751) 15   OT Goals   Therapy Frequency (OT) 6 times/week   OT Predicted Duration/Target Date for Goal Attainment 03/05/25   OT Goals Hygiene/Grooming;Upper Body Dressing;Lower Body Dressing;Toilet Transfer/Toileting;Cardiac Phase 1   OT: Hygiene/Grooming minimal assist   OT: Upper Body Dressing Supervision/stand-by assist   OT: Lower Body Dressing Modified independent;using adaptive equipment   OT: Toilet Transfer/Toileting Minimal assist;toilet transfer;cleaning and garment management   OT: Understanding of cardiac education to maximize quality of life, condition management, and health outcomes Patient  (CHF edu)   Interventions   Interventions Quick Adds Therapeutic Procedures/Exercise   Therapeutic Procedures/Exercise   Therapeutic Procedure: strength, endurance, ROM, flexibillity minutes (03934) 17   Symptoms Noted During/After Treatment fatigue   Treatment Detail/Skilled Intervention OT: Extensive time initiate CHF education and educating pt on activity tolerance and importance for strengthening for improved activity tolerance, limited partciption. Agreeable for UE ex w/ yellow theraband x10 reps, encouraged to perform 3x/day. Very anxious about getting up today again. Wrote on board pt to get to chair for all meals.   OT Discharge Planning   OT Plan CHF edu, SS transfers, strengthening, repeated STS, g/h sitting EOB or at sink from ?, take steps and ambulate when able   OT Discharge Recommendation (DC Rec) Transitional Care Facility   OT Rationale for DC Rec Pt functioning well below baseline, significant LE edema and limited strength impacting safety w/ BADLs and activity tolerance. Pt will require TCU stay for monitoring of medications and continued activity prior to returning to [facility]. Facility unable to provide level of A required at this time.   OT Brief overview of current status Goals of therapy will  be to address safe mobility and make recs for d/c to next level of care. Pt and RN will continue to follow all falls risk precautions as documented by RN staff while hospitalized  (Ax2 SS)   OT Total Distance Amb During Session (feet) 0   Total Session Time   Timed Code Treatment Minutes 17   Total Session Time (sum of timed and untimed services) 32

## 2025-02-21 NOTE — PLAN OF CARE
"Goal Outcome Evaluation:                      Date/Time: 02/20: [id number]     Trauma/Ortho/Medical (Choose one): Medical     Diagnosis: Decompensated heart failure, BLEs edema  POD#: N/A  Mental Status: Oriented x 4, anxious, forgetful  Activity/dangle: A-1, GB/Walker  Diet: Mechanical soft, 2 gm sodium, 2L FR  Pain: Managed with PRN Tylenol  Lucio/Voiding: External catheter   Tele/Restraints/Iso: Tele is a-fib with CVR  02/LDA: RA. Pulled out IV access,resource ,flying squad tried,not successfully.Iv access team consulted.  D/C Date: TBD, pt is from [facility]  Other Info: BLEs has +3 edema. Is  on IV Lasix. Meds crushed with applesauce.    K and Mag protocol  Pt refused Lovenox injection\" stated that he will have to talk to the MD before taking,writer educated him on the dangers of getting a blood clot if he doesn't take this med,pt stated that he will rather have a blood clot than taking the medication .Am nurse notified to f/u.  "

## 2025-02-21 NOTE — PROGRESS NOTES
02/21/25 1449   Appointment Info   Signing Clinician's Name / Credentials (PT) Reji Kate DPT   Rehab Comments (PT) Lymph eval completed, PT orders as well, however, Pt declining any OOB mobility at this time.   Quick Adds   Quick Adds Edema Eval   Living Environment   People in Home facility resident   Current Living Arrangements assisted living  (Memory care)   Living Environment Comments Pt lives in memory care portion of [facility] and wife is living in [facility] portion. Per pt they allowed him to be with his wife now however per SW note pt   Self-Care   Usual Activity Tolerance good   Current Activity Tolerance poor   Equipment Currently Used at Home walker, rolling;grab bar, tub/shower;shower chair;grab bar, toilet;lift device;wheelchair, manual   Fall history within last six months yes   Number of times patient has fallen within last six months 3   Activity/Exercise/Self-Care Comment Pt reports he is able to dress himself, bathe himself and takes care of himself.  Reports 6 months ago was able to walk 1 mile. Reports over the past week has been unable to ambulate   General Information   Onset of Illness/Injury or Date of Surgery 02/19/25   Referring Physician Wendy Malcolm MD   Patient/Family Therapy Goals Statement (PT) Get stronger   Pertinent History of Current Problem (include personal factors and/or comorbidities that impact the POC) Jamie Valdivia is a delightful 84 year old male with history of permanent atrial fibrillation, has declined anticoagulation, mild dementia, history of alcohol use disorder, GERD with Vickers's esophagus, chronic normocytic anemia, chronic pharyngeal dysphagia, prostate cancer, frequent falls and peripheral neuropathy who presented from AL on 2/19/2025 with increased lower extremity swelling and weight gain and admitted with acute decompensated heart failure.   Existing Precautions/Restrictions fall   Cognition   Affect/Mental Status (Cognition) WFL   Orientation Status  (Cognition) oriented x 3   Follows Commands (Cognition) WFL   Pain Assessment   Patient Currently in Pain Yes, see Vital Sign flowsheet  (Pain to BLEs)   Integumentary/Edema   Integumentary/Edema Comments BLE 2-3+ pitting edema from toes to knees   Onset of Edema 02/14/25  (Reports ongoing for the past week but has had some chronic swelling)   Affected Body Part(s) Right LE;Left LE   Edema Etiology Insidious;Unknown;Chronic Venous Insfficiency   Edema Precautions Cardiac Edema/CHF   Edema Examination/Assessment   Skin Condition Pitting   Scar No   Ulcerations No   Dorsal Pedal Pulse Symmetrical   Stemmer Sign Positive   Pitting Assessment 2-3+ to BLEs from toes to knees   Range of Motion (ROM)   ROM Comment Able to flex knees to assist w/ donning of wraps   Strength (Manual Muscle Testing)   Strength Comments Not tested as Pt not wanting to complete OOB mobility. Able to complete heel slides w/o assistance   Bed Mobility   Comment, (Bed Mobility) Pt declined this date   Transfers   Comment, (Transfers) Pt declined this date   Gait/Stairs (Locomotion)   Comment, (Gait/Stairs) Pt declined this date   Balance   Balance Comments Not tested as no OOB mobility completed   Clinical Impression   Criteria for Skilled Therapeutic Intervention Yes, treatment indicated   PT Diagnosis (PT) Impaired ambulation   Edema: Patient Presentation Stage 2 Lymphedema   Influenced by the following impairments Impaired strength, balance and activity tolerance   Functional limitations due to impairments Impaired ADLs, IADLs and functional mobility   Clinical Presentation (PT Evaluation Complexity) stable   Clinical Presentation Rationale Clinical judgment   Clinical Decision Making (Complexity) low complexity   Planned Therapy Interventions (PT) balance training;bed mobility training;gait training;home exercise program;patient/family education;strengthening;transfer training;progressive activity/exercise   Edema: Planned Interventions Manual  lymph drainage;Gradient compression bandaging;Fit for compression garment;Edema exercises;Precautions to prevent infection/exacerbation;Education;Manual therapy;ADL training   Risk & Benefits of therapy have been explained evaluation/treatment results reviewed;care plan/treatment goals reviewed;risks/benefits reviewed;current/potential barriers reviewed;participants voiced agreement with care plan;participants included;patient   PT Total Evaluation Time   PT Eval, Low Complexity Minutes (34145) 15   Physical Therapy Goals   PT Frequency Daily  (Daily for lymph, likely x5 week for PT once completing mobility)   PT Predicted Duration/Target Date for Goal Attainment 03/03/25   PT Goals Bed Mobility;Transfers;Gait;Edema   PT: Bed Mobility Supervision/stand-by assist;Supine to/from sit   PT: Transfers Supervision/stand-by assist;Sit to/from stand;Assistive device   PT: Gait Supervision/stand-by assist;150 feet;Rolling walker   PT: Edema education to increase ability to manage edema after discharge from the hospital Patient;Verbalize;Demonstrate;Supervision/Stand-by assist;Skin care routine;signs/symptoms of intolerance;wear schedule;limb positioning;garment/bandage care;discharge recommendations   PT: Management of edema bandages Patient;Verbalize;Demonstrate;Mod assist;garment(s)   PT: Functional edema exercise program to reduce limb volume, increase activity tolerance and improve independence with ADL Patient;Verbalize;Demonstrates;Supervision/Stand-by assist;HEP;walking program   Interventions   Interventions Quick Adds Manual Therapy   Manual Therapy   Manual Therapy Minutes (92719) 28   Symptoms Noted During/After Treatment Pain increased   Treatment Detail/Skilled Intervention PT: Lymphedema evaluation complete and treatment initiated.  Pt demonstrates lymphedema in BLE below knee.  Pt is appropriate for LE quick wrap, using short stretch (SS) bandages, not ACE wraps. Pt educated in rationale for compression with  wound healing and importance of using SS bandages which have low resting pressure and high working pressure, vs ACE wraps which have high resting pressure and low working pressure. LEs washed, lotion applied, and quick wraps completed to BLE with two different 8 cm short stretch bandage toes to ankle and then ankle to mid calf and 10 cm short stretch bandage mid calf to knee with appropriate stretch and spacing to allow gradient compression.  Wraps applied bilaterally. Coordinated with nursing regarding wearing schedule, and completing LE cares for the following day prior to therapy arrival. Updated white board with schedule. If pt does not tolerate wraps well, please remove wraps but keep LEs elevated.   PT Discharge Planning   PT Plan Attempt OOB mobility, reassess LE short stretch wraps, LE exercises   PT Discharge Recommendation (DC Rec) Transitional Care Facility   PT Rationale for DC Rec Pt below baseline mobility. Currently not attempting OOB mobility this date d/t fatigue. Agreeable to short stretch wraps. Has been needing significant assistance for nursing staff to get to chair. Anticipate when medically ready will need TCU to improve upon functional mobility and strengthening.   PT Brief overview of current status Goals of therapy will be to address safe mobility and make recs for d/c to next level of care. Pt and RN will continue to follow all falls risk precautions as documented by RN staff while hospitalized   PT Total Distance Amb During Session (feet) 0   Physical Therapy Time and Intention   Timed Code Treatment Minutes 28   Total Session Time (sum of timed and untimed services) 43

## 2025-02-21 NOTE — PLAN OF CARE
Date/Time: 02/20: [id number]     Trauma/Ortho/Medical (Choose one): Medical     Diagnosis: Decompensated heart failure, BLEs edema  POD#: N/A  Mental Status: Oriented x 4, anxious, forgetful  Activity/dangle: Up with one assist with GB/Walker  Diet: Mechanical soft, 2 gm sodium, 2L FR  Pain: Denies  Lucio/Voiding: External catheter - high output  Tele/Restraints/Iso: Tele is a-fib with CVR  02/LDA: RA. IV SL  D/C Date: TBD, pt is from [facility]  Other Info: BLEs has +3 edema. Is  on IV Lasix. Good appetite. Meds crushed with applesauce.  Anxious and c/o SOB when readjusted

## 2025-02-21 NOTE — PROGRESS NOTES
Care Management Follow Up    Length of Stay (days): 2    Expected Discharge Date: 02/24/2025     Concerns to be Addressed: adjustment to diagnosis/illness, discharge planning     Patient plan of care discussed at interdisciplinary rounds: Yes    Anticipated Discharge Disposition: Assisted Living, Transitional Care      Anticipated Discharge Services:    Anticipated Discharge DME:      Patient/family educated on Medicare website which has current facility and service quality ratings:    Education Provided on the Discharge Plan:    Patient/Family in Agreement with the Plan: unable to assess    Referrals Placed by CM/SW: External Care Coordination  Private pay costs discussed: Not applicable    Discussed  Partnership in Safe Discharge Planning  document with patient/family: No     Handoff Completed: No, handoff not indicated or clinically appropriate    Additional Information:  Referral sent to [facility] as Nory Malloy was hoping that patient would go to TCU.     Next Steps: talk to patient and update    GUICHO Hinkle

## 2025-02-22 ENCOUNTER — APPOINTMENT (OUTPATIENT)
Dept: SPEECH THERAPY | Facility: CLINIC | Age: 85
DRG: 291 | End: 2025-02-22
Payer: COMMERCIAL

## 2025-02-22 ENCOUNTER — APPOINTMENT (OUTPATIENT)
Dept: PHYSICAL THERAPY | Facility: CLINIC | Age: 85
DRG: 291 | End: 2025-02-22
Payer: COMMERCIAL

## 2025-02-22 LAB
ANION GAP SERPL CALCULATED.3IONS-SCNC: 7 MMOL/L (ref 7–15)
ATRIAL RATE - MUSE: 71 BPM
BUN SERPL-MCNC: 13 MG/DL (ref 8–23)
CALCIUM SERPL-MCNC: 8.5 MG/DL (ref 8.8–10.4)
CHLORIDE SERPL-SCNC: 96 MMOL/L (ref 98–107)
CREAT SERPL-MCNC: 0.7 MG/DL (ref 0.67–1.17)
DIASTOLIC BLOOD PRESSURE - MUSE: NORMAL MMHG
EGFRCR SERPLBLD CKD-EPI 2021: >90 ML/MIN/1.73M2
ERYTHROCYTE [DISTWIDTH] IN BLOOD BY AUTOMATED COUNT: 17.2 % (ref 10–15)
GLUCOSE BLDC GLUCOMTR-MCNC: 102 MG/DL (ref 70–99)
GLUCOSE SERPL-MCNC: 90 MG/DL (ref 70–99)
HCO3 SERPL-SCNC: 38 MMOL/L (ref 22–29)
HCT VFR BLD AUTO: 24.9 % (ref 40–53)
HGB BLD-MCNC: 7.5 G/DL (ref 13.3–17.7)
INTERPRETATION ECG - MUSE: NORMAL
MAGNESIUM SERPL-MCNC: 2 MG/DL (ref 1.7–2.3)
MCH RBC QN AUTO: 23.4 PG (ref 26.5–33)
MCHC RBC AUTO-ENTMCNC: 30.1 G/DL (ref 31.5–36.5)
MCV RBC AUTO: 78 FL (ref 78–100)
P AXIS - MUSE: NORMAL DEGREES
PLATELET # BLD AUTO: 171 10E3/UL (ref 150–450)
POTASSIUM SERPL-SCNC: 3.8 MMOL/L (ref 3.4–5.3)
PR INTERVAL - MUSE: NORMAL MS
QRS DURATION - MUSE: 80 MS
QT - MUSE: 420 MS
QTC - MUSE: 429 MS
R AXIS - MUSE: -36 DEGREES
RBC # BLD AUTO: 3.21 10E6/UL (ref 4.4–5.9)
SODIUM SERPL-SCNC: 141 MMOL/L (ref 135–145)
SYSTOLIC BLOOD PRESSURE - MUSE: NORMAL MMHG
T AXIS - MUSE: 48 DEGREES
TROPONIN T SERPL HS-MCNC: 20 NG/L
VENTRICULAR RATE- MUSE: 63 BPM
WBC # BLD AUTO: 4.2 10E3/UL (ref 4–11)

## 2025-02-22 PROCEDURE — 82310 ASSAY OF CALCIUM: CPT | Performed by: INTERNAL MEDICINE

## 2025-02-22 PROCEDURE — 36415 COLL VENOUS BLD VENIPUNCTURE: CPT | Performed by: INTERNAL MEDICINE

## 2025-02-22 PROCEDURE — 99233 SBSQ HOSP IP/OBS HIGH 50: CPT | Performed by: INTERNAL MEDICINE

## 2025-02-22 PROCEDURE — 99232 SBSQ HOSP IP/OBS MODERATE 35: CPT | Performed by: INTERNAL MEDICINE

## 2025-02-22 PROCEDURE — 250N000011 HC RX IP 250 OP 636: Performed by: INTERNAL MEDICINE

## 2025-02-22 PROCEDURE — 258N000003 HC RX IP 258 OP 636: Performed by: INTERNAL MEDICINE

## 2025-02-22 PROCEDURE — 120N000001 HC R&B MED SURG/OB

## 2025-02-22 PROCEDURE — 250N000013 HC RX MED GY IP 250 OP 250 PS 637: Performed by: INTERNAL MEDICINE

## 2025-02-22 PROCEDURE — 97116 GAIT TRAINING THERAPY: CPT | Mod: GP

## 2025-02-22 PROCEDURE — 92526 ORAL FUNCTION THERAPY: CPT | Mod: GN

## 2025-02-22 PROCEDURE — 80048 BASIC METABOLIC PNL TOTAL CA: CPT | Performed by: INTERNAL MEDICINE

## 2025-02-22 PROCEDURE — 36415 COLL VENOUS BLD VENIPUNCTURE: CPT

## 2025-02-22 PROCEDURE — 97140 MANUAL THERAPY 1/> REGIONS: CPT | Mod: GP

## 2025-02-22 PROCEDURE — 85014 HEMATOCRIT: CPT | Performed by: INTERNAL MEDICINE

## 2025-02-22 PROCEDURE — 83735 ASSAY OF MAGNESIUM: CPT | Performed by: INTERNAL MEDICINE

## 2025-02-22 PROCEDURE — 84484 ASSAY OF TROPONIN QUANT: CPT

## 2025-02-22 PROCEDURE — 97530 THERAPEUTIC ACTIVITIES: CPT | Mod: GP

## 2025-02-22 PROCEDURE — 250N000013 HC RX MED GY IP 250 OP 250 PS 637

## 2025-02-22 RX ORDER — MAGNESIUM OXIDE 400 MG/1
400 TABLET ORAL EVERY 4 HOURS
Status: COMPLETED | OUTPATIENT
Start: 2025-02-22 | End: 2025-02-22

## 2025-02-22 RX ORDER — POTASSIUM CHLORIDE 1.5 G/1.58G
20 POWDER, FOR SOLUTION ORAL ONCE
Status: COMPLETED | OUTPATIENT
Start: 2025-02-22 | End: 2025-02-22

## 2025-02-22 RX ORDER — FERROUS SULFATE 325(65) MG
325 TABLET ORAL EVERY OTHER DAY
Status: DISCONTINUED | OUTPATIENT
Start: 2025-02-22 | End: 2025-02-28 | Stop reason: HOSPADM

## 2025-02-22 RX ADMIN — Medication 25 MCG: at 09:39

## 2025-02-22 RX ADMIN — CALCIUM 500 MG: 500 TABLET ORAL at 14:47

## 2025-02-22 RX ADMIN — GABAPENTIN 100 MG: 100 CAPSULE ORAL at 20:09

## 2025-02-22 RX ADMIN — Medication 400 MG: at 14:43

## 2025-02-22 RX ADMIN — POLYETHYLENE GLYCOL 3350 17 G: 17 POWDER, FOR SOLUTION ORAL at 16:50

## 2025-02-22 RX ADMIN — Medication 40 MG: at 20:09

## 2025-02-22 RX ADMIN — FERROUS SULFATE TAB 325 MG (65 MG ELEMENTAL FE) 325 MG: 325 (65 FE) TAB at 20:09

## 2025-02-22 RX ADMIN — ACETAMINOPHEN 650 MG: 325 TABLET, FILM COATED ORAL at 14:43

## 2025-02-22 RX ADMIN — QUETIAPINE 12.5 MG: 25 TABLET, FILM COATED ORAL at 23:07

## 2025-02-22 RX ADMIN — IRON SUCROSE 300 MG: 20 INJECTION, SOLUTION INTRAVENOUS at 09:44

## 2025-02-22 RX ADMIN — HYDROXYZINE HYDROCHLORIDE 10 MG: 10 TABLET ORAL at 20:09

## 2025-02-22 RX ADMIN — CALCIUM 500 MG: 500 TABLET ORAL at 09:39

## 2025-02-22 RX ADMIN — Medication 1 LOZENGE: at 16:51

## 2025-02-22 RX ADMIN — FUROSEMIDE 60 MG: 10 INJECTION, SOLUTION INTRAVENOUS at 16:35

## 2025-02-22 RX ADMIN — Medication 1 LOZENGE: at 11:21

## 2025-02-22 RX ADMIN — ACETAMINOPHEN 650 MG: 325 TABLET, FILM COATED ORAL at 18:45

## 2025-02-22 RX ADMIN — LORATADINE 10 MG: 10 TABLET ORAL at 20:09

## 2025-02-22 RX ADMIN — Medication 40 MG: at 09:39

## 2025-02-22 RX ADMIN — HYDROXYZINE HYDROCHLORIDE 10 MG: 10 TABLET ORAL at 14:43

## 2025-02-22 RX ADMIN — Medication 400 MG: at 18:40

## 2025-02-22 RX ADMIN — FUROSEMIDE 60 MG: 10 INJECTION, SOLUTION INTRAVENOUS at 09:39

## 2025-02-22 RX ADMIN — Medication 40 MG: at 01:04

## 2025-02-22 RX ADMIN — ENOXAPARIN SODIUM 40 MG: 40 INJECTION SUBCUTANEOUS at 07:05

## 2025-02-22 RX ADMIN — ACETAMINOPHEN 650 MG: 325 TABLET, FILM COATED ORAL at 03:42

## 2025-02-22 RX ADMIN — POTASSIUM CHLORIDE 20 MEQ: 1.5 POWDER, FOR SOLUTION ORAL at 14:43

## 2025-02-22 RX ADMIN — CALCIUM 500 MG: 500 TABLET ORAL at 18:40

## 2025-02-22 ASSESSMENT — ACTIVITIES OF DAILY LIVING (ADL)
ADLS_ACUITY_SCORE: 60
ADLS_ACUITY_SCORE: 65
ADLS_ACUITY_SCORE: 68
ADLS_ACUITY_SCORE: 68
ADLS_ACUITY_SCORE: 63
ADLS_ACUITY_SCORE: 63
ADLS_ACUITY_SCORE: 68
ADLS_ACUITY_SCORE: 64
ADLS_ACUITY_SCORE: 63
ADLS_ACUITY_SCORE: 68
ADLS_ACUITY_SCORE: 68
ADLS_ACUITY_SCORE: 63
ADLS_ACUITY_SCORE: 65
ADLS_ACUITY_SCORE: 64
ADLS_ACUITY_SCORE: 68
ADLS_ACUITY_SCORE: 64
ADLS_ACUITY_SCORE: 64
ADLS_ACUITY_SCORE: 68
ADLS_ACUITY_SCORE: 68
ADLS_ACUITY_SCORE: 60
ADLS_ACUITY_SCORE: 65
ADLS_ACUITY_SCORE: 64
ADLS_ACUITY_SCORE: 65

## 2025-02-22 NOTE — PROGRESS NOTES
Diagnosis: Decompensated heart failure, BLEs edema     Orientation: A/O x4, Venetie, Tylenol given     Vitals/Tele: VSS on1L NC, Tele: A-fib  with CVR    IV Access/drains: R PIV SL     Diet: Minced moist/2g Na,  2000 FR    Mobility: A x2 lift, Aga steady    GI/: Incontinent of B/B, purewick in place    Wound/Skin: Scattered bruising, BLE edema, coccyx redness    Consults: SW/SLP/PT/Cardiology    Discharge Plan: TBD     Other Info: Pt refused Ace wrap removal, scant drainage.    See Flow sheets for assessment

## 2025-02-22 NOTE — CODE/RAPID RESPONSE
[facility]    RRT Note  2/21/2025   Time Called: 8:17 PM    Code Status: Full Code    I was called to evaluate Jamie Valdivia, who is a 84 year old male with a past medical history significant for atrial fibrillation not on anticoagulation, mild dimension, history of alcohol use disorder, GERD with Vickers's esophagus, chronic normocytic anemia, chronic pharyngeal dysphagia, prostate cancer, frequent falls, among others who was admitted on 2/19/2025 for increased lower extremity swelling and weight gain, found to have acute decompensated heart failure.    Assessment & Plan   Chest pain  Shortness of breath  Upon arrival patient is sitting at the edge of the bed.  He appears anxious.  He is not in respiratory distress, has nonlabored breathing pattern.  Initial vital signs include HR in 60s, /80, RR 18, 92% on 2 L nasal cannula.  He reports that he developed nonexertional chest pain over the left side of his chest that radiated down his left arm while he was laying in bed, with associated shortness of breath.  He reports the pain was a 15 out of 10 but has now improved to 5 out of 10.  He reports it is continuing to improve.  Patient does report that he has experienced this chest pain many times before, and reports that it is happening more frequently.    Patient had echo today showing EF 60 to 65%, severe biatrial enlargement, mild to severe 2-3+ tricuspid regurg, mild aortic regurg, mild aortic stenosis.  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  Aortic dissection considered in differential however patient's pain significantly improved without intervention, and is continuing to decrease.  Patient also hemodynamically stable.  He denies any pain radiating to his back.    Patient is currently receiving aggressive diuresis with 60 mg IV Lasix twice daily.  He received his last dose of Lasix 6 about 2 hours prior to RRT.  Although CXR radiology  interpretation not consistent with pulmonary edema, patient's lungs sound wet on exam and he is endorsing SOB.    Differentials considered include ACS, pulmonary edema, musculoskeletal, medication reaction, anxiety, GERD, PE, and aortic dissection.    Working diagnosis: Chest pain and shortness of breath secondary to acute on chronic decompensated heart failure    INTERVENTIONS:  - EKG per my interpretation shows A-fib with slow ventricular response, no ischemic changes  - CXR per my interpretation shows enhanced pulmonary vasculature concerning for pulmonary edema.  - Troponin 21  - PRN hydroxyzine for anxiety  - Cardiology following  - Considered nitroglycerin however patient's pain significantly improved without intervention.  No ischemic EKG changes appreciated.    At end of evaluation patient is resting comfortably in bed. He is no longer complaining of chest pain, but endorses SOB with laying flat. Patient's breathing is more comfortable after being boosted in bed.       Antelmo García NP  [facility]  Securely message with the Vocera Web Console (learn more here)  Text page via [employer] Paging/Directory        Physical Exam   Vital Signs with Ranges:  Temp:  [97.3  F (36.3  C)-97.6  F (36.4  C)] 97.6  F (36.4  C)  Pulse:  [54-64] 64  Resp:  [15-18] 18  BP: (122-165)/(55-77) 140/71  SpO2:  [93 %-94 %] 94 %  I/O last 3 completed shifts:  In: 480 [P.O.:480]  Out: 2700 [Urine:2700]         Physical Exam   General:  Sitting at edge of bed. Not in acute distress. A&O x 3.  Skin:  Warm, dry. No rashes or lesions on exposed skin.  HEENT:  Normocephalic, atraumatic.  Neck:  Supple.  Chest:  Breath sounds with rales in bases. No increased work of breathing on room air.  Cardiovascular:  RRR, murmur present. +3-4 peripheral edema.  Abdomen:  Soft, non-tender, non-distended.  Musculoskeletal:  Moves all four extremities.   Neurological:  No new focal neurological deficits.   Psychiatric:   "Anxious.    Data     EKG:  Interpreted by Antelmo García NP  Time reviewed: 20:41  Symptoms at time of EKG: Chest pain  Rhythm: atrial fibrillation - controlled  Rate: 63  Axis: Left Axis Deviation  Ectopy: none  Conduction: normal  ST Segments/ T Waves: No acute ischemic changes  Q Waves: none  Comparison to prior: Unchanged    Clinical Impression: no acute changes    IMAGING: (X-ray/CT/MRI)   Recent Results (from the past 24 hours)   XR Video Swallow with SLP or OT    Narrative    EXAM: XR VIDEO SWALLOW WITH SLP OR OT  LOCATION: [facility]  DATE: 2/21/2025    INDICATION: Difficulty swallowing.  COMPARISON: None.    TECHNIQUE: Routine swallow study with speech pathology using multiple barium thicknesses.    RADIATION DOSE: Total Air Kerma 6.7 mGy      Impression    IMPRESSION:   Swallow study with Speech Pathology using multiple barium thicknesses.     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    Thin: Aspiration with cough response. Mild residue.    Mildly thick: Aspiration with chin tuck. Deep penetration in neutral position. Mild residue.    Puree: No penetration or aspiration. Mild residue.    Regular: No penetration or aspiration. Mild residue.       CBC with Diff:  Recent Labs   Lab Test 02/21/25  0746 02/19/25  1826 12/14/24  0148   WBC 4.3   < > 7.4   HGB 7.6*   < > 10.5*   MCV 78   < > 85      < > 241   INR  --   --  1.15    < > = values in this interval not displayed.      No results found for: \"RETICABSCT\"  No results found for: \"RETP\"    Comprehensive Metabolic Panel:  Recent Labs   Lab 02/21/25  0746 02/19/25 2024 02/19/25  1826      < > 140   POTASSIUM 3.8  3.8   < > 4.0   CHLORIDE 97*   < > 107   CO2 28   < > 23   ANIONGAP 15   < > 10   GLC 84   < > 86   BUN 15.3   < > 11.0   CR 0.75   < > 0.54*   GFRESTIMATED 89   < > >90   JOSUE 8.7*   < > 6.8*   MAG 2.0   < >  --    PROTTOTAL  --   --  5.8*   ALBUMIN  --   --  3.0*   BILITOTAL  --  "  --  0.3   ALKPHOS  --   --  81   AST  --   --  20   ALT  --   --  6    < > = values in this interval not displayed.         Time Spent on this Encounter     CRITICAL CARE TIME  I spent 45 minutes of critical care time on the unit/floor managing the care of Jamie Valdivia. Upon evaluation, this patient had a high probability of imminent or life-threatening deterioration due to chest pain which required my direct attention, intervention, and personal management. 100% of my time was spent at the bedside counseling the patient and/or coordinating care regarding services listed in this note.

## 2025-02-22 NOTE — PLAN OF CARE
Goal Outcome Evaluation:      Plan of Care Reviewed With: patient    Overall Patient Progress: improvingOverall Patient Progress: improving    Outcome Evaluation: Discharge pending, SW following.    Patient vital signs are at baseline: Yes  Patient able to ambulate as they were prior to admission or with assist devices provided by therapies during their stay:  Yes  Patient MUST void prior to discharge:  Yes  Patient able to tolerate oral intake:  Yes  Pain has adequate pain control using Oral analgesics:  Yes  Does patient have an identified :  No,  Reason:  Pt lives in half-way w/ spouse  Has goal D/C date and time been discussed with patient:  No,  Reason:  Discharge pending, SW following.    Dx:Decompensated Heart Failure, SOB, BLE Edema  POD#n/a    A&Ox4.   CMS Intact.   VSS on RA.   Up with Ax1 GB and walker.   Voiding via Male External Catheter.   Right PIV Lost this shift.  Soft/Bite-Sized Level#6 Diet, Liquid Mild Thick Level#2, Small sips, 3x swallow for each bite and sip. 2gr Sodium, 2L Fluid Restriction.  Pain controlled with Tylenol.

## 2025-02-22 NOTE — PLAN OF CARE
Diagnosis: Decompensated HF, SOB  Mental Status: A/O x4  Activity/dangle Ax2 with Aga Perkins  Diet: Minced/Moist + 2g NA diet with mildly thick level 2.  Pain: Tylenol PRN given  Lucio/Voiding: External catheter  Tele/Restraints/Iso: A-fib on tele  02/LDA: room air, R PIV SL  D/C Date: TBD  Other Info: K and Mag protocol, lab recheck am. Keep legs elevated, order to remove ACE wrap overnight (12 hrs after placement). FR 2000 ml. I/O q4hr. 1 dose lasix given during this shift. Takes pills crushed on apple sauce. RRT called for c/o chest pain, no significant findings.

## 2025-02-22 NOTE — PROGRESS NOTES
[facility]    Internal Medicine Hospitalist Progress Note  02/22/2025  I evaluated patient on the above date.    Nazario Mares Jr., MD  [phone] (p)  Text Page  Vocera      [New actions/orders today (02/22/2025) are underlined in the assessment and plan. All lab results in the assessment and plan were reviewed.]  Assessment & Plan   Jamie Valdivia is a delightful 84 year old male with history including mild dementia; HTN; chronic afib (has declined anticoagulation); GERD, h/o Vickers's esophagus; chronic anemia; chronic pharyngeal dysphagia; peripheral neuropathy; OA; chronic spine disease; osteoporosis; h/o prostate cancer; h/o alcohol use d/o (no longer drinking); h/o falls; who presented from [facility] 2/19/2025 with increased lower extremity swelling and weight gain and found with acute decompensated heart failure.     On initial evaluation, pt was afebrile, hypertensive, sats 90%. ECG showed afib with CVR, no acute ischemic changes. Labs notable for CBC with WBC normal, hgb 8.1; BMP with calcium 6.8 (ionized calcium 4.3), otherwise unremarkable; LFT's with albumin 3.0, protein 5.8, otherwise normal; NTP-BNP 2169, troponin normal.    CXR showed left hemidiaphragm remained elevated compared to the right and overall the lungs had shallow inflation, chronic blunting of the right posterior costophrenic sulcus could relate to scarring or minimal fluid but unchanged (comparison 7/2024).        Acute CHF exacerbation (HFpEF).  Moderate to severe tricuspid regurgitation; mild AS.  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  Permanent atrial fibrillation with intermittent slow/controlled ventricular response.  Chronic lower extremity edema.  Permanent atrial fibrillation with intermittent slow ventricular response.  [PTA: furosemide 10 mg daily.]  * Echo 7/2024 showed LVEF 55%; RV OK; mild pHTN; mild AS; ascending aortic dilatation 4.8 cm, sinus 4.4 cm. Patient has declined  anticoagulation; and has declined LAAO as well.  * Initial presentation as above. Wt 215 lbs on admit. Presented with worsening lower extremity edema and weight gain. Started on IV furosemide on admit. Cardiology consulted on admit.   * 2/20: CXR showed possible mildly increased edema, otherwise stable. Echo showed LVEF 6-65%; RV mildly dilated, RV systolic function normal; moderate-severe TR; mild AR, mild AS; ascending aortic aneurysm 4.8 cm, aortic root aneurysm 4.5 cm; pHTN.  * 2/21: CXR showed possible LLL infiltrate, right lung clear. IV furosemide increased.  * 2/22:  Feeling better, feels legs less swollen.  Vitals:    02/19/25 2135   Weight: 97.5 kg (215 lb)     I/O last 3 completed shifts:  In: -   Out: 2000 [Urine:2000]  Recent Labs   Lab 02/22/25  0827 02/22/25  0008 02/21/25 2053 02/21/25  0746 02/20/25  1035 02/20/25  1026 02/20/25  0812 02/19/25 2024 02/19/25  1826     --   --  140  --   --  141  --  140   CO2 38*  --   --  28  --   --  31*  --  23   BUN 13.0  --   --  15.3  --   --  13.0  --  11.0   CR 0.70  --   --  0.75  --   --  0.71  --  0.54*   POTASSIUM 3.8  --  4.1 3.8  3.8  --   --  4.3  --  4.0   CTROPT  --  20 21  --   --  20 19   < > 15   NTBNPI  --   --   --   --   --   --   --   --  2,169*   LVEF  --   --   --   --  60-65%  --   --   --   --     < > = values in this interval not displayed.   - Continues furosemide 60 mg IV 3 times daily with goal net negative 2-3L/d.  - Continue 2L fluid restriction.  - Continue to monitor on telemetry.  - Continue to monitor i/o's, daily wts.  - Appreciate Cardiology help.     Hypocalcemia, non-severe, asymptomatic   Vitamin D deficiency.  * Initial presentation as above. Calcium (including ionized) low on admit. Vitamin D level low on admit.  * Started on calcium and vitamin D this hospitalization.  Recent Labs   Lab Test 02/22/25  0827 02/21/25  0746 02/20/25  0812 02/19/25  2115 02/19/25  1826 12/14/24  0148 09/12/24  1248 08/09/24  0600  08/02/24  0547 07/13/24  0535 07/12/24  2147 06/27/24  1118 06/20/24  0747 04/17/24  0915 04/11/24  0724 03/21/24  0651 03/12/24  1852 06/07/23  0957 02/16/23  0905 01/05/23  1000   CALCIUM, TOTAL 8.5* 8.7* 8.9  --  6.8* 9.0 9.0 9.1 8.8   < > 8.4*   < > 8.4*   < > 7.7* 8.5*   < > 9.1   < > 8.8   CALCIUM IONIZED WHOLE BLOOD  --   --   --  4.3*  --   --   --   --   --   --   --   --   --   --   --   --   --   --   --   --    ALBUMIN  --   --   --   --   --   --   --   --   --   --   --   --   --   --   --   --   --   --   --  3.6   ALBUMIN (R)  --   --   --   --  3.0*  --  3.7  --   --   --  3.0*  --  2.7*  --  2.7* 2.9*  --  4.0  --   --     < > = values in this interval not displayed.   - Continue calcium and vitamin D.    Iron deficiency anemia.  * Initial presentation as above. Hgb 8.1 on admit. Iron studies on admit showed low iron, iron saturation and ferritin. TSH normal on admit. B12 level normal on admit.  * Started on IV iron 2/21.  Recent Labs   Lab 02/22/25  0827 02/21/25  0746 02/20/25  0812 02/19/25  1826   HGB 7.5* 7.6* 7.5* 8.1*   - Given potential difficulties with IV access, discontinue IV iron and start oral iron.  - Continue to monitor CBC - repeat in am.  - Consider prbc transfusion if hgb </= 7.0 or if significant bleeding with hemodynamic instability or if symptomatic.  - Follow-up outpatient.     Weakness and physical deconditioning due to multiple acute and chronic medical issues.  Peripheral neuropathy.  Frequent falls.  * Seen by PT and OT and TCU recommended.  - Continue PTA gabapentin 100 mg at bedtime.  - Continue PT and OT, TCU recommended, but pt favors going back to snf.    Dementia, mild.  At risk for metabolic encephalopathy.  * Lives in snf.  * Scheduled quetiapine not restarted this hospitalization.  - Continue PRN quetiapine 12.5 mg BID.  - Continue to treat other issues as noted.  - Re-orient as needed.  - Maintain normal day/night, sleep/wake cycles.  - Minimize sedating  "medications as able.    Chronic pharyngeal dysphagia.  Chronic dysphonia.  GERD with Vickers's esophagus.  * EGD 2022 found Vickers's esophagus and him started on PPI twice daily. Seen in clinic by Gastroenterologist Colby Mercado on 2/3/2025 and X-ray esophagram, EGD, ENT referral and speech therapy referral placed at that visit.   * Seen by SLP and started on a dysphagia diet.  * Current Diet: Fluid restriction 2000 ML FLUID (and additional linked orders)  Room Service  Combination Diet Soft and Bite Sized Diet (level 6); Mildly Thick (level 2) (Small sips, 3x swallow for each bite and sip.); 2 gm NA Diet    - Continue the above current diet.  - Advance diet as able per SLP.  - Continue PTA PPI.  - Continue aspiration precautions.    OA with h/o joint replacements.  Chronic spine disease with h/o spine surgery.  Osteoporosis with h/o compression fractures.  * Started on calcium and vitamin D as above.  - Continue calcium and vitamin D.    Obesity.  * Body mass index is 32.69 kg/m .  - Needs to continue to pursue aggressive dietary and lifestyle modifications.    H/o Prostate cancer .  * Follows with Dr. Niraj Larry. S/p EBRT without ADT 12/2023.  - Follow-up with Urology    H/o alcohol use disorder, no recent use since moving to [facility] a few years ago.  - Noted.    Clinically Significant Risk Factors          # Hypochloremia: Lowest Cl = 96 mmol/L in last 2 days, will monitor as appropriate      # Hypoalbuminemia: Lowest albumin = 3 g/dL at 2/19/2025  6:26 PM, will monitor as appropriate         # Dementia: noted on problem list        # Obesity: Estimated body mass index is 32.69 kg/m  as calculated from the following:    Height as of 2/3/25: 1.727 m (5' 8\").    Weight as of this encounter: 97.5 kg (215 lb)., PRESENT ON ADMISSION     # Financial/Environmental Concerns:           Diet: Fluid restriction 2000 ML FLUID (and additional linked orders)  Room Service  Combination Diet Soft and Bite Sized Diet (level 6); " Mildly Thick (level 2) (Small sips, 3x swallow for each bite and sip.); 2 gm NA Diet    Prophylaxis: PCD's and ambulation  Lucio Catheter: Not present  Lines: None     Code Status: Full Code    Disposition Plan   Medically Ready for Discharge: Anticipated in 1-2 Days  Expected discharge to transitional care unit (TCU) pending above.    Entered: Nazario Mares MD 02/22/2025, 4:44 PM             Interval History   Doing better.  Bilateral lower extremities feel less swollen, not as tight.    * Data reviewed today: I reviewed all new labs and imaging over the last 24 hours. I personally reviewed the ECG tracing showing findings as described above in the A/P.    Physical Exam   Most recent vitals:   , Blood pressure 108/56, pulse 73, temperature 97.8  F (36.6  C), temperature source Oral, resp. rate 18, weight 97.5 kg (215 lb), SpO2 98%. O2 Device: None (Room air) Oxygen Delivery: 2 LPM  Vitals:    02/19/25 2135   Weight: 97.5 kg (215 lb)     Vital signs with ranges:  Temp:  [97.5  F (36.4  C)-98.1  F (36.7  C)] 97.8  F (36.6  C)  Pulse:  [61-73] 73  Resp:  [18] 18  BP: (108-165)/(55-84) 108/56  SpO2:  [94 %-100 %] 98 %  Patient Vitals for the past 24 hrs:   BP Temp Temp src Pulse Resp SpO2   02/22/25 1537 108/56 97.8  F (36.6  C) Oral 73 18 98 %   02/22/25 1527 133/60 97.5  F (36.4  C) Oral 61 18 94 %   02/22/25 0715 131/57 98.1  F (36.7  C) Oral 61 18 100 %   02/22/25 0050 (!) 157/74 97.6  F (36.4  C) Oral 69 18 99 %   02/21/25 2145 138/84 -- -- -- -- --   02/21/25 2100 (!) 149/82 -- -- -- -- --   02/21/25 2044 (!) 140/71 -- -- -- -- --   02/21/25 2022 (!) (P) 148/80 -- -- -- -- --   02/21/25 2017 (!) 165/77 -- -- -- -- --   02/21/25 2016 (!) 162/77 -- -- -- -- --   02/21/25 1832 133/55 97.6  F (36.4  C) Oral 64 18 94 %     I/O's last 24 hours:  I/O last 3 completed shifts:  In: -   Out: 2000 [Urine:2000]    Constitutional: awake, alert, oriented, pleasant   Head:   Eyes:   ENT:   Neck:   Cardiovascular: regular  "rate, irregular rhythm, +I/VI systolic murmur, no rubs/gallops  Lungs: diminished in the bases, no crackles or wheezes  Gastrointestinal/Abdomen: soft, non-tender, non-distended, positive bowel sounds  :   Musculoskeletal:   Skin/Extremities:  legs heavily wrapped; some pitting in thighs  Neurologic:   Psychiatric:   Hematologic/Lymphatic/Immunologic:        Labs reviewed.  Recent Labs   Lab 02/22/25 0827 02/21/25 2053 02/21/25  0746 02/20/25  0812 02/19/25  1826   WBC 4.2  --  4.3 3.0* 5.8   HGB 7.5*  --  7.6* 7.5* 8.1*   MCV 78  --  78 80 80     --  183 168 189     --  140 141 140   POTASSIUM 3.8 4.1 3.8  3.8 4.3 4.0   CHLORIDE 96*  --  97* 100 107   CO2 38*  --  28 31* 23   BUN 13.0  --  15.3 13.0 11.0   CR 0.70  --  0.75 0.71 0.54*   ANIONGAP 7  --  15 10 10   JOSUE 8.5*  --  8.7* 8.9 6.8*   GLC 90  --  84 73 86   ALBUMIN  --   --   --   --  3.0*   PROTTOTAL  --   --   --   --  5.8*   BILITOTAL  --   --   --   --  0.3   ALKPHOS  --   --   --   --  81   ALT  --   --   --   --  6   AST  --   --   --   --  20     Recent Labs   Lab Test 02/22/25  0008 02/21/25 2053 02/20/25  1026 02/19/25 2024 02/19/25  1826 07/12/24  2147 03/19/24  1232 03/12/24  2119 03/12/24  1852   NT-PROBNP, INPATIENT  --   --   --   --  2,169*  --  3,403*  --  3,144*   TROPONIN T HIGH SENSITIVITY 20 21 20   < > 15   < > 13   < > 16    < > = values in this interval not displayed.     Recent Labs   Lab 02/22/25 0827 02/21/25  0746 02/20/25  0812 02/19/25  1826   GLC 90 84 73 86     Recent Labs   Lab Test 04/28/22  0655 04/07/22  0735   A1C 5.5 5.6       No results for input(s): \"INR\", \"ORQBCR80OHRS\" in the last 168 hours.  Recent Labs   Lab 02/22/25  0827 02/21/25  0746 02/20/25  0812 02/19/25  1826   WBC 4.2 4.3 3.0* 5.8   CAMILA  --   --   --  19*       MICRO:  Cultures (including blood and urine):  No lab results found in last 7 days.    Recent Results (from the past 24 hours)   XR Chest Port 1 View    Narrative    EXAM: XR " CHEST PORT 1 VIEW  LOCATION: [facility]  DATE: 2/21/2025    INDICATION: RRT  COMPARISON: 2/20/2025      Impression    IMPRESSION: Exam is obtained with patient in an apical kyphotic position. Left hemidiaphragm is not as well visualized possibly due to positioning though left lower lobe infiltrate may also be present. Right lung appears clear. Heart size difficult to   assess likely mildly enlarged.  Previous thoracolumbar fusion and left shoulder arthroplasty.       Medications   All medications were reviewed. MAR.    Infusions:  Current Facility-Administered Medications   Medication Dose Route Frequency Provider Last Rate Last Admin    Continuing beta blocker from home medication list OR beta blocker order already placed during this visit   Does not apply DOES NOT GO TO Ammy Ndiaye MD        Reason ACE/ARB/ARNI order not selected   Other DOES NOT GO TO Ammy Ndiaye MD         Scheduled Medications:  Current Facility-Administered Medications   Medication Dose Route Frequency Provider Last Rate Last Admin    calcium carbonate 500 mg (elemental) (OSCAL) tablet 500 mg  500 mg Oral TID w/meals Levi Acevedo MD   500 mg at 02/22/25 1447    [Held by provider] cyanocobalamin (VITAMIN B-12) tablet 1,000 mcg  1,000 mcg Oral Daily Ammy Sky MD        enoxaparin ANTICOAGULANT (LOVENOX) injection 40 mg  40 mg Subcutaneous Q24H Amym Sky MD   40 mg at 02/22/25 0705    [Held by provider] folic acid (FOLVITE) tablet 1,000 mcg  1,000 mcg Oral Daily Ammy Sky MD   1,000 mcg at 02/20/25 1035    [Held by provider] furosemide (LASIX) half-tab 10 mg  10 mg Oral Daily Ammy Sky MD        furosemide (LASIX) injection 60 mg  60 mg Intravenous BID Rishabh Vazquez MD   60 mg at 02/22/25 0939    gabapentin (NEURONTIN) capsule 100 mg  100 mg Oral At Bedtime Ammy Sky MD   100 mg at 02/21/25 2113    loratadine (CLARITIN) tablet 10 mg  10 mg Oral At  Bedtime Ammy Sky MD   10 mg at 02/20/25 2049    magnesium oxide (MAG-OX) tablet 400 mg  400 mg Oral Q4H Levi Acevedo MD   400 mg at 02/22/25 1443    pantoprazole (PROTONIX) 2 mg/mL suspension 40 mg  40 mg Oral BID Ammy Sky MD   40 mg at 02/22/25 0939    sodium chloride (PF) 0.9% PF flush 3 mL  3 mL Intracatheter Q8H Ammy Sky MD   3 mL at 02/21/25 2117    Vitamin D3 (CHOLECALCIFEROL) tablet 25 mcg  25 mcg Oral Daily Levi Acevedo MD   25 mcg at 02/22/25 0939     PRN Medications:  Current Facility-Administered Medications   Medication Dose Route Frequency Provider Last Rate Last Admin    acetaminophen (TYLENOL) tablet 650 mg  650 mg Oral Q4H PRN Ammy Sky MD   650 mg at 02/22/25 1443    Or    acetaminophen (TYLENOL) Suppository 650 mg  650 mg Rectal Q4H PRN Ammy Sky MD        benzocaine-menthol (CHLORASEPTIC) 6-10 MG lozenge 1 lozenge  1 lozenge Buccal Q1H PRN Ammy Sky MD   1 lozenge at 02/22/25 1121    calcium carbonate (TUMS) chewable tablet 1,000 mg  1,000 mg Oral 4x Daily PRN Ammy Sky MD        Continuing beta blocker from home medication list OR beta blocker order already placed during this visit   Does not apply DOES NOT GO TO Ammy Ndiaye MD        hydrOXYzine HCl (ATARAX) tablet 10 mg  10 mg Oral TID PRN Antelmo García NP   10 mg at 02/22/25 1443    lidocaine (LMX4) cream   Topical Q1H PRN Ammy Sky MD        lidocaine 1 % 0.1-1 mL  0.1-1 mL Other Q1H PRN Ammy Sky MD        melatonin tablet 1 mg  1 mg Oral At Bedtime PRN Ammy Sky MD        menthol-zinc oxide (CALMOSEPTINE) 0.44-20.6 % ointment OINT   Topical 4x Daily PRN Ammy Sky MD        nitroGLYcerin (NITROSTAT) sublingual tablet 0.4 mg  0.4 mg Sublingual Q5 Min PRN Antelmo García, NP        ondansetron (ZOFRAN ODT) ODT tab 4 mg  4 mg Oral Q6H PRN Ammy Sky MD        Or    ondansetron (ZOFRAN) injection 4 mg  4 mg Intravenous Q6H PRN Jose Daniel,  Ammy STREET MD        polyethylene glycol (MIRALAX) Packet 17 g  17 g Oral BID Ammy Chapman MD        QUEtiapine (SEROquel) half-tab 12.5 mg  12.5 mg Oral BID Ammy Chapman MD   12.5 mg at 02/20/25 1155    Reason ACE/ARB/ARNI order not selected   Other DOES NOT GO TO Ammy Ndiaye MD        senna-docusate (SENOKOT-S/PERICOLACE) 8.6-50 MG per tablet 1 tablet  1 tablet Oral BID Ammy Chapman MD        Or    senna-docusate (SENOKOT-S/PERICOLACE) 8.6-50 MG per tablet 2 tablet  2 tablet Oral BID Ammy Chapman MD        sodium chloride (PF) 0.9% PF flush 3 mL  3 mL Intracatheter q1 min Ammy Chapman MD

## 2025-02-22 NOTE — PROGRESS NOTES
Cardiology Progress Note          Assessment and Plan:     84 year old male with past medical history significant for permanent atrial fibrillation, moderate aortic stenosis, hypertension, chronic peripheral edema, moderate aortic root dilatation, frequent falls, and alcohol dependence admitted on 2/19/2025 with increased swelling. Cardiology was consulted for decompensated heart failure     Assessment:  Acute on chronic HFpEF (LVEF 55% on 7/2024 TTE), severely volume overloaded on admission, NYHA III  NTproBNP 2, 169  Echocardiogram shows LVEF 60-65% with mild RV dilation, 2+ tricuspid regurgitation and biatrial enlargement  Wt on admission 215 (no other weights recorded)  -2.5 L yesterday, but no inputs recorded   Permanent AF with intermittently slow ventricular response, without worrisome smair-arrhythmia on last Zio, not on anti-coagulation/no LAAO due to patient refusal  Heart rate trends on telemetry upper 40s-70 bpm.  No tachycardia seen in past 24 hrs  Mild-moderate aortic stenosis  Echo 2/20/2025: aortic valve has mild AI and mild aortic stenosis with mean gradient 10.3 mmHg  Ascending aortic aneurysm (4.8 cm on 7/2024 TTE)  Echo 2/20/2025 aortic root measures 4.5 cm and ascending aorta measures 4.8 cm, stable  Mild pulmonary hypertension by echo  Dementia  Prostate cancer  Alcohol use disorder     Plan:   Continue diuresis with with goal net negative 2-3 L/day  Agree with lower extremity compression stockings  Daily standing weights/strict I & Os  -- these have not been done, reordered   Replace electrolytes to maintain K > 4 and Mag > 2  Continue to monitor on telemetry  Consider addition to SGLT2i if patient agreeable  Cardiology will continue to follow       Sriram Dobbins MD            Interval History:     He is feeling well this morning.  States he had chest pain and shortness of breath when moving from the bed to the chair, but now resolved.  He feels his leg swelling has improved significantly.  Main  complaints are related to his diet -- does not like the low sodium diet and thickeners that are in all his drinks.   He stated that he was brought to the hospital because he could not get back into a car after he had a business meeting.         Medications:       Current Facility-Administered Medications   Medication Dose Route Frequency Provider Last Rate Last Admin    acetaminophen (TYLENOL) tablet 650 mg  650 mg Oral Q4H PRN Ammy Sky MD   650 mg at 02/22/25 0342    Or    acetaminophen (TYLENOL) Suppository 650 mg  650 mg Rectal Q4H PRN Ammy Sky MD        benzocaine-menthol (CHLORASEPTIC) 6-10 MG lozenge 1 lozenge  1 lozenge Buccal Q1H PRN Ammy Sky MD        calcium carbonate (TUMS) chewable tablet 1,000 mg  1,000 mg Oral 4x Daily PRN Ammy Sky MD        calcium carbonate 500 mg (elemental) (OSCAL) tablet 500 mg  500 mg Oral TID w/meals Levi Acevedo MD   500 mg at 02/21/25 1834    Continuing beta blocker from home medication list OR beta blocker order already placed during this visit   Does not apply DOES NOT GO TO Ammy Ndiaye MD        [Held by provider] cyanocobalamin (VITAMIN B-12) tablet 1,000 mcg  1,000 mcg Oral Daily Ammy Sky MD        enoxaparin ANTICOAGULANT (LOVENOX) injection 40 mg  40 mg Subcutaneous Q24H Ammy Sky MD   40 mg at 02/22/25 0705    [Held by provider] folic acid (FOLVITE) tablet 1,000 mcg  1,000 mcg Oral Daily Ammy Sky MD   1,000 mcg at 02/20/25 1035    [Held by provider] furosemide (LASIX) half-tab 10 mg  10 mg Oral Daily Ammy Sky MD        furosemide (LASIX) injection 60 mg  60 mg Intravenous BID Rishabh Vazquez MD   60 mg at 02/21/25 1836    gabapentin (NEURONTIN) capsule 100 mg  100 mg Oral At Bedtime Ammy Sky MD   100 mg at 02/21/25 2113    hydrOXYzine HCl (ATARAX) tablet 10 mg  10 mg Oral TID PRN Antelmo García, NP        iron sucrose (VENOFER) 300 mg in sodium chloride 0.9 % 290 mL  intermittent infusion  300 mg Intravenous Daily Levi Acevedo .3 mL/hr at 02/21/25 1849 Restarted at 02/21/25 1849    lidocaine (LMX4) cream   Topical Q1H PRN Ammy Sky MD        lidocaine 1 % 0.1-1 mL  0.1-1 mL Other Q1H PRN Ammy Sky MD        loratadine (CLARITIN) tablet 10 mg  10 mg Oral At Bedtime Ammy Sky MD   10 mg at 02/20/25 2049    melatonin tablet 1 mg  1 mg Oral At Bedtime PRN Ammy Sky MD        menthol-zinc oxide (CALMOSEPTINE) 0.44-20.6 % ointment OINT   Topical 4x Daily PRN Ammy Sky MD        nitroGLYcerin (NITROSTAT) sublingual tablet 0.4 mg  0.4 mg Sublingual Q5 Min PRN Antelmo García, NP        ondansetron (ZOFRAN ODT) ODT tab 4 mg  4 mg Oral Q6H PRN Ammy Sky MD        Or    ondansetron (ZOFRAN) injection 4 mg  4 mg Intravenous Q6H PRN Ammy Sky MD        pantoprazole (PROTONIX) 2 mg/mL suspension 40 mg  40 mg Oral BID Ammy Sky MD   40 mg at 02/22/25 0104    polyethylene glycol (MIRALAX) Packet 17 g  17 g Oral BID PRN Ammy Sky MD        QUEtiapine (SEROquel) half-tab 12.5 mg  12.5 mg Oral BID PRN Ammy Sky MD   12.5 mg at 02/20/25 8866    Reason ACE/ARB/ARNI order not selected   Other DOES NOT GO TO Ammy Ndiaye MD        senna-docusate (SENOKOT-S/PERICOLACE) 8.6-50 MG per tablet 1 tablet  1 tablet Oral BID PRAmmy oH MD        Or    senna-docusate (SENOKOT-S/PERICOLACE) 8.6-50 MG per tablet 2 tablet  2 tablet Oral BID PRAmmy Ho MD        sodium chloride (PF) 0.9% PF flush 3 mL  3 mL Intracatheter Q8H Ammy Sky MD   3 mL at 02/21/25 2117    sodium chloride (PF) 0.9% PF flush 3 mL  3 mL Intracatheter q1 min prn Ammy Sky MD        Vitamin D3 (CHOLECALCIFEROL) tablet 25 mcg  25 mcg Oral Daily Levi Acevedo MD   25 mcg at 02/21/25 1527             Review of Systems:   The Review of Systems is negative other than noted in the HPI             Physical Exam:   /57  (BP Location: Right arm)   Pulse 61   Temp 98.1  F (36.7  C) (Oral)   Resp 18   Wt 97.5 kg (215 lb)   SpO2 100%   BMI 32.69 kg/m        Vital Sign Ranges  Temperature Temp  Av.8  F (36.6  C)  Min: 97.6  F (36.4  C)  Max: 98.1  F (36.7  C)   Blood pressure Systolic (24hrs), Av , Min:131 , Max:165        Diastolic (24hrs), Av, Min:55, Max:84      Pulse Pulse  Av.7  Min: 61  Max: 69   Respirations Resp  Av  Min: 18  Max: 18   Pulse oximetry SpO2  Av.7 %  Min: 94 %  Max: 100 %         Intake/Output Summary (Last 24 hours) at 2025 0913  Last data filed at 2025 0611  Gross per 24 hour   Intake --   Output 2500 ml   Net -2500 ml         Constitutional: No apparent distress, sitting up eating  Respiratory: Breathing non-labored.  Cardiovascular:  Irregular   Extremities: Bilateral LE edema, legs wrapped   Neurologic: Alert, awake, answers questions appropriately.  Psychiatric: Affect appropriate.               Data:     Telemetry: Atrial fibrillation rates 60-70s, nocturnal bradycardia down to 30 bpm and pauses up to 2.2 seconds     Last Comprehensive Metabolic Panel:  Lab Results   Component Value Date     2025    POTASSIUM 3.8 2025    CHLORIDE 96 (L) 2025    CO2 38 (H) 2025    ANIONGAP 7 2025    GLC 90 2025    BUN 13.0 2025    CR 0.70 2025    GFRESTIMATED >90 2025    JOSUE 8.5 (L) 2025       CBC RESULTS:   Recent Labs   Lab Test 25  0827   WBC 4.2   RBC 3.21*   HGB 7.5*   HCT 24.9*   MCV 78   MCH 23.4*   MCHC 30.1*   RDW 17.2*

## 2025-02-23 ENCOUNTER — APPOINTMENT (OUTPATIENT)
Dept: PHYSICAL THERAPY | Facility: CLINIC | Age: 85
DRG: 291 | End: 2025-02-23
Payer: COMMERCIAL

## 2025-02-23 ENCOUNTER — APPOINTMENT (OUTPATIENT)
Dept: OCCUPATIONAL THERAPY | Facility: CLINIC | Age: 85
DRG: 291 | End: 2025-02-23
Payer: COMMERCIAL

## 2025-02-23 LAB
ACANTHOCYTES BLD QL SMEAR: SLIGHT
ANION GAP SERPL CALCULATED.3IONS-SCNC: 6 MMOL/L (ref 7–15)
BASOPHILS # BLD AUTO: 0 10E3/UL (ref 0–0.2)
BASOPHILS NFR BLD AUTO: 1 %
BUN SERPL-MCNC: 14.4 MG/DL (ref 8–23)
CALCIUM SERPL-MCNC: 8.5 MG/DL (ref 8.8–10.4)
CHLORIDE SERPL-SCNC: 94 MMOL/L (ref 98–107)
CREAT SERPL-MCNC: 0.73 MG/DL (ref 0.67–1.17)
EGFRCR SERPLBLD CKD-EPI 2021: 90 ML/MIN/1.73M2
ELLIPTOCYTES BLD QL SMEAR: SLIGHT
EOSINOPHIL # BLD AUTO: 0.2 10E3/UL (ref 0–0.7)
EOSINOPHIL NFR BLD AUTO: 5 %
ERYTHROCYTE [DISTWIDTH] IN BLOOD BY AUTOMATED COUNT: 17.2 % (ref 10–15)
GLUCOSE SERPL-MCNC: 99 MG/DL (ref 70–99)
HCO3 SERPL-SCNC: 39 MMOL/L (ref 22–29)
HCT VFR BLD AUTO: 25 % (ref 40–53)
HGB BLD-MCNC: 7.3 G/DL (ref 13.3–17.7)
IMM GRANULOCYTES # BLD: 0 10E3/UL
IMM GRANULOCYTES NFR BLD: 1 %
LYMPHOCYTES # BLD AUTO: 1.3 10E3/UL (ref 0.8–5.3)
LYMPHOCYTES NFR BLD AUTO: 31 %
MAGNESIUM SERPL-MCNC: 2.1 MG/DL (ref 1.7–2.3)
MCH RBC QN AUTO: 23.2 PG (ref 26.5–33)
MCHC RBC AUTO-ENTMCNC: 29.2 G/DL (ref 31.5–36.5)
MCV RBC AUTO: 80 FL (ref 78–100)
MONOCYTES # BLD AUTO: 0.7 10E3/UL (ref 0–1.3)
MONOCYTES NFR BLD AUTO: 18 %
NEUTROPHILS # BLD AUTO: 1.8 10E3/UL (ref 1.6–8.3)
NEUTROPHILS NFR BLD AUTO: 45 %
NRBC # BLD AUTO: 0 10E3/UL
NRBC BLD AUTO-RTO: 1 /100
PLAT MORPH BLD: ABNORMAL
PLATELET # BLD AUTO: 178 10E3/UL (ref 150–450)
POTASSIUM SERPL-SCNC: 3.7 MMOL/L (ref 3.4–5.3)
RBC # BLD AUTO: 3.14 10E6/UL (ref 4.4–5.9)
RBC MORPH BLD: ABNORMAL
SODIUM SERPL-SCNC: 139 MMOL/L (ref 135–145)
TARGETS BLD QL SMEAR: SLIGHT
WBC # BLD AUTO: 4 10E3/UL (ref 4–11)

## 2025-02-23 PROCEDURE — 250N000013 HC RX MED GY IP 250 OP 250 PS 637: Performed by: INTERNAL MEDICINE

## 2025-02-23 PROCEDURE — 250N000013 HC RX MED GY IP 250 OP 250 PS 637

## 2025-02-23 PROCEDURE — 97530 THERAPEUTIC ACTIVITIES: CPT | Mod: GO

## 2025-02-23 PROCEDURE — 250N000011 HC RX IP 250 OP 636: Performed by: INTERNAL MEDICINE

## 2025-02-23 PROCEDURE — 99233 SBSQ HOSP IP/OBS HIGH 50: CPT | Performed by: INTERNAL MEDICINE

## 2025-02-23 PROCEDURE — 36415 COLL VENOUS BLD VENIPUNCTURE: CPT | Performed by: INTERNAL MEDICINE

## 2025-02-23 PROCEDURE — 97535 SELF CARE MNGMENT TRAINING: CPT | Mod: GO

## 2025-02-23 PROCEDURE — 80048 BASIC METABOLIC PNL TOTAL CA: CPT | Performed by: INTERNAL MEDICINE

## 2025-02-23 PROCEDURE — 85004 AUTOMATED DIFF WBC COUNT: CPT | Performed by: INTERNAL MEDICINE

## 2025-02-23 PROCEDURE — 120N000001 HC R&B MED SURG/OB

## 2025-02-23 PROCEDURE — 84132 ASSAY OF SERUM POTASSIUM: CPT | Performed by: INTERNAL MEDICINE

## 2025-02-23 PROCEDURE — 85014 HEMATOCRIT: CPT | Performed by: INTERNAL MEDICINE

## 2025-02-23 PROCEDURE — 97140 MANUAL THERAPY 1/> REGIONS: CPT | Mod: GP

## 2025-02-23 PROCEDURE — 83735 ASSAY OF MAGNESIUM: CPT | Performed by: INTERNAL MEDICINE

## 2025-02-23 RX ORDER — POTASSIUM CHLORIDE 1.5 G/1.58G
20 POWDER, FOR SOLUTION ORAL ONCE
Status: COMPLETED | OUTPATIENT
Start: 2025-02-23 | End: 2025-02-23

## 2025-02-23 RX ORDER — ASPIRIN 81 MG/1
81 TABLET ORAL DAILY
Status: DISCONTINUED | OUTPATIENT
Start: 2025-02-23 | End: 2025-02-28 | Stop reason: HOSPADM

## 2025-02-23 RX ORDER — FUROSEMIDE 40 MG/1
80 TABLET ORAL
Status: DISCONTINUED | OUTPATIENT
Start: 2025-02-23 | End: 2025-02-24

## 2025-02-23 RX ORDER — FUROSEMIDE 40 MG/1
40 TABLET ORAL
Status: DISCONTINUED | OUTPATIENT
Start: 2025-02-23 | End: 2025-02-23

## 2025-02-23 RX ADMIN — CALCIUM 500 MG: 500 TABLET ORAL at 18:19

## 2025-02-23 RX ADMIN — FUROSEMIDE 10 MG: 10 INJECTION, SOLUTION INTRAVENOUS at 10:02

## 2025-02-23 RX ADMIN — LORATADINE 10 MG: 10 TABLET ORAL at 18:26

## 2025-02-23 RX ADMIN — ASPIRIN 81 MG: 81 TABLET, COATED ORAL at 13:39

## 2025-02-23 RX ADMIN — HYDROXYZINE HYDROCHLORIDE 10 MG: 10 TABLET ORAL at 17:15

## 2025-02-23 RX ADMIN — Medication 25 MCG: at 10:02

## 2025-02-23 RX ADMIN — Medication 1 MG: at 02:34

## 2025-02-23 RX ADMIN — Medication 40 MG: at 10:01

## 2025-02-23 RX ADMIN — ENOXAPARIN SODIUM 40 MG: 40 INJECTION SUBCUTANEOUS at 05:45

## 2025-02-23 RX ADMIN — Medication 1 LOZENGE: at 13:46

## 2025-02-23 RX ADMIN — POTASSIUM CHLORIDE 20 MEQ: 1.5 POWDER, FOR SOLUTION ORAL at 13:40

## 2025-02-23 RX ADMIN — FUROSEMIDE 80 MG: 40 TABLET ORAL at 16:39

## 2025-02-23 RX ADMIN — DICLOFENAC 2 G: 10 GEL TOPICAL at 15:01

## 2025-02-23 RX ADMIN — CALCIUM 500 MG: 500 TABLET ORAL at 13:40

## 2025-02-23 RX ADMIN — ACETAMINOPHEN 650 MG: 325 TABLET, FILM COATED ORAL at 05:54

## 2025-02-23 RX ADMIN — CALCIUM 500 MG: 500 TABLET ORAL at 10:02

## 2025-02-23 RX ADMIN — GABAPENTIN 100 MG: 100 CAPSULE ORAL at 18:20

## 2025-02-23 RX ADMIN — HYDROXYZINE HYDROCHLORIDE 10 MG: 10 TABLET ORAL at 05:45

## 2025-02-23 RX ADMIN — Medication 1 LOZENGE: at 10:02

## 2025-02-23 RX ADMIN — ACETAMINOPHEN 650 MG: 325 TABLET, FILM COATED ORAL at 17:16

## 2025-02-23 ASSESSMENT — ACTIVITIES OF DAILY LIVING (ADL)
ADLS_ACUITY_SCORE: 61
ADLS_ACUITY_SCORE: 65
ADLS_ACUITY_SCORE: 61
ADLS_ACUITY_SCORE: 61
ADLS_ACUITY_SCORE: 65
ADLS_ACUITY_SCORE: 60
ADLS_ACUITY_SCORE: 61
ADLS_ACUITY_SCORE: 65
ADLS_ACUITY_SCORE: 65
ADLS_ACUITY_SCORE: 61
ADLS_ACUITY_SCORE: 61
ADLS_ACUITY_SCORE: 65
ADLS_ACUITY_SCORE: 61
ADLS_ACUITY_SCORE: 65
ADLS_ACUITY_SCORE: 61
ADLS_ACUITY_SCORE: 60
ADLS_ACUITY_SCORE: 61
ADLS_ACUITY_SCORE: 61

## 2025-02-23 NOTE — PROGRESS NOTES
Cardiology Progress Note          Assessment and Plan:     84 year old male with past medical history significant for permanent atrial fibrillation, moderate aortic stenosis, hypertension, chronic peripheral edema, moderate aortic root dilatation, frequent falls, and alcohol dependence admitted on 2/19/2025 with increased leg swelling. Cardiology was consulted for decompensated heart failure     Assessment:  Acute on chronic HFpEF (LVEF 55% on 7/2024 TTE), severely volume overloaded on admission, NYHA III  NTproBNP 2169  Echocardiogram shows LVEF 60-65% with mild RV dilation, 2+ tricuspid regurgitation and biatrial enlargement  I/O and weights were not being recorded until 2/22  Wt on admission 215  --> 210 lbs  Recorded -7 L since admission, but inputs were not consistently recorded   Permanent AF with intermittently slow ventricular response, without worrisome samir-arrhythmia on last Zio, not on anti-coagulation/no LAAO due to patient refusal    Mild-moderate aortic stenosis  Echo 2/20/2025: aortic valve has mild AI and mild aortic stenosis with mean gradient 10.3 mmHg  Ascending aortic aneurysm (4.8 cm on 7/2024 TTE)  Echo 2/20/2025 aortic root measures 4.5 cm and ascending aorta measures 4.8 cm, stable  Mild pulmonary hypertension by echo  Acute on chronic anemia - Hgb 8.1 on admission, now down to 7.3  Dementia  Prostate cancer  Alcohol use disorder       Plan:   Continue diuresis, switching to PO Lasix 80 mg BID today because of no IV access, can switch to torsemide if we do not get good response. Goal net negative 2-3 L/day  Daily standing weights/strict I & Os  -- these have not been done, reordered   Replace electrolytes to maintain K > 4 and Mag > 2  Consider addition to SGLT2i if patient agreeable        Sriram Dobbins MD            Interval History:     Lost IV access and patient declined central line.  Planning to change diuretics to PO.    Telemetry: Afib rate 40-70's         Medications:       Current  Facility-Administered Medications   Medication Dose Route Frequency Provider Last Rate Last Admin    acetaminophen (TYLENOL) tablet 650 mg  650 mg Oral Q4H PRN Ammy Sky MD   650 mg at 02/23/25 0554    Or    acetaminophen (TYLENOL) Suppository 650 mg  650 mg Rectal Q4H PRN Ammy Syk MD        aspirin EC tablet 81 mg  81 mg Oral Daily Nazario Mares MD        benzocaine-menthol (CHLORASEPTIC) 6-10 MG lozenge 1 lozenge  1 lozenge Buccal Q1H PRN Ammy Sky MD   1 lozenge at 02/23/25 1002    calcium carbonate (TUMS) chewable tablet 1,000 mg  1,000 mg Oral 4x Daily PRN Ammy Sky MD        calcium carbonate 500 mg (elemental) (OSCAL) tablet 500 mg  500 mg Oral TID w/meals Levi Acevedo MD   500 mg at 02/23/25 1002    Continuing beta blocker from home medication list OR beta blocker order already placed during this visit   Does not apply DOES NOT GO TO Ammy Ndiaye MD        [Held by provider] cyanocobalamin (VITAMIN B-12) tablet 1,000 mcg  1,000 mcg Oral Daily Ammy Sky MD        diclofenac (VOLTAREN) 1 % topical gel 2 g  2 g Topical Q6H PRN Nazario Mares MD        enoxaparin ANTICOAGULANT (LOVENOX) injection 40 mg  40 mg Subcutaneous Q24H Ammy Sky MD   40 mg at 02/23/25 0545    ferrous sulfate (FEROSUL) tablet 325 mg  325 mg Oral Every Other Day Nazario Mares MD   325 mg at 02/22/25 2009    [Held by provider] folic acid (FOLVITE) tablet 1,000 mcg  1,000 mcg Oral Daily Ammy Sky MD   1,000 mcg at 02/20/25 1035    [Held by provider] furosemide (LASIX) half-tab 10 mg  10 mg Oral Daily Ammy Sky MD        furosemide (LASIX) tablet 80 mg  80 mg Oral BID Nazario Mares MD        gabapentin (NEURONTIN) capsule 100 mg  100 mg Oral At Bedtime Ammy Sky MD   100 mg at 02/22/25 2009    hydrOXYzine HCl (ATARAX) tablet 10 mg  10 mg Oral TID PRN Antelmo García NP   10 mg at 02/23/25 0545    lidocaine (LMX4) cream   Topical Q1H  PRN Ammy Sky MD        lidocaine 1 % 0.1-1 mL  0.1-1 mL Other Q1H PRN Ammy Sky MD        loratadine (CLARITIN) tablet 10 mg  10 mg Oral At Bedtime Ammy Sky MD   10 mg at 02/22/25 2009    melatonin tablet 1 mg  1 mg Oral At Bedtime PRN Ammy Sky MD   1 mg at 02/23/25 0234    menthol-zinc oxide (CALMOSEPTINE) 0.44-20.6 % ointment OINT   Topical 4x Daily PRN Ammy Sky MD        nitroGLYcerin (NITROSTAT) sublingual tablet 0.4 mg  0.4 mg Sublingual Q5 Min PRN Antelmo García, NP        ondansetron (ZOFRAN ODT) ODT tab 4 mg  4 mg Oral Q6H PRN Ammy Sky MD        Or    ondansetron (ZOFRAN) injection 4 mg  4 mg Intravenous Q6H PRN Ammy Sky MD        pantoprazole (PROTONIX) 2 mg/mL suspension 40 mg  40 mg Oral BID Ammy Sky MD   40 mg at 02/23/25 1001    polyethylene glycol (MIRALAX) Packet 17 g  17 g Oral BID PRN Ammy Sky MD   17 g at 02/22/25 1650    QUEtiapine (SEROquel) half-tab 12.5 mg  12.5 mg Oral BID PRN Ammy Sky MD   12.5 mg at 02/22/25 2307    Reason ACE/ARB/ARNI order not selected   Other DOES NOT GO TO Ammy Ndiaye MD        senna-docusate (SENOKOT-S/PERICOLACE) 8.6-50 MG per tablet 1 tablet  1 tablet Oral BID PRAmmy Ho MD        Or    senna-docusate (SENOKOT-S/PERICOLACE) 8.6-50 MG per tablet 2 tablet  2 tablet Oral BID PRAmmy Ho MD        sodium chloride (PF) 0.9% PF flush 3 mL  3 mL Intracatheter Q8H Ammy Sky MD   3 mL at 02/23/25 1004    sodium chloride (PF) 0.9% PF flush 3 mL  3 mL Intracatheter q1 min prn Ammy Sky MD        Vitamin D3 (CHOLECALCIFEROL) tablet 25 mcg  25 mcg Oral Daily Levi Acevedo MD   25 mcg at 02/23/25 1002             Review of Systems:   The Review of Systems is negative other than noted in the HPI             Physical Exam:   /53 (BP Location: Right arm)   Pulse 63   Temp 97.5  F (36.4  C) (Oral)   Resp 18   Wt 95.7 kg (210 lb 15.7 oz)   SpO2 96%    BMI 32.08 kg/m        Vital Sign Ranges  Temperature Temp  Av.5  F (36.4  C)  Min: 97.2  F (36.2  C)  Max: 97.8  F (36.6  C)   Blood pressure Systolic (24hrs), Av , Min:108 , Max:150        Diastolic (24hrs), Av, Min:53, Max:60      Pulse Pulse  Av  Min: 57  Max: 73   Respirations Resp  Av  Min: 18  Max: 18   Pulse oximetry SpO2  Av.8 %  Min: 92 %  Max: 100 %         Intake/Output Summary (Last 24 hours) at 2025 1153  Last data filed at 2025 0500  Gross per 24 hour   Intake 720 ml   Output 1950 ml   Net -1230 ml         Constitutional: Chronically ill appearing, no apparent distress.  Respiratory: Breathing non-labored.   Cardiovascular: Irregular   Extremities: Severe bilateral LE edema   Neurologic: No gross motor deficits.   Psychiatric: Affect appropriate.               Data:       Last Comprehensive Metabolic Panel:  Lab Results   Component Value Date     2025    POTASSIUM 3.7 2025    CHLORIDE 94 (L) 2025    CO2 39 (H) 2025    ANIONGAP 6 (L) 2025    GLC 99 2025    BUN 14.4 2025    CR 0.73 2025    GFRESTIMATED 90 2025    JOSUE 8.5 (L) 2025       CBC RESULTS:   Recent Labs   Lab Test 25  0729   WBC 4.0   RBC 3.14*   HGB 7.3*   HCT 25.0*   MCV 80   MCH 23.2*   MCHC 29.2*   RDW 17.2*

## 2025-02-23 NOTE — PROGRESS NOTES
[facility]    Internal Medicine Hospitalist Progress Note  02/23/2025  I evaluated patient on the above date.    Nazario Mares Jr., MD  [phone] (p)  Text Page  Vocera      [New actions/orders today (02/23/2025) are underlined in the assessment and plan. All lab results in the assessment and plan were reviewed.]  Assessment & Plan   Jamie Valdivia is a delightful 84 year old male with history including mild dementia; HTN; chronic afib (has declined anticoagulation); GERD, h/o Vickers's esophagus; chronic anemia; chronic pharyngeal dysphagia; peripheral neuropathy; OA; chronic spine disease; osteoporosis; h/o prostate cancer; h/o alcohol use d/o (no longer drinking); h/o falls; who presented from [facility] 2/19/2025 with increased lower extremity swelling and weight gain and found with acute decompensated heart failure.     On initial evaluation, pt was afebrile, hypertensive, sats 90%. ECG showed afib with CVR, no acute ischemic changes. Labs notable for CBC with WBC normal, hgb 8.1; BMP with calcium 6.8 (ionized calcium 4.3), otherwise unremarkable; LFT's with albumin 3.0, protein 5.8, otherwise normal; NTP-BNP 2169, troponin normal.    CXR showed left hemidiaphragm remained elevated compared to the right and overall the lungs had shallow inflation, chronic blunting of the right posterior costophrenic sulcus could relate to scarring or minimal fluid but unchanged (comparison 7/2024).        Acute CHF exacerbation (HFpEF).  Moderate to severe tricuspid regurgitation; mild AS.  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  Permanent atrial fibrillation with intermittent slow/controlled ventricular response.  Chronic lower extremity edema.  Permanent atrial fibrillation with intermittent slow ventricular response.  [PTA: furosemide 10 mg daily.]  * Echo 7/2024 showed LVEF 55%; RV OK; mild pHTN; mild AS; ascending aortic dilatation 4.8 cm, sinus 4.4 cm. Patient has declined  anticoagulation; and has declined LAAO as well.  * Initial presentation as above. Wt 215 lbs on admit. Presented with worsening lower extremity edema and weight gain. Started on IV furosemide on admit. Cardiology consulted on admit.   * 2/20: CXR showed possible mildly increased edema, otherwise stable. Echo showed LVEF 6-65%; RV mildly dilated, RV systolic function normal; moderate-severe TR; mild AR, mild AS; ascending aortic aneurysm 4.8 cm, aortic root aneurysm 4.5 cm; pHTN.  * 2/21: CXR showed possible LLL infiltrate, right lung clear. IV furosemide increased.  * 2/22: Feeling better, feels legs less swollen.  Vitals:    02/19/25 2135 02/22/25 2100 02/23/25 0545   Weight: 97.5 kg (215 lb) 91.4 kg (201 lb 8 oz) 95.7 kg (210 lb 15.7 oz)     I/O last 3 completed shifts:  In: 720 [P.O.:720]  Out: 1950 [Urine:1950]  Recent Labs   Lab 02/23/25  0729 02/22/25  0827 02/22/25  0008 02/21/25 2053 02/21/25  0746 02/20/25  1035 02/20/25  1026 02/20/25  0812 02/19/25 2024 02/19/25  1826    141  --   --  140  --   --  141  --  140   CO2 39* 38*  --   --  28  --   --  31*  --  23   BUN 14.4 13.0  --   --  15.3  --   --  13.0  --  11.0   CR 0.73 0.70  --   --  0.75  --   --  0.71  --  0.54*   POTASSIUM 3.7 3.8  --  4.1 3.8  3.8  --   --  4.3  --  4.0   CTROPT  --   --  20 21  --   --  20 19   < > 15   NTBNPI  --   --   --   --   --   --   --   --   --  2,169*   LVEF  --   --   --   --   --  60-65%  --   --   --   --     < > = values in this interval not displayed.   - Lost IV, difficult IV access, pt does not want midline, will change diuretics to PO.  - Discontinue furosemide 60 mg IV 3 times daily and start PO furosemide 80 mg BID, if not effective, then consider PO torsemide, I d/w Dr. Dobbins 2/23.  - Continue 2L fluid restriction.  - Continue to monitor on telemetry.  - Continue to monitor i/o's, daily wts.  - Appreciate Cardiology help.     Hypocalcemia, non-severe, asymptomatic   Vitamin D deficiency.  * Initial  presentation as above. Calcium (including ionized) low on admit. Vitamin D level low on admit.  * Started on calcium and vitamin D this hospitalization.  Recent Labs   Lab Test 02/23/25  0729 02/22/25  0827 02/21/25  0746 02/20/25  0812 02/19/25  2115 02/19/25  1826 12/14/24  0148 09/12/24  1248 08/09/24  0600 07/13/24  0535 07/12/24  2147 06/27/24  1118 06/20/24  0747 04/17/24  0915 04/11/24  0724 03/21/24  0651 03/12/24  1852 06/07/23  0957 02/16/23  0905 01/05/23  1000   CALCIUM, TOTAL 8.5* 8.5* 8.7* 8.9  --  6.8* 9.0 9.0 9.1   < > 8.4*   < > 8.4*   < > 7.7* 8.5*   < > 9.1   < > 8.8   CALCIUM IONIZED WHOLE BLOOD  --   --   --   --  4.3*  --   --   --   --   --   --   --   --   --   --   --   --   --   --   --    ALBUMIN  --   --   --   --   --   --   --   --   --   --   --   --   --   --   --   --   --   --   --  3.6   ALBUMIN (R)  --   --   --   --   --  3.0*  --  3.7  --   --  3.0*  --  2.7*  --  2.7* 2.9*  --  4.0  --   --     < > = values in this interval not displayed.   - Continue calcium and vitamin D.    Iron deficiency anemia.  * Initial presentation as above. Hgb 8.1 on admit. Iron studies on admit showed low iron, iron saturation and ferritin. TSH normal on admit. B12 level normal on admit.  * Started on IV iron 2/21, changed to PO 2/22.  Recent Labs   Lab 02/23/25  0729 02/22/25  0827 02/21/25  0746 02/20/25  0812 02/19/25  1826   HGB 7.3* 7.5* 7.6* 7.5* 8.1*   - Continue ferrous sulfate.  - Continue to monitor CBC - repeat in am.  - Consider prbc transfusion if hgb </= 7.0 or if significant bleeding with hemodynamic instability or if symptomatic.  - Follow-up outpatient.     Weakness and physical deconditioning due to multiple acute and chronic medical issues.  Peripheral neuropathy.  Frequent falls.  * Seen by PT and OT and TCU recommended.  - Continue PTA gabapentin 100 mg at bedtime.  - Continue PT and OT, TCU recommended, but pt favors going back to [facility].    Dementia, mild.  At risk for metabolic  encephalopathy.  * Lives in [facility].  * Scheduled quetiapine not restarted this hospitalization.  - Continue PRN quetiapine 12.5 mg BID.  - Continue to treat other issues as noted.  - Re-orient as needed.  - Maintain normal day/night, sleep/wake cycles.  - Minimize sedating medications as able.    Chronic pharyngeal dysphagia.  Chronic dysphonia.  GERD with Vickers's esophagus.  * EGD 2022 found Vickers's esophagus and him started on PPI twice daily. Seen in clinic by Gastroenterologist Colby Mercado on 2/3/2025 and X-ray esophagram, EGD, ENT referral and speech therapy referral placed at that visit.   * Seen by SLP and started on a dysphagia diet.  * Current Diet: Fluid restriction 2000 ML FLUID (and additional linked orders)  Room Service  Combination Diet Soft and Bite Sized Diet (level 6); Mildly Thick (level 2) (Small sips, 3x swallow for each bite and sip.); 2 gm NA Diet    - Continue the above current diet.  - Advance diet as able per SLP.  - Continue PTA PPI.  - Continue aspiration precautions.    OA with h/o joint replacements.  Chronic spine disease with h/o spine surgery.  Osteoporosis with h/o compression fractures.  * Started on calcium and vitamin D as above.  * 2/23: Pt c/o back pain.  - Continue calcium and vitamin D.  - Order PRN diclofenac gel.    Obesity.  * Body mass index is 32.08 kg/m .  - Needs to continue to pursue aggressive dietary and lifestyle modifications.    H/o Prostate cancer .  * Follows with Dr. Niraj Larry. S/p EBRT without ADT 12/2023.  - Follow-up with Urology    H/o alcohol use disorder, no recent use since moving to [facility] a few years ago.  - Noted.    Clinically Significant Risk Factors          # Hypochloremia: Lowest Cl = 94 mmol/L in last 2 days, will monitor as appropriate      # Hypoalbuminemia: Lowest albumin = 3 g/dL at 2/19/2025  6:26 PM, will monitor as appropriate         # Dementia: noted on problem list        # Obesity: Estimated body mass index is 32.08 kg/m  as  "calculated from the following:    Height as of 2/3/25: 1.727 m (5' 8\").    Weight as of this encounter: 95.7 kg (210 lb 15.7 oz)., PRESENT ON ADMISSION       # Financial/Environmental Concerns:           Diet: Fluid restriction 2000 ML FLUID (and additional linked orders)  Room Service  Combination Diet Soft and Bite Sized Diet (level 6); Mildly Thick (level 2) (Small sips, 3x swallow for each bite and sip.); 2 gm NA Diet    Prophylaxis: PCD's and ambulation  Lucio Catheter: Not present  Lines: None     Code Status: Full Code    Disposition Plan   Medically Ready for Discharge: Anticipated Tomorrow  Expected discharge to transitional care unit (TCU) pending above.    Entered: Nazario Mares MD 02/23/2025, 10:53 AM             Interval History   Doing better.  Bilateral lower extremities feel better, though .  Feels capable to go back home to [facility] tomorrow.    * Data reviewed today: I reviewed all new labs and imaging over the last 24 hours. I personally reviewed no images or ECG's today.    Physical Exam   Most recent vitals:   , Blood pressure 128/53, pulse 63, temperature 97.5  F (36.4  C), temperature source Oral, resp. rate 18, weight 95.7 kg (210 lb 15.7 oz), SpO2 96%. O2 Device: Nasal cannula Oxygen Delivery: 2 LPM  Vitals:    02/19/25 2135 02/22/25 2100 02/23/25 0545   Weight: 97.5 kg (215 lb) 91.4 kg (201 lb 8 oz) 95.7 kg (210 lb 15.7 oz)     Vital signs with ranges:  Temp:  [97.2  F (36.2  C)-97.8  F (36.6  C)] 97.5  F (36.4  C)  Pulse:  [57-73] 63  Resp:  [18] 18  BP: (108-150)/(53-60) 128/53  SpO2:  [92 %-100 %] 96 %  Patient Vitals for the past 24 hrs:   BP Temp Temp src Pulse Resp SpO2 Weight   02/23/25 0734 128/53 -- -- 63 18 96 % --   02/23/25 0545 -- -- -- -- -- -- 95.7 kg (210 lb 15.7 oz)   02/23/25 0139 (!) 150/57 97.5  F (36.4  C) Oral 57 -- 92 % --   02/22/25 2243 137/55 -- -- 60 -- 95 % --   02/22/25 2100 -- -- -- -- -- -- 91.4 kg (201 lb 8 oz)   02/22/25 2056 127/58 97.2  F " (36.2  C) Oral 64 18 100 % --   02/22/25 1537 108/56 97.8  F (36.6  C) Oral 73 18 98 % --   02/22/25 1527 133/60 97.5  F (36.4  C) Oral 61 18 94 % --     I/O's last 24 hours:  I/O last 3 completed shifts:  In: 720 [P.O.:720]  Out: 1950 [Urine:1950]    Constitutional: awake, alert, oriented, pleasant   Head:   Eyes:   ENT:   Neck:   Cardiovascular: regular rate, irregular rhythm, +I/VI systolic murmur, no rubs/gallops  Lungs: diminished in the bases, few wheezes and basilar crackles  Gastrointestinal/Abdomen: soft, non-tender, non-distended, positive bowel sounds  :   Musculoskeletal:   Skin/Extremities:  legs with trace to 1+ edema, very tender, some pitting into thighs  Neurologic:   Psychiatric:   Hematologic/Lymphatic/Immunologic:        Labs reviewed.  Recent Labs   Lab 02/23/25  0729 02/22/25  2255 02/22/25  0827 02/21/25  2053 02/21/25  0746 02/20/25  0812 02/19/25  1826   WBC 4.0  --  4.2  --  4.3   < > 5.8   HGB 7.3*  --  7.5*  --  7.6*   < > 8.1*   MCV 80  --  78  --  78   < > 80     --  171  --  183   < > 189     --  141  --  140   < > 140   POTASSIUM 3.7  --  3.8 4.1 3.8  3.8   < > 4.0   CHLORIDE 94*  --  96*  --  97*   < > 107   CO2 39*  --  38*  --  28   < > 23   BUN 14.4  --  13.0  --  15.3   < > 11.0   CR 0.73  --  0.70  --  0.75   < > 0.54*   ANIONGAP 6*  --  7  --  15   < > 10   JOSUE 8.5*  --  8.5*  --  8.7*   < > 6.8*   GLC 99 102* 90  --  84   < > 86   ALBUMIN  --   --   --   --   --   --  3.0*   PROTTOTAL  --   --   --   --   --   --  5.8*   BILITOTAL  --   --   --   --   --   --  0.3   ALKPHOS  --   --   --   --   --   --  81   ALT  --   --   --   --   --   --  6   AST  --   --   --   --   --   --  20    < > = values in this interval not displayed.     Recent Labs   Lab Test 02/22/25  0008 02/21/25 2053 02/20/25  1026 02/19/25 2024 02/19/25  1826 07/12/24  2147 03/19/24  1232 03/12/24  2119 03/12/24  1852   NT-PROBNP, INPATIENT  --   --   --   --  2,169*  --  3,403*  --  3,144*  "  TROPONIN T HIGH SENSITIVITY 20 21 20   < > 15   < > 13   < > 16    < > = values in this interval not displayed.     Recent Labs   Lab 02/23/25  0729 02/22/25  2255 02/22/25  0827 02/21/25  0746 02/20/25  0812 02/19/25  1826   GLC 99 102* 90 84 73 86     Recent Labs   Lab Test 04/28/22  0655 04/07/22  0735   A1C 5.5 5.6       No results for input(s): \"INR\", \"[id number]\" in the last 168 hours.  Recent Labs   Lab 02/23/25  0729 02/22/25  0827 02/21/25  0746 02/20/25  0812 02/19/25  1826   WBC 4.0 4.2 4.3 3.0* 5.8   CAMILA  --   --   --   --  19*       MICRO:  Cultures (including blood and urine):  No lab results found in last 7 days.    No results found for this or any previous visit (from the past 24 hours).      Medications   All medications were reviewed. MAR.    Infusions:  Current Facility-Administered Medications   Medication Dose Route Frequency Provider Last Rate Last Admin    Continuing beta blocker from home medication list OR beta blocker order already placed during this visit   Does not apply DOES NOT GO TO Ammy Ndiaye MD        Reason ACE/ARB/ARNI order not selected   Other DOES NOT GO TO Ammy Ndiaye MD         Scheduled Medications:  Current Facility-Administered Medications   Medication Dose Route Frequency Provider Last Rate Last Admin    aspirin EC tablet 81 mg  81 mg Oral Daily Nazario Mares MD        calcium carbonate 500 mg (elemental) (OSCAL) tablet 500 mg  500 mg Oral TID w/meals Levi Acevedo MD   500 mg at 02/23/25 1002    [Held by provider] cyanocobalamin (VITAMIN B-12) tablet 1,000 mcg  1,000 mcg Oral Daily Ammy Sky MD        enoxaparin ANTICOAGULANT (LOVENOX) injection 40 mg  40 mg Subcutaneous Q24H Ammy Sky MD   40 mg at 02/23/25 0545    ferrous sulfate (FEROSUL) tablet 325 mg  325 mg Oral Every Other Day Nazario Mares MD   325 mg at 02/22/25 2009    [Held by provider] folic acid (FOLVITE) tablet 1,000 mcg  1,000 mcg Oral Daily Jose Daniel, " Ammy STREET MD   1,000 mcg at 02/20/25 1035    [Held by provider] furosemide (LASIX) half-tab 10 mg  10 mg Oral Daily Ammy Sky MD        furosemide (LASIX) injection 60 mg  60 mg Intravenous BID Rishabh Vazquez MD   10 mg at 02/23/25 1002    gabapentin (NEURONTIN) capsule 100 mg  100 mg Oral At Bedtime Ammy Sky MD   100 mg at 02/22/25 2009    loratadine (CLARITIN) tablet 10 mg  10 mg Oral At Bedtime Ammy Sky MD   10 mg at 02/22/25 2009    pantoprazole (PROTONIX) 2 mg/mL suspension 40 mg  40 mg Oral BID Ammy Sky MD   40 mg at 02/23/25 1001    sodium chloride (PF) 0.9% PF flush 3 mL  3 mL Intracatheter Q8H Ammy Sky MD   3 mL at 02/23/25 1004    Vitamin D3 (CHOLECALCIFEROL) tablet 25 mcg  25 mcg Oral Daily Levi Acevedo MD   25 mcg at 02/23/25 1002     PRN Medications:  Current Facility-Administered Medications   Medication Dose Route Frequency Provider Last Rate Last Admin    acetaminophen (TYLENOL) tablet 650 mg  650 mg Oral Q4H PRN Ammy Sky MD   650 mg at 02/23/25 0554    Or    acetaminophen (TYLENOL) Suppository 650 mg  650 mg Rectal Q4H PRN Ammy Sky MD        benzocaine-menthol (CHLORASEPTIC) 6-10 MG lozenge 1 lozenge  1 lozenge Buccal Q1H PRN Ammy Sky MD   1 lozenge at 02/23/25 1002    calcium carbonate (TUMS) chewable tablet 1,000 mg  1,000 mg Oral 4x Daily PRN Ammy Sky MD        Continuing beta blocker from home medication list OR beta blocker order already placed during this visit   Does not apply DOES NOT GO TO Ammy Ndiaye MD        diclofenac (VOLTAREN) 1 % topical gel 2 g  2 g Topical Q6H PRN Nazario Mares MD        hydrOXYzine HCl (ATARAX) tablet 10 mg  10 mg Oral TID PRN Antelmo García NP   10 mg at 02/23/25 0545    lidocaine (LMX4) cream   Topical Q1H PRN Ammy Sky MD        lidocaine 1 % 0.1-1 mL  0.1-1 mL Other Q1H PRN Ammy Sky MD        melatonin tablet 1 mg  1 mg Oral At Bedtime PRN  Ammy Sky MD   1 mg at 02/23/25 0234    menthol-zinc oxide (CALMOSEPTINE) 0.44-20.6 % ointment OINT   Topical 4x Daily PRN Ammy Sky MD        nitroGLYcerin (NITROSTAT) sublingual tablet 0.4 mg  0.4 mg Sublingual Q5 Min PRN Antelmo García, NP        ondansetron (ZOFRAN ODT) ODT tab 4 mg  4 mg Oral Q6H PRN Ammy Sky MD        Or    ondansetron (ZOFRAN) injection 4 mg  4 mg Intravenous Q6H PRN Ammy Sky MD        polyethylene glycol (MIRALAX) Packet 17 g  17 g Oral BID PRN Ammy Sky MD   17 g at 02/22/25 1650    QUEtiapine (SEROquel) half-tab 12.5 mg  12.5 mg Oral BID PRN Ammy Sky MD   12.5 mg at 02/22/25 2307    Reason ACE/ARB/ARNI order not selected   Other DOES NOT GO TO Ammy Ndiaye MD        senna-docusate (SENOKOT-S/PERICOLACE) 8.6-50 MG per tablet 1 tablet  1 tablet Oral BID PRN Ammy Sky MD        Or    senna-docusate (SENOKOT-S/PERICOLACE) 8.6-50 MG per tablet 2 tablet  2 tablet Oral BID PRAmmy Ho MD        sodium chloride (PF) 0.9% PF flush 3 mL  3 mL Intracatheter q1 min prn Ammy Sky MD

## 2025-02-23 NOTE — PLAN OF CARE
Goal Outcome Evaluation:         Date/Time: 02/23/25, [id number]     Trauma/Ortho/Medical (Choose one): Medical     Diagnosis: Decompensated heart failure, BLEs edema  POD#: N/A  Mental Status: Oriented x 4, anxious, forgetful  Activity/dangle: A x 2 serasteady  Diet: Mechanical soft, 2 gm sodium, 2L FR, mild thicken  Pain: Denies  Lucio/Voiding: External catheter - high output. 1 large BM this shift  Tele/Restraints/Iso: Tele is a-fib with CVR  02/LDA: RA. IV SL  D/C Date: TBD, pt is from care home  Other Info: Patient restless, demanding, argumentative and uncooperative at times. Very anxious and fearful of panic attacks leading to death. Guarded and distrustful of staff. Lots of redirection and emotional support. BLEs has +3 edema. Is  on IV Lasix. Meds whole or crushed with applesauce.  Lungs diminished. SOB noted, baseline. BS active. ACE wraps to BLE, baseline nueropathy. Gave melatonin x 1 and patient did get a few hours of sleep. CTM.

## 2025-02-23 NOTE — PLAN OF CARE
Goal Outcome Evaluation:      Plan of Care Reviewed With: patient    Overall Patient Progress: no changeOverall Patient Progress: no change    Outcome Evaluation: Pt did not ambulate this shift.  PRN Voltaren Gel rx given this shift, pt requests this every 6 hours when possible    Patient vital signs are at baseline: Yes  Patient able to ambulate as they were prior to admission or with assist devices provided by therapies during their stay:  No,  Reason:  Unable to ambulate this shift, Ax2 w/ SaraStedy for Transfers  Patient MUST void prior to discharge:  Yes  Patient able to tolerate oral intake:  Yes  Pain has adequate pain control using Oral analgesics:  Yes  Does patient have an identified :  No,  Reason:  Lives in [facility] w/ Spouse  Has goal D/C date and time been discussed with patient:  No,  Reason:  Discharge pending    Dx:Decompensated Heart Failure, SOB, BLE Edema   POD#n/a    A&Ox4.   CMS Intact.   VSS on RA.   Up with Ax2 GB and SaraStedy.   Voiding via Male External cath.   No PIV Access, MD aware.   Diet.  Pain controlled with Tylenol, Voltaren Gel (pt requests this every 6 hours when possible).

## 2025-02-23 NOTE — PLAN OF CARE
Date/Time: 02/22:0250-2472     Trauma/Ortho/Medical (Choose one): Medical     Diagnosis: Decompensated heart failure  POD#: N/A  Mental Status: Oriented x 4, anxious, forgetful, [place]  Activity/dangle: A1/2, GB/Walker  Diet: Soft bite sized, 2 gm sodium, 2L FR, mildly thickend  Pain: Managed with PRN Tylenol, chest pain with getting back in bed and anxiety-resolves itself. O2 given for comfort   Lucio/Voiding: External catheter, works intermittently, incont of bowel this shift    Tele/Restraints/Iso: Tele is a-fib with CVR  02/LDA: DAVID HARDY  D/C Date: TBD, pt is from [facility]  Other Info: BLEs has +3 edema. Is  on IV Lasix. Meds crushed with applesauce.    K and Mag protocol  ACE wraps on BLE  **Pt is daily weight and strict I/O

## 2025-02-24 ENCOUNTER — APPOINTMENT (OUTPATIENT)
Dept: SPEECH THERAPY | Facility: CLINIC | Age: 85
DRG: 291 | End: 2025-02-24
Payer: COMMERCIAL

## 2025-02-24 ENCOUNTER — APPOINTMENT (OUTPATIENT)
Dept: PHYSICAL THERAPY | Facility: CLINIC | Age: 85
DRG: 291 | End: 2025-02-24
Payer: COMMERCIAL

## 2025-02-24 LAB
ACANTHOCYTES BLD QL SMEAR: SLIGHT
ANION GAP SERPL CALCULATED.3IONS-SCNC: 11 MMOL/L (ref 7–15)
BASOPHILS # BLD AUTO: 0 10E3/UL (ref 0–0.2)
BASOPHILS NFR BLD AUTO: 0 %
BUN SERPL-MCNC: 18 MG/DL (ref 8–23)
CALCIUM SERPL-MCNC: 9.1 MG/DL (ref 8.8–10.4)
CHLORIDE SERPL-SCNC: 95 MMOL/L (ref 98–107)
CREAT SERPL-MCNC: 0.74 MG/DL (ref 0.67–1.17)
EGFRCR SERPLBLD CKD-EPI 2021: 89 ML/MIN/1.73M2
ELLIPTOCYTES BLD QL SMEAR: SLIGHT
EOSINOPHIL # BLD AUTO: 0.2 10E3/UL (ref 0–0.7)
EOSINOPHIL NFR BLD AUTO: 3 %
ERYTHROCYTE [DISTWIDTH] IN BLOOD BY AUTOMATED COUNT: 17.4 % (ref 10–15)
FRAGMENTS BLD QL SMEAR: SLIGHT
GLUCOSE SERPL-MCNC: 119 MG/DL (ref 70–99)
HCO3 SERPL-SCNC: 35 MMOL/L (ref 22–29)
HCT VFR BLD AUTO: 26.6 % (ref 40–53)
HGB BLD-MCNC: 7.6 G/DL (ref 13.3–17.7)
IMM GRANULOCYTES # BLD: 0 10E3/UL
IMM GRANULOCYTES NFR BLD: 1 %
LYMPHOCYTES # BLD AUTO: 1.3 10E3/UL (ref 0.8–5.3)
LYMPHOCYTES NFR BLD AUTO: 24 %
MCH RBC QN AUTO: 23 PG (ref 26.5–33)
MCHC RBC AUTO-ENTMCNC: 28.6 G/DL (ref 31.5–36.5)
MCV RBC AUTO: 80 FL (ref 78–100)
MONOCYTES # BLD AUTO: 0.6 10E3/UL (ref 0–1.3)
MONOCYTES NFR BLD AUTO: 11 %
NEUTROPHILS # BLD AUTO: 3.3 10E3/UL (ref 1.6–8.3)
NEUTROPHILS NFR BLD AUTO: 61 %
NRBC # BLD AUTO: 0 10E3/UL
NRBC BLD AUTO-RTO: 0 /100
PLAT MORPH BLD: ABNORMAL
PLATELET # BLD AUTO: 216 10E3/UL (ref 150–450)
POTASSIUM SERPL-SCNC: 4 MMOL/L (ref 3.4–5.3)
RBC # BLD AUTO: 3.31 10E6/UL (ref 4.4–5.9)
RBC MORPH BLD: ABNORMAL
SODIUM SERPL-SCNC: 141 MMOL/L (ref 135–145)
TARGETS BLD QL SMEAR: SLIGHT
WBC # BLD AUTO: 5.4 10E3/UL (ref 4–11)

## 2025-02-24 PROCEDURE — 250N000013 HC RX MED GY IP 250 OP 250 PS 637: Performed by: INTERNAL MEDICINE

## 2025-02-24 PROCEDURE — 92526 ORAL FUNCTION THERAPY: CPT | Mod: GN

## 2025-02-24 PROCEDURE — 250N000013 HC RX MED GY IP 250 OP 250 PS 637

## 2025-02-24 PROCEDURE — 99232 SBSQ HOSP IP/OBS MODERATE 35: CPT | Mod: FS | Performed by: NURSE PRACTITIONER

## 2025-02-24 PROCEDURE — 250N000011 HC RX IP 250 OP 636: Performed by: INTERNAL MEDICINE

## 2025-02-24 PROCEDURE — 97530 THERAPEUTIC ACTIVITIES: CPT | Mod: GP

## 2025-02-24 PROCEDURE — 97116 GAIT TRAINING THERAPY: CPT | Mod: GP

## 2025-02-24 PROCEDURE — 85025 COMPLETE CBC W/AUTO DIFF WBC: CPT | Performed by: INTERNAL MEDICINE

## 2025-02-24 PROCEDURE — 99232 SBSQ HOSP IP/OBS MODERATE 35: CPT | Performed by: INTERNAL MEDICINE

## 2025-02-24 PROCEDURE — 36415 COLL VENOUS BLD VENIPUNCTURE: CPT | Performed by: INTERNAL MEDICINE

## 2025-02-24 PROCEDURE — 250N000013 HC RX MED GY IP 250 OP 250 PS 637: Performed by: NURSE PRACTITIONER

## 2025-02-24 PROCEDURE — 80048 BASIC METABOLIC PNL TOTAL CA: CPT | Performed by: INTERNAL MEDICINE

## 2025-02-24 PROCEDURE — 97140 MANUAL THERAPY 1/> REGIONS: CPT | Mod: GP

## 2025-02-24 PROCEDURE — 120N000001 HC R&B MED SURG/OB

## 2025-02-24 RX ORDER — BUMETANIDE 1 MG/1
2 TABLET ORAL
Status: DISCONTINUED | OUTPATIENT
Start: 2025-02-24 | End: 2025-02-25

## 2025-02-24 RX ORDER — SPIRONOLACTONE 25 MG/1
25 TABLET ORAL ONCE
Status: COMPLETED | OUTPATIENT
Start: 2025-02-24 | End: 2025-02-24

## 2025-02-24 RX ADMIN — CALCIUM 500 MG: 500 TABLET ORAL at 08:15

## 2025-02-24 RX ADMIN — FERROUS SULFATE TAB 325 MG (65 MG ELEMENTAL FE) 325 MG: 325 (65 FE) TAB at 20:10

## 2025-02-24 RX ADMIN — CALCIUM 500 MG: 500 TABLET ORAL at 18:14

## 2025-02-24 RX ADMIN — Medication 25 MCG: at 08:15

## 2025-02-24 RX ADMIN — Medication 40 MG: at 08:15

## 2025-02-24 RX ADMIN — Medication 1 LOZENGE: at 16:06

## 2025-02-24 RX ADMIN — SPIRONOLACTONE 25 MG: 25 TABLET ORAL at 14:48

## 2025-02-24 RX ADMIN — CALCIUM 500 MG: 500 TABLET ORAL at 12:32

## 2025-02-24 RX ADMIN — HYDROXYZINE HYDROCHLORIDE 10 MG: 10 TABLET ORAL at 05:16

## 2025-02-24 RX ADMIN — Medication 40 MG: at 20:10

## 2025-02-24 RX ADMIN — LORATADINE 10 MG: 10 TABLET ORAL at 20:10

## 2025-02-24 RX ADMIN — ACETAMINOPHEN 650 MG: 325 TABLET, FILM COATED ORAL at 00:39

## 2025-02-24 RX ADMIN — BUMETANIDE 2 MG: 1 TABLET ORAL at 14:48

## 2025-02-24 RX ADMIN — GABAPENTIN 100 MG: 100 CAPSULE ORAL at 20:10

## 2025-02-24 RX ADMIN — ASPIRIN 81 MG: 81 TABLET, COATED ORAL at 08:15

## 2025-02-24 RX ADMIN — ACETAMINOPHEN 650 MG: 325 TABLET, FILM COATED ORAL at 05:15

## 2025-02-24 RX ADMIN — FUROSEMIDE 80 MG: 40 TABLET ORAL at 08:15

## 2025-02-24 ASSESSMENT — ACTIVITIES OF DAILY LIVING (ADL)
ADLS_ACUITY_SCORE: 65
ADLS_ACUITY_SCORE: 61
ADLS_ACUITY_SCORE: 65
ADLS_ACUITY_SCORE: 61
ADLS_ACUITY_SCORE: 60
ADLS_ACUITY_SCORE: 65
ADLS_ACUITY_SCORE: 60
ADLS_ACUITY_SCORE: 60
ADLS_ACUITY_SCORE: 65
ADLS_ACUITY_SCORE: 61
ADLS_ACUITY_SCORE: 65
ADLS_ACUITY_SCORE: 65
ADLS_ACUITY_SCORE: 60
ADLS_ACUITY_SCORE: 65

## 2025-02-24 NOTE — PROGRESS NOTES
Patient is A/O x 4, Vss, a-febrile, c/o generalized pain, tylenol helping, dyspneic on exertion, on room air, up to the chair with A2 sarasteady, bilateral lower extremity edema, lymph edema wraps in place, elevate legs in bed.

## 2025-02-24 NOTE — PROGRESS NOTES
[facility]    Internal Medicine Hospitalist Progress Note  02/24/2025  I evaluated patient on the above date.    Nazario Mares Jr., MD  [phone] (p)  Text Page  Vocera      [New actions/orders today (02/24/2025) are underlined in the assessment and plan. All lab results in the assessment and plan were reviewed.]  Assessment & Plan   Jamie Valdivia is a delightful 84 year old male with history including mild dementia; HTN; chronic afib (has declined anticoagulation); GERD, h/o Vickers's esophagus; chronic anemia; chronic pharyngeal dysphagia; peripheral neuropathy; OA; chronic spine disease; osteoporosis; prostate cancer; h/o alcohol use d/o (no longer drinking); h/o falls; who presented from half-way 2/19/2025 with increased lower extremity swelling and weight gain and found with acute decompensated heart failure.     On initial evaluation, pt was afebrile, hypertensive, sats 90%. ECG showed afib with CVR, no acute ischemic changes. Labs notable for CBC with WBC normal, hgb 8.1; BMP with calcium 6.8 (ionized calcium 4.3), otherwise unremarkable; LFT's with albumin 3.0, protein 5.8, otherwise normal; NTP-BNP 2169, troponin normal.    CXR showed left hemidiaphragm remained elevated compared to the right and overall the lungs had shallow inflation, chronic blunting of the right posterior costophrenic sulcus could relate to scarring or minimal fluid but unchanged (comparison 7/2024).        Acute CHF exacerbation (HFpEF).  Moderate to severe tricuspid regurgitation; mild AS.  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  Permanent atrial fibrillation with intermittent slow/controlled ventricular response.  Chronic lower extremity edema.  Permanent atrial fibrillation with intermittent slow ventricular response.  [PTA: furosemide 10 mg daily.]  * Echo 7/2024 showed LVEF 55%; RV OK; mild pHTN; mild AS; ascending aortic dilatation 4.8 cm, sinus 4.4 cm. Patient has declined anticoagulation;  and has declined LAAO as well.  * Initial presentation as above. Wt 215 lbs on admit. Presented with worsening lower extremity edema and weight gain. Started on IV furosemide on admit. Cardiology consulted on admit.   * 2/20: CXR showed possible mildly increased edema, otherwise stable. Echo showed LVEF 6-65%; RV mildly dilated, RV systolic function normal; moderate-severe TR; mild AR, mild AS; ascending aortic aneurysm 4.8 cm, aortic root aneurysm 4.5 cm; pHTN.  * 2/21: CXR showed possible LLL infiltrate, right lung clear. IV furosemide increased.  * 2/22: Feeling better, feels legs less swollen.  * 2/23: IV lost/difficult placement. Pt did not want midline; as such, diuretics change to PO.  * 2/24: Wt down to 205 lbs.  Vitals:    02/19/25 2135 02/22/25 2100 02/23/25 0545 02/24/25 0541   Weight: 97.5 kg (215 lb) 91.4 kg (201 lb 8 oz) 95.7 kg (210 lb 15.7 oz) 93 kg (205 lb)     I/O last 3 completed shifts:  In: 1360 [P.O.:1360]  Out: 1050 [Urine:1050]  Recent Labs   Lab 02/24/25  0859 02/23/25  0729 02/22/25  0827 02/22/25  0008 02/21/25  2053 02/21/25  0746 02/20/25  1035 02/20/25  1026 02/20/25  0812 02/19/25 2024 02/19/25  1826    139 141  --   --  140  --   --  141  --  140   CO2 35* 39* 38*  --   --  28  --   --  31*  --  23   BUN 18.0 14.4 13.0  --   --  15.3  --   --  13.0  --  11.0   CR 0.74 0.73 0.70  --   --  0.75  --   --  0.71  --  0.54*   POTASSIUM 4.0 3.7 3.8  --  4.1 3.8  3.8  --   --  4.3  --  4.0   CTROPT  --   --   --  20 21  --   --  20 19   < > 15   NTBNPI  --   --   --   --   --   --   --   --   --   --  2,169*   LVEF  --   --   --   --   --   --  60-65%  --   --   --   --     < > = values in this interval not displayed.   - Continue furosemide 80 mg PO BID, may change to alternative PO diuretic per Cardiology.  - Continue 2L fluid restriction.  - Continue to monitor on telemetry.  - Continue to monitor i/o's, daily wts.  - Appreciate Cardiology help.     Hypocalcemia, non-severe,  asymptomatic   Vitamin D deficiency.  * Initial presentation as above. Calcium (including ionized) low on admit. Vitamin D level low on admit.  * Started on calcium and vitamin D this hospitalization.  Recent Labs   Lab Test 02/24/25  0859 02/23/25  0729 02/22/25  0827 02/21/25  0746 02/20/25  0812 02/19/25  2115 02/19/25  1826 12/14/24  0148 09/12/24  1248 07/13/24  0535 07/12/24  2147 06/27/24  1118 06/20/24  0747 04/17/24  0915 04/11/24  0724 03/21/24  0651 03/12/24  1852 06/07/23  0957 02/16/23  0905 01/05/23  1000   CALCIUM, TOTAL 9.1 8.5* 8.5* 8.7* 8.9  --  6.8* 9.0 9.0   < > 8.4*   < > 8.4*   < > 7.7* 8.5*   < > 9.1   < > 8.8   CALCIUM IONIZED WHOLE BLOOD  --   --   --   --   --  4.3*  --   --   --   --   --   --   --   --   --   --   --   --   --   --    ALBUMIN  --   --   --   --   --   --   --   --   --   --   --   --   --   --   --   --   --   --   --  3.6   ALBUMIN (R)  --   --   --   --   --   --  3.0*  --  3.7  --  3.0*  --  2.7*  --  2.7* 2.9*  --  4.0  --   --     < > = values in this interval not displayed.   - Continue calcium and vitamin D.    Iron deficiency anemia.  * Initial presentation as above. Hgb 8.1 on admit. Iron studies on admit showed low iron, iron saturation and ferritin. TSH normal on admit. B12 level normal on admit.  * Started on IV iron 2/21, changed to PO 2/22.  Recent Labs   Lab 02/24/25  0859 02/23/25  0729 02/22/25  0827 02/21/25  0746 02/20/25  0812 02/19/25  1826   HGB 7.6* 7.3* 7.5* 7.6* 7.5* 8.1*   - Continue ferrous sulfate.  - Continue to monitor CBC periodically as needed.  - Consider prbc transfusion if hgb </= 7.0 or if significant bleeding with hemodynamic instability or if symptomatic.  - Follow-up outpatient.     Dysphagia.  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  * Current Diet: Fluid restriction 2000 ML FLUID (and additional linked orders)  Room  Service  Combination Diet Soft and Bite Sized Diet (level 6); Mildly Thick (level 2) (Small sips, 3x swallow for each bite and sip.); 2 gm NA Diet    - GI consult, appreciate help.  - Continue the above current diet.  - Advance diet as able per SLP.  - Continue aspiration precautions.    Weakness and physical deconditioning due to multiple acute and chronic medical issues.  Peripheral neuropathy.  Frequent falls.  * Lives in assisted living. Has an unconventional assisted living arrangement; he and his wife have separate apartments at their assisted living (he lives one floor above her).  * Seen by PT and OT and TCU recommended.  - Continue PTA gabapentin 100 mg at bedtime.  - Continue PT and OT, TCU recommended, but pt favors going back to custodial.  - SW consult for discharge planning back to custodial (his wife will plan to stay with him at his apartment at discharge).    Dementia, mild.  At risk for metabolic encephalopathy.  * Lives in custodial.  * Scheduled quetiapine not restarted this hospitalization.  - Continue PRN quetiapine 12.5 mg BID.  - Continue to treat other issues as noted.  - Re-orient as needed.  - Maintain normal day/night, sleep/wake cycles.  - Minimize sedating medications as able.    Chronic pharyngeal dysphagia.  Chronic dysphonia.  GERD with Vickers's esophagus.  * EGD 2022 found Vickers's esophagus and him started on PPI twice daily. Seen in clinic by Gastroenterologist Colby Mercado on 2/3/2025 and X-ray esophagram, EGD, ENT referral and speech therapy referral placed at that visit.   * Seen by SLP and started on a dysphagia diet.  * Current Diet: Fluid restriction 2000 ML FLUID (and additional linked orders)  Room Service  Combination Diet Soft and Bite Sized Diet (level 6); Mildly Thick (level 2) (Small sips, 3x swallow for each bite and sip.); 2 gm NA Diet    - Continue the above current diet.  - Advance diet as able per SLP.  - Continue PTA PPI.  - Continue aspiration precautions.    OA with h/o joint  "replacements.  Chronic spine disease with h/o spine surgery.  Osteoporosis with h/o compression fractures.  * Started on calcium and vitamin D as above.  * 2/23: Pt c/o back pain. Ordered PRN diclofenac.  - Continue calcium and vitamin D.  - Continue PRN diclofenac gel.    Obesity.  * Body mass index is 31.17 kg/m .  - Needs to continue to pursue aggressive dietary and lifestyle modifications.    Prostate cancer .  * Follows with Dr. Niraj Larry. S/p EBRT without ADT 12/2023.   - Pt due for PSA 2/26 and wife requested this be checked here.  - Follow-up with Urology, Dr. Ross as scheduled.    H/o alcohol use disorder, no recent use since moving to senior living a few years ago.  - Noted.    Clinically Significant Risk Factors          # Hypochloremia: Lowest Cl = 94 mmol/L in last 2 days, will monitor as appropriate      # Hypoalbuminemia: Lowest albumin = 3 g/dL at 2/19/2025  6:26 PM, will monitor as appropriate         # Dementia: noted on problem list        # Obesity: Estimated body mass index is 31.17 kg/m  as calculated from the following:    Height as of 2/3/25: 1.727 m (5' 8\").    Weight as of this encounter: 93 kg (205 lb).        # Financial/Environmental Concerns:           Diet: Fluid restriction 2000 ML FLUID (and additional linked orders)  Room Service  Combination Diet Soft and Bite Sized Diet (level 6); Mildly Thick (level 2) (Small sips, 3x swallow for each bite and sip.); 2 gm NA Diet    Prophylaxis: PCD's and ambulation  Lucio Catheter: Not present  Lines: None     Code Status: Full Code    Disposition Plan   Medically Ready for Discharge: Anticipated Tomorrow  Expected discharge to assisted living facility (senior living) pending above.    Entered: Nazario Mares MD 02/24/2025, 1:21 PM     COMMUNICATION  - I discussed with patient's wife 02/24/25      Interval History   Doing better overall.  Bilateral lower extremities feeling better overall with regard to swelling.    * Data reviewed today: I reviewed " all new labs and imaging over the last 24 hours. I personally reviewed no images or ECG's today.    Physical Exam   Most recent vitals:   , Blood pressure 132/61, pulse 64, temperature 98.1  F (36.7  C), temperature source Oral, resp. rate 16, weight 93 kg (205 lb), SpO2 93%. O2 Device: None (Room air) Oxygen Delivery: 2 LPM  Vitals:    02/22/25 2100 02/23/25 0545 02/24/25 0541   Weight: 91.4 kg (201 lb 8 oz) 95.7 kg (210 lb 15.7 oz) 93 kg (205 lb)     Vital signs with ranges:  Temp:  [97.3  F (36.3  C)-98.1  F (36.7  C)] 98.1  F (36.7  C)  Pulse:  [64-69] 64  Resp:  [16] 16  BP: (117-154)/(61-69) 132/61  SpO2:  [93 %-100 %] 93 %  Patient Vitals for the past 24 hrs:   BP Temp Temp src Pulse Resp SpO2 Weight   02/24/25 0746 132/61 98.1  F (36.7  C) Oral 64 16 93 % --   02/24/25 0541 -- -- -- -- -- -- 93 kg (205 lb)   02/23/25 1930 (!) 154/69 97.3  F (36.3  C) Oral 65 16 100 % --   02/23/25 1700 117/61 98  F (36.7  C) Oral 69 16 95 % --     I/O's last 24 hours:  I/O last 3 completed shifts:  In: 1360 [P.O.:1360]  Out: 1050 [Urine:1050]    Constitutional: awake, alert, oriented, pleasant   Head:   Eyes:   ENT:   Neck:   Cardiovascular: regular rate, irregular rhythm, +I/VI systolic murmur, no rubs/gallops  Lungs: diminished in the bases, no crackles or wheezes  Gastrointestinal/Abdomen: soft, non-tender, non-distended, positive bowel sounds  :   Musculoskeletal:   Skin/Extremities:  legs diffuse trace to 1+ edema, with pitting into thighs  Neurologic:   Psychiatric:   Hematologic/Lymphatic/Immunologic:        Labs reviewed.  Recent Labs   Lab 02/24/25  0859 02/23/25  0729 02/22/25  2255 02/22/25  0827 02/20/25  0812 02/19/25  1826   WBC 5.4 4.0  --  4.2   < > 5.8   HGB 7.6* 7.3*  --  7.5*   < > 8.1*   MCV 80 80  --  78   < > 80    178  --  171   < > 189    139  --  141   < > 140   POTASSIUM 4.0 3.7  --  3.8   < > 4.0   CHLORIDE 95* 94*  --  96*   < > 107   CO2 35* 39*  --  38*   < > 23   BUN 18.0 14.4   "--  13.0   < > 11.0   CR 0.74 0.73  --  0.70   < > 0.54*   ANIONGAP 11 6*  --  7   < > 10   JOSUE 9.1 8.5*  --  8.5*   < > 6.8*   * 99 102* 90   < > 86   ALBUMIN  --   --   --   --   --  3.0*   PROTTOTAL  --   --   --   --   --  5.8*   BILITOTAL  --   --   --   --   --  0.3   ALKPHOS  --   --   --   --   --  81   ALT  --   --   --   --   --  6   AST  --   --   --   --   --  20    < > = values in this interval not displayed.     Recent Labs   Lab Test 02/22/25 0008 02/21/25 2053 02/20/25  1026 02/19/25 2024 02/19/25  1826 07/12/24  2147 03/19/24  1232 03/12/24  2119 03/12/24  1852   NT-PROBNP, INPATIENT  --   --   --   --  2,169*  --  3,403*  --  3,144*   TROPONIN T HIGH SENSITIVITY 20 21 20   < > 15   < > 13   < > 16    < > = values in this interval not displayed.     Recent Labs   Lab 02/24/25  0859 02/23/25  0729 02/22/25  2255 02/22/25  0827 02/21/25  0746 02/20/25  0812   * 99 102* 90 84 73     Recent Labs   Lab Test 04/28/22  0655 04/07/22  0735   A1C 5.5 5.6       No results for input(s): \"INR\", \"[id number]\" in the last 168 hours.  Recent Labs   Lab 02/24/25  0859 02/23/25  0729 02/22/25  0827 02/21/25  0746 02/20/25  0812 02/19/25  1826   WBC 5.4 4.0 4.2 4.3 3.0* 5.8   CAMILA  --   --   --   --   --  19*       MICRO:  Cultures (including blood and urine):  No lab results found in last 7 days.    No results found for this or any previous visit (from the past 24 hours).      Medications   All medications were reviewed. MAR.    Infusions:  Current Facility-Administered Medications   Medication Dose Route Frequency Provider Last Rate Last Admin    Continuing beta blocker from home medication list OR beta blocker order already placed during this visit   Does not apply DOES NOT GO TO Ammy Ndiaye MD        Reason ACE/ARB/ARNI order not selected   Other DOES NOT GO TO Ammy Ndiaye MD         Scheduled Medications:  Current Facility-Administered Medications   Medication Dose Route " Frequency Provider Last Rate Last Admin    aspirin EC tablet 81 mg  81 mg Oral Daily Nazario Mares MD   81 mg at 02/24/25 0815    calcium carbonate 500 mg (elemental) (OSCAL) tablet 500 mg  500 mg Oral TID w/meals Levi Acevedo MD   500 mg at 02/24/25 1232    [Held by provider] cyanocobalamin (VITAMIN B-12) tablet 1,000 mcg  1,000 mcg Oral Daily Ammy Sky MD        enoxaparin ANTICOAGULANT (LOVENOX) injection 40 mg  40 mg Subcutaneous Q24H Ammy Sky MD   40 mg at 02/23/25 0545    ferrous sulfate (FEROSUL) tablet 325 mg  325 mg Oral Every Other Day Nazario Mares MD   325 mg at 02/22/25 2009    [Held by provider] folic acid (FOLVITE) tablet 1,000 mcg  1,000 mcg Oral Daily Ammy Sky MD   1,000 mcg at 02/20/25 1035    [Held by provider] furosemide (LASIX) half-tab 10 mg  10 mg Oral Daily Ammy Sky MD        furosemide (LASIX) tablet 80 mg  80 mg Oral BID Nazario Mares MD   80 mg at 02/24/25 0815    gabapentin (NEURONTIN) capsule 100 mg  100 mg Oral At Bedtime Ammy Sky MD   100 mg at 02/23/25 1820    loratadine (CLARITIN) tablet 10 mg  10 mg Oral At Bedtime Ammy Sky MD   10 mg at 02/23/25 1826    pantoprazole (PROTONIX) 2 mg/mL suspension 40 mg  40 mg Oral BID Ammy Sky MD   40 mg at 02/24/25 0815    sodium chloride (PF) 0.9% PF flush 3 mL  3 mL Intracatheter Q8H Ammy Sky MD   3 mL at 02/24/25 0816    Vitamin D3 (CHOLECALCIFEROL) tablet 25 mcg  25 mcg Oral Daily Levi Acevedo MD   25 mcg at 02/24/25 0815     PRN Medications:  Current Facility-Administered Medications   Medication Dose Route Frequency Provider Last Rate Last Admin    acetaminophen (TYLENOL) tablet 650 mg  650 mg Oral Q4H PRN Ammy Sky MD   650 mg at 02/24/25 0515    Or    acetaminophen (TYLENOL) Suppository 650 mg  650 mg Rectal Q4H PRN Ammy Sky MD        benzocaine-menthol (CHLORASEPTIC) 6-10 MG lozenge 1 lozenge  1 lozenge Buccal Q1H PRN Jose Daniel  Ammy STREET MD   1 lozenge at 02/23/25 1346    calcium carbonate (TUMS) chewable tablet 1,000 mg  1,000 mg Oral 4x Daily PRN Ammy Sky MD        Continuing beta blocker from home medication list OR beta blocker order already placed during this visit   Does not apply DOES NOT GO TO Ammy Ndiaye MD        diclofenac (VOLTAREN) 1 % topical gel 2 g  2 g Topical Q6H PRN Nazario Mares MD   2 g at 02/23/25 1501    hydrOXYzine HCl (ATARAX) tablet 10 mg  10 mg Oral TID PRN Antelmo García, NP   10 mg at 02/24/25 0516    lidocaine (LMX4) cream   Topical Q1H PRN Ammy Sky MD        lidocaine 1 % 0.1-1 mL  0.1-1 mL Other Q1H PRN Ammy Sky MD        melatonin tablet 1 mg  1 mg Oral At Bedtime PRN Ammy Sky MD   1 mg at 02/23/25 0234    menthol-zinc oxide (CALMOSEPTINE) 0.44-20.6 % ointment OINT   Topical 4x Daily PRN Ammy Sky MD        nitroGLYcerin (NITROSTAT) sublingual tablet 0.4 mg  0.4 mg Sublingual Q5 Min PRN Antelmo García, PRAVEEN        ondansetron (ZOFRAN ODT) ODT tab 4 mg  4 mg Oral Q6H PRN Ammy Sky MD        Or    ondansetron (ZOFRAN) injection 4 mg  4 mg Intravenous Q6H PRN Ammy Sky MD        polyethylene glycol (MIRALAX) Packet 17 g  17 g Oral BID PRN Ammy Sky MD   17 g at 02/22/25 1650    QUEtiapine (SEROquel) half-tab 12.5 mg  12.5 mg Oral BID PRN Ammy Sky MD   12.5 mg at 02/22/25 2307    Reason ACE/ARB/ARNI order not selected   Other DOES NOT GO TO Ammy Ndiaye MD        senkathy-docusate (SENOKOT-S/PERICOLACE) 8.6-50 MG per tablet 1 tablet  1 tablet Oral BID PRN Ammy Sky MD        Or    senna-docusate (SENOKOT-S/PERICOLACE) 8.6-50 MG per tablet 2 tablet  2 tablet Oral BID PRN Ammy Sky MD        sodium chloride (PF) 0.9% PF flush 3 mL  3 mL Intracatheter q1 min prn Ammy Sky MD

## 2025-02-24 NOTE — PROGRESS NOTES
SLP NOTE: This patient continues to be followed by SLP for dysphagia.      Video swallow study 2/21/25 demonstrated large amounts of aspiration of thin liquids due to esophageal deficits. Recommend GI consult to determine if intervention for cricopharyngeal bar and diverticulum would be beneficial or appropriate.   From an oropharyngeal standpoint recommend a soft/bite sized diet with ongoing mildly thick liquids and 3x swallow for each bite and sip, eating only when alert and upright. Cough-re-swallow.     The patient requests drinking thin H20 despite known dysphagia.  A thin water protocol would be the safest way to do so. Recommend small sips of H20 following oral cares. Recommend repeated swallows and cough, reswallow strategy.

## 2025-02-24 NOTE — PROGRESS NOTES
Care Management Follow Up    Length of Stay (days): 5    Expected Discharge Date: 02/24/2025     Concerns to be Addressed: adjustment to diagnosis/illness, discharge planning     Patient plan of care discussed at interdisciplinary rounds: Yes    Anticipated Discharge Disposition: Assisted Living, Transitional Care              Anticipated Discharge Services:    Anticipated Discharge DME:      Patient/family educated on Medicare website which has current facility and service quality ratings:    Education Provided on the Discharge Plan:    Patient/Family in Agreement with the Plan: unable to assess    Referrals Placed by CM/SW: External Care Coordination  Private pay costs discussed: Not applicable    Discussed  Partnership in Safe Discharge Planning  document with patient/family: No     Handoff Completed: No, handoff not indicated or clinically appropriate    Additional Information:  Message sent asking if a bed is available today at [facility]    Next Steps: follow for discharge planning    GUICHO Hinkle

## 2025-02-24 NOTE — PROGRESS NOTES
Date & Time: 2/23/25 1900-0730  Summary: Admitted for increased lower extremity swelling and weight gain and found with acute decompensated heart failure.   Orientation/Cognitive: A&Ox 4 , forgetful  Mobility Level/Assist Equipment: A1-2/Aga steady  Fall Risk (Y/N): yes  Behavior Concerns: Green  Pain Management: Schedule Tylenol, PRN Tylenol and Atarax given per MAR .  Tele/VS/O2: VSS on 2L via NC over night and he's usually on room air during the day.  TELE: Afib/CVR  ABNL Lab/BG: [place] 8.5, Hgb 7.5, 7.3, Carbon dioxide 39 & Chloride 94  Diet: Mechanical soft, 2 gm sodium, 2L FR with mildly thick liquids  Bowel/Bladder: Incontinent of bowel and bladder. External male catheter in place  Skin Concerns: Blanchable redness to sacrum/coccyx. BLE moderate edema. Ace warps to BLE.  Drains/Devices:  PIV not flushing and the patient does not want me to remove IV right now.  Tests/Procedures for next shift: None  Anticipated DC date & active delays: TBD  Other Important Information: Patient stated that he wants to get enough rest during the night. Mag & K+ protocol, lab draw in the morning.      BP (!) 154/69 (BP Location: Left arm, Patient Position: Supine, Cuff Size: Adult Regular)   Pulse 65   Temp 97.3  F (36.3  C) (Oral)   Resp 16   Wt 95.7 kg (210 lb 15.7 oz)   SpO2 100%   BMI 32.08 kg/m

## 2025-02-24 NOTE — PLAN OF CARE
Diagnosis: BLE edema, CHF  Mental Status: A&O x 4  Activity/dangle up with 1-2 & yoan steady  Diet: 2 gm NA, soft/bite size, mod thick, 2000 FR  Lucio/Voiding: external cath  Tele/Restraints/Iso: tele - Afib CVR  02/LDA: JOSE CARLOS Walker MD aware no IV access, pt refusing  D/C Date:  ?  Other Info: GI consult pending

## 2025-02-24 NOTE — PROGRESS NOTES
[facility]  Cardiology Progress Note  Date of Service: 02/24/2025  Date of Admission: 2/19/2025  Primary Cardiologist: Dr. Ross    Summary    Mr. Jamie Valdivia is a very pleasant 84 year old male with a past medical history of permanent atrial fibrillation, moderate aortic stenosis, hypertension, chronic peripheral edema, moderate aortic root dilatation, frequent falls, and alcohol dependence admitted on 2/19/2025 with increased leg swelling. Cardiology was consulted for decompensated heart failure.    Interval February 24, 2025    Remains volume up on physical exam with pitting LE edema and elevated JVP.  Diuresing ok with POs, refusing IV replacement.  Sluggish UOP response after 80 mg PO Furosemide this AM - discussed with nursing and only about 400cc out so far today.  Pt indicates that his LE swelling has improved per his judgement.      Asssessment:    Acute on chronic HFpEF  CHF exacerbation with severe volume overload on admission  Mild pulmonary hypertension by echo  Admission NTproBNP 2169  Echocardiogram shows LVEF 60-65% with mild RV dilation, 2+ TR and CHRISTINA  Sub-ideal I/O monitoring, charts indicate net - 80cc with improved weight trend.  Wt on admission 215>210 (bed scale) > 205# (standing scale)  Stable renal function during diuresis without contraction; Cr 0.74.  Permanent AF  Intermittently slow ventricular rate, no worrisome samir-arrhythmia on last Zio  Refusing OAC, no Hx LAAO.  On bASA.  Mild-moderate aortic stenosis, mean gradient 10.3 mmHg  Ascending aortic aneurysm (4.8 cm on 7/2024 TTE)  Echo 2/20/2025 aortic root measures 4.5 cm and ascending aorta measures 4.8 cm, stable  Acute on chronic anemia - Hgb 8.1 on admission, stable around 7.6  Dementia, resides at memory care.  At times goes days without taking scheduled medications.  Prostate cancer  Alcohol use disorder  _____________________________________________________________    Plan:   Stop PO  Furosemide.  Start PO Bumex 2 mg BID.  Give PO Spironolactone 25 mg once.  BMP and NTproBNP in AM.  ACE wraps to bilateral LE to facilitate and support diuresis.  2gm Na restriction, 2L fluid restriction  Strict I/O monitoring + daily standing weights.  _____________________________________________________________    Plan of care and the majority of MDM was formulated under direction and guidance of Dr. Santillan.    20 Minutes spent in coordination of care, and discussion with the patient and/or family regarding diagnostic results, prognosis, symptom management, risks and benefits of management options, and development of the plan of care of above.    Thank you for the opportunity to participate in the care of Mr. Jamie Valdivia.  Please feel free to reach out with any additional questions.    MARICHUY Gracia, CNP   Nurse Practitioner  [facility]  Pager: [phone]  Phone: [phone]  Text Page (8am - 5pm, M-F)    Physical Exam   Temp: 98.1  F (36.7  C) Temp src: Oral BP: 132/61 Pulse: 64   Resp: 16 SpO2: 93 % O2 Device: None (Room air) Oxygen Delivery: 2 LPM    Vitals:    02/22/25 2100 02/23/25 0545 02/24/25 0541   Weight: 91.4 kg (201 lb 8 oz) 95.7 kg (210 lb 15.7 oz) 93 kg (205 lb)     I/O last 3 completed shifts:  In: 1360 [P.O.:1360]  Out: 1050 [Urine:1050]    Constitutional: Appears stated age, well nourished, NAD.  Eyes: Pupils equal, round. Sclerae anicteric.   HEENT: Normocephalic, atraumatic.   Neck: Supple. Carotid pulses full and equal. Elevated to jaw.  Respiratory: Non-labored. Lungs upper rales, no fine crackles.  Cardiovascular: IRR, normal S1 and S2. No M/G/R.  Vascular: Peripheral pulses intact; pitting LE edema to thighs.  Musculoskeletal/Extremities: Symmetrical movement.   Neurologic: No gross focal deficits. Alert, awake.    Data   Recent Labs   Lab 02/24/25  0859 02/23/25  0729 02/22/25  2255 02/22/25  0827 02/20/25  0812 02/19/25  1826   WBC 5.4 4.0  --  4.2   <  > 5.8   HGB 7.6* 7.3*  --  7.5*   < > 8.1*   MCV 80 80  --  78   < > 80    178  --  171   < > 189    139  --  141   < > 140   POTASSIUM 4.0 3.7  --  3.8   < > 4.0   CHLORIDE 95* 94*  --  96*   < > 107   CO2 35* 39*  --  38*   < > 23   BUN 18.0 14.4  --  13.0   < > 11.0   CR 0.74 0.73  --  0.70   < > 0.54*   ANIONGAP 11 6*  --  7   < > 10   JOSUE 9.1 8.5*  --  8.5*   < > 6.8*   * 99 102* 90   < > 86   ALBUMIN  --   --   --   --   --  3.0*   PROTTOTAL  --   --   --   --   --  5.8*   BILITOTAL  --   --   --   --   --  0.3   ALKPHOS  --   --   --   --   --  81   ALT  --   --   --   --   --  6   AST  --   --   --   --   --  20    < > = values in this interval not displayed.       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)    Chronic alcohol use    Major neurocognitive disorder (H)    Generalized anxiety disorder    Gastro-esophageal reflux disease without esophagitis    History of 2019 novel coronavirus disease (COVID-19)    Impaired gait and mobility    Obesity (BMI 30-39.9)    Osteoarthritis of both knees    Other insomnia    Pharyngeal dysphagia    Physical deconditioning    Repeated falls    Coronary atherosclerosis of native coronary artery    Anemia    Other idiopathic peripheral autonomic neuropathy    Age-related osteoporosis without current pathological fracture    Constipation    Dependent edema    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    Atherosclerosis of aorta    Alcohol dependence in remission (H)    Prostate cancer (H) - Dx January 2023    Chronic cough    Fall, initial encounter    Closed fracture of multiple ribs of right side, initial encounter    Closed wedge compression fracture of L3 vertebra, initial encounter (H)    Aneurysm of ascending aorta without rupture    Preventative health care    Bradycardia    Encephalopathy    Generalized weakness    Failure of outpatient treatment    Chest pain, unspecified type    Choking, subsequent encounter    Oropharyngeal dysphagia    Esophageal dysphagia    Dysphonia    Shortness of breath    Impaired activities of daily living    Bilateral lower extremity edema    Hypervolemia, unspecified hypervolemia type     Medications   Current Facility-Administered Medications   Medication Dose Route Frequency Provider Last Rate Last Admin    Continuing beta blocker from home medication list OR beta blocker order already placed during this visit   Does not apply DOES NOT GO TO Ammy Ndiaye MD        Reason ACE/ARB/ARNI order not selected   Other DOES NOT GO TO Ammy Ndiaye MD         Current Facility-Administered Medications   Medication Dose Route Frequency Provider Last Rate Last Admin    aspirin EC tablet 81 mg  81 mg Oral Daily Nazario Mares MD   81 mg at 02/24/25 0815    bumetanide (BUMEX) tablet 2 mg  2 mg Oral BID Zoë Tejeda APRN CNP        calcium carbonate 500 mg (elemental) (OSCAL) tablet 500 mg  500 mg Oral TID w/meals Levi Acevedo MD   500 mg at 02/24/25 1232    [Held by provider] cyanocobalamin (VITAMIN B-12) tablet 1,000 mcg  1,000 mcg Oral Daily Ammy Sky MD        enoxaparin ANTICOAGULANT (LOVENOX) injection  40 mg  40 mg Subcutaneous Q24H Ammy Sky MD   40 mg at 25 0545    ferrous sulfate (FEROSUL) tablet 325 mg  325 mg Oral Every Other Day Nazario Mares MD   325 mg at 25    [Held by provider] folic acid (FOLVITE) tablet 1,000 mcg  1,000 mcg Oral Daily Ammy Sky MD   1,000 mcg at 25 1035    [Held by provider] furosemide (LASIX) half-tab 10 mg  10 mg Oral Daily Ammy Sky MD        gabapentin (NEURONTIN) capsule 100 mg  100 mg Oral At Bedtime Ammy Sky MD   100 mg at 25 1820    loratadine (CLARITIN) tablet 10 mg  10 mg Oral At Bedtime Ammy Sky MD   10 mg at 25 1826    pantoprazole (PROTONIX) 2 mg/mL suspension 40 mg  40 mg Oral BID Ammy Sky MD   40 mg at 25 0815    sodium chloride (PF) 0.9% PF flush 3 mL  3 mL Intracatheter Q8H Ammy Sky MD   3 mL at 25 0816    spironolactone (ALDACTONE) tablet 25 mg  25 mg Oral Once Zoë Tejeda APRN CNP        Vitamin D3 (CHOLECALCIFEROL) tablet 25 mcg  25 mcg Oral Daily Levi Acevedo MD   25 mcg at 25 0815       Data   Last 24 hours labs personally reviewed.  Echo:   Recent Results (from the past 4320 hours)   Echocardiogram Complete   Result Value    LVEF  60-65%    Narrative    [id number]  [id number]  [id number]^JOCE^AMMY^JAD     [facility]  Echocardiography Laboratory  [address] [place], [place] [address]     Name: CAMILO CRUMP  MRN: [id number]  : 1940  Study Date: 2025 08:52 AM  Age: 84 yrs  Gender: Male  Patient Location: [facility]  Reason For Study: Heart Failure  Ordering Physician: AMMY SKY  Performed By: RAJESH Grant     BSA: 2.1 m2  Height: 68 in  Weight: 215 lb  HR: 47  BP: 121/62 mmHg  ______________________________________________________________________________  Procedure  Echocardiogram with two-dimensional, color and spectral Doppler. Definity (NDC  #71235-825) given intravenously. Contrast  Definity. Technically difficult  study.  ______________________________________________________________________________  Interpretation Summary     Left ventricular systolic function is normal.The visual ejection fraction is  60-65%.  The right ventricle is mildly dilated.The right ventricular systolic function  is normal.  Severe bi-atrial enlargement,  There is moderate to mod-severe (2-3+) tricuspid regurgitation.There is mild  (1+) aortic regurgitation.Mild valvular aortic stenosis.  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  Pulmonary hypertension- RVSP 51 mm hg +RA.     Echocardiogram dated 07/03/2024 mild tricuspid regurgitation noted with RVSP  37 mm hg + RA, ascending aorta 4.8 cm, aortic root 4.4 cm  ______________________________________________________________________________  Left Ventricle  Left ventricular systolic function is normal. The visual ejection fraction is  60-65%.     Right Ventricle  The right ventricle is mildly dilated. The right ventricular systolic function  is normal.     Atria  The left atrium is severely dilated. The right atrium is severely dilated.  There is no color Doppler evidence of an atrial shunt.     Mitral Valve  There is mild mitral annular calcification. There is mild (1+) mitral  regurgitation. There is no mitral valve stenosis.     Tricuspid Valve  There is moderate to mod-severe (2-3+) tricuspid regurgitation. The right  ventricular systolic pressure is approximated at 50.5 mmHg plus the right  atrial pressure. Pulmonary hypertension.     Aortic Valve  The aortic valve is trileaflet. There is mild (1+) aortic regurgitation. Mild  valvular aortic stenosis. The mean AoV pressure gradient is 10.3 mmHg.     Pulmonic Valve  There is trace pulmonic valvular regurgitation. There is no pulmonic valvular  stenosis.     Vessels  Aortic root aneurysm is present. 4.5 cm. Ascending aorta aneurysm is present.  4.8 cm.      Pericardium  There is no pericardial effusion.     ______________________________________________________________________________  MMode/2D Measurements & Calculations  Ao root diam: 4.5 cm  asc Aorta Diam: 4.8 cm  LVOT diam: 2.1 cm  LVOT area: 3.5 cm2     Ao root diam index Ht(cm/m): 2.6  Ao root diam index BSA (cm/m2): 2.2  Asc Ao diam index BSA (cm/m2): 2.3  Asc Ao diam index Ht(cm/m): 2.8  TAPSE: 2.6 cm     Doppler Measurements & Calculations  MV E max shelton: 126.0 cm/sec  MV A max shelton: 34.4 cm/sec  MV E/A: 3.7  MV max PG: 10.8 mmHg  MV mean P.4 mmHg  MV V2 VTI: 43.0 cm  MVA(VTI): 2.4 cm2  MV dec slope: 652.7 cm/sec2  MV dec time: 0.19 sec  Ao V2 max: 220.0 cm/sec  Ao max P.0 mmHg  Ao V2 mean: 149.1 cm/sec  Ao mean PG: 10.3 mmHg  Ao V2 VTI: 57.8 cm  LEONILA(I,D): 1.8 cm2  LEONILA(V,D): 1.8 cm2  LV V1 max P.3 mmHg  LV V1 max: 114.9 cm/sec  LV V1 VTI: 29.4 cm  SV(LVOT): 102.5 ml  SI(LVOT): 48.6 ml/m2  PA acc time: 0.04 sec  TR max shelton: 355.2 cm/sec  TR max P.5 mmHg  AV Shelton Ratio (DI): 0.52     LEONILA Index (cm2/m2): 0.84  RV S Shelton: 9.1 cm/sec     ______________________________________________________________________________  Report approved by: Dallas Oviedo MD on 2025 12:46 PM

## 2025-02-24 NOTE — PROGRESS NOTES
Care Management Follow Up    Length of Stay (days): 5    Expected Discharge Date: 02/24/2025     Concerns to be Addressed: adjustment to diagnosis/illness, discharge planning     Patient plan of care discussed at interdisciplinary rounds: Yes    Anticipated Discharge Disposition: Assisted Living, Transitional Care              Anticipated Discharge Services:    Anticipated Discharge DME:      Patient/family educated on Medicare website which has current facility and service quality ratings:    Education Provided on the Discharge Plan:    Patient/Family in Agreement with the Plan: unable to assess    Referrals Placed by CM/SW: External Care Coordination  Private pay costs discussed: Not applicable    Discussed  Partnership in Safe Discharge Planning  document with patient/family: No     Handoff Completed: No, handoff not indicated or clinically appropriate    Additional Information:    [facility] accepted referral. SW met with patient at bedside to discuss with patient. Patient is declining TCU. Patient went on multiple tangents while SW and PT were in the room. SW called patients wife who is agreeable to patient going to TCU or returning to long term. Wants patient to decide. Patient was getting upset talking about TCU and rambling. SW will follow up tomorrow.     Next Steps: plan for discharge     TYLER Shah

## 2025-02-25 ENCOUNTER — APPOINTMENT (OUTPATIENT)
Dept: PHYSICAL THERAPY | Facility: CLINIC | Age: 85
DRG: 291 | End: 2025-02-25
Payer: COMMERCIAL

## 2025-02-25 ENCOUNTER — APPOINTMENT (OUTPATIENT)
Dept: OCCUPATIONAL THERAPY | Facility: CLINIC | Age: 85
DRG: 291 | End: 2025-02-25
Payer: COMMERCIAL

## 2025-02-25 LAB
ANION GAP SERPL CALCULATED.3IONS-SCNC: 7 MMOL/L (ref 7–15)
BUN SERPL-MCNC: 17.3 MG/DL (ref 8–23)
CALCIUM SERPL-MCNC: 8.9 MG/DL (ref 8.8–10.4)
CHLORIDE SERPL-SCNC: 97 MMOL/L (ref 98–107)
CREAT SERPL-MCNC: 0.7 MG/DL (ref 0.67–1.17)
EGFRCR SERPLBLD CKD-EPI 2021: >90 ML/MIN/1.73M2
GLUCOSE SERPL-MCNC: 97 MG/DL (ref 70–99)
HCO3 SERPL-SCNC: 39 MMOL/L (ref 22–29)
NT-PROBNP SERPL-MCNC: 3810 PG/ML (ref 0–1800)
PLATELET # BLD AUTO: 198 10E3/UL (ref 150–450)
POTASSIUM SERPL-SCNC: 4.1 MMOL/L (ref 3.4–5.3)
PSA SERPL DL<=0.01 NG/ML-MCNC: 0.12 NG/ML
SODIUM SERPL-SCNC: 143 MMOL/L (ref 135–145)

## 2025-02-25 PROCEDURE — 250N000013 HC RX MED GY IP 250 OP 250 PS 637: Performed by: INTERNAL MEDICINE

## 2025-02-25 PROCEDURE — 250N000011 HC RX IP 250 OP 636: Performed by: INTERNAL MEDICINE

## 2025-02-25 PROCEDURE — 120N000001 HC R&B MED SURG/OB

## 2025-02-25 PROCEDURE — 99232 SBSQ HOSP IP/OBS MODERATE 35: CPT | Mod: FS | Performed by: NURSE PRACTITIONER

## 2025-02-25 PROCEDURE — 84153 ASSAY OF PSA TOTAL: CPT | Performed by: INTERNAL MEDICINE

## 2025-02-25 PROCEDURE — 99255 IP/OBS CONSLTJ NEW/EST HI 80: CPT | Performed by: PHYSICIAN ASSISTANT

## 2025-02-25 PROCEDURE — 99232 SBSQ HOSP IP/OBS MODERATE 35: CPT | Performed by: INTERNAL MEDICINE

## 2025-02-25 PROCEDURE — 80048 BASIC METABOLIC PNL TOTAL CA: CPT | Performed by: INTERNAL MEDICINE

## 2025-02-25 PROCEDURE — 250N000011 HC RX IP 250 OP 636: Performed by: NURSE PRACTITIONER

## 2025-02-25 PROCEDURE — 82310 ASSAY OF CALCIUM: CPT | Performed by: INTERNAL MEDICINE

## 2025-02-25 PROCEDURE — 97116 GAIT TRAINING THERAPY: CPT | Mod: GP

## 2025-02-25 PROCEDURE — 250N000013 HC RX MED GY IP 250 OP 250 PS 637: Performed by: NURSE PRACTITIONER

## 2025-02-25 PROCEDURE — 83880 ASSAY OF NATRIURETIC PEPTIDE: CPT | Performed by: NURSE PRACTITIONER

## 2025-02-25 PROCEDURE — 36415 COLL VENOUS BLD VENIPUNCTURE: CPT | Performed by: INTERNAL MEDICINE

## 2025-02-25 PROCEDURE — 85049 AUTOMATED PLATELET COUNT: CPT | Performed by: INTERNAL MEDICINE

## 2025-02-25 PROCEDURE — 97535 SELF CARE MNGMENT TRAINING: CPT | Mod: GO | Performed by: OCCUPATIONAL THERAPIST

## 2025-02-25 PROCEDURE — 97140 MANUAL THERAPY 1/> REGIONS: CPT | Mod: GP

## 2025-02-25 RX ORDER — POTASSIUM CHLORIDE 1.5 G/1.58G
40 POWDER, FOR SOLUTION ORAL DAILY
Status: DISCONTINUED | OUTPATIENT
Start: 2025-02-26 | End: 2025-02-27

## 2025-02-25 RX ORDER — FUROSEMIDE 10 MG/ML
80 INJECTION INTRAMUSCULAR; INTRAVENOUS
Status: DISCONTINUED | OUTPATIENT
Start: 2025-02-25 | End: 2025-02-26

## 2025-02-25 RX ADMIN — CALCIUM 500 MG: 500 TABLET ORAL at 09:39

## 2025-02-25 RX ADMIN — ASPIRIN 81 MG: 81 TABLET, COATED ORAL at 09:39

## 2025-02-25 RX ADMIN — GABAPENTIN 100 MG: 100 CAPSULE ORAL at 17:57

## 2025-02-25 RX ADMIN — LORATADINE 10 MG: 10 TABLET ORAL at 19:32

## 2025-02-25 RX ADMIN — CALCIUM 500 MG: 500 TABLET ORAL at 17:55

## 2025-02-25 RX ADMIN — Medication 40 MG: at 19:34

## 2025-02-25 RX ADMIN — FUROSEMIDE 80 MG: 10 INJECTION, SOLUTION INTRAVENOUS at 15:59

## 2025-02-25 RX ADMIN — BUMETANIDE 2 MG: 1 TABLET ORAL at 09:39

## 2025-02-25 RX ADMIN — ENOXAPARIN SODIUM 40 MG: 40 INJECTION SUBCUTANEOUS at 06:36

## 2025-02-25 RX ADMIN — Medication 25 MCG: at 09:39

## 2025-02-25 RX ADMIN — POLYETHYLENE GLYCOL 3350 17 G: 17 POWDER, FOR SOLUTION ORAL at 09:45

## 2025-02-25 RX ADMIN — Medication 40 MG: at 09:39

## 2025-02-25 ASSESSMENT — ACTIVITIES OF DAILY LIVING (ADL)
ADLS_ACUITY_SCORE: 60
ADLS_ACUITY_SCORE: 61
ADLS_ACUITY_SCORE: 61
ADLS_ACUITY_SCORE: 60
ADLS_ACUITY_SCORE: 61
ADLS_ACUITY_SCORE: 60
ADLS_ACUITY_SCORE: 60
ADLS_ACUITY_SCORE: 61
ADLS_ACUITY_SCORE: 60
ADLS_ACUITY_SCORE: 61
ADLS_ACUITY_SCORE: 60
ADLS_ACUITY_SCORE: 60
ADLS_ACUITY_SCORE: 61
ADLS_ACUITY_SCORE: 61
DOING_ERRANDS_INDEPENDENTLY_DIFFICULTY: YES
ADLS_ACUITY_SCORE: 61
ADLS_ACUITY_SCORE: 61

## 2025-02-25 NOTE — PLAN OF CARE
Diagnosis: BLE Edema, CHF exacerbation   Mental Status:A&Ox 4, [place]   Activity/dangle up with 1, a little unsteady at first  Diet: soft/bite size, 2 gm NA, 2000 FR   Pain: denies  Lucio/Voiding: external catheter  Tele/Restraints/Iso: tele - afib CVR  02/LDA: ANTONY  D/C Date: ? Pending progress  Other Info: BLE lymph wraps

## 2025-02-25 NOTE — CONSULTS
"  Gastroenterology Consultation      Date of Admission:  2/19/2025  Reason for Admission: ADHF   Date of Consult  2/25/2025   Requesting Physician:  Ammy Sky MD           ASSESSMENT AND RECOMMENDATIONS:   Assessment:  84 year old male with a history of mild dementia, permanent atrial fibrillation (not on anticoagulation), moderate aortic stenosis, hypertension, chronic peripheral edema, moderate aortic root dilatation, frequent falls, hx of alcohol dependence (no longer drinking), history of Munguia's esophagus, chronic anemia, chronic pharyngeal dysphagia who presented from [facility] 2/19/25 with increased lower extremity swelling and weight gain, admitted with acute decompensated heart failure. GI consulted for \"dysphagia; eval for intervention for cricopharyngeal bar and diverticulum\".       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 He recently was seen in GI clinic on 2/3/25 with Dr. Mercado. EGD in 2022 showed munguia's esophagus and the patient has been on PPI twice daily. He has dysphagia with liquids and solid foods and at times small pieces of food makes him feel like he is choking. During the clinic visit, the patient reported that at times he chokes to the point of getting nosebleeds. The patient was recommended to have an esophagram, a video swallow study, he was referred to ENT, and was recommended to have an EGD with possible dilation. His PPI was decreased from twice daily to once daily, however it appears from med rec that patient is still taking it twice daily.     SLP obtained a video swallow study on 2/21 that showed large amounts of aspiration of thin liquids. It also showed a zenker diverticulum and prominent cricopharyngeus muscle. Speech put him on a modified diet that he is tolerating other than his dislike for thickened liquids.     - Patient currently is not " "interested in an EGD at this time. Given his acute decompensated heart failure, he is high risk for an endoscopic procedure.   - He can follow up in clinic with Dr. Mercado or seek a second opinion per our discussion today.   - Diet per SLP   - Continue PPI      # Iron deficiency anemia  Admit hgb 8.1, has been stable in the 7s this admission. Iron studies on admission showed low iron of 16, and ferritin of 19. He was started on IV iron 2/21, switched to PO 2/22.     - Continue iron supplements  - Monitor hgb        GI will sign off.     Thank you for involving us in this patient's care. Please do not hesitate to contact the GI service with any questions or concerns.     Pt seen and care plan discussed with Dr. Dahl, GI staff physician, and Dr. Mares, primary team.      Overall time spent on the date of this encounter preparing to see the patient (including chart review of available notes, clinical status events, imaging and labs); obtaining and/or reviewing separately obtained history; ordering medications, tests or procedures; communicating with other health care professionals; and documenting the above clinical information in the electronic medical record was 80 minutes.    Yumiko Mckeon PA-C  GI Service  [facility]  Text Page (Monday-Friday, 8am-4pm)    To page the On-Call [facility] GI provider 24 hours a day, please click [facility] and select GASTROENTEROLOGY MEDICINE ADULT / [facility] in the drop-down menu.   -------------------------------------------------------------------------------------------------------------------       Reason for Consultation:   We were asked by Ammy Sky MD of medicine to evaluate this patient with \"dysphagia; eval for intervention for cricopharyngeal bar and diverticulum\".      History is obtained from the patient and the medical record.            History of Present Illness:     Jamie Valdivia is a 84 year old male with a PMH significant for mild " "dementia, permanent atrial fibrillation (not on anticoagulation), moderate aortic stenosis, hypertension, chronic peripheral edema, moderate aortic root dilatation, frequent falls, hx of alcohol dependence (no longer drinking), history of Munguia's esophagus, chronic anemia, chronic pharyngeal dysphagia who presented from [facility] with increased lower extremity swelling and weight gain, admitted with acute decompensated heart failure. GI consulted for \"dysphagia; eval for intervention for cricopharyngeal bar and diverticulum\".      History is limited due to patient going on tangents mid conversation. He is very focused on figuring out if he has cancer and when the PSA test will be back. His main complaint regarding his swallowing is that the thickened liquids he was put on by speech therapy. He does not like the thickened liquids and reports that he does not have difficulty swallowing if he is allowed to drink the liquids he wants. He denies odynophagia. His nurse, Krys, reports he is otherwise tolerating the modified diet without issues. No abdominal pain, nausea, vomiting. He denies recent choking events. No odynophagia.     SLP obtained a video swallow study on 2/21 that showed large amounts of aspiration of thin liquids. It also showed a zenker diverticulum and prominent cricopharyngeus muscle. Speech put him on a diet of soft/bite sized diet with ongoing mildly thick liquids and 3x swallow for each bite and sip, eating only when alert and upright. Cough-re-swallow.     He recently saw Dr. Mercado in GI clinic on 2/3/25 where the patient reported difficulty swallowing for 40 years. He had an endoscopy in 2022 that showed munguia's esophagus and the patient has been on PPI twice daily. He has dysphagia with liquids and solid foods and at times small pieces of food makes him feel like he is choking. During the clinic visit, the patient reported that at times he chokes to the point of getting nosebleeds. The " patient was recommended to have an esophagram, a video swallow study, he was referred to ENT, and was recommended to have an EGD with possible dilation. His PPI was decreased from twice daily to once daily.       Previous Procedures:  EGD 6/2022 with Dr. Springer for dysphagia   Impression:               - Normal examined duodenum.                             - Normal stomach.                             - Esophageal mucosal changes suggestive of                             short-segment Vickers's esophagus. Biopsied.                             - Abnormal esophageal motility, suspicious for                             presbyesophagus.     Final Diagnosis   A. Esophagus, distal at 41 cm, biopsy:  -Vickers's esophagus with reactive epithelial changes, negative for dysplasia.  -Columnar mucosa with chronic inflammation and goblet cells identified.  -Minute adjacent squamous mucosa with reactive epithelial changes.  -Negative for acute or eosinophilic esophagitis.  -Negative for dysplasia or malignancy.                 Past Medical History:   Reviewed and edited as appropriate  Past Medical History:   Diagnosis Date    Age-related osteoporosis without current pathological fracture 03/30/2022    Alcohol use disorder     Aortic root aneurysm     CAD (coronary artery disease)     Chronic a-fib (H)     Chronic atrial fibrillation (H) 07/15/2016    Formatting of this note might be different from the original. 2/2019: Multiple falls so started on aspirin only.  Then aspirin stopped due to GI bleed.    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 2/2/21    HTN (hypertension)     Impaired gait and mobility 03/14/2019    Frequent falls    Mild TBI (traumatic brain injury) (H) 03/14/2019    Mumps     Obesity (BMI 30-39.9) 09/03/2015    Osteoarthritis of both knees 05/13/2015    Osteoporosis     Other idiopathic peripheral autonomic neuropathy 03/30/2022    Other seizures (H) 03/30/2022    Pharyngeal dysphagia 05/13/2015    Formatting of this note might be different from the original. Likely secondary to GERD with esophagitis, on PPI and H2 blocker with notable improvement, EGD 10/5/15.    Prostate cancer (H)     Recurrent major depressive disorder 03/30/2022    Rhabdomyolysis     Thrombocytopenia             Past Surgical History:   Reviewed and edited as appropriate   Past Surgical History:   Procedure Laterality Date    BIOPSY PROSTATE TRANSRECTAL N/A 1/11/2023    Procedure: PROSTATE BIOPSY, RECTAL APPROACH;  Surgeon: Niraj Larry MD;  Location:  OR    COLONOSCOPY      ESOPHAGOSCOPY, GASTROSCOPY, DUODENOSCOPY (EGD), COMBINED N/A 06/01/2022    Procedure: ESOPHAGOGASTRODUODENOSCOPY (EGD) mngi with biopsies using jumbo cold biopsy forceps;  Surgeon: David Springer MD;  Location:  GI    left hip replacement  2010    left shoulder replacement  2016    ORTHOPEDIC SURGERY      SPINE SURGERY      [facility]    TONSILLECTOMY      TRANSRECTAL ULTRASONIC SONOGRAM N/A 1/11/2023    Procedure: TRANSRECTAL ULTRASOUND;  Surgeon: Niraj Larry MD;  Location:  OR              Social History:   Reviewed and edited as appropriate  Social History     Socioeconomic History    Marital status:      Spouse name: Not on file    Number of children: Not on file    Years of education: Not on file    Highest education level: Not on file   Occupational History    Not on file   Tobacco Use    Smoking status: Never    Smokeless tobacco: Never   Substance and Sexual Activity    Alcohol use: Not Currently      Comment: not since December 2021    Drug use: Never    Sexual activity: Not on file   Other Topics Concern    Not on file   Social History Narrative    Not on file     Social Drivers of Health     Financial Resource Strain: Low Risk  (11/22/2024)    Financial Resource Strain     Within the past 12 months, have you or your family members you live with been unable to get utilities (heat, electricity) when it was really needed?: No   Food Insecurity: Low Risk  (11/22/2024)    Food Insecurity     Within the past 12 months, did you worry that your food would run out before you got money to buy more?: No     Within the past 12 months, did the food you bought just not last and you didn t have money to get more?: No   Transportation Needs: Low Risk  (11/22/2024)    Transportation Needs     Within the past 12 months, has lack of transportation kept you from medical appointments, getting your medicines, non-medical meetings or appointments, work, or from getting things that you need?: No   Physical Activity: Inactive (11/22/2024)    Exercise Vital Sign     Days of Exercise per Week: 0 days     Minutes of Exercise per Session: 0 min   Stress: No Stress Concern Present (11/22/2024)    [employer] - Occupational Stress Questionnaire     Feeling of Stress : Only a little   Social Connections: Moderately Integrated (11/22/2024)    Social Connection and Isolation Panel [NHANES]     Frequency of Communication with Friends and Family: More than three times a week     Frequency of Social Gatherings with Friends and Family: Three times a week     Attends [employer] Services: More than 4 times per year     Active Member of Clubs or Organizations: No     Attends Club or Organization Meetings: Never     Marital Status:    Interpersonal Safety: Low Risk  (11/22/2024)    Interpersonal Safety     Do you feel physically and emotionally safe where you currently live?: Yes     Within the past 12 months, have  you been hit, slapped, kicked or otherwise physically hurt by someone?: No     Within the past 12 months, have you been humiliated or emotionally abused in other ways by your partner or ex-partner?: No   Housing Stability: Low Risk  (11/22/2024)    Housing Stability     Do you have housing? : Yes     Are you worried about losing your housing?: No              Family History:   Patient's family history is reviewed today and is non-contributory  Family History   Problem Relation Age of Onset    Osteoporosis Mother     Prostate Cancer Paternal Grandfather     Colon Cancer No family hx of              Allergies:   Reviewed and edited as appropriate     Allergies   Allergen Reactions    Ativan [Lorazepam]             Medications:     Medications Prior to Admission   Medication Sig Dispense Refill Last Dose/Taking    acetaminophen (TYLENOL) 500 MG tablet Take 2 tablets (1,000 mg) by mouth 3 times daily as needed for mild pain 10 tablet 98 More than a month    cyanocobalamin (VITAMIN B-12) 1000 MCG tablet TAKE 1 TABLET BY MOUTH ONCE DAILY 90 tablet 97 2/19/2025    folic acid (FOLVITE) 1 MG tablet TAKE 1 TABLET BY MOUTH ONCE DAILY 90 tablet 97 2/19/2025    furosemide (LASIX) 20 MG tablet TAKE ONE-HALF TABLET (10MG) BY MOUTH ONCE DAILY 45 tablet 97 2/19/2025    gabapentin (NEURONTIN) 100 MG capsule TAKE 1 CAPSULE BY MOUTH AT BEDTIME 90 capsule 97 Past Week    loratadine (CLARITIN) 10 MG tablet TAKE 1 TABLET BY MOUTH AT BEDTIME 90 tablet 97 Past Week    menthol-zinc oxide (CALMOSEPTINE) 0.44-20.6 % OINT ointment Apply topically 4 times daily.   More than a month    omeprazole (PRILOSEC) 40 MG DR capsule TAKE 1 CAPSULE BY MOUTH TWICE DAILY 180 capsule 97 2/19/2025    QUEtiapine (SEROQUEL) 25 MG tablet TAKE ONE-HALF TABLET (12.5MG) BY MOUTH TWICE DAILY;& MAY TAKE ONE-HALF TABLET (12.5MG) EVERY 12 HOURS AS NEEDED 180 tablet 97 2/19/2025    sodium chloride (OCEAN) 0.65 % nasal spray Spray 1 spray in nostril 2 times daily. 480 mL  98 More than a month    polyethylene glycol (MIRALAX) 17 GM/Dose powder MIX 2 CAPFULS IN 8OZ OF ORANGE JUICE  IN THE MORNING;AND MAY MIX 2 CAPFULS ONCE DAILY AS NEEDED FOR CONSTIPATION. MIX IN FRONT OF PT. HOLD FOR LOOSE STOOL. OK TO GIVE 1 CAPFUL IF RESIDENT REFUSES 2 CAPFULS. (Patient not taking: Reported on 2/20/2025) 510 g 97 Not Taking             Review of Systems:     A complete review of systems was performed and is negative except as noted in the HPI             Physical Exam:   Temp: 97.8  F (36.6  C) Temp src: Oral BP: (!) 150/67 Pulse: 61   Resp: 19 SpO2: 97 % O2 Device: Nasal cannula Oxygen Delivery: 2 LPM  Wt:   Wt Readings from Last 2 Encounters:   02/25/25 91.6 kg (201 lb 15.1 oz)   02/03/25 97.1 kg (214 lb)        General: 84 year old male in NAD.  Answers appropriately.    HEENT: Head is AT/NC. Sclera anicteric. No conjunctival injection.  Oropharynx is clear, moist    Neck: Supple  Lungs: Non labored breathing   Heart: Regular rate   Abdomen: Soft, non-tender, non-distended.     Extremities: Bilateral pedal edema, lymphedema wraps in place.           Data:   Labs and imaging below were independently reviewed and interpreted    LAB WORK:    BMP  Recent Labs   Lab 02/24/25  0859 02/23/25  0729 02/22/25 2255 02/22/25  0827 02/21/25 2053 02/21/25  0746    139  --  141  --  140   POTASSIUM 4.0 3.7  --  3.8 4.1 3.8  3.8   CHLORIDE 95* 94*  --  96*  --  97*   JSOUE 9.1 8.5*  --  8.5*  --  8.7*   CO2 35* 39*  --  38*  --  28   BUN 18.0 14.4  --  13.0  --  15.3   CR 0.74 0.73  --  0.70  --  0.75   * 99 102* 90  --  84     CBC  Recent Labs   Lab 02/24/25  0859 02/23/25  0729 02/22/25  0827 02/21/25  0746   WBC 5.4 4.0 4.2 4.3   RBC 3.31* 3.14* 3.21* 3.29*   HGB 7.6* 7.3* 7.5* 7.6*   HCT 26.6* 25.0* 24.9* 25.6*   MCV 80 80 78 78   MCH 23.0* 23.2* 23.4* 23.1*   MCHC 28.6* 29.2* 30.1* 29.7*   RDW 17.4* 17.2* 17.2* 17.0*    178 171 183     INRNo lab results found in last 7  days.  LFTs  Recent Labs   Lab 02/19/25  1826   ALKPHOS 81   AST 20   ALT 6   BILITOTAL 0.3   PROTTOTAL 5.8*   ALBUMIN 3.0*      PANCNo lab results found in last 7 days.  CULTURES:   7-Day Micro Results       No results found for the last 168 hours.              IMAGING:  Video swallow study 2/21/25  IMPRESSION:   Swallow study with Speech Pathology using multiple barium thicknesses.      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     Thin: Aspiration with cough response. Mild residue.     Mildly thick: Aspiration with chin tuck. Deep penetration in neutral position. Mild residue.     Puree: No penetration or aspiration. Mild residue.     Regular: No penetration or aspiration. Mild residue.    =======================================================================

## 2025-02-25 NOTE — PLAN OF CARE
Goal Outcome Evaluation:      Date/Time: 2/24/2025 [id number]     Trauma/Ortho/Medical (Choose one) Medical     Diagnosis: CHF exacerbation / BLE edema  POD#: NA  Mental Status: A/O x3, forgetful  Activity/dangle: 1-2 GB /walker  Diet: Soft  bite size, mildly thick liquid  Pain: Denied  Lucio/Voiding: External catheter  Tele/Restraints/Iso: Tele- A-Fib CVR  02/LDA: 2 lpm NC / No IV access  D/C Date: Pending TCU  Other Info:  BLE Lymphedema wraps in place due to be removed at 1230 pm today.

## 2025-02-25 NOTE — PROGRESS NOTES
[facility]  Cardiology Progress Note  Date of Service: 02/25/2025  Date of Admission: 2/19/2025  Primary Cardiologist: Dr. Ross    Summary    Mr. Jamie Valdivia is a very pleasant 84 year old male with a past medical history of permanent atrial fibrillation, moderate aortic stenosis, hypertension, chronic peripheral edema, moderate aortic root dilatation, frequent falls, and alcohol dependence admitted on 2/19/2025 with increased leg swelling. Cardiology was consulted for decompensated heart failure.    Interval February 25, 2025    Remains volume up on physical exam.  Weight trend challenging given different modalities of weighing (needs consistent standing weight daily).  Recorded 700cc out yesterday, now net -200cc.  Weight improved.    Asssessment:    Acute on chronic HFpEF  CHF exacerbation with severe volume overload on admission  Mild pulmonary hypertension by echo  Admission NTproBNP 2169 > ~3800  Echocardiogram shows LVEF 60-65% with mild RV dilation, 2+ TR and CHRISTINA  Sub-ideal I/O monitoring, charts indicate net - 80cc with improved weight trend.  Wt on admission 215? 24-hour trend 205>202# on standing scale.  Stable renal function during diuresis without contraction; Cr 0.74.  Permanent AF  Intermittently slow ventricular rate, no worrisome samir-arrhythmia on last Zio  Refusing OAC, no Hx LAAO.  On bASA.  Mild-moderate aortic stenosis, mean gradient 10.3 mmHg  Ascending aortic aneurysm (4.8 cm on 7/2024 TTE)  Echo 2/20/2025 aortic root measures 4.5 cm and ascending aorta measures 4.8 cm.  Acute on chronic anemia - Hgb 8.1 on admission, stable around 7.6  Dementia, resides at [facility].  At times goes days without taking scheduled medications.  Prostate cancer  Alcohol use disorder  Chronic pharyngeal dysphagia, GI consulted - pt declined EGD.  _____________________________________________________________    Plan:   I had a nice discussion with the patient about his plan  of care.  He wishes to be out of the hospital while at the same wants to improve his leg edema.  As it stands currently with PO therapy, this will be a very slow process thus prolonging his hospital stay - which does not align with his goals.  This was explained clearly to the patient.  In a shared decision making conversation, patient is willing to accept PIV placement so we may augment our therapies with IV treatment today.  START IV Furosemide 80 mg BID.  Discontinue PO Bumex.  START potassium chloride 40 mEq daily.  Daily BMP.  Sodium and fluid restrictions as ordered.  Daily standing weight + strict I/O monitoring.  ACE/Lymphedema wrapping to BLE.  _____________________________________________________________    Plan of care and the majority of MDM was formulated under direction and guidance of Dr. Santillan.    20 Minutes spent in coordination of care, and discussion with the patient and/or family regarding diagnostic results, prognosis, symptom management, risks and benefits of management options, and development of the plan of care of above.    Thank you for the opportunity to participate in the care of Mr. Jamie Valdivia.  Please feel free to reach out with any additional questions.    MARICHUY Gracia, CNP   Nurse Practitioner  [facility]  Pager: [phone]  Phone: [phone]  Text Page (8am - 5pm, M-F)    Physical Exam   Temp: 97.8  F (36.6  C) Temp src: Oral BP: (!) 150/67 Pulse: 61   Resp: 19 SpO2: 94 % O2 Device: None (Room air) Oxygen Delivery: 2 LPM    Vitals:    02/24/25 0541 02/25/25 0619 02/25/25 1027   Weight: 93 kg (205 lb) 91.6 kg (201 lb 15.1 oz) 91.9 kg (202 lb 11.2 oz)     I/O last 3 completed shifts:  In: 900 [P.O.:900]  Out: 700 [Urine:700]    Constitutional: Appears stated age, well nourished, NAD.  Eyes: Pupils equal, round. Sclerae anicteric.   HEENT: Normocephalic, atraumatic.   Neck: Supple. Carotid pulses full and equal. Elevated to jaw.  Respiratory:  Non-labored. Lungs upper rales, no fine crackles.  Cardiovascular: IRR, normal S1 and S2. No M/G/R.  Vascular: Peripheral pulses intact; pitting LE edema to thighs. Lymphedema wraps on.  Musculoskeletal/Extremities: Symmetrical movement.   Neurologic: No gross focal deficits. Alert, awake.    Data   Recent Labs   Lab 02/25/25  0910 02/24/25  0859 02/23/25  0729 02/22/25  2255 02/22/25  0827 02/20/25  0812 02/19/25  1826   WBC  --  5.4 4.0  --  4.2   < > 5.8   HGB  --  7.6* 7.3*  --  7.5*   < > 8.1*   MCV  --  80 80  --  78   < > 80    216 178  --  171   < > 189    141 139  --  141   < > 140   POTASSIUM 4.1 4.0 3.7  --  3.8   < > 4.0   CHLORIDE 97* 95* 94*  --  96*   < > 107   CO2 39* 35* 39*  --  38*   < > 23   BUN 17.3 18.0 14.4  --  13.0   < > 11.0   CR 0.70 0.74 0.73  --  0.70   < > 0.54*   ANIONGAP 7 11 6*  --  7   < > 10   JOSUE 8.9 9.1 8.5*  --  8.5*   < > 6.8*   GLC 97 119* 99   < > 90   < > 86   ALBUMIN  --   --   --   --   --   --  3.0*   PROTTOTAL  --   --   --   --   --   --  5.8*   BILITOTAL  --   --   --   --   --   --  0.3   ALKPHOS  --   --   --   --   --   --  81   ALT  --   --   --   --   --   --  6   AST  --   --   --   --   --   --  20    < > = values in this interval not displayed.       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)    Chronic alcohol use    Major neurocognitive disorder (H)    Generalized anxiety disorder    Gastro-esophageal reflux  disease without esophagitis    History of 2019 novel coronavirus disease (COVID-19)    Impaired gait and mobility    Obesity (BMI 30-39.9)    Osteoarthritis of both knees    Other insomnia    Pharyngeal dysphagia    Physical deconditioning    Repeated falls    Coronary atherosclerosis of native coronary artery    Anemia    Other idiopathic peripheral autonomic neuropathy    Age-related osteoporosis without current pathological fracture    Constipation    Dependent edema    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    Atherosclerosis of aorta    Alcohol dependence in remission (H)    Prostate cancer (H) - Dx January 2023    Chronic cough    Fall, initial encounter    Closed fracture of multiple ribs of right side, initial encounter    Closed wedge compression fracture of L3 vertebra, initial encounter (H)    Aneurysm of ascending aorta without rupture    Preventative health care    Bradycardia    Encephalopathy    Generalized weakness    Failure of outpatient treatment    Chest pain, unspecified type    Choking, subsequent encounter    Oropharyngeal dysphagia    Esophageal dysphagia    Dysphonia    Shortness of breath    Impaired activities of daily living    Bilateral lower extremity edema    Hypervolemia, unspecified hypervolemia type     Medications   Current Facility-Administered Medications   Medication Dose Route Frequency Provider Last Rate Last Admin    Continuing beta blocker from home medication list OR beta blocker order already placed during this visit   Does not apply DOES NOT GO TO Ammy Ndiaye MD        Reason ACE/ARB/ARNI order not selected   Other DOES NOT GO TO Ammy Ndiaye MD         Current Facility-Administered Medications   Medication Dose Route Frequency  Provider Last Rate Last Admin    aspirin EC tablet 81 mg  81 mg Oral Daily Nazario Mares MD   81 mg at 25 0939    calcium carbonate 500 mg (elemental) (OSCAL) tablet 500 mg  500 mg Oral TID w/meals Levi Acevedo MD   500 mg at 25 0939    [Held by provider] cyanocobalamin (VITAMIN B-12) tablet 1,000 mcg  1,000 mcg Oral Daily Ammy Sky MD        enoxaparin ANTICOAGULANT (LOVENOX) injection 40 mg  40 mg Subcutaneous Q24H Ammy Sky MD   40 mg at 25 0636    ferrous sulfate (FEROSUL) tablet 325 mg  325 mg Oral Every Other Day Nazario Mares MD   325 mg at 25    [Held by provider] folic acid (FOLVITE) tablet 1,000 mcg  1,000 mcg Oral Daily Ammy Sky MD   1,000 mcg at 25 1035    furosemide (LASIX) injection 80 mg  80 mg Intravenous BID Zoë Tejeda APRN CNP        gabapentin (NEURONTIN) capsule 100 mg  100 mg Oral At Bedtime Ammy Sky MD   100 mg at 25    loratadine (CLARITIN) tablet 10 mg  10 mg Oral At Bedtime Ammy Sky MD   10 mg at 25    pantoprazole (PROTONIX) 2 mg/mL suspension 40 mg  40 mg Oral BID Ammy Sky MD   40 mg at 25 0939    [START ON 2025] potassium chloride (KLOR-CON) Packet 40 mEq  40 mEq Oral Daily Zoë Tejeda APRN CNP        sodium chloride (PF) 0.9% PF flush 3 mL  3 mL Intracatheter Q8H Ammy Sky MD   3 mL at 25 0816    Vitamin D3 (CHOLECALCIFEROL) tablet 25 mcg  25 mcg Oral Daily Levi Acevedo MD   25 mcg at 25 0939       Data   Last 24 hours labs personally reviewed.  Echo:   Recent Results (from the past 4320 hours)   Echocardiogram Complete   Result Value    LVEF  60-65%    Narrative    [id number]  [id number]  [id number]  [id number]^JAD     [facility]  Echocardiography Laboratory  [address] [place] [address]     Name: CAMILO CRUMP  MRN: [id number]  : 1940  Study Date: 2025 08:52  AM  Age: 84 yrs  Gender: Male  Patient Location: [facility]  Reason For Study: Heart Failure  Ordering Physician: NITIN HOBSON  Performed By: RAJESH Grant     BSA: 2.1 m2  Height: 68 in  Weight: 215 lb  HR: 47  BP: 121/62 mmHg  ______________________________________________________________________________  Procedure  Echocardiogram with two-dimensional, color and spectral Doppler. Definity (NDC  #39062-107) given intravenously. Contrast Definity. Technically difficult  study.  ______________________________________________________________________________  Interpretation Summary     Left ventricular systolic function is normal.The visual ejection fraction is  60-65%.  The right ventricle is mildly dilated.The right ventricular systolic function  is normal.  Severe bi-atrial enlargement,  There is moderate to mod-severe (2-3+) tricuspid regurgitation.There is mild  (1+) aortic regurgitation.Mild valvular aortic stenosis.  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  Pulmonary hypertension- RVSP 51 mm hg +RA.     Echocardiogram dated 07/03/2024 mild tricuspid regurgitation noted with RVSP  37 mm hg + RA, ascending aorta 4.8 cm, aortic root 4.4 cm  ______________________________________________________________________________  Left Ventricle  Left ventricular systolic function is normal. The visual ejection fraction is  60-65%.     Right Ventricle  The right ventricle is mildly dilated. The right ventricular systolic function  is normal.     Atria  The left atrium is severely dilated. The right atrium is severely dilated.  There is no color Doppler evidence of an atrial shunt.     Mitral Valve  There is mild mitral annular calcification. There is mild (1+) mitral  regurgitation. There is no mitral valve stenosis.     Tricuspid Valve  There is moderate to mod-severe (2-3+) tricuspid regurgitation. The right  ventricular systolic pressure is approximated at 50.5 mmHg plus  the right  atrial pressure. Pulmonary hypertension.     Aortic Valve  The aortic valve is trileaflet. There is mild (1+) aortic regurgitation. Mild  valvular aortic stenosis. The mean AoV pressure gradient is 10.3 mmHg.     Pulmonic Valve  There is trace pulmonic valvular regurgitation. There is no pulmonic valvular  stenosis.     Vessels  Aortic root aneurysm is present. 4.5 cm. Ascending aorta aneurysm is present.  4.8 cm.     Pericardium  There is no pericardial effusion.     ______________________________________________________________________________  MMode/2D Measurements & Calculations  Ao root diam: 4.5 cm  asc Aorta Diam: 4.8 cm  LVOT diam: 2.1 cm  LVOT area: 3.5 cm2     Ao root diam index Ht(cm/m): 2.6  Ao root diam index BSA (cm/m2): 2.2  Asc Ao diam index BSA (cm/m2): 2.3  Asc Ao diam index Ht(cm/m): 2.8  TAPSE: 2.6 cm     Doppler Measurements & Calculations  MV E max shelton: 126.0 cm/sec  MV A max shelton: 34.4 cm/sec  MV E/A: 3.7  MV max PG: 10.8 mmHg  MV mean P.4 mmHg  MV V2 VTI: 43.0 cm  MVA(VTI): 2.4 cm2  MV dec slope: 652.7 cm/sec2  MV dec time: 0.19 sec  Ao V2 max: 220.0 cm/sec  Ao max P.0 mmHg  Ao V2 mean: 149.1 cm/sec  Ao mean PG: 10.3 mmHg  Ao V2 VTI: 57.8 cm  LEONILA(I,D): 1.8 cm2  LEONILA(V,D): 1.8 cm2  LV V1 max P.3 mmHg  LV V1 max: 114.9 cm/sec  LV V1 VTI: 29.4 cm  SV(LVOT): 102.5 ml  SI(LVOT): 48.6 ml/m2  PA acc time: 0.04 sec  TR max shelton: 355.2 cm/sec  TR max P.5 mmHg  AV Shelton Ratio (DI): 0.52     LEONILA Index (cm2/m2): 0.84  RV S Shelton: 9.1 cm/sec     ______________________________________________________________________________  Report approved by: Dallas Oviedo MD on 2025 12:46 PM

## 2025-02-25 NOTE — CONSULTS
Care Management is currently following.    Please see Initial Consult 2/20 and follow up notes.     Javon Reina RN, BSN, Care Coordinator

## 2025-02-25 NOTE — PROGRESS NOTES
Care Management Follow Up    Length of Stay (days): 6    Expected Discharge Date: 02/24/2025     Concerns to be Addressed: adjustment to diagnosis/illness, discharge planning     Patient plan of care discussed at interdisciplinary rounds: Yes    Anticipated Discharge Disposition: Assisted Living, Transitional Care              Anticipated Discharge Services:    Anticipated Discharge DME:      Patient/family educated on Medicare website which has current facility and service quality ratings:    Education Provided on the Discharge Plan:    Patient/Family in Agreement with the Plan: unable to assess    Referrals Placed by CM/SW: External Care Coordination  Private pay costs discussed: Not applicable    Discussed  Partnership in Safe Discharge Planning  document with patient/family: No     Handoff Completed: No, handoff not indicated or clinically appropriate    Additional Information:    SHALINI spoke to Flakito at [facility] ([phone]). Flakito explained that patient has had a long history with [facility] due to noncompliance. Patient lives in memory care and his wife lives in [place] in the same building. Patient regularly stays with his wife and misses his medication for days at a time. Flakito stated that patient is currently receiving things that [employer] cannot accommodate, ie low sodium diet and more. Flakito states she is not saying patient can't return, but it is very much advised that patient goes to TCU, despite him adamantly declining. SHALINI notified patient typically relies on wife for decisions, yet her cognition is declining as well. Patient is not speaking to kids or allowing care team to reach out to them either. SHALINI received messaged from both patients  and PCP stating complications of his case. PCP thinks guardianship needs to be pursued.     SHALINI discussed case with RNCC supervisor to decide next steps. Patient is documented to be alert and oriented, but lives in a locked memory care unit. Last  SLUMS was a year ago. SHALINI advised to message hospitalist to see if patient is decisional. Hospitalist will have psych visit patient. SHALINI left Flakito a VM to update.    Next Steps: continue to plan for discharge    TYLER Shah

## 2025-02-25 NOTE — PLAN OF CARE
Date/Time: 2/24/25 [id number]    Trauma/Ortho/Medical (Choose one) Medical    Diagnosis: CHF, LE Edema  POD#: NA  Mental Status: A&Ox4 Forgetful   Activity/dangle: A1 GB Walker  Diet: @ GM NA, Mild Thick Liquids, 2,000ml FR  Pain: Denies  Lucio/Voiding: External Cath  Tele/Restraints/Iso: Tele Afib CVR  02/LDA: No IV Access, O2 2L NC  D/C Date: Recommending TCU but patient declining  Other Info: GI Consult for esophageal deficits, meds crushed in apple sauce, TAVERAS

## 2025-02-25 NOTE — PROGRESS NOTES
[facility]    Internal Medicine Hospitalist Progress Note  02/25/2025  I evaluated patient on the above date.    Nazario Mares Jr., MD  [phone] (p)  Text Page  Vocera      [New actions/orders today (02/25/2025) are underlined in the assessment and plan. All lab results in the assessment and plan were reviewed.]  Assessment & Plan   Jamie Valdivia is a delightful 84 year old male with history including mild dementia; HTN; chronic afib (has declined anticoagulation); GERD, h/o Vickers's esophagus; chronic anemia; chronic pharyngeal dysphagia; peripheral neuropathy; OA; chronic spine disease; osteoporosis; prostate cancer; h/o alcohol use d/o (no longer drinking); h/o falls; who presented from assisted 2/19/2025 with increased lower extremity swelling and weight gain and found with acute decompensated heart failure.     On initial evaluation, pt was afebrile, hypertensive, sats 90%. ECG showed afib with CVR, no acute ischemic changes. Labs notable for CBC with WBC normal, hgb 8.1; BMP with calcium 6.8 (ionized calcium 4.3), otherwise unremarkable; LFT's with albumin 3.0, protein 5.8, otherwise normal; NTP-BNP 2169, troponin normal.    CXR showed left hemidiaphragm remained elevated compared to the right and overall the lungs had shallow inflation, chronic blunting of the right posterior costophrenic sulcus could relate to scarring or minimal fluid but unchanged (comparison 7/2024).        Acute CHF exacerbation (HFpEF).  Moderate to severe tricuspid regurgitation; mild AS.  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  Permanent atrial fibrillation with intermittent slow/controlled ventricular response.  Chronic lower extremity edema.  Permanent atrial fibrillation with intermittent slow ventricular response.  [PTA: furosemide 10 mg daily.]  * Echo 7/2024 showed LVEF 55%; RV OK; mild pHTN; mild AS; ascending aortic dilatation 4.8 cm, sinus 4.4 cm. Patient has declined anticoagulation;  and has declined LAAO as well.  * Initial presentation as above. Wt 215 lbs on admit. Presented with worsening lower extremity edema and weight gain. Started on IV furosemide on admit. Cardiology consulted on admit.   * 2/20: CXR showed possible mildly increased edema, otherwise stable. Echo showed LVEF 6-65%; RV mildly dilated, RV systolic function normal; moderate-severe TR; mild AR, mild AS; ascending aortic aneurysm 4.8 cm, aortic root aneurysm 4.5 cm; pHTN.  * 2/21: CXR showed possible LLL infiltrate, right lung clear. IV furosemide increased.  * 2/22: Feeling better, feels legs less swollen.  * 2/23: IV lost/difficult placement. Pt did not want midline; as such, diuretics change to PO.  * 2/24: Wt down to 205 lbs. Still with significant edema. Diuretics changed from PO furosemide to PO bumetanide and given spironolactone x1.  * 2/25: Wt down but still with significant edema, though improving.  Vitals:    02/22/25 2100 02/23/25 0545 02/24/25 0541 02/25/25 0619   Weight: 91.4 kg (201 lb 8 oz) 95.7 kg (210 lb 15.7 oz) 93 kg (205 lb) 91.6 kg (201 lb 15.1 oz)    02/25/25 1027   Weight: 91.9 kg (202 lb 11.2 oz)     I/O last 3 completed shifts:  In: 900 [P.O.:900]  Out: 700 [Urine:700]  Recent Labs   Lab 02/25/25  0910 02/24/25  0859 02/23/25  0729 02/22/25  0827 02/22/25  0008 02/21/25 2053 02/21/25  0746 02/20/25  1035 02/20/25  1026 02/20/25  0812 02/19/25 2024 02/19/25  1826    141 139 141  --   --  140  --   --  141  --  140   CO2 39* 35* 39* 38*  --   --  28  --   --  31*  --  23   BUN 17.3 18.0 14.4 13.0  --   --  15.3  --   --  13.0  --  11.0   CR 0.70 0.74 0.73 0.70  --   --  0.75  --   --  0.71  --  0.54*   POTASSIUM 4.1 4.0 3.7 3.8  --  4.1 3.8  3.8  --   --  4.3  --  4.0   CTROPT  --   --   --   --  20 21  --   --  20 19   < > 15   NTBNPI 3,810*  --   --   --   --   --   --   --   --   --   --  2,169*   LVEF  --   --   --   --   --   --   --  60-65%  --   --   --   --     < > = values in this  interval not displayed.   - Continue bumetanide 2 mg BID - plan re-initiate IV diuretics per Cardiology.  - Continue 2L fluid restriction.  - Continue to monitor on telemetry.  - Continue to monitor i/o's, daily wts.  - Appreciate Cardiology help.     Hypocalcemia, non-severe, asymptomatic   Vitamin D deficiency.  * Initial presentation as above. Calcium (including ionized) low on admit. Vitamin D level low on admit.  * Started on calcium and vitamin D this hospitalization.  Recent Labs   Lab Test 02/25/25  0910 02/24/25  0859 02/23/25  0729 02/22/25  0827 02/21/25  0746 02/20/25  0812 02/19/25  2115 02/19/25  1826 12/14/24  0148 09/12/24  1248 07/13/24  0535 07/12/24  2147 06/27/24  1118 06/20/24  0747 04/17/24  0915 04/11/24  0724 03/21/24  0651 03/12/24  1852 06/07/23  0957 02/16/23  0905 01/05/23  1000   CALCIUM, TOTAL 8.9 9.1 8.5* 8.5* 8.7* 8.9  --  6.8* 9.0 9.0   < > 8.4*   < > 8.4*   < > 7.7* 8.5*   < > 9.1   < > 8.8   CALCIUM IONIZED WHOLE BLOOD  --   --   --   --   --   --  4.3*  --   --   --   --   --   --   --   --   --   --   --   --   --   --    ALBUMIN  --   --   --   --   --   --   --   --   --   --   --   --   --   --   --   --   --   --   --   --  3.6   ALBUMIN (R)  --   --   --   --   --   --   --  3.0*  --  3.7  --  3.0*  --  2.7*  --  2.7* 2.9*  --  4.0  --   --     < > = values in this interval not displayed.   - Continue calcium and vitamin D.    Iron deficiency anemia.  * Initial presentation as above. Hgb 8.1 on admit. Iron studies on admit showed low iron, iron saturation and ferritin. TSH normal on admit. B12 level normal on admit.  * Started on IV iron 2/21, changed to PO 2/22.  Recent Labs   Lab 02/24/25  0859 02/23/25  0729 02/22/25  0827 02/21/25  0746 02/20/25  0812 02/19/25  1826   HGB 7.6* 7.3* 7.5* 7.6* 7.5* 8.1*   - Continue ferrous sulfate.  - Continue to monitor CBC periodically as needed.  - Consider prbc transfusion if hgb </= 7.0 or if significant bleeding with hemodynamic  instability or if symptomatic.  - Follow-up outpatient.     Dysphagia.  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  * Current Diet: Fluid restriction 2000 ML FLUID (and additional linked orders)  Room Service  Combination Diet Soft and Bite Sized Diet (level 6); Mildly Thick (level 2) (Small sips, 3x swallow for each bite and sip.); 2 gm NA Diet    - [facility] consulted, appreciate help.  - Continue the above current diet.  - Advance diet as able per SLP.  - Continue aspiration precautions.    Weakness and physical deconditioning due to multiple acute and chronic medical issues.  Peripheral neuropathy.  Frequent falls.  * Lives in assisted living. Has an unconventional assisted living arrangement; he and his wife have separate apartments at their assisted living (he lives one floor above her).  * Seen by PT and OT and TCU recommended.  - Continue PTA gabapentin 100 mg at bedtime.  - Continue PT and OT, TCU recommended, but pt favors going back to group home.  - SW consulted for discharge planning back to [facility] (his wife will plan to stay with him at his apartment at discharge); pt adamantly refuses TCU.    Dementia, mild.  At risk for metabolic encephalopathy.  * Lives in group home.  * Scheduled quetiapine not restarted this hospitalization.  - Continue PRN quetiapine 12.5 mg BID.  - Continue to treat other issues as noted.  - Re-orient as needed.  - Maintain normal day/night, sleep/wake cycles.  - Minimize sedating medications as able.    ADDENDUM 16:20:  - Was told by SHALINI that pt in fact lives in a memory care (wife does live in group home in same building).There was concern about decsion making capacity to go back to memory care vs TCU; as such, will consult Psychiatry regarding this.    Chronic pharyngeal dysphagia.  Chronic dysphonia.  GERD with Vickers's esophagus.  * EGD 2022 found Vickers's esophagus and him started on PPI twice daily.  "Seen in clinic by Gastroenterologist Colby Mercado on 2/3/2025 and X-ray esophagram, EGD, ENT referral and speech therapy referral placed at that visit.   * Seen by SLP and started on a dysphagia diet.  * Current Diet: Fluid restriction 2000 ML FLUID (and additional linked orders)  Room Service  Combination Diet Soft and Bite Sized Diet (level 6); Mildly Thick (level 2) (Small sips, 3x swallow for each bite and sip.); 2 gm NA Diet    - Continue the above current diet.  - Advance diet as able per SLP.  - Continue PTA PPI.  - Continue aspiration precautions.    OA with h/o joint replacements.  Chronic spine disease with h/o spine surgery.  Osteoporosis with h/o compression fractures.  * Started on calcium and vitamin D as above.  * 2/23: Pt c/o back pain. Ordered PRN diclofenac.  - Continue calcium and vitamin D.  - Continue PRN diclofenac gel.    Obesity.  * Body mass index is 30.82 kg/m .  - Needs to continue to pursue aggressive dietary and lifestyle modifications.    Prostate cancer .  * Follows with Dr. Niraj Larry. S/p EBRT without ADT 12/2023.   * Pt was due for PSA (2/26) and wife requested this be checked here, obtained 2/25, pending.  - Follow-up with Urology as scheduled.    H/o alcohol use disorder, no recent use since moving to [facility] a few years ago.  - Noted.    Clinically Significant Risk Factors          # Hypochloremia: Lowest Cl = 95 mmol/L in last 2 days, will monitor as appropriate      # Hypoalbuminemia: Lowest albumin = 3 g/dL at 2/19/2025  6:26 PM, will monitor as appropriate         # Dementia: noted on problem list        # Obesity: Estimated body mass index is 30.82 kg/m  as calculated from the following:    Height as of 2/3/25: 1.727 m (5' 8\").    Weight as of this encounter: 91.9 kg (202 lb 11.2 oz).        # Financial/Environmental Concerns:           Diet: Fluid restriction 2000 ML FLUID (and additional linked orders)  Room Service  Combination Diet Soft and Bite Sized Diet (level 6); " "Mildly Thick (level 2) (Small sips, 3x swallow for each bite and sip.); 2 gm NA Diet    Prophylaxis: PCD's and ambulation  Lucio Catheter: Not present  Lines: None     Code Status: Full Code    Disposition Plan   Medically Ready for Discharge: Anticipated Tomorrow  Expected discharge to assisted living facility ([facility]) pending stability/improvement/resolution of above acute issues.    Entered: Nazario Mares MD 02/25/2025, 12:24 PM     COMMUNICATION  - I discussed with patient's wife 02/24/25      Interval History   Frustrated with multiple \"malfunctions\" including leakage from his external catheter.  Otherwise, swelling continues to decrease though very slow.    * Data reviewed today: I reviewed all new labs and imaging over the last 24 hours. I personally reviewed no images or ECG's today.    Physical Exam   Most recent vitals:   , Blood pressure (!) 150/67, pulse 61, temperature 97.8  F (36.6  C), temperature source Oral, resp. rate 19, weight 91.9 kg (202 lb 11.2 oz), SpO2 94%. O2 Device: None (Room air) Oxygen Delivery: 2 LPM  Vitals:    02/24/25 0541 02/25/25 0619 02/25/25 1027   Weight: 93 kg (205 lb) 91.6 kg (201 lb 15.1 oz) 91.9 kg (202 lb 11.2 oz)     Vital signs with ranges:  Temp:  [97.8  F (36.6  C)-98  F (36.7  C)] 97.8  F (36.6  C)  Pulse:  [61-66] 61  Resp:  [16-19] 19  BP: (130-150)/(54-67) 150/67  SpO2:  [88 %-97 %] 94 %  Patient Vitals for the past 24 hrs:   BP Temp Temp src Pulse Resp SpO2 Weight   02/25/25 1027 -- -- -- -- -- -- 91.9 kg (202 lb 11.2 oz)   02/25/25 0950 -- -- -- -- -- 94 % --   02/25/25 0627 (!) 150/67 97.8  F (36.6  C) Oral 61 19 97 % --   02/25/25 0619 -- -- -- -- -- -- 91.6 kg (201 lb 15.1 oz)   02/24/25 2200 -- -- -- -- -- 94 % --   02/24/25 2012 -- -- -- -- -- (!) 88 % --   02/24/25 1512 135/54 98  F (36.7  C) Oral 66 16 92 % --   02/24/25 1445 130/59 -- -- 66 -- -- --     I/O's last 24 hours:  I/O last 3 completed shifts:  In: 900 [P.O.:900]  Out: 700 " [Urine:700]    Constitutional: awake, alert, oriented, pleasant, conversant   Head:   Eyes:   ENT:   Neck:   Cardiovascular: regular rate, irregular rhythm, +I/VI systolic murmur, no rubs/gallops  Lungs: diminished in the bases, no crackles or wheezes  Gastrointestinal/Abdomen: soft, non-tender, non-distended, positive bowel sounds  :   Musculoskeletal:   Skin/Extremities:  legs diffuse trace to 1+ edema, with pitting into thighs, improving  Neurologic:   Psychiatric:   Hematologic/Lymphatic/Immunologic:        Labs reviewed.  Recent Labs   Lab 02/25/25  0910 02/24/25  0859 02/23/25  0729 02/22/25  2255 02/22/25  0827 02/20/25  0812 02/19/25  1826   WBC  --  5.4 4.0  --  4.2   < > 5.8   HGB  --  7.6* 7.3*  --  7.5*   < > 8.1*   MCV  --  80 80  --  78   < > 80    216 178  --  171   < > 189    141 139  --  141   < > 140   POTASSIUM 4.1 4.0 3.7  --  3.8   < > 4.0   CHLORIDE 97* 95* 94*  --  96*   < > 107   CO2 39* 35* 39*  --  38*   < > 23   BUN 17.3 18.0 14.4  --  13.0   < > 11.0   CR 0.70 0.74 0.73  --  0.70   < > 0.54*   ANIONGAP 7 11 6*  --  7   < > 10   JOSUE 8.9 9.1 8.5*  --  8.5*   < > 6.8*   GLC 97 119* 99   < > 90   < > 86   ALBUMIN  --   --   --   --   --   --  3.0*   PROTTOTAL  --   --   --   --   --   --  5.8*   BILITOTAL  --   --   --   --   --   --  0.3   ALKPHOS  --   --   --   --   --   --  81   ALT  --   --   --   --   --   --  6   AST  --   --   --   --   --   --  20    < > = values in this interval not displayed.     Recent Labs   Lab Test 02/25/25  0910 02/22/25  0008 02/21/25 2053 02/20/25  1026 02/19/25  2024 02/19/25  1826 07/12/24  2147 03/19/24  1232   NT-PROBNP, INPATIENT 3,810*  --   --   --   --  2,169*  --  3,403*   TROPONIN T HIGH SENSITIVITY  --  20 21 20   < > 15   < > 13    < > = values in this interval not displayed.     Recent Labs   Lab 02/25/25  0910 02/24/25  0859 02/23/25  0729 02/22/25  2255 02/22/25  0827 02/21/25  0746   GLC 97 119* 99 102* 90 84     Recent Labs  "  Lab Test 04/28/22  0655 04/07/22  0735   A1C 5.5 5.6       No results for input(s): \"INR\", \"[id number]\" in the last 168 hours.  Recent Labs   Lab 02/24/25  0859 02/23/25  0729 02/22/25  0827 02/21/25  0746 02/20/25  0812 02/19/25  1826   WBC 5.4 4.0 4.2 4.3 3.0* 5.8   CAMILA  --   --   --   --   --  19*       MICRO:  Cultures (including blood and urine):  No lab results found in last 7 days.    No results found for this or any previous visit (from the past 24 hours).      Medications   All medications were reviewed. MAR.    Infusions:  Current Facility-Administered Medications   Medication Dose Route Frequency Provider Last Rate Last Admin    Continuing beta blocker from home medication list OR beta blocker order already placed during this visit   Does not apply DOES NOT GO TO Ammy Ndiaye MD        Reason ACE/ARB/ARNI order not selected   Other DOES NOT GO TO Ammy Ndiaye MD         Scheduled Medications:  Current Facility-Administered Medications   Medication Dose Route Frequency Provider Last Rate Last Admin    aspirin EC tablet 81 mg  81 mg Oral Daily Nazario Mares MD   81 mg at 02/25/25 0939    bumetanide (BUMEX) tablet 2 mg  2 mg Oral BID Zoë Tejeda APRN CNP   2 mg at 02/25/25 0939    calcium carbonate 500 mg (elemental) (OSCAL) tablet 500 mg  500 mg Oral TID w/meals Levi Acevedo MD   500 mg at 02/25/25 0939    [Held by provider] cyanocobalamin (VITAMIN B-12) tablet 1,000 mcg  1,000 mcg Oral Daily Ammy Sky MD        enoxaparin ANTICOAGULANT (LOVENOX) injection 40 mg  40 mg Subcutaneous Q24H Ammy Sky MD   40 mg at 02/25/25 0636    ferrous sulfate (FEROSUL) tablet 325 mg  325 mg Oral Every Other Day Nazario Mares MD   325 mg at 02/24/25 2010    [Held by provider] folic acid (FOLVITE) tablet 1,000 mcg  1,000 mcg Oral Daily Ammy Sky MD   1,000 mcg at 02/20/25 1035    gabapentin (NEURONTIN) capsule 100 mg  100 mg Oral At Bedtime Ammy Sky" MD JAD   100 mg at 02/24/25 2010    loratadine (CLARITIN) tablet 10 mg  10 mg Oral At Bedtime Ammy Sky MD   10 mg at 02/24/25 2010    pantoprazole (PROTONIX) 2 mg/mL suspension 40 mg  40 mg Oral BID Ammy Sky MD   40 mg at 02/25/25 0939    sodium chloride (PF) 0.9% PF flush 3 mL  3 mL Intracatheter Q8H Ammy Sky MD   3 mL at 02/24/25 0816    Vitamin D3 (CHOLECALCIFEROL) tablet 25 mcg  25 mcg Oral Daily Levi Acevedo MD   25 mcg at 02/25/25 0939     PRN Medications:  Current Facility-Administered Medications   Medication Dose Route Frequency Provider Last Rate Last Admin    acetaminophen (TYLENOL) tablet 650 mg  650 mg Oral Q4H PRN Ammy Sky MD   650 mg at 02/24/25 0515    Or    acetaminophen (TYLENOL) Suppository 650 mg  650 mg Rectal Q4H PRN Ammy Sky MD        benzocaine-menthol (CHLORASEPTIC) 6-10 MG lozenge 1 lozenge  1 lozenge Buccal Q1H PRN Ammy Sky MD   1 lozenge at 02/24/25 1606    calcium carbonate (TUMS) chewable tablet 1,000 mg  1,000 mg Oral 4x Daily PRN Ammy Sky MD        Continuing beta blocker from home medication list OR beta blocker order already placed during this visit   Does not apply DOES NOT GO TO Ammy Ndiaye MD        diclofenac (VOLTAREN) 1 % topical gel 2 g  2 g Topical Q6H PRN Nazario Mares MD   2 g at 02/23/25 1501    hydrOXYzine HCl (ATARAX) tablet 10 mg  10 mg Oral TID PRN Antelmo García NP   10 mg at 02/24/25 0516    lidocaine (LMX4) cream   Topical Q1H PRN Ammy Sky MD        lidocaine 1 % 0.1-1 mL  0.1-1 mL Other Q1H PRN Ammy Sky MD        melatonin tablet 1 mg  1 mg Oral At Bedtime PRN Ammy Sky MD   1 mg at 02/23/25 0234    menthol-zinc oxide (CALMOSEPTINE) 0.44-20.6 % ointment OINT   Topical 4x Daily PRN Ammy Sky MD        nitroGLYcerin (NITROSTAT) sublingual tablet 0.4 mg  0.4 mg Sublingual Q5 Min PRN Antelmo García, NP        ondansetron (ZOFRAN ODT) ODT tab 4 mg  4 mg Oral Q6H  Ammy Summers MD        Or    ondansetron (ZOFRAN) injection 4 mg  4 mg Intravenous Q6H Ammy Summers MD        polyethylene glycol (MIRALAX) Packet 17 g  17 g Oral BID Ammy Summers MD   17 g at 02/25/25 0945    QUEtiapine (SEROquel) half-tab 12.5 mg  12.5 mg Oral BID Ammy Summers MD   12.5 mg at 02/22/25 2307    Reason ACE/ARB/ARNI order not selected   Other DOES NOT GO TO Ammy Ndiaye MD        senna-docusate (SENOKOT-S/PERICOLACE) 8.6-50 MG per tablet 1 tablet  1 tablet Oral BID Ammy Summers MD        Or    senna-docusate (SENOKOT-S/PERICOLACE) 8.6-50 MG per tablet 2 tablet  2 tablet Oral BID Ammy Summers MD        sodium chloride (PF) 0.9% PF flush 3 mL  3 mL Intracatheter q1 min Ammy Summers MD

## 2025-02-26 ENCOUNTER — APPOINTMENT (OUTPATIENT)
Dept: PHYSICAL THERAPY | Facility: CLINIC | Age: 85
DRG: 291 | End: 2025-02-26
Payer: COMMERCIAL

## 2025-02-26 ENCOUNTER — APPOINTMENT (OUTPATIENT)
Dept: OCCUPATIONAL THERAPY | Facility: CLINIC | Age: 85
DRG: 291 | End: 2025-02-26
Payer: COMMERCIAL

## 2025-02-26 ENCOUNTER — TELEPHONE (OUTPATIENT)
Dept: CARDIOLOGY | Facility: CLINIC | Age: 85
End: 2025-02-26
Payer: COMMERCIAL

## 2025-02-26 ENCOUNTER — APPOINTMENT (OUTPATIENT)
Dept: SPEECH THERAPY | Facility: CLINIC | Age: 85
DRG: 291 | End: 2025-02-26
Payer: COMMERCIAL

## 2025-02-26 LAB
ABO + RH BLD: NORMAL
ANION GAP SERPL CALCULATED.3IONS-SCNC: 8 MMOL/L (ref 7–15)
BLD GP AB SCN SERPL QL: NEGATIVE
BUN SERPL-MCNC: 18.7 MG/DL (ref 8–23)
CALCIUM SERPL-MCNC: 8.9 MG/DL (ref 8.8–10.4)
CHLORIDE SERPL-SCNC: 93 MMOL/L (ref 98–107)
CREAT SERPL-MCNC: 0.84 MG/DL (ref 0.67–1.17)
EGFRCR SERPLBLD CKD-EPI 2021: 86 ML/MIN/1.73M2
GLUCOSE SERPL-MCNC: 97 MG/DL (ref 70–99)
HCO3 SERPL-SCNC: 38 MMOL/L (ref 22–29)
POTASSIUM SERPL-SCNC: 4.1 MMOL/L (ref 3.4–5.3)
SODIUM SERPL-SCNC: 139 MMOL/L (ref 135–145)
SPECIMEN EXP DATE BLD: NORMAL

## 2025-02-26 PROCEDURE — 250N000013 HC RX MED GY IP 250 OP 250 PS 637: Performed by: INTERNAL MEDICINE

## 2025-02-26 PROCEDURE — 86900 BLOOD TYPING SEROLOGIC ABO: CPT | Performed by: INTERNAL MEDICINE

## 2025-02-26 PROCEDURE — 99232 SBSQ HOSP IP/OBS MODERATE 35: CPT | Performed by: INTERNAL MEDICINE

## 2025-02-26 PROCEDURE — 36415 COLL VENOUS BLD VENIPUNCTURE: CPT | Performed by: INTERNAL MEDICINE

## 2025-02-26 PROCEDURE — 97530 THERAPEUTIC ACTIVITIES: CPT | Mod: GP | Performed by: PHYSICAL THERAPIST

## 2025-02-26 PROCEDURE — 92526 ORAL FUNCTION THERAPY: CPT | Mod: GN

## 2025-02-26 PROCEDURE — 250N000011 HC RX IP 250 OP 636: Performed by: INTERNAL MEDICINE

## 2025-02-26 PROCEDURE — 120N000001 HC R&B MED SURG/OB

## 2025-02-26 PROCEDURE — 86850 RBC ANTIBODY SCREEN: CPT | Performed by: INTERNAL MEDICINE

## 2025-02-26 PROCEDURE — 82310 ASSAY OF CALCIUM: CPT | Performed by: INTERNAL MEDICINE

## 2025-02-26 PROCEDURE — 97535 SELF CARE MNGMENT TRAINING: CPT | Mod: GO | Performed by: OCCUPATIONAL THERAPIST

## 2025-02-26 PROCEDURE — 99233 SBSQ HOSP IP/OBS HIGH 50: CPT | Mod: FS | Performed by: NURSE PRACTITIONER

## 2025-02-26 PROCEDURE — 97140 MANUAL THERAPY 1/> REGIONS: CPT | Mod: GP | Performed by: PHYSICAL THERAPIST

## 2025-02-26 PROCEDURE — 250N000013 HC RX MED GY IP 250 OP 250 PS 637: Performed by: NURSE PRACTITIONER

## 2025-02-26 PROCEDURE — 80048 BASIC METABOLIC PNL TOTAL CA: CPT | Performed by: INTERNAL MEDICINE

## 2025-02-26 PROCEDURE — 82565 ASSAY OF CREATININE: CPT | Performed by: INTERNAL MEDICINE

## 2025-02-26 PROCEDURE — 99253 IP/OBS CNSLTJ NEW/EST LOW 45: CPT | Performed by: PHYSICIAN ASSISTANT

## 2025-02-26 PROCEDURE — 250N000011 HC RX IP 250 OP 636: Performed by: NURSE PRACTITIONER

## 2025-02-26 RX ORDER — FUROSEMIDE 10 MG/ML
80 INJECTION INTRAMUSCULAR; INTRAVENOUS
Status: COMPLETED | OUTPATIENT
Start: 2025-02-26 | End: 2025-02-27

## 2025-02-26 RX ORDER — TORSEMIDE 10 MG/1
10 TABLET ORAL DAILY
Status: DISCONTINUED | OUTPATIENT
Start: 2025-02-28 | End: 2025-02-28

## 2025-02-26 RX ORDER — VALSARTAN 40 MG/1
40 TABLET ORAL DAILY
Status: DISCONTINUED | OUTPATIENT
Start: 2025-02-26 | End: 2025-02-28 | Stop reason: HOSPADM

## 2025-02-26 RX ADMIN — GABAPENTIN 100 MG: 100 CAPSULE ORAL at 20:27

## 2025-02-26 RX ADMIN — FUROSEMIDE 80 MG: 10 INJECTION, SOLUTION INTRAVENOUS at 08:27

## 2025-02-26 RX ADMIN — ASPIRIN 81 MG: 81 TABLET, COATED ORAL at 08:27

## 2025-02-26 RX ADMIN — ENOXAPARIN SODIUM 40 MG: 40 INJECTION SUBCUTANEOUS at 06:45

## 2025-02-26 RX ADMIN — POLYETHYLENE GLYCOL 3350 17 G: 17 POWDER, FOR SOLUTION ORAL at 09:58

## 2025-02-26 RX ADMIN — LORATADINE 10 MG: 10 TABLET ORAL at 20:27

## 2025-02-26 RX ADMIN — FUROSEMIDE 80 MG: 10 INJECTION, SOLUTION INTRAVENOUS at 16:12

## 2025-02-26 RX ADMIN — CALCIUM 500 MG: 500 TABLET ORAL at 19:24

## 2025-02-26 RX ADMIN — FERROUS SULFATE TAB 325 MG (65 MG ELEMENTAL FE) 325 MG: 325 (65 FE) TAB at 20:27

## 2025-02-26 RX ADMIN — CALCIUM 500 MG: 500 TABLET ORAL at 08:27

## 2025-02-26 RX ADMIN — CALCIUM 500 MG: 500 TABLET ORAL at 13:37

## 2025-02-26 RX ADMIN — POTASSIUM CHLORIDE 40 MEQ: 1.5 POWDER, FOR SOLUTION ORAL at 08:27

## 2025-02-26 RX ADMIN — Medication 40 MG: at 20:26

## 2025-02-26 RX ADMIN — Medication 25 MCG: at 08:27

## 2025-02-26 RX ADMIN — Medication 40 MG: at 08:40

## 2025-02-26 RX ADMIN — VALSARTAN 40 MG: 40 TABLET, FILM COATED ORAL at 13:37

## 2025-02-26 RX ADMIN — ACETAMINOPHEN 650 MG: 325 TABLET, FILM COATED ORAL at 03:32

## 2025-02-26 RX ADMIN — POLYETHYLENE GLYCOL 3350 17 G: 17 POWDER, FOR SOLUTION ORAL at 20:29

## 2025-02-26 ASSESSMENT — ACTIVITIES OF DAILY LIVING (ADL)
ADLS_ACUITY_SCORE: 60

## 2025-02-26 NOTE — PLAN OF CARE
Goal Outcome Evaluation:      Plan of Care Reviewed With: patient    Overall Patient Progress: no change    Trauma/Ortho/Medical (Choose one) medical     Diagnosis:CHF exacerbation  Mental Status:A&O x3, Tribe,forgetful  Activity/dangle up with 1/walker/belt  Diet: soft bite foods, mildly thickened liquids  ,2 gm NA, 2000 fluid restriction  Pain: denies  Lucio/Voiding: external cath, Incontinent of B&B  Tele/Restraints/Iso:tele a-fib with CVR  02/LDA:1L NC/ IV-SL  D/C Date: Pending  Other Info:  BLE Lymphedema wraps

## 2025-02-26 NOTE — TELEPHONE ENCOUNTER
1st attempt- Left voicemail for the patient to call back and schedule the following:    Appointment type:  Return Cardiology  Provider:  JANEE/NP  Return date:  1 month  Additional appointment(s) needed:  -  Additonal Notes:  -  Specialty phone number: [phone]

## 2025-02-26 NOTE — PLAN OF CARE
Goal Outcome Evaluation:    Date/Time: 2/25/2025 [id number]     Trauma/Ortho/Medical (Choose one) Medical     Diagnosis: CHF exacerbation / BLE edema  POD#: NA  Mental Status: A/O x3, forgetful  Activity/dangle: 1 GB /walker  Diet: Soft  bite size, mildly thick liquid/ Fluid rest 2L/day  Pain: Tylenol given  Lucio/Voiding: External catheter  Tele/Restraints/Iso: Tele- A-Fib CVR  02/LDA: 2 lpm NC / PIV sl  D/C Date: Pending TCU  Other Info:  BLE Lymphedema wraps in place.

## 2025-02-26 NOTE — CONSULTS
Initial Psychiatric Consult   Consult date: February 26, 2025         Reason for Consult, requesting source:      Requesting source: Ammy Sky    Labs and imaging reviewed. Patient seen and evaluated by Rafy Polo PA-C          HPI:   Jamie Valdivia is a delightful 84 year old male with history of permanent atrial fibrillation, has declined anticoagulation, mild dementia, history of alcohol use disorder, GERD with Vickers's esophagus, chronic normocytic anemia, chronic pharyngeal dysphagia, prostate cancer, frequent falls and peripheral neuropathy who presented from AL on 2/19/2025 with increased swelling.     Psychiatry consulted for capacity evaluation. Treatment team recommends TCU placement.   Patient currently stays at [facility]. His wife lives in assisted living in the same building. Patient spends all of his time in his wife's apartment. Misses medication doses due to this. [facility] cannot accommodate the monitoring of sodium intake, or assist with transfers according to nursing staff.     On assessment patient is irritable about various things. Does not feel that anything is being done correctly. Mad at nurses for multiple complaints. Asserts that he wants to go home to his wifes apartment. States that she knows him best and takes the best care of him. Urinates on floor while making this statement. Is not aware of his incontinence. He does not believe he needs the support of a TCU. He is unable to ration through any of the risks of returning to his wifes apartment without the treatments that are being recommended.            Past Psychiatric History:           Substance Use and History:           Past Medical History:   PAST MEDICAL HISTORY:   Past Medical History:   Diagnosis Date    Age-related osteoporosis without current pathological fracture 03/30/2022    Alcohol use disorder     Aortic root aneurysm     CAD (coronary artery disease)     Chronic a-fib (H)     Chronic atrial  fibrillation (H) 07/15/2016    Formatting of this note might be different from the original. 2/2019: Multiple falls so started on aspirin only.  Then aspirin stopped due to GI bleed.    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 2/2/21    HTN (hypertension)     Impaired gait and mobility 03/14/2019    Frequent falls    Mild TBI (traumatic brain injury) (H) 03/14/2019    Mumps     Obesity (BMI 30-39.9) 09/03/2015    Osteoarthritis of both knees 05/13/2015    Osteoporosis     Other idiopathic peripheral autonomic neuropathy 03/30/2022    Other seizures (H) 03/30/2022    Pharyngeal dysphagia 05/13/2015    Formatting of this note might be different from the original. Likely secondary to GERD with esophagitis, on PPI and H2 blocker with notable improvement, EGD 10/5/15.    Prostate cancer (H)     Recurrent major depressive disorder 03/30/2022    Rhabdomyolysis     Thrombocytopenia        PAST SURGICAL HISTORY:   Past Surgical History:   Procedure Laterality Date    BIOPSY PROSTATE TRANSRECTAL N/A 1/11/2023    Procedure: PROSTATE BIOPSY, RECTAL APPROACH;  Surgeon: Niraj Larry MD;  Location:  OR    COLONOSCOPY      ESOPHAGOSCOPY, GASTROSCOPY, DUODENOSCOPY (EGD), COMBINED N/A 06/01/2022    Procedure: ESOPHAGOGASTRODUODENOSCOPY (EGD) mngi with biopsies using jumbo cold biopsy forceps;  Surgeon: David Springer MD;  Location:  GI    left hip replacement  2010    left shoulder replacement  2016    ORTHOPEDIC SURGERY      SPINE SURGERY      done in [place]    TONSILLECTOMY       TRANSRECTAL ULTRASONIC SONOGRAM N/A 1/11/2023    Procedure: TRANSRECTAL ULTRASOUND;  Surgeon: Niraj Larry MD;  Location:  OR             Family History:   FAMILY HISTORY:   Family History   Problem Relation Age of Onset    Osteoporosis Mother     Prostate Cancer Paternal Grandfather     Colon Cancer No family hx of        Family Psychiatric History:         Social History:   SOCIAL HISTORY:   Social History     Tobacco Use    Smoking status: Never    Smokeless tobacco: Never   Substance Use Topics    Alcohol use: Not Currently     Comment: not since December 2021                Physical ROS:   The 10 point Review of Systems is negative other than noted in the HPI or here.           Medications:     Current Facility-Administered Medications   Medication Dose Route Frequency Provider Last Rate Last Admin    aspirin EC tablet 81 mg  81 mg Oral Daily Nazario Mares MD   81 mg at 02/26/25 0827    calcium carbonate 500 mg (elemental) (OSCAL) tablet 500 mg  500 mg Oral TID w/meals Levi Acevedo MD   500 mg at 02/26/25 0827    [Held by provider] cyanocobalamin (VITAMIN B-12) tablet 1,000 mcg  1,000 mcg Oral Daily Ammy Sky MD        enoxaparin ANTICOAGULANT (LOVENOX) injection 40 mg  40 mg Subcutaneous Q24H Ammy Sky MD   40 mg at 02/26/25 0645    ferrous sulfate (FEROSUL) tablet 325 mg  325 mg Oral Every Other Day Nazario Mares MD   325 mg at 02/24/25 2010    [Held by provider] folic acid (FOLVITE) tablet 1,000 mcg  1,000 mcg Oral Daily Ammy Sky MD   1,000 mcg at 02/20/25 1035    furosemide (LASIX) injection 80 mg  80 mg Intravenous BID Zoë Tejeda APRN CNP   80 mg at 02/26/25 0827    gabapentin (NEURONTIN) capsule 100 mg  100 mg Oral At Bedtime Ammy Sky MD   100 mg at 02/25/25 1757    loratadine (CLARITIN) tablet 10 mg  10 mg Oral At Bedtime Ammy Sky MD   10 mg at 02/25/25 1932    pantoprazole (PROTONIX) 2 mg/mL suspension 40 mg  40 mg Oral BID  Ammy Sky MD   40 mg at 02/26/25 0840    potassium chloride (KLOR-CON) Packet 40 mEq  40 mEq Oral Daily Zoë Tejeda APRN CNP   40 mEq at 02/26/25 0827    sodium chloride (PF) 0.9% PF flush 3 mL  3 mL Intracatheter Q8H Ammy Sky MD   3 mL at 02/26/25 0830    Vitamin D3 (CHOLECALCIFEROL) tablet 25 mcg  25 mcg Oral Daily Levi Acevedo MD   25 mcg at 02/26/25 0827              Allergies:     Allergies   Allergen Reactions    Ativan [Lorazepam]           Labs:     Recent Results (from the past 48 hours)   Platelet count    Collection Time: 02/25/25  9:10 AM   Result Value Ref Range    Platelet Count 198 150 - 450 10e3/uL   Basic metabolic panel    Collection Time: 02/25/25  9:10 AM   Result Value Ref Range    Sodium 143 135 - 145 mmol/L    Potassium 4.1 3.4 - 5.3 mmol/L    Chloride 97 (L) 98 - 107 mmol/L    Carbon Dioxide (CO2) 39 (H) 22 - 29 mmol/L    Anion Gap 7 7 - 15 mmol/L    Urea Nitrogen 17.3 8.0 - 23.0 mg/dL    Creatinine 0.70 0.67 - 1.17 mg/dL    GFR Estimate >90 >60 mL/min/1.73m2    Calcium 8.9 8.8 - 10.4 mg/dL    Glucose 97 70 - 99 mg/dL   PSA tumor marker    Collection Time: 02/25/25  9:10 AM   Result Value Ref Range    PSA Tumor Marker 0.12 ng/mL   Nt probnp inpatient    Collection Time: 02/25/25  9:10 AM   Result Value Ref Range    N terminal Pro BNP Inpatient 3,810 (H) 0 - 1,800 pg/mL   Basic metabolic panel    Collection Time: 02/26/25  8:07 AM   Result Value Ref Range    Sodium 139 135 - 145 mmol/L    Potassium 4.1 3.4 - 5.3 mmol/L    Chloride 93 (L) 98 - 107 mmol/L    Carbon Dioxide (CO2) 38 (H) 22 - 29 mmol/L    Anion Gap 8 7 - 15 mmol/L    Urea Nitrogen 18.7 8.0 - 23.0 mg/dL    Creatinine 0.84 0.67 - 1.17 mg/dL    GFR Estimate 86 >60 mL/min/1.73m2    Calcium 8.9 8.8 - 10.4 mg/dL    Glucose 97 70 - 99 mg/dL          Physical and Psychiatric Examination:     BP (!) 141/66 (BP Location: Right arm)   Pulse 51   Temp 98.7  F (37.1  C) (Oral)   Resp 18   Wt 91.9 kg (202 lb  11.2 oz)   SpO2 97%   BMI 30.82 kg/m    Weight is 202 lbs 11.2 oz  Body mass index is 30.82 kg/m .    Physical Exam:  I have reviewed the physical exam as documented by by the medical team and agree with findings and assessment and have no additional findings to add at this time.    Mental Status Exam:    Appearance: poorly groomed  Attitude:  slightly uncooperative  Eye Contact:  intense  Mood:  angry  Affect:  mood congruent  Speech:  clear, coherent  Language: Fluent in english   Psychomotor Behavior:  no evidence of tardive dyskinesia, dystonia, or tics  Thought Process:  goal oriented  Associations:  no loose associations  Thought Content:  no evidence of suicidal ideation or homicidal ideation  Insight:  limited  Judgement:  limited  Oriented to:  time, person, and place  Attention Span and Concentration:  limited  Recent and Remote Memory:  limited  Fund of Knowledge: Appropriate   Gait and Station:                DSM-5 Diagnosis:   Capacity evaluation          Assessment:   Competency is a legal term that refers to the ability to act on one's own behalf or participate in legal proceedings or transactions. It is a global assessment and a legal determination made by a  in court. Writer is able to review capacity as a functional term that refers to the ability to make a particular decision or perform a specific task. It is not static and can vary depending on the situation and the person's condition. The four key components to address in a capacity evaluation include: 1) communicating a choice, 2) understanding, 3) appreciation, and 4) rationalization/reasoning.     Aid to Capacity  Able to understand medical problem? No   2. Able to understand proposed treatment? No    3. Able to understand alternative to proposed treatment (if any)? No    4. Able to understand option of refusing proposed treatment? No    5. Able to appreciate reasonably foreseeable consequences of accepting proposed treatment? No   6.  Able to appreciate reasonably foreseeable consequences of refusing proposed treatment?  No   7. The person s decision is affected by depression? No   8. The person s decision is affected by delusion/psychosis? No   Summary of capacity screener: DOES NOT HAVE CAPACITY AT THIS TIME    After reviewing evidence based screeners, such as a SLUMS, CPT, etc... for the patient and reviewing collateral information, it appears patient does not display complex medical decision making capacity at this time regarding disposition.  (ONLY IF NEEDED:) Writer recommends that care team review [facility]'s policy on identification of alternate decision maker for this particular decision or specific task.  Patient identifies his wife as trusted decision maker.                     Summary of Recommendations:   Does not have capacity to make decisions regarding disposition.  Recommend involvement of surrogate decision maker, wife is preferred ( if she is unable to act in this role she may still be able to identify trusted family who are able to support)  No psychiatric medications indicated.       Rafy Polo PA-C

## 2025-02-26 NOTE — PLAN OF CARE
Goal Outcome Evaluation:      Plan of Care Reviewed With: patient    Overall Patient Progress: no changeOverall Patient Progress: no change  Date/Time 2/25 gil    Trauma/Ortho/Medical (Choose one) medical    Diagnosis:CHF exacerbation  Mental Status:forgetful  Activity/dangle up with 1/walker/belt  Diet:2 gm NA, 2000 fluid restriction, soft bite foods, mildly thickened liquids  Pain: denies  Lucio/Voiding: external cath  Tele/Restraints/Iso:tele a-fib with CVR  02/LDA:saline lock RFA  D/C Date: ?  Other Info:[facility], tele a-fib cvr,

## 2025-02-26 NOTE — PROGRESS NOTES
[facility]  Cardiology Progress Note  Date of Service: 02/26/2025  Date of Admission: 2/19/2025  Primary Cardiologist: Dr. Ross    Summary    Mr. Jamie Valdivia is a very pleasant 84 year old male with a past medical history of permanent atrial fibrillation, moderate aortic stenosis, hypertension, chronic peripheral edema, moderate aortic root dilatation, frequent falls, and alcohol dependence admitted on 2/19/2025 with increased leg swelling. Cardiology was consulted for decompensated heart failure.    Interval February 26, 2025    Diuresed about 1.7L yesterday.  Weight trend improved, down to 196#.  Edema improving as well.  No SOB, CP.    Asssessment:    Acute on chronic HFpEF  CHF exacerbation with severe volume overload on admission  Mild pulmonary hypertension by echo  Admission NTproBNP 2169 > ~3800  Echocardiogram shows LVEF 60-65% with mild RV dilation, 2+ TR and CHRISTINA  Sub-ideal I/O monitoring, charts indicate net - 80cc with improved weight trend.  Wt on admission 215? Diuresed 1.7L yesterday, down about 4#.  Stable renal function during diuresis without contraction; Cr 0.74.  Permanent AF  Intermittently slow ventricular rate, no worrisome samir-arrhythmia on last Zio  Refusing OAC, no Hx LAAO.  On bASA.  Mild-moderate aortic stenosis, mean gradient 10.3 mmHg  Ascending aortic aneurysm (4.8 cm on 7/2024 TTE)  Echo 2/20/2025 aortic root measures 4.5 cm and ascending aorta measures 4.8 cm.  Acute on chronic anemia - Hgb 8.1 on admission, stable around 7.6  Dementia, resides at memory care.  At times goes days without taking scheduled medications.  Prostate cancer  Alcohol use disorder  Chronic pharyngeal dysphagia, GI consulted - pt declined EGD.  _____________________________________________________________    Plan:   Continue IV Furosemide 80 mg BID for another 48-hours.  On Friday, 2/28, stop IV diuresis and transition to PO Torsemide 10 mg daily.  Orders placed.  Start  Valsartan 40 mg daily.  Daily BMP.  Sodium and fluid restrictions as ordered.  AM Daily standing weight + strict I/O monitoring.  ACE/Lymphedema wrapping to BLE.  Outpatient cardiology follow up will be arranged.  Cardiology will sign off.  _____________________________________________________________    Plan of care and the majority of MDM was formulated under direction and guidance of Dr. Santillan.    25 Minutes spent in coordination of care, and discussion with the patient and/or family regarding diagnostic results, prognosis, symptom management, risks and benefits of management options, and development of the plan of care of above.    Thank you for the opportunity to participate in the care of Mr. Jamie Valdivia.  Please feel free to reach out with any additional questions.    MARICHUY Gracia, CNP   Nurse Practitioner  [facility]  Pager: [phone]  Phone: [phone]  Text Page (8am - 5pm, M-F)    Physical Exam   Temp: 98.7  F (37.1  C) Temp src: Oral BP: (!) 141/66 Pulse: 51   Resp: 18 SpO2: 97 % O2 Device: Nasal cannula Oxygen Delivery: 2 LPM    Vitals:    02/25/25 0619 02/25/25 1027 02/26/25 1043   Weight: 91.6 kg (201 lb 15.1 oz) 91.9 kg (202 lb 11.2 oz) 89 kg (196 lb 1.6 oz)     I/O last 3 completed shifts:  In: 790 [P.O.:790]  Out: 1750 [Urine:1750]    Constitutional: Appears stated age, well nourished, NAD.  Eyes: Pupils equal, round. Sclerae anicteric.   HEENT: Normocephalic, atraumatic.   Neck: Supple. Carotid pulses full and equal. Elevated to jaw.  Respiratory: Non-labored. Lungs upper rales, no fine crackles.  Cardiovascular: IRR, normal S1 and S2. No M/G/R.  Vascular: Peripheral pulses intact; pitting LE edema to knees. Lymphedema wraps on.  Musculoskeletal/Extremities: Symmetrical movement.   Neurologic: No gross focal deficits. Alert, awake.    Data   Recent Labs   Lab 02/26/25  0807 02/25/25  0910 02/24/25  0859 02/23/25  0729 02/22/25  7517 02/22/25  0899  02/20/25  0812 02/19/25  1826   WBC  --   --  5.4 4.0  --  4.2   < > 5.8   HGB  --   --  7.6* 7.3*  --  7.5*   < > 8.1*   MCV  --   --  80 80  --  78   < > 80   PLT  --  198 216 178  --  171   < > 189    143 141 139  --  141   < > 140   POTASSIUM 4.1 4.1 4.0 3.7  --  3.8   < > 4.0   CHLORIDE 93* 97* 95* 94*  --  96*   < > 107   CO2 38* 39* 35* 39*  --  38*   < > 23   BUN 18.7 17.3 18.0 14.4  --  13.0   < > 11.0   CR 0.84 0.70 0.74 0.73  --  0.70   < > 0.54*   ANIONGAP 8 7 11 6*  --  7   < > 10   JOSUE 8.9 8.9 9.1 8.5*  --  8.5*   < > 6.8*   GLC 97 97 119* 99   < > 90   < > 86   ALBUMIN  --   --   --   --   --   --   --  3.0*   PROTTOTAL  --   --   --   --   --   --   --  5.8*   BILITOTAL  --   --   --   --   --   --   --  0.3   ALKPHOS  --   --   --   --   --   --   --  81   ALT  --   --   --   --   --   --   --  6   AST  --   --   --   --   --   --   --  20    < > = values in this interval not displayed.       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)    Chronic alcohol use    Major neurocognitive disorder (H)    Generalized anxiety disorder    Gastro-esophageal reflux disease without esophagitis    History of 2019 novel coronavirus disease (COVID-19)    Impaired gait and mobility    Obesity (BMI 30-39.9)    Osteoarthritis of both knees    Other insomnia    Pharyngeal dysphagia    Physical deconditioning    Repeated falls    Coronary atherosclerosis of native coronary artery     Anemia    Other idiopathic peripheral autonomic neuropathy    Age-related osteoporosis without current pathological fracture    Constipation    Dependent edema    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    Atherosclerosis of aorta    Alcohol dependence in remission (H)    Prostate cancer (H) - Dx January 2023    Chronic cough    Fall, initial encounter    Closed fracture of multiple ribs of right side, initial encounter    Closed wedge compression fracture of L3 vertebra, initial encounter (H)    Aneurysm of ascending aorta without rupture    Preventative health care    Bradycardia    Encephalopathy    Generalized weakness    Failure of outpatient treatment    Chest pain, unspecified type    Choking, subsequent encounter    Oropharyngeal dysphagia    Esophageal dysphagia    Dysphonia    Shortness of breath    Impaired activities of daily living    Bilateral lower extremity edema    Hypervolemia, unspecified hypervolemia type     Medications   Current Facility-Administered Medications   Medication Dose Route Frequency Provider Last Rate Last Admin    Continuing beta blocker from home medication list OR beta blocker order already placed during this visit   Does not apply DOES NOT GO TO Ammy Ndiaye MD        Reason ACE/ARB/ARNI order not selected   Other DOES NOT GO TO Ammy Ndiaye MD         Current Facility-Administered Medications   Medication Dose Route Frequency Provider Last Rate Last Admin    aspirin EC tablet 81 mg  81 mg Oral Daily Nazario Mares MD   81 mg at 02/26/25 0827    calcium carbonate 500 mg (elemental) (OSCAL) tablet 500 mg  500 mg Oral TID w/meals Levi Acevedo MD   500 mg at 02/26/25 0827    [Held by provider] cyanocobalamin (VITAMIN B-12)  tablet 1,000 mcg  1,000 mcg Oral Daily Ammy Sky MD        enoxaparin ANTICOAGULANT (LOVENOX) injection 40 mg  40 mg Subcutaneous Q24H Ammy Sky MD   40 mg at 25 0645    ferrous sulfate (FEROSUL) tablet 325 mg  325 mg Oral Every Other Day Nazario Mares MD   325 mg at 25    [Held by provider] folic acid (FOLVITE) tablet 1,000 mcg  1,000 mcg Oral Daily Ammy Sky MD   1,000 mcg at 25 1035    furosemide (LASIX) injection 80 mg  80 mg Intravenous BID Zoë Tejeda APRN CNP        gabapentin (NEURONTIN) capsule 100 mg  100 mg Oral At Bedtime Ammy Sky MD   100 mg at 25 1757    loratadine (CLARITIN) tablet 10 mg  10 mg Oral At Bedtime Ammy Sky MD   10 mg at 25 1932    pantoprazole (PROTONIX) 2 mg/mL suspension 40 mg  40 mg Oral BID Ammy Sky MD   40 mg at 25 0840    potassium chloride (KLOR-CON) Packet 40 mEq  40 mEq Oral Daily Zoë Tejeda APRN CNP   40 mEq at 25 0827    sodium chloride (PF) 0.9% PF flush 3 mL  3 mL Intracatheter Q8H Ammy Sky MD   3 mL at 25 0830    [START ON 2025] torsemide (DEMADEX) tablet 10 mg  10 mg Oral Daily Zoë Tejeda APRN CNP        valsartan (DIOVAN) half-tab 40 mg  40 mg Oral Daily Zoë Tejeda APRN CNP        Vitamin D3 (CHOLECALCIFEROL) tablet 25 mcg  25 mcg Oral Daily Levi Acevedo MD   25 mcg at 25 0827       Data   Last 24 hours labs personally reviewed.  Echo:   Recent Results (from the past 4320 hours)   Echocardiogram Complete   Result Value    LVEF  60-65%    Narrative    [id number]  [id number]  [id number]  [id number]^JOCE^AMMY^JAD     [facility]  Echocardiography Laboratory  [facility] [address]     Name: CAMILO CRUMP CATRACHITA  MRN: [id number]  : 1940  Study Date: 2025 08:52 AM  Age: 84 yrs  Gender: Male  Patient Location: [facility]  Reason For Study: Heart Failure  Ordering Physician:  NITIN HOBSON  Performed By: RAJESH Grant     BSA: 2.1 m2  Height: 68 in  Weight: 215 lb  HR: 47  BP: 121/62 mmHg  ______________________________________________________________________________  Procedure  Echocardiogram with two-dimensional, color and spectral Doppler. Definity (NDC  #36318-644) given intravenously. Contrast Definity. Technically difficult  study.  ______________________________________________________________________________  Interpretation Summary     Left ventricular systolic function is normal.The visual ejection fraction is  60-65%.  The right ventricle is mildly dilated.The right ventricular systolic function  is normal.  Severe bi-atrial enlargement,  There is moderate to mod-severe (2-3+) tricuspid regurgitation.There is mild  (1+) aortic regurgitation.Mild valvular aortic stenosis.  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  Pulmonary hypertension- RVSP 51 mm hg +RA.     Echocardiogram dated 07/03/2024 mild tricuspid regurgitation noted with RVSP  37 mm hg + RA, ascending aorta 4.8 cm, aortic root 4.4 cm  ______________________________________________________________________________  Left Ventricle  Left ventricular systolic function is normal. The visual ejection fraction is  60-65%.     Right Ventricle  The right ventricle is mildly dilated. The right ventricular systolic function  is normal.     Atria  The left atrium is severely dilated. The right atrium is severely dilated.  There is no color Doppler evidence of an atrial shunt.     Mitral Valve  There is mild mitral annular calcification. There is mild (1+) mitral  regurgitation. There is no mitral valve stenosis.     Tricuspid Valve  There is moderate to mod-severe (2-3+) tricuspid regurgitation. The right  ventricular systolic pressure is approximated at 50.5 mmHg plus the right  atrial pressure. Pulmonary hypertension.     Aortic Valve  The aortic valve is trileaflet. There is  mild (1+) aortic regurgitation. Mild  valvular aortic stenosis. The mean AoV pressure gradient is 10.3 mmHg.     Pulmonic Valve  There is trace pulmonic valvular regurgitation. There is no pulmonic valvular  stenosis.     Vessels  Aortic root aneurysm is present. 4.5 cm. Ascending aorta aneurysm is present.  4.8 cm.     Pericardium  There is no pericardial effusion.     ______________________________________________________________________________  MMode/2D Measurements & Calculations  Ao root diam: 4.5 cm  asc Aorta Diam: 4.8 cm  LVOT diam: 2.1 cm  LVOT area: 3.5 cm2     Ao root diam index Ht(cm/m): 2.6  Ao root diam index BSA (cm/m2): 2.2  Asc Ao diam index BSA (cm/m2): 2.3  Asc Ao diam index Ht(cm/m): 2.8  TAPSE: 2.6 cm     Doppler Measurements & Calculations  MV E max shelton: 126.0 cm/sec  MV A max shelton: 34.4 cm/sec  MV E/A: 3.7  MV max PG: 10.8 mmHg  MV mean P.4 mmHg  MV V2 VTI: 43.0 cm  MVA(VTI): 2.4 cm2  MV dec slope: 652.7 cm/sec2  MV dec time: 0.19 sec  Ao V2 max: 220.0 cm/sec  Ao max P.0 mmHg  Ao V2 mean: 149.1 cm/sec  Ao mean PG: 10.3 mmHg  Ao V2 VTI: 57.8 cm  LEONILA(I,D): 1.8 cm2  LEONILA(V,D): 1.8 cm2  LV V1 max P.3 mmHg  LV V1 max: 114.9 cm/sec  LV V1 VTI: 29.4 cm  SV(LVOT): 102.5 ml  SI(LVOT): 48.6 ml/m2  PA acc time: 0.04 sec  TR max shelton: 355.2 cm/sec  TR max P.5 mmHg  AV Shelton Ratio (DI): 0.52     LEONILA Index (cm2/m2): 0.84  RV S Shelton: 9.1 cm/sec     ______________________________________________________________________________  Report approved by: Dallas Oviedo MD on 2025 12:46 PM

## 2025-02-26 NOTE — PROGRESS NOTES
"CLINICAL NUTRITION SERVICES    Reviewed nutrition risk factors due to LOS. No concerns with oral intake per flowsheets / [employer] (room service meal ordering system). % intakes documented. Ordering adequately nourishing meals BID-TID per [employer]. No indication of pressure injury.     Weight History: No unintentional weight loss    - H&P, \"Note weight in the clinic has steadily gone up previously around 80 kg as recently as 11/26/2024 and last visit on 2/3/20/2025 he was 97.1 kg\"  - Pt on diuretics this admission    Wt Readings from Last 10 Encounters:   02/25/25 91.9 kg (202 lb 11.2 oz)   02/03/25 97.1 kg (214 lb)   12/16/24 86.6 kg (191 lb)   11/26/24 78 kg (172 lb)   10/21/24 84.4 kg (186 lb)   08/26/24 81.6 kg (180 lb)   08/20/24 81.6 kg (180 lb)   08/14/24 79.7 kg (175 lb 12.8 oz)   08/07/24 84.5 kg (186 lb 4.8 oz)   08/01/24 83.8 kg (184 lb 11.2 oz)     Follow Up / Monitoring:   Per policy unless consulted.    Megan Chavis RD, LD  Clinical Dietitian - [facility]        "

## 2025-02-26 NOTE — PROGRESS NOTES
Care Management Follow Up    Length of Stay (days): 7    Expected Discharge Date: 02/27/2025     Concerns to be Addressed: adjustment to diagnosis/illness, discharge planning     Patient plan of care discussed at interdisciplinary rounds: Yes    Anticipated Discharge Disposition: Assisted Living, Transitional Care              Anticipated Discharge Services:    Anticipated Discharge DME:      Patient/family educated on Medicare website which has current facility and service quality ratings:    Education Provided on the Discharge Plan:    Patient/Family in Agreement with the Plan: unable to assess    Referrals Placed by CM/SW: External Care Coordination  Private pay costs discussed: Not applicable    Discussed  Partnership in Safe Discharge Planning  document with patient/family: No     Handoff Completed: Yes, [facility] PCP: Internal handoff referral completed    Additional Information:    SW notified patient is not deemed decisional at this time. Flakito from [facility] called and explained that he refers to his wife as his decision maker, though according to the [facility], her cognition is declining more so than patients. Flakito stated having documentation that one of the children is POA. SHALINI called back and requested documentation to send to [facility] to confirm health care POA.     Next Steps: Receive POA docs and send to [facility] TYLER Lambert

## 2025-02-27 ENCOUNTER — APPOINTMENT (OUTPATIENT)
Dept: PHYSICAL THERAPY | Facility: CLINIC | Age: 85
DRG: 291 | End: 2025-02-27
Payer: COMMERCIAL

## 2025-02-27 VITALS
OXYGEN SATURATION: 94 % | TEMPERATURE: 98.3 F | DIASTOLIC BLOOD PRESSURE: 57 MMHG | SYSTOLIC BLOOD PRESSURE: 122 MMHG | BODY MASS INDEX: 29.82 KG/M2 | WEIGHT: 196.1 LBS | HEART RATE: 69 BPM | RESPIRATION RATE: 18 BRPM

## 2025-02-27 LAB
ANION GAP SERPL CALCULATED.3IONS-SCNC: 9 MMOL/L (ref 7–15)
BUN SERPL-MCNC: 19.3 MG/DL (ref 8–23)
CALCIUM SERPL-MCNC: 9 MG/DL (ref 8.8–10.4)
CHLORIDE SERPL-SCNC: 91 MMOL/L (ref 98–107)
CREAT SERPL-MCNC: 0.81 MG/DL (ref 0.67–1.17)
EGFRCR SERPLBLD CKD-EPI 2021: 87 ML/MIN/1.73M2
ERYTHROCYTE [DISTWIDTH] IN BLOOD BY AUTOMATED COUNT: 20.2 % (ref 10–15)
GLUCOSE SERPL-MCNC: 157 MG/DL (ref 70–99)
HCO3 SERPL-SCNC: 35 MMOL/L (ref 22–29)
HCT VFR BLD AUTO: 29.1 % (ref 40–53)
HGB BLD-MCNC: 8.6 G/DL (ref 13.3–17.7)
MCH RBC QN AUTO: 23.8 PG (ref 26.5–33)
MCHC RBC AUTO-ENTMCNC: 29.6 G/DL (ref 31.5–36.5)
MCV RBC AUTO: 80 FL (ref 78–100)
PLATELET # BLD AUTO: 204 10E3/UL (ref 150–450)
POTASSIUM SERPL-SCNC: 4.3 MMOL/L (ref 3.4–5.3)
RBC # BLD AUTO: 3.62 10E6/UL (ref 4.4–5.9)
SODIUM SERPL-SCNC: 135 MMOL/L (ref 135–145)
WBC # BLD AUTO: 5.3 10E3/UL (ref 4–11)

## 2025-02-27 PROCEDURE — 250N000011 HC RX IP 250 OP 636: Performed by: INTERNAL MEDICINE

## 2025-02-27 PROCEDURE — 250N000013 HC RX MED GY IP 250 OP 250 PS 637: Performed by: INTERNAL MEDICINE

## 2025-02-27 PROCEDURE — 36415 COLL VENOUS BLD VENIPUNCTURE: CPT | Performed by: INTERNAL MEDICINE

## 2025-02-27 PROCEDURE — 80048 BASIC METABOLIC PNL TOTAL CA: CPT | Performed by: INTERNAL MEDICINE

## 2025-02-27 PROCEDURE — 97140 MANUAL THERAPY 1/> REGIONS: CPT | Mod: GP

## 2025-02-27 PROCEDURE — 85018 HEMOGLOBIN: CPT | Performed by: INTERNAL MEDICINE

## 2025-02-27 PROCEDURE — 250N000011 HC RX IP 250 OP 636: Performed by: NURSE PRACTITIONER

## 2025-02-27 PROCEDURE — 99232 SBSQ HOSP IP/OBS MODERATE 35: CPT | Performed by: INTERNAL MEDICINE

## 2025-02-27 PROCEDURE — 120N000001 HC R&B MED SURG/OB

## 2025-02-27 PROCEDURE — 250N000013 HC RX MED GY IP 250 OP 250 PS 637: Performed by: NURSE PRACTITIONER

## 2025-02-27 PROCEDURE — 97116 GAIT TRAINING THERAPY: CPT | Mod: GP

## 2025-02-27 RX ADMIN — LORATADINE 10 MG: 10 TABLET ORAL at 20:42

## 2025-02-27 RX ADMIN — ENOXAPARIN SODIUM 40 MG: 40 INJECTION SUBCUTANEOUS at 06:35

## 2025-02-27 RX ADMIN — VALSARTAN 40 MG: 40 TABLET, FILM COATED ORAL at 09:26

## 2025-02-27 RX ADMIN — Medication 25 MCG: at 09:27

## 2025-02-27 RX ADMIN — POTASSIUM CHLORIDE 40 MEQ: 1.5 POWDER, FOR SOLUTION ORAL at 09:26

## 2025-02-27 RX ADMIN — Medication 1 LOZENGE: at 10:11

## 2025-02-27 RX ADMIN — Medication 40 MG: at 20:53

## 2025-02-27 RX ADMIN — CALCIUM 500 MG: 500 TABLET ORAL at 15:04

## 2025-02-27 RX ADMIN — ACETAMINOPHEN 650 MG: 325 TABLET, FILM COATED ORAL at 20:42

## 2025-02-27 RX ADMIN — CALCIUM 500 MG: 500 TABLET ORAL at 18:35

## 2025-02-27 RX ADMIN — ASPIRIN 81 MG: 81 TABLET, COATED ORAL at 09:26

## 2025-02-27 RX ADMIN — FUROSEMIDE 80 MG: 10 INJECTION, SOLUTION INTRAVENOUS at 16:43

## 2025-02-27 RX ADMIN — CALCIUM 500 MG: 500 TABLET ORAL at 09:27

## 2025-02-27 RX ADMIN — FUROSEMIDE 80 MG: 10 INJECTION, SOLUTION INTRAVENOUS at 09:27

## 2025-02-27 RX ADMIN — Medication 40 MG: at 09:35

## 2025-02-27 RX ADMIN — POLYETHYLENE GLYCOL 3350 17 G: 17 POWDER, FOR SOLUTION ORAL at 09:26

## 2025-02-27 RX ADMIN — ACETAMINOPHEN 650 MG: 325 TABLET, FILM COATED ORAL at 00:24

## 2025-02-27 RX ADMIN — GABAPENTIN 100 MG: 100 CAPSULE ORAL at 20:41

## 2025-02-27 ASSESSMENT — ACTIVITIES OF DAILY LIVING (ADL)
ADLS_ACUITY_SCORE: 60
ADLS_ACUITY_SCORE: 60
ADLS_ACUITY_SCORE: 64
ADLS_ACUITY_SCORE: 61
ADLS_ACUITY_SCORE: 64
ADLS_ACUITY_SCORE: 64
ADLS_ACUITY_SCORE: 60
ADLS_ACUITY_SCORE: 64
ADLS_ACUITY_SCORE: 60
ADLS_ACUITY_SCORE: 64
ADLS_ACUITY_SCORE: 60
ADLS_ACUITY_SCORE: 60
ADLS_ACUITY_SCORE: 64
ADLS_ACUITY_SCORE: 64
ADLS_ACUITY_SCORE: 60
ADLS_ACUITY_SCORE: 60
ADLS_ACUITY_SCORE: 64
ADLS_ACUITY_SCORE: 64
ADLS_ACUITY_SCORE: 60
ADLS_ACUITY_SCORE: 61
ADLS_ACUITY_SCORE: 64

## 2025-02-27 NOTE — PLAN OF CARE
Goal Outcome Evaluation:      Plan of Care Reviewed With: patient    Overall Patient Progress: improvingOverall Patient Progress: improving    Outcome Evaluation: Plan to Discharge to TCU if pt agrees.  Pt up in Hallway this shift.    Patient vital signs are at baseline: Yes  Patient able to ambulate as they were prior to admission or with assist devices provided by therapies during their stay:  Yes  Patient MUST void prior to discharge:  Yes  Patient able to tolerate oral intake:  Yes  Pain has adequate pain control using Oral analgesics:  Yes  Does patient have an identified :  No,  Reason:  Pt lives in Memory Care Unit at [facility]  Has goal D/C date and time been discussed with patient:  No,  Reason:  Plan to Discharge to TCU if pt agrees.    Dx:Decompensated Heart Failure, SOB, BLE Edema   POD#n/a   Admitted 02/19/2025    A&Ox4.   CMS Intact.   VSS on RA.   Up with Ax1 GB and walker.   Voiding via Male External Cath.   Right PIV SL.  Soft/Bite-sized Diet, Level#2 Thick Liquids, 2gr Na, 2L Fluid Retsriction.  Pain controlled.

## 2025-02-27 NOTE — PLAN OF CARE
Goal Outcome Evaluation:      Plan of Care Reviewed With: patient      Trauma/Ortho/Medical (Choose one) Medical.    Diagnosis: CHF Exacerbation.  POD#: None.  Mental Status:A&OX3 [place]. Forgetful at Times.  Activity/dangle Up with Assist of 1 gait belt and walker.  Diet: Soft bite foods, mildly thickened liquids.  Pain: Denies.  Lucio/Voiding: Incontinent of B&B, Male external cath.  Tele/Restraints/Iso: On tele A-fib with CVR.  02/LDA: ZENA HARDY.  D/C Date: Pending.  Other Info: Pt VSS on room air, Denies pain on this shift, BLE Lymphedema wrapped.

## 2025-02-27 NOTE — PROGRESS NOTES
Care Management Follow Up    Length of Stay (days): 8    Expected Discharge Date: 02/28/2025     Concerns to be Addressed: adjustment to diagnosis/illness, discharge planning     Patient plan of care discussed at interdisciplinary rounds: Yes    Anticipated Discharge Disposition: Assisted Living, Transitional Care              Anticipated Discharge Services:    Anticipated Discharge DME:      Patient/family educated on Medicare website which has current facility and service quality ratings:    Education Provided on the Discharge Plan:    Patient/Family in Agreement with the Plan: unable to assess    Referrals Placed by CM/SW: External Care Coordination  Private pay costs discussed: Not applicable    Discussed  Partnership in Safe Discharge Planning  document with patient/family: No     Handoff Completed: No, handoff not indicated or clinically appropriate    Additional Information:  Call placed to Dania at [facility] regarding some additional questions for her regarding discharge planning. Per POOJA on phone left yesterday, patient does NOT have a healthcare directive. Per Dania, family is appointed only as his financial POA.     Writer reviewed chart and found patient's daughter's contact information:  Coral: [phone]  Alexia: [phone]    Per chart review, patient has an RN CM through [facility] assigned to him. Call to Gisela with [facility] at [phone] with request for a call back.     Message sent to Doreen that patient is not medically ready for discharge today.    1115: Dania called back from [facility]. Writer asked about the involvement of his family. She said that there is a complicated relationship and Jamie and his wife choose to keep his kids out of their affairs. Markos and Coral are the financial POA's but they peripherally are involved. They will buy them both things they need and manage finances but they do not visit due to strained relationship. Dania also noted that she called family when  patient was admitted to the hospital but has received no call back.    Dania also confirmed that several years ago patient had seen neuro psych and they had said that he is no able to make high functioning decisions which is why they kept him in memory care. He had wanted to live in the [facility] but because he is unable to manage his own cares they kept him in the . Dania stated that patient has been declining with his mobility over the past several months. He has fallen several times and refuses cares.     Dania noted that patient will go visit his wife in [facility] and will just sit there and expect her to manage his needs. The facility does not believe that his wife understands the ramifications of the decisions she makes regarding his health.  can accept him back but strongly advocate that he goes to TCU because he will refuse cares and they feel that he will end up rebounding back to hospital and will not be allowed to leave . If he goes to the TCU first, his wife can come to visit him. Patient is currently using O2 here which is not something he uses at home.     Next Steps: follow for discharge planning    GUICHO Hinkle

## 2025-02-27 NOTE — PLAN OF CARE
Goal Outcome Evaluation:     Plan of Care Reviewed With: patient     Overall Patient Progress: improvingOverall Patient Progress: improving     Outcome Evaluation: Plan to Discharge to TCU if pt agrees.  Pt up in Hallway this shift.     Patient vital signs are at baseline: Yes  Patient able to ambulate as they were prior to admission or with assist devices provided by therapies during their stay:  Yes  Patient MUST void prior to discharge:  Yes  Patient able to tolerate oral intake:  Yes  Pain has adequate pain control using Oral analgesics:  Yes  Does patient have an identified :  No,  Reason:  Pt lives in [facility]  Has goal D/C date and time been discussed with patient:  No,  Reason:  Plan to Discharge to TCU if pt agrees.     Dx:Decompensated Heart Failure, SOB, BLE Edema   POD#n/a   Admitted 02/19/2025     A&Ox4.   CMS Intact.   VSS on RA.   Up with Ax1 GB and walker.   Voiding via Male External Cath.   Right PIV SL.  Soft/Bite-sized Diet, Level#2 Thick Liquids, 2gr Na, 2L Fluid Retsriction.  Pain controlled.

## 2025-02-27 NOTE — PROGRESS NOTES
[facility]    Medicine Progress Note - Hospitalist Service    Date of Admission:  2/19/2025    Interval History   Patient is lying in bed and talking on the phone. He is able to articulate that he wants to go to his home.   He and his wife. Discussion that may require rehab first as too weak to return yet.   Seen by psychiatry with ongoing concerns of capacity. Seen 2/26 and noted by psychiatry to not have capacity at this time to make decisions regarding disposition.     Assessment & Plan   Summary as outlined by Dr. Mares note.   New actions highlighted 2/26    Jamie Valdivia is a delightful 84 year old male with history including mild dementia; HTN; chronic afib (has declined anticoagulation); GERD, h/o Vickers's esophagus; chronic anemia; chronic pharyngeal dysphagia; peripheral neuropathy; OA; chronic spine disease; osteoporosis; prostate cancer; h/o alcohol use d/o (no longer drinking); h/o falls; who presented from [facility] 2/19/2025 with increased lower extremity swelling and weight gain and found with acute decompensated heart failure.      On initial evaluation, pt was afebrile, hypertensive, sats 90%. ECG showed afib with CVR, no acute ischemic changes. Labs notable for CBC with WBC normal, hgb 8.1; BMP with calcium 6.8 (ionized calcium 4.3), otherwise unremarkable; LFT's with albumin 3.0, protein 5.8, otherwise normal; NTP-BNP 2169, troponin normal.     CXR showed left hemidiaphragm remained elevated compared to the right and overall the lungs had shallow inflation, chronic blunting of the right posterior costophrenic sulcus could relate to scarring or minimal fluid but unchanged (comparison 7/2024).       Acute CHF exacerbation (HFpEF).  Moderate to severe tricuspid regurgitation; mild AS.  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  Chronic lower extremity edema.  Permanent  atrial fibrillation with intermittent slow ventricular response.  [PTA: furosemide 10 mg daily.]  * Echo 7/2024 showed LVEF 55%; RV OK; mild pHTN; mild AS; ascending aortic dilatation 4.8 cm, sinus 4.4 cm. Patient has declined anticoagulation; and has declined LAAO as well.  * Initial presentation as above. Wt 215 lbs on admit. Presented with worsening lower extremity edema and weight gain. Started on IV furosemide on admit. Cardiology consulted on admit.   2/20: CXR showed possible mildly increased edema, otherwise stable. Echo showed LVEF 6-65%; RV mildly dilated, RV systolic function normal; moderate-severe TR; mild AR, mild AS; ascending aortic aneurysm 4.8 cm, aortic root aneurysm 4.5 cm; pHTN.  2/21: CXR showed possible LLL infiltrate, right lung clear. IV furosemide increased.  2/23: IV lost/difficult placement. Pt did not want midline; as such, diuretics change to PO.  2/24: Wt down to 205 lbs. Still with significant edema. Diuretics changed from PO furosemide to PO bumetanide and given spironolactone x 1   2/26 noted to still by hypervolemic (recommend 24-48 hours of IV therapy) before going to oral torsemide per cardiology.   Cardiology signed off 2/26 2/26 Lasix 80 mg IV BID (has been on various doses of IV diuretics this admission)  WEIGHT 196 (lost 6 pounds last 24 hours) Admit 215   2 liter fluid restriction.   Continued diuresis and monitor BMP   Legs are wrapped   Lasix currently ordered 80 mg IV BID until 2/27 pm (then stop)   Demadex 10 mg po daliy to start on Friday 2/28   KCL 40 meq currently scheduled daily (monitor K+)   Daily BMP          Hypocalcemia, non-severe, asymptomatic   Vitamin D deficiency.  Initial presentation as above. Calcium (including ionized) low on admit. 6.8 level   Vitamin D level low on admit. (25, OH) level of 16 on admission.   Started on calcium and vitamin D this hospitalization.       Iron deficiency anemia.   Initial presentation as above. Hgb 8.1 on admit. Iron  "studies on admit showed low iron, iron saturation and ferritin. TSH normal on admit. B12 level normal on admit.  Started on IV iron 2/21, changed to PO 2/22. (Venofer 300 mg IV X 2)  Continue to monitor CBC periodically as needed.  Consider prbc transfusion if hgb </= 7.0 or if significant bleeding with hemodynamic instability or if symptomatic.  Follow-up outpatient.     Dysphagia.  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   Current Diet: Fluid restriction 2000 ML FLUID (and additional linked orders)  Room Service  Combination Diet Soft and Bite Sized Diet (level 6); Mildly Thick (level 2) (Small sips, 3x swallow for each bite and sip.); 2 gm NA Diet    [facility] GI consulted, appreciate help.  Continue the above current diet.   Advance diet as able per SLP.   Continue aspiration precautions.     Weakness and physical deconditioning due to multiple acute and chronic medical issues.  Peripheral neuropathy.  Frequent falls.   Patient lives in memory care. He and his wife have separate apartments . She is in the assisted living. (he lives one floor above her).  Seen by PT and OT and TCU recommended.  Continue PTA gabapentin 100 mg at bedtime.  Continue PT and OT, TCU recommended, but pt favors going back to \"his apartment\"   2/20 Per pharmacy discussion with memory care nurse, pt has ability to leave the memory care portion of the facility to visit his spouse, who lives in an assisted living apartment in the same facility. He stays with her in her apartment for days at a time. Unfortunately, when he is not in his memory care room, he does not receive his medications. His spouse doesn't manage her own medications so certainly doesn't manage his medications. Facility is going to fax the MAR and med rec will be completed with this when it arrives. Heike Krishna PharmD   SW consulted for discharge " planning back to [facility] (his wife will plan to stay with him at his apartment at discharge); pt adamantly refuses TCU.  2/26 psychiatry assessment: patient does not have the capacity to determine his disposition  No other immediate family outside of his wife are listed. NEED CM help      Dementia, mild.  At risk for metabolic encephalopathy.  * Lives in [facility].  * Scheduled quetiapine not restarted this hospitalization.  - Continue PRN quetiapine 12.5 mg BID.  - Continue to treat other issues as noted.  - Re-orient as needed.  - Maintain normal day/night, sleep/wake cycles.  - Minimize sedating medications as able.      Chronic pharyngeal dysphagia.  Chronic dysphonia.  GERD with Vickers's esophagus.  EGD 2022 found Vickers's esophagus and him started on PPI twice daily. Seen in clinic by Gastroenterologist Colby Mercado on 2/3/2025 and X-ray esophagram, EGD, ENT referral and speech therapy referral placed at that visit.   Seen by SLP and started on a dysphagia diet  Current Diet: Fluid restriction 2000 ML FLUID (and additional linked orders)  Combination Diet Soft and Bite Sized Diet (level 6); Mildly Thick (level 2) (Small sips, 3x swallow for each bite and sip.); 2 gm NA Diet    - Continue the above current diet.  - Advance diet as able per SLP.  - Continue PTA PPI.  - Continue aspiration precautions.     OA with h/o joint replacements.  Chronic spine disease with h/o spine surgery.  Osteoporosis with h/o compression fractures.  * Started on calcium and vitamin D as above.  * 2/23: Pt c/o back pain. Ordered PRN diclofenac.  - Continue calcium and vitamin D.  - Continue PRN diclofenac gel.     Obesity.  * Body mass index is 30.82 kg/m .  - Needs to continue to pursue aggressive dietary and lifestyle modifications.     Prostate cancer .  * Follows with Dr. Niraj Larry. S/p EBRT without ADT 12/2023.   * Pt was due for PSA (2/26) and wife requested this be checked here, obtained 2/25,   PSA 0.12 this admission.   -  "Follow-up with Urology as scheduled.  Referral on discharge      H/o alcohol use disorder, no recent use since moving to [facility] a few years ago.  - Noted.     Disposition Plan  Medically Ready for Discharge: 2 days per cardiology: But then signed off   Complex discharge planning. Patient has declined TCU which he needs. His wife is unable to make decisions for him as noted in the record and per psychiatry he does not have the capacity to decide disposition   NEEDS TCU on discharge for monitoring and need to determine decision and plan   There may be one of the children is POA according to \"[place] from [place]\"   Case management is working on this.            Diet: Fluid restriction 2000 ML FLUID (and additional linked orders)  Room Service  Combination Diet Soft and Bite Sized Diet (level 6); Mildly Thick (level 2) (Small sips, 3x swallow for each bite and sip.); 2 gm NA Diet    DVT Prophylaxis: Pneumatic Compression Devices  Lucio Catheter: Not present  Lines: None     Cardiac Monitoring: ACTIVE order. Indication: Tachyarrhythmias, acute (48 hours)  Code Status: Full Code      Clinically Significant Risk Factors          # Hypochloremia: Lowest Cl = 93 mmol/L in last 2 days, will monitor as appropriate      # Hypoalbuminemia: Lowest albumin = 3 g/dL at 2/19/2025  6:26 PM, will monitor as appropriate         # Dementia: noted on problem list        # Overweight: Estimated body mass index is 29.82 kg/m  as calculated from the following:    Height as of 2/3/25: 1.727 m (5' 8\").    Weight as of this encounter: 89 kg (196 lb 1.6 oz).      # Financial/Environmental Concerns:           Social Drivers of Health    Physical Activity: Inactive (11/22/2024)    Exercise Vital Sign     Days of Exercise per Week: 0 days     Minutes of Exercise per Session: 0 min   Health Literacy: Inadequate Health Literacy (11/22/2024)     Health Literacy     Frequency of need for help with medical instructions: Often          Disposition " Plan     Medically Ready for Discharge: Anticipated in 2-4 Days       CATARINA AVALOS MD  Hospitalist Service  [facility]  Securely message with [employer] (more info)  Text page via [employer] Paging/Directory   ______________________________________________________________________    Physical Exam   Vital Signs: Temp: 98.5  F (36.9  C) Temp src: Oral BP: 133/60 Pulse: 59   Resp: 18 SpO2: 95 % O2 Device: Nasal cannula Oxygen Delivery: 1 LPM  Weight: 196 lbs 1.6 oz    General Appearance: Patient is awake and alert talking on the phone.   Respiratory: Clear to auscultation bilaterally with no wheezes or rhonchi  Cardiovascular: Regular rate with no gallop or rub  GI: + BS, soft, non tender   Skin: wrapped       Medical Decision Making       40 MINUTES SPENT BY ME on the date of service doing chart review, history, exam, documentation & further activities per the note.      Data     I have personally reviewed the following data over the past 24 hrs:    N/A  \   N/A   / N/A     139 93 (L) 18.7 /  97   4.1 38 (H) 0.84 \       Imaging results reviewed over the past 24 hrs:   No results found for this or any previous visit (from the past 24 hours).

## 2025-02-27 NOTE — PLAN OF CARE
Goal Outcome Evaluation:    Overall Patient Progress: improving    Date/Time 2/26/2025 [id number]    Trauma/Ortho/Medical (Choose one) Medical    Diagnosis: Acute CHF exacerbation and bilateral lower extremity edema  POD#: N/A  Mental Status: A&O x4 with intermittent confusion  Activity/dangle: Assist of 1 with gait belt and walker; sat on edge of  bed during this shift  Diet: Soft and bite sized, Mildly thick (level 2), 2 gm NA diet, 2000 mL fluid restriction, no caffeine  Pain: PRN tylenol  Lucio/Voiding: External catheter, incontinent for BM  Tele/Restraints/Iso: Tele, afib CVR  02/LDA: NC, PIV SL  D/C Date: TBD  Other Info: VSS. Bilateral LE lymphedema wrap clean, dry, and intact on this shift.

## 2025-02-28 ENCOUNTER — APPOINTMENT (OUTPATIENT)
Dept: SPEECH THERAPY | Facility: CLINIC | Age: 85
DRG: 291 | End: 2025-02-28
Payer: COMMERCIAL

## 2025-02-28 ENCOUNTER — APPOINTMENT (OUTPATIENT)
Dept: PHYSICAL THERAPY | Facility: CLINIC | Age: 85
DRG: 291 | End: 2025-02-28
Payer: COMMERCIAL

## 2025-02-28 VITALS
BODY MASS INDEX: 29.82 KG/M2 | DIASTOLIC BLOOD PRESSURE: 53 MMHG | OXYGEN SATURATION: 99 % | TEMPERATURE: 97.3 F | RESPIRATION RATE: 16 BRPM | HEART RATE: 67 BPM | SYSTOLIC BLOOD PRESSURE: 125 MMHG | WEIGHT: 196.1 LBS

## 2025-02-28 LAB
ANION GAP SERPL CALCULATED.3IONS-SCNC: 10 MMOL/L (ref 7–15)
BUN SERPL-MCNC: 26.2 MG/DL (ref 8–23)
CALCIUM SERPL-MCNC: 9.2 MG/DL (ref 8.8–10.4)
CHLORIDE SERPL-SCNC: 92 MMOL/L (ref 98–107)
CREAT SERPL-MCNC: 0.89 MG/DL (ref 0.67–1.17)
EGFRCR SERPLBLD CKD-EPI 2021: 85 ML/MIN/1.73M2
ERYTHROCYTE [DISTWIDTH] IN BLOOD BY AUTOMATED COUNT: 20.7 % (ref 10–15)
GLUCOSE SERPL-MCNC: 105 MG/DL (ref 70–99)
HCO3 SERPL-SCNC: 36 MMOL/L (ref 22–29)
HCT VFR BLD AUTO: 31.3 % (ref 40–53)
HGB BLD-MCNC: 9.2 G/DL (ref 13.3–17.7)
MCH RBC QN AUTO: 23.7 PG (ref 26.5–33)
MCHC RBC AUTO-ENTMCNC: 29.4 G/DL (ref 31.5–36.5)
MCV RBC AUTO: 81 FL (ref 78–100)
PLATELET # BLD AUTO: 262 10E3/UL (ref 150–450)
POTASSIUM SERPL-SCNC: 4.4 MMOL/L (ref 3.4–5.3)
RBC # BLD AUTO: 3.89 10E6/UL (ref 4.4–5.9)
SODIUM SERPL-SCNC: 138 MMOL/L (ref 135–145)
WBC # BLD AUTO: 6.6 10E3/UL (ref 4–11)

## 2025-02-28 PROCEDURE — 250N000013 HC RX MED GY IP 250 OP 250 PS 637: Performed by: INTERNAL MEDICINE

## 2025-02-28 PROCEDURE — 92526 ORAL FUNCTION THERAPY: CPT | Mod: GN | Performed by: SPEECH-LANGUAGE PATHOLOGIST

## 2025-02-28 PROCEDURE — 97140 MANUAL THERAPY 1/> REGIONS: CPT | Mod: GP | Performed by: PHYSICAL THERAPIST

## 2025-02-28 PROCEDURE — 250N000013 HC RX MED GY IP 250 OP 250 PS 637: Performed by: NURSE PRACTITIONER

## 2025-02-28 PROCEDURE — 36415 COLL VENOUS BLD VENIPUNCTURE: CPT | Performed by: INTERNAL MEDICINE

## 2025-02-28 PROCEDURE — 80048 BASIC METABOLIC PNL TOTAL CA: CPT | Performed by: INTERNAL MEDICINE

## 2025-02-28 PROCEDURE — 82310 ASSAY OF CALCIUM: CPT | Performed by: INTERNAL MEDICINE

## 2025-02-28 PROCEDURE — 85018 HEMOGLOBIN: CPT | Performed by: INTERNAL MEDICINE

## 2025-02-28 PROCEDURE — 250N000013 HC RX MED GY IP 250 OP 250 PS 637

## 2025-02-28 PROCEDURE — 99239 HOSP IP/OBS DSCHRG MGMT >30: CPT

## 2025-02-28 RX ORDER — VALSARTAN 40 MG/1
40 TABLET ORAL DAILY
DISCHARGE
Start: 2025-03-01 | End: 2025-03-06 | Stop reason: DRUGHIGH

## 2025-02-28 RX ORDER — TORSEMIDE 10 MG/1
10 TABLET ORAL DAILY
DISCHARGE
Start: 2025-02-28

## 2025-02-28 RX ORDER — TORSEMIDE 10 MG/1
10 TABLET ORAL DAILY
Status: DISCONTINUED | OUTPATIENT
Start: 2025-02-28 | End: 2025-02-28 | Stop reason: HOSPADM

## 2025-02-28 RX ADMIN — Medication 25 MCG: at 08:33

## 2025-02-28 RX ADMIN — POLYETHYLENE GLYCOL 3350 17 G: 17 POWDER, FOR SOLUTION ORAL at 08:28

## 2025-02-28 RX ADMIN — Medication 40 MG: at 08:47

## 2025-02-28 RX ADMIN — ACETAMINOPHEN 650 MG: 325 TABLET, FILM COATED ORAL at 19:46

## 2025-02-28 RX ADMIN — CALCIUM CARBONATE (ANTACID) CHEW TAB 500 MG 1000 MG: 500 CHEW TAB at 19:46

## 2025-02-28 RX ADMIN — TORSEMIDE 10 MG: 10 TABLET ORAL at 11:41

## 2025-02-28 RX ADMIN — ASPIRIN 81 MG: 81 TABLET, COATED ORAL at 08:32

## 2025-02-28 RX ADMIN — VALSARTAN 40 MG: 40 TABLET, FILM COATED ORAL at 08:32

## 2025-02-28 RX ADMIN — CALCIUM 500 MG: 500 TABLET ORAL at 08:33

## 2025-02-28 ASSESSMENT — ACTIVITIES OF DAILY LIVING (ADL)
ADLS_ACUITY_SCORE: 60
ADLS_ACUITY_SCORE: 60
ADLS_ACUITY_SCORE: 61
ADLS_ACUITY_SCORE: 61
ADLS_ACUITY_SCORE: 60
ADLS_ACUITY_SCORE: 60
ADLS_ACUITY_SCORE: 61
ADLS_ACUITY_SCORE: 61
ADLS_ACUITY_SCORE: 60
ADLS_ACUITY_SCORE: 61
ADLS_ACUITY_SCORE: 60
ADLS_ACUITY_SCORE: 61
ADLS_ACUITY_SCORE: 60
ADLS_ACUITY_SCORE: 61
ADLS_ACUITY_SCORE: 60
ADLS_ACUITY_SCORE: 60
ADLS_ACUITY_SCORE: 61
ADLS_ACUITY_SCORE: 60
ADLS_ACUITY_SCORE: 61
ADLS_ACUITY_SCORE: 61

## 2025-02-28 NOTE — DISCHARGE INSTRUCTIONS
Please contact [facility] GI clinic - ([phone]) - to make an appointment to address ongoing dysphagia and determine whether an EGD would be appropriate.

## 2025-02-28 NOTE — PROVIDER NOTIFICATION
PLEASE have day hospital rounder look at patient's legs. 2/28   Wrapped during rounding and unable to get back to have them unwrapped.   Alison

## 2025-02-28 NOTE — PROGRESS NOTES
Care Management Discharge Note    Discharge Date: 02/28/2025       Discharge Disposition: Assisted Living, Transitional Care    Discharge Services:      Discharge DME:      Discharge Transportation: health plan transportation    Private pay costs discussed: private room/amenity fees and transportation costs    Does the patient's insurance plan have a 3 day qualifying hospital stay waiver?  Yes     Which insurance plan 3 day waiver is available? Alternative insurance waiver    Will the waiver be used for post-acute placement? Yes    PAS Confirmation Code: [id number]  Patient/family educated on Medicare website which has current facility and service quality ratings:      Education Provided on the Discharge Plan:    Persons Notified of Discharge Plans: Jamie and wife Gisela  Patient/Family in Agreement with the Plan: unable to assess    Handoff Referral Completed: No, handoff not indicated or clinically appropriate    Additional Information:  Patient accepted to [facility] today into a private room. Writer spoke to patient who would like a call to his wife to update her. Writer called Gisela who is in agreement but asked that writer confirm with patient what he'd like. He called his wife and they decided that they'd take the private room for $46/day. Writer confirmed with [facility] and messaged provider for discharge orders. Left a  for wife confirming plan and provided contact information if she wanted to connect with writer. Call to [employer] and [email or web address] ride scheduled for today between [id number] for lowe leg ulcers, needing supervision as he lives in memory care. PCS completed and submitted electronically.    PAS completed.    PAS-RR    D: Per DHS regulation, SHALINI completed and submitted PAS-RR to [place] [employer] on [employer] Direct Connect via the [employer].  PAS-RR confirmation # is : [id number]    I: SHALINI spoke with patient and they are aware a PAS-RR has been submitted.  SHALINI reviewed with patient that they may be contacted for  a follow up appointment within 10 days of hospital discharge if their SNF stay is < 30 days.  Contact information for [employer] was also provided.    A: patient verbalized understanding.    P: Further questions may be directed to [facility] [employer] Line at #[phone], option #4 for [facility] [place]-RR staff.    Orders received and faxed    GUICHO Hinkle

## 2025-02-28 NOTE — PLAN OF CARE
Goal Outcome Evaluation:      Plan of Care Reviewed With: patient    Overall Patient Progress: improvingOverall Patient Progress: improving    Outcome Evaluation: Discharge to TCU 02/28/2025 after 17:00 via Medical Transport.    Patient vital signs are at baseline: Yes  Patient able to ambulate as they were prior to admission or with assist devices provided by therapies during their stay:  Yes  Patient MUST void prior to discharge:  Yes  Patient able to tolerate oral intake:  Yes  Pain has adequate pain control using Oral analgesics:  Yes  Does patient have an identified :  No,  Reason:  Discharge to TCU  Has goal D/C date and time been discussed with patient:  Yes   Discharge to TCU 02/28/2025 after 17:00 via Medical Transport.    Dx:  POD#n/a   Admitted 02/19/2025    Discharge to TCU 02/28/2025 after 17:00 via Medical Transport.    A&Ox4.   CMS Intact.   VSS on RA.   Up with Ax1 GB and walker.   Voiding via Male External cath.   Right PIV SL.  Soft/Bite-sized Diet, Thin Liquids, 2gr Sodium, 2L Fluid Restriction.  Pain controlled.

## 2025-02-28 NOTE — PLAN OF CARE
Goal Outcome Evaluation:    Overall Patient Progress: improving      Date/Time 2/27/2025 [id number]    Trauma/Ortho/Medical (Choose one) Medical    Diagnosis: CHF exacerbation and chronic lower edema  POD#: N/A  Mental Status: A&O x4  Activity/dangle: Assist of 1-2 with gaitbelt and walker  Diet: 2G NA, 2L fluids restriction, soft, thick level 2  Pain: PRN tylenol, Scheduled gabapentin  Lucio/Voiding: Incontinent; external cath  Tele/Restraints/Iso: None  02/LDA: NC 2L, R PIV SL  D/C Date: TBD to TCU  Other Info: Lymphedema wraps clean, dry, and intact, incontinent of urine x2

## 2025-02-28 NOTE — PROGRESS NOTES
[facility]    Medicine Progress Note - Hospitalist Service    Date of Admission:  2/19/2025      Assessment & Plan   Jamie Valdivia is a delightful 84 year old male with history including mild dementia; HTN; chronic afib (has declined anticoagulation); GERD, h/o Vickers's esophagus; chronic anemia; chronic pharyngeal dysphagia; peripheral neuropathy; OA; chronic spine disease; osteoporosis; prostate cancer; h/o alcohol use d/o (no longer drinking); h/o falls; who presented from [facility] 2/19/2025 with increased lower extremity swelling and weight gain and found with acute decompensated heart failure.      On initial evaluation, pt was afebrile, hypertensive, sats 90%. ECG showed afib with CVR, no acute ischemic changes. Labs notable for CBC with WBC normal, hgb 8.1; BMP with calcium 6.8 (ionized calcium 4.3), otherwise unremarkable; LFT's with albumin 3.0, protein 5.8, otherwise normal; NTP-BNP 2169, troponin normal.  CXR showed left hemidiaphragm remained elevated compared to the right and overall the lungs had shallow inflation, chronic blunting of the right posterior costophrenic sulcus could relate to scarring or minimal fluid but unchanged (comparison 7/2024).       Acute CHF exacerbation (HFpEF).   Moderate to severe tricuspid regurgitation; mild AS.  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  Chronic lower extremity edema.  Permanent atrial fibrillation with intermittent slow ventricular response.  [PTA: furosemide 10 mg daily.]  Echo 7/2024 showed LVEF 55%; RV OK; mild pHTN; mild AS; ascending aortic dilatation 4.8 cm, sinus 4.4 cm. Patient has declined anticoagulation; and has declined LAAO as well.  Initial presentation as above. Wt 215 lbs on admit. Presented with worsening lower extremity edema and weight gain. Started on IV furosemide on admit. Cardiology consulted on admit.  "  2/20: CXR showed possible mildly increased edema, otherwise stable. Echo showed LVEF 60-65%; RV mildly dilated, RV systolic function normal; moderate-severe TR; mild AR, mild AS; ascending aortic aneurysm 4.8 cm, aortic root aneurysm 4.5 cm; pHTN.  2/21: CXR showed possible LLL infiltrate, right lung clear. IV furosemide increased.  2/23: IV lost/difficult placement. Pt did not want midline; as such, diuretics change to PO.  2/24: Wt down to 205 lbs. Still with significant edema. Diuretics changed from PO furosemide to PO bumetanide and given spironolactone x 1   2/26 noted to still by hypervolemic (recommend 24-48 hours of IV therapy by cardiology) before going to oral torsemide per cardiology.   Cardiology signed off 2/26   Legs are wrapped   2/27 completed lasix 80 mg IV BID and started on torsemide 10 mg po daily tomorrow am on Friday 2/28 per cardiology   Torsemide 10 mg po daliy to start on Friday 2/28   Daily BMP   No weight recorded 2/27  Suspect his fluid management will be ongoing outpatient plan.   He still has edema in his legs. Reports that he has \"chronic lower extremity edema\"   Anticipate his diuresis will be ongoing as outpatient.   Discharge when TCU available and patient willing to go.   COMPLIANCE will be an ongoing challenge. He will likely struggle with fluid retention.   Patient would benefit from goals of care discussion with his primary care team at his facility    He likely has some future decisions for aortic aneurysm?, not on anticoagulation with afib (risk of stroke) Not compliant with meds and now challenge of his decision making   Challenged to comply with treatment          Hypocalcemia, non-severe, asymptomatic   Vitamin D deficiency.  Initial presentation as above. Calcium (including ionized) low on admit. 6.8 level   Vitamin D level low on admit. (25, [place]) level of 16 on admission.   Started on calcium and vitamin D this hospitalization.       Iron deficiency anemia.   Initial " "presentation as above. Hgb 8.1 on admit. Iron studies on admit showed low iron, iron saturation and ferritin. TSH normal on admit. B12 level normal on admit.  Started on IV iron 2/21, changed to PO 2/22. (Venofer 300 mg IV X 2)  Continue to monitor CBC periodically as needed.  Consider prbc transfusion if hgb </= 7.0 or if significant bleeding with hemodynamic instability or if symptomatic.  Follow-up outpatient.  2/27 hgb 8.6        Weakness and physical deconditioning due to multiple acute and chronic medical issues.  Peripheral neuropathy.  Frequent falls.   Patient lives in memory care. He and his wife have separate apartments . She is in the assisted living. (he lives one floor above her).  Seen by PT and OT and TCU recommended.  Continue PTA gabapentin 100 mg at bedtime.  Continue PT and OT, TCU recommended, but pt favors going back to \"his apartment\"   2/20 Per pharmacy discussion with memory care nurse, pt has ability to leave the memory care portion of the facility to visit his spouse, who lives in an assisted living apartment in the same facility. He stays with her in her apartment for days at a time. Unfortunately, when he is not in his memory care room, he does not receive his medications. His spouse doesn't manage her own medications so certainly doesn't manage his medications. Facility is going to fax the MAR and med rec will be completed with this when it arrives. Heike Krishna PharmD   SW consulted for discharge planning back to correction (his wife will plan to stay with him at his apartment at discharge); pt adamantly refuses TCU.   2/26 psychiatry assessment: patient does not have the capacity to determine his disposition  No other immediate family outside of his wife are listed. NEED CM help   2/27 reports he will go to the TCU but knows this is near [place] where his [place].      Dementia, mild.  At risk for metabolic encephalopathy.  * Lives in correction.  * Scheduled quetiapine not restarted this " hospitalization.  - Continue PRN quetiapine 12.5 mg BID.  - Continue to treat other issues as noted.  - Re-orient as needed.  - Maintain normal day/night, sleep/wake cycles.  - Minimize sedating medications as able.      Chronic pharyngeal dysphagia.  Chronic dysphonia.  GERD with Vickers's esophagus.  Cricopharyngeal bar and Zenker diverticulum   EGD 2022 found Vickers's esophagus and him started on PPI twice daily  Seen in clinic by Gastroenterologist Colby Mercado on 2/3/2025 and X-ray esophagram, EGD, ENT referral and speech therapy referral placed at that visit.   2/21 SLP obtained a video swallow study on 2/21 that showed large amounts of aspiration of thin liquids. It also showed a zenker diverticulum and prominent cricopharyngeus muscle.   Current Diet: Fluid restriction 2000 ML FLUID (and additional linked orders)  Combination Diet Soft and Bite Sized Diet (level 6); Mildly Thick (level 2) (Small sips, 3x swallow for each bite and sip.); 2 gm NA Diet    2/25 [facility] consult from GI  >> patient not interested in EGD>> follow up Dr. Mercado outpatient in clinic or seek second opinioin  Continue the above current diet.  Advance diet as able per SLP.  Continue PTA PPI.  Continue aspiration precautions.  Anticipate ongoing compliance challenges outside of hospital          OA with h/o joint replacements.  Chronic spine disease with h/o spine surgery.  Osteoporosis with h/o compression fractures.  * Started on calcium and vitamin D as above.  * 2/23: Pt c/o back pain. Ordered PRN diclofenac.  - Continue calcium and vitamin D.  - Continue PRN diclofenac gel.     Obesity.  * Body mass index is 30.82 kg/m .  - Needs to continue to pursue aggressive dietary and lifestyle modifications.     Prostate cancer .  * Follows with Dr. Niraj Larry. S/p EBRT without ADT 12/2023.   * Pt was due for PSA (2/26) and wife requested this be checked here, obtained 2/25,   PSA 0.12 this admission.   - Follow-up with Urology as  "scheduled.  Referral on discharge      H/o alcohol use disorder, no recent use since moving to [facility] a few years ago.  - Noted.     Disposition Plan  Complex discharge planning. Patient has declined TCU which he needs. His wife is unable to make decisions for him as noted in the record and per psychiatry he does not have the capacity to decide disposition   NEEDS TCU on discharge for monitoring and need to determine decision and plan   See SW/CM notes patient does not want children contacted.   2/27 in agreement to go to TCU.   He can remain on diuretics         Diet: Fluid restriction 2000 ML FLUID (and additional linked orders)  Room Service  Combination Diet Soft and Bite Sized Diet (level 6); Mildly Thick (level 2) (Small sips, 3x swallow for each bite and sip.); 2 gm NA Diet    DVT Prophylaxis: Pneumatic Compression Devices  Lucio Catheter: Not present  Lines: None     Cardiac Monitoring: ACTIVE order. Indication: Tachyarrhythmias, acute (48 hours)  Code Status: Full Code      Clinically Significant Risk Factors   { TIP  Diagnoses will continue to appear throughout the admission.  :38376}       # Hypochloremia: Lowest Cl = 91 mmol/L in last 2 days, will monitor as appropriate      # Hypoalbuminemia: Lowest albumin = 3 g/dL at 2/19/2025  6:26 PM, will monitor as appropriate         # Dementia: noted on problem list        # Overweight: Estimated body mass index is 29.82 kg/m  as calculated from the following:    Height as of 2/3/25: 1.727 m (5' 8\").    Weight as of this encounter: 89 kg (196 lb 1.6 oz).        # Financial/Environmental Concerns:           Social Drivers of Health    Physical Activity: Inactive (11/22/2024)    Exercise Vital Sign     Days of Exercise per Week: 0 days     Minutes of Exercise per Session: 0 min   Health Literacy: Inadequate Health Literacy (11/22/2024)     Health Literacy     Frequency of need for help with medical instructions: Often          Disposition Plan   {TIP  The " patient's Medical Readiness for Discharge [MRD] has been documented today or is 'Ready Now'. Last Documentation- Anticipated in 2-4 Days   Use SmartList below if a change is needed.  :[id number]}  Medically Ready for Discharge: Anticipated in 2-4 Days       Jabier England MD  Hospitalist Service  [facility]  Securely message with [employer] (more info)  Text page via [employer] Paging/Directory   ______________________________________________________________________    Interval History   Patient again lying in bed talking on the phone  Plans are for patient to go to a TCU that is connected to his current memory care where he lives and near his wife in the assisted living  His wife lives in the assisted living that is connected to the TCU  Social work involved regarding additional questions of patient's competency to make a decision to leave to the TCU but he is currently agreeable.  Also concerns about his wife's ability to make decisions who helps the patient himself  Social work called [employer] today (patient does not have a healthcare directive) family is only is financial POA  Detailed note outlined by social work regarding family dynamics where his kids are not really involved in medical decisions.  If patient agrees to go back to the TCU this would be his disposition plan.   Care facility will need to coordinate patient going between the assisted living where his wife lives.  Patient himself lives in memory care      Physical Exam   Vital Signs: Temp: 97.6  F (36.4  C) Temp src: Oral BP: 118/64 Pulse: 60   Resp: 16 SpO2: 100 % O2 Device: Nasal cannula Oxygen Delivery: 2 LPM  Weight: 196 lbs 1.6 oz    General Appearance: Patient is awake and alert talking on the phone.   Respiratory: Clear to auscultation bilaterally with no wheezes or rhonchi  Cardiovascular: Regular rate with no gallop or rub  GI: + BS, soft, non tender   Skin: wrapped       Medical Decision Making   { TIP   MDM Calculator     MDM grid (w/ times)    Coding Support Chat  Billing is now based on time OR medical decision making complexity. Medical decision making included in your A&P does NOT need to be re-documented here.    :[id number]}    40 MINUTES SPENT BY ME on the date of service doing chart review, history, exam, documentation & further activities per the note.      Data     I have personally reviewed the following data over the past 24 hrs:    5.3  \   8.6 (L)   / 204     135 91 (L) 19.3 /  157 (H)   4.3 35 (H) 0.81 \       Imaging results reviewed over the past 24 hrs:   No results found for this or any previous visit (from the past 24 hours).

## 2025-02-28 NOTE — PROGRESS NOTES
[facility]    Medicine Progress Note - Hospitalist Service    Date of Admission:  2/19/2025    Interval History   Patient again lying in bed talking on the phone  Plans are for patient to go to a TCU that is connected to his current memory care where he lives and near his wife in the assisted living  His wife lives in the assisted living that is connected to the TCU  Social work involved regarding additional questions of patient's competency to make a decision to leave to the TCU but he is currently agreeable.  Also concerns about his wife's ability to make decisions who helps the patient himself  Social work called [employer] today (patient does not have a healthcare directive) family is only is financial POA  Detailed note outlined by social work regarding family dynamics where his kids are not really involved in medical decisions.  If patient agrees to go back to the TCU this would be his disposition plan.   Care facility will need to coordinate patient going between the assisted living where his wife lives.  Patient himself lives in memory care      Assessment & Plan   Jamie Valdivia is a delightful 84 year old male with history including mild dementia; HTN; chronic afib (has declined anticoagulation); GERD, h/o Vickers's esophagus; chronic anemia; chronic pharyngeal dysphagia; peripheral neuropathy; OA; chronic spine disease; osteoporosis; prostate cancer; h/o alcohol use d/o (no longer drinking); h/o falls; who presented from [facility] 2/19/2025 with increased lower extremity swelling and weight gain and found with acute decompensated heart failure.      On initial evaluation, pt was afebrile, hypertensive, sats 90%. ECG showed afib with CVR, no acute ischemic changes. Labs notable for CBC with WBC normal, hgb 8.1; BMP with calcium 6.8 (ionized calcium 4.3), otherwise unremarkable; LFT's with albumin 3.0, protein 5.8, otherwise normal; NTP-BNP 2169, troponin normal.  CXR showed left  hemidiaphragm remained elevated compared to the right and overall the lungs had shallow inflation, chronic blunting of the right posterior costophrenic sulcus could relate to scarring or minimal fluid but unchanged (comparison 7/2024).       Acute CHF exacerbation (HFpEF).   Moderate to severe tricuspid regurgitation; mild AS.  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  Chronic lower extremity edema.  Permanent atrial fibrillation with intermittent slow ventricular response.  [PTA: furosemide 10 mg daily.]  Echo 7/2024 showed LVEF 55%; RV OK; mild pHTN; mild AS; ascending aortic dilatation 4.8 cm, sinus 4.4 cm. Patient has declined anticoagulation; and has declined LAAO as well.  Initial presentation as above. Wt 215 lbs on admit. Presented with worsening lower extremity edema and weight gain. Started on IV furosemide on admit. Cardiology consulted on admit.   2/20: CXR showed possible mildly increased edema, otherwise stable. Echo showed LVEF 60-65%; RV mildly dilated, RV systolic function normal; moderate-severe TR; mild AR, mild AS; ascending aortic aneurysm 4.8 cm, aortic root aneurysm 4.5 cm; pHTN.  2/21: CXR showed possible LLL infiltrate, right lung clear. IV furosemide increased.  2/23: IV lost/difficult placement. Pt did not want midline; as such, diuretics change to PO.  2/24: Wt down to 205 lbs. Still with significant edema. Diuretics changed from PO furosemide to PO bumetanide and given spironolactone x 1   2/26 noted to still by hypervolemic (recommend 24-48 hours of IV therapy by cardiology) before going to oral torsemide per cardiology.   Cardiology signed off 2/26   Legs are wrapped   2/27 completed lasix 80 mg IV BID and started on demadex 10 mg po daily tomorrow am on Friday 2/28 per cardiology   Demadex 10 mg po daliy to start on Friday 2/28   Daily BMP   No weight recorded 2/27  Suspect his  "fluid management will be ongoing outpatient plan.   He still has edema in his legs. Reports that he has \"chronic lower extremity edema\"   Anticipate his diuresis will be ongoing as outpatient.   Discharge when TCU available and patient willing to go.   COMPLIANCE will be an ongoing challenge. He will likely struggle with fluid retention.   Patient would benefit from goals of care discussion with his primary care team at his facility    He likely has some future decisions for aortic aneurysm?, not on anticoagulation with afib (risk of stroke) Not compliant with meds and now challenge of his decision making   Challenged to comply with treatment          Hypocalcemia, non-severe, asymptomatic   Vitamin D deficiency.  Initial presentation as above. Calcium (including ionized) low on admit. 6.8 level   Vitamin D level low on admit. (25, [place]) level of 16 on admission.   Started on calcium and vitamin D this hospitalization.       Iron deficiency anemia.   Initial presentation as above. Hgb 8.1 on admit. Iron studies on admit showed low iron, iron saturation and ferritin. TSH normal on admit. B12 level normal on admit.  Started on IV iron 2/21, changed to PO 2/22. (Venofer 300 mg IV X 2)  Continue to monitor CBC periodically as needed.  Consider prbc transfusion if hgb </= 7.0 or if significant bleeding with hemodynamic instability or if symptomatic.  Follow-up outpatient.  2/27 hgb 8.6        Weakness and physical deconditioning due to multiple acute and chronic medical issues.  Peripheral neuropathy.  Frequent falls.   Patient lives in memory care. He and his wife have separate apartments . She is in the assisted living. (he lives one floor above her).  Seen by PT and OT and TCU recommended.  Continue PTA gabapentin 100 mg at bedtime.  Continue PT and OT, TCU recommended, but pt favors going back to \"his apartment\"   2/20 Per pharmacy discussion with memory care nurse, pt has ability to leave the memory care portion of " the facility to visit his spouse, who lives in an assisted living apartment in the same facility. He stays with her in her apartment for days at a time. Unfortunately, when he is not in his memory care room, he does not receive his medications. His spouse doesn't manage her own medications so certainly doesn't manage his medications. Facility is going to fax the MAR and med rec will be completed with this when it arrives. Heike Krishna PharmD   SW consulted for discharge planning back to [facility] (his wife will plan to stay with him at his apartment at discharge); pt adamantly refuses TCU.   2/26 psychiatry assessment: patient does not have the capacity to determine his disposition  No other immediate family outside of his wife are listed. NEED CM help   2/27 reports he will go to the TCU but knows this is near [place] where his wife lives.      Dementia, mild.  At risk for metabolic encephalopathy.  * Lives in [facility].  * Scheduled quetiapine not restarted this hospitalization.  - Continue PRN quetiapine 12.5 mg BID.  - Continue to treat other issues as noted.  - Re-orient as needed.  - Maintain normal day/night, sleep/wake cycles.  - Minimize sedating medications as able.      Chronic pharyngeal dysphagia.  Chronic dysphonia.  GERD with Vickers's esophagus.  Cricopharyngeal bar and Zenker diverticulum   EGD 2022 found Vickers's esophagus and him started on PPI twice daily  Seen in clinic by Gastroenterologist Colby Mercado on 2/3/2025 and X-ray esophagram, EGD, ENT referral and speech therapy referral placed at that visit.   2/21 SLP obtained a video swallow study on 2/21 that showed large amounts of aspiration of thin liquids. It also showed a zenker diverticulum and prominent cricopharyngeus muscle.   Current Diet: Fluid restriction 2000 ML FLUID (and additional linked orders)  Combination Diet Soft and Bite Sized Diet (level 6); Mildly Thick (level 2) (Small sips, 3x swallow for each bite and sip.); 2 gm NA Diet    2/25 [facility]  consult from GI  >> patient not interested in EGD>> follow up Dr. Mercado outpatient in clinic or seek second opinioin  Continue the above current diet.  Advance diet as able per SLP.  Continue PTA PPI.  Continue aspiration precautions.  Anticipate ongoing compliance challenges outside of hospital          OA with h/o joint replacements.  Chronic spine disease with h/o spine surgery.  Osteoporosis with h/o compression fractures.  * Started on calcium and vitamin D as above.  * 2/23: Pt c/o back pain. Ordered PRN diclofenac.  - Continue calcium and vitamin D.  - Continue PRN diclofenac gel.     Obesity.  * Body mass index is 30.82 kg/m .  - Needs to continue to pursue aggressive dietary and lifestyle modifications.     Prostate cancer .  * Follows with Dr. Niraj Larry. S/p EBRT without ADT 12/2023.   * Pt was due for PSA (2/26) and wife requested this be checked here, obtained 2/25,   PSA 0.12 this admission.   - Follow-up with Urology as scheduled.  Referral on discharge      H/o alcohol use disorder, no recent use since moving to [facility] a few years ago.  - Noted.     Disposition Plan  Complex discharge planning. Patient has declined TCU which he needs. His wife is unable to make decisions for him as noted in the record and per psychiatry he does not have the capacity to decide disposition   NEEDS TCU on discharge for monitoring and need to determine decision and plan   See SW/CM notes patient does not want children contacted.   2/27 in agreement to go to TCU.   He can remain on diuretics         Diet: Fluid restriction 2000 ML FLUID (and additional linked orders)  Room Service  Combination Diet Soft and Bite Sized Diet (level 6); Mildly Thick (level 2) (Small sips, 3x swallow for each bite and sip.); 2 gm NA Diet    DVT Prophylaxis: Pneumatic Compression Devices  Lucio Catheter: Not present  Lines: None     Cardiac Monitoring: ACTIVE order. Indication: Tachyarrhythmias, acute (48 hours)  Code Status: Full Code   "    Clinically Significant Risk Factors          # Hypochloremia: Lowest Cl = 91 mmol/L in last 2 days, will monitor as appropriate      # Hypoalbuminemia: Lowest albumin = 3 g/dL at 2/19/2025  6:26 PM, will monitor as appropriate         # Dementia: noted on problem list        # Overweight: Estimated body mass index is 29.82 kg/m  as calculated from the following:    Height as of 2/3/25: 1.727 m (5' 8\").    Weight as of this encounter: 89 kg (196 lb 1.6 oz).        # Financial/Environmental Concerns:           Social Drivers of Health    Physical Activity: Inactive (11/22/2024)    Exercise Vital Sign     Days of Exercise per Week: 0 days     Minutes of Exercise per Session: 0 min   Health Literacy: Inadequate Health Literacy (11/22/2024)     Health Literacy     Frequency of need for help with medical instructions: Often          Disposition Plan     Medically Ready for Discharge: Anticipated in 2-4 Days       CATARINA AVALOS MD  Hospitalist Service  [facility]  Securely message with [employer] (more info)  Text page via Find[employer] Paging/Directory   ______________________________________________________________________    Physical Exam   Vital Signs: Temp: 98  F (36.7  C) Temp src: Oral BP: 112/50 Pulse: 65   Resp: 18 SpO2: 97 % O2 Device: None (Room air) Oxygen Delivery: 1 LPM  Weight: 196 lbs 1.6 oz    General Appearance: Patient is awake and alert talking on the phone.   Respiratory: Clear to auscultation bilaterally with no wheezes or rhonchi  Cardiovascular: Regular rate with no gallop or rub  GI: + BS, soft, non tender   Skin: wrapped       Medical Decision Making       40 MINUTES SPENT BY ME on the date of service doing chart review, history, exam, documentation & further activities per the note.      Data     I have personally reviewed the following data over the past 24 hrs:    5.3  \   8.6 (L)   / 204     135 91 (L) 19.3 /  157 (H)   4.3 35 (H) 0.81 \       Imaging results reviewed " over the past 24 hrs:   No results found for this or any previous visit (from the past 24 hours).

## 2025-02-28 NOTE — PLAN OF CARE
Goal Outcome Evaluation:  Date/Time 2/28 gil    Trauma/Ortho/Medical (Choose one) medical    Diagnosis:CHF  Mental Status:rambles  Activity/dangle up with 1/walker  Diet:   Pain:denies  Lucio/Voiding:ext cath  Tele/Restraints/Iso:tele- SR- dc'd for discharge  02/LDA:saline lock dc'd intact for discharge  D/C Date: today [id number]  Other Info:many questions and requests. All belongings sent with patient. AVS sent with patient

## 2025-03-01 NOTE — PLAN OF CARE
Occupational Therapy Discharge Summary    Reason for therapy discharge:    Discharged to transitional care facility.    Progress towards therapy goal(s). See goals on Care Plan in [facility] electronic health record for goal details.  Goals partially met.  Barriers to achieving goals:   discharge from facility.    Therapy recommendation(s):    Continued therapy is recommended.  Rationale/Recommendations:  Pt functioning well below baseline, significant LE edema and limited strength impacting safety w/ BADLs and activity tolerance. Currently requiring Ax1 for transfers. Pt will require TCU stay for monitoring of medications and continued activity prior to returning to [facility]. Facility unable to provide level of A required at this time.

## 2025-03-01 NOTE — DISCHARGE SUMMARY
"[facility]  Hospitalist Discharge Summary      Date of Admission:  2/19/2025  Date of Discharge:  2/28/2025  8:12 PM  Discharging Provider: Jabier England MD  Discharge Service: Hospitalist Service    Discharge Diagnoses   See below    Clinically Significant Risk Factors     # Overweight: Estimated body mass index is 29.82 kg/m  as calculated from the following:    Height as of 2/3/25: 1.727 m (5' 8\").    Weight as of this encounter: 89 kg (196 lb 1.6 oz).       Follow-ups Needed After Discharge   Follow-up Appointments       Follow Up and recommended labs and tests      Follow up with long-term physician.  The following labs/tests are recommended: BMP within one week with diuretic/medication changes.    Follow up with cardiology, they will call you to schedule a follow up appointment                Discharge Disposition   Discharged to short-term care facility  Condition at discharge: Stable    Hospital Course   Jamie Valdivia is a delightful 84 year old male with history including mild dementia; HTN; chronic afib (has declined anticoagulation); GERD, h/o Vickers's esophagus; chronic anemia; chronic pharyngeal dysphagia; peripheral neuropathy; OA; chronic spine disease; osteoporosis; prostate cancer; h/o alcohol use d/o (no longer drinking); h/o falls; who presented from [facility]/19/[facility] with increased lower extremity swelling and weight gain and found with acute decompensated heart failure.      On initial evaluation, pt was afebrile, hypertensive, sats 90%. ECG showed afib with CVR, no acute ischemic changes. Labs notable for CBC with WBC normal, hgb 8.1; BMP with calcium 6.8 (ionized calcium 4.3), otherwise unremarkable; LFT's with albumin 3.0, protein 5.8, otherwise normal; NTP-BNP 2169, troponin normal. CXR showed left hemidiaphragm remained elevated compared to the right and overall the lungs had shallow inflation, chronic blunting of the right posterior costophrenic sulcus could " relate to scarring or minimal fluid but unchanged (comparison 7/2024).      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  Cardiology consulted, diuresed from 215 to 196lbs, switched to PO torsemide prior to discharge. LE edema significantly improved, stable off of oxygen at discharge.  Compliance will be an ongoing challenge.    *TTE 2/20 EF 60-65% RV mildly dilated but normal function, moderate-severe TR, mild AR, mild AS.    *Regarding A-fib, patient has declined anticoagulation and LAAO, previously on baby aspirin but discontinued with history of GI bleed  -Change Lasix to torsemide 10 mg daily at discharge  -Cardiology follow-up ordered  -BMP within 1 week  -Continue lymphedema therapy at TCU    Chronic pharyngeal dysphagia.  Chronic dysphonia.  GERD with Vickers's esophagus.  Cricopharyngeal bar and Zenker diverticulum   EGD 2022 found Vickers's esophagus and him started on PPI twice daily. Seen in clinic by Gastroenterologist Colby Mercado on 2/3/2025 and X-ray esophagram, EGD, ENT referral and speech therapy referral placed at that visit. 2/21 SLP obtained a video swallow study on 2/21 that showed large amounts of aspiration of thin liquids. It also showed a zenker diverticulum and prominent cricopharyngeus muscle, patient refused mildly thickened liquids as recommended by SLP. GI consulted not interested in EGD- patient to follow up with Dr. Mercado outpatient or seek a second opinion. Patient is high risk for aspiration.  -  Continue PTA PPI  - SLP at rehab    Hypocalcemia, non-severe, asymptomatic   Vitamin D deficiency  Initial presentation as above. Calcium (including ionized) low on admit. 6.8 level. Vitamin D level low on admit. (25, OH) level of 16 on admission. Started on calcium and vitamin D this hospitalization.     Iron deficiency anemia  Hgb 8.1 on admit. Iron studies on admit showed low iron, iron saturation and ferritin. TSH normal on admit. B12 level normal on admit. Started on IV iron 2/21, changed to PO 2/22. (Venofer 300 mg IV X 2)  - outpatient follow up     Weakness and physical deconditioning due to multiple acute and chronic medical issues  Peripheral neuropathy  Frequent falls  Dementia, mild  Patient lives in memory care. He and his wife have separate apartments . She is in the assisted living. (he lives one floor above her). Seen by PT and OT and TCU recommended, after much discussion patient agreeable to TCU. 2/26 psychiatry assessment- patient does not have capacity to determine his disposition- he was fully alert/oriented on discharge- disposition discussed with patient and wife who are in agreement.  - Continue PTA gabapentin 100mg at bedtime  - Continue PTA quetiapine    OA with h/o joint replacements  Chronic spine disease with h/o spine surgery  Osteoporosis with h/o compression fractures  Started on calcium and vitamin D as above.    Obesity  * Body mass index is 30.82 kg/m .  - Needs to continue to pursue aggressive dietary and lifestyle modifications.     Prostate cancer   * Follows with Dr. Niraj Larry. S/p EBRT without ADT 12/2023.   * Pt was due for PSA (2/26) and wife requested this be checked here, obtained 2/25, 0.12 this admission.   - Follow up with urology as planned      H/o alcohol use disorder, no recent use since moving to [facility] a few years ago  - Noted.     Disposition Plan  Complex discharge planning. Patient has declined TCU which he needs. His wife is unable to make decisions for him  as noted in the record and per psychiatry he does not have the capacity to decide disposition   NEEDS TCU on discharge for monitoring and need to determine decision and plan   See SW/CM notes patient does not want children contacted.   2/27 in agreement to go to TCU.   He can remain on diuretics     Consultations This Hospital Stay   OCCUPATIONAL THERAPY ADULT IP CONSULT  CARDIOLOGY IP CONSULT  CARDIOLOGY IP CONSULT  PHYSICAL THERAPY ADULT IP CONSULT  OCCUPATIONAL THERAPY ADULT IP CONSULT  CARE MANAGEMENT / SOCIAL WORK IP CONSULT  SPEECH LANGUAGE PATH ADULT IP CONSULT  CARE MANAGEMENT / SOCIAL WORK IP CONSULT  CARE MANAGEMENT / SOCIAL WORK IP CONSULT  LYMPHEDEMA THERAPY IP CONSULT  VASCULAR ACCESS ADULT IP CONSULT  VASCULAR ACCESS ADULT IP CONSULT  CARE MANAGEMENT / SOCIAL WORK IP CONSULT  GASTROENTEROLOGY IP CONSULT  GASTROENTEROLOGY IP CONSULT  GI LUMINAL ADULT IP CONSULT  VASCULAR ACCESS ADULT IP CONSULT  PSYCHIATRY IP CONSULT  PHYSICAL THERAPY ADULT IP CONSULT  OCCUPATIONAL THERAPY ADULT IP CONSULT  LYMPHEDEMA THERAPY IP CONSULT  SPEECH LANGUAGE PATH ADULT IP CONSULT    Code Status   Full Code    Time Spent on this Encounter   I, Jabier England MD, personally saw the patient today and spent greater than 30 minutes discharging this patient.       Jabier England MD  [facility]  [address] [place] [id number]  Phone: [phone]  Fax: [phone]  ______________________________________________________________________    Physical Exam   Vital Signs: Temp: 97.3  F (36.3  C) Temp src: Oral BP: 125/53 Pulse: 67   Resp: 16 SpO2: 99 % O2 Device: None (Room air)    Weight: 196 lbs 1.6 oz  Patient evaluated on day of discharge, feels well, denies shortness of breath, chest pain, has chronic lower extremity swelling with leg wraps and is improved per patient and nursing.  Significant discussion at bedside regarding if patient would go to rehab versus home, ultimately elected for  rehab.    Constitutional: awake, alert, cooperative, NAD  HEENT: normocephalic, anicteric sclerae, MMM, poor dentition  Pulmonary: no increased work of breathing on room air, lungs clear to auscultation bilaterally without wheezes or rales   Cardiovascular: No JVD sitting upright, RRR, normal S1 and S2  GI: soft, non-tender, non-distended  Skin: warm, dry  MSK: venous stasis changes to bilateral LE, no significant pitting edema   Neuro: AAOx3, no gross focal neurologic deficits noted         Primary Care Physician   Sylvia Stein    Discharge Orders      Follow-Up with Cardiology JANEE      General info for SNF    Length of Stay Estimate: Short Term Care: Estimated # of Days <30  Condition at Discharge: Improving  Level of care:skilled   Rehabilitation Potential: Good  Admission H&P remains valid and up-to-date: Yes  Recent Chemotherapy: N/A  Use Nursing Home Standing Orders: Yes     Mantoux instructions    Give two-step Mantoux (PPD) Per Facility Policy Yes     Follow Up and recommended labs and tests    Follow up with [facility] physician.  The following labs/tests are recommended: BMP within one week with diuretic/medication changes.    Follow up with cardiology, they will call you to schedule a follow up appointment     Reason for your hospital stay    You were hospitalized for a heart failure exacerbation     Activity - Up with assistive device     Activity - Up with nursing assistance     Full Code     Physical Therapy Adult Consult    Evaluate and treat as clinically indicated.    Reason:  weakness/deconditioning     Occupational Therapy Adult Consult    Evaluate and treat as clinically indicated.    Reason:  weakness/deconditioning     Lymphedema Therapy Adult Consult    Evaluate and treat as clinically indicated.    Reason:  bilateral LE     Speech Language Path Adult Consult    Evaluate and treat as clinically indicated.    Reason:  aspiration risk, continue evaluation of swallowing     Fall precautions      Diet    Follow this diet upon discharge:      Combination Diet Soft and Bite Sized Diet (level 6); Thin Liquids (level 0) (Small sips, 3x swallow for each bite and sip.); 2 gm NA Diet       Significant Results and Procedures   Most Recent 3 CBC's:  Recent Labs   Lab Test 02/28/25  0835 02/27/25  0934 02/25/25  0910 02/24/25  0859   WBC 6.6 5.3  --  5.4   HGB 9.2* 8.6*  --  7.6*   MCV 81 80  --  80    204 198 216     Most Recent 3 BMP's:  Recent Labs   Lab Test 02/28/25  0835 02/27/25  0934 02/26/25  0807    135 139   POTASSIUM 4.4 4.3 4.1   CHLORIDE 92* 91* 93*   CO2 36* 35* 38*   BUN 26.2* 19.3 18.7   CR 0.89 0.81 0.84   ANIONGAP 10 9 8   JOSUE 9.2 9.0 8.9   * 157* 97   ,   Results for orders placed or performed during the hospital encounter of 02/19/25   XR Chest 2 Views    Narrative    EXAM: XR CHEST 2 VIEWS  LOCATION: [facility]  DATE: 2/19/2025    INDICATION: shortness of breath  COMPARISON: AP and lateral views of the chest 7/12/2024      Impression    IMPRESSION:     Frontal image was acquired with caudal beam angulation. The left hemidiaphragm remains elevated compared to the right and overall the lungs have shallow inflation. Chronic blunting of the right posterior costophrenic sulcus could relate to scarring or   minimal fluid but unchanged.    There are atheromatous calcifications in aortic arch. Cardiac borders are silhouetted on the lateral view and unchanged on the frontal view.    Reverse left shoulder arthroplasty. Vertebroplasty cement is present in several mid and lower thoracic vertebra. Paraspinous rods and pedicle screws in the lower thoracic spine around a mild compression deformity are unchanged.   XR Chest Port 1 View    Narrative    EXAM: XR CHEST PORT 1 VIEW  LOCATION: [facility]  DATE: 2/20/2025    INDICATION: Chest pain.  COMPARISON: Chest x-ray 2/19/2025.      Impression    IMPRESSION:   Stable cardiomegaly. Stable  small bilateral mid lung patchy opacities may be atelectasis or airspace disease. Minimally increased interstitial prominence that may be mildly increased edema. Partial visualization of left shoulder arthroplasty. Spine   fusion appears stable.   XR Video Swallow with SLP or OT    Narrative    EXAM: XR VIDEO SWALLOW WITH SLP OR OT  LOCATION: [facility]  DATE: 2025    INDICATION: Difficulty swallowing.  COMPARISON: None.    TECHNIQUE: Routine swallow study with speech pathology using multiple barium thicknesses.    RADIATION DOSE: Total Air Kerma 6.7 mGy      Impression    IMPRESSION:   Swallow study with Speech Pathology using multiple barium thicknesses.     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    Thin: Aspiration with cough response. Mild residue.    Mildly thick: Aspiration with chin tuck. Deep penetration in neutral position. Mild residue.    Puree: No penetration or aspiration. Mild residue.    Regular: No penetration or aspiration. Mild residue.   XR Chest Port 1 View    Narrative    EXAM: XR CHEST PORT 1 VIEW  LOCATION: [facility]  DATE: 2025    INDICATION: RRT  COMPARISON: 2025      Impression    IMPRESSION: Exam is obtained with patient in an apical kyphotic position. Left hemidiaphragm is not as well visualized possibly due to positioning though left lower lobe infiltrate may also be present. Right lung appears clear. Heart size difficult to   assess likely mildly enlarged.  Previous thoracolumbar fusion and left shoulder arthroplasty.   Echocardiogram Complete     Value    LVEF  60-65%    Narrative    [id number]  [id number]  [id number]  [id number]^NITIN^E     [facility]  Echocardiography Laboratory  [address] [place] [address]     Name: CAMILO CRUMP  MRN: [id number]  : 1940  Study Date: 2025 08:52 AM  Age: 84 yrs  Gender: Male  Patient Location:  [facility]  Reason For Study: Heart Failure  Ordering Physician: NITIN HOBSON  Performed By: Sylvia Khan, RAJESH     BSA: 2.1 m2  Height: 68 in  Weight: 215 lb  HR: 47  BP: 121/62 mmHg  ______________________________________________________________________________  Procedure  Echocardiogram with two-dimensional, color and spectral Doppler. Definity (NDC  #99648-450) given intravenously. Contrast Definity. Technically difficult  study.  ______________________________________________________________________________  Interpretation Summary     Left ventricular systolic function is normal.The visual ejection fraction is  60-65%.  The right ventricle is mildly dilated.The right ventricular systolic function  is normal.  Severe bi-atrial enlargement,  There is moderate to mod-severe (2-3+) tricuspid regurgitation.There is mild  (1+) aortic regurgitation.Mild valvular aortic stenosis.  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  Pulmonary hypertension- RVSP 51 mm hg +RA.     Echocardiogram dated 07/03/2024 mild tricuspid regurgitation noted with RVSP  37 mm hg + RA, ascending aorta 4.8 cm, aortic root 4.4 cm  ______________________________________________________________________________  Left Ventricle  Left ventricular systolic function is normal. The visual ejection fraction is  60-65%.     Right Ventricle  The right ventricle is mildly dilated. The right ventricular systolic function  is normal.     Atria  The left atrium is severely dilated. The right atrium is severely dilated.  There is no color Doppler evidence of an atrial shunt.     Mitral Valve  There is mild mitral annular calcification. There is mild (1+) mitral  regurgitation. There is no mitral valve stenosis.     Tricuspid Valve  There is moderate to mod-severe (2-3+) tricuspid regurgitation. The right  ventricular systolic pressure is approximated at 50.5 mmHg plus the right  atrial pressure. Pulmonary  hypertension.     Aortic Valve  The aortic valve is trileaflet. There is mild (1+) aortic regurgitation. Mild  valvular aortic stenosis. The mean AoV pressure gradient is 10.3 mmHg.     Pulmonic Valve  There is trace pulmonic valvular regurgitation. There is no pulmonic valvular  stenosis.     Vessels  Aortic root aneurysm is present. 4.5 cm. Ascending aorta aneurysm is present.  4.8 cm.     Pericardium  There is no pericardial effusion.     ______________________________________________________________________________  MMode/2D Measurements & Calculations  Ao root diam: 4.5 cm  asc Aorta Diam: 4.8 cm  LVOT diam: 2.1 cm  LVOT area: 3.5 cm2     Ao root diam index Ht(cm/m): 2.6  Ao root diam index BSA (cm/m2): 2.2  Asc Ao diam index BSA (cm/m2): 2.3  Asc Ao diam index Ht(cm/m): 2.8  TAPSE: 2.6 cm     Doppler Measurements & Calculations  MV E max shelton: 126.0 cm/sec  MV A max shelton: 34.4 cm/sec  MV E/A: 3.7  MV max PG: 10.8 mmHg  MV mean P.4 mmHg  MV V2 VTI: 43.0 cm  MVA(VTI): 2.4 cm2  MV dec slope: 652.7 cm/sec2  MV dec time: 0.19 sec  Ao V2 max: 220.0 cm/sec  Ao max P.0 mmHg  Ao V2 mean: 149.1 cm/sec  Ao mean PG: 10.3 mmHg  Ao V2 VTI: 57.8 cm  LEONILA(I,D): 1.8 cm2  LEONILA(V,D): 1.8 cm2  LV V1 max P.3 mmHg  LV V1 max: 114.9 cm/sec  LV V1 VTI: 29.4 cm  SV(LVOT): 102.5 ml  SI(LVOT): 48.6 ml/m2  PA acc time: 0.04 sec  TR max shelton: 355.2 cm/sec  TR max P.5 mmHg  AV Shelton Ratio (DI): 0.52     LEONILA Index (cm2/m2): 0.84  RV S Shelton: 9.1 cm/sec     ______________________________________________________________________________  Report approved by: Dallas Oviedo MD on 2025 12:46 PM             Discharge Medications   Discharge Medication List as of 2025  8:00 PM        START taking these medications    Details   torsemide (DEMADEX) 10 MG tablet Take 1 tablet (10 mg) by mouth daily., Transitional      valsartan (DIOVAN) 40 MG tablet Take 1 tablet (40 mg) by mouth daily., Transitional           CONTINUE these  "medications which have NOT CHANGED    Details   acetaminophen (TYLENOL) 500 MG tablet Take 2 tablets (1,000 mg) by mouth 3 times daily as needed for mild pain, Disp-10 tablet, R-98, E-Prescribe      cyanocobalamin (VITAMIN B-12) 1000 MCG tablet TAKE 1 TABLET BY MOUTH ONCE DAILY, Disp-90 tablet, R-97, E-Prescribe\"Please authorize quantity 90 day supply with PRN refills for assisted living patient. Thank you!\"      folic acid (FOLVITE) 1 MG tablet TAKE 1 TABLET BY MOUTH ONCE DAILY, Disp-90 tablet, R-97, E-Prescribe\"Please authorize quantity 90 day supply with PRN refills for assisted living patient. Thank you!\"      gabapentin (NEURONTIN) 100 MG capsule TAKE 1 CAPSULE BY MOUTH AT BEDTIME, Disp-90 capsule, R-97, E-PrescribeREFILL REQUEST FOR CYCLE THAT STARTS ON 11/28/24. THANK YOU.      loratadine (CLARITIN) 10 MG tablet TAKE 1 TABLET BY MOUTH AT BEDTIME, Disp-90 tablet, R-97, E-PrescribeREFILL REQUEST FOR CYCLE THAT STARTS ON 5/23/24      menthol-zinc oxide (CALMOSEPTINE) 0.44-20.6 % OINT ointment Apply topically 4 times daily.Historical      omeprazole (PRILOSEC) 40 MG DR capsule TAKE 1 CAPSULE BY MOUTH TWICE DAILY, Disp-180 capsule, R-97, E-Prescribe      polyethylene glycol (MIRALAX) 17 GM/Dose powder MIX 2 CAPFULS IN 8OZ OF ORANGE JUICE  IN THE MORNING;AND MAY MIX 2 CAPFULS ONCE DAILY AS NEEDED FOR CONSTIPATION. MIX IN FRONT OF PT. HOLD FOR LOOSE STOOL. OK TO GIVE 1 CAPFUL IF RESIDENT REFUSES 2 CAPFULS., Disp-510 g, R-97, E-Prescribe      QUEtiapine (SEROQUEL) 25 MG tablet TAKE ONE-HALF TABLET (12.5MG) BY MOUTH TWICE DAILY;& MAY TAKE ONE-HALF TABLET (12.5MG) EVERY 12 HOURS AS NEEDED, Disp-180 tablet, R-97, E-PrescribeREFILL REQUEST FOR CYCLE THAT BEGINS 12/26/24, THANK YOU      sodium chloride (OCEAN) 0.65 % nasal spray Spray 1 spray in nostril 2 times daily., Disp-480 mL, R-98, E-Prescribe           STOP taking these medications       furosemide (LASIX) 20 MG tablet Comments:   Reason for Stopping:         "     Allergies   Allergies   Allergen Reactions    Ativan [Lorazepam]

## 2025-03-01 NOTE — PLAN OF CARE
Physical Therapy Discharge Summary    Reason for therapy discharge:    Discharged to transitional care facility.    Progress towards therapy goal(s). See goals on Care Plan in [facility] electronic health record for goal details.  Goals partially met.  Barriers to achieving goals:   discharge from facility.    Therapy recommendation(s):    Continued therapy is recommended.  Rationale/Recommendations:  Pt below baseline mobility. Progressed on this date to perform transfers and ambulation (around 60') with 4WW and SB-CGA. Agreeable to short stretch wraps and pt continues to have significant edema in B LEs, benefits from GCB. Anticipate when medically ready will need TCU to improve upon functional mobility and strengthening. Episode of knee buckling this date in hallway. NOTE: pt declining TCF on this date, wanting to return back to facility and be with wife. Per conversation with SW/CC, facility is concerned because pt is leaving  area, going to [facility] where wife is and they haven't been able to provide him medications..

## 2025-03-01 NOTE — PROGRESS NOTES
Patient refusing all cares. Will not talk to staff because he states he has been waiting too long to leave. Will continue to ask if he needs anything.

## 2025-03-01 NOTE — PROGRESS NOTES
Transport called- patient has been bumped for 911 calls. Will be transported at 2000 or sooner if possible. Patient angry with delay.

## 2025-03-02 ENCOUNTER — TELEPHONE (OUTPATIENT)
Dept: GERIATRICS | Facility: CLINIC | Age: 85
End: 2025-03-02
Payer: COMMERCIAL

## 2025-03-02 NOTE — PLAN OF CARE
"Speech Language Therapy Discharge Summary    Reason for therapy discharge:    Discharged to transitional care facility.    Progress towards therapy goal(s). See goals on Care Plan in Epic electronic health record for goal details.  Goals not met.  Barriers to achieving goals:   discharge from facility.    Therapy recommendation(s):    Per last SLP: \"Recommend consider OP GI EGD consideration as his aspiration risk will remain high if CP bar, Zenker's dysmotility cannot be treated.\"    \"Recommend upgrade to thin liquids per patient quality of life preference, will require cueing for implementation of 2-3 swallows per sip. Recommend continue soft and bite size diet. \"     *Pt not seen by discharging therapist on this date, note written based on previous treating therapist's notes and recommendations     "

## 2025-03-03 ENCOUNTER — TRANSITIONAL CARE UNIT VISIT (OUTPATIENT)
Dept: GERIATRICS | Facility: CLINIC | Age: 85
End: 2025-03-03
Payer: COMMERCIAL

## 2025-03-03 VITALS
WEIGHT: 182 LBS | SYSTOLIC BLOOD PRESSURE: 98 MMHG | OXYGEN SATURATION: 98 % | BODY MASS INDEX: 27.58 KG/M2 | RESPIRATION RATE: 18 BRPM | HEART RATE: 51 BPM | DIASTOLIC BLOOD PRESSURE: 49 MMHG | TEMPERATURE: 97.2 F | HEIGHT: 68 IN

## 2025-03-03 DIAGNOSIS — R53.81 PHYSICAL DECONDITIONING: Primary | ICD-10-CM

## 2025-03-03 DIAGNOSIS — F10.27 DEMENTIA ASSOCIATED WITH ALCOHOLISM WITH BEHAVIORAL DISTURBANCE (H): ICD-10-CM

## 2025-03-03 DIAGNOSIS — E66.9 OBESITY (BMI 30-39.9): ICD-10-CM

## 2025-03-03 DIAGNOSIS — I50.9 CONGESTIVE HEART FAILURE, UNSPECIFIED HF CHRONICITY, UNSPECIFIED HEART FAILURE TYPE (H): ICD-10-CM

## 2025-03-03 DIAGNOSIS — R60.0 BILATERAL LOWER EXTREMITY EDEMA: ICD-10-CM

## 2025-03-03 DIAGNOSIS — K22.719 BARRETT'S ESOPHAGUS WITH DYSPLASIA: ICD-10-CM

## 2025-03-03 DIAGNOSIS — D64.9 ANEMIA, UNSPECIFIED TYPE: ICD-10-CM

## 2025-03-03 PROCEDURE — 99310 SBSQ NF CARE HIGH MDM 45: CPT | Performed by: NURSE PRACTITIONER

## 2025-03-03 NOTE — PROGRESS NOTES
Nurse reporting this patient has complained of chest pain on and off through this shift, however also calls his wife and told her the pain is only there when other people (staff) are in his room and bothering him. Patient states he will not go to the hospital.  VS: 100/40, 89, 20, 95%.   Orders: Continue monitoring vital signs throughout the night, offer Tylenol. Given that this pain is only happening when he is irritated by other people in the room it is possibly behavioral. Call back if any acute vital sign changes or persistent pain.  Jimena Magana, CNP

## 2025-03-03 NOTE — LETTER
3/3/2025      Jamie Valdivia  C/o Coral De La Garza  [address]  [place] [address]        [facility]  PRIMARY CARE PROVIDER AND CLINIC:  MARICHUY Basilio CNP, [address] [facility] / [place] [address]  Chief Complaint   Patient presents with     [facility] Medical Record Number:  [id number]  Place of Service where encounter took place:  [facility] ([facility]) [[id number]]    Jamie Valdivia  is a 84 year old  (1940), admitted to the above facility from  [facility]. Hospital stay 2/19/25 through 2/28/25..  Admitted to this facility for  rehab, medical management, and nursing care.    HPI:    HPI information obtained from: facility chart records, facility staff, and patient report.   Brief Summary of Hospital Course:   Updates on Status Since Skilled nursing Admission:     Per hospital report:     Patient Jamie Valdivia is a 84 yr old male admitted to [facility] TCU for decompensated heart failure and fluid retention  PMHx mild dementia,hypertension, chronic afib (has declined anticoagulation), GERD, h/o Vickers's esophagus, chronic anemia; chronic pharyngeal dysphagia; peripheral neuropathy, OA; chronic spine disease, osteoporosis, prostate cancer; h/o alcohol use d/o (no longer drinking); h/o falls     ECG showed afib with CVR, no acute ischemic changes.CXR showed left hemidiaphragm remained elevated compared to the right and overall the lungs had shallow inflation, chronic blunting of the right posterior costophrenic sulcus could relate to scarring or minimal fluid but unchanged (comparison 7/2024).    Today   Discussion today with patient, wife,  and Flakito (NORBERTO from [facility])  Patient would like wife to be only contact   working on setting up guardian due to wife has dementia as well  Patient mentioned wanting to live with wife at [facility].  Per Flakito, patient has been staying with wife in her apartment despite having his own apartment in [facility] memory care and subsequently not getting some medications  Discussed with Flakito and patient and wife may due better in long term care  Patient also mentioned moving to [place] and having his primary care provider follow from [place] (unclear if this primary care provider would follow in a facility).  aware  Patient denies shortness of breath, vitals stable room air, has +3 edema bilateral, denies pain, denies gastrointestinal upset and reports regular elimination, Denies feeling ill        CODE STATUS/ADVANCE DIRECTIVES DISCUSSION:   CPR/Full code   Patient's living condition: lives in an assisted living facility  ALLERGIES: Ativan [lorazepam]  PAST MEDICAL HISTORY:  has a past medical history of Age-related osteoporosis without current pathological fracture (03/30/2022), Alcohol use disorder, Aortic root aneurysm, CAD (coronary artery disease), Chronic a-fib (H), Chronic atrial fibrillation (H) (07/15/2016), Claustrophobia (05/13/2015), Constipation, 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) (11/19/2021), ED (erectile dysfunction), JOSÉ MANUEL (generalized anxiety disorder), Generalized anxiety disorder (04/27/2020), GERD (gastroesophageal reflux disease), GI bleeding, H/O carpal tunnel syndrome, History of 2019 novel coronavirus disease (COVID-19) (02/08/2021), HTN (hypertension), Impaired gait and mobility (03/14/2019), Mild TBI (traumatic brain injury) (H) (03/14/2019), Mumps, Obesity (BMI 30-39.9) (09/03/2015), Osteoarthritis of both knees (05/13/2015), Osteoporosis, Other idiopathic peripheral autonomic neuropathy (03/30/2022), Other seizures (H) (03/30/2022), Pharyngeal dysphagia (05/13/2015), Prostate cancer (H), Recurrent major depressive disorder (03/30/2022), Rhabdomyolysis, and Thrombocytopenia.    He has no past medical history of  Asymptomatic human immunodeficiency virus (HIV) infection status (H), Blood in semen, Complication of anesthesia, Congenital renal agenesis and dysgenesis, Epididymitis, bilateral, Epididymitis, left, Epididymitis, right, Goiter, Gout, Hernia, abdominal, History of spinal cord injury, History of thrombophlebitis, Horseshoe kidney, Malignant hyperthermia, Orchitis, epididymitis, and epididymo-orchitis, with abscess, Parkinsons disease (H), Penile discharge, Prostate infection, Spider veins, Spina bifida (H), STD (sexually transmitted disease), Swelling of testicle, Tethered cord (H), or Tuberculosis.  PAST SURGICAL HISTORY:   has a past surgical history that includes left hip replacement (2010); left shoulder replacement (2016); tonsillectomy; Spine surgery; orthopedic surgery; Esophagoscopy, gastroscopy, duodenoscopy (EGD), combined (N/A, 06/01/2022); colonoscopy; Biopsy prostate transrectal (N/A, 1/11/2023); and Transrectal ultrasonic sonogram (N/A, 1/11/2023).  FAMILY HISTORY: family history includes Osteoporosis in his mother; Prostate Cancer in his paternal grandfather.  SOCIAL HISTORY:   reports that he has never smoked. He has never used smokeless tobacco. He reports that he does not currently use alcohol. He reports that he does not use drugs.    Post Discharge Medication Reconciliation Status: discharge medications reconciled and changed, per note/orders    Current Outpatient Medications   Medication Sig Dispense Refill     acetaminophen (TYLENOL) 500 MG tablet Take 2 tablets (1,000 mg) by mouth 3 times daily as needed for mild pain 10 tablet 98     cyanocobalamin (VITAMIN B-12) 1000 MCG tablet TAKE 1 TABLET BY MOUTH ONCE DAILY 90 tablet 97     folic acid (FOLVITE) 1 MG tablet TAKE 1 TABLET BY MOUTH ONCE DAILY 90 tablet 97     gabapentin (NEURONTIN) 100 MG capsule TAKE 1 CAPSULE BY MOUTH AT BEDTIME 90 capsule 97     loratadine (CLARITIN) 10 MG tablet TAKE 1 TABLET BY MOUTH AT BEDTIME 90 tablet 97      "menthol-zinc oxide (CALMOSEPTINE) 0.44-20.6 % OINT ointment Apply topically 4 times daily.       omeprazole (PRILOSEC) 40 MG DR capsule TAKE 1 CAPSULE BY MOUTH TWICE DAILY 180 capsule 97     polyethylene glycol (MIRALAX) 17 GM/Dose powder MIX 2 CAPFULS IN 8OZ OF ORANGE JUICE  IN THE MORNING;AND MAY MIX 2 CAPFULS ONCE DAILY AS NEEDED FOR CONSTIPATION. MIX IN FRONT OF PT. HOLD FOR LOOSE STOOL. OK TO GIVE 1 CAPFUL IF RESIDENT REFUSES 2 CAPFULS. 510 g 97     QUEtiapine (SEROQUEL) 25 MG tablet TAKE ONE-HALF TABLET (12.5MG) BY MOUTH TWICE DAILY;& MAY TAKE ONE-HALF TABLET (12.5MG) EVERY 12 HOURS AS NEEDED 180 tablet 97     sodium chloride (OCEAN) 0.65 % nasal spray Spray 1 spray in nostril 2 times daily. 480 mL 98     torsemide (DEMADEX) 10 MG tablet Take 1 tablet (10 mg) by mouth daily.       valsartan (DIOVAN) 40 MG tablet Take 1 tablet (40 mg) by mouth daily.         ROS:  10 point ROS of systems including Constitutional, Eyes, Respiratory, Cardiovascular, Gastroenterology, Genitourinary, Integumentary, Musculoskeletal, Psychiatric were all negative except for pertinent positives noted in my HPI.    Vitals:  BP 98/49   Pulse 51   Temp 97.2  F (36.2  C)   Resp 18   Ht 1.727 m (5' 8\")   Wt 82.6 kg (182 lb)   SpO2 98%   BMI 27.67 kg/m    Exam:  GENERAL APPEARANCE:  Alert, in no distress  ENT:  Mouth and posterior oropharynx normal, moist mucous membranes  EYES:  EOM, conjunctivae, lids, pupils and irises normal  NECK:  No adenopathy,masses or thyromegaly  RESP:  respiratory effort and palpation of chest normal, lungs clear to auscultation , no respiratory distress  CV:  Palpation and auscultation of heart done , regular rate and rhythm  ABDOMEN:  normal bowel sounds, soft, nontender  M/S:   sitting in bed  SKIN:  Inspection of skin and subcutaneous tissue+3 edema bilateral  NEURO:   Cranial nerves 2-12 are normal tested and grossly at patient's baseline  PSYCH:  oriented X 3    Lab/Diagnostic data:  Labs done in SNF " "are in [place] EPIC. Please refer to them using [facility] Everywhere.    Last Comprehensive Metabolic Panel:  Lab Results   Component Value Date     02/28/2025    POTASSIUM 4.4 02/28/2025    CHLORIDE 92 (L) 02/28/2025    CO2 36 (H) 02/28/2025    ANIONGAP 10 02/28/2025     (H) 02/28/2025    BUN 26.2 (H) 02/28/2025    CR 0.89 02/28/2025    GFRESTIMATED 85 02/28/2025    JOSUE 9.2 02/28/2025       Lab Results   Component Value Date    WBC 6.6 02/28/2025     Lab Results   Component Value Date    RBC 3.89 02/28/2025     Lab Results   Component Value Date    HGB 9.2 02/28/2025     Lab Results   Component Value Date    HCT 31.3 02/28/2025     No components found for: \"MCT\"  Lab Results   Component Value Date    MCV 81 02/28/2025     Lab Results   Component Value Date    MCH 23.7 02/28/2025     Lab Results   Component Value Date    MCHC 29.4 02/28/2025     Lab Results   Component Value Date    RDW 20.7 02/28/2025     Lab Results   Component Value Date     02/28/2025         ASSESSMENT/PLAN:  Acute exacerbation of diastolic heart failure   AHRF secondary to above  Moderate to severe tricuspid regurgitation; mild AS  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 4.8 cm   Permanent atrial fibrillation with intermittent slow/controlled ventricular response  Chronic lower extremity edema  Permanent atrial fibrillation with intermittent slow ventricular response  Cardiology consulted, diuresed from 215 to 196lbs, edema improved   *TTE 2/20 EF 60-65% RV mildly dilated but normal function, moderate-severe TR, mild AR, mild AS.    *Regarding A-fib, patient has declined anticoagulation and LAAO, previously on baby aspirin but discontinued with history of GI bleed  Continue torsemide 10 mg daily at discharge (prior on lasix)  Follow up cardiology   Continue lymphedema therapy   Follow up BMP,CBC 3/5/25     Chronic pharyngeal dysphagia.  Chronic dysphonia.  GERD with Vickers's esophagus.  Cricopharyngeal bar " and Zenker diverticulum   Per hospital note;   EGD 2022 found Vickers's esophagus and him started on PPI twice daily. Seen in clinic by Gastroenterologist Colby Mercado on 2/3/2025 and X-ray esophagram, EGD, ENT referral and speech therapy referral placed at that visit. 2/21 SLP obtained a video swallow study on 2/21 that showed large amounts of aspiration of thin liquids. It also showed a zenker diverticulum and prominent cricopharyngeus muscle, patient refused mildly thickened liquids as recommended by SLP. GI consulted not interested in EGD- patient to follow up with Dr. Mercado outpatient or seek a second opinion. Patient is high risk for aspiration.  Continue PPI 2xday  Speech therapy evaluate and treat      Hypocalcemia, non-severe, asymptomatic   Vitamin D deficiency  Initial presentation as above. Calcium (including ionized) low on admit. 6.8 level. Vitamin D level low on admit. (25, OH) level of 16 on admission. Started on calcium and vitamin D this hospitalization  Monitor      Iron deficiency anemia  Per hospital note  Hgb 8.1 on admit. Iron studies on admit showed low iron, iron saturation and ferritin. TSH normal on admit. B12 level normal on admit. Started on IV iron 2/21, changed to PO 2/22. (Venofer 300 mg IV X 2)  Monitor      Weakness and physical deconditioning due to multiple acute and chronic medical issues  Peripheral neuropathy  Frequent falls  Dementia, mild  Per hospital note:  Patient lives in memory care. He and his wife have separate apartments . She is in the assisted living. (he lives one floor above her). 2/26 psychiatry assessment- patient does not have capacity to determine his disposition- he was fully alert/oriented on discharge- disposition discussed with patient and wife who are in agreement.  Continue gabapentin 100mg at bedtime  Continue quetiapine     OA with h/o joint replacements  Chronic spine disease with h/o spine surgery  Osteoporosis with h/o compression  fractures  continue calcium and vitamin D   Pain managed with tylenol      Obesity  Body mass index is 30.82 kg/m   Encourage physical activity, portion control and increase fruits and vegetables     Prostate cancer   Follows with Dr. Niraj Larry. S/p EBRT without ADT 2023.   PSA () 0.12 this admission.   Follow up with urology      H/o alcohol use disorder, unclear if issues since moving to [facility] a few years ago    Deconditioned  Comment: d/t recent hospitalization & comorbidity, expect delay rehabilitation d/t age & comorbidity  Plan: PT/OT eval & treat, monitor    Advanced care planning   FULL code, patient sign; staff to confirm with wife    Cognitive impairment   Per hospital note  His wife is unable to make decisions for him as noted in the record and per psychiatry he does not have the capacity to decide disposition   See SW/CM notes patient does not want children contacted.       3/3/25  ORDERS Jamie Valdivia   1940)  1)BMP,CBC; TB GOLD 3/5/35 (CHF;  TB screening)  2)daily weights (CHF)  3)Schedule fu cardiologist as advised   4)speech therapy evaluate and treat (dysphagia)  Electronically signed by:  MARICHUY Mendoza CNP      Mar 04, 2025 9:45 AM  (Arrive by 9:30 AM)  NEW THROAT with Mallory Adorno MD  [facility] [place] ([facility] ) [phone]     Mar 04, 2025 9:45 AM  (Arrive by 9:30 AM)  [facility] Speech Pathology and ENT Evaluation with  Ent Dysphonia Slp Provider  [facility] [place] ([facility] ) [phone]     Mar 04, 2025 9:45 AM  Adult Ent Eval with ISAAC Zepeda  [facility] ([facility] ) [phone]              Total time spent with patient visit at the skilled nursing facility was 45 min including patient visit and review of past records. Greater than  50% of total time spent with counseling and coordinating care due to discussion on CHF management, functional goals, bowel movement management and discharge planning.     Electronically signed by:  MARICHUY Mendoza CNP                         Sincerely,        MARICHUY Mendoza CNP    Electronically signed

## 2025-03-03 NOTE — PROGRESS NOTES
[place] [facility]  PRIMARY CARE PROVIDER AND CLINIC:  MARICHUY Basilio CNP, [address] [facility] [address]  Chief Complaint   Patient presents with    [facility] Medical Record Number:  [id number]  Place of Service where encounter took place:  [facility] ([facility]) [[id number]]    Jamie Valdivia  is a 84 year old  (1940), admitted to the above facility from  [facility]. Hospital stay 2/19/25 through 2/28/25..  Admitted to this facility for  rehab, medical management, and nursing care.    HPI:    HPI information obtained from: facility chart records, facility staff, and patient report.   Brief Summary of Hospital Course:   Updates on Status Since Skilled nursing Admission:     Per hospital report:     Patient Jamie Valdivia is a 84 yr old male admitted to [facility] TCU for decompensated heart failure and fluid retention  PMHx mild dementia,hypertension, chronic afib (has declined anticoagulation), GERD, h/o Vickers's esophagus, chronic anemia; chronic pharyngeal dysphagia; peripheral neuropathy, OA; chronic spine disease, osteoporosis, prostate cancer; h/o alcohol use d/o (no longer drinking); h/o falls     ECG showed afib with CVR, no acute ischemic changes.CXR showed left hemidiaphragm remained elevated compared to the right and overall the lungs had shallow inflation, chronic blunting of the right posterior costophrenic sulcus could relate to scarring or minimal fluid but unchanged (comparison 7/2024).    Today   Discussion today with patient, wife,  and Flakito (NORBERTO from [facility])  Patient would like wife to be only contact   working on setting up guardian due to wife has dementia as well  Patient mentioned [facility]. Per Flakito, patient has been staying with wife in her apartment despite having his own apartment  in [facility] care and subsequently not getting some medications  Discussed with Flakito and patient and wife may due better in long term care  Patient also mentioned moving to [place] and having his primary care provider follow from [place] (unclear if this primary care provider would follow in a facility).  aware  Patient denies shortness of breath, vitals stable room air, has +3 edema bilateral, denies pain, denies gastrointestinal upset and reports regular elimination, Denies feeling ill        CODE STATUS/ADVANCE DIRECTIVES DISCUSSION:   CPR/Full code   Patient's living condition: lives in an assisted living facility  ALLERGIES: Ativan [lorazepam]  PAST MEDICAL HISTORY:  has a past medical history of Age-related osteoporosis without current pathological fracture (03/30/2022), Alcohol use disorder, Aortic root aneurysm, CAD (coronary artery disease), Chronic a-fib (H), Chronic atrial fibrillation (H) (07/15/2016), Claustrophobia (05/13/2015), Constipation, 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) (11/19/2021), ED (erectile dysfunction), JOSÉ MANUEL (generalized anxiety disorder), Generalized anxiety disorder (04/27/2020), GERD (gastroesophageal reflux disease), GI bleeding, H/O carpal tunnel syndrome, History of 2019 novel coronavirus disease (COVID-19) (02/08/2021), HTN (hypertension), Impaired gait and mobility (03/14/2019), Mild TBI (traumatic brain injury) (H) (03/14/2019), Mumps, Obesity (BMI 30-39.9) (09/03/2015), Osteoarthritis of both knees (05/13/2015), Osteoporosis, Other idiopathic peripheral autonomic neuropathy (03/30/2022), Other seizures (H) (03/30/2022), Pharyngeal dysphagia (05/13/2015), Prostate cancer (H), Recurrent major depressive disorder (03/30/2022), Rhabdomyolysis, and Thrombocytopenia.    He has no past medical history of Asymptomatic human immunodeficiency virus (HIV) infection status (H), Blood in semen, Complication of  anesthesia, Congenital renal agenesis and dysgenesis, Epididymitis, bilateral, Epididymitis, left, Epididymitis, right, Goiter, Gout, Hernia, abdominal, History of spinal cord injury, History of thrombophlebitis, Horseshoe kidney, Malignant hyperthermia, Orchitis, epididymitis, and epididymo-orchitis, with abscess, Parkinsons disease (H), Penile discharge, Prostate infection, Spider veins, Spina bifida (H), STD (sexually transmitted disease), Swelling of testicle, Tethered cord (H), or Tuberculosis.  PAST SURGICAL HISTORY:   has a past surgical history that includes left hip replacement (2010); left shoulder replacement (2016); tonsillectomy; Spine surgery; orthopedic surgery; Esophagoscopy, gastroscopy, duodenoscopy (EGD), combined (N/A, 06/01/2022); colonoscopy; Biopsy prostate transrectal (N/A, 1/11/2023); and Transrectal ultrasonic sonogram (N/A, 1/11/2023).  FAMILY HISTORY: family history includes Osteoporosis in his mother; Prostate Cancer in his paternal grandfather.  SOCIAL HISTORY:   reports that he has never smoked. He has never used smokeless tobacco. He reports that he does not currently use alcohol. He reports that he does not use drugs.    Post Discharge Medication Reconciliation Status: discharge medications reconciled and changed, per note/orders    Current Outpatient Medications   Medication Sig Dispense Refill    acetaminophen (TYLENOL) 500 MG tablet Take 2 tablets (1,000 mg) by mouth 3 times daily as needed for mild pain 10 tablet 98    cyanocobalamin (VITAMIN B-12) 1000 MCG tablet TAKE 1 TABLET BY MOUTH ONCE DAILY 90 tablet 97    folic acid (FOLVITE) 1 MG tablet TAKE 1 TABLET BY MOUTH ONCE DAILY 90 tablet 97    gabapentin (NEURONTIN) 100 MG capsule TAKE 1 CAPSULE BY MOUTH AT BEDTIME 90 capsule 97    loratadine (CLARITIN) 10 MG tablet TAKE 1 TABLET BY MOUTH AT BEDTIME 90 tablet 97    menthol-zinc oxide (CALMOSEPTINE) 0.44-20.6 % OINT ointment Apply topically 4 times daily.      omeprazole  "(PRILOSEC) 40 MG DR capsule TAKE 1 CAPSULE BY MOUTH TWICE DAILY 180 capsule 97    polyethylene glycol (MIRALAX) 17 GM/Dose powder MIX 2 CAPFULS IN 8OZ OF ORANGE JUICE  IN THE MORNING;AND MAY MIX 2 CAPFULS ONCE DAILY AS NEEDED FOR CONSTIPATION. MIX IN FRONT OF PT. HOLD FOR LOOSE STOOL. OK TO GIVE 1 CAPFUL IF RESIDENT REFUSES 2 CAPFULS. 510 g 97    QUEtiapine (SEROQUEL) 25 MG tablet TAKE ONE-HALF TABLET (12.5MG) BY MOUTH TWICE DAILY;& MAY TAKE ONE-HALF TABLET (12.5MG) EVERY 12 HOURS AS NEEDED 180 tablet 97    sodium chloride (OCEAN) 0.65 % nasal spray Spray 1 spray in nostril 2 times daily. 480 mL 98    torsemide (DEMADEX) 10 MG tablet Take 1 tablet (10 mg) by mouth daily.      valsartan (DIOVAN) 40 MG tablet Take 1 tablet (40 mg) by mouth daily.         ROS:  10 point ROS of systems including Constitutional, Eyes, Respiratory, Cardiovascular, Gastroenterology, Genitourinary, Integumentary, Musculoskeletal, Psychiatric were all negative except for pertinent positives noted in my HPI.    Vitals:  BP 98/49   Pulse 51   Temp 97.2  F (36.2  C)   Resp 18   Ht 1.727 m (5' 8\")   Wt 82.6 kg (182 lb)   SpO2 98%   BMI 27.67 kg/m    Exam:  GENERAL APPEARANCE:  Alert, in no distress  ENT:  Mouth and posterior oropharynx normal, moist mucous membranes  EYES:  EOM, conjunctivae, lids, pupils and irises normal  NECK:  No adenopathy,masses or thyromegaly  RESP:  respiratory effort and palpation of chest normal, lungs clear to auscultation , no respiratory distress  CV:  Palpation and auscultation of heart done , regular rate and rhythm  ABDOMEN:  normal bowel sounds, soft, nontender  M/S:   sitting in bed  SKIN:  Inspection of skin and subcutaneous tissue+3 edema bilateral  NEURO:   Cranial nerves 2-12 are normal tested and grossly at patient's baseline  PSYCH:  oriented X 3    Lab/Diagnostic data:  [facility]. Please refer to them using [facility] Everywhere.    Last Comprehensive Metabolic " "Panel:  Lab Results   Component Value Date     02/28/2025    POTASSIUM 4.4 02/28/2025    CHLORIDE 92 (L) 02/28/2025    CO2 36 (H) 02/28/2025    ANIONGAP 10 02/28/2025     (H) 02/28/2025    BUN 26.2 (H) 02/28/2025    CR 0.89 02/28/2025    GFRESTIMATED 85 02/28/2025    JOSUE 9.2 02/28/2025       Lab Results   Component Value Date    WBC 6.6 02/28/2025     Lab Results   Component Value Date    RBC 3.89 02/28/2025     Lab Results   Component Value Date    HGB 9.2 02/28/2025     Lab Results   Component Value Date    HCT 31.3 02/28/2025     No components found for: \"MCT\"  Lab Results   Component Value Date    MCV 81 02/28/2025     Lab Results   Component Value Date    MCH 23.7 02/28/2025     Lab Results   Component Value Date    MCHC 29.4 02/28/2025     Lab Results   Component Value Date    RDW 20.7 02/28/2025     Lab Results   Component Value Date     02/28/2025         ASSESSMENT/PLAN:  Acute exacerbation of diastolic heart failure   AHRF secondary to above  Moderate to severe tricuspid regurgitation; mild AS  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 4.8 cm   Permanent atrial fibrillation with intermittent slow/controlled ventricular response  Chronic lower extremity edema  Permanent atrial fibrillation with intermittent slow ventricular response  Cardiology consulted, diuresed from 215 to 196lbs, edema improved   *TTE 2/20 EF 60-65% RV mildly dilated but normal function, moderate-severe TR, mild AR, mild AS.    *Regarding A-fib, patient has declined anticoagulation and LAAO, previously on baby aspirin but discontinued with history of GI bleed  Continue torsemide 10 mg daily at discharge (prior on lasix)  Follow up cardiology   Continue lymphedema therapy   Follow up BMP,CBC 3/5/25     Chronic pharyngeal dysphagia.  Chronic dysphonia.  GERD with Vickers's esophagus.  Cricopharyngeal bar and Zenker diverticulum   Per hospital note;   EGD 2022 found Vickers's esophagus and him started on PPI " twice daily. Seen in clinic by Gastroenterologist Colby Mercado on 2/3/2025 and X-ray esophagram, EGD, ENT referral and speech therapy referral placed at that visit. 2/21 SLP obtained a video swallow study on 2/21 that showed large amounts of aspiration of thin liquids. It also showed a zenker diverticulum and prominent cricopharyngeus muscle, patient refused mildly thickened liquids as recommended by SLP. GI consulted not interested in EGD- patient to follow up with Dr. Mercado outpatient or seek a second opinion. Patient is high risk for aspiration.  Continue PPI 2xday  Speech therapy evaluate and treat      Hypocalcemia, non-severe, asymptomatic   Vitamin D deficiency  Initial presentation as above. Calcium (including ionized) low on admit. 6.8 level. Vitamin D level low on admit. (25, OH) level of 16 on admission. Started on calcium and vitamin D this hospitalization  Monitor      Iron deficiency anemia  Per hospital note  Hgb 8.1 on admit. Iron studies on admit showed low iron, iron saturation and ferritin. TSH normal on admit. B12 level normal on admit. Started on IV iron 2/21, changed to PO 2/22. (Venofer 300 mg IV X 2)  Monitor      Weakness and physical deconditioning due to multiple acute and chronic medical issues  Peripheral neuropathy  Frequent falls  Dementia, mild  Per hospital note:  Patient lives in memory care. He and his wife have separate apartments . She is in the assisted living. (he lives one floor above her). 2/26 psychiatry assessment- patient does not have capacity to determine his disposition- he was fully alert/oriented on discharge- disposition discussed with patient and wife who are in agreement.  Continue gabapentin 100mg at bedtime  Continue quetiapine     OA with h/o joint replacements  Chronic spine disease with h/o spine surgery  Osteoporosis with h/o compression fractures  continue calcium and vitamin D   Pain managed with tylenol      Obesity  Body mass index is 30.82  kg/m   Encourage physical activity, portion control and increase fruits and vegetables     Prostate cancer   Follows with Dr. Niraj Larry. S/p EBRT without ADT 2023.   PSA () 0.12 this admission.   Follow up with urology      H/o alcohol use disorder, unclear if issues since moving to [facility] a few years ago    Deconditioned  Comment: d/t recent hospitalization & comorbidity, expect delay rehabilitation d/t age & comorbidity  Plan: PT/OT eval & treat, monitor    Advanced care planning   FULL code, patient sign; staff to confirm with wife    Cognitive impairment   Per hospital note  His wife is unable to make decisions for him as noted in the record and per psychiatry he does not have the capacity to decide disposition   See SW/CM notes patient does not want children contacted.       3/3/25  ORDERS Jamie Valdivia   1940)  1)BMP,CBC; TB GOLD 3/5/35 (CHF;  TB screening)  2)daily weights (CHF)  3)Schedule fu cardiologist as advised   4)speech therapy evaluate and treat (dysphagia)  Electronically signed by:  MARICHUY Mendoza CNP      Mar 04, 2025 9:45 AM  (Arrive by 9:30 AM)  NEW THROAT with Mallory Adorno MD  [facility] ([facility] ) [phone]     Mar 04, 2025 9:45 AM  (Arrive by 9:30 AM)  [facility] Speech Pathology and ENT Evaluation with  Ent Dysphonia Slp Provider  [facility] ([facility] ) [phone]     Mar 04, 2025 9:45 AM  Adult Ent Eval with Sara De La Rosa, ISAAC  [facility] ([facility] ) [phone]              Total time spent with patient visit at the skilled nursing facility was 45 min including patient visit and review of past records. Greater than 50% of total time spent with counseling and coordinating care due to discussion on CHF management,  functional goals, bowel movement management and discharge planning.     Electronically signed by:  MARICHUY Mendoza CNP                      no

## 2025-03-04 ENCOUNTER — PRE VISIT (OUTPATIENT)
Dept: OTOLARYNGOLOGY | Facility: CLINIC | Age: 85
End: 2025-03-04

## 2025-03-04 ENCOUNTER — DOCUMENTATION ONLY (OUTPATIENT)
Dept: OTHER | Facility: CLINIC | Age: 85
End: 2025-03-04

## 2025-03-04 ENCOUNTER — TELEPHONE (OUTPATIENT)
Dept: OTOLARYNGOLOGY | Facility: CLINIC | Age: 85
End: 2025-03-04

## 2025-03-04 ENCOUNTER — TELEPHONE (OUTPATIENT)
Dept: GERIATRICS | Facility: CLINIC | Age: 85
End: 2025-03-04

## 2025-03-04 ENCOUNTER — LAB REQUISITION (OUTPATIENT)
Dept: LAB | Facility: CLINIC | Age: 85
End: 2025-03-04

## 2025-03-04 DIAGNOSIS — Z11.1 ENCOUNTER FOR SCREENING FOR RESPIRATORY TUBERCULOSIS: ICD-10-CM

## 2025-03-04 DIAGNOSIS — I50.9 HEART FAILURE, UNSPECIFIED (H): ICD-10-CM

## 2025-03-04 NOTE — TELEPHONE ENCOUNTER
[facility] Triage Nurse Telephone Encounter    Provider: Tyler Weiss MD  Facility: [facility] Type:  TCU    Caller: Dolores  Call Back Number: [phone]    Allergies:    Allergies   Allergen Reactions    Ativan [Lorazepam]         Reason for call: Nurse is calling to report that within about 10 minutes of getting up, patient began bleeding from his mouth and nose.  Patient is bring up blood clots.  Patient refuses for the nurse to examen his mouth or nose.  VS:  T=97.6, P=62, R=18, BE=902/56, O2=94%RA.  Nurse states that the bleeding is not slowing or stopping.      Verbal Order/Direction given by Provider: Send patient to the  due to profuse bleeding from the mouth and nose.      Provider giving Order:  Tyler Weiss MD    Verbal Order given to: Dolores Cash RN

## 2025-03-04 NOTE — TELEPHONE ENCOUNTER
Attempted to cotact emily to reschedule patients appointmen.unable to leave vm as it was full. Writer will attempt to contact at later time.

## 2025-03-05 ENCOUNTER — TELEPHONE (OUTPATIENT)
Dept: OTOLARYNGOLOGY | Facility: CLINIC | Age: 85
End: 2025-03-05
Payer: COMMERCIAL

## 2025-03-05 LAB
ANION GAP SERPL CALCULATED.3IONS-SCNC: 12 MMOL/L (ref 7–15)
BUN SERPL-MCNC: 55 MG/DL (ref 8–23)
CALCIUM SERPL-MCNC: 9 MG/DL (ref 8.8–10.4)
CHLORIDE SERPL-SCNC: 94 MMOL/L (ref 98–107)
CREAT SERPL-MCNC: 1.23 MG/DL (ref 0.67–1.17)
EGFRCR SERPLBLD CKD-EPI 2021: 58 ML/MIN/1.73M2
ERYTHROCYTE [DISTWIDTH] IN BLOOD BY AUTOMATED COUNT: 20.7 % (ref 10–15)
GLUCOSE SERPL-MCNC: 102 MG/DL (ref 70–99)
HCO3 SERPL-SCNC: 29 MMOL/L (ref 22–29)
HCT VFR BLD AUTO: 27 % (ref 40–53)
HGB BLD-MCNC: 7.9 G/DL (ref 13.3–17.7)
MCH RBC QN AUTO: 23.9 PG (ref 26.5–33)
MCHC RBC AUTO-ENTMCNC: 29.3 G/DL (ref 31.5–36.5)
MCV RBC AUTO: 82 FL (ref 78–100)
PLATELET # BLD AUTO: 287 10E3/UL (ref 150–450)
POTASSIUM SERPL-SCNC: 5.1 MMOL/L (ref 3.4–5.3)
RBC # BLD AUTO: 3.31 10E6/UL (ref 4.4–5.9)
SODIUM SERPL-SCNC: 135 MMOL/L (ref 135–145)
WBC # BLD AUTO: 8 10E3/UL (ref 4–11)

## 2025-03-05 PROCEDURE — P9604 ONE-WAY ALLOW PRORATED TRIP: HCPCS | Performed by: NURSE PRACTITIONER

## 2025-03-05 PROCEDURE — 36415 COLL VENOUS BLD VENIPUNCTURE: CPT | Performed by: NURSE PRACTITIONER

## 2025-03-05 PROCEDURE — 80048 BASIC METABOLIC PNL TOTAL CA: CPT | Performed by: NURSE PRACTITIONER

## 2025-03-05 PROCEDURE — 85014 HEMATOCRIT: CPT | Performed by: NURSE PRACTITIONER

## 2025-03-05 NOTE — TELEPHONE ENCOUNTER
Attempted to contact for 2nd time to reschedule appt. Vm is still full and writer unable to leave a message. Will attempt again at later time.

## 2025-03-06 ENCOUNTER — TELEPHONE (OUTPATIENT)
Dept: GERIATRICS | Facility: CLINIC | Age: 85
End: 2025-03-06
Payer: COMMERCIAL

## 2025-03-06 VITALS
SYSTOLIC BLOOD PRESSURE: 111 MMHG | DIASTOLIC BLOOD PRESSURE: 63 MMHG | HEART RATE: 62 BPM | BODY MASS INDEX: 27.6 KG/M2 | HEIGHT: 68 IN | WEIGHT: 182.1 LBS | OXYGEN SATURATION: 96 % | RESPIRATION RATE: 18 BRPM | TEMPERATURE: 97.2 F

## 2025-03-06 LAB
QUANTIFERON MITOGEN: 0.85 IU/ML
QUANTIFERON NIL TUBE: 0.01 IU/ML
QUANTIFERON TB1 TUBE: 0.02 IU/ML
QUANTIFERON TB2 TUBE: 0.04

## 2025-03-06 RX ORDER — VALSARTAN 40 MG/1
20 TABLET ORAL DAILY
COMMUNITY

## 2025-03-06 NOTE — PROGRESS NOTES
[place] [facility]  [place] Medical Record Number:  [id number]  Place of Service where encounter took place:  [facility] - [place] ([place]) [[id number]]  Chief Complaint   Patient presents with    Nursing Home Acute       HPI:    Jamie Valdivia  is a 84 year old (1940), who is being seen today for an episodic care visit.  HPI information obtained from: {FGS HPI:166793}. Today's concern is:  {FGS DX:074864}    Past Medical and Surgical History reviewed in Epic today.    MEDICATIONS:  {Providers Please double check the med list (in the plan section >> meds & orders tab) and Discontinue any of the meds flagged by the TC to be discontinued}  Current Outpatient Medications   Medication Sig Dispense Refill    acetaminophen (TYLENOL) 500 MG tablet Take 2 tablets (1,000 mg) by mouth 3 times daily as needed for mild pain 10 tablet 98    cyanocobalamin (VITAMIN B-12) 1000 MCG tablet TAKE 1 TABLET BY MOUTH ONCE DAILY 90 tablet 97    folic acid (FOLVITE) 1 MG tablet TAKE 1 TABLET BY MOUTH ONCE DAILY 90 tablet 97    gabapentin (NEURONTIN) 100 MG capsule TAKE 1 CAPSULE BY MOUTH AT BEDTIME 90 capsule 97    loratadine (CLARITIN) 10 MG tablet TAKE 1 TABLET BY MOUTH AT BEDTIME 90 tablet 97    menthol-zinc oxide (CALMOSEPTINE) 0.44-20.6 % OINT ointment Apply topically 4 times daily.      omeprazole (PRILOSEC) 40 MG DR capsule TAKE 1 CAPSULE BY MOUTH TWICE DAILY 180 capsule 97    polyethylene glycol (MIRALAX) 17 GM/Dose powder MIX 2 CAPFULS IN 8OZ OF ORANGE JUICE  IN THE MORNING;AND MAY MIX 2 CAPFULS ONCE DAILY AS NEEDED FOR CONSTIPATION. MIX IN FRONT OF PT. HOLD FOR LOOSE STOOL. OK TO GIVE 1 CAPFUL IF RESIDENT REFUSES 2 CAPFULS. 510 g 97    QUEtiapine (SEROQUEL) 25 MG tablet TAKE ONE-HALF TABLET (12.5MG) BY MOUTH TWICE DAILY;& MAY TAKE ONE-HALF TABLET (12.5MG) EVERY 12 HOURS AS NEEDED 180 tablet 97    sodium chloride (OCEAN) 0.65 % nasal spray Spray 1 spray in nostril 2 times daily. 480 mL 98    torsemide  "(DEMADEX) 10 MG tablet Take 1 tablet (10 mg) by mouth daily.      valsartan (DIOVAN) 40 MG tablet Take 1 tablet (40 mg) by mouth daily.       ***    REVIEW OF SYSTEMS:  {ROS FGS:688949}    Objective:  /63   Pulse 62   Temp 97.2  F (36.2  C)   Resp 18   Ht 1.727 m (5' 8\")   Wt 82.6 kg (182 lb 1.6 oz)   SpO2 96%   BMI 27.69 kg/m    Exam:  {Nursing home physical exam :442021}    Labs:   {fgslab:190687}    ASSESSMENT/PLAN:  {FGS DX:516916}    {fgsorders:051064}  ***    {fgstime1:837081}  Electronically signed by:  Jimena Ramires ***  {Providers Please double check the med list (in the plan section >> meds & orders tab) and Discontinue any of the meds flagged by the TC to be discontinued}        "

## 2025-03-07 ENCOUNTER — TRANSITIONAL CARE UNIT VISIT (OUTPATIENT)
Dept: GERIATRICS | Facility: CLINIC | Age: 85
End: 2025-03-07
Payer: COMMERCIAL

## 2025-03-07 ENCOUNTER — LAB REQUISITION (OUTPATIENT)
Dept: LAB | Facility: CLINIC | Age: 85
End: 2025-03-07

## 2025-03-07 VITALS
WEIGHT: 182.1 LBS | RESPIRATION RATE: 18 BRPM | OXYGEN SATURATION: 98 % | DIASTOLIC BLOOD PRESSURE: 74 MMHG | BODY MASS INDEX: 27.6 KG/M2 | SYSTOLIC BLOOD PRESSURE: 130 MMHG | TEMPERATURE: 97 F | HEIGHT: 68 IN | HEART RATE: 72 BPM

## 2025-03-07 DIAGNOSIS — I50.9 HEART FAILURE, UNSPECIFIED (H): ICD-10-CM

## 2025-03-07 NOTE — PROGRESS NOTES
[facility] GERIATRICS    PRIMARY CARE PROVIDER AND CLINIC:  MARICHUY Basilio CNP, [address] [facility] / [facility] [id number]  Chief Complaint   Patient presents with    Hospital F/U      [place] Medical Record Number:  [id number]  Place of Service where encounter took place:  [facility] ([facility])     Jamie Valdivia  is a 84 year old  (1940), admitted to the above facility from  [facility]. Hospital stay 2/19/25 through 2/28/25..     Hospital course was reviewed by me, is as per the hospital discharge summary and nurse practitioner note.    Patient has a past medical history of mild dementia, hypertension, chronic atrial fibrillation not on anticoagulation per patient choice, GERD, chronic pharyngeal dysphagia, peripheral neuropathy, chronic anemia, prostate cancer, history of alcohol use.  He was admitted from his assisted living facility to the hospital with increasing lower extremity edema, weight gain, was found to have decompensated heart failure.  BNP elevated at 2100  Chest x-ray revealed elevated left hemidiaphragm,, blunting of the right posterior costophrenic sulcus.  Prior to admission on furosemide 10 mg daily  He was diuresed from 215 296 pounds with intravenous diuretic which was changed to oral torsemide 10 mg daily on discharge  Echocardiogram revealed ejection fraction 66 5%, RV mildly dilated, moderate to severe TR, mild AS, mild AR.    Patient has chronic dysphagia with past video swallow studies revealing large amounts of aspiration of thin liquids, Zenker's diverticulum and prominent cricopharyngeus muscle.  Patient has declined recommendation for mildly thickened liquids.    Hemoglobin 8.1 on admission with iron studies consistent with iron deficiency.  He was treated with IV iron during hospitalization.    PSA obtained during hospitalization was 0.12.  Patient does have urology follow-up.    Since admission to the [facility] the patient  did have an episode of epistaxis.  He declined recommendation to be sent to the ER for further evaluation.    Patient lives in [place], [place].  He was noted to be weaker than baseline thus TCU placement was recommended.  Patient states at baseline he is able to ambulate with a walker states he is currently too weak to ambulate.    Patient states he feels weak, is not close to his baseline level of functioning.  He denies cough, chest pain, shortness of breath.  He denies epistaxis or hemoptysis over the last few days.  He believes that his swallowing difficulties and hemoptysis are related to some ENT procedure that went awry though is not specific.  CODE STATUS/ADVANCE DIRECTIVES DISCUSSION:  Full Code  CPR/Full code   ALLERGIES:   Allergies   Allergen Reactions    Ativan [Lorazepam]       PAST MEDICAL HISTORY:   Past Medical History:   Diagnosis Date    Age-related osteoporosis without current pathological fracture 03/30/2022    Alcohol use disorder     Aortic root aneurysm     CAD (coronary artery disease)     Chronic a-fib (H)     Chronic atrial fibrillation (H) 07/15/2016    Formatting of this note might be different from the original. 2/2019: Multiple falls so started on aspirin only.  Then aspirin stopped due to GI bleed.    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 2/2/21    HTN (hypertension)     Impaired gait and mobility 03/14/2019    Frequent falls    Mild TBI (traumatic brain injury) (H) 03/14/2019    Mumps     Obesity (BMI 30-39.9) 09/03/2015    Osteoarthritis of both knees  05/13/2015    Osteoporosis     Other idiopathic peripheral autonomic neuropathy 03/30/2022    Other seizures (H) 03/30/2022    Pharyngeal dysphagia 05/13/2015    Formatting of this note might be different from the original. Likely secondary to GERD with esophagitis, on PPI and H2 blocker with notable improvement, EGD 10/5/15.    Prostate cancer (H)     Recurrent major depressive disorder 03/30/2022    Rhabdomyolysis     Thrombocytopenia       PAST SURGICAL HISTORY:   has a past surgical history that includes left hip replacement (2010); left shoulder replacement (2016); tonsillectomy; Spine surgery; orthopedic surgery; Esophagoscopy, gastroscopy, duodenoscopy (EGD), combined (N/A, 06/01/2022); colonoscopy; Biopsy prostate transrectal (N/A, 1/11/2023); and Transrectal ultrasonic sonogram (N/A, 1/11/2023).  FAMILY HISTORY: family history includes Osteoporosis in his mother; Prostate Cancer in his paternal grandfather.  SOCIAL HISTORY:   reports that he has never smoked. He has never used smokeless tobacco. He reports that he does not currently use alcohol. He reports that he does not use drugs.  Patient's living condition: lives in an assisted living facility    Current medications reviewed by me today    Current Outpatient Medications   Medication Sig Dispense Refill    acetaminophen (TYLENOL) 500 MG tablet Take 2 tablets (1,000 mg) by mouth 3 times daily as needed for mild pain 10 tablet 98    cyanocobalamin (VITAMIN B-12) 1000 MCG tablet TAKE 1 TABLET BY MOUTH ONCE DAILY 90 tablet 97    folic acid (FOLVITE) 1 MG tablet TAKE 1 TABLET BY MOUTH ONCE DAILY 90 tablet 97    gabapentin (NEURONTIN) 100 MG capsule TAKE 1 CAPSULE BY MOUTH AT BEDTIME 90 capsule 97    loratadine (CLARITIN) 10 MG tablet TAKE 1 TABLET BY MOUTH AT BEDTIME 90 tablet 97    menthol-zinc oxide (CALMOSEPTINE) 0.44-20.6 % OINT ointment Apply topically 4 times daily.      omeprazole (PRILOSEC) 40 MG DR capsule TAKE 1 CAPSULE BY MOUTH TWICE DAILY 180  "capsule 97    polyethylene glycol (MIRALAX) 17 GM/Dose powder MIX 2 CAPFULS IN 8OZ OF ORANGE JUICE  IN THE MORNING;AND MAY MIX 2 CAPFULS ONCE DAILY AS NEEDED FOR CONSTIPATION. MIX IN FRONT OF PT. HOLD FOR LOOSE STOOL. OK TO GIVE 1 CAPFUL IF RESIDENT REFUSES 2 CAPFULS. 510 g 97    QUEtiapine (SEROQUEL) 25 MG tablet TAKE ONE-HALF TABLET (12.5MG) BY MOUTH TWICE DAILY;& MAY TAKE ONE-HALF TABLET (12.5MG) EVERY 12 HOURS AS NEEDED 180 tablet 97    sodium chloride (OCEAN) 0.65 % nasal spray Spray 1 spray in nostril 2 times daily. 480 mL 98    torsemide (DEMADEX) 10 MG tablet Take 1 tablet (10 mg) by mouth daily.      valsartan (DIOVAN) 40 MG tablet Take 20 mg by mouth daily.       No current facility-administered medications for this visit.       ROS:  10 point ROS of systems including Constitutional, Eyes, Respiratory, Cardiovascular, Gastroenterology, Genitourinary, Integumentary, Musculoskeletal, Psychiatric were all negative except for pertinent positives noted in my HPI.    Vitals:  /74   Pulse 72   Temp 97  F (36.1  C)   Resp 18   Ht 1.727 m (5' 8\")   Wt 82.6 kg (182 lb 1.6 oz)   SpO2 98%   BMI 27.69 kg/m    Exam:  Thin, pale appearing male, sitting up in bed.  He is in no distress.  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  HEENT: Oral mucosa moist, no evidence of recent bleeding  Voice is moist sounding  Lungs clear  CV: Irregular, soft systolic murmur  Abdomen soft  Extremities: 2+ pretibial edema.  Skin is dry flaking.  Neuro: Oriented to person place and general circumstances.  Insight appears diminished.  Generalized weakness particularly involving lower extremities.  No gross focal weakness.  Gait was not assessed by me.    Lab/Diagnostic data:  Most Recent 3 CBC's:  Recent Labs   Lab Test 03/05/25  0620 02/28/25  0835 02/27/25  0934   WBC 8.0 6.6 5.3   HGB 7.9* 9.2* 8.6* "   MCV 82 81 80    262 204     Most Recent 3 BMP's:  Recent Labs   Lab Test 03/05/25  0620 02/28/25  0835 02/27/25  0934    138 135   POTASSIUM 5.1 4.4 4.3   CHLORIDE 94* 92* 91*   CO2 29 36* 35*   BUN 55.0* 26.2* 19.3   CR 1.23* 0.89 0.81   ANIONGAP 12 10 9   JOSUE 9.0 9.2 9.0   * 105* 157*     Most Recent 2 LFT's:  Recent Labs   Lab Test 02/19/25 1826 09/12/24  1248   AST 20 18   ALT 6 6   ALKPHOS 81 87   BILITOTAL 0.3 0.3     Most Recent TSH and T4:  Recent Labs   Lab Test 02/19/25 2024   TSH 2.83     Most Recent Anemia Panel:  Recent Labs   Lab Test 03/05/25  0620 02/20/25  0812 02/19/25 2024 02/19/25 1826 06/20/24  0747 05/30/24  0630   WBC 8.0   < >  --  5.8   < > 7.7   HGB 7.9*   < >  --  8.1*   < > 8.2*   HCT 27.0*   < >  --  27.3*   < > 27.8*   MCV 82   < >  --  80   < > 85      < >  --  189   < > 385   IRON  --   --  16*  --    < > 25*   IRONSAT  --   --  4*  --    < > 11*   FEB  --   --  357  --    < > 230*   CAMILA  --   --   --  19*   < >  --    B12  --   --   --  1,039   < > 193*   FOLIC  --   --   --   --   --  3.6*    < > = values in this interval not displayed.       ASSESSMENT/PLAN:      Acute on chronic exacerbation of heart failure with preserved ejection fraction, with echo revealing intact systolic function, aortic and mitral valve dysfunction as well as significant tricuspid regurgitation, pulmonary hypertension  Improved with diuresis of almost 20 pounds, now on low-dose of torsemide  Creatinine recently mildly elevated.  No complaints of shortness of breath however patient does have generalized weakness and significant decrease in functional capacity  Plan: Therapies.  Monitor exam, weight, BMP closely continue low-dose diuretics.  Patient is receiving valsartan which may not be critical given cardiac profile and in view of recently worsened creatinine    Anemia, acute on chronic, with iron deficiency noted during hospitalization  Unclear if related to occult GI or  ENT blood loss  Plan: Monitor hemoglobin, consider oral iron therapy.  Patient did receive intravenous iron during hospitalization    Chronic dysphagia, with complex ENT history as noted above  History of recurrent epistaxis with episode since admission TCU  Patient has declined recommended dysphagia diet and is at risk for aspiration pneumonia  Plan: Monitor G eye and ENT status.  Monitor hemoglobin.  ENT follow-up    Cognitive impairment  Appears mild today though judgment appears impaired  History of delusional thoughts per chart review  Plan: Continue therapies.  Continue low-dose quetiapine.  Anticipate discharge back to assisted living unit      Tyler Weiss MD

## 2025-03-09 ENCOUNTER — TRANSITIONAL CARE UNIT VISIT (OUTPATIENT)
Dept: GERIATRICS | Facility: CLINIC | Age: 85
End: 2025-03-09
Payer: COMMERCIAL

## 2025-03-09 DIAGNOSIS — D64.9 ANEMIA, UNSPECIFIED TYPE: ICD-10-CM

## 2025-03-09 DIAGNOSIS — I48.91 ATRIAL FIBRILLATION, UNSPECIFIED TYPE (H): ICD-10-CM

## 2025-03-09 DIAGNOSIS — R04.0 RECURRENT EPISTAXIS: Primary | ICD-10-CM

## 2025-03-09 DIAGNOSIS — I50.9 CONGESTIVE HEART FAILURE, UNSPECIFIED HF CHRONICITY, UNSPECIFIED HEART FAILURE TYPE (H): ICD-10-CM

## 2025-03-09 PROCEDURE — 99305 1ST NF CARE MODERATE MDM 35: CPT | Performed by: INTERNAL MEDICINE

## 2025-03-09 NOTE — LETTER
3/9/2025      Jamie Valdivia  [address] [place] [address] [place]  [address]  [place] [address]        [facility] GERIATRICS    PRIMARY CARE PROVIDER AND CLINIC:  MARICHUY Basilio CNP, [address] [facility] / [place] [address]  Chief Complaint   Patient presents with     Hospital F/U      [place] Medical Record Number:  [id number]  Place of Service where encounter took place:  [facility] ([facility])     Jamie Valdivia  is a 84 year old  (1940), admitted to the above facility from  [facility]. Hospital stay 2/19/25 through 2/28/25..     Hospital course was reviewed by me, is as per the hospital discharge summary and nurse practitioner note.    Patient has a past medical history of mild dementia, hypertension, chronic atrial fibrillation not on anticoagulation per patient choice, GERD, chronic pharyngeal dysphagia, peripheral neuropathy, chronic anemia, prostate cancer, history of alcohol use.  He was admitted from his assisted living facility to the hospital with increasing lower extremity edema, weight gain, was found to have decompensated heart failure.  BNP elevated at 2100  Chest x-ray revealed elevated left hemidiaphragm,, blunting of the right posterior costophrenic sulcus.  Prior to admission on furosemide 10 mg daily  He was diuresed from 215 296 pounds with intravenous diuretic which was changed to oral torsemide 10 mg daily on discharge  Echocardiogram revealed ejection fraction 66 5%, RV mildly dilated, moderate to severe TR, mild AS, mild AR.    Patient has chronic dysphagia with past video swallow studies revealing large amounts of aspiration of thin liquids, Zenker's diverticulum and prominent cricopharyngeus muscle.  Patient has declined recommendation for mildly thickened liquids.    Hemoglobin 8.1 on admission with iron studies consistent with iron deficiency.  He was treated with IV iron during hospitalization.    PSA obtained during  hospitalization was 0.12.  Patient does have urology follow-up.    Since admission to the TCU the patient did have an episode of epistaxis.  He declined recommendation to be sent to the ER for further evaluation.    Patient lives in [place], [place].  He was noted to be weaker than baseline thus TCU placement was recommended.  Patient states at baseline he is able to ambulate with a walker states he is currently too weak to ambulate.    Patient states he feels weak, is not close to his baseline level of functioning.  He denies cough, chest pain, shortness of breath.  He denies epistaxis or hemoptysis over the last few days.  He believes that his swallowing difficulties and hemoptysis are related to some ENT procedure that went awry though is not specific.  CODE STATUS/ADVANCE DIRECTIVES DISCUSSION:  Full Code  CPR/Full code   ALLERGIES:   Allergies   Allergen Reactions     Ativan [Lorazepam]       PAST MEDICAL HISTORY:   Past Medical History:   Diagnosis Date     Age-related osteoporosis without current pathological fracture 03/30/2022     Alcohol use disorder      Aortic root aneurysm      CAD (coronary artery disease)      Chronic a-fib (H)      Chronic atrial fibrillation (H) 07/15/2016    Formatting of this note might be different from the original. 2/2019: Multiple falls so started on aspirin only.  Then aspirin stopped due to GI bleed.     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 2/2/21     HTN (hypertension)      Impaired gait and mobility 03/14/2019    Frequent falls      Mild TBI (traumatic brain injury) (H) 03/14/2019     Mumps      Obesity (BMI 30-39.9) 09/03/2015     Osteoarthritis of both knees 05/13/2015     Osteoporosis      Other idiopathic peripheral autonomic neuropathy 03/30/2022     Other seizures (H) 03/30/2022     Pharyngeal dysphagia 05/13/2015    Formatting of this note might be different from the original. Likely secondary to GERD with esophagitis, on PPI and H2 blocker with notable improvement, EGD 10/5/15.     Prostate cancer (H)      Recurrent major depressive disorder 03/30/2022     Rhabdomyolysis      Thrombocytopenia       PAST SURGICAL HISTORY:   has a past surgical history that includes left hip replacement (2010); left shoulder replacement (2016); tonsillectomy; Spine surgery; orthopedic surgery; Esophagoscopy, gastroscopy, duodenoscopy (EGD), combined (N/A, 06/01/2022); colonoscopy; Biopsy prostate transrectal (N/A, 1/11/2023); and Transrectal ultrasonic sonogram (N/A, 1/11/2023).  FAMILY HISTORY: family history includes Osteoporosis in his mother; Prostate Cancer in his paternal grandfather.  SOCIAL HISTORY:   reports that he has never smoked. He has never used smokeless tobacco. He reports that he does not currently use alcohol. He reports that he does not use drugs.  Patient's living condition: lives in an assisted living facility    Current medications reviewed by me today    Current Outpatient Medications   Medication Sig Dispense Refill     acetaminophen (TYLENOL) 500 MG tablet Take 2 tablets (1,000 mg) by mouth 3 times daily as needed for mild pain 10 tablet 98     cyanocobalamin (VITAMIN B-12) 1000 MCG tablet TAKE 1 TABLET BY MOUTH ONCE DAILY 90 tablet 97     folic acid (FOLVITE) 1 MG tablet TAKE 1 TABLET BY MOUTH ONCE DAILY 90 tablet 97     gabapentin (NEURONTIN) 100 MG capsule TAKE 1 CAPSULE BY MOUTH AT BEDTIME 90 capsule 97     loratadine (CLARITIN) 10 MG tablet TAKE 1 TABLET BY MOUTH AT BEDTIME 90 tablet 97     menthol-zinc oxide (CALMOSEPTINE)  "0.44-20.6 % OINT ointment Apply topically 4 times daily.       omeprazole (PRILOSEC) 40 MG DR capsule TAKE 1 CAPSULE BY MOUTH TWICE DAILY 180 capsule 97     polyethylene glycol (MIRALAX) 17 GM/Dose powder MIX 2 CAPFULS IN 8OZ OF ORANGE JUICE  IN THE MORNING;AND MAY MIX 2 CAPFULS ONCE DAILY AS NEEDED FOR CONSTIPATION. MIX IN FRONT OF PT. HOLD FOR LOOSE STOOL. OK TO GIVE 1 CAPFUL IF RESIDENT REFUSES 2 CAPFULS. 510 g 97     QUEtiapine (SEROQUEL) 25 MG tablet TAKE ONE-HALF TABLET (12.5MG) BY MOUTH TWICE DAILY;& MAY TAKE ONE-HALF TABLET (12.5MG) EVERY 12 HOURS AS NEEDED 180 tablet 97     sodium chloride (OCEAN) 0.65 % nasal spray Spray 1 spray in nostril 2 times daily. 480 mL 98     torsemide (DEMADEX) 10 MG tablet Take 1 tablet (10 mg) by mouth daily.       valsartan (DIOVAN) 40 MG tablet Take 20 mg by mouth daily.       No current facility-administered medications for this visit.       ROS:  10 point ROS of systems including Constitutional, Eyes, Respiratory, Cardiovascular, Gastroenterology, Genitourinary, Integumentary, Musculoskeletal, Psychiatric were all negative except for pertinent positives noted in my HPI.    Vitals:  /74   Pulse 72   Temp 97  F (36.1  C)   Resp 18   Ht 1.727 m (5' 8\")   Wt 82.6 kg (182 lb 1.6 oz)   SpO2 98%   BMI 27.69 kg/m    Exam:  Thin, pale appearing male, sitting up in bed.  He is in no distress.  Initially was focused upon frustration with past caregivers, subsequently was quite engaging when he and I talked about his past career as a [employer] .  HEENT: Oral mucosa moist, no evidence of recent bleeding  Voice is moist sounding  Lungs clear  CV: Irregular, soft systolic murmur  Abdomen soft  Extremities: 2+ pretibial edema.  Skin is dry flaking.  Neuro: Oriented to person place and general circumstances.  Insight appears diminished.  Generalized weakness particularly involving lower extremities.  No gross focal weakness.  Gait was not assessed by " me.    Lab/Diagnostic data:  Most Recent 3 CBC's:  Recent Labs   Lab Test 03/05/25  0620 02/28/25  0835 02/27/25  0934   WBC 8.0 6.6 5.3   HGB 7.9* 9.2* 8.6*   MCV 82 81 80    262 204     Most Recent 3 BMP's:  Recent Labs   Lab Test 03/05/25  0620 02/28/25  0835 02/27/25  0934    138 135   POTASSIUM 5.1 4.4 4.3   CHLORIDE 94* 92* 91*   CO2 29 36* 35*   BUN 55.0* 26.2* 19.3   CR 1.23* 0.89 0.81   ANIONGAP 12 10 9   JOSUE 9.0 9.2 9.0   * 105* 157*     Most Recent 2 LFT's:  Recent Labs   Lab Test 02/19/25 1826 09/12/24  1248   AST 20 18   ALT 6 6   ALKPHOS 81 87   BILITOTAL 0.3 0.3     Most Recent TSH and T4:  Recent Labs   Lab Test 02/19/25 2024   TSH 2.83     Most Recent Anemia Panel:  Recent Labs   Lab Test 03/05/25  0620 02/20/25  0812 02/19/25 2024 02/19/25 1826 06/20/24  0747 05/30/24  0630   WBC 8.0   < >  --  5.8   < > 7.7   HGB 7.9*   < >  --  8.1*   < > 8.2*   HCT 27.0*   < >  --  27.3*   < > 27.8*   MCV 82   < >  --  80   < > 85      < >  --  189   < > 385   IRON  --   --  16*  --    < > 25*   IRONSAT  --   --  4*  --    < > 11*   FEB  --   --  357  --    < > 230*   CAMILA  --   --   --  19*   < >  --    B12  --   --   --  1,039   < > 193*   FOLIC  --   --   --   --   --  3.6*    < > = values in this interval not displayed.       ASSESSMENT/PLAN:      Acute on chronic exacerbation of heart failure with preserved ejection fraction, with echo revealing intact systolic function, aortic and mitral valve dysfunction as well as significant tricuspid regurgitation, pulmonary hypertension  Improved with diuresis of almost 20 pounds, now on low-dose of torsemide  Creatinine recently mildly elevated.  No complaints of shortness of breath however patient does have generalized weakness and significant decrease in functional capacity  Plan: Therapies.  Monitor exam, weight, BMP closely continue low-dose diuretics.  Patient is receiving valsartan which may not be critical given cardiac profile  and in view of recently worsened creatinine    Anemia, acute on chronic, with iron deficiency noted during hospitalization  Unclear if related to occult GI or ENT blood loss  Plan: Monitor hemoglobin, consider oral iron therapy.  Patient did receive intravenous iron during hospitalization    Chronic dysphagia, with complex ENT history as noted above  History of recurrent epistaxis with episode since admission TCU  Patient has declined recommended dysphagia diet and is at risk for aspiration pneumonia  Plan: Monitor G eye and ENT status.  Monitor hemoglobin.  ENT follow-up    Cognitive impairment  Appears mild today though judgment appears impaired  History of delusional thoughts per chart review  Plan: Continue therapies.  Continue low-dose quetiapine.  Anticipate discharge back to assisted living unit      Tyler Weiss MD      Sincerely,        Tyler Weiss MD    Electronically signed

## 2025-03-10 LAB
ANION GAP SERPL CALCULATED.3IONS-SCNC: 13 MMOL/L (ref 7–15)
BUN SERPL-MCNC: 21.5 MG/DL (ref 8–23)
CALCIUM SERPL-MCNC: 8.9 MG/DL (ref 8.8–10.4)
CHLORIDE SERPL-SCNC: 100 MMOL/L (ref 98–107)
CREAT SERPL-MCNC: 0.71 MG/DL (ref 0.67–1.17)
EGFRCR SERPLBLD CKD-EPI 2021: 90 ML/MIN/1.73M2
ERYTHROCYTE [DISTWIDTH] IN BLOOD BY AUTOMATED COUNT: 20.8 % (ref 10–15)
GLUCOSE SERPL-MCNC: 70 MG/DL (ref 70–99)
HCO3 SERPL-SCNC: 28 MMOL/L (ref 22–29)
HCT VFR BLD AUTO: 27.6 % (ref 40–53)
HGB BLD-MCNC: 8.4 G/DL (ref 13.3–17.7)
MCH RBC QN AUTO: 24.7 PG (ref 26.5–33)
MCHC RBC AUTO-ENTMCNC: 30.4 G/DL (ref 31.5–36.5)
MCV RBC AUTO: 81 FL (ref 78–100)
PLATELET # BLD AUTO: 266 10E3/UL (ref 150–450)
POTASSIUM SERPL-SCNC: 4.4 MMOL/L (ref 3.4–5.3)
RBC # BLD AUTO: 3.4 10E6/UL (ref 4.4–5.9)
SODIUM SERPL-SCNC: 141 MMOL/L (ref 135–145)
WBC # BLD AUTO: 5.9 10E3/UL (ref 4–11)

## 2025-03-10 PROCEDURE — 36415 COLL VENOUS BLD VENIPUNCTURE: CPT | Performed by: NURSE PRACTITIONER

## 2025-03-10 PROCEDURE — 80048 BASIC METABOLIC PNL TOTAL CA: CPT | Performed by: NURSE PRACTITIONER

## 2025-03-10 PROCEDURE — 84295 ASSAY OF SERUM SODIUM: CPT | Performed by: NURSE PRACTITIONER

## 2025-03-10 PROCEDURE — 84520 ASSAY OF UREA NITROGEN: CPT | Performed by: NURSE PRACTITIONER

## 2025-03-10 PROCEDURE — 85027 COMPLETE CBC AUTOMATED: CPT | Performed by: NURSE PRACTITIONER

## 2025-03-10 PROCEDURE — P9604 ONE-WAY ALLOW PRORATED TRIP: HCPCS | Performed by: NURSE PRACTITIONER

## 2025-03-10 PROCEDURE — 82435 ASSAY OF BLOOD CHLORIDE: CPT | Performed by: NURSE PRACTITIONER

## 2025-03-11 VITALS
HEIGHT: 68 IN | TEMPERATURE: 97.8 F | HEART RATE: 54 BPM | SYSTOLIC BLOOD PRESSURE: 119 MMHG | RESPIRATION RATE: 18 BRPM | WEIGHT: 184 LBS | DIASTOLIC BLOOD PRESSURE: 65 MMHG | BODY MASS INDEX: 27.89 KG/M2 | OXYGEN SATURATION: 95 %

## 2025-03-11 NOTE — PROGRESS NOTES
[place] [facility]  [place] [facility] Record Number:  [id number]  Place of Service where encounter took place:  [facility] ([facility]) [[id number]]  Chief Complaint   Patient presents with    Nursing Home Acute       HPI:    Jamie Valdivia  is a 84 year old (1940), who is being seen today for an episodic care visit.  HPI information obtained from: facility chart records, facility staff, and patient report. Today's concern is:      Per hospital report:     Patient Jamie Valdivia is a 84 yr old male admitted to [facility]CU for decompensated heart failure and fluid retention  PMHx mild dementia,hypertension, chronic afib (has declined anticoagulation), GERD, h/o Vickers's esophagus, chronic anemia; chronic pharyngeal dysphagia; peripheral neuropathy, OA; chronic spine disease, osteoporosis, prostate cancer; h/o alcohol use d/o (no longer drinking); h/o falls     ECG showed afib with CVR, no acute ischemic changes.CXR showed left hemidiaphragm remained elevated compared to the right and overall the lungs had shallow inflation, chronic blunting of the right posterior costophrenic sulcus could relate to scarring or minimal fluid but unchanged (comparison 7/2024).       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 Patient express wanting to move to [place] with wife at facility there  No further  "bleeding noted, patient canceled follow up gastrointestinal specialist       Past Medical and Surgical History reviewed in Epic today.    MEDICATIONS:    Current Outpatient Medications   Medication Sig Dispense Refill    acetaminophen (TYLENOL) 500 MG tablet Take 2 tablets (1,000 mg) by mouth 3 times daily as needed for mild pain 10 tablet 98    cyanocobalamin (VITAMIN B-12) 1000 MCG tablet TAKE 1 TABLET BY MOUTH ONCE DAILY 90 tablet 97    folic acid (FOLVITE) 1 MG tablet TAKE 1 TABLET BY MOUTH ONCE DAILY 90 tablet 97    gabapentin (NEURONTIN) 100 MG capsule TAKE 1 CAPSULE BY MOUTH AT BEDTIME 90 capsule 97    loratadine (CLARITIN) 10 MG tablet TAKE 1 TABLET BY MOUTH AT BEDTIME 90 tablet 97    menthol-zinc oxide (CALMOSEPTINE) 0.44-20.6 % OINT ointment Apply topically 4 times daily.      omeprazole (PRILOSEC) 40 MG DR capsule TAKE 1 CAPSULE BY MOUTH TWICE DAILY 180 capsule 97    oxymetazoline (AFRIN) 0.05 % nasal spray Spray 2 sprays into both nostrils 2 times daily as needed for congestion.      polyethylene glycol (MIRALAX) 17 GM/Dose powder MIX 2 CAPFULS IN 8OZ OF ORANGE JUICE  IN THE MORNING;AND MAY MIX 2 CAPFULS ONCE DAILY AS NEEDED FOR CONSTIPATION. MIX IN FRONT OF PT. HOLD FOR LOOSE STOOL. OK TO GIVE 1 CAPFUL IF RESIDENT REFUSES 2 CAPFULS. 510 g 97    QUEtiapine (SEROQUEL) 25 MG tablet TAKE ONE-HALF TABLET (12.5MG) BY MOUTH TWICE DAILY;& MAY TAKE ONE-HALF TABLET (12.5MG) EVERY 12 HOURS AS NEEDED 180 tablet 97    sodium chloride (OCEAN) 0.65 % nasal spray Spray 1 spray in nostril 2 times daily. 480 mL 98    torsemide (DEMADEX) 10 MG tablet Take 1 tablet (10 mg) by mouth daily.      valsartan (DIOVAN) 40 MG tablet Take 20 mg by mouth daily.           REVIEW OF SYSTEMS:  4 point ROS including Respiratory, CV, GI and , other than that noted in the HPI,  is negative    Objective:  /65   Pulse 54   Temp 97.8  F (36.6  C)   Resp 18   Ht 1.727 m (5' 8\")   Wt 83.5 kg (184 lb)   SpO2 95%   BMI 27.98 kg/m  " "  Exam:  GENERAL APPEARANCE:  Alert, in no distress  RESP:  respiratory effort and palpation of chest normal, lungs clear to auscultation , no respiratory distress  CV:  Palpation and auscultation of heart done , regular rate and rhythm  ABDOMEN:  normal bowel sounds, soft, nontender  M/S:   sitting in bed  SKIN:  Inspection of skin and subcutaneous tissue+2 edema bilateral  NEURO:   Cranial nerves 2-12 are normal tested and grossly at patient's baseline  PSYCH:  affect and mood normal    Labs:   Labs done in SNF are in [facility]. Please refer to them using [employer]/Care Everywhere.    Last Comprehensive Metabolic Panel:  Lab Results   Component Value Date     03/10/2025    POTASSIUM 4.4 03/10/2025    CHLORIDE 100 03/10/2025    CO2 28 03/10/2025    ANIONGAP 13 03/10/2025    GLC 70 03/10/2025    BUN 21.5 03/10/2025    CR 0.71 03/10/2025    GFRESTIMATED 90 03/10/2025    JOSUE 8.9 03/10/2025       Lab Results   Component Value Date    WBC 5.9 03/10/2025     Lab Results   Component Value Date    RBC 3.40 03/10/2025     Lab Results   Component Value Date    HGB 8.4 03/10/2025     Lab Results   Component Value Date    HCT 27.6 03/10/2025     No components found for: \"MCT\"  Lab Results   Component Value Date    MCV 81 03/10/2025     Lab Results   Component Value Date    MCH 24.7 03/10/2025     Lab Results   Component Value Date    MCHC 30.4 03/10/2025     Lab Results   Component Value Date    RDW 20.8 03/10/2025     Lab Results   Component Value Date     03/10/2025         ASSESSMENT/PLAN:     Recurrent epistaxis  Anemia, unspecified type  Congestive heart failure, unspecified HF chronicity, unspecified heart failure type (H)  Physical deconditioning  Atrial fibrillation, unspecified type (H)  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 Patient express wanting to move to [place] with wife at facility there   involved in safe discharge plan  No further bleeding noted, patient canceled follow up gastrointestinal specialist   Hgb stable, not on blood thinner    Electronically signed by:  MARICHUY Mendoza CNP

## 2025-03-12 ENCOUNTER — TELEPHONE (OUTPATIENT)
Dept: OTOLARYNGOLOGY | Facility: CLINIC | Age: 85
End: 2025-03-12

## 2025-03-12 ENCOUNTER — TRANSITIONAL CARE UNIT VISIT (OUTPATIENT)
Dept: GERIATRICS | Facility: CLINIC | Age: 85
End: 2025-03-12
Payer: COMMERCIAL

## 2025-03-12 DIAGNOSIS — I50.9 CONGESTIVE HEART FAILURE, UNSPECIFIED HF CHRONICITY, UNSPECIFIED HEART FAILURE TYPE (H): ICD-10-CM

## 2025-03-12 DIAGNOSIS — D64.9 ANEMIA, UNSPECIFIED TYPE: ICD-10-CM

## 2025-03-12 DIAGNOSIS — R53.81 PHYSICAL DECONDITIONING: ICD-10-CM

## 2025-03-12 DIAGNOSIS — I48.91 ATRIAL FIBRILLATION, UNSPECIFIED TYPE (H): ICD-10-CM

## 2025-03-12 DIAGNOSIS — I10 ESSENTIAL HYPERTENSION, BENIGN: ICD-10-CM

## 2025-03-12 DIAGNOSIS — F10.27 DEMENTIA ASSOCIATED WITH ALCOHOLISM WITH BEHAVIORAL DISTURBANCE (H): ICD-10-CM

## 2025-03-12 DIAGNOSIS — R04.0 RECURRENT EPISTAXIS: Primary | ICD-10-CM

## 2025-03-12 NOTE — TELEPHONE ENCOUNTER
3rd attempt to contact Janeth at [facility] to reschedule patients cancelled appt. Vm continues to be full so writer can not leave a voicemail. Writer then attempted to reach patients wife, vm message left on her line with writers contact information for a call back.

## 2025-03-18 VITALS
OXYGEN SATURATION: 100 % | RESPIRATION RATE: 18 BRPM | TEMPERATURE: 97.6 F | WEIGHT: 186.3 LBS | HEART RATE: 62 BPM | HEIGHT: 68 IN | BODY MASS INDEX: 28.23 KG/M2 | SYSTOLIC BLOOD PRESSURE: 112 MMHG | DIASTOLIC BLOOD PRESSURE: 52 MMHG

## 2025-03-18 NOTE — PROGRESS NOTES
[place] [facility]  [place] Medical Record Number:  [id number]  Place of Service where encounter took place:  [facility] (TCU) [[id number]]  Chief Complaint   Patient presents with    Nursing Home Acute       HPI:    Jamie Valdivia  is a 84 year old (1940), who is being seen today for an episodic care visit.  HPI information obtained from: facility chart records, facility staff, and patient report. Today's concern is:    Per hospital report:     Patient Jamie Valdivia is a 84 yr old male admitted to [facility] for decompensated heart failure and fluid retention  PMHx mild dementia,hypertension, chronic afib (has declined anticoagulation), GERD, h/o Vickers's esophagus, chronic anemia; chronic pharyngeal dysphagia; peripheral neuropathy, OA; chronic spine disease, osteoporosis, prostate cancer; h/o alcohol use d/o (no longer drinking); h/o falls     ECG showed afib with CVR, no acute ischemic changes.CXR showed left hemidiaphragm remained elevated compared to the right and overall the lungs had shallow inflation, chronic blunting of the right posterior costophrenic sulcus could relate to scarring or minimal fluid but unchanged (comparison 7/2024).       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 Updated  regarding   Vitals stable room air  No further complaints  Mood appropriate today. 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  LD 3/12/25        Past Medical and Surgical History reviewed in Epic today.    MEDICATIONS:    Current Outpatient Medications   Medication Sig Dispense Refill    losartan (COZAAR) 25 MG tablet Take 25 mg by mouth daily.      acetaminophen (TYLENOL) 500 MG tablet Take 2 tablets (1,000 mg) by mouth 3 times daily as needed for mild pain 10 tablet 98    cyanocobalamin (VITAMIN B-12) 1000 MCG tablet TAKE 1 TABLET BY MOUTH ONCE DAILY 90 tablet 97    folic acid (FOLVITE) 1 MG tablet TAKE 1 TABLET BY MOUTH ONCE DAILY 90 tablet 97    gabapentin (NEURONTIN) 100 MG capsule TAKE 1 CAPSULE BY MOUTH AT BEDTIME 90 capsule 97    loratadine (CLARITIN) 10 MG tablet TAKE 1 TABLET BY MOUTH AT BEDTIME 90 tablet 97    menthol-zinc oxide (CALMOSEPTINE) 0.44-20.6 % OINT ointment Apply topically 4 times daily.      omeprazole (PRILOSEC) 40 MG DR capsule TAKE 1 CAPSULE BY MOUTH TWICE DAILY 180 capsule 97    oxymetazoline (AFRIN) 0.05 % nasal spray Spray 2 sprays into both nostrils 2 times daily as needed for congestion.      polyethylene glycol (MIRALAX) 17 GM/Dose powder MIX 2 CAPFULS IN 8OZ OF ORANGE JUICE  IN THE MORNING;AND MAY MIX 2 CAPFULS ONCE DAILY AS NEEDED FOR CONSTIPATION. MIX IN FRONT OF PT. HOLD FOR LOOSE STOOL. OK TO GIVE 1 CAPFUL IF RESIDENT REFUSES 2 CAPFULS. 510 g 97    QUEtiapine (SEROQUEL) 25 MG tablet TAKE ONE-HALF TABLET (12.5MG) BY MOUTH TWICE DAILY;& MAY TAKE ONE-HALF TABLET (12.5MG) EVERY 12 HOURS AS NEEDED 180 tablet 97    sodium chloride (OCEAN) 0.65 % nasal spray Spray 1  "spray in nostril 2 times daily. 480 mL 98    torsemide (DEMADEX) 10 MG tablet Take 1 tablet (10 mg) by mouth daily.           REVIEW OF SYSTEMS:  4 point ROS including Respiratory, CV, GI and , other than that noted in the HPI,  is negative    Objective:  /52   Pulse 62   Temp 97.6  F (36.4  C)   Resp 18   Ht 1.727 m (5' 8\")   Wt 84.5 kg (186 lb 4.8 oz)   SpO2 100%   BMI 28.33 kg/m    Exam:  GENERAL APPEARANCE:  Alert, in no distress  RESP:  respiratory effort and palpation of chest normal, lungs clear to auscultation , no respiratory distress  CV:  Palpation and auscultation of heart done , regular rate and rhythm  ABDOMEN:  normal bowel sounds, soft, nontender  M/S:   sitting in bed  SKIN:  Inspection of skin and subcutaneous tissue+2-3 edema bilateral lower extremity, slightly dry skin  NEURO:   Cranial nerves 2-12 are normal tested and grossly at patient's baseline  PSYCH:  oriented X 3    Labs:   [facility]. Please refer to them using [employer] Everywhere.    Last Comprehensive Metabolic Panel:  Lab Results   Component Value Date     03/10/2025    POTASSIUM 4.4 03/10/2025    CHLORIDE 100 03/10/2025    CO2 28 03/10/2025    ANIONGAP 13 03/10/2025    GLC 70 03/10/2025    BUN 21.5 03/10/2025    CR 0.71 03/10/2025    GFRESTIMATED 90 03/10/2025    JOSUE 8.9 03/10/2025       Lab Results   Component Value Date    WBC 5.9 03/10/2025     Lab Results   Component Value Date    RBC 3.40 03/10/2025     Lab Results   Component Value Date    HGB 8.4 03/10/2025     Lab Results   Component Value Date    HCT 27.6 03/10/2025     No components found for: \"MCT\"  Lab Results   Component Value Date    MCV 81 03/10/2025     Lab Results   Component Value Date    MCH 24.7 03/10/2025     Lab Results   Component Value Date    MCHC 30.4 03/10/2025     Lab Results   Component Value Date    RDW 20.8 03/10/2025     Lab Results   Component Value Date     03/10/2025         ASSESSMENT/PLAN:   "   Congestive heart failure, unspecified HF chronicity, unspecified heart failure type (H)  Physical deconditioning  Atrial fibrillation, unspecified type (H)  Essential hypertension, benign  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 Updated  regarding     No further complaints  Mood appropriate today. 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

## 2025-03-19 ENCOUNTER — TRANSITIONAL CARE UNIT VISIT (OUTPATIENT)
Dept: GERIATRICS | Facility: CLINIC | Age: 85
End: 2025-03-19
Payer: COMMERCIAL

## 2025-03-19 DIAGNOSIS — I50.9 CONGESTIVE HEART FAILURE, UNSPECIFIED HF CHRONICITY, UNSPECIFIED HEART FAILURE TYPE (H): Primary | ICD-10-CM

## 2025-03-19 DIAGNOSIS — I48.91 ATRIAL FIBRILLATION, UNSPECIFIED TYPE (H): ICD-10-CM

## 2025-03-19 DIAGNOSIS — F10.27 DEMENTIA ASSOCIATED WITH ALCOHOLISM WITH BEHAVIORAL DISTURBANCE (H): ICD-10-CM

## 2025-03-19 DIAGNOSIS — R53.81 PHYSICAL DECONDITIONING: ICD-10-CM

## 2025-03-19 DIAGNOSIS — I10 ESSENTIAL HYPERTENSION, BENIGN: ICD-10-CM

## 2025-03-19 PROCEDURE — 99309 SBSQ NF CARE MODERATE MDM 30: CPT | Performed by: NURSE PRACTITIONER

## 2025-03-19 RX ORDER — LOSARTAN POTASSIUM 25 MG/1
25 TABLET ORAL DAILY
COMMUNITY
End: 2025-03-20

## 2025-03-20 VITALS
OXYGEN SATURATION: 96 % | RESPIRATION RATE: 18 BRPM | SYSTOLIC BLOOD PRESSURE: 133 MMHG | TEMPERATURE: 98.9 F | WEIGHT: 185.5 LBS | BODY MASS INDEX: 28.11 KG/M2 | HEART RATE: 77 BPM | HEIGHT: 68 IN | DIASTOLIC BLOOD PRESSURE: 77 MMHG

## 2025-03-20 RX ORDER — LOSARTAN POTASSIUM 25 MG/1
25 TABLET ORAL DAILY
COMMUNITY

## 2025-03-20 RX ORDER — FERROUS SULFATE 325(65) MG
325 TABLET ORAL
Qty: 30 TABLET | Refills: 0 | Status: SHIPPED | OUTPATIENT
Start: 2025-03-20

## 2025-03-20 NOTE — PROGRESS NOTES
[place] [facility] DISCHARGE SUMMARY  PATIENT'S NAME: Jamie Valdivia  YOB: 1940  MEDICAL RECORD NUMBER:  [id number]  Place of Service where encounter took place:  [facility] (TCU) [[id number]]    PRIMARY CARE PROVIDER AND CLINIC RESPONSIBLE AFTER TRANSFER:   MARICHUY Basilio CNP, [address] [facility] [id number]    Assisted Living: [facility] (MW)     Transferring providers: MARICHUY Mendoza CNP; Tyler Weiss MD  Recent Hospitalization/ED:  [facility] Hospital stay 2/19/25 to 2/28/25.  Date of SNF Admission:  2/28/25  Date of SNF (anticipated) Discharge:  3/25/25  Discharged to: previous assisted living [facility] memory care  Cognitive Scores/Physical Function/ DME:   Walking in halls 4WW    CODE STATUS/ADVANCE DIRECTIVES DISCUSSION:  Full Code   ALLERGIES: Ativan [lorazepam]    DISCHARGE DIAGNOSIS/NURSING FACILITY COURSE:     Per hospital report:     Patient Jamie Valdivia is a 84 yr old male admitted to [facility] TCU for decompensated heart failure and fluid retention  PMHx mild dementia,hypertension, chronic afib (has declined anticoagulation), GERD, h/o Vickers's esophagus, chronic anemia; chronic pharyngeal dysphagia; peripheral neuropathy, OA; chronic spine disease, osteoporosis, prostate cancer; h/o alcohol use d/o (no longer drinking); h/o falls     ECG showed afib with CVR, no acute ischemic changes.CXR showed left hemidiaphragm remained elevated compared to the right and overall the lungs had shallow inflation, chronic blunting of the right posterior costophrenic sulcus could relate to scarring or minimal fluid but unchanged (comparison 7/2024).    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    *Regarding A-fib, patient has declined anticoagulation and LAAO, previously on baby aspirin but discontinued with history of GI bleed  Continue torsemide 10 mg daily (prior on lasix)  Not appear fluid up   Wt Readings from Last 2 Encounters:   03/20/25 84.1 kg (185 lb 8 oz)   03/18/25 84.5 kg (186 lb 4.8 oz)     Follow up cardiology as advised  ARB lowered due to LILY, some low BPs  Continue lymphedema therapy as patient allows, has been refusing wraps/compression stockings  Follow up BMP,CBC X1 week ***     Chronic pharyngeal dysphagia.  Chronic dysphonia.  GERD with Vickers's esophagus.  Cricopharyngeal bar and Zenker diverticulum   Per hospital note;   EGD 2022 found Vickers's esophagus and him started on PPI twice daily. Seen in clinic by Gastroenterologist Colby Mercado on 2/3/2025 and X-ray esophagram, EGD, ENT referral and speech therapy referral placed at that visit. 2/21 SLP obtained a video swallow study on 2/21 that showed large amounts of aspiration of thin liquids. It also showed a zenker diverticulum and prominent cricopharyngeus muscle, patient refused mildly thickened liquids as recommended by SLP. GI consulted not interested in EGD- patient to follow up with Dr. Mercado outpatient or seek a second opinion. Patient is high risk for aspiration.  Continue PPI 2xday  Speech therapy evaluate and treat      Hypocalcemia, non-severe, asymptomatic   Vitamin D deficiency  Initial presentation as above. Calcium (including ionized) low on admit. 6.8 level. Vitamin D level low on admit. (25, [place]) level of 16 on admission. Started on calcium and vitamin D this  hospitalization  Continue calcium & vitamin D  Monitor      Iron deficiency anemia  Per hospital note  Hemoglobin   Date Value Ref Range Status   03/10/2025 8.4 (L) 13.3 - 17.7 g/dL Final   03/05/2025 7.9 (L) 13.3 - 17.7 g/dL Final   Hgb stable ~8   Iron studies in hospital showed low iron, iron saturation and ferritin. TSH normal on admit. B12 level normal on admit. Started on IV iron 2/21, changed to PO 2/22. (Venofer 300 mg IV X 2)  Continue iron  Remains on B12 and folic acid for anemia  Monitor      Weakness and physical deconditioning due to multiple acute and chronic medical issues  Peripheral neuropathy  Frequent falls  Dementia, mild  Per hospital note:  Patient lives in memory care. He and his wife have separate apartments . She is in the assisted living. (he lives one floor above her). 2/26 psychiatry assessment- patient does not have capacity to determine his disposition-   Continue gabapentin 100mg at bedtime  Continue quetiapine (consider higher dose due to paranoia)     OA with h/o joint replacements  Chronic spine disease with h/o spine surgery  Osteoporosis with h/o compression fractures  continue calcium and vitamin D   Pain managed with tylenol      Obesity  Body mass index is 30.82 kg/m   Encourage physical activity, portion control and increase fruits and vegetables     Prostate cancer   Follows with Dr. Niraj Larry. S/p EBRT without ADT 12/2023.   PSA (2/26) 0.12 this admission.   Follow up with urology as advised      H/o alcohol use disorder, unclear if issues since moving to [facility] a few years ago    Deconditioned  Comment: d/t recent hospitalization & comorbidity, expect delay rehabilitation d/t age & comorbidity  Plan: PT/OT eval & treat, monitor    Advanced care planning   FULL code, patient sign; staff to confirm with wife    Cognitive impairment   Per hospital note  His wife is unable to make decisions for him as noted in the record and per psychiatry he does not have the capacity to  decide disposition   See SW/CM notes patient does not want children contacted.    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) (11/19/2021), ED (erectile dysfunction), JOSÉ MANUEL (generalized anxiety disorder), Generalized anxiety disorder (04/27/2020), GERD (gastroesophageal reflux disease), GI bleeding, H/O carpal tunnel syndrome, History of 2019 novel coronavirus disease (COVID-19) (02/08/2021), HTN (hypertension), Impaired gait and mobility (03/14/2019), Mild TBI (traumatic brain injury) (H) (03/14/2019), Mumps, Obesity (BMI 30-39.9) (09/03/2015), Osteoarthritis of both knees (05/13/2015), Osteoporosis, Other idiopathic peripheral autonomic neuropathy (03/30/2022), Other seizures (H) (03/30/2022), Pharyngeal dysphagia (05/13/2015), Prostate cancer (H), Recurrent major depressive disorder (03/30/2022), Rhabdomyolysis, and Thrombocytopenia.    He has no past medical history of Asymptomatic human immunodeficiency virus (HIV) infection status (H), Blood in semen, Complication of anesthesia, Congenital renal agenesis and dysgenesis, Epididymitis, bilateral, Epididymitis, left, Epididymitis, right, Goiter, Gout, Hernia, abdominal, History of spinal cord injury, History of thrombophlebitis, Horseshoe kidney, Malignant hyperthermia, Orchitis, epididymitis, and epididymo-orchitis, with abscess, Parkinsons disease (H), Penile discharge, Prostate infection, Spider veins, Spina bifida (H), STD (sexually transmitted disease), Swelling of  testicle, Tethered cord (H), or Tuberculosis.    Discharge Medications:    Current Outpatient Medications   Medication Sig Dispense Refill    calcium carbonate-vitamin D (OSCAL) 250-3.125 MG-MCG TABS per tablet Take 1 tablet by mouth daily. 30 tablet 0    ferrous sulfate (FEROSUL) 325 (65 Fe) MG tablet Take 1 tablet (325 mg) by mouth daily (with breakfast). 30 tablet 0    losartan (COZAAR) 25 MG tablet Take 25 mg by mouth daily.      acetaminophen (TYLENOL) 500 MG tablet Take 2 tablets (1,000 mg) by mouth 3 times daily as needed for mild pain 10 tablet 98    cyanocobalamin (VITAMIN B-12) 1000 MCG tablet TAKE 1 TABLET BY MOUTH ONCE DAILY 90 tablet 97    folic acid (FOLVITE) 1 MG tablet TAKE 1 TABLET BY MOUTH ONCE DAILY 90 tablet 97    gabapentin (NEURONTIN) 100 MG capsule TAKE 1 CAPSULE BY MOUTH AT BEDTIME 90 capsule 97    loratadine (CLARITIN) 10 MG tablet TAKE 1 TABLET BY MOUTH AT BEDTIME 90 tablet 97    menthol-zinc oxide (CALMOSEPTINE) 0.44-20.6 % OINT ointment Apply topically 4 times daily.      omeprazole (PRILOSEC) 40 MG DR capsule TAKE 1 CAPSULE BY MOUTH TWICE DAILY 180 capsule 97    oxymetazoline (AFRIN) 0.05 % nasal spray Spray 2 sprays into both nostrils 2 times daily as needed for congestion.      polyethylene glycol (MIRALAX) 17 GM/Dose powder MIX 2 CAPFULS IN 8OZ OF ORANGE JUICE  IN THE MORNING;AND MAY MIX 2 CAPFULS ONCE DAILY AS NEEDED FOR CONSTIPATION. MIX IN FRONT OF PT. HOLD FOR LOOSE STOOL. OK TO GIVE 1 CAPFUL IF RESIDENT REFUSES 2 CAPFULS. 510 g 97    QUEtiapine (SEROQUEL) 25 MG tablet TAKE ONE-HALF TABLET (12.5MG) BY MOUTH TWICE DAILY;& MAY TAKE ONE-HALF TABLET (12.5MG) EVERY 12 HOURS AS NEEDED 180 tablet 97    sodium chloride (OCEAN) 0.65 % nasal spray Spray 1 spray in nostril 2 times daily. 480 mL 98    torsemide (DEMADEX) 10 MG tablet Take 1 tablet (10 mg) by mouth daily.         Medication Changes/Rationale:   See HPI    Controlled medications sent with patient:   not applicable/none     ROS:  "  4 point ROS including Respiratory, CV, GI and , other than that noted in the HPI,  is negative    Physical Exam:   Vitals: /77   Pulse 77   Temp 98.9  F (37.2  C)   Resp 18   Ht 1.727 m (5' 8\")   Wt 84.1 kg (185 lb 8 oz)   SpO2 96%   BMI 28.21 kg/m    BMI= Body mass index is 28.21 kg/m .  GENERAL APPEARANCE:  {[facility] GENERAL APPEARANCE:811789}  RESP:  {NURSING HOME RESP PE:713837}  CV:  {[facility] CV PE:203602::\"Palpation and auscultation of heart done \",\"regular rate and rhythm, no murmur, rub, or gallop\"}  ABDOMEN:  {[facility] GI PE:640690::\"normal bowel sounds, soft, nontender, no hepatosplenomegaly or other masses\"}  M/S:   {[facility] M/S PE:255200}  SKIN:  {NURSING HOME SKIN PE:371061}  NEURO:   {[facility] NEURO PE:813186}  PSYCH:  {[facility] PSYCH PE:269963}     SNF labs: Labs done in SNF are in [facility]. Please refer to them using [employer] Everywhere.  Last Comprehensive Metabolic Panel:  Lab Results   Component Value Date     03/10/2025    POTASSIUM 4.4 03/10/2025    CHLORIDE 100 03/10/2025    CO2 28 03/10/2025    ANIONGAP 13 03/10/2025    GLC 70 03/10/2025    BUN 21.5 03/10/2025    CR 0.71 03/10/2025    GFRESTIMATED 90 03/10/2025    JOSUE 8.9 03/10/2025       Lab Results   Component Value Date    WBC 5.9 03/10/2025     Lab Results   Component Value Date    RBC 3.40 03/10/2025     Lab Results   Component Value Date    HGB 8.4 03/10/2025     Lab Results   Component Value Date    HCT 27.6 03/10/2025     No components found for: \"MCT\"  Lab Results   Component Value Date    MCV 81 03/10/2025     Lab Results   Component Value Date    MCH 24.7 03/10/2025     Lab Results   Component Value Date    MCHC 30.4 03/10/2025     Lab Results   Component Value Date    RDW 20.8 03/10/2025     Lab Results   Component Value Date     03/10/2025         DISCHARGE PLAN:  Follow up labs: BMP,CBC x1 week***  Medical Follow Up:      Follow up with primary care provider in 1-2 " weeks  MT referral needed and placed by this provider: No  Current [place] scheduled appointments:         Apr 17, 2025 1:20 PM  (Arrive by 1:15 PM)  Return Cardiology with MARICHUY Saini CNP  [facility] ([facility]) [phone]     Oct 30, 2025 8:15 AM  (Arrive by 8:00 AM)  NEW THROAT with Mallory Adorno MD  [facility] [place] ([facility] ) [phone]     Oct 30, 2025 8:15 AM  (Arrive by 8:00 AM)  [facility] Speech Pathology and ENT Evaluation with  Ent Dysphonia Slp Provider  [facility] [place] ([facility] ) [phone]            Discharge Services: Home Care:  Occupational Therapy, Physical Therapy, Registered Nurse, Home Health Aide, and       TOTAL DISCHARGE TIME:   Greater than 30 minutes  Electronically signed by:  MARICHUY Mendoza CNP     Home care Face to Face documentation done in [facility] attached to Home care orders for homecare.

## 2025-03-21 ENCOUNTER — DISCHARGE SUMMARY NURSING HOME (OUTPATIENT)
Dept: GERIATRICS | Facility: CLINIC | Age: 85
End: 2025-03-21
Payer: COMMERCIAL

## 2025-03-21 DIAGNOSIS — E61.1 IRON DEFICIENCY: Primary | ICD-10-CM

## 2025-03-21 DIAGNOSIS — R60.0 BILATERAL LOWER EXTREMITY EDEMA: ICD-10-CM

## 2025-03-21 DIAGNOSIS — D64.9 ANEMIA, UNSPECIFIED TYPE: ICD-10-CM

## 2025-03-21 DIAGNOSIS — R53.81 PHYSICAL DECONDITIONING: ICD-10-CM

## 2025-03-21 DIAGNOSIS — I48.91 ATRIAL FIBRILLATION, UNSPECIFIED TYPE (H): ICD-10-CM

## 2025-03-21 DIAGNOSIS — I50.9 CONGESTIVE HEART FAILURE, UNSPECIFIED HF CHRONICITY, UNSPECIFIED HEART FAILURE TYPE (H): ICD-10-CM

## 2025-03-21 DIAGNOSIS — I10 ESSENTIAL HYPERTENSION, BENIGN: ICD-10-CM

## 2025-03-21 DIAGNOSIS — K22.719 BARRETT'S ESOPHAGUS WITH DYSPLASIA: ICD-10-CM

## 2025-03-21 DIAGNOSIS — E55.9 VITAMIN D DEFICIENCY: ICD-10-CM

## 2025-03-21 NOTE — LETTER
3/21/2025      Jamie Valdivia  [place]  [address]  [facility] [address]        No notes on file      Sincerely,        MARICHYU Mendoza CNP    Electronically signed

## 2025-03-24 ENCOUNTER — PATIENT OUTREACH (OUTPATIENT)
Dept: GERIATRIC MEDICINE | Facility: CLINIC | Age: 85
End: 2025-03-24
Payer: COMMERCIAL

## 2025-03-24 NOTE — PROGRESS NOTES
[facility] Care Coordination Contact    Arranged transportation thru are [phone] for the below appt:  Appt Date & Time: 04/17/2025 1:15pm  Clinic Name & Address:   [address] [place] [id number]    Transportation Provider: JANIE [phone] / [phone] w   time:  12:30pm - 1:00pm  Return ride  time: will  call    Notified CC and member via letter of  time.    Sylvia Odonnell    Care Management Specialist   [facility]  [phone]

## 2025-03-27 NOTE — PROGRESS NOTES
[facility] Care Coordination Contact    Member out of community setting for longer than 30 days. Elderly waiver temporary closed effective 2/19/25. DTR request sent to Arabella Langford RN. [phone] sent to [facility].     Gisela Hall RN  [facility]  [phone]

## 2025-03-31 ENCOUNTER — DOCUMENTATION ONLY (OUTPATIENT)
Dept: GERIATRICS | Facility: CLINIC | Age: 85
End: 2025-03-31
Payer: COMMERCIAL

## 2025-03-31 DIAGNOSIS — R53.81 PHYSICAL DECONDITIONING: ICD-10-CM

## 2025-03-31 DIAGNOSIS — I48.91 ATRIAL FIBRILLATION, UNSPECIFIED TYPE (H): ICD-10-CM

## 2025-03-31 DIAGNOSIS — I10 ESSENTIAL HYPERTENSION, BENIGN: ICD-10-CM

## 2025-03-31 DIAGNOSIS — I50.9 CONGESTIVE HEART FAILURE, UNSPECIFIED HF CHRONICITY, UNSPECIFIED HEART FAILURE TYPE (H): Primary | ICD-10-CM

## 2025-03-31 DIAGNOSIS — R60.0 BILATERAL LOWER EXTREMITY EDEMA: ICD-10-CM

## 2025-03-31 DIAGNOSIS — D64.9 ANEMIA, UNSPECIFIED TYPE: ICD-10-CM

## 2025-03-31 PROCEDURE — 99316 NF DSCHRG MGMT 30 MIN+: CPT | Performed by: NURSE PRACTITIONER

## 2025-03-31 NOTE — PROGRESS NOTES
[facility] DISCHARGE SUMMARY  PATIENT'S NAME: Jamie Valdivia  YOB: 1940  MEDICAL RECORD NUMBER:  [id number]  Place of Service where encounter took place:  No question data found.    PRIMARY CARE PROVIDER AND CLINIC RESPONSIBLE AFTER TRANSFER:   MARICHUY Basilio CNP, [address] [facility] [address]    Assisted Living: [facility] ([facility])     Transferring providers: MARICHUY Mendoza CNP; Tyler Weiss MD  Recent Hospitalization/ED:  [facility] Hospital stay 2/19/25 to 2/28/25.  Date of SNF Admission:  2/28/25  Date of SNF (anticipated) Discharge:  4/1/25 , (discharge had been postponed from prior)  Discharged to: previous assisted living [facility] memory care  Cognitive Scores/Physical Function/ DME:   Walking in halls 4WW  Reports he is independent with ADLs    CODE STATUS/ADVANCE DIRECTIVES DISCUSSION:  Full Code   ALLERGIES: Ativan [lorazepam]    DISCHARGE DIAGNOSIS/NURSING FACILITY COURSE:     Per hospital report:     Patient Jamie Valdivia is a 84 yr old male admitted to [facility] TCU for decompensated heart failure and fluid retention  PMHx mild dementia,hypertension, chronic afib (has declined anticoagulation), GERD, h/o Vickers's esophagus, chronic anemia; chronic pharyngeal dysphagia; peripheral neuropathy, OA; chronic spine disease, osteoporosis, prostate cancer; h/o alcohol use d/o (no longer drinking); h/o falls     ECG showed afib with CVR, no acute ischemic changes.CXR showed left hemidiaphragm remained elevated compared to the right and overall the lungs had shallow inflation, chronic blunting of the right posterior costophrenic sulcus could relate to scarring or minimal fluid but unchanged (comparison 7/2024).    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    *Regarding A-fib, patient has declined anticoagulation and LAAO, previously on baby aspirin but discontinued with history of GI bleed  Continue torsemide 10 mg daily (prior on lasix)  Give addition torsemide 10mg x3 days due to weights trend up 191lb today  Not appear fluid up   Wt Readings from Last 2 Encounters:   03/20/25 84.1 kg (185 lb 8 oz)   03/18/25 84.5 kg (186 lb 4.8 oz)     Follow up cardiology as advised  ARB lowered due to LILY, some low BPs  Continue lymphedema therapy as patient allows, has been refusing wraps/compression stockings  Follow up BMP, CBC 4/2/25      Chronic pharyngeal dysphagia.  Chronic dysphonia.  GERD with Vickers's esophagus.  Cricopharyngeal bar and Zenker diverticulum   Per hospital note;   EGD 2022 found Vickers's esophagus and him started on PPI twice daily. Seen in clinic by Gastroenterologist Colby Mercado on 2/3/2025 and X-ray esophagram, EGD, ENT referral and speech therapy referral placed at that visit. 2/21 SLP obtained a video swallow study on 2/21 that showed large amounts of aspiration of thin liquids. It also showed a zenker diverticulum and prominent cricopharyngeus muscle, patient refused mildly thickened liquids as recommended by SLP. GI consulted not interested in EGD- patient to follow up with Dr. Mercado outpatient or seek a second opinion. Patient is high risk for aspiration.  Continue PPI 2xday  Speech therapy evaluate and treat      Hypocalcemia, non-severe, asymptomatic   Vitamin D deficiency  Initial presentation as above. Calcium (including ionized) low on admit. 6.8 level.  Vitamin D level low on admit. (25, [place]) level of 16 on admission. Started on calcium and vitamin D this hospitalization  Continue calcium & vitamin D  Monitor      Iron deficiency anemia  Per hospital note  Hemoglobin   Date Value Ref Range Status   03/10/2025 8.4 (L) 13.3 - 17.7 g/dL Final   03/05/2025 7.9 (L) 13.3 - 17.7 g/dL Final   Hgb stable ~8   Iron studies in hospital showed low iron, iron saturation and ferritin. TSH normal on admit. B12 level normal on admit. Started on IV iron 2/21, changed to PO 2/22. (Venofer 300 mg IV X 2)  Iron stopped, patient has been refusing  Remains on B12 and folic acid for anemia  Monitor      Weakness and physical deconditioning due to multiple acute and chronic medical issues  Peripheral neuropathy  Frequent falls  Dementia, mild  Per hospital note:  Patient lives in memory care. He and his wife have separate apartments . She is in the assisted living. (he lives one floor above her). 2/26 psychiatry assessment- patient does not have capacity to determine his disposition-   Continue gabapentin 100mg at bedtime  Continue quetiapine (consider higher dose due to paranoia)     OA with h/o joint replacements  Chronic spine disease with h/o spine surgery  Osteoporosis with h/o compression fractures  continue calcium and vitamin D   Pain managed with tylenol      Obesity  Body mass index is 30.82 kg/m   Encourage physical activity, portion control and increase fruits and vegetables     Prostate cancer   Follows with Dr. Niraj Larry. S/p EBRT without ADT 12/2023.   PSA (2/26) 0.12 this admission.   Follow up with urology as advised      H/o alcohol use disorder, unclear if issues since moving to [place] [facility] a few years ago    Deconditioned  Comment: d/t recent hospitalization & comorbidity, expect delay rehabilitation d/t age & comorbidity  Plan: PT/OT eval & treat, monitor  Plans to discharge home with homecare    Advanced care planning   FULL code, patient sign; staff to confirm with  wife    Cognitive impairment   Per hospital note  His wife is unable to make decisions for him as noted in the record and per psychiatry he does not have the capacity to decide disposition   See SW/CM notes patient does not want children contacted.    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) (11/19/2021), ED (erectile dysfunction), JOSÉ MANUEL (generalized anxiety disorder), Generalized anxiety disorder (04/27/2020), GERD (gastroesophageal reflux disease), GI bleeding, H/O carpal tunnel syndrome, History of 2019 novel coronavirus disease (COVID-19) (02/08/2021), HTN (hypertension), Impaired gait and mobility (03/14/2019), Mild TBI (traumatic brain injury) (H) (03/14/2019), Mumps, Obesity (BMI 30-39.9) (09/03/2015), Osteoarthritis of both knees (05/13/2015), Osteoporosis, Other idiopathic peripheral autonomic neuropathy (03/30/2022), Other seizures (H) (03/30/2022), Pharyngeal dysphagia (05/13/2015), Prostate cancer (H), Recurrent major depressive disorder (03/30/2022), Rhabdomyolysis, and Thrombocytopenia.    He has no past medical history of Asymptomatic human immunodeficiency virus (HIV) infection status (H), Blood in semen, Complication of anesthesia, Congenital renal agenesis and dysgenesis, Epididymitis, bilateral, Epididymitis, left, Epididymitis, right, Goiter, Gout, Hernia, abdominal, History of spinal cord injury, History of thrombophlebitis, Horseshoe kidney, Malignant hyperthermia, Orchitis, epididymitis, and  epididymo-orchitis, with abscess, Parkinsons disease (H), Penile discharge, Prostate infection, Spider veins, Spina bifida (H), STD (sexually transmitted disease), Swelling of testicle, Tethered cord (H), or Tuberculosis.    Discharge Medications:    Current Outpatient Medications   Medication Sig Dispense Refill    acetaminophen (TYLENOL) 500 MG tablet Take 2 tablets (1,000 mg) by mouth 3 times daily as needed for mild pain 10 tablet 98    calcium carbonate-vitamin D (OSCAL) 250-3.125 MG-MCG TABS per tablet Take 1 tablet by mouth daily. 30 tablet 0    cyanocobalamin (VITAMIN B-12) 1000 MCG tablet TAKE 1 TABLET BY MOUTH ONCE DAILY 90 tablet 97    folic acid (FOLVITE) 1 MG tablet TAKE 1 TABLET BY MOUTH ONCE DAILY 90 tablet 97    gabapentin (NEURONTIN) 100 MG capsule TAKE 1 CAPSULE BY MOUTH AT BEDTIME 90 capsule 97    loratadine (CLARITIN) 10 MG tablet TAKE 1 TABLET BY MOUTH AT BEDTIME 90 tablet 97    losartan (COZAAR) 25 MG tablet Take 25 mg by mouth daily.      menthol-zinc oxide (CALMOSEPTINE) 0.44-20.6 % OINT ointment Apply topically 4 times daily.      omeprazole (PRILOSEC) 40 MG DR capsule TAKE 1 CAPSULE BY MOUTH TWICE DAILY 180 capsule 97    oxymetazoline (AFRIN) 0.05 % nasal spray Spray 2 sprays into both nostrils 2 times daily as needed for congestion.      polyethylene glycol (MIRALAX) 17 GM/Dose powder MIX 2 CAPFULS IN 8OZ OF ORANGE JUICE  IN THE MORNING;AND MAY MIX 2 CAPFULS ONCE DAILY AS NEEDED FOR CONSTIPATION. MIX IN FRONT OF PT. HOLD FOR LOOSE STOOL. OK TO GIVE 1 CAPFUL IF RESIDENT REFUSES 2 CAPFULS. 510 g 97    QUEtiapine (SEROQUEL) 25 MG tablet TAKE ONE-HALF TABLET (12.5MG) BY MOUTH TWICE DAILY;& MAY TAKE ONE-HALF TABLET (12.5MG) EVERY 12 HOURS AS NEEDED 180 tablet 97    sodium chloride (OCEAN) 0.65 % nasal spray Spray 1 spray in nostril 2 times daily. 480 mL 98    torsemide (DEMADEX) 10 MG tablet Take 1 tablet (10 mg) by mouth daily.    GIVE ADDITIONAL TORSEMIDE 10MG X3 DAYS (START 3/31)    "      Medication Changes/Rationale:   See HPI    Controlled medications sent with patient:   not applicable/none     ROS:   4 point ROS including Respiratory, CV, GI and , other than that noted in the HPI,  is negative    Physical Exam:   Vitals: There were no vitals taken for this visit.  BMI= There is no height or weight on file to calculate BMI.  GENERAL APPEARANCE:  Alert, in no distress      SNF labs: Labs done in SNF are in [facility] EPIC. Please refer to them using EPIC/Care Everywhere.  Last Comprehensive Metabolic Panel:      Lab Results   Component Value Date     03/10/2025    POTASSIUM 4.4 03/10/2025    CHLORIDE 100 03/10/2025    CO2 28 03/10/2025    ANIONGAP 13 03/10/2025    GLC 70 03/10/2025    BUN 21.5 03/10/2025    CR 0.71 03/10/2025    GFRESTIMATED 90 03/10/2025    JOSUE 8.9 03/10/2025       Lab Results   Component Value Date    WBC 5.9 03/10/2025     Lab Results   Component Value Date    RBC 3.40 03/10/2025     Lab Results   Component Value Date    HGB 8.4 03/10/2025     Lab Results   Component Value Date    HCT 27.6 03/10/2025     No components found for: \"MCT\"  Lab Results   Component Value Date    MCV 81 03/10/2025     Lab Results   Component Value Date    MCH 24.7 03/10/2025     Lab Results   Component Value Date    MCHC 30.4 03/10/2025     Lab Results   Component Value Date    RDW 20.8 03/10/2025     Lab Results   Component Value Date     03/10/2025         DISCHARGE PLAN:  Follow up labs: BMP,CBC 4/2/25  Medical Follow Up:      Follow up with primary care provider in 1-2 weeks  [facility] referral needed and placed by this provider: No  Current [facility] scheduled appointments:         Apr 17, 2025 1:20 PM  (Arrive by 1:15 PM)  Return Cardiology with MARICHUY Saini CNP  [facility] [place] ([facility]) [phone]     Oct 30, 2025 8:15 AM  (Arrive by 8:00 AM)  NEW THROAT with Mallory Adorno MD  [facility] " [facility] ([facility] [place] ) [phone]     Oct 30, 2025 8:15 AM  (Arrive by 8:00 AM)  [facility] Speech Pathology and ENT Evaluation with  Ent Dysphonia Slp Provider  [facility] [place] ([facility] ) [phone]            Discharge Services: Home Care:  Occupational Therapy, Physical Therapy, Registered Nurse, Home Health Aide, and       TOTAL DISCHARGE TIME:   Greater than 30 minutes  Electronically signed by:  MARICHUY Mendoza CNP     Home care Face to Face documentation done in [facility] attached to Home care orders for homecare.

## 2025-04-01 ENCOUNTER — PATIENT OUTREACH (OUTPATIENT)
Dept: GERIATRIC MEDICINE | Facility: CLINIC | Age: 85
End: 2025-04-01
Payer: COMMERCIAL

## 2025-04-01 NOTE — PROGRESS NOTES
[facility] Care Coordination Contact    Received a request to submit a DTR for the terminated of Assisted Living and EW. Documentation completed and faxed to the health plan. Care Coordinator aware.    Arabella Langford RN  Utilization   [facility]  [phone]

## 2025-04-15 ENCOUNTER — PATIENT OUTREACH (OUTPATIENT)
Dept: GERIATRIC MEDICINE | Facility: CLINIC | Age: 85
End: 2025-04-15
Payer: COMMERCIAL

## 2025-04-15 NOTE — PROGRESS NOTES
[facility] Care Coordination Contact    Received authorization task from care coordinator.  Completed following tasks: Submitted referrals/auths for CL and Updated services in Database.    Provider Signature - No Support Plan Shared:  Member indicates that they do not want their support plan shared with any EW providers.    Sylvia Odonnell    Care Management Specialist   [facility]  [phone]

## 2025-04-21 NOTE — PATIENT INSTRUCTIONS
Jamie Valdivia  1940  ORDERS:  - Celexa 5 mg PO once daily dx depression  - Discontinue PRN Zyprexa   - Discontinue multivitamin   Electronically signed by:   MARICHUY Basilio CNP  07/29/22 4:26 PM     (V5) oriented

## 2025-06-12 ENCOUNTER — PATIENT OUTREACH (OUTPATIENT)
Dept: GERIATRIC MEDICINE | Facility: CLINIC | Age: 85
End: 2025-06-12
Payer: COMMERCIAL

## 2025-06-12 NOTE — PROGRESS NOTES
[facility] Care Coordination Contact      [facility] Six-Month Telephone Assessment    6 month telephone assessment completed on 6/12/25.    ER visits: Yes -  [facility]  Hospitalizations: Yes -  [facility]  TCU stays: Yes -  [facility]  Significant health status changes: Denies- however, care coordinator got update from AL staff. He continues to decline help from them and does not take his medications.   Falls/Injuries: Yes: Several falls reported by AL staff but no injuries.   ADL/IADL changes: No  Changes in services: No    Caregiver Assessment follow up:  NA    Goals: See Support Plan for goal progress documentation.      Will see member in 6 months for an annual health risk assessment.   Encouraged member to call CC with any questions or concerns in the meantime.     Gisela Hall RN  [facility]  [phone]

## 2025-08-12 ENCOUNTER — PATIENT OUTREACH (OUTPATIENT)
Dept: GERIATRIC MEDICINE | Facility: CLINIC | Age: 85
End: 2025-08-12
Payer: COMMERCIAL

## 2025-08-19 ENCOUNTER — PATIENT OUTREACH (OUTPATIENT)
Dept: GERIATRIC MEDICINE | Facility: CLINIC | Age: 85
End: 2025-08-19
Payer: COMMERCIAL

## (undated) DEVICE — NDL BIOPSY 18GA 20CM 441820

## (undated) DEVICE — SPONGE BALL KERLIX ROUND XL W/O STRING LATEX 4935

## (undated) DEVICE — PACK CYSTOSCOPY SBA15CYFSI

## (undated) DEVICE — SPONGE RAY-TEC 4X8" 7318

## (undated) DEVICE — Device

## (undated) DEVICE — DRSG TELFA 3X8" 1238

## (undated) DEVICE — GLOVE PROTEXIS W/NEU-THERA 8.0  2D73TE80

## (undated) DEVICE — SOL WATER IRRIG 1000ML BOTTLE 2F7114

## (undated) DEVICE — LINEN TOWEL PACK X5 5464

## (undated) DEVICE — BLADE KNIFE SURG 11 371111

## (undated) DEVICE — ENDO BITE BLOCK ADULT OLYMPUS LATEX FREE MAJ-1632

## (undated) DEVICE — BAG DRAIN URO FOR SIEMENS 8MM ADAPTER NS CC164NS-A

## (undated) DEVICE — KIT ENDO TURNOVER/PROCEDURE W/CLEAN A SCOPE LINERS 103888

## (undated) DEVICE — FCP BIOPSY RADIAL JAW 4 JUMBO 3.2MM CHANNEL M00513370

## (undated) RX ORDER — LIDOCAINE HYDROCHLORIDE 20 MG/ML
INJECTION, SOLUTION EPIDURAL; INFILTRATION; INTRACAUDAL; PERINEURAL
Status: DISPENSED
Start: 2022-09-28

## (undated) RX ORDER — CLINDAMYCIN PHOSPHATE 900 MG/50ML
INJECTION, SOLUTION INTRAVENOUS
Status: DISPENSED
Start: 2022-09-28

## (undated) RX ORDER — FENTANYL CITRATE 50 UG/ML
INJECTION, SOLUTION INTRAMUSCULAR; INTRAVENOUS
Status: DISPENSED
Start: 2023-01-11

## (undated) RX ORDER — DEXAMETHASONE SODIUM PHOSPHATE 4 MG/ML
INJECTION, SOLUTION INTRA-ARTICULAR; INTRALESIONAL; INTRAMUSCULAR; INTRAVENOUS; SOFT TISSUE
Status: DISPENSED
Start: 2022-09-28

## (undated) RX ORDER — FENTANYL CITRATE 0.05 MG/ML
INJECTION, SOLUTION INTRAMUSCULAR; INTRAVENOUS
Status: DISPENSED
Start: 2022-06-01

## (undated) RX ORDER — PROPOFOL 10 MG/ML
INJECTION, EMULSION INTRAVENOUS
Status: DISPENSED
Start: 2022-09-28

## (undated) RX ORDER — ONDANSETRON 2 MG/ML
INJECTION INTRAMUSCULAR; INTRAVENOUS
Status: DISPENSED
Start: 2022-09-28

## (undated) RX ORDER — CEFAZOLIN SODIUM/WATER 2 G/20 ML
SYRINGE (ML) INTRAVENOUS
Status: DISPENSED
Start: 2023-01-11

## (undated) RX ORDER — FENTANYL CITRATE 50 UG/ML
INJECTION, SOLUTION INTRAMUSCULAR; INTRAVENOUS
Status: DISPENSED
Start: 2022-09-28